# Patient Record
Sex: FEMALE | Race: WHITE | Employment: OTHER | URBAN - NONMETROPOLITAN AREA
[De-identification: names, ages, dates, MRNs, and addresses within clinical notes are randomized per-mention and may not be internally consistent; named-entity substitution may affect disease eponyms.]

---

## 2017-01-18 ENCOUNTER — OFFICE VISIT (OUTPATIENT)
Dept: PSYCHIATRY | Age: 57
End: 2017-01-18

## 2017-01-18 DIAGNOSIS — F31.32 BIPOLAR 1 DISORDER, DEPRESSED, MODERATE (HCC): Primary | ICD-10-CM

## 2017-01-18 PROCEDURE — 99213 OFFICE O/P EST LOW 20 MIN: CPT | Performed by: PSYCHIATRY & NEUROLOGY

## 2017-01-18 RX ORDER — CITALOPRAM 40 MG/1
40 TABLET ORAL DAILY
Qty: 30 TABLET | Refills: 3 | Status: SHIPPED | OUTPATIENT
Start: 2017-01-18 | End: 2017-03-28 | Stop reason: SDUPTHER

## 2017-02-15 ENCOUNTER — OFFICE VISIT (OUTPATIENT)
Dept: PSYCHIATRY | Age: 57
End: 2017-02-15

## 2017-02-15 DIAGNOSIS — F31.75 BIPOLAR DISORDER, IN PARTIAL REMISSION, MOST RECENT EPISODE DEPRESSED (HCC): Primary | ICD-10-CM

## 2017-02-15 PROCEDURE — 99213 OFFICE O/P EST LOW 20 MIN: CPT | Performed by: PSYCHIATRY & NEUROLOGY

## 2017-02-22 PROBLEM — D50.9 IRON DEFICIENCY ANEMIA: Status: ACTIVE | Noted: 2017-02-22

## 2017-02-22 PROBLEM — R07.9 CHEST PAIN AT REST: Status: ACTIVE | Noted: 2017-02-22

## 2017-03-07 RX ORDER — LAMOTRIGINE 200 MG/1
TABLET ORAL
Qty: 90 TABLET | Refills: 0 | Status: ON HOLD | OUTPATIENT
Start: 2017-03-07 | End: 2017-04-25 | Stop reason: SDUPTHER

## 2017-03-15 ENCOUNTER — OFFICE VISIT (OUTPATIENT)
Dept: PSYCHIATRY | Age: 57
End: 2017-03-15

## 2017-03-15 DIAGNOSIS — F31.75 BIPOLAR DISORDER, IN PARTIAL REMISSION, MOST RECENT EPISODE DEPRESSED (HCC): Primary | ICD-10-CM

## 2017-03-15 PROCEDURE — 99213 OFFICE O/P EST LOW 20 MIN: CPT | Performed by: PSYCHIATRY & NEUROLOGY

## 2017-03-28 RX ORDER — CITALOPRAM 40 MG/1
TABLET ORAL
Qty: 90 TABLET | Refills: 3 | Status: ON HOLD | OUTPATIENT
Start: 2017-03-28 | End: 2018-02-07 | Stop reason: HOSPADM

## 2017-04-19 ENCOUNTER — OFFICE VISIT (OUTPATIENT)
Dept: PSYCHIATRY | Age: 57
End: 2017-04-19

## 2017-04-19 DIAGNOSIS — F31.75 BIPOLAR DISORDER, IN PARTIAL REMISSION, MOST RECENT EPISODE DEPRESSED (HCC): Primary | ICD-10-CM

## 2017-04-19 PROCEDURE — 99213 OFFICE O/P EST LOW 20 MIN: CPT | Performed by: PSYCHIATRY & NEUROLOGY

## 2017-04-25 PROBLEM — R94.39 ABNORMAL NUCLEAR STRESS TEST: Status: ACTIVE | Noted: 2017-04-25

## 2017-04-25 PROBLEM — Z86.79 H/O CLASS II ANGINA PECTORIS: Status: ACTIVE | Noted: 2017-04-25

## 2017-05-09 RX ORDER — LITHIUM CARBONATE 300 MG/1
CAPSULE ORAL
Qty: 270 CAPSULE | Refills: 3 | Status: SHIPPED | OUTPATIENT
Start: 2017-05-09 | End: 2018-08-03 | Stop reason: SDUPTHER

## 2017-05-12 ENCOUNTER — TELEPHONE (OUTPATIENT)
Dept: PSYCHIATRY | Age: 57
End: 2017-05-12

## 2017-05-12 DIAGNOSIS — F31.75 BIPOLAR I, MOST RECENT EPISODE DEPRESSED, PARTIAL REMISSION (HCC): Primary | ICD-10-CM

## 2017-05-24 ENCOUNTER — OFFICE VISIT (OUTPATIENT)
Dept: PSYCHIATRY | Age: 57
End: 2017-05-24

## 2017-05-24 DIAGNOSIS — F31.75 BIPOLAR I, MOST RECENT EPISODE DEPRESSED, PARTIAL REMISSION (HCC): Primary | ICD-10-CM

## 2017-05-24 PROCEDURE — 99213 OFFICE O/P EST LOW 20 MIN: CPT | Performed by: PSYCHIATRY & NEUROLOGY

## 2017-05-24 RX ORDER — LAMOTRIGINE 200 MG/1
200 TABLET ORAL DAILY
Qty: 90 TABLET | Refills: 3 | Status: SHIPPED | OUTPATIENT
Start: 2017-05-24 | End: 2018-08-20 | Stop reason: SDUPTHER

## 2017-07-10 ENCOUNTER — TELEPHONE (OUTPATIENT)
Dept: PSYCHIATRY | Age: 57
End: 2017-07-10

## 2017-07-26 ENCOUNTER — HOSPITAL ENCOUNTER (OUTPATIENT)
Age: 57
Discharge: HOME | End: 2017-07-26
Payer: MEDICARE

## 2017-07-26 LAB
BASOPHILS # BLD: 0.9 %
BASOPHILS ABSOLUTE: 0 THOU/MM3 (ref 0–0.1)
EOSINOPHIL # BLD: 3.5 %
EOSINOPHILS ABSOLUTE: 0.2 THOU/MM3 (ref 0–0.4)
HCT VFR BLD CALC: 37.3 % (ref 37–47)
HEMOGLOBIN: 12.4 GM/DL (ref 12–16)
LYMPHOCYTES # BLD: 12.8 %
LYMPHOCYTES ABSOLUTE: 0.7 THOU/MM3 (ref 1–4.8)
MCH RBC QN AUTO: 32.6 PG (ref 27–31)
MCHC RBC AUTO-ENTMCNC: 33.3 GM/DL (ref 33–37)
MCV RBC AUTO: 97.8 FL (ref 81–99)
MONOCYTES # BLD: 8.6 %
MONOCYTES ABSOLUTE: 0.5 THOU/MM3 (ref 0.4–1.3)
NUCLEATED RED BLOOD CELLS: 0 /100 WBC
PDW BLD-RTO: 14 % (ref 11.5–14.5)
PLATELET # BLD: 189 THOU/MM3 (ref 130–400)
PMV BLD AUTO: 8.8 MCM (ref 7.4–10.4)
RBC # BLD: 3.81 MILL/MM3 (ref 4.2–5.4)
RBC # BLD: NORMAL 10*6/UL
SEG NEUTROPHILS: 74.2 %
SEGMENTED NEUTROPHILS ABSOLUTE COUNT: 3.9 THOU/MM3 (ref 1.8–7.7)
WBC # BLD: 5.3 THOU/MM3 (ref 4.8–10.8)

## 2017-07-26 PROCEDURE — 85025 COMPLETE CBC W/AUTO DIFF WBC: CPT

## 2017-07-26 PROCEDURE — 36415 COLL VENOUS BLD VENIPUNCTURE: CPT

## 2017-08-02 ENCOUNTER — OFFICE VISIT (OUTPATIENT)
Dept: PSYCHIATRY | Age: 57
End: 2017-08-02
Payer: MEDICARE

## 2017-08-02 DIAGNOSIS — F31.75 BIPOLAR I, MOST RECENT EPISODE DEPRESSED, PARTIAL REMISSION (HCC): Primary | ICD-10-CM

## 2017-08-02 PROCEDURE — 99213 OFFICE O/P EST LOW 20 MIN: CPT | Performed by: PSYCHIATRY & NEUROLOGY

## 2017-08-25 ENCOUNTER — HOSPITAL ENCOUNTER (OUTPATIENT)
Age: 57
Discharge: HOME | End: 2017-08-25
Payer: MEDICARE

## 2017-08-25 LAB
CREAT SERPL-MCNC: 0.6 MG/DL (ref 0.4–1.2)
GFR SERPL CREATININE-BSD FRML MDRD: > 90 ML/MIN/1.73M2

## 2017-08-25 PROCEDURE — 36415 COLL VENOUS BLD VENIPUNCTURE: CPT

## 2017-08-25 PROCEDURE — 82565 ASSAY OF CREATININE: CPT

## 2017-09-06 ENCOUNTER — HOSPITAL ENCOUNTER (OUTPATIENT)
Age: 57
Discharge: HOME | End: 2017-09-06
Payer: MEDICARE

## 2017-09-06 LAB
ANION GAP SERPL CALCULATED.3IONS-SCNC: 10 MEQ/L (ref 8–16)
BUN BLDV-MCNC: 10 MG/DL (ref 7–22)
CALCIUM SERPL-MCNC: 10 MG/DL (ref 8.5–10.5)
CHLORIDE BLD-SCNC: 107 MEQ/L (ref 98–111)
CO2: 25 MEQ/L (ref 23–33)
CREAT SERPL-MCNC: 0.4 MG/DL (ref 0.4–1.2)
GFR SERPL CREATININE-BSD FRML MDRD: > 90 ML/MIN/1.73M2
GLUCOSE BLD-MCNC: 87 MG/DL (ref 70–108)
POTASSIUM SERPL-SCNC: 4.4 MEQ/L (ref 3.5–5.2)
SODIUM BLD-SCNC: 142 MEQ/L (ref 135–145)

## 2017-09-06 PROCEDURE — 36415 COLL VENOUS BLD VENIPUNCTURE: CPT

## 2017-09-06 PROCEDURE — 80048 BASIC METABOLIC PNL TOTAL CA: CPT

## 2017-09-08 ENCOUNTER — HOSPITAL ENCOUNTER (OUTPATIENT)
Dept: MRI IMAGING | Age: 57
Discharge: HOME | End: 2017-09-08
Payer: MEDICARE

## 2017-09-08 DIAGNOSIS — G35 MULTIPLE SCLEROSIS (HCC): ICD-10-CM

## 2017-09-08 PROCEDURE — 70553 MRI BRAIN STEM W/O & W/DYE: CPT

## 2017-09-08 PROCEDURE — A9579 GAD-BASE MR CONTRAST NOS,1ML: HCPCS | Performed by: PSYCHIATRY & NEUROLOGY

## 2017-09-08 PROCEDURE — 6360000004 HC RX CONTRAST MEDICATION: Performed by: PSYCHIATRY & NEUROLOGY

## 2017-09-08 RX ADMIN — GADOTERIDOL 10 ML: 279.3 INJECTION, SOLUTION INTRAVENOUS at 18:36

## 2017-10-26 ENCOUNTER — HOSPITAL ENCOUNTER (OUTPATIENT)
Age: 57
Discharge: HOME | End: 2017-10-26
Payer: MEDICARE

## 2017-10-26 LAB
T4 FREE: 1.2 NG/DL (ref 0.93–1.76)
TSH SERPL DL<=0.05 MIU/L-ACNC: 0.4 UIU/ML (ref 0.4–4.2)

## 2017-10-26 PROCEDURE — 84443 ASSAY THYROID STIM HORMONE: CPT

## 2017-10-26 PROCEDURE — 84439 ASSAY OF FREE THYROXINE: CPT

## 2017-10-26 PROCEDURE — 36415 COLL VENOUS BLD VENIPUNCTURE: CPT

## 2017-11-01 ENCOUNTER — OFFICE VISIT (OUTPATIENT)
Dept: PSYCHIATRY | Age: 57
End: 2017-11-01
Payer: MEDICARE

## 2017-11-01 DIAGNOSIS — F31.75 BIPOLAR I, MOST RECENT EPISODE DEPRESSED, PARTIAL REMISSION (HCC): Primary | ICD-10-CM

## 2017-11-01 PROCEDURE — 99213 OFFICE O/P EST LOW 20 MIN: CPT | Performed by: PSYCHIATRY & NEUROLOGY

## 2017-12-04 ENCOUNTER — HOSPITAL ENCOUNTER (OUTPATIENT)
Dept: WOMENS IMAGING | Age: 57
Discharge: HOME | End: 2017-12-04
Payer: MEDICARE

## 2017-12-04 DIAGNOSIS — Z12.31 VISIT FOR SCREENING MAMMOGRAM: ICD-10-CM

## 2017-12-04 PROCEDURE — 77063 BREAST TOMOSYNTHESIS BI: CPT

## 2018-02-03 ENCOUNTER — HOSPITAL ENCOUNTER (INPATIENT)
Age: 58
LOS: 4 days | Discharge: HOME | DRG: 378 | End: 2018-02-07
Attending: EMERGENCY MEDICINE | Admitting: EMERGENCY MEDICINE
Payer: MEDICARE

## 2018-02-03 ENCOUNTER — NURSE TRIAGE (OUTPATIENT)
Dept: ADMINISTRATIVE | Age: 58
End: 2018-02-03

## 2018-02-03 ENCOUNTER — APPOINTMENT (OUTPATIENT)
Dept: CT IMAGING | Age: 58
DRG: 378 | End: 2018-02-03
Payer: MEDICARE

## 2018-02-03 DIAGNOSIS — R10.9 RIGHT SIDED ABDOMINAL PAIN: Primary | ICD-10-CM

## 2018-02-03 DIAGNOSIS — Z87.11 HISTORY OF GASTRIC ULCER: ICD-10-CM

## 2018-02-03 DIAGNOSIS — Z98.84 HISTORY OF BARIATRIC SURGERY: ICD-10-CM

## 2018-02-03 DIAGNOSIS — D64.89 ANEMIA DUE TO OTHER CAUSE, NOT CLASSIFIED: ICD-10-CM

## 2018-02-03 DIAGNOSIS — K92.1 GASTROINTESTINAL HEMORRHAGE WITH MELENA: ICD-10-CM

## 2018-02-03 PROBLEM — R10.11 RIGHT UPPER QUADRANT ABDOMINAL PAIN: Status: ACTIVE | Noted: 2018-02-03

## 2018-02-03 PROBLEM — K92.2 GI BLEEDING: Status: ACTIVE | Noted: 2018-02-03

## 2018-02-03 PROBLEM — R07.9 CHEST PAIN AT REST: Status: RESOLVED | Noted: 2017-02-22 | Resolved: 2018-02-03

## 2018-02-03 PROBLEM — D53.9 ANEMIA, MACROCYTIC: Status: ACTIVE | Noted: 2018-02-03

## 2018-02-03 LAB
ALBUMIN SERPL-MCNC: 4.3 G/DL (ref 3.5–5.1)
ALP BLD-CCNC: 94 U/L (ref 38–126)
ALT SERPL-CCNC: 38 U/L (ref 11–66)
AMORPHOUS: ABNORMAL
AMYLASE: 82 U/L (ref 20–104)
ANION GAP SERPL CALCULATED.3IONS-SCNC: 14 MEQ/L (ref 8–16)
ANISOCYTOSIS: ABNORMAL
AST SERPL-CCNC: 24 U/L (ref 5–40)
BACTERIA: ABNORMAL /HPF
BASOPHILS # BLD: 0.9 %
BASOPHILS ABSOLUTE: 0 THOU/MM3 (ref 0–0.1)
BILIRUB SERPL-MCNC: 0.2 MG/DL (ref 0.3–1.2)
BILIRUBIN DIRECT: < 0.2 MG/DL (ref 0–0.3)
BILIRUBIN URINE: NEGATIVE
BLOOD, URINE: ABNORMAL
BUN BLDV-MCNC: 12 MG/DL (ref 7–22)
CALCIUM SERPL-MCNC: 9.6 MG/DL (ref 8.5–10.5)
CASTS 2: ABNORMAL /LPF
CASTS UA: ABNORMAL /LPF
CHARACTER, URINE: ABNORMAL
CHLORIDE BLD-SCNC: 103 MEQ/L (ref 98–111)
CO2: 23 MEQ/L (ref 23–33)
COLOR: YELLOW
CREAT SERPL-MCNC: 0.7 MG/DL (ref 0.4–1.2)
CRYSTALS, UA: ABNORMAL
EOSINOPHIL # BLD: 2.7 %
EOSINOPHILS ABSOLUTE: 0.1 THOU/MM3 (ref 0–0.4)
EPITHELIAL CELLS, UA: ABNORMAL /HPF
GFR SERPL CREATININE-BSD FRML MDRD: 86 ML/MIN/1.73M2
GLUCOSE BLD-MCNC: 97 MG/DL (ref 70–108)
GLUCOSE URINE: NEGATIVE MG/DL
HCT VFR BLD CALC: 20.4 % (ref 37–47)
HCT VFR BLD CALC: 27.3 % (ref 37–47)
HEMOCCULT STL QL: POSITIVE
HEMOGLOBIN: 6.6 GM/DL (ref 12–16)
HEMOGLOBIN: 8.8 GM/DL (ref 12–16)
INR BLD: 1.07 (ref 0.85–1.13)
KETONES, URINE: ABNORMAL
LEUKOCYTE ESTERASE, URINE: ABNORMAL
LIPASE: 25.6 U/L (ref 5.6–51.3)
LYMPHOCYTES # BLD: 12.8 %
LYMPHOCYTES ABSOLUTE: 0.7 THOU/MM3 (ref 1–4.8)
MACROCYTES: ABNORMAL
MCH RBC QN AUTO: 32.8 PG (ref 27–31)
MCHC RBC AUTO-ENTMCNC: 32.3 GM/DL (ref 33–37)
MCV RBC AUTO: 101.5 FL (ref 81–99)
MISCELLANEOUS 2: ABNORMAL
MONOCYTES # BLD: 9 %
MONOCYTES ABSOLUTE: 0.5 THOU/MM3 (ref 0.4–1.3)
NITRITE, URINE: NEGATIVE
NUCLEATED RED BLOOD CELLS: 0 /100 WBC
OSMOLALITY CALCULATION: 279.1 MOSMOL/KG (ref 275–300)
PDW BLD-RTO: 14.8 % (ref 11.5–14.5)
PH UA: 7
PLATELET # BLD: 337 THOU/MM3 (ref 130–400)
PMV BLD AUTO: 8.2 FL (ref 7.4–10.4)
POTASSIUM SERPL-SCNC: 4.2 MEQ/L (ref 3.5–5.2)
PROTEIN UA: NEGATIVE
RBC # BLD: 2.69 MILL/MM3 (ref 4.2–5.4)
RBC URINE: ABNORMAL /HPF
RENAL EPITHELIAL, UA: ABNORMAL
SEG NEUTROPHILS: 74.6 %
SEGMENTED NEUTROPHILS ABSOLUTE COUNT: 4 THOU/MM3 (ref 1.8–7.7)
SODIUM BLD-SCNC: 140 MEQ/L (ref 135–145)
SPECIFIC GRAVITY, URINE: 1.01 (ref 1–1.03)
TOTAL PROTEIN: 6.2 G/DL (ref 6.1–8)
UROBILINOGEN, URINE: 0.2 EU/DL
WBC # BLD: 5.3 THOU/MM3 (ref 4.8–10.8)
WBC UA: ABNORMAL /HPF
YEAST: ABNORMAL

## 2018-02-03 PROCEDURE — 6360000004 HC RX CONTRAST MEDICATION: Performed by: EMERGENCY MEDICINE

## 2018-02-03 PROCEDURE — 96361 HYDRATE IV INFUSION ADD-ON: CPT

## 2018-02-03 PROCEDURE — 2580000003 HC RX 258: Performed by: NURSE PRACTITIONER

## 2018-02-03 PROCEDURE — 2580000003 HC RX 258: Performed by: EMERGENCY MEDICINE

## 2018-02-03 PROCEDURE — 80053 COMPREHEN METABOLIC PANEL: CPT

## 2018-02-03 PROCEDURE — 82150 ASSAY OF AMYLASE: CPT

## 2018-02-03 PROCEDURE — 85610 PROTHROMBIN TIME: CPT

## 2018-02-03 PROCEDURE — G0378 HOSPITAL OBSERVATION PER HR: HCPCS

## 2018-02-03 PROCEDURE — 85018 HEMOGLOBIN: CPT

## 2018-02-03 PROCEDURE — 36415 COLL VENOUS BLD VENIPUNCTURE: CPT

## 2018-02-03 PROCEDURE — 96374 THER/PROPH/DIAG INJ IV PUSH: CPT

## 2018-02-03 PROCEDURE — 82248 BILIRUBIN DIRECT: CPT

## 2018-02-03 PROCEDURE — 6370000000 HC RX 637 (ALT 250 FOR IP): Performed by: NURSE PRACTITIONER

## 2018-02-03 PROCEDURE — 87081 CULTURE SCREEN ONLY: CPT

## 2018-02-03 PROCEDURE — 86850 RBC ANTIBODY SCREEN: CPT

## 2018-02-03 PROCEDURE — 86900 BLOOD TYPING SEROLOGIC ABO: CPT

## 2018-02-03 PROCEDURE — 96375 TX/PRO/DX INJ NEW DRUG ADDON: CPT

## 2018-02-03 PROCEDURE — 86901 BLOOD TYPING SEROLOGIC RH(D): CPT

## 2018-02-03 PROCEDURE — 74177 CT ABD & PELVIS W/CONTRAST: CPT

## 2018-02-03 PROCEDURE — 87086 URINE CULTURE/COLONY COUNT: CPT

## 2018-02-03 PROCEDURE — 83690 ASSAY OF LIPASE: CPT

## 2018-02-03 PROCEDURE — 99285 EMERGENCY DEPT VISIT HI MDM: CPT

## 2018-02-03 PROCEDURE — P9016 RBC LEUKOCYTES REDUCED: HCPCS

## 2018-02-03 PROCEDURE — C9113 INJ PANTOPRAZOLE SODIUM, VIA: HCPCS | Performed by: EMERGENCY MEDICINE

## 2018-02-03 PROCEDURE — 87641 MR-STAPH DNA AMP PROBE: CPT

## 2018-02-03 PROCEDURE — 86923 COMPATIBILITY TEST ELECTRIC: CPT

## 2018-02-03 PROCEDURE — 82272 OCCULT BLD FECES 1-3 TESTS: CPT

## 2018-02-03 PROCEDURE — 81001 URINALYSIS AUTO W/SCOPE: CPT

## 2018-02-03 PROCEDURE — 85025 COMPLETE CBC W/AUTO DIFF WBC: CPT

## 2018-02-03 PROCEDURE — 99223 1ST HOSP IP/OBS HIGH 75: CPT | Performed by: NURSE PRACTITIONER

## 2018-02-03 PROCEDURE — 2060000000 HC ICU INTERMEDIATE R&B

## 2018-02-03 PROCEDURE — 6360000002 HC RX W HCPCS: Performed by: EMERGENCY MEDICINE

## 2018-02-03 PROCEDURE — 85014 HEMATOCRIT: CPT

## 2018-02-03 RX ORDER — ONDANSETRON 2 MG/ML
4 INJECTION INTRAMUSCULAR; INTRAVENOUS EVERY 6 HOURS PRN
Status: DISCONTINUED | OUTPATIENT
Start: 2018-02-03 | End: 2018-02-07 | Stop reason: HOSPADM

## 2018-02-03 RX ORDER — FERROUS SULFATE 325(65) MG
325 TABLET ORAL
Status: DISCONTINUED | OUTPATIENT
Start: 2018-02-04 | End: 2018-02-06

## 2018-02-03 RX ORDER — 0.9 % SODIUM CHLORIDE 0.9 %
250 INTRAVENOUS SOLUTION INTRAVENOUS ONCE
Status: DISCONTINUED | OUTPATIENT
Start: 2018-02-03 | End: 2018-02-07 | Stop reason: HOSPADM

## 2018-02-03 RX ORDER — LAMOTRIGINE 100 MG/1
200 TABLET ORAL DAILY
Status: DISCONTINUED | OUTPATIENT
Start: 2018-02-04 | End: 2018-02-07 | Stop reason: HOSPADM

## 2018-02-03 RX ORDER — PANTOPRAZOLE SODIUM 40 MG/10ML
40 INJECTION, POWDER, LYOPHILIZED, FOR SOLUTION INTRAVENOUS 2 TIMES DAILY
Status: DISCONTINUED | OUTPATIENT
Start: 2018-02-04 | End: 2018-02-06

## 2018-02-03 RX ORDER — PANTOPRAZOLE SODIUM 40 MG/10ML
40 INJECTION, POWDER, LYOPHILIZED, FOR SOLUTION INTRAVENOUS EVERY 8 HOURS
Status: DISCONTINUED | OUTPATIENT
Start: 2018-02-03 | End: 2018-02-03

## 2018-02-03 RX ORDER — SODIUM CHLORIDE 0.9 % (FLUSH) 0.9 %
10 SYRINGE (ML) INJECTION EVERY 12 HOURS SCHEDULED
Status: DISCONTINUED | OUTPATIENT
Start: 2018-02-03 | End: 2018-02-06 | Stop reason: SDUPTHER

## 2018-02-03 RX ORDER — PANTOPRAZOLE SODIUM 40 MG/10ML
40 INJECTION, POWDER, LYOPHILIZED, FOR SOLUTION INTRAVENOUS ONCE
Status: COMPLETED | OUTPATIENT
Start: 2018-02-03 | End: 2018-02-03

## 2018-02-03 RX ORDER — SODIUM CHLORIDE 0.9 % (FLUSH) 0.9 %
10 SYRINGE (ML) INJECTION PRN
Status: DISCONTINUED | OUTPATIENT
Start: 2018-02-03 | End: 2018-02-03

## 2018-02-03 RX ORDER — ACETAMINOPHEN 325 MG/1
650 TABLET ORAL EVERY 4 HOURS PRN
Status: DISCONTINUED | OUTPATIENT
Start: 2018-02-03 | End: 2018-02-07 | Stop reason: HOSPADM

## 2018-02-03 RX ORDER — NITROGLYCERIN 0.4 MG/1
0.4 TABLET SUBLINGUAL EVERY 5 MIN PRN
Status: DISCONTINUED | OUTPATIENT
Start: 2018-02-03 | End: 2018-02-07 | Stop reason: HOSPADM

## 2018-02-03 RX ORDER — SODIUM CHLORIDE 0.9 % (FLUSH) 0.9 %
10 SYRINGE (ML) INJECTION EVERY 12 HOURS SCHEDULED
Status: DISCONTINUED | OUTPATIENT
Start: 2018-02-03 | End: 2018-02-03

## 2018-02-03 RX ORDER — SODIUM CHLORIDE 9 MG/ML
INJECTION, SOLUTION INTRAVENOUS CONTINUOUS
Status: DISCONTINUED | OUTPATIENT
Start: 2018-02-03 | End: 2018-02-07 | Stop reason: HOSPADM

## 2018-02-03 RX ORDER — LEVOTHYROXINE SODIUM 0.12 MG/1
125 TABLET ORAL DAILY
Status: DISCONTINUED | OUTPATIENT
Start: 2018-02-04 | End: 2018-02-07 | Stop reason: HOSPADM

## 2018-02-03 RX ORDER — LITHIUM CARBONATE 300 MG/1
600 CAPSULE ORAL NIGHTLY
Status: DISCONTINUED | OUTPATIENT
Start: 2018-02-03 | End: 2018-02-07 | Stop reason: HOSPADM

## 2018-02-03 RX ORDER — SODIUM CHLORIDE 0.9 % (FLUSH) 0.9 %
10 SYRINGE (ML) INJECTION PRN
Status: DISCONTINUED | OUTPATIENT
Start: 2018-02-03 | End: 2018-02-06 | Stop reason: SDUPTHER

## 2018-02-03 RX ORDER — DIMETHYL FUMARATE 240 MG/1
240 CAPSULE ORAL 2 TIMES DAILY
Status: DISCONTINUED | OUTPATIENT
Start: 2018-02-03 | End: 2018-02-07 | Stop reason: HOSPADM

## 2018-02-03 RX ORDER — KETOROLAC TROMETHAMINE 30 MG/ML
30 INJECTION, SOLUTION INTRAMUSCULAR; INTRAVENOUS ONCE
Status: COMPLETED | OUTPATIENT
Start: 2018-02-03 | End: 2018-02-03

## 2018-02-03 RX ORDER — LITHIUM CARBONATE 300 MG/1
300 CAPSULE ORAL DAILY
Status: DISCONTINUED | OUTPATIENT
Start: 2018-02-04 | End: 2018-02-07 | Stop reason: HOSPADM

## 2018-02-03 RX ADMIN — SODIUM CHLORIDE: 9 INJECTION, SOLUTION INTRAVENOUS at 21:47

## 2018-02-03 RX ADMIN — KETOROLAC TROMETHAMINE 30 MG: 30 INJECTION, SOLUTION INTRAMUSCULAR at 16:33

## 2018-02-03 RX ADMIN — SODIUM CHLORIDE: 9 INJECTION, SOLUTION INTRAVENOUS at 16:25

## 2018-02-03 RX ADMIN — Medication 10 ML: at 21:58

## 2018-02-03 RX ADMIN — LITHIUM CARBONATE 600 MG: 300 CAPSULE ORAL at 21:58

## 2018-02-03 RX ADMIN — PANTOPRAZOLE SODIUM 40 MG: 40 INJECTION, POWDER, FOR SOLUTION INTRAVENOUS at 20:08

## 2018-02-03 RX ADMIN — IOPAMIDOL 85 ML: 755 INJECTION, SOLUTION INTRAVENOUS at 18:55

## 2018-02-03 RX ADMIN — DIMETHYL FUMARATE 240 MG: 240 CAPSULE ORAL at 22:46

## 2018-02-03 ASSESSMENT — PAIN DESCRIPTION - DESCRIPTORS
DESCRIPTORS: ACHING
DESCRIPTORS: ACHING

## 2018-02-03 ASSESSMENT — PAIN SCALES - GENERAL
PAINLEVEL_OUTOF10: 7
PAINLEVEL_OUTOF10: 2
PAINLEVEL_OUTOF10: 3
PAINLEVEL_OUTOF10: 8
PAINLEVEL_OUTOF10: 3
PAINLEVEL_OUTOF10: 0

## 2018-02-03 ASSESSMENT — PAIN DESCRIPTION - ORIENTATION
ORIENTATION: RIGHT;LOWER

## 2018-02-03 ASSESSMENT — PAIN DESCRIPTION - LOCATION
LOCATION: ABDOMEN

## 2018-02-03 ASSESSMENT — PAIN DESCRIPTION - FREQUENCY
FREQUENCY: INTERMITTENT
FREQUENCY: INTERMITTENT

## 2018-02-03 ASSESSMENT — ENCOUNTER SYMPTOMS
EYE PAIN: 0
RHINORRHEA: 0
ABDOMINAL PAIN: 1
COUGH: 0
BACK PAIN: 0
SHORTNESS OF BREATH: 0
VOMITING: 0
SORE THROAT: 0
DIARRHEA: 0
WHEEZING: 0
EYE DISCHARGE: 0
NAUSEA: 0
CONSTIPATION: 1

## 2018-02-03 ASSESSMENT — PAIN DESCRIPTION - PAIN TYPE
TYPE: ACUTE PAIN

## 2018-02-03 ASSESSMENT — PAIN DESCRIPTION - PROGRESSION
CLINICAL_PROGRESSION: NOT CHANGED
CLINICAL_PROGRESSION: GRADUALLY WORSENING

## 2018-02-03 NOTE — ED PROVIDER NOTES
[id number] [id number]   eMERGENCY dEPARTMENT eNCOUnter        CHIEF COMPLAINT    Chief Complaint   Patient presents with    Abdominal Pain     right isde       Nurses Notes reviewed and I agree except as noted in the HPI. HPI    Jeny Parker is a 62 y.o. female who presents for evaluation of abdominal pain. The patient reports that yesterday afternoon she noted some right sided tenderness that has gradually gotten worse. She rates the pain at a 8/10 in severity and describes it as aching in character. She states that she has also been constipated and tried suppositories and enemas with no relief. Patient's stool is hard and dark and this morning it is melanotic. She had history of ulcer in the past and has been seeing Dr. Darin Willard. She denies any dysuria, hematuria, nausea, vomiting, or chills. She has had her appendix, and gall bladder removed, and has had a hysterectomy. Her last BM was today, but it was small and hard. Patient denies any further complaints at initial encounter. REVIEW OF SYSTEMS    Review of Systems   Constitutional: Negative for appetite change, chills, fatigue and fever. HENT: Negative for congestion, ear pain, rhinorrhea and sore throat. Eyes: Negative for pain, discharge and visual disturbance. Respiratory: Negative for cough, shortness of breath and wheezing. Cardiovascular: Negative for chest pain, palpitations and leg swelling. Gastrointestinal: Positive for abdominal pain and constipation. Negative for diarrhea, nausea and vomiting. Genitourinary: Negative for difficulty urinating, dysuria and vaginal discharge. Musculoskeletal: Negative for arthralgias, back pain, joint swelling and neck pain. Skin: Negative for pallor and rash. Neurological: Negative for dizziness, syncope, weakness, light-headedness and headaches. Hematological: Negative for adenopathy.    Psychiatric/Behavioral: Negative for confusion, sister; Colon Polyps in her mother; Glaucoma in her father; Heart Disease in her father. SOCIAL HISTORY     reports that she has never smoked. She has never used smokeless tobacco. She reports that she does not drink alcohol or use drugs. PHYSICAL EXAM      INITIAL VITALS: /64   Pulse 59   Temp 97.7 °F (36.5 °C) (Oral)   Resp 16   Ht 4' 10\" (1.473 m)   Wt 120 lb (54.4 kg)   SpO2 99%   BMI 25.08 kg/m²  Estimated body mass index is 25.08 kg/m² as calculated from the following:    Height as of this encounter: 4' 10\" (1.473 m). Weight as of this encounter: 120 lb (54.4 kg). Physical Exam   Constitutional: She is oriented to person, place, and time. She appears well-developed and well-nourished. HENT:   Head: Normocephalic and atraumatic. Right Ear: External ear normal.   Left Ear: External ear normal.   Eyes: Conjunctivae are normal. Right eye exhibits no discharge. Left eye exhibits no discharge. No scleral icterus. Neck: Normal range of motion. Neck supple. No JVD present. Cardiovascular: Normal rate, regular rhythm and normal heart sounds. Exam reveals no gallop and no friction rub. No murmur heard. Pulmonary/Chest: Effort normal. No stridor. No respiratory distress. She has no wheezes. She has no rales. Abdominal: Soft. She exhibits no distension. There is tenderness. There is CVA tenderness (right). Right lateral abdominal pain   Genitourinary:   Genitourinary Comments: Rectal examination was done with the nurse RN Shaista Mccarty, good sprinter tone and empty rectal vault, there is brown-black scanty material for examination finger. No masses no tenderness   Musculoskeletal: Normal range of motion. She exhibits no edema. Neurological: She is alert and oriented to person, place, and time. She exhibits normal muscle tone. GCS eye subscore is 4. GCS verbal subscore is 5. GCS motor subscore is 6. Skin: Skin is warm and dry. She is not diaphoretic. No erythema.    Psychiatric: She has a normal mood and affect. Her behavior is normal.   Nursing note and vitals reviewed. MEDICAL DECISION MAKING    DIFFERENTIAL DIAGNOSIS:  UTI, kidney stones, pyelonephritis, bowel obstruction   History of bariatric surgery B[place]-en-Y      DIAGNOSTIC RESULTS      RADIOLOGY:  I have reviewed radiologic plain film image(s). The plain films will be read or overread by the radiologist.  All other non-plain film images(s) such as CT, Ultrasound and MRI have been read by the radiologist.  [address] Additional Contrast? Oral   Final Result      There is a nonspecific, dilated loop of small bowel within the midabdomen. No other surrounding changes are identified however, early small bowel obstruction is not completely excluded. No other acute abnormality is identified. **This report has been created using voice recognition software. It may contain minor errors which are inherent in voice recognition technology. **      Final report electronically signed by Dr. Melinda Casillas on 2/3/2018 7:21 PM          LABS:   Labs Reviewed   CBC WITH AUTO DIFFERENTIAL - Abnormal; Notable for the following:        Result Value    RBC 2.69 (*)     Hemoglobin 8.8 (*)     Hematocrit 27.3 (*)     .5 (*)     MCH 32.8 (*)     MCHC 32.3 (*)     RDW 14.8 (*)     Lymphocytes # 0.7 (*)     All other components within normal limits   HEPATIC FUNCTION PANEL - Abnormal; Notable for the following:      Total Bilirubin 0.2 (*)     All other components within normal limits   GLOMERULAR FILTRATION RATE, ESTIMATED - Abnormal; Notable for the following:     Est, Glom Filt Rate 86 (*)     All other components within normal limits   URINE WITH REFLEXED MICRO - Abnormal; Notable for the following:     Ketones, Urine TRACE (*)     Blood, Urine MODERATE (*)     Leukocyte Esterase, Urine SMALL (*)     Character, Urine CLOUDY (*)     All other components within normal limits   URINE CULTURE, REFLEXED    Narrative: Source: urine, clean catch       Site:           Current Antibiotics: none   BASIC METABOLIC PANEL   LIPASE   AMYLASE   ANION GAP   OSMOLALITY   BLOOD OCCULT STOOL SCREEN #1     All other unresulted laboratory test above are normal:    Vitals:    Vitals:    02/03/18 1547 02/03/18 1702 02/03/18 1708 02/03/18 1837   BP: (!) 132/54 124/64 124/64 124/64   Pulse: 69  58 59   Resp: 18 18 16   Temp: 97.7 °F (36.5 °C)      TempSrc: Oral      SpO2: 93% 99% 100% 99%   Weight: 120 lb (54.4 kg)      Height: 4' 10\" (1.473 m)          EMERGENCY DEPARTMENT COURSE:    Medications   0.9 % sodium chloride infusion ( Intravenous Stopped 2/3/18 1927)   pantoprazole (PROTONIX) injection 40 mg (not administered)   pantoprazole (PROTONIX) 80 mg in sodium chloride 0.9 % 100 mL infusion (not administered)   ketorolac (TORADOL) injection 30 mg (30 mg Intravenous Given 2/3/18 1633)   iopamidol (ISOVUE-370) 76 % injection 85 mL (85 mLs Intravenous Given 2/3/18 1855)       The pt was seen and evaluated by me. Within the department, I observed the pt's vital signs to be within acceptable range. Laboratory and Radiological studies were performed, results were reviewed with the patient. Within the department, the pt was treated with IV fluid hydration, Protonix, Toradol for pain. I observed the pt's condition to be hemodynamically stable during the duration of their stay. I explained my proposed course of treatment to the pt, and she is amenable to my decision. The case was referred to the hospitalist services, Dr. Wiley Villalobos graciously admitted the patient. Dr. Kip Dudley was likewise consulted from gastroenterology services. CRITICAL CARE:   None. CONSULTS:  Dr Sera Monzon:  None. FINAL IMPRESSION       1. Right sided abdominal pain    2. Gastrointestinal hemorrhage with melena    3. Anemia due to other cause, not classified    4. History of gastric ulcer    5.  History of bariatric surgery

## 2018-02-04 ENCOUNTER — APPOINTMENT (OUTPATIENT)
Dept: GENERAL RADIOLOGY | Age: 58
DRG: 378 | End: 2018-02-04
Payer: MEDICARE

## 2018-02-04 LAB
ABO: NORMAL
ANTIBODY SCREEN: NORMAL
HCT VFR BLD CALC: 24.1 % (ref 37–47)
HCT VFR BLD CALC: 26.4 % (ref 37–47)
HEMOGLOBIN: 8 GM/DL (ref 12–16)
HEMOGLOBIN: 8.5 GM/DL (ref 12–16)
MRSA SCREEN RT-PCR: NEGATIVE
ORGANISM: ABNORMAL
RH FACTOR: NORMAL
URINE CULTURE REFLEX: ABNORMAL

## 2018-02-04 PROCEDURE — 74018 RADEX ABDOMEN 1 VIEW: CPT

## 2018-02-04 PROCEDURE — 2060000000 HC ICU INTERMEDIATE R&B

## 2018-02-04 PROCEDURE — 85018 HEMOGLOBIN: CPT

## 2018-02-04 PROCEDURE — 2580000003 HC RX 258: Performed by: EMERGENCY MEDICINE

## 2018-02-04 PROCEDURE — G0378 HOSPITAL OBSERVATION PER HR: HCPCS

## 2018-02-04 PROCEDURE — 2580000003 HC RX 258: Performed by: NURSE PRACTITIONER

## 2018-02-04 PROCEDURE — 6360000002 HC RX W HCPCS: Performed by: NURSE PRACTITIONER

## 2018-02-04 PROCEDURE — 96376 TX/PRO/DX INJ SAME DRUG ADON: CPT

## 2018-02-04 PROCEDURE — 36430 TRANSFUSION BLD/BLD COMPNT: CPT

## 2018-02-04 PROCEDURE — 85014 HEMATOCRIT: CPT

## 2018-02-04 PROCEDURE — P9016 RBC LEUKOCYTES REDUCED: HCPCS

## 2018-02-04 PROCEDURE — C9113 INJ PANTOPRAZOLE SODIUM, VIA: HCPCS | Performed by: NURSE PRACTITIONER

## 2018-02-04 PROCEDURE — 6370000000 HC RX 637 (ALT 250 FOR IP): Performed by: NURSE PRACTITIONER

## 2018-02-04 PROCEDURE — 99232 SBSQ HOSP IP/OBS MODERATE 35: CPT | Performed by: INTERNAL MEDICINE

## 2018-02-04 PROCEDURE — 36415 COLL VENOUS BLD VENIPUNCTURE: CPT

## 2018-02-04 RX ADMIN — Medication 10 ML: at 21:15

## 2018-02-04 RX ADMIN — LAMOTRIGINE 200 MG: 100 TABLET ORAL at 07:54

## 2018-02-04 RX ADMIN — FERROUS SULFATE TAB 325 MG (65 MG ELEMENTAL FE) 325 MG: 325 (65 FE) TAB at 07:54

## 2018-02-04 RX ADMIN — DIMETHYL FUMARATE 240 MG: 240 CAPSULE ORAL at 07:16

## 2018-02-04 RX ADMIN — PANTOPRAZOLE SODIUM 40 MG: 40 INJECTION, POWDER, FOR SOLUTION INTRAVENOUS at 07:10

## 2018-02-04 RX ADMIN — LITHIUM CARBONATE 600 MG: 300 CAPSULE ORAL at 21:14

## 2018-02-04 RX ADMIN — SODIUM CHLORIDE: 9 INJECTION, SOLUTION INTRAVENOUS at 22:30

## 2018-02-04 RX ADMIN — SODIUM CHLORIDE: 9 INJECTION, SOLUTION INTRAVENOUS at 12:02

## 2018-02-04 RX ADMIN — LITHIUM CARBONATE 300 MG: 300 CAPSULE ORAL at 07:54

## 2018-02-04 RX ADMIN — DIMETHYL FUMARATE 240 MG: 240 CAPSULE ORAL at 21:14

## 2018-02-04 RX ADMIN — PANTOPRAZOLE SODIUM 40 MG: 40 INJECTION, POWDER, FOR SOLUTION INTRAVENOUS at 21:15

## 2018-02-04 RX ADMIN — LEVOTHYROXINE SODIUM 125 MCG: 125 TABLET ORAL at 07:14

## 2018-02-04 NOTE — CONSULTS
Allergies:  Latex and Sulfa antibiotics  Home Meds:  Prior to Admission medications    Medication Sig Start Date End Date Taking? Authorizing Provider   psyllium (KONSYL) 28.3 % PACK Take 1 packet by mouth daily   Yes Historical Provider, MD   aspirin 81 MG tablet Take 81 mg by mouth daily   Yes Historical Provider, MD   lamoTRIgine (LAMICTAL) 200 MG tablet Take 1 tablet by mouth daily 5/24/17  Yes Judy Segovia MD   lithium 300 MG capsule TAKE 1 CAPSULE BY MOUTH EVERY MORNING AND 2 CAPSULES BY MOUTH EVERY EVENING. 5/9/17  Yes Judy Segovia MD   dimethyl fumarate (TECFIDERA) 240 MG delayed release capsule Take 240 mg by mouth 2 times daily   Yes Historical Provider, MD   levothyroxine (SYNTHROID) 125 MCG tablet Take 125 mcg by mouth Daily   Yes Historical Provider, MD   citalopram (CELEXA) 40 MG tablet TAKE 1 TABLET BY MOUTH DAILY 3/28/17  Yes Judy Segovia MD   Prenatal MV-Min-Fe Fum-FA-DHA (PRENATAL 1 PO) Take by mouth   Yes Historical Provider, MD   ferrous sulfate 325 (65 FE) MG tablet Take 325 mg by mouth daily (with breakfast)   Yes Historical Provider, MD   nitroGLYCERIN (NITROSTAT) 0.4 MG SL tablet up to max of 3 total doses.  If no relief after 1 dose, call 911. 2/22/17   Marcelle Travis MD      Current Meds:     Current Facility-Administered Medications   Medication Dose Route Frequency Provider Last Rate Last Dose    0.9 % sodium chloride infusion   Intravenous Continuous Duy MD Juan Carlos 100 mL/hr at 02/03/18 2147      dimethyl fumarate (TECFIDERA) delayed release capsule 240 mg  240 mg Oral BID Rosario Patel CNP   240 mg at 02/04/18 0716    ferrous sulfate tablet 325 mg  325 mg Oral Daily with breakfast Rosario Patel CNP   325 mg at 02/04/18 0754    lamoTRIgine (LAMICTAL) tablet 200 mg  200 mg Oral Daily Rosario Patel CNP   200 mg at 02/04/18 0754    levothyroxine (SYNTHROID) tablet 125 mcg  125 mcg Oral Daily Rosario Salter

## 2018-02-04 NOTE — ED NOTES
Pt returned from CT, VS reassessed and stable. Pt updated on POC and verbalizes no further needs, questions, or concerns at this time. Will continue to monitor.      Miquel Pryor RN  02/03/18 1929

## 2018-02-04 NOTE — FLOWSHEET NOTE
02/03/18 2238   Provider Notification   Reason for Communication Evaluate   Provider Name Chante Gonsales    Provider Notification Nurse Practitioner   Method of Communication Secure Message   Response See orders   Notification Time 1802     Critical value of hemoglobin of 6.6. 1 unit of blood ordered along with type and screen. Pt educated. and blood consent signed.

## 2018-02-04 NOTE — OP NOTE
[facility]    Patient: Reena Su  : 1960  Acct#: [de-identified]  Date of evaluation: 2018  Primary Care Physician: Pauline Sharpe CNP     Procedure:   []EGD    [x]Enteroscopy    []Biopsy   []Dilation      []PEG    []PEG change    []PEG removal  []Variceal banding    []Control of bleeding  []Destruction of lesion by [facility]    []Stent Placement  []Foreign Body Removal    []Snare Polypectomy  []Other:     []Aborted:        []Colonoscopy  []Flex Sigmoid []EUS, rectal     []Biopsy   []Snare Polypectomy    []Control of bleeding  []Destruction of lesion by [facility]    []Stent Placement  []Foreign Body Removal    []Dilation    []Other:    []Aborted:   []Tattoo    Indication:  Melena, abdominal pain     History:  The patient is a 62 y.o.  female admitted 2/3/18 for melena and abdominal pain. She has a RYGB in  and has a history of anastomotic ulcers. Pain is epigastric and RUQ. She is s/p remote manju and appy. Pain began yesterday as did intermittent melena. She is on an iron supplement but has not changed it and it has not made her stool black in the past.  She did have a large hard BM before this started. She had an episode like this in ~ and had some anastomotic ulcers. Physical Exam:  VITALS: BP (!) 121/56   Pulse 61   Temp 98.5 °F (36.9 °C) (Oral)   Resp 18   Ht 4' 10\" (1.473 m)   Wt 127 lb (57.6 kg)   SpO2 99%   BMI 26.54 kg/m²   The patient is a 62 y.o.  female with Body mass index is 26.54 kg/m². in no acute distress. HEAD: Normal cephalic/atraumatic. Extra-occular motions intact bilaterally. NECK: No lymphadenopathy or bruits. CHEST: Rises equally on inspiration. Clear to auscultation bilaterally. CARDIOVASCULAR: Regular rate and rhythm without murmurs, rubs or gallops. ABDOMEN: Soft and nondistended with normal bowel sounds. No Hepatosplemomegaly.               Tender:  RUQ and epigastrium  UPPER EXTREMITIES: no cyanosis, clubbing, or edema. R arm congenitally short  DERM: no rash or jaundice. LOWER EXTREMITIES: no cyanosis, clubbing, or edema. NEURO: Alert and oriented times four. Patient moves all extremities and has gross sensation in all extremities. ASA: ASA 2 - Patient with mild systemic disease with no functional limitations    MEDICATION:    MAC/Propofol/Anesthesia:  Yes     Photo:  Yes  Biopsy:  No      Description of Procedure: The risks and benefits of the procedure were described to the patient or their representative if unable to give consent including but not limited to bleeding, infection, poking a hole someplace requiring surgery to fix it, having a reaction to medication, and death. The patient  or their representative if unable to give consent understood these risks and provided informed consent. Airway was assessed and is adequate for the planned sedation. Anesthesia: MAC  Estimated Blood Loss: less than 50   Complications: None  Specimens: Was Not Obtained     Sedation was administered by anesthesia who monitored the patient during the procedure. The patient was placed in the left lateral decubitus position. A forward-viewing Olympus pediatric colonoscope was lubricated and inserted through the mouth into the oropharynx. Under direct visualization, the upper esophagus was intubated. The scope was advanced to the esophagus and stomach to second portion of duodenum and then as far as possible with push enteroscopy. Scope was slowly withdrawn with good views of mucosal surfaces. The scope was retroflexed in the fundus. Findings and maneuvers are listed in impression below. The patient tolerated the procedure well. The scope was removed. There were no immediate complications. IMPRESSION:  1. Esophagus - normal   2. Stomach - s/p RYGB with normal upper anastomosis  3.  Duodenum - unable to visualize given RYGB  4. Jejunum - normal, unable to reach lower anastomosis

## 2018-02-04 NOTE — ED NOTES
Pt transported to [facility]  by cart in stable condition. IV is patent. Spoke with 4K RN.         Pantera Parrish  02/03/18 2007

## 2018-02-04 NOTE — PROGRESS NOTES
--    --    HGB  8.8*  6.6*  8.5*   HCT  27.3*  20.4*  26.4*   PLT  337   --    --      Recent Labs      02/03/18   1624   NA  140   K  4.2   CL  103   CO2  23   BUN  12   CREATININE  0.7   CALCIUM  9.6     Recent Labs      02/03/18   1624   AST  24   ALT  38   BILIDIR  <0.2   BILITOT  0.2*   ALKPHOS  94     Recent Labs      02/03/18   2130   INR  1.07     No results for input(s): Diaz Din in the last 72 hours. Urinalysis:    Lab Results   Component Value Date    NITRU NEGATIVE 02/03/2018    WBCUA 2-4 02/03/2018    BACTERIA FEW 02/03/2018    RBCUA 0-2 02/03/2018    BLOODU MODERATE 02/03/2018    SPECGRAV >1.030 05/13/2016    GLUCOSEU NEGATIVE 02/03/2018       Radiology:  Xr Abdomen (kub) (single Ap View)    Result Date: 2/4/2018  PROCEDURE: XR ABDOMEN (KUB) (SINGLE AP VIEW) CLINICAL INFORMATION: Abdominal pain. COMPARISON: CT abdomen/pelvis dated 2/3/2018. TECHNIQUE: AP supine abdomen performed. FINDINGS: POSTOPERATIVE CHANGES: 1. Cholecystectomy. LINES/TUBES/MECHANICAL DEVICES: None. BOWEL GAS PATTERN: 1. There is gas and a moderately large amount of stool mixed with oral contrast within the nondistended colon. There is also gas within several loops of small bowel a few which are upper limits normal size. The distended small bowel loop measuring 5.2 cm seen within the mid abdomen on the previous CT examination is not identified on the plain film radiograph. The bowel gas pattern based on the plain film radiographs suggest an ileus however when combined with the previous CT examination a partial small bowel obstruction cannot be excluded. Progress imaging is recommended. LUNG BASES: Unremarkable. FREE AIR: None. MASS SHADOWS: None. PATHOLOGIC CALCIFICATIONS: None. OSSEOUS STRUCTURES: Unremarkable. OTHER: None. 1.  There is gas and a moderately large amount of stool mixed with oral contrast within the nondistended colon.  There is also gas within several loops of small bowel a few which are upper

## 2018-02-05 ENCOUNTER — ANESTHESIA EVENT (OUTPATIENT)
Dept: ENDOSCOPY | Age: 58
DRG: 378 | End: 2018-02-05
Payer: MEDICARE

## 2018-02-05 ENCOUNTER — ANESTHESIA (OUTPATIENT)
Dept: ENDOSCOPY | Age: 58
DRG: 378 | End: 2018-02-05
Payer: MEDICARE

## 2018-02-05 VITALS — SYSTOLIC BLOOD PRESSURE: 129 MMHG | OXYGEN SATURATION: 100 % | DIASTOLIC BLOOD PRESSURE: 64 MMHG

## 2018-02-05 LAB
ANISOCYTOSIS: ABNORMAL
BASOPHILS # BLD: 0.8 %
BASOPHILS ABSOLUTE: 0 THOU/MM3 (ref 0–0.1)
EOSINOPHIL # BLD: 3.5 %
EOSINOPHILS ABSOLUTE: 0.1 THOU/MM3 (ref 0–0.4)
FERRITIN: 9 NG/ML (ref 10–291)
FOLATE: 17.7 NG/ML (ref 4.8–24.2)
HCT VFR BLD CALC: 25.1 % (ref 37–47)
HEMOGLOBIN: 8.2 GM/DL (ref 12–16)
IRON SATURATION: 8 % (ref 20–50)
IRON: 21 UG/DL (ref 50–170)
LYMPHOCYTES # BLD: 12.8 %
LYMPHOCYTES ABSOLUTE: 0.5 THOU/MM3 (ref 1–4.8)
MCH RBC QN AUTO: 32.3 PG (ref 27–31)
MCHC RBC AUTO-ENTMCNC: 32.7 GM/DL (ref 33–37)
MCV RBC AUTO: 98.8 FL (ref 81–99)
MONOCYTES # BLD: 9.3 %
MONOCYTES ABSOLUTE: 0.3 THOU/MM3 (ref 0.4–1.3)
MRSA SCREEN: NORMAL
NUCLEATED RED BLOOD CELLS: 0 /100 WBC
PDW BLD-RTO: 16.9 % (ref 11.5–14.5)
PLATELET # BLD: 215 THOU/MM3 (ref 130–400)
PMV BLD AUTO: 8.1 FL (ref 7.4–10.4)
RBC # BLD: 2.54 MILL/MM3 (ref 4.2–5.4)
RETICULOCYTE ABSOLUTE COUNT: 3.5 % (ref 0.5–2)
SEG NEUTROPHILS: 73.6 %
SEGMENTED NEUTROPHILS ABSOLUTE COUNT: 2.7 THOU/MM3 (ref 1.8–7.7)
TOTAL IRON BINDING CAPACITY: 258 UG/DL (ref 171–450)
VITAMIN B-12: 205 PG/ML (ref 211–911)
VRE CULTURE: NORMAL
WBC # BLD: 3.7 THOU/MM3 (ref 4.8–10.8)

## 2018-02-05 PROCEDURE — 96365 THER/PROPH/DIAG IV INF INIT: CPT

## 2018-02-05 PROCEDURE — 3700000001 HC ADD 15 MINUTES (ANESTHESIA): Performed by: INTERNAL MEDICINE

## 2018-02-05 PROCEDURE — 83550 IRON BINDING TEST: CPT

## 2018-02-05 PROCEDURE — 0DJ08ZZ INSPECTION OF UPPER INTESTINAL TRACT, VIA NATURAL OR ARTIFICIAL OPENING ENDOSCOPIC: ICD-10-PCS | Performed by: INTERNAL MEDICINE

## 2018-02-05 PROCEDURE — 7100000000 HC PACU RECOVERY - FIRST 15 MIN: Performed by: INTERNAL MEDICINE

## 2018-02-05 PROCEDURE — 2060000000 HC ICU INTERMEDIATE R&B

## 2018-02-05 PROCEDURE — 2580000003 HC RX 258: Performed by: INTERNAL MEDICINE

## 2018-02-05 PROCEDURE — 96376 TX/PRO/DX INJ SAME DRUG ADON: CPT

## 2018-02-05 PROCEDURE — 3700000000 HC ANESTHESIA ATTENDED CARE: Performed by: INTERNAL MEDICINE

## 2018-02-05 PROCEDURE — 2500000003 HC RX 250 WO HCPCS: Performed by: SPECIALIST

## 2018-02-05 PROCEDURE — 6370000000 HC RX 637 (ALT 250 FOR IP): Performed by: INTERNAL MEDICINE

## 2018-02-05 PROCEDURE — 6370000000 HC RX 637 (ALT 250 FOR IP): Performed by: NURSE PRACTITIONER

## 2018-02-05 PROCEDURE — 2580000003 HC RX 258: Performed by: NURSE PRACTITIONER

## 2018-02-05 PROCEDURE — 99232 SBSQ HOSP IP/OBS MODERATE 35: CPT | Performed by: INTERNAL MEDICINE

## 2018-02-05 PROCEDURE — C9113 INJ PANTOPRAZOLE SODIUM, VIA: HCPCS | Performed by: NURSE PRACTITIONER

## 2018-02-05 PROCEDURE — 6360000002 HC RX W HCPCS: Performed by: NURSE PRACTITIONER

## 2018-02-05 PROCEDURE — 85025 COMPLETE CBC W/AUTO DIFF WBC: CPT

## 2018-02-05 PROCEDURE — 6360000002 HC RX W HCPCS: Performed by: INTERNAL MEDICINE

## 2018-02-05 PROCEDURE — 83540 ASSAY OF IRON: CPT

## 2018-02-05 PROCEDURE — 96366 THER/PROPH/DIAG IV INF ADDON: CPT

## 2018-02-05 PROCEDURE — 7100000001 HC PACU RECOVERY - ADDTL 15 MIN: Performed by: INTERNAL MEDICINE

## 2018-02-05 PROCEDURE — 6370000000 HC RX 637 (ALT 250 FOR IP): Performed by: SPECIALIST

## 2018-02-05 PROCEDURE — 6370000000 HC RX 637 (ALT 250 FOR IP)

## 2018-02-05 PROCEDURE — 85045 AUTOMATED RETICULOCYTE COUNT: CPT

## 2018-02-05 PROCEDURE — 82607 VITAMIN B-12: CPT

## 2018-02-05 PROCEDURE — 82746 ASSAY OF FOLIC ACID SERUM: CPT

## 2018-02-05 PROCEDURE — 36415 COLL VENOUS BLD VENIPUNCTURE: CPT

## 2018-02-05 PROCEDURE — 3609017000 HC ENTEROSCOPY: Performed by: INTERNAL MEDICINE

## 2018-02-05 PROCEDURE — 82728 ASSAY OF FERRITIN: CPT

## 2018-02-05 PROCEDURE — G0378 HOSPITAL OBSERVATION PER HR: HCPCS

## 2018-02-05 PROCEDURE — 6360000002 HC RX W HCPCS: Performed by: SPECIALIST

## 2018-02-05 RX ORDER — 0.9 % SODIUM CHLORIDE 0.9 %
10 VIAL (ML) INJECTION PRN
Status: DISCONTINUED | OUTPATIENT
Start: 2018-02-05 | End: 2018-02-06 | Stop reason: SDUPTHER

## 2018-02-05 RX ORDER — 0.9 % SODIUM CHLORIDE 0.9 %
10 VIAL (ML) INJECTION EVERY 12 HOURS SCHEDULED
Status: DISCONTINUED | OUTPATIENT
Start: 2018-02-05 | End: 2018-02-06 | Stop reason: SDUPTHER

## 2018-02-05 RX ORDER — GLYCOPYRROLATE 0.2 MG/ML
INJECTION INTRAMUSCULAR; INTRAVENOUS PRN
Status: DISCONTINUED | OUTPATIENT
Start: 2018-02-05 | End: 2018-02-05 | Stop reason: SDUPTHER

## 2018-02-05 RX ORDER — POLYETHYLENE GLYCOL 3350 17 G/17G
238 POWDER, FOR SOLUTION ORAL ONCE
Status: COMPLETED | OUTPATIENT
Start: 2018-02-05 | End: 2018-02-05

## 2018-02-05 RX ORDER — SODIUM CHLORIDE 450 MG/100ML
INJECTION, SOLUTION INTRAVENOUS CONTINUOUS
Status: DISCONTINUED | OUTPATIENT
Start: 2018-02-05 | End: 2018-02-05

## 2018-02-05 RX ORDER — LIDOCAINE HYDROCHLORIDE 20 MG/ML
INJECTION, SOLUTION EPIDURAL; INFILTRATION; INTRACAUDAL; PERINEURAL PRN
Status: DISCONTINUED | OUTPATIENT
Start: 2018-02-05 | End: 2018-02-05 | Stop reason: SDUPTHER

## 2018-02-05 RX ADMIN — GLYCOPYRROLATE 0.1 MG: 0.2 INJECTION, SOLUTION INTRAMUSCULAR; INTRAVENOUS at 10:01

## 2018-02-05 RX ADMIN — LIDOCAINE HYDROCHLORIDE 15 ML: 20 SOLUTION ORAL; TOPICAL at 10:01

## 2018-02-05 RX ADMIN — PROPOFOL 100 MG: 10 INJECTION, EMULSION INTRAVENOUS at 10:02

## 2018-02-05 RX ADMIN — Medication 10 ML: at 11:41

## 2018-02-05 RX ADMIN — DIMETHYL FUMARATE 240 MG: 240 CAPSULE ORAL at 11:45

## 2018-02-05 RX ADMIN — DIMETHYL FUMARATE 240 MG: 240 CAPSULE ORAL at 20:25

## 2018-02-05 RX ADMIN — FERROUS SULFATE TAB 325 MG (65 MG ELEMENTAL FE) 325 MG: 325 (65 FE) TAB at 11:40

## 2018-02-05 RX ADMIN — LITHIUM CARBONATE 600 MG: 300 CAPSULE ORAL at 20:25

## 2018-02-05 RX ADMIN — PROPOFOL 40 MG: 10 INJECTION, EMULSION INTRAVENOUS at 10:09

## 2018-02-05 RX ADMIN — PANTOPRAZOLE SODIUM 40 MG: 40 INJECTION, POWDER, FOR SOLUTION INTRAVENOUS at 20:42

## 2018-02-05 RX ADMIN — Medication 10 ML: at 20:42

## 2018-02-05 RX ADMIN — BISACODYL 10 MG: 5 TABLET, DELAYED RELEASE ORAL at 15:02

## 2018-02-05 RX ADMIN — LITHIUM CARBONATE 300 MG: 300 CAPSULE ORAL at 11:40

## 2018-02-05 RX ADMIN — SODIUM CHLORIDE: 4.5 INJECTION, SOLUTION INTRAVENOUS at 09:55

## 2018-02-05 RX ADMIN — PANTOPRAZOLE SODIUM 40 MG: 40 INJECTION, POWDER, FOR SOLUTION INTRAVENOUS at 11:41

## 2018-02-05 RX ADMIN — Medication 10 ML: at 20:26

## 2018-02-05 RX ADMIN — LAMOTRIGINE 200 MG: 100 TABLET ORAL at 11:40

## 2018-02-05 RX ADMIN — LEVOTHYROXINE SODIUM 125 MCG: 125 TABLET ORAL at 04:58

## 2018-02-05 RX ADMIN — POLYETHYLENE GLYCOL 3350 238 G: 17 POWDER, FOR SOLUTION ORAL at 17:14

## 2018-02-05 RX ADMIN — IRON SUCROSE 500 MG: 20 INJECTION, SOLUTION INTRAVENOUS at 17:06

## 2018-02-05 RX ADMIN — LIDOCAINE HYDROCHLORIDE 100 MG: 20 INJECTION, SOLUTION EPIDURAL; INFILTRATION; INTRACAUDAL; PERINEURAL at 10:02

## 2018-02-05 ASSESSMENT — PAIN SCALES - GENERAL
PAINLEVEL_OUTOF10: 0

## 2018-02-05 NOTE — PROGRESS NOTES
Hospitalist Progress Note    Patient:  Rashmi Neil      Unit/Bed:4K-12/012-A    YOB: 1960    MRN: [id number]       Acct: [de-identified]     PCP: Varun Horton CNP    Date of Admission: 2/3/2018    Chief Complaint:   Chief Complaint   Patient presents with    Abdominal Pain     right isde       Subjective:   Dizziness and abd pain has resolved. Some dark stools overnight. Hgb down to 8.2 in am.    Medications:  Reviewed    Infusion Medications    sodium chloride 75 mL/hr (02/05/18 1144)     Scheduled Medications    sodium chloride (PF)  10 mL Intravenous 2 times per day    iron sucrose  500 mg Intravenous Once    polyethylene glycol  238 g Oral Once    dimethyl fumarate  240 mg Oral BID    ferrous sulfate  325 mg Oral Daily with breakfast    lamoTRIgine  200 mg Oral Daily    levothyroxine  125 mcg Oral Daily    lithium  300 mg Oral Daily    lithium  600 mg Oral Nightly    sodium chloride flush  10 mL Intravenous 2 times per day    pantoprazole  40 mg Intravenous BID    sodium chloride  250 mL Intravenous Once     PRN Meds: sodium chloride (PF), nitroGLYCERIN, sodium chloride flush, acetaminophen, ondansetron      Intake/Output Summary (Last 24 hours) at 02/05/18 1537  Last data filed at 02/05/18 1418   Gross per 24 hour   Intake          3171.71 ml   Output             4050 ml   Net          -878.29 ml       Diet:  DIET CLEAR LIQUID;    Exam:  BP (!) 103/53   Pulse 51   Temp 98.2 °F (36.8 °C) (Oral)   Resp 16   Ht 4' 10\" (1.473 m)   Wt 125 lb 3.2 oz (56.8 kg)   SpO2 98%   BMI 26.17 kg/m²     Gen/overall appearance: Not in acute distress. Alert. Head: Normocephalic, atraumatic  Eyes: EOMI, no scleral icterus  CVS: regular rate and rhythm, Normal S1S2  Pulm: Clear to auscultation bilaterally. No crackles/wheezes  Gastrointestinal: Soft, nontender, nondistended, no guarding or rebound  Extremities: No edema.  No erythema or warmth  Neuro: No gross focal deficits noted  Skin:

## 2018-02-05 NOTE — PLAN OF CARE
Problem: Discharge Planning:  Goal: Discharged to appropriate level of care  Discharged to appropriate level of care   Outcome: Ongoing  Patient planned to be discharged home. No current needs at this time. Problem: Bowel Function - Altered:  Goal: Bowel elimination is within specified parameters  Bowel elimination is within specified parameters   Outcome: Ongoing  1 loose bowel movement this AM- patient reports bowel movement is dark. Started bowel prep for colonoscopy tomorrow. Problem: Fluid Volume - Imbalance:  Goal: Will show no signs and symptoms of excessive bleeding  Will show no signs and symptoms of excessive bleeding   Outcome: Ongoing  Monitoring labs as ordered. No blood per rectum. Patient reported dark stool. Problem: Nausea/Vomiting:  Goal: Absence of nausea/vomiting  Absence of nausea/vomiting   Outcome: Ongoing  Patient denied nausea throughout shift. Encouraged to report early. Problem: Falls - Risk of:  Goal: Will remain free from falls  Will remain free from falls   Outcome: Ongoing  Patient high risk for falls due to generalized weakness and GI bleed. Up to bathroom with standby assist x1. Tolerates ambulation well. Denies any dizziness while ambulating. Problem: Pain:  Goal: Pain level will decrease  Pain level will decrease   Outcome: Ongoing  Denies any pain throughout shift. Reports acute abdominal pain that was present upon admission has since resolved. Assessed with hourly rounding. Encouraged to report any pain. Problem: Falls - Risk of  Goal: Absence of falls  Outcome: Ongoing  Patient high risk for falls due to generalized weakness and GI bleed. Up to bathroom with standby assist x1. Tolerates ambulation well. Denies any dizziness while ambulating. Comments: Care plan reviewed with patient. Patient verbalized understanding of the plan of care and contribute to goal setting.

## 2018-02-05 NOTE — ANESTHESIA POSTPROCEDURE EVALUATION
Department of Anesthesiology  Postprocedure Note    Patient: Hugo Park  MRN: [id number]  YOB: 1960  Date of evaluation: 2/5/2018  Time:  10:21 AM     Procedure Summary     Date:  02/05/18 Room / Location:  [facility] / [facility]    Anesthesia Start:  1000 Anesthesia Stop:  0494    Procedure:  ENTEROSCOPY (N/A ) Diagnosis:  (GI Bleed)    Surgeon:  Zachariah Martino MD Responsible Provider:  Debbie Watts DO    Anesthesia Type:  MAC ASA Status:  3          Anesthesia Type: MAC    Jerson Phase I:      Jerson Phase II:      Last vitals: Reviewed and per EMR flowsheets.        Anesthesia Post Evaluation    Patient location during evaluation: bedside  Patient participation: complete - patient participated  Level of consciousness: responsive to verbal stimuli  Pain score: 0  Airway patency: patent  Nausea & Vomiting: no nausea and no vomiting  Complications: no  Cardiovascular status: hemodynamically stable  Respiratory status: spontaneous ventilation

## 2018-02-06 ENCOUNTER — ANESTHESIA (OUTPATIENT)
Dept: ENDOSCOPY | Age: 58
DRG: 378 | End: 2018-02-06
Payer: MEDICARE

## 2018-02-06 ENCOUNTER — ANESTHESIA EVENT (OUTPATIENT)
Dept: ENDOSCOPY | Age: 58
DRG: 378 | End: 2018-02-06
Payer: MEDICARE

## 2018-02-06 ENCOUNTER — APPOINTMENT (OUTPATIENT)
Dept: CT IMAGING | Age: 58
DRG: 378 | End: 2018-02-06
Payer: MEDICARE

## 2018-02-06 VITALS — OXYGEN SATURATION: 100 % | DIASTOLIC BLOOD PRESSURE: 49 MMHG | SYSTOLIC BLOOD PRESSURE: 104 MMHG

## 2018-02-06 LAB
ANISOCYTOSIS: ABNORMAL
BASOPHILS # BLD: 0.1 %
BASOPHILS ABSOLUTE: 0 THOU/MM3 (ref 0–0.1)
EOSINOPHIL # BLD: 3.4 %
EOSINOPHILS ABSOLUTE: 0.2 THOU/MM3 (ref 0–0.4)
HCT VFR BLD CALC: 24.1 % (ref 37–47)
HEMOGLOBIN: 7.9 GM/DL (ref 12–16)
LYMPHOCYTES # BLD: 12.5 %
LYMPHOCYTES ABSOLUTE: 0.7 THOU/MM3 (ref 1–4.8)
MCH RBC QN AUTO: 31.2 PG (ref 27–31)
MCHC RBC AUTO-ENTMCNC: 32.7 GM/DL (ref 33–37)
MCV RBC AUTO: 95.6 FL (ref 81–99)
MONOCYTES # BLD: 7.5 %
MONOCYTES ABSOLUTE: 0.4 THOU/MM3 (ref 0.4–1.3)
NUCLEATED RED BLOOD CELLS: 0 /100 WBC
PDW BLD-RTO: 16.8 % (ref 11.5–14.5)
PLATELET # BLD: 223 THOU/MM3 (ref 130–400)
PMV BLD AUTO: 7.7 FL (ref 7.4–10.4)
RBC # BLD: 2.52 MILL/MM3 (ref 4.2–5.4)
SEG NEUTROPHILS: 76.5 %
SEGMENTED NEUTROPHILS ABSOLUTE COUNT: 4.1 THOU/MM3 (ref 1.8–7.7)
WBC # BLD: 5.3 THOU/MM3 (ref 4.8–10.8)

## 2018-02-06 PROCEDURE — G0378 HOSPITAL OBSERVATION PER HR: HCPCS

## 2018-02-06 PROCEDURE — 99232 SBSQ HOSP IP/OBS MODERATE 35: CPT | Performed by: INTERNAL MEDICINE

## 2018-02-06 PROCEDURE — 96376 TX/PRO/DX INJ SAME DRUG ADON: CPT

## 2018-02-06 PROCEDURE — 2060000000 HC ICU INTERMEDIATE R&B

## 2018-02-06 PROCEDURE — C9113 INJ PANTOPRAZOLE SODIUM, VIA: HCPCS | Performed by: NURSE PRACTITIONER

## 2018-02-06 PROCEDURE — 2580000003 HC RX 258: Performed by: INTERNAL MEDICINE

## 2018-02-06 PROCEDURE — 6360000002 HC RX W HCPCS: Performed by: NURSE PRACTITIONER

## 2018-02-06 PROCEDURE — 2580000003 HC RX 258: Performed by: ANESTHESIOLOGY

## 2018-02-06 PROCEDURE — 36415 COLL VENOUS BLD VENIPUNCTURE: CPT

## 2018-02-06 PROCEDURE — 6360000002 HC RX W HCPCS: Performed by: ANESTHESIOLOGY

## 2018-02-06 PROCEDURE — 6360000004 HC RX CONTRAST MEDICATION: Performed by: INTERNAL MEDICINE

## 2018-02-06 PROCEDURE — 74177 CT ABD & PELVIS W/CONTRAST: CPT

## 2018-02-06 PROCEDURE — 3700000000 HC ANESTHESIA ATTENDED CARE: Performed by: INTERNAL MEDICINE

## 2018-02-06 PROCEDURE — 3609027000 HC COLONOSCOPY: Performed by: INTERNAL MEDICINE

## 2018-02-06 PROCEDURE — 85025 COMPLETE CBC W/AUTO DIFF WBC: CPT

## 2018-02-06 PROCEDURE — 6370000000 HC RX 637 (ALT 250 FOR IP): Performed by: NURSE PRACTITIONER

## 2018-02-06 PROCEDURE — 0DJD8ZZ INSPECTION OF LOWER INTESTINAL TRACT, VIA NATURAL OR ARTIFICIAL OPENING ENDOSCOPIC: ICD-10-PCS | Performed by: INTERNAL MEDICINE

## 2018-02-06 PROCEDURE — 6370000000 HC RX 637 (ALT 250 FOR IP): Performed by: INTERNAL MEDICINE

## 2018-02-06 PROCEDURE — 7100000000 HC PACU RECOVERY - FIRST 15 MIN: Performed by: INTERNAL MEDICINE

## 2018-02-06 RX ORDER — SODIUM CHLORIDE 450 MG/100ML
INJECTION, SOLUTION INTRAVENOUS CONTINUOUS
Status: DISCONTINUED | OUTPATIENT
Start: 2018-02-06 | End: 2018-02-07 | Stop reason: HOSPADM

## 2018-02-06 RX ORDER — SODIUM CHLORIDE 9 MG/ML
INJECTION, SOLUTION INTRAVENOUS CONTINUOUS PRN
Status: DISCONTINUED | OUTPATIENT
Start: 2018-02-06 | End: 2018-02-06 | Stop reason: SDUPTHER

## 2018-02-06 RX ORDER — LANOLIN ALCOHOL/MO/W.PET/CERES
1000 CREAM (GRAM) TOPICAL DAILY
Status: DISCONTINUED | OUTPATIENT
Start: 2018-02-06 | End: 2018-02-07 | Stop reason: HOSPADM

## 2018-02-06 RX ORDER — PROPOFOL 10 MG/ML
INJECTION, EMULSION INTRAVENOUS PRN
Status: DISCONTINUED | OUTPATIENT
Start: 2018-02-06 | End: 2018-02-06 | Stop reason: SDUPTHER

## 2018-02-06 RX ORDER — PANTOPRAZOLE SODIUM 40 MG/1
40 TABLET, DELAYED RELEASE ORAL
Status: DISCONTINUED | OUTPATIENT
Start: 2018-02-07 | End: 2018-02-07 | Stop reason: HOSPADM

## 2018-02-06 RX ORDER — 0.9 % SODIUM CHLORIDE 0.9 %
10 VIAL (ML) INJECTION EVERY 12 HOURS SCHEDULED
Status: DISCONTINUED | OUTPATIENT
Start: 2018-02-06 | End: 2018-02-07 | Stop reason: HOSPADM

## 2018-02-06 RX ORDER — 0.9 % SODIUM CHLORIDE 0.9 %
10 VIAL (ML) INJECTION PRN
Status: DISCONTINUED | OUTPATIENT
Start: 2018-02-06 | End: 2018-02-07 | Stop reason: HOSPADM

## 2018-02-06 RX ADMIN — SODIUM CHLORIDE: 9 INJECTION, SOLUTION INTRAVENOUS at 09:20

## 2018-02-06 RX ADMIN — CYANOCOBALAMIN TAB 1000 MCG 1000 MCG: 1000 TAB at 12:31

## 2018-02-06 RX ADMIN — IOPAMIDOL 100 ML: 755 INJECTION, SOLUTION INTRAVENOUS at 15:32

## 2018-02-06 RX ADMIN — DIMETHYL FUMARATE 240 MG: 240 CAPSULE ORAL at 19:41

## 2018-02-06 RX ADMIN — LITHIUM CARBONATE 300 MG: 300 CAPSULE ORAL at 10:47

## 2018-02-06 RX ADMIN — LAMOTRIGINE 200 MG: 100 TABLET ORAL at 10:47

## 2018-02-06 RX ADMIN — Medication 10 ML: at 08:01

## 2018-02-06 RX ADMIN — SODIUM CHLORIDE: 9 INJECTION, SOLUTION INTRAVENOUS at 11:01

## 2018-02-06 RX ADMIN — DIMETHYL FUMARATE 240 MG: 240 CAPSULE ORAL at 10:47

## 2018-02-06 RX ADMIN — Medication 10 ML: at 19:40

## 2018-02-06 RX ADMIN — LITHIUM CARBONATE 600 MG: 300 CAPSULE ORAL at 19:39

## 2018-02-06 RX ADMIN — PANTOPRAZOLE SODIUM 40 MG: 40 INJECTION, POWDER, FOR SOLUTION INTRAVENOUS at 08:01

## 2018-02-06 RX ADMIN — PROPOFOL 200 MG: 10 INJECTION, EMULSION INTRAVENOUS at 09:51

## 2018-02-06 RX ADMIN — LEVOTHYROXINE SODIUM 125 MCG: 125 TABLET ORAL at 04:33

## 2018-02-06 RX ADMIN — SODIUM CHLORIDE: 9 INJECTION, SOLUTION INTRAVENOUS at 03:15

## 2018-02-06 ASSESSMENT — PAIN SCALES - GENERAL
PAINLEVEL_OUTOF10: 0
PAINLEVEL_OUTOF10: 0

## 2018-02-06 NOTE — PROGRESS NOTES
3. 7*  5.3   HGB  8.8*   < >  8.0*  8.2*  7.9*   HCT  27.3*   < >  24.1*  25.1*  24.1*   PLT  337   --    --   215  223    < > = values in this interval not displayed. Recent Labs      02/03/18   1624   NA  140   K  4.2   CL  103   CO2  23   BUN  12   CREATININE  0.7   CALCIUM  9.6     Recent Labs      02/03/18   1624   AST  24   ALT  38   BILIDIR  <0.2   BILITOT  0.2*   ALKPHOS  94     Recent Labs      02/03/18   2130   INR  1.07     No results for input(s): Luberta Moll in the last 72 hours. Urinalysis:      Lab Results   Component Value Date    NITRU NEGATIVE 02/03/2018    WBCUA 2-4 02/03/2018    BACTERIA FEW 02/03/2018    RBCUA 0-2 02/03/2018    BLOODU MODERATE 02/03/2018    SPECGRAV >1.030 05/13/2016    GLUCOSEU NEGATIVE 02/03/2018       Radiology:  Xr Abdomen (kub) (single Ap View)    Result Date: 2/4/2018  PROCEDURE: XR ABDOMEN (KUB) (SINGLE AP VIEW) CLINICAL INFORMATION: Abdominal pain. COMPARISON: CT abdomen/pelvis dated 2/3/2018. TECHNIQUE: AP supine abdomen performed. FINDINGS: POSTOPERATIVE CHANGES: 1. Cholecystectomy. LINES/TUBES/MECHANICAL DEVICES: None. BOWEL GAS PATTERN: 1. There is gas and a moderately large amount of stool mixed with oral contrast within the nondistended colon. There is also gas within several loops of small bowel a few which are upper limits normal size. The distended small bowel loop measuring 5.2 cm seen within the mid abdomen on the previous CT examination is not identified on the plain film radiograph. The bowel gas pattern based on the plain film radiographs suggest an ileus however when combined with the previous CT examination a partial small bowel obstruction cannot be excluded. Progress imaging is recommended. LUNG BASES: Unremarkable. FREE AIR: None. MASS SHADOWS: None. PATHOLOGIC CALCIFICATIONS: None. OSSEOUS STRUCTURES: Unremarkable. OTHER: None.      1.  There is gas and a moderately large amount of stool mixed with oral contrast within the nondistended colon. There is also gas within several loops of small bowel a few which are upper limits normal size. The distended small bowel loop measuring 5.2 cm seen within the mid abdomen on the previous CT examination is not identified on the plain film radiograph. The bowel gas pattern based on the plain film radiographs suggest an ileus however when combined with the previous CT examination a partial small bowel obstruction cannot be excluded. Progress imaging is recommended. **This report has been created using voice recognition software. It may contain minor errors which are inherent in voice recognition technology. ** Final report electronically signed by Dr. France Ribeiro on 2/4/2018 7:38 AM    Ct Abdomen Pelvis W Iv Contrast Additional Contrast? Oral    Result Date: 2/3/2018  PROCEDURE: CT ABDOMEN PELVIS W IV CONTRAST CLINICAL INFORMATION: the middle pain right-sided, anemia. Additional history obtained from electronic medical record indicates the patient has abdominal pain. COMPARISON: None. TECHNIQUE: 5 mm axial CT images were obtained through the abdomen and pelvis after the administration of 80 mL Isovue-370 intravenous and oral contrast. Coronal and sagittal reconstructions were obtained. All CT scans at this facility use dose modulation, iterative reconstruction, and/or weight-based dosing when appropriate to reduce radiation dose to as low as reasonably achievable. FINDINGS: The visualized aspects of the lung bases are clear. The base of the heart is within appropriate limits. There is a hypodensity again noted at the lateral aspect of the liver which likely represents a cyst or hemangioma. The spleen is unremarkable. The adrenals and pancreas are normal. The gallbladder is surgically absent. No intra-or extrahepatic biliary ductal dilation. There is no hydronephrosis or stones of either kidney. No renal masses are noted. No ascites or free air. No other fluid collection.  No abnormalities of the small or large bowel loops are noted. There is a single, nonspecific loop of dilated small bowel in the midabdomen. The appendix is surgically absent. The IVC and aorta are of normal caliber. There is no adenopathy. The urinary bladder is normal. There is no pelvic free fluid. There is no adenopathy. No suspicious osseous lesions are identified. There is a nonspecific, dilated loop of small bowel within the midabdomen. No other surrounding changes are identified however, early small bowel obstruction is not completely excluded. No other acute abnormality is identified. **This report has been created using voice recognition software. It may contain minor errors which are inherent in voice recognition technology. ** Final report electronically signed by Dr. Tirso Morrison on 2/3/2018 7:21 PM      Diet: Diet NPO Time Specified  DIET GENERAL; High Fiber     Disposition:    [x] Home       [] TCU       [] Rehab       [] Psych       [] SNF       [] [place]       [] Other-    Code Status: Full Code    Assessment/Plan:    Active Hospital Problems    Diagnosis Date Noted    Lower GI bleed [K92.2] 02/03/2018    Anemia, macrocytic [D53.9] 02/03/2018    Right upper quadrant abdominal pain [R10.11] 02/03/2018    Gastric bypass status for obesity [Z98.84] 05/15/2016    Bipolar I, most recent episode depressed, partial remission ([facility] [address] [F31.75] 05/08/2013    Multiple sclerosis ([facility] [address] [place] 12/21/2011    Hypothyroidism [E03.9] 12/20/2011     1. Questionable SBO; clinically improving. 2.   Acute GI bleed with melenic stools. EGD and CS unremarkable for bleed  3. Acute blood loss anemia 2/2 above  4. Hx of gastric bypass  5. Dilated cardiomyopathy. Cath with patent coronaries in 2017. LVEF 35-40%  6.    PMH of HTN, Multiple sclerosis, Hypothyroidism, GERD, Bipolar, Depression    Planned for CT enterography and outpt capsule  Trend H&H  Transfuse if <7 or symptomatic  Gentle IVFs  PPI  PRN bentyl    Electronically signed by Ascencion Angela MD on 2/6/2018 at 2:24 PM

## 2018-02-06 NOTE — OP NOTE
and epigastrium  UPPER EXTREMITIES: no cyanosis, clubbing, or edema.  R arm congenitally short  DERM: no rash or jaundice. LOWER EXTREMITIES: no cyanosis, clubbing, or edema. NEURO: Alert and oriented times four. Patient moves all extremities and has gross sensation in all extremities.     ASA: ASA 2 - Patient with mild systemic disease with no functional limitations    MEDICATION:    MAC/Propofol/Anesthesia:  Yes     Photo:  Yes  Biopsy:  No      Description of Procedure: The risks and benefits of the procedure were described to the patient or their representative if unable to give consent including but not limited to bleeding, infection, poking a hole someplace requiring surgery to fix it, having a reaction to medication, and death. The patient  or their representative if unable to give consent understood these risks and provided informed consent. Airway was assessed and is adequate for the planned sedation. Anesthesia: MAC  Estimated Blood Loss: less than 50   Complications: None  Specimens: Was Not Obtained     Sedation was administered by anesthesia who monitored the patient during the procedure. The patient was placed in the left lateral decubitus position. The perianal area was inspected, and a digital rectal exam was performed. A forward-viewing Olympus adult colonoscope was lubricated and inserted through the anus into the rectum. Under direct visualization, this was advanced to the TI. Cecal base was identified by the ileocecal valve and appendiceal orifice. The scope was then slowly withdrawn with good views of mucosal surfaces. The scope was retroflexed in the rectum. Findings and maneuvers are listed in impression below. The patient tolerated the procedure well. The scope was removed. The patient was moved to the recovery area. There were no immediate complications. IMPRESSION:  1. Fair prep made adequate with copious irrigation and suctioning. 2.  No blood or melena seen.   3.

## 2018-02-07 VITALS
DIASTOLIC BLOOD PRESSURE: 46 MMHG | RESPIRATION RATE: 18 BRPM | SYSTOLIC BLOOD PRESSURE: 105 MMHG | TEMPERATURE: 98.3 F | HEART RATE: 59 BPM | OXYGEN SATURATION: 98 % | HEIGHT: 58 IN | WEIGHT: 126.44 LBS | BODY MASS INDEX: 26.54 KG/M2

## 2018-02-07 PROBLEM — R10.11 RIGHT UPPER QUADRANT ABDOMINAL PAIN: Status: RESOLVED | Noted: 2018-02-03 | Resolved: 2018-02-07

## 2018-02-07 PROBLEM — K92.2 LOWER GI BLEED: Status: RESOLVED | Noted: 2018-02-03 | Resolved: 2018-02-07

## 2018-02-07 LAB
ABO: NORMAL
ANISOCYTOSIS: ABNORMAL
ANTIBODY SCREEN: NORMAL
BASOPHILS # BLD: 0.8 %
BASOPHILS ABSOLUTE: 0 THOU/MM3 (ref 0–0.1)
EOSINOPHIL # BLD: 3.5 %
EOSINOPHILS ABSOLUTE: 0.2 THOU/MM3 (ref 0–0.4)
HCT VFR BLD CALC: 21.2 % (ref 37–47)
HCT VFR BLD CALC: 24.5 % (ref 37–47)
HEMOGLOBIN: 7 GM/DL (ref 12–16)
HEMOGLOBIN: 8 GM/DL (ref 12–16)
LYMPHOCYTES # BLD: 11 %
LYMPHOCYTES ABSOLUTE: 0.6 THOU/MM3 (ref 1–4.8)
MCH RBC QN AUTO: 32.4 PG (ref 27–31)
MCHC RBC AUTO-ENTMCNC: 32.9 GM/DL (ref 33–37)
MCV RBC AUTO: 98.3 FL (ref 81–99)
MONOCYTES # BLD: 7.8 %
MONOCYTES ABSOLUTE: 0.4 THOU/MM3 (ref 0.4–1.3)
NUCLEATED RED BLOOD CELLS: 0 /100 WBC
PDW BLD-RTO: 16.3 % (ref 11.5–14.5)
PLATELET # BLD: 212 THOU/MM3 (ref 130–400)
PMV BLD AUTO: 8.5 FL (ref 7.4–10.4)
RBC # BLD: 2.15 MILL/MM3 (ref 4.2–5.4)
RH FACTOR: NORMAL
SEG NEUTROPHILS: 76.9 %
SEGMENTED NEUTROPHILS ABSOLUTE COUNT: 3.9 THOU/MM3 (ref 1.8–7.7)
WBC # BLD: 5.1 THOU/MM3 (ref 4.8–10.8)

## 2018-02-07 PROCEDURE — 85018 HEMOGLOBIN: CPT

## 2018-02-07 PROCEDURE — 85025 COMPLETE CBC W/AUTO DIFF WBC: CPT

## 2018-02-07 PROCEDURE — P9016 RBC LEUKOCYTES REDUCED: HCPCS

## 2018-02-07 PROCEDURE — G0378 HOSPITAL OBSERVATION PER HR: HCPCS

## 2018-02-07 PROCEDURE — 86923 COMPATIBILITY TEST ELECTRIC: CPT

## 2018-02-07 PROCEDURE — 36430 TRANSFUSION BLD/BLD COMPNT: CPT

## 2018-02-07 PROCEDURE — 86850 RBC ANTIBODY SCREEN: CPT

## 2018-02-07 PROCEDURE — 86901 BLOOD TYPING SEROLOGIC RH(D): CPT

## 2018-02-07 PROCEDURE — 36415 COLL VENOUS BLD VENIPUNCTURE: CPT

## 2018-02-07 PROCEDURE — 99239 HOSP IP/OBS DSCHRG MGMT >30: CPT | Performed by: HOSPITALIST

## 2018-02-07 PROCEDURE — 2580000003 HC RX 258: Performed by: INTERNAL MEDICINE

## 2018-02-07 PROCEDURE — 6370000000 HC RX 637 (ALT 250 FOR IP): Performed by: INTERNAL MEDICINE

## 2018-02-07 PROCEDURE — 6370000000 HC RX 637 (ALT 250 FOR IP): Performed by: NURSE PRACTITIONER

## 2018-02-07 PROCEDURE — 85014 HEMATOCRIT: CPT

## 2018-02-07 PROCEDURE — 86900 BLOOD TYPING SEROLOGIC ABO: CPT

## 2018-02-07 RX ORDER — 0.9 % SODIUM CHLORIDE 0.9 %
250 INTRAVENOUS SOLUTION INTRAVENOUS ONCE
Status: COMPLETED | OUTPATIENT
Start: 2018-02-07 | End: 2018-02-07

## 2018-02-07 RX ORDER — PANTOPRAZOLE SODIUM 40 MG/1
40 TABLET, DELAYED RELEASE ORAL
Qty: 30 TABLET | Refills: 3 | Status: ON HOLD | OUTPATIENT
Start: 2018-02-08 | End: 2018-10-02

## 2018-02-07 RX ADMIN — DIMETHYL FUMARATE 240 MG: 240 CAPSULE ORAL at 08:23

## 2018-02-07 RX ADMIN — CYANOCOBALAMIN TAB 1000 MCG 1000 MCG: 1000 TAB at 08:14

## 2018-02-07 RX ADMIN — PANTOPRAZOLE SODIUM 40 MG: 40 TABLET, DELAYED RELEASE ORAL at 04:14

## 2018-02-07 RX ADMIN — LAMOTRIGINE 200 MG: 100 TABLET ORAL at 08:59

## 2018-02-07 RX ADMIN — LITHIUM CARBONATE 300 MG: 300 CAPSULE ORAL at 08:14

## 2018-02-07 RX ADMIN — SODIUM CHLORIDE: 9 INJECTION, SOLUTION INTRAVENOUS at 00:56

## 2018-02-07 RX ADMIN — SODIUM CHLORIDE 250 ML: 9 INJECTION, SOLUTION INTRAVENOUS at 08:12

## 2018-02-07 RX ADMIN — LEVOTHYROXINE SODIUM 125 MCG: 125 TABLET ORAL at 04:14

## 2018-02-07 NOTE — PROGRESS NOTES
Pt Name: Mariah Mayberry  MRN: [id number]  [id number]  YOB: 1960  Admit Date: 2/3/2018  3:46 PM  Date of evaluation: 2/7/2018  Primary Care Physician: Enzo Ledesma CNP   [id number]     YESSY LARA No complaints. Feels well, no melena. O.     Vitals:    02/07/18 1103   BP: (!) 105/46   Pulse: 59   Resp: 18   Temp: 98.3 °F (36.8 °C)   SpO2: 98%       Body mass index is 26.43 kg/m². Awake and alert   clear to auscultation bilaterally   regular rate and rhythm   Soft and nondistended with normal BS, nontender   no cyanosis, clubbing or edema present, RUE congeitally short    Current Meds    sodium chloride (PF)  10 mL Intravenous 2 times per day    vitamin B-12  1,000 mcg Oral Daily    pantoprazole  40 mg Oral QAM AC    dimethyl fumarate  240 mg Oral BID    lamoTRIgine  200 mg Oral Daily    levothyroxine  125 mcg Oral Daily    lithium  300 mg Oral Daily    lithium  600 mg Oral Nightly    sodium chloride  250 mL Intravenous Once      sodium chloride      sodium chloride 75 mL/hr at 02/07/18 0056     Diet: DIET GENERAL; High Fiber    A.  57 y.o.  female admitted 2/3/18 for melena and abdominal pain. Yesica Bettencourt has a RYGB in 2000 and has a history of anastomotic ulcers.  Pain is epigastric and RUQ. Yesica Bettencourt is s/p remote manju and appy.  Pain began yesterday as did intermittent melena. Yesica Bettencourt is on an iron supplement but has not changed it and it has not made her stool black in the past.  She did have a large hard BM before this started. Yesica Bettencourt had an episode like this in ~2009 and had some anastomotic ulcers.     Enteroscopy 12/5/18 normal.  Colonoscopy 12/6/18 with tics but no sign of hemorrhage  CT enterography 12/7/18 with some dilated areas but no narrowing or thickening    Melena  Chronic anemia  JOANNE from poor iron absorptions  S/P RYGB  B12 deficiency    P.       Repeat iv iron in 2 weeks ~ 2/19/18   B12 supplementation  Outpatient pillcam for JOANNE, obscure GI bleeding  OV with Pilar

## 2018-02-07 NOTE — CARE COORDINATION
2/7/18, 12:20 PM  Client plans home alone independently as PTA (uses cane) when medically cleared and if H stable; H 7 and plans blood transfusion today: GI following  Discharge plan discussed by  and . Discharge plan reviewed with patient/ family. Patient/ family verbalize understanding of discharge plan and are in agreement with plan. Understanding was demonstrated using the teach back method.

## 2018-02-08 ENCOUNTER — NURSE TRIAGE (OUTPATIENT)
Dept: ADMINISTRATIVE | Age: 58
End: 2018-02-08

## 2018-02-08 ENCOUNTER — TELEPHONE (OUTPATIENT)
Dept: PSYCHIATRY | Age: 58
End: 2018-02-08

## 2018-02-08 NOTE — TELEPHONE ENCOUNTER
younger than 10years old without medical advice. How should I take psyllium? Use exactly as directed on the label, or as prescribed by your doctor. Do not use in larger or smaller amounts or for longer than recommended. Take psyllium with a full glass (at least 8 ounces) of water or other liquid. Psyllium can swell in your throat and cause choking if you don't take it with enough liquid. Drink plenty of fluids each day to help improve bowel regularity. Swallow psyllium capsules one at a time. Do not take more than the recommended number of capsules per dose. Psyllium powder must be mixed with liquid before you take it. Do not swallow the dry powder. Mix the powder with at least 8 ounces of liquid such as water or fruit juice. Stir and drink this mixture right away. To get the entire dose, add a little more water to the same glass, swirl gently and drink right away. The psyllium wafer must be chewed before you swallow it. Psyllium usually produces a bowel movement within 12 to 72 hours. It may take up to 3 days before your symptoms improve. Do not take psyllium for longer than 7 days in a row without a doctor's advice. Using a laxative too often or for too long may cause severe medical problems with your intestines. Call your doctor if your symptoms do not improve, or if they get worse while using psyllium. Laxatives may be habit-forming if they are used too often or for too long. Psyllium may be only part of a complete program of treatment that also includes diet, exercise, and weight control. Follow your doctor's instructions very closely. Store at room temperature away from moisture and heat. What happens if I miss a dose? Since psyllium is used when needed, you may not be on a dosing schedule. If you are on a schedule, use the missed dose as soon as you remember. Skip the missed dose if it is almost time for your next scheduled dose. Do not use extra medicine to make up the missed dose.   What with others, and use this medication only for the indication prescribed. Every effort has been made to ensure that the information provided by [address] is accurate, up-to-date, and complete, but no guarantee is made to that effect. Drug information contained herein may be time sensitive. [facility] information has been compiled for use by healthcare practitioners and consumers in the [place] and therefore [facility] does not warrant that uses outside of the [place] are appropriate, unless specifically indicated otherwise. [facility]'s drug information does not endorse drugs, diagnose patients or recommend therapy. [facility]'s drug information is an informational resource designed to assist licensed healthcare practitioners in caring for their patients and/or to serve consumers viewing this service as a supplement to, and not a substitute for, the expertise, skill, knowledge and judgment of healthcare practitioners. The absence of a warning for a given drug or drug combination in no way should be construed to indicate that the drug or drug combination is safe, effective or appropriate for any given patient. [facility] does not assume any responsibility for any aspect of healthcare administered with the aid of information [facility] provides. The information contained herein is not intended to cover all possible uses, directions, precautions, warnings, drug interactions, allergic reactions, or adverse effects. If you have questions about the drugs you are taking, check with your doctor, nurse or pharmacist.  Copyright 9397-1848 [address] [place] [address] Dr CUENCA. Version: 7.03. Revision date: 5/6/2014. Care instructions adapted under license by [facility] ([facility]).  If you have questions about a medical condition or this instruction, always ask your healthcare professional. [place] [address] any warranty or liability for your use of this information.     Discussed some foods as well that would enhance her diet

## 2018-02-09 ENCOUNTER — OFFICE VISIT (OUTPATIENT)
Dept: PSYCHIATRY | Age: 58
End: 2018-02-09
Payer: MEDICARE

## 2018-02-09 ENCOUNTER — TELEPHONE (OUTPATIENT)
Dept: PSYCHIATRY | Age: 58
End: 2018-02-09

## 2018-02-09 DIAGNOSIS — F31.75 BIPOLAR I, MOST RECENT EPISODE DEPRESSED, PARTIAL REMISSION (HCC): Primary | ICD-10-CM

## 2018-02-09 PROCEDURE — G8482 FLU IMMUNIZE ORDER/ADMIN: HCPCS | Performed by: PSYCHIATRY & NEUROLOGY

## 2018-02-09 PROCEDURE — 99213 OFFICE O/P EST LOW 20 MIN: CPT | Performed by: PSYCHIATRY & NEUROLOGY

## 2018-02-09 PROCEDURE — 3014F SCREEN MAMMO DOC REV: CPT | Performed by: PSYCHIATRY & NEUROLOGY

## 2018-02-09 PROCEDURE — 1036F TOBACCO NON-USER: CPT | Performed by: PSYCHIATRY & NEUROLOGY

## 2018-02-09 PROCEDURE — 3017F COLORECTAL CA SCREEN DOC REV: CPT | Performed by: PSYCHIATRY & NEUROLOGY

## 2018-02-09 PROCEDURE — 1111F DSCHRG MED/CURRENT MED MERGE: CPT | Performed by: PSYCHIATRY & NEUROLOGY

## 2018-02-09 PROCEDURE — G8419 CALC BMI OUT NRM PARAM NOF/U: HCPCS | Performed by: PSYCHIATRY & NEUROLOGY

## 2018-02-09 PROCEDURE — G8428 CUR MEDS NOT DOCUMENT: HCPCS | Performed by: PSYCHIATRY & NEUROLOGY

## 2018-02-09 RX ORDER — VILAZODONE HYDROCHLORIDE 40 MG/1
40 TABLET ORAL DAILY
Qty: 30 TABLET | Refills: 3 | Status: SHIPPED | OUTPATIENT
Start: 2018-02-09 | End: 2018-06-04 | Stop reason: SDUPTHER

## 2018-02-12 ENCOUNTER — TELEPHONE (OUTPATIENT)
Dept: PHARMACY | Age: 58
End: 2018-02-12

## 2018-02-12 NOTE — TELEPHONE ENCOUNTER
Returning pt phone call - pt requesting food list for fiber, iron and Vitamin B12.  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

## 2018-02-13 ENCOUNTER — HOSPITAL ENCOUNTER (EMERGENCY)
Age: 58
Discharge: HOME | End: 2018-02-13
Payer: COMMERCIAL

## 2018-02-13 ENCOUNTER — HOSPITAL ENCOUNTER (OUTPATIENT)
Age: 58
Discharge: HOME | End: 2018-02-13
Payer: COMMERCIAL

## 2018-02-13 ENCOUNTER — APPOINTMENT (OUTPATIENT)
Dept: GENERAL RADIOLOGY | Age: 58
End: 2018-02-13
Payer: COMMERCIAL

## 2018-02-13 ENCOUNTER — HOSPITAL ENCOUNTER (OUTPATIENT)
Dept: NURSING | Age: 58
End: 2018-02-13
Payer: COMMERCIAL

## 2018-02-13 VITALS
HEIGHT: 60 IN | RESPIRATION RATE: 18 BRPM | DIASTOLIC BLOOD PRESSURE: 58 MMHG | WEIGHT: 132 LBS | SYSTOLIC BLOOD PRESSURE: 124 MMHG | HEART RATE: 82 BPM | TEMPERATURE: 98.9 F | BODY MASS INDEX: 25.91 KG/M2 | OXYGEN SATURATION: 100 %

## 2018-02-13 DIAGNOSIS — J02.9 VIRAL PHARYNGITIS: ICD-10-CM

## 2018-02-13 DIAGNOSIS — J40 BRONCHITIS: Primary | ICD-10-CM

## 2018-02-13 LAB
FLU A ANTIGEN: NEGATIVE
FLU B ANTIGEN: NEGATIVE
GROUP A STREP CULTURE, REFLEX: NEGATIVE
HCT VFR BLD CALC: 25 % (ref 37–47)
HEMOGLOBIN: 8 GM/DL (ref 12–16)
REFLEX THROAT C + S: NORMAL

## 2018-02-13 PROCEDURE — 96372 THER/PROPH/DIAG INJ SC/IM: CPT

## 2018-02-13 PROCEDURE — 85018 HEMOGLOBIN: CPT

## 2018-02-13 PROCEDURE — 6370000000 HC RX 637 (ALT 250 FOR IP): Performed by: PHYSICIAN ASSISTANT

## 2018-02-13 PROCEDURE — 85014 HEMATOCRIT: CPT

## 2018-02-13 PROCEDURE — 87804 INFLUENZA ASSAY W/OPTIC: CPT

## 2018-02-13 PROCEDURE — 87070 CULTURE OTHR SPECIMN AEROBIC: CPT

## 2018-02-13 PROCEDURE — 87880 STREP A ASSAY W/OPTIC: CPT

## 2018-02-13 PROCEDURE — 6360000002 HC RX W HCPCS: Performed by: PHYSICIAN ASSISTANT

## 2018-02-13 PROCEDURE — 36415 COLL VENOUS BLD VENIPUNCTURE: CPT

## 2018-02-13 PROCEDURE — 71046 X-RAY EXAM CHEST 2 VIEWS: CPT

## 2018-02-13 PROCEDURE — 99283 EMERGENCY DEPT VISIT LOW MDM: CPT

## 2018-02-13 RX ORDER — KETOROLAC TROMETHAMINE 30 MG/ML
60 INJECTION, SOLUTION INTRAMUSCULAR; INTRAVENOUS ONCE
Status: COMPLETED | OUTPATIENT
Start: 2018-02-13 | End: 2018-02-13

## 2018-02-13 RX ORDER — BENZONATATE 100 MG/1
200 CAPSULE ORAL ONCE
Status: COMPLETED | OUTPATIENT
Start: 2018-02-13 | End: 2018-02-13

## 2018-02-13 RX ORDER — AZITHROMYCIN 250 MG/1
TABLET, FILM COATED ORAL
Qty: 6 TABLET | Refills: 0 | Status: ON HOLD | OUTPATIENT
Start: 2018-02-13 | End: 2018-02-17

## 2018-02-13 RX ORDER — PREDNISONE 20 MG/1
40 TABLET ORAL DAILY
Qty: 10 TABLET | Refills: 0 | Status: ON HOLD | OUTPATIENT
Start: 2018-02-13 | End: 2018-02-17

## 2018-02-13 RX ORDER — GUAIFENESIN AND CODEINE PHOSPHATE 100; 10 MG/5ML; MG/5ML
5 SOLUTION ORAL 3 TIMES DAILY PRN
Qty: 118 ML | Refills: 0 | Status: ON HOLD | OUTPATIENT
Start: 2018-02-13 | End: 2018-02-17

## 2018-02-13 RX ADMIN — KETOROLAC TROMETHAMINE 60 MG: 30 INJECTION, SOLUTION INTRAMUSCULAR at 09:40

## 2018-02-13 RX ADMIN — BENZONATATE 200 MG: 100 CAPSULE ORAL at 09:40

## 2018-02-13 RX ADMIN — Medication 5 ML: at 09:45

## 2018-02-13 ASSESSMENT — PAIN SCALES - GENERAL
PAINLEVEL_OUTOF10: 6
PAINLEVEL_OUTOF10: 6

## 2018-02-13 ASSESSMENT — ENCOUNTER SYMPTOMS
STRIDOR: 0
COUGH: 0
COLOR CHANGE: 0
EYE DISCHARGE: 0
CHEST TIGHTNESS: 0
SHORTNESS OF BREATH: 1
SORE THROAT: 1
ABDOMINAL DISTENTION: 0
DIARRHEA: 0
EYE REDNESS: 0
VOMITING: 0
PHOTOPHOBIA: 0
NAUSEA: 0
RHINORRHEA: 1
FACIAL SWELLING: 0

## 2018-02-13 ASSESSMENT — PAIN DESCRIPTION - LOCATION: LOCATION: THROAT;HEAD

## 2018-02-13 ASSESSMENT — PAIN DESCRIPTION - PAIN TYPE: TYPE: ACUTE PAIN

## 2018-02-13 ASSESSMENT — PAIN DESCRIPTION - ONSET: ONSET: GRADUAL

## 2018-02-13 ASSESSMENT — PAIN DESCRIPTION - FREQUENCY: FREQUENCY: INTERMITTENT

## 2018-02-13 NOTE — ED PROVIDER NOTES
[facility]  eMERGENCY dEPARTMENT eNCOUnter          [address] [facility]       Chief Complaint   Patient presents with    Pharyngitis    Cough    Nausea       Nurses Notes reviewed and I agree except as noted in the HPI. HISTORY OF PRESENT ILLNESS    Deirdre Smith is a 62 y.o. female who presents to the Emergency Department for the evaluation of \"lung pain\". The patient reports feeling like her throat and lungs are \"on fire\" since yesterday. She rates her current pain as a 6/10 in severity and states it is worse with her dry cough. The patient states she took aspirin this morning without relief. The patient reports associated runny nose, generalized weakness and shortness of breath. She denies any chest tightness, fever, vomiting, diarrhea or congestion. The patient denies any sick contact. The patient denies history of asthma, COPD or lung disease. She denies smoking cigarettes. Location/Symptom: \"lung pain\"  Timing/Onset: yesterday  Context/Setting: feels like her throat and lungs are on fire, denies lung disease or sick contact  Quality: burning, \"on fire\"  Duration: constant  Modifying Factors: worse with coughing, no relief with aspirin  Severity: 6/10    The HPI was provided by the patient. REVIEW OF SYSTEMS     Review of Systems   Constitutional: Negative for chills, diaphoresis and fever. HENT: Positive for rhinorrhea and sore throat. Negative for congestion, ear discharge and facial swelling. Eyes: Negative for photophobia, discharge and redness. Respiratory: Positive for shortness of breath. Negative for cough, chest tightness and stridor. Cardiovascular: Negative for palpitations and leg swelling. Gastrointestinal: Negative for abdominal distention, diarrhea, nausea and vomiting. Musculoskeletal: Negative for gait problem and joint swelling. Skin: Negative for color change and rash (on exposed body surfaces). Neurological: Positive for weakness.  Negative

## 2018-02-13 NOTE — CARE COORDINATION
Brief ED Social Work Assessment  2/13/18, 11:04 AM    Spoke with patient and she stated that she has done well at home until yesterday when she started to get sick.

## 2018-02-15 LAB — THROAT/NOSE CULTURE: NORMAL

## 2018-02-17 ENCOUNTER — APPOINTMENT (OUTPATIENT)
Dept: CT IMAGING | Age: 58
DRG: 394 | End: 2018-02-17
Payer: MEDICARE

## 2018-02-17 ENCOUNTER — HOSPITAL ENCOUNTER (INPATIENT)
Age: 58
LOS: 4 days | Discharge: HOME | DRG: 394 | End: 2018-02-21
Attending: EMERGENCY MEDICINE | Admitting: INTERNAL MEDICINE
Payer: MEDICARE

## 2018-02-17 DIAGNOSIS — K29.71 GASTROINTESTINAL HEMORRHAGE ASSOCIATED WITH GASTRITIS, UNSPECIFIED GASTRITIS TYPE: Primary | ICD-10-CM

## 2018-02-17 DIAGNOSIS — J10.1 INFLUENZA A: ICD-10-CM

## 2018-02-17 PROBLEM — K92.2 GI BLEED: Status: ACTIVE | Noted: 2018-02-17

## 2018-02-17 PROBLEM — D62 ACUTE BLOOD LOSS ANEMIA: Status: ACTIVE | Noted: 2018-02-17

## 2018-02-17 LAB
ABO: NORMAL
ALBUMIN SERPL-MCNC: 3.5 G/DL (ref 3.5–5.1)
ALP BLD-CCNC: 83 U/L (ref 38–126)
ALT SERPL-CCNC: 34 U/L (ref 11–66)
AMPHETAMINE+METHAMPHETAMINE URINE SCREEN: NEGATIVE
ANION GAP SERPL CALCULATED.3IONS-SCNC: 9 MEQ/L (ref 8–16)
ANTIBODY SCREEN: NORMAL
AST SERPL-CCNC: 23 U/L (ref 5–40)
BARBITURATE QUANTITATIVE URINE: NEGATIVE
BENZODIAZEPINE QUANTITATIVE URINE: NEGATIVE
BILIRUB SERPL-MCNC: 0.2 MG/DL (ref 0.3–1.2)
BILIRUBIN DIRECT: < 0.2 MG/DL (ref 0–0.3)
BILIRUBIN URINE: NEGATIVE
BLOOD, URINE: NEGATIVE
BUN BLDV-MCNC: 20 MG/DL (ref 7–22)
CALCIUM SERPL-MCNC: 8.9 MG/DL (ref 8.5–10.5)
CANNABINOID QUANTITATIVE URINE: NEGATIVE
CHARACTER, URINE: CLEAR
CHLORIDE BLD-SCNC: 107 MEQ/L (ref 98–111)
CO2: 26 MEQ/L (ref 23–33)
COCAINE METABOLITE QUANTITATIVE URINE: NEGATIVE
COLOR: YELLOW
CREAT SERPL-MCNC: 0.5 MG/DL (ref 0.4–1.2)
DIFFERENTIAL, MANUAL: NORMAL
EKG ATRIAL RATE: 68 BPM
EKG P AXIS: 56 DEGREES
EKG P-R INTERVAL: 152 MS
EKG Q-T INTERVAL: 450 MS
EKG QRS DURATION: 140 MS
EKG QTC CALCULATION (BAZETT): 478 MS
EKG R AXIS: 40 DEGREES
EKG T AXIS: 35 DEGREES
EKG VENTRICULAR RATE: 68 BPM
ETHYL ALCOHOL, SERUM: < 0.01 %
FLU A ANTIGEN: POSITIVE
FLU B ANTIGEN: NEGATIVE
GFR SERPL CREATININE-BSD FRML MDRD: > 90 ML/MIN/1.73M2
GLUCOSE BLD-MCNC: 116 MG/DL (ref 70–108)
GLUCOSE URINE: NEGATIVE MG/DL
HCT VFR BLD CALC: 25.2 % (ref 37–47)
HEMOCCULT STL QL: POSITIVE
HEMOGLOBIN: 8.2 GM/DL (ref 12–16)
INDIRECT COOMBS: NORMAL
KETONES, URINE: NEGATIVE
LEUKOCYTE ESTERASE, URINE: NEGATIVE
LIPASE: 17.1 U/L (ref 5.6–51.3)
MAGNESIUM: 2.2 MG/DL (ref 1.6–2.4)
NITRITE, URINE: NEGATIVE
OPIATES, URINE: POSITIVE
OSMOLALITY CALCULATION: 286.7 MOSMOL/KG (ref 275–300)
OXYCODONE: NEGATIVE
PH UA: 7.5
PHENCYCLIDINE QUANTITATIVE URINE: NEGATIVE
POTASSIUM SERPL-SCNC: 4.2 MEQ/L (ref 3.5–5.2)
PROTEIN UA: NEGATIVE
RH FACTOR: NORMAL
SODIUM BLD-SCNC: 142 MEQ/L (ref 135–145)
SPECIFIC GRAVITY, URINE: 1.01 (ref 1–1.03)
TOTAL PROTEIN: 5.2 G/DL (ref 6.1–8)
TROPONIN T: < 0.01 NG/ML
TSH SERPL DL<=0.05 MIU/L-ACNC: 2.39 UIU/ML (ref 0.4–4.2)
UROBILINOGEN, URINE: 0.2 EU/DL

## 2018-02-17 PROCEDURE — 86900 BLOOD TYPING SEROLOGIC ABO: CPT

## 2018-02-17 PROCEDURE — 85025 COMPLETE CBC W/AUTO DIFF WBC: CPT

## 2018-02-17 PROCEDURE — 86901 BLOOD TYPING SEROLOGIC RH(D): CPT

## 2018-02-17 PROCEDURE — P9016 RBC LEUKOCYTES REDUCED: HCPCS

## 2018-02-17 PROCEDURE — 85014 HEMATOCRIT: CPT

## 2018-02-17 PROCEDURE — 84443 ASSAY THYROID STIM HORMONE: CPT

## 2018-02-17 PROCEDURE — 1200000003 HC TELEMETRY R&B

## 2018-02-17 PROCEDURE — 86850 RBC ANTIBODY SCREEN: CPT

## 2018-02-17 PROCEDURE — 81003 URINALYSIS AUTO W/O SCOPE: CPT

## 2018-02-17 PROCEDURE — 80053 COMPREHEN METABOLIC PANEL: CPT

## 2018-02-17 PROCEDURE — 71250 CT THORAX DX C-: CPT

## 2018-02-17 PROCEDURE — 36430 TRANSFUSION BLD/BLD COMPNT: CPT

## 2018-02-17 PROCEDURE — 82272 OCCULT BLD FECES 1-3 TESTS: CPT

## 2018-02-17 PROCEDURE — C9113 INJ PANTOPRAZOLE SODIUM, VIA: HCPCS | Performed by: EMERGENCY MEDICINE

## 2018-02-17 PROCEDURE — 86923 COMPATIBILITY TEST ELECTRIC: CPT

## 2018-02-17 PROCEDURE — 99285 EMERGENCY DEPT VISIT HI MDM: CPT

## 2018-02-17 PROCEDURE — 2580000003 HC RX 258: Performed by: EMERGENCY MEDICINE

## 2018-02-17 PROCEDURE — 84484 ASSAY OF TROPONIN QUANT: CPT

## 2018-02-17 PROCEDURE — 6370000000 HC RX 637 (ALT 250 FOR IP): Performed by: EMERGENCY MEDICINE

## 2018-02-17 PROCEDURE — 6360000002 HC RX W HCPCS: Performed by: EMERGENCY MEDICINE

## 2018-02-17 PROCEDURE — 80307 DRUG TEST PRSMV CHEM ANLYZR: CPT

## 2018-02-17 PROCEDURE — 85018 HEMOGLOBIN: CPT

## 2018-02-17 PROCEDURE — 2580000003 HC RX 258: Performed by: INTERNAL MEDICINE

## 2018-02-17 PROCEDURE — 6370000000 HC RX 637 (ALT 250 FOR IP): Performed by: INTERNAL MEDICINE

## 2018-02-17 PROCEDURE — 83735 ASSAY OF MAGNESIUM: CPT

## 2018-02-17 PROCEDURE — 36415 COLL VENOUS BLD VENIPUNCTURE: CPT

## 2018-02-17 PROCEDURE — G0480 DRUG TEST DEF 1-7 CLASSES: HCPCS

## 2018-02-17 PROCEDURE — 93010 ELECTROCARDIOGRAM REPORT: CPT | Performed by: INTERNAL MEDICINE

## 2018-02-17 PROCEDURE — 83690 ASSAY OF LIPASE: CPT

## 2018-02-17 PROCEDURE — 87804 INFLUENZA ASSAY W/OPTIC: CPT

## 2018-02-17 PROCEDURE — 82248 BILIRUBIN DIRECT: CPT

## 2018-02-17 PROCEDURE — 74176 CT ABD & PELVIS W/O CONTRAST: CPT

## 2018-02-17 PROCEDURE — 30233N1 TRANSFUSION OF NONAUTOLOGOUS RED BLOOD CELLS INTO PERIPHERAL VEIN, PERCUTANEOUS APPROACH: ICD-10-PCS | Performed by: EMERGENCY MEDICINE

## 2018-02-17 PROCEDURE — 99223 1ST HOSP IP/OBS HIGH 75: CPT | Performed by: INTERNAL MEDICINE

## 2018-02-17 PROCEDURE — 93005 ELECTROCARDIOGRAM TRACING: CPT | Performed by: EMERGENCY MEDICINE

## 2018-02-17 RX ORDER — NITROGLYCERIN 0.4 MG/1
0.4 TABLET SUBLINGUAL EVERY 5 MIN PRN
Status: DISCONTINUED | OUTPATIENT
Start: 2018-02-17 | End: 2018-02-21 | Stop reason: HOSPADM

## 2018-02-17 RX ORDER — LITHIUM CARBONATE 300 MG/1
300 CAPSULE ORAL DAILY
Status: DISCONTINUED | OUTPATIENT
Start: 2018-02-17 | End: 2018-02-21 | Stop reason: HOSPADM

## 2018-02-17 RX ORDER — ONDANSETRON 2 MG/ML
4 INJECTION INTRAMUSCULAR; INTRAVENOUS EVERY 6 HOURS PRN
Status: DISCONTINUED | OUTPATIENT
Start: 2018-02-17 | End: 2018-02-21 | Stop reason: HOSPADM

## 2018-02-17 RX ORDER — LEVOTHYROXINE SODIUM 0.12 MG/1
125 TABLET ORAL DAILY
Status: DISCONTINUED | OUTPATIENT
Start: 2018-02-18 | End: 2018-02-21 | Stop reason: HOSPADM

## 2018-02-17 RX ORDER — DIMETHYL FUMARATE 240 MG/1
240 CAPSULE ORAL 2 TIMES DAILY
Status: DISCONTINUED | OUTPATIENT
Start: 2018-02-17 | End: 2018-02-21 | Stop reason: HOSPADM

## 2018-02-17 RX ORDER — POTASSIUM CHLORIDE 7.45 MG/ML
10 INJECTION INTRAVENOUS PRN
Status: DISCONTINUED | OUTPATIENT
Start: 2018-02-17 | End: 2018-02-21 | Stop reason: HOSPADM

## 2018-02-17 RX ORDER — VILAZODONE HYDROCHLORIDE 40 MG/1
40 TABLET ORAL DAILY
Status: DISCONTINUED | OUTPATIENT
Start: 2018-02-17 | End: 2018-02-21 | Stop reason: HOSPADM

## 2018-02-17 RX ORDER — FERROUS SULFATE 325(65) MG
325 TABLET ORAL
Status: DISCONTINUED | OUTPATIENT
Start: 2018-02-18 | End: 2018-02-21 | Stop reason: HOSPADM

## 2018-02-17 RX ORDER — LAMOTRIGINE 100 MG/1
200 TABLET ORAL DAILY
Status: DISCONTINUED | OUTPATIENT
Start: 2018-02-18 | End: 2018-02-21 | Stop reason: HOSPADM

## 2018-02-17 RX ORDER — SODIUM CHLORIDE 9 MG/ML
INJECTION, SOLUTION INTRAVENOUS CONTINUOUS
Status: ACTIVE | OUTPATIENT
Start: 2018-02-17 | End: 2018-02-18

## 2018-02-17 RX ORDER — SODIUM CHLORIDE 0.9 % (FLUSH) 0.9 %
10 SYRINGE (ML) INJECTION PRN
Status: DISCONTINUED | OUTPATIENT
Start: 2018-02-17 | End: 2018-02-21 | Stop reason: HOSPADM

## 2018-02-17 RX ORDER — OSELTAMIVIR PHOSPHATE 75 MG/1
75 CAPSULE ORAL ONCE
Status: DISCONTINUED | OUTPATIENT
Start: 2018-02-17 | End: 2018-02-17

## 2018-02-17 RX ORDER — 0.9 % SODIUM CHLORIDE 0.9 %
250 INTRAVENOUS SOLUTION INTRAVENOUS ONCE
Status: COMPLETED | OUTPATIENT
Start: 2018-02-17 | End: 2018-02-18

## 2018-02-17 RX ORDER — PRENATAL WITH FERROUS FUM AND FOLIC ACID 3080; 920; 120; 400; 22; 1.84; 3; 20; 10; 1; 12; 200; 27; 25; 2 [IU]/1; [IU]/1; MG/1; [IU]/1; MG/1; MG/1; MG/1; MG/1; MG/1; MG/1; UG/1; MG/1; MG/1; MG/1; MG/1
1 TABLET ORAL DAILY
Status: DISCONTINUED | OUTPATIENT
Start: 2018-02-17 | End: 2018-02-21 | Stop reason: HOSPADM

## 2018-02-17 RX ORDER — POTASSIUM CHLORIDE 20 MEQ/1
40 TABLET, EXTENDED RELEASE ORAL PRN
Status: DISCONTINUED | OUTPATIENT
Start: 2018-02-17 | End: 2018-02-21 | Stop reason: HOSPADM

## 2018-02-17 RX ORDER — LITHIUM CARBONATE 300 MG/1
600 CAPSULE ORAL NIGHTLY
Status: DISCONTINUED | OUTPATIENT
Start: 2018-02-17 | End: 2018-02-21 | Stop reason: HOSPADM

## 2018-02-17 RX ORDER — ACETAMINOPHEN 325 MG/1
650 TABLET ORAL EVERY 4 HOURS PRN
Status: DISCONTINUED | OUTPATIENT
Start: 2018-02-17 | End: 2018-02-21 | Stop reason: HOSPADM

## 2018-02-17 RX ORDER — OSELTAMIVIR PHOSPHATE 75 MG/1
75 CAPSULE ORAL 2 TIMES DAILY
Status: DISCONTINUED | OUTPATIENT
Start: 2018-02-17 | End: 2018-02-21 | Stop reason: HOSPADM

## 2018-02-17 RX ORDER — POTASSIUM CHLORIDE 20MEQ/15ML
40 LIQUID (ML) ORAL PRN
Status: DISCONTINUED | OUTPATIENT
Start: 2018-02-17 | End: 2018-02-21 | Stop reason: HOSPADM

## 2018-02-17 RX ORDER — PANTOPRAZOLE SODIUM 40 MG/1
40 TABLET, DELAYED RELEASE ORAL
Status: DISCONTINUED | OUTPATIENT
Start: 2018-02-18 | End: 2018-02-21 | Stop reason: HOSPADM

## 2018-02-17 RX ORDER — SODIUM CHLORIDE 0.9 % (FLUSH) 0.9 %
10 SYRINGE (ML) INJECTION EVERY 12 HOURS SCHEDULED
Status: DISCONTINUED | OUTPATIENT
Start: 2018-02-17 | End: 2018-02-21 | Stop reason: HOSPADM

## 2018-02-17 RX ORDER — CODEINE PHOSPHATE AND GUAIFENESIN 10; 100 MG/5ML; MG/5ML
5 SOLUTION ORAL 3 TIMES DAILY PRN
Status: DISCONTINUED | OUTPATIENT
Start: 2018-02-17 | End: 2018-02-21 | Stop reason: HOSPADM

## 2018-02-17 RX ORDER — LANOLIN ALCOHOL/MO/W.PET/CERES
1000 CREAM (GRAM) TOPICAL DAILY
Status: DISCONTINUED | OUTPATIENT
Start: 2018-02-17 | End: 2018-02-21 | Stop reason: HOSPADM

## 2018-02-17 RX ORDER — PANTOPRAZOLE SODIUM 40 MG/1
40 TABLET, DELAYED RELEASE ORAL
Status: DISCONTINUED | OUTPATIENT
Start: 2018-02-18 | End: 2018-02-17

## 2018-02-17 RX ADMIN — SODIUM CHLORIDE: 9 INJECTION, SOLUTION INTRAVENOUS at 20:21

## 2018-02-17 RX ADMIN — Medication 10 ML: at 11:57

## 2018-02-17 RX ADMIN — SODIUM CHLORIDE 80 MG: 900 INJECTION, SOLUTION INTRAVENOUS at 11:57

## 2018-02-17 RX ADMIN — LITHIUM CARBONATE 600 MG: 300 CAPSULE ORAL at 20:19

## 2018-02-17 RX ADMIN — OSELTAMIVIR PHOSPHATE 75 MG: 75 CAPSULE ORAL at 20:19

## 2018-02-17 RX ADMIN — SODIUM CHLORIDE 250 ML: 9 INJECTION, SOLUTION INTRAVENOUS at 12:52

## 2018-02-17 RX ADMIN — OSELTAMIVIR PHOSPHATE 75 MG: 75 CAPSULE ORAL at 09:44

## 2018-02-17 RX ADMIN — DIMETHYL FUMARATE 240 MG: 240 CAPSULE ORAL at 20:20

## 2018-02-17 ASSESSMENT — PAIN SCALES - GENERAL
PAINLEVEL_OUTOF10: 0
PAINLEVEL_OUTOF10: 0

## 2018-02-17 ASSESSMENT — ENCOUNTER SYMPTOMS
TROUBLE SWALLOWING: 0
COUGH: 1
SHORTNESS OF BREATH: 0
VOMITING: 0
CHOKING: 0
BLOOD IN STOOL: 1
PHOTOPHOBIA: 0
DIARRHEA: 0
CONSTIPATION: 0
WHEEZING: 0
EYE DISCHARGE: 0
SORE THROAT: 0
BACK PAIN: 0
NAUSEA: 0
RHINORRHEA: 0
EYE ITCHING: 0
VOICE CHANGE: 0
CONSTIPATION: 1
CHEST TIGHTNESS: 0
EYE PAIN: 0
ABDOMINAL PAIN: 0
EYE REDNESS: 0
ABDOMINAL DISTENTION: 0
SINUS PRESSURE: 0

## 2018-02-17 NOTE — H&P
History & Physical      PCP: Adilson Daily CNP    Date of Admission: 2/17/2018    Date of Service: 2/17/18    Chief Complaint:  Fatigue, anemia    History Of Present Illness:    History obtained from chart review and the patient. 62 y.o. female who presented to [address] [place] [address] with three days of worsening fatigue. She had constipation, took laxative and had dark bowel movement. She cites compliance with diet and supplements, denies specific SOB but doesn't feel energy to ambulate well. Past Medical History:          Diagnosis Date    Balance problem     Bipolar I disorder, most recent episode (or current) depressed, in partial or unspecified remission 5/8/2013    Cancer ([facility] [address]     CERVICAL     Depression     Fatigue     Gallstones     GERD (gastroesophageal reflux disease)     History of hysterectomy     REMOVAL OF ONE OVARY     Hypothyroidism     MS (multiple sclerosis) (HCC)     Paresthesias     RT LOWER LIMB    UTI (urinary tract infection)        Past Surgical History:          Procedure Laterality Date    APPENDECTOMY      BONE GRAFT Left     left leg to right arm    CARPAL TUNNEL RELEASE  1999    CHOLECYSTECTOMY, LAPAROSCOPIC  5/16/2016    ENTEROSCOPY N/A 2/5/2018    ENTEROSCOPY performed by Sharyle Hawking, MD at [address]  5/16/2016    OTHER [address]    OTHER SURGICAL HISTORY  1986    D&C    OTHER SURGICAL HISTORY  2006    GI BLEED    ME COLONOSCOPY FLX DX W/COLLJ SPEC WHEN PFRMD Left 2/6/2018    COLONOSCOPY performed by Sharyle Hawking, MD at [address] [facility]       Medications Prior to Admission:      Prior to Admission medications    Medication Sig Start Date End Date Taking?  Authorizing Provider   vilazodone HCl (VILAZODONE HCL) 40 MG TABS Take 1 tablet by mouth daily 2/9/18  Yes Keisha Hinton MD   vitamin B-12 1000 MCG tablet Take 1 tablet by mouth daily 2/8/18  Yes Nathan Stevens MD   pantoprazole (PROTONIX) 40 MG tablet Take 1 tablet by mouth every morning (before breakfast) 2/8/18  Yes Annie Omer MD   psyllium (KONSYL) 28.3 % PACK Take 1 packet by mouth 3 times daily    Yes Historical Provider, MD   aspirin 81 MG tablet Take 81 mg by mouth daily   Yes Historical Provider, MD   lamoTRIgine (LAMICTAL) 200 MG tablet Take 1 tablet by mouth daily 5/24/17  Yes Mallory Rosenthal MD   lithium 300 MG capsule TAKE 1 CAPSULE BY MOUTH EVERY MORNING AND 2 CAPSULES BY MOUTH EVERY EVENING. 5/9/17  Yes Mallory Rosenthal MD   dimethyl fumarate (TECFIDERA) 240 MG delayed release capsule Take 240 mg by mouth 2 times daily   Yes Historical Provider, MD   levothyroxine (SYNTHROID) 125 MCG tablet Take 125 mcg by mouth Daily   Yes Historical Provider, MD   Prenatal MV-Min-Fe Fum-FA-DHA (PRENATAL 1 PO) Take by mouth   Yes Historical Provider, MD   ferrous sulfate 325 (65 FE) MG tablet Take 325 mg by mouth daily (with breakfast)   Yes Historical Provider, MD   nitroGLYCERIN (NITROSTAT) 0.4 MG SL tablet up to max of 3 total doses. If no relief after 1 dose, call 911. 2/22/17   Pepper Romero MD       Allergies:  Latex and Sulfa antibiotics    Social History:      The patient currently lives at home    TOBACCO:   reports that she has never smoked. She has never used smokeless tobacco.  ETOH:   reports that she does not drink alcohol. Family History:      Reviewed in detail. Positive as follows:        Problem Relation Age of Onset   Adina Huang Cancer Mother     Colon Polyps Mother     Heart Disease Father     Glaucoma Father     Cancer Sister     Cancer Sister        REVIEW OF SYSTEMS:   10 point review of system performed, pertinent positives as noted in the HPI, all other systems negative/at baseline.     PHYSICAL EXAM:    BP (!) 103/54   Pulse 64   Temp 99.3 °F (37.4 °C) (Oral)   Resp 18 Comment: lungs clear, diminished  Ht 4' 10\" (1.473 m)   Wt 128 lb 12.8

## 2018-02-17 NOTE — ED TRIAGE NOTES
Patient presents to ED room 3 with complaint of generalized weakness for the last 3 days. Patient reports she still has a cough from bronchitis, which she was treated for here on 2/12/18. Reports she has a history of anemia, Hgb was 7 last time she had it checked.

## 2018-02-17 NOTE — H&P
Lito Reyes is an 62 y.o.  female. Patient presents with complaint of weakness and fatigue for the last three days. The patient has a history of RYGB surgery and in the past had anastomotic ulcers resulting in GI bleed with melena. The patient had a black stool yesterday after not having a bowel movement for the previous week for which she took a laxative. Patient states that she is compliant with her medications at home and follows the diet recommended to her to prevent vitamin B12 and iron deficiency. Past Medical History:   Diagnosis Date    Balance problem     Bipolar I disorder, most recent episode (or current) depressed, in partial or unspecified remission 5/8/2013    Cancer ([facility] [address]     CERVICAL     Depression     Fatigue     Gallstones     GERD (gastroesophageal reflux disease)     History of hysterectomy     REMOVAL OF ONE OVARY     Hypothyroidism     [facility] sclerosis) ([facility])     Paresthesias     RT LOWER LIMB    UTI (urinary tract infection)        Allergies: Allergies   Allergen Reactions    Latex Rash    Sulfa Antibiotics        Active Problems:    GI bleed  Resolved Problems:    * No resolved hospital problems. *    Blood pressure (!) 124/59, pulse 69, temperature 98.8 °F (37.1 °C), temperature source Oral, resp. rate 18, height 4' 10\" (1.473 m), weight 128 lb 12.8 oz (58.4 kg), SpO2 100 %, not currently breastfeeding. Review of Systems   Constitutional: Positive for malaise/fatigue. Negative for chills and fever. Respiratory: Positive for cough. Negative for shortness of breath. Cardiovascular: Negative for chest pain. Gastrointestinal: Positive for constipation and melena. Negative for abdominal pain, diarrhea, nausea and vomiting. Genitourinary: Negative for dysuria, frequency and urgency. Patient states she has had decreased urine output the last few days. Musculoskeletal: Negative for myalgias. Skin: Negative for rash.    Neurological: Positive for dizziness and weakness. Negative for headaches. Patient complains of lightheadedness when she gets up from laying down. Physical Exam   Constitutional: She is oriented to person, place, and time. She appears well-developed and well-nourished. HENT:   Head: Normocephalic and atraumatic. Cardiovascular: Normal rate and normal heart sounds. Exam reveals no gallop and no friction rub. No murmur heard. Pulmonary/Chest: Effort normal and breath sounds normal. No respiratory distress. She has no wheezes. She has no rales. Abdominal: Soft. Bowel sounds are normal. She exhibits no distension. There is no tenderness. There is no guarding. Musculoskeletal: She exhibits no edema. Neurological: She is alert and oriented to person, place, and time. Skin: Skin is warm and dry. Assessment:  Anemia secondary to GI bleed    Plan:  Patient is admitted for blood transfusion to correct the anemia secondary to the GI bleed. GI will be performing an EGD tomorrow to analyze the source of the bleed. Patient will also be given IV iron sucrose to treat iron deficiency.     Scott Herrera  2/17/2018

## 2018-02-18 LAB
ANION GAP SERPL CALCULATED.3IONS-SCNC: 8 MEQ/L (ref 8–16)
ANISOCYTOSIS: ABNORMAL
BANDED NEUTROPHILS ABSOLUTE COUNT: 0.1 THOU/MM3
BANDS PRESENT: 2 %
BASOPHILIA: ABNORMAL
BASOPHILS # BLD: 0 %
BASOPHILS ABSOLUTE: 0 THOU/MM3 (ref 0–0.1)
BUN BLDV-MCNC: 14 MG/DL (ref 7–22)
CALCIUM SERPL-MCNC: 9 MG/DL (ref 8.5–10.5)
CHLORIDE BLD-SCNC: 113 MEQ/L (ref 98–111)
CO2: 22 MEQ/L (ref 23–33)
CREAT SERPL-MCNC: 0.5 MG/DL (ref 0.4–1.2)
DIFFERENTIAL TYPE: ABNORMAL
EOSINOPHIL # BLD: 0 %
EOSINOPHILS ABSOLUTE: 0 THOU/MM3 (ref 0–0.4)
GFR SERPL CREATININE-BSD FRML MDRD: > 90 ML/MIN/1.73M2
GLUCOSE BLD-MCNC: 92 MG/DL (ref 70–108)
HCT VFR BLD CALC: 20.1 % (ref 37–47)
HCT VFR BLD CALC: 24 % (ref 37–47)
HCT VFR BLD CALC: 25.3 % (ref 37–47)
HEMOGLOBIN: 6.5 GM/DL (ref 12–16)
HEMOGLOBIN: 7.6 GM/DL (ref 12–16)
HEMOGLOBIN: 8.5 GM/DL (ref 12–16)
LYMPHOCYTES # BLD: 19 %
LYMPHOCYTES ABSOLUTE: 0.7 THOU/MM3 (ref 1–4.8)
MACROCYTES: ABNORMAL
MCH RBC QN AUTO: 31.3 PG (ref 27–31)
MCH RBC QN AUTO: 32.6 PG (ref 27–31)
MCHC RBC AUTO-ENTMCNC: 31.8 GM/DL (ref 33–37)
MCHC RBC AUTO-ENTMCNC: 32.2 GM/DL (ref 33–37)
MCV RBC AUTO: 101.1 FL (ref 81–99)
MCV RBC AUTO: 98.2 FL (ref 81–99)
METAMYELOCYTES: 1 %
MONOCYTES # BLD: 2 %
MONOCYTES ABSOLUTE: 0.1 THOU/MM3 (ref 0.4–1.3)
NUCLEATED RED BLOOD CELLS: 2 /100 WBC
PATHOLOGIST REVIEW: ABNORMAL
PDW BLD-RTO: 17.7 % (ref 11.5–14.5)
PDW BLD-RTO: 19.5 % (ref 11.5–14.5)
PLATELET # BLD: 195 THOU/MM3 (ref 130–400)
PLATELET # BLD: 196 THOU/MM3 (ref 130–400)
PLATELET ESTIMATE: ADEQUATE
PMV BLD AUTO: 7.9 FL (ref 7.4–10.4)
PMV BLD AUTO: 8.3 FL (ref 7.4–10.4)
POIKILOCYTES: ABNORMAL
POTASSIUM REFLEX MAGNESIUM: 3.7 MEQ/L (ref 3.5–5.2)
RBC # BLD: 1.98 MILL/MM3 (ref 4.2–5.4)
RBC # BLD: 2.45 MILL/MM3 (ref 4.2–5.4)
ROULEAUX: SLIGHT
SEG NEUTROPHILS: 76 %
SEGMENTED NEUTROPHILS ABSOLUTE COUNT: 2.7 THOU/MM3 (ref 1.8–7.7)
SODIUM BLD-SCNC: 143 MEQ/L (ref 135–145)
WBC # BLD: 3.6 THOU/MM3 (ref 4.8–10.8)
WBC # BLD: 3.6 THOU/MM3 (ref 4.8–10.8)

## 2018-02-18 PROCEDURE — 6360000002 HC RX W HCPCS: Performed by: INTERNAL MEDICINE

## 2018-02-18 PROCEDURE — 85014 HEMATOCRIT: CPT

## 2018-02-18 PROCEDURE — 85018 HEMOGLOBIN: CPT

## 2018-02-18 PROCEDURE — 0DJ08ZZ INSPECTION OF UPPER INTESTINAL TRACT, VIA NATURAL OR ARTIFICIAL OPENING ENDOSCOPIC: ICD-10-PCS | Performed by: INTERNAL MEDICINE

## 2018-02-18 PROCEDURE — 85027 COMPLETE CBC AUTOMATED: CPT

## 2018-02-18 PROCEDURE — 1200000003 HC TELEMETRY R&B

## 2018-02-18 PROCEDURE — 99152 MOD SED SAME PHYS/QHP 5/>YRS: CPT | Performed by: INTERNAL MEDICINE

## 2018-02-18 PROCEDURE — 2580000003 HC RX 258: Performed by: INTERNAL MEDICINE

## 2018-02-18 PROCEDURE — 3609017100 HC EGD: Performed by: INTERNAL MEDICINE

## 2018-02-18 PROCEDURE — 99232 SBSQ HOSP IP/OBS MODERATE 35: CPT | Performed by: INTERNAL MEDICINE

## 2018-02-18 PROCEDURE — 80048 BASIC METABOLIC PNL TOTAL CA: CPT

## 2018-02-18 PROCEDURE — 36430 TRANSFUSION BLD/BLD COMPNT: CPT

## 2018-02-18 PROCEDURE — 6370000000 HC RX 637 (ALT 250 FOR IP): Performed by: INTERNAL MEDICINE

## 2018-02-18 PROCEDURE — 36415 COLL VENOUS BLD VENIPUNCTURE: CPT

## 2018-02-18 PROCEDURE — P9016 RBC LEUKOCYTES REDUCED: HCPCS

## 2018-02-18 RX ORDER — MIDAZOLAM HYDROCHLORIDE 1 MG/ML
INJECTION INTRAMUSCULAR; INTRAVENOUS PRN
Status: DISCONTINUED | OUTPATIENT
Start: 2018-02-18 | End: 2018-02-18 | Stop reason: HOSPADM

## 2018-02-18 RX ORDER — 0.9 % SODIUM CHLORIDE 0.9 %
250 INTRAVENOUS SOLUTION INTRAVENOUS ONCE
Status: COMPLETED | OUTPATIENT
Start: 2018-02-18 | End: 2018-02-19

## 2018-02-18 RX ORDER — SODIUM CHLORIDE 9 MG/ML
INJECTION, SOLUTION INTRAVENOUS CONTINUOUS
Status: DISCONTINUED | OUTPATIENT
Start: 2018-02-18 | End: 2018-02-20

## 2018-02-18 RX ORDER — FENTANYL CITRATE 50 UG/ML
INJECTION, SOLUTION INTRAMUSCULAR; INTRAVENOUS PRN
Status: DISCONTINUED | OUTPATIENT
Start: 2018-02-18 | End: 2018-02-18 | Stop reason: HOSPADM

## 2018-02-18 RX ADMIN — PANTOPRAZOLE SODIUM 40 MG: 40 TABLET, DELAYED RELEASE ORAL at 10:15

## 2018-02-18 RX ADMIN — LITHIUM CARBONATE 300 MG: 300 CAPSULE ORAL at 10:14

## 2018-02-18 RX ADMIN — VILAZODONE HYDROCHLORIDE 40 MG: 40 TABLET ORAL at 10:14

## 2018-02-18 RX ADMIN — Medication 200 MG: at 10:40

## 2018-02-18 RX ADMIN — LITHIUM CARBONATE 600 MG: 300 CAPSULE ORAL at 22:05

## 2018-02-18 RX ADMIN — OSELTAMIVIR PHOSPHATE 75 MG: 75 CAPSULE ORAL at 10:14

## 2018-02-18 RX ADMIN — SODIUM CHLORIDE 250 ML: 9 INJECTION, SOLUTION INTRAVENOUS at 15:44

## 2018-02-18 RX ADMIN — DIMETHYL FUMARATE 240 MG: 240 CAPSULE ORAL at 22:05

## 2018-02-18 RX ADMIN — VITAMIN A, VITAMIN C, VITAMIN D-3, VITAMIN E, VITAMIN B-1, VITAMIN B-2, NIACIN, VITAMIN B-6, CALCIUM, IRON, ZINC, COPPER 1 TABLET: 4000; 120; 400; 22; 1.84; 3; 20; 10; 1; 12; 200; 27; 25; 2 TABLET ORAL at 10:14

## 2018-02-18 RX ADMIN — DIMETHYL FUMARATE 240 MG: 240 CAPSULE ORAL at 10:16

## 2018-02-18 RX ADMIN — Medication 1000 MCG: at 10:14

## 2018-02-18 RX ADMIN — Medication 10 ML: at 10:41

## 2018-02-18 RX ADMIN — FERROUS SULFATE TAB 325 MG (65 MG ELEMENTAL FE) 325 MG: 325 (65 FE) TAB at 10:15

## 2018-02-18 RX ADMIN — LAMOTRIGINE 200 MG: 100 TABLET ORAL at 10:14

## 2018-02-18 RX ADMIN — OSELTAMIVIR PHOSPHATE 75 MG: 75 CAPSULE ORAL at 22:04

## 2018-02-18 RX ADMIN — SODIUM CHLORIDE: 9 INJECTION, SOLUTION INTRAVENOUS at 23:37

## 2018-02-18 RX ADMIN — LEVOTHYROXINE SODIUM 125 MCG: 125 TABLET ORAL at 10:14

## 2018-02-18 ASSESSMENT — PAIN SCALES - GENERAL
PAINLEVEL_OUTOF10: 0

## 2018-02-18 ASSESSMENT — PAIN - FUNCTIONAL ASSESSMENT: PAIN_FUNCTIONAL_ASSESSMENT: 0-10

## 2018-02-18 NOTE — PROGRESS NOTES
It may contain minor errors which are inherent in voice recognition technology. **      Final report electronically signed by Dr. Barbra Perales on 2/17/2018 9:22 AM      CT CHEST WO CONTRAST   Final Result   1. There is no consolidation or infiltrates. 2. Enlarged thyroid gland containing calcifications. A scheduled outpatient thyroid ultrasound examination is recommended. 3. Findings related to the abdomen/pelvis are dictated on the CT abdomen/pelvis examination report dated 2/17/2018. **This report has been created using voice recognition software. It may contain minor errors which are inherent in voice recognition technology. **      Final report electronically signed by Dr. Barbra Perales on 2/17/2018 9:15 AM          Diet: Diet NPO Effective Now    DVT prophylaxis: No DVT prophylaxis, acutely bleeding       Disposition:    [x] Home       [] TCU       [] Rehab       [] Psych       [] SNF       [] [place]       [] Other-    Code Status: Limited    PT/OT Eval Status: None    Assessment/Plan:    Anticipated Discharge in : 2 days    Active Hospital Problems    Diagnosis Date Noted    GI bleed [K92.2] 02/17/2018    Acute blood loss anemia [D62] 02/17/2018    Influenza A [J10.1] 02/17/2018    Anemia, macrocytic [D53.9] 02/03/2018    Gastric bypass status for obesity [Z98.84] 05/15/2016    Bipolar I, most recent episode depressed, partial remission ([facility] [address] [F31.75] 05/08/2013    Multiple sclerosis ([facility] [address] Annalisa Kwan 12/21/2011       1. GI bleed- Undergoing EGD in attempt to find the source of the bleed  2. Acute blood loss anemia- 2 units of blood given yesterday. Will continue to monitor Hgb. 3. Influenza-75 mg capsule of tamiflu started yesterday  4.  Anemia, macrocytic- vitamin B-12 injections started      Electronically signed by Tanya Doyle on 2/18/2018 at 11:45 AM

## 2018-02-18 NOTE — CONSULTS
Oral, BID, Deanna Apley, DO, 75 mg at 02/17/18 2019    vitamin B-12 (CYANOCOBALAMIN) tablet 1,000 mcg, 1,000 mcg, Oral, Daily, Deanna Apley, DO    vilazodone HCl (VIIBRYD) TABS 40 mg, 40 mg, Oral, Daily, Deanna Apley, DO    psyllium (KONSYL) 28.3 % packet 1 packet, 1 packet, Oral, Daily, Deanna Loganey, DO    nitroGLYCERIN (NITROSTAT) SL tablet 0.4 mg, 0.4 mg, Sublingual, Q5 Min PRN, Deanna Apley, DO    lithium capsule 300 mg, 300 mg, Oral, Daily, Dondavid Apley, DO    lithium capsule 600 mg, 600 mg, Oral, Nightly, Donnakerwinan Apley, DO, 600 mg at 02/17/18 2019    levothyroxine (SYNTHROID) tablet 125 mcg, 125 mcg, Oral, Daily, Deanna Loganey, DO    lamoTRIgine (LAMICTAL) tablet 200 mg, 200 mg, Oral, Daily, Deanna Apley, DO    guaiFENesin-codeine (TUSSI-ORGANIDIN NR) 100-10 MG/5ML syrup 5 mL, 5 mL, Oral, TID PRN, Deanna Apley, DO    ferrous sulfate tablet 325 mg, 325 mg, Oral, Daily with breakfast, Deanna Apley, DO    dimethyl fumarate (TECFIDERA) delayed release capsule 240 mg, 240 mg, Oral, BID, Deanna Apley, DO, 240 mg at 02/17/18 2020    0.9 % sodium chloride infusion, , Intravenous, Continuous, Deanna Aplangela, DO, Last Rate: 75 mL/hr at 02/17/18 2021    sodium chloride flush 0.9 % injection 10 mL, 10 mL, Intravenous, 2 times per day, Donnakerwinan Apley, DO, 10 mL at 02/17/18 1157    sodium chloride flush 0.9 % injection 10 mL, 10 mL, Intravenous, PRN, Deanna Apley, DO    potassium chloride (KLOR-CON M) extended release tablet 40 mEq, 40 mEq, Oral, PRN **OR** potassium chloride 20 MEQ/15ML (10%) oral solution 40 mEq, 40 mEq, Oral, PRN **OR** potassium chloride 10 mEq/100 mL IVPB (Peripheral Line), 10 mEq, Intravenous, PRN, Donnajean Apley, DO    acetaminophen (TYLENOL) tablet 650 mg, 650 mg, Oral, Q4H PRN, Donnajean Apley, DO    magnesium hydroxide (MILK OF MAGNESIA) 400 MG/5ML suspension 30 mL, 30 mL, Oral, Daily PRN, Donnajean Apley, DO    ondansetron [facility]) injection 4 mg, 4 mg, Oriented  []Other:      VITAL SIGNS   Patient Vitals for the past 24 hrs:   BP Temp Temp src Pulse Resp SpO2 Height Weight   02/18/18 0909 (!) 127/59 - - 59 - 100 % - -   02/18/18 0907 (!) 152/67 - - 61 - 100 % - -   02/18/18 0906 (!) 176/72 - - 63 - 100 % - -   02/18/18 0903 138/63 - - 71 - 100 % - -   02/18/18 0900 (!) 150/69 - - 63 - 100 % - -   02/18/18 0832 (!) 163/78 - - 62 18 100 % - -   02/18/18 0347 (!) 107/54 98.9 °F (37.2 °C) Oral 63 18 98 % - -   02/17/18 2004 111/61 99.3 °F (37.4 °C) Oral 69 18 100 % - -   02/17/18 1814 (!) 116/58 99.4 °F (37.4 °C) Oral 69 18 97 % - -   02/17/18 1610 (!) 103/54 99.3 °F (37.4 °C) Oral 64 18 98 % - -   02/17/18 1523 (!) 108/50 98.8 °F (37.1 °C) Oral 69 18 97 % - -   02/17/18 1446 (!) 113/54 98.8 °F (37.1 °C) Oral 72 18 99 % - -   02/17/18 1302 (!) 110/53 99.4 °F (37.4 °C) Oral 73 16 98 % - -   02/17/18 1241 (!) 104/50 98.8 °F (37.1 °C) Oral 67 18 97 % - -   02/17/18 1052 (!) 124/59 98.8 °F (37.1 °C) Oral 69 18 100 % 4' 10\" (1.473 m) 128 lb 12.8 oz (58.4 kg)   02/17/18 0935 99/60 - - 68 - 99 % - -       PLANNED PROCEDURE  [x]EGD  []Colonoscopy []Flex Sigmoid  []ERCP []EUS   []Cystoscopy  [] CATH [] BRONCH   Consent: I have discussed with the patient and/or the patient representative the indication, alternatives, and the possible risks and/or complications of the planned procedure and the anesthesia methods. The patient and/or patient representative appear to understand and agree to proceed. SEDATION  Planned agent:[]Midazolam []Meperidine []Sublimaze []Morphine  []Diazepam [x]Propofol  []Other:     ASA Classification: Class 3 - A patient with severe systemic disease that limits activity but is not incapacitating    Airway Assessment: normal    Monitoring and Safety: The patient will be placed on a cardiac monitor and vital signs, pulse oximetry and level of consciousness will be continuously evaluated throughout the procedure.  The patient will be closely monitored until recovery from the medications is complete and the patient has returned to baseline status. Respiratory therapy will be on standby during the procedure. [x]Pre-procedure diagnostic studies complete and results available. Comment:    [x]Previous sedation/anesthesia experiences assessed. Comment:    [x]The patient is an appropriate candidate to undergo the planned procedure sedation and anesthesia. (Refer to nursing sedation/analgesia documentation record)  [x]Formulation and discussion of sedation/procedure plan, risks, and expectations with patient and/or responsible adult completed. [x]Patient examined immediately prior to the procedure.  (Refer to nursing sedation/analgesia documentation record)    Sina An MD   Electronically signed 2/18/2018 at 9:17 AM

## 2018-02-18 NOTE — POST SEDATION
Kristi Benites  Sedation/Analgesia Post Sedation Record    Patient: Fernando Barjuan ale: 1960  [facility] Rec#: [id number] Acc#: [id number]   Procedure Performed By: Marbella Carlos  Primary Care Physician: Fransisca Bryant CNP    POST-PROCEDURE    Physicians/Assistants: Marbella Carlos  Procedure Performed:    Sedation/Anesthesia:     Estimated Blood Loss:          ml  Specimens Removed:  [x]None []Other:      Disposition of Specimen:  []Pathology [x]Other      Complications:   [x]None Immediate []Other:     Post-procedure Diagnosis/Findings:             Recommendations:     Gi bleed          Marbella Carlos MD [facility]  Electronically signed 2/18/2018 at 9:18 AM

## 2018-02-18 NOTE — CONSULTS
[address] [id number]                                   CONSULTATION    PATIENT NAME: Suly Martinez                  :        1960  MED REC NO:   [id number]                           ROOM:       0036  ACCOUNT NO:   [de-identified]                           ADMIT DATE: 2018  PROVIDER:     TYREL Saha Perches:  2018    REASON FOR CONSULTATION:  Symptomatic anemia and melena. HISTORY OF PRESENT ILLNESS:  A 66-year-old pleasant white female known to  our GI service. The patient has history of RYGB in  and has history of  anastomotic ulcer and bleed from that. The patient was recently admitted  in the hospital with melena and undergone enteroscopy with Dr. Paulette Knott  and there was no sign of upper GI bleeding found. The upper anastomosis  could be reached, but the lower anastomosis could not be reached with the  enteroscope. It was followed with the colonoscopy next day which was  earlier this month and it was negative except for twisted bowels, but no  active GI bleeding. The patient was given some transfusions and it was  felt the patient should have a small bowel capsule. CT enterography was  done which was essentially normal except for small dilated loop of small  bowel and the patient is scheduled to have the PillCam or small bowel  evaluation on coming Tuesday. The patient had gotten a cold and was  getting some antibiotics for that. The patient did not have a bowel  movement for almost a week to week and a half and then last night, she had  a black stool and she came in for feeling weak and tired and was found to  have low hemoglobin. The patient's lab's today in the emergency room show  the patient to have a white cell count of 3.6, hemoglobin 6.5, hematocrit  20, MCV is 101, and platelet count is 087.   Relatively normal liver  function with AST and ALT 34 and 23 respectively

## 2018-02-19 LAB
ANISOCYTOSIS: ABNORMAL
BANDED NEUTROPHILS ABSOLUTE COUNT: 0 THOU/MM3
BANDS PRESENT: 1 %
BASOPHILIA: ABNORMAL
BASOPHILS # BLD: 1 %
BASOPHILS ABSOLUTE: 0 THOU/MM3 (ref 0–0.1)
DIFFERENTIAL, MANUAL: NORMAL
EOSINOPHIL # BLD: 3 %
EOSINOPHILS ABSOLUTE: 0.1 THOU/MM3 (ref 0–0.4)
HCT VFR BLD CALC: 25.5 % (ref 37–47)
HEMOGLOBIN: 8.3 GM/DL (ref 12–16)
LYMPHOCYTES # BLD: 19 %
LYMPHOCYTES ABSOLUTE: 0.8 THOU/MM3 (ref 1–4.8)
MCH RBC QN AUTO: 31.7 PG (ref 27–31)
MCHC RBC AUTO-ENTMCNC: 32.6 GM/DL (ref 33–37)
MCV RBC AUTO: 97.2 FL (ref 81–99)
METAMYELOCYTES: 1 %
MONOCYTES # BLD: 7 %
MONOCYTES ABSOLUTE: 0.3 THOU/MM3 (ref 0.4–1.3)
MYELOCYTES: 5 %
NUCLEATED RED BLOOD CELLS: 0 /100 WBC
PDW BLD-RTO: 18.7 % (ref 11.5–14.5)
PLATELET # BLD: 201 THOU/MM3 (ref 130–400)
PLATELET ESTIMATE: ADEQUATE
PMV BLD AUTO: 8.7 FL (ref 7.4–10.4)
POIKILOCYTES: SLIGHT
RBC # BLD: 2.63 MILL/MM3 (ref 4.2–5.4)
SEG NEUTROPHILS: 63 %
SEGMENTED NEUTROPHILS ABSOLUTE COUNT: 2.8 THOU/MM3 (ref 1.8–7.7)
WBC # BLD: 4.4 THOU/MM3 (ref 4.8–10.8)

## 2018-02-19 PROCEDURE — 1200000003 HC TELEMETRY R&B

## 2018-02-19 PROCEDURE — 6370000000 HC RX 637 (ALT 250 FOR IP): Performed by: INTERNAL MEDICINE

## 2018-02-19 PROCEDURE — 85025 COMPLETE CBC W/AUTO DIFF WBC: CPT

## 2018-02-19 PROCEDURE — 36415 COLL VENOUS BLD VENIPUNCTURE: CPT

## 2018-02-19 PROCEDURE — 36430 TRANSFUSION BLD/BLD COMPNT: CPT

## 2018-02-19 RX ADMIN — LITHIUM CARBONATE 600 MG: 300 CAPSULE ORAL at 20:46

## 2018-02-19 RX ADMIN — OSELTAMIVIR PHOSPHATE 75 MG: 75 CAPSULE ORAL at 09:02

## 2018-02-19 RX ADMIN — DIMETHYL FUMARATE 240 MG: 240 CAPSULE ORAL at 09:04

## 2018-02-19 RX ADMIN — Medication 1000 MCG: at 09:03

## 2018-02-19 RX ADMIN — DIMETHYL FUMARATE 240 MG: 240 CAPSULE ORAL at 20:47

## 2018-02-19 RX ADMIN — OSELTAMIVIR PHOSPHATE 75 MG: 75 CAPSULE ORAL at 20:46

## 2018-02-19 RX ADMIN — FERROUS SULFATE TAB 325 MG (65 MG ELEMENTAL FE) 325 MG: 325 (65 FE) TAB at 09:02

## 2018-02-19 RX ADMIN — LEVOTHYROXINE SODIUM 125 MCG: 125 TABLET ORAL at 04:47

## 2018-02-19 RX ADMIN — VITAMIN A, VITAMIN C, VITAMIN D-3, VITAMIN E, VITAMIN B-1, VITAMIN B-2, NIACIN, VITAMIN B-6, CALCIUM, IRON, ZINC, COPPER 1 TABLET: 4000; 120; 400; 22; 1.84; 3; 20; 10; 1; 12; 200; 27; 25; 2 TABLET ORAL at 09:03

## 2018-02-19 RX ADMIN — VILAZODONE HYDROCHLORIDE 40 MG: 40 TABLET ORAL at 09:03

## 2018-02-19 RX ADMIN — PANTOPRAZOLE SODIUM 40 MG: 40 TABLET, DELAYED RELEASE ORAL at 07:42

## 2018-02-19 RX ADMIN — LAMOTRIGINE 200 MG: 100 TABLET ORAL at 09:02

## 2018-02-19 RX ADMIN — LITHIUM CARBONATE 300 MG: 300 CAPSULE ORAL at 09:02

## 2018-02-19 ASSESSMENT — PAIN SCALES - GENERAL
PAINLEVEL_OUTOF10: 0

## 2018-02-19 NOTE — PROGRESS NOTES
Gastrointestinal Progress Note      Patient:  Antwon Severe  Unit/Bed:3B-36/036-A       YOB: 1960    MRN: [id number]                      Acct: [de-identified]     Admit date: 2/17/2018      Subjective:  Pt lying in bed. She is tearful about not having transportation for her SBCE which was supposed to be tomorrow. I have called the office and we were able to reschedule it for Monday. Rn aware of the new date. Will ask  to come see the patient to see what they have available /resources for transport. Objective:       CBC:   Recent Labs      02/17/18   0850   02/18/18   0540  02/18/18   2102  02/19/18   0653   WBC  3.6*   --   3.6*   --   4.4*   HGB  6.5*   < >  7.6*  8.5*  8.3*   PLT  196   --   195   --   201    < > = values in this interval not displayed. BMP:    Recent Labs      02/17/18   0850  02/18/18   0540   NA  142  143   K  4.2  3.7   CL  107  113*   CO2  26  22*   BUN  20  14   CREATININE  0.5  0.5   GLUCOSE  116*  92     Calcium:  Recent Labs      02/18/18   0540   CALCIUM  9.0     Ionized Calcium:No results for input(s): IONCA in the last 72 hours. Magnesium:  Recent Labs      02/17/18   0850   MG  2.2     Phosphorus:No results for input(s): PHOS in the last 72 hours. INR: No results for input(s): INR in the last 72 hours. Hepatic:   Recent Labs      02/17/18   0850   ALKPHOS  83   ALT  34   AST  23   PROT  5.2*   BILITOT  0.2*   BILIDIR  <0.2   LABALBU  3.5     Amylase and Lipase:No results for input(s): LACTA, AMYLASE in the last 72 hours. Lactic Acid: No results for input(s): LACTA in the last 72 hours. Physical Exam:  Vitals:    02/19/18 1114   BP: (!) 111/59   Pulse: 60   Resp: 18   Temp: 98.4 °F (36.9 °C)   SpO2: 97%     GEN: Well nourished, well developed. LUNGS:  Clear to auscultation bilaterally. Chest rises equally on inspiration. CARDIOVASCULAR:  Regular rate and rhythm without murmurs, rubs or gallops.   ABDOMEN:  Soft, nontender and

## 2018-02-19 NOTE — PROGRESS NOTES
Hospitalist Progress Note    Patient:  Raquel Stoner      Unit/Bed:3B-36/036-A    YOB: 1960    MRN: [id number]       Acct: [de-identified]     PCP: Debra Zimmerman CNP    Date of Admission: 2/17/2018    Chief Complaint: Fatigue    Hospital Course: Patient was admitted two days ago with fatigue and weakness secondary to anemia. Patient has been given multiple units of blood over the last three days for her anemia secondary to GI bleed. EGD performed yesterday found no obvious cause of bleed at proximal anastomose. Patient hemoglobin dropped slightly when compared to yesterday, which may be caused by hemodilution. Hemoglobin will be checked tomorrow morning and may be transferred or discharge pending GI recommendations. Subjective: Patient states that she is feeling the same fatigue and weakness as yesterday. She said that her cough has improved slightly. Patient says she started getting dark black diarrhea yesterday as well.       Medications:  Reviewed    Infusion Medications    sodium chloride 50 mL/hr at 02/18/18 1217     Scheduled Medications    oseltamivir  75 mg Oral BID    cyanocobalamin  1,000 mcg Oral Daily    vilazodone HCl  40 mg Oral Daily    psyllium  1 packet Oral Daily    lithium  300 mg Oral Daily    lithium  600 mg Oral Nightly    levothyroxine  125 mcg Oral Daily    lamoTRIgine  200 mg Oral Daily    ferrous sulfate  325 mg Oral Daily with breakfast    dimethyl fumarate  240 mg Oral BID    sodium chloride flush  10 mL Intravenous 2 times per day    prenatal vitamin  1 tablet Oral Daily    pantoprazole  40 mg Oral QAM AC     PRN Meds: nitroGLYCERIN, guaiFENesin-codeine, sodium chloride flush, potassium chloride **OR** potassium chloride **OR** potassium chloride, acetaminophen, magnesium hydroxide, ondansetron      Intake/Output Summary (Last 24 hours) at 02/19/18 1035  Last data filed at 02/19/18 0714   Gross per 24 hour   Intake           355.42 ml   Output

## 2018-02-19 NOTE — CARE COORDINATION
2/19/18, 12:45 PM  Jeane Eugene       Admitted from: ED 2/17/2018/ NICOLE OKEEFE) [facility] day: 2   Location: [address] [place] [address] Reason for admit: GI bleed [K92.2] Status: inpt  Admit order signed?: yes  PMH:  has a past medical history of Balance problem; Bipolar I disorder, most recent episode (or current) depressed, in partial or unspecified remission; Cancer ([facility] [address] Depression; Fatigue; Gallstones; GERD (gastroesophageal reflux disease); History of hysterectomy; Hypothyroidism; MS (multiple sclerosis) ([facility] [address] Paresthesias; and UTI (urinary tract infection). Hx Batsheva-en-Y surgery. Medications:  Scheduled Meds:   oseltamivir  75 mg Oral BID    cyanocobalamin  1,000 mcg Oral Daily    vilazodone HCl  40 mg Oral Daily    psyllium  1 packet Oral Daily    lithium  300 mg Oral Daily    lithium  600 mg Oral Nightly    levothyroxine  125 mcg Oral Daily    lamoTRIgine  200 mg Oral Daily    ferrous sulfate  325 mg Oral Daily with breakfast    dimethyl fumarate  240 mg Oral BID    sodium chloride flush  10 mL Intravenous 2 times per day    prenatal vitamin  1 tablet Oral Daily    pantoprazole  40 mg Oral QAM AC     Continuous Infusions:   sodium chloride 50 mL/hr at 02/18/18 2337      Pertinent Info/Orders/Treatment Plan: IV NS @ 50/hr, S/P 3 units PRBC, IV iron yesterday, EGD (-). Hgb 8.3 today. Diet: Diet NPO Effective Now   Vital Signs: BP (!) 111/59   Pulse 60   Temp 98.4 °F (36.9 °C) (Oral)   Resp 18   Ht 4' 10\" (1.473 m)   Wt 128 lb 12.8 oz (58.4 kg)   SpO2 97%   BMI 26.92 kg/m²   DVT Prophylaxis: SCDs  Influenza Vaccination Screening Completed: yes  Pneumonia Vaccination Screening Completed: yes  Core measures: monitoring  PCP: Anand Reid CNP  Readmission: yes  Patient has been readmitted within 10 days. Patient went to f/u appointment? Yes on 2/12  If yes, was it within 7 days? yes  Patient was able to fill prescriptions? yes  Patient is taking medications as prescribed? yes  Cause for readmission?  GI bleeding    Risk Score: 26.75     Discharge Planning  Current Residence:  Private Residence  Living Arrangements:  Alone   Support Systems:  Family Members  Current Services PTA:     Potential Assistance Needed:  N/A  Potential Assistance Purchasing Medications:  No  Does patient want to participate in local refill/ meds to beds program?  No  Type of Home Care Services:  None  Patient expects to be discharged to:  home  Expected Discharge date:  02/19/18  Follow Up Appointment: Best Day/ Time: Monday AM    Discharge Plan: Pt from home alone and plans on returning home.     Evaluation: no

## 2018-02-20 ENCOUNTER — APPOINTMENT (OUTPATIENT)
Dept: NUCLEAR MEDICINE | Age: 58
DRG: 394 | End: 2018-02-20
Payer: MEDICARE

## 2018-02-20 PROBLEM — K29.71 GASTROINTESTINAL HEMORRHAGE ASSOCIATED WITH GASTRITIS: Status: ACTIVE | Noted: 2018-02-17

## 2018-02-20 LAB
ANION GAP SERPL CALCULATED.3IONS-SCNC: 13 MEQ/L (ref 8–16)
BUN BLDV-MCNC: 7 MG/DL (ref 7–22)
CALCIUM SERPL-MCNC: 8.9 MG/DL (ref 8.5–10.5)
CHLORIDE BLD-SCNC: 110 MEQ/L (ref 98–111)
CO2: 20 MEQ/L (ref 23–33)
CREAT SERPL-MCNC: 0.4 MG/DL (ref 0.4–1.2)
GFR SERPL CREATININE-BSD FRML MDRD: > 90 ML/MIN/1.73M2
GLUCOSE BLD-MCNC: 80 MG/DL (ref 70–108)
HCT VFR BLD CALC: 23.2 % (ref 37–47)
HCT VFR BLD CALC: 24.1 % (ref 37–47)
HEMOGLOBIN: 7.6 GM/DL (ref 12–16)
HEMOGLOBIN: 7.8 GM/DL (ref 12–16)
MCH RBC QN AUTO: 30.7 PG (ref 27–31)
MCHC RBC AUTO-ENTMCNC: 32.8 GM/DL (ref 33–37)
MCV RBC AUTO: 93.5 FL (ref 81–99)
PDW BLD-RTO: 19 % (ref 11.5–14.5)
PLATELET # BLD: 218 THOU/MM3 (ref 130–400)
PMV BLD AUTO: 8.1 FL (ref 7.4–10.4)
POTASSIUM SERPL-SCNC: 3.9 MEQ/L (ref 3.5–5.2)
RBC # BLD: 2.48 MILL/MM3 (ref 4.2–5.4)
SODIUM BLD-SCNC: 143 MEQ/L (ref 135–145)
WBC # BLD: 5.5 THOU/MM3 (ref 4.8–10.8)

## 2018-02-20 PROCEDURE — 3430000000 HC RX DIAGNOSTIC RADIOPHARMACEUTICAL: Performed by: NURSE PRACTITIONER

## 2018-02-20 PROCEDURE — 6370000000 HC RX 637 (ALT 250 FOR IP): Performed by: INTERNAL MEDICINE

## 2018-02-20 PROCEDURE — 80048 BASIC METABOLIC PNL TOTAL CA: CPT

## 2018-02-20 PROCEDURE — 85027 COMPLETE CBC AUTOMATED: CPT

## 2018-02-20 PROCEDURE — 78278 ACUTE GI BLOOD LOSS IMAGING: CPT

## 2018-02-20 PROCEDURE — 1200000003 HC TELEMETRY R&B

## 2018-02-20 PROCEDURE — 2580000003 HC RX 258: Performed by: INTERNAL MEDICINE

## 2018-02-20 PROCEDURE — A9560 TC99M LABELED RBC: HCPCS | Performed by: NURSE PRACTITIONER

## 2018-02-20 PROCEDURE — 36415 COLL VENOUS BLD VENIPUNCTURE: CPT

## 2018-02-20 PROCEDURE — 85018 HEMOGLOBIN: CPT

## 2018-02-20 PROCEDURE — 85014 HEMATOCRIT: CPT

## 2018-02-20 PROCEDURE — 99232 SBSQ HOSP IP/OBS MODERATE 35: CPT | Performed by: HOSPITALIST

## 2018-02-20 RX ADMIN — LITHIUM CARBONATE 600 MG: 300 CAPSULE ORAL at 19:50

## 2018-02-20 RX ADMIN — OSELTAMIVIR PHOSPHATE 75 MG: 75 CAPSULE ORAL at 19:49

## 2018-02-20 RX ADMIN — PSYLLIUM HUSK 1 PACKET: 3.4 GRANULE ORAL at 08:25

## 2018-02-20 RX ADMIN — FERROUS SULFATE TAB 325 MG (65 MG ELEMENTAL FE) 325 MG: 325 (65 FE) TAB at 08:23

## 2018-02-20 RX ADMIN — VITAMIN A, VITAMIN C, VITAMIN D-3, VITAMIN E, VITAMIN B-1, VITAMIN B-2, NIACIN, VITAMIN B-6, CALCIUM, IRON, ZINC, COPPER 1 TABLET: 4000; 120; 400; 22; 1.84; 3; 20; 10; 1; 12; 200; 27; 25; 2 TABLET ORAL at 08:24

## 2018-02-20 RX ADMIN — DIMETHYL FUMARATE 240 MG: 240 CAPSULE ORAL at 19:54

## 2018-02-20 RX ADMIN — LITHIUM CARBONATE 300 MG: 300 CAPSULE ORAL at 08:24

## 2018-02-20 RX ADMIN — Medication 25 MILLICURIE: at 14:15

## 2018-02-20 RX ADMIN — Medication 1000 MCG: at 08:24

## 2018-02-20 RX ADMIN — LAMOTRIGINE 200 MG: 100 TABLET ORAL at 08:23

## 2018-02-20 RX ADMIN — DIMETHYL FUMARATE 240 MG: 240 CAPSULE ORAL at 08:20

## 2018-02-20 RX ADMIN — VILAZODONE HYDROCHLORIDE 40 MG: 40 TABLET ORAL at 08:24

## 2018-02-20 RX ADMIN — Medication 10 ML: at 19:49

## 2018-02-20 RX ADMIN — PANTOPRAZOLE SODIUM 40 MG: 40 TABLET, DELAYED RELEASE ORAL at 08:24

## 2018-02-20 RX ADMIN — LEVOTHYROXINE SODIUM 125 MCG: 125 TABLET ORAL at 04:25

## 2018-02-20 RX ADMIN — OSELTAMIVIR PHOSPHATE 75 MG: 75 CAPSULE ORAL at 08:23

## 2018-02-20 NOTE — PROGRESS NOTES
Hospitalist Progress Note    Patient:  Alicia Burleson      Unit/Bed:3B-36/036-A    YOB: 1960    MRN: [id number]       Acct: [de-identified]     PCP: Adilson Daily CNP    Date of Admission: 2/17/2018        Subjective:     Has some melena stools  No abdominal pain,nausea or vomiting      Medications:  Reviewed    Infusion Medications    Scheduled Medications    oseltamivir  75 mg Oral BID    cyanocobalamin  1,000 mcg Oral Daily    vilazodone HCl  40 mg Oral Daily    psyllium  1 packet Oral Daily    lithium  300 mg Oral Daily    lithium  600 mg Oral Nightly    levothyroxine  125 mcg Oral Daily    lamoTRIgine  200 mg Oral Daily    ferrous sulfate  325 mg Oral Daily with breakfast    dimethyl fumarate  240 mg Oral BID    sodium chloride flush  10 mL Intravenous 2 times per day    prenatal vitamin  1 tablet Oral Daily    pantoprazole  40 mg Oral QAM AC     PRN Meds: nitroGLYCERIN, guaiFENesin-codeine, sodium chloride flush, potassium chloride **OR** potassium chloride **OR** potassium chloride, acetaminophen, magnesium hydroxide, ondansetron      Intake/Output Summary (Last 24 hours) at 02/20/18 1604  Last data filed at 02/20/18 1408   Gross per 24 hour   Intake          1458.78 ml   Output             3675 ml   Net         -2216.22 ml       Diet:  DIET GENERAL;    Exam:  BP (!) 130/58   Pulse 66   Temp 98.3 °F (36.8 °C) (Oral)   Resp 16   Ht 4' 10\" (1.473 m)   Wt 131 lb 6.4 oz (59.6 kg)   SpO2 100%   BMI 27.46 kg/m²     General appearance: No apparent distress, appears stated age and cooperative. HEENT: Pupils equal, round, and reactive to light. Conjunctivae/corneas clear. Neck: Supple, with full range of motion. No jugular venous distention. Trachea midline. Respiratory:  Normal respiratory effort. Clear to auscultation, bilaterally without Rales/Wheezes/Rhonchi. Cardiovascular: Regular rate and rhythm with normal S1/S2 without murmurs, rubs or gallops.   Abdomen: Soft, non-tender, non-distended with normal bowel sounds. Musculoskeletal: No clubbing, cyanosis or edema bilaterally. Full range of motion without deformity. Skin: Skin color, texture, turgor normal.  No rashes or lesions. Neurologic:  Neurovascularly intact without any focal sensory/motor deficits. Cranial nerves: II-XII intact, grossly non-focal.  Psychiatric: Alert and oriented, thought content appropriate, normal insight  Capillary Refill: Brisk,< 3 seconds   Peripheral Pulses: +2 palpable, equal bilaterally       Labs:   Recent Labs      02/18/18   0540   02/19/18   0653  02/20/18   0658  02/20/18   1108   WBC  3.6*   --   4.4*  5.5   --    HGB  7.6*   < >  8.3*  7.6*  7.8*   HCT  24.0*   < >  25.5*  23.2*  24.1*   PLT  195   --   201  218   --     < > = values in this interval not displayed. Recent Labs      02/18/18   0540  02/20/18   0422   NA  143  143   K  3.7  3.9   CL  113*  110   CO2  22*  20*   BUN  14  7   CREATININE  0.5  0.4   CALCIUM  9.0  8.9     No results for input(s): AST, ALT, BILIDIR, BILITOT, ALKPHOS in the last 72 hours. No results for input(s): INR in the last 72 hours. No results for input(s): Jenifer Blanca in the last 72 hours. Urinalysis:    Lab Results   Component Value Date    NITRU NEGATIVE 02/17/2018    WBCUA 2-4 02/03/2018    BACTERIA FEW 02/03/2018    RBCUA 0-2 02/03/2018    BLOODU NEGATIVE 02/17/2018    SPECGRAV >1.030 05/13/2016    GLUCOSEU NEGATIVE 02/17/2018       Radiology:  NM GI Blood Loss   Final Result      No scintigraphic evidence of active gastrointestinal bleeding. Final report electronically signed by Dr. Veronica Linda on 2/20/2018 3:49 PM      CT ABDOMEN PELVIS WO CONTRAST Additional Contrast? None   Final Result   1. The bowel gas pattern is nonobstructive. 2. There is a moderately large amount of stool throughout the colon. 3. Postop changes related to the small bowel.  There is a stable dilated loop of small bowel within the anterior aspect of the mid abdomen measuring 3.5 cm transverse dimension. 4. Additional findings as described in the body of the report. **This report has been created using voice recognition software. It may contain minor errors which are inherent in voice recognition technology. **      Final report electronically signed by Dr. Ana Maria Llanes on 2/17/2018 9:22 AM      CT CHEST WO CONTRAST   Final Result   1. There is no consolidation or infiltrates. 2. Enlarged thyroid gland containing calcifications. A scheduled outpatient thyroid ultrasound examination is recommended. 3. Findings related to the abdomen/pelvis are dictated on the CT abdomen/pelvis examination report dated 2/17/2018. **This report has been created using voice recognition software. It may contain minor errors which are inherent in voice recognition technology. **      Final report electronically signed by Dr. Ana Maria Llanes on 2/17/2018 9:15 AM          Diet: DIET GENERAL;    DVT prophylaxis: [] Lovenox                                 [x] SCDs                                 [] SQ Heparin                                 [] Encourage ambulation           [] Already on Anticoagulation     Disposition:    [x] Home vs transfer       [] TCU       [] Rehab       [] Psych       [] SNF       [] [place]       [] Other-    Code Status: Limited        Assessment/Plan:    Anticipated Discharge in : 24 hrs if H&H is stable with no further bleeding      1. Acute GI bleed with melena stools- EGD was unable to find source of bleed,could be from surgically excluded part of stomach. .. Will continue to monitor H&H. Tagged RBC scan today is negative.scheduled to have SBCE next week as outpatient. ..GI recommends transfer if bleeding recurrs  2. Acute blood loss anemia s/p 2 units PRBC transfusion 2/18 . On iron therapy  3. Hx Batsheva-en-Y gastric bypass  4. Influenza- Continue the 75mg capsule of tamiflu two times daily,stop   5.  Chronic macrocytic

## 2018-02-20 NOTE — PLAN OF CARE
Problem: DISCHARGE BARRIERS  Goal: Patient's continuum of care needs are met  Outcome: Ongoing  Home alone. Denies needs.
Problem: Falls - Risk of  Goal: Absence of falls  Outcome: Met This Shift  Assessment & interventions provided throughout shift. Bed locked & in low position, call light in reach, side-rails up x2, non-slip socks on when ambulating, reminded patient to use call light to call for assistance. Problem: Fluid Volume - Imbalance:  Goal: Absence of imbalanced fluid volume signs and symptoms  Absence of imbalanced fluid volume signs and symptoms   Outcome: Met This Shift  No SS or hyper or hypovolemia will continue to monitor      Problem: Cardiovascular  Goal: No DVT, peripheral vascular complications  Outcome: Met This Shift  No signs of DVT   Goal: Hemodynamic stability  Outcome: Ongoing  Last Hgb stable no significant drop. Pt has not had BM today. Will reassess with CBC in morning     Comments: Care plan reviewed with patient. Patient verbalizes understanding of the care plan and contributed to goal setting.
Problem: Falls - Risk of  Goal: Absence of falls  Outcome: Met This Shift  Non-skids, 2/4 siderails, call light & belongings kept within reach, use of bed alarm. Uses call light appropriately. Up x 1 assist. Weak but steady. No falls. Problem: Discharge Planning:  Goal: Discharged to appropriate level of care  Discharged to appropriate level of care   Outcome: Ongoing  Plans to be discharged home. Will continue to monitor & involve case management for discharge needs. Problem: Fluid Volume - Imbalance:  Goal: Absence of imbalanced fluid volume signs and symptoms  Absence of imbalanced fluid volume signs and symptoms   Outcome: Ongoing  Remains on IV fluids. Urinary output adequate. Monitoring hemoglobin. Last hemoglobin improved to 8.2. Awaiting labs from this AM. Will continue to monitor and intervene appropriately.      Problem: Cardiovascular  Goal: No DVT, peripheral vascular complications  bbb
Problem: Falls - Risk of  Goal: Absence of falls  Outcome: Ongoing  Monitoring patient for falls with each shift assessment and hourly rounding. Problem: Discharge Planning:  Goal: Discharged to appropriate level of care  Discharged to appropriate level of care   Outcome: Ongoing      Comments: Care plan reviewed with patient. Patient verbalize understanding of the plan of care and contribute to goal setting.
facility. Comments: Care plan reviewed with patient. Patient states understanding of care plan.

## 2018-02-20 NOTE — CARE COORDINATION
DISCHARGE BARRIERS  2/20/18, 1:50 PM    Reason for Referral: Insurance questions  Mental Status: Alert and oriented to person, place and time. Decision Making: Independent   Family/Social/Home Environment: From home alone. Gopi South is very independent. She gets most of her transportation through the [employer] program.   Current Services: None  Current Equipment:Quad Cane  Payment Source:BCBS and Medicaid  Concerns or Barriers to Discharge: None  Collabrative List of ECF/HH were provided:NA    Teach Back Method used with Gopi South regarding care plan and discharge planning. Patient verbalized understanding of the plan of care and contribute to goal setting. Anticipated Needs/Discharge Plan: [place] [facility] plans to return home where she lives alone. She has questions regarding coverage for specific procedures. SW unable to answer her specific insurance questions. She denied need for home health. She uses [employer] for transportation to physicians appointments. SW also made her aware of the [facility] and what they could help with should she have a decline in her mobility. She denies needs at this time. Electronically signed by XOCHILT Ortiz on 2/20/2018 at 1:50 PM

## 2018-02-21 VITALS
TEMPERATURE: 98.2 F | OXYGEN SATURATION: 98 % | RESPIRATION RATE: 18 BRPM | SYSTOLIC BLOOD PRESSURE: 110 MMHG | HEIGHT: 58 IN | HEART RATE: 60 BPM | BODY MASS INDEX: 27.97 KG/M2 | DIASTOLIC BLOOD PRESSURE: 55 MMHG | WEIGHT: 133.25 LBS

## 2018-02-21 LAB
ANISOCYTOSIS: ABNORMAL
BASOPHILIA: ABNORMAL
BASOPHILS # BLD: 0 %
BASOPHILS ABSOLUTE: 0 THOU/MM3 (ref 0–0.1)
DIFFERENTIAL, MANUAL: NORMAL
EOSINOPHIL # BLD: 3 %
EOSINOPHILS ABSOLUTE: 0.2 THOU/MM3 (ref 0–0.4)
HCT VFR BLD CALC: 26.3 % (ref 37–47)
HEMOGLOBIN: 8.6 GM/DL (ref 12–16)
LYMPHOCYTES # BLD: 12 %
LYMPHOCYTES ABSOLUTE: 0.9 THOU/MM3 (ref 1–4.8)
MCH RBC QN AUTO: 31.4 PG (ref 27–31)
MCHC RBC AUTO-ENTMCNC: 32.6 GM/DL (ref 33–37)
MCV RBC AUTO: 96.3 FL (ref 81–99)
METAMYELOCYTES: 2 %
MONOCYTES # BLD: 5 %
MONOCYTES ABSOLUTE: 0.4 THOU/MM3 (ref 0.4–1.3)
MYELOCYTES: 2 %
NUCLEATED RED BLOOD CELLS: 1 /100 WBC
OVALOCYTES: ABNORMAL
PDW BLD-RTO: 21 % (ref 11.5–14.5)
PLATELET # BLD: 269 THOU/MM3 (ref 130–400)
PLATELET ESTIMATE: ADEQUATE
PMV BLD AUTO: 7.9 FL (ref 7.4–10.4)
POIKILOCYTES: SLIGHT
RBC # BLD: 2.73 MILL/MM3 (ref 4.2–5.4)
SEG NEUTROPHILS: 76 %
SEGMENTED NEUTROPHILS ABSOLUTE COUNT: 5.9 THOU/MM3 (ref 1.8–7.7)
WBC # BLD: 7.7 THOU/MM3 (ref 4.8–10.8)

## 2018-02-21 PROCEDURE — 99239 HOSP IP/OBS DSCHRG MGMT >30: CPT | Performed by: HOSPITALIST

## 2018-02-21 PROCEDURE — 6370000000 HC RX 637 (ALT 250 FOR IP): Performed by: INTERNAL MEDICINE

## 2018-02-21 PROCEDURE — 36415 COLL VENOUS BLD VENIPUNCTURE: CPT

## 2018-02-21 PROCEDURE — 85025 COMPLETE CBC W/AUTO DIFF WBC: CPT

## 2018-02-21 PROCEDURE — 2580000003 HC RX 258: Performed by: INTERNAL MEDICINE

## 2018-02-21 RX ORDER — OSELTAMIVIR PHOSPHATE 75 MG/1
75 CAPSULE ORAL 2 TIMES DAILY
Qty: 4 CAPSULE | Refills: 0 | Status: SHIPPED | OUTPATIENT
Start: 2018-02-21 | End: 2018-02-23

## 2018-02-21 RX ADMIN — VILAZODONE HYDROCHLORIDE 40 MG: 40 TABLET ORAL at 08:27

## 2018-02-21 RX ADMIN — PSYLLIUM HUSK 1 PACKET: 3.4 GRANULE ORAL at 08:29

## 2018-02-21 RX ADMIN — LEVOTHYROXINE SODIUM 125 MCG: 125 TABLET ORAL at 05:48

## 2018-02-21 RX ADMIN — FERROUS SULFATE TAB 325 MG (65 MG ELEMENTAL FE) 325 MG: 325 (65 FE) TAB at 08:27

## 2018-02-21 RX ADMIN — OSELTAMIVIR PHOSPHATE 75 MG: 75 CAPSULE ORAL at 08:27

## 2018-02-21 RX ADMIN — PANTOPRAZOLE SODIUM 40 MG: 40 TABLET, DELAYED RELEASE ORAL at 05:48

## 2018-02-21 RX ADMIN — VITAMIN A, VITAMIN C, VITAMIN D-3, VITAMIN E, VITAMIN B-1, VITAMIN B-2, NIACIN, VITAMIN B-6, CALCIUM, IRON, ZINC, COPPER 1 TABLET: 4000; 120; 400; 22; 1.84; 3; 20; 10; 1; 12; 200; 27; 25; 2 TABLET ORAL at 08:27

## 2018-02-21 RX ADMIN — DIMETHYL FUMARATE 240 MG: 240 CAPSULE ORAL at 08:26

## 2018-02-21 RX ADMIN — Medication 10 ML: at 08:29

## 2018-02-21 RX ADMIN — LAMOTRIGINE 200 MG: 100 TABLET ORAL at 08:27

## 2018-02-21 RX ADMIN — Medication 1000 MCG: at 08:27

## 2018-02-21 RX ADMIN — LITHIUM CARBONATE 300 MG: 300 CAPSULE ORAL at 08:27

## 2018-02-21 NOTE — PROGRESS NOTES
clubbing, or edema. NEURO:  Alert and oriented x4. Patient moves all extremities and has gross sensation in all extremities. Medications:  Scheduled Meds:   oseltamivir  75 mg Oral BID    cyanocobalamin  1,000 mcg Oral Daily    vilazodone HCl  40 mg Oral Daily    psyllium  1 packet Oral Daily    lithium  300 mg Oral Daily    lithium  600 mg Oral Nightly    levothyroxine  125 mcg Oral Daily    lamoTRIgine  200 mg Oral Daily    ferrous sulfate  325 mg Oral Daily with breakfast    dimethyl fumarate  240 mg Oral BID    sodium chloride flush  10 mL Intravenous 2 times per day    prenatal vitamin  1 tablet Oral Daily    pantoprazole  40 mg Oral QAM AC     Continuous Infusions:   PRN Meds:nitroGLYCERIN, guaiFENesin-codeine, sodium chloride flush, potassium chloride **OR** potassium chloride **OR** potassium chloride, acetaminophen, magnesium hydroxide, ondansetron    Radiology : nuclear medicine bleeding scan yesterday                  No scintigraphic evidence of active gastrointestinal bleeding. Assessment:  · Melena PTA, + FOBT on 2-17-18  · Anemia;  H/H 8.6/26.3  · Influenza   · Hx recent GI bleed - anastomotic site ulcer bleed  · Hx Batsheva-en-Y  · + opiates on drug screen     Plan:  · Recent negative colonoscopy and enteroscopy. · Nuclear medicine bleeding scan negative ; see above  · EGD with DR Rm Staplteon; report reviewed- no ulcer noted. Likely source of bleeding in the surgical excluded stomach, in the gastroduodenal artery territory. and cannot be reached with scope. If further bleeding, recommended transfer to tertiary center  · PRBC prn  · Follow labs  · Advance diet. Pt is not ill appearing at this time.    · Needs to get SBCE as OP when able--Feb 26, 2018 Monday 0815 with Sydney Vicente at [facility]. Nathanael Gale[place] 1 office  · Social service consult for possible transportation to SBCE  · Pt to be discharged today since H/H better        Hospitalist/Attending provider notes, consulting

## 2018-02-21 NOTE — CARE COORDINATION
2/21/18, 8:51 AM  Ricki Ayala day: 4  Location: [address] [place] [address] Reason for admit: GI bleed [K92.2]   Clinical update: Pt here for GI bleed and (+) flu. S/P 3 units PRBC, S/P EGD on Monday, S/P IV iron x1 on 2/18. Full liquid diet advanced to regular. Hgb 8.7 today. Discharge plan: Discussed plan of care and patient disposition in [address] [place] [address] this morning, plan is for possible discharge per Dr. Mikey Carroll. Discharge plan discussed by  and . Discharge plan reviewed with patient/ family. Patient/ family verbalize understanding of discharge plan and are in agreement with plan. Understanding was demonstrated using the teach back method.              .3

## 2018-02-21 NOTE — DISCHARGE SUMMARY
Hospital Discharge Summary    Patient's PCP: Jenni Chung CNP  Admit Date: 2/17/2018   Discharge Date: 2/21/2018    Admitting Physician: Dr. Kizzy Linn, DO  [facility]   Consults: GI    Brief HPI:     59-year-old female with history of RYGB in 2000 ,c/b  Bleeding anastomotic ulcer,s recently admitted  with melena and had enteroscopy with Dr. Gris Elias  and there was no sign of upper GI bleeding found. The upper anastomosis  could be reached, but the lower anastomosis could not be reached with the  enteroscope. It was followed with the colonoscopy next day which was  earlier this month and it was negative except for twisted bowels, but no  active GI bleeding. She returned with three days of worsening fatigue and melena stools    Brief hospital course:     She was treated for the following    1. Acute GI bleed with melena stools- EGD was unable to find source of bleed,could be from surgically excluded part of stomach. ..  Will continue to monitor H&H. Tagged RBC scan was negative.scheduled to have SBCE next week as outpatient. ..GI recommended transfer to tertiary facility if bleeding recurred  2. Acute blood loss anemia s/p 2 units PRBC transfusion 2/18/18 . On iron therapy. H&H improved and stabilized  3. Hx Batsheva-en-Y gastric bypass  4. Influenza- Continue the 75mg capsule of tamiflu two times daily,stop date 2/22/18  5. Iron def/B12 def  anemia- Continue 1,000 mcg tablet of vitamin B-12 and iron supplementation  6. Hypothyroidism-c/w  Synthroid  7. Bipolar 1 d/o-stable on Lithium,vilazodone,lamotrigine  8. Hx MS-stable-c/w Tecfidera  9. GERD -c/w  PPI      Physical Exam: BP (!) 110/55   Pulse 60   Temp 98.2 °F (36.8 °C) (Oral)   Resp 18   Ht 4' 10\" (1.473 m)   Wt 133 lb 4 oz (60.4 kg)   SpO2 98%   BMI 27.85 kg/m²   Gen/overall appearance: Not in acute distress. Alert.   Head: Normocephalic, atraumatic  Eyes: EOMI, good acuity  ENT:- Oral mucosa moist  Neck: No JVD,

## 2018-03-15 ENCOUNTER — OFFICE VISIT (OUTPATIENT)
Dept: PSYCHIATRY | Age: 58
End: 2018-03-15
Payer: MEDICARE

## 2018-03-15 DIAGNOSIS — F31.76 BIPOLAR DISORDER, IN FULL REMISSION, MOST RECENT EPISODE DEPRESSED (HCC): Primary | ICD-10-CM

## 2018-03-15 PROCEDURE — 1036F TOBACCO NON-USER: CPT | Performed by: PSYCHIATRY & NEUROLOGY

## 2018-03-15 PROCEDURE — 3017F COLORECTAL CA SCREEN DOC REV: CPT | Performed by: PSYCHIATRY & NEUROLOGY

## 2018-03-15 PROCEDURE — G8419 CALC BMI OUT NRM PARAM NOF/U: HCPCS | Performed by: PSYCHIATRY & NEUROLOGY

## 2018-03-15 PROCEDURE — 3014F SCREEN MAMMO DOC REV: CPT | Performed by: PSYCHIATRY & NEUROLOGY

## 2018-03-15 PROCEDURE — 99213 OFFICE O/P EST LOW 20 MIN: CPT | Performed by: PSYCHIATRY & NEUROLOGY

## 2018-03-15 PROCEDURE — G8428 CUR MEDS NOT DOCUMENT: HCPCS | Performed by: PSYCHIATRY & NEUROLOGY

## 2018-03-15 PROCEDURE — 1111F DSCHRG MED/CURRENT MED MERGE: CPT | Performed by: PSYCHIATRY & NEUROLOGY

## 2018-03-15 PROCEDURE — G8482 FLU IMMUNIZE ORDER/ADMIN: HCPCS | Performed by: PSYCHIATRY & NEUROLOGY

## 2018-03-15 NOTE — PROGRESS NOTES
Chief Complaint   Patient presents with    1 Month Follow-Up     Bipolar      Julian Starks returned after one month to follow-up on bipolar disorder. Her Celexa had been stopped while she was hospitalized because of GI bleeding. Therefore I started her on Viibryd which did not list problems in this area. She is doing quite well with that. Her mood is normal.  She's back to doing volunteer work. She's bright and perky in the interview and not reporting any mood symptoms. She did report some sleep difficulties with the Viibryd. She gets to sleep alright, but will wake up around midnight, and may be up as much as two hours before being able to return to sleep. She says that this is improving, and I commented that it probably would with time. She says that the literature she read did describe this phenomenon and she expects it to get better. We decided not to make any changes. She'll therefore continue the lamotrigine 200 mg and the 900 mg daily of lithium, in addition to the [address] She said there had not been other medication changes. She had been readmitted to the hospital, again with GI bleeding and iron deficiency anemia. That was towards the end of February.     Mental Status Examination    Level of consciousness:  within normal limits  Appearance:  well-appearing, street clothes, in chair, good grooming and good hygiene  Behavior/Motor:  no abnormalities noted  Attitude toward examiner:  cooperative, attentive and good eye contact  Speech:  spontaneous, normal rate, normal volume and well articulated  Mood:  euthymic  Affect:  mood congruent  Thought processes:  linear, goal directed and coherent  Thought content:  Homocidal ideation: none  Suicidal Ideation:  denies suicidal ideation  Delusions:  no evidence of delusions  Perceptual Disturbance:  denies any perceptual disturbance  Cognition:  oriented to person, place, and time  Concentration succeeded  Memory intact  Fund of knowledge:

## 2018-03-28 ENCOUNTER — HOSPITAL ENCOUNTER (OUTPATIENT)
Dept: NURSING | Age: 58
Discharge: HOME | End: 2018-03-28
Payer: MEDICARE

## 2018-03-28 VITALS
DIASTOLIC BLOOD PRESSURE: 59 MMHG | HEART RATE: 50 BPM | SYSTOLIC BLOOD PRESSURE: 108 MMHG | BODY MASS INDEX: 24.35 KG/M2 | HEIGHT: 58 IN | TEMPERATURE: 97.5 F | RESPIRATION RATE: 18 BRPM | WEIGHT: 116 LBS | OXYGEN SATURATION: 100 %

## 2018-03-28 DIAGNOSIS — D50.9 IRON DEFICIENCY ANEMIA, UNSPECIFIED IRON DEFICIENCY ANEMIA TYPE: ICD-10-CM

## 2018-03-28 PROCEDURE — 2580000003 HC RX 258: Performed by: INTERNAL MEDICINE

## 2018-03-28 PROCEDURE — 96365 THER/PROPH/DIAG IV INF INIT: CPT

## 2018-03-28 PROCEDURE — 6360000002 HC RX W HCPCS: Performed by: INTERNAL MEDICINE

## 2018-03-28 RX ORDER — SODIUM CHLORIDE 0.9 % (FLUSH) 0.9 %
10 SYRINGE (ML) INJECTION PRN
Status: CANCELLED | OUTPATIENT
Start: 2018-03-28

## 2018-03-28 RX ORDER — SODIUM CHLORIDE 0.9 % (FLUSH) 0.9 %
10 SYRINGE (ML) INJECTION PRN
Status: DISCONTINUED | OUTPATIENT
Start: 2018-03-28 | End: 2018-03-29 | Stop reason: HOSPADM

## 2018-03-28 RX ADMIN — FERRIC CARBOXYMALTOSE INJECTION 750 MG: 50 INJECTION, SOLUTION INTRAVENOUS at 08:36

## 2018-03-28 RX ADMIN — Medication 10 ML: at 09:02

## 2018-03-28 ASSESSMENT — PAIN SCALES - GENERAL: PAINLEVEL_OUTOF10: 0

## 2018-03-28 ASSESSMENT — PAIN - FUNCTIONAL ASSESSMENT: PAIN_FUNCTIONAL_ASSESSMENT: 0-10

## 2018-03-28 NOTE — PROGRESS NOTES
Patient admitted to room A for a IV infusion, vitals are stable. Patient offered rights and responsibilities.

## 2018-03-28 NOTE — PROGRESS NOTES
Patient being discharged in stable condition. Written and verbal instructions given to patient, she voices no questions are concerns.

## 2018-03-28 NOTE — PROGRESS NOTES
__m__ Safety:       (Environmental)   Theodore to environment   Ensure ID band is correct and in place/ allergy band as needed   Assess for fall risk   Initiate fall precautions as applicable (fall band, side rails, etc.)   Call light within reach   Bed in low position/ wheels locked    ____ Pain:        Assess pain level and characteristics   Administer analgesics as ordered   Assess effectiveness of pain management and report to MD as needed    ____ Knowledge Deficit:   Assess baseline knowledge   Provide teaching at level of understanding   Provide teaching via preferred learning method   Evaluate teaching effectiveness    ____ Hemodynamic/Respiratory Status:       (Pre and Post Procedure Monitoring)   Assess/Monitor vital signs and LOC   Assess Baseline SpO2 prior to any sedation   Obtain weight/height   Assess vital signs/ LOC until patient meets discharge criteria   Monitor procedure site and notify MD of any issues    ____ Infection-Risk of Central Venous Catheter:   Monitor for infection signs and symptoms (catheter site redness, temperature elevation, etc)   Assess for infection risks   Educate regarding infection prevention   Manage central venous catheter (flushes/ dressing changes per protocol)

## 2018-04-04 ENCOUNTER — HOSPITAL ENCOUNTER (OUTPATIENT)
Dept: NURSING | Age: 58
Discharge: HOME | End: 2018-04-04
Payer: MEDICARE

## 2018-04-04 VITALS
TEMPERATURE: 98.5 F | OXYGEN SATURATION: 100 % | HEART RATE: 52 BPM | SYSTOLIC BLOOD PRESSURE: 127 MMHG | RESPIRATION RATE: 16 BRPM | DIASTOLIC BLOOD PRESSURE: 73 MMHG

## 2018-04-04 DIAGNOSIS — D50.9 IRON DEFICIENCY ANEMIA, UNSPECIFIED IRON DEFICIENCY ANEMIA TYPE: ICD-10-CM

## 2018-04-04 PROCEDURE — 6360000002 HC RX W HCPCS: Performed by: INTERNAL MEDICINE

## 2018-04-04 PROCEDURE — 2580000003 HC RX 258: Performed by: INTERNAL MEDICINE

## 2018-04-04 PROCEDURE — 96365 THER/PROPH/DIAG IV INF INIT: CPT

## 2018-04-04 RX ORDER — SODIUM CHLORIDE 0.9 % (FLUSH) 0.9 %
10 SYRINGE (ML) INJECTION PRN
Status: CANCELLED | OUTPATIENT
Start: 2018-04-04

## 2018-04-04 RX ORDER — SODIUM CHLORIDE 0.9 % (FLUSH) 0.9 %
10 SYRINGE (ML) INJECTION PRN
Status: DISCONTINUED | OUTPATIENT
Start: 2018-04-04 | End: 2018-04-05 | Stop reason: HOSPADM

## 2018-04-04 RX ADMIN — FERRIC CARBOXYMALTOSE INJECTION 750 MG: 50 INJECTION, SOLUTION INTRAVENOUS at 09:10

## 2018-04-04 RX ADMIN — Medication 10 ML: at 09:04

## 2018-04-04 ASSESSMENT — PAIN - FUNCTIONAL ASSESSMENT: PAIN_FUNCTIONAL_ASSESSMENT: 0-10

## 2018-04-04 NOTE — PROGRESS NOTES
0800 Patient arrived ambulatory for iron infusion  PT RIGHTS AND RESPONSIBILITIES OFFERED TO PT.  0907 discharge instructions given to patient verbalized understanding  0933 Discharge to home in stable condition ambulatory    m____ Safety:       (Environmental)  Genia Chad to environment   Ensure ID band is correct and in place/ allergy band as needed   Assess for fall risk   Initiate fall precautions as applicable (fall band, side rails, etc.)   Call light within reach   Bed in low position/ wheels locked    __m__ Pain:        Assess pain level and characteristics   Administer analgesics as ordered   Assess effectiveness of pain management and report to MD as needed    __m__ Knowledge Deficit:   Assess baseline knowledge   Provide teaching at level of understanding   Provide teaching via preferred learning method   Evaluate teaching effectiveness    __m__ Hemodynamic/Respiratory Status:       (Pre and Post Procedure Monitoring)   Assess/Monitor vital signs and LOC   Assess Baseline SpO2 prior to any sedation   Obtain weight/height   Assess vital signs/ LOC until patient meets discharge criteria   Monitor procedure site and notify MD of any issues

## 2018-04-11 ENCOUNTER — HOSPITAL ENCOUNTER (OUTPATIENT)
Age: 58
Discharge: HOME | End: 2018-04-11
Payer: MEDICARE

## 2018-04-11 PROBLEM — J10.1 INFLUENZA A: Status: RESOLVED | Noted: 2018-02-17 | Resolved: 2018-04-11

## 2018-04-11 LAB
ANION GAP SERPL CALCULATED.3IONS-SCNC: 12 MEQ/L (ref 8–16)
BUN BLDV-MCNC: 11 MG/DL (ref 7–22)
CALCIUM SERPL-MCNC: 10.4 MG/DL (ref 8.5–10.5)
CHLORIDE BLD-SCNC: 107 MEQ/L (ref 98–111)
CO2: 23 MEQ/L (ref 23–33)
CREAT SERPL-MCNC: 0.4 MG/DL (ref 0.4–1.2)
GFR SERPL CREATININE-BSD FRML MDRD: > 90 ML/MIN/1.73M2
GLUCOSE BLD-MCNC: 89 MG/DL (ref 70–108)
HCT VFR BLD CALC: 38 % (ref 37–47)
HEMOGLOBIN: 12.4 GM/DL (ref 12–16)
MCH RBC QN AUTO: 29.7 PG (ref 27–31)
MCHC RBC AUTO-ENTMCNC: 32.5 GM/DL (ref 33–37)
MCV RBC AUTO: 91.4 FL (ref 81–99)
PDW BLD-RTO: 18.2 % (ref 11.5–14.5)
PLATELET # BLD: 196 THOU/MM3 (ref 130–400)
PMV BLD AUTO: 9.6 FL (ref 7.4–10.4)
POTASSIUM SERPL-SCNC: 4.2 MEQ/L (ref 3.5–5.2)
RBC # BLD: 4.15 MILL/MM3 (ref 4.2–5.4)
SODIUM BLD-SCNC: 142 MEQ/L (ref 135–145)
WBC # BLD: 4.6 THOU/MM3 (ref 4.8–10.8)

## 2018-04-11 PROCEDURE — 80048 BASIC METABOLIC PNL TOTAL CA: CPT

## 2018-04-11 PROCEDURE — 36415 COLL VENOUS BLD VENIPUNCTURE: CPT

## 2018-04-11 PROCEDURE — 85027 COMPLETE CBC AUTOMATED: CPT

## 2018-04-20 ENCOUNTER — OFFICE VISIT (OUTPATIENT)
Dept: PSYCHIATRY | Age: 58
End: 2018-04-20
Payer: MEDICARE

## 2018-04-20 DIAGNOSIS — F31.76 BIPOLAR DISORDER, IN FULL REMISSION, MOST RECENT EPISODE DEPRESSED (HCC): Primary | ICD-10-CM

## 2018-04-20 PROCEDURE — 3017F COLORECTAL CA SCREEN DOC REV: CPT | Performed by: PSYCHIATRY & NEUROLOGY

## 2018-04-20 PROCEDURE — 3014F SCREEN MAMMO DOC REV: CPT | Performed by: PSYCHIATRY & NEUROLOGY

## 2018-04-20 PROCEDURE — 1036F TOBACCO NON-USER: CPT | Performed by: PSYCHIATRY & NEUROLOGY

## 2018-04-20 PROCEDURE — 99212 OFFICE O/P EST SF 10 MIN: CPT | Performed by: PSYCHIATRY & NEUROLOGY

## 2018-04-20 PROCEDURE — G8420 CALC BMI NORM PARAMETERS: HCPCS | Performed by: PSYCHIATRY & NEUROLOGY

## 2018-04-20 PROCEDURE — G8428 CUR MEDS NOT DOCUMENT: HCPCS | Performed by: PSYCHIATRY & NEUROLOGY

## 2018-05-01 ENCOUNTER — HOSPITAL ENCOUNTER (OUTPATIENT)
Age: 58
Discharge: HOME | End: 2018-05-01
Payer: MEDICARE

## 2018-05-01 ENCOUNTER — TELEPHONE (OUTPATIENT)
Dept: PSYCHIATRY | Age: 58
End: 2018-05-01

## 2018-05-01 DIAGNOSIS — F31.76 BIPOLAR I DISORDER, MOST RECENT EPISODE DEPRESSED, IN REMISSION (HCC): ICD-10-CM

## 2018-05-01 DIAGNOSIS — F31.76 DEPRESSED BIPOLAR I DISORDER IN REMISSION (HCC): Primary | ICD-10-CM

## 2018-05-01 LAB — LITHIUM LEVEL: 0.67 MMOL/L (ref 0.6–1.2)

## 2018-05-01 PROCEDURE — 80178 ASSAY OF LITHIUM: CPT

## 2018-05-01 PROCEDURE — 36415 COLL VENOUS BLD VENIPUNCTURE: CPT

## 2018-05-11 ENCOUNTER — HOSPITAL ENCOUNTER (EMERGENCY)
Age: 58
Discharge: HOME | End: 2018-05-11
Payer: MEDICARE

## 2018-05-11 ENCOUNTER — APPOINTMENT (OUTPATIENT)
Dept: GENERAL RADIOLOGY | Age: 58
End: 2018-05-11
Payer: MEDICARE

## 2018-05-11 VITALS
RESPIRATION RATE: 14 BRPM | OXYGEN SATURATION: 99 % | BODY MASS INDEX: 22.67 KG/M2 | TEMPERATURE: 97.8 F | SYSTOLIC BLOOD PRESSURE: 129 MMHG | WEIGHT: 108 LBS | HEIGHT: 58 IN | DIASTOLIC BLOOD PRESSURE: 76 MMHG | HEART RATE: 66 BPM

## 2018-05-11 DIAGNOSIS — R53.1 GENERAL WEAKNESS: Primary | ICD-10-CM

## 2018-05-11 DIAGNOSIS — N39.0 URINARY TRACT INFECTION WITHOUT HEMATURIA, SITE UNSPECIFIED: ICD-10-CM

## 2018-05-11 LAB
ALBUMIN SERPL-MCNC: 4.5 G/DL (ref 3.5–5.1)
ALP BLD-CCNC: 221 U/L (ref 38–126)
ALT SERPL-CCNC: 167 U/L (ref 11–66)
ANION GAP SERPL CALCULATED.3IONS-SCNC: 12 MEQ/L (ref 8–16)
ANISOCYTOSIS: ABNORMAL
AST SERPL-CCNC: 113 U/L (ref 5–40)
BACTERIA: ABNORMAL /HPF
BASOPHILS # BLD: 0.1 %
BASOPHILS ABSOLUTE: 0 THOU/MM3 (ref 0–0.1)
BILIRUB SERPL-MCNC: 0.4 MG/DL (ref 0.3–1.2)
BILIRUBIN DIRECT: < 0.2 MG/DL (ref 0–0.3)
BILIRUBIN URINE: NEGATIVE
BLOOD, URINE: ABNORMAL
BUN BLDV-MCNC: 14 MG/DL (ref 7–22)
CALCIUM SERPL-MCNC: 10.1 MG/DL (ref 8.5–10.5)
CASTS 2: ABNORMAL /LPF
CASTS UA: ABNORMAL /LPF
CHARACTER, URINE: CLEAR
CHLORIDE BLD-SCNC: 103 MEQ/L (ref 98–111)
CO2: 23 MEQ/L (ref 23–33)
COLOR: YELLOW
CREAT SERPL-MCNC: 0.4 MG/DL (ref 0.4–1.2)
CRYSTALS, UA: ABNORMAL
EKG ATRIAL RATE: 57 BPM
EKG P AXIS: 68 DEGREES
EKG P-R INTERVAL: 152 MS
EKG Q-T INTERVAL: 478 MS
EKG QRS DURATION: 136 MS
EKG QTC CALCULATION (BAZETT): 465 MS
EKG R AXIS: 77 DEGREES
EKG T AXIS: 46 DEGREES
EKG VENTRICULAR RATE: 57 BPM
EOSINOPHIL # BLD: 0.9 %
EOSINOPHILS ABSOLUTE: 0.1 THOU/MM3 (ref 0–0.4)
EPITHELIAL CELLS, UA: ABNORMAL /HPF
GFR SERPL CREATININE-BSD FRML MDRD: > 90 ML/MIN/1.73M2
GLUCOSE BLD-MCNC: 74 MG/DL (ref 70–108)
GLUCOSE URINE: NEGATIVE MG/DL
HCT VFR BLD CALC: 40.4 % (ref 37–47)
HEMOGLOBIN: 13.2 GM/DL (ref 12–16)
KETONES, URINE: NEGATIVE
LEUKOCYTE ESTERASE, URINE: ABNORMAL
LIPASE: 22.8 U/L (ref 5.6–51.3)
LITHIUM LEVEL: 0.84 MMOL/L (ref 0.6–1.2)
LYMPHOCYTES # BLD: 8.9 %
LYMPHOCYTES ABSOLUTE: 0.7 THOU/MM3 (ref 1–4.8)
MCH RBC QN AUTO: 29.9 PG (ref 27–31)
MCHC RBC AUTO-ENTMCNC: 32.8 GM/DL (ref 33–37)
MCV RBC AUTO: 91.1 FL (ref 81–99)
MISCELLANEOUS 2: ABNORMAL
MONOCYTES # BLD: 10.8 %
MONOCYTES ABSOLUTE: 0.8 THOU/MM3 (ref 0.4–1.3)
NITRITE, URINE: NEGATIVE
NUCLEATED RED BLOOD CELLS: 0 /100 WBC
OSMOLALITY CALCULATION: 274.8 MOSMOL/KG (ref 275–300)
PDW BLD-RTO: 19.7 % (ref 11.5–14.5)
PH UA: 6.5
PLATELET # BLD: 274 THOU/MM3 (ref 130–400)
PMV BLD AUTO: 8.3 FL (ref 7.4–10.4)
POTASSIUM SERPL-SCNC: 3.9 MEQ/L (ref 3.5–5.2)
PROTEIN UA: NEGATIVE
RBC # BLD: 4.43 MILL/MM3 (ref 4.2–5.4)
RBC URINE: ABNORMAL /HPF
RENAL EPITHELIAL, UA: ABNORMAL
SEG NEUTROPHILS: 79.3 %
SEGMENTED NEUTROPHILS ABSOLUTE COUNT: 5.9 THOU/MM3 (ref 1.8–7.7)
SODIUM BLD-SCNC: 138 MEQ/L (ref 135–145)
SPECIFIC GRAVITY, URINE: 1.01 (ref 1–1.03)
TOTAL PROTEIN: 7 G/DL (ref 6.1–8)
TROPONIN T: < 0.01 NG/ML
TSH SERPL DL<=0.05 MIU/L-ACNC: 0.24 UIU/ML (ref 0.4–4.2)
UROBILINOGEN, URINE: 0.2 EU/DL
WBC # BLD: 7.5 THOU/MM3 (ref 4.8–10.8)
WBC UA: ABNORMAL /HPF
YEAST: ABNORMAL

## 2018-05-11 PROCEDURE — 80053 COMPREHEN METABOLIC PANEL: CPT

## 2018-05-11 PROCEDURE — 85025 COMPLETE CBC W/AUTO DIFF WBC: CPT

## 2018-05-11 PROCEDURE — 87086 URINE CULTURE/COLONY COUNT: CPT

## 2018-05-11 PROCEDURE — 93005 ELECTROCARDIOGRAM TRACING: CPT | Performed by: EMERGENCY MEDICINE

## 2018-05-11 PROCEDURE — 84443 ASSAY THYROID STIM HORMONE: CPT

## 2018-05-11 PROCEDURE — 87077 CULTURE AEROBIC IDENTIFY: CPT

## 2018-05-11 PROCEDURE — 84484 ASSAY OF TROPONIN QUANT: CPT

## 2018-05-11 PROCEDURE — 99285 EMERGENCY DEPT VISIT HI MDM: CPT

## 2018-05-11 PROCEDURE — 36415 COLL VENOUS BLD VENIPUNCTURE: CPT

## 2018-05-11 PROCEDURE — 81001 URINALYSIS AUTO W/SCOPE: CPT

## 2018-05-11 PROCEDURE — 87186 SC STD MICRODIL/AGAR DIL: CPT

## 2018-05-11 PROCEDURE — 83690 ASSAY OF LIPASE: CPT

## 2018-05-11 PROCEDURE — 71046 X-RAY EXAM CHEST 2 VIEWS: CPT

## 2018-05-11 PROCEDURE — 82248 BILIRUBIN DIRECT: CPT

## 2018-05-11 PROCEDURE — 80178 ASSAY OF LITHIUM: CPT

## 2018-05-11 PROCEDURE — 87184 SC STD DISK METHOD PER PLATE: CPT

## 2018-05-11 RX ORDER — NITROFURANTOIN 25; 75 MG/1; MG/1
100 CAPSULE ORAL 2 TIMES DAILY
Qty: 14 CAPSULE | Refills: 0 | Status: SHIPPED | OUTPATIENT
Start: 2018-05-11 | End: 2018-05-18

## 2018-05-11 RX ORDER — BUPIVACAINE HYDROCHLORIDE 5 MG/ML
30 INJECTION, SOLUTION PERINEURAL ONCE
Status: DISCONTINUED | OUTPATIENT
Start: 2018-05-11 | End: 2018-05-11

## 2018-05-11 RX ORDER — LIDOCAINE HYDROCHLORIDE 10 MG/ML
5 INJECTION, SOLUTION EPIDURAL; INFILTRATION; INTRACAUDAL; PERINEURAL ONCE
Status: DISCONTINUED | OUTPATIENT
Start: 2018-05-11 | End: 2018-05-11

## 2018-05-11 ASSESSMENT — ENCOUNTER SYMPTOMS
ABDOMINAL PAIN: 0
BLOOD IN STOOL: 0
SHORTNESS OF BREATH: 0
EYE DISCHARGE: 0
RHINORRHEA: 0
BACK PAIN: 0
VOMITING: 0
WHEEZING: 0
SORE THROAT: 0
EYE PAIN: 0
DIARRHEA: 0
COUGH: 0
NAUSEA: 1

## 2018-05-12 PROCEDURE — 93010 ELECTROCARDIOGRAM REPORT: CPT | Performed by: INTERNAL MEDICINE

## 2018-05-13 LAB
ORGANISM: ABNORMAL
URINE CULTURE REFLEX: ABNORMAL

## 2018-05-14 ENCOUNTER — TELEPHONE (OUTPATIENT)
Dept: PHARMACY | Age: 58
End: 2018-05-14

## 2018-05-15 RX ORDER — CEFUROXIME AXETIL 250 MG/1
250 TABLET ORAL 2 TIMES DAILY
Qty: 10 TABLET | Refills: 0 | Status: SHIPPED | OUTPATIENT
Start: 2018-05-15 | End: 2018-05-20

## 2018-06-04 RX ORDER — VILAZODONE HYDROCHLORIDE 40 MG/1
TABLET ORAL
Qty: 30 TABLET | Refills: 1 | Status: SHIPPED | OUTPATIENT
Start: 2018-06-04 | End: 2018-08-27 | Stop reason: SDUPTHER

## 2018-06-20 ENCOUNTER — OFFICE VISIT (OUTPATIENT)
Dept: PSYCHIATRY | Age: 58
End: 2018-06-20
Payer: MEDICARE

## 2018-06-20 DIAGNOSIS — F31.76 BIPOLAR DISORDER, IN FULL REMISSION, MOST RECENT EPISODE DEPRESSED (HCC): Primary | ICD-10-CM

## 2018-06-20 PROCEDURE — 99212 OFFICE O/P EST SF 10 MIN: CPT | Performed by: PSYCHIATRY & NEUROLOGY

## 2018-08-03 RX ORDER — LITHIUM CARBONATE 300 MG/1
CAPSULE ORAL
Qty: 270 CAPSULE | Refills: 3 | Status: ON HOLD | OUTPATIENT
Start: 2018-08-03 | End: 2019-05-21

## 2018-08-03 NOTE — TELEPHONE ENCOUNTER
[facility]S PHARMACY IS REQUESTING A 270 DEVICE SUPPLY FOR LITHIUM 300MG; #270 WITH 3 REFILLS; LAST FILLED ON 05/09/17 ON ESE'S BEHALF. PATIENT'S LAST COMPLETED APT WAS ON 06/20/18 TO RETURN ON 08/08/18.

## 2018-08-06 ENCOUNTER — TELEPHONE (OUTPATIENT)
Dept: PSYCHIATRY | Age: 58
End: 2018-08-06

## 2018-08-08 ENCOUNTER — OFFICE VISIT (OUTPATIENT)
Dept: PSYCHIATRY | Age: 58
End: 2018-08-08
Payer: MEDICARE

## 2018-08-08 DIAGNOSIS — F31.76 BIPOLAR DISORDER, IN FULL REMISSION, MOST RECENT EPISODE DEPRESSED (HCC): Primary | ICD-10-CM

## 2018-08-08 PROCEDURE — 99212 OFFICE O/P EST SF 10 MIN: CPT | Performed by: PSYCHIATRY & NEUROLOGY

## 2018-08-20 RX ORDER — LAMOTRIGINE 200 MG/1
TABLET ORAL
Qty: 90 TABLET | Refills: 0 | Status: SHIPPED | OUTPATIENT
Start: 2018-08-20 | End: 2018-11-07 | Stop reason: SDUPTHER

## 2018-08-20 NOTE — TELEPHONE ENCOUNTER
THIS IS A DR. BUENROSTRO PATIENT; HE IS CURRENTLY OUT OF THE OFFICE UNTIL Wednesday 08/22/18. I HAVE ONLY LOADED THE MEDICATION WITH 0 REFILLS BEING THAT THIS ENCOUNTER IS BEING SENT TO Chris Brink. [facility]'S PHARMACY IS REQUESTING A MEDICATION REFILL FOR LAMICTAL 200MG; #90 WITH 3 REFILLS;LAST FILLED ON 05/24/18. PATIENT'S LAST COMPLETED APT WAS ON 08/08/18 TO RETURN ON 11/14/18.

## 2018-08-27 RX ORDER — VILAZODONE HYDROCHLORIDE 40 MG/1
40 TABLET ORAL DAILY
Qty: 90 TABLET | Refills: 1 | Status: SHIPPED | OUTPATIENT
Start: 2018-08-27 | End: 2019-04-08 | Stop reason: SDUPTHER

## 2018-09-11 ENCOUNTER — TELEPHONE (OUTPATIENT)
Dept: PSYCHIATRY | Age: 58
End: 2018-09-11

## 2018-09-11 NOTE — TELEPHONE ENCOUNTER
Little Gaucher contacted office seeking advice, she is taking protonix (prescribed by jimenez Gautam) and is asking if she needs to continue this medication or if she can discontinue . She is having no issues. Please advice.

## 2018-09-12 ENCOUNTER — HOSPITAL ENCOUNTER (OUTPATIENT)
Age: 58
Discharge: HOME | End: 2018-09-12
Payer: MEDICARE

## 2018-09-12 LAB
ERYTHROCYTE [DISTWIDTH] IN BLOOD BY AUTOMATED COUNT: 13.3 % (ref 11.5–14.5)
ERYTHROCYTE [DISTWIDTH] IN BLOOD BY AUTOMATED COUNT: 49.7 FL (ref 35–45)
FERRITIN: 228 NG/ML (ref 10–291)
HCT VFR BLD CALC: 38.4 % (ref 37–47)
HEMOGLOBIN: 12.6 GM/DL (ref 12–16)
IRON: 216 UG/DL (ref 50–170)
MCH RBC QN AUTO: 33.2 PG (ref 26–33)
MCHC RBC AUTO-ENTMCNC: 32.8 GM/DL (ref 32.2–35.5)
MCV RBC AUTO: 101.1 FL (ref 81–99)
PLATELET # BLD: 200 THOU/MM3 (ref 130–400)
PMV BLD AUTO: 10.4 FL (ref 9.4–12.4)
RBC # BLD: 3.8 MILL/MM3 (ref 4.2–5.4)
TOTAL IRON BINDING CAPACITY: 256 UG/DL (ref 171–450)
WBC # BLD: 5.5 THOU/MM3 (ref 4.8–10.8)

## 2018-09-12 PROCEDURE — 83540 ASSAY OF IRON: CPT

## 2018-09-12 PROCEDURE — 82728 ASSAY OF FERRITIN: CPT

## 2018-09-12 PROCEDURE — 83550 IRON BINDING TEST: CPT

## 2018-09-12 PROCEDURE — 85027 COMPLETE CBC AUTOMATED: CPT

## 2018-09-12 PROCEDURE — 36415 COLL VENOUS BLD VENIPUNCTURE: CPT

## 2018-09-28 ENCOUNTER — HOSPITAL ENCOUNTER (EMERGENCY)
Age: 58
Discharge: HOME | DRG: 357 | End: 2018-09-28
Attending: FAMILY MEDICINE
Payer: MEDICARE

## 2018-09-28 ENCOUNTER — APPOINTMENT (OUTPATIENT)
Dept: GENERAL RADIOLOGY | Age: 58
DRG: 357 | End: 2018-09-28
Payer: MEDICARE

## 2018-09-28 VITALS
HEART RATE: 65 BPM | HEIGHT: 58 IN | SYSTOLIC BLOOD PRESSURE: 128 MMHG | WEIGHT: 108 LBS | OXYGEN SATURATION: 99 % | BODY MASS INDEX: 22.67 KG/M2 | TEMPERATURE: 98.7 F | RESPIRATION RATE: 17 BRPM | DIASTOLIC BLOOD PRESSURE: 55 MMHG

## 2018-09-28 DIAGNOSIS — K92.1 BLACK STOOLS: Primary | ICD-10-CM

## 2018-09-28 DIAGNOSIS — Z98.84 GASTRIC BYPASS STATUS FOR OBESITY: ICD-10-CM

## 2018-09-28 DIAGNOSIS — E03.1 CONGENITAL HYPOTHYROIDISM WITHOUT GOITER: ICD-10-CM

## 2018-09-28 DIAGNOSIS — D50.8 OTHER IRON DEFICIENCY ANEMIA: ICD-10-CM

## 2018-09-28 DIAGNOSIS — R10.31 RIGHT LOWER QUADRANT ABDOMINAL PAIN: ICD-10-CM

## 2018-09-28 LAB
ANION GAP SERPL CALCULATED.3IONS-SCNC: 9 MEQ/L (ref 8–16)
APTT: 32.2 SECONDS (ref 22–38)
BACTERIA: ABNORMAL /HPF
BASOPHILS # BLD: 0.4 %
BASOPHILS ABSOLUTE: 0 THOU/MM3 (ref 0–0.1)
BILIRUBIN URINE: NEGATIVE
BLOOD, URINE: NEGATIVE
BUN BLDV-MCNC: 17 MG/DL (ref 7–22)
CALCIUM SERPL-MCNC: 10.4 MG/DL (ref 8.5–10.5)
CASTS 2: ABNORMAL /LPF
CASTS UA: ABNORMAL /LPF
CHARACTER, URINE: CLEAR
CHLORIDE BLD-SCNC: 107 MEQ/L (ref 98–111)
CO2: 24 MEQ/L (ref 23–33)
COLOR: YELLOW
CREAT SERPL-MCNC: 0.6 MG/DL (ref 0.4–1.2)
CRYSTALS, UA: ABNORMAL
EKG ATRIAL RATE: 54 BPM
EKG P AXIS: 61 DEGREES
EKG P-R INTERVAL: 156 MS
EKG Q-T INTERVAL: 490 MS
EKG QRS DURATION: 142 MS
EKG QTC CALCULATION (BAZETT): 464 MS
EKG R AXIS: 54 DEGREES
EKG T AXIS: 30 DEGREES
EKG VENTRICULAR RATE: 54 BPM
EOSINOPHIL # BLD: 2 %
EOSINOPHILS ABSOLUTE: 0.1 THOU/MM3 (ref 0–0.4)
EPITHELIAL CELLS, UA: ABNORMAL /HPF
ERYTHROCYTE [DISTWIDTH] IN BLOOD BY AUTOMATED COUNT: 13.4 % (ref 11.5–14.5)
ERYTHROCYTE [DISTWIDTH] IN BLOOD BY AUTOMATED COUNT: 49 FL (ref 35–45)
GFR SERPL CREATININE-BSD FRML MDRD: > 90 ML/MIN/1.73M2
GLUCOSE BLD-MCNC: 96 MG/DL (ref 70–108)
GLUCOSE URINE: NEGATIVE MG/DL
HCT VFR BLD CALC: 38.5 % (ref 37–47)
HEMOCCULT STL QL: NEGATIVE
HEMOGLOBIN: 12.6 GM/DL (ref 12–16)
IMMATURE GRANS (ABS): 0.01 THOU/MM3 (ref 0–0.07)
IMMATURE GRANULOCYTES: 0.2 %
INR BLD: 0.92 (ref 0.85–1.13)
KETONES, URINE: NEGATIVE
LEUKOCYTE ESTERASE, URINE: ABNORMAL
LYMPHOCYTES # BLD: 11.6 %
LYMPHOCYTES ABSOLUTE: 0.6 THOU/MM3 (ref 1–4.8)
MCH RBC QN AUTO: 32.8 PG (ref 26–33)
MCHC RBC AUTO-ENTMCNC: 32.7 GM/DL (ref 32.2–35.5)
MCV RBC AUTO: 100.3 FL (ref 81–99)
MISCELLANEOUS 2: ABNORMAL
MONOCYTES # BLD: 8.6 %
MONOCYTES ABSOLUTE: 0.4 THOU/MM3 (ref 0.4–1.3)
NITRITE, URINE: NEGATIVE
NUCLEATED RED BLOOD CELLS: 0 /100 WBC
OSMOLALITY CALCULATION: 280.8 MOSMOL/KG (ref 275–300)
PH UA: 6
PLATELET # BLD: 196 THOU/MM3 (ref 130–400)
PMV BLD AUTO: 10.2 FL (ref 9.4–12.4)
POTASSIUM SERPL-SCNC: 4.6 MEQ/L (ref 3.5–5.2)
PROTEIN UA: NEGATIVE
RBC # BLD: 3.84 MILL/MM3 (ref 4.2–5.4)
RBC URINE: ABNORMAL /HPF
RENAL EPITHELIAL, UA: ABNORMAL
SEG NEUTROPHILS: 77.2 %
SEGMENTED NEUTROPHILS ABSOLUTE COUNT: 3.8 THOU/MM3 (ref 1.8–7.7)
SODIUM BLD-SCNC: 140 MEQ/L (ref 135–145)
SPECIFIC GRAVITY, URINE: 1.01 (ref 1–1.03)
UROBILINOGEN, URINE: 0.2 EU/DL
WBC # BLD: 4.9 THOU/MM3 (ref 4.8–10.8)
WBC UA: ABNORMAL /HPF
YEAST: ABNORMAL

## 2018-09-28 PROCEDURE — 85025 COMPLETE CBC W/AUTO DIFF WBC: CPT

## 2018-09-28 PROCEDURE — 74018 RADEX ABDOMEN 1 VIEW: CPT

## 2018-09-28 PROCEDURE — 80048 BASIC METABOLIC PNL TOTAL CA: CPT

## 2018-09-28 PROCEDURE — 87086 URINE CULTURE/COLONY COUNT: CPT

## 2018-09-28 PROCEDURE — 85730 THROMBOPLASTIN TIME PARTIAL: CPT

## 2018-09-28 PROCEDURE — 81001 URINALYSIS AUTO W/SCOPE: CPT

## 2018-09-28 PROCEDURE — 99284 EMERGENCY DEPT VISIT MOD MDM: CPT

## 2018-09-28 PROCEDURE — 36415 COLL VENOUS BLD VENIPUNCTURE: CPT

## 2018-09-28 PROCEDURE — 93005 ELECTROCARDIOGRAM TRACING: CPT | Performed by: FAMILY MEDICINE

## 2018-09-28 PROCEDURE — 85610 PROTHROMBIN TIME: CPT

## 2018-09-28 PROCEDURE — 82272 OCCULT BLD FECES 1-3 TESTS: CPT

## 2018-09-28 ASSESSMENT — ENCOUNTER SYMPTOMS
SHORTNESS OF BREATH: 0
ABDOMINAL PAIN: 1
CONSTIPATION: 0
EYE PAIN: 0
SORE THROAT: 0
COUGH: 0
ABDOMINAL DISTENTION: 0
PHOTOPHOBIA: 0
CHEST TIGHTNESS: 0

## 2018-09-28 ASSESSMENT — PAIN DESCRIPTION - LOCATION: LOCATION: ABDOMEN

## 2018-09-28 ASSESSMENT — PAIN DESCRIPTION - PAIN TYPE: TYPE: ACUTE PAIN

## 2018-09-28 ASSESSMENT — PAIN DESCRIPTION - ORIENTATION: ORIENTATION: LEFT

## 2018-09-28 ASSESSMENT — PAIN SCALES - GENERAL: PAINLEVEL_OUTOF10: 2

## 2018-09-28 NOTE — ED PROVIDER NOTES
indicated that her father is . She indicated that both of her sisters are alive. family history includes Cancer in her mother, sister, and sister; Colon Polyps in her mother; Glaucoma in her father; Heart Disease in her father. SOCIAL HISTORY      reports that she has quit smoking. She has never used smokeless tobacco. She reports that she does not drink alcohol or use drugs. PHYSICAL EXAM     INITIAL VITALS:  height is 4' 10\" (1.473 m) and weight is 108 lb (49 kg). Her oral temperature is 98.7 °F (37.1 °C). Her blood pressure is 128/55 (abnormal) and her pulse is 65. Her respiration is 17 and oxygen saturation is 99%. Physical Exam   Constitutional: She is oriented to person, place, and time. She appears well-developed and well-nourished. Non-toxic appearance. HENT:   Head: Normocephalic and atraumatic. Right Ear: Tympanic membrane and external ear normal.   Left Ear: Tympanic membrane and external ear normal.   Nose: Nose normal.   Mouth/Throat: Oropharynx is clear and moist and mucous membranes are normal. No oropharyngeal exudate, posterior oropharyngeal edema or posterior oropharyngeal erythema. Eyes: Conjunctivae and EOM are normal.   Neck: Normal range of motion. Neck supple. No JVD present. Cardiovascular: Normal rate, regular rhythm, normal heart sounds, intact distal pulses and normal pulses. Exam reveals no gallop and no friction rub. No murmur heard. Pulmonary/Chest: Effort normal and breath sounds normal. No respiratory distress. She has no decreased breath sounds. She has no wheezes. She has no rhonchi. She has no rales. Abdominal: Soft. Bowel sounds are normal. She exhibits no distension. There is tenderness in the right lower quadrant. There is no rebound, no guarding and no CVA tenderness. Musculoskeletal: Normal range of motion. She exhibits no edema. Neurological: She is alert and oriented to person, place, and time. She exhibits normal muscle tone.

## 2018-09-29 LAB
ORGANISM: ABNORMAL
URINE CULTURE REFLEX: ABNORMAL

## 2018-09-29 PROCEDURE — 93010 ELECTROCARDIOGRAM REPORT: CPT | Performed by: INTERNAL MEDICINE

## 2018-10-01 ENCOUNTER — HOSPITAL ENCOUNTER (INPATIENT)
Age: 58
LOS: 8 days | Discharge: ANOTHER HOSPITAL | DRG: 357 | End: 2018-10-09
Attending: FAMILY MEDICINE | Admitting: FAMILY MEDICINE
Payer: MEDICARE

## 2018-10-01 DIAGNOSIS — K92.2 GASTROINTESTINAL HEMORRHAGE, UNSPECIFIED GASTROINTESTINAL HEMORRHAGE TYPE: Primary | ICD-10-CM

## 2018-10-01 DIAGNOSIS — D50.0 BLOOD LOSS ANEMIA: ICD-10-CM

## 2018-10-01 DIAGNOSIS — R79.89 PRERENAL AZOTEMIA: ICD-10-CM

## 2018-10-01 PROBLEM — R53.83 FATIGUE: Status: ACTIVE | Noted: 2018-10-01

## 2018-10-01 PROBLEM — R42 DIZZINESS: Status: ACTIVE | Noted: 2018-10-01

## 2018-10-01 PROBLEM — E83.51 HYPOCALCEMIA: Status: ACTIVE | Noted: 2018-10-01

## 2018-10-01 LAB
ALBUMIN SERPL-MCNC: 3.1 G/DL (ref 3.5–5.1)
ALP BLD-CCNC: 77 U/L (ref 38–126)
ALT SERPL-CCNC: 39 U/L (ref 11–66)
ANION GAP SERPL CALCULATED.3IONS-SCNC: 8 MEQ/L (ref 8–16)
AST SERPL-CCNC: 14 U/L (ref 5–40)
BASOPHILS # BLD: 0.5 %
BASOPHILS ABSOLUTE: 0 THOU/MM3 (ref 0–0.1)
BILIRUB SERPL-MCNC: 0.3 MG/DL (ref 0.3–1.2)
BILIRUBIN DIRECT: < 0.2 MG/DL (ref 0–0.3)
BUN BLDV-MCNC: 28 MG/DL (ref 7–22)
CALCIUM SERPL-MCNC: 7.9 MG/DL (ref 8.5–10.5)
CHLORIDE BLD-SCNC: 112 MEQ/L (ref 98–111)
CO2: 21 MEQ/L (ref 23–33)
CREAT SERPL-MCNC: 0.4 MG/DL (ref 0.4–1.2)
EKG ATRIAL RATE: 62 BPM
EKG P AXIS: 55 DEGREES
EKG P-R INTERVAL: 140 MS
EKG Q-T INTERVAL: 458 MS
EKG QRS DURATION: 136 MS
EKG QTC CALCULATION (BAZETT): 464 MS
EKG R AXIS: 31 DEGREES
EKG T AXIS: 24 DEGREES
EKG VENTRICULAR RATE: 62 BPM
EOSINOPHIL # BLD: 1.9 %
EOSINOPHILS ABSOLUTE: 0.1 THOU/MM3 (ref 0–0.4)
ERYTHROCYTE [DISTWIDTH] IN BLOOD BY AUTOMATED COUNT: 13.5 % (ref 11.5–14.5)
ERYTHROCYTE [DISTWIDTH] IN BLOOD BY AUTOMATED COUNT: 49.2 FL (ref 35–45)
GFR SERPL CREATININE-BSD FRML MDRD: > 90 ML/MIN/1.73M2
GLUCOSE BLD-MCNC: 95 MG/DL (ref 70–108)
HCT VFR BLD CALC: 23.8 % (ref 37–47)
HEMOCCULT STL QL: POSITIVE
HEMOGLOBIN: 7.8 GM/DL (ref 12–16)
IMMATURE GRANS (ABS): 0.02 THOU/MM3 (ref 0–0.07)
IMMATURE GRANULOCYTES: 0.3 %
LIPASE: 24.5 U/L (ref 5.6–51.3)
LYMPHOCYTES # BLD: 12 %
LYMPHOCYTES ABSOLUTE: 0.7 THOU/MM3 (ref 1–4.8)
MCH RBC QN AUTO: 33.3 PG (ref 26–33)
MCHC RBC AUTO-ENTMCNC: 32.8 GM/DL (ref 32.2–35.5)
MCV RBC AUTO: 101.7 FL (ref 81–99)
MONOCYTES # BLD: 8.8 %
MONOCYTES ABSOLUTE: 0.5 THOU/MM3 (ref 0.4–1.3)
NUCLEATED RED BLOOD CELLS: 0 /100 WBC
OSMOLALITY CALCULATION: 286.5 MOSMOL/KG (ref 275–300)
PLATELET # BLD: 169 THOU/MM3 (ref 130–400)
PMV BLD AUTO: 10.5 FL (ref 9.4–12.4)
POTASSIUM SERPL-SCNC: 4.1 MEQ/L (ref 3.5–5.2)
RBC # BLD: 2.34 MILL/MM3 (ref 4.2–5.4)
SEG NEUTROPHILS: 76.5 %
SEGMENTED NEUTROPHILS ABSOLUTE COUNT: 4.5 THOU/MM3 (ref 1.8–7.7)
SODIUM BLD-SCNC: 141 MEQ/L (ref 135–145)
T4 FREE: 0.93 NG/DL (ref 0.93–1.76)
TOTAL PROTEIN: 4.2 G/DL (ref 6.1–8)
TROPONIN T: < 0.01 NG/ML
TSH SERPL DL<=0.05 MIU/L-ACNC: 6.63 UIU/ML (ref 0.4–4.2)
WBC # BLD: 5.9 THOU/MM3 (ref 4.8–10.8)

## 2018-10-01 PROCEDURE — 2709999900 HC NON-CHARGEABLE SUPPLY

## 2018-10-01 PROCEDURE — 2580000003 HC RX 258: Performed by: FAMILY MEDICINE

## 2018-10-01 PROCEDURE — 84484 ASSAY OF TROPONIN QUANT: CPT

## 2018-10-01 PROCEDURE — 83690 ASSAY OF LIPASE: CPT

## 2018-10-01 PROCEDURE — 6360000002 HC RX W HCPCS

## 2018-10-01 PROCEDURE — 84443 ASSAY THYROID STIM HORMONE: CPT

## 2018-10-01 PROCEDURE — 99285 EMERGENCY DEPT VISIT HI MDM: CPT

## 2018-10-01 PROCEDURE — 99223 1ST HOSP IP/OBS HIGH 75: CPT | Performed by: FAMILY MEDICINE

## 2018-10-01 PROCEDURE — 86850 RBC ANTIBODY SCREEN: CPT

## 2018-10-01 PROCEDURE — 84439 ASSAY OF FREE THYROXINE: CPT

## 2018-10-01 PROCEDURE — 80053 COMPREHEN METABOLIC PANEL: CPT

## 2018-10-01 PROCEDURE — 82272 OCCULT BLD FECES 1-3 TESTS: CPT

## 2018-10-01 PROCEDURE — 85025 COMPLETE CBC W/AUTO DIFF WBC: CPT

## 2018-10-01 PROCEDURE — 86901 BLOOD TYPING SEROLOGIC RH(D): CPT

## 2018-10-01 PROCEDURE — 86923 COMPATIBILITY TEST ELECTRIC: CPT

## 2018-10-01 PROCEDURE — 2060000000 HC ICU INTERMEDIATE R&B

## 2018-10-01 PROCEDURE — 93005 ELECTROCARDIOGRAM TRACING: CPT | Performed by: FAMILY MEDICINE

## 2018-10-01 PROCEDURE — 36430 TRANSFUSION BLD/BLD COMPNT: CPT

## 2018-10-01 PROCEDURE — 82248 BILIRUBIN DIRECT: CPT

## 2018-10-01 PROCEDURE — 86900 BLOOD TYPING SEROLOGIC ABO: CPT

## 2018-10-01 PROCEDURE — P9016 RBC LEUKOCYTES REDUCED: HCPCS

## 2018-10-01 PROCEDURE — 36415 COLL VENOUS BLD VENIPUNCTURE: CPT

## 2018-10-01 RX ORDER — ONDANSETRON 4 MG/1
4 TABLET, ORALLY DISINTEGRATING ORAL ONCE
Status: COMPLETED | OUTPATIENT
Start: 2018-10-01 | End: 2018-10-01

## 2018-10-01 RX ORDER — SODIUM CHLORIDE 9 MG/ML
INJECTION, SOLUTION INTRAVENOUS CONTINUOUS
Status: DISCONTINUED | OUTPATIENT
Start: 2018-10-01 | End: 2018-10-02 | Stop reason: HOSPADM

## 2018-10-01 RX ORDER — ONDANSETRON 4 MG/1
TABLET, ORALLY DISINTEGRATING ORAL
Status: COMPLETED
Start: 2018-10-01 | End: 2018-10-01

## 2018-10-01 RX ORDER — 0.9 % SODIUM CHLORIDE 0.9 %
500 INTRAVENOUS SOLUTION INTRAVENOUS ONCE
Status: COMPLETED | OUTPATIENT
Start: 2018-10-01 | End: 2018-10-02

## 2018-10-01 RX ADMIN — ONDANSETRON 4 MG: 4 TABLET, ORALLY DISINTEGRATING ORAL at 21:23

## 2018-10-01 RX ADMIN — SODIUM CHLORIDE 500 ML: 9 INJECTION, SOLUTION INTRAVENOUS at 21:45

## 2018-10-01 ASSESSMENT — ENCOUNTER SYMPTOMS
SHORTNESS OF BREATH: 0
BLOOD IN STOOL: 0
CONSTIPATION: 1

## 2018-10-02 ENCOUNTER — APPOINTMENT (OUTPATIENT)
Dept: INTERVENTIONAL RADIOLOGY/VASCULAR | Age: 58
DRG: 357 | End: 2018-10-02
Payer: MEDICARE

## 2018-10-02 ENCOUNTER — APPOINTMENT (OUTPATIENT)
Dept: CT IMAGING | Age: 58
DRG: 357 | End: 2018-10-02
Payer: MEDICARE

## 2018-10-02 ENCOUNTER — APPOINTMENT (OUTPATIENT)
Dept: NUCLEAR MEDICINE | Age: 58
DRG: 357 | End: 2018-10-02
Payer: MEDICARE

## 2018-10-02 LAB
ABO: NORMAL
ANION GAP SERPL CALCULATED.3IONS-SCNC: 9 MEQ/L (ref 8–16)
ANTIBODY SCREEN: NORMAL
BUN BLDV-MCNC: 30 MG/DL (ref 7–22)
CALCIUM IONIZED: 1.35 MMOL/L (ref 1.12–1.32)
CALCIUM SERPL-MCNC: 8.8 MG/DL (ref 8.5–10.5)
CHLORIDE BLD-SCNC: 116 MEQ/L (ref 98–111)
CO2: 15 MEQ/L (ref 23–33)
CREAT SERPL-MCNC: 0.3 MG/DL (ref 0.4–1.2)
FOLATE: 12.1 NG/ML (ref 4.8–24.2)
GFR SERPL CREATININE-BSD FRML MDRD: > 90 ML/MIN/1.73M2
GLUCOSE BLD-MCNC: 117 MG/DL (ref 70–108)
HCT VFR BLD CALC: 20.9 % (ref 37–47)
HCT VFR BLD CALC: 22.4 % (ref 37–47)
HEMOGLOBIN: 6.8 GM/DL (ref 12–16)
HEMOGLOBIN: 8 GM/DL (ref 12–16)
LACTIC ACID: 1.1 MMOL/L (ref 0.5–2.2)
POTASSIUM REFLEX MAGNESIUM: 5.3 MEQ/L (ref 3.5–5.2)
PRO-BNP: 62 PG/ML (ref 0–900)
RH FACTOR: NORMAL
SODIUM BLD-SCNC: 140 MEQ/L (ref 135–145)
TROPONIN T: < 0.01 NG/ML
VITAMIN B-12: 616 PG/ML (ref 211–911)

## 2018-10-02 PROCEDURE — 6360000004 HC RX CONTRAST MEDICATION: Performed by: FAMILY MEDICINE

## 2018-10-02 PROCEDURE — 82607 VITAMIN B-12: CPT

## 2018-10-02 PROCEDURE — 80048 BASIC METABOLIC PNL TOTAL CA: CPT

## 2018-10-02 PROCEDURE — 2709999900 HC NON-CHARGEABLE SUPPLY

## 2018-10-02 PROCEDURE — C1760 CLOSURE DEV, VASC: HCPCS

## 2018-10-02 PROCEDURE — 6360000004 HC RX CONTRAST MEDICATION: Performed by: RADIOLOGY

## 2018-10-02 PROCEDURE — 83605 ASSAY OF LACTIC ACID: CPT

## 2018-10-02 PROCEDURE — 82330 ASSAY OF CALCIUM: CPT

## 2018-10-02 PROCEDURE — B4241ZZ COMPUTERIZED TOMOGRAPHY (CT SCAN) OF SUPERIOR MESENTERIC ARTERY USING LOW OSMOLAR CONTRAST: ICD-10-PCS | Performed by: RADIOLOGY

## 2018-10-02 PROCEDURE — C9113 INJ PANTOPRAZOLE SODIUM, VIA: HCPCS | Performed by: INTERNAL MEDICINE

## 2018-10-02 PROCEDURE — 78278 ACUTE GI BLOOD LOSS IMAGING: CPT

## 2018-10-02 PROCEDURE — 74174 CTA ABD&PLVS W/CONTRAST: CPT

## 2018-10-02 PROCEDURE — B4101ZZ FLUOROSCOPY OF ABDOMINAL AORTA USING LOW OSMOLAR CONTRAST: ICD-10-PCS | Performed by: RADIOLOGY

## 2018-10-02 PROCEDURE — 36247 INS CATH ABD/L-EXT ART 3RD: CPT

## 2018-10-02 PROCEDURE — 75774 ARTERY X-RAY EACH VESSEL: CPT

## 2018-10-02 PROCEDURE — 6360000002 HC RX W HCPCS: Performed by: RADIOLOGY

## 2018-10-02 PROCEDURE — B4121ZZ FLUOROSCOPY OF HEPATIC ARTERY USING LOW OSMOLAR CONTRAST: ICD-10-PCS | Performed by: RADIOLOGY

## 2018-10-02 PROCEDURE — 6360000002 HC RX W HCPCS: Performed by: INTERNAL MEDICINE

## 2018-10-02 PROCEDURE — 99233 SBSQ HOSP IP/OBS HIGH 50: CPT | Performed by: INTERNAL MEDICINE

## 2018-10-02 PROCEDURE — 2500000003 HC RX 250 WO HCPCS

## 2018-10-02 PROCEDURE — 85014 HEMATOCRIT: CPT

## 2018-10-02 PROCEDURE — 93010 ELECTROCARDIOGRAM REPORT: CPT | Performed by: INTERNAL MEDICINE

## 2018-10-02 PROCEDURE — 36430 TRANSFUSION BLD/BLD COMPNT: CPT

## 2018-10-02 PROCEDURE — 82746 ASSAY OF FOLIC ACID SERUM: CPT

## 2018-10-02 PROCEDURE — 75726 ARTERY X-RAYS ABDOMEN: CPT

## 2018-10-02 PROCEDURE — C1894 INTRO/SHEATH, NON-LASER: HCPCS

## 2018-10-02 PROCEDURE — 3430000000 HC RX DIAGNOSTIC RADIOPHARMACEUTICAL: Performed by: INTERNAL MEDICINE

## 2018-10-02 PROCEDURE — B4281ZZ COMPUTERIZED TOMOGRAPHY (CT SCAN) OF BILATERAL RENAL ARTERIES USING LOW OSMOLAR CONTRAST: ICD-10-PCS | Performed by: RADIOLOGY

## 2018-10-02 PROCEDURE — 83880 ASSAY OF NATRIURETIC PEPTIDE: CPT

## 2018-10-02 PROCEDURE — B41B1ZZ FLUOROSCOPY OF OTHER INTRA-ABDOMINAL ARTERIES USING LOW OSMOLAR CONTRAST: ICD-10-PCS | Performed by: RADIOLOGY

## 2018-10-02 PROCEDURE — 36415 COLL VENOUS BLD VENIPUNCTURE: CPT

## 2018-10-02 PROCEDURE — 6370000000 HC RX 637 (ALT 250 FOR IP)

## 2018-10-02 PROCEDURE — 2580000003 HC RX 258: Performed by: INTERNAL MEDICINE

## 2018-10-02 PROCEDURE — 2060000000 HC ICU INTERMEDIATE R&B

## 2018-10-02 PROCEDURE — 37244 VASC EMBOLIZE/OCCLUDE BLEED: CPT

## 2018-10-02 PROCEDURE — 04V33DZ RESTRICTION OF HEPATIC ARTERY WITH INTRALUMINAL DEVICE, PERCUTANEOUS APPROACH: ICD-10-PCS | Performed by: RADIOLOGY

## 2018-10-02 PROCEDURE — 6370000000 HC RX 637 (ALT 250 FOR IP): Performed by: RADIOLOGY

## 2018-10-02 PROCEDURE — 6370000000 HC RX 637 (ALT 250 FOR IP): Performed by: FAMILY MEDICINE

## 2018-10-02 PROCEDURE — 71275 CT ANGIOGRAPHY CHEST: CPT

## 2018-10-02 PROCEDURE — 6360000002 HC RX W HCPCS

## 2018-10-02 PROCEDURE — 84484 ASSAY OF TROPONIN QUANT: CPT

## 2018-10-02 PROCEDURE — C1887 CATHETER, GUIDING: HCPCS

## 2018-10-02 PROCEDURE — C1881 DIALYSIS ACCESS SYSTEM: HCPCS

## 2018-10-02 PROCEDURE — P9016 RBC LEUKOCYTES REDUCED: HCPCS

## 2018-10-02 PROCEDURE — B4211ZZ COMPUTERIZED TOMOGRAPHY (CT SCAN) OF CELIAC ARTERY USING LOW OSMOLAR CONTRAST: ICD-10-PCS | Performed by: RADIOLOGY

## 2018-10-02 PROCEDURE — C1769 GUIDE WIRE: HCPCS

## 2018-10-02 PROCEDURE — 85018 HEMOGLOBIN: CPT

## 2018-10-02 PROCEDURE — 2720000010 HC SURG SUPPLY STERILE

## 2018-10-02 PROCEDURE — A9560 TC99M LABELED RBC: HCPCS | Performed by: INTERNAL MEDICINE

## 2018-10-02 PROCEDURE — B4201ZZ COMPUTERIZED TOMOGRAPHY (CT SCAN) OF ABDOMINAL AORTA USING LOW OSMOLAR CONTRAST: ICD-10-PCS | Performed by: RADIOLOGY

## 2018-10-02 RX ORDER — LAMOTRIGINE 100 MG/1
200 TABLET ORAL DAILY
Status: DISCONTINUED | OUTPATIENT
Start: 2018-10-02 | End: 2018-10-09 | Stop reason: HOSPADM

## 2018-10-02 RX ORDER — PRENATAL WITH FERROUS FUM AND FOLIC ACID 3080; 920; 120; 400; 22; 1.84; 3; 20; 10; 1; 12; 200; 27; 25; 2 [IU]/1; [IU]/1; MG/1; [IU]/1; MG/1; MG/1; MG/1; MG/1; MG/1; MG/1; UG/1; MG/1; MG/1; MG/1; MG/1
1 TABLET ORAL DAILY
Status: DISCONTINUED | OUTPATIENT
Start: 2018-10-02 | End: 2018-10-04

## 2018-10-02 RX ORDER — PANTOPRAZOLE SODIUM 40 MG/10ML
40 INJECTION, POWDER, LYOPHILIZED, FOR SOLUTION INTRAVENOUS 2 TIMES DAILY
Status: DISCONTINUED | OUTPATIENT
Start: 2018-10-02 | End: 2018-10-02 | Stop reason: SDUPTHER

## 2018-10-02 RX ORDER — DIAPER,BRIEF,INFANT-TODD,DISP
EACH MISCELLANEOUS ONCE
Status: COMPLETED | OUTPATIENT
Start: 2018-10-02 | End: 2018-10-02

## 2018-10-02 RX ORDER — FENTANYL CITRATE 50 UG/ML
25 INJECTION, SOLUTION INTRAMUSCULAR; INTRAVENOUS ONCE
Status: COMPLETED | OUTPATIENT
Start: 2018-10-02 | End: 2018-10-02

## 2018-10-02 RX ORDER — SODIUM CHLORIDE 0.9 % (FLUSH) 0.9 %
10 SYRINGE (ML) INJECTION PRN
Status: DISCONTINUED | OUTPATIENT
Start: 2018-10-02 | End: 2018-10-09 | Stop reason: HOSPADM

## 2018-10-02 RX ORDER — DIMETHYL FUMARATE 240 MG/1
240 CAPSULE ORAL 2 TIMES DAILY
Status: DISCONTINUED | OUTPATIENT
Start: 2018-10-02 | End: 2018-10-09 | Stop reason: HOSPADM

## 2018-10-02 RX ORDER — NITROGLYCERIN 0.4 MG/1
0.4 TABLET SUBLINGUAL EVERY 5 MIN PRN
Status: DISCONTINUED | OUTPATIENT
Start: 2018-10-02 | End: 2018-10-09 | Stop reason: HOSPADM

## 2018-10-02 RX ORDER — LITHIUM CARBONATE 300 MG/1
300 CAPSULE ORAL DAILY
Status: DISCONTINUED | OUTPATIENT
Start: 2018-10-02 | End: 2018-10-09 | Stop reason: HOSPADM

## 2018-10-02 RX ORDER — LEVOTHYROXINE SODIUM 0.12 MG/1
125 TABLET ORAL DAILY
Status: DISCONTINUED | OUTPATIENT
Start: 2018-10-02 | End: 2018-10-09 | Stop reason: HOSPADM

## 2018-10-02 RX ORDER — 0.9 % SODIUM CHLORIDE 0.9 %
250 INTRAVENOUS SOLUTION INTRAVENOUS ONCE
Status: COMPLETED | OUTPATIENT
Start: 2018-10-02 | End: 2018-10-02

## 2018-10-02 RX ORDER — MIDAZOLAM HYDROCHLORIDE 1 MG/ML
0.5 INJECTION INTRAMUSCULAR; INTRAVENOUS ONCE
Status: COMPLETED | OUTPATIENT
Start: 2018-10-02 | End: 2018-10-02

## 2018-10-02 RX ORDER — SODIUM CHLORIDE 0.9 % (FLUSH) 0.9 %
10 SYRINGE (ML) INJECTION EVERY 12 HOURS SCHEDULED
Status: DISCONTINUED | OUTPATIENT
Start: 2018-10-02 | End: 2018-10-09 | Stop reason: HOSPADM

## 2018-10-02 RX ORDER — FERROUS SULFATE 325(65) MG
325 TABLET ORAL
Status: DISCONTINUED | OUTPATIENT
Start: 2018-10-02 | End: 2018-10-02

## 2018-10-02 RX ORDER — VILAZODONE HYDROCHLORIDE 40 MG/1
40 TABLET ORAL DAILY
Status: DISCONTINUED | OUTPATIENT
Start: 2018-10-02 | End: 2018-10-09 | Stop reason: HOSPADM

## 2018-10-02 RX ORDER — ACETAMINOPHEN 500 MG
1000 TABLET ORAL EVERY 8 HOURS PRN
Status: DISCONTINUED | OUTPATIENT
Start: 2018-10-02 | End: 2018-10-09 | Stop reason: HOSPADM

## 2018-10-02 RX ORDER — LITHIUM CARBONATE 300 MG/1
600 CAPSULE ORAL NIGHTLY
Status: DISCONTINUED | OUTPATIENT
Start: 2018-10-02 | End: 2018-10-09 | Stop reason: HOSPADM

## 2018-10-02 RX ORDER — LANOLIN ALCOHOL/MO/W.PET/CERES
1000 CREAM (GRAM) TOPICAL DAILY
Status: DISCONTINUED | OUTPATIENT
Start: 2018-10-02 | End: 2018-10-04

## 2018-10-02 RX ORDER — LITHIUM CARBONATE 300 MG/1
300 CAPSULE ORAL SEE ADMIN INSTRUCTIONS
Status: DISCONTINUED | OUTPATIENT
Start: 2018-10-02 | End: 2018-10-02

## 2018-10-02 RX ORDER — ONDANSETRON 2 MG/ML
4 INJECTION INTRAMUSCULAR; INTRAVENOUS EVERY 6 HOURS PRN
Status: DISCONTINUED | OUTPATIENT
Start: 2018-10-02 | End: 2018-10-09 | Stop reason: HOSPADM

## 2018-10-02 RX ORDER — SODIUM CHLORIDE 9 MG/ML
INJECTION, SOLUTION INTRAVENOUS CONTINUOUS
Status: DISCONTINUED | OUTPATIENT
Start: 2018-10-02 | End: 2018-10-03

## 2018-10-02 RX ADMIN — SODIUM CHLORIDE 8 MG/HR: 9 INJECTION, SOLUTION INTRAVENOUS at 19:09

## 2018-10-02 RX ADMIN — IOPAMIDOL 90 ML: 755 INJECTION, SOLUTION INTRAVENOUS at 01:42

## 2018-10-02 RX ADMIN — FENTANYL CITRATE 25 MCG: 50 INJECTION INTRAMUSCULAR; INTRAVENOUS at 15:27

## 2018-10-02 RX ADMIN — MIDAZOLAM HYDROCHLORIDE 0.5 MG: 2 INJECTION, SOLUTION INTRAMUSCULAR; INTRAVENOUS at 15:26

## 2018-10-02 RX ADMIN — IOPAMIDOL 100 ML: 612 INJECTION, SOLUTION INTRAVENOUS at 16:30

## 2018-10-02 RX ADMIN — ACETAMINOPHEN 1000 MG: 500 TABLET, FILM COATED ORAL at 03:38

## 2018-10-02 RX ADMIN — BACITRACIN ZINC 1 G: 500 OINTMENT TOPICAL at 16:39

## 2018-10-02 RX ADMIN — SODIUM CHLORIDE: 9 INJECTION, SOLUTION INTRAVENOUS at 09:09

## 2018-10-02 RX ADMIN — Medication 28.8 MILLICURIE: at 12:10

## 2018-10-02 RX ADMIN — SODIUM CHLORIDE: 9 INJECTION, SOLUTION INTRAVENOUS at 20:11

## 2018-10-02 RX ADMIN — SODIUM CHLORIDE 8 MG/HR: 9 INJECTION, SOLUTION INTRAVENOUS at 06:37

## 2018-10-02 RX ADMIN — SODIUM CHLORIDE 80 MG: 9 INJECTION, SOLUTION INTRAVENOUS at 06:37

## 2018-10-02 RX ADMIN — SODIUM CHLORIDE 250 ML: 9 INJECTION, SOLUTION INTRAVENOUS at 09:28

## 2018-10-02 ASSESSMENT — PAIN DESCRIPTION - PAIN TYPE
TYPE: ACUTE PAIN;SURGICAL PAIN
TYPE: ACUTE PAIN

## 2018-10-02 ASSESSMENT — PAIN DESCRIPTION - ORIENTATION
ORIENTATION: RIGHT;UPPER
ORIENTATION: MID

## 2018-10-02 ASSESSMENT — PAIN DESCRIPTION - DESCRIPTORS
DESCRIPTORS: ACHING
DESCRIPTORS: CRAMPING

## 2018-10-02 ASSESSMENT — PAIN SCALES - GENERAL
PAINLEVEL_OUTOF10: 3
PAINLEVEL_OUTOF10: 3
PAINLEVEL_OUTOF10: 1
PAINLEVEL_OUTOF10: 2

## 2018-10-02 ASSESSMENT — PAIN DESCRIPTION - LOCATION
LOCATION: ABDOMEN
LOCATION: LEG

## 2018-10-02 NOTE — ED NOTES
Bed: 001A  Expected date: 10/1/18  Expected time:   Means of arrival: [facility] Dept  Comments:      Elan Nixon RN  10/01/18 0071

## 2018-10-02 NOTE — CONSULTS
[facility]  Gastroenterology Consult    Patient's Name/Date of Birth: Emilie Laguna / 1960 (4 y.o.)    Date: October 2, 2018     Referring Provider: Jun Matthew MD    CHIEF COMPLAINT:    HPI: This is a pleasant 62 y.o. female with a history of Batsheva-en-Y gastric bypass who we are consulted for a week history of melena. Patient had a melanic bowel movement on Wednesday and then did not have a BM on Thursday. Friday she decided to take a stool softener and had a small BM and had a small dark BM on Saturday after an enema. She presented to the ED on Saturday for issues with continued constipation/fatigue and was evaluated and was put on a CLD and discharged. . She did not have any bright red or maroon colored stools until she arrived to [facility]. [facility]. She states she has had 3 darker maroon colored stools overnight. She does complain of nausea with no vomiting. Has epigastric abdominal pain with radiation to her back alleviated by laying down. No F/C. No NSAID use. No tobacco use. She does have dizziness. She was noted to have a Hgb of 12.6 3 days ago and noted to have a Hgb of 7.8 today. She has had similar presentation previously, has had a recent colonoscopy in 3/2018 which was unremarkable for etiology of anemia and EGD on 4/2018 was unremarkable as well for an etiology of bleeding. She had a capsule that was unremarkable as well. PMH  GERD  Hypothyroidism  MS  Bipolar disorder    SH:  Denies any tobacco, alcohol, or illicit drug use.     FH:  Denies any FH or GI or liver disease      Past Medical History:   Diagnosis Date    Balance problem     Bipolar I disorder, most recent episode (or current) depressed, in partial or unspecified remission 5/8/2013    Cancer ([facility] [address]     CERVICAL     Depression     Fatigue     Gallstones     Gastric bypass status for obesity     GERD (gastroesophageal reflux disease)     History of blood transfusion     History of hysterectomy

## 2018-10-02 NOTE — H&P
History & Physical    Patient:  Silas Foote  YOB: 1960  Date of Service: 10/1/2018  MRN: [id number]   Acct:  [de-identified]   Primary Care Physician: AJITH Rivera CNP    Chief Complaint: Generalized weakness    History of Present Illness:   History obtained from chart review and the patient. The patient is a 62 y.o. female with GERD, Hypothyroidism, MS, Bipolar disorder who presents to the ED for evaluation of generalized weakness for the past 5 days. Patient states she was seen at this ED 3 days ago and was diagnosed with constipation. She states that after she was discharged home, she gave herself an enema and did have non bloody stool. She was noted with H/H  12.6/38.5 3 days ago. H/H today was 7.8/23. 8. Patient denies bloody stools but states she had dark stools for 2 days prior to ED presentation 3 days ago. She states that she felt worse today, with associated dizziness and nausea, prompting ED visit. Patient also relates to associated lower abdominal pain which she rates 3/10 in severity without radiation for the past 5 days. She notes epigastric pain which she notes 4/10 in severity with radiation to her back since early this morning. Patient states that last colonoscopy was ~ 3/2018 without polypectomy. EGD was ~ 4/2018 without ulcerations noted. Patient denies recent use of NSAIDs. Patient denies chest pain, shortness of breath, cough, fever, chills, headache or dysuria. Patient denies smoking, alcohol or illicit drug use. Patient is being admitted for further care. GI has been consulted.       Past Medical History:        Diagnosis Date    Balance problem     Bipolar I disorder, most recent episode (or current) depressed, in partial or unspecified remission 5/8/2013    Cancer ([facility] [address]     CERVICAL     Depression     Fatigue     Gallstones     Gastric bypass status for obesity     GERD (gastroesophageal reflux disease)     History of hysterectomy     REMOVAL OF ONE person, place, and time  Neck - supple, no significant adenopathy, no JVD, or carotid bruits  Chest - clear to auscultation, no wheezes, rales or rhonchi, symmetric air entry  Heart - normal rate, regular rhythm, normal S1, S2, no murmurs, rubs, clicks or gallops  Abdomen - soft, + generalized abdominal tenderness, nondistended, no masses or organomegaly  Neurological - alert, oriented, normal speech, no focal findings or movement disorder noted  Musculoskeletal - + RUE deformity d/t birth defect, no joint tenderness or swelling  Extremities - peripheral pulses normal, no pedal edema, no clubbing or cyanosis  Skin - normal coloration and turgor, no rashes, no suspicious skin lesions noted      Review of Labs and Diagnostic Testing:    Recent Results (from the past 24 hour(s))   EKG Nausea    Collection Time: 10/01/18  9:19 PM   Result Value Ref Range    Ventricular Rate 62 BPM    Atrial Rate 62 BPM    P-R Interval 140 ms    QRS Duration 136 ms    Q-T Interval 458 ms    QTc Calculation (Bazett) 464 ms    P Axis 55 degrees    R Axis 31 degrees    T Axis 24 degrees   CBC auto differential    Collection Time: 10/01/18  9:49 PM   Result Value Ref Range    WBC 5.9 4.8 - 10.8 thou/mm3    RBC 2.34 (L) 4.20 - 5.40 mill/mm3    Hemoglobin 7.8 (L) 12.0 - 16.0 gm/dl    Hematocrit 23.8 (L) 37.0 - 47.0 %    .7 (H) 81.0 - 99.0 fL    MCH 33.3 (H) 26.0 - 33.0 pg    MCHC 32.8 32.2 - 35.5 gm/dl    RDW-CV 13.5 11.5 - 14.5 %    RDW-SD 49.2 (H) 35.0 - 45.0 fL    Platelets 257 670 - 094 thou/mm3    MPV 10.5 9.4 - 12.4 fL    Seg Neutrophils 76.5 %    Lymphocytes 12.0 %    Monocytes 8.8 %    Eosinophils 1.9 %    Basophils 0.5 %    Immature Granulocytes 0.3 %    Segs Absolute 4.5 1.8 - 7.7 thou/mm3    Lymphocytes # 0.7 (L) 1.0 - 4.8 thou/mm3    Monocytes # 0.5 0.4 - 1.3 thou/mm3    Eosinophils # 0.1 0.0 - 0.4 thou/mm3    Basophils # 0.0 0.0 - 0.1 thou/mm3    Immature Grans (Abs) 0.02 0.00 - 0.07 thou/mm3    nRBC 0 /100 wbc   Basic

## 2018-10-02 NOTE — ED NOTES
Pt. Resting comfortably in bed. Pt. Has no new complaints at this time. Patient appears to be feeling better. Pt.'s V/S stable.       Peewee Cruz RN  10/01/18 5575

## 2018-10-02 NOTE — ED NOTES
Pt. Presents to the ED with complaints of nausea and fatigue. Pt. States her symptoms started this am and has progressively become worse. Pt. Has no other concerns. Dr. Teresa Dallas at bedside.       Crystal Mix RN  10/01/18 3307

## 2018-10-02 NOTE — PROGRESS NOTES
Department of Radiology  Post Procedure Progress Note      Pre-Procedure Diagnosis:  GI bleed    Procedure Performed:  Mesenteric angiogram with embolization of the gastroduodenal artery     Anesthesia: local / versed and fentanyl    Findings: successful    Immediate Complications:  None    Estimated Blood Loss: minimal    SEE DICTATED PROCEDURE NOTE FOR COMPLETE DETAILS.     Electronically signed by Rachelle Pfeiffer MD on 10/2/2018 at 4:41 PM

## 2018-10-02 NOTE — PROGRESS NOTES
Placeholder, Hoda Udeagbala, DO    sodium chloride flush 0.9 % injection 10 mL, 10 mL, Intravenous, 2 times per day, Hdoa Udeagbala, DO    sodium chloride flush 0.9 % injection 10 mL, 10 mL, Intravenous, PRN, Hoda Udeagbala, DO    magnesium hydroxide (MILK OF MAGNESIA) 400 MG/5ML suspension 30 mL, 30 mL, Oral, Daily PRN, Hoda Udeagbala, DO    ondansetron (ZOFRAN) injection 4 mg, 4 mg, Intravenous, Q6H PRN, Hoda Udeagbala, DO    acetaminophen (TYLENOL) tablet 1,000 mg, 1,000 mg, Oral, Q8H PRN, Hoda Udeagbala, DO, 1,000 mg at 10/02/18 0338    dimethyl fumarate (TECFIDERA) delayed release capsule 240 mg, 240 mg, Oral, BID, Hoda Marckeagbala, DO    lamoTRIgine (LAMICTAL) tablet 200 mg, 200 mg, Oral, Daily, Hoda Udeagbala, DO    levothyroxine (SYNTHROID) tablet 125 mcg, 125 mcg, Oral, Daily, Hoda Udeagbala, DO    nitroGLYCERIN (NITROSTAT) SL tablet 0.4 mg, 0.4 mg, Sublingual, Q5 Min PRN, Hoda Udeagbala, DO    prenatal vitamin 27-1 MG tablet 1 tablet, 1 tablet, Oral, Daily, Hoda Udeagbala, DO    vilazodone HCl (VIIBRYD) TABS 40 mg, 40 mg, Oral, Daily, Hoda Udeagbala, DO    vitamin B-12 (CYANOCOBALAMIN) tablet 1,000 mcg, 1,000 mcg, Oral, Daily, Hoda Udeagbala, DO    lithium capsule 600 mg, 600 mg, Oral, Nightly, Hoda Udeagbala, DO    lithium capsule 300 mg, 300 mg, Oral, Daily, Hoda Udeagbala, DO    pantoprazole (PROTONIX) 80 mg in sodium chloride 0.9 % 100 mL infusion, 8 mg/hr, Intravenous, Continuous, Roseline Sabillon MD, Last Rate: 10 mL/hr at 10/02/18 0637, 8 mg/hr at 10/02/18 0637    0.9 % sodium chloride infusion, , Intravenous, Continuous, Sorin Mason MD, Last Rate: 75 mL/hr at 10/02/18 0909    0.9 % sodium chloride bolus, 250 mL, Intravenous, Once, Sorin Mason MD, Last Rate: 20 mL/hr at 10/02/18 0928, 250 mL at 10/02/18 0928    [START ON 10/3/2018] influenza quadrivalent split vaccine (FLUZONE;FLUARIX;FLULAVAL;AFLURIA)

## 2018-10-02 NOTE — PROGRESS NOTES
Moderate stool in the colon. Additional findings as detailed above. **This report has been created using voice recognition software. It may contain minor errors which are inherent in voice recognition technology. **      Final report electronically signed by Dr. Kip Eugene on 10/2/2018 2:36 AM      IR ANGIOGRAM VISCERAL GASTRIC    (Results Pending)       Diet: Diet NPO Time Specified    DVT prophylaxis: [] Lovenox                                 [x] SCDs                                 [] SQ Heparin                                 [] Encourage ambulation           [] Already on Anticoagulation     Disposition:    [x] Home       [] TCU       [] Rehab       [] Psych       [] SNF       [] [place]       [] Other-    Code Status: Limited        Assessment/Plan:    Anticipated Discharge in : ?    Active Hospital Problems    Diagnosis Date Noted    GI bleed [K92.2] 10/01/2018    Hypocalcemia [E83.51] 10/01/2018    Fatigue [R53.83] 10/01/2018    Dizziness [R42] 10/01/2018    Acute blood loss anemia [D62] 02/17/2018    Anemia, macrocytic [D53.9] 02/03/2018    Nausea [R11.0] 05/15/2016       1.  Gi bleed, likely upper- protonix drip, consult gi, npo; follow h/h        Electronically signed by Cely Cassidy MD on 10/2/2018 at 4:27 PM

## 2018-10-03 ENCOUNTER — APPOINTMENT (OUTPATIENT)
Dept: INTERVENTIONAL RADIOLOGY/VASCULAR | Age: 58
DRG: 357 | End: 2018-10-03
Payer: MEDICARE

## 2018-10-03 ENCOUNTER — APPOINTMENT (OUTPATIENT)
Dept: NUCLEAR MEDICINE | Age: 58
DRG: 357 | End: 2018-10-03
Payer: MEDICARE

## 2018-10-03 PROBLEM — K92.2: Status: ACTIVE | Noted: 2018-10-03

## 2018-10-03 LAB
ANION GAP SERPL CALCULATED.3IONS-SCNC: 9 MEQ/L (ref 8–16)
BUN BLDV-MCNC: 30 MG/DL (ref 7–22)
CALCIUM SERPL-MCNC: 8.8 MG/DL (ref 8.5–10.5)
CHLORIDE BLD-SCNC: 113 MEQ/L (ref 98–111)
CO2: 20 MEQ/L (ref 23–33)
CREAT SERPL-MCNC: 0.3 MG/DL (ref 0.4–1.2)
GFR SERPL CREATININE-BSD FRML MDRD: > 90 ML/MIN/1.73M2
GLUCOSE BLD-MCNC: 131 MG/DL (ref 70–108)
GLUCOSE BLD-MCNC: 176 MG/DL (ref 70–108)
HCT VFR BLD CALC: 19 % (ref 37–47)
HCT VFR BLD CALC: 23 % (ref 37–47)
HEMOGLOBIN: 6.3 GM/DL (ref 12–16)
HEMOGLOBIN: 7.6 GM/DL (ref 12–16)
POTASSIUM SERPL-SCNC: 4 MEQ/L (ref 3.5–5.2)
SODIUM BLD-SCNC: 142 MEQ/L (ref 135–145)

## 2018-10-03 PROCEDURE — 85018 HEMOGLOBIN: CPT

## 2018-10-03 PROCEDURE — 2580000003 HC RX 258: Performed by: INTERNAL MEDICINE

## 2018-10-03 PROCEDURE — 99233 SBSQ HOSP IP/OBS HIGH 50: CPT | Performed by: INTERNAL MEDICINE

## 2018-10-03 PROCEDURE — C1887 CATHETER, GUIDING: HCPCS

## 2018-10-03 PROCEDURE — 80048 BASIC METABOLIC PNL TOTAL CA: CPT

## 2018-10-03 PROCEDURE — 2709999900 HC NON-CHARGEABLE SUPPLY

## 2018-10-03 PROCEDURE — 36245 INS CATH ABD/L-EXT ART 1ST: CPT

## 2018-10-03 PROCEDURE — 3430000000 HC RX DIAGNOSTIC RADIOPHARMACEUTICAL: Performed by: INTERNAL MEDICINE

## 2018-10-03 PROCEDURE — 6360000004 HC RX CONTRAST MEDICATION: Performed by: RADIOLOGY

## 2018-10-03 PROCEDURE — C1894 INTRO/SHEATH, NON-LASER: HCPCS

## 2018-10-03 PROCEDURE — B4121ZZ FLUOROSCOPY OF HEPATIC ARTERY USING LOW OSMOLAR CONTRAST: ICD-10-PCS | Performed by: RADIOLOGY

## 2018-10-03 PROCEDURE — 6360000002 HC RX W HCPCS: Performed by: INTERNAL MEDICINE

## 2018-10-03 PROCEDURE — 2500000003 HC RX 250 WO HCPCS

## 2018-10-03 PROCEDURE — 6360000002 HC RX W HCPCS: Performed by: FAMILY MEDICINE

## 2018-10-03 PROCEDURE — 82948 REAGENT STRIP/BLOOD GLUCOSE: CPT

## 2018-10-03 PROCEDURE — 75726 ARTERY X-RAYS ABDOMEN: CPT

## 2018-10-03 PROCEDURE — 6360000002 HC RX W HCPCS

## 2018-10-03 PROCEDURE — C1760 CLOSURE DEV, VASC: HCPCS

## 2018-10-03 PROCEDURE — 93005 ELECTROCARDIOGRAM TRACING: CPT | Performed by: INTERNAL MEDICINE

## 2018-10-03 PROCEDURE — B4141ZZ FLUOROSCOPY OF SUPERIOR MESENTERIC ARTERY USING LOW OSMOLAR CONTRAST: ICD-10-PCS | Performed by: RADIOLOGY

## 2018-10-03 PROCEDURE — 04L33DZ OCCLUSION OF HEPATIC ARTERY WITH INTRALUMINAL DEVICE, PERCUTANEOUS APPROACH: ICD-10-PCS | Performed by: RADIOLOGY

## 2018-10-03 PROCEDURE — B4101ZZ FLUOROSCOPY OF ABDOMINAL AORTA USING LOW OSMOLAR CONTRAST: ICD-10-PCS | Performed by: RADIOLOGY

## 2018-10-03 PROCEDURE — 78278 ACUTE GI BLOOD LOSS IMAGING: CPT

## 2018-10-03 PROCEDURE — C9113 INJ PANTOPRAZOLE SODIUM, VIA: HCPCS | Performed by: INTERNAL MEDICINE

## 2018-10-03 PROCEDURE — 36430 TRANSFUSION BLD/BLD COMPNT: CPT

## 2018-10-03 PROCEDURE — P9016 RBC LEUKOCYTES REDUCED: HCPCS

## 2018-10-03 PROCEDURE — 85014 HEMATOCRIT: CPT

## 2018-10-03 PROCEDURE — 6370000000 HC RX 637 (ALT 250 FOR IP)

## 2018-10-03 PROCEDURE — 36247 INS CATH ABD/L-EXT ART 3RD: CPT

## 2018-10-03 PROCEDURE — 2060000000 HC ICU INTERMEDIATE R&B

## 2018-10-03 PROCEDURE — B4151ZZ FLUOROSCOPY OF INFERIOR MESENTERIC ARTERY USING LOW OSMOLAR CONTRAST: ICD-10-PCS | Performed by: RADIOLOGY

## 2018-10-03 PROCEDURE — 6360000002 HC RX W HCPCS: Performed by: RADIOLOGY

## 2018-10-03 PROCEDURE — 2580000003 HC RX 258: Performed by: FAMILY MEDICINE

## 2018-10-03 PROCEDURE — C1769 GUIDE WIRE: HCPCS

## 2018-10-03 PROCEDURE — B41B1ZZ FLUOROSCOPY OF OTHER INTRA-ABDOMINAL ARTERIES USING LOW OSMOLAR CONTRAST: ICD-10-PCS | Performed by: RADIOLOGY

## 2018-10-03 PROCEDURE — 6370000000 HC RX 637 (ALT 250 FOR IP): Performed by: FAMILY MEDICINE

## 2018-10-03 PROCEDURE — 37244 VASC EMBOLIZE/OCCLUDE BLEED: CPT

## 2018-10-03 PROCEDURE — A9560 TC99M LABELED RBC: HCPCS | Performed by: INTERNAL MEDICINE

## 2018-10-03 PROCEDURE — 6370000000 HC RX 637 (ALT 250 FOR IP): Performed by: RADIOLOGY

## 2018-10-03 PROCEDURE — 36415 COLL VENOUS BLD VENIPUNCTURE: CPT

## 2018-10-03 RX ORDER — DIAPER,BRIEF,INFANT-TODD,DISP
EACH MISCELLANEOUS ONCE
Status: COMPLETED | OUTPATIENT
Start: 2018-10-03 | End: 2018-10-03

## 2018-10-03 RX ORDER — PANTOPRAZOLE SODIUM 40 MG/1
40 TABLET, DELAYED RELEASE ORAL
Status: DISCONTINUED | OUTPATIENT
Start: 2018-10-03 | End: 2018-10-03

## 2018-10-03 RX ORDER — PANTOPRAZOLE SODIUM 40 MG/10ML
40 INJECTION, POWDER, LYOPHILIZED, FOR SOLUTION INTRAVENOUS 2 TIMES DAILY
Status: DISCONTINUED | OUTPATIENT
Start: 2018-10-03 | End: 2018-10-03

## 2018-10-03 RX ORDER — 0.9 % SODIUM CHLORIDE 0.9 %
250 INTRAVENOUS SOLUTION INTRAVENOUS ONCE
Status: COMPLETED | OUTPATIENT
Start: 2018-10-03 | End: 2018-10-03

## 2018-10-03 RX ORDER — SODIUM CHLORIDE 9 MG/ML
INJECTION, SOLUTION INTRAVENOUS CONTINUOUS
Status: DISCONTINUED | OUTPATIENT
Start: 2018-10-03 | End: 2018-10-04

## 2018-10-03 RX ORDER — MIDAZOLAM HYDROCHLORIDE 1 MG/ML
0.5 INJECTION INTRAMUSCULAR; INTRAVENOUS ONCE
Status: COMPLETED | OUTPATIENT
Start: 2018-10-03 | End: 2018-10-03

## 2018-10-03 RX ORDER — MIDAZOLAM HYDROCHLORIDE 1 MG/ML
1 INJECTION INTRAMUSCULAR; INTRAVENOUS ONCE
Status: COMPLETED | OUTPATIENT
Start: 2018-10-03 | End: 2018-10-03

## 2018-10-03 RX ORDER — FENTANYL CITRATE 50 UG/ML
50 INJECTION, SOLUTION INTRAMUSCULAR; INTRAVENOUS ONCE
Status: COMPLETED | OUTPATIENT
Start: 2018-10-03 | End: 2018-10-03

## 2018-10-03 RX ORDER — 0.9 % SODIUM CHLORIDE 0.9 %
250 INTRAVENOUS SOLUTION INTRAVENOUS ONCE
Status: COMPLETED | OUTPATIENT
Start: 2018-10-03 | End: 2018-10-04

## 2018-10-03 RX ORDER — FENTANYL CITRATE 50 UG/ML
25 INJECTION, SOLUTION INTRAMUSCULAR; INTRAVENOUS ONCE
Status: COMPLETED | OUTPATIENT
Start: 2018-10-03 | End: 2018-10-03

## 2018-10-03 RX ORDER — OCTREOTIDE ACETATE 100 UG/ML
50 INJECTION, SOLUTION INTRAVENOUS; SUBCUTANEOUS ONCE
Status: COMPLETED | OUTPATIENT
Start: 2018-10-03 | End: 2018-10-03

## 2018-10-03 RX ORDER — 0.9 % SODIUM CHLORIDE 0.9 %
250 INTRAVENOUS SOLUTION INTRAVENOUS ONCE
Status: DISCONTINUED | OUTPATIENT
Start: 2018-10-03 | End: 2018-10-05

## 2018-10-03 RX ORDER — 0.9 % SODIUM CHLORIDE 0.9 %
250 INTRAVENOUS SOLUTION INTRAVENOUS ONCE
Status: DISCONTINUED | OUTPATIENT
Start: 2018-10-03 | End: 2018-10-03

## 2018-10-03 RX ADMIN — SODIUM CHLORIDE 8 MG/HR: 9 INJECTION, SOLUTION INTRAVENOUS at 22:38

## 2018-10-03 RX ADMIN — Medication 10 ML: at 20:07

## 2018-10-03 RX ADMIN — FENTANYL CITRATE 25 MCG: 50 INJECTION INTRAMUSCULAR; INTRAVENOUS at 21:20

## 2018-10-03 RX ADMIN — SODIUM CHLORIDE 250 ML: 9 INJECTION, SOLUTION INTRAVENOUS at 15:28

## 2018-10-03 RX ADMIN — VILAZODONE HYDROCHLORIDE 40 MG: 40 TABLET ORAL at 11:57

## 2018-10-03 RX ADMIN — PRENATAL WITH FERROUS FUM AND FOLIC ACID 1 TABLET: 3080; 920; 120; 400; 22; 1.84; 3; 20; 10; 1; 12; 200; 27; 25; 2 TABLET ORAL at 11:57

## 2018-10-03 RX ADMIN — SODIUM CHLORIDE 8 MG/HR: 9 INJECTION, SOLUTION INTRAVENOUS at 10:53

## 2018-10-03 RX ADMIN — ACETAMINOPHEN 1000 MG: 500 TABLET, FILM COATED ORAL at 11:58

## 2018-10-03 RX ADMIN — OCTREOTIDE ACETATE 50 MCG/HR: 500 INJECTION, SOLUTION INTRAVENOUS; SUBCUTANEOUS at 22:38

## 2018-10-03 RX ADMIN — IOPAMIDOL 110 ML: 612 INJECTION, SOLUTION INTRAVENOUS at 22:00

## 2018-10-03 RX ADMIN — SODIUM CHLORIDE: 9 INJECTION, SOLUTION INTRAVENOUS at 22:59

## 2018-10-03 RX ADMIN — MIDAZOLAM 0.5 MG: 1 INJECTION INTRAMUSCULAR; INTRAVENOUS at 21:20

## 2018-10-03 RX ADMIN — Medication 33 MILLICURIE: at 17:50

## 2018-10-03 RX ADMIN — ACETAMINOPHEN 1000 MG: 500 TABLET, FILM COATED ORAL at 20:07

## 2018-10-03 RX ADMIN — SODIUM CHLORIDE 250 ML: 9 INJECTION, SOLUTION INTRAVENOUS at 01:00

## 2018-10-03 RX ADMIN — Medication 1000 MCG: at 11:57

## 2018-10-03 RX ADMIN — IRON SUCROSE 100 MG: 20 INJECTION, SOLUTION INTRAVENOUS at 14:37

## 2018-10-03 RX ADMIN — LAMOTRIGINE 200 MG: 100 TABLET ORAL at 11:58

## 2018-10-03 RX ADMIN — LITHIUM CARBONATE 300 MG: 300 CAPSULE ORAL at 11:57

## 2018-10-03 RX ADMIN — ONDANSETRON 4 MG: 2 INJECTION INTRAMUSCULAR; INTRAVENOUS at 14:24

## 2018-10-03 RX ADMIN — SODIUM CHLORIDE: 9 INJECTION, SOLUTION INTRAVENOUS at 16:40

## 2018-10-03 RX ADMIN — MIDAZOLAM 0.5 MG: 1 INJECTION INTRAMUSCULAR; INTRAVENOUS at 21:17

## 2018-10-03 RX ADMIN — FENTANYL CITRATE 25 MCG: 50 INJECTION INTRAMUSCULAR; INTRAVENOUS at 21:17

## 2018-10-03 RX ADMIN — BACITRACIN ZINC 1 G: 500 OINTMENT TOPICAL at 22:12

## 2018-10-03 RX ADMIN — OCTREOTIDE ACETATE 50 MCG: 50 INJECTION, SOLUTION INTRAVENOUS; SUBCUTANEOUS at 22:38

## 2018-10-03 ASSESSMENT — PAIN SCALES - GENERAL
PAINLEVEL_OUTOF10: 2
PAINLEVEL_OUTOF10: 1
PAINLEVEL_OUTOF10: 0
PAINLEVEL_OUTOF10: 2

## 2018-10-03 ASSESSMENT — PAIN DESCRIPTION - PROGRESSION: CLINICAL_PROGRESSION: GRADUALLY WORSENING

## 2018-10-03 ASSESSMENT — PAIN DESCRIPTION - DESCRIPTORS: DESCRIPTORS: ACHING

## 2018-10-03 ASSESSMENT — PAIN DESCRIPTION - LOCATION: LOCATION: BACK

## 2018-10-03 ASSESSMENT — PAIN DESCRIPTION - FREQUENCY: FREQUENCY: CONTINUOUS

## 2018-10-03 ASSESSMENT — PAIN DESCRIPTION - PAIN TYPE: TYPE: ACUTE PAIN

## 2018-10-03 ASSESSMENT — PAIN DESCRIPTION - ORIENTATION: ORIENTATION: MID;LOWER

## 2018-10-03 ASSESSMENT — PAIN DESCRIPTION - ONSET: ONSET: SUDDEN

## 2018-10-03 NOTE — CARE COORDINATION
10/3/18, 12:55 PM      Corey Leiva day: 2  Location: [facility]10/010-A Reason for admit: GI bleed [K92.2]   Procedure: 10/2 Mesenteric Angiogram: coil embolization of the gastroduodenal artery with evidence of bleeding   Treatment Plan of Care: H 7.6; monitor. Blood Transfusion orders on chart.  GI following. 100.5t; monitor  PCP: Hoy Brunner, APRN - CNP  Readmission Risk Score: 18%  Discharge Plan: plans home alone when medically cleared

## 2018-10-03 NOTE — FLOWSHEET NOTE
10/02/18 8556   Provider Notification   Reason for Communication Critical Value (comment)  (Hgb 6.8 Hct 20.9)   Provider Name Dr. Arsh Bautista   Provider Notification Physician   Method of Communication Secure Message   Response Waiting for response   Notification Time [phone]     Per Dr. Escalera Spikes note today, she says to contact for Hgb less than 7. Patients last H&H came back Hgb 6.8 and Hct 20.9. Down from Hgb 8.0 at 3p today after receiving 1 unit of blood. Patients vitals stable, last vitals at 2307 /55 Map 70 HR: 66 T:97.5. Site from IR procedure has no issues. No BM's for me to assess yet this shift. What interventions would you like done at this time? Thanks! Provider called back, putting in orders for 1 unit PRBC. See new orders.

## 2018-10-03 NOTE — PROGRESS NOTES
Intravenous Once Robert Saunders  mL/hr at 10/03/18 1437 100 mg at 10/03/18 1437    0.9 % sodium chloride bolus  250 mL Intravenous Once Robert Saunders MD        pantoprazole (PROTONIX) injection 40 mg  40 mg Intravenous BID Robert Saunders MD        0.9 % sodium chloride infusion   Intravenous Continuous Robert Saunders MD        calcium replacement protocol   Other RX Placeholder Hoda Udeagbala, DO        sodium chloride flush 0.9 % injection 10 mL  10 mL Intravenous 2 times per day Hoda Udeagbala, DO        sodium chloride flush 0.9 % injection 10 mL  10 mL Intravenous PRN Hoda Udeagbala, DO        magnesium hydroxide (MILK OF MAGNESIA) 400 MG/5ML suspension 30 mL  30 mL Oral Daily PRN Hoda Udeagbala, DO        ondansetron (ZOFRAN) injection 4 mg  4 mg Intravenous Q6H PRN Hoda Udeagbala, DO   4 mg at 10/03/18 1424    acetaminophen (TYLENOL) tablet 1,000 mg  1,000 mg Oral Q8H PRN Hoda Udeagbala, DO   1,000 mg at 10/03/18 1158    dimethyl fumarate (TECFIDERA) delayed release capsule 240 mg  240 mg Oral BID Hoda Udeagbala, DO        lamoTRIgine (LAMICTAL) tablet 200 mg  200 mg Oral Daily Hoda Udeagbala, DO   200 mg at 10/03/18 1158    levothyroxine (SYNTHROID) tablet 125 mcg  125 mcg Oral Daily Hoda Udeagbala, DO        nitroGLYCERIN (NITROSTAT) SL tablet 0.4 mg  0.4 mg Sublingual Q5 Min PRN Hoda Udeagbala, DO        prenatal vitamin 27-1 MG tablet 1 tablet  1 tablet Oral Daily Hoda Udeagbala, DO   1 tablet at 10/03/18 1157    vilazodone HCl (VIIBRYD) TABS 40 mg  40 mg Oral Daily Hoda Udeagbala, DO   40 mg at 10/03/18 1157    vitamin B-12 (CYANOCOBALAMIN) tablet 1,000 mcg  1,000 mcg Oral Daily Hoda Udeagbala, DO   1,000 mcg at 10/03/18 1157    lithium capsule 600 mg  600 mg Oral Nightly Hodaraj Garrett DO        lithium capsule 300 mg  300 mg Oral Daily Hoda DO Tami   300 mg at 10/03/18 1157    influenza

## 2018-10-04 ENCOUNTER — ANESTHESIA EVENT (OUTPATIENT)
Dept: ENDOSCOPY | Age: 58
DRG: 357 | End: 2018-10-04
Payer: MEDICARE

## 2018-10-04 ENCOUNTER — ANESTHESIA (OUTPATIENT)
Dept: ENDOSCOPY | Age: 58
DRG: 357 | End: 2018-10-04
Payer: MEDICARE

## 2018-10-04 VITALS
RESPIRATION RATE: 12 BRPM | DIASTOLIC BLOOD PRESSURE: 53 MMHG | OXYGEN SATURATION: 99 % | SYSTOLIC BLOOD PRESSURE: 101 MMHG

## 2018-10-04 LAB
ANION GAP SERPL CALCULATED.3IONS-SCNC: 8 MEQ/L (ref 8–16)
BUN BLDV-MCNC: 26 MG/DL (ref 7–22)
CALCIUM SERPL-MCNC: 8.6 MG/DL (ref 8.5–10.5)
CHLORIDE BLD-SCNC: 115 MEQ/L (ref 98–111)
CO2: 20 MEQ/L (ref 23–33)
CREAT SERPL-MCNC: 0.4 MG/DL (ref 0.4–1.2)
EKG ATRIAL RATE: 62 BPM
EKG P AXIS: 51 DEGREES
EKG P-R INTERVAL: 144 MS
EKG Q-T INTERVAL: 456 MS
EKG QRS DURATION: 142 MS
EKG QTC CALCULATION (BAZETT): 462 MS
EKG R AXIS: 31 DEGREES
EKG T AXIS: 30 DEGREES
EKG VENTRICULAR RATE: 62 BPM
GFR SERPL CREATININE-BSD FRML MDRD: > 90 ML/MIN/1.73M2
GLUCOSE BLD-MCNC: 141 MG/DL (ref 70–108)
HCT VFR BLD CALC: 21.4 % (ref 37–47)
HCT VFR BLD CALC: 21.7 % (ref 37–47)
HCT VFR BLD CALC: 21.9 % (ref 37–47)
HCT VFR BLD CALC: 22.2 % (ref 37–47)
HEMOGLOBIN: 7 GM/DL (ref 12–16)
HEMOGLOBIN: 7.2 GM/DL (ref 12–16)
HEMOGLOBIN: 7.3 GM/DL (ref 12–16)
HEMOGLOBIN: 7.5 GM/DL (ref 12–16)
POTASSIUM SERPL-SCNC: 4.1 MEQ/L (ref 3.5–5.2)
SODIUM BLD-SCNC: 143 MEQ/L (ref 135–145)

## 2018-10-04 PROCEDURE — 2060000000 HC ICU INTERMEDIATE R&B

## 2018-10-04 PROCEDURE — B32S1ZZ COMPUTERIZED TOMOGRAPHY (CT SCAN) OF RIGHT PULMONARY ARTERY USING LOW OSMOLAR CONTRAST: ICD-10-PCS | Performed by: RADIOLOGY

## 2018-10-04 PROCEDURE — 2580000003 HC RX 258: Performed by: NURSE PRACTITIONER

## 2018-10-04 PROCEDURE — 36415 COLL VENOUS BLD VENIPUNCTURE: CPT

## 2018-10-04 PROCEDURE — 36569 INSJ PICC 5 YR+ W/O IMAGING: CPT

## 2018-10-04 PROCEDURE — 99233 SBSQ HOSP IP/OBS HIGH 50: CPT | Performed by: INTERNAL MEDICINE

## 2018-10-04 PROCEDURE — 2709999900 HC NON-CHARGEABLE SUPPLY

## 2018-10-04 PROCEDURE — 6370000000 HC RX 637 (ALT 250 FOR IP): Performed by: FAMILY MEDICINE

## 2018-10-04 PROCEDURE — 6360000002 HC RX W HCPCS: Performed by: INTERNAL MEDICINE

## 2018-10-04 PROCEDURE — 3609012800 HC EGD DIAGNOSTIC ONLY: Performed by: INTERNAL MEDICINE

## 2018-10-04 PROCEDURE — 2580000003 HC RX 258: Performed by: INTERNAL MEDICINE

## 2018-10-04 PROCEDURE — 85014 HEMATOCRIT: CPT

## 2018-10-04 PROCEDURE — 7100000000 HC PACU RECOVERY - FIRST 15 MIN: Performed by: INTERNAL MEDICINE

## 2018-10-04 PROCEDURE — 3700000000 HC ANESTHESIA ATTENDED CARE: Performed by: INTERNAL MEDICINE

## 2018-10-04 PROCEDURE — 99223 1ST HOSP IP/OBS HIGH 75: CPT | Performed by: SURGERY

## 2018-10-04 PROCEDURE — 85018 HEMOGLOBIN: CPT

## 2018-10-04 PROCEDURE — 0DJ08ZZ INSPECTION OF UPPER INTESTINAL TRACT, VIA NATURAL OR ARTIFICIAL OPENING ENDOSCOPIC: ICD-10-PCS | Performed by: INTERNAL MEDICINE

## 2018-10-04 PROCEDURE — 6360000002 HC RX W HCPCS: Performed by: NURSE ANESTHETIST, CERTIFIED REGISTERED

## 2018-10-04 PROCEDURE — 3700000001 HC ADD 15 MINUTES (ANESTHESIA): Performed by: INTERNAL MEDICINE

## 2018-10-04 PROCEDURE — B32T1ZZ COMPUTERIZED TOMOGRAPHY (CT SCAN) OF LEFT PULMONARY ARTERY USING LOW OSMOLAR CONTRAST: ICD-10-PCS | Performed by: RADIOLOGY

## 2018-10-04 PROCEDURE — C1751 CATH, INF, PER/CENT/MIDLINE: HCPCS

## 2018-10-04 PROCEDURE — C9113 INJ PANTOPRAZOLE SODIUM, VIA: HCPCS | Performed by: INTERNAL MEDICINE

## 2018-10-04 PROCEDURE — P9016 RBC LEUKOCYTES REDUCED: HCPCS

## 2018-10-04 PROCEDURE — 76937 US GUIDE VASCULAR ACCESS: CPT

## 2018-10-04 PROCEDURE — 2709999900 HC NON-CHARGEABLE SUPPLY: Performed by: INTERNAL MEDICINE

## 2018-10-04 PROCEDURE — 2500000003 HC RX 250 WO HCPCS: Performed by: NURSE ANESTHETIST, CERTIFIED REGISTERED

## 2018-10-04 PROCEDURE — 7100000001 HC PACU RECOVERY - ADDTL 15 MIN: Performed by: INTERNAL MEDICINE

## 2018-10-04 PROCEDURE — 93010 ELECTROCARDIOGRAM REPORT: CPT | Performed by: INTERNAL MEDICINE

## 2018-10-04 PROCEDURE — B3201ZZ COMPUTERIZED TOMOGRAPHY (CT SCAN) OF THORACIC AORTA USING LOW OSMOLAR CONTRAST: ICD-10-PCS | Performed by: RADIOLOGY

## 2018-10-04 PROCEDURE — 80048 BASIC METABOLIC PNL TOTAL CA: CPT

## 2018-10-04 RX ORDER — 0.9 % SODIUM CHLORIDE 0.9 %
250 INTRAVENOUS SOLUTION INTRAVENOUS ONCE
Status: DISCONTINUED | OUTPATIENT
Start: 2018-10-04 | End: 2018-10-05

## 2018-10-04 RX ORDER — SODIUM CHLORIDE 450 MG/100ML
INJECTION, SOLUTION INTRAVENOUS CONTINUOUS
Status: DISCONTINUED | OUTPATIENT
Start: 2018-10-04 | End: 2018-10-04

## 2018-10-04 RX ORDER — PROPOFOL 10 MG/ML
INJECTION, EMULSION INTRAVENOUS PRN
Status: DISCONTINUED | OUTPATIENT
Start: 2018-10-04 | End: 2018-10-04 | Stop reason: SDUPTHER

## 2018-10-04 RX ORDER — LIDOCAINE HYDROCHLORIDE 20 MG/ML
INJECTION, SOLUTION INFILTRATION; PERINEURAL PRN
Status: DISCONTINUED | OUTPATIENT
Start: 2018-10-04 | End: 2018-10-04 | Stop reason: SDUPTHER

## 2018-10-04 RX ORDER — FUROSEMIDE 10 MG/ML
10 INJECTION INTRAMUSCULAR; INTRAVENOUS ONCE
Status: COMPLETED | OUTPATIENT
Start: 2018-10-04 | End: 2018-10-04

## 2018-10-04 RX ORDER — SODIUM CHLORIDE 0.9 % (FLUSH) 0.9 %
10 SYRINGE (ML) INJECTION EVERY 12 HOURS SCHEDULED
Status: DISCONTINUED | OUTPATIENT
Start: 2018-10-04 | End: 2018-10-05

## 2018-10-04 RX ORDER — 0.9 % SODIUM CHLORIDE 0.9 %
10 VIAL (ML) INJECTION EVERY 12 HOURS SCHEDULED
Status: DISCONTINUED | OUTPATIENT
Start: 2018-10-04 | End: 2018-10-05

## 2018-10-04 RX ORDER — LIDOCAINE HYDROCHLORIDE 10 MG/ML
5 INJECTION, SOLUTION EPIDURAL; INFILTRATION; INTRACAUDAL; PERINEURAL ONCE
Status: DISCONTINUED | OUTPATIENT
Start: 2018-10-04 | End: 2018-10-09 | Stop reason: HOSPADM

## 2018-10-04 RX ORDER — SODIUM CHLORIDE 9 MG/ML
INJECTION, SOLUTION INTRAVENOUS CONTINUOUS
Status: DISCONTINUED | OUTPATIENT
Start: 2018-10-04 | End: 2018-10-09 | Stop reason: HOSPADM

## 2018-10-04 RX ORDER — SODIUM CHLORIDE 0.9 % (FLUSH) 0.9 %
10 SYRINGE (ML) INJECTION PRN
Status: DISCONTINUED | OUTPATIENT
Start: 2018-10-04 | End: 2018-10-05

## 2018-10-04 RX ORDER — 0.9 % SODIUM CHLORIDE 0.9 %
10 VIAL (ML) INJECTION PRN
Status: DISCONTINUED | OUTPATIENT
Start: 2018-10-04 | End: 2018-10-05

## 2018-10-04 RX ADMIN — SODIUM CHLORIDE: 9 INJECTION, SOLUTION INTRAVENOUS at 18:06

## 2018-10-04 RX ADMIN — FUROSEMIDE 10 MG: 10 INJECTION, SOLUTION INTRAMUSCULAR; INTRAVENOUS at 19:47

## 2018-10-04 RX ADMIN — PROPOFOL 150 MG: 10 INJECTION, EMULSION INTRAVENOUS at 11:35

## 2018-10-04 RX ADMIN — ACETAMINOPHEN 1000 MG: 500 TABLET, FILM COATED ORAL at 14:41

## 2018-10-04 RX ADMIN — Medication 10 ML: at 19:54

## 2018-10-04 RX ADMIN — OCTREOTIDE ACETATE 50 MCG/HR: 500 INJECTION, SOLUTION INTRAVENOUS; SUBCUTANEOUS at 21:18

## 2018-10-04 RX ADMIN — VILAZODONE HYDROCHLORIDE 40 MG: 40 TABLET ORAL at 14:40

## 2018-10-04 RX ADMIN — ACETAMINOPHEN 1000 MG: 500 TABLET, FILM COATED ORAL at 06:55

## 2018-10-04 RX ADMIN — SODIUM CHLORIDE 8 MG/HR: 9 INJECTION, SOLUTION INTRAVENOUS at 21:16

## 2018-10-04 RX ADMIN — LIDOCAINE HYDROCHLORIDE 80 MG: 20 INJECTION, SOLUTION INFILTRATION; PERINEURAL at 11:35

## 2018-10-04 RX ADMIN — SODIUM CHLORIDE 8 MG/HR: 9 INJECTION, SOLUTION INTRAVENOUS at 09:20

## 2018-10-04 RX ADMIN — OCTREOTIDE ACETATE 50 MCG/HR: 500 INJECTION, SOLUTION INTRAVENOUS; SUBCUTANEOUS at 09:20

## 2018-10-04 RX ADMIN — SODIUM CHLORIDE: 4.5 INJECTION, SOLUTION INTRAVENOUS at 11:24

## 2018-10-04 RX ADMIN — FUROSEMIDE 10 MG: 10 INJECTION, SOLUTION INTRAMUSCULAR; INTRAVENOUS at 09:19

## 2018-10-04 ASSESSMENT — ENCOUNTER SYMPTOMS
BACK PAIN: 0
BLOOD IN STOOL: 1
COUGH: 0
EYE REDNESS: 0
SHORTNESS OF BREATH: 0
WHEEZING: 0
NAUSEA: 0
VOMITING: 0
ABDOMINAL PAIN: 0
PHOTOPHOBIA: 0

## 2018-10-04 ASSESSMENT — PAIN DESCRIPTION - FREQUENCY
FREQUENCY: INTERMITTENT
FREQUENCY: INTERMITTENT

## 2018-10-04 ASSESSMENT — PAIN DESCRIPTION - ORIENTATION
ORIENTATION: RIGHT
ORIENTATION: RIGHT

## 2018-10-04 ASSESSMENT — PAIN SCALES - GENERAL
PAINLEVEL_OUTOF10: 0
PAINLEVEL_OUTOF10: 3
PAINLEVEL_OUTOF10: 2
PAINLEVEL_OUTOF10: 1
PAINLEVEL_OUTOF10: 0

## 2018-10-04 ASSESSMENT — PAIN DESCRIPTION - DESCRIPTORS
DESCRIPTORS: ACHING

## 2018-10-04 ASSESSMENT — PAIN DESCRIPTION - PROGRESSION: CLINICAL_PROGRESSION: NOT CHANGED

## 2018-10-04 ASSESSMENT — PAIN DESCRIPTION - PAIN TYPE
TYPE: SURGICAL PAIN
TYPE: SURGICAL PAIN

## 2018-10-04 ASSESSMENT — PAIN DESCRIPTION - LOCATION
LOCATION: GROIN
LOCATION: GROIN

## 2018-10-04 ASSESSMENT — PAIN - FUNCTIONAL ASSESSMENT: PAIN_FUNCTIONAL_ASSESSMENT: 0-10

## 2018-10-04 ASSESSMENT — PAIN DESCRIPTION - ONSET
ONSET: PROGRESSIVE
ONSET: ON-GOING

## 2018-10-04 NOTE — CONSULTS
[facility]  General Surgery Consult Note  Jefferson Mario MD     Pt Name: Angi Foreman  MRN: [id number]  [place]: 1960  Date of evaluation: 10/4/2018  Primary Care Physician: AJITH Martino - CNP  Patient evaluated at the request of  Dr. Yip Early  Reason for evaluation: Upper GI bleeding  IMPRESSIONS:   1. Upper GI bleeding-gastroduodenal artery distribution status post coil embolization ×2  2. Remote history of Batsheva-en-Y gastric bypass  3. Nodular thyroid incidentally on CT scan  4. Hemodynamically stable  PLANS:   1. Status post second coil embolization. Per nurse she has received her fourth unit of blood. No bowel movements now since yesterday. 2.   I would not rush this patient's surgery as discrete localization has not yet been possible. Surgery may be difficult to localize the source of bleeding as well. Last EGD in February failed to reveal the source of bleeding. Scope unable to be passed to even distal anastomosis. No bleeding or erosions seen at proximal gastroesophageal anastomosis at that time. If comes to surgical intervention may need on table endoscopy at that time. 3. Continue medical management protonix drip and on octreotide. Serial hemoglobins  4. Recommend thyroid ultrasound at some point whether it be on an inpatient or outpatient basis. SUBJECTIVE:   Chief complaint: Bloody bowel movement    History of present illness:  Davis August is a very pleasant 44-year-old female who had a history of a Batsheva-en-Y gastric bypass in 2002. She has a prior history of marginal ulceration at her gastroesophageal anastomosis. She has had previous cholecystectomy and appendectomy by Dr. Sharon Sutherland in 2016. She was admitted in February with GI bleeding. EGD and colonoscopy performed at that time. Due to her Batsheva-en-Y reconstruction only the small gastric pouch and approximately fair limb could be seen. Distal anastomosis even with push enteroscopy could not be visualized.   She protocol   Other RX Placeholder    sodium chloride flush  10 mL Intravenous 2 times per day    dimethyl fumarate  240 mg Oral BID    lamoTRIgine  200 mg Oral Daily    levothyroxine  125 mcg Oral Daily    prenatal vitamin  1 tablet Oral Daily    vilazodone HCl  40 mg Oral Daily    cyanocobalamin  1,000 mcg Oral Daily    lithium  600 mg Oral Nightly    lithium  300 mg Oral Daily    influenza virus vaccine  0.5 mL Intramuscular Once     Continuous Infusions:   sodium chloride 50 mL/hr at 10/03/18 2259    pantoprozole (PROTONIX) infusion 8 mg/hr (10/03/18 2238)    octreotide (SANDOSTATIN) infusion 50 mcg/hr (10/03/18 2238)     PRN Meds:.sodium chloride flush, magnesium hydroxide, ondansetron, acetaminophen, nitroGLYCERIN  Allergies:  is allergic to latex and sulfa antibiotics. Family History:  family history includes Cancer in her mother, sister, and sister; Colon Polyps in her mother; Glaucoma in her father; Heart Disease in her father. Social History:   reports that she has quit smoking. She has never used smokeless tobacco. She reports that she does not drink alcohol or use drugs. Review of Systems:  Review of Systems   Constitutional: Negative for diaphoresis and fever. HENT: Negative for congestion and nosebleeds. Eyes: Negative for photophobia and redness. Respiratory: Negative for cough, shortness of breath and wheezing. Gastrointestinal: Positive for blood in stool. Negative for abdominal pain, nausea and vomiting. Endocrine: Negative for polyuria. Genitourinary: Negative for dysuria and hematuria. Musculoskeletal: Negative for arthralgias and back pain. Neurological: Negative for dizziness, tremors, weakness and headaches. Hematological: Negative for adenopathy. Psychiatric/Behavioral: Negative for agitation. OBJECTIVE:   CURRENT VITALS:  height is 4' 10\" (1.473 m) and weight is 113 lb 14.4 oz (51.7 kg). Her oral temperature is 98.6 °F (37 °C).  Her blood pressure is

## 2018-10-04 NOTE — PROGRESS NOTES
2050 Patient received in IR for procedure. No family present. 2055 This procedure has been fully reviewed with the patient and written informed consent has been obtained. 2100 Dr. Misael Vasquez in; spoke to patient. 2105 Contacted floor nurse regarding no recent H&H since 0920 this AM. Nurse states that lab attempted to draw earlier but blood clotted. Nurse states she could contact lab to come to specials to draw blood now. Advised we are getting ready to prep and start procedure and that wouldn't be possible at this time. 2110 Patient prepped for procedure. 2117 Procedure started with Dr. Misael Vasquez. 2150 Deployed a VortX Aishwarya 5mm coil to the gastroduodenal artery. 2210 Procedure completed patient tolerated well. Angio seal device deployed to right femoral artery. 2212 Bacitracin oint, gauze and op site to right femoral site; area soft to touch with no bleeding noted. 2215 Dr Misael Vasquez updating Dr Cintia Saucedo via telephone. 2220 Report called to [employer], RN on [employer]. States they have no one to come get this pt.   2225 Patient on bed; comfort ensured. Right femoral dressing remains dry and intact with area soft. 2230 Patient taken to 4K via bed. Right femoral dressing remains dry and intact with area soft.

## 2018-10-04 NOTE — PROGRESS NOTES
1945 [facility]. H&H previous drawn on floor by specials but blood clotted. Lab to call back about redraw. H&H now q6h starting at 1900.     2105 Paul BAL from  called this RN about last H&H. This RN stated she has not seen lab in to redraw blood as she was in with another pt. Arcelia Conor stated that won't be neccessary as pt is already prepped for procedure. 2107 this RN called lab about H&H draw. Phlebotomy states no one has been up to draw blood but can come shortly. This RN updated them on pt's location. Phlebotomy states they are unsure what happened about the redraw.

## 2018-10-04 NOTE — PLAN OF CARE
Problem: Fluid Volume - Imbalance:  Goal: Will show no signs and symptoms of excessive bleeding  Will show no signs and symptoms of excessive bleeding   Outcome: Ongoing  H&H trending q6h. 1unit of PRBCs given. Problem: Bleeding:  Goal: Will show no signs and symptoms of excessive bleeding  Will show no signs and symptoms of excessive bleeding   Outcome: Ongoing  H&H trending q6h. 1unit of PRBCs given. Comments: Care plan reviewed with patient. Patient verbalize understanding of the plan of care and contribute to goal setting.

## 2018-10-04 NOTE — PROGRESS NOTES
1043: Dr. Emily Mays (GI) notified of Hemoglobin 7.0 via phone  10:44: Endo RN notified of pt Hemoglobin 7.0, Transport at bedside to transport pt to Endo for EGD. Pt in stable condition. 1050: Dr. Emily Mays gave this nurse a telephone order to transfused one unit of blood. 1 unit of Blood to be sent to Endoscopy to be transfused there.

## 2018-10-04 NOTE — ANESTHESIA PRE PROCEDURE
21.4 10/04/2018    .7 10/01/2018    RDW 19.7 05/11/2018     10/01/2018       CMP:   Lab Results   Component Value Date     10/04/2018    K 4.1 10/04/2018    K 5.3 10/02/2018     10/04/2018    CO2 20 10/04/2018    BUN 26 10/04/2018    CREATININE 0.4 10/04/2018    LABGLOM >90 10/04/2018    GLUCOSE 141 10/04/2018    PROT 4.2 10/01/2018    CALCIUM 8.6 10/04/2018    BILITOT 0.3 10/01/2018    ALKPHOS 77 10/01/2018    AST 14 10/01/2018    ALT 39 10/01/2018       POC Tests:   Recent Labs      10/03/18   1433   POCGLU  176*       Coags:   Lab Results   Component Value Date    INR 0.92 09/28/2018    APTT 32.2 09/28/2018       HCG (If Applicable): No results found for: PREGTESTUR, PREGSERUM, HCG, HCGQUANT     ABGs: No results found for: PHART, PO2ART, ZKT6WNV, AYX8VSN, BEART, Y6PXUMON     Type & Screen (If Applicable):  Lab Results   Component Value Date    LABRH NEG 10/01/2018       Anesthesia Evaluation  Patient summary reviewed and Nursing notes reviewed no history of anesthetic complications:   Airway: Mallampati: II  TM distance: >3 FB   Neck ROM: full  Mouth opening: > = 3 FB Dental: normal exam         Pulmonary: breath sounds clear to auscultation      (-) recent URI                           Cardiovascular:    (+) angina: no interval change, CAD: no interval change,       ECG reviewed  Rhythm: regular  Rate: normal    Stress test reviewed                Neuro/Psych:   (+) psychiatric history: stable without treatment            GI/Hepatic/Renal:   (+) GERD: no interval change,          ROS comment: Hx GI bleed with stenting uyesterday of anastomosis bleed. Endo/Other:              Pt had no PAT visit       Abdominal:           Vascular:                                        Anesthesia Plan      MAC     ASA 3             Anesthetic plan and risks discussed with patient. Plan discussed with attending.     Attending anesthesiologist reviewed and agrees with Pre Eval

## 2018-10-04 NOTE — PROGRESS NOTES
[]Other:    Abdomen: [x]Soft    []Other:    Mental Status: [x]Alert & Oriented  []Other:      PLANNED PROCEDURE   []Biospy [x]Mesenteric Arteriogram              []Drainage []Mediport Insertion  []Fistulogram []IV access       []Vertebroplasty / Augmentation  []IVC filter []Dialysis catheter []Biliary drainage  []Other: []CAPD Catheter []Nephrostomy Tube / Stent  SEDATION  Planned agent:[x]Midazolam []Meperidine [x]Sublimaze []Dilaudid []Morphine     []Diazepam  []Other:     ASA Classification:  []1 [x]2 []3 []4 []5  Class 1: A normal healthy patient  Class 2: Pt with mild to moderate systemic disease  Class 3: Severe systemic disease or disturbance  Class 4: Severe systemic disorders that are already life threatening. Class 5: Moribund pt with little chances of survival, for more than 24 hours. Mallampati I Airway Classification:   []1 [x]2 []3 []4    [x]Pre-procedure diagnostic studies complete and results available. Comment:    [x]Previous sedation/anesthesia experiences assessed. Comment:    [x]The patient is an appropriate candidate to undergo the planned procedure sedation and anesthesia. (Refer to nursing sedation/analgesia documentation record)  [x]Formulation and discussion of sedation/procedure plan, risks, and expectations with patient and/or responsible adult completed. [x]Patient examined immediately prior to the procedure.  (Refer to nursing sedation/analgesia documentation record)    Annika Linda MD  Electronically signed 10/3/2018 at 10:18 PM

## 2018-10-04 NOTE — PRE SEDATION
protocol   Other RX Placeholder    sodium chloride flush  10 mL Intravenous 2 times per day    dimethyl fumarate  240 mg Oral BID    lamoTRIgine  200 mg Oral Daily    levothyroxine  125 mcg Oral Daily    vilazodone HCl  40 mg Oral Daily    lithium  600 mg Oral Nightly    lithium  300 mg Oral Daily    influenza virus vaccine  0.5 mL Intramuscular Once     Continuous Infusions:    sodium chloride      sodium chloride 50 mL/hr at 10/03/18 2283    pantoprozole (PROTONIX) infusion 8 mg/hr (10/04/18 0920)    octreotide (SANDOSTATIN) infusion 50 mcg/hr (10/04/18 0920)     PRN Meds: sodium chloride (PF), sodium chloride flush, magnesium hydroxide, ondansetron, acetaminophen, nitroGLYCERIN  Home Meds:   Prior to Admission medications    Medication Sig Start Date End Date Taking? Authorizing Provider   vilazodone HCl (VILAZODONE HCL) 40 MG TABS Take 1 tablet by mouth daily 8/27/18 11/25/18 Yes Marion Pike MD   lamoTRIgine (LAMICTAL) 200 MG tablet TAKE ONE TABLET BY MOUTH ONE TIME A DAY 8/20/18  Yes Pamela Garza PA-C   lithium 300 MG capsule TAKE 1 CAPSULE BY MOUTH EVERY MORNING AND 2 CAPSULES EVERY EVENING 8/3/18  Yes Pamela Garza PA-C   psyllium (KONSYL) 28.3 % PACK Take 1 packet by mouth daily 2/21/18  Yes Susan Small MD   vitamin B-12 1000 MCG tablet Take 1 tablet by mouth daily 2/8/18  Yes Mercy Rodríguez MD   dimethyl fumarate (TECFIDERA) 240 MG delayed release capsule Take 240 mg by mouth 2 times daily   Yes Historical Provider, MD   levothyroxine (SYNTHROID) 125 MCG tablet Take 125 mcg by mouth Daily   Yes Historical Provider, MD   nitroGLYCERIN (NITROSTAT) 0.4 MG SL tablet up to max of 3 total doses.  If no relief after 1 dose, call 911. 2/22/17  Yes Jessica Brewster MD   Prenatal MV-Min-Fe Fum-FA-DHA (PRENATAL 1 PO) Take by mouth   Yes Historical Provider, MD   ferrous sulfate 325 (65 FE) MG tablet Take 325 mg by mouth daily (with breakfast)   Yes Historical Provider, MD

## 2018-10-04 NOTE — CARE COORDINATION
Plan of care: EGD with control of bleeding today; PPI/Octreotide gtts continued.  GI following; NPO status; client plans home alone when medically cleared  10/04/18  12:41 PM

## 2018-10-04 NOTE — PROGRESS NOTES
by Dr. Cam Pinzon on 10/3/2018 7:19 PM      IR ANGIOGRAM VISCERAL GASTRIC   Final Result   Successful, uncomplicated coil embolization of the gastroduodenal artery which demonstrated active extravasation on preembolization imaging. **This report has been created using voice recognition software. It may contain minor errors which are inherent in voice recognition technology. **                        Final report electronically signed by Dr Natacha Chew on 10/2/2018 6:00 PM      NM GI BLOOD LOSS   Final Result    IMPRESSION:   1. Positive for GI bleed within the small bowel as described above. These results were communicated directly with Saud Davis RN  on 10/2/2018 1:39 PM,  [ ]      **This report has been created using voice recognition software. It may contain minor errors which are inherent in voice recognition technology. **      Final report electronically signed by Dr. Cam Pinzon on 10/2/2018 1:42 PM      CTA ABDOMEN PELVIS W WO CONTRAST   Final Result      No thoracic aortic aneurysm or dissection. No acute pulmonary embolism. No acute pneumonia. Bilateral thyroid nodules, recommend ultrasound correlation. Additional findings as detailed above. **This report has been created using voice recognition software. It may contain minor errors which are inherent in voice recognition technology. **      PROCEDURE: CTA ABD PELVIS W WO CONTRAST      CLINICAL INFORMATION: Acute abdominal pain, acute back pain, anemia, rule out aortic dissection. COMPARISON: None      TECHNIQUE: Following IV contrast administration, spiral scanning was performed from the lung bases to the ischial tuberosities with axial images acquired at 3 mm intervals and slice thickness. Sagittal and coronal MIP 3-D reconstructions were created and    reviewed. Volume rendered 3-D reconstructions of the abdominal aorta were obtained and reviewed.       All CT scans at this facility use dose modulation, iterative are degenerative changes of the lumbar spine. Aorta and iliac arteries:   No evidence of an aortic aneurysm or dissection. There is no hemodynamically significant stenosis or occlusion in the common, internal, or external iliac arteries. Mesenteric Arteries: There is no hemodynamically significant stenosis in the celiac artery, SMA, or YARELIS. Renal Arteries: There are 2 right renal arteries and a single left renal artery. There is no hemodynamically significant stenosis. IMPRESSION:       No abdominal aortic aneurysm or dissection. No acute inflammatory/infectious process in the abdomen or pelvis. No evidence of bowel obstruction. Stable 11 x 13 mm hyperdense lesion in the mid left kidney, recommend ultrasound correlation. Moderate stool in the colon. Additional findings as detailed above. **This report has been created using voice recognition software. It may contain minor errors which are inherent in voice recognition technology. **      Final report electronically signed by Dr. Kimberley Heard on 10/2/2018 2:36 AM      CTA CHEST W WO CONTRAST   Final Result      No thoracic aortic aneurysm or dissection. No acute pulmonary embolism. No acute pneumonia. Bilateral thyroid nodules, recommend ultrasound correlation. Additional findings as detailed above. **This report has been created using voice recognition software. It may contain minor errors which are inherent in voice recognition technology. **      PROCEDURE: CTA ABD PELVIS W WO CONTRAST      CLINICAL INFORMATION: Acute abdominal pain, acute back pain, anemia, rule out aortic dissection. COMPARISON: None      TECHNIQUE: Following IV contrast administration, spiral scanning was performed from the lung bases to the ischial tuberosities with axial images acquired at 3 mm intervals and slice thickness. Sagittal and coronal MIP 3-D reconstructions were created and    reviewed.  Volume rendered 3-D

## 2018-10-05 ENCOUNTER — APPOINTMENT (OUTPATIENT)
Dept: ULTRASOUND IMAGING | Age: 58
DRG: 357 | End: 2018-10-05
Payer: MEDICARE

## 2018-10-05 LAB
ABO: NORMAL
ANION GAP SERPL CALCULATED.3IONS-SCNC: 9 MEQ/L (ref 8–16)
ANTIBODY SCREEN: NORMAL
BUN BLDV-MCNC: 19 MG/DL (ref 7–22)
CALCIUM SERPL-MCNC: 8.1 MG/DL (ref 8.5–10.5)
CHLORIDE BLD-SCNC: 106 MEQ/L (ref 98–111)
CO2: 22 MEQ/L (ref 23–33)
CREAT SERPL-MCNC: 0.4 MG/DL (ref 0.4–1.2)
GFR SERPL CREATININE-BSD FRML MDRD: > 90 ML/MIN/1.73M2
GLUCOSE BLD-MCNC: 184 MG/DL (ref 70–108)
HCT VFR BLD CALC: 18.7 % (ref 37–47)
HCT VFR BLD CALC: 22.2 % (ref 37–47)
HEMOGLOBIN: 6.3 GM/DL (ref 12–16)
HEMOGLOBIN: 7.4 GM/DL (ref 12–16)
POTASSIUM SERPL-SCNC: 3.7 MEQ/L (ref 3.5–5.2)
RH FACTOR: NORMAL
SODIUM BLD-SCNC: 137 MEQ/L (ref 135–145)
TSH SERPL DL<=0.05 MIU/L-ACNC: 2.31 UIU/ML (ref 0.4–4.2)

## 2018-10-05 PROCEDURE — 6360000002 HC RX W HCPCS: Performed by: INTERNAL MEDICINE

## 2018-10-05 PROCEDURE — 85014 HEMATOCRIT: CPT

## 2018-10-05 PROCEDURE — 80048 BASIC METABOLIC PNL TOTAL CA: CPT

## 2018-10-05 PROCEDURE — 99232 SBSQ HOSP IP/OBS MODERATE 35: CPT | Performed by: SURGERY

## 2018-10-05 PROCEDURE — 2060000000 HC ICU INTERMEDIATE R&B

## 2018-10-05 PROCEDURE — 86923 COMPATIBILITY TEST ELECTRIC: CPT

## 2018-10-05 PROCEDURE — 76536 US EXAM OF HEAD AND NECK: CPT

## 2018-10-05 PROCEDURE — P9016 RBC LEUKOCYTES REDUCED: HCPCS

## 2018-10-05 PROCEDURE — 2580000003 HC RX 258: Performed by: FAMILY MEDICINE

## 2018-10-05 PROCEDURE — 6370000000 HC RX 637 (ALT 250 FOR IP): Performed by: FAMILY MEDICINE

## 2018-10-05 PROCEDURE — 86900 BLOOD TYPING SEROLOGIC ABO: CPT

## 2018-10-05 PROCEDURE — 2709999900 HC NON-CHARGEABLE SUPPLY

## 2018-10-05 PROCEDURE — 2580000003 HC RX 258: Performed by: INTERNAL MEDICINE

## 2018-10-05 PROCEDURE — 36415 COLL VENOUS BLD VENIPUNCTURE: CPT

## 2018-10-05 PROCEDURE — G0008 ADMIN INFLUENZA VIRUS VAC: HCPCS | Performed by: INTERNAL MEDICINE

## 2018-10-05 PROCEDURE — 36430 TRANSFUSION BLD/BLD COMPNT: CPT

## 2018-10-05 PROCEDURE — 85018 HEMOGLOBIN: CPT

## 2018-10-05 PROCEDURE — 99233 SBSQ HOSP IP/OBS HIGH 50: CPT | Performed by: FAMILY MEDICINE

## 2018-10-05 PROCEDURE — 84443 ASSAY THYROID STIM HORMONE: CPT

## 2018-10-05 PROCEDURE — 36592 COLLECT BLOOD FROM PICC: CPT

## 2018-10-05 PROCEDURE — C9113 INJ PANTOPRAZOLE SODIUM, VIA: HCPCS | Performed by: INTERNAL MEDICINE

## 2018-10-05 PROCEDURE — 86901 BLOOD TYPING SEROLOGIC RH(D): CPT

## 2018-10-05 PROCEDURE — 86850 RBC ANTIBODY SCREEN: CPT

## 2018-10-05 PROCEDURE — 90686 IIV4 VACC NO PRSV 0.5 ML IM: CPT | Performed by: INTERNAL MEDICINE

## 2018-10-05 RX ORDER — 0.9 % SODIUM CHLORIDE 0.9 %
250 INTRAVENOUS SOLUTION INTRAVENOUS ONCE
Status: COMPLETED | OUTPATIENT
Start: 2018-10-05 | End: 2018-10-06

## 2018-10-05 RX ADMIN — OCTREOTIDE ACETATE 50 MCG/HR: 500 INJECTION, SOLUTION INTRAVENOUS; SUBCUTANEOUS at 08:47

## 2018-10-05 RX ADMIN — INFLUENZA A VIRUS A/MICHIGAN/45/2015 X-275 (H1N1) ANTIGEN (FORMALDEHYDE INACTIVATED), INFLUENZA A VIRUS A/SINGAPORE/INFIMH-16-0019/2016 IVR-186 (H3N2) ANTIGEN (FORMALDEHYDE INACTIVATED), INFLUENZA B VIRUS B/PHUKET/3073/2013 ANTIGEN (FORMALDEHYDE INACTIVATED), AND INFLUENZA B VIRUS B/MARYLAND/15/2016 BX-69A ANTIGEN (FORMALDEHYDE INACTIVATED) 0.5 ML: 15; 15; 15; 15 INJECTION, SUSPENSION INTRAMUSCULAR at 08:52

## 2018-10-05 RX ADMIN — LAMOTRIGINE 200 MG: 100 TABLET ORAL at 08:52

## 2018-10-05 RX ADMIN — SODIUM CHLORIDE 8 MG/HR: 9 INJECTION, SOLUTION INTRAVENOUS at 21:33

## 2018-10-05 RX ADMIN — OCTREOTIDE ACETATE 50 MCG/HR: 500 INJECTION, SOLUTION INTRAVENOUS; SUBCUTANEOUS at 19:04

## 2018-10-05 RX ADMIN — LITHIUM CARBONATE 600 MG: 300 CAPSULE ORAL at 20:13

## 2018-10-05 RX ADMIN — VILAZODONE HYDROCHLORIDE 40 MG: 40 TABLET ORAL at 14:28

## 2018-10-05 RX ADMIN — Medication 10 ML: at 08:53

## 2018-10-05 RX ADMIN — LITHIUM CARBONATE 300 MG: 300 CAPSULE ORAL at 08:52

## 2018-10-05 RX ADMIN — SODIUM CHLORIDE 8 MG/HR: 9 INJECTION, SOLUTION INTRAVENOUS at 08:47

## 2018-10-05 RX ADMIN — SODIUM CHLORIDE 250 ML: 9 INJECTION, SOLUTION INTRAVENOUS at 14:20

## 2018-10-05 ASSESSMENT — ENCOUNTER SYMPTOMS
SHORTNESS OF BREATH: 0
NAUSEA: 0
CHEST TIGHTNESS: 0
APNEA: 0
ABDOMINAL PAIN: 0
VOMITING: 0
ABDOMINAL DISTENTION: 0
BACK PAIN: 0
TROUBLE SWALLOWING: 0

## 2018-10-05 ASSESSMENT — PAIN SCALES - GENERAL
PAINLEVEL_OUTOF10: 0
PAINLEVEL_OUTOF10: 0

## 2018-10-05 NOTE — PROGRESS NOTES
nitroGLYCERIN      Intake/Output Summary (Last 24 hours) at 10/05/18 1105  Last data filed at 10/05/18 0649   Gross per 24 hour   Intake          3988.48 ml   Output             2500 ml   Net          1488.48 ml       Diet:  DIET GENERAL;    Exam:  BP (!) 121/55   Pulse 59   Temp 98.3 °F (36.8 °C) (Oral)   Resp 18   Ht 4' 10\" (1.473 m)   Wt 115 lb 14.4 oz (52.6 kg)   SpO2 100%   BMI 24.22 kg/m²     General appearance: No apparent distress, appears older than stated age and cooperative. HEENT: Pupils equal, round, and reactive to light. Conjunctivae/corneas clear. Neck: Supple, with full range of motion. No jugular venous distention. Trachea midline. Respiratory:  Normal respiratory effort. Clear to auscultation, bilaterally without Rales/Wheezes/Rhonchi. No respiratory distress or accessory muscle use. Cardiovascular: Regular rate and rhythm with normal S1/S2 without murmurs, rubs or gallops. Abdomen: Soft, minimal TTP upper abd, non-distended with normal bowel sounds. No rebound or guarding  Musculoskeletal: No clubbing, cyanosis or edema bilaterally. Full range of motion . Right arm distal to elbow missing. No calf tenderness palpation  Skin: Skin color, texture, turgor normal.  No rashes or lesions. Neurologic:  Neurovascularly intact without any focal sensory/motor deficits.  Cranial nerves: II-XII intact, grossly non-focal.  Psychiatric: Alert and oriented, thought content appropriate, normal insight  Capillary Refill: Brisk,< 3 seconds   Peripheral Pulses: +2 palpable, equal bilaterally       Labs:   Recent Labs      10/04/18   1504  10/04/18   2310  10/05/18   0945   HGB  7.2*  7.3*  6.3*   HCT  21.9*  21.7*  18.7*     Recent Labs      10/03/18   0920  10/04/18   0932  10/05/18   0945   NA  142  143  137   K  4.0  4.1  3.7   CL  113*  115*  106   CO2  20*  20*  22*   BUN  30*  26*  19   CREATININE  0.3*  0.4  0.4   CALCIUM  8.8  8.6  8.1*     No results for input(s): AST, ALT, BILIDIR,

## 2018-10-05 NOTE — PROGRESS NOTES
(LAMICTAL) tablet 200 mg  200 mg Oral Daily Hoda Udeagbala, DO   200 mg at 10/05/18 9848    levothyroxine (SYNTHROID) tablet 125 mcg  125 mcg Oral Daily Hoda Udeagbala, DO        nitroGLYCERIN (NITROSTAT) SL tablet 0.4 mg  0.4 mg Sublingual Q5 Min PRN Ohda Udeagbala, DO        vilazodone HCl (VIIBRYD) TABS 40 mg  40 mg Oral Daily Hdoa Udeagbala, DO   40 mg at 10/04/18 1440    lithium capsule 600 mg  600 mg Oral Nightly Hoda Udeagbala, DO        lithium capsule 300 mg  300 mg Oral Daily Hoda Udeagbala, DO   300 mg at 10/05/18 2708       Problem list:  Principal Problem:    Gastroduodenal artery bleed  Active Problems:    Nausea    Anemia, macrocytic    Acute blood loss anemia    GI bleed    Hypocalcemia    Fatigue    Dizziness  Resolved Problems:    * No resolved hospital problems. *        ASSESSMENT AND PLAN:  Zaira Bojorquez is a 62 y.o. female patient who presents with black stools and fatigue. 1. Upper GI Bleed  No acute bleeding, most likely due to Small Bowel bleed. EGD shows normal esophagus, small gastric remnant, and no blood. The anastomotic site appeared normal.  Continue PPI  Blood work pending, 0400 blood draw clotted, so redrawn at 1000.    2. Thyroid Nodule  TSH/T4 pending  Endocrine on board for /S      GI will continue to follow    Thank you for allowing us to participate in the care of this patient. Feel free to contact [facility] or myself with any questions or concerns.         Tita Long  Gastroenterology - [facility]

## 2018-10-05 NOTE — PROGRESS NOTES
pressure is 109/55 (abnormal) and her pulse is 60. Her respiration is 16 and oxygen saturation is 97%. INTAKE/OUTPUT:    Intake/Output Summary (Last 24 hours) at 10/05/18 0826  Last data filed at 10/05/18 0649   Gross per 24 hour   Intake          3988.48 ml   Output             2500 ml   Net          1488.48 ml     Physical Exam   Constitutional: She is oriented to person, place, and time. She appears well-developed. No distress. HENT:   Head: Normocephalic and atraumatic. Mouth/Throat: Oropharynx is clear and moist.   Eyes: Pupils are equal, round, and reactive to light. EOM are normal. No scleral icterus. Neck: Normal range of motion. Neck supple. No JVD present. No tracheal deviation present. Thyromegaly present. Firm mass entire lobe right thyroid compared to the left   Cardiovascular: Normal rate and normal heart sounds. Pulmonary/Chest: Breath sounds normal. No respiratory distress. She has no wheezes. Abdominal: Soft. Bowel sounds are normal. She exhibits no distension. There is no tenderness. There is no rebound and no guarding. Musculoskeletal: Normal range of motion. She exhibits no edema. Congenital absence right forearm   Lymphadenopathy:     She has no cervical adenopathy. Neurological: She is alert and oriented to person, place, and time. No cranial nerve deficit. Skin: Skin is warm and dry. No rash noted. She is not diaphoretic. Psychiatric: She has a normal mood and affect. Vitals reviewed. I/O last 3 completed shifts: In: 3988.5 [P.O.:1420; I.V.:1854.3; Blood:714.2]  Out: 2500 [Urine:2500]  No intake/output data recorded. LABS  Recent Labs      10/04/18   0932   10/04/18   2310   HGB  7.0*   < >  7.3*   HCT  21.4*   < >  21.7*   NA  143   --    --    K  4.1   --    --    CL  115*   --    --    CO2  20*   --    --    BUN  26*   --    --    CREATININE  0.4   --    --    CALCIUM  8.6   --    --     < > = values in this interval not displayed.          Jaron Wheeler

## 2018-10-05 NOTE — PLAN OF CARE
Problem: Nutrition  Goal: Optimal nutrition therapy  Outcome: Ongoing  Nutrition Problem: Inadequate oral intake  Intervention: Food and/or Nutrient Delivery: Continue current diet  Nutritional Goals: Pt. will receive adequate nutrition (FL diet or greater) in next 1-4 days.

## 2018-10-06 PROBLEM — R53.1 GENERALIZED WEAKNESS: Status: ACTIVE | Noted: 2018-10-01

## 2018-10-06 LAB
ABSOLUTE RETIC #: 137 THOU/MM3 (ref 20–115)
CALCIUM IONIZED: 1.26 MMOL/L (ref 1.12–1.32)
FERRITIN: 191 NG/ML (ref 10–291)
HCT VFR BLD CALC: 20.6 % (ref 37–47)
HCT VFR BLD CALC: 20.8 % (ref 37–47)
HCT VFR BLD CALC: 21.9 % (ref 37–47)
HEMOGLOBIN: 6.9 GM/DL (ref 12–16)
HEMOGLOBIN: 6.9 GM/DL (ref 12–16)
HEMOGLOBIN: 7.3 GM/DL (ref 12–16)
IMMATURE RETIC FRACT: 47.9 % (ref 3–15.9)
IRON SATURATION: 31 % (ref 20–50)
IRON: 54 UG/DL (ref 50–170)
MAGNESIUM: 1.8 MG/DL (ref 1.6–2.4)
POTASSIUM SERPL-SCNC: 3.9 MEQ/L (ref 3.5–5.2)
RETIC HEMOGLOBIN: 31.6 PG (ref 28.2–35.7)
RETICULOCYTE ABSOLUTE COUNT: 6.3 % (ref 0.5–2)
THYROXINE (T4): 2.4 UG/DL (ref 4.5–12)
TOTAL IRON BINDING CAPACITY: 176 UG/DL (ref 171–450)
TROPONIN T: < 0.01 NG/ML
VITAMIN B-12: 791 PG/ML (ref 211–911)

## 2018-10-06 PROCEDURE — 82607 VITAMIN B-12: CPT

## 2018-10-06 PROCEDURE — 83540 ASSAY OF IRON: CPT

## 2018-10-06 PROCEDURE — P9016 RBC LEUKOCYTES REDUCED: HCPCS

## 2018-10-06 PROCEDURE — 99233 SBSQ HOSP IP/OBS HIGH 50: CPT | Performed by: FAMILY MEDICINE

## 2018-10-06 PROCEDURE — 85046 RETICYTE/HGB CONCENTRATE: CPT

## 2018-10-06 PROCEDURE — 99232 SBSQ HOSP IP/OBS MODERATE 35: CPT | Performed by: SURGERY

## 2018-10-06 PROCEDURE — 2060000000 HC ICU INTERMEDIATE R&B

## 2018-10-06 PROCEDURE — 83550 IRON BINDING TEST: CPT

## 2018-10-06 PROCEDURE — 84436 ASSAY OF TOTAL THYROXINE: CPT

## 2018-10-06 PROCEDURE — 6360000002 HC RX W HCPCS: Performed by: INTERNAL MEDICINE

## 2018-10-06 PROCEDURE — 36592 COLLECT BLOOD FROM PICC: CPT

## 2018-10-06 PROCEDURE — 84132 ASSAY OF SERUM POTASSIUM: CPT

## 2018-10-06 PROCEDURE — 82728 ASSAY OF FERRITIN: CPT

## 2018-10-06 PROCEDURE — 85018 HEMOGLOBIN: CPT

## 2018-10-06 PROCEDURE — 2709999900 HC NON-CHARGEABLE SUPPLY

## 2018-10-06 PROCEDURE — 2580000003 HC RX 258: Performed by: HOSPITALIST

## 2018-10-06 PROCEDURE — 6360000002 HC RX W HCPCS: Performed by: FAMILY MEDICINE

## 2018-10-06 PROCEDURE — 36415 COLL VENOUS BLD VENIPUNCTURE: CPT

## 2018-10-06 PROCEDURE — 93005 ELECTROCARDIOGRAM TRACING: CPT | Performed by: FAMILY MEDICINE

## 2018-10-06 PROCEDURE — 2580000003 HC RX 258: Performed by: INTERNAL MEDICINE

## 2018-10-06 PROCEDURE — 84484 ASSAY OF TROPONIN QUANT: CPT

## 2018-10-06 PROCEDURE — 6370000000 HC RX 637 (ALT 250 FOR IP): Performed by: FAMILY MEDICINE

## 2018-10-06 PROCEDURE — C9113 INJ PANTOPRAZOLE SODIUM, VIA: HCPCS | Performed by: INTERNAL MEDICINE

## 2018-10-06 PROCEDURE — 2580000003 HC RX 258: Performed by: FAMILY MEDICINE

## 2018-10-06 PROCEDURE — 36430 TRANSFUSION BLD/BLD COMPNT: CPT

## 2018-10-06 PROCEDURE — 82330 ASSAY OF CALCIUM: CPT

## 2018-10-06 PROCEDURE — 85014 HEMATOCRIT: CPT

## 2018-10-06 PROCEDURE — 83735 ASSAY OF MAGNESIUM: CPT

## 2018-10-06 PROCEDURE — 82747 ASSAY OF FOLIC ACID RBC: CPT

## 2018-10-06 RX ORDER — 0.9 % SODIUM CHLORIDE 0.9 %
250 INTRAVENOUS SOLUTION INTRAVENOUS ONCE
Status: COMPLETED | OUTPATIENT
Start: 2018-10-06 | End: 2018-10-07

## 2018-10-06 RX ORDER — FUROSEMIDE 10 MG/ML
20 INJECTION INTRAMUSCULAR; INTRAVENOUS ONCE
Status: COMPLETED | OUTPATIENT
Start: 2018-10-06 | End: 2018-10-06

## 2018-10-06 RX ORDER — 0.9 % SODIUM CHLORIDE 0.9 %
250 INTRAVENOUS SOLUTION INTRAVENOUS ONCE
Status: COMPLETED | OUTPATIENT
Start: 2018-10-06 | End: 2018-10-06

## 2018-10-06 RX ADMIN — SODIUM CHLORIDE 250 ML: 9 INJECTION, SOLUTION INTRAVENOUS at 16:20

## 2018-10-06 RX ADMIN — LITHIUM CARBONATE 300 MG: 300 CAPSULE ORAL at 08:33

## 2018-10-06 RX ADMIN — LAMOTRIGINE 200 MG: 100 TABLET ORAL at 08:33

## 2018-10-06 RX ADMIN — SODIUM CHLORIDE 8 MG/HR: 9 INJECTION, SOLUTION INTRAVENOUS at 08:34

## 2018-10-06 RX ADMIN — SODIUM CHLORIDE 8 MG/HR: 9 INJECTION, SOLUTION INTRAVENOUS at 22:08

## 2018-10-06 RX ADMIN — FUROSEMIDE 20 MG: 10 INJECTION, SOLUTION INTRAMUSCULAR; INTRAVENOUS at 21:39

## 2018-10-06 RX ADMIN — LEVOTHYROXINE SODIUM 125 MCG: 125 TABLET ORAL at 06:16

## 2018-10-06 RX ADMIN — SODIUM CHLORIDE 250 ML: 9 INJECTION, SOLUTION INTRAVENOUS at 03:15

## 2018-10-06 RX ADMIN — OCTREOTIDE ACETATE 50 MCG/HR: 500 INJECTION, SOLUTION INTRAVENOUS; SUBCUTANEOUS at 10:13

## 2018-10-06 RX ADMIN — LITHIUM CARBONATE 600 MG: 300 CAPSULE ORAL at 21:39

## 2018-10-06 RX ADMIN — VILAZODONE HYDROCHLORIDE 40 MG: 40 TABLET ORAL at 13:40

## 2018-10-06 ASSESSMENT — ENCOUNTER SYMPTOMS
BACK PAIN: 0
SHORTNESS OF BREATH: 0
NAUSEA: 0
ABDOMINAL DISTENTION: 0
VOMITING: 0
CHEST TIGHTNESS: 0
ABDOMINAL PAIN: 0
TROUBLE SWALLOWING: 0
APNEA: 0

## 2018-10-06 ASSESSMENT — PAIN SCALES - GENERAL
PAINLEVEL_OUTOF10: 0
PAINLEVEL_OUTOF10: 0

## 2018-10-06 NOTE — PROGRESS NOTES
Medications    sodium chloride  250 mL Intravenous Once    lidocaine 1 % injection  5 mL Intradermal Once    calcium replacement protocol   Other RX Placeholder    sodium chloride flush  10 mL Intravenous 2 times per day    dimethyl fumarate  240 mg Oral BID    lamoTRIgine  200 mg Oral Daily    levothyroxine  125 mcg Oral Daily    vilazodone HCl  40 mg Oral Daily    lithium  600 mg Oral Nightly    lithium  300 mg Oral Daily     PRN Meds: sodium chloride flush, magnesium hydroxide, ondansetron, acetaminophen, nitroGLYCERIN      Intake/Output Summary (Last 24 hours) at 10/06/18 0729  Last data filed at 10/06/18 0550   Gross per 24 hour   Intake          4652.21 ml   Output             3600 ml   Net          1052.21 ml       Diet:  DIET GENERAL;    Exam:  /60   Pulse 54   Temp 98.5 °F (36.9 °C) (Oral)   Resp 18 Comment: lung sounds clear  Ht 4' 10\" (1.473 m)   Wt 118 lb 6.4 oz (53.7 kg)   SpO2 98%   BMI 24.75 kg/m²     General appearance: No apparent distress, appears stated age and cooperative. HEENT: Pupils equal, round, and reactive to light. Conjunctivae/corneas clear. Neck: Supple, with full range of motion. No jugular venous distention. Trachea midline. Respiratory:  Normal respiratory effort. Clear to auscultation, bilaterally without Rales/Wheezes/Rhonchi. No respiratory distress or accessory muscle use. Cardiovascular: Regular rate and rhythm with normal S1/S2 without murmurs, rubs or gallops. Abdomen: Soft, non-tender, non-distended with normal bowel sounds. No rebound or guarding  Musculoskeletal: No clubbing, cyanosis or edema bilaterally. Full range of motion. 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 No calf tenderness palpation  Skin: Skin color, texture, turgor normal.  No rashes or lesions. Neurologic:  Neurovascularly intact without any focal sensory/motor deficits.  Cranial nerves: II-XII intact, grossly non-focal.  Psychiatric: Alert and oriented, thought content appropriate, normal insight  Capillary Refill: Brisk,< 3 seconds   Peripheral Pulses: +2 palpable, equal bilaterally       Labs:   Recent Labs      10/05/18   1150  10/05/18   1757  10/06/18   0640   HGB  6.9*  7.4*  7.3*   HCT  20.8*  22.2*  21.9*     Recent Labs      10/03/18   0920  10/04/18   0932  10/05/18   0945  10/06/18   0648   NA  142  143  137   --    K  4.0  4.1  3.7  3.9   CL  113*  115*  106   --    CO2  20*  20*  22*   --    BUN  30*  26*  19   --    CREATININE  0.3*  0.4  0.4   --    CALCIUM  8.8  8.6  8.1*   --      No results for input(s): AST, ALT, BILIDIR, BILITOT, ALKPHOS in the last 72 hours. No results for input(s): INR in the last 72 hours. No results for input(s): Adonay Conrado in the last 72 hours. Urinalysis:    Lab Results   Component Value Date    NITRU NEGATIVE 09/28/2018    WBCUA 0-2 09/28/2018    BACTERIA NONE 09/28/2018    RBCUA 0-2 09/28/2018    BLOODU NEGATIVE 09/28/2018    SPECGRAV >1.030 05/13/2016    GLUCOSEU NEGATIVE 09/28/2018       Radiology:  US HEAD NECK SOFT TISSUE THYROID   Final Result   Multiple nodules bilaterally. Based on the American Thyroid Association criteria, they all have low suspicion for malignancy. **This report has been created using voice recognition software. It may contain minor errors which are inherent in voice recognition technology. **      Final report electronically signed by Dr. New Knott on 10/5/2018 3:30 PM      IR ABDOMINAL AORTOBIFEMORAL CATHETER SERIALOGRAM   Final Result   Successful, uncomplicated repeat coil embolization of the gastroduodenal artery. Final report electronically signed by Dr Loreto Sloan on 10/4/2018 12:53 PM      NM GI BLOOD LOSS   Final Result   1. Findings suspicious for active GI bleed within the proximal small bowel as described above. **This report has been created using voice recognition software.  It may contain minor errors which are inherent in voice recognition technology. **      Final report electronically signed by Dr. Marcell Parra on 10/3/2018 7:19 PM      IR ANGIOGRAM VISCERAL GASTRIC   Final Result   Successful, uncomplicated coil embolization of the gastroduodenal artery which demonstrated active extravasation on preembolization imaging. **This report has been created using voice recognition software. It may contain minor errors which are inherent in voice recognition technology. **                        Final report electronically signed by Dr Airam Scott on 10/2/2018 6:00 PM      NM GI BLOOD LOSS   Final Result    IMPRESSION:   1. Positive for GI bleed within the small bowel as described above. These results were communicated directly with Julissa Cerda RN  on 10/2/2018 1:39 PM,  [ ]      **This report has been created using voice recognition software. It may contain minor errors which are inherent in voice recognition technology. **      Final report electronically signed by Dr. Marcell Parra on 10/2/2018 1:42 PM      CTA ABDOMEN PELVIS W WO CONTRAST   Final Result      No thoracic aortic aneurysm or dissection. No acute pulmonary embolism. No acute pneumonia. Bilateral thyroid nodules, recommend ultrasound correlation. Additional findings as detailed above. **This report has been created using voice recognition software. It may contain minor errors which are inherent in voice recognition technology. **      PROCEDURE: CTA ABD PELVIS W WO CONTRAST      CLINICAL INFORMATION: Acute abdominal pain, acute back pain, anemia, rule out aortic dissection. COMPARISON: None      TECHNIQUE: Following IV contrast administration, spiral scanning was performed from the lung bases to the ischial tuberosities with axial images acquired at 3 mm intervals and slice thickness. Sagittal and coronal MIP 3-D reconstructions were created and    reviewed. Volume rendered 3-D reconstructions of the abdominal aorta were obtained and reviewed. All CT scans at this facility use dose modulation, iterative reconstruction, and/or weight-based dosing when appropriate to reduce radiation dose to as low as reasonably achievable. FINDINGS:      Lung bases, cardiomediastinum, and chest wall: See the accompanying chest CTA report      Liver: There is a 12 x 10 mm ovoid hypodense lesion in the lateral subcapsular right lobe of the liver, unchanged compared to prior CT, probably a cyst. Liver is otherwise unremarkable. No intrahepatic biliary dilatation. Gallbladder, bile ducts: Patient is status post cholecystectomy with clips in the gallbladder fossa. There is mild extrahepatic biliary dilatation, within normal limits postcholecystectomy. Spleen: Unremarkable      Pancreas: Unremarkable. No mass or ductal dilatation. Adrenal glands: Unremarkable      Kidneys and ureters: There is no change in the 11 x 13 mm ovoid hyperdense lesion in the anterior mid left kidney, probably a hemorrhagic or proteinaceous debris-containing cyst although cannot exclude a solid mass. Recommend correlation with renal    ultrasound. . No hydroureteronephrosis. No renal or ureteral calculi. Bladder: Unremarkable. No wall thickening, obvious mass, or calculi. Reproductive: The uterus is absent consistent with hysterectomy. The adnexal regions are unremarkable. Stomach, small bowel, colon:   The stomach and duodenum are grossly normal.   There is moderate stool in the colon. Small bowel and colon are otherwise grossly normal.    No bowel wall thickening or evidence of bowel obstruction. Appendix: The appendix is absent and there are staples adjacent to the wall of the cecum consistent with prior appendectomy. Omentum and mesentery: Unremarkable. Peritoneum:  There is no ascites, abscess, adenopathy, or mass. No pneumoperitoneum. Abdominal and pelvic body wall soft tissues: Unremarkable.       Musculoskeletal:  There is a or mass. No pneumoperitoneum. Abdominal and pelvic body wall soft tissues: Unremarkable. Musculoskeletal:  There is a subcentimeter sclerotic probable bone island in the left femoral head. There are degenerative changes of the lumbar spine. Aorta and iliac arteries:   No evidence of an aortic aneurysm or dissection. There is no hemodynamically significant stenosis or occlusion in the common, internal, or external iliac arteries. Mesenteric Arteries: There is no hemodynamically significant stenosis in the celiac artery, SMA, or YARELIS. Renal Arteries: There are 2 right renal arteries and a single left renal artery. There is no hemodynamically significant stenosis. IMPRESSION:       No abdominal aortic aneurysm or dissection. No acute inflammatory/infectious process in the abdomen or pelvis. No evidence of bowel obstruction. Stable 11 x 13 mm hyperdense lesion in the mid left kidney, recommend ultrasound correlation. Moderate stool in the colon. Additional findings as detailed above. **This report has been created using voice recognition software. It may contain minor errors which are inherent in voice recognition technology. **      Final report electronically signed by Dr. Rocio Lyons on 10/2/2018 2:36 AM          Diet: DIET GENERAL;    [place]: no    Microbiology:  none    Tele:  SR, SB 50's    DVT prophylaxis: [] Lovenox                                 [x] SCDs                                 [] SQ Heparin                                 [] Encourage ambulation           [] Already on Anticoagulation     Disposition:    [x] Home       [] TCU       [] Rehab       [] Psych       [] SNF       [] [place]       [] Other-    Code Status: Full Code    PT/OT Eval Status: consulted 10/6        Electronically signed by Gasper Jarrett MD on 10/6/2018 at 7:29 AM

## 2018-10-06 NOTE — PROGRESS NOTES
--    --    CL  106   --    --    CO2  22*   --    --    BUN  19   --    --    CREATININE  0.4   --    --    CALCIUM  8.1*   --    --     < > = values in this interval not displayed. TSH normal, T4 pending        Narrative   PROCEDURE: US HEAD NECK SOFT TISSUE THYROID       CLINICAL INFORMATION: THYROID DISEASE,        COMPARISON: Chest CT 2/17/2018       TECHNIQUE: Grayscale and color sonographic imaging of the thyroid gland performed in longitudinal and transverse planes.       FINDINGS:        Right Thyroid - 4.9 x 2.2 x 2.5 cm   Left Thyroid -3.9 x 1.7 x 1.8 cm   Isthmus -0.19 cm       THYROID SIZE: Normal.       NODULES:        R1: Mid gland. Fairly well-circumscribed. Mildly heterogeneous. Majority of the lesion is isoechoic. 2.9 cm x 2.4 cm x 2.0 cm. Based on the American Thyroid Association criteria, this has a low suspicion for malignancy.       R2: Inferior well-circumscribed. Mildly hyperechoic. Mild associated shadowing. 5 mm x 7 mm x 4 mm Based on the American Thyroid Association criteria, this has a low suspicion for malignancy.       L1: Superior. Fairly well-circumscribed. Hyperechoic. 1.5 cm x 1.4 cm x 1.1 cm. Based on the American Thyroid Association criteria, this has a low suspicion for malignancy.       L2: Mid gland. Well circumscribed. Heterogeneous and isoechoic. 2.0 cm x 1.4 cm x 1.4 cm. Based on the American Thyroid Association criteria, this has a low suspicion for malignancy.       Within the isthmus toward the left there is a well-circumscribed cystic focus with central echogenic area measuring 4 mm x 3 mm x 4 mm. Based on the American Thyroid Association criteria, this has a low suspicion for malignancy.       PARENCHYMAL ECHOGENICITY/VASCULARITY: Normal.       MICROLITHIASIS: None.       CERVICAL LYMPHADENOPATHY:    There are no pathologically enlarged lymph nodes adjacent to the thyroid gland.               Impression   Multiple nodules bilaterally.  Based on the American Thyroid Association criteria, they all have low suspicion for malignancy.               **This report has been created using voice recognition software.  It may contain minor errors which are inherent in voice recognition technology. **       Final report electronically signed by Dr. Demetrius Guy on 10/5/2018 3:30 PM        Ricardo Rangel MD  Electronically signed 10/6/2018 at 6:58 AM

## 2018-10-06 NOTE — PLAN OF CARE
Problem: Falls - Risk of:  Goal: Will remain free from falls  Will remain free from falls   Outcome: Ongoing  No falls noted this shift. Continue falling star program. Bed alarm on, bed in low position. Call light and personal belongings in reach. Patient uses call light appropriately. Problem: Nutrition  Goal: Optimal nutrition therapy  Outcome: Ongoing  Pt tolerating general diet this shift. Will monitor. Problem: Discharge Planning:  Goal: Discharged to appropriate level of care  Discharged to appropriate level of care   Outcome: Ongoing  Pt plans to return home at discharge. Will continue to assess discharge needs. Problem: Bowel Function - Altered:  Goal: Bowel elimination is within specified parameters  Bowel elimination is within specified parameters   Outcome: Ongoing  Pt has not had bowel movement this shift. Bowel sounds active. Will monitor. Problem: Fluid Volume - Imbalance:  Goal: Will show no signs and symptoms of excessive bleeding  Will show no signs and symptoms of excessive bleeding    Outcome: Ongoing  No signs of excessive bleeding this shift. Will monitor. Problem: Pain:  Goal: Pain level will decrease  Pain level will decrease   Outcome: Ongoing  Pt not complaining of pain this shift. Prn pain medication available. Will monitor. Problem: Bleeding:  Goal: Will show no signs and symptoms of excessive bleeding  Will show no signs and symptoms of excessive bleeding    Outcome: Ongoing  No signs of excessive bleeding this shift. Will monitor. Comments: Care plan reviewed with patient. Patient verbalizes understanding of plan of care and contributes to goal setting.

## 2018-10-07 LAB
ANION GAP SERPL CALCULATED.3IONS-SCNC: 9 MEQ/L (ref 8–16)
BUN BLDV-MCNC: 23 MG/DL (ref 7–22)
CALCIUM SERPL-MCNC: 8.1 MG/DL (ref 8.5–10.5)
CHLORIDE BLD-SCNC: 106 MEQ/L (ref 98–111)
CO2: 23 MEQ/L (ref 23–33)
CREAT SERPL-MCNC: 0.4 MG/DL (ref 0.4–1.2)
EKG ATRIAL RATE: 55 BPM
EKG P AXIS: 52 DEGREES
EKG P-R INTERVAL: 150 MS
EKG Q-T INTERVAL: 464 MS
EKG QRS DURATION: 138 MS
EKG QTC CALCULATION (BAZETT): 443 MS
EKG R AXIS: 40 DEGREES
EKG T AXIS: 37 DEGREES
EKG VENTRICULAR RATE: 55 BPM
ERYTHROCYTE [DISTWIDTH] IN BLOOD BY AUTOMATED COUNT: 17.2 % (ref 11.5–14.5)
ERYTHROCYTE [DISTWIDTH] IN BLOOD BY AUTOMATED COUNT: 47 FL (ref 35–45)
GFR SERPL CREATININE-BSD FRML MDRD: > 90 ML/MIN/1.73M2
GLUCOSE BLD-MCNC: 119 MG/DL (ref 70–108)
HCT VFR BLD CALC: 24.9 % (ref 37–47)
HEMOGLOBIN: 8.9 GM/DL (ref 12–16)
MCH RBC QN AUTO: 32.5 PG (ref 26–33)
MCHC RBC AUTO-ENTMCNC: 35.7 GM/DL (ref 32.2–35.5)
MCV RBC AUTO: 90.9 FL (ref 81–99)
PHOSPHORUS: 3 MG/DL (ref 2.4–4.7)
PLATELET # BLD: 101 THOU/MM3 (ref 130–400)
PMV BLD AUTO: 11.4 FL (ref 9.4–12.4)
POTASSIUM SERPL-SCNC: 4.1 MEQ/L (ref 3.5–5.2)
RBC # BLD: 2.74 MILL/MM3 (ref 4.2–5.4)
SODIUM BLD-SCNC: 138 MEQ/L (ref 135–145)
WBC # BLD: 8.2 THOU/MM3 (ref 4.8–10.8)

## 2018-10-07 PROCEDURE — 80048 BASIC METABOLIC PNL TOTAL CA: CPT

## 2018-10-07 PROCEDURE — C9113 INJ PANTOPRAZOLE SODIUM, VIA: HCPCS | Performed by: INTERNAL MEDICINE

## 2018-10-07 PROCEDURE — 99232 SBSQ HOSP IP/OBS MODERATE 35: CPT | Performed by: SURGERY

## 2018-10-07 PROCEDURE — 2709999900 HC NON-CHARGEABLE SUPPLY

## 2018-10-07 PROCEDURE — 36415 COLL VENOUS BLD VENIPUNCTURE: CPT

## 2018-10-07 PROCEDURE — 93010 ELECTROCARDIOGRAM REPORT: CPT | Performed by: INTERNAL MEDICINE

## 2018-10-07 PROCEDURE — 2580000003 HC RX 258: Performed by: INTERNAL MEDICINE

## 2018-10-07 PROCEDURE — 99232 SBSQ HOSP IP/OBS MODERATE 35: CPT | Performed by: FAMILY MEDICINE

## 2018-10-07 PROCEDURE — 6370000000 HC RX 637 (ALT 250 FOR IP): Performed by: FAMILY MEDICINE

## 2018-10-07 PROCEDURE — 85027 COMPLETE CBC AUTOMATED: CPT

## 2018-10-07 PROCEDURE — 2060000000 HC ICU INTERMEDIATE R&B

## 2018-10-07 PROCEDURE — 6360000002 HC RX W HCPCS: Performed by: INTERNAL MEDICINE

## 2018-10-07 PROCEDURE — 84100 ASSAY OF PHOSPHORUS: CPT

## 2018-10-07 RX ADMIN — VILAZODONE HYDROCHLORIDE 40 MG: 40 TABLET ORAL at 13:03

## 2018-10-07 RX ADMIN — OCTREOTIDE ACETATE 50 MCG/HR: 500 INJECTION, SOLUTION INTRAVENOUS; SUBCUTANEOUS at 07:00

## 2018-10-07 RX ADMIN — LITHIUM CARBONATE 300 MG: 300 CAPSULE ORAL at 08:18

## 2018-10-07 RX ADMIN — LITHIUM CARBONATE 600 MG: 300 CAPSULE ORAL at 19:48

## 2018-10-07 RX ADMIN — SODIUM CHLORIDE 8 MG/HR: 9 INJECTION, SOLUTION INTRAVENOUS at 11:00

## 2018-10-07 RX ADMIN — LEVOTHYROXINE SODIUM 125 MCG: 125 TABLET ORAL at 05:33

## 2018-10-07 RX ADMIN — LAMOTRIGINE 200 MG: 100 TABLET ORAL at 08:18

## 2018-10-07 RX ADMIN — SODIUM CHLORIDE 8 MG/HR: 9 INJECTION, SOLUTION INTRAVENOUS at 21:27

## 2018-10-07 RX ADMIN — OCTREOTIDE ACETATE 50 MCG/HR: 500 INJECTION, SOLUTION INTRAVENOUS; SUBCUTANEOUS at 19:36

## 2018-10-07 ASSESSMENT — ENCOUNTER SYMPTOMS
TROUBLE SWALLOWING: 0
CHEST TIGHTNESS: 0
ABDOMINAL PAIN: 0
VOMITING: 0
SHORTNESS OF BREATH: 0
NAUSEA: 0
ABDOMINAL DISTENTION: 0
APNEA: 0
COLOR CHANGE: 0
BACK PAIN: 0

## 2018-10-07 ASSESSMENT — PAIN SCALES - GENERAL
PAINLEVEL_OUTOF10: 0
PAINLEVEL_OUTOF10: 0

## 2018-10-07 NOTE — FLOWSHEET NOTE
10/07/18 1718   Encounter Summary   Services provided to: Patient   Referral/Consult From: [facility]   Continue Visiting Yes  (10/07/18)   Complexity of Encounter Moderate   Length of Encounter 15 minutes   Spiritual/Restoration   Type Spiritual support   Assessment Calm; Approachable; Hopeful   Intervention Active listening;Nurtured hope;Prayer   Outcome Engaged in conversation;Expressed gratitude   10/07/18  S. Patient was sitting up in bed resting. Room was bright. No TV. O. 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 Stated that her condition was improving very slowly. Almaz Kamara as though she will be here for a few days. Prayed with her. P. Recommend spiritual care follow up to encourage the patient.

## 2018-10-08 LAB
ABO: NORMAL
ANTIBODY SCREEN: NORMAL
ERYTHROCYTE [DISTWIDTH] IN BLOOD BY AUTOMATED COUNT: 18.8 % (ref 11.5–14.5)
ERYTHROCYTE [DISTWIDTH] IN BLOOD BY AUTOMATED COUNT: 56.4 FL (ref 35–45)
HCT VFR BLD CALC: 20.9 % (ref 37–47)
HCT VFR BLD CALC: 21.1 % (ref 37–47)
HEMOGLOBIN: 6.5 GM/DL (ref 12–16)
HEMOGLOBIN: 6.7 GM/DL (ref 12–16)
MCH RBC QN AUTO: 31.7 PG (ref 26–33)
MCHC RBC AUTO-ENTMCNC: 31.1 GM/DL (ref 32.2–35.5)
MCV RBC AUTO: 102 FL (ref 81–99)
PLATELET # BLD: 129 THOU/MM3 (ref 130–400)
PMV BLD AUTO: 11.4 FL (ref 9.4–12.4)
RBC # BLD: 2.05 MILL/MM3 (ref 4.2–5.4)
RH FACTOR: NORMAL
TROPONIN T: < 0.01 NG/ML
WBC # BLD: 8.8 THOU/MM3 (ref 4.8–10.8)

## 2018-10-08 PROCEDURE — 85018 HEMOGLOBIN: CPT

## 2018-10-08 PROCEDURE — 86900 BLOOD TYPING SEROLOGIC ABO: CPT

## 2018-10-08 PROCEDURE — 85027 COMPLETE CBC AUTOMATED: CPT

## 2018-10-08 PROCEDURE — 2580000003 HC RX 258: Performed by: HOSPITALIST

## 2018-10-08 PROCEDURE — 36415 COLL VENOUS BLD VENIPUNCTURE: CPT

## 2018-10-08 PROCEDURE — 86923 COMPATIBILITY TEST ELECTRIC: CPT

## 2018-10-08 PROCEDURE — 2709999900 HC NON-CHARGEABLE SUPPLY

## 2018-10-08 PROCEDURE — 99233 SBSQ HOSP IP/OBS HIGH 50: CPT | Performed by: FAMILY MEDICINE

## 2018-10-08 PROCEDURE — C9113 INJ PANTOPRAZOLE SODIUM, VIA: HCPCS | Performed by: INTERNAL MEDICINE

## 2018-10-08 PROCEDURE — 6360000002 HC RX W HCPCS: Performed by: INTERNAL MEDICINE

## 2018-10-08 PROCEDURE — 2580000003 HC RX 258: Performed by: INTERNAL MEDICINE

## 2018-10-08 PROCEDURE — 6360000002 HC RX W HCPCS: Performed by: FAMILY MEDICINE

## 2018-10-08 PROCEDURE — 36430 TRANSFUSION BLD/BLD COMPNT: CPT

## 2018-10-08 PROCEDURE — 2060000000 HC ICU INTERMEDIATE R&B

## 2018-10-08 PROCEDURE — 85014 HEMATOCRIT: CPT

## 2018-10-08 PROCEDURE — 99233 SBSQ HOSP IP/OBS HIGH 50: CPT | Performed by: SURGERY

## 2018-10-08 PROCEDURE — 84484 ASSAY OF TROPONIN QUANT: CPT

## 2018-10-08 PROCEDURE — 86850 RBC ANTIBODY SCREEN: CPT

## 2018-10-08 PROCEDURE — P9016 RBC LEUKOCYTES REDUCED: HCPCS

## 2018-10-08 PROCEDURE — 86901 BLOOD TYPING SEROLOGIC RH(D): CPT

## 2018-10-08 PROCEDURE — 6370000000 HC RX 637 (ALT 250 FOR IP): Performed by: FAMILY MEDICINE

## 2018-10-08 RX ORDER — SODIUM CHLORIDE 0.9 % (FLUSH) 0.9 %
10 SYRINGE (ML) INJECTION PRN
Status: DISCONTINUED | OUTPATIENT
Start: 2018-10-08 | End: 2018-10-09 | Stop reason: HOSPADM

## 2018-10-08 RX ORDER — 0.9 % SODIUM CHLORIDE 0.9 %
250 INTRAVENOUS SOLUTION INTRAVENOUS ONCE
Status: COMPLETED | OUTPATIENT
Start: 2018-10-09 | End: 2018-10-09

## 2018-10-08 RX ORDER — 0.9 % SODIUM CHLORIDE 0.9 %
250 INTRAVENOUS SOLUTION INTRAVENOUS ONCE
Status: COMPLETED | OUTPATIENT
Start: 2018-10-08 | End: 2018-10-08

## 2018-10-08 RX ADMIN — LITHIUM CARBONATE 300 MG: 300 CAPSULE ORAL at 09:53

## 2018-10-08 RX ADMIN — LITHIUM CARBONATE 600 MG: 300 CAPSULE ORAL at 20:22

## 2018-10-08 RX ADMIN — SODIUM CHLORIDE 250 ML: 9 INJECTION, SOLUTION INTRAVENOUS at 09:56

## 2018-10-08 RX ADMIN — OCTREOTIDE ACETATE 50 MCG/HR: 500 INJECTION, SOLUTION INTRAVENOUS; SUBCUTANEOUS at 14:05

## 2018-10-08 RX ADMIN — ACETAMINOPHEN 1000 MG: 500 TABLET, FILM COATED ORAL at 12:41

## 2018-10-08 RX ADMIN — VILAZODONE HYDROCHLORIDE 40 MG: 40 TABLET ORAL at 18:03

## 2018-10-08 RX ADMIN — ONDANSETRON 4 MG: 2 INJECTION INTRAMUSCULAR; INTRAVENOUS at 21:49

## 2018-10-08 RX ADMIN — ACETAMINOPHEN 1000 MG: 500 TABLET, FILM COATED ORAL at 21:49

## 2018-10-08 RX ADMIN — LAMOTRIGINE 200 MG: 100 TABLET ORAL at 09:53

## 2018-10-08 RX ADMIN — LEVOTHYROXINE SODIUM 125 MCG: 125 TABLET ORAL at 06:15

## 2018-10-08 RX ADMIN — SODIUM CHLORIDE 8 MG/HR: 9 INJECTION, SOLUTION INTRAVENOUS at 21:51

## 2018-10-08 RX ADMIN — SODIUM CHLORIDE 8 MG/HR: 9 INJECTION, SOLUTION INTRAVENOUS at 10:39

## 2018-10-08 ASSESSMENT — PAIN SCALES - GENERAL
PAINLEVEL_OUTOF10: 6
PAINLEVEL_OUTOF10: 4

## 2018-10-08 ASSESSMENT — ENCOUNTER SYMPTOMS
VOMITING: 0
ABDOMINAL PAIN: 0
NAUSEA: 0
TROUBLE SWALLOWING: 0
ABDOMINAL DISTENTION: 0
APNEA: 0
CHEST TIGHTNESS: 0
COLOR CHANGE: 0
SHORTNESS OF BREATH: 0
BACK PAIN: 0

## 2018-10-08 ASSESSMENT — PAIN DESCRIPTION - PAIN TYPE: TYPE: ACUTE PAIN

## 2018-10-08 ASSESSMENT — PAIN DESCRIPTION - PROGRESSION: CLINICAL_PROGRESSION: NOT CHANGED

## 2018-10-08 ASSESSMENT — PAIN DESCRIPTION - ONSET: ONSET: ON-GOING

## 2018-10-08 ASSESSMENT — PAIN DESCRIPTION - LOCATION: LOCATION: ABDOMEN

## 2018-10-08 ASSESSMENT — PAIN DESCRIPTION - DESCRIPTORS: DESCRIPTORS: CRAMPING

## 2018-10-08 ASSESSMENT — PAIN DESCRIPTION - ORIENTATION: ORIENTATION: LEFT

## 2018-10-08 ASSESSMENT — PAIN DESCRIPTION - FREQUENCY: FREQUENCY: INTERMITTENT

## 2018-10-08 NOTE — CARE COORDINATION
Plan of care: blood transfusion orders on chart for H 6.5; monitor, on general diet. GI/SGY following.  PPI/Octrotide gtts continued; client plans home mikael when medically cleared  10/08/18  2:08 PM

## 2018-10-08 NOTE — PROGRESS NOTES
[facility] - Dr. Abby Ferguson  Daily Progress Note  Pt Name: Malik Gonzalez  Medical Record Number: [id number]  Date of Birth 1960   Today's Date: 10/8/2018    ASSESSMENT  1. Upper GI bleed status post coiling of the gastroduodenal artery  2. Status post Batsheva-en-Y gastric bypass 2002  3. Evidence of summary bleeding with drop in hemoglobin. Black stool with some blood today. Receiving transfusion. EGD no bleeding seen I efferent or afferent limb  5. Right thyroid enlargement/nodularity- multiple benign appearing nodules on thyroid ultrasound  PLAN  1. Discussed at length with GI. Only further thinning I can offer here is operative exploration with on table endoscopy to try to identify and treat the source of bleeding. Dr. Ro Chowdhury rounding this week. States he is in no rash for operative management. Discussed with Haily'alexander FELDMANA who is her sister. She mentioned Dr. Ro Chowdhury in February had discussed the possibility of further interventions at ICU. I inquired with [facility] to see if they may have something that we cannot offer here. SUBJECTIVE  Haily hemoglobin dropped again overnight. Had more dark stool with some melenic blood per her sister. She states she feels tired. Abdomen is benign. REVIEW OF SYSTEMS:    Review of Systems   Constitutional: Positive for fatigue. Negative for activity change, chills, diaphoresis and fever. HENT: Negative for congestion and trouble swallowing. Eyes: Negative for visual disturbance. Respiratory: Negative for apnea, chest tightness and shortness of breath. Cardiovascular: Negative for chest pain and palpitations. Gastrointestinal: Negative for abdominal distention, abdominal pain, nausea and vomiting. Genitourinary: Negative for flank pain and frequency. Musculoskeletal: Negative for back pain and myalgias. Skin: Negative for color change and pallor.    Neurological: Negative for light-headedness and

## 2018-10-08 NOTE — PROGRESS NOTES
above - TSH 10/5 WNL - cont current synthroid dose  14. Bipolar d/o -- cont lamictal, vibryd, lithium  15. GERD -- PPI currently - per GI EGD 10/4 normal  16. Constipation -- ++ stool noted per CT abd - cont monitor - TSH wNL 10/5 - per GI with above  17. Hx Vit D deficiency -- eval outpt once transfusions done  18. Generalized weakness -- due to anemia/bleeding - ?causing MS flare - cont monitor activity - c/s PT/OT 10/6  19. Code status -- discussed different code status options as Per RN pt expressed desire for possible limited code/DNRCCA --> 15 min discussion with pt and sister POA --> pt does not wish to have life-sustaining measures without benefit but at this time would like to be a full code but would NOT like prolonged CPR or long ventilation if not going to improve. Sister POA understands and pt has living will. Offered palliative care c/s and pt declined. Dispo   -- 10/8 --> apprec consultants -- apparently pt still bleeding with dark stool last pm and drop in h/h again this am -_> transfusing a 11th unit of blood -- repeat this pm -- ?GI and surgery plan --> pt agreeable to surgery her if needed vs had talked about tx to  back in 2/2018 when here for GIB --> ??transfer. Cont monitor BP as borderline low, monitor lytes, h/h closely. Await consultants plan and for transfusion this am.    -- 10/7 --> apprec consultants - h/h up this am after 2 more units PRBC last pm --> repeat later this afternoon and in am - monitor for stools - cont PPI gtt and octreotide gtts per GI.  CP resolved - monitor for further sx and encouraged pt to MOVE and wear SCDs again. Cont monitor lytes, BP, h/h, stools. PT/OT to see.       -- 10/6 --> apprec consultants - d/w Dr. Staci Kingsley - no plans for surgery at this point -- still on PPI and octreotide gtts per GI --> cont monitor h/h as transfused again overnight -> ??need to repeat bleeding scan again - ?need tx to tertiary center - await further GI recs - cont transfuse prn admission. Midline won't draw blood. Medications:  Reviewed    Infusion Medications    sodium chloride 20 mL/hr at 10/04/18 1806    pantoprozole (PROTONIX) infusion 8 mg/hr (10/07/18 2127)    octreotide (SANDOSTATIN) infusion 50 mcg/hr (10/07/18 1936)     Scheduled Medications    sodium chloride  250 mL Intravenous Once    lidocaine 1 % injection  5 mL Intradermal Once    calcium replacement protocol   Other RX Placeholder    sodium chloride flush  10 mL Intravenous 2 times per day    dimethyl fumarate  240 mg Oral BID    lamoTRIgine  200 mg Oral Daily    levothyroxine  125 mcg Oral Daily    vilazodone HCl  40 mg Oral Daily    lithium  600 mg Oral Nightly    lithium  300 mg Oral Daily     PRN Meds: sodium chloride flush, magnesium hydroxide, ondansetron, acetaminophen, nitroGLYCERIN      Intake/Output Summary (Last 24 hours) at 10/08/18 0706  Last data filed at 10/08/18 0245   Gross per 24 hour   Intake          2297.17 ml   Output             3800 ml   Net         -1502.83 ml       Diet:  DIET GENERAL;    Exam:  /61   Pulse 60   Temp 98.5 °F (36.9 °C) (Oral)   Resp 18   Ht 4' 10\" (1.473 m)   Wt 118 lb 14.4 oz (53.9 kg)   SpO2 98%   BMI 24.85 kg/m²     General appearance: No apparent distress, appears Older than stated age and cooperative. HEENT: Pupils equal, round, and reactive to light. Conjunctivae/corneas clear. Neck: Supple, with full range of motion. No jugular venous distention. Trachea midline. Respiratory:  Normal respiratory effort. Clear to auscultation, bilaterally without Rales/Wheezes/Rhonchi. No respiratory distress or accessory muscle use. Cardiovascular: Regular rate and rhythm with normal S1/S2 without murmurs, rubs or gallops. Abdomen: Soft, non-tender, non-distended with normal bowel sounds. No rebound or guarding  Musculoskeletal: trace edema bilaterally. Full range of motion. 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   No calf tenderness palpation  Skin: Skin color, texture, turgor normal.  No rashes or lesions. Neurologic:  Neurovascularly intact without any focal sensory/motor deficits. Cranial nerves: II-XII intact, grossly non-focal.  Psychiatric: Alert and oriented, thought content appropriate, normal insight  Capillary Refill: Brisk,< 3 seconds   Peripheral Pulses: +2 palpable, equal bilaterally       Labs:   Recent Labs      10/06/18   1511  10/07/18   0600  10/08/18   0504   WBC   --   8.2  8.8   HGB  6.9*  8.9*  6.5*   HCT  20.6*  24.9*  20.9*   PLT   --   101*  129*     Recent Labs      10/05/18   0945  10/06/18   0648  10/07/18   0600   NA  137   --   138   K  3.7  3.9  4.1   CL  106   --   106   CO2  22*   --   23   BUN  19   --   23*   CREATININE  0.4   --   0.4   CALCIUM  8.1*   --   8.1*   PHOS   --    --   3.0     No results for input(s): AST, ALT, BILIDIR, BILITOT, ALKPHOS in the last 72 hours. No results for input(s): INR in the last 72 hours. No results for input(s): Tom [place] in the last 72 hours. Urinalysis:      Lab Results   Component Value Date    NITRU NEGATIVE 09/28/2018    WBCUA 0-2 09/28/2018    BACTERIA NONE 09/28/2018    RBCUA 0-2 09/28/2018    BLOODU NEGATIVE 09/28/2018    SPECGRAV >1.030 05/13/2016    GLUCOSEU NEGATIVE 09/28/2018       Radiology:  US HEAD NECK SOFT TISSUE THYROID   Final Result   Multiple nodules bilaterally. Based on the American Thyroid Association criteria, they all have low suspicion for malignancy. **This report has been created using voice recognition software. It may contain minor errors which are inherent in voice recognition technology. **      Final report electronically signed by Dr. Evangelina Roger on 10/5/2018 3:30 PM      IR ABDOMINAL AORTOBIFEMORAL CATHETER SERIALOGRAM   Final Result   Successful, uncomplicated repeat coil embolization of the gastroduodenal artery.                                  Final report electronically signed by Dr Fany Schmitz on

## 2018-10-08 NOTE — FLOWSHEET NOTE
Yadira Vivas 60  OCCUPATIONAL THERAPY MISSED TREATMENT NOTE  [facility] ICU STEPDOWN TELEMETRY 4K  4K-10010-A      Date: 10/8/2018  Patient Name: Kathie Paniagua        CSN: [id number]   : 1960  (62 y.o.)  Gender: female                REASON FOR MISSED TREATMENT:  Hold treatment per nursing request.  RN requesting to hold eval this am due to low hemoglobin and needing blood transfusion. Will re attempt as able.

## 2018-10-09 VITALS
WEIGHT: 118.9 LBS | BODY MASS INDEX: 24.96 KG/M2 | DIASTOLIC BLOOD PRESSURE: 58 MMHG | RESPIRATION RATE: 18 BRPM | HEART RATE: 78 BPM | TEMPERATURE: 98.6 F | OXYGEN SATURATION: 99 % | HEIGHT: 58 IN | SYSTOLIC BLOOD PRESSURE: 111 MMHG

## 2018-10-09 LAB
ERYTHROCYTE [DISTWIDTH] IN BLOOD BY AUTOMATED COUNT: 20.6 % (ref 11.5–14.5)
ERYTHROCYTE [DISTWIDTH] IN BLOOD BY AUTOMATED COUNT: 53.1 FL (ref 35–45)
HCT VFR BLD CALC: 21.5 % (ref 37–47)
HEMOGLOBIN: 7.6 GM/DL (ref 12–16)
MCH RBC QN AUTO: 31.3 PG (ref 26–33)
MCHC RBC AUTO-ENTMCNC: 35.3 GM/DL (ref 32.2–35.5)
MCV RBC AUTO: 88.5 FL (ref 81–99)
PLATELET # BLD: 134 THOU/MM3 (ref 130–400)
PMV BLD AUTO: 11.6 FL (ref 9.4–12.4)
RBC # BLD: 2.43 MILL/MM3 (ref 4.2–5.4)
WBC # BLD: 11.5 THOU/MM3 (ref 4.8–10.8)

## 2018-10-09 PROCEDURE — 6360000002 HC RX W HCPCS: Performed by: INTERNAL MEDICINE

## 2018-10-09 PROCEDURE — G8988 SELF CARE GOAL STATUS: HCPCS

## 2018-10-09 PROCEDURE — 99232 SBSQ HOSP IP/OBS MODERATE 35: CPT | Performed by: SURGERY

## 2018-10-09 PROCEDURE — 99239 HOSP IP/OBS DSCHRG MGMT >30: CPT | Performed by: FAMILY MEDICINE

## 2018-10-09 PROCEDURE — 36430 TRANSFUSION BLD/BLD COMPNT: CPT

## 2018-10-09 PROCEDURE — G8978 MOBILITY CURRENT STATUS: HCPCS

## 2018-10-09 PROCEDURE — 2580000003 HC RX 258: Performed by: INTERNAL MEDICINE

## 2018-10-09 PROCEDURE — P9016 RBC LEUKOCYTES REDUCED: HCPCS

## 2018-10-09 PROCEDURE — 2580000003 HC RX 258: Performed by: FAMILY MEDICINE

## 2018-10-09 PROCEDURE — 2709999900 HC NON-CHARGEABLE SUPPLY

## 2018-10-09 PROCEDURE — 6370000000 HC RX 637 (ALT 250 FOR IP): Performed by: FAMILY MEDICINE

## 2018-10-09 PROCEDURE — G8979 MOBILITY GOAL STATUS: HCPCS

## 2018-10-09 PROCEDURE — 85027 COMPLETE CBC AUTOMATED: CPT

## 2018-10-09 PROCEDURE — 36415 COLL VENOUS BLD VENIPUNCTURE: CPT

## 2018-10-09 PROCEDURE — G8987 SELF CARE CURRENT STATUS: HCPCS

## 2018-10-09 PROCEDURE — 97162 PT EVAL MOD COMPLEX 30 MIN: CPT

## 2018-10-09 PROCEDURE — 2580000003 HC RX 258: Performed by: HOSPITALIST

## 2018-10-09 PROCEDURE — C9113 INJ PANTOPRAZOLE SODIUM, VIA: HCPCS | Performed by: INTERNAL MEDICINE

## 2018-10-09 PROCEDURE — 97166 OT EVAL MOD COMPLEX 45 MIN: CPT

## 2018-10-09 RX ADMIN — VILAZODONE HYDROCHLORIDE 40 MG: 40 TABLET ORAL at 12:12

## 2018-10-09 RX ADMIN — Medication 10 ML: at 08:58

## 2018-10-09 RX ADMIN — OCTREOTIDE ACETATE 50 MCG/HR: 500 INJECTION, SOLUTION INTRAVENOUS; SUBCUTANEOUS at 04:41

## 2018-10-09 RX ADMIN — LAMOTRIGINE 200 MG: 100 TABLET ORAL at 08:57

## 2018-10-09 RX ADMIN — SODIUM CHLORIDE 250 ML: 9 INJECTION, SOLUTION INTRAVENOUS at 01:55

## 2018-10-09 RX ADMIN — SODIUM CHLORIDE 8 MG/HR: 9 INJECTION, SOLUTION INTRAVENOUS at 10:33

## 2018-10-09 RX ADMIN — LEVOTHYROXINE SODIUM 125 MCG: 125 TABLET ORAL at 06:56

## 2018-10-09 RX ADMIN — LITHIUM CARBONATE 300 MG: 300 CAPSULE ORAL at 08:58

## 2018-10-09 ASSESSMENT — ENCOUNTER SYMPTOMS
ABDOMINAL DISTENTION: 0
NAUSEA: 0
TROUBLE SWALLOWING: 0
SHORTNESS OF BREATH: 0
VOMITING: 0
CHEST TIGHTNESS: 0
COLOR CHANGE: 0
APNEA: 0
BACK PAIN: 0
ABDOMINAL PAIN: 0

## 2018-10-09 ASSESSMENT — PAIN SCALES - GENERAL: PAINLEVEL_OUTOF10: 0

## 2018-10-09 NOTE — PROGRESS NOTES
[facility]  INPATIENT PHYSICAL THERAPY  EVALUATION  [facility] ICU STEPDOWN TELEMETRY 4K - 4K-10/010-A    Time In: 1121  Time Out: 1139  Timed Code Treatment Minutes: 0 Minutes  Minutes: 18          Date: 10/9/2018  Patient Name: Angie Kapoor,  Gender:  female        MRN: [id number]  : 1960  (62 y.o.)      Referring Practitioner: Dr Benjy Nieves  Diagnosis: GI bleed  Additional Pertinent Hx: Pt admitted 10-01 with Gi bleed . Pt had EGD 10-4. Pt planning discharge to another facitlity soon for her GI issues.      Past Medical History:   Diagnosis Date    Balance problem     Bipolar I disorder, most recent episode (or current) depressed, in partial or unspecified remission 2013    Cancer ([facility] [address]     CERVICAL     Depression     Fatigue     Gallstones     Gastric bypass status for obesity     GERD (gastroesophageal reflux disease)     History of blood transfusion     History of hysterectomy     REMOVAL OF ONE OVARY     Hypothyroidism     MS (multiple sclerosis) (HCC)     Paresthesias     RT LOWER LIMB    UTI (urinary tract infection)      Past Surgical History:   Procedure Laterality Date    APPENDECTOMY      BONE GRAFT Left     left leg to right arm    CARPAL TUNNEL RELEASE      CHOLECYSTECTOMY, LAPAROSCOPIC  2016    COLONOSCOPY      DILATATION, ESOPHAGUS      ENDOSCOPY, COLON, DIAGNOSTIC      ENTEROSCOPY N/A 2018    ENTEROSCOPY performed by Boni Taylor MD at [address]  2016    OTHER [address]    OTHER SURGICAL HISTORY      D&C    OTHER SURGICAL HISTORY      GI BLEED    CT COLONOSCOPY FLX DX W/COLLJ SPEC WHEN PFRMD Left 2018    COLONOSCOPY performed by Boni Taylor MD at [facility]    CT OFFICE/OUTPT VISIT,PROCEDURE ONLY Left 2018    EGD ESOPHAGOGASTRODUODENOSCOPY performed by Ro Romero MD at [facility] OFFICE/OUTPT

## 2018-10-09 NOTE — PROGRESS NOTES
2/18/2018    EGD ESOPHAGOGASTRODUODENOSCOPY performed by Ashlee Hooks MD at [facility] OFFICE/OUTPT VISIT,PROCEDURE ONLY N/A 10/4/2018    EGD DIAGNOSTIC ONLY performed by Oj Lopez MD at [facility]           Subjective  Chart Reviewed: Yes (orders, progress notes)  Patient assessed for rehabilitation services?: Yes  Family / Caregiver Present: No    Subjective: agreeable to short session, family present to visit    General:     Vision: Within Functional Limits  Vision Exceptions: Wears glasses at all times    Hearing: Exceptions to [facility]  Hearing Exceptions: Hard of hearing/hearing concerns         Pain:  Pain Assessment  Patient Currently in Pain: No (\"just really tired & weak\")       Social/Functional History:  Lives With: Alone  Type of Home: Apartment  Home Layout: One level  Home Access: Elevator  Home Equipment: Quad cane             ADL Assistance: Independent  Homemaking Assistance: Independent       Ambulation Assistance: Independent  Transfer Assistance: Independent          Additional Comments: Pt volunteers here. She uses quad cane on occasion.     Objective        Overall Cognitive Status: WNL         Sensation  Overall Sensation Status: WNL                  LUE AROM (degrees)  LUE AROM : WNL          RUE AROM (degrees)  RUE AROM : Exceptions (residual limb-shoulder abduction to 90 degrees)       LUE Strength  Gross LUE Strength:  (4-/5 grossly)              RUE Strength  Gross RUE Strength:  (NT )                   ADL  LE Dressing: Dependent/Total (for adjusting slipper socks)     Bed mobility  Supine to Sit: Modified independent    Transfers  Sit to stand: Contact guard assistance  Stand to sit: Contact guard assistance    Balance  Sitting Balance: Stand by assistance  Standing Balance: Contact guard assistance           Functional Mobility  Functional - Mobility Device: No device  Activity: Other (3ft to bedside chair)  Assist Level: Contact guard assistance                Activity Tolerance:  Activity Tolerance: Patient limited by fatigue        Assessment:  Assessment: Pt demo decreased strength, endurance, & balance for ADLs & functional mobility at PLOF. Continued OT recommended to increase safety awareness & faciliate improved strength & endurnace for ADLs. Performance deficits / Impairments: Decreased functional mobility , Decreased endurance, Decreased ADL status, Decreased balance, Decreased strength  Prognosis: Fair    Clinical Decision Making: Clinical Decision making was of Moderate Complexity as the result of analysis of data from a detailed assessment, a consideration of several treatment options, the presence of comorbidities affecting the plan of care and the need for minimal to moderate modifications or assistance required to complete the evaluation. Discharge Recommendations:  Discharge Recommendations: Continue to assess pending progress    Patient Education:  Patient Education: OT role, POC, safety with mobility, fall prevention  Barriers to Learning: none    Equipment Recommendations: Other: Continue to assess pending progress    Safety:  Safety Devices in place: Yes  Type of devices:  All fall risk precautions in place, Call light within reach    Plan:  Times per week: 3-5x  Current Treatment Recommendations: Balance Training, Functional Mobility Training, Endurance Training, Safety Education & Training, Self-Care / ADL    Goals:  Patient goals : get stronger, not be so tired    Short term goals  Time Frame for Short term goals: 2 weeks  Short term goal 1: Complete various t/f including toilet with S & 0 vcs for safety  Short term goal 2: Complete 3-4 min standing ADL with S for increased indep with sinkside grooming  Short term goal 3: Complete mobility to/from bathroom with S   Short term goal 4: Complete LE dressing with min A & adaptative strategies prn for decreased fall risk  Long term goals  Time Frame for Long term

## 2018-10-09 NOTE — PROGRESS NOTES
Pt Name: Tor Stage  MRN: [id number]  [id number]  YOB: 1960  Admit Date: 10/1/2018  9:12 PM  Date of evaluation: 10/9/2018  Primary Care Physician: AJITH Yuan - CNP   4K-10/010-A   Dictating for WARREN Lee    S  Pt denies pain, nausea, and vomiting. Had BM large black in color with red blood around stool of small amount. O  BP (!) 94/51   Pulse 62   Temp 98.3 °F (36.8 °C) (Oral)   Resp 18   Ht 4' 10\" (1.473 m)   Wt 118 lb 14.4 oz (53.9 kg)   SpO2 98%   BMI 24.85 kg/m²   VSS, afebrile  Alert and oriented  Resp: CTAB  Chest: RRR  Abd:soft, non-tender,non-distended with active BS's  Ext: +bilat lower ext edema      ASSESSMENT AND PLAN:  62 y.o.  female with RYGB was admitted 10/1/18 for weakness, melena, and anemia.  She had similar episodes in the past, with unremarkable workup including EGD, colonoscopy and capsule endoscopy earlier in 2018.  She developed hematochezia and had +tagged RBC scan with embolization of the GDA by IR on 10/2/18 with repeat bloody stools and repeat IR embolization and coiling to GDA in same area as previous intervention on 10-3-18.  EGD was performed 10/4/18 without source.        1. GI Bleed in remnanat stomach/duodenum, unreachable by endoscopy at [facility], surgery following. Gastroduodenal artery bleed with IR coiling by IR on 10-2-18 (7 coils) and again on 10-3-18 (1 coli). EGD 10-14-18 without source noted. Pt with black stool and some red around it  Today. 2. S/p Batsheva-en Y gastric bypass 2002  3. Anemia. S/p 10 units PRBC. Hgb this AM 7.6, one more unit PRBC last evening ordered to total 12 units transfused. 4. Thrombocytopenia 134  5. Right thyroid enlargement/nodularity- multiple benign appearing nodules on thyroid ultrasound      Plan  1. Supportive care per primary team  2. Transfuse as needed. 3. Continue IV PPI until hgb stabalizes  4. Octreotide gtt until hgb stabalizes  5. Diet as tolerated  6.  Consider transfer to tertiary

## 2018-10-10 LAB — FOLATE: NORMAL

## 2018-10-10 NOTE — DISCHARGE SUMMARY
Hospitalist Discharge Summary     Patient Identification:  Bob Nuno  : 1960  MRN: [id number]   Account: [de-identified]     Admit date: 10/1/2018  Discharge date: 10/9/2018   Attending provider: No att. providers found        Primary care provider: AJITH Vega - CNP     Discharge Diagnoses:   1. Acute GI bleed at gastroduodenal artery s/p embolization x 2  2. Acute blood loss anemia due to GI loss   3. Atypical chest pain - resolved  4. Metabolic acidosis  5. Azotemia   6. Bilateral thyroid nodules   7. Hx gastric bypass -- Batsheva-en-Y  8. Non-ischemic cardiomyopathy  9. Dizziness   10. multiple sclerosis  11. 11 x 13 mm hyperdense lesion in anterior mid left kidney   12. 12 x 10 mm hypodense lesion in lateral subcapsular right lobe of liver   13. Hypothyroidism   14. Bipolar d/o   15. GERD  16. Constipation   17. Hx Vit D deficiency  18. Generalized weakness         Hospital Course: Bob Nuno is a 62 y.o. female admitted to [address] [place] [address] on 10/1/2018 for generalized weakness, anemia, GIB. See H&P by Ángel Monroe DO on 10/1/18 for admitting details.      2. Acute GI bleed -- apprec GI and gen surg assist --> PPI gtt, octreotide gtt 10/3  --> per GI CNP [place] plan to transfer to [address] [place] [address] for further eval/tx 10/9/18   -- cont monitor h/h closely and transfuse prn   --  Pt cont to bleed despite admission x 1 week  -- bleeding scan (+) on 10/2 --> s/p IR abdominal angiogram 10/2/18 with extravasation from gastroduodenal artery into region of duodenum thus s/p coil embolization --> repeat bleeding scan done 10/3 due to drop in hgb again and (+) for bleeding in prox small bowel --> repeat IR abd arteriogram 10/3 with repeat coil embolization of gastroduodenal artery  -- Dr. Jesus West did EGD 10/4/18 -> normal esophagus, gastric remnant small but normal with efferent and afferent loops visualized and appeared normal w/o visualized blood and anastomotic site appeared normal.  -- general surgery consulted per GI that if bleeding persists ?need surgical intervention  3. Acute blood loss anemia due to GI loss -- see above -- s/p transfused 13 units PRBC since admission --> cont trend and transfuse prn -- baseline hgb 12's  -- cont to drop thus cont likely bleeding -> per GI plan tx to [facility]  -- retic count up 10/6 thus likely response for acute bleeding  -- hx iron def 2/2018 -> on po iron at home -> held for now with #1 -> resume per GI - s/p IV iron dose 10/3 x 1 --> ??repeat iron  -- iron studies 10/6 ok, retic high thus likely acute loss  -- INR normal 9/28, LFT/bili 10/1 WNL thus less likely hemolysis  4. Atypical chest pain/Chest \"numbness\" -- 10/6 pm and resolved since 10/7 --  no EKG changes - trop (-) x 1 - likely anxiety related -- resolved 10/7 -- ?GI related. States happened after lasix   -- cath 4/2017 with PATENT coronaries  -- monitor closely as pt has been refusing SCD's for DVT proph and has not been moving well -> if persists/recurs may need r/o PE. 19. Metabolic acidosis -- likely due to GIB -- LA 10/2 WNL -- improving as of 10/5 with blood/IVF and correction of above -- monitor   20. Azotemia -- BUN >>Creat likely due to GIB -- improving  21. Bilateral thyroid nodules -- noted per CTA chest 10/2/18 -- Dr. Viviana Romero gen surgery ordered thyroid u/s 10/5/18 multiple nodules bilaterally and based on \"the American Thyroid Association criteria, they all have low suspicion for malignancy\" per radiologist  -- TSH 10/5 WNL --> to f/u with Dr. Viviana Romero as outpt for repeat u/s 6 mo. 22. Hx gastric bypass -- Batsheva-en-Y -- per GI and surgery -- s/p normal EGD into afferent and efferent limbs 10/4/18 -- resume MVI, iron, B12 when bleeding improves  23. Non-ischemic cardiomyopathy -- per cath by Dr. Mundo Brown 4/26/17 with EF 35-40%, patent coronaries - ?cause - cont monitor fluid status -- BP does not allow for BB or ACE/ARB at this time - prn lasix with blood transfusions  24.  Dizziness and the scope was completely withdrawn.        Recommendations:   - Follow clinical symptoms and laboratory studies for evidence of rebleeding  - [facility] to [facility] for [facility] care  - continue IV PPI   - patient can be started on a CLD  - continue to monitor H/H closely  - Please call myself or [facility] for any further questions or concerns     Loree Jenkins MD  [facility]    Consults:   IP CONSULT TO GI  IP CONSULT TO GENERAL SURGERY      Examination:  Vitals:  Vitals:    10/09/18 0745 10/09/18 1200 10/09/18 1535 10/09/18 2007   BP: (!) 94/51 (!) 97/47 (!) 93/49 (!) 111/58   Pulse: 62 61 59 78   Resp: 18 18 16 18   Temp: 98.3 °F (36.8 °C) 98.5 °F (36.9 °C) 98.6 °F (37 °C) 98.6 °F (37 °C)   TempSrc: Oral Oral Oral Oral   SpO2: 98% 94% 100% 99%   Weight:       Height:         Weight: Weight: 118 lb 14.4 oz (53.9 kg)     24 hour intake/output:  Intake/Output Summary (Last 24 hours) at 10/09/18 2335  Last data filed at 10/09/18 1336   Gross per 24 hour   Intake           1728.3 ml   Output             2100 ml   Net           -371.7 ml       General appearance - oriented to person, place, and time and chronically ill appearing  Chest - clear to auscultation, no wheezes, rales or rhonchi, symmetric air entry  Heart - normal rate, regular rhythm, normal S1, S2, no murmurs, rubs, clicks or gallops  Abdomen - soft, nontender, nondistended, no masses or organomegaly  bowel sounds normal  Obese: No; Protuberant: No   Neurological - alert, oriented, normal speech, no focal findings or movement disorder noted, cranial nerves II through XII intact  Extremities - peripheral pulses normal, no pedal edema, no clubbing or cyanosis;   Right distal arm deformity (no hand)  Skin - normal coloration and turgor, no rashes, no suspicious skin lesions noted    Significant Diagnostics:   Radiology:   RADIOLOGY REPORT   Final Result      US HEAD NECK SOFT TISSUE THYROID   Final Result   Multiple nodules

## 2018-10-14 ENCOUNTER — APPOINTMENT (OUTPATIENT)
Dept: CT IMAGING | Age: 58
End: 2018-10-14
Payer: MEDICARE

## 2018-10-14 ENCOUNTER — HOSPITAL ENCOUNTER (EMERGENCY)
Age: 58
Discharge: HOME | End: 2018-10-15
Payer: MEDICARE

## 2018-10-14 DIAGNOSIS — R10.9 ABDOMINAL PAIN, UNSPECIFIED ABDOMINAL LOCATION: Primary | ICD-10-CM

## 2018-10-14 DIAGNOSIS — K59.00 CONSTIPATION, UNSPECIFIED CONSTIPATION TYPE: ICD-10-CM

## 2018-10-14 LAB
ALBUMIN SERPL-MCNC: 2.8 G/DL (ref 3.5–5.1)
ALP BLD-CCNC: 95 U/L (ref 38–126)
ALT SERPL-CCNC: 22 U/L (ref 11–66)
ANION GAP SERPL CALCULATED.3IONS-SCNC: 8 MEQ/L (ref 8–16)
ANISOCYTOSIS: PRESENT
AST SERPL-CCNC: 22 U/L (ref 5–40)
BASOPHILIA: ABNORMAL
BASOPHILS # BLD: 0.2 %
BASOPHILS ABSOLUTE: 0 THOU/MM3 (ref 0–0.1)
BILIRUB SERPL-MCNC: 0.2 MG/DL (ref 0.3–1.2)
BILIRUBIN DIRECT: < 0.2 MG/DL (ref 0–0.3)
BUN BLDV-MCNC: 14 MG/DL (ref 7–22)
CALCIUM SERPL-MCNC: 9 MG/DL (ref 8.5–10.5)
CHLORIDE BLD-SCNC: 108 MEQ/L (ref 98–111)
CO2: 24 MEQ/L (ref 23–33)
CREAT SERPL-MCNC: 0.5 MG/DL (ref 0.4–1.2)
EKG ATRIAL RATE: 58 BPM
EKG P AXIS: 53 DEGREES
EKG P-R INTERVAL: 162 MS
EKG Q-T INTERVAL: 492 MS
EKG QRS DURATION: 146 MS
EKG QTC CALCULATION (BAZETT): 482 MS
EKG R AXIS: 25 DEGREES
EKG T AXIS: 32 DEGREES
EKG VENTRICULAR RATE: 58 BPM
EOSINOPHIL # BLD: 1 %
EOSINOPHILS ABSOLUTE: 0.1 THOU/MM3 (ref 0–0.4)
ERYTHROCYTE [DISTWIDTH] IN BLOOD BY AUTOMATED COUNT: 19.3 % (ref 11.5–14.5)
ERYTHROCYTE [DISTWIDTH] IN BLOOD BY AUTOMATED COUNT: 67.1 FL (ref 35–45)
GFR SERPL CREATININE-BSD FRML MDRD: > 90 ML/MIN/1.73M2
GLUCOSE BLD-MCNC: 94 MG/DL (ref 70–108)
HCT VFR BLD CALC: 30.4 % (ref 37–47)
HEMOGLOBIN: 9.4 GM/DL (ref 12–16)
IMMATURE GRANS (ABS): 0.03 THOU/MM3 (ref 0–0.07)
IMMATURE GRANULOCYTES: 0.6 %
LIPASE: 28.5 U/L (ref 5.6–51.3)
LYMPHOCYTES # BLD: 8.4 %
LYMPHOCYTES ABSOLUTE: 0.4 THOU/MM3 (ref 1–4.8)
MCH RBC QN AUTO: 30.1 PG (ref 26–33)
MCHC RBC AUTO-ENTMCNC: 30.9 GM/DL (ref 32.2–35.5)
MCV RBC AUTO: 97.4 FL (ref 81–99)
MONOCYTES # BLD: 6.5 %
MONOCYTES ABSOLUTE: 0.3 THOU/MM3 (ref 0.4–1.3)
NUCLEATED RED BLOOD CELLS: 0 /100 WBC
OSMOLALITY CALCULATION: 279.6 MOSMOL/KG (ref 275–300)
PLATELET # BLD: 300 THOU/MM3 (ref 130–400)
PMV BLD AUTO: 9.4 FL (ref 9.4–12.4)
POTASSIUM SERPL-SCNC: 4.2 MEQ/L (ref 3.5–5.2)
RBC # BLD: 3.12 MILL/MM3 (ref 4.2–5.4)
SCAN OF BLOOD SMEAR: NORMAL
SEG NEUTROPHILS: 83.3 %
SEGMENTED NEUTROPHILS ABSOLUTE COUNT: 4.3 THOU/MM3 (ref 1.8–7.7)
SODIUM BLD-SCNC: 140 MEQ/L (ref 135–145)
TOTAL PROTEIN: 5.1 G/DL (ref 6.1–8)
WBC # BLD: 5.2 THOU/MM3 (ref 4.8–10.8)

## 2018-10-14 PROCEDURE — 85025 COMPLETE CBC W/AUTO DIFF WBC: CPT

## 2018-10-14 PROCEDURE — 74177 CT ABD & PELVIS W/CONTRAST: CPT

## 2018-10-14 PROCEDURE — 82248 BILIRUBIN DIRECT: CPT

## 2018-10-14 PROCEDURE — 93005 ELECTROCARDIOGRAM TRACING: CPT | Performed by: NURSE PRACTITIONER

## 2018-10-14 PROCEDURE — 6360000004 HC RX CONTRAST MEDICATION: Performed by: NURSE PRACTITIONER

## 2018-10-14 PROCEDURE — 80053 COMPREHEN METABOLIC PANEL: CPT

## 2018-10-14 PROCEDURE — 99285 EMERGENCY DEPT VISIT HI MDM: CPT

## 2018-10-14 PROCEDURE — 6360000002 HC RX W HCPCS: Performed by: NURSE PRACTITIONER

## 2018-10-14 PROCEDURE — 83690 ASSAY OF LIPASE: CPT

## 2018-10-14 PROCEDURE — 96374 THER/PROPH/DIAG INJ IV PUSH: CPT

## 2018-10-14 PROCEDURE — 36415 COLL VENOUS BLD VENIPUNCTURE: CPT

## 2018-10-14 PROCEDURE — 96375 TX/PRO/DX INJ NEW DRUG ADDON: CPT

## 2018-10-14 RX ORDER — MORPHINE SULFATE 4 MG/ML
4 INJECTION, SOLUTION INTRAMUSCULAR; INTRAVENOUS ONCE
Status: COMPLETED | OUTPATIENT
Start: 2018-10-14 | End: 2018-10-14

## 2018-10-14 RX ORDER — ONDANSETRON 2 MG/ML
4 INJECTION INTRAMUSCULAR; INTRAVENOUS ONCE
Status: COMPLETED | OUTPATIENT
Start: 2018-10-14 | End: 2018-10-14

## 2018-10-14 RX ADMIN — MORPHINE SULFATE 4 MG: 4 INJECTION, SOLUTION INTRAMUSCULAR; INTRAVENOUS at 22:05

## 2018-10-14 RX ADMIN — ONDANSETRON HYDROCHLORIDE 4 MG: 4 INJECTION, SOLUTION INTRAMUSCULAR; INTRAVENOUS at 22:05

## 2018-10-14 ASSESSMENT — PAIN DESCRIPTION - PAIN TYPE
TYPE: ACUTE PAIN
TYPE: ACUTE PAIN

## 2018-10-14 ASSESSMENT — ENCOUNTER SYMPTOMS
ABDOMINAL PAIN: 1
SORE THROAT: 0
WHEEZING: 0
NAUSEA: 0
RHINORRHEA: 0
VOMITING: 0
BLOOD IN STOOL: 0
DIARRHEA: 0
BACK PAIN: 0
SHORTNESS OF BREATH: 0
CONSTIPATION: 0
COUGH: 0

## 2018-10-14 ASSESSMENT — PAIN DESCRIPTION - FREQUENCY: FREQUENCY: CONTINUOUS

## 2018-10-14 ASSESSMENT — PAIN DESCRIPTION - DESCRIPTORS: DESCRIPTORS: ACHING

## 2018-10-14 ASSESSMENT — PAIN SCALES - GENERAL
PAINLEVEL_OUTOF10: 10
PAINLEVEL_OUTOF10: 8
PAINLEVEL_OUTOF10: 10

## 2018-10-14 ASSESSMENT — PAIN DESCRIPTION - ORIENTATION
ORIENTATION: RIGHT;LOWER
ORIENTATION: RIGHT;LOWER

## 2018-10-14 ASSESSMENT — PAIN DESCRIPTION - LOCATION
LOCATION: ABDOMEN
LOCATION: ABDOMEN

## 2018-10-15 VITALS
DIASTOLIC BLOOD PRESSURE: 61 MMHG | HEART RATE: 57 BPM | RESPIRATION RATE: 16 BRPM | SYSTOLIC BLOOD PRESSURE: 151 MMHG | HEIGHT: 59 IN | WEIGHT: 108 LBS | OXYGEN SATURATION: 100 % | BODY MASS INDEX: 21.77 KG/M2 | TEMPERATURE: 98.3 F

## 2018-10-15 LAB
BILIRUBIN URINE: NEGATIVE
BLOOD, URINE: NEGATIVE
CHARACTER, URINE: CLEAR
COLOR: YELLOW
GLUCOSE URINE: NEGATIVE MG/DL
KETONES, URINE: NEGATIVE
LEUKOCYTE ESTERASE, URINE: NEGATIVE
NITRITE, URINE: NEGATIVE
PH UA: 7.5
PROTEIN UA: NEGATIVE
SPECIFIC GRAVITY, URINE: 1.01 (ref 1–1.03)
UROBILINOGEN, URINE: 1 EU/DL

## 2018-10-15 PROCEDURE — 6360000004 HC RX CONTRAST MEDICATION: Performed by: NURSE PRACTITIONER

## 2018-10-15 PROCEDURE — 81003 URINALYSIS AUTO W/O SCOPE: CPT

## 2018-10-15 PROCEDURE — 93010 ELECTROCARDIOGRAM REPORT: CPT | Performed by: INTERNAL MEDICINE

## 2018-10-15 PROCEDURE — 2709999900 HC NON-CHARGEABLE SUPPLY

## 2018-10-15 PROCEDURE — 96375 TX/PRO/DX INJ NEW DRUG ADDON: CPT

## 2018-10-15 PROCEDURE — 6360000002 HC RX W HCPCS: Performed by: NURSE PRACTITIONER

## 2018-10-15 RX ORDER — POLYETHYLENE GLYCOL 3350 17 G/17G
17 POWDER, FOR SOLUTION ORAL DAILY
Qty: 1 BOTTLE | Refills: 0 | Status: SHIPPED | OUTPATIENT
Start: 2018-10-15 | End: 2019-03-13

## 2018-10-15 RX ORDER — KETOROLAC TROMETHAMINE 30 MG/ML
15 INJECTION, SOLUTION INTRAMUSCULAR; INTRAVENOUS ONCE
Status: COMPLETED | OUTPATIENT
Start: 2018-10-15 | End: 2018-10-15

## 2018-10-15 RX ADMIN — IOPAMIDOL 85 ML: 755 INJECTION, SOLUTION INTRAVENOUS at 00:01

## 2018-10-15 RX ADMIN — KETOROLAC TROMETHAMINE 15 MG: 30 INJECTION, SOLUTION INTRAMUSCULAR at 01:54

## 2018-10-15 ASSESSMENT — PAIN SCALES - GENERAL
PAINLEVEL_OUTOF10: 1
PAINLEVEL_OUTOF10: 1

## 2018-10-15 ASSESSMENT — PAIN DESCRIPTION - LOCATION: LOCATION: ABDOMEN

## 2018-10-15 ASSESSMENT — PAIN DESCRIPTION - ORIENTATION: ORIENTATION: RIGHT;LOWER

## 2018-10-15 ASSESSMENT — PAIN DESCRIPTION - PAIN TYPE: TYPE: ACUTE PAIN

## 2018-10-15 NOTE — ED PROVIDER NOTES
weakness, light-headedness, numbness and headaches. PAST MEDICALHISTORY    has a past medical history of Balance problem; Bipolar I disorder, most recent episode (or current) depressed, in partial or unspecified remission; Cancer ([facility] [address] Depression; Fatigue; Gallstones; Gastric bypass status for obesity; GERD (gastroesophageal reflux disease); History of blood transfusion; History of hysterectomy; Hypothyroidism; MS (multiple sclerosis) ([facility] [address] Paresthesias; and UTI (urinary tract infection). SURGICAL HISTORY      has a past surgical history that includes Hysterectomy (1963); Carpal tunnel release (1999); other surgical history; other surgical history (1986); Batsheva-en-Y Gastric Bypass (2000); other surgical history (2006); bone graft (Left); Cholecystectomy, laparoscopic (5/16/2016); laparoscopic appendectomy (5/16/2016); Appendectomy; Enteroscopy (N/A, 2/5/2018); pr colonoscopy flx dx w/collj spec when pfrmd (Left, 2/6/2018); pr office/outpt visit,procedure only (Left, 2/18/2018); Colonoscopy; Endoscopy, colon, diagnostic; Dilatation, esophagus; and pr office/outpt visit,procedure only (N/A, 10/4/2018).     CURRENT MEDICATIONS       Discharge Medication List as of 10/15/2018  1:50 AM      CONTINUE these medications which have NOT CHANGED    Details   vilazodone HCl (VILAZODONE HCL) 40 MG TABS Take 1 tablet by mouth daily, Disp-90 tablet, R-1Normal      lamoTRIgine (LAMICTAL) 200 MG tablet TAKE ONE TABLET BY MOUTH ONE TIME A DAY, Disp-90 tablet, R-0Normal      lithium 300 MG capsule TAKE 1 CAPSULE BY MOUTH EVERY MORNING AND 2 CAPSULES EVERY EVENING, Disp-270 capsule, R-3Normal      psyllium (KONSYL) 28.3 % PACK Take 1 packet by mouth daily, Disp-7 each, R-0NO PRINT      vitamin B-12 1000 MCG tablet Take 1 tablet by mouth daily, Disp-30 tablet, R-5Print      dimethyl fumarate (TECFIDERA) 240 MG delayed release capsule Take 240 mg by mouth 2 times dailyHistorical Med      levothyroxine (SYNTHROID) 125 MCG tablet

## 2018-10-15 NOTE — ED NOTES
Pt back from CT. Pt denies pain at this time. Respirations easy and unlabored. Pt given water. Pt's sister at bedside.      Shane Farrell RN  10/15/18 0846

## 2018-10-15 NOTE — ED NOTES
Pt resting on cot. Pt appears more alert at this time. Pt states she feels \"much better\" and denies pain at this time. Pt denies nausea at this time. Respirations easy and unlabored. Sister at bedside.      Nasra Vang RN  10/14/18 3350

## 2018-10-16 ENCOUNTER — HOSPITAL ENCOUNTER (EMERGENCY)
Age: 58
Discharge: HOME | End: 2018-10-16
Attending: FAMILY MEDICINE
Payer: MEDICARE

## 2018-10-16 ENCOUNTER — APPOINTMENT (OUTPATIENT)
Dept: GENERAL RADIOLOGY | Age: 58
End: 2018-10-16
Payer: MEDICARE

## 2018-10-16 VITALS
DIASTOLIC BLOOD PRESSURE: 78 MMHG | SYSTOLIC BLOOD PRESSURE: 155 MMHG | HEIGHT: 58 IN | WEIGHT: 105 LBS | HEART RATE: 60 BPM | TEMPERATURE: 98.1 F | RESPIRATION RATE: 16 BRPM | OXYGEN SATURATION: 98 % | BODY MASS INDEX: 22.04 KG/M2

## 2018-10-16 DIAGNOSIS — R07.81 PAIN IN RIB: ICD-10-CM

## 2018-10-16 DIAGNOSIS — M54.6 ACUTE MIDLINE THORACIC BACK PAIN: Primary | ICD-10-CM

## 2018-10-16 PROCEDURE — 96374 THER/PROPH/DIAG INJ IV PUSH: CPT

## 2018-10-16 PROCEDURE — 71110 X-RAY EXAM RIBS BIL 3 VIEWS: CPT

## 2018-10-16 PROCEDURE — 99283 EMERGENCY DEPT VISIT LOW MDM: CPT

## 2018-10-16 PROCEDURE — 6370000000 HC RX 637 (ALT 250 FOR IP): Performed by: FAMILY MEDICINE

## 2018-10-16 PROCEDURE — 6360000002 HC RX W HCPCS: Performed by: FAMILY MEDICINE

## 2018-10-16 RX ORDER — CYCLOBENZAPRINE HCL 10 MG
10 TABLET ORAL 3 TIMES DAILY PRN
Qty: 21 TABLET | Refills: 0 | Status: SHIPPED | OUTPATIENT
Start: 2018-10-16 | End: 2018-10-24

## 2018-10-16 RX ORDER — NAPROXEN 500 MG/1
500 TABLET ORAL 2 TIMES DAILY
Qty: 20 TABLET | Refills: 0 | Status: SHIPPED | OUTPATIENT
Start: 2018-10-16 | End: 2018-10-24 | Stop reason: ALTCHOICE

## 2018-10-16 RX ORDER — KETOROLAC TROMETHAMINE 30 MG/ML
30 INJECTION, SOLUTION INTRAMUSCULAR; INTRAVENOUS ONCE
Status: COMPLETED | OUTPATIENT
Start: 2018-10-16 | End: 2018-10-16

## 2018-10-16 RX ORDER — TIZANIDINE 4 MG/1
4 TABLET ORAL ONCE
Status: COMPLETED | OUTPATIENT
Start: 2018-10-16 | End: 2018-10-16

## 2018-10-16 RX ADMIN — KETOROLAC TROMETHAMINE 30 MG: 30 INJECTION, SOLUTION INTRAMUSCULAR at 19:40

## 2018-10-16 RX ADMIN — TIZANIDINE 4 MG: 4 TABLET ORAL at 19:40

## 2018-10-16 ASSESSMENT — ENCOUNTER SYMPTOMS
WHEEZING: 0
EYE DISCHARGE: 0
EYE PAIN: 0
SORE THROAT: 0
ABDOMINAL PAIN: 0
VOMITING: 0
RHINORRHEA: 0
DIARRHEA: 0
BACK PAIN: 0
COUGH: 0
NAUSEA: 0
SHORTNESS OF BREATH: 0

## 2018-10-16 ASSESSMENT — PAIN DESCRIPTION - DESCRIPTORS: DESCRIPTORS: ACHING

## 2018-10-16 ASSESSMENT — PAIN DESCRIPTION - PAIN TYPE: TYPE: ACUTE PAIN

## 2018-10-16 ASSESSMENT — PAIN DESCRIPTION - LOCATION: LOCATION: RIB CAGE

## 2018-10-16 ASSESSMENT — PAIN SCALES - GENERAL: PAINLEVEL_OUTOF10: 8

## 2018-10-16 ASSESSMENT — PAIN DESCRIPTION - ORIENTATION: ORIENTATION: RIGHT;LEFT

## 2018-10-17 ENCOUNTER — HOSPITAL ENCOUNTER (OUTPATIENT)
Dept: MRI IMAGING | Age: 58
Discharge: HOME | End: 2018-10-17
Payer: MEDICARE

## 2018-10-17 DIAGNOSIS — G35 MULTIPLE SCLEROSIS (HCC): ICD-10-CM

## 2018-10-17 PROCEDURE — 70553 MRI BRAIN STEM W/O & W/DYE: CPT

## 2018-10-17 PROCEDURE — 6360000004 HC RX CONTRAST MEDICATION: Performed by: PSYCHIATRY & NEUROLOGY

## 2018-10-17 PROCEDURE — A9579 GAD-BASE MR CONTRAST NOS,1ML: HCPCS | Performed by: PSYCHIATRY & NEUROLOGY

## 2018-10-17 RX ADMIN — GADOTERIDOL 10 ML: 279.3 INJECTION, SOLUTION INTRAVENOUS at 10:21

## 2018-10-24 ENCOUNTER — OFFICE VISIT (OUTPATIENT)
Dept: FAMILY MEDICINE CLINIC | Age: 58
End: 2018-10-24
Payer: MEDICARE

## 2018-10-24 VITALS
DIASTOLIC BLOOD PRESSURE: 89 MMHG | TEMPERATURE: 98.4 F | HEIGHT: 59 IN | WEIGHT: 116.8 LBS | BODY MASS INDEX: 23.55 KG/M2 | OXYGEN SATURATION: 99 % | RESPIRATION RATE: 12 BRPM | SYSTOLIC BLOOD PRESSURE: 124 MMHG | HEART RATE: 62 BPM

## 2018-10-24 DIAGNOSIS — E83.42 HYPOMAGNESEMIA: ICD-10-CM

## 2018-10-24 DIAGNOSIS — G35 MS (MULTIPLE SCLEROSIS) (HCC): ICD-10-CM

## 2018-10-24 DIAGNOSIS — K92.2 RECURRENT GASTROINTESTINAL HEMORRHAGE: Primary | ICD-10-CM

## 2018-10-24 DIAGNOSIS — I82.A12 ACUTE DEEP VEIN THROMBOSIS (DVT) OF AXILLARY VEIN OF LEFT UPPER EXTREMITY (HCC): ICD-10-CM

## 2018-10-24 DIAGNOSIS — Z23 NEED FOR PROPHYLACTIC VACCINATION AND INOCULATION AGAINST VARICELLA: ICD-10-CM

## 2018-10-24 DIAGNOSIS — Z11.4 ENCOUNTER FOR SCREENING FOR HIV: ICD-10-CM

## 2018-10-24 DIAGNOSIS — E03.1 CONGENITAL HYPOTHYROIDISM WITHOUT GOITER: ICD-10-CM

## 2018-10-24 DIAGNOSIS — Z23 NEED FOR PROPHYLACTIC VACCINATION AGAINST DIPHTHERIA-TETANUS-PERTUSSIS (DTP): ICD-10-CM

## 2018-10-24 DIAGNOSIS — E55.9 VITAMIN D DEFICIENCY: ICD-10-CM

## 2018-10-24 DIAGNOSIS — Z11.59 ENCOUNTER FOR HEPATITIS C SCREENING TEST FOR LOW RISK PATIENT: ICD-10-CM

## 2018-10-24 DIAGNOSIS — E83.51 HYPOCALCEMIA: ICD-10-CM

## 2018-10-24 DIAGNOSIS — Z13.220 SCREENING FOR HYPERLIPIDEMIA: ICD-10-CM

## 2018-10-24 DIAGNOSIS — F31.75 BIPOLAR I, MOST RECENT EPISODE DEPRESSED, PARTIAL REMISSION (HCC): ICD-10-CM

## 2018-10-24 PROCEDURE — 99215 OFFICE O/P EST HI 40 MIN: CPT | Performed by: NURSE PRACTITIONER

## 2018-10-24 RX ORDER — LEVOTHYROXINE SODIUM 0.12 MG/1
125 TABLET ORAL DAILY
Qty: 30 TABLET | Refills: 1 | Status: SHIPPED | OUTPATIENT
Start: 2018-10-24 | End: 2018-12-11 | Stop reason: SDUPTHER

## 2018-10-24 RX ORDER — SUCRALFATE ORAL 1 G/10ML
1 SUSPENSION ORAL 4 TIMES DAILY
COMMUNITY
End: 2019-03-13

## 2018-10-24 ASSESSMENT — ENCOUNTER SYMPTOMS
ANAL BLEEDING: 0
DIARRHEA: 0
SORE THROAT: 0
RHINORRHEA: 0
SHORTNESS OF BREATH: 0
COLOR CHANGE: 0
NAUSEA: 0
CONSTIPATION: 0
ABDOMINAL DISTENTION: 0
BLOOD IN STOOL: 0
EYE DISCHARGE: 0
ABDOMINAL PAIN: 0
EYE REDNESS: 0
COUGH: 0

## 2018-10-24 NOTE — PATIENT INSTRUCTIONS
please click on the \"Sign Up Now\" link. Current as of: December 7, 2017  Content Version: 11.7  © [id number] [employer]. Care instructions adapted under license by [facility] ([facility]). If you have questions about a medical condition or this instruction, always ask your healthcare professional. [address] any warranty or liability for your use of this information. Patient Education        Gastrointestinal Bleeding: Care Instructions  Your Care Instructions    The digestive or gastrointestinal tract goes from the mouth to the anus. It is often called the GI tract. Bleeding can happen anywhere in the GI tract. It may be caused by an ulcer, an infection, or cancer. It may also be caused by medicines such as aspirin or ibuprofen. Light bleeding may not cause any symptoms at first. But if you continue to bleed for a while, you may feel very weak or tired. Sudden, heavy bleeding means you need to see a doctor right away. This kind of bleeding can be very dangerous. But it can usually be cured or controlled. The doctor may do some tests to find the cause of your bleeding. Follow-up care is a key part of your treatment and safety. Be sure to make and go to all appointments, and call your doctor if you are having problems. It's also a good idea to know your test results and keep a list of the medicines you take. How can you care for yourself at home? · Be safe with medicines. Take your medicines exactly as prescribed. Call your doctor if you think you are having a problem with your medicine. You will get more details on the specific medicines your doctor prescribes. · Do not take aspirin or other anti-inflammatory medicines, such as naproxen (Aleve) or ibuprofen (Advil, Motrin), without talking to your doctor first. Ask your doctor if it is okay to use acetaminophen (Tylenol). · Do not drink alcohol. · The bleeding may make you lose iron.  So it's important to eat foods that have a active. · You may have to take medicine. Follow-up care is a key part of your treatment and safety. Be sure to make and go to all appointments, and call your doctor if you are having problems. It's also a good idea to know your test results and keep a list of the medicines you take. Where can you learn more? Go to [email or web address] and sign in to your weartolook account. Enter T552 in the Nagi box to learn more about \"Learning About High Cholesterol. \"     If you do not have an account, please click on the \"Sign Up Now\" link. Current as of: May 10, 2017  Content Version: 11.7  © 2758-7537 [employer] Care instructions adapted under license by [facility] ([facility]). If you have questions about a medical condition or this instruction, always ask your healthcare professional. [place] [address] any warranty or liability for your use of this information. Patient Education        Hypocalcemia: Care Instructions  Your Care Instructions    Hypocalcemia means that the level of calcium in your blood is lower than it should be. Your doctor may have done tests to check your calcium levels because you had certain symptoms. These include tingling or twitching of your muscles. Your doctor may do more tests to find out why your calcium is low and to see how well your kidneys and other organs are working. Your doctor will also want to see how well your parathyroid gland is working. This gland controls calcium levels in your blood. You may have this problem because you are not getting enough calcium in your diet. Or your body may not be absorbing the calcium as it should. You may be able to get your calcium up to a safe level by taking supplements. If your levels are very low, your doctor may give you a calcium shot, possibly along with magnesium. You will probably also be given vitamin D, because you need it to absorb calcium.   After your doctor has your

## 2018-10-24 NOTE — PROGRESS NOTES
Pharmacy Medication History Note      List of current medications patient is taking is complete. Source of information: patient    Medications removed (include reason, ex. therapy complete or physician discontinued):  · cyclobenzaprine - given in ER and no longer taking  · Naproxen - given in ER and no longer taking    Medications added/doses adjusted:  · carafate 1 g po qid added     Other notes (ex. Recent course of antibiotics, Coumadin dosing):  · Denies use of other OTC or herbal medications.       Allergies reviewed    Eliane Fischer, PharmD  PGY2 Ambulatory Care Resident
History:   Diagnosis Date    Balance problem     Bipolar I disorder, most recent episode (or current) depressed, in partial or unspecified remission 5/8/2013    Cancer ([address]     CERVICAL     Depression     Fatigue     Gallstones     Gastric bypass status for obesity 2002    GERD (gastroesophageal reflux disease)     History of blood transfusion     History of hysterectomy     REMOVAL OF ONE OVARY     Hypothyroidism     MS (multiple sclerosis) (HCC)     Paresthesias     RT LOWER LIMB    UTI (urinary tract infection)       Past Surgical History:   Procedure Laterality Date    APPENDECTOMY      BONE GRAFT Left     left leg to right arm    CARPAL TUNNEL RELEASE  1999    CHOLECYSTECTOMY, LAPAROSCOPIC  5/16/2016    COLONOSCOPY      DILATATION, ESOPHAGUS      ENDOSCOPY, COLON, DIAGNOSTIC      ENTEROSCOPY N/A 2/5/2018    ENTEROSCOPY performed by Mg South MD at [address] [place] [address]  5/16/2016    OTHER SURGICAL HISTORY      RECTAL FISSULRE    OTHER SURGICAL HISTORY  1986    D&C    OTHER SURGICAL HISTORY  2006    GI BLEED    OK COLONOSCOPY FLX DX W/COLLJ SPEC WHEN PFRMD Left 2/6/2018    COL[facility] Mg [facility]    OK OFFICE/OUTPT VISIT,PROCEDURE ONLY Left 2/18/2018    EGD ESOPHAGOGASTRODUODENOSCOPY performed by Micah Rodriguez MD at [address] [place] [address] OFFICE/OUTPT VISIT,PROCEDURE ONLY N/A 10/4/2018    EGD DIAGNOSTIC ONLY performed by Rodrigo Brand MD at [address] [place] [address]  2000     Family History   Problem Relation Age of Onset    Cancer Mother     Colon Polyps Mother     Heart Disease Father     Glaucoma Father     Cancer Sister     Cancer Sister      Social History   Substance Use Topics    Smoking status: Former Smoker     Packs/day: 0.10     Years: 0.50     Types: Cigarettes     Quit date: 10/24/1977    Smokeless tobacco: Never Used   Krishna Manley

## 2018-10-25 ENCOUNTER — HOSPITAL ENCOUNTER (OUTPATIENT)
Dept: INTERVENTIONAL RADIOLOGY/VASCULAR | Age: 58
End: 2018-10-25
Payer: MEDICARE

## 2018-10-25 ENCOUNTER — HOSPITAL ENCOUNTER (OUTPATIENT)
Age: 58
Discharge: HOME | End: 2018-10-25
Payer: MEDICARE

## 2018-10-25 ENCOUNTER — TELEPHONE (OUTPATIENT)
Dept: FAMILY MEDICINE CLINIC | Age: 58
End: 2018-10-25

## 2018-10-25 DIAGNOSIS — Z13.220 SCREENING FOR HYPERLIPIDEMIA: ICD-10-CM

## 2018-10-25 DIAGNOSIS — G35 MS (MULTIPLE SCLEROSIS) (HCC): ICD-10-CM

## 2018-10-25 DIAGNOSIS — E55.9 VITAMIN D DEFICIENCY: ICD-10-CM

## 2018-10-25 DIAGNOSIS — E03.1 CONGENITAL HYPOTHYROIDISM WITHOUT GOITER: ICD-10-CM

## 2018-10-25 DIAGNOSIS — E83.42 HYPOMAGNESEMIA: ICD-10-CM

## 2018-10-25 DIAGNOSIS — D50.0 CHRONIC BLOOD LOSS ANEMIA: Primary | ICD-10-CM

## 2018-10-25 DIAGNOSIS — Z11.59 ENCOUNTER FOR HEPATITIS C SCREENING TEST FOR LOW RISK PATIENT: ICD-10-CM

## 2018-10-25 DIAGNOSIS — K92.2 RECURRENT GASTROINTESTINAL HEMORRHAGE: ICD-10-CM

## 2018-10-25 DIAGNOSIS — Z11.4 ENCOUNTER FOR SCREENING FOR HIV: ICD-10-CM

## 2018-10-25 LAB
ANISOCYTOSIS: PRESENT
BASOPHILIA: ABNORMAL
BASOPHILS # BLD: 0.3 %
BASOPHILS ABSOLUTE: 0 THOU/MM3 (ref 0–0.1)
CHOLESTEROL, TOTAL: 129 MG/DL (ref 100–199)
EOSINOPHIL # BLD: 2.8 %
EOSINOPHILS ABSOLUTE: 0.1 THOU/MM3 (ref 0–0.4)
ERYTHROCYTE [DISTWIDTH] IN BLOOD BY AUTOMATED COUNT: 19.3 % (ref 11.5–14.5)
ERYTHROCYTE [DISTWIDTH] IN BLOOD BY AUTOMATED COUNT: 73.1 FL (ref 35–45)
FOLATE: > 20 NG/ML (ref 4.8–24.2)
HCT VFR BLD CALC: 30.1 % (ref 37–47)
HDLC SERPL-MCNC: 84 MG/DL
HEMOGLOBIN: 9.1 GM/DL (ref 12–16)
HEPATITIS C ANTIBODY: NEGATIVE
IMMATURE GRANS (ABS): 0.01 THOU/MM3 (ref 0–0.07)
IMMATURE GRANULOCYTES: 0.3 %
LDL CHOLESTEROL CALCULATED: 37 MG/DL
LYMPHOCYTES # BLD: 9.9 %
LYMPHOCYTES ABSOLUTE: 0.4 THOU/MM3 (ref 1–4.8)
MAGNESIUM: 2.1 MG/DL (ref 1.6–2.4)
MCH RBC QN AUTO: 31.3 PG (ref 26–33)
MCHC RBC AUTO-ENTMCNC: 30.2 GM/DL (ref 32.2–35.5)
MCV RBC AUTO: 103.4 FL (ref 81–99)
MONOCYTES # BLD: 9.3 %
MONOCYTES ABSOLUTE: 0.3 THOU/MM3 (ref 0.4–1.3)
NUCLEATED RED BLOOD CELLS: 0 /100 WBC
PLATELET # BLD: 245 THOU/MM3 (ref 130–400)
PLATELET ESTIMATE: ADEQUATE
PMV BLD AUTO: 9.2 FL (ref 9.4–12.4)
POIKILOCYTES: ABNORMAL
RBC # BLD: 2.91 MILL/MM3 (ref 4.2–5.4)
SCAN OF BLOOD SMEAR: NORMAL
SEG NEUTROPHILS: 77.4 %
SEGMENTED NEUTROPHILS ABSOLUTE COUNT: 2.8 THOU/MM3 (ref 1.8–7.7)
TRIGL SERPL-MCNC: 40 MG/DL (ref 0–199)
VITAMIN B-12: 1180 PG/ML (ref 211–911)
VITAMIN D 25-HYDROXY: 16 NG/ML (ref 30–100)
WBC # BLD: 3.6 THOU/MM3 (ref 4.8–10.8)

## 2018-10-25 PROCEDURE — 85025 COMPLETE CBC W/AUTO DIFF WBC: CPT

## 2018-10-25 PROCEDURE — 82607 VITAMIN B-12: CPT

## 2018-10-25 PROCEDURE — 86803 HEPATITIS C AB TEST: CPT

## 2018-10-25 PROCEDURE — 80061 LIPID PANEL: CPT

## 2018-10-25 PROCEDURE — 36415 COLL VENOUS BLD VENIPUNCTURE: CPT

## 2018-10-25 PROCEDURE — 82306 VITAMIN D 25 HYDROXY: CPT

## 2018-10-25 PROCEDURE — 83735 ASSAY OF MAGNESIUM: CPT

## 2018-10-25 PROCEDURE — 87389 HIV-1 AG W/HIV-1&-2 AB AG IA: CPT

## 2018-10-25 PROCEDURE — 82746 ASSAY OF FOLIC ACID SERUM: CPT

## 2018-10-26 LAB — HIV-2 AB: NEGATIVE

## 2018-10-30 ENCOUNTER — TELEPHONE (OUTPATIENT)
Dept: FAMILY MEDICINE CLINIC | Age: 58
End: 2018-10-30

## 2018-11-05 ENCOUNTER — TELEPHONE (OUTPATIENT)
Dept: PSYCHIATRY | Age: 58
End: 2018-11-05

## 2018-11-06 NOTE — TELEPHONE ENCOUNTER
Willian Aleman was contacted regarding providers recommendation with vibryd, she indicated she is concerned with her history of G.I bleed, recently admitted to hospital needing a blood transfusion of 14 pints ,she also has 2 blood clots in her shoulder from a med. Line that was placed Willian Aleman is scheduled to return on 11/14.

## 2018-11-07 ENCOUNTER — OFFICE VISIT (OUTPATIENT)
Dept: FAMILY MEDICINE CLINIC | Age: 58
End: 2018-11-07
Payer: MEDICARE

## 2018-11-07 VITALS
SYSTOLIC BLOOD PRESSURE: 119 MMHG | TEMPERATURE: 98.8 F | DIASTOLIC BLOOD PRESSURE: 67 MMHG | RESPIRATION RATE: 12 BRPM | HEART RATE: 54 BPM | OXYGEN SATURATION: 99 % | HEIGHT: 59 IN | WEIGHT: 115 LBS | BODY MASS INDEX: 23.18 KG/M2

## 2018-11-07 DIAGNOSIS — J02.9 ACUTE VIRAL PHARYNGITIS: Primary | ICD-10-CM

## 2018-11-07 DIAGNOSIS — Z23 NEED FOR PROPHYLACTIC VACCINATION AND INOCULATION AGAINST VARICELLA: ICD-10-CM

## 2018-11-07 DIAGNOSIS — Z23 NEED FOR PROPHYLACTIC VACCINATION WITH TETANUS-DIPHTHERIA (TD): ICD-10-CM

## 2018-11-07 LAB — S PYO AG THROAT QL: NORMAL

## 2018-11-07 PROCEDURE — 87880 STREP A ASSAY W/OPTIC: CPT | Performed by: NURSE PRACTITIONER

## 2018-11-07 PROCEDURE — 99213 OFFICE O/P EST LOW 20 MIN: CPT | Performed by: NURSE PRACTITIONER

## 2018-11-07 RX ORDER — LAMOTRIGINE 200 MG/1
200 TABLET ORAL DAILY
Qty: 30 TABLET | Refills: 0 | Status: SHIPPED | OUTPATIENT
Start: 2018-11-07 | End: 2018-12-07 | Stop reason: SDUPTHER

## 2018-11-07 RX ORDER — PHENOL 1.4 %
1 AEROSOL, SPRAY (ML) MUCOUS MEMBRANE NIGHTLY
COMMUNITY

## 2018-11-07 RX ORDER — PANTOPRAZOLE SODIUM 40 MG/1
40 TABLET, DELAYED RELEASE ORAL 2 TIMES DAILY
COMMUNITY
End: 2019-09-21

## 2018-11-07 RX ORDER — MULTIVITAMIN/IRON/FOLIC ACID 18MG-0.4MG
250 TABLET ORAL NIGHTLY
COMMUNITY

## 2018-11-07 RX ORDER — TETANUS AND DIPHTHERIA TOXOIDS ADSORBED 2; 2 [LF]/.5ML; [LF]/.5ML
0.5 INJECTION INTRAMUSCULAR ONCE
Qty: 0.5 ML | Refills: 0 | Status: SHIPPED | OUTPATIENT
Start: 2018-11-07 | End: 2018-11-07

## 2018-11-07 ASSESSMENT — ENCOUNTER SYMPTOMS
VOMITING: 0
NAUSEA: 0
COUGH: 0
CHEST TIGHTNESS: 0
TROUBLE SWALLOWING: 0
DIARRHEA: 0
SHORTNESS OF BREATH: 0
SORE THROAT: 1

## 2018-11-07 NOTE — PROGRESS NOTES
OFFICE/OUTPT VISIT,PROCEDURE ONLY Left 2/18/2018    EGD ESOPHAGOGASTRODUODENOSCOPY performed by Nikki Laureano MD at [facility]    [facility] OFFICE/OUTPT VISIT,PROCEDURE ONLY N/A 10/4/2018    EGD DIAGNOSTIC ONLY performed by Sofie Hazel MD at [address]  [address]     Family History   Problem Relation Age of Onset   Montero Cancer Mother     Colon Polyps Mother     Heart Disease Father     Glaucoma Father     Cancer Sister     Cancer Sister      Social History   Substance Use Topics    Smoking status: Former Smoker     Packs/day: 0.10     Years: 0.50     Types: Cigarettes     Quit date: 10/24/1977    Smokeless tobacco: Never Used    Alcohol use No      Current Outpatient Prescriptions   Medication Sig Dispense Refill    diptheria-tetanus toxoids (DECAVAC) 2-2 LF/0.5ML injection Inject 0.5 mLs into the muscle once for 1 dose 0.5 mL 0    zoster recombinant adjuvanted vaccine (SHINGRIX) 50 MCG/0.5ML SUSR injection Inject 0.5 mLs into the muscle once for 1 dose 50 MCG IM then repeat 2-6 months.  1 each 1    pantoprazole (PROTONIX) 40 MG tablet Take 40 mg by mouth 2 times daily      magnesium oxide (MAG-OX) 400 MG tablet Take 250 mg by mouth daily      calcium carbonate 600 MG TABS tablet Take 1 tablet by mouth daily      sucralfate (CARAFATE) 1 GM/10ML suspension Take 1 g by mouth 4 times daily      levothyroxine (SYNTHROID) 125 MCG tablet Take 1 tablet by mouth Daily 30 tablet 1    polyethylene glycol (MIRALAX) powder Take 17 g by mouth daily 1 Bottle 0    vilazodone HCl (VILAZODONE HCL) 40 MG TABS Take 1 tablet by mouth daily 90 tablet 1    lamoTRIgine (LAMICTAL) 200 MG tablet TAKE ONE TABLET BY MOUTH ONE TIME A DAY 90 tablet 0    lithium 300 MG capsule TAKE 1 CAPSULE BY MOUTH EVERY MORNING AND 2 CAPSULES EVERY EVENING 270 capsule 3    vitamin B-12 1000 MCG tablet Take 1 tablet by mouth daily 30 tablet 5    dimethyl fumarate (TECFIDERA) 240 MG delayed release capsule Take 240 BILATERAL (3 VIEWS) CLINICAL INFORMATION: rib pain. denies injury or fall. tenderness to palpation, no step off defortmities, . COMPARISON: May 11, 2018 TECHNIQUE: Four views of the bilateral ribs were obtained. A PA upright view of the chest was also obtained. FINDINGS: No lobar consolidation. Costophrenic recesses are preserved. No displaced rib fracture. Cardiac mediastinal silhouette is within normal limits. Degenerative changes thoracic spine. No displaced rib fracture. No lobar consolidation **This report has been created using voice recognition software. It may contain minor errors which are inherent in voice recognition technology. ** Final report electronically signed by Dr. Ericka Mosley on 10/16/2018 8:24 PM    Ct Abdomen Pelvis W Iv Contrast    Result Date: 10/15/2018  PROCEDURE: CT ABDOMEN PELVIS W IV CONTRAST CLINICAL INFORMATION: abd pain, recent gi bleed with coiling . COMPARISON: October 3, 2018 TECHNIQUE: 5 mm axial CT images were obtained through the abdomen and pelvis after the administration of intravenous and oral contrast. Coronal and sagittal reconstructions were obtained. All CT scans at this facility use dose modulation, iterative reconstruction, and/or weight-based dosing when appropriate to reduce radiation dose to as low as reasonably achievable. FINDINGS:  The lung bases are clear. The cardiac chambers remain within normal limits. There is mild fatty appearance of the liver with postcholecystectomy changes and slight dilation of the intra and extrahepatic bile ducts likely related to surgery. There are embolic coils in the right upper abdomen adjacent or associated with the gastroduodenal artery. There is no evidence of acute bowel obstruction. There is retained stool throughout the colon. The urinary bladder is notably distended. Bilateral kidneys demonstrate slight heterogeneous enhancement correlation with nephritis recommended.  There is no gross obstructive uropathy or retained within normal limits. No abnormal intracranial enhancement is identified. The visualized orbits and temporal bone structures are unremarkable. The paranasal sinuses are within normal limits. There is redemonstration of multiple hyperintense T2 signal lesions within the periventricular and subcortical white matter hemispheres. The appearance is again consistent with the patient's history of demyelinating disease and specifically multiple sclerosis. No new lesion is identified. None of the lesions demonstrate restricted diffusion or enhancement to suggest active demyelination. **This report has been created using voice recognition software. It may contain minor errors which are inherent in voice recognition technology. ** Final report electronically signed by Dr. Nida Farrell on 10/17/2018 12:59 PM    Assessment:      Diagnosis Orders   1. Acute viral pharyngitis  Throat Culture   2. Need for prophylactic vaccination with tetanus-diphtheria (Td)  diptheria-tetanus toxoids (DECAVAC) 2-2 LF/0.5ML injection   3. Need for prophylactic vaccination and inoculation against varicella  zoster recombinant adjuvanted vaccine [facility]) 50 MCG/0.5ML SUSR injection     Plan:      · Rapid strep negative  · Will culture swab   · Source of symptoms likely viral  · Instructed most viral illnesses last 7-14 days, and antibiotics do not help. · Warm salt-water gargles  · Get plenty of rest  · Increase fluids  · Avoid secondhand smoke  · Cool-mist humidifier at night   · Use Tylenol or Ibuprofen (motrin) OTC as directed for headache or sore throat  · Return to office if symptoms do not start to improve over the 7-10 days       No Follow-up on file.     Orders Placed:  Orders Placed This Encounter   Procedures    Throat Culture    POCT rapid strep A     Medications Prescribed:  Orders Placed This Encounter   Medications    diptheria-tetanus toxoids (DECAVAC) 2-2 LF/0.5ML injection     Sig: Inject 0.5 mLs into the muscle once

## 2018-11-09 LAB — THROAT/NOSE CULTURE: NORMAL

## 2018-11-13 ENCOUNTER — HOSPITAL ENCOUNTER (OUTPATIENT)
Age: 58
Discharge: HOME | End: 2018-11-13
Payer: MEDICARE

## 2018-11-13 DIAGNOSIS — D50.0 CHRONIC BLOOD LOSS ANEMIA: ICD-10-CM

## 2018-11-13 LAB
BASOPHILS # BLD: 0.4 %
BASOPHILS ABSOLUTE: 0 THOU/MM3 (ref 0–0.1)
EOSINOPHIL # BLD: 1.7 %
EOSINOPHILS ABSOLUTE: 0.1 THOU/MM3 (ref 0–0.4)
ERYTHROCYTE [DISTWIDTH] IN BLOOD BY AUTOMATED COUNT: 16.4 % (ref 11.5–14.5)
ERYTHROCYTE [DISTWIDTH] IN BLOOD BY AUTOMATED COUNT: 62.9 FL (ref 35–45)
HCT VFR BLD CALC: 35.4 % (ref 37–47)
HEMOGLOBIN: 11 GM/DL (ref 12–16)
IMMATURE GRANS (ABS): 0.02 THOU/MM3 (ref 0–0.07)
IMMATURE GRANULOCYTES: 0.4 %
LYMPHOCYTES # BLD: 11.2 %
LYMPHOCYTES ABSOLUTE: 0.6 THOU/MM3 (ref 1–4.8)
MCH RBC QN AUTO: 32.2 PG (ref 26–33)
MCHC RBC AUTO-ENTMCNC: 31.1 GM/DL (ref 32.2–35.5)
MCV RBC AUTO: 103.5 FL (ref 81–99)
MONOCYTES # BLD: 8.6 %
MONOCYTES ABSOLUTE: 0.5 THOU/MM3 (ref 0.4–1.3)
NUCLEATED RED BLOOD CELLS: 0 /100 WBC
PLATELET # BLD: 267 THOU/MM3 (ref 130–400)
PMV BLD AUTO: 9.6 FL (ref 9.4–12.4)
RBC # BLD: 3.42 MILL/MM3 (ref 4.2–5.4)
SEG NEUTROPHILS: 77.7 %
SEGMENTED NEUTROPHILS ABSOLUTE COUNT: 4.2 THOU/MM3 (ref 1.8–7.7)
WBC # BLD: 5.4 THOU/MM3 (ref 4.8–10.8)

## 2018-11-13 PROCEDURE — 85025 COMPLETE CBC W/AUTO DIFF WBC: CPT

## 2018-11-13 PROCEDURE — 36415 COLL VENOUS BLD VENIPUNCTURE: CPT

## 2018-11-14 ENCOUNTER — TELEPHONE (OUTPATIENT)
Dept: FAMILY MEDICINE CLINIC | Age: 58
End: 2018-11-14

## 2018-11-14 ENCOUNTER — OFFICE VISIT (OUTPATIENT)
Dept: PSYCHIATRY | Age: 58
End: 2018-11-14
Payer: MEDICARE

## 2018-11-14 DIAGNOSIS — F31.9 BIPOLAR DEPRESSION (HCC): Primary | ICD-10-CM

## 2018-11-14 PROCEDURE — 99213 OFFICE O/P EST LOW 20 MIN: CPT | Performed by: PSYCHIATRY & NEUROLOGY

## 2018-11-14 NOTE — PROGRESS NOTES
mg by mouth 2 times daily      magnesium oxide (MAG-OX) 400 MG tablet Take 250 mg by mouth daily      calcium carbonate 600 MG TABS tablet Take 1 tablet by mouth daily      lamoTRIgine (LAMICTAL) 200 MG tablet Take 1 tablet by mouth daily 30 tablet 0    sucralfate (CARAFATE) 1 GM/10ML suspension Take 1 g by mouth 4 times daily      levothyroxine (SYNTHROID) 125 MCG tablet Take 1 tablet by mouth Daily 30 tablet 1    polyethylene glycol (MIRALAX) powder Take 17 g by mouth daily 1 Bottle 0    vilazodone HCl (VILAZODONE HCL) 40 MG TABS Take 1 tablet by mouth daily 90 tablet 1    lithium 300 MG capsule TAKE 1 CAPSULE BY MOUTH EVERY MORNING AND 2 CAPSULES EVERY EVENING 270 capsule 3    vitamin B-12 1000 MCG tablet Take 1 tablet by mouth daily 30 tablet 5    dimethyl fumarate (TECFIDERA) 240 MG delayed release capsule Take 240 mg by mouth 2 times daily      nitroGLYCERIN (NITROSTAT) 0.4 MG SL tablet up to max of 3 total doses. If no relief after 1 dose, call 911. 25 tablet 3    Prenatal MV-Min-Fe Fum-FA-DHA (PRENATAL 1 PO) Take by mouth      ferrous sulfate 325 (65 FE) MG tablet Take 325 mg by mouth daily (with breakfast)       No current facility-administered medications for this visit.

## 2018-11-16 ENCOUNTER — HOSPITAL ENCOUNTER (OUTPATIENT)
Age: 58
Discharge: HOME | End: 2018-11-16
Payer: MEDICARE

## 2018-11-16 ENCOUNTER — TELEPHONE (OUTPATIENT)
Dept: FAMILY MEDICINE CLINIC | Age: 58
End: 2018-11-16

## 2018-11-16 DIAGNOSIS — F31.9 BIPOLAR DEPRESSION (HCC): ICD-10-CM

## 2018-11-16 LAB — LITHIUM LEVEL: 0.56 MMOL/L (ref 0.6–1.2)

## 2018-11-16 PROCEDURE — 36415 COLL VENOUS BLD VENIPUNCTURE: CPT

## 2018-11-16 PROCEDURE — 80178 ASSAY OF LITHIUM: CPT

## 2018-11-21 ENCOUNTER — HOSPITAL ENCOUNTER (OUTPATIENT)
Dept: INTERVENTIONAL RADIOLOGY/VASCULAR | Age: 58
Discharge: HOME | End: 2018-11-21
Payer: MEDICARE

## 2018-11-21 DIAGNOSIS — I82.622 DEEP VEIN THROMBOSIS (DVT) OF LEFT UPPER EXTREMITY, UNSPECIFIED CHRONICITY, UNSPECIFIED VEIN (HCC): ICD-10-CM

## 2018-11-21 PROCEDURE — 93971 EXTREMITY STUDY: CPT

## 2018-12-07 RX ORDER — LAMOTRIGINE 200 MG/1
200 TABLET ORAL DAILY
Qty: 30 TABLET | Refills: 0 | Status: SHIPPED | OUTPATIENT
Start: 2018-12-07 | End: 2019-01-04 | Stop reason: SDUPTHER

## 2018-12-11 RX ORDER — LEVOTHYROXINE SODIUM 0.12 MG/1
125 TABLET ORAL DAILY
Qty: 30 TABLET | Refills: 1 | Status: SHIPPED | OUTPATIENT
Start: 2018-12-11 | End: 2019-01-21 | Stop reason: SDUPTHER

## 2018-12-12 ENCOUNTER — HOSPITAL ENCOUNTER (OUTPATIENT)
Dept: WOMENS IMAGING | Age: 58
Discharge: HOME | End: 2018-12-12
Payer: MEDICARE

## 2018-12-12 DIAGNOSIS — Z12.31 VISIT FOR SCREENING MAMMOGRAM: ICD-10-CM

## 2018-12-12 PROCEDURE — 77067 SCR MAMMO BI INCL CAD: CPT

## 2019-01-07 RX ORDER — LAMOTRIGINE 200 MG/1
TABLET ORAL
Qty: 30 TABLET | Refills: 1 | Status: SHIPPED | OUTPATIENT
Start: 2019-01-07 | End: 2019-03-10 | Stop reason: SDUPTHER

## 2019-01-08 ENCOUNTER — OFFICE VISIT (OUTPATIENT)
Dept: FAMILY MEDICINE CLINIC | Age: 59
End: 2019-01-08
Payer: MEDICARE

## 2019-01-08 VITALS
DIASTOLIC BLOOD PRESSURE: 60 MMHG | HEIGHT: 59 IN | BODY MASS INDEX: 24.35 KG/M2 | RESPIRATION RATE: 12 BRPM | TEMPERATURE: 97.7 F | OXYGEN SATURATION: 100 % | SYSTOLIC BLOOD PRESSURE: 100 MMHG | WEIGHT: 120.8 LBS | HEART RATE: 56 BPM

## 2019-01-08 DIAGNOSIS — F31.75 BIPOLAR I, MOST RECENT EPISODE DEPRESSED, PARTIAL REMISSION (HCC): ICD-10-CM

## 2019-01-08 DIAGNOSIS — R19.7 DIARRHEA, UNSPECIFIED TYPE: ICD-10-CM

## 2019-01-08 DIAGNOSIS — D50.8 OTHER IRON DEFICIENCY ANEMIA: ICD-10-CM

## 2019-01-08 DIAGNOSIS — E06.3 HYPOTHYROIDISM DUE TO HASHIMOTO'S THYROIDITIS: ICD-10-CM

## 2019-01-08 DIAGNOSIS — E03.8 HYPOTHYROIDISM DUE TO HASHIMOTO'S THYROIDITIS: ICD-10-CM

## 2019-01-08 DIAGNOSIS — K29.71 GASTROINTESTINAL HEMORRHAGE ASSOCIATED WITH GASTRITIS, UNSPECIFIED GASTRITIS TYPE: ICD-10-CM

## 2019-01-08 DIAGNOSIS — K25.0 ACUTE GASTRIC ULCER WITH HEMORRHAGE: ICD-10-CM

## 2019-01-08 DIAGNOSIS — R41.3 MEMORY DIFFICULTIES: ICD-10-CM

## 2019-01-08 DIAGNOSIS — G35 MS (MULTIPLE SCLEROSIS) (HCC): Primary | ICD-10-CM

## 2019-01-08 PROCEDURE — 99214 OFFICE O/P EST MOD 30 MIN: CPT | Performed by: FAMILY MEDICINE

## 2019-01-08 RX ORDER — ONDANSETRON HYDROCHLORIDE 8 MG/1
8 TABLET, FILM COATED ORAL EVERY 8 HOURS PRN
COMMUNITY
End: 2019-01-16 | Stop reason: SDUPTHER

## 2019-01-08 ASSESSMENT — ENCOUNTER SYMPTOMS
VOMITING: 0
COUGH: 0
TROUBLE SWALLOWING: 0
DIARRHEA: 1
NAUSEA: 0
BLOOD IN STOOL: 0
CONSTIPATION: 0
EYE PAIN: 0
ABDOMINAL PAIN: 0
SHORTNESS OF BREATH: 0

## 2019-01-15 ENCOUNTER — HOSPITAL ENCOUNTER (EMERGENCY)
Age: 59
Discharge: HOME | End: 2019-01-16
Attending: EMERGENCY MEDICINE
Payer: MEDICARE

## 2019-01-15 DIAGNOSIS — E86.0 DEHYDRATION, MILD: ICD-10-CM

## 2019-01-15 DIAGNOSIS — Z82.0 FAMILY HISTORY OF MS (MULTIPLE SCLEROSIS): ICD-10-CM

## 2019-01-15 DIAGNOSIS — R11.0 NAUSEA: Primary | ICD-10-CM

## 2019-01-15 LAB
ALBUMIN SERPL-MCNC: 4.4 G/DL (ref 3.5–5.1)
ALP BLD-CCNC: 134 U/L (ref 38–126)
ALT SERPL-CCNC: 94 U/L (ref 11–66)
ANION GAP SERPL CALCULATED.3IONS-SCNC: 10 MEQ/L (ref 8–16)
AST SERPL-CCNC: 99 U/L (ref 5–40)
BASOPHILS # BLD: 0.4 %
BASOPHILS ABSOLUTE: 0 THOU/MM3 (ref 0–0.1)
BILIRUB SERPL-MCNC: 0.4 MG/DL (ref 0.3–1.2)
BUN BLDV-MCNC: 25 MG/DL (ref 7–22)
CALCIUM SERPL-MCNC: 9.9 MG/DL (ref 8.5–10.5)
CHLORIDE BLD-SCNC: 106 MEQ/L (ref 98–111)
CO2: 21 MEQ/L (ref 23–33)
CREAT SERPL-MCNC: 0.6 MG/DL (ref 0.4–1.2)
EKG ATRIAL RATE: 57 BPM
EKG P AXIS: 60 DEGREES
EKG P-R INTERVAL: 150 MS
EKG Q-T INTERVAL: 500 MS
EKG QRS DURATION: 152 MS
EKG QTC CALCULATION (BAZETT): 486 MS
EKG R AXIS: 46 DEGREES
EKG T AXIS: 34 DEGREES
EKG VENTRICULAR RATE: 57 BPM
EOSINOPHIL # BLD: 1.2 %
EOSINOPHILS ABSOLUTE: 0.1 THOU/MM3 (ref 0–0.4)
ERYTHROCYTE [DISTWIDTH] IN BLOOD BY AUTOMATED COUNT: 13.5 % (ref 11.5–14.5)
ERYTHROCYTE [DISTWIDTH] IN BLOOD BY AUTOMATED COUNT: 47.8 FL (ref 35–45)
GFR SERPL CREATININE-BSD FRML MDRD: > 90 ML/MIN/1.73M2
GLUCOSE BLD-MCNC: 95 MG/DL (ref 70–108)
HCT VFR BLD CALC: 40.4 % (ref 37–47)
HEMOGLOBIN: 13 GM/DL (ref 12–16)
IMMATURE GRANS (ABS): 0.02 THOU/MM3 (ref 0–0.07)
IMMATURE GRANULOCYTES: 0.3 %
LYMPHOCYTES # BLD: 6.8 %
LYMPHOCYTES ABSOLUTE: 0.5 THOU/MM3 (ref 1–4.8)
MCH RBC QN AUTO: 31.1 PG (ref 26–33)
MCHC RBC AUTO-ENTMCNC: 32.2 GM/DL (ref 32.2–35.5)
MCV RBC AUTO: 96.7 FL (ref 81–99)
MONOCYTES # BLD: 5.9 %
MONOCYTES ABSOLUTE: 0.4 THOU/MM3 (ref 0.4–1.3)
NUCLEATED RED BLOOD CELLS: 0 /100 WBC
OSMOLALITY CALCULATION: 278 MOSMOL/KG (ref 275–300)
PLATELET # BLD: 206 THOU/MM3 (ref 130–400)
PMV BLD AUTO: 10 FL (ref 9.4–12.4)
POTASSIUM REFLEX MAGNESIUM: 4.3 MEQ/L (ref 3.5–5.2)
RBC # BLD: 4.18 MILL/MM3 (ref 4.2–5.4)
SEG NEUTROPHILS: 85.4 %
SEGMENTED NEUTROPHILS ABSOLUTE COUNT: 5.8 THOU/MM3 (ref 1.8–7.7)
SODIUM BLD-SCNC: 137 MEQ/L (ref 135–145)
TOTAL PROTEIN: 6.2 G/DL (ref 6.1–8)
TSH SERPL DL<=0.05 MIU/L-ACNC: 0.18 UIU/ML (ref 0.4–4.2)
WBC # BLD: 6.8 THOU/MM3 (ref 4.8–10.8)

## 2019-01-15 PROCEDURE — 96374 THER/PROPH/DIAG INJ IV PUSH: CPT

## 2019-01-15 PROCEDURE — 86800 THYROGLOBULIN ANTIBODY: CPT

## 2019-01-15 PROCEDURE — 84432 ASSAY OF THYROGLOBULIN: CPT

## 2019-01-15 PROCEDURE — 85025 COMPLETE CBC W/AUTO DIFF WBC: CPT

## 2019-01-15 PROCEDURE — 6360000002 HC RX W HCPCS: Performed by: EMERGENCY MEDICINE

## 2019-01-15 PROCEDURE — 96361 HYDRATE IV INFUSION ADD-ON: CPT

## 2019-01-15 PROCEDURE — 99285 EMERGENCY DEPT VISIT HI MDM: CPT

## 2019-01-15 PROCEDURE — 2580000003 HC RX 258: Performed by: EMERGENCY MEDICINE

## 2019-01-15 PROCEDURE — 83516 IMMUNOASSAY NONANTIBODY: CPT

## 2019-01-15 PROCEDURE — 80053 COMPREHEN METABOLIC PANEL: CPT

## 2019-01-15 PROCEDURE — 86376 MICROSOMAL ANTIBODY EACH: CPT

## 2019-01-15 PROCEDURE — 36415 COLL VENOUS BLD VENIPUNCTURE: CPT

## 2019-01-15 PROCEDURE — 93005 ELECTROCARDIOGRAM TRACING: CPT | Performed by: EMERGENCY MEDICINE

## 2019-01-15 PROCEDURE — 84443 ASSAY THYROID STIM HORMONE: CPT

## 2019-01-15 RX ORDER — SODIUM CHLORIDE 9 MG/ML
INJECTION, SOLUTION INTRAVENOUS CONTINUOUS
Status: DISCONTINUED | OUTPATIENT
Start: 2019-01-15 | End: 2019-01-16 | Stop reason: HOSPADM

## 2019-01-15 RX ORDER — ONDANSETRON 2 MG/ML
4 INJECTION INTRAMUSCULAR; INTRAVENOUS EVERY 30 MIN PRN
Status: DISCONTINUED | OUTPATIENT
Start: 2019-01-15 | End: 2019-01-16 | Stop reason: HOSPADM

## 2019-01-15 RX ORDER — MULTIVIT-MIN/IRON/FOLIC ACID/K 18-600-40
CAPSULE ORAL NIGHTLY
COMMUNITY

## 2019-01-15 RX ORDER — 0.9 % SODIUM CHLORIDE 0.9 %
1000 INTRAVENOUS SOLUTION INTRAVENOUS ONCE
Status: COMPLETED | OUTPATIENT
Start: 2019-01-15 | End: 2019-01-16

## 2019-01-15 RX ADMIN — SODIUM CHLORIDE 1000 ML: 9 INJECTION, SOLUTION INTRAVENOUS at 23:00

## 2019-01-15 RX ADMIN — ONDANSETRON 4 MG: 2 INJECTION INTRAMUSCULAR; INTRAVENOUS at 23:00

## 2019-01-15 ASSESSMENT — ENCOUNTER SYMPTOMS
BLOOD IN STOOL: 0
SHORTNESS OF BREATH: 0
WHEEZING: 0
COUGH: 0
SORE THROAT: 0
VOMITING: 1
DIARRHEA: 0
BACK PAIN: 0
NAUSEA: 1
ABDOMINAL PAIN: 0

## 2019-01-16 ENCOUNTER — TELEPHONE (OUTPATIENT)
Dept: FAMILY MEDICINE CLINIC | Age: 59
End: 2019-01-16

## 2019-01-16 VITALS
BODY MASS INDEX: 22.18 KG/M2 | RESPIRATION RATE: 16 BRPM | HEART RATE: 61 BPM | HEIGHT: 59 IN | SYSTOLIC BLOOD PRESSURE: 108 MMHG | OXYGEN SATURATION: 98 % | TEMPERATURE: 98 F | WEIGHT: 110 LBS | DIASTOLIC BLOOD PRESSURE: 53 MMHG

## 2019-01-16 LAB
BACTERIA: ABNORMAL /HPF
BILIRUBIN URINE: NEGATIVE
BLOOD, URINE: ABNORMAL
CASTS 2: ABNORMAL /LPF
CASTS UA: ABNORMAL /LPF
CHARACTER, URINE: CLEAR
COLOR: YELLOW
CRYSTALS, UA: ABNORMAL
EPITHELIAL CELLS, UA: ABNORMAL /HPF
GLUCOSE URINE: NEGATIVE MG/DL
KETONES, URINE: 15
LEUKOCYTE ESTERASE, URINE: ABNORMAL
MISCELLANEOUS 2: ABNORMAL
NITRITE, URINE: NEGATIVE
PH UA: 5.5
PROTEIN UA: NEGATIVE
RBC URINE: ABNORMAL /HPF
RENAL EPITHELIAL, UA: ABNORMAL
SPECIFIC GRAVITY, URINE: 1.01 (ref 1–1.03)
UROBILINOGEN, URINE: 0.2 EU/DL
WBC UA: ABNORMAL /HPF
YEAST: ABNORMAL

## 2019-01-16 PROCEDURE — 87077 CULTURE AEROBIC IDENTIFY: CPT

## 2019-01-16 PROCEDURE — 93010 ELECTROCARDIOGRAM REPORT: CPT | Performed by: INTERNAL MEDICINE

## 2019-01-16 PROCEDURE — 81001 URINALYSIS AUTO W/SCOPE: CPT

## 2019-01-16 PROCEDURE — 87184 SC STD DISK METHOD PER PLATE: CPT

## 2019-01-16 PROCEDURE — 87186 SC STD MICRODIL/AGAR DIL: CPT

## 2019-01-16 PROCEDURE — 87086 URINE CULTURE/COLONY COUNT: CPT

## 2019-01-16 PROCEDURE — 96361 HYDRATE IV INFUSION ADD-ON: CPT

## 2019-01-16 RX ORDER — ONDANSETRON 4 MG/1
4 TABLET, ORALLY DISINTEGRATING ORAL EVERY 8 HOURS PRN
Qty: 20 TABLET | Refills: 0 | Status: SHIPPED | OUTPATIENT
Start: 2019-01-16 | End: 2019-11-13 | Stop reason: ALTCHOICE

## 2019-01-17 ENCOUNTER — OFFICE VISIT (OUTPATIENT)
Dept: PSYCHIATRY | Age: 59
End: 2019-01-17
Payer: MEDICARE

## 2019-01-17 ENCOUNTER — HOSPITAL ENCOUNTER (OUTPATIENT)
Age: 59
Discharge: HOME | End: 2019-01-17
Payer: MEDICARE

## 2019-01-17 ENCOUNTER — HOSPITAL ENCOUNTER (OUTPATIENT)
Dept: MRI IMAGING | Age: 59
Discharge: HOME | End: 2019-01-17
Payer: MEDICARE

## 2019-01-17 DIAGNOSIS — F31.31 BIPOLAR AFFECTIVE DISORDER, CURRENTLY DEPRESSED, MILD (HCC): Primary | ICD-10-CM

## 2019-01-17 DIAGNOSIS — G35 MS (MULTIPLE SCLEROSIS) (HCC): ICD-10-CM

## 2019-01-17 LAB
LITHIUM LEVEL: 0.68 MMOL/L (ref 0.6–1.2)
T4 FREE: 0.96 NG/DL (ref 0.93–1.76)
TSH SERPL DL<=0.05 MIU/L-ACNC: 0.18 UIU/ML (ref 0.4–4.2)

## 2019-01-17 PROCEDURE — 6360000004 HC RX CONTRAST MEDICATION: Performed by: PSYCHIATRY & NEUROLOGY

## 2019-01-17 PROCEDURE — 80178 ASSAY OF LITHIUM: CPT

## 2019-01-17 PROCEDURE — 36415 COLL VENOUS BLD VENIPUNCTURE: CPT

## 2019-01-17 PROCEDURE — 84439 ASSAY OF FREE THYROXINE: CPT

## 2019-01-17 PROCEDURE — 84443 ASSAY THYROID STIM HORMONE: CPT

## 2019-01-17 PROCEDURE — 99213 OFFICE O/P EST LOW 20 MIN: CPT | Performed by: PSYCHIATRY & NEUROLOGY

## 2019-01-17 PROCEDURE — 70553 MRI BRAIN STEM W/O & W/DYE: CPT

## 2019-01-17 PROCEDURE — A9579 GAD-BASE MR CONTRAST NOS,1ML: HCPCS | Performed by: PSYCHIATRY & NEUROLOGY

## 2019-01-17 RX ADMIN — GADOTERIDOL 10 ML: 279.3 INJECTION, SOLUTION INTRAVENOUS at 08:23

## 2019-01-18 LAB
ORGANISM: ABNORMAL
THYROGLOBULIN: NORMAL
THYROID PEROXIDASE ANTIBODY: 0.4 IU/ML (ref 0–9)
URINE CULTURE REFLEX: ABNORMAL

## 2019-01-19 ENCOUNTER — TELEPHONE (OUTPATIENT)
Dept: PHARMACY | Age: 59
End: 2019-01-19

## 2019-01-19 LAB — GLIADIN PEPTIDE IGG, IGA: NORMAL

## 2019-01-20 RX ORDER — NITROFURANTOIN 25; 75 MG/1; MG/1
100 CAPSULE ORAL 2 TIMES DAILY
Qty: 10 CAPSULE | Refills: 0 | Status: SHIPPED | OUTPATIENT
Start: 2019-01-20 | End: 2019-01-25

## 2019-01-21 ENCOUNTER — TELEPHONE (OUTPATIENT)
Dept: FAMILY MEDICINE CLINIC | Age: 59
End: 2019-01-21

## 2019-01-21 RX ORDER — LEVOTHYROXINE SODIUM 0.1 MG/1
100 TABLET ORAL DAILY
Qty: 90 TABLET | Refills: 1 | Status: SHIPPED | OUTPATIENT
Start: 2019-01-21 | End: 2019-07-07 | Stop reason: SDUPTHER

## 2019-02-13 ENCOUNTER — OFFICE VISIT (OUTPATIENT)
Dept: PSYCHIATRY | Age: 59
End: 2019-02-13
Payer: MEDICARE

## 2019-02-13 DIAGNOSIS — F31.75 BIPOLAR I, MOST RECENT EPISODE DEPRESSED, PARTIAL REMISSION (HCC): Primary | ICD-10-CM

## 2019-02-13 PROCEDURE — 99213 OFFICE O/P EST LOW 20 MIN: CPT | Performed by: PSYCHIATRY & NEUROLOGY

## 2019-03-13 ENCOUNTER — OFFICE VISIT (OUTPATIENT)
Dept: FAMILY MEDICINE CLINIC | Age: 59
End: 2019-03-13
Payer: MEDICARE

## 2019-03-13 VITALS
HEIGHT: 59 IN | SYSTOLIC BLOOD PRESSURE: 130 MMHG | WEIGHT: 116.2 LBS | RESPIRATION RATE: 12 BRPM | OXYGEN SATURATION: 99 % | DIASTOLIC BLOOD PRESSURE: 70 MMHG | TEMPERATURE: 98.6 F | BODY MASS INDEX: 23.43 KG/M2 | HEART RATE: 56 BPM

## 2019-03-13 DIAGNOSIS — E03.8 OTHER SPECIFIED HYPOTHYROIDISM: ICD-10-CM

## 2019-03-13 DIAGNOSIS — J34.89 RHINORRHEA: Primary | ICD-10-CM

## 2019-03-13 PROCEDURE — 99213 OFFICE O/P EST LOW 20 MIN: CPT | Performed by: NURSE PRACTITIONER

## 2019-03-13 RX ORDER — VILAZODONE HYDROCHLORIDE 40 MG/1
40 TABLET ORAL DAILY
Status: ON HOLD | COMMUNITY
End: 2019-03-21 | Stop reason: DRUGHIGH

## 2019-03-13 RX ORDER — FLUTICASONE PROPIONATE 50 MCG
1 SPRAY, SUSPENSION (ML) NASAL DAILY
Qty: 2 BOTTLE | Refills: 1 | Status: SHIPPED | OUTPATIENT
Start: 2019-03-13 | End: 2019-04-10 | Stop reason: DRUGHIGH

## 2019-03-13 RX ORDER — LORATADINE 10 MG/1
10 CAPSULE, LIQUID FILLED ORAL DAILY
Qty: 30 CAPSULE | Refills: 1 | Status: ON HOLD | OUTPATIENT
Start: 2019-03-13 | End: 2019-03-22 | Stop reason: HOSPADM

## 2019-03-13 ASSESSMENT — ENCOUNTER SYMPTOMS
COUGH: 0
ANAL BLEEDING: 0
SHORTNESS OF BREATH: 0
RHINORRHEA: 1
BLOOD IN STOOL: 0
DIARRHEA: 0
NAUSEA: 0
ABDOMINAL DISTENTION: 0
CONSTIPATION: 0
EYE DISCHARGE: 0
ABDOMINAL PAIN: 0
EYE REDNESS: 0
SORE THROAT: 0
COLOR CHANGE: 0

## 2019-03-14 ENCOUNTER — HOSPITAL ENCOUNTER (OUTPATIENT)
Age: 59
Discharge: HOME | End: 2019-03-14
Payer: MEDICARE

## 2019-03-14 ENCOUNTER — TELEPHONE (OUTPATIENT)
Dept: FAMILY MEDICINE CLINIC | Age: 59
End: 2019-03-14

## 2019-03-14 DIAGNOSIS — E03.8 OTHER SPECIFIED HYPOTHYROIDISM: ICD-10-CM

## 2019-03-14 LAB — TSH SERPL DL<=0.05 MIU/L-ACNC: 0.52 UIU/ML (ref 0.4–4.2)

## 2019-03-14 PROCEDURE — 84443 ASSAY THYROID STIM HORMONE: CPT

## 2019-03-14 PROCEDURE — 36415 COLL VENOUS BLD VENIPUNCTURE: CPT

## 2019-03-14 PROCEDURE — 84436 ASSAY OF TOTAL THYROXINE: CPT

## 2019-03-15 ENCOUNTER — TELEPHONE (OUTPATIENT)
Dept: FAMILY MEDICINE CLINIC | Age: 59
End: 2019-03-15

## 2019-03-15 LAB — THYROXINE (T4): 5.9 UG/DL (ref 4.5–12)

## 2019-03-20 ENCOUNTER — HOSPITAL ENCOUNTER (INPATIENT)
Age: 59
LOS: 2 days | Discharge: HOME | DRG: 392 | End: 2019-03-22
Attending: FAMILY MEDICINE | Admitting: INTERNAL MEDICINE
Payer: MEDICARE

## 2019-03-20 ENCOUNTER — APPOINTMENT (OUTPATIENT)
Dept: GENERAL RADIOLOGY | Age: 59
DRG: 392 | End: 2019-03-20
Payer: MEDICARE

## 2019-03-20 ENCOUNTER — APPOINTMENT (OUTPATIENT)
Dept: CT IMAGING | Age: 59
DRG: 392 | End: 2019-03-20
Payer: MEDICARE

## 2019-03-20 DIAGNOSIS — R00.1 SYMPTOMATIC BRADYCARDIA: Primary | ICD-10-CM

## 2019-03-20 LAB
AMYLASE: 83 U/L (ref 20–104)
ANION GAP SERPL CALCULATED.3IONS-SCNC: 10 MEQ/L (ref 8–16)
APTT: 30.7 SECONDS (ref 22–38)
BASOPHILS # BLD: 0.4 %
BASOPHILS ABSOLUTE: 0 THOU/MM3 (ref 0–0.1)
BUN BLDV-MCNC: 16 MG/DL (ref 7–22)
CALCIUM SERPL-MCNC: 9.4 MG/DL (ref 8.5–10.5)
CHLORIDE BLD-SCNC: 107 MEQ/L (ref 98–111)
CO2: 25 MEQ/L (ref 23–33)
CREAT SERPL-MCNC: 0.6 MG/DL (ref 0.4–1.2)
D-DIMER QUANTITATIVE: 485 NG/ML FEU (ref 0–500)
EKG ATRIAL RATE: 46 BPM
EKG ATRIAL RATE: 60 BPM
EKG P AXIS: 52 DEGREES
EKG P AXIS: 61 DEGREES
EKG P-R INTERVAL: 168 MS
EKG P-R INTERVAL: 176 MS
EKG Q-T INTERVAL: 486 MS
EKG Q-T INTERVAL: 520 MS
EKG QRS DURATION: 144 MS
EKG QRS DURATION: 150 MS
EKG QTC CALCULATION (BAZETT): 413 MS
EKG QTC CALCULATION (BAZETT): 486 MS
EKG R AXIS: 39 DEGREES
EKG R AXIS: 45 DEGREES
EKG T AXIS: 18 DEGREES
EKG T AXIS: 21 DEGREES
EKG VENTRICULAR RATE: 38 BPM
EKG VENTRICULAR RATE: 60 BPM
EOSINOPHIL # BLD: 1.8 %
EOSINOPHILS ABSOLUTE: 0.2 THOU/MM3 (ref 0–0.4)
ERYTHROCYTE [DISTWIDTH] IN BLOOD BY AUTOMATED COUNT: 14.4 % (ref 11.5–14.5)
ERYTHROCYTE [DISTWIDTH] IN BLOOD BY AUTOMATED COUNT: 54.4 FL (ref 35–45)
GFR SERPL CREATININE-BSD FRML MDRD: > 90 ML/MIN/1.73M2
GLUCOSE BLD-MCNC: 135 MG/DL (ref 70–108)
HCT VFR BLD CALC: 36.6 % (ref 37–47)
HEMOGLOBIN: 11.7 GM/DL (ref 12–16)
IMMATURE GRANS (ABS): 0.02 THOU/MM3 (ref 0–0.07)
IMMATURE GRANULOCYTES: 0.2 %
INR BLD: 0.91 (ref 0.85–1.13)
LACTIC ACID, SEPSIS: 1.1 MMOL/L (ref 0.5–1.9)
LACTIC ACID, SEPSIS: 1.3 MMOL/L (ref 0.5–1.9)
LIPASE: 20.7 U/L (ref 5.6–51.3)
LITHIUM LEVEL: 1.24 MMOL/L (ref 0.6–1.2)
LYMPHOCYTES # BLD: 12.5 %
LYMPHOCYTES ABSOLUTE: 1.1 THOU/MM3 (ref 1–4.8)
MCH RBC QN AUTO: 32.6 PG (ref 26–33)
MCHC RBC AUTO-ENTMCNC: 32 GM/DL (ref 32.2–35.5)
MCV RBC AUTO: 101.9 FL (ref 81–99)
MONOCYTES # BLD: 8.7 %
MONOCYTES ABSOLUTE: 0.7 THOU/MM3 (ref 0.4–1.3)
NUCLEATED RED BLOOD CELLS: 0 /100 WBC
OSMOLALITY CALCULATION: 286.3 MOSMOL/KG (ref 275–300)
PLATELET # BLD: 218 THOU/MM3 (ref 130–400)
PMV BLD AUTO: 10 FL (ref 9.4–12.4)
POTASSIUM SERPL-SCNC: 3.9 MEQ/L (ref 3.5–5.2)
PRO-BNP: 240.5 PG/ML (ref 0–900)
RBC # BLD: 3.59 MILL/MM3 (ref 4.2–5.4)
SEG NEUTROPHILS: 76.4 %
SEGMENTED NEUTROPHILS ABSOLUTE COUNT: 6.4 THOU/MM3 (ref 1.8–7.7)
SODIUM BLD-SCNC: 142 MEQ/L (ref 135–145)
TROPONIN T: < 0.01 NG/ML
TSH SERPL DL<=0.05 MIU/L-ACNC: 2.43 UIU/ML (ref 0.4–4.2)
WBC # BLD: 8.4 THOU/MM3 (ref 4.8–10.8)

## 2019-03-20 PROCEDURE — 93005 ELECTROCARDIOGRAM TRACING: CPT | Performed by: FAMILY MEDICINE

## 2019-03-20 PROCEDURE — 93010 ELECTROCARDIOGRAM REPORT: CPT | Performed by: INTERNAL MEDICINE

## 2019-03-20 PROCEDURE — 2140000000 HC CCU INTERMEDIATE R&B

## 2019-03-20 PROCEDURE — 82150 ASSAY OF AMYLASE: CPT

## 2019-03-20 PROCEDURE — 84443 ASSAY THYROID STIM HORMONE: CPT

## 2019-03-20 PROCEDURE — 84484 ASSAY OF TROPONIN QUANT: CPT

## 2019-03-20 PROCEDURE — 83690 ASSAY OF LIPASE: CPT

## 2019-03-20 PROCEDURE — 83880 ASSAY OF NATRIURETIC PEPTIDE: CPT

## 2019-03-20 PROCEDURE — 85610 PROTHROMBIN TIME: CPT

## 2019-03-20 PROCEDURE — 96374 THER/PROPH/DIAG INJ IV PUSH: CPT

## 2019-03-20 PROCEDURE — 80048 BASIC METABOLIC PNL TOTAL CA: CPT

## 2019-03-20 PROCEDURE — 85379 FIBRIN DEGRADATION QUANT: CPT

## 2019-03-20 PROCEDURE — 71046 X-RAY EXAM CHEST 2 VIEWS: CPT

## 2019-03-20 PROCEDURE — 74176 CT ABD & PELVIS W/O CONTRAST: CPT

## 2019-03-20 PROCEDURE — 83605 ASSAY OF LACTIC ACID: CPT

## 2019-03-20 PROCEDURE — 6360000002 HC RX W HCPCS

## 2019-03-20 PROCEDURE — 2709999900 HC NON-CHARGEABLE SUPPLY

## 2019-03-20 PROCEDURE — 87040 BLOOD CULTURE FOR BACTERIA: CPT

## 2019-03-20 PROCEDURE — 85025 COMPLETE CBC W/AUTO DIFF WBC: CPT

## 2019-03-20 PROCEDURE — 36415 COLL VENOUS BLD VENIPUNCTURE: CPT

## 2019-03-20 PROCEDURE — 80178 ASSAY OF LITHIUM: CPT

## 2019-03-20 PROCEDURE — 85730 THROMBOPLASTIN TIME PARTIAL: CPT

## 2019-03-20 PROCEDURE — G0378 HOSPITAL OBSERVATION PER HR: HCPCS

## 2019-03-20 PROCEDURE — 2580000003 HC RX 258: Performed by: FAMILY MEDICINE

## 2019-03-20 PROCEDURE — 99285 EMERGENCY DEPT VISIT HI MDM: CPT

## 2019-03-20 RX ORDER — ATROPINE SULFATE 1 MG/ML
0.5 INJECTION, SOLUTION INTRAMUSCULAR; INTRAVENOUS; SUBCUTANEOUS ONCE
Status: DISCONTINUED | OUTPATIENT
Start: 2019-03-21 | End: 2019-03-22 | Stop reason: HOSPADM

## 2019-03-20 RX ORDER — VILAZODONE HYDROCHLORIDE 40 MG/1
40 TABLET ORAL DAILY
Status: DISCONTINUED | OUTPATIENT
Start: 2019-03-21 | End: 2019-03-22 | Stop reason: HOSPADM

## 2019-03-20 RX ORDER — MULTIVITAMIN/IRON/FOLIC ACID 18MG-0.4MG
250 TABLET ORAL DAILY
Status: DISCONTINUED | OUTPATIENT
Start: 2019-03-21 | End: 2019-03-22 | Stop reason: HOSPADM

## 2019-03-20 RX ORDER — ATROPINE SULFATE 0.1 MG/ML
INJECTION INTRAVENOUS
Status: COMPLETED
Start: 2019-03-20 | End: 2019-03-20

## 2019-03-20 RX ORDER — ONDANSETRON 4 MG/1
4 TABLET, ORALLY DISINTEGRATING ORAL EVERY 8 HOURS PRN
Status: DISCONTINUED | OUTPATIENT
Start: 2019-03-20 | End: 2019-03-22 | Stop reason: HOSPADM

## 2019-03-20 RX ORDER — FERROUS SULFATE 325(65) MG
325 TABLET ORAL
Status: DISCONTINUED | OUTPATIENT
Start: 2019-03-21 | End: 2019-03-22 | Stop reason: HOSPADM

## 2019-03-20 RX ORDER — PANTOPRAZOLE SODIUM 40 MG/1
40 TABLET, DELAYED RELEASE ORAL 2 TIMES DAILY
Status: DISCONTINUED | OUTPATIENT
Start: 2019-03-21 | End: 2019-03-22 | Stop reason: HOSPADM

## 2019-03-20 RX ORDER — NITROGLYCERIN 0.4 MG/1
0.4 TABLET SUBLINGUAL EVERY 5 MIN PRN
Status: DISCONTINUED | OUTPATIENT
Start: 2019-03-20 | End: 2019-03-22 | Stop reason: HOSPADM

## 2019-03-20 RX ORDER — LITHIUM CARBONATE 300 MG/1
300 CAPSULE ORAL 2 TIMES DAILY WITH MEALS
Status: DISCONTINUED | OUTPATIENT
Start: 2019-03-21 | End: 2019-03-22 | Stop reason: HOSPADM

## 2019-03-20 RX ORDER — 0.9 % SODIUM CHLORIDE 0.9 %
1000 INTRAVENOUS SOLUTION INTRAVENOUS ONCE
Status: COMPLETED | OUTPATIENT
Start: 2019-03-20 | End: 2019-03-20

## 2019-03-20 RX ORDER — LEVOTHYROXINE SODIUM 0.1 MG/1
100 TABLET ORAL DAILY
Status: DISCONTINUED | OUTPATIENT
Start: 2019-03-21 | End: 2019-03-22 | Stop reason: HOSPADM

## 2019-03-20 RX ORDER — ATROPINE SULFATE 0.1 MG/ML
1 INJECTION INTRAVENOUS ONCE
Status: COMPLETED | OUTPATIENT
Start: 2019-03-20 | End: 2019-03-20

## 2019-03-20 RX ORDER — 0.9 % SODIUM CHLORIDE 0.9 %
1000 INTRAVENOUS SOLUTION INTRAVENOUS ONCE
Status: DISCONTINUED | OUTPATIENT
Start: 2019-03-20 | End: 2019-03-20 | Stop reason: HOSPADM

## 2019-03-20 RX ORDER — LANOLIN ALCOHOL/MO/W.PET/CERES
1000 CREAM (GRAM) TOPICAL DAILY
Status: DISCONTINUED | OUTPATIENT
Start: 2019-03-21 | End: 2019-03-22 | Stop reason: HOSPADM

## 2019-03-20 RX ORDER — VILAZODONE HYDROCHLORIDE 40 MG/1
40 TABLET ORAL DAILY
Status: DISCONTINUED | OUTPATIENT
Start: 2019-03-21 | End: 2019-03-20 | Stop reason: SDUPTHER

## 2019-03-20 RX ORDER — FLUTICASONE PROPIONATE 50 MCG
1 SPRAY, SUSPENSION (ML) NASAL DAILY
Status: DISCONTINUED | OUTPATIENT
Start: 2019-03-21 | End: 2019-03-22 | Stop reason: HOSPADM

## 2019-03-20 RX ORDER — CALCIUM CARBONATE 500(1250)
500 TABLET ORAL DAILY
Status: DISCONTINUED | OUTPATIENT
Start: 2019-03-21 | End: 2019-03-22 | Stop reason: HOSPADM

## 2019-03-20 RX ADMIN — ATROPINE SULFATE 1 MG: 0.1 INJECTION, SOLUTION ENDOTRACHEAL; INTRAMUSCULAR; INTRAVENOUS; SUBCUTANEOUS at 20:48

## 2019-03-20 RX ADMIN — SODIUM CHLORIDE 1000 ML: 9 INJECTION, SOLUTION INTRAVENOUS at 21:00

## 2019-03-20 RX ADMIN — ATROPINE SULFATE 1 MG: 0.1 INJECTION INTRAVENOUS at 20:48

## 2019-03-20 ASSESSMENT — PAIN DESCRIPTION - LOCATION: LOCATION: ABDOMEN

## 2019-03-20 ASSESSMENT — PAIN DESCRIPTION - ORIENTATION: ORIENTATION: RIGHT;LEFT

## 2019-03-20 ASSESSMENT — PAIN SCALES - GENERAL
PAINLEVEL_OUTOF10: 0
PAINLEVEL_OUTOF10: 0
PAINLEVEL_OUTOF10: 3
PAINLEVEL_OUTOF10: 0

## 2019-03-20 ASSESSMENT — ENCOUNTER SYMPTOMS
STRIDOR: 0
FACIAL SWELLING: 0
TROUBLE SWALLOWING: 0
SORE THROAT: 1
VOMITING: 0
ABDOMINAL PAIN: 1
CHEST TIGHTNESS: 0
WHEEZING: 0
NAUSEA: 1
SHORTNESS OF BREATH: 0
SINUS PRESSURE: 1
VOICE CHANGE: 0
DIARRHEA: 1
RHINORRHEA: 1
COUGH: 1

## 2019-03-20 ASSESSMENT — PAIN DESCRIPTION - DESCRIPTORS: DESCRIPTORS: ACHING;SHARP

## 2019-03-20 ASSESSMENT — PAIN DESCRIPTION - FREQUENCY: FREQUENCY: CONTINUOUS

## 2019-03-20 ASSESSMENT — PAIN DESCRIPTION - ONSET: ONSET: ON-GOING

## 2019-03-20 ASSESSMENT — PAIN DESCRIPTION - PROGRESSION: CLINICAL_PROGRESSION: NOT CHANGED

## 2019-03-20 ASSESSMENT — PAIN DESCRIPTION - PAIN TYPE: TYPE: ACUTE PAIN

## 2019-03-21 ENCOUNTER — TELEPHONE (OUTPATIENT)
Dept: FAMILY MEDICINE CLINIC | Age: 59
End: 2019-03-21

## 2019-03-21 LAB
BILIRUBIN URINE: NEGATIVE
BLOOD, URINE: NEGATIVE
CHARACTER, URINE: CLEAR
COLOR: YELLOW
FLU A ANTIGEN: NEGATIVE
FLU B ANTIGEN: NEGATIVE
GLUCOSE URINE: NEGATIVE MG/DL
KETONES, URINE: ABNORMAL
LEUKOCYTE ESTERASE, URINE: ABNORMAL
NITRITE, URINE: NEGATIVE
PH UA: 6.5 (ref 5–9)
PROTEIN UA: NEGATIVE
SPECIFIC GRAVITY, URINE: 1.01 (ref 1–1.03)
TROPONIN T: < 0.01 NG/ML
UROBILINOGEN, URINE: 0.2 EU/DL (ref 0–1)

## 2019-03-21 PROCEDURE — 2580000003 HC RX 258: Performed by: INTERNAL MEDICINE

## 2019-03-21 PROCEDURE — 2709999900 HC NON-CHARGEABLE SUPPLY

## 2019-03-21 PROCEDURE — 81003 URINALYSIS AUTO W/O SCOPE: CPT

## 2019-03-21 PROCEDURE — 6370000000 HC RX 637 (ALT 250 FOR IP): Performed by: INTERNAL MEDICINE

## 2019-03-21 PROCEDURE — 81001 URINALYSIS AUTO W/SCOPE: CPT

## 2019-03-21 PROCEDURE — 84484 ASSAY OF TROPONIN QUANT: CPT

## 2019-03-21 PROCEDURE — 2140000000 HC CCU INTERMEDIATE R&B

## 2019-03-21 PROCEDURE — G0378 HOSPITAL OBSERVATION PER HR: HCPCS

## 2019-03-21 PROCEDURE — 36415 COLL VENOUS BLD VENIPUNCTURE: CPT

## 2019-03-21 PROCEDURE — 87804 INFLUENZA ASSAY W/OPTIC: CPT

## 2019-03-21 RX ORDER — CHLORPHENIRAMINE MALEATE 4 MG/1
4 TABLET ORAL EVERY 6 HOURS PRN
Status: DISCONTINUED | OUTPATIENT
Start: 2019-03-21 | End: 2019-03-22 | Stop reason: HOSPADM

## 2019-03-21 RX ORDER — ACETAMINOPHEN 325 MG/1
650 TABLET ORAL EVERY 4 HOURS PRN
Status: DISCONTINUED | OUTPATIENT
Start: 2019-03-21 | End: 2019-03-22 | Stop reason: HOSPADM

## 2019-03-21 RX ORDER — SODIUM CHLORIDE 0.9 % (FLUSH) 0.9 %
10 SYRINGE (ML) INJECTION 2 TIMES DAILY
Status: DISCONTINUED | OUTPATIENT
Start: 2019-03-21 | End: 2019-03-22 | Stop reason: HOSPADM

## 2019-03-21 RX ADMIN — CALCIUM 500 MG: 500 TABLET ORAL at 09:15

## 2019-03-21 RX ADMIN — CHLORPHENIRAMINE MALEATE 4 MG: 4 TABLET ORAL at 11:02

## 2019-03-21 RX ADMIN — LEVOTHYROXINE SODIUM 100 MCG: 100 TABLET ORAL at 17:00

## 2019-03-21 RX ADMIN — PANTOPRAZOLE SODIUM 40 MG: 40 TABLET, DELAYED RELEASE ORAL at 17:00

## 2019-03-21 RX ADMIN — LITHIUM CARBONATE 300 MG: 300 CAPSULE ORAL at 17:00

## 2019-03-21 RX ADMIN — FERROUS SULFATE TAB 325 MG (65 MG ELEMENTAL FE) 325 MG: 325 (65 FE) TAB at 09:15

## 2019-03-21 RX ADMIN — VITAMIN D, TAB 1000IU (100/BT) 2000 UNITS: 25 TAB at 09:15

## 2019-03-21 RX ADMIN — Medication 10 ML: at 23:15

## 2019-03-21 RX ADMIN — Medication 1000 MCG: at 09:15

## 2019-03-21 RX ADMIN — CHLORPHENIRAMINE MALEATE 4 MG: 4 TABLET ORAL at 17:00

## 2019-03-21 RX ADMIN — LURASIDONE HYDROCHLORIDE 20 MG: 40 TABLET, FILM COATED ORAL at 09:15

## 2019-03-21 RX ADMIN — PANTOPRAZOLE SODIUM 40 MG: 40 TABLET, DELAYED RELEASE ORAL at 05:13

## 2019-03-21 RX ADMIN — Medication 250 MG: at 09:15

## 2019-03-21 RX ADMIN — LITHIUM CARBONATE 300 MG: 300 CAPSULE ORAL at 09:15

## 2019-03-21 RX ADMIN — ACETAMINOPHEN 650 MG: 325 TABLET ORAL at 18:49

## 2019-03-21 RX ADMIN — VILAZODONE HYDROCHLORIDE 40 MG: 40 TABLET ORAL at 09:15

## 2019-03-21 RX ADMIN — FLUTICASONE PROPIONATE 1 SPRAY: 50 SPRAY, METERED NASAL at 09:15

## 2019-03-21 ASSESSMENT — PAIN SCALES - GENERAL
PAINLEVEL_OUTOF10: 1
PAINLEVEL_OUTOF10: 0
PAINLEVEL_OUTOF10: 3
PAINLEVEL_OUTOF10: 3
PAINLEVEL_OUTOF10: 0
PAINLEVEL_OUTOF10: 0
PAINLEVEL_OUTOF10: 5
PAINLEVEL_OUTOF10: 0
PAINLEVEL_OUTOF10: 3
PAINLEVEL_OUTOF10: 0
PAINLEVEL_OUTOF10: 0

## 2019-03-21 ASSESSMENT — PAIN DESCRIPTION - LOCATION
LOCATION: HEAD;THROAT

## 2019-03-21 ASSESSMENT — PAIN DESCRIPTION - PAIN TYPE
TYPE: ACUTE PAIN

## 2019-03-21 NOTE — H&P
[address] [id number]                              HISTORY AND PHYSICAL    PATIENT NAME: Vamshi Colvin                  :        1960  MED REC NO:   [id number]                           ROOM:       0031  ACCOUNT NO:   [de-identified]                           ADMIT DATE: 2019  PROVIDER:     Carley Bui M.D. PRESENTING COMPLAINT:  Abdominal pain and also cold symptoms. 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 The patient was  also noted to have bradycardia, this is not new to her, which was  initially reported as symptomatic, but she denied any symptoms relating  to the bradycardia. She did take some cold symptoms over-the-counter  medications, however, prior to being admitted here. When she was seen  here and with scans of the abdomen, she was admitted for observation  purposes. The patient apparently had taken some Claritin and also some  Flonase to aid the cold symptoms that she presented with. At the time  of my visit this morning, she feels somewhat better and the pain in the  abdomen has mostly resolved for the most part to palpation. PAST MEDICAL HISTORY:  Again, medical history is remarkable for bipolar  disorder, history of cervical cancer, gallstone disease, and  hypothyroidism. PAST SURGICAL HISTORY:  Remarkable for the appendectomy, gastric bypass  Batsheva-en-Y surgery. She has had hysterectomy. HOME MEDICATIONS:  To be reconciled; include the vilazodone, Claritin,  Flonase, Lamictal, Latuda, Synthroid, Zofran, vitamin D, Protonix, mag  oxide, p.r.n. Nitrostat, iron sulfate. ALLERGIES:  Listed allergy to LATEX and SULFA. SOCIAL HISTORY:  Quit smoking about 41 years ago. No alcohol use at  this time. She lives by herself.   At pelvis. No  evidence of bowel obstructions. No urinary calculi noted. Renal cyst  in the kidney is measuring about 1.5 x 1.2 cm. Chest x-ray shows no acute cardiopulmonary disease. EKG is normal sinus  rhythm, rate about 60 per minute, no acute ST-T wave changes. ASSESSMENT AND PLAN:  1. Coryza. We will treat symptomatically. We will check for flu,  however. 2.  Abdominal pain and discomfort, mostly just a discomfort. Rectal  examination deferred. No clear indication, however, we will monitor  left suprapubic pain. No palpable mass. There may be some possibility  of diverticular disease probably, but she has no knowledge of this as  well. 3.  Hypothyroidism, on Synthroid. We will continue medication. Currently, euthyroid on the current dose. 4.  History of bipolar disorder, medications were reviewed and resumed. 5.  GERD, on Protonix 40 a day, we will resume this. We will anticipate  overnight stay and discharge tomorrow. [address] [place] [address]  Hilton Overton M.D.    D: 03/21/2019 8:49:37       T: 03/21/2019 10:18:50     [email or web address]  Job#: [id number]     Doc#: [id number]    CC:

## 2019-03-21 NOTE — ED NOTES
Pt resting quietly in room no needs expressed. Side rails up x2 with call light in reach. Will continue to monitor.        [facility]  03/20/19 6627

## 2019-03-21 NOTE — ED NOTES
Pt resting quietly in room no needs expressed. Side rails up x2 with call light in reach. Will continue to monitor.        [facility]  03/20/19 8544

## 2019-03-21 NOTE — PLAN OF CARE
Problem: Safety:  Goal: Free from accidental physical injury  Description  Free from accidental physical injury  Outcome: Met This Shift  Note:   Bed lock and in low position, side rails up x2. Non slip socks used while ambulating. Reminded patient not to get out of bed without assistance, use call light for assistance. Problem: Pain:  Goal: Patient's pain/discomfort is manageable  Description  Patient's pain/discomfort is manageable  Outcome: Met This Shift  Note:    Pt to report pain to the nurse. Pain meds  per MD orders. Rates pain 0 out of 10. Patient's pain goal is 0 out of 10. Problem: Discharge Planning:  Goal: Patients continuum of care needs are met  Description  Patients continuum of care needs are met  Outcome: Ongoing  Note:   Pt lives alone. Plans to return home upon discharge. States sister is available to assist if needed. Care plan reviewed with pt. Pt verbalizes understanding of the care plan and contributed to goal setting.

## 2019-03-21 NOTE — ED NOTES
Bed: 001A  Expected date: 3/20/19  Expected time: 8:33 PM  Means of arrival: [facility]  Comments:     Steven Manzanares RN  03/20/19 2040

## 2019-03-21 NOTE — PROGRESS NOTES
Pt resting quietly in bed, eyes closed, resp. rate easy and unlabored @ 16. Monitor shows sinus ric 44.

## 2019-03-21 NOTE — PROGRESS NOTES
Pharmacy Medication History Note      List of current medications patient is taking is Complete. Source of information: Patient, AVS (3-13-19), Patient provided med list, and epic fill history. Changes made to medication list:  Medications removed (include reason, ex. therapy complete or physician discontinued):  Lurasidone 40mg- removed for dose adjustment  Vilazodone 49SN- duplicate order on file. Medications added/doses adjusted:  Magnesium oxide 400mg tab switched to 250mg tab daily. Lurasidone 40mg 1/2 tab daily changed to 20mg daily. Prenatal MV - dose clarified of 1 tab daily    Other notes (ex. Recent course of antibiotics, Coumadin dosing):  Patient brought in med list and keeps up to date for pharmacy records. Denies use of other OTC or herbal medications.       Allergies reviewed      Electronically signed by Patsy Mejia on 3/21/2019 at 11:03 AM

## 2019-03-21 NOTE — CARE COORDINATION
3/21/19, 7:48 AM      Anthony Mcleod       Admitted from: ED 3/20/2019/ 2040 Hospital day: 1   Location: [facility]/031-A Reason for admit: Symptomatic bradycardia [R00.1] Status: IP  Admit order signed?: yes  PMH:  has a past medical history of Balance problem, Bipolar I disorder, most recent episode (or current) depressed, in partial or unspecified remission, Cancer ([facility] [address] Depression, Fatigue, Gallstones, Gastric bypass status for obesity, GERD (gastroesophageal reflux disease), History of blood transfusion, History of hysterectomy, Hypothyroidism, MS (multiple sclerosis) ([facility] [address] Paresthesias, and UTI (urinary tract infection). Procedure: none  Pertinent abnormal Imaging:  3/20 CT abdomen/pelvis  Exam limited due to the lack of oral and IV contrast.   No definite acute inflammatory or infectious process in the abdomen or pelvis. No evidence of bowel obstruction. No urinary tract calculi. No hydroureteronephrosis. Postoperative changes of the abdomen and pelvis as discussed above. 1.5 x 1.2 cm hyperdense left renal lesion, see discussion above and recommend ultrasound correlation.           Medications:  Scheduled Meds:   ferrous sulfate  325 mg Oral Daily with breakfast    cyanocobalamin  1,000 mcg Oral Daily    lithium  300 mg Oral BID WC    vilazodone HCl  40 mg Oral Daily    pantoprazole  40 mg Oral BID    Magnesium Oxide  250 mg Oral Daily    calcium elemental  500 mg Oral Daily    vitamin D  2,000 Units Oral BID    levothyroxine  100 mcg Oral Daily    lurasidone  20 mg Oral Daily    fluticasone  1 spray Each Nare Daily    atropine  0.5 mg Intravenous Once    enoxaparin  40 mg Subcutaneous Daily     Continuous Infusions:   Pertinent Info/Orders/Treatment Plan:  Presented to ED for bradycardia, atropine given in ED. She had taken sinus medication at home. hx MS. Internal med following. Telemetry. (-) troponin. Lactic 1.1. Viibryd tabs. 95% on RA.    Diet: DIET CARDIAC;   Smoking status:

## 2019-03-21 NOTE — ED PROVIDER NOTES
pain, palpitations and leg swelling. Gastrointestinal: Positive for abdominal pain, diarrhea and nausea. Negative for vomiting. Genitourinary: Negative. Musculoskeletal: Negative. Skin: Negative. Neurological: Positive for weakness. Negative for dizziness. All other systems reviewed and are negative. PASTMEDICAL HISTORY   [unfilled]    SURGICAL HISTORY      has a past surgical history that includes Hysterectomy (1963); Carpal tunnel release (1999); other surgical history; other surgical history (1986); Batsheva-en-Y Gastric Bypass (2000); other surgical history (2006); bone graft (Left); Cholecystectomy, laparoscopic (5/16/2016); laparoscopic appendectomy (5/16/2016); Appendectomy; Enteroscopy (N/A, 2/5/2018); pr colonoscopy flx dx w/collj spec when pfrmd (Left, 2/6/2018); pr office/outpt visit,procedure only (Left, 2/18/2018);  Colonoscopy; Endoscopy, colon, diagnostic; Dilatation, esophagus; pr office/outpt visit,procedure only (N/A, 10/4/2018); and Hysterectomy, vaginal.    CURRENT MEDICATIONS       Previous Medications    CALCIUM CARBONATE 600 MG TABS TABLET    Take 1 tablet by mouth daily    CHOLECALCIFEROL (VITAMIN D) 2000 UNITS CAPS CAPSULE    Take 2,000 Units by mouth 2 times daily    DIMETHYL FUMARATE (TECFIDERA) 240 MG DELAYED RELEASE CAPSULE    Take 240 mg by mouth 2 times daily    FERROUS SULFATE 325 (65 FE) MG TABLET    Take 325 mg by mouth daily (with breakfast)    FLUTICASONE (FLONASE) 50 MCG/ACT NASAL SPRAY    1 spray by Each Nare route daily 1 Spray in each nostril    LAMOTRIGINE (LAMICTAL) 200 MG TABLET    TAKE 1 TABLET BY MOUTH ONE TIME A DAY    LEVOTHYROXINE (SYNTHROID) 100 MCG TABLET    Take 1 tablet by mouth Daily    LITHIUM 300 MG CAPSULE    TAKE 1 CAPSULE BY MOUTH EVERY MORNING AND 2 CAPSULES EVERY EVENING    LORATADINE (CLARITIN) 10 MG CAPSULE    Take 1 capsule by mouth daily    LURASIDONE (LATUDA) 40 MG TABS TABLET    Take 1 tablet by mouth daily    MAGNESIUM OXIDE (MAG-OX) 400 MG TABLET    Take 250 mg by mouth daily    NITROGLYCERIN (NITROSTAT) 0.4 MG SL TABLET    up to max of 3 total doses. If no relief after 1 dose, call 911. ONDANSETRON (ZOFRAN ODT) 4 MG DISINTEGRATING TABLET    Take 1 tablet by mouth every 8 hours as needed for Nausea    PANTOPRAZOLE (PROTONIX) 40 MG TABLET    Take 40 mg by mouth 2 times daily    PRENATAL MV-MIN-FE FUM-FA-DHA (PRENATAL 1 PO)    Take by mouth daily     VILAZODONE HCL (VILAZODONE HCL) 40 MG TABS    Take 1 tablet by mouth daily    VILAZODONE HCL (VILAZODONE HCL) 40 MG TABS    Take 40 mg by mouth daily    VITAMIN B-12 1000 MCG TABLET    Take 1 tablet by mouth daily       ALLERGIES     is allergic to latex and sulfa antibiotics. FAMILY HISTORY     indicated that her mother is alive. She indicated that her father is . She indicated that both of her sisters are alive. family history includes Cancer in her mother, sister, and sister; Colon Polyps in her mother; Glaucoma in her father; Heart Disease in her father. SOCIAL HISTORY      reports that she quit smoking about 41 years ago. Her smoking use included cigarettes. She has a 0.05 pack-year smoking history. She has never used smokeless tobacco. She reports that she does not drink alcohol or use drugs. PHYSICAL EXAM     INITIAL VITALS:  height is 4' 11\" (1.499 m) and weight is 120 lb (54.4 kg). Her oral temperature is 97.3 °F (36.3 °C). Her blood pressure is 105/55 (abnormal) and her pulse is 60. Her respiration is 16 and oxygen saturation is 100%. Physical Exam   Constitutional: She is oriented to person, place, and time. She appears well-developed and well-nourished. No distress. HENT:   Head: Normocephalic and atraumatic. Right Ear: External ear normal.   Left Ear: External ear normal.   Nose: Nose normal.   Eyes: Pupils are equal, round, and reactive to light. Conjunctivae and EOM are normal. Right eye exhibits no discharge. Left eye exhibits no discharge. No scleral icterus. Neck: Normal range of motion. Neck supple. No JVD present. No tracheal deviation present. No thyromegaly present. Cardiovascular: Normal rate, regular rhythm, normal heart sounds and intact distal pulses. No murmur heard. Pulmonary/Chest: Effort normal and breath sounds normal. No respiratory distress. She has no wheezes. She has no rales. She exhibits no tenderness. Abdominal: Soft. Bowel sounds are normal. She exhibits no distension and no mass. There is no tenderness. There is no rebound and no guarding. Musculoskeletal: Normal range of motion. She exhibits no edema or tenderness. Lymphadenopathy:     She has no cervical adenopathy. Neurological: She is alert and oriented to person, place, and time. No cranial nerve deficit. Coordination normal.   Skin: Skin is warm. No rash noted. She is not diaphoretic. No erythema. No pallor. DIFFERENTIALDIAGNOSIS:      bradycardic at a sinus bradycardia most likely related to the medication she took. We shall exclude other causes of bradycardia. DIAGNOSTIC RESULTS     EKG: All EKG's are interpreted by the Emergency Department Physician who either signs or Co-signs this chart inthe absence of a cardiologist.     EKG is sinus bradycardia at 30°/m QTc of 413 normal intervals abnormal EKG with a right bundle branch block repeat EKG is basically the same with a ventricular rate of 60. RADIOLOGY: non-plain film images(s) such as CT, Ultrasound and MRI are read by the radiologist.  Plain radiographic images are visualized and preliminarily interpreted by the emergency physician unless otherwise stated below. XR CHEST STANDARD (2 VW)   Final Result      No acute cardiopulmonary disease. **This report has been created using voice recognition software. It may contain minor errors which are inherent in voice recognition technology. **      Final report electronically signed by Dr. Raad De León on 3/20/2019 9:55 PM      CT ABDOMEN PELVIS WO CONTRAST Additional Contrast? None   Final Result   Exam limited due to the lack of oral and IV contrast.   No definite acute inflammatory or infectious process in the abdomen or pelvis. No evidence of bowel obstruction. No urinary tract calculi. No hydroureteronephrosis. Postoperative changes of the abdomen and pelvis as discussed above. 1.5 x 1.2 cm hyperdense left renal lesion, see discussion above and recommend ultrasound correlation. **This report has been created using voice recognition software. It may contain minor errors which are inherent in voice recognition technology. **      Final report electronically signed by Dr. Walt Cade on 3/20/2019 9:49 PM          LABS:   Labs Reviewed   CBC WITH AUTO DIFFERENTIAL - Abnormal; Notable for the following components:       Result Value    RBC 3.59 (*)     Hemoglobin 11.7 (*)     Hematocrit 36.6 (*)     .9 (*)     MCHC 32.0 (*)     RDW-SD 54.4 (*)     All other components within normal limits   BASIC METABOLIC PANEL - Abnormal; Notable for the following components:    Glucose 135 (*)     All other components within normal limits   LITHIUM LEVEL - Abnormal; Notable for the following components:    Lithium Lvl 1.24 (*)     All other components within normal limits   CULTURE BLOOD #1   CULTURE BLOOD #2   TROPONIN   LIPASE   AMYLASE   BRAIN NATRIURETIC PEPTIDE   PROTIME-INR   APTT   D-DIMER, QUANTITATIVE   TSH WITHOUT REFLEX   LACTATE, SEPSIS   ANION GAP   OSMOLALITY   GLOMERULAR FILTRATION RATE, ESTIMATED   URINE RT REFLEX TO CULTURE   LACTATE, SEPSIS       EMERGENCY DEPARTMENT COURSE:   Vitals:    Vitals:    03/20/19 2041 03/20/19 2202 03/20/19 2206   BP: 121/63  (!) 105/55   Pulse: (!) 39 58 60   Resp: 18  16   Temp: 97.3 °F (36.3 °C)     TempSrc: Oral     SpO2: 100%  100%   Weight: 120 lb (54.4 kg)     Height: 4' 11\" (1.499 m)       CRITICAL CARE:      15 min patient with bradycardia requiring atropine IV.  Close monitoring for decompensation. Patient's workup in the ED is negative. CONSULTS:    11PM patient is discussed with Dr Heidy Brewer who kindly accepts the patient . FINAL IMPRESSION      1. Symptomatic bradycardia          DISPOSITION/PLAN     DISPOSITION Admitted patient is discharged.     PATIENT REFERRED TO:  Juliette Araya DO  [address] [place] [address]            DISCHARGE MEDICATIONS:  New Prescriptions    No medications on file       (Please note that portions of this note were completed with avoice recognition program.  Efforts were made to edit the dictations but occasionally words are mis-transcribed.)    MD Sharlene Diaz MD  03/20/19 0065 [address] Silvana Mcbride MD  03/20/19 9817

## 2019-03-21 NOTE — PLAN OF CARE
Problem: Safety:  Goal: Free from accidental physical injury  Description  Free from accidental physical injury  3/21/2019 1028 by Estela Krishnamurthy RN  Outcome: Ongoing  Note:   Patient free of falls. Call light within reach. Bed in lowest position. Nonskid socks on. Bed alarm on. Patient can ambulate with the assist of one. Hourly rounding continues. Problem: Daily Care:  Goal: Daily care needs are met  Description  Daily care needs are met  Outcome: Ongoing  Note:   Patient can perform all daily care need independently. Problem: Pain:  Goal: Patient's pain/discomfort is manageable  Description  Patient's pain/discomfort is manageable  3/21/2019 1028 by Estela Krishnamurthy RN  Outcome: Ongoing  Note:   Patient denies any pain during this shift. Will continue to monitor. Problem: Skin Integrity:  Goal: Skin integrity will stabilize  Description  Skin integrity will stabilize  Outcome: Ongoing  Note:   Patient has no skin breakdown. Patient able to turn self frequently. Will continue to monitor. Problem: Discharge Planning:  Goal: Patients continuum of care needs are met  Description  Patients continuum of care needs are met  3/21/2019 1028 by Estela Krishnamurthy RN  Outcome: Ongoing  Note:   Patient from home alone. Plans to go back home when appropriate. Will continue to monitor. Problem: Cardiac Output - Decreased:  Goal: Hemodynamic stability will improve  Description  Hemodynamic stability will improve  Outcome: Ongoing  Note:   Vitals stable. Heart rate remains 40-60s. Patient asymptomatic. Will continue to monitor with cardiac telemetry. Care plan reviewed with patient. Patient verbalize dunderstanding of the plan of care and contribute to goal setting.

## 2019-03-21 NOTE — PROGRESS NOTES
Head to toe assessment  Vital signs Temp 100.5, Resp 18, O2 sat 96% on RA, /59, HR 54  Complaints of a 3/10 on a 0 - 10 on a pain scale for headache and throat pain and raw feeling  Alert and Oriented x4, speech is clear and appropriate  PERRLA 3mm to 2mm  Skin is pink, warm and dry no skin breakdown. 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 It is otherwise fully functional with no pain, loss of sensation or skin breakdown. Left arm with out any abnormality. Left hand grasp is strong, capillary refill is < 3 seconds, skin turgor < 3 seconds. Lung sounds are clear throughout all lobes bilaterally. Respirations are of moderate depth and chest rise is equal bilaterally   Heart sounds are strong, regular at a bradicardic rate of 52 apical. Telemetry has been reflecting sinus bradycardia. Bowel sounds are active in all 4 quadrants, no masses or pain on palpation  Lower extremities have full ROM and sensation. Skin is pink warm and dry and no skin breakdown. Pedal pulses are present and strong bilaterally. Scant edema in the lower extremities, SCDs being worn by patient while in bed.

## 2019-03-21 NOTE — H&P
Dr. Sven Bills ( [facility] )  H&P  3/21/2019  8:31 AM    Patient:  Meghana Marsh  YOB: 1960    MRN: [id number]   Acct:  [de-identified]   3B-31/031-A  Primary Care Physician: Sury Ellis DO  DICTATED      Electronically signed by Ramana Israel MD on 3/21/2019 at 8:31 AM         Copy: Primary Care Physician: Sury Ellis DO

## 2019-03-22 ENCOUNTER — TELEPHONE (OUTPATIENT)
Dept: FAMILY MEDICINE CLINIC | Age: 59
End: 2019-03-22

## 2019-03-22 VITALS
TEMPERATURE: 98.9 F | WEIGHT: 113.8 LBS | HEIGHT: 59 IN | HEART RATE: 53 BPM | SYSTOLIC BLOOD PRESSURE: 106 MMHG | RESPIRATION RATE: 14 BRPM | OXYGEN SATURATION: 96 % | DIASTOLIC BLOOD PRESSURE: 59 MMHG | BODY MASS INDEX: 22.94 KG/M2

## 2019-03-22 PROCEDURE — G0378 HOSPITAL OBSERVATION PER HR: HCPCS

## 2019-03-22 PROCEDURE — 93225 XTRNL ECG REC<48 HRS REC: CPT

## 2019-03-22 PROCEDURE — 93226 XTRNL ECG REC<48 HR SCAN A/R: CPT

## 2019-03-22 PROCEDURE — 6370000000 HC RX 637 (ALT 250 FOR IP): Performed by: INTERNAL MEDICINE

## 2019-03-22 PROCEDURE — 2580000003 HC RX 258: Performed by: INTERNAL MEDICINE

## 2019-03-22 RX ORDER — CHLORPHENIRAMINE MALEATE 4 MG/1
4 TABLET ORAL EVERY 6 HOURS PRN
Qty: 40 TABLET | Refills: 0 | Status: SHIPPED | OUTPATIENT
Start: 2019-03-22 | End: 2019-04-01

## 2019-03-22 RX ADMIN — Medication 1000 MCG: at 08:45

## 2019-03-22 RX ADMIN — Medication 250 MG: at 08:44

## 2019-03-22 RX ADMIN — PANTOPRAZOLE SODIUM 40 MG: 40 TABLET, DELAYED RELEASE ORAL at 05:52

## 2019-03-22 RX ADMIN — CALCIUM 500 MG: 500 TABLET ORAL at 08:44

## 2019-03-22 RX ADMIN — LITHIUM CARBONATE 300 MG: 300 CAPSULE ORAL at 08:44

## 2019-03-22 RX ADMIN — VITAMIN D, TAB 1000IU (100/BT) 2000 UNITS: 25 TAB at 08:44

## 2019-03-22 RX ADMIN — Medication 10 ML: at 08:46

## 2019-03-22 RX ADMIN — CHLORPHENIRAMINE MALEATE 4 MG: 4 TABLET ORAL at 08:44

## 2019-03-22 RX ADMIN — VILAZODONE HYDROCHLORIDE 40 MG: 40 TABLET ORAL at 08:44

## 2019-03-22 RX ADMIN — LEVOTHYROXINE SODIUM 100 MCG: 100 TABLET ORAL at 05:52

## 2019-03-22 RX ADMIN — CHLORPHENIRAMINE MALEATE 4 MG: 4 TABLET ORAL at 02:45

## 2019-03-22 RX ADMIN — FERROUS SULFATE TAB 325 MG (65 MG ELEMENTAL FE) 325 MG: 325 (65 FE) TAB at 08:44

## 2019-03-22 RX ADMIN — FLUTICASONE PROPIONATE 1 SPRAY: 50 SPRAY, METERED NASAL at 08:45

## 2019-03-22 RX ADMIN — LURASIDONE HYDROCHLORIDE 20 MG: 40 TABLET, FILM COATED ORAL at 08:45

## 2019-03-22 ASSESSMENT — PAIN SCALES - GENERAL: PAINLEVEL_OUTOF10: 0

## 2019-03-22 NOTE — PROGRESS NOTES
CLINICAL PHARMACY: DISCHARGE MED RECONCILIATION/REVIEW    [facility]) Select Patient?: No  Total # of Interventions Recommended: 1 -    -   - Updated Order #: 1  Total # Interventions Accepted: 1  Intervention Severity:   - Level 1 Intervention Present?: No   - Level 2 #: 0   - Level 3 #: 1   Time Spent (min): 15    Additional Documentation:  AVS reviewed for discharge. Medication section completed.

## 2019-03-22 NOTE — PROGRESS NOTES
Patient sitting up in bed with reading materials and television on. Rates her pain a 5 on a 0 -10 scale regarding her sore throat and 3 regarding her headache. Patients vitals Temp 99.6, BP 99/50, Resp 18, HR 56 and 97% O2 on RA. Tylenol administered at bedside. Patients side table, call light and cell phone all within reach. No other concerns voiced at this time.

## 2019-03-22 NOTE — CARE COORDINATION
3/22/19, 2:17 PM    Discharge plan discussed by  and . Discharge plan reviewed with patient/ family. Patient/ family verbalize understanding of discharge plan and are in agreement with plan. Understanding was demonstrated using the teach back method. Discharging home, denied needs.         IMM Letter  IMM Letter date given[de-identified] 03/20/19

## 2019-03-22 NOTE — DISCHARGE SUMMARY
Dr. Mariely Flores Discharge Summary  3/22/2019  11:51 AM    Patient:  Kehinde Dunn  YOB: 1960    MRN: [id number]   Acct: [de-identified]   [id number]   Primary Care Physician: Ping Cuello DO    Admit date:  3/20/2019    Discharge date:  3/22/2019    Discharge Diagnoses:    Patient Active Problem List   Diagnosis    Hypothyroidism    [facility]    Bipolar I, most recent episode depressed, partial remission ([facility]    Nausea    Gastric bypass status for obesity    S/P laparoscopic cholecystectomy    S/P laparoscopic appendectomy    Iron deficiency anemia    H/O class II angina pectoris    Abnormal nuclear stress test    Anemia, macrocytic    Gastrointestinal hemorrhage associated with gastritis    Acute blood loss anemia    Bipolar disorder, in full remission, most recent episode depressed ([facility]    GI bleed    Hypocalcemia    Generalized weakness    Dizziness    Gastroduodenal artery bleed    Prerenal azotemia    Metabolic acidosis    Multiple thyroid nodules    Nonischemic cardiomyopathy ([facility]    History of Batsheva-en-Y gastric bypass    Renal lesion    Liver lesion    Bipolar 1 disorder (HCC)    Constipation    Atypical chest pain    Acute gastric ulcer with hemorrhage    Symptomatic bradycardia       Discharge Medications:       [facility] Medication Instructions [id number]    Printed on:03/22/19 1151   Medication Information                      calcium carbonate 600 MG TABS tablet  Take 1 tablet by mouth daily             chlorpheniramine (CHLOR-TRIMETON) 4 MG tablet  Take 1 tablet by mouth every 6 hours as needed for Allergies             Cholecalciferol (VITAMIN D) 2000 units CAPS capsule  Take 2,000 Units by mouth daily              dimethyl fumarate (TECFIDERA) 240 MG delayed release capsule  Take 240 mg by mouth 2 times daily             ferrous sulfate 325 (65 FE) MG tablet  Take 325 mg by mouth daily (with breakfast) without any issue and will be discharged with holter today. Vitals: BP (!) 106/59   Pulse 53   Temp 98.9 °F (37.2 °C) (Oral)   Resp 14   Ht 4' 11\" (1.499 m)   Wt 113 lb 12.8 oz (51.6 kg)   SpO2 96%   Breastfeeding? No   BMI 22.98 kg/m²   Physical Exam:  General appearance - alert, well appearing, and in no distress  Eyes - pupils equal and reactive, extraocular eye movements intact  Mouth - mucous membranes moist, pharynx normal without lesions  Neck - supple, no significant adenopathy  Chest - clear to auscultation, no wheezes, rales or rhonchi, symmetric air entry  Distant Breath Sounds: No  Heart - normal rate, regular rhythm, normal S1, S2, no murmurs, rubs, clicks or gallops  Abdomen - soft, nontender, nondistended, no masses or organomegaly  Neurological - alert, oriented, normal speech, no focal findings or movement disorder noted  Musculoskeletal - no joint tenderness, deformity or swelling    Disposition: home    Condition: Stable        Pam Zmimerman MD     Copy: Primary Care Physician: Merlin Alexander, DO  Internal Medicine  Over 30 mins spent.

## 2019-03-22 NOTE — PROGRESS NOTES
Patient with eyes closed resting in bed with television on in room. Bedside table, cell phone and call light all within reach.

## 2019-03-22 NOTE — PROGRESS NOTES
Head to toe assessment completed  Vital signs Temp 98.9, BP 98/52, HR 52, Resp 18, 97% O2 Sat on RA  Patient rates a decrease in pain from a 5 to a 1 regarding her headache and sore throat, after administration of Tylenol. Assessment findings are all otherwise consistent with last assessment at 1610. Patients side table, call light and cell phone all within reach. No other concerns voiced at this time.

## 2019-03-22 NOTE — PROGRESS NOTES
Patient AVS printed and reviewed. Medication next dosage due, medication changes, follow up appointments and bradycardia reviewed. Patient received medications from outpatient pharmacy and EKG put on her 48 holter monitor. Patient offered opportunity to ask questions. She is agreeable to discharge and is waitng for [employer] ride home. Discharge signature in chart and patient dressed and ready to go home. Going to private residence alone.

## 2019-03-26 LAB
BLOOD CULTURE, ROUTINE: NORMAL
BLOOD CULTURE, ROUTINE: NORMAL

## 2019-03-28 ENCOUNTER — OFFICE VISIT (OUTPATIENT)
Dept: FAMILY MEDICINE CLINIC | Age: 59
End: 2019-03-28
Payer: MEDICARE

## 2019-03-28 VITALS
OXYGEN SATURATION: 99 % | BODY MASS INDEX: 23.1 KG/M2 | SYSTOLIC BLOOD PRESSURE: 122 MMHG | HEIGHT: 59 IN | WEIGHT: 114.6 LBS | TEMPERATURE: 98.3 F | DIASTOLIC BLOOD PRESSURE: 64 MMHG | HEART RATE: 57 BPM | RESPIRATION RATE: 12 BRPM

## 2019-03-28 DIAGNOSIS — J34.89 RHINORRHEA: Primary | ICD-10-CM

## 2019-03-28 DIAGNOSIS — R00.1 BRADYCARDIA: ICD-10-CM

## 2019-03-28 DIAGNOSIS — Z09 HOSPITAL DISCHARGE FOLLOW-UP: ICD-10-CM

## 2019-03-28 LAB
ACQUISITION DURATION: NORMAL S
AVERAGE HEART RATE: 57 BPM
FASTEST SUPRAVENTRICULAR RATE: 140 BPM
HOOKUP DATE: NORMAL
HOOKUP TIME: NORMAL
LONGEST SUPRAVENTRICULAR RATE: 140 BPM
MAX HEART RATE TIME/DATE: NORMAL
MAX HEART RATE: 103 BPM
MIN HEART RATE TIME/DATE: NORMAL
MIN HEART RATE: 43 BPM
NUMBER OF FASTEST SUPRAVENTRICULAR BEATS: 6
NUMBER OF LONGEST SUPRAVENTRICULAR BEATS: 6
NUMBER OF QRS COMPLEXES: NORMAL
NUMBER OF SUPRAVENTRICULAR BEATS IN RUNS: 19
NUMBER OF SUPRAVENTRICULAR COUPLETS: 4
NUMBER OF SUPRAVENTRICULAR ECTOPICS: 207
NUMBER OF SUPRAVENTRICULAR ISOLATED BEATS: 180
NUMBER OF SUPRAVENTRICULAR RUNS: 5
NUMBER OF VENTRICULAR BEATS IN RUNS: 0
NUMBER OF VENTRICULAR BIGEMINAL CYCLES: 0
NUMBER OF VENTRICULAR COUPLETS: 0
NUMBER OF VENTRICULAR ECTOPICS: 26
NUMBER OF VENTRICULAR ISOLATED BEATS: 26
NUMBER OF VENTRICULAR RUNS: 0

## 2019-03-28 PROCEDURE — 99495 TRANSJ CARE MGMT MOD F2F 14D: CPT | Performed by: NURSE PRACTITIONER

## 2019-03-28 PROCEDURE — 1111F DSCHRG MED/CURRENT MED MERGE: CPT | Performed by: NURSE PRACTITIONER

## 2019-03-28 ASSESSMENT — ENCOUNTER SYMPTOMS
DIARRHEA: 0
SORE THROAT: 0
COLOR CHANGE: 0
EYE DISCHARGE: 0
SHORTNESS OF BREATH: 0
ABDOMINAL PAIN: 0
CONSTIPATION: 0
BLOOD IN STOOL: 0
ABDOMINAL DISTENTION: 0
EYE REDNESS: 0
NAUSEA: 0
COUGH: 0
ANAL BLEEDING: 0

## 2019-04-08 RX ORDER — VILAZODONE HYDROCHLORIDE 40 MG/1
40 TABLET ORAL DAILY
Qty: 90 TABLET | Refills: 1 | Status: SHIPPED | OUTPATIENT
Start: 2019-04-08 | End: 2019-05-15 | Stop reason: ALTCHOICE

## 2019-04-08 NOTE — TELEPHONE ENCOUNTER
Esther Meyer called requesting a refill of Viibryd 40mg      Last Appointment Date: 2/13/2019  Next Appointment Date: Visit date not found

## 2019-04-09 RX ORDER — LEVOTHYROXINE SODIUM 0.1 MG/1
100 TABLET ORAL DAILY
Qty: 90 TABLET | Refills: 1 | Status: CANCELLED | OUTPATIENT
Start: 2019-04-09

## 2019-04-10 ENCOUNTER — OFFICE VISIT (OUTPATIENT)
Dept: FAMILY MEDICINE CLINIC | Age: 59
End: 2019-04-10
Payer: MEDICARE

## 2019-04-10 ENCOUNTER — NURSE ONLY (OUTPATIENT)
Dept: LAB | Age: 59
End: 2019-04-10

## 2019-04-10 ENCOUNTER — OFFICE VISIT (OUTPATIENT)
Dept: PSYCHIATRY | Age: 59
End: 2019-04-10
Payer: MEDICARE

## 2019-04-10 VITALS
BODY MASS INDEX: 23.14 KG/M2 | WEIGHT: 114.8 LBS | OXYGEN SATURATION: 98 % | TEMPERATURE: 97.6 F | RESPIRATION RATE: 16 BRPM | HEIGHT: 59 IN | HEART RATE: 56 BPM | SYSTOLIC BLOOD PRESSURE: 122 MMHG | DIASTOLIC BLOOD PRESSURE: 80 MMHG

## 2019-04-10 DIAGNOSIS — Z23 NEED FOR IMMUNIZATION AGAINST COMBINATION OF INFECTIOUS DISEASES: ICD-10-CM

## 2019-04-10 DIAGNOSIS — J31.0 CHRONIC RHINITIS: Primary | ICD-10-CM

## 2019-04-10 DIAGNOSIS — F31.76 BIPOLAR DISORDER, IN FULL REMISSION, MOST RECENT EPISODE DEPRESSED (HCC): Primary | ICD-10-CM

## 2019-04-10 LAB
ALBUMIN SERPL-MCNC: 4 G/DL (ref 3.5–5.1)
ALP BLD-CCNC: 156 U/L (ref 38–126)
ALT SERPL-CCNC: 153 U/L (ref 11–66)
ANION GAP SERPL CALCULATED.3IONS-SCNC: 7 MEQ/L (ref 8–16)
AST SERPL-CCNC: 115 U/L (ref 5–40)
BILIRUB SERPL-MCNC: 0.5 MG/DL (ref 0.3–1.2)
BILIRUBIN DIRECT: < 0.2 MG/DL (ref 0–0.3)
BUN BLDV-MCNC: 14 MG/DL (ref 7–22)
CALCIUM SERPL-MCNC: 9.9 MG/DL (ref 8.5–10.5)
CHLORIDE BLD-SCNC: 110 MEQ/L (ref 98–111)
CHOLESTEROL, TOTAL: 130 MG/DL (ref 100–199)
CO2: 26 MEQ/L (ref 23–33)
CREAT SERPL-MCNC: 0.5 MG/DL (ref 0.4–1.2)
ERYTHROCYTE [DISTWIDTH] IN BLOOD BY AUTOMATED COUNT: 13.5 % (ref 11.5–14.5)
ERYTHROCYTE [DISTWIDTH] IN BLOOD BY AUTOMATED COUNT: 50.4 FL (ref 35–45)
GFR SERPL CREATININE-BSD FRML MDRD: > 90 ML/MIN/1.73M2
GLUCOSE BLD-MCNC: 83 MG/DL (ref 70–108)
HCT VFR BLD CALC: 39.2 % (ref 37–47)
HDLC SERPL-MCNC: 96 MG/DL
HEMOGLOBIN: 12.7 GM/DL (ref 12–16)
LDL CHOLESTEROL CALCULATED: 27 MG/DL
MCH RBC QN AUTO: 32.9 PG (ref 26–33)
MCHC RBC AUTO-ENTMCNC: 32.4 GM/DL (ref 32.2–35.5)
MCV RBC AUTO: 101.6 FL (ref 81–99)
PLATELET # BLD: 175 THOU/MM3 (ref 130–400)
PMV BLD AUTO: 10.6 FL (ref 9.4–12.4)
POTASSIUM SERPL-SCNC: 5 MEQ/L (ref 3.5–5.2)
RBC # BLD: 3.86 MILL/MM3 (ref 4.2–5.4)
SODIUM BLD-SCNC: 143 MEQ/L (ref 135–145)
TOTAL PROTEIN: 6.1 G/DL (ref 6.1–8)
TRIGL SERPL-MCNC: 36 MG/DL (ref 0–199)
TSH SERPL DL<=0.05 MIU/L-ACNC: 1.13 UIU/ML (ref 0.4–4.2)
WBC # BLD: 4.5 THOU/MM3 (ref 4.8–10.8)

## 2019-04-10 PROCEDURE — 99213 OFFICE O/P EST LOW 20 MIN: CPT | Performed by: NURSE PRACTITIONER

## 2019-04-10 PROCEDURE — 99213 OFFICE O/P EST LOW 20 MIN: CPT | Performed by: PSYCHIATRY & NEUROLOGY

## 2019-04-10 RX ORDER — FLUTICASONE PROPIONATE 50 MCG
2 SPRAY, SUSPENSION (ML) NASAL DAILY
Qty: 2 BOTTLE | Refills: 1 | Status: SHIPPED
Start: 2019-04-10 | End: 2019-04-10 | Stop reason: DRUGHIGH

## 2019-04-10 RX ORDER — LORATADINE 10 MG/1
10 CAPSULE, LIQUID FILLED ORAL DAILY
COMMUNITY
End: 2019-05-08 | Stop reason: SDUPTHER

## 2019-04-10 RX ORDER — FLUTICASONE PROPIONATE 50 MCG
2 SPRAY, SUSPENSION (ML) NASAL DAILY
Qty: 2 BOTTLE | Refills: 1 | Status: ON HOLD
Start: 2019-04-10 | End: 2019-09-23

## 2019-04-10 ASSESSMENT — ENCOUNTER SYMPTOMS
VOMITING: 0
SHORTNESS OF BREATH: 0
RHINORRHEA: 1
COUGH: 0
SINUS PRESSURE: 0
NAUSEA: 0
SORE THROAT: 0
TROUBLE SWALLOWING: 0
SINUS PAIN: 0
FACIAL SWELLING: 0

## 2019-04-10 NOTE — PATIENT INSTRUCTIONS
Patient Education      Increase Flonase to 2 sprays per nostril once daily. Continue Claritin. Keep follow up with ENT. Follow up in 1 month or sooner if needed due to medication change. Rhinitis: Care Instructions  Your Care Instructions  Rhinitis is swelling and irritation in the nose. Allergies and infections are often the cause. Your nose may run or feel stuffy. Other symptoms are itchy and sore eyes, ears, throat, and mouth. If allergies are the cause, your doctor may do tests to find out what you are allergic to. You may be able to stop symptoms if you avoid the things that cause them. Your doctor may suggest or prescribe medicine to ease your symptoms. Follow-up care is a key part of your treatment and safety. Be sure to make and go to all appointments, and call your doctor if you are having problems. It's also a good idea to know your test results and keep a list of the medicines you take. How can you care for yourself at home? · If your rhinitis is caused by allergies, try to find out what sets off (triggers) your symptoms. Take steps to avoid these triggers. ? Avoid yard work. It can stir up both pollen and mold. ? Do not smoke or allow others to smoke around you. If you need help quitting, talk to your doctor about stop-smoking programs and medicines. These can increase your chances of quitting for good. ? Do not use aerosol sprays, cleaning products, or perfumes. ? If pollen is one of your triggers, close your house and car windows during blooming season. ? Clean your house often to control dust.  ? Keep pets outside. · If your doctor recommends over-the-counter medicines to relieve symptoms, take your medicines exactly as prescribed. Call your doctor if you think you are having a problem with your medicine. · Use saline (saltwater) nasal washes to help keep your nasal passages open and wash out mucus and bacteria. You can buy saline nose drops at a grocery store or drugstore.  Or you can make your own at home by adding 1 teaspoon of salt and 1 teaspoon of baking soda to 2 cups of distilled water. If you make your own, fill a bulb syringe with the solution, insert the tip into your nostril, and squeeze gently. Pat Fraction your nose. When should you call for help? Call your doctor now or seek immediate medical care if:    · You are having trouble breathing.    Watch closely for changes in your health, and be sure to contact your doctor if:    · Mucus from your nose gets thicker (like pus) or has new blood in it.     · You have new or worse symptoms.     · You do not get better as expected. Where can you learn more? Go to [email or web address][email or web address] org and sign in to your [employer] account. Enter M030 in the [employer] box to learn more about \"Rhinitis: Care Instructions. \"     If you do not have an account, please click on the \"Sign Up Now\" link. Current as of: March 27, 2018  Content Version: 11.9  © 7851-4747 [employer]. Care instructions adapted under license by [facility] ([facility]). If you have questions about a medical condition or this instruction, always ask your healthcare professional. [place] [address] any warranty or liability for your use of this information. Patient Education        pneumococcal 13-valent conjugate vaccine  Pronunciation:  BRIONNA hu 13-VAY joelle MERCADOX keenan  Brand:  Prevnar 15  What is the most important information I should know about this vaccine? For children, the pneumococcal 13-valent vaccine is given in a series of shots. The first shot is usually given when the child is 3 months old. The booster shots are then given at 4 months, 6 months, and 15to 13months of age. Adults usually receive only one dose of the vaccine. In a child older than 6 months who has not yet received this vaccine, the first dose can be given any time from the age of 10 months through 11 years (before the 7th birthday).   If pneumococcal bacteria. Pneumococcal 13-valent vaccine works by exposing you to a small amount of the bacteria or a protein from the bacteria, which causes the body to develop immunity to the disease. This vaccine will not treat an active infection that has already developed in the body. Pneumococcal 13-valent vaccine is for use in children from 6 weeks to 11years old, and in adults who are 48 and older. Becoming infected with pneumococcal disease (such as pneumonia or meningitis) is much more dangerous to your health than receiving this vaccine. However, like any medicine, this vaccine can cause side effects but the risk of serious side effects is extremely low. Like any vaccine, pneumococcal 13-valent vaccine may not provide protection from disease in every person. What should I discuss with my healthcare provider before receiving this vaccine? Keep track of any and all side effects your child has after receiving this vaccine. When the child receives a booster dose, you will need to tell the doctor if the previous shot caused any side effects. You should not receive this vaccine if you ever had a severe allergic reaction to a pneumococcal or diphtheria vaccine. Before your child receives this vaccine, tell your doctor if the child was born prematurely. To make sure you or your child can safely receive this vaccine, tell your doctor if you or your child have any of these other conditions:  · a bleeding or blood clotting disorder such as hemophilia or easy bruising; or  · a weak immune system caused by disease, bone marrow transplant, or by using certain medicines or receiving cancer treatments. You can still receive a vaccine if you have a minor cold. In the case of a more severe illness with a fever or any type of infection, wait until you get better before receiving this vaccine. How is this vaccine given? This vaccine is injected into a muscle.  You will receive this injection in a doctor's office or clinic setting. For children, the pneumococcal 13-valent vaccine is given in a series of shots. The first shot is usually given when the child is 3 months old. The booster shots are then given at 4 months, 6 months, and 15to 13months of age. Adults usually receive only one dose of the vaccine. The first injection should be given no earlier than 10weeks of age. Allow at least 2 months to pass between injections. If your child is older than 6 months, he or she can still receive this vaccine on the following schedule:  · Age 7-11 months: two injections at least 4 weeks apart, followed by a third injection after the child turns 1 year (at least 2 months after the second injection); · Age 12-23 months: two injections at least 2 months apart;  · Age 19 months to 5 years (before the 7th birthday): one injection. The timing of this vaccination is very important for it to be effective. Your child's individual booster schedule may be different from these guidelines. Follow your doctor's instructions or the schedule recommended by the health department of the state you live in. Your doctor may recommend treating fever and pain with an aspirin-free pain reliever such as acetaminophen (Tylenol) or ibuprofen (Motrin, Advil, and others) when the shot is given and for the next 24 hours. Follow the label directions or your doctor's instructions about how much of this medicine to give your child. It is especially important to prevent fever from occurring in a child who has a seizure disorder such as epilepsy. Be sure to keep your child on a regular schedule for other immunizations such as diphtheria, tetanus, pertussis (whooping cough), hepatitis, and varicella (chicken pox). Your doctor or state health department can provide you with a recommended immunization schedule. What happens if I miss a dose? Contact your doctor if your child will miss a booster dose or gets behind schedule.  The next dose should be given as soon as possible. There is no need to start over. Be sure your child receives all recommended doses of this vaccine. If your child does not receive the full series of vaccines, he or she may not be fully protected against the disease. What happens if I overdose? An overdose of this vaccine is unlikely to occur. What should I avoid before or after receiving this vaccine? Follow your doctor's instructions about any restrictions on food, beverages, or activity. What are the possible side effects of this vaccine? Your child should not receive a booster vaccine if he or she had a life-threatening allergic reaction after the first shot. Keep track of any and all side effects your child has after receiving this vaccine. When the child receives a booster dose, you will need to tell the doctor if the previous shot caused any side effects. Get emergency medical help if your child has any of these signs of an allergic reaction: hives; difficulty breathing; swelling of the face, lips, tongue, or throat. Call your doctor at once if you or your child has a serious side effect such as:  · high fever (103 degrees or higher);  · seizure (convulsions);  · wheezing, trouble breathing;  · severe stomach pain, severe vomiting or diarrhea;  · easy bruising or bleeding; or  · severe pain, itching, irritation, or skin changes where the shot was given. Less serious side effects include  · crying, fussiness;  · headache, tired feeling;  · muscle or joint pain;  · drowsiness, sleeping more or less than usual;  · mild redness, swelling, tenderness, or a hard lump where the shot was given;  · loss of appetite, mild vomiting or diarrhea;  · low fever (102 degrees or less), chills; or  · mild skin rash. This is not a complete list of side effects and others may occur. Call your doctor for medical advice about side effects. You may report vaccine side effects to the [address] [place] [address] [address] and Human Services at [phone].   What other drugs will affect this vaccine? Before receiving this vaccine, tell the doctor about all other vaccines you or your child have recently received. Also tell the doctor if you or your child have recently received drugs or treatments that can weaken the immune system, including:  · an oral, nasal, inhaled, or injectable steroid medicine;  · chemotherapy or radiation;  · medications to treat psoriasis, rheumatoid arthritis, or other autoimmune disorders, such as azathioprine (Imuran), etanercept (Enbrel), leflunomide ([address]), and others; or  · medicines to treat or prevent organ transplant rejection, such as basiliximab (Simulect), cyclosporine (Sandimmune, Neoral, Gengraf), muromonab CD3 (Orthoclone), mycophenolate mofetil (CellCept), sirolimus (Rapamune), or tacrolimus (Prograf). If you are using any of these medications, you may not be able to receive the vaccine, or may need to wait until the other treatments are finished. There may be other drugs that can interact with pneumococcal 13-valent vaccine. Tell your doctor about all medications you use. This includes prescription, over-the-counter, vitamin, and herbal products. Do not start a new medication without telling your doctor. Where can I get more information? Your doctor or pharmacist can provide more information about this vaccine. Additional information is available from your local health department or the Centers for Disease Control and Prevention. Remember, keep this and all other medicines out of the reach of children, never share your medicines with others, and use this medication only for the indication prescribed. Every effort has been made to ensure that the information provided by [address] is accurate, up-to-date, and complete, but no guarantee is made to that effect. Drug information contained herein may be time sensitive.  [employer] information has been compiled for use by healthcare practitioners and consumers in the [facility] States and therefore [employer] does not warrant that uses outside of the [place] are appropriate, unless specifically indicated otherwise. [facility]'s drug information does not endorse drugs, diagnose patients or recommend therapy. [facility] drug information is an informational resource designed to assist licensed healthcare practitioners in caring for their patients and/or to serve consumers viewing this service as a supplement to, and not a substitute for, the expertise, skill, knowledge and judgment of healthcare practitioners. The absence of a warning for a given drug or drug combination in no way should be construed to indicate that the drug or drug combination is safe, effective or appropriate for any given patient. [facility] does not assume any responsibility for any aspect of healthcare administered with the aid of information [facility] provides. The information contained herein is not intended to cover all possible uses, directions, precautions, warnings, drug interactions, allergic reactions, or adverse effects. If you have questions about the drugs you are taking, check with your doctor, nurse or pharmacist.  Copyright 1444-7191 [address]. Version: 4.01. Revision date: 1/16/2012. Care instructions adapted under license by [facility] ([facility]). If you have questions about a medical condition or this instruction, always ask your healthcare professional. [place] [address] any warranty or liability for your use of this information.

## 2019-04-10 NOTE — PROGRESS NOTES
[address] [facility] [address] [place]  [address]  Dept: [phone]  Dept Fax: [phone]  Loc: [address] [facility]       Chief Complaint   Patient presents with    1 Month Follow-Up     prevnar       Nurses Notes reviewed and I agree except as noted in the HPI. HISTORY OF PRESENT ILLNESS   Gabe Whitley is a 62 y.o. female who presents for 1 month f/u for rhinitis. Recently started on Claritin and Flonase. Flonase using as 1 spray per nostril a day. No nose bleeds. No headaches. Patient notes a 50% improvement in rhinitis s/s. She would like to continue treatment for another month to see if it helps. She has appointment with ENT on 4/18/19. She is due for Prevnar vaccine. She would like that today. No additional complaints. REVIEW OF SYSTEMS     Review of Systems   Constitutional: Negative for chills, diaphoresis, fatigue and fever. HENT: Positive for congestion, hearing loss (chronic, wears hearing aids. No changes.), postnasal drip, rhinorrhea and sneezing. Negative for ear pain, facial swelling, nosebleeds (she has had in past, not since starting Flonase), sinus pressure, sinus pain, sore throat and trouble swallowing. Respiratory: Negative for cough and shortness of breath. Cardiovascular: Negative for chest pain. Gastrointestinal: Negative for nausea and vomiting. Musculoskeletal: Negative for gait problem, neck pain and neck stiffness. Neurological: Negative for dizziness, light-headedness and headaches. Hematological: Negative for adenopathy.        PAST MEDICAL HISTORY         Diagnosis Date    Balance problem     Bipolar I disorder, most recent episode (or current) depressed, in partial or unspecified remission 5/8/2013    Cancer ([facility] [address]     CERVICAL     Depression     Fatigue     Gallstones     Gastric bypass status for obesity 2002    GERD (gastroesophageal reflux disease)     History of blood transfusion     History of hysterectomy     REMOVAL OF ONE OVARY     Hypothyroidism     MS (multiple sclerosis) (HCC)     Paresthesias     RT LOWER LIMB    UTI (urinary tract infection)        SURGICAL HISTORY     Patient  has a past surgical history that includes Hysterectomy (1963); Carpal tunnel release (1999); other surgical history; other surgical history (1986); Batsheva-en-Y Gastric Bypass (2000); other surgical history (2006); bone graft (Left); Cholecystectomy, laparoscopic (5/16/2016); laparoscopic appendectomy (5/16/2016); Appendectomy; Enteroscopy (N/A, 2/5/2018); pr colonoscopy flx dx w/collj spec when pfrmd (Left, 2/6/2018); pr office/outpt visit,procedure only (Left, 2/18/2018);  Colonoscopy; Endoscopy, colon, diagnostic; Dilatation, esophagus; pr office/outpt visit,procedure only (N/A, 10/4/2018); and Hysterectomy, vaginal.    CURRENT MEDICATIONS       Outpatient Medications Prior to Visit   Medication Sig Dispense Refill    loratadine (CLARITIN) 10 MG capsule Take 10 mg by mouth daily      lurasidone (LATUDA) 20 MG TABS tablet Take 20 mg by mouth daily      lamoTRIgine (LAMICTAL) 200 MG tablet TAKE 1 TABLET BY MOUTH ONE TIME A DAY 30 tablet 5    levothyroxine (SYNTHROID) 100 MCG tablet Take 1 tablet by mouth Daily 90 tablet 1    ondansetron (ZOFRAN ODT) 4 MG disintegrating tablet Take 1 tablet by mouth every 8 hours as needed for Nausea 20 tablet 0    Cholecalciferol (VITAMIN D) 2000 units CAPS capsule Take 2,000 Units by mouth daily       pantoprazole (PROTONIX) 40 MG tablet Take 40 mg by mouth 2 times daily      Magnesium Oxide 250 MG TABS Take 250 mg by mouth daily      calcium carbonate 600 MG TABS tablet Take 1 tablet by mouth daily      lithium 300 MG capsule TAKE 1 CAPSULE BY MOUTH EVERY MORNING AND 2 CAPSULES EVERY EVENING 270 capsule 3    vitamin B-12 1000 MCG tablet Take 1 tablet by mouth daily 30 tablet 5    dimethyl fumarate (TECFIDERA) 240 MG delayed release capsule Take 240 mg by mouth 2 times daily      nitroGLYCERIN (NITROSTAT) 0.4 MG SL tablet up to max of 3 total doses. If no relief after 1 dose, call 911. 25 tablet 3    Prenatal MV-Min-Fe Fum-FA-DHA (PRENATAL 1 PO) Take 1 tablet by mouth daily       ferrous sulfate 325 (65 FE) MG tablet Take 325 mg by mouth daily (with breakfast)      vilazodone HCl (VILAZODONE HCL) 40 MG TABS Take 1 tablet by mouth daily 90 tablet 1    fluticasone (FLONASE) 50 MCG/ACT nasal spray 1 spray by Each Nare route daily 1 Spray in each nostril 2 Bottle 1     No facility-administered medications prior to visit. ALLERGIES     Patient is is allergic to latex and sulfa antibiotics. FAMILY HISTORY     Patient's family history includes Cancer in her mother, sister, and sister; Colon Polyps in her mother; Glaucoma in her father; Heart Disease in her father. SOCIAL HISTORY     Patient  reports that she quit smoking about 41 years ago. Her smoking use included cigarettes. She has a 0.05 pack-year smoking history. She has never used smokeless tobacco. She reports that she does not drink alcohol or use drugs. PHYSICAL EXAM     VITALS  BP: 122/80, Temp: 97.6 °F (36.4 °C), Pulse: 56, Resp: 16, SpO2: 98 %  Physical Exam   Constitutional: She is oriented to person, place, and time. Vital signs are normal. She appears well-developed and well-nourished. Non-toxic appearance. She does not have a sickly appearance. She does not appear ill. No distress. HENT:   Head: Normocephalic and atraumatic. Right Ear: External ear and ear canal normal. A middle ear effusion is present. Decreased hearing (wears hearing aids) is noted. Left Ear: External ear and ear canal normal. A middle ear effusion is present. Decreased hearing (wears hearing aids) is noted. Nose: Mucosal edema present. No rhinorrhea. No epistaxis. Right sinus exhibits no maxillary sinus tenderness and no frontal sinus tenderness.  Left sinus exhibits no maxillary sinus tenderness and no frontal sinus tenderness. Mouth/Throat: Uvula is midline, oropharynx is clear and moist and mucous membranes are normal. No trismus in the jaw. No uvula swelling. No oropharyngeal exudate, posterior oropharyngeal edema or posterior oropharyngeal erythema. Eyes: No scleral icterus. Neck: Normal range of motion. Neck supple. No tracheal deviation present. No thyromegaly present. Pulmonary/Chest: Effort normal. No respiratory distress. Lymphadenopathy:        Head (right side): No submental, no submandibular, no tonsillar, no preauricular, no posterior auricular and no occipital adenopathy present. Head (left side): No submental, no submandibular, no tonsillar, no preauricular, no posterior auricular and no occipital adenopathy present. She has no cervical adenopathy. Right: No supraclavicular adenopathy present. Left: No supraclavicular adenopathy present. Neurological: She is alert and oriented to person, place, and time. Skin: Skin is warm, dry and intact. No rash noted. She is not diaphoretic. No pallor. Skin warm and dry to touch, no rashes noted on exposed surfaces. Nursing note and vitals reviewed. DIAGNOSTIC RESULTS   Labs:No results found for this visit on 04/10/19. IMAGING:  No orders to display       No images are attached to the encounter or orders placed in the encounter. CLINICAL COURSE COURSE:     Vitals:    04/10/19 0814   BP: 122/80   Site: Left Upper Arm   Position: Sitting   Cuff Size: Medium Adult   Pulse: 56   Resp: 16   Temp: 97.6 °F (36.4 °C)   TempSrc: Oral   SpO2: 98%   Weight: 114 lb 12.8 oz (52.1 kg)   Height: 4' 11.02\" (1.499 m)       Administrations This Visit     pneumococcal 13-valent conjugate (PREVNAR) injection 0.5 mL     Admin Date  04/10/2019 Action  Given Dose  0.5 mL Route  Intramuscular Administered By  Agustin Gonzales CMA (AAMA)              PROCEDURES:  None  FINAL IMPRESSION      1.  Chronic

## 2019-04-10 NOTE — PROGRESS NOTES
Immunizations     Name Date Dose Route    Pneumococcal 13-valent Conjugate ([id number]) 4/10/2019 0.5 mL Intramuscular    Lot: [id number]    NDC: 4439-4236-12

## 2019-04-10 NOTE — PROGRESS NOTES
Chief Complaint   Patient presents with    Follow-up     2 mos Bipolar      Rudi Barron returned after two months to follow-up on bipolar disorder. She was in the hospital in March for bradycardia. That has now corrected. I checked for lithium levels, and found one ordered in March that was 1.24 µg per mL. That is too high of a value but I cannot tell whether it was a trough level or not. Oftentimes I find that these are not timed appropriately so they are artificially high. I'm going to repeat it and make sure that the 12 hour trough is obtained. Rudi Barron says she can easily do that in the morning so we'll know sometime tomorrow. I told her we would contact her if it needs changing.     Mental Status Examination    Level of consciousness:  within normal limits  Appearance:  well-appearing, street clothes, in chair, good grooming and good hygiene  Behavior/Motor:  no abnormalities noted  Attitude toward examiner:  cooperative, attentive and good eye contact  Speech:  spontaneous, normal rate, normal volume and well articulated  Mood:  euthymic  Affect:  mood congruent  Thought processes:  linear, goal directed and coherent  Thought content:  Homocidal ideation: none  Suicidal Ideation:  denies suicidal ideation  Delusions:  no evidence of delusions  Perceptual Disturbance:  denies any perceptual disturbance  Cognition:  oriented to person, place, and time  Concentration succeeded  Memory intact  Fund of knowledge:  good  Abstract thinking:  good  Insight:  good  Judgment:  good    DIAGNOSTIC IMPRESSION  Bipolar dep, remission    Plan  No changes  Current Outpatient Medications   Medication Sig Dispense Refill    loratadine (CLARITIN) 10 MG capsule Take 10 mg by mouth daily      fluticasone (FLONASE) 50 MCG/ACT nasal spray 2 sprays by Each Nare route daily 2 Bottle 1    vilazodone HCl (VILAZODONE HCL) 40 MG TABS Take 1 tablet by mouth daily 90 tablet 1    lurasidone (LATUDA) 20 MG TABS tablet Take 20 mg

## 2019-04-11 ENCOUNTER — NURSE ONLY (OUTPATIENT)
Dept: LAB | Age: 59
End: 2019-04-11

## 2019-04-11 LAB — LITHIUM LEVEL: 0.95 MMOL/L (ref 0.6–1.2)

## 2019-04-17 LAB
BILIRUBIN URINE: NEGATIVE
BLOOD, URINE: NEGATIVE
CHARACTER, URINE: CLEAR
COLOR: YELLOW
GLUCOSE URINE: NEGATIVE MG/DL
KETONES, URINE: ABNORMAL
LEUKOCYTE ESTERASE, URINE: ABNORMAL
NITRITE, URINE: NEGATIVE
PH UA: 6.5 (ref 5–9)
PROTEIN UA: NEGATIVE
SPECIFIC GRAVITY, URINE: 1.01 (ref 1–1.03)
UROBILINOGEN, URINE: 0.2 EU/DL (ref 0–1)

## 2019-05-08 DIAGNOSIS — J34.89 RHINORRHEA: ICD-10-CM

## 2019-05-08 RX ORDER — LORATADINE 10 MG/1
TABLET ORAL
Qty: 30 TABLET | Refills: 1 | Status: SHIPPED | OUTPATIENT
Start: 2019-05-08 | End: 2019-07-07 | Stop reason: SDUPTHER

## 2019-05-15 ENCOUNTER — OFFICE VISIT (OUTPATIENT)
Dept: ENT CLINIC | Age: 59
End: 2019-05-15
Payer: MEDICARE

## 2019-05-15 VITALS
BODY MASS INDEX: 24.77 KG/M2 | HEART RATE: 52 BPM | DIASTOLIC BLOOD PRESSURE: 72 MMHG | HEIGHT: 58 IN | SYSTOLIC BLOOD PRESSURE: 114 MMHG | RESPIRATION RATE: 12 BRPM | WEIGHT: 118 LBS | TEMPERATURE: 98.2 F

## 2019-05-15 DIAGNOSIS — J31.0 CHRONIC RHINITIS: ICD-10-CM

## 2019-05-15 DIAGNOSIS — J30.0 VASOMOTOR RHINITIS: Primary | ICD-10-CM

## 2019-05-15 PROCEDURE — 99203 OFFICE O/P NEW LOW 30 MIN: CPT | Performed by: OTOLARYNGOLOGY

## 2019-05-15 RX ORDER — IPRATROPIUM BROMIDE 21 UG/1
2 SPRAY, METERED NASAL 3 TIMES DAILY PRN
Qty: 1 BOTTLE | Refills: 3 | Status: SHIPPED | OUTPATIENT
Start: 2019-05-15 | End: 2019-09-05 | Stop reason: SDUPTHER

## 2019-05-15 ASSESSMENT — ENCOUNTER SYMPTOMS
COUGH: 0
COLOR CHANGE: 0
STRIDOR: 0
SINUS PRESSURE: 0
SHORTNESS OF BREATH: 0
FACIAL SWELLING: 0
SORE THROAT: 0
TROUBLE SWALLOWING: 0
VOICE CHANGE: 0
EYE ITCHING: 0
VOMITING: 0
SINUS PAIN: 0
NAUSEA: 0
RHINORRHEA: 1

## 2019-05-15 NOTE — PROGRESS NOTES
[address]  [place] [address]  [address]  [address] [address]  Dept: [phone]  Dept Fax: [phone]  Loc: [phone]    Meghana Marsh is a 61 y.o. female who was referred AJITH Kinney* for:  Chief Complaint   Patient presents with    New Patient     Pt. presents for rhinorrhea. Pt states it gets worse when/after she eats and talks a lot. Pt. states she goes in on 5/20/19 for insertion of a Harri. [place] Kristin HPI:     Meghana Marsh is a 61 y.o. female who presents today for chronic anterior bilateral rhinorrhea. This has been going on for about 1 year. It is worse when she eats food and also when she goes outside when there is a temperature change. It occurs on both sides but is worse on the right. She also noticed it being worse with increased stress such as public speaking or when she talks fast.  She had trialed Flonase and Claritin but this did not help. No previous history of chronic sinusitis. No history of allergy symptoms with environmental exposure. History:      Allergies   Allergen Reactions    Latex Rash    Sulfa Antibiotics Rash     Current Outpatient Medications   Medication Sig Dispense Refill    ipratropium (ATROVENT) 0.03 % nasal spray 2 sprays by Nasal route 3 times daily as needed for Rhinitis 1 Bottle 3    loratadine (CLARITIN) 10 MG tablet TAKE 1 TABLET BY MOUTH ONE TIME A DAY 30 tablet 1    fluticasone (FLONASE) 50 MCG/ACT nasal spray 2 sprays by Each Nare route daily 2 Bottle 1    lurasidone (LATUDA) 20 MG TABS tablet Take 20 mg by mouth daily      lamoTRIgine (LAMICTAL) 200 MG tablet TAKE 1 TABLET BY MOUTH ONE TIME A DAY 30 tablet 5    levothyroxine (SYNTHROID) 100 MCG tablet Take 1 tablet by mouth Daily 90 tablet 1    ondansetron (ZOFRAN ODT) 4 MG disintegrating tablet Take 1 tablet by mouth every 8 hours as needed for Nausea 20 tablet 0    Cholecalciferol (VITAMIN D) 2000 units CAPS capsule Take 2,000 Units by mouth daily       pantoprazole (PROTONIX) 40 MG tablet Take 40 mg by mouth 2 times daily      Magnesium Oxide 250 MG TABS Take 250 mg by mouth daily      calcium carbonate 600 MG TABS tablet Take 1 tablet by mouth daily      lithium 300 MG capsule TAKE 1 CAPSULE BY MOUTH EVERY MORNING AND 2 CAPSULES EVERY EVENING 270 capsule 3    vitamin B-12 1000 MCG tablet Take 1 tablet by mouth daily 30 tablet 5    dimethyl fumarate (TECFIDERA) 240 MG delayed release capsule Take 240 mg by mouth 2 times daily      nitroGLYCERIN (NITROSTAT) 0.4 MG SL tablet up to max of 3 total doses. If no relief after 1 dose, call 911. 25 tablet 3    Prenatal MV-Min-Fe Fum-FA-DHA (PRENATAL 1 PO) Take 1 tablet by mouth daily       ferrous sulfate 325 (65 FE) MG tablet Take 325 mg by mouth daily (with breakfast)       No current facility-administered medications for this visit.       Past Medical History:   Diagnosis Date    Balance problem     Bipolar I disorder, most recent episode (or current) depressed, in partial or unspecified remission 5/8/2013    [facility] ([facility] [address]     CERVICAL     Depression     Fatigue     Gallstones     Gastric bypass status for obesity 2002    GERD (gastroesophageal reflux disease)     History of blood transfusion     History of hysterectomy     REMOVAL OF ONE OVARY     Hypothyroidism     MS (multiple sclerosis) (HCC)     Paresthesias     RT LOWER LIMB    UTI (urinary tract infection)       Past Surgical History:   Procedure Laterality Date    APPENDECTOMY      BONE GRAFT Left     left leg to right arm    CARPAL TUNNEL RELEASE  1999    CHOLECYSTECTOMY, LAPAROSCOPIC  5/16/2016    COLONOSCOPY      DILATATION, ESOPHAGUS      ENDOSCOPY, COLON, DIAGNOSTIC      ENTEROSCOPY N/A 2/5/2018    ENTEROSCOPY performed by Brian Gilmore MD at [address] [facility] [address]  5/16/2016    OTHER SURGICAL HISTORY RECTAL FISSULRE    OTHER SURGICAL HISTORY      D&C    OTHER SURGICAL HISTORY  2006    GI BLEED    NE COLONOSCOPY FLX DX W/COLLJ SPEC WHEN PFRMD Left 2018    COLONOSCOPY performed by Hipolito Church MD at [facility]    NE OFFICE/OUTPT VISIT,PROCEDURE ONLY Left 2018    EGD ESOPHAGOGASTRODUODENOSCOPY performed by Meet Wallace MD at [facility]    NE OFFICE/OUTPT VISIT,PROCEDURE ONLY N/A 10/4/2018    EGD DIAGNOSTIC ONLY performed by Alexander An MD at [address]       Family History   Problem Relation Age of Onset    Cancer Mother     Colon Polyps Mother     Heart Disease Father     Glaucoma Father     Cancer Sister     Cancer Sister      Social History     Tobacco Use    Smoking status: Former Smoker     Packs/day: 0.10     Years: 0.50     Pack years: 0.05     Types: Cigarettes     Last attempt to quit: 10/24/1977     Years since quittin.5    Smokeless tobacco: Never Used   Substance Use Topics    Alcohol use: No       Subjective:      Review of Systems   Constitutional: Negative. HENT: Positive for rhinorrhea. Negative for ear discharge, ear pain, facial swelling, hearing loss, nosebleeds, postnasal drip, sinus pressure, sinus pain, sore throat, tinnitus, trouble swallowing and voice change. Eyes: Negative for itching and visual disturbance. Respiratory: Negative for cough, shortness of breath and stridor. Gastrointestinal: Negative for nausea and vomiting. Musculoskeletal: Negative for neck pain and neck stiffness. Skin: Negative for color change and wound. Allergic/Immunologic: Negative for environmental allergies and immunocompromised state. Neurological: Negative for dizziness and facial asymmetry. Psychiatric/Behavioral: Negative for behavioral problems and hallucinations. All other systems reviewed and are negative. Rest of review of systems are negative, except as noted in HPI.      Objective:   BP 114/72 (Site: Left Upper Arm, Position: Sitting)   Pulse 52   Temp 98.2 °F (36.8 °C) (Oral)   Resp 12   Ht 4' 10\" (1.473 m)   Wt 118 lb (53.5 kg)   BMI 24.66 kg/m²     PHYSICAL EXAM  Constitutional: He is oriented to person, place, and time. He appears well-developed and well-nourished. No distress. HENT:   Head: Normocephalic and atraumatic. Right Ear: External ear normal. Ear canal clear. Tympanic membrane intact. Middle ear aerated. Left Ear: External ear normal. Ear canal clear. Tympanic membrane intact. Middle ear aerated. Nose: External nose normal.  Nasal mucosa dry with mild crusting. No lesions noted. Mouth/Throat: Fair dentition. Oral cavity mucosa normal, no masses or lesions noted. Oropharynx is clear and moist.   Eyes: Pupils are equal, round, and reactive to light. Conjunctivae and EOM are normal.   Neck: Normal range of motion. Neck supple. No JVD present. No tracheal deviation present. No thyromegaly present. No cervical lymphadenopathy noted. Cardiovascular: Normal rate. Pulmonary/Chest: Effort normal. No stridor or stertor. No respiratory distress. Musculoskeletal: Normal range of motion. He exhibits no edema or Lymphadenopathy. Neurological: He is alert and oriented to person, place, and time. Cranial nerve II-XII grossly intact. Skin: Skin is warm. No erythema. Psychiatric: Normal mood and affect. Behavior is normal.   Vitals reviewed. Data:  All of the past medical history, past surgical history, family history,social history, allergies and current medications were reviewed with the patient. Assessment & Plan   Diagnoses and all orders for this visit:     Diagnosis Orders   1. Vasomotor rhinitis     2. Chronic rhinitis       59-year-old female with history consistent with vasomotor rhinitis. I will have her start ipratropium nasal spray as needed for this. I did state that this will further dry her nose.   Therefore she should use Salt water spray or gel to keep her nose hydrated. I also discussed that in theory ipratropium spray can raise her heart rate. Studies show that there is not  systemic effects, however her cardiologist may certainly not want her to take this. She should follow up with us if her symptoms persist because we could increase the dosage of her spray. I also discussed surgical treatment which I think for this time right now may be too aggressive. Follow as needed  The findings were explained and her questi ons were answered. Return if symptoms worsen or fail to improve. Claudia Valenzuela MD    **This report has been created using voice recognition software. It may contain minor errors which are inherent in voice recognition technology. **

## 2019-05-15 NOTE — PATIENT INSTRUCTIONS
Continue flonase and claritin with the ipratropium (Atrovent) spray for 2 weeks, then may come off flonase and claritin as a trial.

## 2019-05-16 RX ORDER — VILAZODONE HYDROCHLORIDE 40 MG/1
40 TABLET ORAL NIGHTLY
COMMUNITY
End: 2019-11-13 | Stop reason: SDUPTHER

## 2019-05-20 ENCOUNTER — APPOINTMENT (OUTPATIENT)
Dept: GENERAL RADIOLOGY | Age: 59
End: 2019-05-20
Attending: INTERNAL MEDICINE
Payer: MEDICARE

## 2019-05-20 PROBLEM — Z95.0 STATUS POST PLACEMENT OF CARDIAC PACEMAKER: Status: ACTIVE | Noted: 2019-05-20

## 2019-05-20 PROCEDURE — 71045 X-RAY EXAM CHEST 1 VIEW: CPT

## 2019-05-21 ENCOUNTER — TELEPHONE (OUTPATIENT)
Dept: FAMILY MEDICINE CLINIC | Age: 59
End: 2019-05-21

## 2019-05-21 NOTE — TELEPHONE ENCOUNTER
[facility] Transitions Initial Follow Up Call    Outreach made within 2 business days of discharge: Yes    Patient: Gabe Whitley Patient : 1960   MRN: [id number]  Reason for Admission: There are no discharge diagnoses documented for the most recent discharge. Discharge Date: 3/22/19       Spoke with: LOUISE [phone] Pippa Granados  , spoke Leandro Noel    Discharge department/facility: HOME     TCM Interactive Patient Contact:  Was patient able to fill all prescriptions: Yes  Was patient instructed to bring all medications to the follow-up visit: Yes  Is patient taking all medications as directed in the discharge summary?  Yes  Does patient understand their discharge instructions: Yes  Does patient have questions or concerns that need addressed prior to 7-14 day follow up office visit: no    Scheduled appointment with PCP within 7-14 days    Follow Up  Future Appointments   Date Time Provider Rudi Brandon   2019 12:00 PM [facility]   2019  1:40 PM Brenda Gibson MD [address] [place] [address]

## 2019-05-21 NOTE — TELEPHONE ENCOUNTER
05/21/2019 . Transition of Care visit scheduled.   5/23/2019  [id number]  Discharge from facility [facility]  Reason for admission pacemaker

## 2019-05-26 ENCOUNTER — HOSPITAL ENCOUNTER (EMERGENCY)
Age: 59
Discharge: HOME | End: 2019-05-26
Attending: EMERGENCY MEDICINE
Payer: MEDICARE

## 2019-05-26 VITALS
SYSTOLIC BLOOD PRESSURE: 119 MMHG | RESPIRATION RATE: 20 BRPM | HEART RATE: 61 BPM | TEMPERATURE: 97.7 F | DIASTOLIC BLOOD PRESSURE: 61 MMHG | WEIGHT: 110 LBS | HEIGHT: 58 IN | OXYGEN SATURATION: 98 % | BODY MASS INDEX: 23.09 KG/M2

## 2019-05-26 DIAGNOSIS — R42 VERTIGO: Primary | ICD-10-CM

## 2019-05-26 LAB
ANION GAP SERPL CALCULATED.3IONS-SCNC: 11 MEQ/L (ref 8–16)
BUN BLDV-MCNC: 11 MG/DL (ref 7–22)
CALCIUM SERPL-MCNC: 9.8 MG/DL (ref 8.5–10.5)
CHLORIDE BLD-SCNC: 106 MEQ/L (ref 98–111)
CO2: 24 MEQ/L (ref 23–33)
CREAT SERPL-MCNC: 0.5 MG/DL (ref 0.4–1.2)
EKG ATRIAL RATE: 63 BPM
EKG P AXIS: 79 DEGREES
EKG P-R INTERVAL: 198 MS
EKG Q-T INTERVAL: 458 MS
EKG QRS DURATION: 152 MS
EKG QTC CALCULATION (BAZETT): 468 MS
EKG R AXIS: 64 DEGREES
EKG T AXIS: 8 DEGREES
EKG VENTRICULAR RATE: 63 BPM
ERYTHROCYTE [DISTWIDTH] IN BLOOD BY AUTOMATED COUNT: 13.2 % (ref 11.5–14.5)
ERYTHROCYTE [DISTWIDTH] IN BLOOD BY AUTOMATED COUNT: 49.3 FL (ref 35–45)
GFR SERPL CREATININE-BSD FRML MDRD: > 90 ML/MIN/1.73M2
GLUCOSE BLD-MCNC: 56 MG/DL (ref 70–108)
HCT VFR BLD CALC: 36.5 % (ref 37–47)
HEMOGLOBIN: 12.1 GM/DL (ref 12–16)
MCH RBC QN AUTO: 33.5 PG (ref 26–33)
MCHC RBC AUTO-ENTMCNC: 33.2 GM/DL (ref 32.2–35.5)
MCV RBC AUTO: 101.1 FL (ref 81–99)
OSMOLALITY CALCULATION: 278.3 MOSMOL/KG (ref 275–300)
PLATELET # BLD: 162 THOU/MM3 (ref 130–400)
PMV BLD AUTO: 9.6 FL (ref 9.4–12.4)
POTASSIUM REFLEX MAGNESIUM: 4.2 MEQ/L (ref 3.5–5.2)
RBC # BLD: 3.61 MILL/MM3 (ref 4.2–5.4)
SODIUM BLD-SCNC: 141 MEQ/L (ref 135–145)
WBC # BLD: 6.8 THOU/MM3 (ref 4.8–10.8)

## 2019-05-26 PROCEDURE — 80048 BASIC METABOLIC PNL TOTAL CA: CPT

## 2019-05-26 PROCEDURE — 6370000000 HC RX 637 (ALT 250 FOR IP): Performed by: EMERGENCY MEDICINE

## 2019-05-26 PROCEDURE — 85027 COMPLETE CBC AUTOMATED: CPT

## 2019-05-26 PROCEDURE — 93005 ELECTROCARDIOGRAM TRACING: CPT | Performed by: EMERGENCY MEDICINE

## 2019-05-26 PROCEDURE — 99284 EMERGENCY DEPT VISIT MOD MDM: CPT

## 2019-05-26 PROCEDURE — 36415 COLL VENOUS BLD VENIPUNCTURE: CPT

## 2019-05-26 RX ORDER — MECLIZINE HYDROCHLORIDE CHEWABLE TABLETS 25 MG/1
25 TABLET, CHEWABLE ORAL ONCE
Status: COMPLETED | OUTPATIENT
Start: 2019-05-26 | End: 2019-05-26

## 2019-05-26 RX ORDER — MECLIZINE HYDROCHLORIDE 25 MG/1
25 TABLET ORAL 3 TIMES DAILY PRN
Qty: 10 TABLET | Refills: 0 | Status: SHIPPED | OUTPATIENT
Start: 2019-05-26 | End: 2019-05-29

## 2019-05-26 RX ADMIN — MECLIZINE HCL 25 MG: 25 TABLET, CHEWABLE ORAL at 11:12

## 2019-05-26 ASSESSMENT — ENCOUNTER SYMPTOMS
RHINORRHEA: 0
COUGH: 0
NAUSEA: 0
WHEEZING: 0
EYE DISCHARGE: 0
SHORTNESS OF BREATH: 0
ABDOMINAL PAIN: 0
SORE THROAT: 0
VOMITING: 0
DIARRHEA: 0
BACK PAIN: 0
EYE PAIN: 0

## 2019-05-26 NOTE — ED TRIAGE NOTES
Pt presents to [facility] via EMS c/o dizziness that started today. Pt states that she had a pacemaker placed on Monday per Dr. Imtiaz Bansal and started to have some pain last night. EKG completed and pt placed on cardiac monitor. VSS. CAll light in reach. Will monitor.

## 2019-05-26 NOTE — ED PROVIDER NOTES
Chief Complaint   Patient presents with    Dizziness     History obtained from the patient and chart review. Richardson Barba is a 61 y.o. female, c/o dizziness with spinning sensation earlier today while at home. Symptoms subsided prior to going to the emergency department, but since the patient reports that she had a [employer] pacemaker placed by Dr. Serge Brown (cardiologist) on 05/20/19 due to symptomatic bradycardia, she wanted to get evaluated to make sure it was okay. Patient has a history of MS. She denies recent sick contact or personal illness. She denies smoking, drinking, or illicit drug use. She is allergic to latex and sulfa drugs. She has an appointment with Dr. Serge Brown on 05/28/19. Patient denies any further complaints at initial encounter. Review of Systems   Constitutional: Negative for appetite change, chills, fatigue and fever. HENT: Negative for congestion, ear pain, rhinorrhea and sore throat. Eyes: Negative for pain, discharge and visual disturbance. Respiratory: Negative for cough, shortness of breath and wheezing. Cardiovascular: Negative for chest pain, palpitations and leg swelling. Gastrointestinal: Negative for abdominal pain, diarrhea, nausea and vomiting. Genitourinary: Negative for difficulty urinating, dysuria, hematuria and vaginal discharge. Musculoskeletal: Negative for arthralgias, back pain, joint swelling and neck pain. Skin: Negative for pallor and rash. Neurological: Positive for dizziness (described as spinning sensation). Negative for syncope, weakness, light-headedness, numbness and headaches. Hematological: Negative for adenopathy. Psychiatric/Behavioral: The patient is not nervous/anxious.           Past Medical History:   Diagnosis Date    Balance problem     Bipolar I disorder, most recent episode (or current) depressed, in partial or unspecified remission 5/8/2013    Cancer ([facility] [address]     CERVICAL     Depression     Fatigue     Gallstones     Gastric bypass status for obesity 2002    GERD (gastroesophageal reflux disease)     History of blood transfusion     History of hysterectomy     REMOVAL OF ONE OVARY     Hypothyroidism     MS (multiple sclerosis) (HCC)     Paresthesias     RT LOWER LIMB    UTI (urinary tract infection)          Past Surgical History:   Procedure Laterality Date    APPENDECTOMY      BONE GRAFT Left     left leg to right arm    CARPAL TUNNEL RELEASE  1999    CHOLECYSTECTOMY, LAPAROSCOPIC  5/16/2016    COLONOSCOPY      DILATATION, ESOPHAGUS      ENDOSCOPY, COLON, DIAGNOSTIC      ENTEROSCOPY N/A 2/5/2018    ENTEROSCOPY performed by Malia Ashford MD at [address] [facility]  5/16/2016    OTHER SURGICAL HISTORY      RECTAL [address]    OTHER SURGICAL HISTORY  1986    D&C    OTHER SURGICAL HISTORY  2006    GI BLEED    PACEMAKER INSERTION Right 05/20/2019    NE COLONOSCOPY FLX DX W/COLLJ SPEC WHEN PFRMD Left 2/6/2018    COLONOSCOPY performed by Malia Ashford MD at [facility]    NE OFFICE/OUTPT [facility] Left 2/18/2018    EGD ESOPHAGOGASTRODUODENOSCOPY performed by Shawna Hughes MD at [address] [facility] [address] OFFICE/OUTPT VISIT,PROCEDURE ONLY N/A 10/4/2018    EGD DIAGNOSTIC ONLY performed by Narayan Roblero MD at [address] [facility]         No current facility-administered medications for this encounter.      Current Outpatient Medications:     meclizine (ANTIVERT) 25 MG tablet, Take 1 tablet by mouth 3 times daily as needed for Dizziness If it does recur, take one tablet, may repeat a second dose half an hour to the first one if symptoms persist., Disp: 10 tablet, Rfl: 0    lithium 300 MG tablet, Take 600 mg by mouth nightly, Disp: , Rfl:     lithium 300 MG tablet, Take 300 mg by mouth every morning, Disp: , Rfl:     vilazodone HCl (VILAZODONE HCL) 20 MG TABS, Take 40 mg by mouth daily , Disp: , Rfl:     ipratropium (ATROVENT) 0.03 % nasal spray, 2 sprays by Nasal route 3 times daily as needed for Rhinitis, Disp: 1 Bottle, Rfl: 3    loratadine (CLARITIN) 10 MG tablet, TAKE 1 TABLET BY MOUTH ONE TIME A DAY, Disp: 30 tablet, Rfl: 1    fluticasone (FLONASE) 50 MCG/ACT nasal spray, 2 sprays by Each Nare route daily, Disp: 2 Bottle, Rfl: 1    lurasidone (LATUDA) 20 MG TABS tablet, Take 20 mg by mouth daily, Disp: , Rfl:     lamoTRIgine (LAMICTAL) 200 MG tablet, TAKE 1 TABLET BY MOUTH ONE TIME A DAY, Disp: 30 tablet, Rfl: 5    levothyroxine (SYNTHROID) 100 MCG tablet, Take 1 tablet by mouth Daily, Disp: 90 tablet, Rfl: 1    ondansetron (ZOFRAN ODT) 4 MG disintegrating tablet, Take 1 tablet by mouth every 8 hours as needed for Nausea, Disp: 20 tablet, Rfl: 0    Cholecalciferol (VITAMIN D) 2000 units CAPS capsule, Take 2,000 Units by mouth daily , Disp: , Rfl:     pantoprazole (PROTONIX) 40 MG tablet, Take 40 mg by mouth 2 times daily, Disp: , Rfl:     Magnesium Oxide 250 MG TABS, Take 250 mg by mouth daily, Disp: , Rfl:     calcium carbonate 600 MG TABS tablet, Take 1 tablet by mouth daily, Disp: , Rfl:     vitamin B-12 1000 MCG tablet, Take 1 tablet by mouth daily, Disp: 30 tablet, Rfl: 5    dimethyl fumarate (TECFIDERA) 240 MG delayed release capsule, Take 240 mg by mouth 2 times daily, Disp: , Rfl:     nitroGLYCERIN (NITROSTAT) 0.4 MG SL tablet, up to max of 3 total doses.  If no relief after 1 dose, call 911., Disp: 25 tablet, Rfl: 3    Prenatal MV-Min-Fe Fum-FA-DHA (PRENATAL 1 PO), Take 1 tablet by mouth daily , Disp: , Rfl:     ferrous sulfate 325 (65 FE) MG tablet, Take 325 mg by mouth daily (with breakfast), Disp: , Rfl:       Social History     Social History Narrative    Not on file     Social History     Tobacco Use    Smoking status: Former Smoker     Packs/day: 0.10     Years: 0.50     Pack years: 0.05     Types: Cigarettes     Last attempt to quit: 10/24/1977 Years since quittin.6    Smokeless tobacco: Never Used   Substance Use Topics    Alcohol use: No    Drug use: No         Allergies   Allergen Reactions    Latex Rash    Sulfa Antibiotics Rash         Family History   Problem Relation Age of Onset    Cancer Mother     Colon Polyps Mother     Heart Disease Father     Glaucoma Father     Cancer Sister     Cancer Sister            Previous records reviewed: patient had her pacemaker placed on 19 by Dr. Jose A Flores. Vitals:    19 1113   BP: 119/61   Pulse: 61   Resp: 20   Temp:    SpO2: 98%     Vital signs and nursing assessment reviewed and are normal. Pulsoximetry is normal per my interpretation. Physical Exam   Constitutional: She is oriented to person, place, and time. She appears well-developed and well-nourished. Non-toxic appearance. HENT:   Head: Normocephalic and atraumatic. Right Ear: External ear normal.   Left Ear: External ear normal.   Nose: Nose normal.   Mouth/Throat: Oropharynx is clear and moist and mucous membranes are normal. No oropharyngeal exudate, posterior oropharyngeal edema or posterior oropharyngeal erythema. Eyes: Conjunctivae are normal. Right eye exhibits nystagmus (right lateral, extinguishable). Neck: Normal range of motion. Neck supple. No JVD present. Cardiovascular: Normal rate, regular rhythm, normal heart sounds, intact distal pulses and normal pulses. Exam reveals no gallop and no friction rub. No murmur heard. Pulmonary/Chest: Effort normal and breath sounds normal. No respiratory distress. She has no decreased breath sounds. She has no wheezes. She has no rhonchi. She has no rales. Well healing incision site to the right anterior chest wall. No warmth or active bleeding. Musculoskeletal: Normal range of motion. She exhibits no edema. Neurological: She is alert and oriented to person, place, and time. She exhibits normal muscle tone.  Coordination normal.   Negative test of skew.   Skin: Skin is warm and dry. No rash noted. She is not diaphoretic. Nursing note and vitals reviewed. MDM  Initial Assessment: vertigo. R/o arrhytmic episode earlier today or electrolyte abnormality. Plan: medication, labs, EKG, interrogate pacemaker, likely discharge. Labs Reviewed   CBC - Abnormal; Notable for the following components:       Result Value    RBC 3.61 (*)     Hematocrit 36.5 (*)     .1 (*)     MCH 33.5 (*)     RDW-SD 49.3 (*)     All other components within normal limits   BASIC METABOLIC PANEL W/ REFLEX TO MG FOR LOW K - Abnormal; Notable for the following components:    Glucose 56 (*)     All other components within normal limits   ANION GAP   GLOMERULAR FILTRATION RATE, ESTIMATED   OSMOLALITY         Medications   meclizine (ANTIVERT) chewable tablet 25 mg (25 mg Oral Given 5/26/19 1112)         No orders to display         Final diagnoses:   Vertigo         ED Course as of May 26 1126   Sun May 26, 2019   1044 The written report from pacer interrogation, as well as detected today. Also receive a call from [facility] confirming no arrhythmic events detected today. Pacer spikes are currently visible on the 12-lead EKG as well as cardiac monitor. [DT]   1104 She has remained asymptomatic throughout observation. Nystagmus improved after medication. Pt. is ambulatory without assistance. Will discharged. [DT]      ED Course User Index  [DT] Deep Zelaya MD         New Prescriptions    MECLIZINE (ANTIVERT) 25 MG TABLET    Take 1 tablet by mouth 3 times daily as needed for Dizziness If it does recur, take one tablet, may repeat a second dose half an hour to the first one if symptoms persist.       Scribe:  Joaquim Cortez 5/26/19 10:28 AM Scribing for and in the presence of [email or web address].  Tita Villarreal MD.    Signed by: Mayur March Si, 05/26/19 11:26 AM    Provider:  I personally performed the services described in the documentation, reviewed and edited

## 2019-05-26 NOTE — ED NOTES
Bed: 006A  Expected date:   Expected time:   Means of arrival: [employer] Dept  Comments:     Arian Molina RN  05/26/19 6218

## 2019-05-28 PROCEDURE — 93010 ELECTROCARDIOGRAM REPORT: CPT | Performed by: INTERNAL MEDICINE

## 2019-05-29 ENCOUNTER — OFFICE VISIT (OUTPATIENT)
Dept: FAMILY MEDICINE CLINIC | Age: 59
End: 2019-05-29
Payer: MEDICARE

## 2019-05-29 VITALS
HEART RATE: 61 BPM | WEIGHT: 117.6 LBS | RESPIRATION RATE: 12 BRPM | SYSTOLIC BLOOD PRESSURE: 110 MMHG | OXYGEN SATURATION: 98 % | BODY MASS INDEX: 24.68 KG/M2 | HEIGHT: 58 IN | DIASTOLIC BLOOD PRESSURE: 74 MMHG | TEMPERATURE: 98.9 F

## 2019-05-29 DIAGNOSIS — Z09 HOSPITAL DISCHARGE FOLLOW-UP: ICD-10-CM

## 2019-05-29 DIAGNOSIS — R00.1 BRADYCARDIA: Primary | ICD-10-CM

## 2019-05-29 DIAGNOSIS — Z95.0 STATUS POST BIVENTRICULAR CARDIAC PACEMAKER INSERTION: ICD-10-CM

## 2019-05-29 DIAGNOSIS — L72.9 SKIN CYST: ICD-10-CM

## 2019-05-29 PROCEDURE — 1111F DSCHRG MED/CURRENT MED MERGE: CPT | Performed by: NURSE PRACTITIONER

## 2019-05-29 PROCEDURE — 99214 OFFICE O/P EST MOD 30 MIN: CPT | Performed by: NURSE PRACTITIONER

## 2019-05-29 ASSESSMENT — ENCOUNTER SYMPTOMS
COUGH: 0
NAUSEA: 0
ANAL BLEEDING: 0
EYE REDNESS: 0
DIARRHEA: 0
SORE THROAT: 0
CONSTIPATION: 0
ABDOMINAL DISTENTION: 0
BLOOD IN STOOL: 0
ABDOMINAL PAIN: 0
RHINORRHEA: 0
SHORTNESS OF BREATH: 0
COLOR CHANGE: 0
EYE DISCHARGE: 0

## 2019-05-29 NOTE — PATIENT INSTRUCTIONS
Thank you   1. Thank you for trusting us with your healthcare needs. You may receive a survey regarding today's visit. It would help us out if you would take a few moments to provide your feedback. We value your input. 2. Please bring in ALL medications BOTTLES, including inhalers, herbal supplements, over the counter, prescribed & non-prescribed medicine. The office would like actual medication bottles and a list.   3. Please note our OFFICE POLICIES:   a. Prior to getting your labs drawn, please check with your insurance company for benefits and eligibility of lab services. Often, insurance companies cover certain tests for preventative visits only. It is patient's responsibility to see what is covered. b. We are unable to change a diagnosis after the test has been performed. c. Lab orders will not be re-printed. Please hold onto your original lab orders and take them to your lab to be completed. d. If you no show your scheduled appointment three times, you will be dismissed from this practice. e. Betha Maffucci must be completed 24 hours prior to your schedule appointment. 4. If the list below has been completed, PLEASE FAX RECORDS TO OUR OFFICE @ [phone]. Once the records have been received we will update your records at our office:  Health Maintenance Due   Topic Date Due    Shingles Vaccine (2 of 2) 05/06/2019       Will recheck the area on the groin at next visit   If it changes at all please let us know right away  Back in 3 months for recheck        Patient Education        Bradycardia: Care Instructions  Your Care Instructions    Bradycardia is a slow heart rate. If your heart beats too slowly, it can't supply your body with enough blood. This can make you weak or dizzy. Or it may make you pass out. Sometimes medicine can cause this problem. If this happens, your doctor may have you adjust one of your medicines. If a medicine is not the problem, your doctor may recommend a pacemaker.   It is important to treat bradycardia so that you don't get more serious health problems. Your doctor will want to see you on a routine schedule to make sure that your heartbeat is normal.  Follow-up care is a key part of your treatment and safety. Be sure to make and go to all appointments, and call your doctor if you are having problems. It's also a good idea to know your test results and keep a list of the medicines you take. How can you care for yourself at home? · Take your medicines exactly as prescribed. Call your doctor if you think you are having a problem with your medicine. If your bradycardia is caused by another disease, your doctor will try to treat the disease. If it is caused by heart medicines, he or she will adjust your medicines. · Make lifestyle changes to improve your heart health. ? Get regular exercise. Try for 30 minutes on most days of the week. If you do not have other heart problems, you likely do not have limits on the type or level of activity that you can do. You may want to walk, swim, bike, or do other activities. Ask your doctor what level of exercise is safe for you. ? To control your cholesterol, avoid foods with a lot of fat, saturated fat, or sodium. Try to eat more fiber. And if your doctor says it's okay, get some exercise on most days. ? Do not smoke. Smoking can make your heart condition worse. If you need help quitting, talk to your doctor about stop-smoking programs and medicines. These can increase your chances of quitting for good. ? Limit alcohol to 2 drinks a day for men and 1 drink a day for women. Too much alcohol can cause health problems. ? If you get a pacemaker, you will get information about it. When should you call for help? Call 911 anytime you think you may need emergency care. For example, call if:    · You have symptoms of sudden heart failure. These may include:  ? Severe trouble breathing. ? A fast or irregular heartbeat. ?  Coughing up pink, foamy pacemaker is a small device that is placed under the skin of your chest. It is powered by batteries. It has thin wires, called leads, that pass through a vein into your heart. A pacemaker for heart failure is a biventricular pacemaker (say \"leon-felisa-TRICK-carlito-campbell\"). Treatment that uses this type of pacemaker is called cardiac resynchronization therapy (CRT). When you have heart failure, the lower chambers of your heart may not pump at the same time. The pacemaker sends painless electrical signals to your heart. These signals make the chambers pump at the same time. This can help your heart pump blood better and help you feel better. Your pacemaker may be combined with an ICD, or implantable cardioverter-defibrillator. It can control abnormal heart rhythms. This can prevent sudden death. You may feel worried about having a pacemaker. This is common. It can help if you learn about how the pacemaker helps your heart. Talk to your doctor about your concerns. How is a pacemaker put in place? You will get medicine before the procedure. This helps you relax and helps prevent pain. The doctor makes a cut in the skin just below your collarbone. The cut may be on either side of your chest. The doctor will put the pacemaker leads through the cut. The leads go into a large blood vessel in the upper chest. Then the doctor will guide the leads through the blood vessel into different chambers of the heart. The doctor will place the pacemaker under the skin of your chest. He or she will attach the leads to the pacemaker. Then the cut will be closed with stitches. The procedure usually takes 2 to 3 hours. You may need to spend the night in the hospital.  What can you expect when you have a pacemaker? A pacemaker can help your heart pump blood better. It may help you feel better so you can be more active. It also may help keep you out of the hospital and help you live longer.   You can live a normal, active life with a pacemaker. But you'll need to use certain electric devices with caution. Some devices have a strong electromagnetic field. This field can keep your pacemaker from working right for a short time. These devices include things in your home, garage, or workplace. Check with your doctor about what you need to avoid and what you need to keep a short distance away from your pacemaker. Many household and office electronics do not affect your pacemaker. Your doctor will check your pacemaker regularly to make sure it's working right. Pacemaker batteries usually last 5 to 15 years before they need to be replaced. Follow-up care is a key part of your treatment and safety. Be sure to make and go to all appointments, and call your doctor if you are having problems. It's also a good idea to know your test results and keep a list of the medicines you take. Where can you learn more? Go to https://[email or web address].[employer]. org and sign in to your Copybar account. Enter Y169 in the BackerKit box to learn more about \"Learning About a Pacemaker for Heart Failure. \"     If you do not have an account, please click on the \"Sign Up Now\" link. Current as of: July 22, 2018  Content Version: 12.0  © 2378-6606 Healthwise, Incorporated. Care instructions adapted under license by [facility] ([facility]). If you have questions about a medical condition or this instruction, always ask your healthcare professional. [address] any warranty or liability for your use of this information.

## 2019-05-29 NOTE — PROGRESS NOTES
TABS tablet  Take 1 tablet by mouth daily             Cholecalciferol (VITAMIN D) 2000 units CAPS capsule  Take 2,000 Units by mouth daily              dimethyl fumarate (TECFIDERA) 240 MG delayed release capsule  Take 240 mg by mouth 2 times daily             ferrous sulfate 325 (65 FE) MG tablet  Take 325 mg by mouth daily (with breakfast)             fluticasone (FLONASE) 50 MCG/ACT nasal spray  2 sprays by Each Nare route daily             ipratropium (ATROVENT) 0.03 % nasal spray  2 sprays by Nasal route 3 times daily as needed for Rhinitis             lamoTRIgine (LAMICTAL) 200 MG tablet  TAKE 1 TABLET BY MOUTH ONE TIME A DAY             levothyroxine (SYNTHROID) 100 MCG tablet  Take 1 tablet by mouth Daily             lithium 300 MG tablet  Take 600 mg by mouth nightly             lithium 300 MG tablet  Take 300 mg by mouth every morning             loratadine (CLARITIN) 10 MG tablet  TAKE 1 TABLET BY MOUTH ONE TIME A DAY             lurasidone (LATUDA) 20 MG TABS tablet  Take 20 mg by mouth daily             Magnesium Oxide 250 MG TABS  Take 250 mg by mouth daily             meclizine (ANTIVERT) 25 MG tablet  Take 1 tablet by mouth 3 times daily as needed for Dizziness If it does recur, take one tablet, may repeat a second dose half an hour to the first one if symptoms persist.             nitroGLYCERIN (NITROSTAT) 0.4 MG SL tablet  up to max of 3 total doses. If no relief after 1 dose, call 911.              ondansetron (ZOFRAN ODT) 4 MG disintegrating tablet  Take 1 tablet by mouth every 8 hours as needed for Nausea             pantoprazole (PROTONIX) 40 MG tablet  Take 40 mg by mouth 2 times daily             Prenatal MV-Min-Fe Fum-FA-DHA (PRENATAL 1 PO)  Take 1 tablet by mouth daily              vilazodone HCl (VILAZODONE HCL) 20 MG TABS  Take 40 mg by mouth daily              vitamin B-12 1000 MCG tablet  Take 1 tablet by mouth daily                   Medications marked \"taking\" at this time  Outpatient Medications Marked as Taking for the 5/29/19 encounter (Office Visit) with AJITH Asher CNP   Medication Sig Dispense Refill    meclizine (ANTIVERT) 25 MG tablet Take 1 tablet by mouth 3 times daily as needed for Dizziness If it does recur, take one tablet, may repeat a second dose half an hour to the first one if symptoms persist. 10 tablet 0    lithium 300 MG tablet Take 600 mg by mouth nightly      lithium 300 MG tablet Take 300 mg by mouth every morning      vilazodone HCl (VILAZODONE HCL) 20 MG TABS Take 40 mg by mouth daily       ipratropium (ATROVENT) 0.03 % nasal spray 2 sprays by Nasal route 3 times daily as needed for Rhinitis 1 Bottle 3    lurasidone (LATUDA) 20 MG TABS tablet Take 20 mg by mouth daily      lamoTRIgine (LAMICTAL) 200 MG tablet TAKE 1 TABLET BY MOUTH ONE TIME A DAY 30 tablet 5    levothyroxine (SYNTHROID) 100 MCG tablet Take 1 tablet by mouth Daily 90 tablet 1    ondansetron (ZOFRAN ODT) 4 MG disintegrating tablet Take 1 tablet by mouth every 8 hours as needed for Nausea 20 tablet 0    Cholecalciferol (VITAMIN D) 2000 units CAPS capsule Take 2,000 Units by mouth daily       pantoprazole (PROTONIX) 40 MG tablet Take 40 mg by mouth 2 times daily      Magnesium Oxide 250 MG TABS Take 250 mg by mouth daily      calcium carbonate 600 MG TABS tablet Take 1 tablet by mouth daily      vitamin B-12 1000 MCG tablet Take 1 tablet by mouth daily 30 tablet 5    dimethyl fumarate (TECFIDERA) 240 MG delayed release capsule Take 240 mg by mouth 2 times daily      Prenatal MV-Min-Fe Fum-FA-DHA (PRENATAL 1 PO) Take 1 tablet by mouth daily       ferrous sulfate 325 (65 FE) MG tablet Take 325 mg by mouth daily (with breakfast)          Medications patient taking as of now reconciled against medications ordered at time of hospital discharge: Yes    Chief Complaint   Patient presents with    Follow-Up from Hospital     [facility]   [facility]       [facility]    Had a dual-chamber How Severe Are Your Spot(S)?: moderate pacemaker placed on 5/20/19 - tolerated procedure well - feeling good after. Saw Dr. Brock Chavez yesterday for follow-up - he said everything looks good - did not want her wearing her prosthetic arm while healing - cannot go back to work for 2 more weeks. Done for persistent bradycardia. Since the procedure she feels better overall - depression is improved as well. Walked to her appointment today and had no shortness of breath, chest pain, dizziness, or lightheadedness. On Sunday she went to ER with c/o dizziness - diagnosed with vertigo - given Antivert in ER and an rx for antivert for 3 days. By the time she left the ER she felt better    Inpatient course: Discharge summary reviewed- see chart. Interval history/Current status: Stable    Review of Systems   Constitutional: Negative for chills, fatigue and fever. HENT: Negative for congestion, ear pain, postnasal drip, rhinorrhea and sore throat. Eyes: Negative for discharge and redness. Respiratory: Negative for cough and shortness of breath. Cardiovascular: Negative for chest pain and leg swelling. Gastrointestinal: Negative for abdominal distention, abdominal pain, anal bleeding, blood in stool, constipation, diarrhea and nausea. Skin: Negative for color change and rash. Neurological: Negative for facial asymmetry, speech difficulty and weakness. Hematological: Does not bruise/bleed easily. Psychiatric/Behavioral: Negative for agitation and confusion. Vitals:    05/29/19 1414   BP: 110/74   Pulse: 61   Resp: 12   Temp: 98.9 °F (37.2 °C)   TempSrc: Oral   SpO2: 98%   Weight: 117 lb 9.6 oz (53.3 kg)   Height: 4' 9.99\" (1.473 m)     Body mass index is 24.59 kg/m².    Wt Readings from Last 3 Encounters:   05/29/19 117 lb 9.6 oz (53.3 kg)   05/26/19 110 lb (49.9 kg)   05/20/19 118 lb (53.5 kg)     BP Readings from Last 3 Encounters:   05/29/19 110/74   05/26/19 119/61   05/21/19 123/61       Physical Exam   Constitutional: Vital What Is The Reason For Today's Visit?: Excessive sun exposure

## 2019-06-12 ENCOUNTER — OFFICE VISIT (OUTPATIENT)
Dept: PSYCHIATRY | Age: 59
End: 2019-06-12
Payer: MEDICARE

## 2019-06-12 DIAGNOSIS — F31.76 BIPOLAR DISORDER, IN FULL REMISSION, MOST RECENT EPISODE DEPRESSED (HCC): Primary | ICD-10-CM

## 2019-06-12 PROCEDURE — 99214 OFFICE O/P EST MOD 30 MIN: CPT | Performed by: PSYCHIATRY & NEUROLOGY

## 2019-06-12 NOTE — PROGRESS NOTES
Chief Complaint   Patient presents with    Follow-up     2 mos Bipolar dep     Mir Dorman returns after two months to follow-up on the above. This time she was accompanied by an older sister, Edwige Dang. I asked why reinforcements were needed at this time, and the sister told me that she and her other sister have some concerns about how Mir Dorman has been doing. She described an incident when all three of them were with their mother in the nursing home, and Mir Dorman would not let go of mother's hand in order to allow things like a transferr from the bed to the wheelchair and out to a car. Mir Dorman told us in thinking back that she has so little time with mother that it was just for than she could bare, and the sisters do not let Mir Dorman do much with mother due to Haily's limitations. She feels cut off; her sister says she isolates and is invited. There are other incidents as well. For instance a cousin came up to her when she was in the parking lot and she did not recognize her cousin (who she basically grew up with). She insisted on taking the bus rather than go with the cousin who offered to take her wherever she wanted to go. There have been times when they find her confused and apparently disoriented. Mir Dorman broke into tears at that point and said she is having some cognitive difficulties and wonders if she has early dementia. At that point I offered to refer her for testing and she accepted. We will see if Dr. Hamzah Esqueda can see her. She also told me that she thought she ought to be in therapy. She thinks that she saw Alo Najera previously. Jessica Murphy is no longer here so I told her I would check with Dr. Shayy Ortiz.     Mental Status Examination    Level of consciousness:  within normal limits  Appearance:  well-appearing, street clothes, in chair, good grooming and good hygiene  Behavior/Motor:  no abnormalities noted  Attitude toward examiner:  cooperative, attentive and good eye contact  Speech:  spontaneous, normal rate, normal volume and well articulated  Mood:  depressed, tearful, angry  Affect:  mood congruent  Thought processes:  linear, goal directed and coherent  Thought content:  Homocidal ideation: none  Suicidal Ideation:  denies suicidal ideation  Delusions:  no evidence of delusions  Perceptual Disturbance:  denies any perceptual disturbance  Cognition:  oriented to person, place, and time  Concentration succeeded  Memory intact  Fund of knowledge:  good  Abstract thinking:  good  Insight:  good  Judgment:  good    DIAGNOSTIC IMPRESSION  Bipolar dep, moderate  R/o cognitive disorder    Plan  Refer for testing  Refer for therapy  Current Outpatient Medications   Medication Sig Dispense Refill    lithium 300 MG tablet Take 600 mg by mouth nightly      lithium 300 MG tablet Take 300 mg by mouth every morning      vilazodone HCl (VILAZODONE HCL) 20 MG TABS Take 40 mg by mouth daily       ipratropium (ATROVENT) 0.03 % nasal spray 2 sprays by Nasal route 3 times daily as needed for Rhinitis 1 Bottle 3    loratadine (CLARITIN) 10 MG tablet TAKE 1 TABLET BY MOUTH ONE TIME A DAY 30 tablet 1    fluticasone (FLONASE) 50 MCG/ACT nasal spray 2 sprays by Each Nare route daily 2 Bottle 1    lurasidone (LATUDA) 20 MG TABS tablet Take 20 mg by mouth daily      lamoTRIgine (LAMICTAL) 200 MG tablet TAKE 1 TABLET BY MOUTH ONE TIME A DAY 30 tablet 5    levothyroxine (SYNTHROID) 100 MCG tablet Take 1 tablet by mouth Daily 90 tablet 1    ondansetron (ZOFRAN ODT) 4 MG disintegrating tablet Take 1 tablet by mouth every 8 hours as needed for Nausea 20 tablet 0    Cholecalciferol (VITAMIN D) 2000 units CAPS capsule Take 2,000 Units by mouth daily       pantoprazole (PROTONIX) 40 MG tablet Take 40 mg by mouth 2 times daily      Magnesium Oxide 250 MG TABS Take 250 mg by mouth daily      calcium carbonate 600 MG TABS tablet Take 1 tablet by mouth daily      vitamin B-12 1000 MCG tablet Take 1 tablet by mouth daily 30 tablet 5    dimethyl fumarate (TECFIDERA) 240 MG delayed release capsule Take 240 mg by mouth 2 times daily      nitroGLYCERIN (NITROSTAT) 0.4 MG SL tablet up to max of 3 total doses. If no relief after 1 dose, call 911. 25 tablet 3    Prenatal MV-Min-Fe Fum-FA-DHA (PRENATAL 1 PO) Take 1 tablet by mouth daily       ferrous sulfate 325 (65 FE) MG tablet Take 325 mg by mouth daily (with breakfast)       No current facility-administered medications for this visit.

## 2019-06-17 ENCOUNTER — TELEPHONE (OUTPATIENT)
Dept: PSYCHIATRY | Age: 59
End: 2019-06-17

## 2019-06-17 NOTE — TELEPHONE ENCOUNTER
[facility] does not accept patient insurance and also will be leaving the [place] area. Is there somewhere else you would like to refer patient?

## 2019-07-07 DIAGNOSIS — J34.89 RHINORRHEA: ICD-10-CM

## 2019-07-08 RX ORDER — LEVOTHYROXINE SODIUM 0.1 MG/1
TABLET ORAL
Qty: 90 TABLET | Refills: 1 | Status: SHIPPED | OUTPATIENT
Start: 2019-07-08 | End: 2019-12-30

## 2019-07-08 RX ORDER — LORATADINE 10 MG/1
TABLET ORAL
Qty: 30 TABLET | Refills: 1 | Status: SHIPPED | OUTPATIENT
Start: 2019-07-08 | End: 2019-08-28 | Stop reason: ALTCHOICE

## 2019-08-22 ENCOUNTER — OFFICE VISIT (OUTPATIENT)
Dept: PSYCHIATRY | Age: 59
End: 2019-08-22
Payer: MEDICARE

## 2019-08-22 DIAGNOSIS — F31.75 BIPOLAR I, MOST RECENT EPISODE DEPRESSED, PARTIAL REMISSION (HCC): Primary | ICD-10-CM

## 2019-08-22 PROCEDURE — 99213 OFFICE O/P EST LOW 20 MIN: CPT | Performed by: PSYCHIATRY & NEUROLOGY

## 2019-08-22 RX ORDER — LITHIUM CARBONATE 300 MG
300 TABLET ORAL EVERY MORNING
Qty: 90 TABLET | Refills: 3 | Status: SHIPPED | OUTPATIENT
Start: 2019-08-22 | End: 2020-06-01

## 2019-08-22 RX ORDER — LITHIUM CARBONATE 300 MG
600 TABLET ORAL NIGHTLY
Qty: 180 TABLET | Refills: 3 | Status: SHIPPED | OUTPATIENT
Start: 2019-08-22 | End: 2020-06-01

## 2019-08-23 ENCOUNTER — NURSE ONLY (OUTPATIENT)
Dept: LAB | Age: 59
End: 2019-08-23

## 2019-08-23 LAB
BUN BLDV-MCNC: 14 MG/DL (ref 7–22)
CREAT SERPL-MCNC: 0.6 MG/DL (ref 0.4–1.2)
GFR SERPL CREATININE-BSD FRML MDRD: > 90 ML/MIN/1.73M2
LITHIUM LEVEL: 0.82 MMOL/L (ref 0.6–1.2)
T4 FREE: 1.11 NG/DL (ref 0.93–1.76)
TSH SERPL DL<=0.05 MIU/L-ACNC: 0.94 UIU/ML (ref 0.4–4.2)

## 2019-08-28 ENCOUNTER — NURSE ONLY (OUTPATIENT)
Dept: LAB | Age: 59
End: 2019-08-28

## 2019-08-28 ENCOUNTER — OFFICE VISIT (OUTPATIENT)
Dept: FAMILY MEDICINE CLINIC | Age: 59
End: 2019-08-28
Payer: MEDICARE

## 2019-08-28 VITALS
HEIGHT: 58 IN | HEART RATE: 78 BPM | WEIGHT: 120 LBS | SYSTOLIC BLOOD PRESSURE: 126 MMHG | RESPIRATION RATE: 12 BRPM | OXYGEN SATURATION: 98 % | BODY MASS INDEX: 25.19 KG/M2 | DIASTOLIC BLOOD PRESSURE: 69 MMHG

## 2019-08-28 DIAGNOSIS — R41.3 MEMORY DIFFICULTIES: Primary | ICD-10-CM

## 2019-08-28 DIAGNOSIS — E06.3 HYPOTHYROIDISM DUE TO HASHIMOTO'S THYROIDITIS: ICD-10-CM

## 2019-08-28 DIAGNOSIS — F31.75 BIPOLAR I, MOST RECENT EPISODE DEPRESSED, PARTIAL REMISSION (HCC): ICD-10-CM

## 2019-08-28 DIAGNOSIS — R41.3 MEMORY DIFFICULTIES: ICD-10-CM

## 2019-08-28 DIAGNOSIS — E03.8 HYPOTHYROIDISM DUE TO HASHIMOTO'S THYROIDITIS: ICD-10-CM

## 2019-08-28 PROCEDURE — 99213 OFFICE O/P EST LOW 20 MIN: CPT | Performed by: NURSE PRACTITIONER

## 2019-08-28 ASSESSMENT — ENCOUNTER SYMPTOMS
EYE REDNESS: 0
COUGH: 0
NAUSEA: 0
ABDOMINAL PAIN: 0
ANAL BLEEDING: 0
SHORTNESS OF BREATH: 0
CONSTIPATION: 0
BLOOD IN STOOL: 0
COLOR CHANGE: 0
EYE DISCHARGE: 0
ABDOMINAL DISTENTION: 0
DIARRHEA: 0
RHINORRHEA: 0
SORE THROAT: 0

## 2019-08-28 NOTE — PATIENT INSTRUCTIONS
You may receive a survey about your visit with us today. The feedback from our patients helps us identify what is working well and where the service to all patients can be enhanced. Thank you! Back in 6 months sooner as needed  Will check DHEA levels      Patient Education        Bipolar Disorder: Care Instructions  Your Care Instructions    Bipolar disorder is an illness that causes extreme mood changes, from times of very high energy (manic episodes) to times of depression. But many people with bipolar disorder show only the symptoms of depression. These moods may cause problems with your work, school, family life, friendships, and how well you function. This disease is also called manic-depression. There is no cure for bipolar disorder, but it can be helped with medicines. Counseling may also help. It is important to take your medicines exactly as prescribed, even when you feel well. You may need lifelong treatment. Follow-up care is a key part of your treatment and safety. Be sure to make and go to all appointments, and call your doctor if you are having problems. It's also a good idea to know your test results and keep a list of the medicines you take. How can you care for yourself at home? · Be safe with medicines. Take your medicines exactly as prescribed. Do not stop or change a medicine without talking to your doctor first. Eliel Daly and your doctor may need to try different combinations of medicines to find what works for you. · Take your medicines on schedule to keep your moods even. When you feel good, you may think that you do not need your medicines. But it is important to keep taking them. · Go to your counseling sessions. Call and talk with your counselor if you can't go to a session or if you don't think the sessions are helping. Do not just stop going. · Get at least 30 minutes of activity on most days of the week. Walking is a good choice.  You also may want to do other things, such as always ask your healthcare professional. [address] any warranty or liability for your use of this information. Patient Education        Hypothyroidism: Care Instructions  Your Care Instructions    You have hypothyroidism, which means that your body is not making enough thyroid hormone. This hormone helps your body use energy. If your thyroid level is low, you may feel tired, be constipated, have an increase in your blood pressure, or have dry skin or memory problems. You may also get cold easily, even when it is warm. Women with low thyroid levels may have heavy menstrual periods. A blood test to find your thyroid-stimulating hormone (TSH) level is used to check for hypothyroidism. A high TSH level may mean that you have low thyroid. When your body is not making enough thyroid hormone, TSH levels rise in an effort to make the body produce more. The treatment for hypothyroidism is to take thyroid hormone pills. You should start to feel better in 1 to 2 weeks. But it can take several months to see changes in the TSH level. You will need regular visits with your doctor to make sure you have the right dose of medicine. Most people need treatment for the rest of their lives. You will need to see your doctor regularly to have blood tests and to make sure you are doing well. Follow-up care is a key part of your treatment and safety. Be sure to make and go to all appointments, and call your doctor if you are having problems. It's also a good idea to know your test results and keep a list of the medicines you take. How can you care for yourself at home? · Take your thyroid hormone medicine exactly as prescribed. Call your doctor if you think you are having a problem with your medicine. Most people do not have side effects if they take the right amount of medicine regularly. ? Take the medicine 30 minutes before breakfast, and do not take it with calcium, vitamins, or iron.   ? Do not pasta.  · Gain weight. · Sleep more and feel drowsy during the daytime. How are mood disorders treated? Mood disorders can be treated with medicines or counseling, or a combination of both. Medicines for depression and SAD may include antidepressants. Medicines for bipolar disorder may include:  · Mood stabilizers. · Antipsychotics. · Benzodiazepines. Counseling may involve cognitive-behavioral therapy. It teaches you how to change the ways you think and behave. This can help you stop thinking bad thoughts about yourself and your life. Light therapy is the main treatment for SAD. This therapy uses a special kind of lamp. You let the lamp shine on you at certain times, usually in the morning. This may help your symptoms during the months when there is less sunlight. Healthy lifestyle  Healthy lifestyle changes may help you feel better. · Get at least 30 minutes of exercise on most days of the week. Walking is a good choice. · Eat a healthy diet. Include fruits, vegetables, lean proteins, and whole grains in your diet each day. · Keep a regular sleep schedule. Try for 8 hours of sleep a night. · Find ways to manage stress, such as relaxation exercises. · Avoid alcohol and illegal drugs. Follow-up care is a key part of your treatment and safety. Be sure to make and go to all appointments, and call your doctor if you are having problems. It's also a good idea to know your test results and keep a list of the medicines you take. Where can you learn more? Go to https://Zvooqtaisha.bop.fm. org and sign in to your [employer] account. Enter O505 in the [facility] box to learn more about \"Learning About Mood Disorders. \"     If you do not have an account, please click on the \"Sign Up Now\" link. Current as of: September 11, 2018  Content Version: 12.1  © 9387-4265 [employer]. Care instructions adapted under license by [facility] ([facility]).  If you have questions about a medical

## 2019-08-31 LAB — DHEAS (DHEA SULFATE): 37 UG/DL (ref 26–200)

## 2019-09-01 LAB — DHEA UNCONJUGATED: 0.24 NG/ML (ref 0.63–4.7)

## 2019-09-03 DIAGNOSIS — J34.89 RHINORRHEA: ICD-10-CM

## 2019-09-04 RX ORDER — LORATADINE 10 MG/1
TABLET ORAL
Qty: 30 TABLET | Refills: 5 | Status: ON HOLD | OUTPATIENT
Start: 2019-09-04 | End: 2019-09-23

## 2019-09-05 RX ORDER — LAMOTRIGINE 200 MG/1
TABLET ORAL
Qty: 30 TABLET | Refills: 5 | Status: SHIPPED | OUTPATIENT
Start: 2019-09-05 | End: 2020-03-03

## 2019-09-05 RX ORDER — IPRATROPIUM BROMIDE 21 UG/1
SPRAY, METERED NASAL
Qty: 30 ML | Refills: 3 | Status: ON HOLD | OUTPATIENT
Start: 2019-09-05 | End: 2019-09-23

## 2019-09-05 NOTE — TELEPHONE ENCOUNTER
Melinda Perez is requesting a medication refill for Lamictal 200mg;#30 with 5 refills; last with a start date of 03/11/19 on leah's behalf. Patient's last completed appt was on 08/22/19 to return on 10/23/19.     Medication is pending your approval.

## 2019-09-06 ENCOUNTER — TELEPHONE (OUTPATIENT)
Dept: FAMILY MEDICINE CLINIC | Age: 59
End: 2019-09-06

## 2019-09-13 ENCOUNTER — HOSPITAL ENCOUNTER (EMERGENCY)
Age: 59
Discharge: HOME | End: 2019-09-13
Attending: EMERGENCY MEDICINE
Payer: MEDICARE

## 2019-09-13 ENCOUNTER — APPOINTMENT (OUTPATIENT)
Dept: CT IMAGING | Age: 59
End: 2019-09-13
Payer: MEDICARE

## 2019-09-13 VITALS
SYSTOLIC BLOOD PRESSURE: 118 MMHG | HEIGHT: 59 IN | DIASTOLIC BLOOD PRESSURE: 59 MMHG | TEMPERATURE: 98.5 F | RESPIRATION RATE: 16 BRPM | WEIGHT: 110 LBS | BODY MASS INDEX: 22.18 KG/M2 | HEART RATE: 62 BPM | OXYGEN SATURATION: 99 %

## 2019-09-13 DIAGNOSIS — R10.13 DYSPEPSIA: Primary | ICD-10-CM

## 2019-09-13 LAB
ALBUMIN SERPL-MCNC: 4.1 G/DL (ref 3.5–5.1)
ALP BLD-CCNC: 115 U/L (ref 38–126)
ALT SERPL-CCNC: 39 U/L (ref 11–66)
ANION GAP SERPL CALCULATED.3IONS-SCNC: 11 MEQ/L (ref 8–16)
AST SERPL-CCNC: 32 U/L (ref 5–40)
BACTERIA: ABNORMAL /HPF
BASOPHILS # BLD: 0.5 %
BASOPHILS ABSOLUTE: 0 THOU/MM3 (ref 0–0.1)
BILIRUB SERPL-MCNC: 0.5 MG/DL (ref 0.3–1.2)
BILIRUBIN DIRECT: < 0.2 MG/DL (ref 0–0.3)
BILIRUBIN URINE: NEGATIVE
BLOOD, URINE: NEGATIVE
BUN BLDV-MCNC: 13 MG/DL (ref 7–22)
CALCIUM SERPL-MCNC: 10.7 MG/DL (ref 8.5–10.5)
CASTS 2: ABNORMAL /LPF
CASTS UA: ABNORMAL /LPF
CHARACTER, URINE: CLEAR
CHLORIDE BLD-SCNC: 105 MEQ/L (ref 98–111)
CO2: 24 MEQ/L (ref 23–33)
COLOR: YELLOW
CREAT SERPL-MCNC: 0.6 MG/DL (ref 0.4–1.2)
CRYSTALS, UA: ABNORMAL
EOSINOPHIL # BLD: 2.5 %
EOSINOPHILS ABSOLUTE: 0.1 THOU/MM3 (ref 0–0.4)
EPITHELIAL CELLS, UA: ABNORMAL /HPF
ERYTHROCYTE [DISTWIDTH] IN BLOOD BY AUTOMATED COUNT: 13 % (ref 11.5–14.5)
ERYTHROCYTE [DISTWIDTH] IN BLOOD BY AUTOMATED COUNT: 48.9 FL (ref 35–45)
GFR SERPL CREATININE-BSD FRML MDRD: > 90 ML/MIN/1.73M2
GLUCOSE BLD-MCNC: 85 MG/DL (ref 70–108)
GLUCOSE URINE: NEGATIVE MG/DL
HCT VFR BLD CALC: 38.4 % (ref 37–47)
HEMOGLOBIN: 12.2 GM/DL (ref 12–16)
IMMATURE GRANS (ABS): 0.02 THOU/MM3 (ref 0–0.07)
IMMATURE GRANULOCYTES: 0 %
KETONES, URINE: NEGATIVE
LEUKOCYTE ESTERASE, URINE: ABNORMAL
LIPASE: 20.6 U/L (ref 5.6–51.3)
LYMPHOCYTES # BLD: 12.9 %
LYMPHOCYTES ABSOLUTE: 0.7 THOU/MM3 (ref 1–4.8)
MCH RBC QN AUTO: 32.4 PG (ref 26–33)
MCHC RBC AUTO-ENTMCNC: 31.8 GM/DL (ref 32.2–35.5)
MCV RBC AUTO: 102.1 FL (ref 81–99)
MISCELLANEOUS 2: ABNORMAL
MONOCYTES # BLD: 11.3 %
MONOCYTES ABSOLUTE: 0.6 THOU/MM3 (ref 0.4–1.3)
NITRITE, URINE: NEGATIVE
NUCLEATED RED BLOOD CELLS: 0 /100 WBC
OSMOLALITY CALCULATION: 278.8 MOSMOL/KG (ref 275–300)
PH UA: 7 (ref 5–9)
PLATELET # BLD: 192 THOU/MM3 (ref 130–400)
PMV BLD AUTO: 9.4 FL (ref 9.4–12.4)
POTASSIUM REFLEX MAGNESIUM: 4.5 MEQ/L (ref 3.5–5.2)
PROTEIN UA: NEGATIVE
RBC # BLD: 3.76 MILL/MM3 (ref 4.2–5.4)
RBC URINE: ABNORMAL /HPF
RENAL EPITHELIAL, UA: ABNORMAL
SEG NEUTROPHILS: 72.4 %
SEGMENTED NEUTROPHILS ABSOLUTE COUNT: 4.1 THOU/MM3 (ref 1.8–7.7)
SODIUM BLD-SCNC: 140 MEQ/L (ref 135–145)
SPECIFIC GRAVITY, URINE: 1.01 (ref 1–1.03)
TOTAL PROTEIN: 6.3 G/DL (ref 6.1–8)
UROBILINOGEN, URINE: 0.2 EU/DL (ref 0–1)
WBC # BLD: 5.7 THOU/MM3 (ref 4.8–10.8)
WBC UA: ABNORMAL /HPF
YEAST: ABNORMAL

## 2019-09-13 PROCEDURE — 96374 THER/PROPH/DIAG INJ IV PUSH: CPT

## 2019-09-13 PROCEDURE — 83690 ASSAY OF LIPASE: CPT

## 2019-09-13 PROCEDURE — 81001 URINALYSIS AUTO W/SCOPE: CPT

## 2019-09-13 PROCEDURE — 99284 EMERGENCY DEPT VISIT MOD MDM: CPT

## 2019-09-13 PROCEDURE — 80048 BASIC METABOLIC PNL TOTAL CA: CPT

## 2019-09-13 PROCEDURE — 74176 CT ABD & PELVIS W/O CONTRAST: CPT

## 2019-09-13 PROCEDURE — 80076 HEPATIC FUNCTION PANEL: CPT

## 2019-09-13 PROCEDURE — 85025 COMPLETE CBC W/AUTO DIFF WBC: CPT

## 2019-09-13 PROCEDURE — 36415 COLL VENOUS BLD VENIPUNCTURE: CPT

## 2019-09-13 PROCEDURE — 2580000003 HC RX 258: Performed by: EMERGENCY MEDICINE

## 2019-09-13 PROCEDURE — 6360000002 HC RX W HCPCS: Performed by: EMERGENCY MEDICINE

## 2019-09-13 RX ORDER — SODIUM CHLORIDE 9 MG/ML
INJECTION, SOLUTION INTRAVENOUS CONTINUOUS
Status: DISCONTINUED | OUTPATIENT
Start: 2019-09-13 | End: 2019-09-13 | Stop reason: HOSPADM

## 2019-09-13 RX ORDER — KETOROLAC TROMETHAMINE 30 MG/ML
30 INJECTION, SOLUTION INTRAMUSCULAR; INTRAVENOUS ONCE
Status: COMPLETED | OUTPATIENT
Start: 2019-09-13 | End: 2019-09-13

## 2019-09-13 RX ORDER — 0.9 % SODIUM CHLORIDE 0.9 %
1000 INTRAVENOUS SOLUTION INTRAVENOUS ONCE
Status: COMPLETED | OUTPATIENT
Start: 2019-09-13 | End: 2019-09-13

## 2019-09-13 RX ORDER — ONDANSETRON 2 MG/ML
4 INJECTION INTRAMUSCULAR; INTRAVENOUS EVERY 30 MIN PRN
Status: DISCONTINUED | OUTPATIENT
Start: 2019-09-13 | End: 2019-09-13 | Stop reason: HOSPADM

## 2019-09-13 RX ORDER — PANTOPRAZOLE SODIUM 40 MG/10ML
40 INJECTION, POWDER, LYOPHILIZED, FOR SOLUTION INTRAVENOUS ONCE
Status: DISCONTINUED | OUTPATIENT
Start: 2019-09-13 | End: 2019-09-13 | Stop reason: HOSPADM

## 2019-09-13 RX ORDER — SUCRALFATE 1 G/1
1 TABLET ORAL 4 TIMES DAILY
Qty: 60 TABLET | Refills: 0 | Status: ON HOLD | OUTPATIENT
Start: 2019-09-13 | End: 2019-09-25 | Stop reason: SDUPTHER

## 2019-09-13 RX ORDER — RANITIDINE 300 MG/1
300 CAPSULE ORAL 2 TIMES DAILY
Status: ON HOLD | COMMUNITY
End: 2019-09-25 | Stop reason: HOSPADM

## 2019-09-13 RX ADMIN — KETOROLAC TROMETHAMINE 30 MG: 30 INJECTION, SOLUTION INTRAMUSCULAR at 20:08

## 2019-09-13 RX ADMIN — ONDANSETRON 4 MG: 2 INJECTION INTRAMUSCULAR; INTRAVENOUS at 20:08

## 2019-09-13 RX ADMIN — SODIUM CHLORIDE 1000 ML: 9 INJECTION, SOLUTION INTRAVENOUS at 20:08

## 2019-09-13 ASSESSMENT — ENCOUNTER SYMPTOMS
BLOOD IN STOOL: 0
VOMITING: 0
SORE THROAT: 0
DIARRHEA: 0
NAUSEA: 1
ABDOMINAL PAIN: 1
SHORTNESS OF BREATH: 0
WHEEZING: 0
BACK PAIN: 0
COUGH: 0

## 2019-09-13 ASSESSMENT — PAIN DESCRIPTION - LOCATION: LOCATION: BACK

## 2019-09-13 ASSESSMENT — PAIN SCALES - GENERAL
PAINLEVEL_OUTOF10: 1
PAINLEVEL_OUTOF10: 8

## 2019-09-13 ASSESSMENT — PAIN DESCRIPTION - PAIN TYPE: TYPE: ACUTE PAIN

## 2019-09-13 ASSESSMENT — PAIN DESCRIPTION - FREQUENCY: FREQUENCY: CONTINUOUS

## 2019-09-13 NOTE — ED PROVIDER NOTES
The patient is not nervous/anxious. All other systems reviewed and are negative. PAST MEDICAL HISTORY    has a past medical history of Balance problem, Bipolar I disorder, most recent episode (or current) depressed, in partial or unspecified remission, Cancer ([facility] [address] Depression, Fatigue, Gallstones, Gastric bypass status for obesity, GERD (gastroesophageal reflux disease), History of blood transfusion, History of hysterectomy, Hypothyroidism, MS (multiple sclerosis) ([facility] [address] Paresthesias, and UTI (urinary tract infection). SURGICAL HISTORY      has a past surgical history that includes Hysterectomy (1963); Carpal tunnel release (1999); other surgical history; other surgical history (1986); Batsheva-en-Y Gastric Bypass (2000); other surgical history (2006); bone graft (Left); Cholecystectomy, laparoscopic (5/16/2016); laparoscopic appendectomy (5/16/2016); Appendectomy; Enteroscopy (N/A, 2/5/2018); pr colonoscopy flx dx w/collj spec when pfrmd (Left, 2/6/2018); pr office/outpt visit,procedure only (Left, 2/18/2018); Colonoscopy; Endoscopy, colon, diagnostic; Dilatation, esophagus; pr office/outpt visit,procedure only (N/A, 10/4/2018); Hysterectomy, vaginal; and Pacemaker insertion (Right, 05/20/2019). CURRENT MEDICATIONS       Previous Medications    CALCIUM CARBONATE 600 MG TABS TABLET    Take 1 tablet by mouth daily    CHOLECALCIFEROL (VITAMIN D) 2000 UNITS CAPS CAPSULE    Take 2,000 Units by mouth daily     DIMETHYL FUMARATE (TECFIDERA) 240 MG DELAYED RELEASE CAPSULE    Take 240 mg by mouth 2 times daily    FERROUS SULFATE 325 (65 FE) MG TABLET    Take 325 mg by mouth daily (with breakfast)    FLUTICASONE (FLONASE) 50 MCG/ACT NASAL SPRAY    2 sprays by Each Nare route daily    IPRATROPIUM (ATROVENT) 0.03 % NASAL SPRAY    USE 2 SPRAYS IN EACH NOSTRIL THREE TIMES A DAY AS NEEDED FOR RHINITIS.     LAMOTRIGINE (LAMICTAL) 200 MG TABLET    TAKE 1 TABLET BY MOUTH ONE TIME A DAY    LEVOTHYROXINE (SYNTHROID) 100 MCG cooperative. Non-toxic appearance. She does not appear ill. HENT:   Head: Normocephalic. Right Ear: External ear normal. No drainage. Left Ear: External ear normal. No drainage. Nose: Nose normal. No epistaxis. Mouth/Throat: Mucous membranes are not dry and not cyanotic. Eyes: Conjunctivae and EOM are normal. Right eye exhibits no discharge. Left eye exhibits no discharge. No scleral icterus. Neck: Trachea normal, normal range of motion and phonation normal. Neck supple. No tracheal deviation present. Cardiovascular: Normal rate, regular rhythm, normal heart sounds and intact distal pulses. Exam reveals no gallop and no friction rub. No murmur heard. Pulses:       Radial pulses are 2+ on the right side. Pulmonary/Chest: Effort normal and breath sounds normal. No stridor. No respiratory distress. She has no wheezes. She has no rales. She exhibits no tenderness. Abdominal: Soft. Bowel sounds are normal. She exhibits no distension and no pulsatile midline mass. There is tenderness in the right upper quadrant and epigastric area. There is CVA tenderness. There is no rigidity, no rebound and no guarding. Musculoskeletal: Normal range of motion. She exhibits no edema or tenderness (No calf pain or tenderness. No evidence of DVT). Neurological: She is alert and oriented to person, place, and time. She has normal strength. She displays no tremor. She displays no seizure activity. Coordination normal. GCS eye subscore is 4. GCS verbal subscore is 5. GCS motor subscore is 6. Skin: Skin is warm and dry. No rash (on exposed surfaced) noted. She is not diaphoretic. No cyanosis or erythema. No pallor. Psychiatric: She has a normal mood and affect. Her speech is normal and behavior is normal.   Nursing note and vitals reviewed. DIFFERENTIAL DIAGNOSIS:   Right upper quadrant back and epigastric pain.   Consider dyspepsia secondary to peptic ulcer disease, gastritis, pancreatitis, kidney stone.    DIAGNOSTIC RESULTS     RADIOLOGY: non-plain film images(s) such as CT, Ultrasound and MRI are read by the radiologist.    CT ABDOMEN PELVIS WO CONTRAST Additional Contrast? None   Final Result   Stool throughout the colon correlate for constipation. Nonobstructing right intrarenal calculus. Stable likely hemorrhagic left renal cyst.         **This report has been created using voice recognition software. It may contain minor errors which are inherent in voice recognition technology. **      Final report electronically signed by Dr. Odalis Gordon on 9/13/2019 8:40 PM          [x] Visualized and interpreted by me   [x] Radiologist's Wet Read Report Reviewed   [] Discussed with Radiologist.    Manual Prude:   Results for orders placed or performed during the hospital encounter of 09/13/19   Urine with Reflexed Micro   Result Value Ref Range    Glucose, Ur NEGATIVE NEGATIVE mg/dl    Bilirubin Urine NEGATIVE NEGATIVE    Ketones, Urine NEGATIVE NEGATIVE    Specific Gravity, Urine 1.015 1.002 - 1.03    Blood, Urine NEGATIVE NEGATIVE    pH, UA 7.0 5.0 - 9.0    Protein, UA NEGATIVE NEGATIVE    Urobilinogen, Urine 0.2 0.0 - 1.0 eu/dl    Nitrite, Urine NEGATIVE NEGATIVE    Leukocyte Esterase, Urine SMALL (A) NEGATIVE    Color, UA YELLOW STRAW-YELL    Character, Urine CLEAR CLEAR-SL C    RBC, UA 3-5 0-2/hpf /hpf    WBC, UA 5-10 0-4/hpf /hpf    Epi Cells 0-2 3-5/hpf /hpf    Bacteria, UA NONE FEW/NONE S /hpf    Casts UA NONE SEEN NONE SEEN /lpf    Crystals NONE SEEN NONE SEEN    Renal Epithelial, Urine NONE SEEN NONE SEEN    Yeast, UA NONE SEEN NONE SEEN    CASTS 2 NONE SEEN NONE SEEN /lpf    MISCELLANEOUS 2 NONE SEEN    CBC Auto Differential   Result Value Ref Range    WBC 5.7 4.8 - 10.8 thou/mm3    RBC 3.76 (L) 4.20 - 5.40 mill/mm3    Hemoglobin 12.2 12.0 - 16.0 gm/dl    Hematocrit 38.4 37.0 - 47.0 %    .1 (H) 81.0 - 99.0 fL    MCH 32.4 26.0 - 33.0 pg    MCHC 31.8 (L) 32.2 - 35.5 gm/dl    RDW-CV 13.0 11.5 - 14.5 %

## 2019-09-21 ENCOUNTER — APPOINTMENT (OUTPATIENT)
Dept: GENERAL RADIOLOGY | Age: 59
DRG: 392 | End: 2019-09-21
Payer: MEDICARE

## 2019-09-21 ENCOUNTER — HOSPITAL ENCOUNTER (EMERGENCY)
Age: 59
Discharge: HOME | DRG: 392 | End: 2019-09-21
Attending: EMERGENCY MEDICINE
Payer: MEDICARE

## 2019-09-21 VITALS
DIASTOLIC BLOOD PRESSURE: 76 MMHG | BODY MASS INDEX: 22.22 KG/M2 | RESPIRATION RATE: 16 BRPM | HEART RATE: 67 BPM | OXYGEN SATURATION: 97 % | SYSTOLIC BLOOD PRESSURE: 132 MMHG | TEMPERATURE: 97.4 F | WEIGHT: 110 LBS

## 2019-09-21 LAB
ALBUMIN SERPL-MCNC: 3.9 G/DL (ref 3.5–5.1)
ALP BLD-CCNC: 118 U/L (ref 38–126)
ALT SERPL-CCNC: 56 U/L (ref 11–66)
ANION GAP SERPL CALCULATED.3IONS-SCNC: 10 MEQ/L (ref 8–16)
AST SERPL-CCNC: 33 U/L (ref 5–40)
BACTERIA: ABNORMAL /HPF
BASOPHILS # BLD: 0.3 %
BASOPHILS ABSOLUTE: 0 THOU/MM3 (ref 0–0.1)
BILIRUB SERPL-MCNC: 0.2 MG/DL (ref 0.3–1.2)
BILIRUBIN URINE: NEGATIVE
BLOOD, URINE: NEGATIVE
BUN BLDV-MCNC: 17 MG/DL (ref 7–22)
CALCIUM SERPL-MCNC: 10 MG/DL (ref 8.5–10.5)
CASTS 2: ABNORMAL /LPF
CASTS UA: ABNORMAL /LPF
CHARACTER, URINE: CLEAR
CHLORIDE BLD-SCNC: 108 MEQ/L (ref 98–111)
CO2: 21 MEQ/L (ref 23–33)
COLOR: YELLOW
CREAT SERPL-MCNC: 0.7 MG/DL (ref 0.4–1.2)
CRYSTALS, UA: ABNORMAL
EKG ATRIAL RATE: 63 BPM
EKG P AXIS: 70 DEGREES
EKG P-R INTERVAL: 224 MS
EKG Q-T INTERVAL: 440 MS
EKG QRS DURATION: 140 MS
EKG QTC CALCULATION (BAZETT): 450 MS
EKG R AXIS: 7 DEGREES
EKG T AXIS: 7 DEGREES
EKG VENTRICULAR RATE: 63 BPM
EOSINOPHIL # BLD: 2.1 %
EOSINOPHILS ABSOLUTE: 0.1 THOU/MM3 (ref 0–0.4)
EPITHELIAL CELLS, UA: ABNORMAL /HPF
ERYTHROCYTE [DISTWIDTH] IN BLOOD BY AUTOMATED COUNT: 13.2 % (ref 11.5–14.5)
ERYTHROCYTE [DISTWIDTH] IN BLOOD BY AUTOMATED COUNT: 50 FL (ref 35–45)
GFR SERPL CREATININE-BSD FRML MDRD: 86 ML/MIN/1.73M2
GLUCOSE BLD-MCNC: 123 MG/DL (ref 70–108)
GLUCOSE URINE: NEGATIVE MG/DL
HCT VFR BLD CALC: 37.3 % (ref 37–47)
HEMOGLOBIN: 11.9 GM/DL (ref 12–16)
IMMATURE GRANS (ABS): 0.02 THOU/MM3 (ref 0–0.07)
IMMATURE GRANULOCYTES: 0 %
KETONES, URINE: NEGATIVE
LEUKOCYTE ESTERASE, URINE: ABNORMAL
LIPASE: 19.8 U/L (ref 5.6–51.3)
LYMPHOCYTES # BLD: 9.1 %
LYMPHOCYTES ABSOLUTE: 0.6 THOU/MM3 (ref 1–4.8)
MCH RBC QN AUTO: 32.9 PG (ref 26–33)
MCHC RBC AUTO-ENTMCNC: 31.9 GM/DL (ref 32.2–35.5)
MCV RBC AUTO: 103 FL (ref 81–99)
MISCELLANEOUS 2: ABNORMAL
MONOCYTES # BLD: 10.9 %
MONOCYTES ABSOLUTE: 0.8 THOU/MM3 (ref 0.4–1.3)
NITRITE, URINE: NEGATIVE
NUCLEATED RED BLOOD CELLS: 0 /100 WBC
OSMOLALITY CALCULATION: 280.4 MOSMOL/KG (ref 275–300)
PH UA: 6 (ref 5–9)
PLATELET # BLD: 182 THOU/MM3 (ref 130–400)
PMV BLD AUTO: 9.8 FL (ref 9.4–12.4)
POTASSIUM REFLEX MAGNESIUM: 4.2 MEQ/L (ref 3.5–5.2)
PROTEIN UA: NEGATIVE
RBC # BLD: 3.62 MILL/MM3 (ref 4.2–5.4)
RBC URINE: ABNORMAL /HPF
RENAL EPITHELIAL, UA: ABNORMAL
SEG NEUTROPHILS: 77.3 %
SEGMENTED NEUTROPHILS ABSOLUTE COUNT: 5.4 THOU/MM3 (ref 1.8–7.7)
SODIUM BLD-SCNC: 139 MEQ/L (ref 135–145)
SPECIFIC GRAVITY, URINE: 1.02 (ref 1–1.03)
TOTAL PROTEIN: 6.1 G/DL (ref 6.1–8)
TROPONIN T: < 0.01 NG/ML
UROBILINOGEN, URINE: 0.2 EU/DL (ref 0–1)
WBC # BLD: 7 THOU/MM3 (ref 4.8–10.8)
WBC UA: ABNORMAL /HPF
YEAST: ABNORMAL

## 2019-09-21 PROCEDURE — 36415 COLL VENOUS BLD VENIPUNCTURE: CPT

## 2019-09-21 PROCEDURE — 6360000002 HC RX W HCPCS: Performed by: EMERGENCY MEDICINE

## 2019-09-21 PROCEDURE — 85025 COMPLETE CBC W/AUTO DIFF WBC: CPT

## 2019-09-21 PROCEDURE — 6370000000 HC RX 637 (ALT 250 FOR IP): Performed by: EMERGENCY MEDICINE

## 2019-09-21 PROCEDURE — 96375 TX/PRO/DX INJ NEW DRUG ADDON: CPT

## 2019-09-21 PROCEDURE — 84484 ASSAY OF TROPONIN QUANT: CPT

## 2019-09-21 PROCEDURE — 93005 ELECTROCARDIOGRAM TRACING: CPT | Performed by: EMERGENCY MEDICINE

## 2019-09-21 PROCEDURE — 81001 URINALYSIS AUTO W/SCOPE: CPT

## 2019-09-21 PROCEDURE — 74022 RADEX COMPL AQT ABD SERIES: CPT

## 2019-09-21 PROCEDURE — 96374 THER/PROPH/DIAG INJ IV PUSH: CPT

## 2019-09-21 PROCEDURE — 87086 URINE CULTURE/COLONY COUNT: CPT

## 2019-09-21 PROCEDURE — 83690 ASSAY OF LIPASE: CPT

## 2019-09-21 PROCEDURE — 80053 COMPREHEN METABOLIC PANEL: CPT

## 2019-09-21 PROCEDURE — 99284 EMERGENCY DEPT VISIT MOD MDM: CPT

## 2019-09-21 PROCEDURE — C9113 INJ PANTOPRAZOLE SODIUM, VIA: HCPCS | Performed by: EMERGENCY MEDICINE

## 2019-09-21 RX ORDER — PANTOPRAZOLE SODIUM 40 MG/10ML
40 INJECTION, POWDER, LYOPHILIZED, FOR SOLUTION INTRAVENOUS ONCE
Status: COMPLETED | OUTPATIENT
Start: 2019-09-21 | End: 2019-09-21

## 2019-09-21 RX ORDER — SUCRALFATE 1 G/1
1 TABLET ORAL ONCE
Status: COMPLETED | OUTPATIENT
Start: 2019-09-21 | End: 2019-09-21

## 2019-09-21 RX ORDER — MORPHINE SULFATE 2 MG/ML
2 INJECTION, SOLUTION INTRAMUSCULAR; INTRAVENOUS ONCE
Status: COMPLETED | OUTPATIENT
Start: 2019-09-21 | End: 2019-09-21

## 2019-09-21 RX ORDER — 0.9 % SODIUM CHLORIDE 0.9 %
10 VIAL (ML) INJECTION DAILY
Status: DISCONTINUED | OUTPATIENT
Start: 2019-09-22 | End: 2019-09-22 | Stop reason: HOSPADM

## 2019-09-21 RX ORDER — DICYCLOMINE HYDROCHLORIDE 10 MG/1
10 CAPSULE ORAL 4 TIMES DAILY
Qty: 120 CAPSULE | Refills: 0 | Status: ON HOLD | OUTPATIENT
Start: 2019-09-21 | End: 2019-09-23

## 2019-09-21 RX ADMIN — LIDOCAINE HYDROCHLORIDE: 20 SOLUTION ORAL; TOPICAL at 21:52

## 2019-09-21 RX ADMIN — MORPHINE SULFATE 2 MG: 2 INJECTION, SOLUTION INTRAMUSCULAR; INTRAVENOUS at 21:53

## 2019-09-21 RX ADMIN — SUCRALFATE 1 G: 1 TABLET ORAL at 21:53

## 2019-09-21 RX ADMIN — PANTOPRAZOLE SODIUM 40 MG: 40 INJECTION, POWDER, FOR SOLUTION INTRAVENOUS at 21:55

## 2019-09-21 ASSESSMENT — ENCOUNTER SYMPTOMS
RHINORRHEA: 0
ABDOMINAL PAIN: 1
EYE DISCHARGE: 0
ABDOMINAL DISTENTION: 0
WHEEZING: 0
COUGH: 0
EYE ITCHING: 0
SHORTNESS OF BREATH: 0
NAUSEA: 1
DIARRHEA: 0
VOMITING: 0

## 2019-09-21 ASSESSMENT — PAIN DESCRIPTION - ORIENTATION: ORIENTATION: MID;UPPER

## 2019-09-21 ASSESSMENT — PAIN DESCRIPTION - DESCRIPTORS: DESCRIPTORS: PRESSURE;DULL

## 2019-09-21 ASSESSMENT — PAIN DESCRIPTION - LOCATION: LOCATION: ABDOMEN

## 2019-09-21 ASSESSMENT — PAIN DESCRIPTION - FREQUENCY: FREQUENCY: CONTINUOUS

## 2019-09-21 ASSESSMENT — PAIN SCALES - GENERAL
PAINLEVEL_OUTOF10: 9
PAINLEVEL_OUTOF10: 9

## 2019-09-21 ASSESSMENT — PAIN DESCRIPTION - PAIN TYPE: TYPE: ACUTE PAIN

## 2019-09-22 NOTE — ED PROVIDER NOTES
[facility]  eMERGENCY dEPARTMENT eNCOUnter          CHIEF COMPLAINT       Chief Complaint   Patient presents with    Abdominal Pain    Nausea       Nurses Notes reviewed and I agreeexcept as noted in the HPI. HISTORY OF PRESENT ILLNESS    Homer Goodell is a 61 y.o. female who presents to Emergency Department with abdominal pain for two weeks. Her past medical history significant for chronic abdominal pain, bipolar disorder, remote open Batsheva-en-Y gastric bypass, prior cholecystectomy and appendectomy and prior GI bleeding. She was seen at [facility] ED for same pain on 9/13/2019 with labs WNL and CT A/P showing stool throughout the colon correlate for constipation, nonobstructing right intrarenal calculus, and a stable likely hemorrhagic left renal cyst.    She has a chronic abdominal pain and she is on pantoprazole, ranitidine, sucralfate, and Zofran. She is suggested to follow-up with GI but apparently she has not done that yet. Pain is at 5/10, dull ache, persistent pain, no radiating. Associated with nausea and vomiting. No diarrhea. No urine symptoms. REVIEW OF SYSTEMS     Review of Systems   Constitutional: Negative for activity change, appetite change, chills, fatigue and fever. HENT: Negative for congestion, ear discharge, hearing loss, nosebleeds and rhinorrhea. Eyes: Negative for discharge and itching. Respiratory: Negative for cough, shortness of breath and wheezing. Cardiovascular: Negative for chest pain. Gastrointestinal: Positive for abdominal pain and nausea. Negative for abdominal distention, diarrhea and vomiting. Endocrine: Negative for cold intolerance. Genitourinary: Negative for enuresis and genital sores. Musculoskeletal: Negative for arthralgias, myalgias, neck pain and neck stiffness. Skin: Negative for rash and wound. Neurological: Negative for dizziness and weakness.    Psychiatric/Behavioral: Negative for agitation and suicidal ideas.          PAST MEDICAL HISTORY    has a past medical history of Balance problem, Bipolar I disorder, most recent episode (or current) depressed, in partial or unspecified remission, Cancer ([facility] [address] Depression, Fatigue, Gallstones, Gastric bypass status for obesity, GERD (gastroesophageal reflux disease), History of blood transfusion, History of hysterectomy, Hypothyroidism, MS (multiple sclerosis) ([facility] [address] Paresthesias, and UTI (urinary tract infection). SURGICAL HISTORY      has a past surgical history that includes Hysterectomy (1963); Carpal tunnel release (1999); other surgical history; other surgical history (1986); Batsheva-en-Y Gastric Bypass (2000); other surgical history (2006); bone graft (Left); Cholecystectomy, laparoscopic (5/16/2016); laparoscopic appendectomy (5/16/2016); Appendectomy; Enteroscopy (N/A, 2/5/2018); pr colonoscopy flx dx w/collj spec when pfrmd (Left, 2/6/2018); pr office/outpt visit,procedure only (Left, 2/18/2018); Colonoscopy; Endoscopy, colon, diagnostic; Dilatation, esophagus; pr office/outpt visit,procedure only (N/A, 10/4/2018); Hysterectomy, vaginal; and Pacemaker insertion (Right, 05/20/2019). CURRENT MEDICATIONS       Previous Medications    CALCIUM CARBONATE 600 MG TABS TABLET    Take 1 tablet by mouth daily    CHOLECALCIFEROL (VITAMIN D) 2000 UNITS CAPS CAPSULE    Take 2,000 Units by mouth daily     DIMETHYL FUMARATE (TECFIDERA) 240 MG DELAYED RELEASE CAPSULE    Take 240 mg by mouth 2 times daily    FERROUS SULFATE 325 (65 FE) MG TABLET    Take 325 mg by mouth daily (with breakfast)    FLUTICASONE (FLONASE) 50 MCG/ACT NASAL SPRAY    2 sprays by Each Nare route daily    IPRATROPIUM (ATROVENT) 0.03 % NASAL SPRAY    USE 2 SPRAYS IN EACH NOSTRIL THREE TIMES A DAY AS NEEDED FOR RHINITIS.     LAMOTRIGINE (LAMICTAL) 200 MG TABLET    TAKE 1 TABLET BY MOUTH ONE TIME A DAY    LEVOTHYROXINE (SYNTHROID) 100 MCG TABLET    TAKE 1 TABLET BY MOUTH ONE TIME A DAY    LITHIUM 300 MG TABLET Take 2 tablets by mouth nightly    LITHIUM 300 MG TABLET    Take 1 tablet by mouth every morning    LORATADINE (CLARITIN) 10 MG TABLET    TAKE 1 TABLET BY MOUTH ONCE DAILY. LURASIDONE (LATUDA) 20 MG TABS TABLET    Take 20 mg by mouth daily    MAGNESIUM OXIDE 250 MG TABS    Take 250 mg by mouth daily    NITROGLYCERIN (NITROSTAT) 0.4 MG SL TABLET    up to max of 3 total doses. If no relief after 1 dose, call 911. ONDANSETRON (ZOFRAN ODT) 4 MG DISINTEGRATING TABLET    Take 1 tablet by mouth every 8 hours as needed for Nausea    PRENATAL MV-MIN-FE FUM-FA-DHA (PRENATAL 1 PO)    Take 1 tablet by mouth daily     RANITIDINE (ZANTAC) 300 MG CAPSULE    Take 300 mg by mouth 2 times daily    SUCRALFATE (CARAFATE) 1 GM TABLET    Take 1 tablet by mouth 4 times daily    VILAZODONE HCL (VILAZODONE HCL) 20 MG TABS    Take 40 mg by mouth daily     VITAMIN B-12 1000 MCG TABLET    Take 1 tablet by mouth daily       ALLERGIES     is allergic to latex and sulfa antibiotics. FAMILY HISTORY     She indicated that her mother is alive. She indicated that her father is . She indicated that both of her sisters are alive. family history includes Cancer in her mother, sister, and sister; Colon Polyps in her mother; Glaucoma in her father; Heart Disease in her father. SOCIAL HISTORY      reports that she quit smoking about 41 years ago. Her smoking use included cigarettes. She has a 0.05 pack-year smoking history. She has never used smokeless tobacco. She reports that she does not drink alcohol or use drugs. PHYSICAL EXAM     INITIAL VITALS:  weight is 110 lb (49.9 kg). Her oral temperature is 97.4 °F (36.3 °C). Her blood pressure is 132/76 and her pulse is 67. Her respiration is 16 and oxygen saturation is 97%. Physical Exam   Constitutional: She is oriented to person, place, and time. She appears well-developed and well-nourished. HENT:   Head: Normocephalic and atraumatic.    Eyes: Pupils are equal, round, and reactive to light. Right eye exhibits no discharge. Left eye exhibits no discharge. No scleral icterus. Neck: Normal range of motion. Neck supple. No tracheal deviation present. Cardiovascular: Normal rate, regular rhythm and normal heart sounds. Exam reveals no gallop and no friction rub. No murmur heard. Pulmonary/Chest: Effort normal. No stridor. No respiratory distress. She has no wheezes. Abdominal: Soft. There is tenderness. There is no rebound and no guarding. Epigastric tenderness   Musculoskeletal: She exhibits no edema or tenderness. Neurological: She is alert and oriented to person, place, and time. No cranial nerve deficit. Coordination normal.   Skin: Skin is warm and dry. She is not diaphoretic. Psychiatric: She has a normal mood and affect. Her behavior is normal. Judgment and thought content normal.   Nursing note and vitals reviewed. DIFFERENTIAL DIAGNOSIS:   Gastritis, peptic ulcer, IBS, AMI, anxiety,    DIAGNOSTIC RESULTS     EKG: All EKG's are interpreted by the Emergency Department Physician who either signs or Co-signsthis chart in the absence of a cardiologist.  None    RADIOLOGY: non-plain film images(s) such as CT, Ultrasound and MRI are read by the radiologist.    XR Acute Abd Series Chest 1 VW   Final Result   Non-obstructive bowel gas pattern. No pneumoperitoneum. No acute cardiopulmonary disease. **This report has been created using voice recognition software. It may contain minor errors which are inherent in voice recognition technology. **      Final report electronically signed by Dr. Carlie Ayoub on 9/21/2019 9:54 PM          []Visualized and interpreted by me   [] Radiologist's Wet Read Report Reviewed   [] Discussed with Radiologist.    Narinder Brooms:   Results for orders placed or performed during the hospital encounter of 09/21/19   CBC Auto Differential   Result Value Ref Range    WBC 7.0 4.8 - 10.8 thou/mm3    RBC 3.62 (L) 4.20 - 5.40 mill/mm3 Hemoglobin 11.9 (L) 12.0 - 16.0 gm/dl    Hematocrit 37.3 37.0 - 47.0 %    .0 (H) 81.0 - 99.0 fL    MCH 32.9 26.0 - 33.0 pg    MCHC 31.9 (L) 32.2 - 35.5 gm/dl    RDW-CV 13.2 11.5 - 14.5 %    RDW-SD 50.0 (H) 35.0 - 45.0 fL    Platelets 474 219 - 163 thou/mm3    MPV 9.8 9.4 - 12.4 fL    Seg Neutrophils 77.3 %    Lymphocytes 9.1 %    Monocytes 10.9 %    Eosinophils 2.1 %    Basophils 0.3 %    Immature Granulocytes 0 %    Segs Absolute 5.4 1.8 - 7.7 thou/mm3    Lymphocytes Absolute 0.6 (L) 1.0 - 4.8 thou/mm3    Monocytes Absolute 0.8 0.4 - 1.3 thou/mm3    Eosinophils Absolute 0.1 0.0 - 0.4 thou/mm3    Basophils Absolute 0.0 0.0 - 0.1 thou/mm3    Immature Grans (Abs) 0.02 0.00 - 0.07 thou/mm3    nRBC 0 /100 wbc   Comprehensive Metabolic Panel w/ Reflex to MG   Result Value Ref Range    Glucose 123 (H) 70 - 108 mg/dL    CREATININE 0.7 0.4 - 1.2 mg/dL    BUN 17 7 - 22 mg/dL    Sodium 139 135 - 145 meq/L    Potassium reflex Magnesium 4.2 3.5 - 5.2 meq/L    Chloride 108 98 - 111 meq/L    CO2 21 (L) 23 - 33 meq/L    Calcium 10.0 8.5 - 10.5 mg/dL    AST 33 5 - 40 U/L    Alkaline Phosphatase 118 38 - 126 U/L    Total Protein 6.1 6.1 - 8.0 g/dL    Alb 3.9 3.5 - 5.1 g/dL    Total Bilirubin 0.2 (L) 0.3 - 1.2 mg/dL    ALT 56 11 - 66 U/L   Lipase   Result Value Ref Range    Lipase 19.8 5.6 - 51.3 U/L   Troponin   Result Value Ref Range    Troponin T < 0.010 ng/ml   Anion Gap   Result Value Ref Range    Anion Gap 10.0 8.0 - 16.0 meq/L   Osmolality   Result Value Ref Range    Osmolality Calc 280.4 275.0 - 300 mOsmol/kg   Glomerular Filtration Rate, Estimated   Result Value Ref Range    Est, Glom Filt Rate 86 (A) ml/min/1.73m2   Urine with Reflexed Micro   Result Value Ref Range    Glucose, Ur NEGATIVE NEGATIVE mg/dl    Bilirubin Urine NEGATIVE NEGATIVE    Ketones, Urine NEGATIVE NEGATIVE    Specific Gravity, Urine 1.022 1.002 - 1.03    Blood, Urine NEGATIVE NEGATIVE    pH, UA 6.0 5.0 - 9.0    Protein, UA NEGATIVE NEGATIVE

## 2019-09-22 NOTE — ED NOTES
Bed: 023A  Expected date:   Expected time:   Means of arrival: MAKAYLA MASON II.[facility] [employer]  Comments:     Enrique Stager  09/21/19 2050

## 2019-09-22 NOTE — ED NOTES
Patient upon discharge stating that her pain is no longer present. Discharge instructions reviewed with patient and any questions answered. [facility] cab called for patient and patient transported to waiting room to await for cab by wheelchair. Patient denies any further needs.       Elena Black RN  09/21/19 [id number]

## 2019-09-22 NOTE — ED TRIAGE NOTES
Patient presents to the ED with complaints of abdominal pain that has been ongoing for the last few months. Patient was seen and treated in this ED 2 weeks ago and was sent home with acid reflux medication which the patient states got rid of her headache but has not helped her abdominal pain. Patient has some nausea with the abdominal pain but denies any emesis, diarrhea, constipation or any other GI complaints. Patient had a gastric bypass in 2002. Denies any CP or SOB.

## 2019-09-23 ENCOUNTER — HOSPITAL ENCOUNTER (INPATIENT)
Age: 59
LOS: 2 days | Discharge: HOME | DRG: 392 | End: 2019-09-25
Attending: INTERNAL MEDICINE | Admitting: INTERNAL MEDICINE
Payer: MEDICARE

## 2019-09-23 PROBLEM — K92.2 ACUTE GI BLEEDING: Status: ACTIVE | Noted: 2019-09-23

## 2019-09-23 LAB
ABO: NORMAL
ALBUMIN SERPL-MCNC: 4.2 G/DL (ref 3.5–5.1)
ALP BLD-CCNC: 115 U/L (ref 38–126)
ALT SERPL-CCNC: 79 U/L (ref 11–66)
ANION GAP SERPL CALCULATED.3IONS-SCNC: 10 MEQ/L (ref 8–16)
ANTIBODY SCREEN: NORMAL
AST SERPL-CCNC: 35 U/L (ref 5–40)
BASOPHILS # BLD: 0.4 %
BASOPHILS ABSOLUTE: 0 THOU/MM3 (ref 0–0.1)
BILIRUB SERPL-MCNC: 0.2 MG/DL (ref 0.3–1.2)
BUN BLDV-MCNC: 32 MG/DL (ref 7–22)
CALCIUM SERPL-MCNC: 10.3 MG/DL (ref 8.5–10.5)
CHLORIDE BLD-SCNC: 104 MEQ/L (ref 98–111)
CO2: 24 MEQ/L (ref 23–33)
CREAT SERPL-MCNC: 0.6 MG/DL (ref 0.4–1.2)
EKG ATRIAL RATE: 62 BPM
EKG P AXIS: 64 DEGREES
EKG P-R INTERVAL: 146 MS
EKG Q-T INTERVAL: 460 MS
EKG QRS DURATION: 144 MS
EKG QTC CALCULATION (BAZETT): 466 MS
EKG R AXIS: 44 DEGREES
EKG T AXIS: 35 DEGREES
EKG VENTRICULAR RATE: 62 BPM
EOSINOPHIL # BLD: 1.2 %
EOSINOPHILS ABSOLUTE: 0.1 THOU/MM3 (ref 0–0.4)
ERYTHROCYTE [DISTWIDTH] IN BLOOD BY AUTOMATED COUNT: 13.5 % (ref 11.5–14.5)
ERYTHROCYTE [DISTWIDTH] IN BLOOD BY AUTOMATED COUNT: 51.8 FL (ref 35–45)
GFR SERPL CREATININE-BSD FRML MDRD: > 90 ML/MIN/1.73M2
GLUCOSE BLD-MCNC: 96 MG/DL (ref 70–108)
HCT VFR BLD CALC: 24.4 % (ref 37–47)
HCT VFR BLD CALC: 32.1 % (ref 37–47)
HEMOCCULT STL QL: POSITIVE
HEMOGLOBIN: 10.3 GM/DL (ref 12–16)
HEMOGLOBIN: 8.3 GM/DL (ref 12–16)
IMMATURE GRANS (ABS): 0.02 THOU/MM3 (ref 0–0.07)
IMMATURE GRANULOCYTES: 0 %
INR BLD: 0.95 (ref 0.85–1.13)
LIPASE: 19.2 U/L (ref 5.6–51.3)
LYMPHOCYTES # BLD: 7.9 %
LYMPHOCYTES ABSOLUTE: 0.6 THOU/MM3 (ref 1–4.8)
MCH RBC QN AUTO: 33.2 PG (ref 26–33)
MCHC RBC AUTO-ENTMCNC: 32.1 GM/DL (ref 32.2–35.5)
MCV RBC AUTO: 103.5 FL (ref 81–99)
MONOCYTES # BLD: 9.4 %
MONOCYTES ABSOLUTE: 0.8 THOU/MM3 (ref 0.4–1.3)
NUCLEATED RED BLOOD CELLS: 0 /100 WBC
ORGANISM: ABNORMAL
OSMOLALITY CALCULATION: 282.4 MOSMOL/KG (ref 275–300)
PLATELET # BLD: 200 THOU/MM3 (ref 130–400)
PMV BLD AUTO: 9.9 FL (ref 9.4–12.4)
POTASSIUM SERPL-SCNC: 4.3 MEQ/L (ref 3.5–5.2)
RBC # BLD: 3.1 MILL/MM3 (ref 4.2–5.4)
RH FACTOR: NORMAL
SEG NEUTROPHILS: 80.9 %
SEGMENTED NEUTROPHILS ABSOLUTE COUNT: 6.5 THOU/MM3 (ref 1.8–7.7)
SODIUM BLD-SCNC: 138 MEQ/L (ref 135–145)
TOTAL PROTEIN: 6.1 G/DL (ref 6.1–8)
URINE CULTURE REFLEX: ABNORMAL
WBC # BLD: 8 THOU/MM3 (ref 4.8–10.8)

## 2019-09-23 PROCEDURE — 93005 ELECTROCARDIOGRAM TRACING: CPT | Performed by: EMERGENCY MEDICINE

## 2019-09-23 PROCEDURE — 1200000003 HC TELEMETRY R&B

## 2019-09-23 PROCEDURE — 85025 COMPLETE CBC W/AUTO DIFF WBC: CPT

## 2019-09-23 PROCEDURE — 85018 HEMOGLOBIN: CPT

## 2019-09-23 PROCEDURE — 83690 ASSAY OF LIPASE: CPT

## 2019-09-23 PROCEDURE — 6360000002 HC RX W HCPCS: Performed by: NURSE PRACTITIONER

## 2019-09-23 PROCEDURE — 96374 THER/PROPH/DIAG INJ IV PUSH: CPT

## 2019-09-23 PROCEDURE — 99222 1ST HOSP IP/OBS MODERATE 55: CPT | Performed by: PHYSICIAN ASSISTANT

## 2019-09-23 PROCEDURE — 99285 EMERGENCY DEPT VISIT HI MDM: CPT

## 2019-09-23 PROCEDURE — C9113 INJ PANTOPRAZOLE SODIUM, VIA: HCPCS | Performed by: NURSE PRACTITIONER

## 2019-09-23 PROCEDURE — 94761 N-INVAS EAR/PLS OXIMETRY MLT: CPT

## 2019-09-23 PROCEDURE — 86900 BLOOD TYPING SEROLOGIC ABO: CPT

## 2019-09-23 PROCEDURE — 86901 BLOOD TYPING SEROLOGIC RH(D): CPT

## 2019-09-23 PROCEDURE — 82272 OCCULT BLD FECES 1-3 TESTS: CPT

## 2019-09-23 PROCEDURE — 2580000003 HC RX 258: Performed by: NURSE PRACTITIONER

## 2019-09-23 PROCEDURE — 85610 PROTHROMBIN TIME: CPT

## 2019-09-23 PROCEDURE — 36415 COLL VENOUS BLD VENIPUNCTURE: CPT

## 2019-09-23 PROCEDURE — 2580000003 HC RX 258: Performed by: PHYSICIAN ASSISTANT

## 2019-09-23 PROCEDURE — 96375 TX/PRO/DX INJ NEW DRUG ADDON: CPT

## 2019-09-23 PROCEDURE — 85014 HEMATOCRIT: CPT

## 2019-09-23 PROCEDURE — 80053 COMPREHEN METABOLIC PANEL: CPT

## 2019-09-23 PROCEDURE — 6370000000 HC RX 637 (ALT 250 FOR IP): Performed by: PHYSICIAN ASSISTANT

## 2019-09-23 PROCEDURE — 86850 RBC ANTIBODY SCREEN: CPT

## 2019-09-23 PROCEDURE — 2500000003 HC RX 250 WO HCPCS: Performed by: EMERGENCY MEDICINE

## 2019-09-23 PROCEDURE — 93010 ELECTROCARDIOGRAM REPORT: CPT | Performed by: INTERNAL MEDICINE

## 2019-09-23 RX ORDER — CETIRIZINE HYDROCHLORIDE 10 MG/1
10 TABLET ORAL DAILY
Status: DISCONTINUED | OUTPATIENT
Start: 2019-09-23 | End: 2019-09-23

## 2019-09-23 RX ORDER — PANTOPRAZOLE SODIUM 40 MG/10ML
40 INJECTION, POWDER, LYOPHILIZED, FOR SOLUTION INTRAVENOUS EVERY 12 HOURS
Status: DISCONTINUED | OUTPATIENT
Start: 2019-09-23 | End: 2019-09-24

## 2019-09-23 RX ORDER — VILAZODONE HYDROCHLORIDE 40 MG/1
40 TABLET ORAL NIGHTLY
Status: DISCONTINUED | OUTPATIENT
Start: 2019-09-23 | End: 2019-09-25 | Stop reason: HOSPADM

## 2019-09-23 RX ORDER — FERROUS SULFATE 325(65) MG
325 TABLET ORAL
Status: DISCONTINUED | OUTPATIENT
Start: 2019-09-24 | End: 2019-09-25 | Stop reason: HOSPADM

## 2019-09-23 RX ORDER — DIMETHYL FUMARATE 240 MG/1
240 CAPSULE ORAL 2 TIMES DAILY
Status: DISCONTINUED | OUTPATIENT
Start: 2019-09-23 | End: 2019-09-25 | Stop reason: HOSPADM

## 2019-09-23 RX ORDER — ONDANSETRON 2 MG/ML
4 INJECTION INTRAMUSCULAR; INTRAVENOUS EVERY 6 HOURS PRN
Status: DISCONTINUED | OUTPATIENT
Start: 2019-09-23 | End: 2019-09-25 | Stop reason: HOSPADM

## 2019-09-23 RX ORDER — LAMOTRIGINE 100 MG/1
200 TABLET ORAL DAILY
Status: DISCONTINUED | OUTPATIENT
Start: 2019-09-24 | End: 2019-09-25 | Stop reason: HOSPADM

## 2019-09-23 RX ORDER — SODIUM CHLORIDE 0.9 % (FLUSH) 0.9 %
10 SYRINGE (ML) INJECTION PRN
Status: DISCONTINUED | OUTPATIENT
Start: 2019-09-23 | End: 2019-09-25 | Stop reason: HOSPADM

## 2019-09-23 RX ORDER — SODIUM CHLORIDE 0.9 % (FLUSH) 0.9 %
10 SYRINGE (ML) INJECTION EVERY 12 HOURS SCHEDULED
Status: DISCONTINUED | OUTPATIENT
Start: 2019-09-23 | End: 2019-09-25 | Stop reason: HOSPADM

## 2019-09-23 RX ORDER — LITHIUM CARBONATE 300 MG/1
300 CAPSULE ORAL EVERY MORNING
Status: DISCONTINUED | OUTPATIENT
Start: 2019-09-24 | End: 2019-09-25 | Stop reason: HOSPADM

## 2019-09-23 RX ORDER — DICYCLOMINE HYDROCHLORIDE 10 MG/1
10 CAPSULE ORAL 4 TIMES DAILY
Status: DISCONTINUED | OUTPATIENT
Start: 2019-09-23 | End: 2019-09-23

## 2019-09-23 RX ORDER — 0.9 % SODIUM CHLORIDE 0.9 %
10 VIAL (ML) INJECTION EVERY 12 HOURS
Status: DISCONTINUED | OUTPATIENT
Start: 2019-09-23 | End: 2019-09-25 | Stop reason: HOSPADM

## 2019-09-23 RX ORDER — LITHIUM CARBONATE 300 MG/1
600 CAPSULE ORAL NIGHTLY
Status: DISCONTINUED | OUTPATIENT
Start: 2019-09-23 | End: 2019-09-25 | Stop reason: HOSPADM

## 2019-09-23 RX ORDER — POLYETHYLENE GLYCOL 3350 17 G/17G
17 POWDER, FOR SOLUTION ORAL DAILY
Status: DISCONTINUED | OUTPATIENT
Start: 2019-09-23 | End: 2019-09-25 | Stop reason: HOSPADM

## 2019-09-23 RX ORDER — DOCUSATE SODIUM 100 MG/1
100 CAPSULE, LIQUID FILLED ORAL 2 TIMES DAILY
Status: DISCONTINUED | OUTPATIENT
Start: 2019-09-23 | End: 2019-09-25 | Stop reason: HOSPADM

## 2019-09-23 RX ORDER — ACETAMINOPHEN 325 MG/1
650 TABLET ORAL EVERY 4 HOURS PRN
Status: DISCONTINUED | OUTPATIENT
Start: 2019-09-23 | End: 2019-09-25 | Stop reason: HOSPADM

## 2019-09-23 RX ORDER — LEVOTHYROXINE SODIUM 0.1 MG/1
100 TABLET ORAL DAILY
Status: DISCONTINUED | OUTPATIENT
Start: 2019-09-23 | End: 2019-09-25 | Stop reason: HOSPADM

## 2019-09-23 RX ADMIN — Medication 10 ML: at 15:24

## 2019-09-23 RX ADMIN — PANTOPRAZOLE SODIUM 40 MG: 40 INJECTION, POWDER, FOR SOLUTION INTRAVENOUS at 15:23

## 2019-09-23 RX ADMIN — LEVOTHYROXINE SODIUM 100 MCG: 100 TABLET ORAL at 15:23

## 2019-09-23 RX ADMIN — SODIUM CHLORIDE, PRESERVATIVE FREE 10 ML: 5 INJECTION INTRAVENOUS at 20:43

## 2019-09-23 RX ADMIN — FAMOTIDINE 20 MG: 10 INJECTION, SOLUTION INTRAVENOUS at 11:31

## 2019-09-23 RX ADMIN — LITHIUM CARBONATE 600 MG: 300 CAPSULE ORAL at 20:42

## 2019-09-23 RX ADMIN — VILAZODONE HYDROCHLORIDE 40 MG: 40 TABLET ORAL at 20:42

## 2019-09-23 RX ADMIN — LURASIDONE HYDROCHLORIDE 20 MG: 40 TABLET, FILM COATED ORAL at 20:42

## 2019-09-23 ASSESSMENT — ENCOUNTER SYMPTOMS
WHEEZING: 0
SHORTNESS OF BREATH: 0
CONSTIPATION: 1
NAUSEA: 0
BACK PAIN: 0
EYE PAIN: 0
COUGH: 0
PHOTOPHOBIA: 0
SORE THROAT: 0
ABDOMINAL PAIN: 1
EYE REDNESS: 0
RHINORRHEA: 1
BLOOD IN STOOL: 1
ABDOMINAL DISTENTION: 0
EYE DISCHARGE: 0
ABDOMINAL PAIN: 0
RHINORRHEA: 0
DIARRHEA: 0
VOMITING: 0

## 2019-09-23 ASSESSMENT — PAIN SCALES - GENERAL: PAINLEVEL_OUTOF10: 0

## 2019-09-23 NOTE — H&P
Assessment and Plan:        1. Acute GI Bleed: GI consulted. Hgb 10.8, down trending from 11.9 on 9/21. NPO until GI sees. H&H q6hr. Zofran prn for nausea. 2. Bipolar I disorder: continue home meds- Lamictal, Lithium, Latuda, and Viibryd. 3. Multiple Sclerosis: continue home meds- Tecridera   4. Hypothyroidism: continue home meds- synthroid        CC:  Rectal bleed    HPI: 61 y.o. female former smoker with PMHx of GI bleeds, MS, pacemaker placement, and Bipolar disorder, who presented to [address] [place] [address] with rectal bleeding. Pt states she noticed black in her stool 3 days ago and started noticing bright red blood today. Pt admits to fatigue. Pt denies abdominal pain at this time, denies N/V, fever, chills, or body aches. Pt has been in the ED on 9/13 and 9/21 for abdominal pain and was discharged home as she was stable at those times. Pt states she has had multiple GI bleeds from different sources in the past. Pt states she was seeing Dr. Cristopher Crews outpatient, but recently switched to the [address] [place] [address] and was scheduled for an EGD in a month from now. Pt sees Dr. Anthony Meza for Cardiology outpatient and is s/p pacemaker placement in Feb 2019. ROS: Review of Systems   Constitutional: Positive for fatigue. Negative for chills and fever. HENT: Positive for congestion and rhinorrhea. Negative for postnasal drip. Eyes: Negative for photophobia, redness and visual disturbance. Respiratory: Negative for cough, shortness of breath and wheezing. Cardiovascular: Negative for chest pain, palpitations and leg swelling. Gastrointestinal: Positive for abdominal pain and blood in stool. Negative for abdominal distention, diarrhea, nausea and vomiting. Endocrine: Negative for cold intolerance, heat intolerance and polyuria. Genitourinary: Negative for difficulty urinating, dysuria and urgency. Musculoskeletal: Negative for arthralgias, back pain and neck stiffness. Skin: Positive for pallor. Negative for rash and wound. Neurological: Negative for dizziness, light-headedness and headaches. Psychiatric/Behavioral: Negative for agitation. The patient is not nervous/anxious and is not hyperactive. PMH:  Per HPI  SHX:  Former smoker, quit 1977. Denies ETOH use. FHX: CA, Colon polyps, Heart disease, glaucoma  Allergies: Latex, Sulfa antibiotics   Medications:      Vital Signs:   BP (!) 96/57   Pulse 62   Temp 98.3 °F (36.8 °C) (Oral)   Resp 12   SpO2 98%    No intake or output data in the 24 hours ending 09/23/19 1206     General:   Pale appearing female. NAD   HEENT:  normocephalic and atraumatic. No scleral icterus. PERR. Neck: supple. No JVD. No thyromegaly. Lungs: clear to auscultation. No retractions  Cardiac: RRR without murmur. Abdomen: soft. Diffuse tenderness. Bowel sounds positive. Extremities:  No clubbing, cyanosis, or edema x 4. Prosthetic arm d/t birth defect. Vasculature: capillary refill < 3 seconds. Palpable LE pulses bilaterally. Skin:  warm and dry. Psych:  Alert and oriented x3. Affect appropriate  Lymph:  No supraclavicular adenopathy. Neurologic:  No focal deficit. No seizures. Data: (All radiographs, tracings, PFTs, and imaging are personally viewed and interpreted unless otherwise noted).  Sodium 138. Potassium 4.3. Chloride 104. CO2 24. BUN 32 (H). Cr 0.6. AG 10. eGFR >90. Calcium 10.3. Glucose 96   Albumin 4.2. Alk Phos 115. ALT 79 (H). AST 35. Bilirubin 0.2. Lipase 19.2. Total protein 6.1    WBC 8.0. RBC 3.10. Hgb 10.3. Hct 32.1. .5. MCH 33.2. MCHC 32.1.  Blood Occult stool: positive   XR Acute Abd Series Report (9/21): Non-obstructive bowel gas pattern. No peumoperitoneum. No acute cardiopulmonary disease.  CT Abdomen pelvis WO contrast Report (9/13): stool throughout the colon correlate for constipation. Non-obstructing right intrarenal calculus.  Stable likely hemorrhagic left renal cyst.       Electronically signed by Elijah Outhouse ALEXIS Bedoya on 9/23/2019 at 12:06 PM

## 2019-09-23 NOTE — ED PROVIDER NOTES
[facility] COMPLAINT       Chief Complaint   Patient presents with    Rectal Bleeding       Nurses Notes reviewed and I agree except as noted in the HPI. HISTORY OF PRESENT ILLNESS    Kiera Ho is a 61 y.o. female who presents to the Emergency Department for the evaluation of blood in stool. Patient states she suspected she had blood in her stool 3 days ago, but noticed it this morning. She states she was feeling constipated this morning and gave herself an enema and states she noticed black stool with bright red blood. She reports urinary frequency. She denies other urinary symptoms, abdominal pain, nausea, vomiting, fever, chills, or body aches. Patient was in the ED on 09/13/19 for abdominal pain and had blood work and a CT scan and discharged home with Carafate. Patient was then in the ED again on 09/21/19 for abdominal pain and had blood work and an x-ray and discharged home with Bentyl. She states her abdominal pain has completely resolved. Patient states she has had EGDs in the past by Dr. Skyler Hewitt (GI), but states she is switching to Dr. Jackeline Hidalgo (GI) and has an appointment next month. The patient has a past medical history of GERD and MS. No further complaints at the time of the initial encounter. The HPI was provided by the patient. REVIEW OF SYSTEMS     Review of Systems   Constitutional: Negative for appetite change, chills, fatigue and fever. HENT: Negative for congestion, ear pain, rhinorrhea and sore throat. Eyes: Negative for pain, discharge and visual disturbance. Respiratory: Negative for cough, shortness of breath and wheezing. Cardiovascular: Negative for chest pain, palpitations and leg swelling. Gastrointestinal: Positive for blood in stool and constipation. Negative for abdominal pain, diarrhea, nausea and vomiting. Genitourinary: Positive for frequency.  Negative for difficulty urinating, dysuria, DAY  Qty: 30 tablet, Refills: 5      !! lithium 300 MG tablet Take 2 tablets by mouth nightly  Qty: 180 tablet, Refills: 3      !! lithium 300 MG tablet Take 1 tablet by mouth every morning  Qty: 90 tablet, Refills: 3      levothyroxine (SYNTHROID) 100 MCG tablet TAKE 1 TABLET BY MOUTH ONE TIME A DAY  Qty: 90 tablet, Refills: 1      vilazodone HCl (VILAZODONE HCL) 40 MG TABS Take 40 mg by mouth nightly       lurasidone (LATUDA) 40 MG TABS tablet Take 20 mg by mouth nightly       ondansetron (ZOFRAN ODT) 4 MG disintegrating tablet Take 1 tablet by mouth every 8 hours as needed for Nausea  Qty: 20 tablet, Refills: 0      Cholecalciferol (VITAMIN D) 2000 units CAPS capsule Take 2,000 Units by mouth nightly       Magnesium Oxide 250 MG TABS Take 250 mg by mouth nightly       calcium carbonate 600 MG TABS tablet Take 1 tablet by mouth nightly       vitamin B-12 1000 MCG tablet Take 1 tablet by mouth daily  Qty: 30 tablet, Refills: 5      dimethyl fumarate (TECFIDERA) 240 MG delayed release capsule Take 240 mg by mouth 2 times daily      nitroGLYCERIN (NITROSTAT) 0.4 MG SL tablet up to max of 3 total doses. If no relief after 1 dose, call 911. Qty: 25 tablet, Refills: 3      Prenatal MV-Min-Fe Fum-FA-DHA (PRENATAL 1 PO) Take 1 tablet by mouth daily       ferrous sulfate 325 (65 FE) MG tablet Take 325 mg by mouth daily (with breakfast)       ! ! - Potential duplicate medications found. Please discuss with provider. ALLERGIES     is allergic to latex and sulfa antibiotics. FAMILY HISTORY     She indicated that her mother is alive. She indicated that her father is . She indicated that both of her sisters are alive. family history includes Cancer in her mother, sister, and sister; Colon Polyps in her mother; Glaucoma in her father; Heart Disease in her father. SOCIAL HISTORY      reports that she quit smoking about 41 years ago. Her smoking use included cigarettes.  She has a 0.05 pack-year smoking history. She has never used smokeless tobacco. She reports that she does not drink alcohol or use drugs. PHYSICAL EXAM     INITIAL VITALS:  height is 4' 11\" (1.499 m) and weight is 114 lb 10.2 oz (52 kg). Her oral temperature is 98 °F (36.7 °C). Her blood pressure is 97/55 (abnormal) and her pulse is 64. Her respiration is 16 and oxygen saturation is 100%. Physical Exam   Constitutional: She is oriented to person, place, and time. She appears well-developed and well-nourished. Non-toxic appearance. Right arm is in a brace due to having MSEleazar CORDON:   Head: Normocephalic and atraumatic. Right Ear: Tympanic membrane normal.   Left Ear: Tympanic membrane normal.   Mouth/Throat: Mucous membranes are normal. No posterior oropharyngeal edema or posterior oropharyngeal erythema. Eyes: Conjunctivae and EOM are normal.   Neck: Normal range of motion. Neck supple. No JVD present. Cardiovascular: Normal rate, regular rhythm, normal heart sounds, intact distal pulses and normal pulses. Exam reveals no gallop and no friction rub. No murmur heard. Pulmonary/Chest: Effort normal and breath sounds normal. No respiratory distress. She has no decreased breath sounds. She has no wheezes. She has no rhonchi. She has no rales. Abdominal: Soft. Bowel sounds are normal. She exhibits no distension. There is no tenderness. There is no rebound, no guarding and no CVA tenderness. Genitourinary:   Genitourinary Comments: No stool in rectal vault. Trace stool on glove that was dark in color. Musculoskeletal: Normal range of motion. She exhibits no edema. Neurological: She is alert and oriented to person, place, and time. She exhibits normal muscle tone. Coordination normal.   Skin: Skin is warm and dry. No rash noted. She is not diaphoretic. Nursing note and vitals reviewed. DIFFERENTIAL DIAGNOSIS:   Upper GI bleed, peptic ulcer, GERD, less likely diverticulitis or colitis     DIAGNOSTIC RESULTS     EKG:  All

## 2019-09-23 NOTE — CONSULTS
legs.    PSYCH:  No anxiety or depression. ENDOCRINE:  No polyuria or polydipsia. BLOOD:  No anemia, bleeding disorder, blood or blood product transfusion. PHYSICAL EXAM:  BP (!) 97/55   Pulse 64   Temp 98 °F (36.7 °C) (Oral)   Resp 16   Ht 4' 11\" (1.499 m)   Wt 114 lb 10.2 oz (52 kg)   SpO2 100%   BMI 23.15 kg/m²     General appearance: No apparent distress, appears stated age and cooperative. HEENT: Normal cephalic, atraumatic without obvious deformity. Pupils equal, round, and reactive to light. Neck: Supple, with full range of motion. No jugular venous distention. Trachea midline. Respiratory:  Normal respiratory effort. Clear to auscultation, bilaterally without Rales/Wheezes/Rhonchi. Cardiovascular: Regular rate and rhythm without murmurs, rubs or gallops. Abdomen: Soft, non-tender, non-distended with active bowel sounds. Musculoskeletal: No clubbing, cyanosis or edema bilaterally. Prosthetic limb. Skin: Pink, warm, dry. No rashes or lesions. Psychiatric: Alert and oriented, thought content appropriate, normal insight    Labs:   Recent Labs     09/23/19  1007   WBC 8.0   HGB 10.3*   HCT 32.1*        Recent Labs     09/23/19  1007      K 4.3      CO2 24   BUN 32*   CREATININE 0.6   CALCIUM 10.3     Recent Labs     09/23/19  1007   AST 35   ALT 79*   BILITOT 0.2*   ALKPHOS 115     Code Status: Full Code    ASSESSMENT:  1. GI bleed  2. Anemia  3. GERD  4. Constipation  5. MS  6. Biploar  7. Hypothyroidism    PLAN:    1. IVP PPI BID  2. Miralax daily  3. Colace BID  4. Regular diet, NPO at midnight  5. Monitor Hgb & transfuse for Hgb <8  6. Supportive care per primary team  7. EGD tomorrow with Dr. Dina Wallace       Case reviewed and impression/plan reviewed in collaboration with Dr. Dina Wallace  Electronically signed by AJITH Pérez CNP on 9/23/2019 at 1:54 PM    [facility]  Thank you for the consultation.

## 2019-09-23 NOTE — PROGRESS NOTES
Pt admitted to  6K24 from ED. Complaints: GI bleed. IV none infusing into the antecubital left, condition patent and no redness. IV site free of s/s of infection or infiltration. Vital signs obtained. Assessment and data collection initiated. Two nurse skin assessment performed by ISAIAH Sanders RN and ISABEL Wilson RN. Oriented to room. Policies and procedures for  explained. All questions answered with no further questions at this time. Fall prevention and safety brochure discussed with patient. Bed alarm on. Call light in reach. The best day to schedule a follow up Dr appointment is: Wednesday a.m.

## 2019-09-23 NOTE — PROGRESS NOTES
Pharmacy Medication History Note      List of current medications patient is taking is complete. Source of information: patient and medication list    Changes made to medication list:  Medications removed (include reason, ex. therapy complete or physician discontinued):  Dicyclomine - therapy complete  Fluticasone nasal spray - therapy complete  Ipratropium nasal spray - therapy complete  Loratadine - therapy complete    Medications added/doses adjusted:  None    Other notes (ex. Recent course of antibiotics, Coumadin dosing):  Denies use of other OTC or herbal medications.       Allergies reviewed      Electronically signed by Adrian Lorenz, [address] [place] [address] on 9/23/2019 at 1:31 PM

## 2019-09-24 ENCOUNTER — TELEPHONE (OUTPATIENT)
Dept: FAMILY MEDICINE CLINIC | Age: 59
End: 2019-09-24

## 2019-09-24 LAB
ERYTHROCYTE [DISTWIDTH] IN BLOOD BY AUTOMATED COUNT: 13.8 % (ref 11.5–14.5)
ERYTHROCYTE [DISTWIDTH] IN BLOOD BY AUTOMATED COUNT: 52.2 FL (ref 35–45)
HCT VFR BLD CALC: 27.1 % (ref 37–47)
HEMOGLOBIN: 8.4 GM/DL (ref 12–16)
MCH RBC QN AUTO: 32.7 PG (ref 26–33)
MCHC RBC AUTO-ENTMCNC: 31 GM/DL (ref 32.2–35.5)
MCV RBC AUTO: 105.4 FL (ref 81–99)
PLATELET # BLD: 155 THOU/MM3 (ref 130–400)
PMV BLD AUTO: 10 FL (ref 9.4–12.4)
RBC # BLD: 2.57 MILL/MM3 (ref 4.2–5.4)
WBC # BLD: 5.5 THOU/MM3 (ref 4.8–10.8)

## 2019-09-24 PROCEDURE — 7100000000 HC PACU RECOVERY - FIRST 15 MIN: Performed by: INTERNAL MEDICINE

## 2019-09-24 PROCEDURE — 6370000000 HC RX 637 (ALT 250 FOR IP): Performed by: NURSE PRACTITIONER

## 2019-09-24 PROCEDURE — 1200000003 HC TELEMETRY R&B

## 2019-09-24 PROCEDURE — 6360000002 HC RX W HCPCS: Performed by: INTERNAL MEDICINE

## 2019-09-24 PROCEDURE — 0DJ08ZZ INSPECTION OF UPPER INTESTINAL TRACT, VIA NATURAL OR ARTIFICIAL OPENING ENDOSCOPIC: ICD-10-PCS | Performed by: INTERNAL MEDICINE

## 2019-09-24 PROCEDURE — 99232 SBSQ HOSP IP/OBS MODERATE 35: CPT | Performed by: INTERNAL MEDICINE

## 2019-09-24 PROCEDURE — 6360000002 HC RX W HCPCS: Performed by: NURSE PRACTITIONER

## 2019-09-24 PROCEDURE — G0008 ADMIN INFLUENZA VIRUS VAC: HCPCS | Performed by: PHYSICIAN ASSISTANT

## 2019-09-24 PROCEDURE — C9113 INJ PANTOPRAZOLE SODIUM, VIA: HCPCS | Performed by: NURSE PRACTITIONER

## 2019-09-24 PROCEDURE — 96376 TX/PRO/DX INJ SAME DRUG ADON: CPT

## 2019-09-24 PROCEDURE — 2709999900 HC NON-CHARGEABLE SUPPLY: Performed by: INTERNAL MEDICINE

## 2019-09-24 PROCEDURE — 96375 TX/PRO/DX INJ NEW DRUG ADDON: CPT

## 2019-09-24 PROCEDURE — 2580000003 HC RX 258: Performed by: NURSE PRACTITIONER

## 2019-09-24 PROCEDURE — 6370000000 HC RX 637 (ALT 250 FOR IP): Performed by: PHYSICIAN ASSISTANT

## 2019-09-24 PROCEDURE — 2580000003 HC RX 258: Performed by: PHYSICIAN ASSISTANT

## 2019-09-24 PROCEDURE — 6360000002 HC RX W HCPCS: Performed by: PHYSICIAN ASSISTANT

## 2019-09-24 PROCEDURE — 90686 IIV4 VACC NO PRSV 0.5 ML IM: CPT | Performed by: PHYSICIAN ASSISTANT

## 2019-09-24 PROCEDURE — 85027 COMPLETE CBC AUTOMATED: CPT

## 2019-09-24 PROCEDURE — 6370000000 HC RX 637 (ALT 250 FOR IP)

## 2019-09-24 PROCEDURE — 7100000001 HC PACU RECOVERY - ADDTL 15 MIN: Performed by: INTERNAL MEDICINE

## 2019-09-24 PROCEDURE — 6370000000 HC RX 637 (ALT 250 FOR IP): Performed by: INTERNAL MEDICINE

## 2019-09-24 PROCEDURE — 3609012800 HC EGD DIAGNOSTIC ONLY: Performed by: INTERNAL MEDICINE

## 2019-09-24 PROCEDURE — 36415 COLL VENOUS BLD VENIPUNCTURE: CPT

## 2019-09-24 RX ORDER — FENTANYL CITRATE 50 UG/ML
INJECTION, SOLUTION INTRAMUSCULAR; INTRAVENOUS PRN
Status: DISCONTINUED | OUTPATIENT
Start: 2019-09-24 | End: 2019-09-24 | Stop reason: ALTCHOICE

## 2019-09-24 RX ORDER — PANTOPRAZOLE SODIUM 40 MG/1
40 TABLET, DELAYED RELEASE ORAL
Status: DISCONTINUED | OUTPATIENT
Start: 2019-09-24 | End: 2019-09-25 | Stop reason: HOSPADM

## 2019-09-24 RX ORDER — MIDAZOLAM HYDROCHLORIDE 1 MG/ML
INJECTION INTRAMUSCULAR; INTRAVENOUS PRN
Status: DISCONTINUED | OUTPATIENT
Start: 2019-09-24 | End: 2019-09-24 | Stop reason: ALTCHOICE

## 2019-09-24 RX ADMIN — LAMOTRIGINE 200 MG: 100 TABLET ORAL at 08:28

## 2019-09-24 RX ADMIN — Medication 10 ML: at 03:30

## 2019-09-24 RX ADMIN — DOCUSATE SODIUM 100 MG: 100 CAPSULE, LIQUID FILLED ORAL at 21:37

## 2019-09-24 RX ADMIN — PANTOPRAZOLE SODIUM 40 MG: 40 TABLET, DELAYED RELEASE ORAL at 16:50

## 2019-09-24 RX ADMIN — SODIUM CHLORIDE, PRESERVATIVE FREE 10 ML: 5 INJECTION INTRAVENOUS at 08:28

## 2019-09-24 RX ADMIN — SODIUM CHLORIDE, PRESERVATIVE FREE 10 ML: 5 INJECTION INTRAVENOUS at 21:37

## 2019-09-24 RX ADMIN — Medication 10 ML: at 14:28

## 2019-09-24 RX ADMIN — LITHIUM CARBONATE 600 MG: 300 CAPSULE ORAL at 21:37

## 2019-09-24 RX ADMIN — FERROUS SULFATE TAB 325 MG (65 MG ELEMENTAL FE) 325 MG: 325 (65 FE) TAB at 08:28

## 2019-09-24 RX ADMIN — VILAZODONE HYDROCHLORIDE 40 MG: 40 TABLET ORAL at 21:37

## 2019-09-24 RX ADMIN — PANTOPRAZOLE SODIUM 40 MG: 40 INJECTION, POWDER, FOR SOLUTION INTRAVENOUS at 14:28

## 2019-09-24 RX ADMIN — LITHIUM CARBONATE 300 MG: 300 CAPSULE ORAL at 08:28

## 2019-09-24 RX ADMIN — INFLUENZA A VIRUS A/BRISBANE/02/2018 IVR-190 (H1N1) ANTIGEN (PROPIOLACTONE INACTIVATED), INFLUENZA A VIRUS A/KANSAS/14/2017 X-327 (H3N2) ANTIGEN (PROPIOLACTONE INACTIVATED), INFLUENZA B VIRUS B/MARYLAND/15/2016 ANTIGEN (PROPIOLACTONE INACTIVATED), INFLUENZA B VIRUS B/PHUKET/3073/2013 BVR-1B ANTIGEN (PROPIOLACTONE INACTIVATED) 0.5 ML: 15; 15; 15; 15 INJECTION, SUSPENSION INTRAMUSCULAR at 12:46

## 2019-09-24 RX ADMIN — LURASIDONE HYDROCHLORIDE 20 MG: 40 TABLET, FILM COATED ORAL at 21:37

## 2019-09-24 RX ADMIN — PANTOPRAZOLE SODIUM 40 MG: 40 INJECTION, POWDER, FOR SOLUTION INTRAVENOUS at 03:30

## 2019-09-24 RX ADMIN — LEVOTHYROXINE SODIUM 100 MCG: 100 TABLET ORAL at 06:29

## 2019-09-24 ASSESSMENT — PAIN SCALES - GENERAL
PAINLEVEL_OUTOF10: 0

## 2019-09-24 NOTE — PROGRESS NOTES
1612  Report called to floor RN,     [phone]  Pt arouses to verbal stimuli, pt denies pain at this time.

## 2019-09-24 NOTE — H&P
sucralfate (CARAFATE) 1 GM tablet Take 1 tablet by mouth 4 times daily 9/13/19  Yes General Kenya MD   lamoTRIgine (LAMICTAL) 200 MG tablet TAKE 1 TABLET BY MOUTH ONE TIME A DAY 9/5/19  Yes Gonzales Urbina MD   lithium 300 MG tablet Take 2 tablets by mouth nightly 8/22/19  Yes Gonzales Urbina MD   lithium 300 MG tablet Take 1 tablet by mouth every morning 8/22/19  Yes Gonzales Urbina MD   levothyroxine (SYNTHROID) 100 MCG tablet TAKE 1 TABLET BY MOUTH ONE TIME A DAY 7/8/19  Yes Ayala Gaona DO   vilazodone HCl (VILAZODONE HCL) 40 MG TABS Take 40 mg by mouth nightly    Yes Historical Provider, MD   lurasidone (LATUDA) 40 MG TABS tablet Take 20 mg by mouth nightly    Yes Historical Provider, MD   ondansetron (ZOFRAN ODT) 4 MG disintegrating tablet Take 1 tablet by mouth every 8 hours as needed for Nausea 1/16/19  Yes General Kenya MD   Cholecalciferol (VITAMIN D) 2000 units CAPS capsule Take 2,000 Units by mouth nightly    Yes Historical Provider, MD   Magnesium Oxide 250 MG TABS Take 250 mg by mouth nightly    Yes Historical Provider, MD   calcium carbonate 600 MG TABS tablet Take 1 tablet by mouth nightly    Yes Historical Provider, MD   vitamin B-12 1000 MCG tablet Take 1 tablet by mouth daily 2/8/18  Yes Gayle Zavala MD   dimethyl fumarate (TECFIDERA) 240 MG delayed release capsule Take 240 mg by mouth 2 times daily   Yes Historical Provider, MD   nitroGLYCERIN (NITROSTAT) 0.4 MG SL tablet up to max of 3 total doses. If no relief after 1 dose, call 911. 2/22/17  Yes Gail Casanova MD   Prenatal MV-Min-Fe Fum-FA-DHA (PRENATAL 1 PO) Take 1 tablet by mouth daily    Yes Historical Provider, MD   ferrous sulfate 325 (65 FE) MG tablet Take 325 mg by mouth daily (with breakfast)   Yes Historical Provider, MD     Additional information:       PHYSICAL:    height is 4' 11\" (1.499 m) and weight is 114 lb 10.2 oz (52 kg). Her oral temperature is 98.2 °F (36.8 °C).  Her blood pressure is 105/57 (abnormal) and her pulse is 67. Her respiration is 16 and oxygen saturation is 98%. Heart:  [x]Regular rate and rhythm  []Other:    Lungs:  [x]Clear    []Other:    Abdomen: [x]Soft    []Other:    Mental Status: [x]Alert & Oriented  []Other:      VITAL SIGNS   Patient Vitals for the past 24 hrs:   BP Temp Temp src Pulse Resp SpO2 Height Weight   09/23/19 2039 (!) 105/57 98.2 °F (36.8 °C) Oral 67 16 98 % -- --   09/23/19 1556 (!) 111/54 98.2 °F (36.8 °C) Axillary 67 14 98 % -- --   09/23/19 1206 (!) 97/55 98 °F (36.7 °C) Oral 64 16 100 % 4' 11\" (1.499 m) 114 lb 10.2 oz (52 kg)   09/23/19 1129 (!) 96/57 -- -- 62 12 98 % -- --   09/23/19 1028 -- -- -- -- 17 100 % -- --   09/23/19 1018 (!) 96/57 98.3 °F (36.8 °C) Oral 62 17 98 % -- --       PLANNED PROCEDURE    [x]EGD  []Colonoscopy []Flex Sigmoid  []ERCP []EUS   []Cystoscopy  [] CATH [] BRONCH   Consent: I have discussed with the patient and/or the patient representative the indication, alternatives, and the possible risks and/or complications of the planned procedure and the anesthesia methods. The patient and/or patient representative appear to understand and agree to proceed. SEDATION ( TIVA ANESTHESIA, SEE ANESTHESIA NOTE FOR DETAILS)    Planned agent:[x]Midazolam []Meperidine [x]Sublimaze []Morphine  []Diazepam  []Other:     ASA Classification: Class 3 - A patient with severe systemic disease that limits activity but is not incapacitating    Airway Assessment: normal    Monitoring and Safety: The patient will be placed on a cardiac monitor and vital signs, pulse oximetry and level of consciousness will be continuously evaluated throughout the procedure. The patient will be closely monitored until recovery from the medications is complete and the patient has returned to baseline status. Respiratory therapy will be on standby during the procedure. [x]Pre-procedure diagnostic studies complete and results available.    Comment:    [x]Previous sedation/anesthesia experiences assessed. Comment:    [x]The patient is an appropriate candidate to undergo the planned procedure sedation and anesthesia. (Refer to nursing sedation/analgesia documentation record)  [x]Formulation and discussion of sedation/procedure plan, risks, and expectations with patient and/or responsible adult completed. [x]Patient examined immediately prior to the procedure.  (Refer to nursing sedation/analgesia documentation record)    Michael Borrego MD   Electronically signed 9/23/2019 at 9:49 PM

## 2019-09-24 NOTE — FLOWSHEET NOTE
09/24/19 1229   Encounter Summary   Services provided to: Patient   Referral/Consult From: [facility] Family members   Place of [facility]   Contact Protestant Completed   Continue Visiting Yes  (9/24/19 pre.medical test spiritual support)   Complexity of Encounter Moderate   Length of Encounter 15 minutes   Spiritual Assessment Completed Yes   Advance Care Planning Yes   Spiritual/Cheondoism   Type Spiritual support   Assessment Approachable;Calm; Hopeful   Intervention Active listening;Nurtured hope;Prayer;Discussed illness/injury and it's impact   Outcome Expressed gratitude;Engaged in conversation     Pt is a 61year old female. Patient is lying in her bed and engaged in conversation with . Patient shared wit this  that she is admitted for a GI issue. Patient also told told this  that she is scheduled for some test to find out what is causing this issue. Patient is a member of [employer]. Patient asked for prayer and prayer was offered in respond. Patient is receptive to 's visit. Chalain also offered encouragement and nurtured hope. Plan:   will follow up to learn what test results shows and hear from patent how spiritual care can be of help and encouragement.

## 2019-09-24 NOTE — PROCEDURES
[facility] Endoscopy     EGD Report    Patient: Michelle Almanzar  : 1960  Acct#: [de-identified]     BRIEF HISTORY AND INDICATIONS:    The patient is a 61 y.o.,  female with significant past medical history of hematochezia, and anemia. PREMEDICATION:  Lidocaine gargle to the oropharynx, Fentanyl 50 mcg IV,  Versed 2 mg IV. IVCS start time:  3045  IVCS stop time:  5397  Transfer to recovery:  1600     INSTRUMENT:  Olympus GIF H-180 gastroscope    The risks and benefits of upper endoscopy with biopsy and dilation were  described to the patient, including but not limited to bleeding, infection, poking a hole someplace requiring surgery to fix it, having reaction to medication, and death. The patient understood these risks and provided informed consent. The patient was placed in the left lateral decubitus position. Conscious sedation was administered . The patient was continuously monitored to ensure adequate sedation and patient safety. A forward-viewing Olympus endoscope was lubricated and inserted through the mouth into the oropharynx. Under direct visualization, the upper esophagus was intubated. The scope was advanced to the esophagus and stomach to the jejunum. Scope was slowly withdrawn with good views of mucosal surfaces. The scope was retroflexed in the fundus. Findings and maneuvers are listed in impression below. The patient tolerated the procedure well. The scope was removed. The patient was removed to the recovery area. There were no immediate complications. IMPRESSION:      1. Small hiatal hernia   . 2.   Erosive esophagitis, with signs of recent bleeding . 3. Normal gastric remnant and efferent and afferent limbs, no marginal ulcer . 4. Altered Batsheva en Y gastric bypass anatomy. RECOMMENDATIONS:    1. Protonix 40mg BID, patient likely not absorbing PPI. 2. Make sure that Hb/Hct are stable. 3. Advance diet.    4. If stable, can be discharged tomorrow and follow up with Dr. Marlena Eason as outpatient in 2 weeks. No active bleeding. Consider Colonoscopy as outpatient.          Specimens: were not obtained    (The following sections must be completed)  Post-Sedation Vital Signs: Vital signs were reviewed and were stable after the procedure (see flow sheet for vitals)            Post-Sedation Exam: Lungs: clear to auscultation bilaterally and Cardiovascular: regular rate and rhythm           Complications: none        Claudine Tucker MD, Rafy Harry

## 2019-09-24 NOTE — CARE COORDINATION
Potential Assistance Needed:  N/A  Potential Assistance Purchasing Medications:  No  Does patient want to participate in local refill/ meds to beds program?  Yes  Type of Home Care Services:  None  Patient expects to be discharged to:  home  Expected Discharge date:  09/26/19  Follow Up Appointment: Best Day/ Time: Wednesday AM    Discharge Plan: Met with Oracio Santamaria. She currently lives at home alone. Plan at discharge is to return home. She denies need for DME or HH.      Evaluation: no

## 2019-09-25 VITALS
OXYGEN SATURATION: 92 % | DIASTOLIC BLOOD PRESSURE: 52 MMHG | HEIGHT: 59 IN | HEART RATE: 62 BPM | WEIGHT: 111.1 LBS | SYSTOLIC BLOOD PRESSURE: 110 MMHG | TEMPERATURE: 97.7 F | BODY MASS INDEX: 22.4 KG/M2 | RESPIRATION RATE: 14 BRPM

## 2019-09-25 LAB
ANION GAP SERPL CALCULATED.3IONS-SCNC: 11 MEQ/L (ref 8–16)
BUN BLDV-MCNC: 19 MG/DL (ref 7–22)
CALCIUM SERPL-MCNC: 9.9 MG/DL (ref 8.5–10.5)
CHLORIDE BLD-SCNC: 107 MEQ/L (ref 98–111)
CO2: 24 MEQ/L (ref 23–33)
CREAT SERPL-MCNC: 0.5 MG/DL (ref 0.4–1.2)
ERYTHROCYTE [DISTWIDTH] IN BLOOD BY AUTOMATED COUNT: 13.8 % (ref 11.5–14.5)
ERYTHROCYTE [DISTWIDTH] IN BLOOD BY AUTOMATED COUNT: 53 FL (ref 35–45)
GFR SERPL CREATININE-BSD FRML MDRD: > 90 ML/MIN/1.73M2
GLUCOSE BLD-MCNC: 86 MG/DL (ref 70–108)
HCT VFR BLD CALC: 26.4 % (ref 37–47)
HEMOGLOBIN: 8.2 GM/DL (ref 12–16)
LITHIUM LEVEL: 0.82 MMOL/L (ref 0.6–1.2)
MCH RBC QN AUTO: 33.2 PG (ref 26–33)
MCHC RBC AUTO-ENTMCNC: 31.1 GM/DL (ref 32.2–35.5)
MCV RBC AUTO: 106.9 FL (ref 81–99)
PLATELET # BLD: 173 THOU/MM3 (ref 130–400)
PMV BLD AUTO: 10.3 FL (ref 9.4–12.4)
POTASSIUM SERPL-SCNC: 4.4 MEQ/L (ref 3.5–5.2)
RBC # BLD: 2.47 MILL/MM3 (ref 4.2–5.4)
SODIUM BLD-SCNC: 142 MEQ/L (ref 135–145)
WBC # BLD: 6.7 THOU/MM3 (ref 4.8–10.8)

## 2019-09-25 PROCEDURE — 85027 COMPLETE CBC AUTOMATED: CPT

## 2019-09-25 PROCEDURE — 99239 HOSP IP/OBS DSCHRG MGMT >30: CPT | Performed by: INTERNAL MEDICINE

## 2019-09-25 PROCEDURE — 80048 BASIC METABOLIC PNL TOTAL CA: CPT

## 2019-09-25 PROCEDURE — 2580000003 HC RX 258: Performed by: NURSE PRACTITIONER

## 2019-09-25 PROCEDURE — 36415 COLL VENOUS BLD VENIPUNCTURE: CPT

## 2019-09-25 PROCEDURE — 80178 ASSAY OF LITHIUM: CPT

## 2019-09-25 PROCEDURE — 2580000003 HC RX 258: Performed by: PHYSICIAN ASSISTANT

## 2019-09-25 PROCEDURE — 6370000000 HC RX 637 (ALT 250 FOR IP): Performed by: PHYSICIAN ASSISTANT

## 2019-09-25 PROCEDURE — 6370000000 HC RX 637 (ALT 250 FOR IP): Performed by: NURSE PRACTITIONER

## 2019-09-25 PROCEDURE — 6370000000 HC RX 637 (ALT 250 FOR IP): Performed by: INTERNAL MEDICINE

## 2019-09-25 RX ORDER — SUCRALFATE 1 G/1
1 TABLET ORAL 4 TIMES DAILY
Qty: 60 TABLET | Refills: 0 | Status: SHIPPED | OUTPATIENT
Start: 2019-09-25 | End: 2019-11-13 | Stop reason: ALTCHOICE

## 2019-09-25 RX ORDER — POLYETHYLENE GLYCOL 3350 17 G/17G
17 POWDER, FOR SOLUTION ORAL DAILY
Qty: 527 G | Refills: 1 | COMMUNITY
Start: 2019-09-26 | End: 2019-10-26

## 2019-09-25 RX ORDER — PANTOPRAZOLE SODIUM 40 MG/1
40 TABLET, DELAYED RELEASE ORAL
Qty: 30 TABLET | Refills: 1 | Status: SHIPPED | OUTPATIENT
Start: 2019-09-25 | End: 2019-09-25

## 2019-09-25 RX ORDER — PSEUDOEPHEDRINE HCL 30 MG
100 TABLET ORAL 2 TIMES DAILY
COMMUNITY
Start: 2019-09-25 | End: 2019-11-13 | Stop reason: ALTCHOICE

## 2019-09-25 RX ORDER — PANTOPRAZOLE SODIUM 40 MG/1
40 TABLET, DELAYED RELEASE ORAL
Qty: 60 TABLET | Refills: 1 | Status: ON HOLD | OUTPATIENT
Start: 2019-09-25 | End: 2019-09-28 | Stop reason: HOSPADM

## 2019-09-25 RX ADMIN — DOCUSATE SODIUM 100 MG: 100 CAPSULE, LIQUID FILLED ORAL at 08:40

## 2019-09-25 RX ADMIN — Medication 10 ML: at 02:19

## 2019-09-25 RX ADMIN — POLYETHYLENE GLYCOL 3350 17 G: 17 POWDER, FOR SOLUTION ORAL at 08:40

## 2019-09-25 RX ADMIN — LITHIUM CARBONATE 300 MG: 300 CAPSULE ORAL at 08:40

## 2019-09-25 RX ADMIN — LAMOTRIGINE 200 MG: 100 TABLET ORAL at 08:40

## 2019-09-25 RX ADMIN — SODIUM CHLORIDE, PRESERVATIVE FREE 10 ML: 5 INJECTION INTRAVENOUS at 08:40

## 2019-09-25 RX ADMIN — PANTOPRAZOLE SODIUM 40 MG: 40 TABLET, DELAYED RELEASE ORAL at 05:23

## 2019-09-25 RX ADMIN — LEVOTHYROXINE SODIUM 100 MCG: 100 TABLET ORAL at 05:23

## 2019-09-25 ASSESSMENT — PAIN SCALES - GENERAL
PAINLEVEL_OUTOF10: 0
PAINLEVEL_OUTOF10: 0

## 2019-09-25 NOTE — PROGRESS NOTES
Gastroenterology Progress Note:     Patient Name:  Kerri Engle   MRN: [id number]  [id number]  YOB: 1960  Admit Date: 9/23/2019  9:45 AM  Primary Care Physician: Catherine Chowdhury DO   [id number]     Patient seen and examined. 24 hours events and chart reviewed. Subjective: Patient resting in bed. Denies abdominal pain, nausea, and vomiting. She states she feels good. Objective:  BP (!) 106/53   Pulse 67   Temp 98.9 °F (37.2 °C) (Oral)   Resp 16   Ht 4' 11\" (1.499 m)   Wt 111 lb 1.6 oz (50.4 kg)   SpO2 95%   BMI 22.44 kg/m²     Physical Exam:    General:  Nourished in no distress  HEENT: Atraumatic, normocephalic. Moist oral mucous membranes. Neck: Supple without adenopathy, JVD, thyromegaly or masses. No JVD present. Trachea midline. CV: Heart RRR, no murmurs, rubs, gallops. Resp: Even, easy without cough or accessory use. Lungs clear to ascultation bilaterally. Abd: Round, soft, nontender. No hepatosplenomegaly or mass present. Active bowel sounds heard. No distention noted. Ext:  Without cyanosis, clubbing, edema. Skin: Pink, warm, dry  Neuro:  Alert, oriented x3 with no obvious deficits.        Rectal: deferred    Labs:   CBC:   Lab Results   Component Value Date    WBC 6.7 09/25/2019    HGB 8.2 09/25/2019    HCT 26.4 09/25/2019    .9 09/25/2019     09/25/2019     BMP:   Lab Results   Component Value Date     09/25/2019    K 4.4 09/25/2019    K 4.2 09/21/2019     09/25/2019    CO2 24 09/25/2019    PHOS 3.0 10/07/2018    BUN 19 09/25/2019    CREATININE 0.5 09/25/2019    CALCIUM 9.9 09/25/2019     PT/INR:   Lab Results   Component Value Date    INR 0.95 09/23/2019     Lipids:   Lab Results   Component Value Date    ALKPHOS 115 09/23/2019    ALT 79 09/23/2019    AST 35 09/23/2019    BILITOT 0.2 09/23/2019    BILIDIR <0.2 09/13/2019    LABALBU 4.2 09/23/2019    AMYLASE 83 03/20/2019    LIPASE 19.2 09/23/2019       Significant Diagnostic Studies:   EGD by Dr. Angela Martinez 09/24/19  Impression: small hiatal hernia, erosive esophagitis with signs of recent bleeding, altered Batsheva en Y gastric bypass anatomy, no active bleeding    Current Meds:  Scheduled Meds:   pantoprazole  40 mg Oral BID AC    dimethyl fumarate  240 mg Oral BID    [Held by provider] ferrous sulfate  325 mg Oral Daily with breakfast    lamoTRIgine  200 mg Oral Daily    lithium  600 mg Oral Nightly    lithium  300 mg Oral QAM    levothyroxine  100 mcg Oral Daily    lurasidone  20 mg Oral Nightly    vilazodone HCl  40 mg Oral Nightly    sodium chloride flush  10 mL Intravenous 2 times per day    sodium chloride (PF)  10 mL Intravenous Q12H    polyethylene glycol  17 g Oral Daily    docusate sodium  100 mg Oral BID     Assessment:  60 yo F admitted 09/23/19 for melena. She was in the ED a few times this week for abdominal pain, which subsided prior to this admission. CT abdomen/pelvis 09/13/19 showed constipation and nonobstructing right intrarenal calculus. KUB 09/21/19 negative. She has been having constipation for about a month and gave herself a Fleets enema the morning she was admitted. She has a history of bipolar, cervical cancer, gastric bypass, GERD, MS hypothyroidism, and GI bleed. She would like to see Dr. Arvind Chanel instead of Dr. Malia Hernandez. 1. GI bleed  2. Erosive esophagitis  3. Anemia  4. GERD  5. Constipation  6. MS  7. Biploar  8. Hypothyroidism    Plan:    1. PPI BID, script sent  2. Miralax daily, continue at discharge  3. Colace BID, continue at discharge  4. Supportive care per primary team  5. Follow-up with Dr. Arvind Chanel in 2-3 weeks, may need outpatient colonoscopy  6.  Okay from GI standpoint to discharge home when cleared by primary team  GI signing off    Case reviewed and impression/plan reviewed in collaboration with Dr. Angela Martinez  Electronically signed by AJITH Austin CNP on 9/25/2019 at 11:35 AM    [facility]

## 2019-09-25 NOTE — PLAN OF CARE
Care plan reviewed with patient. Patient verbalizes understanding of the plan of care and contribute to goal setting. Problem: Falls - Risk of:  Goal: Will remain free from falls  Description  Will remain free from falls  9/24/2019 1419 by Omer Saavedra RN  Note:   No falls noted this shift. Continue falling star program. Bed alarm on, bed in low position. Call light and personal belongings in reach. Patient uses call light appropriately. Goal: Absence of physical injury  Description  Absence of physical injury  9/24/2019 1419 by Omer Saavedra RN  Note:   No falls noted this shift. Continue falling star program. Bed alarm on, bed in low position. Call light and personal belongings in reach. Patient uses call light appropriately. Problem: Discharge Planning:  Goal: Discharged to appropriate level of care  Description  Discharged to appropriate level of care  9/24/2019 1419 by Omer Saavedra RN  Note:   Plan to discharge home with support       Problem: Bowel Function - Altered:  Goal: Bowel elimination is within specified parameters  Description  Bowel elimination is within specified parameters  9/24/2019 1419 by Omer Saavedra RN  Note:   Patient not having diarrhea this morning, however states when she wipes it is 'black' EGD this afternoon at 1500, hgb 8.4        Problem: Fluid Volume - Imbalance:  Description  Avoid the routine use of orogastric or nasogastric lavage.   Goal: Absence of imbalanced fluid volume signs and symptoms  Description  Absence of imbalanced fluid volume signs and symptoms  9/24/2019 1419 by Omer Saavedra RN  Note:   Hgb 8.4, patient NPO for EGD this afternoon     Goal: Will show no signs and symptoms of excessive bleeding  Description  Will show no signs and symptoms of excessive bleeding  9/24/2019 1419 by Omer Saavedra RN  Note:   Hgb steady at 8.4, patient denies symptoms, EGD this afternoon around 1500       Problem: Nausea/Vomiting:  Goal:
Care plan reviewed with patient. Patient verbalizes understanding of the plan of care and contribute to goal setting. Problem: Falls - Risk of:  Goal: Will remain free from falls  Description  Will remain free from falls  9/25/2019 1013 by Ahmet Merritt RN  Outcome: Ongoing  Note:   No falls noted this shift. Continue falling star program. Bed alarm on, bed in low position. Call light and personal belongings in reach. Patient uses call light appropriately. Goal: Absence of physical injury  Description  Absence of physical injury  9/25/2019 1013 by Ahmet Merritt RN  Outcome: Ongoing  Note:   No falls noted this shift. Continue falling star program. Bed alarm on, bed in low position. Call light and personal belongings in reach. Patient uses call light appropriately. Problem: Discharge Planning:  Goal: Discharged to appropriate level of care  Description  Discharged to appropriate level of care  9/25/2019 1013 by Ahmet Merritt RN  Outcome: Ongoing  Note:   Plan to discharge home with support       Problem: Bowel Function - Altered:  Goal: Bowel elimination is within specified parameters  Description  Bowel elimination is within specified parameters  9/25/2019 1013 by Ahmet Merritt RN  Outcome: Ongoing  Note:   Patient complaining of constipation today, however had 2 BM yesterday, colace and miralax given, fluids encouraged     Problem: Fluid Volume - Imbalance:  Description  Avoid the routine use of orogastric or nasogastric lavage.   Goal: Absence of imbalanced fluid volume signs and symptoms  Description  Absence of imbalanced fluid volume signs and symptoms  9/25/2019 1013 by Ahmet Merritt RN  Outcome: Ongoing  Note:   VSS, fluids encouraged    Goal: Will show no signs and symptoms of excessive bleeding  Description  Will show no signs and symptoms of excessive bleeding  9/25/2019 1013 by Ahmet Merritt RN  Outcome: Ongoing  Note:   H and H q6, last hgb 8.2       Problem:
Problem: SKIN INTEGRITY  Goal: Skin integrity is maintained or improved  9/25/2019 0008 by Elissa Franco RN  Outcome: Ongoing  Patient free from skin breakdown. Patient turns self and makes frequent positional changes. Will continue to monitor. Problem: KNOWLEDGE DEFICIT  Goal: Patient/S.O. demonstrates understanding of disease process, treatment plan, medications, and discharge instructions. 9/25/2019 0008 by Elissa Franco RN  Outcome: Ongoing  Patient updated on plan of care as needed. Care plan reviewed with patient. Patient verbalize understanding of the plan of care and contribute to goal setting.
Will continue to monitor. Problem: SKIN INTEGRITY  Goal: Skin integrity is maintained or improved  Outcome: Ongoing  Note:   Patient turns and repositions self in bed, no new skin breakdown at this time. Care plan reviewed with patient. Patient verbalize understanding of the plan of care and contribute to goal setting.

## 2019-09-26 ENCOUNTER — HOSPITAL ENCOUNTER (INPATIENT)
Age: 59
LOS: 2 days | Discharge: HOME | DRG: 381 | End: 2019-09-28
Attending: INTERNAL MEDICINE | Admitting: INTERNAL MEDICINE
Payer: MEDICARE

## 2019-09-26 ENCOUNTER — APPOINTMENT (OUTPATIENT)
Dept: GENERAL RADIOLOGY | Age: 59
DRG: 381 | End: 2019-09-26
Payer: MEDICARE

## 2019-09-26 DIAGNOSIS — D62 ACUTE ON CHRONIC BLOOD LOSS ANEMIA: ICD-10-CM

## 2019-09-26 DIAGNOSIS — K92.2 GASTROINTESTINAL HEMORRHAGE, UNSPECIFIED GASTROINTESTINAL HEMORRHAGE TYPE: Primary | ICD-10-CM

## 2019-09-26 PROBLEM — K92.1 MELENA: Status: ACTIVE | Noted: 2019-09-26

## 2019-09-26 LAB
ABO: NORMAL
ALBUMIN SERPL-MCNC: 3.6 G/DL (ref 3.5–5.1)
ALP BLD-CCNC: 92 U/L (ref 38–126)
ALT SERPL-CCNC: 37 U/L (ref 11–66)
ANION GAP SERPL CALCULATED.3IONS-SCNC: 11 MEQ/L (ref 8–16)
ANTIBODY SCREEN: NORMAL
APTT: 30.2 SECONDS (ref 22–38)
AST SERPL-CCNC: 22 U/L (ref 5–40)
BASOPHILS # BLD: 0.3 %
BASOPHILS ABSOLUTE: 0 THOU/MM3 (ref 0–0.1)
BILIRUB SERPL-MCNC: 0.3 MG/DL (ref 0.3–1.2)
BILIRUBIN DIRECT: < 0.2 MG/DL (ref 0–0.3)
BUN BLDV-MCNC: 22 MG/DL (ref 7–22)
CALCIUM SERPL-MCNC: 9.9 MG/DL (ref 8.5–10.5)
CHLORIDE BLD-SCNC: 108 MEQ/L (ref 98–111)
CO2: 22 MEQ/L (ref 23–33)
CREAT SERPL-MCNC: 0.5 MG/DL (ref 0.4–1.2)
EKG ATRIAL RATE: 64 BPM
EKG P AXIS: 58 DEGREES
EKG P-R INTERVAL: 154 MS
EKG Q-T INTERVAL: 468 MS
EKG QRS DURATION: 146 MS
EKG QTC CALCULATION (BAZETT): 482 MS
EKG R AXIS: 32 DEGREES
EKG T AXIS: 40 DEGREES
EKG VENTRICULAR RATE: 64 BPM
EOSINOPHIL # BLD: 0.8 %
EOSINOPHILS ABSOLUTE: 0.1 THOU/MM3 (ref 0–0.4)
ERYTHROCYTE [DISTWIDTH] IN BLOOD BY AUTOMATED COUNT: 14.3 % (ref 11.5–14.5)
ERYTHROCYTE [DISTWIDTH] IN BLOOD BY AUTOMATED COUNT: 53.1 FL (ref 35–45)
FERRITIN: 27 NG/ML (ref 10–291)
GFR SERPL CREATININE-BSD FRML MDRD: > 90 ML/MIN/1.73M2
GLUCOSE BLD-MCNC: 90 MG/DL (ref 70–108)
HCT VFR BLD CALC: 18.4 % (ref 37–47)
HCT VFR BLD CALC: 22.6 % (ref 37–47)
HEMOCCULT STL QL: POSITIVE
HEMOGLOBIN: 6.4 GM/DL (ref 12–16)
HEMOGLOBIN: 7.1 GM/DL (ref 12–16)
IMMATURE GRANS (ABS): 0.09 THOU/MM3 (ref 0–0.07)
IMMATURE GRANULOCYTES: 1.2 %
INR BLD: 0.94 (ref 0.85–1.13)
IRON SATURATION: 46 % (ref 20–50)
IRON: 119 UG/DL (ref 50–170)
LIPASE: 20.8 U/L (ref 5.6–51.3)
LYMPHOCYTES # BLD: 5.3 %
LYMPHOCYTES ABSOLUTE: 0.4 THOU/MM3 (ref 1–4.8)
MAGNESIUM: 2 MG/DL (ref 1.6–2.4)
MCH RBC QN AUTO: 33.5 PG (ref 26–33)
MCHC RBC AUTO-ENTMCNC: 31.4 GM/DL (ref 32.2–35.5)
MCV RBC AUTO: 106.6 FL (ref 81–99)
MONOCYTES # BLD: 7.8 %
MONOCYTES ABSOLUTE: 0.6 THOU/MM3 (ref 0.4–1.3)
NUCLEATED RED BLOOD CELLS: 0 /100 WBC
OSMOLALITY CALCULATION: 284.1 MOSMOL/KG (ref 275–300)
PLATELET # BLD: 180 THOU/MM3 (ref 130–400)
PMV BLD AUTO: 10.2 FL (ref 9.4–12.4)
POTASSIUM SERPL-SCNC: 3.7 MEQ/L (ref 3.5–5.2)
RBC # BLD: 2.12 MILL/MM3 (ref 4.2–5.4)
RH FACTOR: NORMAL
SEG NEUTROPHILS: 84.6 %
SEGMENTED NEUTROPHILS ABSOLUTE COUNT: 6.4 THOU/MM3 (ref 1.8–7.7)
SODIUM BLD-SCNC: 141 MEQ/L (ref 135–145)
TOTAL IRON BINDING CAPACITY: 259 UG/DL (ref 171–450)
TOTAL PROTEIN: 5.7 G/DL (ref 6.1–8)
TROPONIN T: < 0.01 NG/ML
WBC # BLD: 7.6 THOU/MM3 (ref 4.8–10.8)

## 2019-09-26 PROCEDURE — 93005 ELECTROCARDIOGRAM TRACING: CPT | Performed by: PHYSICIAN ASSISTANT

## 2019-09-26 PROCEDURE — 85025 COMPLETE CBC W/AUTO DIFF WBC: CPT

## 2019-09-26 PROCEDURE — C9113 INJ PANTOPRAZOLE SODIUM, VIA: HCPCS | Performed by: NURSE PRACTITIONER

## 2019-09-26 PROCEDURE — 2580000003 HC RX 258: Performed by: PHYSICIAN ASSISTANT

## 2019-09-26 PROCEDURE — 83550 IRON BINDING TEST: CPT

## 2019-09-26 PROCEDURE — 36415 COLL VENOUS BLD VENIPUNCTURE: CPT

## 2019-09-26 PROCEDURE — 99222 1ST HOSP IP/OBS MODERATE 55: CPT | Performed by: INTERNAL MEDICINE

## 2019-09-26 PROCEDURE — 6360000002 HC RX W HCPCS: Performed by: INTERNAL MEDICINE

## 2019-09-26 PROCEDURE — C9113 INJ PANTOPRAZOLE SODIUM, VIA: HCPCS | Performed by: INTERNAL MEDICINE

## 2019-09-26 PROCEDURE — 85610 PROTHROMBIN TIME: CPT

## 2019-09-26 PROCEDURE — P9016 RBC LEUKOCYTES REDUCED: HCPCS

## 2019-09-26 PROCEDURE — 82272 OCCULT BLD FECES 1-3 TESTS: CPT

## 2019-09-26 PROCEDURE — 86923 COMPATIBILITY TEST ELECTRIC: CPT

## 2019-09-26 PROCEDURE — 86901 BLOOD TYPING SEROLOGIC RH(D): CPT

## 2019-09-26 PROCEDURE — 83690 ASSAY OF LIPASE: CPT

## 2019-09-26 PROCEDURE — 82248 BILIRUBIN DIRECT: CPT

## 2019-09-26 PROCEDURE — 85018 HEMOGLOBIN: CPT

## 2019-09-26 PROCEDURE — 93010 ELECTROCARDIOGRAM REPORT: CPT | Performed by: INTERNAL MEDICINE

## 2019-09-26 PROCEDURE — 2580000003 HC RX 258: Performed by: INTERNAL MEDICINE

## 2019-09-26 PROCEDURE — 83540 ASSAY OF IRON: CPT

## 2019-09-26 PROCEDURE — 99285 EMERGENCY DEPT VISIT HI MDM: CPT

## 2019-09-26 PROCEDURE — 86850 RBC ANTIBODY SCREEN: CPT

## 2019-09-26 PROCEDURE — 36430 TRANSFUSION BLD/BLD COMPNT: CPT

## 2019-09-26 PROCEDURE — C9113 INJ PANTOPRAZOLE SODIUM, VIA: HCPCS | Performed by: PHYSICIAN ASSISTANT

## 2019-09-26 PROCEDURE — 6360000002 HC RX W HCPCS: Performed by: NURSE PRACTITIONER

## 2019-09-26 PROCEDURE — 82728 ASSAY OF FERRITIN: CPT

## 2019-09-26 PROCEDURE — 85730 THROMBOPLASTIN TIME PARTIAL: CPT

## 2019-09-26 PROCEDURE — 6360000002 HC RX W HCPCS: Performed by: PHYSICIAN ASSISTANT

## 2019-09-26 PROCEDURE — 86900 BLOOD TYPING SEROLOGIC ABO: CPT

## 2019-09-26 PROCEDURE — 1200000003 HC TELEMETRY R&B

## 2019-09-26 PROCEDURE — 84484 ASSAY OF TROPONIN QUANT: CPT

## 2019-09-26 PROCEDURE — 6370000000 HC RX 637 (ALT 250 FOR IP): Performed by: INTERNAL MEDICINE

## 2019-09-26 PROCEDURE — 85014 HEMATOCRIT: CPT

## 2019-09-26 PROCEDURE — 74018 RADEX ABDOMEN 1 VIEW: CPT

## 2019-09-26 PROCEDURE — 80053 COMPREHEN METABOLIC PANEL: CPT

## 2019-09-26 PROCEDURE — 94761 N-INVAS EAR/PLS OXIMETRY MLT: CPT

## 2019-09-26 PROCEDURE — 83735 ASSAY OF MAGNESIUM: CPT

## 2019-09-26 RX ORDER — DIMETHYL FUMARATE 240 MG/1
240 CAPSULE ORAL 2 TIMES DAILY
Status: DISCONTINUED | OUTPATIENT
Start: 2019-09-26 | End: 2019-09-28 | Stop reason: HOSPADM

## 2019-09-26 RX ORDER — SODIUM CHLORIDE 0.9 % (FLUSH) 0.9 %
10 SYRINGE (ML) INJECTION PRN
Status: DISCONTINUED | OUTPATIENT
Start: 2019-09-26 | End: 2019-09-28 | Stop reason: HOSPADM

## 2019-09-26 RX ORDER — SODIUM CHLORIDE 0.9 % (FLUSH) 0.9 %
10 SYRINGE (ML) INJECTION EVERY 12 HOURS SCHEDULED
Status: DISCONTINUED | OUTPATIENT
Start: 2019-09-26 | End: 2019-09-28 | Stop reason: HOSPADM

## 2019-09-26 RX ORDER — 0.9 % SODIUM CHLORIDE 0.9 %
250 INTRAVENOUS SOLUTION INTRAVENOUS ONCE
Status: COMPLETED | OUTPATIENT
Start: 2019-09-26 | End: 2019-09-26

## 2019-09-26 RX ORDER — LAMOTRIGINE 100 MG/1
200 TABLET ORAL DAILY
Status: DISCONTINUED | OUTPATIENT
Start: 2019-09-27 | End: 2019-09-28 | Stop reason: HOSPADM

## 2019-09-26 RX ORDER — CALCIUM CARBONATE 200(500)MG
500 TABLET,CHEWABLE ORAL NIGHTLY
Status: DISCONTINUED | OUTPATIENT
Start: 2019-09-26 | End: 2019-09-28 | Stop reason: HOSPADM

## 2019-09-26 RX ORDER — PANTOPRAZOLE SODIUM 40 MG/10ML
40 INJECTION, POWDER, LYOPHILIZED, FOR SOLUTION INTRAVENOUS
Status: DISCONTINUED | OUTPATIENT
Start: 2019-09-26 | End: 2019-09-28 | Stop reason: HOSPADM

## 2019-09-26 RX ORDER — FERROUS SULFATE 325(65) MG
325 TABLET ORAL
Status: DISCONTINUED | OUTPATIENT
Start: 2019-09-27 | End: 2019-09-28 | Stop reason: HOSPADM

## 2019-09-26 RX ORDER — SUCRALFATE 1 G/1
1 TABLET ORAL
Status: DISCONTINUED | OUTPATIENT
Start: 2019-09-26 | End: 2019-09-28 | Stop reason: HOSPADM

## 2019-09-26 RX ORDER — LEVOTHYROXINE SODIUM 0.1 MG/1
100 TABLET ORAL DAILY
Status: DISCONTINUED | OUTPATIENT
Start: 2019-09-27 | End: 2019-09-28 | Stop reason: HOSPADM

## 2019-09-26 RX ORDER — LITHIUM CARBONATE 300 MG/1
300 CAPSULE ORAL EVERY MORNING
Status: DISCONTINUED | OUTPATIENT
Start: 2019-09-27 | End: 2019-09-28 | Stop reason: HOSPADM

## 2019-09-26 RX ORDER — PRENATAL WITH FERROUS FUM AND FOLIC ACID 3080; 920; 120; 400; 22; 1.84; 3; 20; 10; 1; 12; 200; 27; 25; 2 [IU]/1; [IU]/1; MG/1; [IU]/1; MG/1; MG/1; MG/1; MG/1; MG/1; MG/1; UG/1; MG/1; MG/1; MG/1; MG/1
1 TABLET ORAL DAILY
Status: DISCONTINUED | OUTPATIENT
Start: 2019-09-27 | End: 2019-09-28 | Stop reason: HOSPADM

## 2019-09-26 RX ORDER — ACETAMINOPHEN 325 MG/1
650 TABLET ORAL EVERY 4 HOURS PRN
Status: DISCONTINUED | OUTPATIENT
Start: 2019-09-26 | End: 2019-09-28 | Stop reason: HOSPADM

## 2019-09-26 RX ORDER — DOCUSATE SODIUM 100 MG/1
100 CAPSULE, LIQUID FILLED ORAL 2 TIMES DAILY
Status: DISCONTINUED | OUTPATIENT
Start: 2019-09-26 | End: 2019-09-28 | Stop reason: HOSPADM

## 2019-09-26 RX ORDER — LITHIUM CARBONATE 300 MG/1
600 CAPSULE ORAL NIGHTLY
Status: DISCONTINUED | OUTPATIENT
Start: 2019-09-26 | End: 2019-09-28 | Stop reason: HOSPADM

## 2019-09-26 RX ORDER — POLYETHYLENE GLYCOL 3350 17 G/17G
17 POWDER, FOR SOLUTION ORAL DAILY
Status: DISCONTINUED | OUTPATIENT
Start: 2019-09-26 | End: 2019-09-28 | Stop reason: HOSPADM

## 2019-09-26 RX ORDER — MULTIVITAMIN/IRON/FOLIC ACID 18MG-0.4MG
250 TABLET ORAL NIGHTLY
Status: DISCONTINUED | OUTPATIENT
Start: 2019-09-26 | End: 2019-09-28 | Stop reason: HOSPADM

## 2019-09-26 RX ORDER — SODIUM CHLORIDE 9 MG/ML
INJECTION, SOLUTION INTRAVENOUS CONTINUOUS
Status: DISCONTINUED | OUTPATIENT
Start: 2019-09-26 | End: 2019-09-27

## 2019-09-26 RX ORDER — VILAZODONE HYDROCHLORIDE 40 MG/1
40 TABLET ORAL NIGHTLY
Status: DISCONTINUED | OUTPATIENT
Start: 2019-09-26 | End: 2019-09-28 | Stop reason: HOSPADM

## 2019-09-26 RX ORDER — ONDANSETRON 2 MG/ML
4 INJECTION INTRAMUSCULAR; INTRAVENOUS EVERY 6 HOURS PRN
Status: DISCONTINUED | OUTPATIENT
Start: 2019-09-26 | End: 2019-09-28 | Stop reason: HOSPADM

## 2019-09-26 RX ORDER — LANOLIN ALCOHOL/MO/W.PET/CERES
1000 CREAM (GRAM) TOPICAL DAILY
Status: DISCONTINUED | OUTPATIENT
Start: 2019-09-26 | End: 2019-09-28 | Stop reason: HOSPADM

## 2019-09-26 RX ORDER — 0.9 % SODIUM CHLORIDE 0.9 %
1000 INTRAVENOUS SOLUTION INTRAVENOUS ONCE
Status: COMPLETED | OUTPATIENT
Start: 2019-09-26 | End: 2019-09-26

## 2019-09-26 RX ADMIN — Medication 250 MG: at 21:13

## 2019-09-26 RX ADMIN — Medication 1000 MCG: at 12:41

## 2019-09-26 RX ADMIN — SUCRALFATE 1 G: 1 TABLET ORAL at 12:41

## 2019-09-26 RX ADMIN — LURASIDONE HYDROCHLORIDE 20 MG: 40 TABLET, FILM COATED ORAL at 21:13

## 2019-09-26 RX ADMIN — SUCRALFATE 1 G: 1 TABLET ORAL at 16:06

## 2019-09-26 RX ADMIN — LITHIUM CARBONATE 600 MG: 300 CAPSULE ORAL at 21:13

## 2019-09-26 RX ADMIN — PANTOPRAZOLE SODIUM 40 MG: 40 INJECTION, POWDER, FOR SOLUTION INTRAVENOUS at 16:07

## 2019-09-26 RX ADMIN — SODIUM CHLORIDE 80 MG: 9 INJECTION, SOLUTION INTRAVENOUS at 09:13

## 2019-09-26 RX ADMIN — DOCUSATE SODIUM 100 MG: 100 CAPSULE, LIQUID FILLED ORAL at 21:15

## 2019-09-26 RX ADMIN — SODIUM CHLORIDE 250 ML: 9 INJECTION, SOLUTION INTRAVENOUS at 20:30

## 2019-09-26 RX ADMIN — VITAMIN D, TAB 1000IU (100/BT) 2000 UNITS: 25 TAB at 21:13

## 2019-09-26 RX ADMIN — VILAZODONE HYDROCHLORIDE 40 MG: 40 TABLET ORAL at 21:13

## 2019-09-26 RX ADMIN — SODIUM CHLORIDE: 9 INJECTION, SOLUTION INTRAVENOUS at 09:59

## 2019-09-26 RX ADMIN — SUCRALFATE 1 G: 1 TABLET ORAL at 21:13

## 2019-09-26 RX ADMIN — ANTACID TABLETS 500 MG: 500 TABLET, CHEWABLE ORAL at 21:13

## 2019-09-26 RX ADMIN — SODIUM CHLORIDE 1000 ML: 9 INJECTION, SOLUTION INTRAVENOUS at 08:29

## 2019-09-26 RX ADMIN — SODIUM CHLORIDE: 9 INJECTION, SOLUTION INTRAVENOUS at 20:24

## 2019-09-26 ASSESSMENT — PAIN SCALES - GENERAL
PAINLEVEL_OUTOF10: 0

## 2019-09-26 ASSESSMENT — ENCOUNTER SYMPTOMS
VOMITING: 0
ABDOMINAL PAIN: 0
RHINORRHEA: 0
EYE PAIN: 0
EYE DISCHARGE: 0
WHEEZING: 0
DIARRHEA: 0
SORE THROAT: 0
NAUSEA: 1
SHORTNESS OF BREATH: 0
BACK PAIN: 0
COUGH: 0

## 2019-09-26 NOTE — PROGRESS NOTES
CLINICAL PHARMACY NOTE: MEDS TO [address] Select Patient?: No  Total # of Prescriptions Filled: 1   The following medications were delivered to the patient:  Pantoprazole 40mg  Total # of Interventions Completed: 2  Time Spent (min): 30    Additional Documentation:

## 2019-09-26 NOTE — DISCHARGE SUMMARY
Hospital Medicine Discharge Summary      Patient Identification:   Hilton Chaudhry   : 1960  MRN: [id number]   Account: [de-identified]      Patient's PCP: Lyle Olivarez DO    Admit Date: 2019     Discharge Date: 2019      Admitting Physician: Arabella Yang MD     Discharge Physician: Valentino Chaco, DO       Hospital Course: Hilton Chaudhry is a 61 y.o. female with PMhx of trista-en-y gastric bypass, bipolar disorder, GERD, hypothyroidism, hx ppm for bradycardia admitted to [address] [place] [address] on 2019 for melena. Patient was found to have a hemoglobin of 10.3 from 11.9 several days prior to admission. She was started on IV protonix, and GI was consulted. She received IVF, and had a dilutional drop to 8.2 but remained stable on repeat. She underwent an upper endoscopy and this showed erosive esophagitis and evidence of recent bleed, but no active bleeding. There was also altered anatomy from prior RnY bypass and small hiatal hernia. Her vital signs remained stable and she her stools became less melanotic and was stable for discharge. Discussed return precuations and will arrange for a CBC in 4 days to monitor for any drop in Hgb. She will continue on iron tablets, and will be started on protonix PO BID. She will follow up with GI in 2-3 weeks and will be considered for colonoscopy at that time. Her hemoglobin was 8.4 and stable on discharge. The patient was stable for discharge - all consultants were contacted and in agreement with plan for discharge. Appropriate follow up appointment was arranged prior to discharge. Please see below or view chart for more details from hospital course.      Discharge Diagnoses:    · Acute UGIB - resolved  · Erosive Esophagitis - not on PPI PTA, dc on protonix BID  · Acute Macrocytic anemia  · Chronic Hypotension  · Constipation  · Hypothyroidism  · Bipolar disorder  · MS      The patient was seen and examined on day of discharge and * lithium 300 MG tablet  Take 2 tablets by mouth nightly     * lithium 300 MG tablet  Take 1 tablet by mouth every morning     lurasidone 40 MG Tabs tablet  Commonly known as:  LATUDA     Magnesium Oxide 250 MG Tabs     nitroGLYCERIN 0.4 MG SL tablet  Commonly known as:  NITROSTAT  up to max of 3 total doses. If no relief after 1 dose, call 911. ondansetron 4 MG disintegrating tablet  Commonly known as:  ZOFRAN-ODT  Take 1 tablet by mouth every 8 hours as needed for Nausea     PRENATAL 1 PO     sucralfate 1 GM tablet  Commonly known as:  CARAFATE  Take 1 tablet by mouth 4 times daily     TECFIDERA 240 MG delayed release capsule  Generic drug:  dimethyl fumarate     vilazodone hcl 40 MG Tabs  Generic drug:  vilazodone HCl     vitamin D 2000 units Caps capsule         * This list has 2 medication(s) that are the same as other medications prescribed for you. Read the directions carefully, and ask your doctor or other care provider to review them with you. STOP taking these medications    ranitidine 300 MG capsule  Commonly known as:  ZANTAC           Where to Get Your Medications      These medications were sent to [address] [place] [address] , [address]  [address] [place] [address]  [address] [place] [address], [address] [place] [address] Road [facility]    Phone:  [phone]   · pantoprazole 40 MG tablet  · sucralfate 1 GM tablet     You can get these medications from any pharmacy    You don't need a prescription for these medications  · docusate 100 MG Caps  · polyethylene glycol packet           Time Spent on discharge is roughly 35 minutes in the examination, evaluation, counseling and review of medications and discharge plan. Thank you Mustapha Morley DO for the opportunity to be involved in this patient's care.     Signed:    Electronically signed by Elodia Ozuna DO on 9/26/2019 at 5:45 PM

## 2019-09-27 ENCOUNTER — APPOINTMENT (OUTPATIENT)
Dept: NUCLEAR MEDICINE | Age: 59
DRG: 381 | End: 2019-09-27
Payer: MEDICARE

## 2019-09-27 ENCOUNTER — TELEPHONE (OUTPATIENT)
Dept: FAMILY MEDICINE CLINIC | Age: 59
End: 2019-09-27

## 2019-09-27 LAB
ANION GAP SERPL CALCULATED.3IONS-SCNC: 11 MEQ/L (ref 8–16)
BUN BLDV-MCNC: 10 MG/DL (ref 7–22)
CALCIUM SERPL-MCNC: 9.7 MG/DL (ref 8.5–10.5)
CHLORIDE BLD-SCNC: 108 MEQ/L (ref 98–111)
CO2: 22 MEQ/L (ref 23–33)
CREAT SERPL-MCNC: 0.4 MG/DL (ref 0.4–1.2)
ERYTHROCYTE [DISTWIDTH] IN BLOOD BY AUTOMATED COUNT: 17.2 % (ref 11.5–14.5)
ERYTHROCYTE [DISTWIDTH] IN BLOOD BY AUTOMATED COUNT: 61 FL (ref 35–45)
FOLATE: > 20 NG/ML (ref 4.8–24.2)
GFR SERPL CREATININE-BSD FRML MDRD: > 90 ML/MIN/1.73M2
GLUCOSE BLD-MCNC: 107 MG/DL (ref 70–108)
HCT VFR BLD CALC: 21.1 % (ref 37–47)
HCT VFR BLD CALC: 24.9 % (ref 37–47)
HCT VFR BLD CALC: 26.4 % (ref 37–47)
HEMOGLOBIN: 6.9 GM/DL (ref 12–16)
HEMOGLOBIN: 7.9 GM/DL (ref 12–16)
HEMOGLOBIN: 8.3 GM/DL (ref 12–16)
MCH RBC QN AUTO: 32.7 PG (ref 26–33)
MCHC RBC AUTO-ENTMCNC: 31.4 GM/DL (ref 32.2–35.5)
MCV RBC AUTO: 103.9 FL (ref 81–99)
PLATELET # BLD: 170 THOU/MM3 (ref 130–400)
PMV BLD AUTO: 9.9 FL (ref 9.4–12.4)
POTASSIUM REFLEX MAGNESIUM: 4.2 MEQ/L (ref 3.5–5.2)
RBC # BLD: 2.54 MILL/MM3 (ref 4.2–5.4)
SODIUM BLD-SCNC: 141 MEQ/L (ref 135–145)
VITAMIN B-12: 1919 PG/ML (ref 211–911)
WBC # BLD: 5.3 THOU/MM3 (ref 4.8–10.8)

## 2019-09-27 PROCEDURE — 2580000003 HC RX 258: Performed by: INTERNAL MEDICINE

## 2019-09-27 PROCEDURE — A9560 TC99M LABELED RBC: HCPCS | Performed by: INTERNAL MEDICINE

## 2019-09-27 PROCEDURE — 82607 VITAMIN B-12: CPT

## 2019-09-27 PROCEDURE — 2580000003 HC RX 258: Performed by: PHYSICIAN ASSISTANT

## 2019-09-27 PROCEDURE — 85027 COMPLETE CBC AUTOMATED: CPT

## 2019-09-27 PROCEDURE — 6370000000 HC RX 637 (ALT 250 FOR IP): Performed by: INTERNAL MEDICINE

## 2019-09-27 PROCEDURE — 78278 ACUTE GI BLOOD LOSS IMAGING: CPT

## 2019-09-27 PROCEDURE — 85014 HEMATOCRIT: CPT

## 2019-09-27 PROCEDURE — 36430 TRANSFUSION BLD/BLD COMPNT: CPT

## 2019-09-27 PROCEDURE — P9016 RBC LEUKOCYTES REDUCED: HCPCS

## 2019-09-27 PROCEDURE — 6360000002 HC RX W HCPCS: Performed by: NURSE PRACTITIONER

## 2019-09-27 PROCEDURE — 3430000000 HC RX DIAGNOSTIC RADIOPHARMACEUTICAL: Performed by: INTERNAL MEDICINE

## 2019-09-27 PROCEDURE — 1200000003 HC TELEMETRY R&B

## 2019-09-27 PROCEDURE — C9113 INJ PANTOPRAZOLE SODIUM, VIA: HCPCS | Performed by: NURSE PRACTITIONER

## 2019-09-27 PROCEDURE — 99233 SBSQ HOSP IP/OBS HIGH 50: CPT | Performed by: INTERNAL MEDICINE

## 2019-09-27 PROCEDURE — 80048 BASIC METABOLIC PNL TOTAL CA: CPT

## 2019-09-27 PROCEDURE — 2709999900 HC NON-CHARGEABLE SUPPLY

## 2019-09-27 PROCEDURE — 85018 HEMOGLOBIN: CPT

## 2019-09-27 PROCEDURE — 36415 COLL VENOUS BLD VENIPUNCTURE: CPT

## 2019-09-27 PROCEDURE — 82746 ASSAY OF FOLIC ACID SERUM: CPT

## 2019-09-27 RX ORDER — 0.9 % SODIUM CHLORIDE 0.9 %
250 INTRAVENOUS SOLUTION INTRAVENOUS ONCE
Status: COMPLETED | OUTPATIENT
Start: 2019-09-27 | End: 2019-09-27

## 2019-09-27 RX ADMIN — ANTACID TABLETS 500 MG: 500 TABLET, CHEWABLE ORAL at 20:59

## 2019-09-27 RX ADMIN — VITAMIN A, VITAMIN C, VITAMIN D-3, VITAMIN E, VITAMIN B-1, VITAMIN B-2, NIACIN, VITAMIN B-6, CALCIUM, IRON, ZINC, COPPER 1 TABLET: 4000; 120; 400; 22; 1.84; 3; 20; 10; 1; 12; 200; 27; 25; 2 TABLET ORAL at 12:58

## 2019-09-27 RX ADMIN — SUCRALFATE 1 G: 1 TABLET ORAL at 05:46

## 2019-09-27 RX ADMIN — DOCUSATE SODIUM 100 MG: 100 CAPSULE, LIQUID FILLED ORAL at 20:59

## 2019-09-27 RX ADMIN — VILAZODONE HYDROCHLORIDE 40 MG: 40 TABLET ORAL at 20:59

## 2019-09-27 RX ADMIN — LITHIUM CARBONATE 300 MG: 300 CAPSULE ORAL at 12:58

## 2019-09-27 RX ADMIN — LITHIUM CARBONATE 600 MG: 300 CAPSULE ORAL at 20:59

## 2019-09-27 RX ADMIN — SODIUM CHLORIDE 250 ML: 9 INJECTION, SOLUTION INTRAVENOUS at 05:00

## 2019-09-27 RX ADMIN — Medication 1000 MCG: at 12:58

## 2019-09-27 RX ADMIN — LURASIDONE HYDROCHLORIDE 20 MG: 40 TABLET, FILM COATED ORAL at 20:59

## 2019-09-27 RX ADMIN — SUCRALFATE 1 G: 1 TABLET ORAL at 18:17

## 2019-09-27 RX ADMIN — SUCRALFATE 1 G: 1 TABLET ORAL at 20:59

## 2019-09-27 RX ADMIN — LAMOTRIGINE 200 MG: 100 TABLET ORAL at 12:59

## 2019-09-27 RX ADMIN — Medication 27.6 MILLICURIE: at 11:20

## 2019-09-27 RX ADMIN — Medication 10 ML: at 20:59

## 2019-09-27 RX ADMIN — FERROUS SULFATE TAB 325 MG (65 MG ELEMENTAL FE) 325 MG: 325 (65 FE) TAB at 12:58

## 2019-09-27 RX ADMIN — LEVOTHYROXINE SODIUM 100 MCG: 100 TABLET ORAL at 05:46

## 2019-09-27 RX ADMIN — PANTOPRAZOLE SODIUM 40 MG: 40 INJECTION, POWDER, FOR SOLUTION INTRAVENOUS at 18:17

## 2019-09-27 RX ADMIN — VITAMIN D, TAB 1000IU (100/BT) 2000 UNITS: 25 TAB at 20:59

## 2019-09-27 RX ADMIN — Medication 250 MG: at 20:59

## 2019-09-27 ASSESSMENT — PAIN SCALES - GENERAL
PAINLEVEL_OUTOF10: 0
PAINLEVEL_OUTOF10: 0

## 2019-09-28 VITALS
DIASTOLIC BLOOD PRESSURE: 56 MMHG | HEIGHT: 59 IN | RESPIRATION RATE: 18 BRPM | BODY MASS INDEX: 23.26 KG/M2 | OXYGEN SATURATION: 99 % | WEIGHT: 115.4 LBS | HEART RATE: 61 BPM | SYSTOLIC BLOOD PRESSURE: 123 MMHG | TEMPERATURE: 97.9 F

## 2019-09-28 LAB
HCT VFR BLD CALC: 25.5 % (ref 37–47)
HEMOGLOBIN: 8 GM/DL (ref 12–16)

## 2019-09-28 PROCEDURE — C9113 INJ PANTOPRAZOLE SODIUM, VIA: HCPCS | Performed by: NURSE PRACTITIONER

## 2019-09-28 PROCEDURE — 2580000003 HC RX 258: Performed by: INTERNAL MEDICINE

## 2019-09-28 PROCEDURE — 85014 HEMATOCRIT: CPT

## 2019-09-28 PROCEDURE — 85018 HEMOGLOBIN: CPT

## 2019-09-28 PROCEDURE — 99239 HOSP IP/OBS DSCHRG MGMT >30: CPT | Performed by: INTERNAL MEDICINE

## 2019-09-28 PROCEDURE — 36415 COLL VENOUS BLD VENIPUNCTURE: CPT

## 2019-09-28 PROCEDURE — 6360000002 HC RX W HCPCS: Performed by: NURSE PRACTITIONER

## 2019-09-28 PROCEDURE — 6370000000 HC RX 637 (ALT 250 FOR IP): Performed by: INTERNAL MEDICINE

## 2019-09-28 RX ORDER — OMEPRAZOLE 40 MG/1
40 CAPSULE, DELAYED RELEASE ORAL
Qty: 90 CAPSULE | Refills: 1 | Status: SHIPPED | OUTPATIENT
Start: 2019-09-28 | End: 2019-11-13 | Stop reason: ALTCHOICE

## 2019-09-28 RX ADMIN — POLYETHYLENE GLYCOL 3350 17 G: 17 POWDER, FOR SOLUTION ORAL at 09:02

## 2019-09-28 RX ADMIN — LITHIUM CARBONATE 300 MG: 300 CAPSULE ORAL at 09:03

## 2019-09-28 RX ADMIN — SUCRALFATE 1 G: 1 TABLET ORAL at 11:11

## 2019-09-28 RX ADMIN — FERROUS SULFATE TAB 325 MG (65 MG ELEMENTAL FE) 325 MG: 325 (65 FE) TAB at 09:03

## 2019-09-28 RX ADMIN — VITAMIN A, VITAMIN C, VITAMIN D-3, VITAMIN E, VITAMIN B-1, VITAMIN B-2, NIACIN, VITAMIN B-6, CALCIUM, IRON, ZINC, COPPER 1 TABLET: 4000; 120; 400; 22; 1.84; 3; 20; 10; 1; 12; 200; 27; 25; 2 TABLET ORAL at 09:02

## 2019-09-28 RX ADMIN — Medication 1000 MCG: at 09:03

## 2019-09-28 RX ADMIN — LAMOTRIGINE 200 MG: 100 TABLET ORAL at 09:02

## 2019-09-28 RX ADMIN — DOCUSATE SODIUM 100 MG: 100 CAPSULE, LIQUID FILLED ORAL at 09:02

## 2019-09-28 RX ADMIN — LEVOTHYROXINE SODIUM 100 MCG: 100 TABLET ORAL at 06:24

## 2019-09-28 RX ADMIN — Medication 10 ML: at 09:02

## 2019-09-28 RX ADMIN — SUCRALFATE 1 G: 1 TABLET ORAL at 06:24

## 2019-09-28 ASSESSMENT — PAIN SCALES - GENERAL: PAINLEVEL_OUTOF10: 0

## 2019-09-30 ENCOUNTER — NURSE ONLY (OUTPATIENT)
Dept: LAB | Age: 59
End: 2019-09-30

## 2019-09-30 ENCOUNTER — OFFICE VISIT (OUTPATIENT)
Dept: FAMILY MEDICINE CLINIC | Age: 59
End: 2019-09-30
Payer: MEDICARE

## 2019-09-30 VITALS
WEIGHT: 121 LBS | SYSTOLIC BLOOD PRESSURE: 108 MMHG | HEART RATE: 80 BPM | DIASTOLIC BLOOD PRESSURE: 64 MMHG | HEIGHT: 59 IN | OXYGEN SATURATION: 99 % | RESPIRATION RATE: 12 BRPM | BODY MASS INDEX: 24.39 KG/M2

## 2019-09-30 DIAGNOSIS — D62 ACUTE ON CHRONIC BLOOD LOSS ANEMIA: ICD-10-CM

## 2019-09-30 DIAGNOSIS — E55.9 VITAMIN D DEFICIENCY: ICD-10-CM

## 2019-09-30 DIAGNOSIS — E03.8 HYPOTHYROIDISM DUE TO HASHIMOTO'S THYROIDITIS: ICD-10-CM

## 2019-09-30 DIAGNOSIS — K92.2: ICD-10-CM

## 2019-09-30 DIAGNOSIS — D50.8 OTHER IRON DEFICIENCY ANEMIA: ICD-10-CM

## 2019-09-30 DIAGNOSIS — E06.3 HYPOTHYROIDISM DUE TO HASHIMOTO'S THYROIDITIS: ICD-10-CM

## 2019-09-30 DIAGNOSIS — K29.71 GASTROINTESTINAL HEMORRHAGE ASSOCIATED WITH GASTRITIS, UNSPECIFIED GASTRITIS TYPE: ICD-10-CM

## 2019-09-30 DIAGNOSIS — D50.0 CHRONIC BLOOD LOSS ANEMIA: Primary | ICD-10-CM

## 2019-09-30 LAB
ANISOCYTOSIS: PRESENT
ATYPICAL LYMPHOCYTES: ABNORMAL %
BASOPHILIA: ABNORMAL
BASOPHILS # BLD: 0 %
BASOPHILS ABSOLUTE: 0 THOU/MM3 (ref 0–0.1)
EOSINOPHIL # BLD: 1 %
EOSINOPHILS ABSOLUTE: 0.1 THOU/MM3 (ref 0–0.4)
ERYTHROCYTE [DISTWIDTH] IN BLOOD BY AUTOMATED COUNT: 18 % (ref 11.5–14.5)
ERYTHROCYTE [DISTWIDTH] IN BLOOD BY AUTOMATED COUNT: 67.7 FL (ref 35–45)
HCT VFR BLD CALC: 26.7 % (ref 37–47)
HEMOGLOBIN: 7.9 GM/DL (ref 12–16)
IMMATURE GRANS (ABS): 0.08 THOU/MM3 (ref 0–0.07)
IMMATURE GRANULOCYTES: 1.1 %
LYMPHOCYTES # BLD: 9.1 %
LYMPHOCYTES ABSOLUTE: 0.7 THOU/MM3 (ref 1–4.8)
MCH RBC QN AUTO: 32.2 PG (ref 26–33)
MCHC RBC AUTO-ENTMCNC: 29.6 GM/DL (ref 32.2–35.5)
MCV RBC AUTO: 109 FL (ref 81–99)
MONOCYTES # BLD: 8.1 %
MONOCYTES ABSOLUTE: 0.6 THOU/MM3 (ref 0.4–1.3)
NUCLEATED RED BLOOD CELLS: 1 /100 WBC
PLATELET # BLD: 211 THOU/MM3 (ref 130–400)
PLATELET ESTIMATE: ADEQUATE
PMV BLD AUTO: 10.4 FL (ref 9.4–12.4)
POIKILOCYTES: ABNORMAL
RBC # BLD: 2.45 MILL/MM3 (ref 4.2–5.4)
SCAN OF BLOOD SMEAR: NORMAL
SEG NEUTROPHILS: 81.8 %
SEGMENTED NEUTROPHILS ABSOLUTE COUNT: 5.9 THOU/MM3 (ref 1.8–7.7)
WBC # BLD: 7.2 THOU/MM3 (ref 4.8–10.8)

## 2019-09-30 PROCEDURE — 99496 TRANSJ CARE MGMT HIGH F2F 7D: CPT | Performed by: NURSE PRACTITIONER

## 2019-09-30 PROCEDURE — 1111F DSCHRG MED/CURRENT MED MERGE: CPT | Performed by: NURSE PRACTITIONER

## 2019-09-30 ASSESSMENT — ENCOUNTER SYMPTOMS
DIARRHEA: 0
NAUSEA: 0
SHORTNESS OF BREATH: 0
SORE THROAT: 0
TROUBLE SWALLOWING: 0
CONSTIPATION: 1
COUGH: 0
ABDOMINAL PAIN: 0

## 2019-09-30 NOTE — PROGRESS NOTES
[address] [facility] [address] [facility] [address]  Dept: [phone]  Dept Fax: [phone]  Loc: [phone]    Transition Care Office Visit    Date of Face-to-Face: 9/30/2019    Date of discharge: Was at ED on 9/26/2019 D/C on 9/28/2019  No symptoms since. No black tarry stools. Hgb 6.4. Got one unit of blood. Last Hgb was 8. Was given Omeprazole, Carafate, and Colace. Taking her meds. Will continue her iron supplement. Summaryreviewed: 9/30/201      See transitional care telephone note on: 9/27/2019      Persons at visit: self    Here for follow after hospitalization for:Recurrent UGIB   Any medication changes since post-hospitalization phone call? No    Any treatment changes since post-hospitalization phone call? No    Any further education needed on medications/treatment plan?  No    Past Medical History:   Diagnosis Date    Balance problem     Bipolar I disorder, most recent episode (or current) depressed, in partial or unspecified remission 5/8/2013    Cancer ([facility] [address]     CERVICAL     Depression     Fatigue     Gallstones     Gastric bypass status for obesity 2002    GERD (gastroesophageal reflux disease)     History of blood transfusion     History of hysterectomy     REMOVAL OF ONE OVARY     Hypothyroidism     MS (multiple sclerosis) ([facility])     Paresthesias     RT LOWER LIMB    UTI (urinary tract infection)        Current Medication List    Outpatient Encounter Medications as of 9/30/2019   Medication Sig Dispense Refill    omeprazole (PRILOSEC) 40 MG delayed release capsule Take 1 capsule by mouth every morning (before breakfast) 90 capsule 1    sucralfate (CARAFATE) 1 GM tablet Take 1 tablet by mouth 4 times daily 60 tablet 0    docusate sodium (COLACE, DULCOLAX) 100 MG CAPS Take 100 mg by mouth 2 times daily      polyethylene glycol (GLYCOLAX) packet Take 17 g by mouth daily 527 g 1    lamoTRIgine dilution from IVF. She received 1 unit of packed RBC, GI had no plans for repeat endoscopy, as the etiology of her bleed was known and the reason for rebleeding was non-compliance. Today she denies any black tarry stools. No further complaints. Review of Systems:     Review of Systems   Constitutional: Positive for fatigue. Negative for chills and fever. HENT: Negative for congestion, sore throat and trouble swallowing. Eyes: Negative for visual disturbance. Respiratory: Negative for cough and shortness of breath. Cardiovascular: Negative for chest pain and palpitations. Gastrointestinal: Positive for constipation. Negative for abdominal pain, diarrhea and nausea. Genitourinary: Negative for frequency and urgency. Musculoskeletal: Negative for arthralgias and joint swelling. Skin: Negative for rash. Neurological: Positive for headaches. Negative for light-headedness. Psychiatric/Behavioral: Negative for agitation. The patient is not nervous/anxious. Exam:   /64   Pulse 80   Resp 12   Ht 4' 11\" (1.499 m)   Wt 121 lb (54.9 kg)   SpO2 99%   BMI 24.44 kg/m²     Physical Exam   Constitutional: She is oriented to person, place, and time. She appears well-developed and well-nourished. No distress. HENT:   Head: Normocephalic and atraumatic. Eyes: Conjunctivae are normal. Right eye exhibits no discharge. Left eye exhibits no discharge. Neck: Normal range of motion. Neck supple. No tracheal deviation present. Pulmonary/Chest: Effort normal. No respiratory distress. Musculoskeletal: Normal range of motion. Neurological: She is alert and oriented to person, place, and time. Skin: Skin is warm and dry. She is not diaphoretic. Psychiatric: She has a normal mood and affect. Her behavior is normal. Judgment and thought content normal.   Nursing note and vitals reviewed.       Lab Results   Component Value Date    WBC 5.3 09/27/2019    HGB 8.0 (L) 09/28/2019    HCT

## 2019-09-30 NOTE — PATIENT INSTRUCTIONS
You may receive a survey about your visit with us today. The feedback from our patients helps us identify what is working well and where the service to all patients can be enhanced. Thank you! Patient Education        Iron Deficiency Anemia: Care Instructions  Your Care Instructions    Anemia means that you do not have enough red blood cells. Red blood cells carry oxygen around your body. When you have anemia, it can make you pale, weak, and tired. Many things can cause anemia. The most common cause is loss of blood. This can happen if you have heavy menstrual periods. It can also happen if you have bleeding in your stomach or bowel. You can also get anemia if you don't have enough iron in your diet or if it's hard for your body to absorb iron. In some cases, pregnancy causes anemia. That's because a pregnant woman needs more iron. Your doctor may do more tests to find the cause of your anemia. If a disease or other health problem is causing it, your doctor will treat that problem. It's important to follow up with your doctor to make sure that your iron level returns to normal.  Follow-up care is a key part of your treatment and safety. Be sure to make and go to all appointments, and call your doctor if you are having problems. It's also a good idea to know your test results and keep a list of the medicines you take. How can you care for yourself at home? · If your doctor recommended iron pills, take them as directed. ? Try to take the pills on an empty stomach. You can do this about 1 hour before or 2 hours after meals. But you may need to take iron with food to avoid an upset stomach. ? Do not take antacids or drink milk or anything with caffeine within 2 hours of when you take your iron. They can keep your body from absorbing the iron well. ? Vitamin C helps your body absorb iron.  You may want to take iron pills with a glass of orange juice or some other food high in vitamin C.  ? Iron pills may cause in the [facility] box to learn more about \"Iron Deficiency Anemia: Care Instructions. \"     If you do not have an account, please click on the \"Sign Up Now\" link. Current as of: March 28, 2019  Content Version: 12.1  © 7207-6599 Healthwise, [employer]. Care instructions adapted under license by [facility] ([facility]). If you have questions about a medical condition or this instruction, always ask your healthcare professional. [address] any warranty or liability for your use of this information. Patient Education        Iron-Rich Diet: Care Instructions  Your Care Instructions    Your body needs iron to make hemoglobin. Hemoglobin is a substance in red blood cells that carries oxygen from the lungs to cells all through your body. If you do not get enough iron, your body makes fewer and smaller red blood cells. As a result, your body's cells may not get enough oxygen. Adult men need 8 milligrams of iron a day; adult women need 18 milligrams of iron a day. After menopause, women need 8 milligrams of iron a day. A pregnant woman needs 27 milligrams of iron a day. Infants and young children have higher iron needs relative to their size than other age groups. People who have lost blood because of ulcers or heavy menstrual periods may become very low in iron and may develop anemia. Most people can get the iron their bodies need by eating enough of certain iron-rich foods. Your doctor may recommend that you take an iron supplement along with eating an iron-rich diet. Follow-up care is a key part of your treatment and safety. Be sure to make and go to all appointments, and call your doctor if you are having problems. It's also a good idea to know your test results and keep a list of the medicines you take. How can you care for yourself at home? · Make iron-rich foods a part of your daily diet. Iron-rich foods include:  ?  All meats, such as chicken, beef, lamb, pork, fish, and

## 2019-09-30 NOTE — TELEPHONE ENCOUNTER
[facility] Transitions Initial Follow Up Call    Outreach made within 2 business days of discharge: Yes    Patient: Nery Singh Patient : 1960   MRN: [id number]  Reason for Admission: There are no discharge diagnoses documented for the most recent discharge. Discharge Date: 19       Spoke with: Monico [place]    Discharge department/facility: home    TCM Interactive Patient Contact:  Was patient able to fill all prescriptions: Yes  Was patient instructed to bring all medications to the follow-up visit: Yes  Is patient taking all medications as directed in the discharge summary?  Yes  Does patient understand their discharge instructions: Yes  Does patient have questions or concerns that need addressed prior to 7-14 day follow up office visit: no    Scheduled appointment with PCP within 7-14 days    Follow Up  Future Appointments   Date Time Provider Rudi Brandon   2019  1:00 PM AJITH Capps CNP SRPX [facility]   10/23/2019  9:40 AM Reena Barros MD [address] [place] [address] [address] [place] [address]   2020  1:40 PM AJITH Sharpe CNP SRPARMANDO [address] ([address] [place] [address]

## 2019-10-02 ENCOUNTER — TELEPHONE (OUTPATIENT)
Dept: FAMILY MEDICINE CLINIC | Age: 59
End: 2019-10-02

## 2019-10-03 ENCOUNTER — OFFICE VISIT (OUTPATIENT)
Dept: FAMILY MEDICINE CLINIC | Age: 59
End: 2019-10-03
Payer: MEDICARE

## 2019-10-03 ENCOUNTER — NURSE ONLY (OUTPATIENT)
Dept: LAB | Age: 59
End: 2019-10-03

## 2019-10-03 VITALS
OXYGEN SATURATION: 98 % | TEMPERATURE: 98.7 F | WEIGHT: 123.6 LBS | BODY MASS INDEX: 25.94 KG/M2 | HEIGHT: 58 IN | SYSTOLIC BLOOD PRESSURE: 116 MMHG | DIASTOLIC BLOOD PRESSURE: 60 MMHG | HEART RATE: 62 BPM

## 2019-10-03 DIAGNOSIS — I42.8 NONISCHEMIC CARDIOMYOPATHY (HCC): ICD-10-CM

## 2019-10-03 DIAGNOSIS — K29.71 GASTROINTESTINAL HEMORRHAGE ASSOCIATED WITH GASTRITIS, UNSPECIFIED GASTRITIS TYPE: ICD-10-CM

## 2019-10-03 DIAGNOSIS — K92.2: Primary | ICD-10-CM

## 2019-10-03 DIAGNOSIS — D50.0 CHRONIC BLOOD LOSS ANEMIA: ICD-10-CM

## 2019-10-03 DIAGNOSIS — Z00.00 ROUTINE GENERAL MEDICAL EXAMINATION AT A HEALTH CARE FACILITY: ICD-10-CM

## 2019-10-03 LAB — HEMOGLOBIN: 8.5 GM/DL (ref 12–16)

## 2019-10-03 PROCEDURE — G0438 PPPS, INITIAL VISIT: HCPCS | Performed by: FAMILY MEDICINE

## 2019-10-03 ASSESSMENT — ENCOUNTER SYMPTOMS
TROUBLE SWALLOWING: 0
COUGH: 0
NAUSEA: 0
CONSTIPATION: 0
EYE PAIN: 0
VOMITING: 0
SHORTNESS OF BREATH: 0
DIARRHEA: 0
BLOOD IN STOOL: 0
ABDOMINAL PAIN: 0

## 2019-10-03 ASSESSMENT — PATIENT HEALTH QUESTIONNAIRE - PHQ9: SUM OF ALL RESPONSES TO PHQ QUESTIONS 1-9: 9

## 2019-10-03 ASSESSMENT — LIFESTYLE VARIABLES: HOW OFTEN DO YOU HAVE A DRINK CONTAINING ALCOHOL: 0

## 2019-10-09 ENCOUNTER — OFFICE VISIT (OUTPATIENT)
Dept: FAMILY MEDICINE CLINIC | Age: 59
End: 2019-10-09
Payer: MEDICARE

## 2019-10-09 ENCOUNTER — NURSE ONLY (OUTPATIENT)
Dept: LAB | Age: 59
End: 2019-10-09

## 2019-10-09 VITALS
WEIGHT: 122 LBS | HEART RATE: 64 BPM | OXYGEN SATURATION: 98 % | RESPIRATION RATE: 12 BRPM | SYSTOLIC BLOOD PRESSURE: 115 MMHG | DIASTOLIC BLOOD PRESSURE: 65 MMHG | HEIGHT: 59 IN | TEMPERATURE: 99.2 F | BODY MASS INDEX: 24.6 KG/M2

## 2019-10-09 DIAGNOSIS — K92.1 MELENA: ICD-10-CM

## 2019-10-09 DIAGNOSIS — F31.76 BIPOLAR DISORDER, IN FULL REMISSION, MOST RECENT EPISODE DEPRESSED (HCC): ICD-10-CM

## 2019-10-09 DIAGNOSIS — Z23 NEED FOR SHINGLES VACCINE: ICD-10-CM

## 2019-10-09 DIAGNOSIS — K92.2: Primary | ICD-10-CM

## 2019-10-09 DIAGNOSIS — D50.0 CHRONIC BLOOD LOSS ANEMIA: ICD-10-CM

## 2019-10-09 DIAGNOSIS — Z95.0 STATUS POST PLACEMENT OF CARDIAC PACEMAKER: ICD-10-CM

## 2019-10-09 DIAGNOSIS — D64.9 FATIGUE ASSOCIATED WITH ANEMIA: ICD-10-CM

## 2019-10-09 DIAGNOSIS — Z98.84 GASTRIC BYPASS STATUS FOR OBESITY: ICD-10-CM

## 2019-10-09 DIAGNOSIS — K29.71 GASTROINTESTINAL HEMORRHAGE ASSOCIATED WITH GASTRITIS, UNSPECIFIED GASTRITIS TYPE: ICD-10-CM

## 2019-10-09 DIAGNOSIS — D50.8 OTHER IRON DEFICIENCY ANEMIA: ICD-10-CM

## 2019-10-09 DIAGNOSIS — D53.9 ANEMIA, MACROCYTIC: ICD-10-CM

## 2019-10-09 LAB — HEMOGLOBIN: 8.8 GM/DL (ref 12–16)

## 2019-10-09 PROCEDURE — 99214 OFFICE O/P EST MOD 30 MIN: CPT | Performed by: STUDENT IN AN ORGANIZED HEALTH CARE EDUCATION/TRAINING PROGRAM

## 2019-10-09 ASSESSMENT — ENCOUNTER SYMPTOMS
SHORTNESS OF BREATH: 0
CONSTIPATION: 0
EYE PAIN: 0
ABDOMINAL PAIN: 0
BLOOD IN STOOL: 0
TROUBLE SWALLOWING: 0
DIARRHEA: 0
SORE THROAT: 0
NAUSEA: 0
COUGH: 0
RHINORRHEA: 0
VOMITING: 0

## 2019-10-22 ENCOUNTER — NURSE ONLY (OUTPATIENT)
Dept: LAB | Age: 59
End: 2019-10-22

## 2019-10-22 DIAGNOSIS — D50.0 CHRONIC BLOOD LOSS ANEMIA: ICD-10-CM

## 2019-10-22 LAB — HEMOGLOBIN: 9.3 GM/DL (ref 12–16)

## 2019-11-01 ENCOUNTER — TELEPHONE (OUTPATIENT)
Dept: FAMILY MEDICINE CLINIC | Age: 59
End: 2019-11-01

## 2019-11-03 DIAGNOSIS — J34.89 RHINORRHEA: ICD-10-CM

## 2019-11-05 ENCOUNTER — OFFICE VISIT (OUTPATIENT)
Dept: PSYCHIATRY | Age: 59
End: 2019-11-05
Payer: MEDICARE

## 2019-11-05 DIAGNOSIS — F31.9 BIPOLAR 1 DISORDER (HCC): Primary | ICD-10-CM

## 2019-11-05 PROCEDURE — 1036F TOBACCO NON-USER: CPT | Performed by: PSYCHIATRY & NEUROLOGY

## 2019-11-05 PROCEDURE — G8428 CUR MEDS NOT DOCUMENT: HCPCS | Performed by: PSYCHIATRY & NEUROLOGY

## 2019-11-05 PROCEDURE — G8420 CALC BMI NORM PARAMETERS: HCPCS | Performed by: PSYCHIATRY & NEUROLOGY

## 2019-11-05 PROCEDURE — 99213 OFFICE O/P EST LOW 20 MIN: CPT | Performed by: PSYCHIATRY & NEUROLOGY

## 2019-11-05 PROCEDURE — G8482 FLU IMMUNIZE ORDER/ADMIN: HCPCS | Performed by: PSYCHIATRY & NEUROLOGY

## 2019-11-05 PROCEDURE — 3017F COLORECTAL CA SCREEN DOC REV: CPT | Performed by: PSYCHIATRY & NEUROLOGY

## 2019-11-07 RX ORDER — FLUTICASONE PROPIONATE 50 MCG
SPRAY, SUSPENSION (ML) NASAL
Qty: 32 G | Refills: 1 | Status: SHIPPED | OUTPATIENT
Start: 2019-11-07 | End: 2019-11-13 | Stop reason: ALTCHOICE

## 2019-11-08 ENCOUNTER — NURSE ONLY (OUTPATIENT)
Dept: LAB | Age: 59
End: 2019-11-08

## 2019-11-08 DIAGNOSIS — D50.0 CHRONIC BLOOD LOSS ANEMIA: ICD-10-CM

## 2019-11-08 LAB — HEMOGLOBIN: 9.6 GM/DL (ref 12–16)

## 2019-11-13 ENCOUNTER — OFFICE VISIT (OUTPATIENT)
Dept: FAMILY MEDICINE CLINIC | Age: 59
End: 2019-11-13
Payer: MEDICARE

## 2019-11-13 ENCOUNTER — NURSE ONLY (OUTPATIENT)
Dept: LAB | Age: 59
End: 2019-11-13

## 2019-11-13 VITALS
DIASTOLIC BLOOD PRESSURE: 68 MMHG | WEIGHT: 124 LBS | HEART RATE: 64 BPM | RESPIRATION RATE: 16 BRPM | HEIGHT: 58 IN | TEMPERATURE: 98.6 F | SYSTOLIC BLOOD PRESSURE: 124 MMHG | BODY MASS INDEX: 26.03 KG/M2

## 2019-11-13 DIAGNOSIS — E03.1 CONGENITAL HYPOTHYROIDISM WITHOUT GOITER: ICD-10-CM

## 2019-11-13 DIAGNOSIS — Z95.0 STATUS POST PLACEMENT OF CARDIAC PACEMAKER: ICD-10-CM

## 2019-11-13 DIAGNOSIS — Z13.1 SCREENING FOR DIABETES MELLITUS: ICD-10-CM

## 2019-11-13 DIAGNOSIS — R79.9 ABNORMAL FINDING OF BLOOD CHEMISTRY, UNSPECIFIED: ICD-10-CM

## 2019-11-13 DIAGNOSIS — K92.2: Primary | ICD-10-CM

## 2019-11-13 DIAGNOSIS — F31.9 BIPOLAR 1 DISORDER (HCC): ICD-10-CM

## 2019-11-13 DIAGNOSIS — Z98.84 HISTORY OF ROUX-EN-Y GASTRIC BYPASS: ICD-10-CM

## 2019-11-13 DIAGNOSIS — D50.0 CHRONIC BLOOD LOSS ANEMIA: ICD-10-CM

## 2019-11-13 DIAGNOSIS — G35 MS (MULTIPLE SCLEROSIS) (HCC): ICD-10-CM

## 2019-11-13 PROBLEM — F31.76 BIPOLAR DISORDER, IN FULL REMISSION, MOST RECENT EPISODE DEPRESSED (HCC): Status: RESOLVED | Noted: 2018-03-15 | Resolved: 2019-11-13

## 2019-11-13 PROBLEM — Z86.79 H/O CLASS II ANGINA PECTORIS: Status: RESOLVED | Noted: 2017-04-25 | Resolved: 2019-11-13

## 2019-11-13 PROBLEM — R53.1 GENERALIZED WEAKNESS: Status: RESOLVED | Noted: 2018-10-01 | Resolved: 2019-11-13

## 2019-11-13 PROBLEM — R42 DIZZINESS: Status: RESOLVED | Noted: 2018-10-01 | Resolved: 2019-11-13

## 2019-11-13 LAB
AVERAGE GLUCOSE: 81 MG/DL (ref 70–126)
HBA1C MFR BLD: 4.7 % (ref 4.4–6.4)
HEMOGLOBIN: 9.5 GM/DL (ref 12–16)

## 2019-11-13 PROCEDURE — 3017F COLORECTAL CA SCREEN DOC REV: CPT | Performed by: STUDENT IN AN ORGANIZED HEALTH CARE EDUCATION/TRAINING PROGRAM

## 2019-11-13 PROCEDURE — 99214 OFFICE O/P EST MOD 30 MIN: CPT | Performed by: STUDENT IN AN ORGANIZED HEALTH CARE EDUCATION/TRAINING PROGRAM

## 2019-11-13 PROCEDURE — G8427 DOCREV CUR MEDS BY ELIG CLIN: HCPCS | Performed by: STUDENT IN AN ORGANIZED HEALTH CARE EDUCATION/TRAINING PROGRAM

## 2019-11-13 PROCEDURE — 1036F TOBACCO NON-USER: CPT | Performed by: STUDENT IN AN ORGANIZED HEALTH CARE EDUCATION/TRAINING PROGRAM

## 2019-11-13 PROCEDURE — G8419 CALC BMI OUT NRM PARAM NOF/U: HCPCS | Performed by: STUDENT IN AN ORGANIZED HEALTH CARE EDUCATION/TRAINING PROGRAM

## 2019-11-13 PROCEDURE — G8482 FLU IMMUNIZE ORDER/ADMIN: HCPCS | Performed by: STUDENT IN AN ORGANIZED HEALTH CARE EDUCATION/TRAINING PROGRAM

## 2019-11-13 RX ORDER — POLYETHYLENE GLYCOL 3350 17 G/17G
17 POWDER, FOR SOLUTION ORAL DAILY
COMMUNITY
End: 2021-06-10

## 2019-11-13 RX ORDER — PANTOPRAZOLE SODIUM 40 MG/1
40 GRANULE, DELAYED RELEASE ORAL
COMMUNITY
End: 2019-12-03 | Stop reason: SDUPTHER

## 2019-11-13 ASSESSMENT — ENCOUNTER SYMPTOMS
ABDOMINAL PAIN: 0
EYE PAIN: 0
DIARRHEA: 0
NAUSEA: 0
RHINORRHEA: 0
SORE THROAT: 0
TROUBLE SWALLOWING: 0
COUGH: 0
SHORTNESS OF BREATH: 0
CONSTIPATION: 0
VOMITING: 0

## 2019-11-22 ENCOUNTER — NURSE ONLY (OUTPATIENT)
Dept: LAB | Age: 59
End: 2019-11-22

## 2019-11-22 LAB — HEMOGLOBIN: 10.1 GM/DL (ref 12–16)

## 2019-12-03 RX ORDER — PANTOPRAZOLE SODIUM 40 MG/1
40 GRANULE, DELAYED RELEASE ORAL
Qty: 60 PACKET | Refills: 5 | Status: SHIPPED | OUTPATIENT
Start: 2019-12-03 | End: 2020-01-27 | Stop reason: SDUPTHER

## 2019-12-10 ENCOUNTER — TELEPHONE (OUTPATIENT)
Dept: FAMILY MEDICINE CLINIC | Age: 59
End: 2019-12-10

## 2019-12-30 RX ORDER — LEVOTHYROXINE SODIUM 0.1 MG/1
TABLET ORAL
Qty: 90 TABLET | Refills: 1 | Status: SHIPPED | OUTPATIENT
Start: 2019-12-30 | End: 2020-01-09

## 2020-01-09 RX ORDER — LEVOTHYROXINE SODIUM 0.1 MG/1
TABLET ORAL
Qty: 90 TABLET | Refills: 1 | Status: SHIPPED | OUTPATIENT
Start: 2020-01-09 | End: 2020-04-09 | Stop reason: SDUPTHER

## 2020-01-15 ENCOUNTER — HOSPITAL ENCOUNTER (OUTPATIENT)
Dept: WOMENS IMAGING | Age: 60
Discharge: HOME | End: 2020-01-15
Payer: MEDICARE

## 2020-01-15 PROCEDURE — 77063 BREAST TOMOSYNTHESIS BI: CPT

## 2020-01-27 RX ORDER — PANTOPRAZOLE SODIUM 40 MG/1
40 GRANULE, DELAYED RELEASE ORAL
Qty: 60 PACKET | Refills: 5 | Status: SHIPPED | OUTPATIENT
Start: 2020-01-27 | End: 2020-02-04

## 2020-02-04 ENCOUNTER — TELEPHONE (OUTPATIENT)
Dept: FAMILY MEDICINE CLINIC | Age: 60
End: 2020-02-04

## 2020-02-04 RX ORDER — PANTOPRAZOLE SODIUM 40 MG/1
40 TABLET, DELAYED RELEASE ORAL
Qty: 30 TABLET | Refills: 5 | Status: SHIPPED | OUTPATIENT
Start: 2020-02-04 | End: 2020-05-20

## 2020-02-11 ENCOUNTER — NURSE ONLY (OUTPATIENT)
Dept: LAB | Age: 60
End: 2020-02-11

## 2020-02-11 LAB
ERYTHROCYTE [DISTWIDTH] IN BLOOD BY AUTOMATED COUNT: 17.9 % (ref 11.5–14.5)
ERYTHROCYTE [DISTWIDTH] IN BLOOD BY AUTOMATED COUNT: 65 FL (ref 35–45)
HCT VFR BLD CALC: 38.8 % (ref 37–47)
HEMOGLOBIN: 11.9 GM/DL (ref 12–16)
MCH RBC QN AUTO: 30.2 PG (ref 26–33)
MCHC RBC AUTO-ENTMCNC: 30.7 GM/DL (ref 32.2–35.5)
MCV RBC AUTO: 98.5 FL (ref 81–99)
PLATELET # BLD: 209 THOU/MM3 (ref 130–400)
PMV BLD AUTO: 10.6 FL (ref 9.4–12.4)
RBC # BLD: 3.94 MILL/MM3 (ref 4.2–5.4)
WBC # BLD: 6.1 THOU/MM3 (ref 4.8–10.8)

## 2020-02-12 ENCOUNTER — TELEPHONE (OUTPATIENT)
Dept: FAMILY MEDICINE CLINIC | Age: 60
End: 2020-02-12

## 2020-02-13 NOTE — TELEPHONE ENCOUNTER
Can you ask her to come in to see us first?  I went back a year to find the diagnosis but could not find anything related to the nose bleeds ans dripping. We just have to have the justification for the referral jovon note. They like us to try something first - but we can do both the referral and the possible first line treatment at the visit!

## 2020-02-26 ENCOUNTER — OFFICE VISIT (OUTPATIENT)
Dept: FAMILY MEDICINE CLINIC | Age: 60
End: 2020-02-26
Payer: MEDICARE

## 2020-02-26 VITALS
SYSTOLIC BLOOD PRESSURE: 110 MMHG | BODY MASS INDEX: 24.11 KG/M2 | TEMPERATURE: 98.1 F | OXYGEN SATURATION: 98 % | HEIGHT: 59 IN | WEIGHT: 119.6 LBS | DIASTOLIC BLOOD PRESSURE: 64 MMHG | HEART RATE: 76 BPM

## 2020-02-26 PROBLEM — K92.1 MELENA: Status: ACTIVE | Noted: 2018-10-10

## 2020-02-26 PROCEDURE — G8482 FLU IMMUNIZE ORDER/ADMIN: HCPCS | Performed by: FAMILY MEDICINE

## 2020-02-26 PROCEDURE — 3017F COLORECTAL CA SCREEN DOC REV: CPT | Performed by: FAMILY MEDICINE

## 2020-02-26 PROCEDURE — 1036F TOBACCO NON-USER: CPT | Performed by: FAMILY MEDICINE

## 2020-02-26 PROCEDURE — G8420 CALC BMI NORM PARAMETERS: HCPCS | Performed by: FAMILY MEDICINE

## 2020-02-26 PROCEDURE — 99213 OFFICE O/P EST LOW 20 MIN: CPT | Performed by: FAMILY MEDICINE

## 2020-02-26 PROCEDURE — G8427 DOCREV CUR MEDS BY ELIG CLIN: HCPCS | Performed by: FAMILY MEDICINE

## 2020-02-26 RX ORDER — IPRATROPIUM BROMIDE 21 UG/1
2 SPRAY, METERED NASAL 3 TIMES DAILY
Qty: 1 BOTTLE | Refills: 3 | Status: SHIPPED | OUTPATIENT
Start: 2020-02-26 | End: 2020-06-15

## 2020-02-26 RX ORDER — FLUTICASONE PROPIONATE 50 MCG
2 SPRAY, SUSPENSION (ML) NASAL DAILY
Qty: 3 BOTTLE | Refills: 1 | Status: SHIPPED | OUTPATIENT
Start: 2020-02-26 | End: 2020-12-10 | Stop reason: ALTCHOICE

## 2020-02-26 RX ORDER — LORATADINE 10 MG/1
10 TABLET ORAL DAILY
Qty: 90 TABLET | Refills: 1 | Status: SHIPPED | OUTPATIENT
Start: 2020-02-26

## 2020-02-26 SDOH — ECONOMIC STABILITY: TRANSPORTATION INSECURITY
IN THE PAST 12 MONTHS, HAS THE LACK OF TRANSPORTATION KEPT YOU FROM MEDICAL APPOINTMENTS OR FROM GETTING MEDICATIONS?: NO

## 2020-02-26 SDOH — ECONOMIC STABILITY: FOOD INSECURITY: WITHIN THE PAST 12 MONTHS, YOU WORRIED THAT YOUR FOOD WOULD RUN OUT BEFORE YOU GOT MONEY TO BUY MORE.: NEVER TRUE

## 2020-02-26 SDOH — ECONOMIC STABILITY: FOOD INSECURITY: WITHIN THE PAST 12 MONTHS, THE FOOD YOU BOUGHT JUST DIDN'T LAST AND YOU DIDN'T HAVE MONEY TO GET MORE.: NEVER TRUE

## 2020-02-26 SDOH — ECONOMIC STABILITY: INCOME INSECURITY: HOW HARD IS IT FOR YOU TO PAY FOR THE VERY BASICS LIKE FOOD, HOUSING, MEDICAL CARE, AND HEATING?: NOT HARD AT ALL

## 2020-02-26 SDOH — ECONOMIC STABILITY: TRANSPORTATION INSECURITY
IN THE PAST 12 MONTHS, HAS LACK OF TRANSPORTATION KEPT YOU FROM MEETINGS, WORK, OR FROM GETTING THINGS NEEDED FOR DAILY LIVING?: NO

## 2020-02-26 ASSESSMENT — ENCOUNTER SYMPTOMS
TROUBLE SWALLOWING: 0
RHINORRHEA: 1
CONSTIPATION: 0
VOMITING: 0
EYE PAIN: 0
SINUS PRESSURE: 0
COUGH: 0
SORE THROAT: 0
DIARRHEA: 0
VOICE CHANGE: 0
NAUSEA: 0
SINUS PAIN: 0
SHORTNESS OF BREATH: 0
ABDOMINAL PAIN: 0

## 2020-02-26 NOTE — PROGRESS NOTES
pm) 180 tablet 3    lurasidone (LATUDA) 40 MG TABS tablet Take 20 mg by mouth nightly       Cholecalciferol (VITAMIN D) 2000 units CAPS capsule Take 2,000 Units by mouth nightly       Magnesium Oxide 250 MG TABS Take 250 mg by mouth nightly       calcium carbonate 600 MG TABS tablet Take 1 tablet by mouth nightly       vitamin B-12 1000 MCG tablet Take 1 tablet by mouth daily 30 tablet 5    dimethyl fumarate (TECFIDERA) 240 MG delayed release capsule Take 240 mg by mouth 2 times daily      nitroGLYCERIN (NITROSTAT) 0.4 MG SL tablet up to max of 3 total doses. If no relief after 1 dose, call 911. 25 tablet 3    Prenatal MV-Min-Fe Fum-FA-DHA (PRENATAL 1 PO) Take 1 tablet by mouth daily       ferrous sulfate 325 (65 FE) MG tablet Take 325 mg by mouth daily (with breakfast)      polyethylene glycol (GLYCOLAX) powder Take 17 g by mouth daily      lithium 300 MG tablet Take 1 tablet by mouth every morning (Patient not taking: Reported on 2/26/2020) 90 tablet 3     No current facility-administered medications for this visit. Review of Systems   Completed per Dr. Shiraz Elkins    OBJECTIVE     /64   Pulse 76   Temp 98.1 °F (36.7 °C) (Oral)   Ht 4' 11\" (1.499 m)   Wt 119 lb 9.6 oz (54.3 kg)   SpO2 98%   BMI 24.16 kg/m²   BP Readings from Last 3 Encounters:   02/26/20 110/64   11/13/19 124/68   10/09/19 115/65       Wt Readings from Last 3 Encounters:   02/26/20 119 lb 9.6 oz (54.3 kg)   11/13/19 124 lb (56.2 kg)   10/09/19 122 lb (55.3 kg)     Body mass index is 24.16 kg/m².         Lab Results   Component Value Date    LABA1C 4.7 11/13/2019       Lab Results   Component Value Date    CHOL 130 04/10/2019    TRIG 36 04/10/2019    HDL 96 04/10/2019    LDLCALC 27 04/10/2019       The 10-year ASCVD risk score (Ayanna Mcintosh, et al., 2013) is: 1%    Values used to calculate the score:      Age: 61 years      Sex: Female      Is Non- : No      Diabetic: No      Tobacco smoker: No cancer screen colonoscopy  11/06/2029    Flu vaccine  Completed    Shingles Vaccine  Completed    Pneumococcal 0-64 years Vaccine  Completed    Hepatitis C screen  Completed    HIV screen  Completed    Hepatitis A vaccine  Aged Out    Hepatitis B vaccine  Aged Out    Hib vaccine  Aged Out    Meningococcal (ACWY) vaccine  Aged Out           Future Appointments   Date Time Provider Rudi Roma   3/4/2020  1:20 PM Edith Corbin MD SRPX PSYCHIA [facility] [email or web address]   4/9/2020  3:40 PM Precious Marian Kamara, APRN - CNP SRPX FM RES [facility] [email or web address]       ASSESSMENT       Diagnosis Orders   1. Rhinorrhea  loratadine (CLARITIN) 10 MG tablet    fluticasone (FLONASE) 50 MCG/ACT nasal spray    ipratropium (ATROVENT) 0.03 % nasal spray       PLAN      Consider PAP/ PELVIC at future appt. Given persistence of rhinorrhea, will have pt trial meds as listed below at current time:   Atrovent Nasal Spray- 0.03%- 2 sprays each nostril 3x/day at this time  Flonase Nasal Spray- 2 sprays/ nostril daily in AM  Claritin  10 mg- 1 pill daily     FLP and TSH/ FREE T4 in 8/2020  Continue current meds otherwise  Follow up in 6 weeks for re-eval.            Attending Physician Statement  I have discussed the case, including pertinent history and exam findings with the resident. I agree with the documented assessment and plan as documented by the resident.   GE modifier added to this encounter     Electronically signed by Vicenta Manley MD on 2/26/2020 at 8:58 PM

## 2020-02-26 NOTE — PATIENT INSTRUCTIONS
Thank you   1. Thank you for trusting us with your healthcare needs. You may receive a survey regarding today's visit. It would help us out if you would take a few moments to provide your feedback. We value your input. 2. Please bring in ALL medications BOTTLES, including inhalers, herbal supplements, over the counter, prescribed & non-prescribed medicine. The office would like actual medication bottles and a list.   3. Please note our OFFICE POLICIES:   a. Prior to getting your labs drawn, please check with your insurance company for benefits and eligibility of lab services. Often, insurance companies cover certain tests for preventative visits only. It is patient's responsibility to see what is covered. b. We are unable to change a diagnosis after the test has been performed. c. Lab orders will not be re-printed. Please hold onto your original lab orders and take them to your lab to be completed. d. If you no show your scheduled appointment three times, you will be dismissed from this practice. e. Reardan Ode must be completed 24 hours prior to your schedule appointment. 4. If the list below has been completed, PLEASE FAX RECORDS TO OUR OFFICE @ [phone]. Once the records have been received we will update your records at our office: There are no preventive care reminders to display for this patient. Patient Education        Stopping Smoking: Care Instructions  Your Care Instructions  Cigarette smokers crave the nicotine in cigarettes. Giving it up is much harder than simply changing a habit. Your body has to stop craving the nicotine. It is hard to quit, but you can do it. There are many tools that people use to quit smoking. You may find that combining tools works best for you. There are several steps to quitting. First you get ready to quit. Then you get support to help you. After that, you learn new skills and behaviors to become a nonsmoker.  For many people, a necessary step is about ways you can avoid those things that make you reach for a cigarette. ? Avoid situations that put you at greatest risk for smoking. For some people, it is hard to have a drink with friends without smoking. For others, they might skip a coffee break with coworkers who smoke. ? Change your daily routine. Take a different route to work or eat a meal in a different place. · Cut down on stress. Calm yourself or release tension by doing an activity you enjoy, such as reading a book, taking a hot bath, or gardening. · Talk to your doctor or pharmacist about nicotine replacement therapy, which replaces the nicotine in your body. You still get nicotine but you do not use tobacco. Nicotine replacement products help you slowly reduce the amount of nicotine you need. These products come in several forms, many of them available over-the-counter:  ? Nicotine patches  ? Nicotine gum and lozenges  ? Nicotine inhaler  · Ask your doctor about bupropion (Wellbutrin) or varenicline (Chantix), which are prescription medicines. They do not contain nicotine. They help you by reducing withdrawal symptoms, such as stress and anxiety. · Some people find hypnosis, acupuncture, and massage helpful for ending the smoking habit. · Eat a healthy diet and get regular exercise. Having healthy habits will help your body move past its craving for nicotine. · Be prepared to keep trying. Most people are not successful the first few times they try to quit. Do not get mad at yourself if you smoke again. Make a list of things you learned and think about when you want to try again, such as next week, next month, or next year. Where can you learn more? Go to https://yakov.[employer]. org and sign in to your [employer] account. Enter T636 in the [employer] box to learn more about \"Stopping Smoking: Care Instructions. \"     If you do not have an account, please click on the \"Sign Up Now\" link.   Current as of: July 4, 2019  Content Version: 12.3  © 3353-2554 Healthwise, [employer]. Care instructions adapted under license by [facility] ([facility]). If you have questions about a medical condition or this instruction, always ask your healthcare professional. [address] any warranty or liability for your use of this information.

## 2020-02-26 NOTE — PROGRESS NOTES
Wil Vieyra is a 61 y.o. female who presents today for:  Chief Complaint   Patient presents with    Hypothyroidism     recheck    Nasal Congestion     dripping Dr Miryam Mcintosh wanted it checked       Goals      Exercise 3x per week (30 min per time)           HPI:   HPI  Nose has been dripping for 6 months. Worse in the morning, sometimes bad all day long. Getting spontaneous nosebleeds as well. Vaseline and sea salt spray helps with the nose bleed, nothing for the runny nose. Clear discharge when she blows her nose. Was given atrovent by Dr. Curt White. Was taking it twice a day, no change at all. Embarrassed when this happens with the patients. No breathing problems. No asthma or COPD. No allergies. No fevers or chills, no cough. No pain. Chronic conditions  GI bleed 9/2019  S/p Bypass- meds/vitamins  Hypothyroid- synthroid  Psych- Dr. Dominguez Vincent, stable drugs and doses. Sees him every 3 months   MS- See Dr. Ayanna Mcqueen. Tecfidera   Bradycardia- Paced by pacemaker  Volunteers at hospital    No question data found.     Health Maintenance reviewed -   Health Maintenance   Topic Date Due    TSH testing  08/23/2020    Annual Wellness Visit (AWV)  10/03/2020    Breast cancer screen  01/15/2022    Lipid screen  04/10/2024    DTaP/Tdap/Td vaccine (4 - Td) 10/25/2028    Colon cancer screen colonoscopy  11/06/2029    Flu vaccine  Completed    Shingles Vaccine  Completed    Pneumococcal 0-64 years Vaccine  Completed    Hepatitis C screen  Completed    HIV screen  Completed    Hepatitis A vaccine  Aged Out    Hepatitis B vaccine  Aged Out    Hib vaccine  Aged Out    Meningococcal (ACWY) vaccine  Aged Out       Past Medical History:   Diagnosis Date    Balance problem     Bipolar I disorder, most recent episode (or current) depressed, in partial or unspecified remission 5/8/2013    Cancer ([facility] [address]     CERVICAL     Depression     Fatigue     Gallstones     Gastric bypass status for obesity 2002    GERD (gastroesophageal reflux disease)     History of blood transfusion     History of hysterectomy     REMOVAL OF ONE OVARY     Hypothyroidism     MS (multiple sclerosis) (HCC)     Paresthesias     RT LOWER LIMB    UTI (urinary tract infection)       Past Surgical History:   Procedure Laterality Date    APPENDECTOMY      BONE GRAFT Left     left leg to right arm    CARPAL TUNNEL RELEASE      CHOLECYSTECTOMY, LAPAROSCOPIC  2016    COLONOSCOPY      DILATATION, ESOPHAGUS      ENDOSCOPY, COLON, DIAGNOSTIC      ENTEROSCOPY N/A 2018    ENTEROSCOPY performed by Nancy Zambrano MD at [facility]  2016    OTHER SURGICAL HISTORY      RECTAL FISSULRE    OTHER SURGICAL HISTORY      D&C    OTHER SURGICAL HISTORY  2006    GI BLEED    PACEMAKER INSERTION Right 2019    MD COLONOSCOPY FLX DX W/COLLJ SPEC WHEN PFRMD Left 2018    COLONOSCOPY performed by Nancy [facility]brano MD at [facility]    MD OFFICE/OUTPT VISIT,PROCEDURE ONLY Left 2018    EGD ESOPHAGOGASTRODUODENOSCOPY performed by Lakesha Bhat MD at [facility] OFFICE/OUTPT VISIT,PROCEDURE ONLY N/A 10/4/2018    EGD DIAGNOSTIC ONLY performed by Alex Ayala MD [address]    UPPER GASTROINTESTINAL ENDOSCOPY Left 2019    EGD DIAGNOSTIC ONLY performed by Aravind Longo MD at [facility]     Family History   Problem Relation Age of Onset    Cancer Mother     Colon Polyps Mother     Heart Disease Father     Glaucoma Father     Cancer Sister     Cancer Sister      Social History     Tobacco Use    Smoking status: Former Smoker     Packs/day: 0.10     Years: 0.50     Pack years: 0.05     Types: Cigarettes     Last attempt to quit: 10/24/1977     Years since quittin.3    Smokeless tobacco: Never Used   Substance Use Topics    Alcohol use: No      Current Outpatient Medications   Medication Sig Dispense Refill    loratadine (CLARITIN) 10 MG tablet Take 1 tablet by mouth daily 90 tablet 1    fluticasone (FLONASE) 50 MCG/ACT nasal spray 2 sprays by Each Nostril route daily 3 Bottle 1    ipratropium (ATROVENT) 0.03 % nasal spray 2 sprays by Nasal route 3 times daily 1 Bottle 3    pantoprazole (PROTONIX) 40 MG tablet Take 1 tablet by mouth every morning (before breakfast) 30 tablet 5    levothyroxine (SYNTHROID) 100 MCG tablet TAKE 1 TABLET BY MOUTH ONE TIME A DAY 90 tablet 1    VILAZODONE HCL 40 MG Tablet TAKE 1 TABLET BY MOUTH ONE TIME A DAY 90 tablet 1    lamoTRIgine (LAMICTAL) 200 MG tablet TAKE 1 TABLET BY MOUTH ONE TIME A DAY 30 tablet 5    lithium 300 MG tablet Take 2 tablets by mouth nightly (Patient taking differently: Take 600 mg by mouth nightly Take one pill in am and 2 pills in pm) 180 tablet 3    lurasidone (LATUDA) 40 MG TABS tablet Take 20 mg by mouth nightly       Cholecalciferol (VITAMIN D) 2000 units CAPS capsule Take 2,000 Units by mouth nightly       Magnesium Oxide 250 MG TABS Take 250 mg by mouth nightly       calcium carbonate 600 MG TABS tablet Take 1 tablet by mouth nightly       vitamin B-12 1000 MCG tablet Take 1 tablet by mouth daily 30 tablet 5    dimethyl fumarate (TECFIDERA) 240 MG delayed release capsule Take 240 mg by mouth 2 times daily      nitroGLYCERIN (NITROSTAT) 0.4 MG SL tablet up to max of 3 total doses. If no relief after 1 dose, call 911. 25 tablet 3    Prenatal MV-Min-Fe Fum-FA-DHA (PRENATAL 1 PO) Take 1 tablet by mouth daily       ferrous sulfate 325 (65 FE) MG tablet Take 325 mg by mouth daily (with breakfast)      polyethylene glycol (GLYCOLAX) powder Take 17 g by mouth daily      lithium 300 MG tablet Take 1 tablet by mouth every morning (Patient not taking: Reported on 2/26/2020) 90 tablet 3     No current facility-administered medications for this visit.       Allergies   Allergen Reactions    Latex Rash    Sulfa Antibiotics Rash     Health Maintenance   Topic Date Due    TSH testing  08/23/2020    Annual Wellness Visit (AWV)  10/03/2020    Breast cancer screen  01/15/2022    Lipid screen  04/10/2024    DTaP/Tdap/Td vaccine (4 - Td) 10/25/2028    Colon cancer screen colonoscopy  11/06/2029    Flu vaccine  Completed    Shingles Vaccine  Completed    Pneumococcal 0-64 years Vaccine  Completed    Hepatitis C screen  Completed    HIV screen  Completed    Hepatitis A vaccine  Aged Out    Hepatitis B vaccine  Aged Out    Hib vaccine  Aged Out    Meningococcal (ACWY) vaccine  Aged Out     Subjective:   Review of Systems   Constitutional: Negative for chills, fatigue and fever. HENT: Positive for nosebleeds, postnasal drip, rhinorrhea and sneezing. Negative for congestion, ear discharge, ear pain, sinus pressure, sinus pain, sore throat, trouble swallowing and voice change. Eyes: Negative for pain and visual disturbance. Respiratory: Negative for cough and shortness of breath. Cardiovascular: Negative for chest pain and palpitations. Gastrointestinal: Negative for abdominal pain, constipation, diarrhea, nausea and vomiting. Genitourinary: Negative for dysuria and urgency. Skin: Negative for rash and wound. Neurological: Negative for numbness and headaches. Psychiatric/Behavioral: Negative for agitation and behavioral problems. Objective:     Vitals:    02/26/20 1415   BP: 110/64   Pulse: 76   Temp: 98.1 °F (36.7 °C)   TempSrc: Oral   SpO2: 98%   Weight: 119 lb 9.6 oz (54.3 kg)   Height: 4' 11\" (1.499 m)     Body mass index is 24.16 kg/m². Wt Readings from Last 3 Encounters:   02/26/20 119 lb 9.6 oz (54.3 kg)   11/13/19 124 lb (56.2 kg)   10/09/19 122 lb (55.3 kg)     BP Readings from Last 3 Encounters:   02/26/20 110/64   11/13/19 124/68   10/09/19 115/65     Physical Exam  Vitals signs and nursing note reviewed. Constitutional:       General: She is not in acute distress. Appearance: She is well-developed. She is not diaphoretic. HENT:      Head: Normocephalic and atraumatic. Right Ear: External ear normal.      Left Ear: External ear normal.      Nose: Mucosal edema and rhinorrhea present. Rhinorrhea is clear. Right Turbinates: Enlarged and swollen. Left Turbinates: Enlarged and swollen. Right Sinus: No maxillary sinus tenderness or frontal sinus tenderness. Left Sinus: No maxillary sinus tenderness or frontal sinus tenderness. Eyes:      General: No scleral icterus. Right eye: No discharge. Left eye: No discharge. Conjunctiva/sclera: Conjunctivae normal.      Pupils: Pupils are equal, round, and reactive to light. Neck:      Musculoskeletal: Normal range of motion. Cardiovascular:      Rate and Rhythm: Normal rate and regular rhythm. Heart sounds: Normal heart sounds. No murmur. Pulmonary:      Effort: Pulmonary effort is normal.      Breath sounds: Normal breath sounds. No wheezing. Abdominal:      General: There is no distension. Palpations: Abdomen is soft. Tenderness: There is no abdominal tenderness. Musculoskeletal:      Comments: Prior R arm amputation    Skin:     General: Skin is warm and dry. Findings: No erythema or rash. Neurological:      Mental Status: She is alert and oriented to person, place, and time. Cranial Nerves: No cranial nerve deficit. Psychiatric:         Behavior: Behavior normal.         Thought Content:  Thought content normal.         Judgment: Judgment normal.         Lab Results   Component Value Date    WBC 6.1 02/11/2020    HGB 11.9 (L) 02/11/2020    HCT 38.8 02/11/2020     02/11/2020    CHOL 130 04/10/2019    TRIG 36 04/10/2019    HDL 96 04/10/2019    LDLCALC 27 04/10/2019    AST 22 09/26/2019     09/27/2019    K 4.2 09/27/2019     09/27/2019    CREATININE 0.4 09/27/2019    BUN 10 09/27/2019    CO2 22 (L) 09/27/2019    TSH 0.937 08/23/2019 INR 0.94 2019    LABA1C 4.7 2019    LABGLOM >90 2019    MG 2.0 2019    CALCIUM 9.7 2019    VITD25 16 (L) 10/25/2018       Imaging Results:    No results found. Assessment / Plan:   Martinez Lamb was seen today for follow-up. Diagnoses and all orders for this visit:    Martinez Lamb was seen today for hypothyroidism and nasal congestion. Diagnoses and all orders for this visit:    Rhinorrhea  -     loratadine (CLARITIN) 10 MG tablet; Take 1 tablet by mouth daily  -     fluticasone (FLONASE) 50 MCG/ACT nasal spray; 2 sprays by Each Nostril route daily  -     ipratropium (ATROVENT) 0.03 % nasal spray; 2 sprays by Nasal route 3 times daily      Will trial these three meds   Return in 6 weeks for reevaluation  If no improvement, consider ENT referral          Return in about 6 weeks (around 2020). Medications Prescribed:  Orders Placed This Encounter   Medications    loratadine (CLARITIN) 10 MG tablet     Sig: Take 1 tablet by mouth daily     Dispense:  90 tablet     Refill:  1    fluticasone (FLONASE) 50 MCG/ACT nasal spray     Si sprays by Each Nostril route daily     Dispense:  3 Bottle     Refill:  1    ipratropium (ATROVENT) 0.03 % nasal spray     Si sprays by Nasal route 3 times daily     Dispense:  1 Bottle     Refill:  3       Future Appointments   Date Time Provider [facility]   3/4/2020  1:20 PM Ava Lundborg, MD [address]        Patient given educational materials - see patient instructions. Discussed use, benefit, and sideeffects of prescribed medications. All patient questions answered. Pt voiced understanding. Reviewed health maintenance. Instructed to continue current medications, diet and exercise. Patient agreed with treatment plan. Follow up as directed.      Electronically signed by Augsutus Dunham MD on 2020 at 3:00 PM

## 2020-03-03 RX ORDER — LAMOTRIGINE 200 MG/1
TABLET ORAL
Qty: 30 TABLET | Refills: 0 | Status: SHIPPED | OUTPATIENT
Start: 2020-03-03 | End: 2020-03-30 | Stop reason: SDUPTHER

## 2020-03-03 NOTE — TELEPHONE ENCOUNTER
THIS IS A DR. BUENROSTRO PATIENT. Melinda Perez is requesting a medication refill on Haily's behalf for Lamictal 200mg;#30 with 5 refills;last with a start date of 09/05/19. Medication is pending your approval for a 30 day supply with 0 refills being that the original provider is out of the office. Patient's last completed appt was on 11/05/19 to return on 04/06/20.

## 2020-03-09 ENCOUNTER — HOSPITAL ENCOUNTER (OUTPATIENT)
Age: 60
Discharge: HOME | End: 2020-03-09
Payer: MEDICARE

## 2020-03-09 LAB
ANION GAP SERPL CALCULATED.3IONS-SCNC: 10 MEQ/L (ref 8–16)
BUN BLDV-MCNC: 16 MG/DL (ref 7–22)
CALCIUM SERPL-MCNC: 9.9 MG/DL (ref 8.5–10.5)
CHLORIDE BLD-SCNC: 107 MEQ/L (ref 98–111)
CO2: 25 MEQ/L (ref 23–33)
CREAT SERPL-MCNC: 0.6 MG/DL (ref 0.4–1.2)
GFR SERPL CREATININE-BSD FRML MDRD: > 90 ML/MIN/1.73M2
GLUCOSE BLD-MCNC: 90 MG/DL (ref 70–108)
POTASSIUM SERPL-SCNC: 4 MEQ/L (ref 3.5–5.2)
SODIUM BLD-SCNC: 142 MEQ/L (ref 135–145)

## 2020-03-09 PROCEDURE — 36415 COLL VENOUS BLD VENIPUNCTURE: CPT

## 2020-03-09 PROCEDURE — 80048 BASIC METABOLIC PNL TOTAL CA: CPT

## 2020-03-11 RX ORDER — LURASIDONE HYDROCHLORIDE 40 MG/1
TABLET, FILM COATED ORAL
Qty: 30 TABLET | Refills: 0 | Status: SHIPPED | OUTPATIENT
Start: 2020-03-11 | End: 2020-03-30

## 2020-03-11 NOTE — TELEPHONE ENCOUNTER
Covering for Dr. Gab Rodríguez. Approved Latuda refill.    Electronically signed by Barbara Elliott MD on 3/11/2020 at 1:34 PM

## 2020-03-17 ENCOUNTER — TELEPHONE (OUTPATIENT)
Dept: FAMILY MEDICINE CLINIC | Age: 60
End: 2020-03-17

## 2020-03-30 RX ORDER — VILAZODONE HYDROCHLORIDE 40 MG/1
TABLET ORAL
Qty: 90 TABLET | Refills: 1 | Status: SHIPPED | OUTPATIENT
Start: 2020-03-30 | End: 2020-12-17 | Stop reason: SDUPTHER

## 2020-03-30 RX ORDER — LURASIDONE HYDROCHLORIDE 40 MG/1
TABLET, FILM COATED ORAL
Qty: 30 TABLET | Refills: 1 | Status: SHIPPED | OUTPATIENT
Start: 2020-03-30 | End: 2020-05-15

## 2020-03-30 RX ORDER — LAMOTRIGINE 200 MG/1
200 TABLET ORAL DAILY
Qty: 30 TABLET | Refills: 0 | Status: SHIPPED | OUTPATIENT
Start: 2020-03-30 | End: 2020-04-23

## 2020-03-30 NOTE — TELEPHONE ENCOUNTER
[facility] ([facility]) has requested a refill of Viibryd 40mg/d on [place]. She attended an appointment in the office 11/5 and is scheduled to return 4/6.

## 2020-03-31 ENCOUNTER — TELEPHONE (OUTPATIENT)
Dept: FAMILY MEDICINE CLINIC | Age: 60
End: 2020-03-31

## 2020-03-31 NOTE — TELEPHONE ENCOUNTER
\"We want to confirm that, for purposes of billing, this is a virtual visit with your provider   for which we will submit a claim for reimbursement with your insurance company. You   will be responsible for any copays, coinsurance amounts or other amounts not covered   by your insurance company. If you do not accept this, unfortunately we will not be able   to schedule a virtual visit with the provider. Do you accept? \"   PATIENT RESPONSE: YES

## 2020-04-03 ENCOUNTER — TELEPHONE (OUTPATIENT)
Dept: FAMILY MEDICINE CLINIC | Age: 60
End: 2020-04-03

## 2020-04-06 ENCOUNTER — VIRTUAL VISIT (OUTPATIENT)
Dept: PSYCHIATRY | Age: 60
End: 2020-04-06
Payer: MEDICARE

## 2020-04-06 PROCEDURE — G8428 CUR MEDS NOT DOCUMENT: HCPCS | Performed by: PSYCHIATRY & NEUROLOGY

## 2020-04-06 PROCEDURE — G8420 CALC BMI NORM PARAMETERS: HCPCS | Performed by: PSYCHIATRY & NEUROLOGY

## 2020-04-06 PROCEDURE — 1036F TOBACCO NON-USER: CPT | Performed by: PSYCHIATRY & NEUROLOGY

## 2020-04-06 PROCEDURE — 3017F COLORECTAL CA SCREEN DOC REV: CPT | Performed by: PSYCHIATRY & NEUROLOGY

## 2020-04-06 PROCEDURE — 99212 OFFICE O/P EST SF 10 MIN: CPT | Performed by: PSYCHIATRY & NEUROLOGY

## 2020-04-09 ENCOUNTER — VIRTUAL VISIT (OUTPATIENT)
Dept: FAMILY MEDICINE CLINIC | Age: 60
End: 2020-04-09
Payer: MEDICARE

## 2020-04-09 ENCOUNTER — TELEPHONE (OUTPATIENT)
Dept: FAMILY MEDICINE CLINIC | Age: 60
End: 2020-04-09

## 2020-04-09 PROCEDURE — 99213 OFFICE O/P EST LOW 20 MIN: CPT | Performed by: NURSE PRACTITIONER

## 2020-04-09 PROCEDURE — 3017F COLORECTAL CA SCREEN DOC REV: CPT | Performed by: NURSE PRACTITIONER

## 2020-04-09 PROCEDURE — G8428 CUR MEDS NOT DOCUMENT: HCPCS | Performed by: NURSE PRACTITIONER

## 2020-04-09 RX ORDER — LEVOTHYROXINE SODIUM 0.1 MG/1
TABLET ORAL
Qty: 90 TABLET | Refills: 1 | Status: SHIPPED | OUTPATIENT
Start: 2020-04-09 | End: 2020-12-10 | Stop reason: SDUPTHER

## 2020-04-09 ASSESSMENT — ENCOUNTER SYMPTOMS
EYE REDNESS: 0
ANAL BLEEDING: 0
EYE DISCHARGE: 0
CONSTIPATION: 0
COLOR CHANGE: 0
BLOOD IN STOOL: 0
DIARRHEA: 0
NAUSEA: 0
RHINORRHEA: 1
COUGH: 0
ABDOMINAL DISTENTION: 0
ABDOMINAL PAIN: 0
SORE THROAT: 0
SHORTNESS OF BREATH: 0

## 2020-04-09 NOTE — PATIENT INSTRUCTIONS
bedroom. · If you are allergic to house dust and mites, do not use home humidifiers. Your doctor can suggest ways you can control dust and mites. · Look for signs of cockroaches. Cockroaches cause allergic reactions. Use cockroach baits to get rid of them. Then, clean your home well. Cockroaches like areas where grocery bags, newspapers, empty bottles, or cardboard boxes are stored. Do not keep these inside your home, and keep trash and food containers sealed. Seal off any spots where cockroaches might enter your home. · If you are allergic to mold, get rid of furniture, rugs, and drapes that smell musty. Check for mold in the bathroom. · If you are allergic to outdoor pollen or mold spores, use air-conditioning. Change or clean all filters every month. Keep windows closed. · If you are allergic to pollen, stay inside when pollen counts are high. Use a vacuum  with a HEPA filter or a double-thickness filter at least two times each week. · Stay inside when air pollution is bad. Avoid paint fumes, perfumes, and other strong odors. · Avoid conditions that make your allergies worse. Stay away from smoke. Do not smoke or let anyone else smoke in your house. Do not use fireplaces or wood-burning stoves. · If you are allergic to your pets, change the air filter in your furnace every month. Use high-efficiency filters. · If you are allergic to pet dander, keep pets outside or out of your bedroom. Old carpet and cloth furniture can hold a lot of animal dander. You may need to replace them. When should you call for help? Give an epinephrine shot if:    · You think you are having a severe allergic reaction.     · You have symptoms in more than one body area, such as mild nausea and an itchy mouth.    After giving an epinephrine shot call  911, even if you feel better.   Call 911 if:    · You have symptoms of a severe allergic reaction.  These may include:  ? Sudden raised, red areas (hives) all over your you have fatigue because you have a certain health problem, treating this problem also treats your fatigue. If depression or anxiety is the cause, treatment may help. Follow-up care is a key part of your treatment and safety. Be sure to make and go to all appointments, and call your doctor if you are having problems. It's also a good idea to know your test results and keep a list of the medicines you take. How can you care for yourself at home? · Get regular exercise. But don't overdo it. Go back and forth between rest and exercise. · Get plenty of rest.  · Eat a healthy diet. Do not skip meals, especially breakfast.  · Reduce your use of caffeine, tobacco, and alcohol. Caffeine is most often found in coffee, tea, cola drinks, and chocolate. · Limit medicines that can cause fatigue. This includes tranquilizers and cold and allergy medicines. When should you call for help? Watch closely for changes in your health, and be sure to contact your doctor if:    · You have new symptoms such as fever or a rash.     · Your fatigue gets worse.     · You have been feeling down, depressed, or hopeless. Or you may have lost interest in things that you usually enjoy.     · You are not getting better as expected. Where can you learn more? Go to https://[employer] Media.Chromasun. org and sign in to your [employer] account. Enter V990 in the [facility] box to learn more about \"Fatigue: Care Instructions. \"     If you do not have an account, please click on the \"Sign Up Now\" link. Current as of: June 26, 2019Content Version: 12.4  © 2651-4088 Healthwise, Incorporated. Care instructions adapted under license by [facility] ([facility]). If you have questions about a medical condition or this instruction, always ask your healthcare professional. [place] [address] any warranty or liability for your use of this information.          Patient Education        Hashimoto's Thyroiditis: Care Instructions  Your Care · You have constipation that is new or that gets worse.     · You cannot stand cold temperatures.     · You have heavy or irregular menstrual periods.     · You have other new symptoms. Where can you learn more? Go to https://Joystickerspeloweb.Art-Exchange. org and sign in to your [employer] account. Enter K454 in the [facility] box to learn more about \"Hashimoto's Thyroiditis: Care Instructions. \"     If you do not have an account, please click on the \"Sign Up Now\" link. Current as of: July 28, 2019Content Version: 12.4  © 7978-0703 [employer]. Care instructions adapted under license by [facility] ([facility]). If you have questions about a medical condition or this instruction, always ask your healthcare professional. [address] any warranty or liability for your use of this information. Patient Education        Rhinitis: Care Instructions  Your Care Instructions  Rhinitis is swelling and irritation in the nose. Allergies and infections are often the cause. Your nose may run or feel stuffy. Other symptoms are itchy and sore eyes, ears, throat, and mouth. If allergies are the cause, your doctor may do tests to find out what you are allergic to. You may be able to stop symptoms if you avoid the things that cause them. Your doctor may suggest or prescribe medicine to ease your symptoms. Follow-up care is a key part of your treatment and safety. Be sure to make and go to all appointments, and call your doctor if you are having problems. It's also a good idea to know your test results and keep a list of the medicines you take. How can you care for yourself at home? · If your rhinitis is caused by allergies, try to find out what sets off (triggers) your symptoms. Take steps to avoid these triggers. ? Avoid yard work. It can stir up both pollen and mold. ? Do not smoke or allow others to smoke around you.  If you need help quitting, talk to your doctor about

## 2020-04-09 NOTE — PROGRESS NOTES
[address] [facility]  [phone] (phone)  [phone] (fax)    Visit Date: 4/9/2020    Ander Freed is a 61 y.o. female who presents today for:  Chief Complaint   Patient presents with    Allergies    Memory Loss     HPI:     THIS VISIT WAS PERFORMED VIA A SYNCHRONOUS TELECOMMUNICATION SYSTEM. Patient gave consent for synchronous telecommunication visit during [facility]- public [facility] emergency. I was present in my home utilizing EPIC patient was in their home. Visit was started at 2:40    Was seen 02/26/2020 - was started on Atrovent, Flonase, and Claritin - feels it is helping her allergies. Does not need refills at this time. Been taking DHEA 50 mg since January - was instructed to take 10 mg daily - she does have some concentration issues. Otherwise she is doing fine - she feels good.      HPI  Health Maintenance   Topic Date Due    TSH testing  08/23/2020    Annual Wellness Visit (AWV)  10/03/2020    Breast cancer screen  01/15/2022    Lipid screen  04/10/2024    DTaP/Tdap/Td vaccine (4 - Td) 10/25/2028    Colon cancer screen colonoscopy  11/06/2029    Flu vaccine  Completed    Shingles Vaccine  Completed    Pneumococcal 0-64 years Vaccine  Completed    Hepatitis C screen  Completed    HIV screen  Completed    Hepatitis A vaccine  Aged Out    Hepatitis B vaccine  Aged Out    Hib vaccine  Aged Out    Meningococcal (ACWY) vaccine  Aged Out     Past Medical History:   Diagnosis Date    Balance problem     Bipolar I disorder, most recent episode (or current) depressed, in partial or unspecified remission 5/8/2013    Cancer ([facility]     CERVICAL     Depression     Fatigue     Gallstones     Gastric bypass status for obesity 2002    GERD (gastroesophageal reflux disease)     History of blood transfusion     History of hysterectomy     REMOVAL OF ONE OVARY     Hypothyroidism     MS (multiple sclerosis) ([facility]     Paresthesias     RT LOWER LIMB    UTI (urinary MG TABS tablet TAKE 1 TABLET BY MOUTH ONE TIME A DAY 30 tablet 1    lamoTRIgine (LAMICTAL) 200 MG tablet Take 1 tablet by mouth daily 30 tablet 0    Prosthesis MISC by Does not apply route Strap for the prosthesis broken please order new 1 each 0    loratadine (CLARITIN) 10 MG tablet Take 1 tablet by mouth daily 90 tablet 1    fluticasone (FLONASE) 50 MCG/ACT nasal spray 2 sprays by Each Nostril route daily 3 Bottle 1    ipratropium (ATROVENT) 0.03 % nasal spray 2 sprays by Nasal route 3 times daily 1 Bottle 3    pantoprazole (PROTONIX) 40 MG tablet Take 1 tablet by mouth every morning (before breakfast) 30 tablet 5    polyethylene glycol (GLYCOLAX) powder Take 17 g by mouth daily      lithium 300 MG tablet Take 2 tablets by mouth nightly (Patient taking differently: Take 600 mg by mouth nightly Take one pill in am and 2 pills in pm) 180 tablet 3    lithium 300 MG tablet Take 1 tablet by mouth every morning (Patient not taking: Reported on 2/26/2020) 90 tablet 3    Cholecalciferol (VITAMIN D) 2000 units CAPS capsule Take 2,000 Units by mouth nightly       Magnesium Oxide 250 MG TABS Take 250 mg by mouth nightly       calcium carbonate 600 MG TABS tablet Take 1 tablet by mouth nightly       vitamin B-12 1000 MCG tablet Take 1 tablet by mouth daily 30 tablet 5    dimethyl fumarate (TECFIDERA) 240 MG delayed release capsule Take 240 mg by mouth 2 times daily      nitroGLYCERIN (NITROSTAT) 0.4 MG SL tablet up to max of 3 total doses. If no relief after 1 dose, call 911. 25 tablet 3    Prenatal MV-Min-Fe Fum-FA-DHA (PRENATAL 1 PO) Take 1 tablet by mouth daily       ferrous sulfate 325 (65 FE) MG tablet Take 325 mg by mouth daily (with breakfast)       No current facility-administered medications for this visit. Allergies   Allergen Reactions    Latex Rash    Sulfa Antibiotics Rash       Subjective:    Review of Systems   Constitutional: Negative for chills, fatigue and fever.    HENT: Positive for Will recheck the DHEA level  Continue current medications  Back in 3 months - sooner as needed     Return in about 3 months (around 7/9/2020). Orders Placed:  Orders Placed This Encounter   Procedures    DHEA    DHEA-Sulfate    TSH with Reflex     Medications Prescribed:  Orders Placed This Encounter   Medications    levothyroxine (SYNTHROID) 100 MCG tablet     Sig: TAKE 1 TABLET BY MOUTH ONE TIME A DAY     Dispense:  90 tablet     Refill:  1     No future appointments. Patient given educational materials - see patient instructions. Discussed use, benefit, and side effects of prescribedmedications. All patient questions answered. Pt voiced understanding. Reviewed health maintenance. Instructed to continue current medications, diet and exercise. Patient agreed with treatment plan. Follow up as directed. Shelby Quinn is a 61 y.o. female being evaluated by a Virtual Visit (video visit) encounter to address concerns as mentioned above. A caregiver was present when appropriate. Due to this being a TeleHealth encounter (During [facility] public health emergency). Evaluation of the following organ systems was limited: Vitals/Constitutional/EENT/Resp/CV/GI//MS/Neuro/Skin/Heme-Lymph-Imm. Pursuant to the emergency declaration under the [facility], [address] [place] [address] authority and the [employer] Act, this Virtual Visit was conducted with patient's (and/or legal guardian's) consent, to reduce the patient's risk of exposure to COVID-19 and provide necessary medical care. The patient (and/or legal guardian) has also been advised to contact this office for worsening conditions or problems, and seek emergency medical treatment and/or call 911 if deemed necessary. Services were provided through a video synchronous discussion virtually to substitute for in-person clinic visit.  Patient and provider were located at

## 2020-04-13 NOTE — TELEPHONE ENCOUNTER
Sorry, I thought I singed but don't see it signed in media. Can you ask Dr Hemant Hernandez to sign when he is in clinic? Thanks!

## 2020-04-23 RX ORDER — LAMOTRIGINE 200 MG/1
TABLET ORAL
Qty: 30 TABLET | Refills: 1 | Status: SHIPPED | OUTPATIENT
Start: 2020-04-23 | End: 2020-07-29

## 2020-05-13 ENCOUNTER — TELEPHONE (OUTPATIENT)
Dept: PSYCHIATRY | Age: 60
End: 2020-05-13

## 2020-05-15 RX ORDER — LURASIDONE HYDROCHLORIDE 40 MG/1
TABLET, FILM COATED ORAL
Qty: 30 TABLET | Refills: 2 | Status: SHIPPED | OUTPATIENT
Start: 2020-05-15 | End: 2020-07-08

## 2020-05-15 NOTE — TELEPHONE ENCOUNTER
[address] Bergman is requesting a medication on Haily's behalf for Griffin Soda 40mg;#30 with 1 refill;last with a start date of 03/30/20 and last refill date of 04/23/20. Medication is pending your approval for a 30 day supply with 2 refills to get her until the time expected for her next appt in July. Her last completed appt was on 04/06/20 with instructions to return on 07/06/20.

## 2020-05-19 ENCOUNTER — NURSE ONLY (OUTPATIENT)
Dept: LAB | Age: 60
End: 2020-05-19

## 2020-05-19 LAB
HEMOGLOBIN: 13.2 GM/DL (ref 12–16)
LITHIUM LEVEL: 0.59 MMOL/L (ref 0.6–1.2)
TSH SERPL DL<=0.05 MIU/L-ACNC: 2.06 UIU/ML (ref 0.4–4.2)

## 2020-05-20 RX ORDER — PANTOPRAZOLE SODIUM 40 MG/1
TABLET, DELAYED RELEASE ORAL
Qty: 30 TABLET | Refills: 5 | Status: SHIPPED | OUTPATIENT
Start: 2020-05-20 | End: 2020-07-06

## 2020-05-21 ENCOUNTER — TELEPHONE (OUTPATIENT)
Dept: FAMILY MEDICINE CLINIC | Age: 60
End: 2020-05-21

## 2020-05-21 NOTE — TELEPHONE ENCOUNTER
3/5 lab results completed. Called [phone], No answer. Left detailed voicemail Check [employer]IP[employer] 08/22/19.

## 2020-05-22 LAB — DHEAS (DHEA SULFATE): 73 UG/DL (ref 13–130)

## 2020-05-23 LAB — DHEA UNCONJUGATED: 0.56 NG/ML (ref 0.63–4.7)

## 2020-06-01 ENCOUNTER — TELEPHONE (OUTPATIENT)
Dept: FAMILY MEDICINE CLINIC | Age: 60
End: 2020-06-01

## 2020-06-01 RX ORDER — LITHIUM CARBONATE 300 MG
600 TABLET ORAL 2 TIMES DAILY
Qty: 120 TABLET | Refills: 3 | Status: SHIPPED | OUTPATIENT
Start: 2020-06-01 | End: 2020-12-17 | Stop reason: SDUPTHER

## 2020-06-01 RX ORDER — LITHIUM CARBONATE 300 MG
600 TABLET ORAL NIGHTLY
Qty: 60 TABLET | Refills: 0 | Status: CANCELLED | OUTPATIENT
Start: 2020-06-01

## 2020-06-01 NOTE — TELEPHONE ENCOUNTER
Brenda Brown-     Patient stated that her DHEA was below normal. Wants to know what you would her to do.      Thanks,  [employer]lee, [employer]

## 2020-06-15 RX ORDER — IPRATROPIUM BROMIDE 21 UG/1
SPRAY, METERED NASAL
Qty: 30 ML | Refills: 3 | Status: SHIPPED | OUTPATIENT
Start: 2020-06-15 | End: 2021-04-08

## 2020-06-17 ENCOUNTER — TELEPHONE (OUTPATIENT)
Dept: FAMILY MEDICINE CLINIC | Age: 60
End: 2020-06-17

## 2020-06-18 RX ORDER — IPRATROPIUM BROMIDE 21 UG/1
SPRAY, METERED NASAL
Qty: 30 ML | Refills: 3 | OUTPATIENT
Start: 2020-06-18

## 2020-06-18 NOTE — TELEPHONE ENCOUNTER
Pt called back, frustrated cannot find the correct dosage. Told her to search [facility] dhea- they have a 10 mg. She found it .   Thanked me for the help

## 2020-06-19 NOTE — TELEPHONE ENCOUNTER
[address]ina.   Pia Bourgeois they are not sure why someone called, they do have the RX and refills ready for her

## 2020-07-06 RX ORDER — PANTOPRAZOLE SODIUM 40 MG/1
TABLET, DELAYED RELEASE ORAL
Qty: 30 TABLET | Refills: 5 | Status: ON HOLD
Start: 2020-07-06 | End: 2020-10-01 | Stop reason: HOSPADM

## 2020-07-08 ENCOUNTER — VIRTUAL VISIT (OUTPATIENT)
Dept: PSYCHIATRY | Age: 60
End: 2020-07-08
Payer: MEDICARE

## 2020-07-08 ENCOUNTER — NURSE ONLY (OUTPATIENT)
Dept: LAB | Age: 60
End: 2020-07-08

## 2020-07-08 LAB — LITHIUM LEVEL: 1.06 MMOL/L (ref 0.6–1.2)

## 2020-07-08 PROCEDURE — 99442 PR PHYS/QHP TELEPHONE EVALUATION 11-20 MIN: CPT | Performed by: PSYCHIATRY & NEUROLOGY

## 2020-07-08 NOTE — PROGRESS NOTES
Chief Complaint   Patient presents with    3 Month Follow-Up     bipolar mixed     Jeanine Topete is a 61 y.o. female evaluated via telephone on 7/8/2020. Consent:  She and/or health care decision maker is aware that that she may receive a bill for this telephone service, depending on her insurance coverage, and has provided verbal consent to proceed: Yes      Documentation:  I communicated with the patient and/or health care decision maker about her psychiartric condition. Details of this discussion including any medical advice provided: yes      I affirm this is a Patient Initiated Episode with a Patient who has not had a related appointment within my department in the past 7 days or scheduled within the next 24 hours. Patient identification was verified at the start of the visit: Yes    Total Time: minutes: 11-20 minutes    Note: not billable if this call serves to triage the patient into an appointment for the relevant concern      Jamaal Cordoba was interviewed by telephone for this appointment. Manisha said that she is having some coarsening of her bipolar illness. She is noting a lot of irritability, but she is able to sleep. She does say her mind is racing, and she is struggling more with depression as well. She is taking 1200 mg of lithium daily in divided. It has not been measured at that level. I asked her to get a lithium level today. She took her morning dose at 4 AM so she should be able to go to the lab late this afternoon and get a valid draw. I put in an order for that. Secondly we talked about her lurasidone. She has 40 mg tablets but is only taking 1/2 tablet daily. I told her to increase that to a whole tablet and we will see if she does not straighten out. When we get the lithium level back I will let her know if we need any further adjustments to the lithium. She is of course worried about the COVID situation.   She has worked for a long time as a levothyroxine (SYNTHROID) 100 MCG tablet TAKE 1 TABLET BY MOUTH ONE TIME A DAY 90 tablet 1    VILAZODONE HCL 40 MG Tablet TAKE 1 TABLET BY MOUTH ONCE DAILY. 90 tablet 1    Prosthesis MISC by Does not apply route Strap for the prosthesis broken please order new 1 each 0    loratadine (CLARITIN) 10 MG tablet Take 1 tablet by mouth daily 90 tablet 1    fluticasone (FLONASE) 50 MCG/ACT nasal spray 2 sprays by Each Nostril route daily 3 Bottle 1    polyethylene glycol (GLYCOLAX) powder Take 17 g by mouth daily      Cholecalciferol (VITAMIN D) 2000 units CAPS capsule Take 2,000 Units by mouth nightly       Magnesium Oxide 250 MG TABS Take 250 mg by mouth nightly       calcium carbonate 600 MG TABS tablet Take 1 tablet by mouth nightly       vitamin B-12 1000 MCG tablet Take 1 tablet by mouth daily 30 tablet 5    dimethyl fumarate (TECFIDERA) 240 MG delayed release capsule Take 240 mg by mouth 2 times daily      nitroGLYCERIN (NITROSTAT) 0.4 MG SL tablet up to max of 3 total doses. If no relief after 1 dose, call 911. 25 tablet 3    Prenatal MV-Min-Fe Fum-FA-DHA (PRENATAL 1 PO) Take 1 tablet by mouth daily       ferrous sulfate 325 (65 FE) MG tablet Take 325 mg by mouth daily (with breakfast)       No current facility-administered medications for this visit.

## 2020-07-24 ENCOUNTER — NURSE ONLY (OUTPATIENT)
Dept: LAB | Age: 60
End: 2020-07-24

## 2020-07-24 LAB
FERRITIN: 43 NG/ML (ref 10–291)
TOTAL IRON BINDING CAPACITY: 334 UG/DL (ref 171–450)

## 2020-07-27 LAB — TRANSFERRIN: 289 MG/DL (ref 200–400)

## 2020-07-29 RX ORDER — LAMOTRIGINE 200 MG/1
TABLET ORAL
Qty: 30 TABLET | Refills: 0 | Status: SHIPPED | OUTPATIENT
Start: 2020-07-29 | End: 2020-08-19

## 2020-07-29 NOTE — TELEPHONE ENCOUNTER
Ziggymikyaddison Fonseca is requesting a medication refill on Haily's behalf for Lamictal 200mg;#30 with 1 refill;last with a start date of 04/23/20. I reached out to the pharmcy to see if she had refills and to verify the last refill date; I spoke with Claudette Larger and she said the last refill date was on 04/23/20 and they filled it for a 2 mths supply. I spoke with Stephanie Leahy and she said that her last bottle says #30 on 03/30/20 and she does not have a bottle for the 04/23/20 date; she reports that she is not completely out; she says that she has a couple days left and she takes 1 daily. She is scheduled to return on 08/03/20 appt. Medication is pending your approval for a 30 day supply with 0 refills.

## 2020-08-04 ENCOUNTER — VIRTUAL VISIT (OUTPATIENT)
Dept: PSYCHIATRY | Age: 60
End: 2020-08-04
Payer: MEDICARE

## 2020-08-04 ENCOUNTER — TELEPHONE (OUTPATIENT)
Dept: PSYCHIATRY | Age: 60
End: 2020-08-04

## 2020-08-04 PROCEDURE — 99442 PR PHYS/QHP TELEPHONE EVALUATION 11-20 MIN: CPT | Performed by: PSYCHIATRY & NEUROLOGY

## 2020-08-04 NOTE — TELEPHONE ENCOUNTER
Dr. Kim Rosenthal has terminated her care; Dr. Brianna Gonzalez is only covering for Dr. Reyna Aguirre during this time; transfer or care okay to this provider? Please advise Heladio Victoria?

## 2020-08-04 NOTE — TELEPHONE ENCOUNTER
Leyla Wheeler called into the office stating that she would like to transfer care to another provider in the office; she feels she is not getting adequate care and there is nothing else that this provider can do for her. She said that she feels this provider is close to [facility] and does not help anymore. She is wanting to switch to [facility]; she was informed that all of our providers are scheduling until the October time; but we do utilize a cancellation list in the meantime as well. She understood. I informed her of the transfer of care policy with checking with the original provider and then checking with the transfer provider; she understood. She said that she is worried about her medications being refilled until the time of her new appt with another provider if accepted; I informed that that typically the original provider provides the refills until she is able to be seen; she understood; she said that this provider was not happy with her so she was unsure if he would or not. Please advise if you are willing to transfer care?

## 2020-08-04 NOTE — TELEPHONE ENCOUNTER
Per Dr. Brittany Pearce:    Barbara Nobles her a termination letter and see if Dr ROGERS will see her as OP (her request) Otherwise refer in office

## 2020-08-04 NOTE — PROGRESS NOTES
Chief Complaint   Patient presents with    1 Month Follow-Up     Bipolar     Caesar Solorzano is a 61 y.o. female evaluated via telephone on 8/4/2020. Consent:  She and/or health care decision maker is aware that that she may receive a bill for this telephone service, depending on her insurance coverage, and has provided verbal consent to proceed: Yes      Documentation:  I communicated with the patient and/ore health care decision maker about her psychiatric condition. Details of this discussion including any medical advice provided: yes      I affirm this is a Patient Initiated Episode with a Patient who has not had a related appointment within my department in the past 7 days or scheduled within the next 24 hours. Patient identification was verified at the start of the visit: Yes    Total Time: minutes: 11-20 minutes    Note: not billable if this call serves to triage the patient into an appointment for the relevant concern    Marco Mercedes was interviewed by telephone for this appointment. She surprised me by indicating that she wanted to be referred to another provider. She pretty pretty proceeded to tell me that she wanted to see the [facility] psychiatrist in our office. I told her that there was no such person although there was an indication DrEleazar and gave Dr. Mayra Britton's name. She said she did not know the name but he was a specialist in addictions so she wanted to see him. I told her that that is not the case, he is not an addictionologist, and he does not really deal with outpatients to any extent but I can ask if he would see her. She tells me that her \"addictions\" are out of control but the only clarification about that I could get from her was that she is talking about food. She said it in the pleural and I do not know what else she might be referring to. She also tells me that her bipolar disorder has gotten bad and asked me if I can do anything about it.   Of course for years I been trying to do something about it which I pointed out to her. She really was not listening very well at all. In the end that is what she wants to do so I told her I would terminate care with her and we can see who else in the office might be willing to take her. Mental Status Examination    Level of consciousness:  within normal limits  Appearance:  phone  Behavior/Motor:  phone  Attitude toward examiner:  argumentative  Speech:  spontaneous, normal rate, normal volume and well articulated  Mood:  depressed?   Affect:  mood congruent  Thought processes:  linear, goal directed and coherent  Thought content:  Homocidal ideation: none  Suicidal Ideation:  denies suicidal ideation  Delusions:  no evidence of delusions  Perceptual Disturbance:  denies any perceptual disturbance  Cognition:  oriented to person, place, and time  Concentration succeeded  Memory intact  Fund knowledge:  good  Abstract :  good  Insight:  good  Judgment:  questionable  Anxiety:   Generalized: No  Excessive Worry: No  Panic Attacks: No  Frequency:  Housebound: No   Obsession: No   Compulsion: No  Flashbacks:No  Nightmares No  Hyperarousal No     DIAGNOSTIC IMPRESSION  Bipolar disorder    Plan  Terminate care and refer to Dr ROGERS or other in office'

## 2020-08-05 NOTE — TELEPHONE ENCOUNTER
I have left a message for Sonali Marte to return my call to schedule an appt with Carol Sepulveda; I will await her call.

## 2020-08-19 RX ORDER — LAMOTRIGINE 200 MG/1
TABLET ORAL
Qty: 30 TABLET | Refills: 1 | Status: SHIPPED | OUTPATIENT
Start: 2020-08-19 | End: 2020-11-02

## 2020-08-19 NOTE — TELEPHONE ENCOUNTER
Melinda [id number] has requested a refill of Lamictal 200mg/d on Haily's behalf.   She attended an appointment 8/4 and will be resuming care with Vik Frausto 10/1

## 2020-08-28 ENCOUNTER — HOSPITAL ENCOUNTER (OUTPATIENT)
Age: 60
Discharge: HOME | End: 2020-08-28
Payer: MEDICARE

## 2020-08-28 LAB
IRON: 131 UG/DL (ref 50–170)
TOTAL IRON BINDING CAPACITY: 306 UG/DL (ref 171–450)

## 2020-08-28 PROCEDURE — 36415 COLL VENOUS BLD VENIPUNCTURE: CPT

## 2020-08-28 PROCEDURE — 83550 IRON BINDING TEST: CPT

## 2020-08-28 PROCEDURE — 83540 ASSAY OF IRON: CPT

## 2020-09-20 ENCOUNTER — HOSPITAL ENCOUNTER (EMERGENCY)
Age: 60
Discharge: HOME | End: 2020-09-20
Payer: MEDICARE

## 2020-09-20 ENCOUNTER — APPOINTMENT (OUTPATIENT)
Dept: CT IMAGING | Age: 60
End: 2020-09-20
Payer: MEDICARE

## 2020-09-20 VITALS
DIASTOLIC BLOOD PRESSURE: 61 MMHG | HEART RATE: 63 BPM | OXYGEN SATURATION: 95 % | WEIGHT: 110 LBS | BODY MASS INDEX: 22.18 KG/M2 | SYSTOLIC BLOOD PRESSURE: 114 MMHG | RESPIRATION RATE: 15 BRPM | TEMPERATURE: 97.9 F | HEIGHT: 59 IN

## 2020-09-20 LAB
ALBUMIN SERPL-MCNC: 4 G/DL (ref 3.5–5.1)
ALP BLD-CCNC: 88 U/L (ref 38–126)
ALT SERPL-CCNC: 31 U/L (ref 11–66)
ANION GAP SERPL CALCULATED.3IONS-SCNC: 7 MEQ/L (ref 8–16)
AST SERPL-CCNC: 33 U/L (ref 5–40)
BACTERIA: ABNORMAL /HPF
BASOPHILS # BLD: 0.1 %
BASOPHILS ABSOLUTE: 0 THOU/MM3 (ref 0–0.1)
BILIRUB SERPL-MCNC: 0.3 MG/DL (ref 0.3–1.2)
BILIRUBIN URINE: NEGATIVE
BLOOD, URINE: ABNORMAL
BUN BLDV-MCNC: 17 MG/DL (ref 7–22)
C-REACTIVE PROTEIN: < 0.03 MG/DL (ref 0–1)
CALCIUM SERPL-MCNC: 9.7 MG/DL (ref 8.5–10.5)
CASTS 2: ABNORMAL /LPF
CASTS UA: ABNORMAL /LPF
CHARACTER, URINE: CLEAR
CHLORIDE BLD-SCNC: 104 MEQ/L (ref 98–111)
CO2: 22 MEQ/L (ref 23–33)
COLOR: ABNORMAL
CREAT SERPL-MCNC: 0.4 MG/DL (ref 0.4–1.2)
CRYSTALS, UA: ABNORMAL
EOSINOPHIL # BLD: 0.3 %
EOSINOPHILS ABSOLUTE: 0 THOU/MM3 (ref 0–0.4)
EPITHELIAL CELLS, UA: ABNORMAL /HPF
ERYTHROCYTE [DISTWIDTH] IN BLOOD BY AUTOMATED COUNT: 12.4 % (ref 11.5–14.5)
ERYTHROCYTE [DISTWIDTH] IN BLOOD BY AUTOMATED COUNT: 46.9 FL (ref 35–45)
GFR SERPL CREATININE-BSD FRML MDRD: > 90 ML/MIN/1.73M2
GLUCOSE BLD-MCNC: 134 MG/DL (ref 70–108)
GLUCOSE URINE: NEGATIVE MG/DL
HCT VFR BLD CALC: 36.3 % (ref 37–47)
HEMOGLOBIN: 11.9 GM/DL (ref 12–16)
IMMATURE GRANS (ABS): 0.02 THOU/MM3 (ref 0–0.07)
IMMATURE GRANULOCYTES: 0.3 %
KETONES, URINE: 15
LEUKOCYTE ESTERASE, URINE: ABNORMAL
LIPASE: 24.3 U/L (ref 5.6–51.3)
LYMPHOCYTES # BLD: 5.5 %
LYMPHOCYTES ABSOLUTE: 0.4 THOU/MM3 (ref 1–4.8)
MCH RBC QN AUTO: 33.7 PG (ref 26–33)
MCHC RBC AUTO-ENTMCNC: 32.8 GM/DL (ref 32.2–35.5)
MCV RBC AUTO: 102.8 FL (ref 81–99)
MISCELLANEOUS 2: ABNORMAL
MONOCYTES # BLD: 4.2 %
MONOCYTES ABSOLUTE: 0.3 THOU/MM3 (ref 0.4–1.3)
NITRITE, URINE: NEGATIVE
NUCLEATED RED BLOOD CELLS: 0 /100 WBC
OSMOLALITY CALCULATION: 269.9 MOSMOL/KG (ref 275–300)
PH UA: 5.5 (ref 5–9)
PLATELET # BLD: 182 THOU/MM3 (ref 130–400)
PMV BLD AUTO: 9.9 FL (ref 9.4–12.4)
POTASSIUM REFLEX MAGNESIUM: 4 MEQ/L (ref 3.5–5.2)
PROTEIN UA: ABNORMAL
RBC # BLD: 3.53 MILL/MM3 (ref 4.2–5.4)
RBC URINE: ABNORMAL /HPF
RENAL EPITHELIAL, UA: ABNORMAL
SEG NEUTROPHILS: 89.6 %
SEGMENTED NEUTROPHILS ABSOLUTE COUNT: 7.1 THOU/MM3 (ref 1.8–7.7)
SODIUM BLD-SCNC: 133 MEQ/L (ref 135–145)
SPECIFIC GRAVITY, URINE: 1.03 (ref 1–1.03)
TOTAL PROTEIN: 6 G/DL (ref 6.1–8)
UROBILINOGEN, URINE: 1 EU/DL (ref 0–1)
WBC # BLD: 7.9 THOU/MM3 (ref 4.8–10.8)
WBC UA: ABNORMAL /HPF
YEAST: ABNORMAL

## 2020-09-20 PROCEDURE — 6360000004 HC RX CONTRAST MEDICATION: Performed by: PHYSICIAN ASSISTANT

## 2020-09-20 PROCEDURE — 96375 TX/PRO/DX INJ NEW DRUG ADDON: CPT

## 2020-09-20 PROCEDURE — 83690 ASSAY OF LIPASE: CPT

## 2020-09-20 PROCEDURE — 6360000002 HC RX W HCPCS: Performed by: PHYSICIAN ASSISTANT

## 2020-09-20 PROCEDURE — 87086 URINE CULTURE/COLONY COUNT: CPT

## 2020-09-20 PROCEDURE — 36415 COLL VENOUS BLD VENIPUNCTURE: CPT

## 2020-09-20 PROCEDURE — 74177 CT ABD & PELVIS W/CONTRAST: CPT

## 2020-09-20 PROCEDURE — 80053 COMPREHEN METABOLIC PANEL: CPT

## 2020-09-20 PROCEDURE — 2580000003 HC RX 258: Performed by: PHYSICIAN ASSISTANT

## 2020-09-20 PROCEDURE — 81001 URINALYSIS AUTO W/SCOPE: CPT

## 2020-09-20 PROCEDURE — 86140 C-REACTIVE PROTEIN: CPT

## 2020-09-20 PROCEDURE — 85025 COMPLETE CBC W/AUTO DIFF WBC: CPT

## 2020-09-20 PROCEDURE — 99284 EMERGENCY DEPT VISIT MOD MDM: CPT

## 2020-09-20 PROCEDURE — 96374 THER/PROPH/DIAG INJ IV PUSH: CPT

## 2020-09-20 RX ORDER — ONDANSETRON 2 MG/ML
4 INJECTION INTRAMUSCULAR; INTRAVENOUS ONCE
Status: COMPLETED | OUTPATIENT
Start: 2020-09-20 | End: 2020-09-20

## 2020-09-20 RX ORDER — 0.9 % SODIUM CHLORIDE 0.9 %
500 INTRAVENOUS SOLUTION INTRAVENOUS ONCE
Status: COMPLETED | OUTPATIENT
Start: 2020-09-20 | End: 2020-09-20

## 2020-09-20 RX ORDER — METRONIDAZOLE 500 MG/1
500 TABLET ORAL 2 TIMES DAILY
Qty: 20 TABLET | Refills: 0 | Status: ON HOLD | OUTPATIENT
Start: 2020-09-20 | End: 2020-10-01 | Stop reason: HOSPADM

## 2020-09-20 RX ORDER — ONDANSETRON 4 MG/1
4 TABLET, ORALLY DISINTEGRATING ORAL EVERY 8 HOURS PRN
Qty: 20 TABLET | Refills: 0 | Status: SHIPPED | OUTPATIENT
Start: 2020-09-20 | End: 2020-09-22 | Stop reason: ALTCHOICE

## 2020-09-20 RX ORDER — HYDROCODONE BITARTRATE AND ACETAMINOPHEN 5; 325 MG/1; MG/1
1 TABLET ORAL EVERY 6 HOURS PRN
Qty: 10 TABLET | Refills: 0 | Status: SHIPPED | OUTPATIENT
Start: 2020-09-20 | End: 2020-09-23

## 2020-09-20 RX ORDER — CIPROFLOXACIN 500 MG/1
500 TABLET, FILM COATED ORAL 2 TIMES DAILY
Qty: 20 TABLET | Refills: 0 | Status: ON HOLD | OUTPATIENT
Start: 2020-09-20 | End: 2020-10-01 | Stop reason: HOSPADM

## 2020-09-20 RX ORDER — MORPHINE SULFATE 4 MG/ML
4 INJECTION, SOLUTION INTRAMUSCULAR; INTRAVENOUS ONCE
Status: COMPLETED | OUTPATIENT
Start: 2020-09-20 | End: 2020-09-20

## 2020-09-20 RX ADMIN — SODIUM CHLORIDE 500 ML: 9 INJECTION, SOLUTION INTRAVENOUS at 04:33

## 2020-09-20 RX ADMIN — MORPHINE SULFATE 4 MG: 4 INJECTION, SOLUTION INTRAMUSCULAR; INTRAVENOUS at 04:34

## 2020-09-20 RX ADMIN — IOPAMIDOL 80 ML: 755 INJECTION, SOLUTION INTRAVENOUS at 04:52

## 2020-09-20 RX ADMIN — ONDANSETRON 4 MG: 2 INJECTION INTRAMUSCULAR; INTRAVENOUS at 04:34

## 2020-09-20 ASSESSMENT — ENCOUNTER SYMPTOMS
VOMITING: 0
SHORTNESS OF BREATH: 0
NAUSEA: 1
ABDOMINAL PAIN: 1
COUGH: 0
CONSTIPATION: 0
BACK PAIN: 0
DIARRHEA: 0
COLOR CHANGE: 0

## 2020-09-20 ASSESSMENT — PAIN DESCRIPTION - ORIENTATION: ORIENTATION: RIGHT;LOWER

## 2020-09-20 ASSESSMENT — PAIN SCALES - GENERAL
PAINLEVEL_OUTOF10: 9
PAINLEVEL_OUTOF10: 9
PAINLEVEL_OUTOF10: 5

## 2020-09-20 ASSESSMENT — PAIN DESCRIPTION - PROGRESSION: CLINICAL_PROGRESSION: GRADUALLY WORSENING

## 2020-09-20 ASSESSMENT — PAIN DESCRIPTION - PAIN TYPE: TYPE: ACUTE PAIN

## 2020-09-20 ASSESSMENT — PAIN DESCRIPTION - LOCATION: LOCATION: ABDOMEN

## 2020-09-20 NOTE — ED PROVIDER NOTES
[facility]  eMERGENCY dEPARTMENT eNCOUnter          CHIEF COMPLAINT       Chief Complaint   Patient presents with    Abdominal Pain     RLQ       Nurses Notes reviewed and I agree except as noted inthe HPI. HISTORY OF PRESENT ILLNESS    Laura Bright is a 61 y.o. female who presents to the Emergency Department for the evaluation of right lower quadrant pain. Patient states that it began 2 weeks ago and has been getting progressively worse and tonight she was unable to tolerate the pain any longer so she came in for evaluation. She reports associated nausea and black stool but denies any fever, chills, vomiting, constipation, diarrhea, urinary changes or hematochezia. She denies any history of similar pain in the past.  She reports history of prior appendectomy, cholecystectomy and hysterectomy with unilateral oophorectomy. The HPI was provided by the patient. REVIEW OF SYSTEMS     Review of Systems   Constitutional: Negative for chills and fever. HENT: Negative for sneezing. Respiratory: Negative for cough and shortness of breath. Cardiovascular: Negative for chest pain. Gastrointestinal: Positive for abdominal pain and nausea. Negative for constipation, diarrhea and vomiting. Genitourinary: Negative for dysuria and hematuria. Musculoskeletal: Negative for back pain. Skin: Negative for color change. Neurological: Negative for syncope and headaches. PAST MEDICAL HISTORY    has a past medical history of Balance problem, Bipolar I disorder, most recent episode (or current) depressed, in partial or unspecified remission, Cancer ([address] Depression, Fatigue, Gallstones, Gastric bypass status for obesity, GERD (gastroesophageal reflux disease), History of blood transfusion, History of hysterectomy, Hypothyroidism, MS (multiple sclerosis) ([address] Paresthesias, and UTI (urinary tract infection).     SURGICAL HISTORY      has a past surgical history that includes (PRENATAL 1 PO)    Take 1 tablet by mouth daily     PROSTHESIS MISC    by Does not apply route Strap for the prosthesis broken please order new    VILAZODONE HCL 40 MG TABLET    TAKE 1 TABLET BY MOUTH ONCE DAILY. VITAMIN B-12 1000 MCG TABLET    Take 1 tablet by mouth daily       ALLERGIES     is allergic to latex and sulfa antibiotics. FAMILY HISTORY     She indicated that her mother is alive. She indicated that her father is . She indicated that both of her sisters are alive. family history includes Cancer in her mother, sister, and sister; Colon Polyps in her mother; Glaucoma in her father; Heart Disease in her father. SOCIAL HISTORY      reports that she quit smoking about 42 years ago. Her smoking use included cigarettes. She has a 0.05 pack-year smoking history. She has never used smokeless tobacco. She reports that she does not drink alcohol or use drugs. PHYSICAL EXAM     INITIAL VITALS:  height is 4' 11\" (1.499 m) and weight is 110 lb (49.9 kg). Her oral temperature is 97.9 °F (36.6 °C). Her blood pressure is 114/61 and her pulse is 63. Her respiration is 15 and oxygen saturation is 95%. Physical Exam  Vitals signs and nursing note reviewed. Constitutional:       Appearance: She is well-developed. HENT:      Head: Normocephalic and atraumatic. Cardiovascular:      Rate and Rhythm: Normal rate. Pulmonary:      Effort: Pulmonary effort is normal. No respiratory distress. Abdominal:      Palpations: Abdomen is soft. Tenderness: There is abdominal tenderness in the right upper quadrant, right lower quadrant, epigastric area and suprapubic area. There is no guarding. Negative signs include Hendrickson's sign and Rovsing's sign. Skin:     General: Skin is warm and dry. Neurological:      General: No focal deficit present. Mental Status: She is alert.    Psychiatric:         Mood and Affect: Mood normal.         DIFFERENTIAL DIAGNOSIS:   Differential diagnoses are discussed    DIAGNOSTIC RESULTS     EKG: All EKG's are interpreted by the Emergency Department Physician who either signs or Co-signsthis chart in the absence of a cardiologist.          RADIOLOGY: non-plain film images(s) such as CT, Ultrasound and MRI are read by the radiologist.    [facility]   Final Result   There is a thickened appearance of the cecum with some inflammatory changes in the adjacent fat. This may be on the basis of acute colitis. **This report has been created using voice recognition software. It may contain minor errors which are inherent in voice recognition technology. **      Final report electronically signed by Dr Denise Carlton on 9/20/2020 5:18 AM          LABS:      Labs Reviewed   CBC WITH AUTO DIFFERENTIAL - Abnormal; Notable for the following components:       Result Value    RBC 3.53 (*)     Hemoglobin 11.9 (*)     Hematocrit 36.3 (*)     .8 (*)     MCH 33.7 (*)     RDW-SD 46.9 (*)     Lymphocytes Absolute 0.4 (*)     Monocytes Absolute 0.3 (*)     All other components within normal limits   COMPREHENSIVE METABOLIC PANEL W/ REFLEX TO MG FOR LOW K - Abnormal; Notable for the following components:    Glucose 134 (*)     Sodium 133 (*)     CO2 22 (*)     Total Protein 6.0 (*)     All other components within normal limits   ANION GAP - Abnormal; Notable for the following components:    Anion Gap 7.0 (*)     All other components within normal limits   OSMOLALITY - Abnormal; Notable for the following components:    Osmolality Calc 269.9 (*)     All other components within normal limits   URINE WITH REFLEXED MICRO - Abnormal; Notable for the following components:    Ketones, Urine 15 (*)     Blood, Urine MODERATE (*)     Protein, UA TRACE (*)     Leukocyte Esterase, Urine MODERATE (*)     Color, UA DK YELLOW (*)     All other components within normal limits   CULTURE, REFLEXED, URINE    Narrative:     Source: urine       Site: unknown collect Current Antibiotics: not stated   C-REACTIVE PROTEIN   LIPASE   GLOMERULAR FILTRATION RATE, ESTIMATED       EMERGENCY DEPARTMENT COURSE:   Vitals:    Vitals:    09/20/20 0347 09/20/20 0437 09/20/20 0545   BP: 105/69 (!) 106/59 114/61   Pulse: 61 61 63   Resp: 16 16 15   Temp: 97.9 °F (36.6 °C)     TempSrc: Oral     SpO2: 98% 99% 95%   Weight: 110 lb (49.9 kg)     Height: 4' 11\" (1.499 m)        4:16 AM EDT: The patient was seen and evaluated. Patient presents for complaints of abdominal pain that has been ongoing for the past 2 weeks. It has been getting progressively worse and is associated with nausea but no fevers, chills, vomiting or grossly bloody stools. However, she has noticed some melena. She has been following with the GI service for this. She arrives with reassuring vital signs. Urinalysis did show moderate leukocytes in the urine with 25-50 white blood cells. White blood cell count was 7.9. CT showed thickened appearance of the cecum with inflammatory changes in the adjacent fat which may be on the basis of acute colitis. Due to the persistence of symptoms for 2 weeks as well as melena, I do feel we should treat this with antibiotics. Discussed with the patient who is agreeable. We will cover with Cipro and Flagyl as this should cover any developing urinary tract infection as well. Advised follow-up with her GI service and she was agreeable with this plan. Return precautions were discussed. As her pain improved in the department with morphine and Zofran, will discharge home with Norco and Zofran in addition to the antibiotics. CRITICAL CARE:   None    CONSULTS:  None    PROCEDURES:  None    FINAL IMPRESSION      1.  Colitis          DISPOSITION/PLAN   Discharge    PATIENT REFERRED TO:  Griffin Scott MD  [address] [place] [id number]  [phone]    Call in 1 day      [address] [facility]  [address]  [phone]    If symptoms

## 2020-09-20 NOTE — ED NOTES
Pt went to restroom for urine sample. Pt back in bed and medicated per MAR. Pt updated on POC. Pt verbalized understanding. VS reassessed. RR reg.  Will continue to monitor      Dylan Castillo RN  09/20/20 4278

## 2020-09-20 NOTE — ED NOTES
Bed: 011A  Expected date:   Expected time:   Means of arrival:   Comments:  TERESA Berger RN  09/20/20 0597

## 2020-09-20 NOTE — ED TRIAGE NOTES
PT comes to ED from home via EMS with c/o RLQ pain that started about 2 weeks ago. PT states the pain has gotten gradually orose. PT states that she had her appendix and gallbladder out and had a hysterectomy. Pt states she had a BM a couple hours ago and it was large. PT denies blood in her stool. Pt denies having trouble urinating as well. Pt PT states she is nauseous. Pt denies emesis but gags occasionally. Pt states this pain has never happened before.

## 2020-09-21 ENCOUNTER — TELEPHONE (OUTPATIENT)
Dept: FAMILY MEDICINE CLINIC | Age: 60
End: 2020-09-21

## 2020-09-21 LAB
ORGANISM: ABNORMAL
URINE CULTURE REFLEX: ABNORMAL

## 2020-09-21 NOTE — TELEPHONE ENCOUNTER
[facility]  [address] [address], [address]  Phone: [phone]  Fax: [phone]    September 21, 2020    [address]  [address] [address]      Dear Ivy Gatica,    This letter is regarding your Emergency Department (ED) visit at [address] [address] on 9/20/20. Dr. Omayra Willard wanted to make sure that you understand your discharge instructions and that you were able to fill any prescriptions that may have been ordered for you. Please contact the office at the above phone number if the ED advised you to follow up with Dr. Omayra Willard, or if you have any further questions or needs. Also did you know -   *Visiting the ED for a non-emergency could result in higher co-pays than you would normally be subject to paying? *You can call your doctor even after hours so they can direct you to the most appropriate care. [facility]) practices can often offer you an appointment on the same day that you call. *We have some [facility] offices that offer Walk-in appointments; check our website for availability in your community, [email or web address].      *Evisits are now available for patients for $36 through [employer] for certain conditions:  * Sinus, cold and or cough       * Diarrhea            * Headache  * Heartburn                                * Poison Karin          * Back pain     * Urinary problems                         If you do not have [employer] and are interested, please contact the office and a staff member may assist you or go to [email or web address].     Sincerely,     Omayra Willard DO and your [facility]

## 2020-09-22 ENCOUNTER — HOSPITAL ENCOUNTER (EMERGENCY)
Age: 60
Discharge: HOME | DRG: 919 | End: 2020-09-22
Attending: EMERGENCY MEDICINE
Payer: MEDICARE

## 2020-09-22 ENCOUNTER — APPOINTMENT (OUTPATIENT)
Dept: CT IMAGING | Age: 60
DRG: 919 | End: 2020-09-22
Payer: MEDICARE

## 2020-09-22 ENCOUNTER — APPOINTMENT (OUTPATIENT)
Dept: GENERAL RADIOLOGY | Age: 60
DRG: 919 | End: 2020-09-22
Payer: MEDICARE

## 2020-09-22 VITALS
SYSTOLIC BLOOD PRESSURE: 121 MMHG | HEART RATE: 65 BPM | DIASTOLIC BLOOD PRESSURE: 69 MMHG | HEIGHT: 59 IN | BODY MASS INDEX: 22.18 KG/M2 | TEMPERATURE: 98.7 F | WEIGHT: 110 LBS | OXYGEN SATURATION: 97 % | RESPIRATION RATE: 12 BRPM

## 2020-09-22 LAB
ALBUMIN SERPL-MCNC: 4.3 G/DL (ref 3.5–5.1)
ALP BLD-CCNC: 365 U/L (ref 38–126)
ALT SERPL-CCNC: 367 U/L (ref 11–66)
ANION GAP SERPL CALCULATED.3IONS-SCNC: 10 MEQ/L (ref 8–16)
APTT: 29.4 SECONDS (ref 22–38)
AST SERPL-CCNC: 290 U/L (ref 5–40)
BACTERIA: ABNORMAL /HPF
BASOPHILS # BLD: 0.3 %
BASOPHILS ABSOLUTE: 0 THOU/MM3 (ref 0–0.1)
BILIRUB SERPL-MCNC: 0.3 MG/DL (ref 0.3–1.2)
BILIRUBIN DIRECT: < 0.2 MG/DL (ref 0–0.3)
BILIRUBIN URINE: NEGATIVE
BLOOD, URINE: NEGATIVE
BUN BLDV-MCNC: 17 MG/DL (ref 7–22)
CALCIUM SERPL-MCNC: 10.9 MG/DL (ref 8.5–10.5)
CASTS 2: ABNORMAL /LPF
CASTS UA: ABNORMAL /LPF
CHARACTER, URINE: CLEAR
CHLORIDE BLD-SCNC: 101 MEQ/L (ref 98–111)
CO2: 24 MEQ/L (ref 23–33)
COLOR: YELLOW
CREAT SERPL-MCNC: 0.6 MG/DL (ref 0.4–1.2)
CRYSTALS, UA: ABNORMAL
EOSINOPHIL # BLD: 0.8 %
EOSINOPHILS ABSOLUTE: 0.1 THOU/MM3 (ref 0–0.4)
EPITHELIAL CELLS, UA: ABNORMAL /HPF
ERYTHROCYTE [DISTWIDTH] IN BLOOD BY AUTOMATED COUNT: 12.4 % (ref 11.5–14.5)
ERYTHROCYTE [DISTWIDTH] IN BLOOD BY AUTOMATED COUNT: 46.7 FL (ref 35–45)
GFR SERPL CREATININE-BSD FRML MDRD: > 90 ML/MIN/1.73M2
GLUCOSE BLD-MCNC: 113 MG/DL (ref 70–108)
GLUCOSE URINE: NEGATIVE MG/DL
HCT VFR BLD CALC: 37.7 % (ref 37–47)
HEMOGLOBIN: 12.2 GM/DL (ref 12–16)
IMMATURE GRANS (ABS): 0.05 THOU/MM3 (ref 0–0.07)
IMMATURE GRANULOCYTES: 0.6 %
INR BLD: 0.92 (ref 0.85–1.13)
KETONES, URINE: ABNORMAL
LEUKOCYTE ESTERASE, URINE: ABNORMAL
LIPASE: 19.3 U/L (ref 5.6–51.3)
LYMPHOCYTES # BLD: 6.3 %
LYMPHOCYTES ABSOLUTE: 0.5 THOU/MM3 (ref 1–4.8)
MCH RBC QN AUTO: 33.5 PG (ref 26–33)
MCHC RBC AUTO-ENTMCNC: 32.4 GM/DL (ref 32.2–35.5)
MCV RBC AUTO: 103.6 FL (ref 81–99)
MISCELLANEOUS 2: ABNORMAL
MONOCYTES # BLD: 5.1 %
MONOCYTES ABSOLUTE: 0.4 THOU/MM3 (ref 0.4–1.3)
NITRITE, URINE: NEGATIVE
NUCLEATED RED BLOOD CELLS: 0 /100 WBC
OSMOLALITY CALCULATION: 272.4 MOSMOL/KG (ref 275–300)
PH UA: 7 (ref 5–9)
PLATELET # BLD: 227 THOU/MM3 (ref 130–400)
PMV BLD AUTO: 10.1 FL (ref 9.4–12.4)
POTASSIUM SERPL-SCNC: 4.6 MEQ/L (ref 3.5–5.2)
PROTEIN UA: NEGATIVE
RBC # BLD: 3.64 MILL/MM3 (ref 4.2–5.4)
RBC URINE: ABNORMAL /HPF
RENAL EPITHELIAL, UA: ABNORMAL
SEG NEUTROPHILS: 86.9 %
SEGMENTED NEUTROPHILS ABSOLUTE COUNT: 7.6 THOU/MM3 (ref 1.8–7.7)
SODIUM BLD-SCNC: 135 MEQ/L (ref 135–145)
SPECIFIC GRAVITY, URINE: 1.01 (ref 1–1.03)
TOTAL PROTEIN: 6.9 G/DL (ref 6.1–8)
TROPONIN T: < 0.01 NG/ML
UROBILINOGEN, URINE: 0.2 EU/DL (ref 0–1)
WBC # BLD: 8.7 THOU/MM3 (ref 4.8–10.8)
WBC UA: ABNORMAL /HPF
YEAST: ABNORMAL

## 2020-09-22 PROCEDURE — 82248 BILIRUBIN DIRECT: CPT

## 2020-09-22 PROCEDURE — 81001 URINALYSIS AUTO W/SCOPE: CPT

## 2020-09-22 PROCEDURE — 83690 ASSAY OF LIPASE: CPT

## 2020-09-22 PROCEDURE — 85610 PROTHROMBIN TIME: CPT

## 2020-09-22 PROCEDURE — 85025 COMPLETE CBC W/AUTO DIFF WBC: CPT

## 2020-09-22 PROCEDURE — 99283 EMERGENCY DEPT VISIT LOW MDM: CPT

## 2020-09-22 PROCEDURE — 6360000004 HC RX CONTRAST MEDICATION: Performed by: EMERGENCY MEDICINE

## 2020-09-22 PROCEDURE — 85730 THROMBOPLASTIN TIME PARTIAL: CPT

## 2020-09-22 PROCEDURE — 84484 ASSAY OF TROPONIN QUANT: CPT

## 2020-09-22 PROCEDURE — 71046 X-RAY EXAM CHEST 2 VIEWS: CPT

## 2020-09-22 PROCEDURE — 80053 COMPREHEN METABOLIC PANEL: CPT

## 2020-09-22 PROCEDURE — 74177 CT ABD & PELVIS W/CONTRAST: CPT

## 2020-09-22 PROCEDURE — 36415 COLL VENOUS BLD VENIPUNCTURE: CPT

## 2020-09-22 RX ORDER — SODIUM CHLORIDE 9 MG/ML
1000 INJECTION, SOLUTION INTRAVENOUS CONTINUOUS
Status: DISCONTINUED | OUTPATIENT
Start: 2020-09-22 | End: 2020-09-22 | Stop reason: HOSPADM

## 2020-09-22 RX ORDER — NAPROXEN 500 MG/1
500 TABLET ORAL 2 TIMES DAILY PRN
Qty: 60 TABLET | Refills: 0 | Status: ON HOLD | OUTPATIENT
Start: 2020-09-22 | End: 2020-09-25

## 2020-09-22 RX ORDER — ONDANSETRON 4 MG/1
4 TABLET, FILM COATED ORAL 3 TIMES DAILY PRN
Qty: 15 TABLET | Refills: 0 | Status: SHIPPED | OUTPATIENT
Start: 2020-09-22

## 2020-09-22 RX ADMIN — IOPAMIDOL 80 ML: 755 INJECTION, SOLUTION INTRAVENOUS at 15:03

## 2020-09-22 ASSESSMENT — ENCOUNTER SYMPTOMS
CHEST TIGHTNESS: 0
CONSTIPATION: 0
PHOTOPHOBIA: 0
VOMITING: 1
NAUSEA: 1
ABDOMINAL DISTENTION: 0
SORE THROAT: 0
CHOKING: 0
BLOOD IN STOOL: 0
DIARRHEA: 0
VOICE CHANGE: 0
STRIDOR: 0
ABDOMINAL PAIN: 1
TROUBLE SWALLOWING: 0
SHORTNESS OF BREATH: 0
WHEEZING: 0
BACK PAIN: 1
COUGH: 0

## 2020-09-22 ASSESSMENT — PAIN DESCRIPTION - PAIN TYPE: TYPE: ACUTE PAIN

## 2020-09-22 ASSESSMENT — PAIN SCALES - GENERAL
PAINLEVEL_OUTOF10: 8
PAINLEVEL_OUTOF10: 8

## 2020-09-22 ASSESSMENT — PAIN DESCRIPTION - ORIENTATION: ORIENTATION: RIGHT;LOWER

## 2020-09-22 ASSESSMENT — PAIN DESCRIPTION - LOCATION: LOCATION: ABDOMEN;BACK

## 2020-09-22 ASSESSMENT — PAIN DESCRIPTION - FREQUENCY: FREQUENCY: INTERMITTENT

## 2020-09-22 NOTE — ED PROVIDER NOTES
ATTENDING ATTESTATION  Evaluation of Suzanna Billingsley. Patient seen and examined by me. Case discussed and care plan developed with resident. I agree with the note and plan as documented by her, except if my documentation differs. I reviewed the medical, surgical, family and social history, medications and allergies. I have reviewed the nursing documentation. I have reviewed the patient's vital signs. Patient c/o upper abd pain radiating around the side towards her back. No vomiting. Hx of cholecystectomy. Patient denies urinary symptoms. Physical exam is notable for well appearing female, no acute distress. Abdomen is soft without rebound, guarding, rigidity. No lower extremity edema, no murmur. Lungs clear to auscultation bilaterally. Pulses 2+ in all 4 extremities. EKG: EKG demonstrates normal sinus rhythm with ventricular rate of 89. No ST changes or T wave inversions suggestive of ischemia. No STEMI.  QRS is 86. VT is 146. QTc is 447. MDM: 51-year-old female returns to the emergency department for upper abdominal pain and back pain. Exam does not show a surgical abdomen. CT that was repeated today shows constipation otherwise no acute pathology. Patient does have transaminitis with elevated alk phos. Patient is tolerating p.o. and is appropriate for outpatient testing. Pt given appropriate return precautions for fever, vomiting, severe pain, inability to eat or drink. Plan: d/c with outpatient PCP follow up. Please see the residents' completed note for final disposition except as documented on this attestation. Diagnosis, treatment and disposition plans were discussed and agreed upon by patient. This transcription was electronically signed. It was dictated by use of voice recognition software and electronically transcribed. The transcription may contain errors not detected in proofreading.      I was present for the critical portion following procedures: None Electronically signed by Smith MD on 9/22/20 at 4:15 PM EDT        Ernie Mcbride MD  09/22/20 5026

## 2020-09-22 NOTE — ED PROVIDER NOTES
joint swelling, myalgias, neck pain and neck stiffness. Neurological: Negative for dizziness, tremors, seizures, syncope, facial asymmetry, speech difficulty, weakness, light-headedness, numbness and headaches. Hematological: Negative for adenopathy. Does not bruise/bleed easily.          PAST MEDICAL AND SURGICAL HISTORY     Past Medical History:   Diagnosis Date    Balance problem     Bipolar I disorder, most recent episode (or current) depressed, in partial or unspecified remission 5/8/2013    [address] [address]     CERVICAL     Depression     Fatigue     Gallstones     Gastric bypass status for obesity 2002    GERD (gastroesophageal reflux disease)     History of blood transfusion     History of hysterectomy     REMOVAL OF ONE OVARY     Hypothyroidism     MS (multiple sclerosis) (HCC)     Paresthesias     RT LOWER LIMB    UTI (urinary tract infection)      Past Surgical History:   Procedure Laterality Date    APPENDECTOMY      BONE GRAFT Left     left leg to right arm    CARPAL TUNNEL RELEASE  1999    CHOLECYSTECTOMY, LAPAROSCOPIC  5/16/2016    COLONOSCOPY      DILATATION, ESOPHAGUS      ENDOSCOPY, COLON, DIAGNOSTIC      ENTEROSCOPY N/A 2/5/2018    ENTEROS[facility]  5/16/2016    OTHER SURGICAL HISTORY      RECTAL [address]    OTHER SURGICAL HISTORY  1986    D&C    OTHER SURGICAL HISTORY  2006    GI BLEED    PACEMAKER INSERTION Right 05/20/2019    TN COLONOSCOPY FLX DX W/COLLJ SPEC WHEN PFRMD Left 2/6/2018    COLONOSCOPY performed by Yang Bey MD at [address] [place] [address] OFFICE/OUTPT VISIT,PROCEDURE ONLY Left 2/18/2018    EGD ESOPHAGOGASTRODUODENOSCOPY performed by Brian Wilkinson MD at [address] [place] [address] OFFICE/OUTPT VISIT,PROCEDURE ONLY N/A 10/4/2018    EGD DIAGNOSTIC ONLY performed by Yonas Knight MD at [facility] [address]    UPPER GASTROINTESTINAL ENDOSCOPY Left 9/24/2019    EGD DIAGNOSTIC ONLY performed by Jorge Gilbert MD at [address] [place] [address]   No current facility-administered medications for this encounter. Current Outpatient Medications:     ondansetron (ZOFRAN) 4 MG tablet, Take 1 tablet by mouth 3 times daily as needed for Nausea or Vomiting, Disp: 15 tablet, Rfl: 0    naproxen (NAPROSYN) 500 MG tablet, Take 1 tablet by mouth 2 times daily as needed for Pain, Disp: 60 tablet, Rfl: 0    HYDROcodone-acetaminophen (NORCO) 5-325 MG per tablet, Take 1 tablet by mouth every 6 hours as needed for Pain for up to 3 days. WARNING: This medication may make you drowsy. Do not operate heavy machinery or motor vehicles during use., Disp: 10 tablet, Rfl: 0    ciprofloxacin (CIPRO) 500 MG tablet, Take 1 tablet by mouth 2 times daily for 10 days, Disp: 20 tablet, Rfl: 0    metroNIDAZOLE (FLAGYL) 500 MG tablet, Take 1 tablet by mouth 2 times daily for 10 days, Disp: 20 tablet, Rfl: 0    lamoTRIgine (LAMICTAL) 200 MG tablet, TAKE 1 TABLET BY MOUTH ONCE DAILY. , Disp: 30 tablet, Rfl: 1    pantoprazole (PROTONIX) 40 MG tablet, TAKE ONE TABLET BY MOUTH EVERY MORNING BEFORE BREAKFAST., Disp: 30 tablet, Rfl: 5    ipratropium (ATROVENT) 0.03 % nasal spray, INSTILL 2 SPRAYS IN EACH NOSTRIL THREE TIMES A DAY, Disp: 30 mL, Rfl: 3    lithium 300 MG tablet, Take 2 tablets by mouth 2 times daily, Disp: 120 tablet, Rfl: 3    levothyroxine (SYNTHROID) 100 MCG tablet, TAKE 1 TABLET BY MOUTH ONE TIME A DAY, Disp: 90 tablet, Rfl: 1    VILAZODONE HCL 40 MG Tablet, TAKE 1 TABLET BY MOUTH ONCE DAILY. , Disp: 90 tablet, Rfl: 1    Prosthesis MISC, by Does not apply route Strap for the prosthesis broken please order new, Disp: 1 each, Rfl: 0    loratadine (CLARITIN) 10 MG tablet, Take 1 tablet by mouth daily, Disp: 90 tablet, Rfl: 1    fluticasone (FLONASE) 50 MCG/ACT nasal spray, 2 sprays by Each Nostril route daily, Disp: 3 Bottle, Rfl: 1    polyethylene glycol (GLYCOLAX) powder, Take 17 g by mouth daily, Disp: , Rfl:     Cholecalciferol (VITAMIN D) 2000 units CAPS capsule, Take 2,000 Units by mouth nightly , Disp: , Rfl:     Magnesium Oxide 250 MG TABS, Take 250 mg by mouth nightly , Disp: , Rfl:     calcium carbonate 600 MG TABS tablet, Take 1 tablet by mouth nightly , Disp: , Rfl:     vitamin B-12 1000 MCG tablet, Take 1 tablet by mouth daily, Disp: 30 tablet, Rfl: 5    dimethyl fumarate (TECFIDERA) 240 MG delayed release capsule, Take 240 mg by mouth 2 times daily, Disp: , Rfl:     nitroGLYCERIN (NITROSTAT) 0.4 MG SL tablet, up to max of 3 total doses. If no relief after 1 dose, call 911., Disp: 25 tablet, Rfl: 3    Prenatal MV-Min-Fe Fum-FA-DHA (PRENATAL 1 PO), Take 1 tablet by mouth daily , Disp: , Rfl:     ferrous sulfate 325 (65 FE) MG tablet, Take 325 mg by mouth daily (with breakfast), Disp: , Rfl:       SOCIAL HISTORY     Social History     Social History Narrative    Not on file     Social History     Tobacco Use    Smoking status: Former Smoker     Packs/day: 0.10     Years: 0.50     Pack years: 0.05     Types: Cigarettes     Last attempt to quit: 10/24/1977     Years since quittin.9    Smokeless tobacco: Never Used   Substance Use Topics    Alcohol use: No    Drug use: No         ALLERGIES     Allergies   Allergen Reactions    Latex Rash    Sulfa Antibiotics Rash         FAMILY HISTORY     Family History   Problem Relation Age of Onset    Cancer Mother     Colon Polyps Mother     Heart Disease Father     Glaucoma Father     Cancer Sister     Cancer Sister          PREVIOUS RECORDS   Previous records reviewed: Past medical, past surgical, medications, allergies.         PHYSICAL EXAM     ED Triage Vitals [20 1258]   BP Temp Temp Source Pulse Resp SpO2 Height Weight   121/68 98.7 °F (37.1 °C) Oral 66 16 97 % 4' 11\" (1.499 m) 110 lb (49.9 kg)     Initial vital signs and nursing assessment reviewed and Normal. Pulsoximetry is normal per my interpretation. Additional Vital Signs:  Vitals:    09/22/20 1431   BP: 121/69   Pulse: 65   Resp: 12   Temp:    SpO2: 97%       Physical Exam  Constitutional:       General: She is not in acute distress. Appearance: She is normal weight. She is not ill-appearing, toxic-appearing or diaphoretic. HENT:      Head: Normocephalic and atraumatic. Mouth/Throat:      Mouth: Mucous membranes are moist.      Pharynx: Oropharynx is clear. Eyes:      General: No scleral icterus. Extraocular Movements: Extraocular movements intact. Pupils: Pupils are equal, round, and reactive to light. Cardiovascular:      Rate and Rhythm: Normal rate. Heart sounds: Normal heart sounds. No murmur. No gallop. Pulmonary:      Effort: Pulmonary effort is normal. No respiratory distress. Breath sounds: Normal breath sounds. No stridor. No wheezing, rhonchi or rales. Chest:      Chest wall: No tenderness. Abdominal:      General: Abdomen is flat. A surgical scar is present. Bowel sounds are normal. There is no distension or abdominal bruit. Palpations: Abdomen is soft. There is no shifting dullness, fluid wave or pulsatile mass. Tenderness: There is abdominal tenderness in the right lower quadrant. There is right CVA tenderness. There is no left CVA tenderness, guarding or rebound. Negative signs include Hendrickson's sign, Rovsing's sign, McBurney's sign, psoas sign and obturator sign. Skin:     General: Skin is warm and dry. Capillary Refill: Capillary refill takes less than 2 seconds. Coloration: Skin is not jaundiced or pale. Neurological:      General: No focal deficit present. Mental Status: She is alert.              MEDICAL DECISION MAKING   Initial Assessment: 70-year-old female with multiple previous abdominal surgeries, including cholecystectomy, appendectomy, hysterectomy, who presented previously 2 days ago and now again for abdominal pain. Differential diagnoses include but are not limited to diverticulitis, small bowel obstruction, gastroenteritis, pancreatitis, cholangitis, hepatitis, pyelonephritis. Physical exam significant for soft abdomen, normal bowel sounds, pain to deep palpation in right lower quadrant and right CVA. Labs show increased LFTs. CT was repeated. Showed hepatic cyst and constipation. Plan: Discharge to home with close follow-up to GI and primary care for transaminitis. Strict return precautions discussed with patient. ED RESULTS   Laboratory results:  Labs Reviewed   CBC WITH AUTO DIFFERENTIAL - Abnormal; Notable for the following components:       Result Value    RBC 3.64 (*)     .6 (*)     MCH 33.5 (*)     RDW-SD 46.7 (*)     Lymphocytes Absolute 0.5 (*)     All other components within normal limits   BASIC METABOLIC PANEL - Abnormal; Notable for the following components:    Glucose 113 (*)     Calcium 10.9 (*)     All other components within normal limits   HEPATIC FUNCTION PANEL - Abnormal; Notable for the following components:    Alkaline Phosphatase 365 (*)      (*)      (*)     All other components within normal limits   OSMOLALITY - Abnormal; Notable for the following components:    Osmolality Calc 272.4 (*)     All other components within normal limits   URINE WITH REFLEXED MICRO - Abnormal; Notable for the following components:    Ketones, Urine TRACE (*)     Leukocyte Esterase, Urine SMALL (*)     All other components within normal limits   LIPASE   TROPONIN   APTT   PROTIME-INR   ANION GAP   GLOMERULAR FILTRATION RATE, ESTIMATED       Radiologic studies results:  CT ABDOMEN PELVIS W IV CONTRAST Additional Contrast? None   Final Result   Moderate stool throughout the colon. Correlate for constipation. **This report has been created using voice recognition software. It may contain minor errors which are inherent in voice recognition technology. ** Final report electronically signed by Dr. Jaquan Tello on 9/22/2020 3:51 PM      XR CHEST (2 VW)   Final Result    IMPRESSION:      No acute findings. **This report has been created using voice recognition software. It may contain minor errors which are inherent in voice recognition technology. **      Final report electronically signed by Dr. Jaquan Tello on 9/22/2020 1:32 PM          ED Medications administered this visit:   Medications   iopamidol (ISOVUE-370) 76 % injection 80 mL (80 mLs Intravenous Given 9/22/20 1503)         ED COURSE     ED Course as of Sep 22 2258   Tue Sep 22, 2020   1823 Patient updated on the results of her laboratory testing, she is agreeable to discharge and further evaluation work-up as an outpatient    [FK]      ED Course User Index  [FK] Jael Mejia MD       Strict return precautions and follow up instructions were discussed with the patient prior to discharge, with which the patient agrees. MEDICATION CHANGES     Discharge Medication List as of 9/22/2020  6:25 PM            FINAL DISPOSITION     Final diagnoses:   Right upper quadrant abdominal pain   Hepatic cyst   Transaminitis     Condition: condition: good  Dispo: Discharge to home      This transcription was electronically signed. Parts of this transcriptions may have been dictated by use of voice recognition software and electronically transcribed, and parts may have been transcribed with the assistance of an ED scribe. The transcription may contain errors not detected in proofreading. Please refer to my supervising physician's documentation if my documentation differs.     Electronically Signed: Cj Wilkerson, 09/22/20, 10:58 PM         Cj Wilkerson MD  Resident  09/22/20 8130

## 2020-09-22 NOTE — ED NOTES
Patient presents to the ED with complaints of abdominal pain and back pain that has been ongoing for 2 weeks. States she was seen here Saturday and they diagnosed with colitis but it has not got better. States she thinks its a kidney stone because the pain is more flank area.      Charles Solorio  09/22/20 [id number]

## 2020-09-23 ENCOUNTER — TELEPHONE (OUTPATIENT)
Dept: FAMILY MEDICINE CLINIC | Age: 60
End: 2020-09-23

## 2020-09-23 NOTE — TELEPHONE ENCOUNTER
[facility]  [address] [place] [address], [address]  Phone: [phone]  Fax: [phone]    September 23, 2020    Gurmeet Agudelo  [address]  [facility] [address]      Dear Carolyn Tsai,    This letter is regarding your Emergency Department (ED) visit at [address] [address] on 9/22/20. Dr. Bryon Calvo wanted to make sure that you understand your discharge instructions and that you were able to fill any prescriptions that may have been ordered for you. Please contact the office at the above phone number if the ED advised you to follow up with Dr. Bryon Calvo, or if you have any further questions or needs. Also did you know -   *Visiting the ED for a non-emergency could result in higher co-pays than you would normally be subject to paying? *You can call your doctor even after hours so they can direct you to the most appropriate care. [facility]) practices can often offer you an appointment on the same day that you call. *We have some [facility] offices that offer Walk-in appointments; check our website for availability in your community, [email or web address].      *Evisits are now available for patients for $36 through [employer] for certain conditions:  * Sinus, cold and or cough       * Diarrhea            * Headache  * Heartburn                                * Poison Karin          * Back pain     * Urinary problems                         If you do not have [facility] and are interested, please contact the office and a staff member may assist you or go to [email or web address].     Sincerely,     Bryon Calvo DO and your [facility]

## 2020-09-25 ENCOUNTER — HOSPITAL ENCOUNTER (INPATIENT)
Age: 60
LOS: 6 days | Discharge: HOME HEALTH | DRG: 919 | End: 2020-10-01
Attending: EMERGENCY MEDICINE | Admitting: INTERNAL MEDICINE
Payer: MEDICARE

## 2020-09-25 PROBLEM — K92.2 GI BLEED: Status: ACTIVE | Noted: 2020-09-25

## 2020-09-25 PROBLEM — D50.0 BLOOD LOSS ANEMIA: Status: ACTIVE | Noted: 2018-02-17

## 2020-09-25 LAB
ABO: NORMAL
ALBUMIN SERPL-MCNC: 3.6 G/DL (ref 3.5–5.1)
ALP BLD-CCNC: 145 U/L (ref 38–126)
ALT SERPL-CCNC: 82 U/L (ref 11–66)
AMORPHOUS: ABNORMAL
ANION GAP SERPL CALCULATED.3IONS-SCNC: 7 MEQ/L (ref 8–16)
ANTIBODY SCREEN: NORMAL
APTT: 26.5 SECONDS (ref 22–38)
AST SERPL-CCNC: 23 U/L (ref 5–40)
BACTERIA: ABNORMAL /HPF
BASOPHILS # BLD: 0.4 %
BASOPHILS ABSOLUTE: 0 THOU/MM3 (ref 0–0.1)
BILIRUB SERPL-MCNC: 0.2 MG/DL (ref 0.3–1.2)
BILIRUBIN URINE: NEGATIVE
BLOOD, URINE: NEGATIVE
BUN BLDV-MCNC: 26 MG/DL (ref 7–22)
CALCIUM SERPL-MCNC: 9.4 MG/DL (ref 8.5–10.5)
CASTS 2: ABNORMAL /LPF
CASTS UA: ABNORMAL /LPF
CHARACTER, URINE: CLEAR
CHLORIDE BLD-SCNC: 109 MEQ/L (ref 98–111)
CO2: 23 MEQ/L (ref 23–33)
COLOR: ABNORMAL
CREAT SERPL-MCNC: 0.6 MG/DL (ref 0.4–1.2)
CRYSTALS, UA: ABNORMAL
EOSINOPHIL # BLD: 0.1 %
EOSINOPHILS ABSOLUTE: 0 THOU/MM3 (ref 0–0.4)
EPITHELIAL CELLS, UA: ABNORMAL /HPF
ERYTHROCYTE [DISTWIDTH] IN BLOOD BY AUTOMATED COUNT: 13.4 % (ref 11.5–14.5)
ERYTHROCYTE [DISTWIDTH] IN BLOOD BY AUTOMATED COUNT: 49.8 FL (ref 35–45)
FERRITIN: 27 NG/ML (ref 10–291)
GFR SERPL CREATININE-BSD FRML MDRD: > 90 ML/MIN/1.73M2
GLUCOSE BLD-MCNC: 108 MG/DL (ref 70–108)
GLUCOSE URINE: NEGATIVE MG/DL
HCT VFR BLD CALC: 23.2 % (ref 37–47)
HEMOGLOBIN: 7.3 GM/DL (ref 12–16)
IMMATURE GRANS (ABS): 0.12 THOU/MM3 (ref 0–0.07)
IMMATURE GRANULOCYTES: 1.6 %
INR BLD: 0.96 (ref 0.85–1.13)
IRON: 123 UG/DL (ref 50–170)
KETONES, URINE: NEGATIVE
LEUKOCYTE ESTERASE, URINE: ABNORMAL
LITHIUM LEVEL: 1.26 MMOL/L (ref 0.6–1.2)
LYMPHOCYTES # BLD: 5.3 %
LYMPHOCYTES ABSOLUTE: 0.4 THOU/MM3 (ref 1–4.8)
MCH RBC QN AUTO: 33.3 PG (ref 26–33)
MCHC RBC AUTO-ENTMCNC: 31.5 GM/DL (ref 32.2–35.5)
MCV RBC AUTO: 105.9 FL (ref 81–99)
MISCELLANEOUS 2: ABNORMAL
MONOCYTES # BLD: 5.1 %
MONOCYTES ABSOLUTE: 0.4 THOU/MM3 (ref 0.4–1.3)
MRSA SCREEN RT-PCR: NEGATIVE
MUCUS: ABNORMAL
NITRITE, URINE: NEGATIVE
NUCLEATED RED BLOOD CELLS: 0 /100 WBC
OSMOLALITY CALCULATION: 282.8 MOSMOL/KG (ref 275–300)
PH UA: 5.5 (ref 5–9)
PLATELET # BLD: 194 THOU/MM3 (ref 130–400)
PMV BLD AUTO: 10.2 FL (ref 9.4–12.4)
POTASSIUM REFLEX MAGNESIUM: 3.9 MEQ/L (ref 3.5–5.2)
PROTEIN UA: ABNORMAL
RBC # BLD: 2.19 MILL/MM3 (ref 4.2–5.4)
RBC URINE: ABNORMAL /HPF
REASON FOR REJECTION: NORMAL
REJECTED TEST: NORMAL
RENAL EPITHELIAL, UA: ABNORMAL
RH FACTOR: NORMAL
SEG NEUTROPHILS: 87.5 %
SEGMENTED NEUTROPHILS ABSOLUTE COUNT: 6.5 THOU/MM3 (ref 1.8–7.7)
SODIUM BLD-SCNC: 139 MEQ/L (ref 135–145)
SPECIFIC GRAVITY, URINE: 1.02 (ref 1–1.03)
TOTAL PROTEIN: 5.3 G/DL (ref 6.1–8)
TROPONIN T: < 0.01 NG/ML
UROBILINOGEN, URINE: 1 EU/DL (ref 0–1)
VANCOMYCIN RESISTANT ENTEROCOCCUS: NEGATIVE
WBC # BLD: 7.4 THOU/MM3 (ref 4.8–10.8)
WBC UA: ABNORMAL /HPF
YEAST: ABNORMAL

## 2020-09-25 PROCEDURE — 2060000000 HC ICU INTERMEDIATE R&B

## 2020-09-25 PROCEDURE — 87641 MR-STAPH DNA AMP PROBE: CPT

## 2020-09-25 PROCEDURE — 84484 ASSAY OF TROPONIN QUANT: CPT

## 2020-09-25 PROCEDURE — 85610 PROTHROMBIN TIME: CPT

## 2020-09-25 PROCEDURE — 87500 VANOMYCIN DNA AMP PROBE: CPT

## 2020-09-25 PROCEDURE — C9113 INJ PANTOPRAZOLE SODIUM, VIA: HCPCS | Performed by: EMERGENCY MEDICINE

## 2020-09-25 PROCEDURE — 2580000003 HC RX 258: Performed by: EMERGENCY MEDICINE

## 2020-09-25 PROCEDURE — 86850 RBC ANTIBODY SCREEN: CPT

## 2020-09-25 PROCEDURE — 2580000003 HC RX 258: Performed by: STUDENT IN AN ORGANIZED HEALTH CARE EDUCATION/TRAINING PROGRAM

## 2020-09-25 PROCEDURE — 6370000000 HC RX 637 (ALT 250 FOR IP): Performed by: STUDENT IN AN ORGANIZED HEALTH CARE EDUCATION/TRAINING PROGRAM

## 2020-09-25 PROCEDURE — 6360000002 HC RX W HCPCS: Performed by: EMERGENCY MEDICINE

## 2020-09-25 PROCEDURE — 82728 ASSAY OF FERRITIN: CPT

## 2020-09-25 PROCEDURE — 81001 URINALYSIS AUTO W/SCOPE: CPT

## 2020-09-25 PROCEDURE — 85025 COMPLETE CBC W/AUTO DIFF WBC: CPT

## 2020-09-25 PROCEDURE — P9016 RBC LEUKOCYTES REDUCED: HCPCS

## 2020-09-25 PROCEDURE — 86901 BLOOD TYPING SEROLOGIC RH(D): CPT

## 2020-09-25 PROCEDURE — 93005 ELECTROCARDIOGRAM TRACING: CPT | Performed by: EMERGENCY MEDICINE

## 2020-09-25 PROCEDURE — 82746 ASSAY OF FOLIC ACID SERUM: CPT

## 2020-09-25 PROCEDURE — 36415 COLL VENOUS BLD VENIPUNCTURE: CPT

## 2020-09-25 PROCEDURE — 86900 BLOOD TYPING SEROLOGIC ABO: CPT

## 2020-09-25 PROCEDURE — 96374 THER/PROPH/DIAG INJ IV PUSH: CPT

## 2020-09-25 PROCEDURE — 86923 COMPATIBILITY TEST ELECTRIC: CPT

## 2020-09-25 PROCEDURE — 94761 N-INVAS EAR/PLS OXIMETRY MLT: CPT

## 2020-09-25 PROCEDURE — 80053 COMPREHEN METABOLIC PANEL: CPT

## 2020-09-25 PROCEDURE — 99223 1ST HOSP IP/OBS HIGH 75: CPT | Performed by: INTERNAL MEDICINE

## 2020-09-25 PROCEDURE — 99283 EMERGENCY DEPT VISIT LOW MDM: CPT

## 2020-09-25 PROCEDURE — 80178 ASSAY OF LITHIUM: CPT

## 2020-09-25 PROCEDURE — 82607 VITAMIN B-12: CPT

## 2020-09-25 PROCEDURE — 87081 CULTURE SCREEN ONLY: CPT

## 2020-09-25 PROCEDURE — 85730 THROMBOPLASTIN TIME PARTIAL: CPT

## 2020-09-25 PROCEDURE — 83540 ASSAY OF IRON: CPT

## 2020-09-25 PROCEDURE — 36430 TRANSFUSION BLD/BLD COMPNT: CPT

## 2020-09-25 RX ORDER — POLYETHYLENE GLYCOL 3350 17 G/17G
17 POWDER, FOR SOLUTION ORAL DAILY
Status: DISCONTINUED | OUTPATIENT
Start: 2020-09-26 | End: 2020-09-25 | Stop reason: CLARIF

## 2020-09-25 RX ORDER — CALCIUM CARBONATE 500(1250)
1 TABLET ORAL NIGHTLY
Status: DISCONTINUED | OUTPATIENT
Start: 2020-09-25 | End: 2020-10-01 | Stop reason: HOSPADM

## 2020-09-25 RX ORDER — LAMOTRIGINE 100 MG/1
200 TABLET ORAL DAILY
Status: DISCONTINUED | OUTPATIENT
Start: 2020-09-25 | End: 2020-10-01 | Stop reason: HOSPADM

## 2020-09-25 RX ORDER — SODIUM CHLORIDE 0.9 % (FLUSH) 0.9 %
10 SYRINGE (ML) INJECTION EVERY 12 HOURS SCHEDULED
Status: DISCONTINUED | OUTPATIENT
Start: 2020-09-25 | End: 2020-09-26 | Stop reason: SDUPTHER

## 2020-09-25 RX ORDER — ONDANSETRON 2 MG/ML
4 INJECTION INTRAMUSCULAR; INTRAVENOUS ONCE
Status: COMPLETED | OUTPATIENT
Start: 2020-09-25 | End: 2020-09-25

## 2020-09-25 RX ORDER — SODIUM CHLORIDE 9 MG/ML
10 INJECTION INTRAVENOUS DAILY
Status: DISCONTINUED | OUTPATIENT
Start: 2020-09-28 | End: 2020-09-25 | Stop reason: ALTCHOICE

## 2020-09-25 RX ORDER — LITHIUM CARBONATE 300 MG/1
300 CAPSULE ORAL ONCE
Status: COMPLETED | OUTPATIENT
Start: 2020-09-25 | End: 2020-09-25

## 2020-09-25 RX ORDER — NITROGLYCERIN 0.4 MG/1
0.4 TABLET SUBLINGUAL EVERY 5 MIN PRN
Status: DISCONTINUED | OUTPATIENT
Start: 2020-09-25 | End: 2020-10-01 | Stop reason: HOSPADM

## 2020-09-25 RX ORDER — PANTOPRAZOLE SODIUM 40 MG/10ML
40 INJECTION, POWDER, LYOPHILIZED, FOR SOLUTION INTRAVENOUS DAILY
Status: DISCONTINUED | OUTPATIENT
Start: 2020-09-28 | End: 2020-09-25 | Stop reason: ALTCHOICE

## 2020-09-25 RX ORDER — ACETAMINOPHEN 650 MG/1
650 SUPPOSITORY RECTAL EVERY 6 HOURS PRN
Status: DISCONTINUED | OUTPATIENT
Start: 2020-09-25 | End: 2020-10-01 | Stop reason: HOSPADM

## 2020-09-25 RX ORDER — FERROUS SULFATE 325(65) MG
325 TABLET ORAL
Status: DISCONTINUED | OUTPATIENT
Start: 2020-09-26 | End: 2020-10-01 | Stop reason: HOSPADM

## 2020-09-25 RX ORDER — 0.9 % SODIUM CHLORIDE 0.9 %
20 INTRAVENOUS SOLUTION INTRAVENOUS ONCE
Status: COMPLETED | OUTPATIENT
Start: 2020-09-25 | End: 2020-09-25

## 2020-09-25 RX ORDER — LACTOBACILLUS RHAMNOSUS GG 10B CELL
1 CAPSULE ORAL
Status: DISCONTINUED | OUTPATIENT
Start: 2020-09-26 | End: 2020-10-01 | Stop reason: HOSPADM

## 2020-09-25 RX ORDER — ONDANSETRON 2 MG/ML
4 INJECTION INTRAMUSCULAR; INTRAVENOUS EVERY 6 HOURS PRN
Status: DISCONTINUED | OUTPATIENT
Start: 2020-09-25 | End: 2020-10-01 | Stop reason: HOSPADM

## 2020-09-25 RX ORDER — CIPROFLOXACIN 500 MG/1
500 TABLET, FILM COATED ORAL 2 TIMES DAILY
Status: COMPLETED | OUTPATIENT
Start: 2020-09-25 | End: 2020-09-27

## 2020-09-25 RX ORDER — IPRATROPIUM BROMIDE 21 UG/1
2 SPRAY, METERED NASAL 3 TIMES DAILY
Status: DISCONTINUED | OUTPATIENT
Start: 2020-09-25 | End: 2020-10-01 | Stop reason: HOSPADM

## 2020-09-25 RX ORDER — LANOLIN ALCOHOL/MO/W.PET/CERES
1000 CREAM (GRAM) TOPICAL DAILY
Status: DISCONTINUED | OUTPATIENT
Start: 2020-09-26 | End: 2020-10-01 | Stop reason: HOSPADM

## 2020-09-25 RX ORDER — PROMETHAZINE HYDROCHLORIDE 25 MG/1
12.5 TABLET ORAL EVERY 6 HOURS PRN
Status: DISCONTINUED | OUTPATIENT
Start: 2020-09-25 | End: 2020-10-01 | Stop reason: HOSPADM

## 2020-09-25 RX ORDER — SODIUM CHLORIDE 9 MG/ML
INJECTION, SOLUTION INTRAVENOUS CONTINUOUS
Status: DISCONTINUED | OUTPATIENT
Start: 2020-09-25 | End: 2020-09-26

## 2020-09-25 RX ORDER — SODIUM CHLORIDE 0.9 % (FLUSH) 0.9 %
10 SYRINGE (ML) INJECTION PRN
Status: DISCONTINUED | OUTPATIENT
Start: 2020-09-25 | End: 2020-09-26 | Stop reason: SDUPTHER

## 2020-09-25 RX ORDER — ACETAMINOPHEN 325 MG/1
650 TABLET ORAL EVERY 6 HOURS PRN
Status: DISCONTINUED | OUTPATIENT
Start: 2020-09-25 | End: 2020-10-01 | Stop reason: HOSPADM

## 2020-09-25 RX ORDER — DIMETHYL FUMARATE 240 MG/1
240 CAPSULE ORAL 2 TIMES DAILY
Status: DISCONTINUED | OUTPATIENT
Start: 2020-09-25 | End: 2020-10-01 | Stop reason: HOSPADM

## 2020-09-25 RX ORDER — MULTIVITAMIN WITH IRON
1 TABLET ORAL DAILY
Status: DISCONTINUED | OUTPATIENT
Start: 2020-09-25 | End: 2020-10-01 | Stop reason: HOSPADM

## 2020-09-25 RX ORDER — 0.9 % SODIUM CHLORIDE 0.9 %
1000 INTRAVENOUS SOLUTION INTRAVENOUS ONCE
Status: COMPLETED | OUTPATIENT
Start: 2020-09-25 | End: 2020-09-25

## 2020-09-25 RX ORDER — CETIRIZINE HYDROCHLORIDE 10 MG/1
10 TABLET ORAL DAILY
Status: DISCONTINUED | OUTPATIENT
Start: 2020-09-25 | End: 2020-10-01 | Stop reason: HOSPADM

## 2020-09-25 RX ORDER — METRONIDAZOLE 500 MG/1
500 TABLET ORAL 2 TIMES DAILY
Status: COMPLETED | OUTPATIENT
Start: 2020-09-25 | End: 2020-09-27

## 2020-09-25 RX ORDER — LEVOTHYROXINE SODIUM 0.1 MG/1
100 TABLET ORAL DAILY
Status: DISCONTINUED | OUTPATIENT
Start: 2020-09-26 | End: 2020-10-01 | Stop reason: HOSPADM

## 2020-09-25 RX ORDER — VILAZODONE HYDROCHLORIDE 40 MG/1
40 TABLET ORAL DAILY
Status: DISCONTINUED | OUTPATIENT
Start: 2020-09-25 | End: 2020-10-01 | Stop reason: HOSPADM

## 2020-09-25 RX ORDER — POLYETHYLENE GLYCOL 3350 17 G/17G
17 POWDER, FOR SOLUTION ORAL DAILY
Status: DISCONTINUED | OUTPATIENT
Start: 2020-09-26 | End: 2020-10-01 | Stop reason: HOSPADM

## 2020-09-25 RX ORDER — VITAMIN B COMPLEX
2000 TABLET ORAL NIGHTLY
Status: DISCONTINUED | OUTPATIENT
Start: 2020-09-25 | End: 2020-10-01 | Stop reason: HOSPADM

## 2020-09-25 RX ADMIN — CIPROFLOXACIN 500 MG: 500 TABLET, FILM COATED ORAL at 20:57

## 2020-09-25 RX ADMIN — IPRATROPIUM BROMIDE 2 SPRAY: 21 SPRAY NASAL at 20:58

## 2020-09-25 RX ADMIN — LITHIUM CARBONATE 300 MG: 300 CAPSULE ORAL at 20:57

## 2020-09-25 RX ADMIN — SODIUM CHLORIDE 20 ML: 9 INJECTION, SOLUTION INTRAVENOUS at 16:00

## 2020-09-25 RX ADMIN — SODIUM CHLORIDE 1000 ML: 9 INJECTION, SOLUTION INTRAVENOUS at 13:07

## 2020-09-25 RX ADMIN — Medication 2000 UNITS: at 20:57

## 2020-09-25 RX ADMIN — ONDANSETRON 4 MG: 2 INJECTION INTRAMUSCULAR; INTRAVENOUS at 13:10

## 2020-09-25 RX ADMIN — SODIUM CHLORIDE 80 MG: 9 INJECTION, SOLUTION INTRAVENOUS at 14:12

## 2020-09-25 RX ADMIN — METRONIDAZOLE 500 MG: 500 TABLET, FILM COATED ORAL at 20:57

## 2020-09-25 RX ADMIN — Medication 10 ML: at 20:58

## 2020-09-25 RX ADMIN — CALCIUM 500 MG: 500 TABLET ORAL at 20:57

## 2020-09-25 RX ADMIN — SODIUM CHLORIDE 8 MG/HR: 9 INJECTION, SOLUTION INTRAVENOUS at 15:25

## 2020-09-25 RX ADMIN — LAMOTRIGINE 200 MG: 100 TABLET ORAL at 20:57

## 2020-09-25 RX ADMIN — VILAZODONE HYDROCHLORIDE 40 MG: 40 TABLET ORAL at 20:57

## 2020-09-25 RX ADMIN — THERA TABS 1 TABLET: TAB at 20:57

## 2020-09-25 ASSESSMENT — ENCOUNTER SYMPTOMS
SHORTNESS OF BREATH: 1
BLOOD IN STOOL: 1
NAUSEA: 0
DIARRHEA: 0
VOMITING: 0
COUGH: 0
SORE THROAT: 0
RHINORRHEA: 0
ABDOMINAL PAIN: 0
CONSTIPATION: 0

## 2020-09-25 ASSESSMENT — PAIN DESCRIPTION - PAIN TYPE: TYPE: ACUTE PAIN

## 2020-09-25 ASSESSMENT — PAIN SCALES - GENERAL
PAINLEVEL_OUTOF10: 7
PAINLEVEL_OUTOF10: 0

## 2020-09-25 ASSESSMENT — PAIN DESCRIPTION - ORIENTATION: ORIENTATION: RIGHT

## 2020-09-25 ASSESSMENT — PAIN DESCRIPTION - LOCATION: LOCATION: FLANK

## 2020-09-25 ASSESSMENT — PAIN DESCRIPTION - DESCRIPTORS: DESCRIPTORS: ACHING

## 2020-09-25 NOTE — H&P
History & Physical       Patient: Kd Pickens  YOB: 1960    MRN: [id number]     Acct: [de-identified]    PCP: Teagan Espinoza DO    Date of Admission: 9/25/2020    Date of Service: Patient seen / examined on 09/25/20 and admitted to Inpatient with expected LOS greater than two midnights due to medical therapy. ASSESSMENT / PLAN:  1. Acute blood loss anemia due to hx of gastritis and erosive esophagitis likely exacerbated by recent naproxen use: Was discharged with Naproxen 500mg BID on 9/22. Pt takes Protonix 40mg QD outpatient; was not compliant in the past because it caused her headaches. - Was given 1L IVF in ED and started on Protonix drip. NPO for now. Monitor H+H q6hrs. GI on case and plan for transfusion of 2 units over 3 hours each and possible EGD in AM.  2. Acute colitis noted on CT abdomen on 9/20: Was discharged from ED with Cipro and Flagyl. No colitis mentioned on CT on 9/22. Has not had diarrhea and no complaints of abdominal pain. - Continue Flagyl and Cipro for two more days  3. ? Mild Shock Liver with transaminitis likely due to #1, improving from 9/22: Hgb of 7.3 compared to 12.2 noted on 9/22. Acute drop in Hgb recently and hypotension on admission may have contributed to acute transaminitis, which has been improving.   - Started IVF @ 125 cc/hr and recheck CMP in AM.  4. Hx Pacemaker placement for symptomatic bradycardia, stable: ECG shows atrial paced rhythm. - Monitor  5. Hx Batsheva-en-Y Gastric Bypass due to Obesity in 2002  - Continue vitamin supplements  6. Hx Hypothyroidism: TSH normal  - Continue Synthroid 100 mcg QD  7. Hx Multiple Sclerosis, stable  - Continue Tecfidera 240mg BID  8. Hx Depression and Bipolar Type I, stable:  - Continue Lamictal, Viibryd;  Pharmacy to dose [place] due to noted mildly elevated lithium level     Chief Complaint:   Melena, weakness, fatigue    History of Present Illness:  Patient is a 60 y/o  female with PMH multiple GI ENTEROSCOPY N/A 2/5/2018    ENTEROSCOPY performed by Gin Sommer MD at [address], VAGINAL      LAPAROSCOPIC APPENDECTOMY  5/16/2016    OTHER SURGICAL HISTORY      RECTAL [address]    OTHER SURGICAL HISTORY  1986    D&C    OTHER SURGICAL HISTORY  2006    GI BLEED    PACEMAKER INSERTION Right 05/20/2019    [place] COLONOSCOPY FLX DX W/COLLJ SPEC WHEN PFRMD Left 2/6/2018    COLONOSCOPY performed by Gin Sommer MD at [address] OFFICE/OUTPT VISIT,PROCEDURE ONLY Left 2/18/2018    EGD ESOPHAGOGASTRODUODENOSCOPY performed by Fred Andrews MD at [facility]    [place] OFFICE/OUTPT VISIT,PROCEDURE ONLY N/A 10/4/2018    EGD DIAGNOSTIC ONLY performed by Genesis Harden MD at [address] [place] [address] ENDOSCOPY Left 9/24/2019    EGD DIAGNOSTIC ONLY performed by Tosin Chand MD at [facility]      Medications Prior to Admission:   No current facility-administered medications on file prior to encounter. Current Outpatient Medications on File Prior to Encounter   Medication Sig Dispense Refill    ondansetron (ZOFRAN) 4 MG tablet Take 1 tablet by mouth 3 times daily as needed for Nausea or Vomiting 15 tablet 0    naproxen (NAPROSYN) 500 MG tablet Take 1 tablet by mouth 2 times daily as needed for Pain 60 tablet 0    ciprofloxacin (CIPRO) 500 MG tablet Take 1 tablet by mouth 2 times daily for 10 days 20 tablet 0    metroNIDAZOLE (FLAGYL) 500 MG tablet Take 1 tablet by mouth 2 times daily for 10 days 20 tablet 0    lamoTRIgine (LAMICTAL) 200 MG tablet TAKE 1 TABLET BY MOUTH ONCE DAILY. 30 tablet 1    pantoprazole (PROTONIX) 40 MG tablet TAKE ONE TABLET BY MOUTH EVERY MORNING BEFORE BREAKFAST.  30 tablet 5    ipratropium (ATROVENT) 0.03 % nasal spray INSTILL 2 SPRAYS IN EACH NOSTRIL THREE TIMES A DAY 30 mL 3    lithium 300 MG tablet Take 2 tablets by mouth 2 times daily 120 tablet 3    levothyroxine (SYNTHROID) 100 MCG tablet TAKE 1 TABLET BY MOUTH ONE TIME A DAY 90 tablet 1    VILAZODONE HCL 40 MG Tablet TAKE 1 TABLET BY MOUTH ONCE DAILY. 90 tablet 1    Prosthesis MISC by Does not apply route Strap for the prosthesis broken please order new 1 each 0    loratadine (CLARITIN) 10 MG tablet Take 1 tablet by mouth daily 90 tablet 1    fluticasone (FLONASE) 50 MCG/ACT nasal spray 2 sprays by Each Nostril route daily 3 Bottle 1    polyethylene glycol (GLYCOLAX) powder Take 17 g by mouth daily      Cholecalciferol (VITAMIN D) 2000 units CAPS capsule Take 2,000 Units by mouth nightly       Magnesium Oxide 250 MG TABS Take 250 mg by mouth nightly       calcium carbonate 600 MG TABS tablet Take 1 tablet by mouth nightly       vitamin B-12 1000 MCG tablet Take 1 tablet by mouth daily 30 tablet 5    dimethyl fumarate (TECFIDERA) 240 MG delayed release capsule Take 240 mg by mouth 2 times daily      nitroGLYCERIN (NITROSTAT) 0.4 MG SL tablet up to max of 3 total doses.  If no relief after 1 dose, call 911. 25 tablet 3    Prenatal MV-Min-Fe Fum-FA-DHA (PRENATAL 1 PO) Take 1 tablet by mouth daily       ferrous sulfate 325 (65 FE) MG tablet Take 325 mg by mouth daily (with breakfast)       Allergies:   Latex and Sulfa antibiotics    Social History:   Social History     Socioeconomic History    Marital status:      Spouse name: Not on file    Number of children: Not on file    Years of education: Not on file    Highest education level: Not on file   Occupational History    Not on file   Social Needs    Financial resource strain: Not hard at all   [place] insecurity     Worry: Never true     Inability: Never true   [employer] needs     Medical: No     Non-medical: No   Tobacco Use    Smoking status: Former Smoker     Packs/day: 0.10     Years: 0.50     Pack years: 0.05     Types: Cigarettes     Last attempt to quit: 10/24/1977     Years since quittin.9    Smokeless tobacco: Never Used   Substance and Sexual Activity    Alcohol use: No    Drug use: No    Sexual activity: Not Currently     Partners: Male   Lifestyle    Physical activity     Days per week: Not on file     Minutes per session: Not on file    Stress: Not on file   Relationships    Social connections     Talks on phone: Not on file     Gets together: Not on file     Attends [employer] service: Not on file     Active member of club or organization: Not on file     Attends meetings of clubs or organizations: Not on file     Relationship status: Not on file    Intimate partner violence     Fear of current or ex partner: Not on file     Emotionally abused: Not on file     Physically abused: Not on file     Forced sexual activity: Not on file   Other Topics Concern    Not on file   Social History Narrative    Not on file     Family History:    Family History   Problem Relation Age of Onset    Cancer Mother     Colon Polyps Mother     Heart Disease Father     Glaucoma Father     Cancer Sister     Cancer Sister      REVIEW OF SYSTEMS:  A 14-point ROS was obtained and negative, with the exception of pertinent positives as listed below:  +Melena  +Weakness  +Fatigue    PHYSICAL EXAM:  Vitals:    09/25/20 1239 09/25/20 1249 09/25/20 1406 09/25/20 1529   BP: (!) 97/49 (!) 110/55 (!) 99/41 (!) 101/52   Pulse: 61 62 62 65   Resp: 18 20 18 18   Temp:  98.4 °F (36.9 °C)     TempSrc:  Oral     SpO2: 100% 100% 100% 100%   Weight:       Height:         General appearance: Alert / well-appearing. Cooperative. NAD. HEENT:  Normocephalic / atraumatic. PERRL. EOM intact. Conjunctivae appear normal.  Neck: Supple. No JVD. Respiratory: Normal respiratory effort on RA. CTAB. No wheezes / rales / rhonchi. Cardiovascular: RRR. Normal S1/S2. No murmurs / rubs / gallops. Abdomen: Soft / non-tender to palpation / non-distended. BS present. Right flank pain  Musculoskeletal: No cyanosis or edema.  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 Normal turgor. Neurologic: A/O x 3. Speech normal. Answers questions appropriately. CN intact. No obvious focal neurologic deficits. Psychiatric: Thought content / judgment / insight appear appropriate. Capillary refill: Brisk bilaterally. Peripheral pulses: +2 bilaterally.     Labs:   Results for orders placed or performed during the hospital encounter of 09/25/20   CBC Auto Differential   Result Value Ref Range    WBC 7.4 4.8 - 10.8 thou/mm3    RBC 2.19 (L) 4.20 - 5.40 mill/mm3    Hemoglobin 7.3 (L) 12.0 - 16.0 gm/dl    Hematocrit 23.2 (L) 37.0 - 47.0 %    .9 (H) 81.0 - 99.0 fL    MCH 33.3 (H) 26.0 - 33.0 pg    MCHC 31.5 (L) 32.2 - 35.5 gm/dl    RDW-CV 13.4 11.5 - 14.5 %    RDW-SD 49.8 (H) 35.0 - 45.0 fL    Platelets 853 510 - 861 thou/mm3    MPV 10.2 9.4 - 12.4 fL    Seg Neutrophils 87.5 %    Lymphocytes 5.3 %    Monocytes 5.1 %    Eosinophils 0.1 %    Basophils 0.4 %    Immature Granulocytes 1.6 %    Segs Absolute 6.5 1.8 - 7.7 thou/mm3    Lymphocytes Absolute 0.4 (L) 1.0 - 4.8 thou/mm3    Monocytes Absolute 0.4 0.4 - 1.3 thou/mm3    Eosinophils Absolute 0.0 0.0 - 0.4 thou/mm3    Basophils Absolute 0.0 0.0 - 0.1 thou/mm3    Immature Grans (Abs) 0.12 (H) 0.00 - 0.07 thou/mm3    nRBC 0 /100 wbc   Comprehensive Metabolic Panel w/ Reflex to MG   Result Value Ref Range    Glucose 108 70 - 108 mg/dL    CREATININE 0.6 0.4 - 1.2 mg/dL    BUN 26 (H) 7 - 22 mg/dL    Sodium 139 135 - 145 meq/L    Potassium reflex Magnesium 3.9 3.5 - 5.2 meq/L    Chloride 109 98 - 111 meq/L    CO2 23 23 - 33 meq/L    Calcium 9.4 8.5 - 10.5 mg/dL    AST 23 5 - 40 U/L    Alkaline Phosphatase 145 (H) 38 - 126 U/L    Total Protein 5.3 (L) 6.1 - 8.0 g/dL    Alb 3.6 3.5 - 5.1 g/dL    Total Bilirubin 0.2 (L) 0.3 - 1.2 mg/dL    ALT 82 (H) 11 - 66 U/L   Protime-INR   Result Value Ref Range    INR 0.96 0.85 - 1.13   APTT   Result Value Ref Range    aPTT 26.5 22.0 - 38.0 seconds   Troponin   Result Value Ref Range CLINICAL INFORMATION: cp. COMPARISON: September 21, 2019. TECHNIQUE: PA and lateral views the chest. FINDINGS: Prominent right chest wall pacer. No lobar consolidation. Costophrenic recesses are preserved. No acute osseous findings. IMPRESSION: No acute findings. **This report has been created using voice recognition software. It may contain minor errors which are inherent in voice recognition technology. ** Final report electronically signed by Dr. Michelle Lyons on 9/22/2020 1:32 PM    Ct Abdomen Pelvis W Iv Contrast Additional Contrast? None    Result Date: 9/22/2020  PROCEDURE: CT ABDOMEN PELVIS W IV CONTRAST CLINICAL INFORMATION: INCREASED LFTS, BELLY PAIN . COMPARISON: 9/20/2020 TECHNIQUE: 5 mm axial CT images were obtained through the abdomen and pelvis after the administration of intravenous and oral contrast. Coronal and sagittal reconstructions were obtained. All CT scans at this facility use dose modulation, iterative reconstruction, and/or weight-based dosing when appropriate to reduce radiation dose to as low as reasonably achievable. FINDINGS: Lung bases are clear. Spleen, pancreas adrenal glands are unremarkable. Hypoattenuating lesion in the right hepatic lobe probable cyst. Cholecystectomy with mild biliary dilatation. Left renal cortical cyst. No hydronephrosis. Spleen is unremarkable. Pancreas is within normal limits. Postsurgical changes stomach. Large amount of stool throughout the colon. Correlate for constipation. No bladder wall thickening. Normal caliber abdominal aorta. No acute osseous findings. Moderate stool throughout the colon. Correlate for constipation. **This report has been created using voice recognition software. It may contain minor errors which are inherent in voice recognition technology. ** Final report electronically signed by Dr. Michelle Lyons on 9/22/2020 3:51 PM    Ct Abdomen Pelvis W Iv Contrast    Result Date: 9/20/2020  PROCEDURE: CT ABDOMEN PELVIS W IV CONTRAST CLINICAL INFORMATION: 20-year-old female with right lower quadrant abdominal pain that began 2 weeks ago . COMPARISON: CT scan 9/13/2019. TECHNIQUE: 5 mm axial CT images were obtained through the abdomen and pelvis after the administration of intravenous and oral contrast. Coronal and sagittal reconstructions were obtained. All CT scans at this facility use dose modulation, iterative reconstruction, and/or weight-based dosing when appropriate to reduce radiation dose to as low as reasonably achievable. FINDINGS: There is bibasilar atelectasis. There is no pleural effusion. The base of the heart is within acceptable limits. There are postsurgical changes involving the stomach. The gallbladder surgically absent. There is a cyst in the right hepatic lobe. There are embolization coils in the region of the gastroduodenal artery which are causing streak artifact. The pancreas and adrenal glands are within normal limits. Some calcified granulomas are seen in the spleen. The kidneys are symmetric in size and shape. There is no hydronephrosis. There is no evidence of a small bowel obstruction. There is thickening in the region of the cecum and there are inflammatory changes in the adjacent fat. There is a moderate amount of retained stool in the colon. The urinary bladder has a normal appearance. The uterus is absent. The abdominal aorta and IVC are normal in caliber. The osseous structures are intact. No acute fractures. There is a thickened appearance of the cecum with some inflammatory changes in the adjacent fat. This may be on the basis of acute colitis. **This report has been created using voice recognition software. It may contain minor errors which are inherent in voice recognition technology. ** Final report electronically signed by Dr Ken Ramsey on 9/20/2020 5:18 AM    FEN/GI/DVT:  IVF: NS @ 150  Electrolytes: Monitor and replace per protocols  Diet: NPO  GI PPX: On PPI for GERD  DVT Prophylaxis: SCDs    CODE STATUS:  Full    Thank you David Asif DO for the opportunity to be involved in this patient's care.     Electronically signed by Alfonso Thomas DO on 9/25/2020 at 4:43 PM

## 2020-09-25 NOTE — ED NOTES
Bed: 018A  Expected date:   Expected time:   Means of arrival: [facility]  Comments:     Dolores Melo RN  09/25/20 [id number]

## 2020-09-25 NOTE — ED NOTES
ED to inpatient nurses report    Chief Complaint   Patient presents with    Flank Pain     right    Melena      Present to ED from home  LOC: alert and orientated to name, place, date  Vital signs   Vitals:    09/25/20 1150 09/25/20 1239 09/25/20 1249   BP: (!) 104/58 (!) 97/49 (!) 110/55   Pulse: 61 61 62   Resp: 20 18 20   Temp: 98.3 °F (36.8 °C)  98.4 °F (36.9 °C)   TempSrc: Oral  Oral   SpO2: 100% 100% 100%   Weight: 110 lb (49.9 kg)     Height: 4' 11\" (1.499 m)        Oxygen Baseline RA    Current needs required RA Bipap/Cpap No  LDAs:   Peripheral IV 09/25/20 Left Antecubital (Active)   Site Assessment Clean;Dry; Intact 09/25/20 1152     Mobility: Requires assistance * 1  Pending ED orders: NA  Present condition: STABLE    Electronically signed by Toni Julio RN on 9/25/2020 at 1:25 PM     Toni Julio RN  09/25/20 9925

## 2020-09-25 NOTE — ED PROVIDER NOTES
Antoinette Pacheco 13 COMPLAINT       Chief Complaint   Patient presents with    Flank Pain     right    Melena       Nurses Notes reviewed and I agree except as noted in the HPI. HISTORY OF PRESENT ILLNESS    Suzanna Billingsley is a 61 y.o. female who presents to the emergency department for black stools. Patient was seen in the emergency department on September 20 and had a CT scan showing colitis. She was back in the emergency department on the 22nd and had a second CT scan which was concerning for constipation, the second CT was performed due to ongoing right flank pain. She reports intermittent black stool for the last week. She is also had some generalized weakness and dyspnea on exertion. She took 3 doses of MiraLAX 2 days ago with minimal resolve. She denies hematemesis, hematuria, hematochezia, dysuria, frequency, fever, or chills. She reports a little mild chest discomfort. She also reports a history of GI bleed due to gastric ulcer and bleeding vessel however patient states that she was told that her GI bleeding was \"because I drank too much pop\". She had a colonoscopy 2 years ago which was reportedly negative. She denies being on any aspirin or other blood thinners. She was prescribed naproxen 500 mg twice daily on September 22. No other complaints or concerns. REVIEW OF SYSTEMS     Review of Systems   Constitutional: Negative for diaphoresis and fever. HENT: Negative for congestion, rhinorrhea and sore throat. Eyes: Negative for visual disturbance. Respiratory: Positive for shortness of breath. Negative for cough. Cardiovascular: Positive for chest pain. Negative for palpitations and leg swelling. Gastrointestinal: Positive for blood in stool (described as black stool). Negative for abdominal pain, constipation, diarrhea, nausea and vomiting. Endocrine: Negative for polyuria.    Genitourinary: Negative for difficulty urinating and dysuria. Musculoskeletal: Negative for joint swelling. Skin: Negative for rash. Neurological: Negative for seizures, syncope, facial asymmetry, speech difficulty, weakness and headaches. Hematological: Negative for adenopathy. Psychiatric/Behavioral: Negative for confusion. All other systems reviewed and are negative. PAST MEDICAL HISTORY    has a past medical history of Balance problem, Bipolar I disorder, most recent episode (or current) depressed, in partial or unspecified remission, Cancer ([facility] [address] Depression, Fatigue, Gallstones, Gastric bypass status for obesity, GERD (gastroesophageal reflux disease), History of blood transfusion, History of hysterectomy, Hypothyroidism, MS (multiple sclerosis) ([address] Paresthesias, and UTI (urinary tract infection). SURGICAL HISTORY      has a past surgical history that includes Hysterectomy (1963); Carpal tunnel release (1999); other surgical history; other surgical history (1986); Batsheva-en-Y Gastric Bypass (2000); other surgical history (2006); bone graft (Left); Cholecystectomy, laparoscopic (5/16/2016); laparoscopic appendectomy (5/16/2016); Appendectomy; Enteroscopy (N/A, 2/5/2018); pr colonoscopy flx dx w/collj spec when pfrmd (Left, 2/6/2018); pr office/outpt visit,procedure only (Left, 2/18/2018); Colonoscopy; Endoscopy, colon, diagnostic; Dilatation, esophagus; pr office/outpt visit,procedure only (N/A, 10/4/2018); Hysterectomy, vaginal; Pacemaker insertion (Right, 05/20/2019); and Upper gastrointestinal endoscopy (Left, 9/24/2019).     CURRENT MEDICATIONS       Previous Medications    CALCIUM CARBONATE 600 MG TABS TABLET    Take 1 tablet by mouth nightly     CHOLECALCIFEROL (VITAMIN D) 2000 UNITS CAPS CAPSULE    Take 2,000 Units by mouth nightly     CIPROFLOXACIN (CIPRO) 500 MG TABLET    Take 1 tablet by mouth 2 times daily for 10 days    DIMETHYL FUMARATE (TECFIDERA) 240 MG DELAYED RELEASE CAPSULE    Take 240 mg by mouth 2 quit smoking about 42 years ago. Her smoking use included cigarettes. She has a 0.05 pack-year smoking history. She has never used smokeless tobacco. She reports that she does not drink alcohol or use drugs. PHYSICAL EXAM     INITIAL VITALS:  height is 4' 11\" (1.499 m) and weight is 110 lb (49.9 kg). Her oral temperature is 98.4 °F (36.9 °C). Her blood pressure is 99/41 (abnormal) and her pulse is 62. Her respiration is 18 and oxygen saturation is 100%. CONSTITUTIONAL: [Awake, alert, non toxic, well developed, well nourished, no acute distress]  HEAD: [Normocephalic, atraumatic]  EYES: [Pupils equal, round & reactive to light, extraocular movements intact, no nystagmus, clear conjunctiva, non-icteric sclera]  ENT: [External ear canal clear without evidence of cerumen impaction or foreign body, TM's clear without erythema or bulging. Nares patent without drainage, septum appears midline. Moist mucus membranes, oropharynx clear without exudate, erythema, or mass. Uvula midline]  NECK: [Nontender and supple. No meningismus, no appreciated lymphadenopathy. Intact full range of motion. C-spine midline without vertebral tenderness. Trachea midline.]  CHEST: [Inspection normal, no lesions, equal rise. No crepitus or tenderness upon palpation. Pacemaker anterior chest wall]  CARDIOVASCULAR: [Regular rate, rhythm, normal S1 and S2. No appreciated murmurs, rubs, or gallops. No pulse deficits appreciated. Intact distal perfusion. JVD not appreciated.]  PULMONARY: [Respiratory distress absent. Respiratory effort normal. Breath sounds clear to auscultation without rhonchi, rales, or wheezing. No accessory muscle use. No stridor]  ABDOMEN: [Inspection normal, without surgical scars. Soft, non-tender, non-distended, with normoactive bowel sounds. No palpable masses, rebound, or guarding]  BACK: [Intact ROM. No midline vertebral tenderness, step off, or crepitus.  No CVA tenderness.]  MUSCULOSKELETAL: [Extremities nontender to palpation. No gross deformity or evidence of external trauma. Intact range of motion. Sensation intact. No clubbing, cyanosis, or edema.]  SKIN: [Warm, dry. No jaundice, rash, urticaria, or petechiae]  NEUROLOGIC: [Alert and oriented x 3, GCS 15, normal mentation for age. Moves all four extremities. No gross sensory deficit. Cerebellar function grossly normal.]  PSYCHIATRIC: [Normal mood and affect, thought process is clear and linear]     DIFFERENTIAL DIAGNOSIS:   Melena due to UGI bleeding (ulcer vs. Gastritis vs. Other), less likely lower GI bleed, diverticulitis, and others    DIAGNOSTIC RESULTS     EKG: All EKG's are interpreted by the Emergency Department Physician who either signs or Co-signsthis chart in the absence of a cardiologist.  EKG shows atrial paced rhythm with a right bundle branch block at a rate of 60 bpm, CO interval 182 ms, QRS duration 144 ms,  ms. No ST elevation or depression, my interpretation.     RADIOLOGY: non-plain film images(s) such as CT,Ultrasound and MRI are read by the radiologist.    No orders to display     [] Visualized and interpreted by me   [] Radiologist's Wet Read Report Reviewed   [] Discussed withRadiologist.    LABS:   Labs Reviewed   CBC WITH AUTO DIFFERENTIAL - Abnormal; Notable for the following components:       Result Value    RBC 2.19 (*)     Hemoglobin 7.3 (*)     Hematocrit 23.2 (*)     .9 (*)     MCH 33.3 (*)     MCHC 31.5 (*)     RDW-SD 49.8 (*)     Lymphocytes Absolute 0.4 (*)     Immature Grans (Abs) 0.12 (*)     All other components within normal limits   COMPREHENSIVE METABOLIC PANEL W/ REFLEX TO MG FOR LOW K - Abnormal; Notable for the following components:    BUN 26 (*)     Alkaline Phosphatase 145 (*)     Total Protein 5.3 (*)     Total Bilirubin 0.2 (*)     ALT 82 (*)     All other components within normal limits   LITHIUM LEVEL - Abnormal; Notable for the following components:    Lithium Lvl 1.26 (*)     All other components within

## 2020-09-25 NOTE — PROGRESS NOTES
Pt admitted to  [facility] from ED. Complaints: GI Bleed. IV PRBC infusing into the hand left, condition patent and no redness at a rate of 150 mls/ hour with about 300 mls in the bag still. IV site free of s/s of infection or infiltration. Vital signs obtained. Assessment and data collection initiated. Two nurse skin assessment performed by [facility] and [employer]. Oriented to room. Policies and procedures for 4K explained. Laurie discussed hourly rounding with patient addressing 5 P's. Fall prevention and safety brochure discussed with patient. Bed alarm on. Call light in reach. The best day to schedule a follow up Dr appointment is:  Monday a.m. Explained patients right to have family, representative or physician notified of their admission. Patient has Declined for physician to be notified. Patient has Declined for family/representative to be notified. All questions answered with no further questions at this time.

## 2020-09-25 NOTE — CONSULTS
Consult History & Physical      Patient:  Sandhya Martinez  YOB: 1960  MRN: [id number]     Acct: [de-identified]    Chief Complaint:    Chief Complaint   Patient presents with    Flank Pain     right    Melena       Date of Service: Pt seen/examined in consultation on 9/25/2020    History Of Present Illness:      61 y.o. female who we are asked to see/evaluate by Samantha Cantu DO for medical management of melena, anema. Was recently in the emergency department for abdominal pain and Melena as well on 9/20. Had a CT scan at this time which showed colitis. Was discharged home with Cipro, Flagyl, as well as Norco and Zofran. She returned to the emergency room on 9/22 for upper abdominal and back pain. CT showed constipation and no acute pathology. At that time she had transaminitis with elevated alk phos but was tolerating p.o. and was discharged with outpatient PCP follow up. She returned to the ED today with continued symptoms of weakness and black stools. Has had weakness and black stools for one week. She states that she has had these symptoms before, many times. These are the symptoms she usually has when she has some sort of GI bleed. She has also had dark urine. Stools are reportedly loose, sticky, tar-like. Stools are not painful or foul smelling and have no jarad red blood. Has had nausea as well, no vomiting. Hasn't been eating much. Last time she ate was last night around [address] [facility] does not aggravate or alleviate her symptoms. She has felt no relief from prescribed antibiotics or her daily Protonix, which she has been taking twice a day. There are no other aggravating or alleviating factors. She reports some right sided flank pain. This does not radiate. It is dull, not intermittent. No positioning has made it better or worse. She has never had a kidney stone or infection that she knows of. The norco had helped her pain minimally.      She reports a history of GI bleed due to gastric 5/16/2016    OTHER SURGICAL HISTORY      RECTAL FISSULRE    OTHER SURGICAL HISTORY  1986    D&C    OTHER SURGICAL HISTORY  2006    GI BLEED    PACEMAKER INSERTION Right 05/20/2019    ID COLONOSCOPY FLX DX W/COLLJ SPEC WHEN PFRMD Left 2/6/2018    COLONOSCOPY performed by Jenny Grant MD at [address] [place] [address] Sw OFFICE/OUTPT VISIT,PROCEDURE ONLY Left 2/18/2018    EGD ESOPHAGOGASTRODUODENOSCOPY performed by Shayla Garcia MD at [facility]    ID OFFICE/OUTPT VISIT,PROCEDURE ONLY N/A 10/4/2018    EGD DIAGNOSTIC ONLY performed by Susan Oliveira MD at [place] [address] ENDOSCOPY Left 9/24/2019    EGD DIAGNOSTIC ONLY performed by Andres Steele MD at [facility] End[facility]       Family History:  Family History   Problem Relation Age of Onset    Cancer Mother     Colon Polyps Mother     Heart Disease Father     Glaucoma Father     Cancer Sister     Cancer Sister        Past GI History:  History of GERD, on Protonix. History of GDA bleed. Colonoscopy 2 years ago. Allergies:  Latex and Sulfa antibiotics    Social History:   TOBACCO:   reports that she quit smoking about 42 years ago. Her smoking use included cigarettes. She has a 0.05 pack-year smoking history. She has never used smokeless tobacco.  ETOH:   reports no history of alcohol use. Review Of Systems  GENERAL: No fever, chills. Reports mild weight loss. EYES:  No  blurred vision, double vision   CARDIOVASCULAR: No chest pain or palpitations. RESPIRATORY:  No dyspnea or cough. GI:  See HPI  MUSCULOSKELETAL: No new painful or swollen joints or myalgias. :   No dysuria or hematuria. SKIN:  No rashes or jaundice. NEUROLOGIC:  No headaches or seizures, numbness or tingling of arms, or legs. PSYCH:  No anxiety or depression. ENDOCRINE:  No polyuria or polydipsia. BLOOD:  No anemia, bleeding disorder, Has history of blood transfusion.       PHYSICAL EXAM:  BP (!) 99/41   Pulse 62   Temp 98.4 °F (36.9 °C) (Oral)   Resp 18   Ht 4' 11\" (1.499 m)   Wt 110 lb (49.9 kg)   SpO2 100%   BMI 22.22 kg/m²     General appearance: No apparent distress, appears stated age and cooperative. HEENT: Normal cephalic, atraumatic without obvious deformity. Pupils equal, round, and reactive to light. Neck: Supple, with full range of motion. No jugular venous distention. Trachea midline. Respiratory:  Normal respiratory effort. Clear to auscultation, bilaterally without Rales/Wheezes/Rhonchi. Cardiovascular: Regular rate and rhythm without murmurs, rubs or gallops. Abdomen: Soft, non-tender, non-distended with active bowel sounds. No guarding or rebound. Musculoskeletal: No clubbing, cyanosis or edema bilaterally. Skin: Pink, warm, dry. No rashes or lesions. Psychiatric: Alert and oriented, thought content appropriate, normal insight    Labs:   Recent Labs     09/25/20  1209   WBC 7.4   HGB 7.3*   HCT 23.2*        Recent Labs     09/25/20  1209      K 3.9      CO2 23   BUN 26*   CREATININE 0.6   CALCIUM 9.4     Recent Labs     09/25/20  1209   AST 23   ALT 82*   BILITOT 0.2*   ALKPHOS 145*     Recent Labs     09/25/20  1209   INR 0.96       Radiology:   CTAP- moderate stool throughout colon  Code Status: Prior    Assessment:  Patient is a 62 y/o  female with PMH multiple GI bleeds, gastritis, erosive esophagitis, GERD, gastric bypass, hypothyroidism, MS, depression, and bipolar type I. She presents with intermittent melena, weakness, and fatigue that has been occurring for the past week. Has had a drop in hemoglobin from 12.2-7.3. 1. Upper GI bleed- r/o bleed from excluded stomach and/or anastomosis site vs gastritis and/or erosive esophagitis  2. Acute on chronic anemia, likely due to 1  3. Transaminitis- improving from 9/22, likely due to 1  4.  Weakness, likely due to 1    Plan:    · 2 units of blood transfused over 3 hours each  · NPO  · IVP

## 2020-09-25 NOTE — ED NOTES
Reassessment of the patients Flank Pain (right) and Melena   is unchanged, the patients pain reassessment is a 5/10, Side rails up times 2, call light in reach, will continue to monitor.      Blanche Gauthier RN  09/25/20 0858

## 2020-09-25 NOTE — PROGRESS NOTES
Clinical Pharmacy Consult  [place]      Sandhya Martinez is a 61 y.o. female for whom pharmacy has been consulted to dose phenytoin. Patient Active Problem List   Diagnosis    Hypothyroidism    MS (multiple sclerosis) (HCC)    S/P laparoscopic cholecystectomy    S/P laparoscopic appendectomy    Iron deficiency anemia    Abnormal nuclear stress test    Gastrointestinal hemorrhage associated with gastritis    Acute on chronic blood loss anemia    Gastroduodenal artery bleed    Multiple thyroid nodules    Nonischemic cardiomyopathy (HCC)    History of Batsheva-en-Y gastric bypass    Renal lesion    Liver lesion    Bipolar 1 disorder (HCC)    Acute gastric ulcer with hemorrhage    Symptomatic bradycardia    Status post placement of cardiac pacemaker    Melena    GI bleed       Allergies:  Latex and Sulfa antibiotics     Recent Labs     09/25/20  1209   CREATININE 0.6       Lab Results   Component Value Date/Time    LABALBU 3.6 09/25/2020 12:09 PM       Ht/Wt:   Ht Readings from Last 1 Encounters:   09/25/20 4' 11\" (1.499 m)        Wt Readings from Last 1 Encounters:   09/25/20 110 lb (49.9 kg)         CrCl cannot be calculated (Unknown ideal weight.). Indication for Lithium: Bi-polar    Drug-drug Interactions: Vilazidone    Assessment/Plan:  Maintenance dose: 300 mg PO BID  Level at 1230 was drawn only 4.5 hours after morning dose- falsely elevated at 1.26. Patient does not have signs of toxicity will give 300 mg dose tonight and recheck level at 0800 on 9/26 to get true level. Will dose according to level then,     Thank you for the consult. Will continue to follow.

## 2020-09-26 ENCOUNTER — APPOINTMENT (OUTPATIENT)
Dept: INTERVENTIONAL RADIOLOGY/VASCULAR | Age: 60
DRG: 919 | End: 2020-09-26
Payer: MEDICARE

## 2020-09-26 ENCOUNTER — APPOINTMENT (OUTPATIENT)
Dept: NUCLEAR MEDICINE | Age: 60
DRG: 919 | End: 2020-09-26
Payer: MEDICARE

## 2020-09-26 LAB
ALBUMIN SERPL-MCNC: 2.6 G/DL (ref 3.5–5.1)
ALP BLD-CCNC: 96 U/L (ref 38–126)
ALT SERPL-CCNC: 44 U/L (ref 11–66)
ANION GAP SERPL CALCULATED.3IONS-SCNC: 5 MEQ/L (ref 8–16)
AST SERPL-CCNC: 16 U/L (ref 5–40)
BASOPHILS # BLD: 0.5 %
BASOPHILS ABSOLUTE: 0 THOU/MM3 (ref 0–0.1)
BILIRUB SERPL-MCNC: 0.6 MG/DL (ref 0.3–1.2)
BUN BLDV-MCNC: 21 MG/DL (ref 7–22)
CALCIUM SERPL-MCNC: 7.8 MG/DL (ref 8.5–10.5)
CHLORIDE BLD-SCNC: 118 MEQ/L (ref 98–111)
CO2: 17 MEQ/L (ref 23–33)
CREAT SERPL-MCNC: 0.4 MG/DL (ref 0.4–1.2)
EKG ATRIAL RATE: 60 BPM
EKG P AXIS: 71 DEGREES
EKG P-R INTERVAL: 182 MS
EKG Q-T INTERVAL: 478 MS
EKG QRS DURATION: 144 MS
EKG QTC CALCULATION (BAZETT): 478 MS
EKG R AXIS: 31 DEGREES
EKG T AXIS: 28 DEGREES
EKG VENTRICULAR RATE: 60 BPM
EOSINOPHIL # BLD: 2.1 %
EOSINOPHILS ABSOLUTE: 0.1 THOU/MM3 (ref 0–0.4)
ERYTHROCYTE [DISTWIDTH] IN BLOOD BY AUTOMATED COUNT: 16.7 % (ref 11.5–14.5)
ERYTHROCYTE [DISTWIDTH] IN BLOOD BY AUTOMATED COUNT: 57.2 FL (ref 35–45)
FOLATE: > 20 NG/ML (ref 4.8–24.2)
GFR SERPL CREATININE-BSD FRML MDRD: > 90 ML/MIN/1.73M2
GLUCOSE BLD-MCNC: 99 MG/DL (ref 70–108)
HCT VFR BLD CALC: 21.4 % (ref 37–47)
HCT VFR BLD CALC: 26.4 % (ref 37–47)
HEMOGLOBIN: 6.7 GM/DL (ref 12–16)
HEMOGLOBIN: 8.6 GM/DL (ref 12–16)
IMMATURE GRANS (ABS): 0.15 THOU/MM3 (ref 0–0.07)
IMMATURE GRANULOCYTES: 2.6 %
LITHIUM LEVEL: 0.37 MMOL/L (ref 0.6–1.2)
LYMPHOCYTES # BLD: 15.1 %
LYMPHOCYTES ABSOLUTE: 0.9 THOU/MM3 (ref 1–4.8)
MAGNESIUM: 1.6 MG/DL (ref 1.6–2.4)
MCH RBC QN AUTO: 32.3 PG (ref 26–33)
MCHC RBC AUTO-ENTMCNC: 32.6 GM/DL (ref 32.2–35.5)
MCV RBC AUTO: 99.2 FL (ref 81–99)
MONOCYTES # BLD: 9.2 %
MONOCYTES ABSOLUTE: 0.5 THOU/MM3 (ref 0.4–1.3)
NUCLEATED RED BLOOD CELLS: 0 /100 WBC
PLATELET # BLD: 140 THOU/MM3 (ref 130–400)
PMV BLD AUTO: 10.3 FL (ref 9.4–12.4)
POTASSIUM REFLEX MAGNESIUM: 3.2 MEQ/L (ref 3.5–5.2)
RBC # BLD: 2.66 MILL/MM3 (ref 4.2–5.4)
SEG NEUTROPHILS: 70.5 %
SEGMENTED NEUTROPHILS ABSOLUTE COUNT: 4 THOU/MM3 (ref 1.8–7.7)
SODIUM BLD-SCNC: 140 MEQ/L (ref 135–145)
TOTAL PROTEIN: 3.8 G/DL (ref 6.1–8)
VITAMIN B-12: 1562 PG/ML (ref 211–911)
WBC # BLD: 5.7 THOU/MM3 (ref 4.8–10.8)

## 2020-09-26 PROCEDURE — 6360000002 HC RX W HCPCS: Performed by: INTERNAL MEDICINE

## 2020-09-26 PROCEDURE — 6360000002 HC RX W HCPCS

## 2020-09-26 PROCEDURE — 75774 ARTERY X-RAY EACH VESSEL: CPT

## 2020-09-26 PROCEDURE — 2709999900 HC NON-CHARGEABLE SUPPLY

## 2020-09-26 PROCEDURE — B4141ZZ FLUOROSCOPY OF SUPERIOR MESENTERIC ARTERY USING LOW OSMOLAR CONTRAST: ICD-10-PCS | Performed by: RADIOLOGY

## 2020-09-26 PROCEDURE — 6360000004 HC RX CONTRAST MEDICATION: Performed by: RADIOLOGY

## 2020-09-26 PROCEDURE — 6360000002 HC RX W HCPCS: Performed by: STUDENT IN AN ORGANIZED HEALTH CARE EDUCATION/TRAINING PROGRAM

## 2020-09-26 PROCEDURE — 2580000003 HC RX 258: Performed by: STUDENT IN AN ORGANIZED HEALTH CARE EDUCATION/TRAINING PROGRAM

## 2020-09-26 PROCEDURE — 0DJ08ZZ INSPECTION OF UPPER INTESTINAL TRACT, VIA NATURAL OR ARTIFICIAL OPENING ENDOSCOPIC: ICD-10-PCS | Performed by: INTERNAL MEDICINE

## 2020-09-26 PROCEDURE — 99152 MOD SED SAME PHYS/QHP 5/>YRS: CPT | Performed by: INTERNAL MEDICINE

## 2020-09-26 PROCEDURE — C1760 CLOSURE DEV, VASC: HCPCS

## 2020-09-26 PROCEDURE — 99153 MOD SED SAME PHYS/QHP EA: CPT | Performed by: INTERNAL MEDICINE

## 2020-09-26 PROCEDURE — C1894 INTRO/SHEATH, NON-LASER: HCPCS

## 2020-09-26 PROCEDURE — 36248 INS CATH ABD/L-EXT ART ADDL: CPT

## 2020-09-26 PROCEDURE — 2580000003 HC RX 258: Performed by: INTERNAL MEDICINE

## 2020-09-26 PROCEDURE — 3609014100 HC ENTEROSCOPY > 2ND PRTN W/CONTROL BLEEDING: Performed by: INTERNAL MEDICINE

## 2020-09-26 PROCEDURE — 99233 SBSQ HOSP IP/OBS HIGH 50: CPT | Performed by: INTERNAL MEDICINE

## 2020-09-26 PROCEDURE — 87086 URINE CULTURE/COLONY COUNT: CPT

## 2020-09-26 PROCEDURE — 36245 INS CATH ABD/L-EXT ART 1ST: CPT

## 2020-09-26 PROCEDURE — 80053 COMPREHEN METABOLIC PANEL: CPT

## 2020-09-26 PROCEDURE — 6370000000 HC RX 637 (ALT 250 FOR IP): Performed by: STUDENT IN AN ORGANIZED HEALTH CARE EDUCATION/TRAINING PROGRAM

## 2020-09-26 PROCEDURE — 2060000000 HC ICU INTERMEDIATE R&B

## 2020-09-26 PROCEDURE — 78278 ACUTE GI BLOOD LOSS IMAGING: CPT

## 2020-09-26 PROCEDURE — 93010 ELECTROCARDIOGRAM REPORT: CPT | Performed by: INTERNAL MEDICINE

## 2020-09-26 PROCEDURE — C1729 CATH, DRAINAGE: HCPCS

## 2020-09-26 PROCEDURE — 3430000000 HC RX DIAGNOSTIC RADIOPHARMACEUTICAL: Performed by: STUDENT IN AN ORGANIZED HEALTH CARE EDUCATION/TRAINING PROGRAM

## 2020-09-26 PROCEDURE — 6370000000 HC RX 637 (ALT 250 FOR IP)

## 2020-09-26 PROCEDURE — 36415 COLL VENOUS BLD VENIPUNCTURE: CPT

## 2020-09-26 PROCEDURE — 85025 COMPLETE CBC W/AUTO DIFF WBC: CPT

## 2020-09-26 PROCEDURE — 85018 HEMOGLOBIN: CPT

## 2020-09-26 PROCEDURE — 83735 ASSAY OF MAGNESIUM: CPT

## 2020-09-26 PROCEDURE — 6370000000 HC RX 637 (ALT 250 FOR IP): Performed by: RADIOLOGY

## 2020-09-26 PROCEDURE — 6360000002 HC RX W HCPCS: Performed by: RADIOLOGY

## 2020-09-26 PROCEDURE — 85014 HEMATOCRIT: CPT

## 2020-09-26 PROCEDURE — C1887 CATHETER, GUIDING: HCPCS

## 2020-09-26 PROCEDURE — C9113 INJ PANTOPRAZOLE SODIUM, VIA: HCPCS | Performed by: STUDENT IN AN ORGANIZED HEALTH CARE EDUCATION/TRAINING PROGRAM

## 2020-09-26 PROCEDURE — A9560 TC99M LABELED RBC: HCPCS | Performed by: STUDENT IN AN ORGANIZED HEALTH CARE EDUCATION/TRAINING PROGRAM

## 2020-09-26 PROCEDURE — 2709999900 HC NON-CHARGEABLE SUPPLY: Performed by: INTERNAL MEDICINE

## 2020-09-26 PROCEDURE — 80178 ASSAY OF LITHIUM: CPT

## 2020-09-26 PROCEDURE — 75726 ARTERY X-RAYS ABDOMEN: CPT

## 2020-09-26 PROCEDURE — C1769 GUIDE WIRE: HCPCS

## 2020-09-26 PROCEDURE — 36247 INS CATH ABD/L-EXT ART 3RD: CPT

## 2020-09-26 RX ORDER — FENTANYL CITRATE 50 UG/ML
INJECTION, SOLUTION INTRAMUSCULAR; INTRAVENOUS
Status: DISPENSED
Start: 2020-09-26 | End: 2020-09-26

## 2020-09-26 RX ORDER — MIDAZOLAM HYDROCHLORIDE 1 MG/ML
INJECTION INTRAMUSCULAR; INTRAVENOUS
Status: DISPENSED
Start: 2020-09-26 | End: 2020-09-26

## 2020-09-26 RX ORDER — FENTANYL CITRATE 50 UG/ML
INJECTION, SOLUTION INTRAMUSCULAR; INTRAVENOUS PRN
Status: DISCONTINUED | OUTPATIENT
Start: 2020-09-26 | End: 2020-09-26 | Stop reason: ALTCHOICE

## 2020-09-26 RX ORDER — SODIUM CHLORIDE, SODIUM LACTATE, POTASSIUM CHLORIDE, CALCIUM CHLORIDE 600; 310; 30; 20 MG/100ML; MG/100ML; MG/100ML; MG/100ML
INJECTION, SOLUTION INTRAVENOUS CONTINUOUS
Status: DISCONTINUED | OUTPATIENT
Start: 2020-09-26 | End: 2020-09-29

## 2020-09-26 RX ORDER — POTASSIUM CHLORIDE 7.45 MG/ML
10 INJECTION INTRAVENOUS PRN
Status: DISCONTINUED | OUTPATIENT
Start: 2020-09-26 | End: 2020-10-01 | Stop reason: HOSPADM

## 2020-09-26 RX ORDER — MIDAZOLAM HYDROCHLORIDE 1 MG/ML
INJECTION INTRAMUSCULAR; INTRAVENOUS PRN
Status: DISCONTINUED | OUTPATIENT
Start: 2020-09-26 | End: 2020-09-26 | Stop reason: ALTCHOICE

## 2020-09-26 RX ORDER — FENTANYL CITRATE 50 UG/ML
25 INJECTION, SOLUTION INTRAMUSCULAR; INTRAVENOUS ONCE
Status: COMPLETED | OUTPATIENT
Start: 2020-09-26 | End: 2020-09-26

## 2020-09-26 RX ORDER — POLYETHYLENE GLYCOL 3350, SODIUM CHLORIDE, SODIUM BICARBONATE, POTASSIUM CHLORIDE 420; 11.2; 5.72; 1.48 G/4L; G/4L; G/4L; G/4L
4000 POWDER, FOR SOLUTION ORAL ONCE
Status: DISCONTINUED | OUTPATIENT
Start: 2020-09-26 | End: 2020-09-26

## 2020-09-26 RX ORDER — MIDAZOLAM HYDROCHLORIDE 1 MG/ML
0.5 INJECTION INTRAMUSCULAR; INTRAVENOUS ONCE
Status: COMPLETED | OUTPATIENT
Start: 2020-09-26 | End: 2020-09-26

## 2020-09-26 RX ORDER — LITHIUM CARBONATE 300 MG/1
300 CAPSULE ORAL 2 TIMES DAILY WITH MEALS
Status: DISCONTINUED | OUTPATIENT
Start: 2020-09-26 | End: 2020-10-01 | Stop reason: HOSPADM

## 2020-09-26 RX ORDER — SODIUM CHLORIDE 0.9 % (FLUSH) 0.9 %
10 SYRINGE (ML) INJECTION EVERY 12 HOURS SCHEDULED
Status: DISCONTINUED | OUTPATIENT
Start: 2020-09-26 | End: 2020-10-01 | Stop reason: HOSPADM

## 2020-09-26 RX ORDER — OCTREOTIDE ACETATE 50 UG/ML
50 INJECTION, SOLUTION INTRAVENOUS; SUBCUTANEOUS ONCE
Status: COMPLETED | OUTPATIENT
Start: 2020-09-26 | End: 2020-09-26

## 2020-09-26 RX ORDER — SODIUM CHLORIDE 0.9 % (FLUSH) 0.9 %
10 SYRINGE (ML) INJECTION PRN
Status: DISCONTINUED | OUTPATIENT
Start: 2020-09-26 | End: 2020-10-01 | Stop reason: HOSPADM

## 2020-09-26 RX ORDER — OCTREOTIDE ACETATE 100 UG/ML
50 INJECTION, SOLUTION INTRAVENOUS; SUBCUTANEOUS ONCE
Status: DISCONTINUED | OUTPATIENT
Start: 2020-09-26 | End: 2020-09-26

## 2020-09-26 RX ORDER — 0.9 % SODIUM CHLORIDE 0.9 %
20 INTRAVENOUS SOLUTION INTRAVENOUS ONCE
Status: COMPLETED | OUTPATIENT
Start: 2020-09-26 | End: 2020-09-26

## 2020-09-26 RX ORDER — MAGNESIUM SULFATE IN WATER 40 MG/ML
2 INJECTION, SOLUTION INTRAVENOUS ONCE
Status: COMPLETED | OUTPATIENT
Start: 2020-09-26 | End: 2020-09-26

## 2020-09-26 RX ORDER — SODIUM CHLORIDE 450 MG/100ML
INJECTION, SOLUTION INTRAVENOUS CONTINUOUS
Status: DISCONTINUED | OUTPATIENT
Start: 2020-09-26 | End: 2020-09-26

## 2020-09-26 RX ORDER — IBUPROFEN 200 MG
CAPSULE ORAL ONCE
Status: COMPLETED | OUTPATIENT
Start: 2020-09-26 | End: 2020-09-26

## 2020-09-26 RX ADMIN — POTASSIUM CHLORIDE 10 MEQ: 7.46 INJECTION, SOLUTION INTRAVENOUS at 13:03

## 2020-09-26 RX ADMIN — OCTREOTIDE ACETATE 50 MCG/HR: 500 INJECTION, SOLUTION INTRAVENOUS; SUBCUTANEOUS at 22:32

## 2020-09-26 RX ADMIN — LITHIUM CARBONATE 300 MG: 300 CAPSULE ORAL at 20:52

## 2020-09-26 RX ADMIN — LITHIUM CARBONATE 300 MG: 300 CAPSULE ORAL at 13:16

## 2020-09-26 RX ADMIN — CIPROFLOXACIN 500 MG: 500 TABLET, FILM COATED ORAL at 20:52

## 2020-09-26 RX ADMIN — THERA TABS 1 TABLET: TAB at 10:22

## 2020-09-26 RX ADMIN — Medication 1 CAPSULE: at 10:23

## 2020-09-26 RX ADMIN — OCTREOTIDE ACETATE 50 MCG: 50 INJECTION, SOLUTION INTRAVENOUS; SUBCUTANEOUS at 22:31

## 2020-09-26 RX ADMIN — VILAZODONE HYDROCHLORIDE 40 MG: 40 TABLET ORAL at 10:22

## 2020-09-26 RX ADMIN — MIDAZOLAM 0.5 MG: 1 INJECTION INTRAMUSCULAR; INTRAVENOUS at 16:36

## 2020-09-26 RX ADMIN — SODIUM CHLORIDE, PRESERVATIVE FREE 10 ML: 5 INJECTION INTRAVENOUS at 20:54

## 2020-09-26 RX ADMIN — Medication 2000 UNITS: at 20:52

## 2020-09-26 RX ADMIN — POTASSIUM CHLORIDE 10 MEQ: 7.46 INJECTION, SOLUTION INTRAVENOUS at 19:17

## 2020-09-26 RX ADMIN — METRONIDAZOLE 500 MG: 500 TABLET, FILM COATED ORAL at 10:22

## 2020-09-26 RX ADMIN — IOPAMIDOL 50 ML: 612 INJECTION, SOLUTION INTRAVENOUS at 18:48

## 2020-09-26 RX ADMIN — SODIUM CHLORIDE, POTASSIUM CHLORIDE, SODIUM LACTATE AND CALCIUM CHLORIDE: 600; 310; 30; 20 INJECTION, SOLUTION INTRAVENOUS at 07:43

## 2020-09-26 RX ADMIN — POLYETHYLENE GLYCOL 3350 17 G: 17 POWDER, FOR SOLUTION ORAL at 10:32

## 2020-09-26 RX ADMIN — CALCIUM 500 MG: 500 TABLET ORAL at 20:52

## 2020-09-26 RX ADMIN — Medication 33 MILLICURIE: at 14:45

## 2020-09-26 RX ADMIN — LEVOTHYROXINE SODIUM 100 MCG: 100 TABLET ORAL at 06:28

## 2020-09-26 RX ADMIN — SODIUM CHLORIDE 8 MG/HR: 9 INJECTION, SOLUTION INTRAVENOUS at 03:30

## 2020-09-26 RX ADMIN — IPRATROPIUM BROMIDE 2 SPRAY: 21 SPRAY NASAL at 13:16

## 2020-09-26 RX ADMIN — MAGNESIUM SULFATE IN WATER 2 G: 40 INJECTION, SOLUTION INTRAVENOUS at 10:23

## 2020-09-26 RX ADMIN — CIPROFLOXACIN 500 MG: 500 TABLET, FILM COATED ORAL at 10:23

## 2020-09-26 RX ADMIN — CETIRIZINE HYDROCHLORIDE 10 MG: 10 TABLET ORAL at 10:23

## 2020-09-26 RX ADMIN — FERROUS SULFATE TAB 325 MG (65 MG ELEMENTAL FE) 325 MG: 325 (65 FE) TAB at 10:23

## 2020-09-26 RX ADMIN — SODIUM CHLORIDE 20 ML: 9 INJECTION, SOLUTION INTRAVENOUS at 19:27

## 2020-09-26 RX ADMIN — IOPAMIDOL 150 ML: 612 INJECTION, SOLUTION INTRAVENOUS at 18:40

## 2020-09-26 RX ADMIN — IPRATROPIUM BROMIDE 2 SPRAY: 21 SPRAY NASAL at 20:53

## 2020-09-26 RX ADMIN — LAMOTRIGINE 200 MG: 100 TABLET ORAL at 10:23

## 2020-09-26 RX ADMIN — POTASSIUM CHLORIDE 10 MEQ: 7.46 INJECTION, SOLUTION INTRAVENOUS at 11:41

## 2020-09-26 RX ADMIN — SODIUM CHLORIDE: 9 INJECTION, SOLUTION INTRAVENOUS at 00:48

## 2020-09-26 RX ADMIN — FENTANYL CITRATE 25 MCG: 50 INJECTION, SOLUTION INTRAMUSCULAR; INTRAVENOUS at 18:20

## 2020-09-26 RX ADMIN — SODIUM CHLORIDE, POTASSIUM CHLORIDE, SODIUM LACTATE AND CALCIUM CHLORIDE: 600; 310; 30; 20 INJECTION, SOLUTION INTRAVENOUS at 18:00

## 2020-09-26 RX ADMIN — FENTANYL CITRATE 25 MCG: 50 INJECTION, SOLUTION INTRAMUSCULAR; INTRAVENOUS at 16:36

## 2020-09-26 RX ADMIN — BACITRACIN ZINC, NEOMYCIN, POLYMYXIN B: 400; 3.5; 5 OINTMENT TOPICAL at 18:32

## 2020-09-26 RX ADMIN — METRONIDAZOLE 500 MG: 500 TABLET, FILM COATED ORAL at 20:52

## 2020-09-26 RX ADMIN — Medication 1000 MCG: at 10:30

## 2020-09-26 RX ADMIN — IPRATROPIUM BROMIDE 2 SPRAY: 21 SPRAY NASAL at 10:30

## 2020-09-26 RX ADMIN — POTASSIUM CHLORIDE 10 MEQ: 7.46 INJECTION, SOLUTION INTRAVENOUS at 10:23

## 2020-09-26 ASSESSMENT — PAIN DESCRIPTION - PAIN TYPE: TYPE: ACUTE PAIN

## 2020-09-26 ASSESSMENT — PAIN DESCRIPTION - LOCATION: LOCATION: BACK

## 2020-09-26 ASSESSMENT — PAIN SCALES - GENERAL
PAINLEVEL_OUTOF10: 3
PAINLEVEL_OUTOF10: 0
PAINLEVEL_OUTOF10: 3
PAINLEVEL_OUTOF10: 3
PAINLEVEL_OUTOF10: 0
PAINLEVEL_OUTOF10: 0

## 2020-09-26 ASSESSMENT — PAIN - FUNCTIONAL ASSESSMENT: PAIN_FUNCTIONAL_ASSESSMENT: 0-10

## 2020-09-26 NOTE — PROGRESS NOTES
Magnesium IV and potassium replacement protocol in place. 5. Hx Pacemaker placement for symptomatic bradycardia, stable: ECG shows atrial paced rhythm. - Monitor  6. Hx Batsheva-en-Y Gastric Bypass due to Obesity in 2002  - Continue vitamin supplements  7. Hx Hypothyroidism: TSH normal  - Continue Synthroid 100 mcg QD  8. Hx Multiple Sclerosis, stable  - Continue Tecfidera 240mg BID  9. Hx Depression and Bipolar Type I, stable:  - Continue Lamictal, Viibryd; Pharmacy to dose Anthoston due to noted mildly elevated lithium level     Chief Complaint: Melena, weakness, fatigue    Hospital Course:  Patient is a 60 y/o  female with PMH multiple GI bleeds, gastritis, erosive esophagitis, GERD, gastric bypass, hypothyroidism, MS, depression, and bipolar type I. She presents to [facility] ED for intermittent melena, weakness, and fatigue that has been occurring for the past week. She was recently seen in the ED on 9/20 and CT showed acute colitis. She was discharged with Cipro and Flagyl. On 9/22 she presented back with right flank pain and CT showed constipation and hepatic cyst. She was discharged home with Naproxen 500mg BID and close follow-up with GI. She presents to us again due to worsening weakness and fatigue. She has been admitted multiple times in the past for GI bleeds. She also reports that she has had dark urine, but denies dysuria or frequency. She denies headaches, lightheadedness, SOB, chest pain, N/V/D, abdominal pain.     In the ED her Hgb was noted to be 7.3, dropped from 12.2 noted on 9/22. ALT 82 and Alk Phos 145, improved from 9/22. She was given 1L IVF and started on protonix drip. GI was consulted with orders to start 2 units over 3 hrs each and plans for possible EGD in AM.       Subjective (past 24 hours):   Patient states she feels better after receiving 2 units of blood. She still had dark stools overnight; she stated it was grainy. She notes she has been compliant with her Protonix at home.  Her BP has also been hypotensive since admission; she states her normal is 120s. She denies headaches, lightheadedness, SOB, chest pain. ROS (12 point review of systems completed. Pertinent positives noted. Otherwise ROS is negative). Medications:  Reviewed    Infusion Medications    lactated ringers 150 mL/hr at 09/26/20 6543     Scheduled Medications    fentaNYL        midazolam        sodium chloride flush  10 mL Intravenous 2 times per day    polyethylene glycol-electrolytes  4,000 mL Oral Once    sodium chloride  20 mL Intravenous Once    lithium  300 mg Oral BID WC    lamoTRIgine  200 mg Oral Daily    ciprofloxacin  500 mg Oral BID    dimethyl fumarate  240 mg Oral BID    ipratropium  2 spray Each Nostril TID    levothyroxine  100 mcg Oral Daily    cetirizine  10 mg Oral Daily    metroNIDAZOLE  500 mg Oral BID    vilazodone HCl  40 mg Oral Daily    lactobacillus  1 capsule Oral Daily with breakfast    calcium elemental  1 tablet Oral Nightly    Vitamin D  2,000 Units Oral Nightly    ferrous sulfate  325 mg Oral Daily with breakfast    multivitamin  1 tablet Oral Daily    cyanocobalamin  1,000 mcg Oral Daily    polyethylene glycol  17 g Oral Daily     PRN Meds: potassium chloride, sodium chloride flush, nitroGLYCERIN, acetaminophen **OR** acetaminophen, promethazine **OR** ondansetron      Intake/Output Summary (Last 24 hours) at 9/26/2020 1314  Last data filed at 9/26/2020 0330  Gross per 24 hour   Intake 3252 ml   Output 700 ml   Net 2552 ml     Diet:  DIET CLEAR LIQUID;  Diet NPO Time Specified Exceptions are: Ice Chips    Exam:  BP (!) 101/56   Pulse 60   Temp 98 °F (36.7 °C) (Oral)   Resp 18   Ht 4' 11\" (1.499 m)   Wt 117 lb 9.6 oz (53.3 kg)   SpO2 98%   BMI 23.75 kg/m²     General appearance: No apparent distress, appears stated age and cooperative. HEENT: Pupils equal, round, and reactive to light. Conjunctivae/corneas clear. Neck: Supple, with full range of motion.  No jugular venous distention. Trachea midline. Respiratory:  Normal respiratory effort. Clear to auscultation, bilaterally without Rales/Wheezes/Rhonchi. Cardiovascular: Regular rate and rhythm with normal S1/S2 without murmurs, rubs or gallops. Abdomen: Soft, non-tender, non-distended with normal bowel sounds. Musculoskeletal: passive and active ROM x 4 extremities. 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 Skin: Skin color, texture, turgor normal.  No rashes or lesions. Neurologic:  Neurovascularly intact without any focal sensory/motor deficits. Cranial nerves: II-XII intact, grossly non-focal.  Psychiatric: Alert and oriented, thought content appropriate, normal insight  Capillary Refill: Brisk,< 3 seconds   Peripheral Pulses: +2 palpable, equal bilaterally     Labs:   Recent Labs     09/25/20  1209 09/26/20  0215 09/26/20  1056   WBC 7.4 5.7  --    HGB 7.3* 8.6* 6.7*   HCT 23.2* 26.4* 21.4*    140  --      Recent Labs     09/25/20  1209 09/26/20  0540    140   K 3.9 3.2*    118*   CO2 23 17*   BUN 26* 21   CREATININE 0.6 0.4   CALCIUM 9.4 7.8*     Recent Labs     09/25/20  1209 09/26/20  0540   AST 23 16   ALT 82* 44   BILITOT 0.2* 0.6   ALKPHOS 145* 96     Recent Labs     09/25/20  1209   INR 0.96     No results for input(s): CKTOTAL, TROPONINI in the last 72 hours.     Microbiology:      Urinalysis:      Lab Results   Component Value Date    NITRU NEGATIVE 09/25/2020    WBCUA 5-9 09/25/2020    BACTERIA FEW 09/25/2020    RBCUA 0-2 09/25/2020    BLOODU NEGATIVE 09/25/2020    SPECGRAV >1.030 05/13/2016    GLUCOSEU NEGATIVE 09/25/2020     Radiology:  NM GI BLOOD LOSS    (Results Pending)     DVT prophylaxis: [] Lovenox                                 [x] SCDs                                 [] SQ Heparin                                 [] Encourage ambulation           [] Already on Anticoagulation     Code Status: Full Code    PT/OT Eval Status: TBD    Tele:   [x] yes             [] no    Electronically signed by Benny Valero DO on 9/26/2020 at 1:14 PM

## 2020-09-26 NOTE — PROGRESS NOTES
1605 Patient received in IR for mesenteric angiogram with possible embolization. 1613 This procedure has been fully reviewed with the patient and written informed consent has been obtained. [id number] [address] [facility] Procedure started with Dr. Rapp Severe   Access into the right femoral artery  1640 5fr sheath placed in the right femoral artery  1644 Angiogram started. 1645 Placing the SOS catheter. [address] and advancing the Renegade microcatheter into the celiac artery  1711 Procedure continues. Pt. resting well.  1753 Switched catheters. Prepped a CHG-C.  1809 Switching catheters. Prepping a KA2, 65cm  1828 Procedure complete. Right femoral angiogram complete. 1 Dr. Riley Walker deploying 6fr angioseal into the right femoral artery. 1832 Right groin soft. Tincture of benzoin placed at the right groin site. Triple antibiotic ointment also placed, folded 4x4, and sterile tegaderm to secure. 1848  Patient on bed; comfort ensured. [address] [facility] [address] report to [address]. [address] [facility]  Patient taken to 4k via bed.

## 2020-09-26 NOTE — PROGRESS NOTES
Clinical Pharmacy Note  Lithium Consult    Lithium Level: 0.37  mcg/mL      Current Dose: 300 mg PO BID  Indication: bipolar    Drug-drug interactions: Vilazodone    Assessment/Plan:   Patient currently tolerating 300 mg PO BID and seems effective. Will keep dose same as home dose. No further monitoring needed at this time.

## 2020-09-26 NOTE — PROGRESS NOTES
Attempted to see pt twice, but she is in a procedure. Pt is a Spiritual care volunteer.   Roddy Najera

## 2020-09-26 NOTE — PROGRESS NOTES
0932-pt in phase 2/vitals taken   0934-pt repositioned, responding to name   0942-vitals taken, arousable to name RR 12  0945-report called to primary RN Eunice Reid, updated on pt status, findings and POC.   0946-pt resting eyes closed RR 12   0947-transport in for patient   0948-pt continues to rest with eyes closed RR 12  0952-warm blanket applied to patient. Naga May continues to rest eyes closed.  RR 12  0958-transport here

## 2020-09-26 NOTE — H&P
[facility] Endoscopy    Pre-Endoscopy H&PE      Patient: David Solares    : 1960    Acct#: [de-identified]  Primary Care Physician: Amarjit Granado DO   Date of evaluation: 2020    Planned Procedure:    []EGD    [x]Enteroscopy    []PEG    []PEG change    []PEG removal  []Variceal banding    []Biopsy   []Dilation      [x]Control of bleeding  []Destruction of lesion by [facility]    []Stent Placement  []Foreign Body Removal    []Snare Polypectomy  []Other:       []Colonoscopy  []Flex Sigmoid []EUS, rectal      []Biopsy   []Dilation      []Control of bleeding  []Destruction of lesion by [facility]    []Stent Placement  []Foreign Body Removal    []Snare Polypectomy  []Other:      Planned Sedation  []Conscious Sedation []MAC/Propofol []Anesthesia    []None    Airway:  Adequate for planned sedation    Indication:   melena    History:  The patient is a 61 y.o.  female with h/o RYGB admitted 20 for melena and anemia. She has h/o PUD. She has had melena many times in the past with a normal w/u. She was in ED 20 with melena and abd pain. CT demonstrated colitis and she was discharged on cipro/flagyl. She returned to ED 20 with abdominal and back pain. CT was normal.  LFT's mildly elevated. She was discharged with outpt f/u. Physical Exam:  VITALS: BP (!) 97/55   Pulse 61   Temp 98.5 °F (36.9 °C) (Oral)   Resp 18   Ht 4' 11\" (1.499 m)   Wt 117 lb 9.6 oz (53.3 kg)   SpO2 100%   BMI 23.75 kg/m²   The patient is a 61 y.o.  female in no acute distress. HEAD: Normal cephalic/atraumatic. Extra-occular motions intact bilaterally. NECK: No lymphadenopathy or bruits. CHEST: Rises equally on inspiration. Clear to auscultation bilaterally. CARDIOVASCULAR: Regular rate and rhythm without murmurs, rubs or gallops. ABDOMEN: Soft, nontender, and nondistended with normal bowel sounds. No Hepatosplemomegaly. UPPER EXTREMITIES: no cyanosis, clubbing, or edema.   DERM: no rash or jaundice. LOWER EXTREMITIES: no cyanosis, clubbing, or edema. NEURO: Alert and oriented times four. Patient moves all extremities and has gross sensation in all extremities.     ASA: ASA 3 - Patient with moderate systemic disease with functional limitations    See GI consult dated 9/25/20    Kaya Elias MD  8:16 AM 9/26/2020

## 2020-09-26 NOTE — OP NOTE
[address] [address] Endoscopy    Patient: Kd Pickens  : 1960  Acct#: [de-identified]  Date of evaluation: 2020  Primary Care Physician: Teagan Espinoza DO     Procedure:    []EGD    [x]Enteroscopy    []Biopsy   []Dilation      []PEG    []PEG change    []PEG removal  []Variceal banding    []Control of bleeding  []Destruction of lesion by [facility]    []Stent Placement  []Foreign Body Removal    []Snare Polypectomy  []Other:     []Aborted:        []Colonoscopy  []Flex Sigmoid []EUS, rectal     []Biopsy   []Snare Polypectomy    []Control of bleeding  []Destruction of lesion by [facility]    []Stent Placement  []Foreign Body Removal    []Dilation    []Other:    []Aborted:   []Tattoo    Indication:   melena    History:  The patient is a 61 y.o.  female with h/o RYGB admitted 20 for melena and anemia. She has h/o PUD. She has had melena many times in the past with a normal w/u. She was in ED 20 with melena and abd pain. CT demonstrated colitis and she was discharged on cipro/flagyl. She returned to ED 20 with abdominal and back pain. CT was normal.  LFT's mildly elevated. She was discharged with outpt f/u. Physical Exam:  VITALS: BP (!) 97/55   Pulse 61   Temp 98.5 °F (36.9 °C) (Oral)   Resp 18   Ht 4' 11\" (1.499 m)   Wt 117 lb 9.6 oz (53.3 kg)   SpO2 100%   BMI 23.75 kg/m²   The patient is a 61 y.o.  female in no acute distress. HEAD: Normal cephalic/atraumatic. Extra-occular motions intact bilaterally. NECK: No lymphadenopathy or bruits. CHEST: Rises equally on inspiration. Clear to auscultation bilaterally. CARDIOVASCULAR: Regular rate and rhythm without murmurs, rubs or gallops. ABDOMEN: Soft, nontender, and nondistended with normal bowel sounds. No Hepatosplemomegaly. UPPER EXTREMITIES: no cyanosis, clubbing, or edema. DERM: no rash or jaundice. LOWER EXTREMITIES: no cyanosis, clubbing, or edema.   NEURO: Alert and complications. IMPRESSION:  1. Esophagus - small hiatal hernia  2. Stomach pouch - normal   3. GJ anastomosis - normal  4. Duodenum - unable to examine due to surgical anatomy  5. Gastric remnant - unable to examine due to surgical anatomy  6. Jejunum - normal   7. J-J anastomosis - unable to reach  8. No obvious reason for melena but unable to reach lower anastomosis and unable to visualize bypassed stomach and bowel    RECOMMENDATIONS:    1. Clear liquid diet   2. Prep for colonoscopy tomorrow  3.   Stop ppi infusion    Rhonda Krishnan MD  9:38 AM 9/26/2020

## 2020-09-26 NOTE — H&P
Formulation and discussion of sedation / procedure plans, risks, benefits, side effects and alternatives with patient and/or responsible adult completed.     Electronically signed by Puja Jin MD on 9/26/2020 at 4:22 PM

## 2020-09-26 NOTE — FLOWSHEET NOTE
09/26/20 1120   Provider Notification   Reason for Communication Critical Value (comment)  (hemoglobin 6.7)   Provider Name Dr. Matias Hernandes   Provider Notification Physician   Method of Communication Face to face   Response See orders   Notification Time 1120   Dr. Matias Hernandes notified of critical hemoglobin of 6.7. 2RBC ordered.

## 2020-09-26 NOTE — H&P
drug therapy use last 5 days  [x]No []Yes  Other anticoagulant use last 5 days  [x]No []Yes    Current Facility-Administered Medications:     potassium chloride 10 mEq/100 mL IVPB (Peripheral Line), 10 mEq, Intravenous, PRN, Saniya Si H Le, DO, Last Rate: 100 mL/hr at 09/26/20 1303, 10 mEq at 09/26/20 1303    lactated ringers infusion, , Intravenous, Continuous, Saniya Si H Le, DO, Last Rate: 150 mL/hr at 09/26/20 0743    fentaNYL (SUBLIMAZE) 100 MCG/2ML injection, , , ,     midazolam (VERSED) 5 MG/5ML injection, , , ,     sodium chloride flush 0.9 % injection 10 mL, 10 mL, Intravenous, 2 times per day, Yang Bey MD    sodium chloride flush 0.9 % injection 10 mL, 10 mL, Intravenous, PRN, Yang Bey MD    polyethylene glycol-electrolytes (NULYTELY) solution 4,000 mL, 4,000 mL, Oral, Once, David Jansen MD    0.9 % sodium chloride bolus, 20 mL, Intravenous, Once, Saniya Si H Le, DO    lithium capsule 300 mg, 300 mg, Oral, BID WC, Saniya Si H Le, DO, 300 mg at 09/26/20 1316    lamoTRIgine (LAMICTAL) tablet 200 mg, 200 mg, Oral, Daily, Saniya Si H Le, DO, 200 mg at 09/26/20 1023    ciprofloxacin (CIPRO) tablet 500 mg, 500 mg, Oral, BID, Saniya Si H Le, DO, 500 mg at 09/26/20 1023    dimethyl fumarate (TECFIDERA) delayed release capsule 240 mg, 240 mg, Oral, BID, Saniya Si H Le, DO    ipratropium (ATROVENT) 0.03 % nasal spray 2 spray, 2 spray, Each Nostril, TID, Saniya Si H Le, DO, 2 spray at 09/26/20 1316    levothyroxine (SYNTHROID) tablet 100 mcg, 100 mcg, Oral, Daily, Saniya Si H Le, DO, 100 mcg at 09/26/20 6573    cetirizine (ZYRTEC) tablet 10 mg, 10 mg, Oral, Daily, Saniya Si H Le, DO, 10 mg at 09/26/20 1023    metroNIDAZOLE (FLAGYL) tablet 500 mg, 500 mg, Oral, BID, Saniya Si H Le, DO, 500 mg at 09/26/20 1022    nitroGLYCERIN (NITROSTAT) SL tablet 0.4 mg, 0.4 mg, Sublingual, Q5 Min PRN, Saniya Si H Le, DO    vilazodone HCl (VIIBRYD) TABS 40 mg, 40 mg, Oral, Daily, Saniya Si H Le, DO, 40 mg at 09/26/20 1022   acetaminophen (TYLENOL) tablet 650 mg, 650 mg, Oral, Q6H PRN **OR** acetaminophen (TYLENOL) suppository 650 mg, 650 mg, Rectal, Q6H PRN, Saniya Si H Le, DO    promethazine (PHENERGAN) tablet 12.5 mg, 12.5 mg, Oral, Q6H PRN **OR** ondansetron (ZOFRAN) injection 4 mg, 4 mg, Intravenous, Q6H PRN, Saniya Si H Le, DO    lactobacillus (CULTURELLE) capsule 1 capsule, 1 capsule, Oral, Daily with breakfast, Saniya Si H Le, DO, 1 capsule at 09/26/20 1023    calcium elemental (OSCAL) tablet 500 mg, 1 tablet, Oral, Nightly, Saniya Si H Le, DO, 500 mg at 09/25/20 2057    Vitamin D (CHOLECALCIFEROL) tablet 2,000 Units, 2,000 Units, Oral, Nightly, Saniya Si H Le, DO, 2,000 Units at 09/25/20 2057    ferrous sulfate (IRON 325) tablet 325 mg, 325 mg, Oral, Daily with breakfast, Saniya Si H Le, DO, 325 mg at 09/26/20 1023    multivitamin 1 tablet, 1 tablet, Oral, Daily, Saniya Si H Le, DO, 1 tablet at 09/26/20 1022    vitamin B-12 (CYANOCOBALAMIN) tablet 1,000 mcg, 1,000 mcg, Oral, Daily, Saniya Si H Le, DO, 1,000 mcg at 09/26/20 1030    polyethylene glycol (GLYCOLAX) packet 17 g, 17 g, Oral, Daily, Saniya Si H Le, DO, 17 g at 09/26/20 1032  Prior to Admission medications    Medication Sig Start Date End Date Taking? Authorizing Provider   lurasidone (LATUDA) 40 MG TABS tablet Take 40 mg by mouth daily   Yes Historical Provider, MD   Nutritional Supplements (DHEA PO) Take 60 mg by mouth daily   Yes Historical Provider, MD   ondansetron (ZOFRAN) 4 MG tablet Take 1 tablet by mouth 3 times daily as needed for Nausea or Vomiting 9/22/20  Yes Hi Rios MD   ciprofloxacin (CIPRO) 500 MG tablet Take 1 tablet by mouth 2 times daily for 10 days 9/20/20 9/30/20 Yes Lilli Floyd PA-C   metroNIDAZOLE (FLAGYL) 500 MG tablet Take 1 tablet by mouth 2 times daily for 10 days 9/20/20 9/30/20 Yes Lilli Floyd PA-C   lamoTRIgine (LAMICTAL) 200 MG tablet TAKE 1 TABLET BY MOUTH ONCE DAILY.  8/19/20  Yes Dina Badillo MD   pantoprazole (PROTONIX) 40 MG tablet TAKE ONE TABLET BY MOUTH EVERY MORNING BEFORE BREAKFAST. Patient taking differently: Take 40 mg by mouth 2 times daily  7/6/20  Yes Nakita Barroso DO   ipratropium (ATROVENT) 0.03 % nasal spray INSTILL 2 SPRAYS IN [facility] NOSTRIL THREE TIMES A DAY 6/15/20  Yes Nakita Barroso, DO   lithium 300 MG tablet Take 2 tablets by mouth 2 times daily 6/1/20  Yes Lokesh Rosa MD   levothyroxine (SYNTHROID) 100 MCG tablet TAKE 1 TABLET BY MOUTH ONE TIME A DAY 4/9/20  Yes Josue Cheatham, APRN - CNP   VILAZODONE HCL 40 MG Tablet TAKE 1 TABLET BY MOUTH ONCE DAILY. 3/30/20  Yes Lokesh Rosa MD   loratadine (CLARITIN) 10 MG tablet Take 1 tablet by mouth daily 2/26/20  Yes Nevaeh Apple MD   fluticasone Dara Brayan) 50 MCG/ACT nasal spray 2 sprays by Each Nostril route daily 2/26/20  Yes Nevaeh Apple MD   polyethylene glycol (GLYCOLAX) powder Take 17 g by mouth daily   Yes Historical Provider, MD   Cholecalciferol (VITAMIN D) 2000 units CAPS capsule Take 2,000 Units by mouth nightly    Yes Historical Provider, MD   Magnesium Oxide 250 MG TABS Take 250 mg by mouth nightly    Yes Historical Provider, MD   calcium carbonate 600 MG TABS tablet Take 1 tablet by mouth nightly    Yes Historical Provider, MD   vitamin B-12 1000 MCG tablet Take 1 tablet by mouth daily 2/8/18  Yes Salvador Floyd MD   dimethyl fumarate (TECFIDERA) 240 MG delayed release capsule Take 240 mg by mouth 2 times daily   Yes Historical Provider, MD   nitroGLYCERIN (NITROSTAT) 0.4 MG SL tablet up to max of 3 total doses.  If no relief after 1 dose, call 911. 2/22/17  Yes Fatoumata Mcfarlane MD   Prenatal MV-Min-Fe Fum-FA-DHA (PRENATAL 1 PO) Take 1 tablet by mouth daily    Yes Historical Provider, MD   ferrous sulfate 325 (65 FE) MG tablet Take 325 mg by mouth 2 times daily (with meals)    Yes Historical Provider, MD   Prosthesis MISC by Does not apply route Strap for the prosthesis broken please order new 3/18/20   Nakita Barroso, DO     Additional information:       VITAL SIGNS   Vitals:    09/26/20 1030   BP: (!) 101/56   Pulse: 60   Resp: 18   Temp: 98 °F (36.7 °C)   SpO2: 98%       PHYSICAL:   Heart:  [x]Regular rate and rhythm  []Other:    Lungs:  [x]Clear    []Other:    Abdomen: [x]Soft    []Other:    Mental Status: [x]Alert & Oriented  []Other:      PLANNED PROCEDURE   []Biospy [x]Arteriogram              []Drainage   []Mediport Insertion  []Fistulogram []IV access       []Vertebroplasty / Augmentation  []IVC filter []Dialysis catheter []Biliary drainage  []Other: []CAPD Catheter []Nephrostomy Tube / Stent  SEDATION  Planned agent:[x]Midazolam []Meperidine [x]Sublimaze []Dilaudid []Morphine     []Diazepam  []Other:     ASA Classification:  []1 [x]2 []3 []4 []5  Class 1: A normal healthy patient  Class 2: Pt with mild to moderate systemic disease  Class 3: Severe systemic disease or disturbance  Class 4: Severe systemic disorders that are already life threatening. Class 5: Moribund pt with little chances of survival, for more than 24 hours. Mallampati I Airway Classification:   []1 [x]2 []3 []4    [x]Pre-procedure diagnostic studies complete and results available. Comment:    [x]Previous sedation/anesthesia experiences assessed. Comment:    [x]The patient is an appropriate candidate to undergo the planned procedure sedation and anesthesia. (Refer to nursing sedation/analgesia documentation record)  [x]Formulation and discussion of sedation/procedure plan, risks, and expectations with patient and/or responsible adult completed. [x]Patient examined immediately prior to the procedure.  (Refer to nursing sedation/analgesia documentation record)    Melinda Brown MD  Electronically signed 9/26/2020 at 4:22 PM

## 2020-09-26 NOTE — OP NOTE
Department of Radiology  Post Procedure Progress Note      Pre-Procedure Diagnosis:  GI bleeding    Procedure Performed:  Mesenteric angiogram    Anesthesia: local / versed and fentanyl    Findings: A small focus of extravasation was seen evident from a tiny artery arising near the origin of the Celiac axis. Many attempts were made to successfully access the vessel with a microcatheter, all of which were unsuccessful due to a combination of vessel size and the tortuous course. Due to these reasons, embolization could not be performed. The region appeared to extend to a portion of small bowel inferior and lateral to the [facility]. Immediate Complications:  None    Estimated Blood Loss: minimal    SEE DICTATED PROCEDURE NOTE FOR COMPLETE DETAILS.     Electronically signed by Shaw Johnston MD on 9/26/2020 at 6:39 PM

## 2020-09-26 NOTE — PLAN OF CARE
improve  Description: Ability to achieve adequate nutritional intake will improve  Outcome: Ongoing  Note: Patient currently NPO except of ice chips. Care plan reviewed with patient. Patient verbalize understanding of the plan of care and contribute to goal setting.

## 2020-09-26 NOTE — PROGRESS NOTES
I discussed results of angio with Dr Charity Holloway    Pt bleeding from previously coiled area of GDA from a small vessel. He was unable to get a microcatheter into vessel to coil. Intervention unsuccessful.     No bleeding from SMA or YARELIS     PLAN:    NPO  Resume PPI infusion  Add octreotide infusion  Follow Hgb and transfuse prn  If bleeding dose not stop, consult general surgery or transfer to tertiary center    Meghana Castillo MD  6:41 PM 9/26/2020

## 2020-09-27 LAB
ANION GAP SERPL CALCULATED.3IONS-SCNC: 9 MEQ/L (ref 8–16)
BUN BLDV-MCNC: 12 MG/DL (ref 7–22)
CALCIUM SERPL-MCNC: 8.3 MG/DL (ref 8.5–10.5)
CHLORIDE BLD-SCNC: 109 MEQ/L (ref 98–111)
CO2: 20 MEQ/L (ref 23–33)
CREAT SERPL-MCNC: 0.3 MG/DL (ref 0.4–1.2)
ERYTHROCYTE [DISTWIDTH] IN BLOOD BY AUTOMATED COUNT: 18 % (ref 11.5–14.5)
ERYTHROCYTE [DISTWIDTH] IN BLOOD BY AUTOMATED COUNT: 58.2 FL (ref 35–45)
GFR SERPL CREATININE-BSD FRML MDRD: > 90 ML/MIN/1.73M2
GLUCOSE BLD-MCNC: 104 MG/DL (ref 70–108)
HCT VFR BLD CALC: 26 % (ref 37–47)
HCT VFR BLD CALC: 26.5 % (ref 37–47)
HCT VFR BLD CALC: 28.6 % (ref 37–47)
HCT VFR BLD CALC: 29.5 % (ref 37–47)
HEMOGLOBIN: 8.4 GM/DL (ref 12–16)
HEMOGLOBIN: 9.3 GM/DL (ref 12–16)
HEMOGLOBIN: 9.5 GM/DL (ref 12–16)
HEMOGLOBIN: 9.7 GM/DL (ref 12–16)
MAGNESIUM: 1.7 MG/DL (ref 1.6–2.4)
MCH RBC QN AUTO: 32.6 PG (ref 26–33)
MCHC RBC AUTO-ENTMCNC: 32.9 GM/DL (ref 32.2–35.5)
MCV RBC AUTO: 99 FL (ref 81–99)
MRSA SCREEN: NORMAL
PLATELET # BLD: 146 THOU/MM3 (ref 130–400)
PMV BLD AUTO: 10.3 FL (ref 9.4–12.4)
POTASSIUM SERPL-SCNC: 3.8 MEQ/L (ref 3.5–5.2)
POTASSIUM SERPL-SCNC: 4 MEQ/L (ref 3.5–5.2)
RBC # BLD: 2.98 MILL/MM3 (ref 4.2–5.4)
SODIUM BLD-SCNC: 138 MEQ/L (ref 135–145)
WBC # BLD: 7 THOU/MM3 (ref 4.8–10.8)

## 2020-09-27 PROCEDURE — 85014 HEMATOCRIT: CPT

## 2020-09-27 PROCEDURE — C9113 INJ PANTOPRAZOLE SODIUM, VIA: HCPCS | Performed by: INTERNAL MEDICINE

## 2020-09-27 PROCEDURE — 2580000003 HC RX 258: Performed by: INTERNAL MEDICINE

## 2020-09-27 PROCEDURE — 80048 BASIC METABOLIC PNL TOTAL CA: CPT

## 2020-09-27 PROCEDURE — 83735 ASSAY OF MAGNESIUM: CPT

## 2020-09-27 PROCEDURE — 85018 HEMOGLOBIN: CPT

## 2020-09-27 PROCEDURE — 2060000000 HC ICU INTERMEDIATE R&B

## 2020-09-27 PROCEDURE — 6360000002 HC RX W HCPCS: Performed by: INTERNAL MEDICINE

## 2020-09-27 PROCEDURE — 85027 COMPLETE CBC AUTOMATED: CPT

## 2020-09-27 PROCEDURE — 36415 COLL VENOUS BLD VENIPUNCTURE: CPT

## 2020-09-27 PROCEDURE — 6370000000 HC RX 637 (ALT 250 FOR IP): Performed by: STUDENT IN AN ORGANIZED HEALTH CARE EDUCATION/TRAINING PROGRAM

## 2020-09-27 PROCEDURE — 84132 ASSAY OF SERUM POTASSIUM: CPT

## 2020-09-27 PROCEDURE — 99233 SBSQ HOSP IP/OBS HIGH 50: CPT | Performed by: INTERNAL MEDICINE

## 2020-09-27 PROCEDURE — 94760 N-INVAS EAR/PLS OXIMETRY 1: CPT

## 2020-09-27 PROCEDURE — 99221 1ST HOSP IP/OBS SF/LOW 40: CPT | Performed by: SURGERY

## 2020-09-27 PROCEDURE — U0002 COVID-19 LAB TEST NON-CDC: HCPCS

## 2020-09-27 RX ORDER — SODIUM CHLORIDE 0.9 % (FLUSH) 0.9 %
10 SYRINGE (ML) INJECTION EVERY 12 HOURS SCHEDULED
Status: DISCONTINUED | OUTPATIENT
Start: 2020-09-28 | End: 2020-10-01 | Stop reason: HOSPADM

## 2020-09-27 RX ORDER — SODIUM CHLORIDE 0.9 % (FLUSH) 0.9 %
10 SYRINGE (ML) INJECTION PRN
Status: DISCONTINUED | OUTPATIENT
Start: 2020-09-27 | End: 2020-10-01 | Stop reason: HOSPADM

## 2020-09-27 RX ADMIN — Medication 2000 UNITS: at 20:49

## 2020-09-27 RX ADMIN — METRONIDAZOLE 500 MG: 500 TABLET, FILM COATED ORAL at 09:42

## 2020-09-27 RX ADMIN — SODIUM CHLORIDE 8 MG/HR: 9 INJECTION, SOLUTION INTRAVENOUS at 00:52

## 2020-09-27 RX ADMIN — FERROUS SULFATE TAB 325 MG (65 MG ELEMENTAL FE) 325 MG: 325 (65 FE) TAB at 09:42

## 2020-09-27 RX ADMIN — THERA TABS 1 TABLET: TAB at 09:41

## 2020-09-27 RX ADMIN — SODIUM CHLORIDE, POTASSIUM CHLORIDE, SODIUM LACTATE AND CALCIUM CHLORIDE: 600; 310; 30; 20 INJECTION, SOLUTION INTRAVENOUS at 17:54

## 2020-09-27 RX ADMIN — VILAZODONE HYDROCHLORIDE 40 MG: 40 TABLET ORAL at 09:41

## 2020-09-27 RX ADMIN — Medication 1 CAPSULE: at 09:42

## 2020-09-27 RX ADMIN — LITHIUM CARBONATE 300 MG: 300 CAPSULE ORAL at 09:42

## 2020-09-27 RX ADMIN — LITHIUM CARBONATE 300 MG: 300 CAPSULE ORAL at 18:14

## 2020-09-27 RX ADMIN — OCTREOTIDE ACETATE 50 MCG/HR: 500 INJECTION, SOLUTION INTRAVENOUS; SUBCUTANEOUS at 07:53

## 2020-09-27 RX ADMIN — SODIUM CHLORIDE 8 MG/HR: 9 INJECTION, SOLUTION INTRAVENOUS at 09:46

## 2020-09-27 RX ADMIN — LEVOTHYROXINE SODIUM 100 MCG: 100 TABLET ORAL at 06:38

## 2020-09-27 RX ADMIN — CALCIUM 500 MG: 500 TABLET ORAL at 20:49

## 2020-09-27 RX ADMIN — SODIUM CHLORIDE 8 MG/HR: 9 INJECTION, SOLUTION INTRAVENOUS at 23:35

## 2020-09-27 RX ADMIN — Medication 1000 MCG: at 09:41

## 2020-09-27 RX ADMIN — OCTREOTIDE ACETATE 50 MCG/HR: 500 INJECTION, SOLUTION INTRAVENOUS; SUBCUTANEOUS at 19:28

## 2020-09-27 RX ADMIN — SODIUM CHLORIDE, POTASSIUM CHLORIDE, SODIUM LACTATE AND CALCIUM CHLORIDE: 600; 310; 30; 20 INJECTION, SOLUTION INTRAVENOUS at 07:45

## 2020-09-27 RX ADMIN — LAMOTRIGINE 200 MG: 100 TABLET ORAL at 09:42

## 2020-09-27 RX ADMIN — CIPROFLOXACIN 500 MG: 500 TABLET, FILM COATED ORAL at 09:42

## 2020-09-27 ASSESSMENT — ENCOUNTER SYMPTOMS
CHEST TIGHTNESS: 0
BLOOD IN STOOL: 1
SORE THROAT: 0
ANAL BLEEDING: 1
TROUBLE SWALLOWING: 0
VOMITING: 0
ABDOMINAL PAIN: 0
NAUSEA: 0
SHORTNESS OF BREATH: 0
BACK PAIN: 1
DIARRHEA: 0
CONSTIPATION: 0
COUGH: 0

## 2020-09-27 ASSESSMENT — PAIN SCALES - GENERAL: PAINLEVEL_OUTOF10: 0

## 2020-09-27 NOTE — PROGRESS NOTES
viibryd, lithium. Pharmacy managing lithium, as it was very minimally elevated on arrival.      Congenital RUE Malformation     Hx of RNY Gastric Bypass - continue MVI. Normal b12 and folate. GERD and hx of esophageal dilation     MS - cont home med    Expected discharge date:  tbd    Disposition: may need transfer to tertiary facility         [] Home       [] TCU       [] Rehab       [] Psych       [] SNF       [] [place]       [] Other-    --------------------------------------------    Chief Complaint: [address] [facility] Course: Per HPI, \"Patient is a 60 y/o  female with PMH multiple GI bleeds, gastritis, erosive esophagitis, GERD, gastric bypass, hypothyroidism, MS, depression, and bipolar type I. She presents to [address] [place] [address] ED for intermittent melena, weakness, and fatigue that has been occurring for the past week. She was recently seen in the ED on 9/20 and CT showed acute colitis. She was discharged with Cipro and Flagyl. On 9/22 she presented back with right flank pain and CT showed constipation and hepatic cyst. She was discharged home with Naproxen 500mg BID and close follow-up with GI. She presents to us again due to worsening weakness and fatigue. She has been admitted multiple times in the past for GI bleeds. She also reports that she has had dark urine, but denies dysuria or frequency. She denies headaches, lightheadedness, SOB, chest pain, N/V/D, abdominal pain. In the ED her Hgb was noted to be 7.3, dropped from 12.2 noted on 9/22. ALT 82 and Alk Phos 145, improved from 9/22. She was given 1L IVF and started on protonix drip. GI was consulted. \"     Of note, the patient had a similar bleeding issue in 2018 which required embolization of the gastroduodenal artery and ultimate transfer to [facility] for further management given ongoing bleeding, however this resolved after APC done on colonoscopy. She also presented in 2019 with GI bleed related to likely erosive esophagitis.   She now presents with what appears to be again bleeding from the gastroduodenal artery. IR was consulted after a positive bleeding scan however it were not able to coil the vessel that was bleeding. Patient is on octreotide and PPI drips now. Subjective (past 24 hours): Seen at bedside, currently stable however still feeling slightly weak and dizzy at times. Vitals show blood pressure remains in the 100-1 20 range and which is similar to her usual baseline of 120. She admits to some nausea since she has not been able to eat for some time, otherwise denies any vomiting, abdominal pain or pressure. Continues to have dark black bowel movements. Hemoglobin remained stable as of this morning at 9.7. She has received a total of 4 units of packed red blood cells, 2 units received last night.       Medications:  Reviewed    Infusion Medications    lactated ringers 100 mL/hr at 09/27/20 0745    pantoprozole (PROTONIX) infusion 8 mg/hr (09/27/20 0946)    octreotide (SANDOSTATIN) infusion 50 mcg/hr (09/27/20 0343)     Scheduled Medications    sodium chloride flush  10 mL Intravenous 2 times per day    lithium  300 mg Oral BID WC    lamoTRIgine  200 mg Oral Daily    dimethyl fumarate  240 mg Oral BID    ipratropium  2 spray Each Nostril TID    levothyroxine  100 mcg Oral Daily    cetirizine  10 mg Oral Daily    vilazodone HCl  40 mg Oral Daily    lactobacillus  1 capsule Oral Daily with breakfast    calcium elemental  1 tablet Oral Nightly    Vitamin D  2,000 Units Oral Nightly    ferrous sulfate  325 mg Oral Daily with breakfast    multivitamin  1 tablet Oral Daily    cyanocobalamin  1,000 mcg Oral Daily    polyethylene glycol  17 g Oral Daily     PRN Meds: potassium chloride, sodium chloride flush, nitroGLYCERIN, acetaminophen **OR** acetaminophen, promethazine **OR** ondansetron      Intake/Output Summary (Last 24 hours) at 9/27/2020 1036  Last data filed at 9/27/2020 0311  Gross per 24 hour   Intake ALKPHOS 145* 96     Recent Labs     09/25/20  1209   INR 0.96     No results for input(s): CKTOTAL, TROPONINI in the last 72 hours. Microbiology:      Urinalysis:      Lab Results   Component Value Date    NITRU NEGATIVE 09/25/2020    WBCUA 5-9 09/25/2020    BACTERIA FEW 09/25/2020    RBCUA 0-2 09/25/2020    BLOODU NEGATIVE 09/25/2020    SPECGRAV >1.030 05/13/2016    GLUCOSEU NEGATIVE 09/25/2020       Radiology:  IR ANGIOGRAM SUPERIOR MESENTERIC   Final Result   Digital subtracted angiography demonstrated contrast extravasation of a tiny branch arising from the celiac axis which led to an area of small bowel in the right side of the abdomen in somewhat close proximity to the previously placed gastroduodenal    artery embolization coils. Due to the tortuosity and small size of the feeding vessel, a microcatheter could not be successfully placed in order to perform an intervention. **This report has been created using voice recognition software. It may contain minor errors which are inherent in voice recognition technology. **                        Final report electronically signed by Dr Jack Glasgow on 9/27/2020 7:36 AM      NM GI BLOOD LOSS   Final Result      Abnormal radiotracer accumulation in the upper abdomen moving into the left hemiabdomen, likely secondary to active small bowel bleeding.       Final report electronically signed by Dr. Estefanía Peralta on 9/26/2020 4:09 PM          DVT prophylaxis: [] Lovenox                                  [x] SCDs                                 [] SQ Heparin                                 [] Encourage ambulation           [] Already on Anticoagulation     Code Status: Full Code    PT/OT Eval Status: tbd    Diet:   Diet NPO Effective Now Exceptions are: Ice Chips    Fluids: yes    Tele:   [x] yes             [] no      Electronically signed by Merlin Lagos, DO on 9/27/2020 at 10:36 AM

## 2020-09-27 NOTE — PROGRESS NOTES
Dc Matute MD  General Surgery consult  Pt Name: Yared Christianson  MRN: [id number]  [place]: 1960  Date of evaluation: 9/27/2020  Primary Care Physician: Andres Whitman DO  Consulting Physician:  Dr. Chrsitian Martin  Reason for evaluation: GI bleed      Chief complaint:      Chief Complaint   Patient presents with    Flank Pain     right    Melena        SUBJECTIVE:     HPI:    Miah Dash is a 61 y. o.female who is currently managed with a GI bleed. Patient has a history of multiple GI bleeds after her Batsheva-en-Y gastric bypass that was performed 2001 at [place]. Patient recently had a GI bleed in 2018, she was found to have bleeding from her GDA that was embolized. She had done well. She is since now has had 4 small GI bleeds. She is admitted again with a GI bleeg after taking naproxen for colitis. She underwent HINSON, there was found to be a bleeding vessel coming off the celiac that was distal to the [address]. It was unable to be embolize due to tortuosity. Dr. Buck Hampton with gastroenterology has been following the patient closely. Due to the location of the bleeding in the gastric remnant he is unable to access it. The patient is very adamant that she does not go back to [place] and wants to stay local.  He personally contacted me today to see if it would be a possibility to perform an open G-tube at which time he could then do a push enteroscopy with possible intervention. I feel this is a good idea for the patient as well. The patient is currently stable and not actively bleeding. Review of Systems:  Review of Systems   Constitutional: Negative for appetite change, fatigue and fever. HENT: Negative for ear pain, sore throat and trouble swallowing. Respiratory: Negative for cough, chest tightness and shortness of breath. Cardiovascular: Negative for chest pain, palpitations and leg swelling. Gastrointestinal: Positive for anal bleeding and blood in stool.  Negative for abdominal pain, constipation, diarrhea, nausea and vomiting. Endocrine: Negative for cold intolerance. Genitourinary: Positive for flank pain. Negative for difficulty urinating and urgency. Musculoskeletal: Positive for back pain. Negative for joint swelling. Skin: Negative for rash and wound. Allergic/Immunologic: Negative for immunocompromised state. Neurological: Negative for seizures and headaches. Psychiatric/Behavioral: Negative for hallucinations and suicidal ideas. The patient is not nervous/anxious.          Past Medical History  Past Medical History:   Diagnosis Date    Balance problem     Bipolar I disorder, most recent episode (or current) depressed, in partial or unspecified remission 5/8/2013    Cancer ([facility] [address]     CERVICAL     Depression     Fatigue     Gallstones     Gastric bypass status for obesity 2002    GERD (gastroesophageal reflux disease)     History of blood transfusion     History of hysterectomy     REMOVAL OF ONE OVARY     Hypothyroidism     MS (multiple sclerosis) (HCC)     Paresthesias     RT LOWER LIMB    UTI (urinary tract infection)        Past Surgical History  Past Surgical History:   Procedure Laterality Date    APPENDECTOMY      BONE GRAFT Left     left leg to right arm    CARPAL TUNNEL RELEASE  1999    CHOLECYSTECTOMY, LAPAROSCOPIC  5/16/2016    COLONOSCOPY      DILATATION, ESOPHAGUS      ENDOSCOPY, COLON, DIAGNOSTIC      ENTEROSCOPY N/A 2/5/2018    ENTEROSCOPY performed by Melisa Paul MD at [facility]  5/16/2016    OTHER SURGICAL HISTORY      RECTAL [address]    OTHER SURGICAL HISTORY  1986    D&C    OTHER SURGICAL HISTORY  2006    GI BLEED    PACEMAKER INSERTION Right 05/20/2019    PACEMAKER PLACEMENT      TX COLONOSCOPY FLX DX W/COLLJ SPEC WHEN PFRMD Left 2/6/2018    COLONOSCOPY performed [facility]    TX OFFICE/OUTPT VISIT,PROCEDURE ONLY Left 2/18/2018    EGD ESOPHAGOGASTRODUODENOSCOPY performed by Karen Duran MD at [address] Endoscopy    [place] OFFICE/OUTPT VISIT,PROCEDURE ONLY N/A 10/4/2018    EGD DIAGNOSTIC ONLY performed by Arelis Berman MD at [facility] ENDOSCOPY Left 9/24/2019    EGD DIAGNOSTIC ONLY performed by Maru Whitehead MD at [address] Endoscopy       Medications  Prior to Admission medications    Medication Sig Start Date End Date Taking? Authorizing Provider   lurasidone (LATUDA) 40 MG TABS tablet Take 40 mg by mouth daily   Yes Historical Provider, MD   Nutritional Supplements (DHEA PO) Take 60 mg by mouth daily   Yes Historical Provider, MD   ondansetron (ZOFRAN) 4 MG tablet Take 1 tablet by mouth 3 times daily as needed for Nausea or Vomiting 9/22/20  Yes Izabela Aldana MD   ciprofloxacin (CIPRO) 500 MG tablet Take 1 tablet by mouth 2 times daily for 10 days 9/20/20 9/30/20 Yes Salinas Cassidy PA-C   metroNIDAZOLE (FLAGYL) 500 MG tablet Take 1 tablet by mouth 2 times daily for 10 days 9/20/20 9/30/20 Yes Salinas Cassidy PA-C   lamoTRIgine (LAMICTAL) 200 MG tablet TAKE 1 TABLET BY MOUTH ONCE DAILY. 8/19/20  Yes Seven Guerrero MD   pantoprazole (PROTONIX) 40 MG tablet TAKE ONE TABLET BY MOUTH EVERY MORNING BEFORE BREAKFAST. Patient taking differently: Take 40 mg by mouth 2 times daily  7/6/20  Yes Roland Green DO   ipratropium (ATROVENT) 0.03 % nasal spray INSTILL 2 SPRAYS IN [facility] NOSTRIL THREE TIMES A DAY 6/15/20  Yes Roland Green DO   lithium 300 MG tablet Take 2 tablets by mouth 2 times daily 6/1/20  Yes Seven Guerrero MD   levothyroxine (SYNTHROID) 100 MCG tablet TAKE 1 TABLET BY MOUTH ONE TIME A DAY 4/9/20  Yes AJITH Riley CNP   VILAZODONE HCL 40 MG Tablet TAKE 1 TABLET BY MOUTH ONCE DAILY.  3/30/20  Yes Seven Guerrero MD   loratadine (CLARITIN) 10 MG tablet Take 1 tablet by mouth daily 2/26/20  Yes Ami Rendon MD fluticasone (FLONASE) 50 MCG/ACT nasal spray 2 sprays by Each Nostril route daily 2/26/20  Yes Eloisa Alfaro MD   polyethylene glycol (GLYCOLAX) powder Take 17 g by mouth daily   Yes Historical Provider, MD   Cholecalciferol (VITAMIN D) 2000 units CAPS capsule Take 2,000 Units by mouth nightly    Yes Historical Provider, MD   Magnesium Oxide 250 MG TABS Take 250 mg by mouth nightly    Yes Historical Provider, MD   calcium carbonate 600 MG TABS tablet Take 1 tablet by mouth nightly    Yes Historical Provider, MD   vitamin B-12 1000 MCG tablet Take 1 tablet by mouth daily 2/8/18  Yes Juan Manuel Gilbert MD   dimethyl fumarate (TECFIDERA) 240 MG delayed release capsule Take 240 mg by mouth 2 times daily   Yes Historical Provider, MD   nitroGLYCERIN (NITROSTAT) 0.4 MG SL tablet up to max of 3 total doses.  If no relief after 1 dose, call 911. 2/22/17  Yes Viridiana Hunter MD   Prenatal MV-Min-Fe Fum-FA-DHA (PRENATAL 1 PO) Take 1 tablet by mouth daily    Yes Historical Provider, MD   ferrous sulfate 325 (65 FE) MG tablet Take 325 mg by mouth 2 times daily (with meals)    Yes Historical Provider, MD   Prosthesis MISC by Does not apply route Strap for the prosthesis broken please order new 3/18/20   Nate Luciano DO    Scheduled Meds:   sodium chloride flush  10 mL Intravenous 2 times per day    lithium  300 mg Oral BID WC    lamoTRIgine  200 mg Oral Daily    dimethyl fumarate  240 mg Oral BID    ipratropium  2 spray Each Nostril TID    levothyroxine  100 mcg Oral Daily    cetirizine  10 mg Oral Daily    vilazodone HCl  40 mg Oral Daily    lactobacillus  1 capsule Oral Daily with breakfast    calcium elemental  1 tablet Oral Nightly    Vitamin D  2,000 Units Oral Nightly    ferrous sulfate  325 mg Oral Daily with breakfast    multivitamin  1 tablet Oral Daily    cyanocobalamin  1,000 mcg Oral Daily    polyethylene glycol  17 g Oral Daily     Continuous Infusions:   lactated ringers 100 mL/hr at 09/27/20 0745    pantoprozole (PROTONIX) infusion 8 mg/hr (20 0972)    octreotide (SANDOSTATIN) infusion 50 mcg/hr (20 0750)     PRN Meds:.potassium chloride, sodium chloride flush, nitroGLYCERIN, acetaminophen **OR** acetaminophen, promethazine **OR** ondansetron    Allergies  Allergies   Allergen Reactions    Latex Rash    Sulfa Antibiotics Rash        Family History  Family History   Problem Relation Age of Onset    Cancer Mother     Colon Polyps Mother     Heart Disease Father     Glaucoma Father     Cancer Sister     Cancer Sister    Not contributory to current presentation    Social History  Social History     Socioeconomic History    Marital status:      Spouse name: None    Number of children: None    Years of education: None    Highest education level: None   Occupational History    None   Social Needs    Financial resource strain: Not hard at all   [employer] insecurity     Worry: Never true     Inability: Never true    Transportation needs     Medical: No     Non-medical: No   Tobacco Use    Smoking status: Former Smoker     Packs/day: 0.10     Years: 0.50     Pack years: 0.05     Types: Cigarettes     Last attempt to quit: 10/24/1977     Years since quittin.9    Smokeless tobacco: Never Used   Substance and Sexual Activity    Alcohol use: No    Drug use: No    Sexual activity: Not Currently     Partners: Male   Lifestyle    Physical activity     Days per week: None     Minutes per session: None    Stress: None   Relationships    Social connections     Talks on phone: None     Gets together: None     Attends Restoration service: None     Active member of club or organization: None     Attends meetings of clubs or organizations: None     Relationship status: None    Intimate partner violence     Fear of current or ex partner: None     Emotionally abused: None     Physically abused: None     Forced sexual activity: None   Other Topics Concern    None   Social History Narrative    None         OBJECTIVE:   CURRENT VITALS:  height is 4' 11\" (1.499 m) and weight is 121 lb (54.9 kg). Her oral temperature is 98.4 °F (36.9 °C). Her blood pressure is 109/60 and her pulse is 60. Her respiration is 18 and oxygen saturation is 98%. Body mass index is 24.44 kg/m². Physical Exam  Constitutional:       Appearance: She is well-developed. HENT:      Head: Normocephalic and atraumatic. Eyes:      General: No scleral icterus. Pupils: Pupils are equal, round, and reactive to light. Neck:      Thyroid: No thyromegaly. Trachea: No tracheal deviation. Cardiovascular:      Rate and Rhythm: Normal rate and regular rhythm. Pulmonary:      Effort: Pulmonary effort is normal.      Breath sounds: Normal breath sounds. Abdominal:      Palpations: Abdomen is soft. Tenderness: There is no abdominal tenderness. There is no guarding. Hernia: No hernia is present. Comments: Well-healed midline laparotomy scar   Musculoskeletal: Normal range of motion. General: No deformity. Skin:     General: Skin is warm. Findings: No rash. Neurological:      Mental Status: She is alert and oriented to person, place, and time.    Psychiatric:         Speech: Speech normal.         Behavior: Behavior normal.          LABS:     Recent Labs     09/25/20  1209 09/25/20  1247 09/26/20  0215 09/26/20  0540  09/27/20  0347 09/27/20  0941 09/27/20  1308   WBC 7.4  --  5.7  --   --   --  7.0  --    HGB 7.3*  --  8.6*  --    < > 9.3* 9.7* 8.4*   HCT 23.2*  --  26.4*  --    < > 28.6* 29.5* 26.0*     --  140  --   --   --  146  --      --   --  140  --   --  138  --    K 3.9  --   --  3.2*  --  3.8 4.0  --      --   --  118*  --   --  109  --    CO2 23  --   --  17*  --   --  20*  --    BUN 26*  --   --  21  --   --  12  --    CREATININE 0.6  --   --  0.4  --   --  0.3*  --    MG  --   --   --  1.6  --   --  1.7  --    CALCIUM 9.4  --   --  7.8*  --   --  8.3*  -- INR 0.96  --   --   --   --   --   --   --    AST 23  --   --  16  --   --   --   --    ALT 82*  --   --  44  --   --   --   --    BILITOT 0.2*  --   --  0.6  --   --   --   --    NITRU  --  NEGATIVE  --   --   --   --   --   --    COLORU  --  DK YELLOW*  --   --   --   --   --   --    BACTERIA  --  FEW  --   --   --   --   --   --     < > = values in this interval not displayed. RADIOLOGY:   I have personally reviewed the following films:  IR ANGIOGRAM SUPERIOR MESENTERIC   Final Result   Digital subtracted angiography demonstrated contrast extravasation of a tiny branch arising from the celiac axis which led to an area of small bowel in the right side of the abdomen in somewhat close proximity to the previously placed gastroduodenal    artery embolization coils. Due to the tortuosity and small size of the feeding vessel, a microcatheter could not be successfully placed in order to perform an intervention. **This report has been created using voice recognition software. It may contain minor errors which are inherent in voice recognition technology. **                        Final report electronically signed by Dr Amanda Monique on 9/27/2020 7:36 AM      NM GI BLOOD LOSS   Final Result      Abnormal radiotracer accumulation in the upper abdomen moving into the left hemiabdomen, likely secondary to active small bowel bleeding. Final report electronically signed by Dr. Jade Gonzalez on 9/26/2020 4:09 PM          IMPRESSIONS:   60-year-old female with recurrent GI bleeds after remote Batsheva-en-Y gastric bypass, it is felt to have a bleeding vessel within the gastric remnant. PLANS:   after discussing risk benefits alternatives with the patient she would like to proceed with above-mentioned procedure. We will plan for open gastrostomy with push enteroscopy and interventions per GI if possible. We will place a G-tube at the time of the procedure.   Dr. Godfrey Franco is available after 1 PM.  I have already called and requested OR time in the after noon  In the interim continue to monitor H&H monitor for acute changes and hemodynamic stability  Electronically signed by Enid Nguyễn MD on 9/27/2020 at 2:14 PM                  Electronically signed by Enid Nguyễn MD  on 9/27/2020 at 2:14 PM

## 2020-09-27 NOTE — PROGRESS NOTES
Pt Name: Sunitha Newsome  MRN: [id number]  [id number]  YOB: 1960  Admit Date: 9/25/2020 11:42 AM  Date of evaluation: 9/27/2020  Primary Care Physician: Ahmet Jones DO   4K-10/010-A     YESSY LARA No complaints. No melena/hematochezia    O.     Vitals:    09/27/20 0733   BP: (!) 106/54   Pulse: 60   Resp: 21   Temp: 98.2 °F (36.8 °C)   SpO2: 98%       Body mass index is 24.44 kg/m². Awake and alert   clear to auscultation bilaterally   regular rate and rhythm   Soft and nondistended with normal BS, nontender   no cyanosis, clubbing or edema present      Current Meds    sodium chloride flush  10 mL Intravenous 2 times per day    lithium  300 mg Oral BID WC    lamoTRIgine  200 mg Oral Daily    ciprofloxacin  500 mg Oral BID    dimethyl fumarate  240 mg Oral BID    ipratropium  2 spray Each Nostril TID    levothyroxine  100 mcg Oral Daily    cetirizine  10 mg Oral Daily    metroNIDAZOLE  500 mg Oral BID    vilazodone HCl  40 mg Oral Daily    lactobacillus  1 capsule Oral Daily with breakfast    calcium elemental  1 tablet Oral Nightly    Vitamin D  2,000 Units Oral Nightly    ferrous sulfate  325 mg Oral Daily with breakfast    multivitamin  1 tablet Oral Daily    cyanocobalamin  1,000 mcg Oral Daily    polyethylene glycol  17 g Oral Daily      lactated ringers 100 mL/hr at 09/27/20 0745    pantoprozole (PROTONIX) infusion 8 mg/hr (09/27/20 0052)    octreotide (SANDOSTATIN) infusion 50 mcg/hr (09/27/20 0753)     Diet: Diet NPO Effective Now Exceptions are: Ice Chips    A.  61 y.o.  female with h/o RYGB admitted 9/25/20 for melena and anemia. She has h/o PUD and has recurrent GI bleeding form bypassed stomach/duodenum with IR with coiling in the past.  She was in ED 9/20/20 with melena and abd pain. CT demonstrated colitis and she was discharged on cipro/flagyl. She returned to ED 9/22/20 with abdominal and back pain. CT was normal.  LFT's mildly elevated.   She was discharged with outpt f/u. I performed an enteroscopy 9/27/20 but could not reach the lower anastomosis. Colonoscopy was planned for the following day, but the patient had a sudden drop in Hgb. Bleeding scan was positive and pt underwent angio with localization of bleeding from previously coiled area of GDA from a small vessel, but IR was unable to get a microcatheter into vessel to coil and intervention was unsuccessful. There was no bleeding from SMA or YARELIS. I resumed ppi infusion and began octreotide planning that if bleeding dose not stop, consult general surgery or transfer to tertiary center.     UGIB from [facility] 17 area on IR, unable to coil  S/p transfusion 4 units PRBC  Acute blood loss anemia  S/p RYGB  Recurrent GIB      P. Cont NPO  Cont PPI infusion  Cont octreotide infusion  Follow Hgb and transfuse prn  If bleeding dose not stop, consult general surgery or transfer to tertiary center - pt would prefer not to [facility] tertiary center. We could consider Surgical G-tube into remnant stomach with endoscopy inter-operatively to see pathology and determine if could be treated endoscopically or open surgery.     Bessy Sandoval MD  10:18 AM 9/27/2020

## 2020-09-27 NOTE — FLOWSHEET NOTE
[facility] PROGRESS NOTE      Patient: Celeste Sharpe  Room #: 4K-10/010-A            YOB: 1960  Age: 61 y.o. Gender: female            Admit Date & Time: 9/25/2020 11:42 AM    Assessment:  Pt was laying in bed, and looked very tired and weak. Pt shared that she had required several units of blood. Pt is a volunteer with the spiritual care department. Pt believes this surgery will not be intensive enough that she cannot return as a volunteer soon. Pt shared that she will be having surgery tomorrow at 1pm to resection her GI track. Pt is a member of [facility]. Pt has a strong lawrence and looking forward to being anointed prior to her surgery tomorrow. Pt expressed an understanding of the dangers of surgery and is ready to die if it is her time. The pt's phone rang at this time and pt chose to accept the call. Interventions:   provided a listening presence and explored the pt's concerns around her upcoming surgery. Outcomes:  Pt was grateful for the visit. Plan:  1. Reassess pt's spiritual needs following surgery. 2.  Provide spiritual care and support. Electronically signed by Olya Luna, on 9/27/2020 at 6:31 PM.  [address] [facility]  [phone]       09/27/20 1730   Encounter Summary   Services provided to: Patient   Referral/Consult From: [address]; Yazidi/lawrence community   Place of Scientologist   ([facility])   Continue Visiting Yes  (9/27)   Complexity of Encounter Moderate   Length of Encounter 15 minutes   Spiritual Assessment Completed Yes   Spiritual/Alevism   Type Spiritual support   Assessment Calm; Approachable   Intervention Active listening;Explored feelings, thoughts, concerns;Sustaining presence/ Ministry of presence   Outcome Connection/belonging;Comfort;Expressed gratitude;Engaged in conversation;Expressed feelings/needs/concerns

## 2020-09-28 ENCOUNTER — ANESTHESIA (OUTPATIENT)
Dept: OPERATING ROOM | Age: 60
DRG: 919 | End: 2020-09-28
Payer: MEDICARE

## 2020-09-28 ENCOUNTER — ANESTHESIA EVENT (OUTPATIENT)
Dept: OPERATING ROOM | Age: 60
DRG: 919 | End: 2020-09-28
Payer: MEDICARE

## 2020-09-28 ENCOUNTER — TELEPHONE (OUTPATIENT)
Dept: FAMILY MEDICINE CLINIC | Age: 60
End: 2020-09-28

## 2020-09-28 VITALS — SYSTOLIC BLOOD PRESSURE: 181 MMHG | OXYGEN SATURATION: 98 % | DIASTOLIC BLOOD PRESSURE: 82 MMHG

## 2020-09-28 PROBLEM — K26.4 BLEEDING DUODENAL ULCER: Status: ACTIVE | Noted: 2020-09-28

## 2020-09-28 LAB
ANION GAP SERPL CALCULATED.3IONS-SCNC: 17 MEQ/L (ref 8–16)
BUN BLDV-MCNC: 10 MG/DL (ref 7–22)
CALCIUM SERPL-MCNC: 9 MG/DL (ref 8.5–10.5)
CHLORIDE BLD-SCNC: 109 MEQ/L (ref 98–111)
CO2: 15 MEQ/L (ref 23–33)
CREAT SERPL-MCNC: 0.4 MG/DL (ref 0.4–1.2)
GFR SERPL CREATININE-BSD FRML MDRD: > 90 ML/MIN/1.73M2
GLUCOSE BLD-MCNC: 126 MG/DL (ref 70–108)
HCT VFR BLD CALC: 29.2 % (ref 37–47)
HCT VFR BLD CALC: 31.5 % (ref 37–47)
HEMOGLOBIN: 10.4 GM/DL (ref 12–16)
HEMOGLOBIN: 11 GM/DL (ref 12–16)
ORGANISM: ABNORMAL
POTASSIUM SERPL-SCNC: 4.9 MEQ/L (ref 3.5–5.2)
SARS-COV-2, NAAT: NOT DETECTED
SODIUM BLD-SCNC: 141 MEQ/L (ref 135–145)
URINE CULTURE, ROUTINE: ABNORMAL

## 2020-09-28 PROCEDURE — 3E0G76Z INTRODUCTION OF NUTRITIONAL SUBSTANCE INTO UPPER GI, VIA NATURAL OR ARTIFICIAL OPENING: ICD-10-PCS | Performed by: SURGERY

## 2020-09-28 PROCEDURE — C1729 CATH, DRAINAGE: HCPCS | Performed by: SURGERY

## 2020-09-28 PROCEDURE — 6360000002 HC RX W HCPCS: Performed by: REGISTERED NURSE

## 2020-09-28 PROCEDURE — 6370000000 HC RX 637 (ALT 250 FOR IP): Performed by: SURGERY

## 2020-09-28 PROCEDURE — 0DH63UZ INSERTION OF FEEDING DEVICE INTO STOMACH, PERCUTANEOUS APPROACH: ICD-10-PCS | Performed by: SURGERY

## 2020-09-28 PROCEDURE — 43830 GSTRST OPEN WO CONSTJ TUBE: CPT | Performed by: SURGERY

## 2020-09-28 PROCEDURE — 2580000003 HC RX 258: Performed by: INTERNAL MEDICINE

## 2020-09-28 PROCEDURE — 2709999900 HC NON-CHARGEABLE SUPPLY: Performed by: SURGERY

## 2020-09-28 PROCEDURE — 7100000001 HC PACU RECOVERY - ADDTL 15 MIN: Performed by: SURGERY

## 2020-09-28 PROCEDURE — 99232 SBSQ HOSP IP/OBS MODERATE 35: CPT | Performed by: SURGERY

## 2020-09-28 PROCEDURE — 6360000002 HC RX W HCPCS: Performed by: SURGERY

## 2020-09-28 PROCEDURE — 3700000000 HC ANESTHESIA ATTENDED CARE: Performed by: SURGERY

## 2020-09-28 PROCEDURE — 80048 BASIC METABOLIC PNL TOTAL CA: CPT

## 2020-09-28 PROCEDURE — 0W3P8ZZ CONTROL BLEEDING IN GASTROINTESTINAL TRACT, VIA NATURAL OR ARTIFICIAL OPENING ENDOSCOPIC: ICD-10-PCS | Performed by: INTERNAL MEDICINE

## 2020-09-28 PROCEDURE — 3700000001 HC ADD 15 MINUTES (ANESTHESIA): Performed by: SURGERY

## 2020-09-28 PROCEDURE — 2580000003 HC RX 258: Performed by: SURGERY

## 2020-09-28 PROCEDURE — 6360000002 HC RX W HCPCS: Performed by: INTERNAL MEDICINE

## 2020-09-28 PROCEDURE — 6370000000 HC RX 637 (ALT 250 FOR IP): Performed by: STUDENT IN AN ORGANIZED HEALTH CARE EDUCATION/TRAINING PROGRAM

## 2020-09-28 PROCEDURE — 85018 HEMOGLOBIN: CPT

## 2020-09-28 PROCEDURE — 85014 HEMATOCRIT: CPT

## 2020-09-28 PROCEDURE — 2500000003 HC RX 250 WO HCPCS: Performed by: REGISTERED NURSE

## 2020-09-28 PROCEDURE — 3600000003 HC SURGERY LEVEL 3 BASE: Performed by: SURGERY

## 2020-09-28 PROCEDURE — 3600000013 HC SURGERY LEVEL 3 ADDTL 15MIN: Performed by: SURGERY

## 2020-09-28 PROCEDURE — 2580000003 HC RX 258: Performed by: REGISTERED NURSE

## 2020-09-28 PROCEDURE — 2060000000 HC ICU INTERMEDIATE R&B

## 2020-09-28 PROCEDURE — 94760 N-INVAS EAR/PLS OXIMETRY 1: CPT

## 2020-09-28 PROCEDURE — 7100000000 HC PACU RECOVERY - FIRST 15 MIN: Performed by: SURGERY

## 2020-09-28 PROCEDURE — 36415 COLL VENOUS BLD VENIPUNCTURE: CPT

## 2020-09-28 RX ORDER — PANTOPRAZOLE SODIUM 40 MG/1
40 TABLET, DELAYED RELEASE ORAL
Status: DISCONTINUED | OUTPATIENT
Start: 2020-09-28 | End: 2020-10-01 | Stop reason: HOSPADM

## 2020-09-28 RX ORDER — MEPERIDINE HYDROCHLORIDE 25 MG/ML
12.5 INJECTION INTRAMUSCULAR; INTRAVENOUS; SUBCUTANEOUS EVERY 5 MIN PRN
Status: DISCONTINUED | OUTPATIENT
Start: 2020-09-28 | End: 2020-09-29

## 2020-09-28 RX ORDER — FENTANYL CITRATE 50 UG/ML
50 INJECTION, SOLUTION INTRAMUSCULAR; INTRAVENOUS EVERY 5 MIN PRN
Status: DISCONTINUED | OUTPATIENT
Start: 2020-09-28 | End: 2020-09-29

## 2020-09-28 RX ORDER — EPHEDRINE SULFATE/0.9% NACL/PF 50 MG/5 ML
SYRINGE (ML) INTRAVENOUS PRN
Status: DISCONTINUED | OUTPATIENT
Start: 2020-09-28 | End: 2020-09-28 | Stop reason: SDUPTHER

## 2020-09-28 RX ORDER — MIDAZOLAM HYDROCHLORIDE 1 MG/ML
INJECTION INTRAMUSCULAR; INTRAVENOUS PRN
Status: DISCONTINUED | OUTPATIENT
Start: 2020-09-28 | End: 2020-09-28 | Stop reason: SDUPTHER

## 2020-09-28 RX ORDER — SODIUM CHLORIDE 9 MG/ML
INJECTION, SOLUTION INTRAVENOUS CONTINUOUS PRN
Status: DISCONTINUED | OUTPATIENT
Start: 2020-09-28 | End: 2020-09-28 | Stop reason: SDUPTHER

## 2020-09-28 RX ORDER — CEFAZOLIN SODIUM 1 G/3ML
INJECTION, POWDER, FOR SOLUTION INTRAMUSCULAR; INTRAVENOUS PRN
Status: DISCONTINUED | OUTPATIENT
Start: 2020-09-28 | End: 2020-09-28 | Stop reason: SDUPTHER

## 2020-09-28 RX ORDER — DIPHENHYDRAMINE HYDROCHLORIDE 50 MG/ML
12.5 INJECTION INTRAMUSCULAR; INTRAVENOUS
Status: ACTIVE | OUTPATIENT
Start: 2020-09-28 | End: 2020-09-28

## 2020-09-28 RX ORDER — LABETALOL 20 MG/4 ML (5 MG/ML) INTRAVENOUS SYRINGE
5 EVERY 5 MIN PRN
Status: DISCONTINUED | OUTPATIENT
Start: 2020-09-28 | End: 2020-10-01 | Stop reason: HOSPADM

## 2020-09-28 RX ORDER — SUCCINYLCHOLINE CHLORIDE 20 MG/ML
INJECTION INTRAMUSCULAR; INTRAVENOUS PRN
Status: DISCONTINUED | OUTPATIENT
Start: 2020-09-28 | End: 2020-09-28 | Stop reason: SDUPTHER

## 2020-09-28 RX ORDER — NEOSTIGMINE METHYLSULFATE 5 MG/5 ML
SYRINGE (ML) INTRAVENOUS PRN
Status: DISCONTINUED | OUTPATIENT
Start: 2020-09-28 | End: 2020-09-28 | Stop reason: SDUPTHER

## 2020-09-28 RX ORDER — ONDANSETRON 2 MG/ML
4 INJECTION INTRAMUSCULAR; INTRAVENOUS
Status: ACTIVE | OUTPATIENT
Start: 2020-09-28 | End: 2020-09-28

## 2020-09-28 RX ORDER — FENTANYL CITRATE 50 UG/ML
INJECTION, SOLUTION INTRAMUSCULAR; INTRAVENOUS PRN
Status: DISCONTINUED | OUTPATIENT
Start: 2020-09-28 | End: 2020-09-28 | Stop reason: SDUPTHER

## 2020-09-28 RX ORDER — HYDROMORPHONE HCL 110MG/55ML
PATIENT CONTROLLED ANALGESIA SYRINGE INTRAVENOUS PRN
Status: DISCONTINUED | OUTPATIENT
Start: 2020-09-28 | End: 2020-09-28 | Stop reason: SDUPTHER

## 2020-09-28 RX ORDER — SUCRALFATE 1 G/1
1 TABLET ORAL
Status: DISCONTINUED | OUTPATIENT
Start: 2020-09-28 | End: 2020-10-01 | Stop reason: HOSPADM

## 2020-09-28 RX ORDER — GLYCOPYRROLATE 1 MG/5 ML
SYRINGE (ML) INTRAVENOUS PRN
Status: DISCONTINUED | OUTPATIENT
Start: 2020-09-28 | End: 2020-09-28 | Stop reason: SDUPTHER

## 2020-09-28 RX ORDER — MORPHINE SULFATE 2 MG/ML
2 INJECTION, SOLUTION INTRAMUSCULAR; INTRAVENOUS EVERY 5 MIN PRN
Status: DISCONTINUED | OUTPATIENT
Start: 2020-09-28 | End: 2020-09-29

## 2020-09-28 RX ORDER — PROPOFOL 10 MG/ML
INJECTION, EMULSION INTRAVENOUS PRN
Status: DISCONTINUED | OUTPATIENT
Start: 2020-09-28 | End: 2020-09-28 | Stop reason: SDUPTHER

## 2020-09-28 RX ORDER — HYDRALAZINE HYDROCHLORIDE 20 MG/ML
5 INJECTION INTRAMUSCULAR; INTRAVENOUS EVERY 10 MIN PRN
Status: DISCONTINUED | OUTPATIENT
Start: 2020-09-28 | End: 2020-10-01 | Stop reason: HOSPADM

## 2020-09-28 RX ORDER — ROCURONIUM BROMIDE 10 MG/ML
INJECTION, SOLUTION INTRAVENOUS PRN
Status: DISCONTINUED | OUTPATIENT
Start: 2020-09-28 | End: 2020-09-28 | Stop reason: SDUPTHER

## 2020-09-28 RX ADMIN — Medication 25 MG: at 12:42

## 2020-09-28 RX ADMIN — SODIUM CHLORIDE, POTASSIUM CHLORIDE, SODIUM LACTATE AND CALCIUM CHLORIDE: 600; 310; 30; 20 INJECTION, SOLUTION INTRAVENOUS at 03:41

## 2020-09-28 RX ADMIN — LITHIUM CARBONATE 300 MG: 300 CAPSULE ORAL at 09:26

## 2020-09-28 RX ADMIN — Medication 4 MG: at 13:49

## 2020-09-28 RX ADMIN — Medication 0.8 MG: at 13:49

## 2020-09-28 RX ADMIN — LITHIUM CARBONATE 300 MG: 300 CAPSULE ORAL at 17:07

## 2020-09-28 RX ADMIN — ROCURONIUM BROMIDE 50 MG: 10 INJECTION INTRAVENOUS at 12:29

## 2020-09-28 RX ADMIN — Medication 25 MG: at 12:35

## 2020-09-28 RX ADMIN — PROPOFOL 150 MG: 10 INJECTION, EMULSION INTRAVENOUS at 12:26

## 2020-09-28 RX ADMIN — MIDAZOLAM HYDROCHLORIDE 2 MG: 1 INJECTION, SOLUTION INTRAMUSCULAR; INTRAVENOUS at 14:01

## 2020-09-28 RX ADMIN — OCTREOTIDE ACETATE 50 MCG/HR: 500 INJECTION, SOLUTION INTRAVENOUS; SUBCUTANEOUS at 17:07

## 2020-09-28 RX ADMIN — Medication 1000 MCG: at 09:27

## 2020-09-28 RX ADMIN — Medication 1 CAPSULE: at 09:26

## 2020-09-28 RX ADMIN — SUCCINYLCHOLINE CHLORIDE 140 MG: 20 INJECTION, SOLUTION INTRAMUSCULAR; INTRAVENOUS at 12:25

## 2020-09-28 RX ADMIN — CEFAZOLIN 1000 MG: 1 INJECTION, POWDER, FOR SOLUTION INTRAMUSCULAR; INTRAVENOUS; PARENTERAL at 12:26

## 2020-09-28 RX ADMIN — FENTANYL CITRATE 100 MCG: 50 INJECTION, SOLUTION INTRAMUSCULAR; INTRAVENOUS at 13:58

## 2020-09-28 RX ADMIN — SODIUM CHLORIDE, POTASSIUM CHLORIDE, SODIUM LACTATE AND CALCIUM CHLORIDE: 600; 310; 30; 20 INJECTION, SOLUTION INTRAVENOUS at 17:10

## 2020-09-28 RX ADMIN — LAMOTRIGINE 200 MG: 100 TABLET ORAL at 09:26

## 2020-09-28 RX ADMIN — OCTREOTIDE ACETATE 50 MCG/HR: 500 INJECTION, SOLUTION INTRAVENOUS; SUBCUTANEOUS at 04:52

## 2020-09-28 RX ADMIN — PANTOPRAZOLE SODIUM 40 MG: 40 TABLET, DELAYED RELEASE ORAL at 17:07

## 2020-09-28 RX ADMIN — VILAZODONE HYDROCHLORIDE 40 MG: 40 TABLET ORAL at 09:27

## 2020-09-28 RX ADMIN — THERA TABS 1 TABLET: TAB at 09:27

## 2020-09-28 RX ADMIN — HYDROMORPHONE HYDROCHLORIDE 1 MG: 2 INJECTION INTRAMUSCULAR; INTRAVENOUS; SUBCUTANEOUS at 14:09

## 2020-09-28 RX ADMIN — FERROUS SULFATE TAB 325 MG (65 MG ELEMENTAL FE) 325 MG: 325 (65 FE) TAB at 09:25

## 2020-09-28 RX ADMIN — SUCRALFATE 1 G: 1 TABLET ORAL at 20:50

## 2020-09-28 RX ADMIN — Medication 2000 UNITS: at 20:50

## 2020-09-28 RX ADMIN — FENTANYL CITRATE 100 MCG: 50 INJECTION, SOLUTION INTRAMUSCULAR; INTRAVENOUS at 12:26

## 2020-09-28 RX ADMIN — SODIUM CHLORIDE: 9 INJECTION, SOLUTION INTRAVENOUS at 12:18

## 2020-09-28 RX ADMIN — LEVOTHYROXINE SODIUM 100 MCG: 100 TABLET ORAL at 06:34

## 2020-09-28 RX ADMIN — CALCIUM 500 MG: 500 TABLET ORAL at 20:50

## 2020-09-28 ASSESSMENT — PULMONARY FUNCTION TESTS
PIF_VALUE: 12
PIF_VALUE: 12
PIF_VALUE: 11
PIF_VALUE: 0
PIF_VALUE: 12
PIF_VALUE: 11
PIF_VALUE: 12
PIF_VALUE: 11
PIF_VALUE: 12
PIF_VALUE: 12
PIF_VALUE: 0
PIF_VALUE: 12
PIF_VALUE: 1
PIF_VALUE: 10
PIF_VALUE: 11
PIF_VALUE: 11
PIF_VALUE: 0
PIF_VALUE: 11
PIF_VALUE: 0
PIF_VALUE: 12
PIF_VALUE: 11
PIF_VALUE: 12
PIF_VALUE: 2
PIF_VALUE: 2
PIF_VALUE: 12
PIF_VALUE: 10
PIF_VALUE: 13
PIF_VALUE: 0
PIF_VALUE: 4
PIF_VALUE: 12
PIF_VALUE: 1
PIF_VALUE: 12
PIF_VALUE: 12
PIF_VALUE: 2
PIF_VALUE: 11
PIF_VALUE: 12
PIF_VALUE: 12
PIF_VALUE: 1
PIF_VALUE: 12
PIF_VALUE: 1
PIF_VALUE: 12
PIF_VALUE: 0
PIF_VALUE: 12
PIF_VALUE: 10
PIF_VALUE: 12
PIF_VALUE: 12
PIF_VALUE: 0
PIF_VALUE: 12
PIF_VALUE: 13
PIF_VALUE: 12
PIF_VALUE: 2
PIF_VALUE: 0
PIF_VALUE: 21
PIF_VALUE: 12
PIF_VALUE: 2
PIF_VALUE: 12
PIF_VALUE: 11
PIF_VALUE: 0
PIF_VALUE: 12
PIF_VALUE: 12
PIF_VALUE: 11
PIF_VALUE: 12
PIF_VALUE: 11
PIF_VALUE: 15
PIF_VALUE: 11
PIF_VALUE: 13
PIF_VALUE: 12
PIF_VALUE: 1
PIF_VALUE: 1
PIF_VALUE: 12
PIF_VALUE: 2
PIF_VALUE: 2
PIF_VALUE: 12
PIF_VALUE: 2
PIF_VALUE: 12
PIF_VALUE: 2
PIF_VALUE: 0
PIF_VALUE: 12

## 2020-09-28 ASSESSMENT — PAIN SCALES - GENERAL
PAINLEVEL_OUTOF10: 0
PAINLEVEL_OUTOF10: 0

## 2020-09-28 NOTE — TELEPHONE ENCOUNTER
Current Admission     Date: 9/25/2020    Unit: [facility] ICU STEPDOWN TELEMETRY 4K    Admitting: Yesica Suarez DO and Jojo Upton MD

## 2020-09-28 NOTE — OP NOTE
[address] [id number] Endoscopy    Patient: [address]  : 1960  Acct#: [de-identified]  Date of evaluation: 2020  Primary Care Physician: Aubree Baker DO     Procedure:    [x]EGD    []Enteroscopy    []Biopsy   []Dilation      []PEG    []PEG change    []PEG removal  []Variceal banding    [x]Control of bleeding []Destruction of lesion by [facility]    []Stent Placement  []Foreign Body Removal    []Snare Polypectomy  []Other:     []Aborted:        []Colonoscopy  []Flex Sigmoid []EUS, rectal     []Biopsy   []Snare Polypectomy    []Control of bleeding  []Destruction of lesion by [facility]    []Stent Placement  []Foreign Body Removal    []Dilation    []Other:    []Aborted:   []Tattoo    Indication:   melena with bleeding from bypassed limb     History:  The patient is a 61 y.o.  female with h/o RYGB admitted 20 for melena and anemia. She has h/o PUD. She has had melena many times in the past with a normal w/u.     She was in ED 20 with melena and abd pain. CT demonstrated colitis and she was discharged on cipro/flagyl. She returned to ED 20 with abdominal and back pain. CT was normal.  LFT's mildly elevated. She was discharged with outpt f/u. I performed an enteroscopy 20 but could not find active hemorrhage nor signs of recent bleeding. Colonoscopy was planned for the following day, but the patient had a sudden drop in Hgb. Bleeding scan was positive and pt underwent angio with localization of bleeding from previously coiled area of GDA from a small vessel, but IR was unable to get a microcatheter into vessel to coil and intervention was unsuccessful. There was no bleeding from SMA or YARELIS. I resumed ppi infusion and began octreotide.     Given recurrent GI bleeding from this area, I conferred with surgery and recommended Surgical G-tube into remnant stomach with endoscopy inter-operatively to see pathology and determine if could be treated endoscopically or open surgery.     Physical Exam:  VITALS: /60   Pulse 65   Temp 98.3 °F (36.8 °C) (Oral)   Resp 16   Ht 4' 11\" (1.499 m)   Wt 120 lb 11.2 oz (54.7 kg)   SpO2 99%   BMI 24.38 kg/m²   The patient is a 61 y.o.  female in no acute distress. HEAD: Normal cephalic/atraumatic. Extra-occular motions intact bilaterally. NECK: No lymphadenopathy or bruits. CHEST: Rises equally on inspiration. Clear to auscultation bilaterally. CARDIOVASCULAR: Regular rate and rhythm without murmurs, rubs or gallops. ABDOMEN: Soft, nontender, and nondistended with normal bowel sounds. No Hepatosplemomegaly. UPPER EXTREMITIES: no cyanosis, clubbing, or edema. DERM: no rash or jaundice. LOWER EXTREMITIES: no cyanosis, clubbing, or edema. NEURO: Alert and oriented times four. Patient moves all extremities and has gross sensation in all extremities.     ASA: ASA 3 - Patient with moderate systemic disease with functional limitations    MEDICATION:    MAC/Propofol/Anesthesia:  Yes     Photo:  Yes  Biopsy:  No      Description of Procedure: The risks and benefits of the procedure were described to the patient or their representative if unable to give consent including but not limited to bleeding, infection, poking a hole someplace requiring surgery to fix it, having a reaction to medication, and death. The patient or their representative if unable to give consent understood these risks and provided informed consent. Airway was assessed and is adequate for the planned sedation. Anesthesia: MAC  Estimated Blood Loss: less than 50   Complications: None  Specimens: Was Not Obtained     Sedation was administered by anesthesia who monitored the patient during the procedure. The patient was placed supine. A G-tube was placed by Dr Aylin Huizar. A forward-viewing Olympus endoscope was lubricated and inserted through the ostomy into the duodenal bulb.  Under direct visualization, the scope was advanced

## 2020-09-28 NOTE — PROGRESS NOTES
Cipro/Flagyl course. 3. ? Mild Shock Liver with transaminitis likely due to #1, resolved: Hgb of 7.3 compared to 12.2 noted on 9/22. Acute drop in Hgb recently and hypotension on admission may have contributed to acute transaminitis, which has been improving.   - Continue IVF  4. Hypokalemia, resolved: Likely dilutional.   - 9/26: K 3.2 this AM. Mg is low-normal. Will give 2gm Magnesium IV and potassium replacement protocol in place. 5. Hx Pacemaker placement for symptomatic bradycardia, stable: ECG shows atrial paced rhythm. - Monitor  6. Hx Batsheva-en-Y Gastric Bypass due to Obesity in 2002  - Continue vitamin supplements  7. Hx Hypothyroidism: TSH normal  - Continue Synthroid 100 mcg QD  8. Hx Multiple Sclerosis, stable  - Continue Tecfidera 240mg BID  9. Hx Depression and Bipolar Type I, stable:  - Continue Lamictal, Viibryd, Lithium    Chief Complaint: Melena, weakness, fatigue    Hospital Course:  Per HPI, \"Patient is a 62 y/o  female with PMH multiple GI bleeds, gastritis, erosive esophagitis, GERD, gastric bypass, hypothyroidism, MS, depression, and bipolar type I. She presents to [facility] ED for intermittent melena, weakness, and fatigue that has been occurring for the past week. She was recently seen in the ED on 9/20 and CT showed acute colitis. She was discharged with Cipro and Flagyl. On 9/22 she presented back with right flank pain and CT showed constipation and hepatic cyst. She was discharged home with Naproxen 500mg BID and close follow-up with GI. She presents to us again due to worsening weakness and fatigue. She has been admitted multiple times in the past for GI bleeds. She also reports that she has had dark urine, but denies dysuria or frequency. She denies headaches, lightheadedness, SOB, chest pain, N/V/D, abdominal pain.     In the ED her Hgb was noted to be 7.3, dropped from 12.2 noted on 9/22. ALT 82 and Alk Phos 145, improved from 9/22.  She was given 1L IVF and started on protonix drip. GI was consulted with orders to start 2 units over 3 hrs each and plans for possible EGD in AM.\"    Subjective (past 24 hours):  Patient had no acute events overnight. She states her weakness and dizziness has improved. She has no complaints or concerns at this time. ROS (12 point review of systems completed. Pertinent positives noted. Otherwise ROS is negative).     Medications:  Reviewed    Infusion Medications    lactated ringers 100 mL/hr at 09/28/20 9261    pantoprozole (PROTONIX) infusion 8 mg/hr (09/27/20 6913)    octreotide (SANDOSTATIN) infusion 50 mcg/hr (09/28/20 6837)     Scheduled Medications    sodium chloride flush  10 mL Intravenous 2 times per day    sodium chloride flush  10 mL Intravenous 2 times per day    lithium  300 mg Oral BID WC    lamoTRIgine  200 mg Oral Daily    dimethyl fumarate  240 mg Oral BID    ipratropium  2 spray Each Nostril TID    levothyroxine  100 mcg Oral Daily    cetirizine  10 mg Oral Daily    vilazodone HCl  40 mg Oral Daily    lactobacillus  1 capsule Oral Daily with breakfast    calcium elemental  1 tablet Oral Nightly    Vitamin D  2,000 Units Oral Nightly    ferrous sulfate  325 mg Oral Daily with breakfast    multivitamin  1 tablet Oral Daily    cyanocobalamin  1,000 mcg Oral Daily    polyethylene glycol  17 g Oral Daily     PRN Meds: sodium chloride flush, potassium chloride, sodium chloride flush, nitroGLYCERIN, acetaminophen **OR** acetaminophen, promethazine **OR** ondansetron      Intake/Output Summary (Last 24 hours) at 9/28/2020 1048  Last data filed at 9/28/2020 0336  Gross per 24 hour   Intake 3007.1 ml   Output 2900 ml   Net 107.1 ml     Diet:  Diet NPO Effective Now Exceptions are: Sips with Meds    Exam:  /60   Pulse 65   Temp 98.3 °F (36.8 °C) (Oral)   Resp 16   Ht 4' 11\" (1.499 m)   Wt 120 lb 11.2 oz (54.7 kg)   SpO2 99%   BMI 24.38 kg/m²     General appearance: No apparent distress, appears stated age and cooperative. HEENT: Pupils equal, round, and reactive to light. Conjunctivae/corneas clear. Neck: Supple, with full range of motion. No jugular venous distention. Trachea midline. Respiratory:  Normal respiratory effort. Clear to auscultation, bilaterally without Rales/Wheezes/Rhonchi. Cardiovascular: Regular rate and rhythm with normal S1/S2 without murmurs, rubs or gallops. Abdomen: Soft, non-tender, non-distended with normal bowel sounds. Musculoskeletal: passive and active ROM x 4 extremities. Skin: Skin color, texture, turgor normal.  No rashes or lesions. Neurologic:  Neurovascularly intact without any focal sensory/motor deficits. Cranial nerves: II-XII intact, grossly non-focal.  Psychiatric: Alert and oriented, thought content appropriate, normal insight  Capillary Refill: Brisk,< 3 seconds   Peripheral Pulses: +2 palpable, equal bilaterally     Labs:   Recent Labs     09/25/20  1209 09/26/20  0215  09/27/20  0941 09/27/20  1308 09/27/20  1848   WBC 7.4 5.7  --  7.0  --   --    HGB 7.3* 8.6*   < > 9.7* 8.4* 9.5*   HCT 23.2* 26.4*   < > 29.5* 26.0* 26.5*    140  --  146  --   --     < > = values in this interval not displayed. Recent Labs     09/25/20  1209 09/26/20  0540 09/27/20  0347 09/27/20  0941    140  --  138   K 3.9 3.2* 3.8 4.0    118*  --  109   CO2 23 17*  --  20*   BUN 26* 21  --  12   CREATININE 0.6 0.4  --  0.3*   CALCIUM 9.4 7.8*  --  8.3*     Recent Labs     09/25/20  1209 09/26/20  0540   AST 23 16   ALT 82* 44   BILITOT 0.2* 0.6   ALKPHOS 145* 96     Recent Labs     09/25/20  1209   INR 0.96     No results for input(s): CKTOTAL, TROPONINI in the last 72 hours.     Microbiology:      Urinalysis:      Lab Results   Component Value Date    NITRU NEGATIVE 09/25/2020    WBCUA 5-9 09/25/2020    BACTERIA FEW 09/25/2020    RBCUA 0-2 09/25/2020    BLOODU NEGATIVE 09/25/2020    SPECGRAV >1.030 05/13/2016    GLUCOSEU NEGATIVE 09/25/2020     Radiology:  IR ANGIOGRAM SUPERIOR MESENTERIC   Final Result   Digital subtracted angiography demonstrated contrast extravasation of a tiny branch arising from the celiac axis which led to an area of small bowel in the right side of the abdomen in somewhat close proximity to the previously placed gastroduodenal    artery embolization coils. Due to the tortuosity and small size of the feeding vessel, a microcatheter could not be successfully placed in order to perform an intervention. **This report has been created using voice recognition software. It may contain minor errors which are inherent in voice recognition technology. **                        Final report electronically signed by Dr Dat Floyd on 9/27/2020 7:36 AM      NM GI BLOOD LOSS   Final Result      Abnormal radiotracer accumulation in the upper abdomen moving into the left hemiabdomen, likely secondary to active small bowel bleeding.       Final report electronically signed by Dr. Siena Spangler on 9/26/2020 4:09 PM        DVT prophylaxis: [] Lovenox                                 [x] SCDs                                 [] SQ Heparin                                 [] Encourage ambulation           [] Already on Anticoagulation     Code Status: Full Code    PT/OT Eval Status: TBD    Tele:   [x] yes             [] no    Electronically signed by Georgia Mohr DO on 9/28/2020 at 10:48 AM

## 2020-09-28 NOTE — PROGRESS NOTES
1402 Pt transferred to PACU, see flow sheet for assessment. Pt is unresponsive, airway obstructing and needing jaw thrust.  2230 Jian Jaramillo CRNA placed NPA into left nare. 1420 Breathing even/unlabored, NPA remains in place. Pt remains unresponsive to her name/touch. 1432 Pt will briefly open her eyes to repeated verbal command and shoulder rub. 65 Pt will open her eyes and nodded her head she was aware her surgery was over. NPA removed. After conversation, pt dozed back to sleep. 56 Pt is sleepy but is oriented to her name/place/age. 1500 Report called to .   1521 Pt transferred to , tele box on IV pole.

## 2020-09-28 NOTE — FLOWSHEET NOTE
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 She is a 61year old who is in bed on 4k and will be having surgery this afternoon.   Pre surgery prayer was had with her.      09/28/20 1000   Encounter Summary   Services provided to: Patient   Referral/Consult From: [facility]   Continue Visiting Yes  (9/28 pre surgery)   Complexity of Encounter Moderate   Length of Encounter 15 minutes   Spiritual/[facility]ian   Type Spiritual support

## 2020-09-28 NOTE — ANESTHESIA PRE PROCEDURE
nasal spray 2 sprays by Each Nostril route daily 2/26/20  Yes Cindy Mercedes MD   polyethylene glycol (GLYCOLAX) powder Take 17 g by mouth daily   Yes Historical Provider, MD   Cholecalciferol (VITAMIN D) 2000 units CAPS capsule Take 2,000 Units by mouth nightly    Yes Historical Provider, MD   Magnesium Oxide 250 MG TABS Take 250 mg by mouth nightly    Yes Historical Provider, MD   calcium carbonate 600 MG TABS tablet Take 1 tablet by mouth nightly    Yes Historical Provider, MD   vitamin B-12 1000 MCG tablet Take 1 tablet by mouth daily 2/8/18  Yes Glenda Tamez MD   dimethyl fumarate (TECFIDERA) 240 MG delayed release capsule Take 240 mg by mouth 2 times daily   Yes Historical Provider, MD   nitroGLYCERIN (NITROSTAT) 0.4 MG SL tablet up to max of 3 total doses.  If no relief after 1 dose, call 911. 2/22/17  Yes Lorin Umanzor MD   Prenatal MV-Min-Fe Fum-FA-DHA (PRENATAL 1 PO) Take 1 tablet by mouth daily    Yes Historical Provider, MD   ferrous sulfate 325 (65 FE) MG tablet Take 325 mg by mouth 2 times daily (with meals)    Yes Historical Provider, MD   Prosthesis MISC by Does not apply route Strap for the prosthesis broken please order new 3/18/20   Zeb Mark, DO       Current medications:    Current Facility-Administered Medications   Medication Dose Route Frequency Provider Last Rate Last Dose    sodium chloride flush 0.9 % injection 10 mL  10 mL Intravenous 2 times per day Janee Quinonez MD        sodium chloride flush 0.9 % injection 10 mL  10 mL Intravenous PRN Janee Quinonez MD        potassium chloride 10 mEq/100 mL IVPB (Peripheral Line)  10 mEq Intravenous PRN Saniya Si H Le,  mL/hr at 09/26/20 1917 10 mEq at 09/26/20 1917    lactated ringers infusion   Intravenous Continuous Margarita Teixeira,  mL/hr at 09/28/20 0341      sodium chloride flush 0.9 % injection 10 mL  10 mL Intravenous 2 times per day Glenda Tamez MD   10 mL at 09/26/20 2054    sodium chloride flush 0.9 % injection 10 mL  10 mL Intravenous PRN Carmita Spaulding MD        lithium capsule 300 mg  300 mg Oral BID WC Saniya Si H Le, DO   300 mg at 09/28/20 0926    pantoprazole (PROTONIX) 80 mg in sodium chloride 0.9 % 100 mL infusion  8 mg/hr Intravenous Continuous Carmita Spaulding MD 10 mL/hr at 09/27/20 2335 8 mg/hr at 09/27/20 2335    octreotide (SANDOSTATIN) 500 mcg in sodium chloride 0.9 % 100 mL infusion  50 mcg/hr Intravenous Continuous Carmita Spaulding MD 10 mL/hr at 09/28/20 0452 50 mcg/hr at 09/28/20 0452    lamoTRIgine (LAMICTAL) tablet 200 mg  200 mg Oral Daily Saniya Si H Le, DO   200 mg at 09/28/20 0926    dimethyl fumarate (TECFIDERA) delayed release capsule 240 mg  240 mg Oral BID Saniya Si H Le, DO        ipratropium (ATROVENT) 0.03 % nasal spray 2 spray  2 spray Each Nostril TID Saniya Si H Le, DO   2 spray at 09/26/20 2053    levothyroxine (SYNTHROID) tablet 100 mcg  100 mcg Oral Daily Saniya Si H Le, DO   100 mcg at 09/28/20 0634    cetirizine (ZYRTEC) tablet 10 mg  10 mg Oral Daily Saniya Si H Le, DO   10 mg at 09/26/20 1023    nitroGLYCERIN (NITROSTAT) SL tablet 0.4 mg  0.4 mg Sublingual Q5 Min PRN Saniya Si H Le, DO        vilazodone HCl (VIIBRYD) TABS 40 mg  40 mg Oral Daily Saniya Si H Le, DO   40 mg at 09/28/20 0927    acetaminophen (TYLENOL) tablet 650 mg  650 mg Oral Q6H PRN Saniya Si H Le, DO        Or    acetaminophen (TYLENOL) suppository 650 mg  650 mg Rectal Q6H PRN Saniya Si H Le, DO        promethazine (PHENERGAN) tablet 12.5 mg  12.5 mg Oral Q6H PRN Saniya Si H Le, DO        Or    ondansetron (ZOFRAN) injection 4 mg  4 mg Intravenous Q6H PRN Saniya Si H Le, DO        lactobacillus (CULTURELLE) capsule 1 capsule  1 capsule Oral Daily with breakfast Saniya Si H Le, DO   1 capsule at 09/28/20 0926    calcium elemental (OSCAL) tablet 500 mg  1 tablet Oral Nightly Saniya Si H Le, DO   500 mg at 09/27/20 2049    Vitamin D (CHOLECALCIFEROL) tablet 2,000 Units  2,000 Units Oral Nightly Saniya Si H Le, DO   2,000 Units at 09/27/20 2049    ferrous sulfate (IRON 325) tablet 325 mg  325 mg Oral Daily with breakfast Saniya Si H Le, DO   325 mg at 09/28/20 3802    multivitamin 1 tablet  1 tablet Oral Daily Saniya Si H Le, DO   1 tablet at 09/28/20 8122    vitamin B-12 (CYANOCOBALAMIN) tablet 1,000 mcg  1,000 mcg Oral Daily Saniya Si H Le, DO   1,000 mcg at 09/28/20 8287    polyethylene glycol (GLYCOLAX) packet 17 g  17 g Oral Daily Saniya Si H Le, DO   17 g at 09/26/20 1032       Allergies:     Allergies   Allergen Reactions    Latex Rash    Sulfa Antibiotics Rash       Problem List:    Patient Active Problem List   Diagnosis Code    Hypothyroidism E03.9    [facility] G35    S/P laparoscopic cholecystectomy Z90.49    S/P laparoscopic appendectomy Z90.49    Iron deficiency anemia D50.9    Abnormal nuclear stress test R94.39    Gastrointestinal hemorrhage associated with gastritis K29.71    Blood loss anemia D50.0    Gastroduodenal artery bleed R58    Prerenal azotemia R79.89    Multiple thyroid nodules E04.2    Nonischemic cardiomyopathy (HCC) I42.8    History of Batsheva-en-Y gastric bypass Z98.84    Renal lesion N28.9    Liver lesion K76.9    Bipolar 1 disorder (HCC) F31.9    Acute gastric ulcer with hemorrhage K25.0    Symptomatic bradycardia R00.1    Status post placement of cardiac pacemaker Z95.0    Melena K92.1    GI bleed K92.2    Transaminitis R74.0       Past Medical History:        Diagnosis Date    Balance problem     Bipolar I disorder, most recent episode (or current) depressed, in partial or unspecified remission 5/8/2013    Cancer ([facility]     CERVICAL     Depression     Fatigue     Gallstones     Gastric bypass status for obesity 2002    GERD (gastroesophageal reflux disease)     History of blood transfusion     History of hysterectomy     REMOVAL OF ONE OVARY     Hypothyroidism     MS (multiple sclerosis) (HCC)     Paresthesias     RT LOWER LIMB    UTI (urinary tract infection)        Past Surgical History:        Procedure Laterality Date    APPENDECTOMY      BONE GRAFT Left     left leg to right arm    CARPAL TUNNEL RELEASE      CHOLECYSTECTOMY, LAPAROSCOPIC  2016    COLONOSCOPY      DILATATION, ESOPHAGUS      ENDOSCOPY, COLON, DIAGNOSTIC      ENTEROSCOPY N/A 2018    ENTEROSCOPY performed [facility]eta [facility]ascimento MD at [facility]  2016    OTHER SURGICAL HISTORY      RECTAL [address]    OTHER SURGICAL HISTORY  1986    D&C    OTHER SURGICAL HISTORY      GI BLEED    PACEMAKER INSERTION Right 2019    PACEMAKER PLACEMENT      IL COLONOSCOPY FLX DX W/COLLJ SPEC WHEN PFRMD Left 2018    COLONOSCOPY performed by Meeta [facility]ascimento MD at [facility] [place] [facility]    IL OFFICE/OUTPT VISIT,PROCEDURE ONLY Left 2018    EGD ESOPHAGOGASTRODUODENOSCOPY performed by Karina Her MD at [facility] [place] [facility]    IL OFFICE/OUTPT VISIT,PROCEDURE ONLY N/A 10/4/2018    EGD DIAGNOSTIC ONLY performed by Ana Martin MD at [place] [address] ENDOSCOPY Left 2019    EGD DIAGNOSTIC ONLY performed by Misa Alcazar MD at [facility] [place] [facility]       Social History:    Social History     Tobacco Use    Smoking status: Former Smoker     Packs/day: 0.10     Years: 0.50     Pack years: 0.05     Types: Cigarettes     Last attempt to quit: 10/24/1977     Years since quittin.9    Smokeless tobacco: Never Used   Substance Use Topics    Alcohol use:  No                                Counseling given: Not Answered      Vital Signs (Current):   Vitals:    20 0000 20 0336 20 0800 20 1001   BP:  113/68 109/60    Pulse:  60 65    Resp:  16 16    Temp:  98.1 °F (36.7 °C) 98.3 °F (36.8 °C)    TempSrc:  Oral Oral    SpO2:  97% 99% 99%   Weight: 120 lb 11.2 oz (54.7 kg)      Height: BP Readings from Last 3 Encounters:   09/28/20 109/60   09/22/20 121/69   09/20/20 114/61       NPO Status: Time of last liquid consumption: 0830                        Time of last solid consumption: 1800                        Date of last liquid consumption: 09/26/20                        Date of last solid food consumption: 09/24/20    BMI:   Wt Readings from Last 3 Encounters:   09/28/20 120 lb 11.2 oz (54.7 kg)   09/22/20 110 lb (49.9 kg)   09/20/20 110 lb (49.9 kg)     Body mass index is 24.38 kg/m². CBC:   Lab Results   Component Value Date    WBC 7.0 09/27/2020    RBC 2.98 09/27/2020    HGB 9.5 09/27/2020    HCT 26.5 09/27/2020    MCV 99.0 09/27/2020    RDW 19.7 05/11/2018     09/27/2020       CMP:   Lab Results   Component Value Date     09/27/2020    K 4.0 09/27/2020    K 3.2 09/26/2020     09/27/2020    CO2 20 09/27/2020    BUN 12 09/27/2020    CREATININE 0.3 09/27/2020    LABGLOM >90 09/27/2020    GLUCOSE 104 09/27/2020    PROT 3.8 09/26/2020    CALCIUM 8.3 09/27/2020    BILITOT 0.6 09/26/2020    ALKPHOS 96 09/26/2020    AST 16 09/26/2020    ALT 44 09/26/2020       POC Tests: No results for input(s): POCGLU, POCNA, POCK, POCCL, POCBUN, POCHEMO, POCHCT in the last 72 hours.     Coags:   Lab Results   Component Value Date    INR 0.96 09/25/2020    APTT 26.5 09/25/2020       HCG (If Applicable): No results found for: PREGTESTUR, PREGSERUM, HCG, HCGQUANT     ABGs: No results found for: PHART, PO2ART, RJH1GSV, EYQ8ILB, BEART, A5ZYIIUL     Type & Screen (If Applicable):  Lab Results   Component Value Date    LABRH NEG 09/25/2020       Drug/Infectious Status (If Applicable):  Lab Results   Component Value Date    HEPCAB Negative 10/25/2018       COVID-19 Screening (If Applicable):   Lab Results   Component Value Date    COVID19 NOT DETECTED 09/27/2020         Anesthesia Evaluation    Airway: Mallampati: II       Mouth opening: > = 3 FB Dental:          Pulmonary: Cardiovascular:    (+) pacemaker:,       ECG reviewed                        Neuro/Psych:   (+) psychiatric history:            GI/Hepatic/Renal:   (+) GERD:, PUD,           Endo/Other:    (+) hypothyroidism::., .                 Abdominal:           Vascular:                                        Anesthesia Plan      general     ASA 4       Induction: intravenous. Anesthetic plan and risks discussed with patient. Plan discussed with CRNA.                   Tom Negrete MD   9/28/2020

## 2020-09-28 NOTE — INTERVAL H&P NOTE
Update History & Physical    Colonoscopy was planned for the following day, but the patient had a sudden drop in Hgb. Bleeding scan was positive and pt underwent angio with localization of bleeding from previously coiled area of GDA from a small vessel, but IR was unable to get a microcatheter into vessel to coil and intervention was unsuccessful. There was no bleeding from SMA or YARELIS. I resumed ppi infusion and began octreotide. Given recurrent GI bleeding from this area, I conferred with surgery and recommended Surgical G-tube into remnant stomach with endoscopy inter-operatively to see pathology and determine if could be treated endoscopically or open surgery. HXPE otherwise unchanged.     Magdalene Rivera MD  8:22 AM 9/28/2020

## 2020-09-28 NOTE — CARE COORDINATION
9/28/20, 7:32 AM EDT  DISCHARGE PLANNING EVALUATION:    Yared Christianson       Admitted from: ED 9/25/2020/ [facility] day: 3   Location: [address] [place] [address]- Reason for admit: GI bleed [K92.2] Status: IP  Admit order signed?: yes  PMH:  has a past medical history of Balance problem, Bipolar I disorder, most recent episode (or current) depressed, in partial or unspecified remission, Cancer ([facility] [address] Depression, Fatigue, Gallstones, Gastric bypass status for obesity, GERD (gastroesophageal reflux disease), History of blood transfusion, History of hysterectomy, Hypothyroidism, MS (multiple sclerosis) ([facility] [address] Paresthesias, and UTI (urinary tract infection). Procedure:   9/26 EGD: Esophagus with small hiatal hernia; No obvious reason for melena but unable to reach lower anastomosis and unable to visualize bypassed stomach and bowel  9/26 IR mesenteric angiogram: small focus of extravasation was seen evident from a tiny artery arising near the origin of the Celiac axis. Many attempts were made to successfully access the vessel with a microcatheter, all of which were unsuccessful due to a combination of vessel size and the tortuous course. Embolization could not be performed. The region appeared to extend to a portion of small bowel inferior and lateral to the previous embolization coils placed in the [facility] in 2018.     9/26 NM Bleeding scan: Abnormal radiotracer accumulation in the upper abdomen moving into the left hemiabdomen, likely secondary to active small bowel bleeding    Medications:  Scheduled Meds:   sodium chloride flush  10 mL Intravenous 2 times per day    sodium chloride flush  10 mL Intravenous 2 times per day    lithium  300 mg Oral BID WC    lamoTRIgine  200 mg Oral Daily    dimethyl fumarate  240 mg Oral BID    ipratropium  2 spray Each Nostril TID    levothyroxine  100 mcg Oral Daily    cetirizine  10 mg Oral Daily    vilazodone HCl  40 mg Oral Daily    lactobacillus  1 capsule Oral Daily with breakfast    calcium elemental  1 tablet Oral Nightly    Vitamin D  2,000 Units Oral Nightly    ferrous sulfate  325 mg Oral Daily with breakfast    multivitamin  1 tablet Oral Daily    cyanocobalamin  1,000 mcg Oral Daily    polyethylene glycol  17 g Oral Daily     Continuous Infusions:   lactated ringers 100 mL/hr at 09/28/20 0341    pantoprozole (PROTONIX) infusion 8 mg/hr (09/27/20 5545)    octreotide (SANDOSTATIN) infusion 50 mcg/hr (09/28/20 8440)      Pertinent Info/Orders/Treatment Plan: Presented with c/o intermittent melena, weakness, and fatigue for 1 week. Seen in ED on 9/20 - CT with acute colitis and was discharged with Cipro and Flagyl. Returned to ED on 9/22 with right flank pain - CT with constipation and hepatic cyst - discharge on Naproxen bid. Returned now with worsening fatigue and weakness. Hx of multiple admissions for GI bleeds. Hgb was 7.3 - down from 12.2 on 9/22. Received 1L fld bolus and started on protonix drip. GI consulted. Received 2 PRBC on 9/26 and 2 PRBC on 9/27. EGD on 9/26 - no obvious reason noted for melena but unable to reach lower anastomosis and unable to visualize bypassed stomach and bowel. Went to IR; unable to coil. General Surgery consulted for recurrent small bowel bleed. Plan for open gastrostomy with push enteroscopy and interventions per GI if possible; plan for G-tube at time of procedure today. Afebrile. Apaced. On room air. Ox4. Telemetry, SCDs, up with assist. IVF, octreotide @ 50 mcg/hr, protonix @ 8 mg/hr, oscal, zyrtec, tecfidera, ferrous sulfate, lactobacillus, lamictal, synthroid, lithium, viibryd. PO Cipro completed yesterday. Trop neg, alb 2.6, alk phos 145 - now 96, alt 82 - now 44, hgb 7.3 - down to 6.7 - now 9.5. Urine w/small leukocytes - culture with growth of contaminants. Diet: Diet NPO Effective Now Exceptions are: Sips with Meds   Smoking status:  reports that she quit smoking about 42 years ago.  Her smoking use included cigarettes. She has a 0.05 pack-year smoking history. She has never used smokeless tobacco.   PCP: Celia Prabhakar DO  Readmission 30 days or less: no  Readmission Risk Score: 24%    Discharge Planning Evaluation  Current Residence:  Private Residence  Living Arrangements:  Alone   Support Systems:  Family Members, [facility] [facility], Friends/Neighbors  Current Services PTA:     Potential Assistance Needed:  N/A  Potential Assistance Purchasing Medications:  No  Does patient want to participate in local refill/ meds to beds program?  Yes  Type of Home Care Services:  None  Patient expects to be discharged to:  home  Expected Discharge date:  10/02/20  Follow Up Appointment: Best Day/ Time: Tuesday AM    Patient Goals/Plan/Treatment Preferences: Spoke with Guerline Batsheva; states she lives at home alone and did not use any DME or have any HH services PTA. She cares for herself independently. She does not drive, she uses the bus for transportation. Guerline Herman states she plans to return home at discharge, denies needs, and declines HH stating she doesn't feel she will need it. Transportation/Food Security/Housekeeping Addressed:  No issues identified.     Evaluation: no

## 2020-09-28 NOTE — OP NOTE
Operative Note      NAME: Neptali Amador   MRN: [id number]  : 1960  PROCEDURE DATE: 2020 - 2020     Surgeon: Ben Robles) and Role:     * Ghanshyam Martinez MD - Primary     * Jose Miguel Garrison MD    Assistants: none    Staff: Circulator: Liza Lee RN; Supriya Hills RN  Relief Circulator: Mayur Dowling RN  Scrub Person First: Sadaf Catalan  Scrub Person Second: Morenita Jameson RN  Perioperative Nurse: Alysia Vera RN  Endoscopy Technician: Wade Ribeiro    Pre-op Diagnosis: hx of gastric bypass, duodenal bleed    Post-op Diagnosis: same, duodenotomy     Procedure Performed: Procedure(s):  GASTROSTOMY TUBE PLACEMENT- RINESMITH TO DO GASTRIC ENDOSCOPY (N/A)    Anesthesia Type: General    Indications: This 27-year-old female who is been having recurrent GI bleeds after GDA ligation and history of gastric bypass. Bleeding was found to be coming from a small vessel feeding her gastric remnant duodenum. Plan was for open G-tube with push enteroscopy by GI. Risk benefits and alternatives were explained. Findings: Moderate filmy adhesions  Postpyloric initial tube placement appreciated during endoscopy. Bleeding within the duodenum cauterized by gastroenterology  Replacement of tube proximal to the pylorus  Primary repair of duodenotomy    Procedure details: She was seen in preoperative holding room where informed consent was reviewed she was brought to the operating placed napping table spine position. After adequate anesthesia formal divorce performed she is prepped and draped in the sterile fashion. Midline incision was made through her prior laparotomy scar  Cautery was used to deepen this through the continuous tissue and fascia. The peritoneal cavity was entered sharply. There were moderate adhesions of the greater omentum to the anterior abdominal wall and the stomach was fairly socked in. These adhesions were taken down sharply.   Was thought to be the stomach just proximal to the pylorus was grasped and elevated anteriorly. Moderate amount of adhesions had the stomach pulled medially. A 24 Prydeinig gastrostomy tube was advanced to the anterior abdominal wall. A pursestring suture was placed around the bowel and a thought to be gastrotomy was made. The tube was advanced the pursestring was secured. It was then buttressed to the anterior abdominal wall with box sutures. The G-tube was then removed and the pursestring suture was cut to allow passage of the endoscope. A separate dictation can be seen from gastroenterology. The scope was able to be advanced into the bowel. At this point it was noted that it was duodenal mucosa and to be  postpyloric. The duodenum was scoped a small ulcer was identified and was cauterized. The scope was passed retrograde into the proximal stomach there were no ulcers or lesions. While doing this the new chosen site for the gastrostomy site was identified and grasped with a tunde. There were multiple adhesions overlying this area of the stomach that required adhesionolysis. The duodenum was taken down from the anterior abdominal wall it was then closed in layers with interrupted Vicryl sutures followed by imbricating Vicryl sutures. Dissection ensued to free the proximal stomach that had overlying adhesions. A pursestring suture was placed. A 24 Prydeinig tube was then passed back  through the anterior abdominal wall . Gastrotomy was made the tube was advanced and the balloon was inflated with sterile water the external bumper was at 2-1/2 cm the skin. It was secured to the anterior abdominal wall again with box sutures. The pursestring suture was cut to allow for endoscopy at a later date. The abdominal cavity was irrigated. A 19 [place] Hank drain was passed through the right anterior abdominal wall to lay over the duodenal repair. The fascia was closed with an 0 looped PDS, the skin was closed a running Vicryl suture. Surgical glue was applied. All sponge and needle counts were correct.     Complications: none    Specimens:   * No specimens in log *     Implant:  * No implants in log *       Estimated Blood Loss:  15ml                     Condition: stable

## 2020-09-28 NOTE — ANESTHESIA POSTPROCEDURE EVALUATION
Department of Anesthesiology  Postprocedure Note    Patient: Chris Downs  MRN: [id number]  YOB: 1960  Date of evaluation: 9/28/2020  Time:  3:24 PM     Procedure Summary     Date:  09/28/20 Room / Location:  [facility]  / [facility]    Anesthesia Start:  1218 Anesthesia Stop:  9272    Procedure:  [address] TO DO GASTRIC ENDOSCOPY (N/A ) Diagnosis:  (GI Bleed)    Surgeon:  Afua De La Paz MD Responsible Provider:  Jian Angulo MD    Anesthesia Type:  general ASA Status:  4          Anesthesia Type: general    Jerson Phase I: Jerson Score: 9    Jerson Phase II: Jerson Score: 9    Last vitals: Reviewed and per EMR flowsheets.        Anesthesia Post Evaluation

## 2020-09-28 NOTE — PROGRESS NOTES
infusion 50 mcg/hr (20 3882)     PRN Meds:.sodium chloride flush, potassium chloride, sodium chloride flush, nitroGLYCERIN, acetaminophen **OR** acetaminophen, promethazine **OR** ondansetron  OBJECTIVE   CURRENT VITALS:  height is 4' 11\" (1.499 m) and weight is 120 lb 11.2 oz (54.7 kg). Her oral temperature is 98.3 °F (36.8 °C). Her blood pressure is 109/60 and her pulse is 65. Her respiration is 16 and oxygen saturation is 99%. Temperature Range (24h):Temp: 98.3 °F (36.8 °C) Temp  Av.2 °F (36.8 °C)  Min: 97.8 °F (36.6 °C)  Max: 98.7 °F (37.1 °C)  BP Range (78C): Systolic (59HZK), ZOW:992 , Min:109 , VOK:359     Diastolic (25SNF), CRF:28, Min:60, Max:68    Pulse Range (24h): Pulse  Av.8  Min: 61  Max: 65  Respiration Range (24h): Resp  Av.4  Min: 16  Max: 18  Current Pulse Ox (24h):  SpO2: 99 %  Pulse Ox Range (24h):  SpO2  Av.6 %  Min: 97 %  Max: 99 %  Oxygen Amount and Delivery:    Incentive Spirometry Tx:            Resting in bed no acute distress interactive and appropriate  Unlabored respirations good air movement  Regular rate palpable distal pulses  Abdomen is soft nontender nondistended  No peripheral edema no cyanosis clubbing    In: 2414.6 [I.V.:2414.6]  Out: 2300 [Urine:2300]  Date 20 0000 - 20   Shift 2592-2994 7435-4007 2981-4954 24 Hour Total   INTAKE   P.O.(mL/kg/hr) 0(0) 0  0   I. V.(mL/kg) 783. 5(14.3) 870(15.9)  1653. 5(30.2)   Shift Total(mL/kg) 783. 5(14.3) 870(15.9)  1653. 5(30.2)   OUTPUT   Urine(mL/kg/hr) 1500(3.4) 0  1500   Emesis/NG output(mL/kg) 0(0) 0(0)  0(0)   Other(mL/kg) 0(0) 0(0)  0(0)   Stool(mL/kg) 0(0) 0(0)  0(0)   Blood(mL/kg) 0(0) 0(0)  0(0)   Shift Total(mL/kg) 1500(27.4) 0(0)  1500(27.4)   Weight (kg) 54.7 54.7 54.7 54.7     LABS     Recent Labs     20  1209 20  0215 20  0540  20  0347 20  0941 20  1308 20  1848   WBC 7.4 5.7  --   --   --  7.0  --   --    HGB 7.3* 8.6*  --    < > 9.3* 9.7* 8.4*

## 2020-09-28 NOTE — PROGRESS NOTES
Patient to OR without any personal belongings including cell phone, jewelry, hearing aids, dentures, etc.     Bilateral nares swabbed. Temporal temperature 98.9.

## 2020-09-29 LAB
HCT VFR BLD CALC: 29.9 % (ref 37–47)
HCT VFR BLD CALC: 30.2 % (ref 37–47)
HEMOGLOBIN: 9.7 GM/DL (ref 12–16)
HEMOGLOBIN: 9.7 GM/DL (ref 12–16)

## 2020-09-29 PROCEDURE — 2580000003 HC RX 258: Performed by: SURGERY

## 2020-09-29 PROCEDURE — 85018 HEMOGLOBIN: CPT

## 2020-09-29 PROCEDURE — 36415 COLL VENOUS BLD VENIPUNCTURE: CPT

## 2020-09-29 PROCEDURE — 2060000000 HC ICU INTERMEDIATE R&B

## 2020-09-29 PROCEDURE — 6370000000 HC RX 637 (ALT 250 FOR IP): Performed by: NURSE PRACTITIONER

## 2020-09-29 PROCEDURE — 6370000000 HC RX 637 (ALT 250 FOR IP): Performed by: SURGERY

## 2020-09-29 PROCEDURE — 85014 HEMATOCRIT: CPT

## 2020-09-29 PROCEDURE — 94760 N-INVAS EAR/PLS OXIMETRY 1: CPT

## 2020-09-29 PROCEDURE — 6360000002 HC RX W HCPCS: Performed by: STUDENT IN AN ORGANIZED HEALTH CARE EDUCATION/TRAINING PROGRAM

## 2020-09-29 RX ORDER — DOCUSATE SODIUM 100 MG/1
100 CAPSULE, LIQUID FILLED ORAL 2 TIMES DAILY
Status: DISCONTINUED | OUTPATIENT
Start: 2020-09-29 | End: 2020-10-01 | Stop reason: HOSPADM

## 2020-09-29 RX ORDER — MORPHINE SULFATE 2 MG/ML
2 INJECTION, SOLUTION INTRAMUSCULAR; INTRAVENOUS EVERY 4 HOURS PRN
Status: DISCONTINUED | OUTPATIENT
Start: 2020-09-29 | End: 2020-10-01 | Stop reason: HOSPADM

## 2020-09-29 RX ORDER — MORPHINE SULFATE 2 MG/ML
1 INJECTION, SOLUTION INTRAMUSCULAR; INTRAVENOUS EVERY 4 HOURS PRN
Status: DISCONTINUED | OUTPATIENT
Start: 2020-09-29 | End: 2020-10-01 | Stop reason: HOSPADM

## 2020-09-29 RX ADMIN — POLYETHYLENE GLYCOL 3350 17 G: 17 POWDER, FOR SOLUTION ORAL at 10:12

## 2020-09-29 RX ADMIN — Medication 1 CAPSULE: at 10:12

## 2020-09-29 RX ADMIN — SODIUM CHLORIDE, PRESERVATIVE FREE 10 ML: 5 INJECTION INTRAVENOUS at 20:43

## 2020-09-29 RX ADMIN — SUCRALFATE 1 G: 1 TABLET ORAL at 15:31

## 2020-09-29 RX ADMIN — SUCRALFATE 1 G: 1 TABLET ORAL at 07:29

## 2020-09-29 RX ADMIN — IPRATROPIUM BROMIDE 2 SPRAY: 21 SPRAY NASAL at 10:13

## 2020-09-29 RX ADMIN — CALCIUM 500 MG: 500 TABLET ORAL at 20:28

## 2020-09-29 RX ADMIN — LAMOTRIGINE 200 MG: 100 TABLET ORAL at 10:11

## 2020-09-29 RX ADMIN — PANTOPRAZOLE SODIUM 40 MG: 40 TABLET, DELAYED RELEASE ORAL at 16:42

## 2020-09-29 RX ADMIN — Medication 1000 MCG: at 10:11

## 2020-09-29 RX ADMIN — Medication 10 ML: at 20:29

## 2020-09-29 RX ADMIN — THERA TABS 1 TABLET: TAB at 10:11

## 2020-09-29 RX ADMIN — SODIUM CHLORIDE, POTASSIUM CHLORIDE, SODIUM LACTATE AND CALCIUM CHLORIDE: 600; 310; 30; 20 INJECTION, SOLUTION INTRAVENOUS at 14:17

## 2020-09-29 RX ADMIN — PANTOPRAZOLE SODIUM 40 MG: 40 TABLET, DELAYED RELEASE ORAL at 10:11

## 2020-09-29 RX ADMIN — Medication 10 ML: at 10:13

## 2020-09-29 RX ADMIN — VILAZODONE HYDROCHLORIDE 40 MG: 40 TABLET ORAL at 10:12

## 2020-09-29 RX ADMIN — LITHIUM CARBONATE 300 MG: 300 CAPSULE ORAL at 16:42

## 2020-09-29 RX ADMIN — MORPHINE SULFATE 2 MG: 2 INJECTION, SOLUTION INTRAMUSCULAR; INTRAVENOUS at 20:28

## 2020-09-29 RX ADMIN — DOCUSATE SODIUM 100 MG: 100 CAPSULE, LIQUID FILLED ORAL at 14:17

## 2020-09-29 RX ADMIN — Medication 2000 UNITS: at 20:28

## 2020-09-29 RX ADMIN — DOCUSATE SODIUM 100 MG: 100 CAPSULE, LIQUID FILLED ORAL at 20:28

## 2020-09-29 RX ADMIN — MORPHINE SULFATE 1 MG: 2 INJECTION, SOLUTION INTRAMUSCULAR; INTRAVENOUS at 08:00

## 2020-09-29 RX ADMIN — LEVOTHYROXINE SODIUM 100 MCG: 100 TABLET ORAL at 10:12

## 2020-09-29 RX ADMIN — LITHIUM CARBONATE 300 MG: 300 CAPSULE ORAL at 10:11

## 2020-09-29 ASSESSMENT — PAIN DESCRIPTION - FREQUENCY
FREQUENCY: CONTINUOUS

## 2020-09-29 ASSESSMENT — PAIN DESCRIPTION - PROGRESSION
CLINICAL_PROGRESSION: NOT CHANGED

## 2020-09-29 ASSESSMENT — PAIN - FUNCTIONAL ASSESSMENT
PAIN_FUNCTIONAL_ASSESSMENT: PREVENTS OR INTERFERES SOME ACTIVE ACTIVITIES AND ADLS

## 2020-09-29 ASSESSMENT — PAIN SCALES - GENERAL
PAINLEVEL_OUTOF10: 7
PAINLEVEL_OUTOF10: 0
PAINLEVEL_OUTOF10: 4
PAINLEVEL_OUTOF10: 6
PAINLEVEL_OUTOF10: 2
PAINLEVEL_OUTOF10: 2

## 2020-09-29 ASSESSMENT — PAIN DESCRIPTION - LOCATION
LOCATION: ABDOMEN

## 2020-09-29 ASSESSMENT — PAIN DESCRIPTION - ONSET
ONSET: ON-GOING

## 2020-09-29 ASSESSMENT — PAIN DESCRIPTION - PAIN TYPE
TYPE: ACUTE PAIN

## 2020-09-29 ASSESSMENT — PAIN DESCRIPTION - ORIENTATION
ORIENTATION: MID

## 2020-09-29 ASSESSMENT — PAIN DESCRIPTION - DESCRIPTORS
DESCRIPTORS: ACHING

## 2020-09-29 NOTE — CARE COORDINATION
DISCHARGE/PLANNING EVALUATION  9/29/20, 11:17 AM EDT    Reason for Referral:  Discharge planning  Mental Status:  Alert and oriented   Decision Making:   Makes her own decisions  Family/Social/Home Environment:   SW spoke to patient about her needs. She lives alone in a second floor apartment with elevator. She had been able to do all of her own housekeeping. She has a bathtub shower with seat and grab bar. She is magdaleno to drive but does not have a car. She relies on the bus for transportation. She works in the [employer] at [employer]. She does not have children but can rely on her sister for assist  Current Services including food security, transportation and housekeeping:   No services PTA, see above for other needs  Current Equipment:  see above  Payment Source: [facility] Medicare and Medicaid  Concerns or Barriers to Discharge: We discussed HH and she was agreeable to this  Post acute provider list with quality measures, geographic area and applicable managed care information provided. Questions regarding selection process answered: she prefers [facility]    Teach Back Method used with patient regarding care plan and needs  Patient verbalize understanding of the plan of care and contribute to goal setting.        Patient goals, treatment preferences and discharge plan:  Plan home alone with [facility]    Electronically signed by XOCHILT Perez on 9/29/2020 at 11:17 AM

## 2020-09-29 NOTE — CARE COORDINATION
9/29/20, 2:55 PM EDT    DISCHARGE PLANNING EVALUATION      SW spoke to Joshua with [facility]. She verified they are in network with her [facility] but her plan does not give many visits. They will bill to  Medicaid if need more visits.

## 2020-09-29 NOTE — CARE COORDINATION
9/29/20, 1:55 PM EDT    DISCHARGE ON GOING [address] day: 4  Location: -10/010-A Reason for admit: GI bleed [K92.2]   Procedure:   9/26 EGD: Esophagus with small hiatal hernia; No obvious reason for melena but unable to reach lower anastomosis and unable to visualize bypassed stomach and bowel  9/26 IR mesenteric angiogram: small focus of extravasation was seen evident from a tiny artery arising near the origin of the Celiac axis.  Many attempts were made to successfully access the vessel with a microcatheter, all of which were unsuccessful due to a combination of vessel size and the tortuous course. Embolization could not be performed. The region appeared to extend to a portion of small bowel inferior and lateral to the previous embolization coils placed in the [facility] in 2018.    9/26 NM Bleeding scan: Abnormal radiotracer accumulation in the upper abdomen moving into the left hemiabdomen, likely secondary to active small bowel bleeding  9/27 IR Angiogram  Digital subtracted angiography demonstrated contrast extravasation of a tiny branch arising from the celiac axis which led to an area of small bowel in the right side of the abdomen in somewhat close proximity to the previously placed gastroduodenal   artery embolization coils  9/28 Endoscopy  1. Esophagus - unable to examine  2. Stomach - postsurgical, proximally obstructed  3.   Duodenum - single small clean-based ulcer with actively oozing spot at proximal side cauterized with APC for hemostasis  9/28   GASTROSTOMY TUBE PLACEMENT- RINESMIT TO DO GASTRIC ENDOSCOPY   Treatment Plan:  Repeat EGD via ostomy in 6-8 weeks to confirm healing after dilating ostomy  Plan of Care: Client admitted with GI Bleed; GI following  Barriers to Discharge: H 9.7; monitor  PCP: Bang Joyner DO  Readmission Risk Score: 22%  Patient Goals/Plan/Treatment Preferences: plans home alone, [facility]

## 2020-09-29 NOTE — FLOWSHEET NOTE
09/29/20 0741   Provider Notification   Reason for Communication Evaluate   Provider Name Dr. Kelley Stallings   Provider Notification Resident   Method of Communication Face to face   Response See orders   Notification Time 0741   Dr. Kelley Stallings in room to see patient. Asked this RN to discontinue morphine, fetanyl, & demerol orders and place order for morphine 1-2 mg every 4 hours as needed. He also ordered PT & OT.  See orders

## 2020-09-29 NOTE — PROGRESS NOTES
Hospitalist Progress Note    Patient:  Colletta Gable      Unit/Bed:4K-10/010-A    YOB: 1960    MRN: [id number]       Acct: [de-identified]     PCP: Brett Sheppard DO    Date of Admission: 9/25/2020    Assessment/Plan:    1. Acute blood loss anemia 2/2 UGIB, resolved: due to hx of gastritis and erosive esophagitis possibly exacerbated by recent naproxen use vs. Hx AVM. Was discharged with Naproxen 500mg BID on 9/22. Pt takes Protonix 40mg QD outpatient; was not compliant in the past because it caused her headaches. From chart review in 10/2018 patient had Hgb drop of 12 to 7.8 and melena. She underweight tagged RBC and coil embolization on 10/2/18 and 10/3/18 at that time. She continued to have melena and required 12 units of pRBCs-subsequently transferred to [facility] for further care. On 10/11/18 she underwent colonoscopy with APC to a cecal AVM. She had no further melena following the procedure. - Was given 1L IVF in ED and started on Protonix drip. NPO for now. Monitor H+H q6hrs. GI on case and plan for transfusion of 2 units over 3 hours each and possible EGD in AM. Stop NSAID's. Transfuse if Hgb >7.  -9/26: EGD this AM showed no obvious evidence for bleeding. NPO after midnight. IVF switched to LR @ 150 cc/hr. Protonix drip stopped per GI. NM bleeding scan showed bleeding in region of GDA/prior embolization, however, IR was unable to successfully coil the bleed-Dr. Jansen aware and restarted PPI drip and started octreotide. Received 2 more units of pRBCs overnight.  -9/27: Seen my general surgery with recommendations for open gastrostomy with push enteroscopy and interventions per GI if possible; G-tube will be placed at time of procedure.  -9/28: Hgb this AM 9.5. Has received a total of 4 units pRBC since admission.  Plan for procedure mentioned above this afternoon.  -9/29: Yesterday afternoon had open gastrostomy with push enteroscopy and duodenal bleed found cauterized with APC; G-tube placed to BAN with plans to repeat EGD via ostomy in 6-8 weeks to confirm healing after dilating ostomy. Hgb was 11 last night and this AM 9.7. Likely dilutional as patient is still on IV fluids. Encouraged PO intake with plans to discontinue IVF. Per general surgery patient is able to advance her diet as tolerated. 2. Acute colitis noted on CT abdomen on 9/20: Was discharged from ED with Cipro and Flagyl. No colitis mentioned on CT on 9/22. Completed Cipro/Flagyl course. 3. ? Mild Shock Liver with transaminitis likely due to #1, resolved: Hgb of 7.3 compared to 12.2 noted on 9/22. Acute drop in Hgb recently and hypotension on admission may have contributed to acute transaminitis, which has resolved on 9/26.  4. Hypokalemia, resolved: Likely dilutional.   - 9/26: K 3.2 this AM. Mg is low-normal. Will give 2gm Magnesium IV and potassium replacement protocol in place.  - Monitor BMP  5. Hx Pacemaker placement for symptomatic bradycardia, stable: ECG shows atrial paced rhythm. - Monitor  6. Hx Batsheva-en-Y Gastric Bypass due to Obesity in 2002  - Continue vitamin supplements  7. Hx Hypothyroidism: TSH normal  - Continue Synthroid 100 mcg QD  8. Hx Multiple Sclerosis, stable  - Continue Tecfidera 240mg BID  9. Hx Depression and Bipolar Type I, stable:  - Continue Lamictal, Viibryd, Lithium    Expected discharge date: 1-2 days    Disposition:    [x] Home       [] TCU       [] Rehab       [] Psych       [] SNF       [] [place]       [] Other-    Chief Complaint: Melena, weakness, fatigue    Hospital Course:  Per HPI, \"Patient is a 62 y/o  female with PMH multiple GI bleeds, gastritis, erosive esophagitis, GERD, gastric bypass, hypothyroidism, MS, depression, and bipolar type I. She presents to [facility] ED for intermittent melena, weakness, and fatigue that has been occurring for the past week. She was recently seen in the ED on 9/20 and CT showed acute colitis. She was discharged with Cipro and Flagyl.  On 9/22 she presented back with right flank pain and CT showed constipation and hepatic cyst. She was discharged home with Naproxen 500mg BID and close follow-up with GI. She presents to us again due to worsening weakness and fatigue. She has been admitted multiple times in the past for GI bleeds. She also reports that she has had dark urine, but denies dysuria or frequency. She denies headaches, lightheadedness, SOB, chest pain, N/V/D, abdominal pain.     In the ED her Hgb was noted to be 7.3, dropped from 12.2 noted on 9/22. ALT 82 and Alk Phos 145, improved from 9/22. She was given 1L IVF and started on protonix drip. GI was consulted with orders to start 2 units over 3 hrs each and plans for possible EGD in AM.\"     Subjective (past 24 hours):   Patient states she has right upper quadrant pain due to her G-tube placement, but she otherwise feels fine. She has not been eating much since the procedure due to the pain, but states she will try to improve her PO intake today and get out of bed. She has no questions or concerns at this time. ROS (12 point review of systems completed. Pertinent positives noted. Otherwise ROS is negative).     Medications:  Reviewed    Infusion Medications    lactated ringers 100 mL/hr at 09/28/20 1710    octreotide (SANDOSTATIN) infusion 50 mcg/hr (09/28/20 1707)     Scheduled Medications    sucralfate  1 g Oral BID AC    pantoprazole  40 mg Oral BID AC    sodium chloride flush  10 mL Intravenous 2 times per day    sodium chloride flush  10 mL Intravenous 2 times per day    lithium  300 mg Oral BID WC    lamoTRIgine  200 mg Oral Daily    dimethyl fumarate  240 mg Oral BID    ipratropium  2 spray Each Nostril TID    levothyroxine  100 mcg Oral Daily    cetirizine  10 mg Oral Daily    vilazodone HCl  40 mg Oral Daily    lactobacillus  1 capsule Oral Daily with breakfast    calcium elemental  1 tablet Oral Nightly    Vitamin D  2,000 Units Oral Nightly    [Held by provider] ferrous sulfate  325 mg Oral Daily with breakfast    multivitamin  1 tablet Oral Daily    cyanocobalamin  1,000 mcg Oral Daily    polyethylene glycol  17 g Oral Daily     PRN Meds: morphine **OR** morphine, labetalol, hydrALAZINE, ceFAZolin, sodium chloride flush, potassium chloride, sodium chloride flush, nitroGLYCERIN, acetaminophen **OR** acetaminophen, promethazine **OR** ondansetron      Intake/Output Summary (Last 24 hours) at 9/29/2020 0748  Last data filed at 9/29/2020 0551  Gross per 24 hour   Intake 3637.89 ml   Output 1370 ml   Net 2267.89 ml     Diet:  DIET CLEAR LIQUID;    Exam:  /63   Pulse 60   Temp 98.4 °F (36.9 °C) (Oral)   Resp 12   Ht 4' 11\" (1.499 m)   Wt 120 lb 11.2 oz (54.7 kg)   SpO2 97%   BMI 24.38 kg/m²     General appearance: No apparent distress, appears stated age and cooperative. HEENT: Pupils equal, round, and reactive to light. Conjunctivae/corneas clear. Neck: Supple, with full range of motion. No jugular venous distention. Trachea midline. Respiratory:  Normal respiratory effort. Clear to auscultation, bilaterally without Rales/Wheezes/Rhonchi. Cardiovascular: Regular rate and rhythm with normal S1/S2 without murmurs, rubs or gallops. Abdomen: Soft, tender to palpation, non-distended with normal bowel sounds. G-tube intact to RUQ. Musculoskeletal: passive and active ROM x 4 extremities. 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 Skin: Skin color, texture, turgor normal.  No rashes or lesions. Neurologic:  Neurovascularly intact without any focal sensory/motor deficits.  Cranial nerves: II-XII intact, grossly non-focal.  Psychiatric: Alert and oriented, thought content appropriate, normal insight  Capillary Refill: Brisk,< 3 seconds   Peripheral Pulses: +2 palpable, equal bilaterally     Labs:   Recent Labs     09/27/20  0941  09/28/20  1624 09/28/20  1908 09/29/20  0056   WBC 7.0  --   --   --   --    HGB 9.7*   < > 11.0* 10.4* 9.7*   HCT 29.5*   < > 31.5* 29.2* 29.9*     --   --   --   --     < > = values in this interval not displayed. Recent Labs     09/27/20  0347 09/27/20  0941 09/28/20  1624   NA  --  138 141   K 3.8 4.0 4.9   CL  --  109 109   CO2  --  20* 15*   BUN  --  12 10   CREATININE  --  0.3* 0.4   CALCIUM  --  8.3* 9.0     No results for input(s): AST, ALT, BILIDIR, BILITOT, ALKPHOS in the last 72 hours. No results for input(s): INR in the last 72 hours. No results for input(s): Jadene Moh in the last 72 hours. Microbiology:      Urinalysis:      Lab Results   Component Value Date    NITRU NEGATIVE 09/25/2020    WBCUA 5-9 09/25/2020    BACTERIA FEW 09/25/2020    RBCUA 0-2 09/25/2020    BLOODU NEGATIVE 09/25/2020    SPECGRAV >1.030 05/13/2016    GLUCOSEU NEGATIVE 09/25/2020       Radiology:  IR ANGIOGRAM SUPERIOR MESENTERIC   Final Result   Digital subtracted angiography demonstrated contrast extravasation of a tiny branch arising from the celiac axis which led to an area of small bowel in the right side of the abdomen in somewhat close proximity to the previously placed gastroduodenal    artery embolization coils. Due to the tortuosity and small size of the feeding vessel, a microcatheter could not be successfully placed in order to perform an intervention. **This report has been created using voice recognition software. It may contain minor errors which are inherent in voice recognition technology. **                        Final report electronically signed by Dr Taurus South on 9/27/2020 7:36 AM      NM GI BLOOD LOSS   Final Result      Abnormal radiotracer accumulation in the upper abdomen moving into the left hemiabdomen, likely secondary to active small bowel bleeding.       Final report electronically signed by Dr. Ericka Spangler on 9/26/2020 4:09 PM        DVT prophylaxis: [] Lovenox                                 [x] SCDs                                 [] SQ Heparin                                 [] Encourage ambulation           [] Already on Anticoagulation     Code Status: Full Code    PT/OT Eval Status: TBD    Tele:   [x] yes              [] no    Electronically signed by Nakita Solomon DO on 9/29/2020 at 7:48 AM

## 2020-09-30 LAB
ANION GAP SERPL CALCULATED.3IONS-SCNC: 7 MEQ/L (ref 8–16)
BUN BLDV-MCNC: 6 MG/DL (ref 7–22)
CALCIUM SERPL-MCNC: 9.2 MG/DL (ref 8.5–10.5)
CHLORIDE BLD-SCNC: 111 MEQ/L (ref 98–111)
CO2: 26 MEQ/L (ref 23–33)
CREAT SERPL-MCNC: 0.5 MG/DL (ref 0.4–1.2)
GFR SERPL CREATININE-BSD FRML MDRD: > 90 ML/MIN/1.73M2
GLUCOSE BLD-MCNC: 113 MG/DL (ref 70–108)
HCT VFR BLD CALC: 29.4 % (ref 37–47)
HEMOGLOBIN: 9.2 GM/DL (ref 12–16)
LACTIC ACID: 1.2 MMOL/L (ref 0.5–2.2)
POTASSIUM SERPL-SCNC: 4.1 MEQ/L (ref 3.5–5.2)
SODIUM BLD-SCNC: 144 MEQ/L (ref 135–145)

## 2020-09-30 PROCEDURE — 6370000000 HC RX 637 (ALT 250 FOR IP): Performed by: SURGERY

## 2020-09-30 PROCEDURE — 94760 N-INVAS EAR/PLS OXIMETRY 1: CPT

## 2020-09-30 PROCEDURE — 97165 OT EVAL LOW COMPLEX 30 MIN: CPT

## 2020-09-30 PROCEDURE — 99024 POSTOP FOLLOW-UP VISIT: CPT | Performed by: SURGERY

## 2020-09-30 PROCEDURE — 2060000000 HC ICU INTERMEDIATE R&B

## 2020-09-30 PROCEDURE — 85018 HEMOGLOBIN: CPT

## 2020-09-30 PROCEDURE — 36415 COLL VENOUS BLD VENIPUNCTURE: CPT

## 2020-09-30 PROCEDURE — 2580000003 HC RX 258: Performed by: SURGERY

## 2020-09-30 PROCEDURE — 6370000000 HC RX 637 (ALT 250 FOR IP): Performed by: NURSE PRACTITIONER

## 2020-09-30 PROCEDURE — 85014 HEMATOCRIT: CPT

## 2020-09-30 PROCEDURE — 97535 SELF CARE MNGMENT TRAINING: CPT

## 2020-09-30 PROCEDURE — 83605 ASSAY OF LACTIC ACID: CPT

## 2020-09-30 PROCEDURE — 80048 BASIC METABOLIC PNL TOTAL CA: CPT

## 2020-09-30 RX ADMIN — THERA TABS 1 TABLET: TAB at 09:32

## 2020-09-30 RX ADMIN — CALCIUM 500 MG: 500 TABLET ORAL at 22:12

## 2020-09-30 RX ADMIN — Medication 1 CAPSULE: at 09:33

## 2020-09-30 RX ADMIN — Medication 2000 UNITS: at 22:13

## 2020-09-30 RX ADMIN — LAMOTRIGINE 200 MG: 100 TABLET ORAL at 09:32

## 2020-09-30 RX ADMIN — VILAZODONE HYDROCHLORIDE 40 MG: 40 TABLET ORAL at 09:32

## 2020-09-30 RX ADMIN — FERROUS SULFATE TAB 325 MG (65 MG ELEMENTAL FE) 325 MG: 325 (65 FE) TAB at 09:32

## 2020-09-30 RX ADMIN — SUCRALFATE 1 G: 1 TABLET ORAL at 14:55

## 2020-09-30 RX ADMIN — PANTOPRAZOLE SODIUM 40 MG: 40 TABLET, DELAYED RELEASE ORAL at 06:42

## 2020-09-30 RX ADMIN — ACETAMINOPHEN 650 MG: 325 TABLET ORAL at 01:07

## 2020-09-30 RX ADMIN — SUCRALFATE 1 G: 1 TABLET ORAL at 06:42

## 2020-09-30 RX ADMIN — PANTOPRAZOLE SODIUM 40 MG: 40 TABLET, DELAYED RELEASE ORAL at 16:18

## 2020-09-30 RX ADMIN — LITHIUM CARBONATE 300 MG: 300 CAPSULE ORAL at 09:32

## 2020-09-30 RX ADMIN — LITHIUM CARBONATE 300 MG: 300 CAPSULE ORAL at 16:18

## 2020-09-30 RX ADMIN — ACETAMINOPHEN 650 MG: 325 TABLET ORAL at 17:11

## 2020-09-30 RX ADMIN — Medication 10 ML: at 21:00

## 2020-09-30 RX ADMIN — DOCUSATE SODIUM 100 MG: 100 CAPSULE, LIQUID FILLED ORAL at 22:12

## 2020-09-30 RX ADMIN — LEVOTHYROXINE SODIUM 100 MCG: 100 TABLET ORAL at 06:42

## 2020-09-30 RX ADMIN — Medication 1000 MCG: at 09:32

## 2020-09-30 RX ADMIN — Medication 10 ML: at 09:33

## 2020-09-30 ASSESSMENT — PAIN DESCRIPTION - PAIN TYPE
TYPE: ACUTE PAIN
TYPE: ACUTE PAIN

## 2020-09-30 ASSESSMENT — PAIN SCALES - GENERAL
PAINLEVEL_OUTOF10: 0
PAINLEVEL_OUTOF10: 0
PAINLEVEL_OUTOF10: 2
PAINLEVEL_OUTOF10: 3
PAINLEVEL_OUTOF10: 0
PAINLEVEL_OUTOF10: 2
PAINLEVEL_OUTOF10: 0

## 2020-09-30 ASSESSMENT — PAIN DESCRIPTION - PROGRESSION
CLINICAL_PROGRESSION: GRADUALLY WORSENING
CLINICAL_PROGRESSION: NOT CHANGED

## 2020-09-30 ASSESSMENT — PAIN DESCRIPTION - ONSET
ONSET: ON-GOING
ONSET: ON-GOING

## 2020-09-30 ASSESSMENT — PAIN DESCRIPTION - LOCATION
LOCATION: ABDOMEN
LOCATION: ABDOMEN

## 2020-09-30 ASSESSMENT — PAIN DESCRIPTION - ORIENTATION
ORIENTATION: MID
ORIENTATION: MID

## 2020-09-30 ASSESSMENT — PAIN DESCRIPTION - DESCRIPTORS
DESCRIPTORS: ACHING
DESCRIPTORS: ACHING

## 2020-09-30 ASSESSMENT — PAIN DESCRIPTION - FREQUENCY
FREQUENCY: CONTINUOUS
FREQUENCY: CONTINUOUS

## 2020-09-30 ASSESSMENT — PAIN - FUNCTIONAL ASSESSMENT
PAIN_FUNCTIONAL_ASSESSMENT: ACTIVITIES ARE NOT PREVENTED
PAIN_FUNCTIONAL_ASSESSMENT: ACTIVITIES ARE NOT PREVENTED

## 2020-09-30 NOTE — PROGRESS NOTES
Angelique Logan MD  [address] [place] [address] Surgery Daily Progress Note    Pt Name: Yuri Corey  Medical Record Number: [id number]  Date of Birth 1960   Today's Date: 9/30/2020  Chief complaint: none  ASSESSMENT   1. Hospital day # 5   2. PDO 2, exlap gastrostomy with push enteroscopy, closure of duodenostomy   3. Recurrent GI bleeds  4. Hx of gastaric bypass   has a past medical history of Balance problem, Bipolar I disorder, most recent episode (or current) depressed, in partial or unspecified remission, Cancer ([facility] [address] Depression, Fatigue, Gallstones, Gastric bypass status for obesity, GERD (gastroesophageal reflux disease), History of blood transfusion, History of hysterectomy, Hypothyroidism, MS (multiple sclerosis) ([facility] [address] Paresthesias, and UTI (urinary tract infection). PLAN   1. IV hydration  2. Regular diet as tolerated  3. Anticipate d/c home tomorrow. Alicia Fragoso is pain is controlled. Still having some residual melanotic stool.    CURRENT MEDICATIONS   Scheduled Meds:   docusate sodium  100 mg Oral BID    sucralfate  1 g Oral BID AC    pantoprazole  40 mg Oral BID AC    sodium chloride flush  10 mL Intravenous 2 times per day    sodium chloride flush  10 mL Intravenous 2 times per day    lithium  300 mg Oral BID WC    lamoTRIgine  200 mg Oral Daily    dimethyl fumarate  240 mg Oral BID    ipratropium  2 spray Each Nostril TID    levothyroxine  100 mcg Oral Daily    cetirizine  10 mg Oral Daily    vilazodone HCl  40 mg Oral Daily    lactobacillus  1 capsule Oral Daily with breakfast    calcium elemental  1 tablet Oral Nightly    Vitamin D  2,000 Units Oral Nightly    ferrous sulfate  325 mg Oral Daily with breakfast    multivitamin  1 tablet Oral Daily    cyanocobalamin  1,000 mcg Oral Daily    polyethylene glycol  17 g Oral Daily     Continuous Infusions:    PRN Meds:.morphine **OR** morphine, labetalol, hydrALAZINE, ceFAZolin, sodium chloride flush, potassium chloride, sodium chloride flush, nitroGLYCERIN, acetaminophen **OR** acetaminophen, promethazine **OR** ondansetron  OBJECTIVE   CURRENT VITALS:  height is 4' 11\" (1.499 m) and weight is 120 lb 11.2 oz (54.7 kg). Her oral temperature is 98.5 °F (36.9 °C). Her blood pressure is 126/66 and her pulse is 60. Her respiration is 16 and oxygen saturation is 96%. Temperature Range (24h):Temp: 98.5 °F (36.9 °C) Temp  Av.5 °F (36.9 °C)  Min: 97.8 °F (36.6 °C)  Max: 99 °F (37.2 °C)  BP Range (86Q): Systolic (64FDW), CNE:827 , Min:120 , GRU:835     Diastolic (94QJC), FWS:76, Min:57, Max:70    Pulse Range (24h): Pulse  Av.8  Min: 61  Max: 60  Respiration Range (24h): Resp  Av.5  Min: 16  Max: 19  Current Pulse Ox (24h):  SpO2: 96 %(no O2 needed per protocol)  Pulse Ox Range (24h):  SpO2  Av.4 %  Min: 95 %  Max: 98 %  Oxygen Amount and Delivery: O2 Flow Rate (L/min): 8 L/min  Incentive Spirometry Tx:            Resting in bed no acute distress interactive and appropriate  Unlabored respirations good air movement  Regular rate palpable distal pulses  Abdomen is soft nontender nondistended  Incision c/d/i, ADE SS, G tube in good position    In: 1642.4 [P.O.:490; I.V.:1152.4]  Out: 1224 [Urine:3150; Drains:20]  Date 20 - 20 2353   Shift 4921-2365 3965-7210 8338-4325 24 Hour Total   INTAKE   P.O.(mL/kg/hr) 50(0.1) 240  290   I. V.(mL/kg) 0(0) 10(0.2)  10(0.2)   Shift Total(mL/kg) 50(0.9) 250(4.6)  300(5.5)   OUTPUT   Urine(mL/kg/hr) 1000(2.3) 450  1450   Shift Total(mL/kg) 1000(18.3) 450(8.2)  1450(26.5)   Weight (kg) 54.7 54.7 54.7 54.7     LABS     Recent Labs     20  1624  20  0056 20  1402 20  0537 20  0854   HGB 11.0*   < > 9.7* 9.7* 9.2*  --    HCT 31.5*   < > 29.9* 30.2* 29.4*  --      --   --   --   --  144   K 4.9  --   --   --   --  4.1     --   --   --   --  111   CO2 15*  --   --   --   --  26   BUN 10  --   --   --   --  6* CREATININE 0.4  --   --   --   --  0.5   CALCIUM 9.0  --   --   --   --  9.2    < > = values in this interval not displayed. No results for input(s): PTT, INR in the last 72 hours. Invalid input(s): PT  Recent Labs     09/30/20  0854   LACTA 1.2     No results for input(s): TROPONINT in the last 72 hours. RADIOLOGY     RADIOLOGY REPORT   Final Result      IR ANGIOGRAM SUPERIOR MESENTERIC   Final Result   Digital subtracted angiography demonstrated contrast extravasation of a tiny branch arising from the celiac axis which led to an area of small bowel in the right side of the abdomen in somewhat close proximity to the previously placed gastroduodenal    artery embolization coils. Due to the tortuosity and small size of the feeding vessel, a microcatheter could not be successfully placed in order to perform an intervention. **This report has been created using voice recognition software. It may contain minor errors which are inherent in voice recognition technology. **                        Final report electronically signed by Dr Vikas Negron on 9/27/2020 7:36 AM      NM GI BLOOD LOSS   Final Result      Abnormal radiotracer accumulation in the upper abdomen moving into the left hemiabdomen, likely secondary to active small bowel bleeding.       Final report electronically signed by Dr. Caron Black on 9/26/2020 4:09 PM          Electronically signed by Brian Topete MD on 9/30/2020 at 2:28 PM

## 2020-09-30 NOTE — FLOWSHEET NOTE
[place] [address] PROGRESS NOTE      Patient: Josiah Meier  Room #: 4K-10/010-A            YOB: 1960  Age: 61 y.o. Gender: female            Admit Date & Time: 9/25/2020 11:42 AM    Assessment: This  visited with the head of the spiritual care department. We brought the pt flowers, a balloon and a card. Pt is a volunteer with the spiritual care department. Pt had surgery two days ago. Pt is hopeful of returning home tomorrow, but stated it will be several weeks before she can return to volunteering. Pt lives alone, but said her sisters would help her out as well as friends. Pt misses her cat. Other  prayed with the pt and then the pt said a prayer. Both chaplains offered to aid pt in any way upon release. Pt's request was for some volunteer tasks to be provided to her to work on from home. Interventions:  Chaplains provided gifts for the pt and offered a listening presence and the other  prayed with the pt. Outcomes:  Pt expressed gratitude for the visit and the gifts. Plan:  1. Provide spiritual care and support. 2.  Find a volunteer project for the pt to work on. Electronically signed by Lora Luna on 9/30/2020 at 2:21 PM.  Ismael Samuel  [phone]       09/30/20 1149   Encounter Summary   Services provided to: Patient   Referral/Consult From: [address] [place] [address] Yarsanism/lawrence community; Family members;Friends/neighbors   Place of [facility]   ( [facility])   Continue Visiting Yes  (9/30)   Complexity of Encounter Moderate   Length of Encounter 30 minutes   Spiritual Assessment Completed Yes   Spiritual/Adventist   Type Spiritual support   Assessment Calm; Approachable   Intervention Active listening;Explored feelings, thoughts, concerns;Explored coping resources;Prayer;Sustaining presence/ Ministry of presence   Outcome Connection/belonging;Comfort;Engaged in conversation;Expressed gratitude

## 2020-09-30 NOTE — PROGRESS NOTES
Assisted pt with ambulation to bathroom. States \"3\" pain in her stomach with ambulation and just received acetaminophen for pain.

## 2020-09-30 NOTE — PROGRESS NOTES
Yadira Vivas 60  INPATIENT OCCUPATIONAL THERAPY  [facility] ICU STEPDOWN TELEMETRY 4K  EVALUATION    Time:   Time In: 1000  Time Out: 1030  Timed Code Treatment Minutes: 15 Minutes  Minutes: 30          Date: 2020  Patient Name: Yuri Corey,   Gender: female      MRN: [id number]  : 1960  (61 y.o.)  Referring Practitioner: Dr. Dominik Angelo DO  Diagnosis: GI Bleed  Additional Pertinent Hx: Pt with PMH multiple GI bleeds, gastritis, erosive esophagitis, GERD, gastric bypass, hypothyroidism, MS, depression, and bipolar type I. She presents to [facility] ED for intermittent melena, weakness, and fatigue that has been occurring for the past week. She was recently seen in the ED on  and CT showed acute colitis. She was discharged with Cipro and Flagyl. On  she presented back with right flank pain and CT showed constipation and hepatic cyst. She was discharged home with Naproxen 500mg BID and close follow-up with GI. She presents to us again due to worsening weakness and fatigue. She has been admitted multiple times in the past for GI bleeds. She also reports that she has had dark urine, but denies dysuria or frequency. She denies headaches, lightheadedness, SOB, chest pain, N/V/D, abdominal pain. She underwent gatrostomy tube placement on 20. Restrictions/Precautions:  Restrictions/Precautions: Fall Risk  Position Activity Restriction  Other position/activity restrictions: G-tube placement; pth as a prosthesis for her RUE which she doesn't always use. Subjective  Chart Reviewed: Yes, Orders, History and Physical    Subjective: Pleasant and cooperative  Comments: RN approved session. Pt agreed to get up and do some self care. She remained in the recliner chair following session. She had mild pain at her abdomen at site of her surgery.     Pain:  Pain Assessment  Patient Currently in Pain: Denies  Pain Assessment: 0-10  Pain Level: 2  Pain Type: Acute pain  Pain Location: Abdomen  Pain Orientation: Mid  Pain Descriptors: Aching  Pain Frequency: Continuous  Clinical Progression: Not changed  Patient's Stated Pain Goal: No pain  Response to Pain Intervention: Patient Satisfied  Multiple Pain Sites: No    Social/Functional History:  Lives With: Alone  Type of Home: Apartment(senior apartment on the ground floor)  Home Layout: One level  Home Access: Level entry  Home Equipment: Quad cane   Bathroom Shower/Tub: Tub/Shower unit  Bathroom Toilet: Standard  Bathroom Equipment: Grab bars in shower, Grab bars around toilet  Bathroom Accessibility: Accessible    Receives Help From: [employer]  ADL Assistance: [address]: Independent  Homemaking Responsibilities: Yes  Ambulation Assistance: Independent  Transfer Assistance: Independent    Active : No  Patient's  Info: Pt uses public transportation  Mode of Transportation: Bus, Cab  Occupation: Volunteer work  Leisure & Hobbies: Reading, taking care of a cat  Additional Comments: Pt ambulated without any AD. She did her own self care and homemaking. She has a washer and dryer at the site and takes an elevator to access it. She uses a rolling basket for transporting laundry. She carries her groceries. Cognition/Orientation:  Overall Orientation Status: Within Normal Limits  Overall Cognitive Status: WNL    ADL's:  Grooming: Stand by assistance(brushing her teeth at the sink)  Vision - Basic Assessment  Prior Vision: Wears glasses all the time    Functional Mobility:  Bed mobility  Rolling to Right: Stand by assistance  Supine to Sit: Stand by assistance(using the bedrail)  Scooting: Stand by assistance    Functional Mobility  Functional - Mobility Device: No device  Activity: To/from bathroom  Assist Level: Stand by assistance  Functional Mobility Comments: Pt needed CGA for a couple of steps but progressed well to only needing SBA. She moves very slowly.      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 RUE AROM : WFL  RUE General AROM: At the shoulder jt. Right Hand General PROM: Not applicable    LUE Strength  L Hand General: 4/5  LUE Strength Comment: 3+/5 deltoid; 4-/5 pectoral; 4/5 biceps/triceps    Sensation  Overall Sensation Status: WNL  Fine Motor Skills  Fine Motor Comment: Pt has no difficulty using her L hand. She does rely on her residual RUE to help with tasks such as opening a pillow case. Activity Tolerance: Patient Tolerated treatment well  Pt stood at the sink for 3 minutes while doing self care. She remained in the chair following the session. Slow moving noted. Assessment:  Assessment: Pt would be able to return home when medically stable. Pt presents with GI bleed and underwent gastrostomy tube placement this admission. 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 She is close to her baseline with self care and with functional mobility. She moves slowly but with SBA without any AD. Pt had no LOB. Pt does not use any AD for ambulation. No acute goals identified at this time. Pt is anxious about returning home alone. Performance deficits / Impairments: Decreased functional mobility , Decreased high-level IADLs  Prognosis: Good  REQUIRES OT FOLLOW UP: No  No Skilled OT: At baseline function  Decision Making: Low Complexity    Treatment Initiated: Treatment and education initiated within context of evaluation.   Evaluation time included review of current medical information, gathering information related to past medical, social and functional history, completion of standardized testing, formal and informal observation of tasks, assessment of data and development of plan of care and goals. Treatment time included skilled education and facilitation of tasks to increase safety and independence with ADL's for improved functional independence and quality of life. Discussed home safety and adaptation options such as using a laundry basket with a handle and wheels. She indicates that she does have a wheeled basket for this purpose. Pt demonstrated placing pillow cases on pillows with SBA. No difficulty shown. Pt stated her pain was mild. Discharge Recommendations:   Home with visiting nurses. Patient Education:  OT Education: OT Role, IADL Safety    Equipment Recommendations:  Equipment Needed: No    Plan:  Times per week: Not applicable  Plan Comment: Pt would be able to return home when medically stable and discharged from acute. No follow up OT recommended. Specific instructions for Next Treatment: Not applicable    Goals:  Patient goals : \"I want to get back home. \" pt states. Short term goals  Time Frame for Short term goals: Not applicable  Long term goals  Time Frame for Long term goals : None secondary to short estimated length of stay. See long-term goal time frame for expected duration of plan of care. If no long-term goals established, a short length of stay is anticipated. Following session, patient left in safe position with all fall risk precautions in place.

## 2020-09-30 NOTE — PROGRESS NOTES
Gastroenterology Progress Note:     Patient Name:  Charmaine Mejia   MRN: [id number]  [id number]  YOB: 1960  Admit Date: 9/25/2020 11:42 AM  Primary Care Physician: Makenzie Giordano DO   4K-10/010-A     Patient seen and examined. 24 hours events and chart reviewed. Subjective: Patient resting in bed. She still has abdominal discomfort at the surgical site. She is tolerating a general diet. Denies nausea & vomiting. Per RN, she had 2-3 dark, green stools. Hgb 9.2 Per RN, she may be discharged tomorrow. Objective:  /66   Pulse 60   Temp 98.5 °F (36.9 °C) (Oral)   Resp 16   Ht 4' 11\" (1.499 m)   Wt 120 lb 11.2 oz (54.7 kg)   SpO2 98%   BMI 24.38 kg/m²     Physical Exam:    General:  Nourished in no distress  HEENT: Atraumatic, normocephalic. Moist oral mucous membranes. Neck: Supple without adenopathy, JVD, thyromegaly or masses. Trachea midline. CV: Heart RRR, no murmurs, rubs, gallops. Resp: Even, easy without cough or accessory use. Lungs clear to ascultation bilaterally. Abd: Round, soft, nontender. No hepatosplenomegaly or mass present. Active bowel sounds heard. No distention noted. G-tube intact. Abdominal dressing dry & intact. ADE drain intact with serosanguinous drainage. Ext:  Without cyanosis, clubbing, edema. Skin: Pink, warm, dry  Neuro:  Alert, oriented x 3 with no obvious deficits.        Rectal: deferred    Labs:   CBC:   Lab Results   Component Value Date    WBC 7.0 09/27/2020    HGB 9.2 09/30/2020    HCT 29.4 09/30/2020    MCV 99.0 09/27/2020     09/27/2020     BMP:   Lab Results   Component Value Date     09/30/2020    K 4.1 09/30/2020    K 3.2 09/26/2020     09/30/2020    CO2 26 09/30/2020    PHOS 3.0 10/07/2018    BUN 6 09/30/2020    CREATININE 0.5 09/30/2020    CALCIUM 9.2 09/30/2020     PT/INR:   Lab Results   Component Value Date    INR 0.96 09/25/2020     Lipids:   Lab Results   Component Value Date    ALKPHOS 96 09/26/2020    ALT 44 09/26/2020    AST 16 09/26/2020    BILITOT 0.6 09/26/2020    BILIDIR <0.2 09/22/2020    LABALBU 2.6 09/26/2020    AMYLASE 83 03/20/2019    LIPASE 19.3 09/22/2020     Current Meds:  Scheduled Meds:   docusate sodium  100 mg Oral BID    sucralfate  1 g Oral BID AC    pantoprazole  40 mg Oral BID AC    sodium chloride flush  10 mL Intravenous 2 times per day    sodium chloride flush  10 mL Intravenous 2 times per day    lithium  300 mg Oral BID WC    lamoTRIgine  200 mg Oral Daily    dimethyl fumarate  240 mg Oral BID    ipratropium  2 spray Each Nostril TID    levothyroxine  100 mcg Oral Daily    cetirizine  10 mg Oral Daily    vilazodone HCl  40 mg Oral Daily    lactobacillus  1 capsule Oral Daily with breakfast    calcium elemental  1 tablet Oral Nightly    Vitamin D  2,000 Units Oral Nightly    ferrous sulfate  325 mg Oral Daily with breakfast    multivitamin  1 tablet Oral Daily    cyanocobalamin  1,000 mcg Oral Daily    polyethylene glycol  17 g Oral Daily     Assessment:  62 yo F admitted 09/25/20 for melena & anemia. H/O PUD & recurrent bleeding from bypassed stomach/duodenum with IR coiling in the past. In the ED 09/20/20, with melena & abd pain. CT A/P demonstrated colitis & she was discharged on Cipro/Flagyl. Returned to ED 09/22/20 for abd pain & back pain, CT negative. Discharged with outpatient follow-up. Enteroscopy 09/27/20, but could not reach lower anastomosis. Colonoscopy planned for following day, but patient had a sudden drop in Hgb. NM GI blood loss scan positive, underwent angio for localization of bleeding from previously coiled area of GDA from a small vessel, but IR unable to get microcatheter into vessel to coil. No bleeding from SMA or YARELIS. Gastrostomy tube placed surgically 09/28/20.  Gastric endoscopy done in the OR 09/28/20 & demonstrated stomach postsurgial, proximally obstructed, duodenum with a single small clean-based ulcer with actively oozing spot at proximal side cauterized with APC. 1. Upper GIB from [place] [address] area on IR, unable to coil  2. Anemia s/p 4 units PRBC  3. Gastrostomy tube placed surgically 09/28/20  4. Gastric endoscopy done in OR 09/28/20 with small clean based ulcer oozing blood in the duodenum s/p APC  5. H/O Batsheva-en-Y bypass  6. Recurrent GIB  7. Pacemaker  8.  Hypothryoidism     Plan:    · Monitor H & H, transfuse prn  · PPI BID, continue at discharge  · Sucralfate BID, continue at discharge  · No NSAIDs  · Diet as tolerated  · Will need repeat endoscopy via ostomy in 6-8 weeks to confirm healing after dilating ostomy  · Stool for h pylori  · General surgery on board  · Pain control per primary  · Colace BID, continue at discharge  · Miralax daily, continue at discharge  · Supportive care per primary team  Will follow    Case reviewed and impression/plan reviewed in collaboration with Dr. Philly Gallegos  Electronically signed by AJITH Maria CNP on 9/30/2020 at 1:20 PM    [facility]

## 2020-09-30 NOTE — CARE COORDINATION
9/30/20, 1:02 PM EDT  Plans home alone likely in am per report-discussed at rounds or when medically cleared (monitoring H 9.2), [facility] when medically cleared  Patient goals/plan/ treatment preferences discussed by  and . Patient goals/plan/ treatment preferences reviewed with patient/ family. Patient/ family verbalize understanding of discharge plan and are in agreement with goal/plan/treatment preferences. Understanding was demonstrated using the teach back method. AVS provided by RN at time of discharge, which includes all necessary medical information pertaining to the patients current course of illness, treatment, post-discharge goals of care, and treatment preferences.         IMM Letter  IMM Letter given to Patient/Family/Significant other/Guardian/POA/by[de-identified]   IMM Letter date given[de-identified] 09/30/20  IMM Letter time given[de-identified] 0900

## 2020-09-30 NOTE — PROGRESS NOTES
Hospitalist Progress Note    Patient:  Colleen Montana      Unit/Bed:4K-10/010-A    YOB: 1960    MRN: [id number]       Acct: [de-identified]     PCP: Kirsten Celeste,     Date of Admission: 9/25/2020    Assessment/Plan:    1. Acute blood loss anemia 2/2 UGIB, resolved: due to hx of gastritis and erosive esophagitis possibly exacerbated by recent naproxen use vs. Hx AVM.  Was discharged with Naproxen 500mg BID on 9/22. Pt takes Protonix 40mg QD outpatient; was not compliant in the past because it caused her headaches. From chart review in 10/2018 patient had Hgb drop of 12 to 7.8 and melena. She underweight tagged RBC and coil embolization on 10/2/18 and 10/3/18 at that time. She continued to have melena and required 12 units of pRBCs-subsequently transferred to [place] for further care. On 10/11/18 she underwent colonoscopy with APC to a cecal AVM. She had no further melena following the procedure. - Was given 1L IVF in ED and started on Protonix drip. NPO for now. Monitor H+H q6hrs. GI on case and plan for transfusion of 2 units over 3 hours each and possible EGD in AM. Stop NSAID's. Transfuse if Hgb >7.  -9/26: EGD this AM showed no obvious evidence for bleeding. NPO after midnight. IVF switched to LR @ 150 cc/hr. Protonix drip stopped per GI. NM bleeding scan showed bleeding in region of GDA/prior embolization, however, IR was unable to successfully coil the bleed-Dr. Jansen aware and restarted PPI drip and started octreotide. Received 2 more units of pRBCs overnight.  -9/27: Seen my general surgery with recommendations for open gastrostomy with push enteroscopy and interventions per GI if possible; G-tube will be placed at time of procedure.  -9/28: Hgb this AM 9.5. Has received a total of 4 units pRBC since admission. Plan for procedure mentioned above this afternoon.  Started on Carafate BID per GI.  -9/29: Yesterday afternoon had open gastrostomy with push enteroscopy and duodenal bleed found cauterized with APC; G-tube placed to RUQ with plans to repeat EGD via ostomy in 6-8 weeks to confirm healing after dilating ostomy. Hgb was 11 last night and this AM 9.7. Likely dilutional as patient is still on IV fluids. Encouraged PO intake with plans to discontinue IVF. Per general surgery patient is able to advance her diet as tolerated. -9/30: Octreotide and IVF was discontinued yesterday. BM this AM was loose and dark, tarry, and green in color. Hgb was 9.2. Plan for discharge tomorrow if no significant changes. 2. Acute colitis noted on CT abdomen on 9/20: Was discharged from ED with Cipro and Flagyl. No colitis mentioned on CT on 9/22. Completed Cipro/Flagyl course. 3. ? Mild Shock Liver with transaminitis likely due to #1, resolved: Hgb of 7.3 compared to 12.2 noted on 9/22. Acute drop in Hgb recently and hypotension on admission may have contributed to acute transaminitis, which has resolved on 9/26.  4. Hypokalemia, resolved: Likely dilutional.   - 9/26: K 3.2 this AM. Mg is low-normal. Will give 2gm Magnesium IV and potassium replacement protocol in place.  - Monitor BMP  5. Hx Pacemaker placement for symptomatic bradycardia, stable: ECG shows atrial paced rhythm. - Monitor  6. Hx Batsheva-en-Y Gastric Bypass due to Obesity in 2002  - Continue vitamin supplements  7. Hx Hypothyroidism: TSH normal  - Continue Synthroid 100 mcg QD  8. Hx Multiple Sclerosis, stable  - Continue Tecfidera 240mg BID  9. Hx Depression and Bipolar Type I, stable:  - Continue Lamictal, Viibryd, Lithium    Expected discharge date: 1-2 days    Disposition:    [x] Home       [] TCU       [] Rehab       [] Psych       [] SNF       [] [place]       [] Other-    Chief Complaint: Melena, weakness, fatigue    Hospital Course:  Per HPI, \"Patient is a 60 y/o  female with PMH multiple GI bleeds, gastritis, erosive esophagitis, GERD, gastric bypass, hypothyroidism, MS, depression, and bipolar type I.  She presents to [facility] ED for intermittent melena, weakness, and fatigue that has been occurring for the past week. She was recently seen in the ED on 9/20 and CT showed acute colitis. She was discharged with Cipro and Flagyl. On 9/22 she presented back with right flank pain and CT showed constipation and hepatic cyst. She was discharged home with Naproxen 500mg BID and close follow-up with GI. She presents to us again due to worsening weakness and fatigue. She has been admitted multiple times in the past for GI bleeds. She also reports that she has had dark urine, but denies dysuria or frequency. She denies headaches, lightheadedness, SOB, chest pain, N/V/D, abdominal pain.     In the ED her Hgb was noted to be 7.3, dropped from 12.2 noted on 9/22. ALT 82 and Alk Phos 145, improved from 9/22. She was given 1L IVF and started on protonix drip. GI was consulted with orders to start 2 units over 3 hrs each and plans for possible EGD in AM.\"     Subjective (past 24 hours):  Patient states she is doing well and her pain is improving. She had a BM this AM and it was loose and dark, tarry, and green in color. She is tolerating PO intake without issues. She would like PT and OT to improve her strength before she is discharged home as she lives alone       ROS (12 point review of systems completed. Pertinent positives noted. Otherwise ROS is negative).     Medications:  Reviewed    Infusion Medications   Scheduled Medications    docusate sodium  100 mg Oral BID    sucralfate  1 g Oral BID AC    pantoprazole  40 mg Oral BID AC    sodium chloride flush  10 mL Intravenous 2 times per day    sodium chloride flush  10 mL Intravenous 2 times per day    lithium  300 mg Oral BID WC    lamoTRIgine  200 mg Oral Daily    dimethyl fumarate  240 mg Oral BID    ipratropium  2 spray Each Nostril TID    levothyroxine  100 mcg Oral Daily    cetirizine  10 mg Oral Daily    vilazodone HCl  40 mg Oral Daily    lactobacillus  1 capsule Oral Daily 9.2*   HCT 29.9* 30.2* 29.4*     Recent Labs     09/28/20  1624 09/30/20  0854    144   K 4.9 4.1    111   CO2 15* 26   BUN 10 6*   CREATININE 0.4 0.5   CALCIUM 9.0 9.2     No results for input(s): AST, ALT, BILIDIR, BILITOT, ALKPHOS in the last 72 hours. No results for input(s): INR in the last 72 hours. No results for input(s): [place] in the last 72 hours. Microbiology:      Urinalysis:      Lab Results   Component Value Date    NITRU NEGATIVE 09/25/2020    WBCUA 5-9 09/25/2020    BACTERIA FEW 09/25/2020    RBCUA 0-2 09/25/2020    BLOODU NEGATIVE 09/25/2020    SPECGRAV >1.030 05/13/2016    GLUCOSEU NEGATIVE 09/25/2020       Radiology:  RADIOLOGY REPORT   Final Result      IR ANGIOGRAM SUPERIOR MESENTERIC   Final Result   Digital subtracted angiography demonstrated contrast extravasation of a tiny branch arising from the celiac axis which led to an area of small bowel in the right side of the abdomen in somewhat close proximity to the previously placed gastroduodenal    artery embolization coils. Due to the tortuosity and small size of the feeding vessel, a microcatheter could not be successfully placed in order to perform an intervention. **This report has been created using voice recognition software. It may contain minor errors which are inherent in voice recognition technology. **                        Final report electronically signed by Dr Jimmy Keating on 9/27/2020 7:36 AM      NM GI BLOOD LOSS   Final Result      Abnormal radiotracer accumulation in the upper abdomen moving into the left hemiabdomen, likely secondary to active small bowel bleeding.       Final report electronically signed by Dr. Liana العلي on 9/26/2020 4:09 PM          DVT prophylaxis: [] Lovenox                                 [x] SCDs                                 [] SQ Heparin                                 [] Encourage ambulation           [] Already on Anticoagulation     Code Status: Full Code    PT/OT Eval Status: Pending eval and treat     Tele:   [x] yes             [] no    Electronically signed by Jeanette Menjivar DO on 9/30/2020 at 10:39 AM

## 2020-09-30 NOTE — CARE COORDINATION
9/30/20, 10:00 AM EDT    DISCHARGE PLANNING EVALUATION      SW spoke to patient to inform that her [employer] Medicare is in network with [facility] and that she will have about 5 visits. She does not think she needs more that this. We discussed her Medicaid but this is [employer] variety which pays for the premiums for her part B of Medicare, can not bill to this for [facility].   She has prescription coverage with her Medicare BCBS

## 2020-09-30 NOTE — PLAN OF CARE
Problem: Falls - Risk of:  Goal: Will remain free from falls  Description: Will remain free from falls  Outcome: Ongoing  Note: No falls thus far this shift. Patient ambulates well with 1 assist. She is alert and oriented x4. Goal: Absence of physical injury  Description: Absence of physical injury  Outcome: Completed     Problem: Pain:  Goal: Pain level will decrease  Description: Pain level will decrease  Outcome: Ongoing  Note: Pain Assessment: 0-10  Pain Level: 0   Patient's Stated Pain Goal: No pain   Is pain goal met at this time? Yes     Non-Pharmaceutical Pain Intervention(s): Repositioned, Rest      Problem: Discharge Planning:  Goal: Discharged to appropriate level of care  Description: Discharged to appropriate level of care  Outcome: Ongoing  Note: Plans return home with home health at discharge. Problem: Bowel Function - Altered:  Goal: Bowel elimination is within specified parameters  Description: Bowel elimination is within specified parameters  Outcome: Ongoing  Note: + 3 BMs this shift, very dark green in color. GI is aware and believe this is old blood. Problem: Fluid Volume - Imbalance:  Goal: Absence of imbalanced fluid volume signs and symptoms  Description: Absence of imbalanced fluid volume signs and symptoms  Outcome: Ongoing  Note: I/O last 3 completed shifts: In: 1882.4 [P.O.:730; I.V.:1152.4]  Out: 2814 [Urine:3150; Drains:30]  I/O this shift: In: 500 [P.O.:500]  Out: -      Goal: Will show no signs and symptoms of excessive bleeding  Description: Will show no signs and symptoms of excessive bleeding  Outcome: Ongoing  Note: Very dark stools thi shift but GI believes this is old blood. Problem: Nausea/Vomiting:  Goal: Absence of nausea/vomiting  Description: Absence of nausea/vomiting  Outcome: Ongoing  Note: No N/V this shift.   Goal: Able to drink  Description: Able to drink  Outcome: Completed  Goal: Able to eat  Description: Able to eat  Outcome: Completed  Goal: Ability to achieve adequate nutritional intake will improve  Description: Ability to achieve adequate nutritional intake will improve  Outcome: Ongoing  Note: Tolerating regular diet well. Problem: Skin Integrity:  Goal: Will show no infection signs and symptoms  Description: Will show no infection signs and symptoms  Outcome: Completed  Goal: Absence of new skin breakdown  Description: Absence of new skin breakdown  Outcome: Ongoing  Note: No new s/s of skin breakdown this shift. She does have blanchable erythema to buttocks. Patient is turning herself in bed but does not always turn every 2 hours as recommended. Patient refuses to turn as recommended. Education provided regarding the benefits of repositioning and the risk to skin integrity associated with not repositioning as recommended. Care plan reviewed with patient. Patient verbalizes understanding of the plan of care and contribute to goal setting.

## 2020-10-01 VITALS
HEART RATE: 60 BPM | OXYGEN SATURATION: 96 % | BODY MASS INDEX: 24.33 KG/M2 | DIASTOLIC BLOOD PRESSURE: 59 MMHG | WEIGHT: 120.7 LBS | HEIGHT: 59 IN | SYSTOLIC BLOOD PRESSURE: 113 MMHG | RESPIRATION RATE: 16 BRPM | TEMPERATURE: 97.7 F

## 2020-10-01 PROBLEM — D50.0 BLOOD LOSS ANEMIA: Status: RESOLVED | Noted: 2018-02-17 | Resolved: 2020-10-01

## 2020-10-01 PROBLEM — K26.4 BLEEDING DUODENAL ULCER: Status: RESOLVED | Noted: 2020-09-28 | Resolved: 2020-10-01

## 2020-10-01 PROBLEM — K92.2 GI BLEED: Status: RESOLVED | Noted: 2020-09-25 | Resolved: 2020-10-01

## 2020-10-01 LAB
HCT VFR BLD CALC: 28.5 % (ref 37–47)
HEMOGLOBIN: 8.8 GM/DL (ref 12–16)

## 2020-10-01 PROCEDURE — 99239 HOSP IP/OBS DSCHRG MGMT >30: CPT | Performed by: INTERNAL MEDICINE

## 2020-10-01 PROCEDURE — 6370000000 HC RX 637 (ALT 250 FOR IP): Performed by: SURGERY

## 2020-10-01 PROCEDURE — 6370000000 HC RX 637 (ALT 250 FOR IP): Performed by: NURSE PRACTITIONER

## 2020-10-01 PROCEDURE — 85018 HEMOGLOBIN: CPT

## 2020-10-01 PROCEDURE — 2580000003 HC RX 258: Performed by: SURGERY

## 2020-10-01 PROCEDURE — 85014 HEMATOCRIT: CPT

## 2020-10-01 PROCEDURE — 99024 POSTOP FOLLOW-UP VISIT: CPT | Performed by: SURGERY

## 2020-10-01 PROCEDURE — 36415 COLL VENOUS BLD VENIPUNCTURE: CPT

## 2020-10-01 RX ORDER — PSEUDOEPHEDRINE HCL 30 MG
100 TABLET ORAL 2 TIMES DAILY
Qty: 30 CAPSULE | Refills: 1 | Status: CANCELLED | OUTPATIENT
Start: 2020-10-01

## 2020-10-01 RX ORDER — PSEUDOEPHEDRINE HCL 30 MG
100 TABLET ORAL 2 TIMES DAILY
Qty: 60 CAPSULE | Refills: 2 | Status: SHIPPED | OUTPATIENT
Start: 2020-10-01 | End: 2020-12-10 | Stop reason: SDUPTHER

## 2020-10-01 RX ORDER — FERROUS SULFATE 325(65) MG
325 TABLET ORAL EVERY OTHER DAY
Qty: 30 TABLET | Refills: 3 | Status: CANCELLED | OUTPATIENT
Start: 2020-10-01

## 2020-10-01 RX ORDER — LACTOBACILLUS RHAMNOSUS GG 10B CELL
1 CAPSULE ORAL
Qty: 30 CAPSULE | Refills: 1 | Status: SHIPPED | OUTPATIENT
Start: 2020-10-02 | End: 2020-12-01 | Stop reason: SDUPTHER

## 2020-10-01 RX ORDER — SUCRALFATE 1 G/1
1 TABLET ORAL
Qty: 120 TABLET | Refills: 1 | Status: SHIPPED | OUTPATIENT
Start: 2020-10-01 | End: 2020-12-10 | Stop reason: ALTCHOICE

## 2020-10-01 RX ORDER — PANTOPRAZOLE SODIUM 40 MG/1
40 TABLET, DELAYED RELEASE ORAL
Qty: 30 TABLET | Refills: 2 | Status: SHIPPED | OUTPATIENT
Start: 2020-10-01 | End: 2020-10-19 | Stop reason: SDUPTHER

## 2020-10-01 RX ORDER — SUCRALFATE 1 G/1
1 TABLET ORAL
Qty: 120 TABLET | Refills: 3 | Status: CANCELLED | OUTPATIENT
Start: 2020-10-01

## 2020-10-01 RX ADMIN — Medication 10 ML: at 07:51

## 2020-10-01 RX ADMIN — PANTOPRAZOLE SODIUM 40 MG: 40 TABLET, DELAYED RELEASE ORAL at 06:53

## 2020-10-01 RX ADMIN — Medication 1 CAPSULE: at 07:50

## 2020-10-01 RX ADMIN — SUCRALFATE 1 G: 1 TABLET ORAL at 06:53

## 2020-10-01 RX ADMIN — LAMOTRIGINE 200 MG: 100 TABLET ORAL at 07:50

## 2020-10-01 RX ADMIN — ACETAMINOPHEN 650 MG: 325 TABLET ORAL at 01:35

## 2020-10-01 RX ADMIN — THERA TABS 1 TABLET: TAB at 07:50

## 2020-10-01 RX ADMIN — LITHIUM CARBONATE 300 MG: 300 CAPSULE ORAL at 07:50

## 2020-10-01 RX ADMIN — Medication 1000 MCG: at 07:50

## 2020-10-01 RX ADMIN — FERROUS SULFATE TAB 325 MG (65 MG ELEMENTAL FE) 325 MG: 325 (65 FE) TAB at 07:50

## 2020-10-01 RX ADMIN — DOCUSATE SODIUM 100 MG: 100 CAPSULE, LIQUID FILLED ORAL at 07:51

## 2020-10-01 RX ADMIN — LEVOTHYROXINE SODIUM 100 MCG: 100 TABLET ORAL at 06:53

## 2020-10-01 RX ADMIN — CETIRIZINE HYDROCHLORIDE 10 MG: 10 TABLET ORAL at 07:50

## 2020-10-01 RX ADMIN — VILAZODONE HYDROCHLORIDE 40 MG: 40 TABLET ORAL at 07:50

## 2020-10-01 ASSESSMENT — PAIN SCALES - GENERAL
PAINLEVEL_OUTOF10: 3
PAINLEVEL_OUTOF10: 0
PAINLEVEL_OUTOF10: 0

## 2020-10-01 NOTE — PROGRESS NOTES
2120: No changes from initial assessment. Bowel sounds present in four quadrants. Tenderness noted in the abdomen.

## 2020-10-01 NOTE — PROGRESS NOTES
Gastroenterology Progress Note:     Patient Name:  Yuri Corey   MRN: [id number]  [id number]  YOB: 1960  Admit Date: 9/25/2020 11:42 AM  Primary Care Physician: Kavitha Bob DO   4K-10/010-A     Patient seen and examined. 24 hours events and chart reviewed. Subjective: Patient resting in bed. She has some surgical discomfort, but states \"it is coming along. \" She is tolerating a general diet. She is being discharged today. Hgb 8.8    Objective:  BP (!) 113/59   Pulse 60   Temp 97.7 °F (36.5 °C) (Oral)   Resp 16   Ht 4' 11\" (1.499 m)   Wt 120 lb 11.2 oz (54.7 kg)   SpO2 96%   BMI 24.38 kg/m²     Physical Exam:    General:  Nourished in no distress  HEENT: Atraumatic, normocephalic. Moist oral mucous membranes. Neck: Supple without adenopathy, JVD, thyromegaly or masses. Trachea midline. CV: Heart RRR, no murmurs, rubs, gallops. Resp: Even, easy without cough or accessory use. Lungs clear to ascultation bilaterally. Abd: Round, soft, tender to surgical area. No hepatosplenomegaly or mass present. Active bowel sounds heard. No distention noted. G-tube intact, no bleeding. Dressing dry & intact. ADE drain intact & patent with serosanguinous drainage. Ext:  Without cyanosis, clubbing, edema. Skin: Pink, warm, dry  Neuro:  Alert, oriented x 3 with no obvious deficits.        Rectal: deferred    Labs:   CBC:   Lab Results   Component Value Date    WBC 7.0 09/27/2020    HGB 8.8 10/01/2020    HCT 28.5 10/01/2020    MCV 99.0 09/27/2020     09/27/2020     BMP:   Lab Results   Component Value Date     09/30/2020    K 4.1 09/30/2020    K 3.2 09/26/2020     09/30/2020    CO2 26 09/30/2020    PHOS 3.0 10/07/2018    BUN 6 09/30/2020    CREATININE 0.5 09/30/2020    CALCIUM 9.2 09/30/2020     PT/INR:   Lab Results   Component Value Date    INR 0.96 09/25/2020     Lipids:   Lab Results   Component Value Date    ALKPHOS 96 09/26/2020    ALT 44 09/26/2020    AST 16 09/26/2020 BILITOT 0.6 09/26/2020    BILIDIR <0.2 09/22/2020    LABALBU 2.6 09/26/2020    AMYLASE 83 03/20/2019    LIPASE 19.3 09/22/2020     Current Meds:  Scheduled Meds:   docusate sodium  100 mg Oral BID    sucralfate  1 g Oral BID AC    pantoprazole  40 mg Oral BID AC    sodium chloride flush  10 mL Intravenous 2 times per day    sodium chloride flush  10 mL Intravenous 2 times per day    lithium  300 mg Oral BID WC    lamoTRIgine  200 mg Oral Daily    dimethyl fumarate  240 mg Oral BID    ipratropium  2 spray Each Nostril TID    levothyroxine  100 mcg Oral Daily    cetirizine  10 mg Oral Daily    vilazodone HCl  40 mg Oral Daily    lactobacillus  1 capsule Oral Daily with breakfast    calcium elemental  1 tablet Oral Nightly    Vitamin D  2,000 Units Oral Nightly    ferrous sulfate  325 mg Oral Daily with breakfast    multivitamin  1 tablet Oral Daily    cyanocobalamin  1,000 mcg Oral Daily    polyethylene glycol  17 g Oral Daily     Assessment:  60 yo F admitted 09/25/20 for melena & anemia. H/O PUD & recurrent bleeding from bypassed stomach/duodenum with IR coiling in the past. In the ED 09/20/20, with melena & abd pain. CT A/P demonstrated colitis & she was discharged on Cipro/Flagyl. Returned to ED 09/22/20 for abd pain & back pain, CT negative. Discharged with outpatient follow-up. Enteroscopy 09/27/20, but could not reach lower anastomosis. Colonoscopy planned for following day, but patient had a sudden drop in Hgb. NM GI blood loss scan positive, underwent angio for localization of bleeding from previously coiled area of GDA from a small vessel, but IR unable to get microcatheter into vessel to coil. No bleeding from SMA or YARELIS. Gastrostomy tube placed surgically 09/28/20. Gastric endoscopy done in the OR 09/28/20 & demonstrated stomach postsurgial, proximally obstructed, duodenum with a single small clean-based ulcer with actively oozing spot at proximal side cauterized with APC.   1. Upper GIB from [place] [address] area on IR, unable to coil- s/p endoscopy with clean-based ulcer  2. Anemia s/p 4 units PRBC  3. Gastrostomy tube placed surgically 09/28/20  4. Gastric endoscopy done in OR 09/28/20 with small clean based ulcer oozing blood in the duodenum s/p APC  5. H/O Batsheva-en-Y bypass  6. Recurrent GIB  7. Pacemaker  8.  Hypothryoidism      Plan:    · Monitor H & H, transfuse prn  · PPI BID, script sent  · Sucralfate BID, script sent  · No NSAIDs  · Diet as tolerated  · Will need repeat endoscopy via ostomy in 6-8 weeks to confirm healing after dilating ostomy  · Stool for h pylori, OP order placed  · General surgery on board  · Pain control per primary  · Colace BID, script sent  · Miralax daily, continue at discharge  · Supportive care per primary team  · Will need a 4 week follow-up with Dr. Nikolas Shaw  · Okay to discharge from GI standpoint when cleared by primary team    Case reviewed and impression/plan reviewed in collaboration with Dr. Bello Dobbs  Electronically signed by AJITH Aj - CNP on 10/1/2020 at 12:20 PM    [facility]

## 2020-10-01 NOTE — CARE COORDINATION
10/1/20, 10:45 AM EDT    Patient goals/plan/ treatment preferences discussed by  and . Patient goals/plan/ treatment preferences reviewed with patient/ family. Patient/ family verbalize understanding of discharge plan and are in agreement with goal/plan/treatment preferences. Understanding was demonstrated using the teach back method. AVS provided by RN at time of discharge, which includes all necessary medical information pertaining to the patients current course of illness, treatment, post-discharge goals of care, and treatment preferences. Services After Discharge  Services At/After Discharge: Nursing Services([facility], RN only for G-tube care)   IMM Letter  IMM Letter given to Patient/Family/Significant other/Guardian/POA/by[de-identified]   IMM Letter date given[de-identified] 09/30/20  IMM Letter time given[de-identified] 0900       Discussed discharge today during morning huddle. ANT notified Kath [place] with [place]. ANT updated pt, her sister is to transport home.   VALERIANO Jacobson updated

## 2020-10-01 NOTE — DISCHARGE INSTR - COC
Continuity of Care Form    Patient Name: Sandhya Martinez   :  1960  MRN:  [id number]    Admit date:  2020  Discharge date:  ***    Code Status Order: Full Code   Advance Directives:   Advance Care Flowsheet Documentation     Date/Time Healthcare Directive Type of Healthcare Directive Copy in [facility] [address] Agent's Name Healthcare Agent's Phone Number    20  Yes, patient has an advance directive for healthcare treatment  Durable power of  for health care;Living will  Other (Comment) in [facility] of   sisters Linda Avila or Liborio Pavon  --          Admitting Physician:  Samantha Cantu DO  PCP: Pat Bryson DO    Discharging Nurse: [facility] Unit/Room#: 4K-10/010-A  Discharging Unit Phone Number: ***    Emergency Contact:   Extended Emergency Contact Information  Primary Emergency Contact: [address] Phone: [phone]  Mobile Phone: [phone]  Relation: Brother/Sister  Secondary Emergency Contact: [address] of [address] [place] [address] Phone: [phone]  Relation: Brother/Sister    Past Surgical History:  Past Surgical History:   Procedure Laterality Date    APPENDECTOMY      BONE GRAFT Left     left leg to right arm    [facility] [address], LAPAROSCOPIC  2016    COLONOSCOPY      DILATATION, ESOPHAGUS      ENDOSCOPY, COLON, DIAGNOSTIC      ENTEROSCOPY N/A 2018    ENTEROSCOPY performed by Jenny Grant MD at [address] [place] [address] N/A 2020    GASTROSTOMY TUBE PLACEMENT- [facility] TO  GASTRIC ENDOSCOPY performed by Susie Rodriguez MD at [address]  2016    OTHER SURGICAL HISTORY      RECTAL FISSULRE    OTHER SURGICAL HISTORY      D&C    OTHER SURGICAL HISTORY  2006    GI BLEED    PACEMAKER INSERTION Right 2019  PACEMAKER PLACEMENT      [place] COLONOSCOPY FLX DX W/COLLJ SPEC WHEN PFRMD Left 2/6/2018    COLONOSCOPY performed by Bang Sparrow MD at [address] [place] [address] OFFICE/OUTPT VISIT,PROCEDURE ONLY Left 2/18/2018    EGD ESOPHAGOGASTRODUODENOSCOPY performed by Gregg Green MD at [address] [place] [address] OFFICE/OUTPT VISIT,PROCEDURE ONLY N/A 10/4/2018    EGD DIAGNOSTIC ONLY performed by Anne-Marie Foote MD at [facility] ENDOSCOPY Left 9/24/2019    EGD DIAGNOSTIC ONLY performed by Juan Elkins MD at [facility] ENDOSCOPY N/A 9/26/2020    ENTEROSCOPY PUSH CONTROL HEMORRHAGE performed by Bang Sparrow MD at [facility] Endoscopy       Immunization History:   Immunization History   Administered Date(s) Administered    Influenza Vaccine, unspecified formulation 09/15/2015, 09/14/2016, 10/21/2016, 08/16/2017    Influenza Virus Vaccine 10/21/2016, 08/16/2017    Influenza, MDCK Lisa Santiago, with preserv IM (Flucelvax 4 yrs and older) 03/11/2019    Influenza, Lisa Santiago, 6 mo and older, IM, PF (Flulaval, Fluarix) 10/05/2018    Influenza, Quadv, IM, (6 mo and older Fluzone, Flulaval, Fluarix and 3 yrs and older Afluria) 10/05/2018    Influenza, Quadv, IM, PF (6 mo and older Fluzone, Flulaval, Fluarix, and 3 yrs and older Afluria) 09/23/2017, 09/24/2019    Pneumococcal Conjugate 13-valent ([id number]) 04/10/2019    Pneumococcal Polysaccharide ([id number]) 05/17/2016    Td (Adult), 5 Lf Tetanus Toxoid, Pf (Tenivac, Decavac) 04/30/1999    Td vaccine (adult) 04/30/1999    Tdap (Boostrix, Adacel) 09/29/2015, 09/12/2016, 10/25/2018    Zoster Recombinant (Shingrix) 03/11/2019, 11/19/2019, 01/03/2020       Active Problems:  Patient Active Problem List   Diagnosis Code    Hypothyroidism E03.9    [facility]) ([facility] G35    S/P laparoscopic cholecystectomy Z90.49    S/P laparoscopic appendectomy Z90.49    Iron deficiency anemia {YES / [place]:84183}       Date of Last BM: ***    Intake/Output Summary (Last 24 hours) at 10/1/2020 0133  Last data filed at 2020  Gross per 24 hour   Intake 1040 ml   Output 2060 ml   Net -1020 ml     I/O last 3 completed shifts: In: 9955 [P.O.:1030;  I.V.:10]  Out:  [Urine:; Drains:10]    Safety Concerns:     [facility] Safety Concerns:137400950}    Impairments/Disabilities:      [facility] Impairments/Disabilities:501366775}    Nutrition Therapy:  Current Nutrition Therapy:   [facility] Diet List:204630837}    Routes of Feeding: {CHP DME Other Feedings:157960230}  Liquids: {Slp liquid thickness:43286}  Daily Fluid Restriction: {CHP DME Yes amt example:835098213}  Last Modified Barium Swallow with Video (Video Swallowing Test): {Done Not Done [facility]:189889713}    Treatments at the Time of Hospital Discharge:   Respiratory Treatments: ***  Oxygen Therapy:  {Therapy; copd oxygen:23129}  Ventilator:    { CC Vent BZIB:217477698}    Rehab Therapies: {THERAPEUTIC INTERVENTION:2663414847}  Weight Bearing Status/Restrictions: [address] [place] [address] Weight Bearin}  Other Medical Equipment (for information only, NOT a DME order):  {EQUIPMENT:012758194}  Other Treatments: ***    Patient's personal belongings (please select all that are sent with patient):  {[facility] DME Belongings:587899487}    RN SIGNATURE:  {Esignature:830385050}    CASE MANAGEMENT/SOCIAL WORK SECTION    Inpatient Status Date: ***    Readmission Risk Assessment Score:  Readmission Risk              Risk of Unplanned Readmission:        22           Discharging to Facility/ Agency   · Name:   · Address:  · Phone:  · Fax:    Dialysis Facility (if applicable)   · Name:  · Address:  · Dialysis Schedule:  · Phone:  · Fax:    / signature: {Esignature:321877628}    PHYSICIAN SECTION    Prognosis: {Prognosis:8795152985}    Condition at Discharge: [facility] Patient Condition:036223107}    Rehab Potential (if transferring to Rehab): {Prognosis:2158922998}    Recommended Labs or Other Treatments After Discharge: ***    Physician Certification: I certify the above information and transfer of Gunnar Fuentes  is necessary for the continuing treatment of the diagnosis listed and that she requires {Admit to Appropriate Level of Care:05733} for {GREATER/LESS:182932741} 30 days.      Update Admission H&P: {CHP DME Changes in OTEPB:358773503}    PHYSICIAN SIGNATURE:  {Esignature:614229928}

## 2020-10-01 NOTE — PROGRESS NOTES
Discharge teaching and instructions for diagnosis/procedure of GI bleed completed with patient using teachback method. AVS reviewed. Four prescriptions given to patient through meds to beds program. Patient voiced understanding regarding prescriptions, follow up appointments, and care of self at home. Discharged in a wheelchair to  home with support per friend. IV taken out. Heart monitor taken off. Patient discharged with documented belongings.

## 2020-10-01 NOTE — DISCHARGE SUMMARY
Internal Medicine Discharge Summary      Patient Identification:   Ave Castro   : 1960  MRN: [id number]   Account: [de-identified]      Patient's PCP: Aubree Baker DO    Admit Date: 2020     Discharge Date:   10/1/2020    Admitting Physician: Shankar Lee DO     Discharge Physician: Marco Foote DO     Discharge Diagnoses: Upper GI [place] Problems    Diagnosis Date Noted    Non-ischemic cardiomyopathy [facility]) [I42.8]        The patient was seen and examined on day of discharge and this discharge summary is in conjunction with any daily progress note from day of discharge. Hospital Course: Carrillo Alvarenga is a 41-year-old female admitted on 2020 for melena and anemia. Patient has a history of peptic ulcer disease and Batsheva-en-Y with history of GI bleeding requiring IR coiling in the past.  Patient reported progressive intermittent melena, weakness, and fatigue that been occurring over the past week. Patient had previous encounter in the ED on 2020 with melena and abdominal pain. CT of the abdomen and pelvis demonstrated colitis and she was discharged on Cipro and Flagyl. Patient returned to the ED on [phone] for abdominal pain and back pain with a CT negative for any acute findings. Patient was discharged with outpatient follow-up. Patient was seen and evaluated by gastroenterology who performed a push through enteroscopy on 2020, but could not reach the lower anastomosis. A colonoscopy was planned for the following day but the patient had a sudden drop in hemoglobin. A GI bleeding scan was positive in the epigastric region for bleeding and the patient underwent angiography for localization of bleeding from previously coiled area of gastric duodenal ulcer but IR was unable to reach the area with a microcatheter.   General surgery placed a surgical gastrostomy tube on 2020 and a gastric endoscopy was performed in the OR which demonstrated a bleeding duodenal ulcer in the bypassed segment of duodenum which was cauterized via endoscopy. On the day of discharge patient was seen and evaluated at bedside and found to be in stable condition and all labs, imaging, and care plan were reviewed by the attending. Patient discharge instructions were discussed with the patient by the nursing staff. Thank you Gene Denise DO for allowing us the opportunity to care for this patient    1. Upper GI bleed: Source gastric duodenal ulcer and bypassed segment of Batsheva-en-Y. Initial attempts to reach site of bleeding through push enteroscopy unsuccessful. Attempts to stop bleeding via IR coiling were unsuccessful. Hemostasis was achieved by enteroscopy with cauterization through surgical gastrostomy tube performed on 9/28/2020. Plan to leave gastrostomy tube in place so that patient can be evaluated for bleeding in follow-up. 2. Gastroduodenal ulcer: Noted in bypassed segment of duodenum. Patient underwent cauterization of duodenal ulcer through gastrectomy on 9/28/2020. Patient is on Carafate and pantoprazole. 3. Anemia: Secondary to upper GI bleed:  Patient has been transfused a total of 4 units packed red blood cells. Current hemoglobin 8.8 and stable from previous day. Iron studies within normal limits  4. History of Batsheva-en-Y bypass: Performed for obesity in 2966 complicated by bleeding in the bypassed duodenal segment requiring IR coiling previously. 5. Pacemaker: For symptomatic bradycardia. ECG shows atrial paced rhythm. Stable  6. Hypothyroidism: Chronic stable on home Synthroid  7. Multiple sclerosis: Chronic, stable on home dose of Tecfidera  8. Bipolar disease type I: Chronic, stable on home dose of Lamictal, Viibryd, and lithium  9. Acute colitis(resolved): Patient was seen in the ED on 9/20/2020 and was treated for acute colitis with course of ciprofloxacin and Flagyl which was completed.         Exam:     Vitals:  Vitals:    10/01/20 0130 10/01/20 0457 10/01/20 0730 10/01/20 1100   BP: (!) 114/58 124/65 122/62 (!) 113/59   Pulse: 72 60 60 60   Resp: 17 17 16 16   Temp: 98.6 °F (37 °C) 98.3 °F (36.8 °C) 97.2 °F (36.2 °C) 97.7 °F (36.5 °C)   TempSrc: Oral Oral Oral Oral   SpO2: 94% 95% 95% 96%   Weight:       Height:         Weight: Weight: 120 lb 11.2 oz (54.7 kg)     24 hour intake/output:    Intake/Output Summary (Last 24 hours) at 10/1/2020 1405  Last data filed at 10/1/2020 1325  Gross per 24 hour   Intake 2790 ml   Output 3210 ml   Net -420 ml         General appearance: No apparent distress, appears stated age and cooperative. HEENT: Conjunctivae/corneas clear. Neck: Supple, with full range of motion. No jugular venous distention. Trachea midline. Respiratory:  Normal respiratory effort. Clear to auscultation, bilaterally without Rales/Wheezes/Rhonchi. Cardiovascular: Regular rate and rhythm with normal S1/S2 without murmurs, rubs or gallops. Abdomen: Clean intact dressing over postoperative site, there is some blood noted in the gastrostomy tube abdomen is otherwise soft, non-tender, non-distended with normal bowel sounds. Musculoskeletal: Right forearm amputation noted otherwise passive and active ROM x 4 extremities. Skin: Skin color, texture, turgor normal.  No rashes or lesions. Neurologic:  Neurovascularly intact without any focal sensory/motor deficits. Psychiatric: Alert and oriented, thought content appropriate, normal insight  Capillary Refill: Brisk,< 3 seconds   Peripheral Pulses: +2 palpable, equal bilaterally     Labs:  For convenience and continuity at follow-up the following most recent labs are provided:      CBC:    Lab Results   Component Value Date    WBC 7.0 09/27/2020    HGB 8.8 10/01/2020    HCT 28.5 10/01/2020     09/27/2020       Renal:    Lab Results   Component Value Date     09/30/2020    K 4.1 09/30/2020    K 3.2 09/26/2020     09/30/2020    CO2 26 09/30/2020    BUN 6 09/30/2020 CREATININE 0.5 09/30/2020    CALCIUM 9.2 09/30/2020    PHOS 3.0 10/07/2018         Significant Diagnostic Studies    Radiology:   RADIOLOGY REPORT   Final Result      IR ANGIOGRAM SUPERIOR MESENTERIC   Final Result   Digital subtracted angiography demonstrated contrast extravasation of a tiny branch arising from the celiac axis which led to an area of small bowel in the right side of the abdomen in somewhat close proximity to the previously placed gastroduodenal    artery embolization coils. Due to the tortuosity and small size of the feeding vessel, a microcatheter could not be successfully placed in order to perform an intervention. **This report has been created using voice recognition software. It may contain minor errors which are inherent in voice recognition technology. **                        Final report electronically signed by Dr Jack Glasgow on 9/27/2020 7:36 AM      NM GI BLOOD LOSS   Final Result      Abnormal radiotracer accumulation in the upper abdomen moving into the left hemiabdomen, likely secondary to active small bowel bleeding.       Final report electronically signed by Dr. Estefanía Peralta on 9/26/2020 4:09 PM             Consults:     IP CONSULT TO GI  IP CONSULT TO GENERAL SURGERY  IP CONSULT TO HOME CARE NEEDS    Disposition: Home  Condition at Discharge: Stable    Code Status:  Full Code     Patient Instructions:    Discharge lab work: none  Activity: activity as tolerated  Diet: DIET GENERAL;      Follow-up visits:   Monica Simmons DO  [address] [place] [address]  [phone]      staff-please s/u w/i 7d    CM STR [address]/[facility], [facility]  [address] [facility] [address] [phone]    As needed for nursing, G tube site care; clean with soap/water, dry dressing as needed    Duy Stearns MD  [address] [place] [address] [address] [facility]  [phone]          Zahraa Saucedo [address]    Schedule an appointment as soon as possible for a visit in 4 weeks           Discharge Medications:      Jennifer Bel   Home Medication Instructions ZI    Printed on:10/01/20 0601   Medication Information                      calcium carbonate 600 MG TABS tablet  Take 1 tablet by mouth nightly              Cholecalciferol (VITAMIN D) 2000 units CAPS capsule  Take 2,000 Units by mouth nightly              dimethyl fumarate (TECFIDERA) 240 MG delayed release capsule  Take 240 mg by mouth 2 times daily             docusate sodium (COLACE, DULCOLAX) 100 MG CAPS  Take 100 mg by mouth 2 times daily             ferrous sulfate 325 (65 FE) MG tablet  Take 325 mg by mouth 2 times daily (with meals)              fluticasone (FLONASE) 50 MCG/ACT nasal spray  2 sprays by Each Nostril route daily             ipratropium (ATROVENT) 0.03 % nasal spray  INSTILL 2 SPRAYS IN EACH NOSTRIL THREE TIMES A DAY             lactobacillus (CULTURELLE) capsule  Take 1 capsule by mouth daily (with breakfast)             lamoTRIgine (LAMICTAL) 200 MG tablet  TAKE 1 TABLET BY MOUTH ONCE DAILY. levothyroxine (SYNTHROID) 100 MCG tablet  TAKE 1 TABLET BY MOUTH ONE TIME A DAY             lithium 300 MG tablet  Take 2 tablets by mouth 2 times daily             loratadine (CLARITIN) 10 MG tablet  Take 1 tablet by mouth daily             lurasidone (LATUDA) 40 MG TABS tablet  Take 40 mg by mouth daily             Magnesium Oxide 250 MG TABS  Take 250 mg by mouth nightly              nitroGLYCERIN (NITROSTAT) 0.4 MG SL tablet  up to max of 3 total doses. If no relief after 1 dose, call 911.              Nutritional Supplements (DHEA PO)  Take 60 mg by mouth daily             ondansetron (ZOFRAN) 4 MG tablet  Take 1 tablet by mouth 3 times daily as needed for Nausea or Vomiting             pantoprazole (PROTONIX) 40 MG tablet  Take 1 tablet by mouth 2 times daily (before meals)             polyethylene glycol (GLYCOLAX) powder  Take 17 g by mouth daily Prenatal MV-Min-Fe Fum-FA-DHA (PRENATAL 1 PO)  Take 1 tablet by mouth daily              Prosthesis MISC  by Does not apply route Strap for the prosthesis broken please order new             sucralfate (CARAFATE) 1 GM tablet  Take 1 tablet by mouth 2 times daily (before meals)             VILAZODONE HCL 40 MG Tablet  TAKE 1 TABLET BY MOUTH ONCE DAILY. vitamin B-12 1000 MCG tablet  Take 1 tablet by mouth daily                 Time Spent on discharge is more than 1 hour in the examination, evaluation, counseling and review of medications and discharge plan. Signed: Thank you New Sood DO for the opportunity to be involved in this patient's care.     Electronically signed by Yari Amador DO on 10/1/2020 at 2:05 PM

## 2020-10-01 NOTE — PROGRESS NOTES
Paulita Blizzard, [address] [facility]   General Surgery Daily Progress Note    Pt Name: Ephraim Morales  Medical Record Number: [id number]  Date of Birth 1960   Today's Date: 10/1/2020  Chief complaint: none  ASSESSMENT   1. Hospital day # 6   2. PDO 3, exlap gastrostomy with push enteroscopy, closure of duodenostomy   3. Recurrent GI bleeds  4. Hx of gastaric bypass   has a past medical history of Balance problem, Bipolar I disorder, most recent episode (or current) depressed, in partial or unspecified remission, Cancer ([facility] [address] Depression, Fatigue, Gallstones, Gastric bypass status for obesity, GERD (gastroesophageal reflux disease), History of blood transfusion, History of hysterectomy, Hypothyroidism, MS (multiple sclerosis) ([facility] [address] Paresthesias, and UTI (urinary tract infection). PLAN   1. IV hydration  2. Regular diet as tolerated  3. Anticipate d/c home today  Dafne Polio is pain is controlled. Drain remains SS  CURRENT MEDICATIONS   Scheduled Meds:   docusate sodium  100 mg Oral BID    sucralfate  1 g Oral BID AC    pantoprazole  40 mg Oral BID AC    sodium chloride flush  10 mL Intravenous 2 times per day    sodium chloride flush  10 mL Intravenous 2 times per day    lithium  300 mg Oral BID WC    lamoTRIgine  200 mg Oral Daily    dimethyl fumarate  240 mg Oral BID    ipratropium  2 spray Each Nostril TID    levothyroxine  100 mcg Oral Daily    cetirizine  10 mg Oral Daily    vilazodone HCl  40 mg Oral Daily    lactobacillus  1 capsule Oral Daily with breakfast    calcium elemental  1 tablet Oral Nightly    Vitamin D  2,000 Units Oral Nightly    ferrous sulfate  325 mg Oral Daily with breakfast    multivitamin  1 tablet Oral Daily    cyanocobalamin  1,000 mcg Oral Daily    polyethylene glycol  17 g Oral Daily     Continuous Infusions:    PRN Meds:.morphine **OR** morphine, labetalol, hydrALAZINE, ceFAZolin, sodium chloride flush, potassium chloride, sodium chloride flush, nitroGLYCERIN, acetaminophen **OR** acetaminophen, promethazine **OR** ondansetron  OBJECTIVE   CURRENT VITALS:  height is 4' 11\" (1.499 m) and weight is 120 lb 11.2 oz (54.7 kg). Her oral temperature is 97.2 °F (36.2 °C). Her blood pressure is 122/62 and her pulse is 60. Her respiration is 16 and oxygen saturation is 95%. Temperature Range (24h):Temp: 97.2 °F (36.2 °C) Temp  Av.3 °F (36.8 °C)  Min: 97.2 °F (36.2 °C)  Max: 98.6 °F (37 °C)  BP Range (85O): Systolic (74ESA), KLS:454 , Min:110 , BLP:621     Diastolic (47SHV), OPS:11, Min:56, Max:66    Pulse Range (24h): Pulse  Av.2  Min: 60  Max: 79  Respiration Range (24h): Resp  Avg: 15  Min: 12  Max: 17  Current Pulse Ox (24h):  SpO2: 95 %  Pulse Ox Range (24h):  SpO2  Av.1 %  Min: 93 %  Max: 98 %  Oxygen Amount and Delivery: O2 Flow Rate (L/min): 8 L/min  Incentive Spirometry Tx: In shower chair. Unlabored respirations good air movement  Regular rate palpable distal pulses  Abdomen is soft nontender nondistended  Incision c/d/i, ADE SS, G tube in good position    In: 770 [P.O.:750; I.V.:20]  Out: 1900 [Urine:1900]  Date 10/01/20 0000 - 10/01/20 235   Shift 3171-2208 7280-3037 2391-7417 24 Hour Total   INTAKE   P.O.(mL/kg/hr) 250(0.6)   250   I. V.(mL/kg) 10(0.2)   10(0.2)   Shift Total(mL/kg) 260(4.7)   260(4.7)   OUTPUT   Urine(mL/kg/hr) 1300(3)   1300   Emesis/NG output(mL/kg) 0(0)   0(0)   Other(mL/kg) 0(0)   0(0)   Stool(mL/kg) 0(0)   0(0)   Blood(mL/kg) 0(0)   0(0)   Shift Total(mL/kg) 1300(23.7)   1300(23.7)   Weight (kg) 54.7 54.7 54.7 54.7     LABS     Recent Labs     20  1624  20  1402 20  0537 20  0854 10/01/20  0528   HGB 11.0*   < > 9.7* 9.2*  --  8.8*   HCT 31.5*   < > 30.2* 29.4*  --  28.5*     --   --   --  144  --    K 4.9  --   --   --  4.1  --      --   --   --  111  --    CO2 15*  --   --   --  26  --    BUN 10  --   --   --  6*  --    CREATININE 0.4  --   --   --  0.5  --

## 2020-10-01 NOTE — PROGRESS NOTES
Assisted pt with ambulation to bathroom and back to her bed. Up with 1 assist and tolerated ambulation well.

## 2020-10-02 ENCOUNTER — TELEPHONE (OUTPATIENT)
Dept: FAMILY MEDICINE CLINIC | Age: 60
End: 2020-10-02

## 2020-10-02 NOTE — PROGRESS NOTES
CLINICAL PHARMACY NOTE: MEDS TO [address] Select Patient?: No  Total # of Prescriptions Filled: 1   The following medications were delivered to the patient:  Culturelle   Total # of Interventions Completed: 2  Time Spent (min): 30    Additional Documentation:

## 2020-10-02 NOTE — PROGRESS NOTES
CLINICAL PHARMACY NOTE: MEDS TO [address] [place] [address] Drive Select Patient?: No  Total # of Prescriptions Filled: 3   The following medications were delivered to the patient:  Pantoprazole 40mg  Docusate 100mg  Sucralfate 1gm  Total # of Interventions Completed: 2  Time Spent (min): 30    Additional Documentation:

## 2020-10-02 NOTE — TELEPHONE ENCOUNTER
[facility] Trans[facility] Initial Follow Up Call    Outreach made within 2 business days of discharge: Yes    Patient: Halford Closs Patient : 1960   MRN: [id number]  Reason for Admission: There are no discharge diagnoses documented for the most recent discharge. Discharge Date: 10/1/20       Spoke with: amy to       Discharge department/facility: hosp    [place] [facility] Interactive Patient Contact:  Was patient able to fill all prescriptions:   Was patient instructed to bring all medications to the follow-up visit:   Is patient taking all medications as directed in the discharge summary?    Does patient understand their discharge instructions:   Does patient have questions or concerns that need addressed prior to 7-14 day follow up office visit:     Scheduled appointment with PCP within 7-14 days    Follow Up  Future Appointments   Date Time Provider Rudi Jorgenseni   10/8/2020  9:30 AM DO KIMBERLY LionX  [facility]   10/20/2020  9:00 AM Richard Salinas MD [id number] [email or web address]   2020 11:00 AM [place], [place]

## 2020-10-05 NOTE — TELEPHONE ENCOUNTER
[facility] Trans[facility] Initial Follow Up Call    Outreach made within 2 business days of discharge: Yes    Patient: Roland Floyd Patient : 1960   MRN: [id number]  Reason for Admission: There are no discharge diagnoses documented for the most recent discharge. Discharge Date: 10/1/20       Spoke with: EULOGIOM to R/C    Discharge department/facility: [facility] Interactive Patient Contact:  Was patient able to fill all prescriptions:   Was patient instructed to bring all medications to the follow-up visit:   Is patient taking all medications as directed in the discharge summary?    Does patient understand their discharge instructions:   Does patient have questions or concerns that need addressed prior to 7-14 day follow up office visit:     Scheduled appointment with PCP within 7-14 days    Follow Up  Future Appointments   Date Time Provider Rudi Jorgenseni   10/8/2020  9:30 AM DO RAD Oh [address]   10/20/2020  9:00 AM Anusha Miller MD [id number] [email or web address]   2020 11:00 AM Roxann Patrick [place], [place]

## 2020-10-07 ENCOUNTER — TELEPHONE (OUTPATIENT)
Dept: FAMILY MEDICINE CLINIC | Age: 60
End: 2020-10-07

## 2020-10-07 ENCOUNTER — NURSE ONLY (OUTPATIENT)
Dept: LAB | Age: 60
End: 2020-10-07

## 2020-10-07 NOTE — TELEPHONE ENCOUNTER
Dr. John [facility] sent in a stool sample to the lab for Juan R Guerrero , they were wondering if you wanted anything else ran on stool. What with her previous issues and HH said stool was black.     Please advise and call lab if additional order are ordered

## 2020-10-08 ENCOUNTER — NURSE ONLY (OUTPATIENT)
Dept: LAB | Age: 60
End: 2020-10-08

## 2020-10-08 ENCOUNTER — OFFICE VISIT (OUTPATIENT)
Dept: FAMILY MEDICINE CLINIC | Age: 60
End: 2020-10-08
Payer: MEDICARE

## 2020-10-08 VITALS
TEMPERATURE: 97 F | DIASTOLIC BLOOD PRESSURE: 68 MMHG | HEIGHT: 59 IN | BODY MASS INDEX: 24.03 KG/M2 | SYSTOLIC BLOOD PRESSURE: 112 MMHG | HEART RATE: 65 BPM | WEIGHT: 119.2 LBS | OXYGEN SATURATION: 99 %

## 2020-10-08 LAB
ANION GAP SERPL CALCULATED.3IONS-SCNC: 9 MEQ/L (ref 8–16)
ANISOCYTOSIS: PRESENT
BASOPHILS # BLD: 0.6 %
BASOPHILS ABSOLUTE: 0 THOU/MM3 (ref 0–0.1)
BUN BLDV-MCNC: 16 MG/DL (ref 7–22)
CALCIUM SERPL-MCNC: 10.4 MG/DL (ref 8.5–10.5)
CHLORIDE BLD-SCNC: 109 MEQ/L (ref 98–111)
CO2: 22 MEQ/L (ref 23–33)
CREAT SERPL-MCNC: 0.5 MG/DL (ref 0.4–1.2)
CRENATED RBC'S: ABNORMAL
EOSINOPHIL # BLD: 2.5 %
EOSINOPHILS ABSOLUTE: 0.2 THOU/MM3 (ref 0–0.4)
ERYTHROCYTE [DISTWIDTH] IN BLOOD BY AUTOMATED COUNT: 16.9 % (ref 11.5–14.5)
ERYTHROCYTE [DISTWIDTH] IN BLOOD BY AUTOMATED COUNT: 66.5 FL (ref 35–45)
GFR SERPL CREATININE-BSD FRML MDRD: > 90 ML/MIN/1.73M2
GLUCOSE BLD-MCNC: 90 MG/DL (ref 70–108)
HCT VFR BLD CALC: 34.3 % (ref 37–47)
HELICOBACTER PYLORI ANTIGEN, STOOL: NORMAL
HEMOGLOBIN: 10.2 GM/DL (ref 12–16)
IMMATURE GRANS (ABS): 0.04 THOU/MM3 (ref 0–0.07)
IMMATURE GRANULOCYTES: 0.6 %
LYMPHOCYTES # BLD: 8 %
LYMPHOCYTES ABSOLUTE: 0.6 THOU/MM3 (ref 1–4.8)
MAGNESIUM: 2.1 MG/DL (ref 1.6–2.4)
MCH RBC QN AUTO: 31.9 PG (ref 26–33)
MCHC RBC AUTO-ENTMCNC: 29.7 GM/DL (ref 32.2–35.5)
MCV RBC AUTO: 107.2 FL (ref 81–99)
MONOCYTES # BLD: 8 %
MONOCYTES ABSOLUTE: 0.6 THOU/MM3 (ref 0.4–1.3)
NUCLEATED RED BLOOD CELLS: 0 /100 WBC
PLATELET # BLD: 363 THOU/MM3 (ref 130–400)
PLATELET ESTIMATE: ADEQUATE
PMV BLD AUTO: 10.1 FL (ref 9.4–12.4)
POIKILOCYTES: ABNORMAL
POTASSIUM SERPL-SCNC: 4.3 MEQ/L (ref 3.5–5.2)
RBC # BLD: 3.2 MILL/MM3 (ref 4.2–5.4)
SCAN OF BLOOD SMEAR: NORMAL
SEG NEUTROPHILS: 80.3 %
SEGMENTED NEUTROPHILS ABSOLUTE COUNT: 5.5 THOU/MM3 (ref 1.8–7.7)
SODIUM BLD-SCNC: 140 MEQ/L (ref 135–145)
VITAMIN D 25-HYDROXY: 35 NG/ML (ref 30–100)
WBC # BLD: 6.9 THOU/MM3 (ref 4.8–10.8)

## 2020-10-08 PROCEDURE — 1111F DSCHRG MED/CURRENT MED MERGE: CPT | Performed by: FAMILY MEDICINE

## 2020-10-08 PROCEDURE — 99495 TRANSJ CARE MGMT MOD F2F 14D: CPT | Performed by: FAMILY MEDICINE

## 2020-10-08 ASSESSMENT — ENCOUNTER SYMPTOMS
CONSTIPATION: 0
SHORTNESS OF BREATH: 0
TROUBLE SWALLOWING: 0
NAUSEA: 0
DIARRHEA: 0
VOMITING: 0
EYE PAIN: 0
BLOOD IN STOOL: 1
COUGH: 0
ABDOMINAL PAIN: 0

## 2020-10-08 NOTE — PATIENT INSTRUCTIONS
Thank you   1. Thank you for trusting us with your healthcare needs. You may receive a survey regarding today's visit. It would help us out if you would take a few moments to provide your feedback. We value your input. 2. Please bring in ALL medications BOTTLES, including inhalers, herbal supplements, over the counter, prescribed & non-prescribed medicine. The office would like actual medication bottles and a list.   3. Please note our OFFICE POLICIES:   a. Prior to getting your labs drawn, please check with your insurance company for benefits and eligibility of lab services. Often, insurance companies cover certain tests for preventative visits only. It is patient's responsibility to see what is covered. b. We are unable to change a diagnosis after the test has been performed. c. Lab orders will not be re-printed. Please hold onto your original lab orders and take them to your lab to be completed. d. If you no show your scheduled appointment three times, you will be dismissed from this practice. e. Mele Dry must be completed 24 hours prior to your schedule appointment. 4. If the list below has been completed, PLEASE FAX RECORDS TO OUR OFFICE @ [phone].  Once the records have been received we will update your records at our office:  Health Maintenance Due   Topic Date Due    Annual Wellness Visit (AWV)  05/29/2019    Flu vaccine (1) 09/01/2020       Will do the cbc today on your way out    Gi appointment today    Will recheck the vitamin levels - dhea and the mag     Will recheck the kidney as you are processing the blod - but they were great in the hospital    Will keep watching the MS    Will see the surgeon in October and psychiatry in December    Will see neurology - Mayra Estrada in 3 months    Will see us back in november

## 2020-10-08 NOTE — PROGRESS NOTES
Health Maintenance Due   Topic Date Due    Annual Wellness Visit (AWV)  [id number] scheduled      [id number] [address] [address]  Dept: [phone]  Dept Fax: [phone]: [phone]    Health Maintenance Due   Topic Date Due    Annual Wellness Visit (AWV)  05/29/2019    Flu vaccine (1) 09/01/2020           Patient:  Cole Foley  Visit Date: 10/8/2020    ANTICIPATORY GUIDANCE : ADULT     WELL QUESTIONS  1. How many cups of caffeine do you drink per day? I do not consume any caffeine products daily, 03 cups of tea, **0* cups of soda, 0 cups of energy drink, 00 snack sized candy bars a day, 00full sized candy bars a day and 3 cups of coffee   2. Do you exercise a minimum of 30 minutes per day, above normal activity? Yes    3. Do you eat a minimum of 8-10 servings of fruits and vegetables per day? Yes  4. Do you drink alcohol? No  5. How many oz of water do you drink per day?  (64 oz per day recommended) 4    EDUCATION PROVIDED  Discussed and/or Handout Given on the following items:            [] Caffeine Use: Limit to 2 cups per day   (400mg of caffeine a day)     [] Exercise: Ideal 30 minutes per day, above normal activity              [] Eating Fruits and Vegetables: Studies show 8-10 servings per day decrease BP  [] Alcohol: Ideal maximum of one cup per day for females and two cups per day for a male           Flu vaccine (1) 09/01/2020       Cole Foley is a 61 y.o. female who presents today for:  Chief Complaint   Patient presents with    Follow-Up from [employer] Exercise 3x per week (30 min per time)           HPI:     HPI   Bounced back from the other surgeries - but this one she tires more easily and more tired    Stool today was not as black - still dark but no longer constipated.   bm the last few days    Had surgery for the go bleed - has a g tube so they have access to the bleed - will do another scope until she is healed the g tube will stay in place    no ms flare on the tecfidera - does not have to have any bloodwork   No question data found.     Past Medical History:   Diagnosis Date    Balance problem     Bipolar I disorder, most recent episode (or current) depressed, in partial or unspecified remission 5/8/2013    Cancer ([address]     CERVICAL     Depression     Fatigue     Gallstones     Gastric bypass status for obesity 2002    GERD (gastroesophageal reflux disease)     History of blood transfusion     History of hysterectomy     REMOVAL OF ONE OVARY     Hypothyroidism     MS (multiple sclerosis) (HCC)     Paresthesias     RT LOWER LIMB    UTI (urinary tract infection)       Past Surgical History:   Procedure Laterality Date    APPENDECTOMY      BONE GRAFT Left     left leg to right arm    CARPAL TUNNEL RELEASE  1999    CHOLECYSTECTOMY, LAPAROSCOPIC  5/16/2016    COLONOSCOPY      DILATATION, ESOPHAGUS      ENDOSCOPY, COLON, DIAGNOSTIC      ENTEROSCOPY N/A 2/5/2018    ENTEROSCOPY performed by Simran Cruz MD at [address] [place] [address] N/A 9/28/2020    GASTROSTOMY TUBE PLACEMENT- [facility] TO DO GASTRIC ENDOSCOPY performed by Oneal Prado MD at [place] [address], VAGINAL      LAPAROSCOPIC APPENDECTOMY  5/16/2016    OTHER SURGICAL HISTORY      RECTAL FISSULRE    OTHER SURGICAL HISTORY  1986    D&C    OTHER SURGICAL HISTORY  2006    GI BLEED    PACEMAKER INSERTION Right 05/20/2019    PACEMAKER PLACEMENT      TN COLONOSCOPY FLX DX W/COLLJ SPEC WHEN PFRMD Left 2/6/2018    COLONOSCOPY performed by Simran Cruz MD at [facility]    TN OFFICE/OUTPT VISIT,PROCEDURE ONLY Left 2/18/2018    EGD ESOPHAGOGASTRODUODENOSCOPY performed by Amber Gonzalez MD at [address] [place] [address] OFFICE/OUTPT VISIT,PROCEDURE ONLY N/A 10/4/2018    EGD DIAGNOSTIC ONLY performed by Zhane Bautista MD at DAILY. 90 tablet 1    Prosthesis MISC by Does not apply route Strap for the prosthesis broken please order new 1 each 0    loratadine (CLARITIN) 10 MG tablet Take 1 tablet by mouth daily 90 tablet 1    fluticasone (FLONASE) 50 MCG/ACT nasal spray 2 sprays by Each Nostril route daily 3 Bottle 1    polyethylene glycol (GLYCOLAX) powder Take 17 g by mouth daily      Cholecalciferol (VITAMIN D) 2000 units CAPS capsule Take 2,000 Units by mouth nightly       Magnesium Oxide 250 MG TABS Take 250 mg by mouth nightly       calcium carbonate 600 MG TABS tablet Take 1 tablet by mouth nightly       vitamin B-12 1000 MCG tablet Take 1 tablet by mouth daily 30 tablet 5    dimethyl fumarate (TECFIDERA) 240 MG delayed release capsule Take 240 mg by mouth 2 times daily      nitroGLYCERIN (NITROSTAT) 0.4 MG SL tablet up to max of 3 total doses. If no relief after 1 dose, call 911. 25 tablet 3    Prenatal MV-Min-Fe Fum-FA-DHA (PRENATAL 1 PO) Take 1 tablet by mouth daily       ferrous sulfate 325 (65 FE) MG tablet Take 325 mg by mouth 2 times daily (with meals)        No current facility-administered medications for this visit. Allergies   Allergen Reactions    Latex Rash    Sulfa Antibiotics Rash       Health Maintenance   Topic Date Due    Annual Wellness Visit (AWV)  05/29/2019    Flu vaccine (1) 09/01/2020    TSH testing  05/19/2021    Breast cancer screen  01/15/2022    Lipid screen  04/10/2024    DTaP/Tdap/Td vaccine (4 - Td) 10/25/2028    Colon cancer screen colonoscopy  11/06/2029    Shingles Vaccine  Completed    Pneumococcal 0-64 years Vaccine  Completed    Hepatitis C screen  Completed    HIV screen  Completed    Hepatitis A vaccine  Aged Out    Hepatitis B vaccine  Aged Out    Hib vaccine  Aged Out    Meningococcal (ACWY) vaccine  Aged Out       Subjective:      Review of Systems   Constitutional: Positive for fatigue. Negative for chills and fever.    HENT: Negative for ear pain, postnasal HGB 8.8 (L) 10/01/2020    HCT 28.5 (L) 10/01/2020     09/27/2020    CHOL 130 04/10/2019    TRIG 36 04/10/2019    HDL 96 04/10/2019    LDLCALC 27 04/10/2019    AST 16 09/26/2020     09/30/2020    K 4.1 09/30/2020     09/30/2020    CREATININE 0.5 09/30/2020    BUN 6 (L) 09/30/2020    CO2 26 09/30/2020    TSH 2.060 05/19/2020    INR 0.96 09/25/2020    LABA1C 4.7 11/13/2019    LABGLOM >90 09/30/2020    MG 1.7 09/27/2020    CALCIUM 9.2 09/30/2020    VITD25 16 (L) 10/25/2018       Imaging Results:    Xr Chest (2 Vw)    Result Date: 9/22/2020  PROCEDURE: XR CHEST (2 VW) CLINICAL INFORMATION: cp. COMPARISON: September 21, 2019. TECHNIQUE: PA and lateral views the chest. FINDINGS: Prominent right chest wall pacer. No lobar consolidation. Costophrenic recesses are preserved. No acute osseous findings. IMPRESSION: No acute findings. **This report has been created using voice recognition software. It may contain minor errors which are inherent in voice recognition technology. ** Final report electronically signed by Dr. Jovita Franco on 9/22/2020 1:32 PM    Nm Gi Blood Loss    Result Date: 9/26/2020  PROCEDURE: NM GI BLOOD LOSS CLINICAL INFORMATION: Blood loss TECHNIQUE: A sample of the patient's blood was removed and the red blood cells were labeled with 33 mCi technetium 99m pertechnetate using the UltraTag kit and reinjected back into the patient. Immediate blood flow images of the abdomen were obtained. Sequential 1 minute acquisitions over the abdomen and pelvis were obtained for 60 minutes. COMPARISON: None FINDINGS: There is abnormal radiotracer accumulation which originates in the upper abdomen earlier in the study and demonstrates movement into the left hemiabdomen. Physiologic activity is seen in the heart, liver, spleen and blood vessels. Abnormal radiotracer accumulation in the upper abdomen moving into the left hemiabdomen, likely secondary to active small bowel bleeding.  Final report electronically signed by Dr. Earnestine Dance on 9/26/2020 4:09 PM    Ct Abdomen Pelvis W Iv Contrast Additional Contrast? None    Result Date: 9/22/2020  PROCEDURE: CT ABDOMEN PELVIS W IV CONTRAST CLINICAL INFORMATION: INCREASED LFTS, BELLY PAIN . COMPARISON: 9/20/2020 TECHNIQUE: 5 mm axial CT images were obtained through the abdomen and pelvis after the administration of intravenous and oral contrast. Coronal and sagittal reconstructions were obtained. All CT scans at this facility use dose modulation, iterative reconstruction, and/or weight-based dosing when appropriate to reduce radiation dose to as low as reasonably achievable. FINDINGS: Lung bases are clear. Spleen, pancreas adrenal glands are unremarkable. Hypoattenuating lesion in the right hepatic lobe probable cyst. Cholecystectomy with mild biliary dilatation. Left renal cortical cyst. No hydronephrosis. Spleen is unremarkable. Pancreas is within normal limits. Postsurgical changes stomach. Large amount of stool throughout the colon. Correlate for constipation. No bladder wall thickening. Normal caliber abdominal aorta. No acute osseous findings. Moderate stool throughout the colon. Correlate for constipation. **This report has been created using voice recognition software. It may contain minor errors which are inherent in voice recognition technology. ** Final report electronically signed by Dr. Michelle Lyons on 9/22/2020 3:51 PM    Ct Abdomen Pelvis W Iv Contrast    Result Date: 9/20/2020  PROCEDURE: CT ABDOMEN PELVIS W IV CONTRAST CLINICAL INFORMATION: 25-year-old female with right lower quadrant abdominal pain that began 2 weeks ago . COMPARISON: CT scan 9/13/2019. TECHNIQUE: 5 mm axial CT images were obtained through the abdomen and pelvis after the administration of intravenous and oral contrast. Coronal and sagittal reconstructions were obtained.  All CT scans at this facility use dose modulation, iterative reconstruction, and/or weight-based dosing when appropriate to reduce radiation dose to as low as reasonably achievable. FINDINGS: There is bibasilar atelectasis. There is no pleural effusion. The base of the heart is within acceptable limits. There are postsurgical changes involving the stomach. The gallbladder surgically absent. There is a cyst in the right hepatic lobe. There are embolization coils in the region of the gastroduodenal artery which are causing streak artifact. The pancreas and adrenal glands are within normal limits. Some calcified granulomas are seen in the spleen. The kidneys are symmetric in size and shape. There is no hydronephrosis. There is no evidence of a small bowel obstruction. There is thickening in the region of the cecum and there are inflammatory changes in the adjacent fat. There is a moderate amount of retained stool in the colon. The urinary bladder has a normal appearance. The uterus is absent. The abdominal aorta and IVC are normal in caliber. The osseous structures are intact. No acute fractures. There is a thickened appearance of the cecum with some inflammatory changes in the adjacent fat. This may be on the basis of acute colitis. **This report has been created using voice recognition software. It may contain minor errors which are inherent in voice recognition technology. ** Final report electronically signed by Dr Deidra Ornelas on 9/20/2020 5:18 AM    Ir Angiogram Superior Mesenteric    Result Date: 9/27/2020  PROCEDURE: MESENTERIC ARTERIOGRAM CLINICAL INFORMATION: 71-year-old female with gastrointestinal bleeding and anemia PERFORMED BY:  Dr. Aung Baeza. Libra Loredo MD APPROACH: Right common femoral artery, micropuncture technique. CATHETERS: 5 [employer] VCF, 5 [employer] SOS, 5 [employer] Alva C, Renegade microcatheter VESSELS CATHETERIZED: Abdominal aorta, celiac axis, superior mesenteric artery, inferior mesenteric artery. 2 additional branches arising from the celiac axis were subselected.  DIAGNOSTIC PROCEDURES: Abdominal aortogram, celiac axis angiogram, splenic artery angiogram, common hepatic artery angiogram, selective angiography of 2 small branches arising from the celiac axis, superior mesenteric artery angiogram, inferior mesenteric artery angiogram INTERVENTIONS: None. FLUOROSCOPY TIME: 44 minutes and 42 seconds FLUOROSCOPIC IMAGES: 205 SEDATION: Versed 0.5mg ; fentanyl 50mcg , IV; the patient was sedated for 2 hours during this procedure and monitored with EKG and pulse ox monitoring devices by registered nurse. CONTRAST: 200 ml, Isovue-300. CLOSURE: Angio-Seal device, successful without complication. TECHNIQUE: Signed informed consent was obtained prior to performing this procedure. The patient was sedated, as indicated above. Access was obtained and a 5 [employer] vascular sheath was inserted. The procedures as above were then performed. FINDINGS: The right common femoral artery was cannulated and the VCF catheter was advanced into the distal portion of the thoracic aorta. Contrast was infused through a power injector and digital subtracted angiographic images were obtained. There was standard anatomy of the celiac axis, superior mesenteric artery, bilateral renal arteries and inferior mesenteric artery. The VCF catheter was then removed over a wire and the SOS catheter was advanced. The catheter was placed into the celiac axis. It was then hooked up to a power injector. Images were obtained demonstrating contrast extravasation slightly lateral and inferior to the previously placed coils within the gastroduodenal artery. Numerous additional images were obtained and it appeared this region was supplied by a tiny vessel near the origin of the celiac axis. Due to the tortuosity in size of the small vessel, several attempts were made to access with a microcatheter which were all unsuccessful as the catheter could not be successfully placed into the vessel to attempt intervention. The microcatheter was then removed.  The base catheter was then placed into the superior mesenteric artery. Power injector was again used. No evidence of contrast extravasation was noted. The base catheter was then placed into the inferior mesenteric artery and images were obtained which did not demonstrate any evidence of extravasation of contrast. At that point all wires and catheters were removed from the patient. Closure was performed using an Angio-Seal device in the right groin which was successful. The patient tolerated the procedure well with no immediate postprocedural complication. Digital subtracted angiography demonstrated contrast extravasation of a tiny branch arising from the celiac axis which led to an area of small bowel in the right side of the abdomen in somewhat close proximity to the previously placed gastroduodenal artery embolization coils. Due to the tortuosity and small size of the feeding vessel, a microcatheter could not be successfully placed in order to perform an intervention. **This report has been created using voice recognition software. It may contain minor errors which are inherent in voice recognition technology. ** Final report electronically signed by Dr Nick Coronado on 9/27/2020 7:36 AM        Assessment:      Diagnosis Orders   1. Other iron deficiency anemia  CBC Auto Differential    Vitamin D 25 Hydroxy    DHEA    DHEA-Sulfate    Magnesium    Basic Metabolic Panel   2. Gastrointestinal hemorrhage associated with gastritis, unspecified gastritis type  CBC Auto Differential    Vitamin D 25 Hydroxy    DHEA    DHEA-Sulfate    Magnesium    Basic Metabolic Panel   3. Bipolar 1 disorder (HCC)  CBC Auto Differential    Vitamin D 25 Hydroxy    DHEA    DHEA-Sulfate    Magnesium    Basic Metabolic Panel   4. Gastrointestinal hemorrhage with melena  CBC Auto Differential    Vitamin D 25 Hydroxy    DHEA    DHEA-Sulfate    Magnesium    Basic Metabolic Panel   5.  Intestinal malabsorption, unspecified type  Vitamin D 25 Hydroxy       Plan: Will do the cbc today on your way out    Gi appointment today    Will recheck the vitamin levels - dhea and the mag     Will recheck the kidney as you are processing the blod - but they were great in the hospital    Will keep watching the MS    Will see the surgeon in October and psychiatry in December    Will see neurology - Jolynndante Bailey in 3 months    Will see us back in november       Return in 2 months (on 12/8/2020) for yrly F/U appt needd. Orders Placed:  Orders Placed This Encounter   Procedures    CBC Auto Differential    Vitamin D 25 Hydroxy    DHEA    DHEA-Sulfate    Magnesium    Basic Metabolic Panel     Medications Prescribed:  No orders of the defined types were placed in this encounter. Future Appointments   Date Time Provider Rudi Brandon   10/20/2020  9:00 AM Wendy Wood MD [id number] [facility]   12/17/2020 11:00 AM AJITH Jeff - CNP Ander Papoula 1998       Patient given educational materials - see patient instructions. Discussed use, benefit, and sideeffects of prescribed medications. All patient questions answered. Pt voiced understanding. Reviewed health maintenance. Instructed to continue current medications, diet and exercise. Patient agreed with treatment plan. Follow up as directed. I was present for the key portions of the exam and history and confirmed all areas of the note with the patient, staff and the student. Electronically signed by New Sood DO on 10/8/2020 at 10:12 AM      Post-Discharge Transitional Care Management Services or Hospital Follow Up      Nancymingo Crawfordsarah   YOB: 1960    Date of Office Visit:  10/8/2020  Date of Hospital Admission: 9/25/20  Date of Hospital Discharge: 10/1/20  Risk of hospital readmission (high >=14%.  Medium >=10%) :Readmission Risk Score: 23      Care management risk score Rising risk (score 2-5) and Complex Care (Scores >=6): 4     Non face to face following discharge, date last encounter closed (first attempt may have been earlier): 10/6/2020  9:11 AM    Call initiated 2 business days of discharge: Yes    Patient Active Problem List   Diagnosis    Hypothyroidism    [facility] sclerosis) ([facility]    S/P laparoscopic cholecystectomy    S/P laparoscopic appendectomy    Iron deficiency anemia    Abnormal nuclear stress test    Gastrointestinal hemorrhage associated with gastritis    Gastroduodenal artery bleed    Multiple thyroid nodules    Non-ischemic cardiomyopathy ([facility]    History of Batsheva-en-Y gastric bypass    Renal lesion    Liver lesion    Bipolar 1 disorder ([facility]    Acute gastric ulcer with hemorrhage    Symptomatic bradycardia    Status post placement of cardiac pacemaker    Gastrointestinal hemorrhage with melena       Allergies   Allergen Reactions    Latex Rash    Sulfa Antibiotics Rash       Medications listed as ordered at the time of discharge from hospital   Deborra Cam R   Home Medication Instructions SUNDAR:    Printed on:10/08/20 1012   Medication Information                      calcium carbonate 600 MG TABS tablet  Take 1 tablet by mouth nightly              Cholecalciferol (VITAMIN D) 2000 units CAPS capsule  Take 2,000 Units by mouth nightly              dimethyl fumarate (TECFIDERA) 240 MG delayed release capsule  Take 240 mg by mouth 2 times daily             docusate sodium (COLACE, DULCOLAX) 100 MG CAPS  Take 100 mg by mouth 2 times daily             ferrous sulfate 325 (65 FE) MG tablet  Take 325 mg by mouth 2 times daily (with meals)              fluticasone (FLONASE) 50 MCG/ACT nasal spray  2 sprays by Each Nostril route daily             ipratropium (ATROVENT) 0.03 % nasal spray  INSTILL 2 SPRAYS IN EACH NOSTRIL THREE TIMES A DAY             lactobacillus (CULTURELLE) capsule  Take 1 capsule by mouth daily (with breakfast)             lamoTRIgine (LAMICTAL) 200 MG tablet  TAKE 1 TABLET BY MOUTH ONCE DAILY. levothyroxine (SYNTHROID) 100 MCG tablet  TAKE 1 TABLET BY MOUTH ONE TIME A DAY             lithium 300 MG tablet  Take 2 tablets by mouth 2 times daily             loratadine (CLARITIN) 10 MG tablet  Take 1 tablet by mouth daily             lurasidone (LATUDA) 40 MG TABS tablet  Take 40 mg by mouth daily             Magnesium Oxide 250 MG TABS  Take 250 mg by mouth nightly              nitroGLYCERIN (NITROSTAT) 0.4 MG SL tablet  up to max of 3 total doses. If no relief after 1 dose, call 911. Nutritional Supplements (DHEA PO)  Take 60 mg by mouth daily             ondansetron (ZOFRAN) 4 MG tablet  Take 1 tablet by mouth 3 times daily as needed for Nausea or Vomiting             pantoprazole (PROTONIX) 40 MG tablet  Take 1 tablet by mouth 2 times daily (before meals)             polyethylene glycol (GLYCOLAX) powder  Take 17 g by mouth daily             Prenatal MV-Min-Fe Fum-FA-DHA (PRENATAL 1 PO)  Take 1 tablet by mouth daily              Prosthesis MISC  by Does not apply route Strap for the prosthesis broken please order new             sucralfate (CARAFATE) 1 GM tablet  Take 1 tablet by mouth 2 times daily (before meals)             VILAZODONE HCL 40 MG Tablet  TAKE 1 TABLET BY MOUTH ONCE DAILY. vitamin B-12 1000 MCG tablet  Take 1 tablet by mouth daily                   Medications marked \"taking\" at this time  No outpatient medications have been marked as taking for the 10/8/20 encounter (Office Visit) with Shelbie Dallas DO.        Medications patient taking as of now reconciled against medications ordered at time of hospital discharge: Yes    Chief Complaint   Patient presents with   [address] from Hospital       History of Present illness - Follow up of Hospital diagnosis(es): stable     Inpatient course: Discharge summary reviewed- see chart.     Interval history/Current status: fatigue but stable    Vitals:    10/08/20 0930   BP: 112/68   Pulse: 65   Temp: 97 °F (36.1 °C)

## 2020-10-12 LAB — DHEAS (DHEA SULFATE): 79.4 UG/DL (ref 13–130)

## 2020-10-15 LAB — DHEA UNCONJUGATED: 0.59 NG/ML (ref 0.63–4.7)

## 2020-10-19 NOTE — TELEPHONE ENCOUNTER
Last visit- 10/8/2020  Next visit- 12/10/2020    Requested Prescriptions     Pending Prescriptions Disp Refills    pantoprazole (PROTONIX) 40 MG tablet 30 tablet 2     Sig: Take 1 tablet by mouth 2 times daily (before meals)     DEBBIE DE LA PAZ DO  Previous script was written for 30 tab BID by hospitalitis when patient was in patient so Rose Ayoub only getting 15 day supply at a time. Please Advise.

## 2020-10-20 ENCOUNTER — OFFICE VISIT (OUTPATIENT)
Dept: SURGERY | Age: 60
End: 2020-10-20
Payer: MEDICARE

## 2020-10-20 VITALS
HEIGHT: 59 IN | TEMPERATURE: 97.2 F | OXYGEN SATURATION: 99 % | DIASTOLIC BLOOD PRESSURE: 78 MMHG | HEART RATE: 68 BPM | SYSTOLIC BLOOD PRESSURE: 120 MMHG | WEIGHT: 110 LBS | BODY MASS INDEX: 22.18 KG/M2 | RESPIRATION RATE: 15 BRPM

## 2020-10-20 PROCEDURE — 17250 CHEM CAUT OF GRANLTJ TISSUE: CPT | Performed by: SURGERY

## 2020-10-20 PROCEDURE — 99024 POSTOP FOLLOW-UP VISIT: CPT | Performed by: SURGERY

## 2020-10-20 RX ORDER — PANTOPRAZOLE SODIUM 40 MG/1
40 TABLET, DELAYED RELEASE ORAL
Qty: 180 TABLET | Refills: 2 | Status: SHIPPED | OUTPATIENT
Start: 2020-10-20 | End: 2020-12-10 | Stop reason: SDUPTHER

## 2020-10-20 NOTE — LETTER
[address] [facility]  [facility]. [address] [facility] [address]  Phone: [phone]  Fax: [phone]    Elvin Barbosa MD        October 20, 2020     Patient: Cole Foley   YOB: 1960   Date of Visit: 10/20/2020       To Whom It May Concern: It is my medical opinion that Zandra Lobato may return to work 10/21/2020. If you have any questions or concerns, please don't hesitate to call.     Sincerely,        Elvin Barbosa MD

## 2020-10-22 ENCOUNTER — TELEPHONE (OUTPATIENT)
Dept: FAMILY MEDICINE CLINIC | Age: 60
End: 2020-10-22

## 2020-10-25 NOTE — PROGRESS NOTES
MD Ander Cortes 238 Post op Note    Pt Name: Colleen Montana  Medical Record Number: [id number]  Date of Birth 1960   Today's Date: 10/25/2020    ASSESSMENT       ICD-10-CM    1. Encounter for postoperative care  Z48.89     ex lap, access gastric remnant    PLAN   1. At this time she is having minimal drainage that I can appreciate however it sounds like it is worse at night. I recommend that she pin the tube up and keep a dry dressing around the site. 2. M surprised that the drainage is nonbilious without mucus. This could be secondary to having good outflow distally however this will be better evaluated when gastroenterology performs an EGD in the coming days. 3. Can see her back when gastroenterology is no longer needing access to her gastric remnant  4. Hypertrophic tissue around her G-tube was cauterized with silver nitrate. 5. Did discuss if the tube needs to stay that we can discuss getting her transitioned over to a [place] button  Tetokaila Evelynnora is doing okay she only complains that she has a little bit of drainage at night from her tube. She is uncertain if it is draining around her tube or if it is draining from her tube. She states it is clear fluid. She is no significant pain. She is not had any more bleeding and overall feels well.        CURRENT MEDICATIONS     Current Outpatient Medications on File Prior to Visit   Medication Sig Dispense Refill    lactobacillus (CULTURELLE) capsule Take 1 capsule by mouth daily (with breakfast) 30 capsule 1    docusate sodium (COLACE, DULCOLAX) 100 MG CAPS Take 100 mg by mouth 2 times daily 60 capsule 2    sucralfate (CARAFATE) 1 GM tablet Take 1 tablet by mouth 2 times daily (before meals) 120 tablet 1    lurasidone (LATUDA) 40 MG TABS tablet Take 40 mg by mouth daily      Nutritional Supplements (DHEA PO) Take 60 mg by mouth daily      ondansetron (ZOFRAN) 4 MG tablet Take 1 tablet by mouth 3 times daily as needed for Nausea or Vomiting 15 tablet 0    lamoTRIgine (LAMICTAL) 200 MG tablet TAKE 1 TABLET BY MOUTH ONCE DAILY. 30 tablet 1    ipratropium (ATROVENT) 0.03 % nasal spray INSTILL 2 SPRAYS IN EACH NOSTRIL THREE TIMES A DAY 30 mL 3    lithium 300 MG tablet Take 2 tablets by mouth 2 times daily 120 tablet 3    levothyroxine (SYNTHROID) 100 MCG tablet TAKE 1 TABLET BY MOUTH ONE TIME A DAY 90 tablet 1    VILAZODONE HCL 40 MG Tablet TAKE 1 TABLET BY MOUTH ONCE DAILY. 90 tablet 1    Prosthesis MISC by Does not apply route Strap for the prosthesis broken please order new 1 each 0    loratadine (CLARITIN) 10 MG tablet Take 1 tablet by mouth daily 90 tablet 1    fluticasone (FLONASE) 50 MCG/ACT nasal spray 2 sprays by Each Nostril route daily 3 Bottle 1    polyethylene glycol (GLYCOLAX) powder Take 17 g by mouth daily      Cholecalciferol (VITAMIN D) 2000 units CAPS capsule Take by mouth nightly 50 mcg nightly      Magnesium Oxide 250 MG TABS Take 250 mg by mouth nightly       calcium carbonate 600 MG TABS tablet Take 1 tablet by mouth nightly       vitamin B-12 1000 MCG tablet Take 1 tablet by mouth daily 30 tablet 5    dimethyl fumarate (TECFIDERA) 240 MG delayed release capsule Take 240 mg by mouth 2 times daily      Prenatal MV-Min-Fe Fum-FA-DHA (PRENATAL 1 PO) Take 1 tablet by mouth daily       ferrous sulfate 325 (65 FE) MG tablet Take 325 mg by mouth 2 times daily (with meals)       pantoprazole (PROTONIX) 40 MG tablet Take 1 tablet by mouth 2 times daily (before meals) 180 tablet 2    nitroGLYCERIN (NITROSTAT) 0.4 MG SL tablet up to max of 3 total doses. If no relief after 1 dose, call 911. (Patient not taking: Reported on 10/20/2020) 25 tablet 3     No current facility-administered medications on file prior to visit. OBJECTIVE   CURRENT VITALS:  height is 4' 11\" (1.499 m) and weight is 110 lb (49.9 kg). Her tympanic temperature is 97.2 °F (36.2 °C).  Her blood pressure is 120/78 and her pulse is 68. Her respiration is 15 and oxygen saturation is 99%. She is alert oriented appropriate no acute distress interactive  Her incision is clean dry and intact  Pain site has healed  Her G-tube is in good position I did not appreciate any significant drainage. There is minimal hypertrophic tissue around the tube this was cauterized with silver nitrate usiing two separate sticks  In the G-tube there is clear drainage no mucus as would be expected no bile.       LABS and Pathology   na    RADIOLOGY   na    Electronically signed by Colt Pike MD on 10/25/2020 at 11:44 AM

## 2020-10-27 NOTE — TELEPHONE ENCOUNTER
Melinda Perez is requesting a medication refill on Haily's behalf; Alec Murrieta also called into the office as well. She said that she needs a refill on the same requested medication; Latuda 40mg;#30 with 0 refills; last with a start date of 05/15/20 and last refill date of 07/13/20. She states that she has been out for several weeks; according to her last refill date it would have lasted until mid August? I informed her that she would need to take the medication every day and to give the office a call at least 3 days prior to her running out; she said that she had called a couple weeks ago but its been in \"limbo\" since then; there is no record of this call. Medication is pending your approval for a 30 day supply with 0 refills; she is no longer this provider's patient but she has not yet seen Carlos Espinoza; whom she transferred to. She is scheduled with Carlos Espinoza on 12/17/20.

## 2020-10-28 RX ORDER — LURASIDONE HYDROCHLORIDE 40 MG/1
TABLET, FILM COATED ORAL
Qty: 30 TABLET | Refills: 0 | OUTPATIENT
Start: 2020-10-28

## 2020-10-29 NOTE — TELEPHONE ENCOUNTER
Patient had previously fired your but she is not scheduled with Uday Alvarado until 12/17/20. The medication request was sent to her previous provider until she is seen with her new provider. Medication is pending your approval for Latuda 40mg;#30 with 0 refills. Please advise otherwise.

## 2020-10-30 NOTE — TELEPHONE ENCOUNTER
[address] also called into the office to check on the status of this encounter; patient is in the in between to transfer care to another provider; medication was routed to the original provider in which she has not seen King Anaya yet.      Medication is loaded for Latuda 40mg;#30 with 0 refills

## 2020-11-02 RX ORDER — LAMOTRIGINE 200 MG/1
TABLET ORAL
Qty: 30 TABLET | Refills: 1 | Status: SHIPPED | OUTPATIENT
Start: 2020-11-02 | End: 2020-12-17 | Stop reason: SDUPTHER

## 2020-11-02 NOTE — TELEPHONE ENCOUNTER
[address] has requested a refill of Lamictal 200mg/d on Haily's behalf. She attended an appointment 8/4 and is to transfer care to [facility] 12/17/20.

## 2020-11-06 ENCOUNTER — HOSPITAL ENCOUNTER (OUTPATIENT)
Age: 60
Discharge: HOME | End: 2020-11-06
Payer: MEDICARE

## 2020-11-06 ENCOUNTER — HOSPITAL ENCOUNTER (OUTPATIENT)
Dept: GENERAL RADIOLOGY | Age: 60
Discharge: HOME | End: 2020-11-06
Payer: MEDICARE

## 2020-11-06 PROCEDURE — 49465 FLUORO EXAM OF G/COLON TUBE: CPT

## 2020-11-06 PROCEDURE — 6360000004 HC RX CONTRAST MEDICATION: Performed by: INTERNAL MEDICINE

## 2020-11-06 RX ADMIN — DIATRIZOATE MEGLUMINE AND DIATRIZOATE SODIUM 30 ML: 600; 100 SOLUTION ORAL; RECTAL at 08:22

## 2020-12-01 RX ORDER — LACTOBACILLUS RHAMNOSUS GG 10B CELL
1 CAPSULE ORAL
Qty: 30 CAPSULE | Refills: 1 | Status: SHIPPED | OUTPATIENT
Start: 2020-12-01 | End: 2021-01-11

## 2020-12-01 NOTE — TELEPHONE ENCOUNTER
Spoke with pt, informed pt to continue Culturelle and will discuss at next appointment on 12/10/20. Pt verbalized understanding.

## 2020-12-10 ENCOUNTER — OFFICE VISIT (OUTPATIENT)
Dept: FAMILY MEDICINE CLINIC | Age: 60
End: 2020-12-10
Payer: MEDICARE

## 2020-12-10 ENCOUNTER — NURSE ONLY (OUTPATIENT)
Dept: LAB | Age: 60
End: 2020-12-10

## 2020-12-10 VITALS
HEART RATE: 73 BPM | HEIGHT: 57 IN | TEMPERATURE: 97 F | BODY MASS INDEX: 26.32 KG/M2 | OXYGEN SATURATION: 100 % | DIASTOLIC BLOOD PRESSURE: 80 MMHG | WEIGHT: 122 LBS | SYSTOLIC BLOOD PRESSURE: 126 MMHG

## 2020-12-10 PROCEDURE — G8431 POS CLIN DEPRES SCRN F/U DOC: HCPCS | Performed by: FAMILY MEDICINE

## 2020-12-10 PROCEDURE — G0439 PPPS, SUBSEQ VISIT: HCPCS | Performed by: FAMILY MEDICINE

## 2020-12-10 RX ORDER — PSEUDOEPHEDRINE HCL 30 MG
100 TABLET ORAL 2 TIMES DAILY
Qty: 60 CAPSULE | Refills: 2 | Status: SHIPPED | OUTPATIENT
Start: 2020-12-10 | End: 2021-06-07

## 2020-12-10 RX ORDER — PANTOPRAZOLE SODIUM 40 MG/1
40 TABLET, DELAYED RELEASE ORAL
Qty: 180 TABLET | Refills: 2 | Status: ON HOLD | OUTPATIENT
Start: 2020-12-10 | End: 2022-01-18 | Stop reason: SDUPTHER

## 2020-12-10 RX ORDER — LEVOTHYROXINE SODIUM 0.1 MG/1
TABLET ORAL
Qty: 90 TABLET | Refills: 1 | Status: SHIPPED | OUTPATIENT
Start: 2020-12-10 | End: 2022-01-04 | Stop reason: SDUPTHER

## 2020-12-10 ASSESSMENT — ENCOUNTER SYMPTOMS
NAUSEA: 0
VOMITING: 0
ABDOMINAL PAIN: 0
ABDOMINAL DISTENTION: 0
ANAL BLEEDING: 0
DIARRHEA: 0
BLOOD IN STOOL: 0

## 2020-12-10 ASSESSMENT — LIFESTYLE VARIABLES: HOW OFTEN DO YOU HAVE A DRINK CONTAINING ALCOHOL: 0

## 2020-12-10 ASSESSMENT — PATIENT HEALTH QUESTIONNAIRE - PHQ9
2. FEELING DOWN, DEPRESSED OR HOPELESS: 2
9. THOUGHTS THAT YOU WOULD BE BETTER OFF DEAD, OR OF HURTING YOURSELF: 0
4. FEELING TIRED OR HAVING LITTLE ENERGY: 1
SUM OF ALL RESPONSES TO PHQ QUESTIONS 1-9: 10
SUM OF ALL RESPONSES TO PHQ9 QUESTIONS 1 & 2: 4
7. TROUBLE CONCENTRATING ON THINGS, SUCH AS READING THE NEWSPAPER OR WATCHING TELEVISION: 2
3. TROUBLE FALLING OR STAYING ASLEEP: 0
SUM OF ALL RESPONSES TO PHQ QUESTIONS 1-9: 10
5. POOR APPETITE OR OVEREATING: 3
8. MOVING OR SPEAKING SO SLOWLY THAT OTHER PEOPLE COULD HAVE NOTICED. OR THE OPPOSITE, BEING SO FIGETY OR RESTLESS THAT YOU HAVE BEEN MOVING AROUND A LOT MORE THAN USUAL: 0
6. FEELING BAD ABOUT YOURSELF - OR THAT YOU ARE A FAILURE OR HAVE LET YOURSELF OR YOUR FAMILY DOWN: 0
SUM OF ALL RESPONSES TO PHQ QUESTIONS 1-9: 10
10. IF YOU CHECKED OFF ANY PROBLEMS, HOW DIFFICULT HAVE THESE PROBLEMS MADE IT FOR YOU TO DO YOUR WORK, TAKE CARE OF THINGS AT HOME, OR GET ALONG WITH OTHER PEOPLE: 0
1. LITTLE INTEREST OR PLEASURE IN DOING THINGS: 2

## 2020-12-10 NOTE — PROGRESS NOTES
**This is a Medical/ PA/ APRN Student Note and is charted for educational purposes. The non-physician staff attested note is not to be used for billing purposes or to guide patient care. Please see the physician modifications/ attestation for treatment plan/suggestions. This note has been reviewed and feedback has been provided to the student. *        Sandhya Martinez is a 61 y.o. female who presents today for:  No chief complaint on file. Goals      Exercise 3x per week (30 min per time)           HPI:     F/U for post hospitalization for melena. No concerns and questions today. H/H monitoring and stable. Denies melena, dizziness, SOB. No question data found.     Past Medical History:   Diagnosis Date    Balance problem     Bipolar I disorder, most recent episode (or current) depressed, in partial or unspecified remission 5/8/2013    Cancer ([facility] [address]     CERVICAL     Depression     Fatigue     Gallstones     Gastric bypass status for obesity 2002    GERD (gastroesophageal reflux disease)     History of blood transfusion     History of hysterectomy     REMOVAL OF ONE OVARY     Hypothyroidism     MS (multiple sclerosis) (HCC)     Paresthesias     RT LOWER LIMB    UTI (urinary tract infection)       Past Surgical History:   Procedure Laterality Date    APPENDECTOMY      BONE GRAFT Left     left leg to right arm    CARPAL TUNNEL RELEASE  1999    CHOLECYSTECTOMY, LAPAROSCOPIC  5/16/2016    COLONOSCOPY      DILATATION, ESOPHAGUS      ENDOSCOPY, COLON, DIAGNOSTIC      ENTEROSCOPY N/A 2/5/2018    ENTEROSCOPY performed by Jenny Grant MD at [address] [place] [address] N/A 9/28/2020    GASTROSTOMY TUBE PLACEMENT- [facility] TO DO GASTRIC ENDOSCOPY performed by Susie Rodriguez MD at [facility], VAGINAL      LAPAROSCOPIC APPENDECTOMY  5/16/2016    OTHER SURGICAL HISTORY      RECTAL [address] 25 tablet Take 1 tablet by mouth 3 times daily as needed for Nausea or Vomiting 15 tablet 0    ipratropium (ATROVENT) 0.03 % nasal spray INSTILL 2 SPRAYS IN EACH NOSTRIL THREE TIMES A DAY 30 mL 3    lithium 300 MG tablet Take 2 tablets by mouth 2 times daily 120 tablet 3    levothyroxine (SYNTHROID) 100 MCG tablet TAKE 1 TABLET BY MOUTH ONE TIME A DAY 90 tablet 1    VILAZODONE HCL 40 MG Tablet TAKE 1 TABLET BY MOUTH ONCE DAILY. 90 tablet 1    loratadine (CLARITIN) 10 MG tablet Take 1 tablet by mouth daily 90 tablet 1    fluticasone (FLONASE) 50 MCG/ACT nasal spray 2 sprays by Each Nostril route daily 3 Bottle 1    polyethylene glycol (GLYCOLAX) powder Take 17 g by mouth daily      Cholecalciferol (VITAMIN D) 2000 units CAPS capsule Take by mouth nightly 50 mcg nightly      Magnesium Oxide 250 MG TABS Take 250 mg by mouth nightly       calcium carbonate 600 MG TABS tablet Take 1 tablet by mouth nightly       vitamin B-12 1000 MCG tablet Take 1 tablet by mouth daily 30 tablet 5    dimethyl fumarate (TECFIDERA) 240 MG delayed release capsule Take 240 mg by mouth 2 times daily      nitroGLYCERIN (NITROSTAT) 0.4 MG SL tablet up to max of 3 total doses. If no relief after 1 dose, call 911. 25 tablet 3    Prenatal MV-Min-Fe Fum-FA-DHA (PRENATAL 1 PO) Take 1 tablet by mouth daily       ferrous sulfate 325 (65 FE) MG tablet Take 325 mg by mouth 2 times daily (with meals)       Prosthesis MISC by Does not apply route Strap for the prosthesis broken please order new 1 each 0     No current facility-administered medications for this visit.       Allergies   Allergen Reactions    Latex Rash    Sulfa Antibiotics Rash       Health Maintenance   Topic Date Due    Annual Wellness Visit (AWV)  05/29/2019    Flu vaccine (1) 09/01/2020    TSH testing  05/19/2021    Breast cancer screen  01/15/2022    Lipid screen  04/10/2024    DTaP/Tdap/Td vaccine (4 - Td) 10/25/2028    Colon cancer screen colonoscopy  11/06/2029  Shingles Vaccine  Completed    Hepatitis C screen  Completed    HIV screen  Completed    Hepatitis A vaccine  Aged Out    Hepatitis B vaccine  Aged Out    Hib vaccine  Aged Out    Meningococcal (ACWY) vaccine  Aged Out    Pneumococcal 0-64 years Vaccine  Aged Out       Subjective:      Review of Systems   Constitutional: Negative for chills, fatigue and fever. Gastrointestinal: Negative for abdominal distention, abdominal pain, anal bleeding, blood in stool, diarrhea, nausea and vomiting. Neurological: Negative for dizziness, syncope, weakness and light-headedness. Hematological: Does not bruise/bleed easily. Objective:     Vitals:    12/10/20 1013   BP: 126/80   Pulse: 73   Temp: 97 °F (36.1 °C)   SpO2: 100%   Weight: 122 lb (55.3 kg)   Height: 4' 9.48\" (1.46 m)       Body mass index is 25.96 kg/m². Wt Readings from Last 3 Encounters:   12/10/20 122 lb (55.3 kg)   10/20/20 110 lb (49.9 kg)   10/08/20 119 lb 3.2 oz (54.1 kg)     BP Readings from Last 3 Encounters:   12/10/20 126/80   10/20/20 120/78   10/08/20 112/68       Physical Exam  Constitutional:       Appearance: Normal appearance. HENT:      Head: Normocephalic and atraumatic. Eyes:      Extraocular Movements: Extraocular movements intact. Pupils: Pupils are equal, round, and reactive to light. Cardiovascular:      Rate and Rhythm: Normal rate and regular rhythm. Heart sounds: No murmur. No friction rub. No gallop. Pulmonary:      Breath sounds: Normal breath sounds. Abdominal:      General: Abdomen is flat. Bowel sounds are normal.   Skin:     General: Skin is warm and dry. Neurological:      Mental Status: She is alert.            Lab Results   Component Value Date    WBC 7.0 11/10/2020    HGB 11.1 (L) 11/10/2020    HCT 36.4 (L) 11/10/2020     11/10/2020    CHOL 130 04/10/2019    TRIG 36 04/10/2019    HDL 96 04/10/2019    LDLCALC 27 04/10/2019    AST 16 09/26/2020     10/08/2020    K 4.3

## 2020-12-10 NOTE — PROGRESS NOTES
Health Maintenance Due   Topic Date Due    Annual Wellness Visit (AWV)  05/29/2019    Flu vaccine (1) 09/01/2020 employees health

## 2020-12-10 NOTE — PROGRESS NOTES
Pharmacy Medication History Note      List of current medications patient is taking is complete. Source of information: patient list     Medications removed (include reason, ex. therapy complete or physician discontinued):  · Sucralfate (stopped on 11/9)    Medications added/doses adjusted:  · none    Other notes (ex. Recent course of antibiotics, Coumadin dosing):  · Denies use of other OTC or herbal medications. Allergies reviewed    Recommendations:   Mily Bloom should be taken with food, patient reported she takes it before bed.      CLINICAL PHARMACY CONSULT: MED RECONCILIATION/REVIEW ADDENDUM    For Pharmacy Admin Tracking Only    PHSO: No  Total # of Interventions Recommended: 1  Total Interventions Accepted: 1  Time Spent (min): 15    Elijah Greco, PharmD  [address]

## 2020-12-10 NOTE — PROGRESS NOTES
[address] [facility] [place] Javier [address] [facility] [address]  Dept: [phone]  Loc: [phone]     Josiah Meier is a 61 y.o. female who presents today for:  No chief complaint on file. Goals      Exercise 3x per week (30 min per time)           HPI:     HPI   Here to recheck labs    Doing great - and not having any more stomach bleeds for the last few months - she knows she feels weak when she has then    The dhea - she was on 50mg and takes it every day     No question data found.     Past Medical History:   Diagnosis Date    Balance problem     Bipolar I disorder, most recent episode (or current) depressed, in partial or unspecified remission 5/8/2013    Cancer ([facility] [address]     CERVICAL     Depression     Fatigue     Gallstones     Gastric bypass status for obesity 2002    GERD (gastroesophageal reflux disease)     History of blood transfusion     History of hysterectomy     REMOVAL OF ONE OVARY     Hypothyroidism     MS (multiple sclerosis) (HCC)     Paresthesias     RT LOWER LIMB    UTI (urinary tract infection)       Past Surgical History:   Procedure Laterality Date    APPENDECTOMY      BONE GRAFT Left     left leg to right arm    CARPAL TUNNEL RELEASE  1999    CHOLECYSTECTOMY, LAPAROSCOPIC  5/16/2016    COLONOSCOPY      DILATATION, ESOPHAGUS      ENDOSCOPY, COLON, DIAGNOSTIC      ENTEROSCOPY N/A 2/5/2018    ENTEROSCOPY performed by David Johnson MD at [address] [place] [address] N/A 9/28/2020    GASTROSTOMY TUBE PLACEMENT- [facility] TO DO GASTRIC ENDOSCOPY performed by Cristopher Garcia MD at [facility], VAGINAL      LAPAROSCOPIC APPENDECTOMY  5/16/2016    OTHER SURGICAL HISTORY      RECTAL FISSULRE    OTHER SURGICAL HISTORY  1986    D&C    OTHER SURGICAL HISTORY  2006    GI BLEED    PACEMAKER INSERTION Right 05/20/2019    Dr. Sanam Kwok PLACEMENT      RI COLONOSCOPY FLX DX W/COLLJ SPEC WHEN PFRMD Left 2018    COLONOSCOPY performed by Katerine Colunga MD at [address] [facility] [address] OFFICE/OUTPT VISIT,PROCEDURE ONLY Left 2018    EGD ESOPHAGOGASTRODUODENOSCOPY performed by Marisela Meier MD at [facility] End[facility]    [place] OFFICE/OUTPT VISIT,PROCEDURE ONLY N/A 10/4/2018    EGD DIAGNOSTIC ONLY performed by Candelario Apley, MD at [address]    UPPER GASTROINTESTINAL ENDOSCOPY Left 2019    EGD DIAGNOSTIC ONLY performed by Latonya Kaplan MD at [address] [facility] ENDOSCOPY N/A 2020    ENTEROSCOPY PUSH CONTROL HEMORRHAGE [facility]     Family History   Problem Relation Age of Onset    Cancer Mother     Colon Polyps Mother     Heart Disease Father     Glaucoma Father     Cancer Sister     Cancer Sister      Social History     Tobacco Use    Smoking status: Former Smoker     Packs/day: 0.10     Years: 0.50     Pack years: 0.05     Types: Cigarettes     Last attempt to quit: 10/24/1977     Years since quittin.1    Smokeless tobacco: Never Used   Substance Use Topics    Alcohol use: No      Current Outpatient Medications   Medication Sig Dispense Refill    pantoprazole (PROTONIX) 40 MG tablet Take 1 tablet by mouth 2 times daily (before meals) 180 tablet 2    docusate (COLACE, DULCOLAX) 100 MG CAPS Take 100 mg by mouth 2 times daily 60 capsule 2    levothyroxine (SYNTHROID) 100 MCG tablet TAKE 1 TABLET BY MOUTH ONE TIME A DAY 90 tablet 1    lactobacillus (CULTURELLE) capsule Take 1 capsule by mouth daily (with breakfast) 30 capsule 1    lurasidone (LATUDA) 40 MG TABS tablet Take 1 tablet by mouth daily 30 tablet 1    lamoTRIgine (LAMICTAL) 200 MG tablet TAKE 1 TABLET BY MOUTH ONCE DAILY.  30 tablet 1    Nutritional Supplements (DHEA PO) Take 50 mg by mouth daily       ondansetron (ZOFRAN) 4 MG tablet Take 1 tablet by mouth 3 times daily as needed for Nausea or Vomiting 15 tablet 0    ipratropium (ATROVENT) 0.03 % nasal spray INSTILL 2 SPRAYS IN EACH NOSTRIL THREE TIMES A DAY 30 mL 3    lithium 300 MG tablet Take 2 tablets by mouth 2 times daily 120 tablet 3    VILAZODONE HCL 40 MG Tablet TAKE 1 TABLET BY MOUTH ONCE DAILY. 90 tablet 1    loratadine (CLARITIN) 10 MG tablet Take 1 tablet by mouth daily 90 tablet 1    polyethylene glycol (GLYCOLAX) powder Take 17 g by mouth daily      Cholecalciferol (VITAMIN D) 2000 units CAPS capsule Take by mouth nightly 50 mcg nightly      Magnesium Oxide 250 MG TABS Take 250 mg by mouth nightly       calcium carbonate 600 MG TABS tablet Take 1 tablet by mouth nightly       vitamin B-12 1000 MCG tablet Take 1 tablet by mouth daily 30 tablet 5    dimethyl fumarate (TECFIDERA) 240 MG delayed release capsule Take 240 mg by mouth 2 times daily      nitroGLYCERIN (NITROSTAT) 0.4 MG SL tablet up to max of 3 total doses. If no relief after 1 dose, call 911. 25 tablet 3    Prenatal MV-Min-Fe Fum-FA-DHA (PRENATAL 1 PO) Take 1 tablet by mouth daily       ferrous sulfate 325 (65 FE) MG tablet Take 325 mg by mouth 2 times daily (with meals)       Prosthesis MISC by Does not apply route Strap for the prosthesis broken please order new 1 each 0     No current facility-administered medications for this visit.       Allergies   Allergen Reactions    Latex Rash    Sulfa Antibiotics Rash       Health Maintenance   Topic Date Due    Annual Wellness Visit (AWV)  05/29/2019    TSH testing  05/19/2021    Breast cancer screen  01/15/2022    Lipid screen  04/10/2024    DTaP/Tdap/Td vaccine (4 - Td) 10/25/2028    Colon cancer screen colonoscopy  11/06/2029    Flu vaccine  Completed    Shingles Vaccine  Completed    Hepatitis C screen  Completed    HIV screen  Completed    Hepatitis A vaccine  Aged Out    Hepatitis B vaccine  Aged Out    Hib vaccine  Aged Out    Meningococcal (ACWY) vaccine  Aged Out LABGLOM >90 10/08/2020    MG 2.1 10/08/2020    CALCIUM 10.4 10/08/2020    VITD25 35 10/08/2020       Imaging Results:    No results found. PROCEDURE: CT ABDOMEN PELVIS W IV CONTRAST         CLINICAL INFORMATION: INCREASED LFTS, BELLY PAIN .         COMPARISON: 9/20/2020         TECHNIQUE: 5 mm axial CT images were obtained through the abdomen and pelvis after the administration of intravenous and oral contrast. Coronal and sagittal reconstructions were obtained.         All CT scans at this facility use dose modulation, iterative reconstruction, and/or weight-based dosing when appropriate to reduce radiation dose to as low as reasonably achievable.         FINDINGS:         Lung bases are clear. Spleen, pancreas adrenal glands are unremarkable. Hypoattenuating lesion in the right hepatic lobe probable cyst. Cholecystectomy with mild biliary dilatation. Left renal cortical cyst. No hydronephrosis. Spleen is unremarkable. Pancreas is within normal limits. Postsurgical changes stomach. Large amount of stool throughout the colon. Correlate for constipation. No bladder wall thickening. Normal caliber abdominal aorta. No acute osseous findings.              Impression    Moderate stool throughout the colon. Correlate for constipation.              **This report has been created using voice recognition software. It may contain minor errors which are inherent in voice recognition technology. **         Final report electronically signed by Dr. Jessica Quinn on 9/22/2020 3:51 PM          Assessment:      Diagnosis Orders   1. Positive depression screening  Positive Screen for Clinical Depression with a Documented Follow-up Plan    2.  Gastroduodenal artery bleed  Positive Screen for Clinical Depression with a Documented Follow-up Plan     Basic Metabolic Panel    TSH with Reflex    Magnesium    Lipid Panel    Vitamin D 25 Hydroxy    Hepatic Function Panel    Iron and TIBC    DHEA    DHEA-Sulfate mg by mouth 2 times daily Yes Historical Provider, MD   nitroGLYCERIN (NITROSTAT) 0.4 MG SL tablet up to max of 3 total doses. If no relief after 1 dose, call 911.  Yes Christian Christy MD   Prenatal MV-Min-Fe Fum-FA-DHA (PRENATAL 1 PO) Take 1 tablet by mouth daily  Yes Historical Provider, MD   ferrous sulfate 325 (65 FE) MG tablet Take 325 mg by mouth 2 times daily (with meals)  Yes Historical Provider, MD   Prosthesis MISC by Does not apply route Strap for the prosthesis broken please order new  Desiree Slider, DO       Past Medical History:   Diagnosis Date    Balance problem     Bipolar I disorder, most recent episode (or current) depressed, in partial or unspecified remission 5/8/2013    Cancer ([facility] [address]     CERVICAL     Depression     Fatigue     Gallstones     Gastric bypass status for obesity 2002    GERD (gastroesophageal reflux disease)     History of blood transfusion     History of hysterectomy     REMOVAL OF ONE OVARY     Hypothyroidism     MS (multiple sclerosis) ([facility] [address]     Paresthesias     RT LOWER LIMB    UTI (urinary tract infection)        Past Surgical History:   Procedure Laterality Date    APPENDECTOMY      BONE GRAFT Left     left leg to right arm    CARPAL TUNNEL RELEASE  1999    CHOLECYSTECTOMY, LAPAROSCOPIC  5/16/2016    COLONOSCOPY      DILATATION, ESOPHAGUS      ENDOSCOPY, COLON, DIAGNOSTIC      ENTEROSCOPY N/A 2/5/2018    ENTEROSCOPY performed by Jose Miguel Garrison MD at [address] [place] [address] N/A 9/28/2020    GASTROSTOMY TUBE PLACEMENT- [facility] TO DO GASTRIC ENDOSCOPY performed by Ghanshyam Martinez MD at [place] [address], VAGINAL      LAPAROSCOPIC APPENDECTOMY  5/16/2016    OTHER SURGICAL HISTORY      RECTAL FISSULRE    OTHER SURGICAL HISTORY  1986    D&C    OTHER SURGICAL HISTORY  2006    GI BLEED    PACEMAKER INSERTION Right 05/20/2019    Dr. Jorje Perez FLX DX W/COLLJ SPEC WHEN PFRMD Left 2/6/2018    COLONOSCOPY performed by Yang Bey MD at [address] [facility] [address] OFFICE/OUTPT [address] [facility] [address] Left 2/18/2018    EGD ESOPHAGOGASTRODUODENOSCOPY performed by Brian Wilkinson MD at [address] Endoscopy    [place] OFFICE/OUTPT VISIT,PROCEDURE ONLY N/A 10/4/2018    EGD DIAGNOSTIC ONLY performed by Yonas Knight MD at [facility] [address] ENDOSCOPY Left 9/24/2019    EGD DIAGNOSTIC ONLY performed by Sonia Guadarrama MD at [address] Endoscopy    UPPER GASTROINTESTINAL ENDOSCOPY N/A 9/26/2020    ENTEROSCOPY PUSH CONTROL HEMORRHAGE performed by Yang Bey MD at [address] Endoscopy       Family History   Problem Relation Age of Onset    Cancer Mother     Colon Polyps Mother     Heart Disease Father     Glaucoma Father     Cancer Sister     Cancer Sister        CareTeam (Including outside providers/suppliers regularly involved in providing care):   Patient Care Team:  Leonel Razo DO as PCP - General (Family Medicine)  Leonel Razo DO as PCP - [facility] Empaneled Provider  AJITH Rondon CNP as Nurse Practitioner (Certified Nurse Practitioner)  AJITH Brice CNP as Nurse Practitioner (Nurse Practitioner)  Yonas Knight MD as Consulting Physician (Gastroenterology)  Leola Nobles MD as Consulting Physician (Neurology)    Wt Readings from Last 3 Encounters:   12/10/20 122 lb (55.3 kg)   10/20/20 110 lb (49.9 kg)   10/08/20 119 lb 3.2 oz (54.1 kg)     Vitals:    12/10/20 1013   BP: 126/80   Pulse: 73   Temp: 97 °F (36.1 °C)   SpO2: 100%   Weight: 122 lb (55.3 kg)   Height: 4' 9.48\" (1.46 m)     Body mass index is 25.96 kg/m². Based upon direct observation of the patient, evaluation of cognition reveals recent and remote memory intact. Patient's complete Health Risk Assessment and screening values have been reviewed and are found in Flowsheets.  The following problems were reviewed today and where indicated follow up appointments were made and/or referrals ordered. Positive Risk Factor Screenings with Interventions:     Depression:  PHQ-2 Score: 4  PHQ-9 Total Score: 10    Severity:1-4 = minimal depression, 5-9 = mild depression, 10-14 = moderate depression, 15-19 = moderately severe depression, 20-27 = severe depression  Depression Interventions:  · follows with psychiatry    General Health and ACP:  General  In general, how would you say your health is?: Good  In the past 7 days, have you experienced any of the following?  New or Increased Pain, New or Increased Fatigue, Loneliness, Social Isolation, Stress or Anger?: (!) Loneliness  Do you get the social and emotional support that you need?: Yes  Do you have a Living Will?: Yes  Advance Directives     Power of  Living Will ACP-Advance Directive ACP-Power of     Not on File [place] on 05/11/17 [id number] [facility] Risk Interventions:  · social isolation due to covid    Health Habits/Nutrition:  Health Habits/Nutrition  Do you exercise for at least 20 minutes 2-3 times per week?: (!) No  Have you lost any weight without trying in the past 3 months?: No  Do you eat fewer than 2 meals per day?: No  Have you seen a dentist within the past year?: (!) No(last appt 1999)  Body mass index: (!) 25.96  Health Habits/Nutrition Interventions:  · will see the dentis    Hearing/Vision:  No exam data present  Hearing/Vision  Do you or your family notice any trouble with your hearing?: (!) Yes(Has hearing aides)  Do you have difficulty driving, watching TV, or doing any of your daily activities because of your eyesight?: No  Have you had an eye exam within the past year?: Yes  Hearing/Vision Interventions:  · does have a hearing aid - spt scheduled to re-adjust    Personalized Preventive Plan   Current Health Maintenance Status  Immunization History   Administered Date(s) Administered    Influenza Vaccine, unspecified formulation 09/15/2015, 09/14/2016, 10/21/2016, 08/16/2017    Influenza Virus Vaccine 10/21/2016, 08/16/2017, 09/30/2020    Influenza, MDCK Manuela Carrillo, with preserv IM (Flucelvax 4 yrs and older) 03/11/2019    Influenza, Manuela Carrillo, 6 mo and older, IM, PF (Flulaval, Fluarix) 10/05/2018    Influenza, Quadv, IM, (6 mo and older Fluzone, Flulaval, Fluarix and 3 yrs and older Afluria) 10/05/2018    Influenza, Quadv, IM, PF (6 mo and older Fluzone, Flulaval, Fluarix, and 3 yrs and older Afluria) 09/23/2017, 09/24/2019    Pneumococcal Conjugate 13-valent ([id number]) 04/10/2019    Pneumococcal Polysaccharide ([id number]) 05/17/2016    Td (Adult), 5 Lf Tetanus Toxoid, Pf (Tenivac, Decavac) 04/30/1999    Td vaccine (adult) 04/30/1999    Tdap (Boostrix, Adacel) 09/29/2015, 09/12/2016, 10/25/2018    Zoster Recombinant (Shingrix) 03/11/2019, 11/19/2019, 01/03/2020        Health Maintenance   Topic Date Due    Annual Wellness Visit (AWV)  05/29/2019    TSH testing  05/19/2021    Breast cancer screen  01/15/2022    Lipid screen  04/10/2024    DTaP/Tdap/Td vaccine (4 - Td) 10/25/2028    Colon cancer screen colonoscopy  11/06/2029    Flu vaccine  Completed    Shingles Vaccine  Completed    Hepatitis C screen  Completed    HIV screen  Completed    Hepatitis A vaccine  Aged Out    Hepatitis B vaccine  Aged Out    Hib vaccine  Aged Out    Meningococcal (ACWY) vaccine  Aged Out    Pneumococcal 0-64 years Vaccine  Aged Out     Recommendations for [employer] Due: see orders and patient instructions/AVS.  . Recommended screening schedule for the next 5-10 years is provided to the patient in written form: see Patient Instructions/AVS.    Anna Ordonez was seen today for medicare awv.     Diagnoses and all orders for this visit:    Positive depression screening  -     Positive Screen for Clinical Depression with a Documented Follow-up Plan     Gastroduodenal artery bleed  -     Positive Screen for Clinical Depression with a Documented Follow-up Plan   -     Basic Metabolic Panel; Future  -     TSH with Reflex; Future  -     Magnesium; Future  -     Lipid Panel; Future  -     Vitamin D 25 Hydroxy; Future  -     Hepatic Function Panel; Future  -     Iron and TIBC; Future  -     DHEA; Future  -     DHEA-Sulfate; Future    Congenital hypothyroidism without goiter  -     Positive Screen for Clinical Depression with a Documented Follow-up Plan   -     Basic Metabolic Panel; Future  -     TSH with Reflex; Future  -     Magnesium; Future  -     Lipid Panel; Future  -     Vitamin D 25 Hydroxy; Future  -     Hepatic Function Panel; Future  -     Iron and TIBC; Future  -     DHEA; Future  -     DHEA-Sulfate; Future    MS [facility]multiple sclerosis) ([facility]  -     Positive Screen for Clinical Depression with a Documented Follow-up Plan   -     Basic Metabolic Panel; Future  -     TSH with Reflex; Future  -     Magnesium; Future  -     Lipid Panel; Future  -     Vitamin D 25 Hydroxy; Future  -     Hepatic Function Panel; Future  -     Iron and TIBC; Future  -     DHEA; Future  -     DHEA-Sulfate; Future    History of Batsheva-en-Y gastric bypass    Other iron deficiency anemia  -     Positive Screen for Clinical Depression with a Documented Follow-up Plan   -     Basic Metabolic Panel; Future  -     TSH with Reflex; Future  -     Magnesium; Future  -     Lipid Panel; Future  -     Vitamin D 25 Hydroxy; Future  -     Hepatic Function Panel; Future  -     Iron and TIBC; Future  -     DHEA; Future  -     DHEA-Sulfate; Future    Intestinal malabsorption, unspecified type  -     Vitamin D 25 Hydroxy; Future    Hypothyroidism due to Hashimoto's thyroiditis  -     levothyroxine (SYNTHROID) 100 MCG tablet; TAKE 1 TABLET BY MOUTH ONE TIME A DAY    Other orders  -     pantoprazole (PROTONIX) 40 MG tablet; Take 1 tablet by mouth 2 times daily (before meals)  -     docusate (COLACE, DULCOLAX) 100 MG CAPS;  Take 100 mg by mouth 2 times daily

## 2020-12-11 NOTE — PATIENT INSTRUCTIONS
Personalized Preventive Plan for Yifan Lentner - 12/10/2020  Medicare offers a range of preventive health benefits. Some of the tests and screenings are paid in full while other may be subject to a deductible, co-insurance, and/or copay. Some of these benefits include a comprehensive review of your medical history including lifestyle, illnesses that may run in your family, and various assessments and screenings as appropriate. After reviewing your medical record and screening and assessments performed today your provider may have ordered immunizations, labs, imaging, and/or referrals for you. A list of these orders (if applicable) as well as your Preventive Care list are included within your After Visit Summary for your review. Other Preventive Recommendations:    · A preventive eye exam performed by an eye specialist is recommended every 1-2 years to screen for glaucoma; cataracts, macular degeneration, and other eye disorders. · A preventive dental visit is recommended every 6 months. · Try to get at least 150 minutes of exercise per week or 10,000 steps per day on a pedometer . · Order or download the FREE \"Exercise & Physical Activity: Your Everyday Guide\" from [employer]. Call 1-795.218.8923 or search [employer] online. · You need 4949-3652 mg of calcium and 3455-2275 IU of vitamin D per day. It is possible to meet your calcium requirement with diet alone, but a vitamin D supplement is usually necessary to meet this goal.  · When exposed to the sun, use a sunscreen that protects against both UVA and UVB radiation with an SPF of 30 or greater. Reapply every 2 to 3 hours or after sweating, drying off with a towel, or swimming. · Always wear a seat belt when traveling in a car. Always wear a helmet when riding a bicycle or motorcycle.

## 2020-12-14 LAB — DHEAS (DHEA SULFATE): 61.3 UG/DL (ref 13–130)

## 2020-12-16 LAB — DHEA UNCONJUGATED: 0.41 NG/ML (ref 0.63–4.7)

## 2020-12-17 ENCOUNTER — OFFICE VISIT (OUTPATIENT)
Dept: PSYCHIATRY | Age: 60
End: 2020-12-17
Payer: MEDICARE

## 2020-12-17 PROCEDURE — 90792 PSYCH DIAG EVAL W/MED SRVCS: CPT | Performed by: NURSE PRACTITIONER

## 2020-12-17 RX ORDER — LAMOTRIGINE 200 MG/1
TABLET ORAL
Qty: 30 TABLET | Refills: 1 | Status: SHIPPED | OUTPATIENT
Start: 2020-12-17 | End: 2021-01-26 | Stop reason: SDUPTHER

## 2020-12-17 RX ORDER — FLUTICASONE PROPIONATE 50 MCG
2 SPRAY, SUSPENSION (ML) NASAL DAILY
COMMUNITY

## 2020-12-17 RX ORDER — LITHIUM CARBONATE 300 MG
600 TABLET ORAL 2 TIMES DAILY
Qty: 120 TABLET | Refills: 1 | Status: SHIPPED | OUTPATIENT
Start: 2020-12-17 | End: 2021-01-26 | Stop reason: SDUPTHER

## 2020-12-17 RX ORDER — VILAZODONE HYDROCHLORIDE 40 MG/1
TABLET ORAL
Qty: 30 TABLET | Refills: 1 | Status: SHIPPED | OUTPATIENT
Start: 2020-12-17 | End: 2021-01-26 | Stop reason: SDUPTHER

## 2020-12-17 NOTE — PROGRESS NOTES
[address] [place] [id number]  Dept: [phone]  Dept Fax: [phone]: [phone]    Visit Date: 12/17/2020    SUBJECTIVE DATA     CHIEF COMPLAINT:    Chief Complaint   Patient presents with   [address] Problem    Psychiatric Evaluation    Establish Care     former patient of Dr. Avinash Segura       History obtained from: patient    HISTORY OF PRESENT ILLNESS:    Josiah Meier is a 61 y.o. female who presents to the office to establish care with this provider. She is a former patient of Dr. Avinash Segura. Requested a transfer to this provider to continue care. States she requested the transfer because she didn't feel she was getting the care she wanted.     States she is here today to [employer]  my care\"    States she doesn't like the current restrictions due to COVID-19  -states she is going \"stir crazy\"  -wants to get out more  -enjoys her cat    States she would like to get off some of her medications if possible  -states if any of her psychotropic medications can be reduced she would be okay with it    Current medications:  *Lithium - for her bipolar - has been on this medication since [address] - for depression - has been on this medication since around 2016 or 20017  *Lamictal - added because she was previously feeling suicidal - doesn't recall when this medication was added  *Latuda - unsure why she is on it - thinks it is for bipolar or the suicidal ideations; has been on this medication for 3-4 years    States she feels her mood is doing very well  -keeping self busy despite COVID-19 restrictions - reading, working on the computer, etc  -has not had any negative mood changes   -states since she got her cat she has noticed she is feeling better; less lonely; states she is no longer saying mean things to her siblings  -denies feeling down, sad, depressed  -occasionally feels \"blue\" but not depressed; this occurs on rare occasion - usually when she is bored  -states she has had some outbursts because others in her apartment building are \"not following the rules\" related to the COVID-19 mandates; states this makes her angry and she has had some outbursts, but this is getting better  -sleeping well overall. Denies difficulty initiating or maintaining sleep  -denies euphoria, pedro luis or hypomania  -rates overall mood as 7/10 with 10 as best mood possible    She has a long history of bipolar disorder  -she was originally diagnosed with depression in 1987 when she was hospitalized the first time  -states by 1989 she was diagnosed with bipolar disorder and started on Lithium; this occurred during the 2nd hospitalization; states she does recall she was having a lot of suicidal thoughts but does not recall any other details leading to the bipolar diagnosis   -states the Lithium has always worked very well for her and she has tolerated it very well; has not had any history of lithium toxicity  -states she recalls she would have some very angry outbursts but does not recall any specific pedro luis/hypomania     Mood has fluctuated over the years  -always fluctuated with depression    States she joined the [employer] following high school but then [place]  -states she left within the graces of the Sikhism and got a dispensation   -states she \"left in [employer]"  -states she never would have left if it would have been against the lawrence/Sikhism because \"I love my lawrence too much\"  -states \"It was stupid to leave.  I should have stayed\"  -states she left on her own accord because of \"stubbornness\"  -she still holds some regret that she left the convent; states \"I'm trying to move on from that because you cannot live in the past.\"    Highest weight was 270# in 2002  -had full gastric bypass in 2002  -lowest weight after surgery was 200#  -states she had some weight gain, but then she joined "[employer] - which has been very helpful; states having a sponsor and following the 12 steps has been very good for her; she was able to begin loosing weight and maintaining weight     Got  in 2002  - in 2014  - in 2015  -states \"I've been going solo ever since and I'm fine with it\"    CHILDHOOD:  -States she recalls having some suicidal thoughts off/on as a child  -was teased a lot because she was overweight and 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 States \"they did not baby me\"    Denies suicidal ideations, intent, plan. No homicidal ideations, intent, plan. No audiovisual hallucinations. HPI      PSYCHIATRIC HISTORY:  Patient has had prior care with the following:    [x] Psychiatrist    [] Psychologist    [] Other Therapist    [] None    The patient has had 0 lifetime suicide attempts. Reports she had intense thoughts of suicide with a plan and intent back in 1987 but someone came home.     Patient reports 3 psych hospital admissions with the last admission taking place in 1996    Past psychiatric medications include: various per her report but does not recall specific names    Adverse reactions from psychotropic medications:  None      Lifetime Psychiatric Review of Systems         Marian or Hypomania:  Patient does not recall specific marian or hypomania; she is unsure how/why she was diagnosed with bipolar disorder     Panic Attacks:  no     Phobias:  no     Obsessions and Compulsions:  no     Body or Vocal Tics:  no     Hallucinations:  no     Delusions:  no    SOCIAL HISTORY:  Patient was born in [place], [place] and raised by her biological parents      Social History     Socioeconomic History    Marital status:      Spouse name: Not on file    Number of children: 0    Years of education: Not on file    Highest education level: Bachelor's degree (e.g., BA, AB, BS)   Occupational History    Not on file   Social Needs    Financial resource strain: Not hard at all   [place] insecurity     Worry: Never true     Inability: Never true   [employer] needs     Medical: No     Non-medical: No   Tobacco Use    Smoking status: Former Smoker     Packs/day: 0.10     Years: 0.50     Pack years: 0.05     Types: Cigarettes     Quit date: 10/24/1977     Years since quittin.1    Smokeless tobacco: Never Used   Substance and Sexual Activity    Alcohol use: No    Drug use: No    Sexual activity: Not Currently     Partners: Male   Lifestyle    Physical activity     Days per week: Not on file     Minutes per session: Not on file    Stress: Not on file   Relationships    Social connections     Talks on phone: Not on file     Gets together: Not on file     Attends [employer] service: Not on file     Active member of club or organization: Not on file     Attends meetings of clubs or organizations: Not on file     Relationship status: Not on file    Intimate partner violence     Fear of current or ex partner: Not on file     Emotionally abused: Not on file     Physically abused: Not on file     Forced sexual activity: Not on file   Other Topics Concern    Not on file   Social History Narrative    2020    LEVEL OF EDUCATION: graduated high school; earned her bachelor degree in education; did some master's level course work but did not complete the degree    SPECIAL EDUCATION NEEDS: None    RESIDENCE: Currently lives alone    LEGAL HISTORY: None    Anabaptist: Ca[employer]ism    TRAUMA: None    : None    HOBBIES: writing, swimming, 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  She was  from  to ; they  in     CHILDREN: None    SUBSTANCE USE: None       FAMILY HISTORY: Family History   Problem Relation Age of Onset   Zannie Cowden Cancer Mother     Colon Polyps Mother     Heart Disease Father     Glaucoma Father     Cancer Sister     Cancer Sister        Psychiatric Family History  None reported    PAST MEDICAL HISTORY:    Past Medical History:   Diagnosis Date    Balance problem     Bipolar I disorder, most recent episode (or current) depressed, in partial or unspecified remission 5/8/2013    Cancer ([facility] [facility]     CERVICAL     Depression     Fatigue     Gallstones     Gastric bypass status for obesity 2002    GERD (gastroesophageal reflux disease)     History of blood transfusion     History of hysterectomy     REMOVAL OF ONE OVARY     Hypothyroidism     MS (multiple sclerosis) ([facility] [facility]     Paresthesias     RT LOWER LIMB    UTI (urinary tract infection)        PAST SURGICAL HISTORY:    Past Surgical History:   Procedure Laterality Date    APPENDECTOMY      BONE GRAFT Left     left leg to right arm    CARPAL TUNNEL RELEASE  1999    CHOLECYSTECTOMY, LAPAROSCOPIC  5/16/2016    COLONOSCOPY      DILATATION, ESOPHAGUS      ENDOSCOPY, COLON, DIAGNOSTIC      ENTEROSCOPY N/A 2/5/2018    ENTEROSCOPY performed by Lubna Pruitt MD at [address] [place] [address] N/A 9/28/2020    GASTROSTOMY TUBE PLACEMENT- [facility] TO DO GASTRIC ENDOSCOPY performed by Rachele Quinn MD at [place] [address], VAGINAL      LAPAROSCOPIC APPENDECTOMY  5/16/2016    OTHER SURGICAL HISTORY      RECTAL [address]    OTHER SURGICAL HISTORY  1986    D&C    OTHER SURGICAL HISTORY  2006    GI BLEED    PACEMAKER INSERTION Right 05/20/2019    Dr. Howard Reid FLX DX W/BONNIE Swan 1978 [facility] Left 2/6/2018    COLONOSCOPY performed by Lubna Pruitt MD at [facility]    [place] OFFICE/OUTPT [facility] Left 2/18/2018    EGD ESOPHAGOGASTRODUODENOSCOPY performed by Krystal Smith MD at [facility]    [place] OFFICE/OUTPT VISIT,PROCEDURE ONLY N/A 10/4/2018    EGD DIAGNOSTIC ONLY performed [address] Anne-Marie Foote MD [address]    UPPER GASTROINTESTINAL ENDOSCOPY Left 9/24/2019    EGD DIAGNOSTIC ONLY performed by Juan Elkins MD at [facility]    UPPER GASTROINTESTINAL ENDOSCOPY N/A 9/26/2020    ENTEROSCOPY PUSH CONTROL HEMORRHAGE performed by Bang pSarrow MD at [facility]       PREVIOUSMEDICATIONS:  Outpatient Medications Prior to Visit   Medication Sig Dispense Refill    fluticasone (FLONASE) 50 MCG/ACT nasal spray 2 sprays by Each Nostril route daily      pantoprazole (PROTONIX) 40 MG tablet Take 1 tablet by mouth 2 times daily (before meals) 180 tablet 2    docusate (COLACE, DULCOLAX) 100 MG CAPS Take 100 mg by mouth 2 times daily 60 capsule 2    levothyroxine (SYNTHROID) 100 MCG tablet TAKE 1 TABLET BY MOUTH ONE TIME A DAY 90 tablet 1    lactobacillus (CULTURELLE) capsule Take 1 capsule by mouth daily (with breakfast) 30 capsule 1    lurasidone (LATUDA) 40 MG TABS tablet Take 1 tablet by mouth daily 30 tablet 1    lamoTRIgine (LAMICTAL) 200 MG tablet TAKE 1 TABLET BY MOUTH ONCE DAILY. 30 tablet 1    Nutritional Supplements (DHEA PO) Take 50 mg by mouth daily       ondansetron (ZOFRAN) 4 MG tablet Take 1 tablet by mouth 3 times daily as needed for Nausea or Vomiting 15 tablet 0    ipratropium (ATROVENT) 0.03 % nasal spray INSTILL 2 SPRAYS IN EACH NOSTRIL THREE TIMES A DAY 30 mL 3    lithium 300 MG tablet Take 2 tablets by mouth 2 times daily 120 tablet 3    VILAZODONE HCL 40 MG Tablet TAKE 1 TABLET BY MOUTH ONCE DAILY.  90 tablet 1    Prosthesis MISC by Does not apply route Strap for the prosthesis broken please order new 1 each 0    loratadine (CLARITIN) 10 MG tablet Take 1 tablet by mouth daily 90 tablet 1    polyethylene glycol (GLYCOLAX) powder Take 17 g by mouth daily      Cholecalciferol (VITAMIN D) 2000 units CAPS capsule Take by mouth nightly 50 mcg nightly      Magnesium Oxide 250 MG TABS Take 250 mg by mouth nightly       calcium carbonate 600 MG TABS tablet Take 1 tablet by mouth nightly       vitamin B-12 1000 MCG tablet Take 1 tablet by mouth daily 30 tablet 5    dimethyl fumarate (TECFIDERA) 240 MG delayed release capsule Take 240 mg by mouth 2 times daily      Prenatal MV-Min-Fe Fum-FA-DHA (PRENATAL 1 PO) Take 1 tablet by mouth daily       ferrous sulfate 325 (65 FE) MG tablet Take 325 mg by mouth 2 times daily (with meals)       nitroGLYCERIN (NITROSTAT) 0.4 MG SL tablet up to max of 3 total doses. If no relief after 1 dose, call 911. 78 tablet 3     No facility-administered medications prior to visit. ALLERGIES:    Latex and Sulfa antibiotics    REVIEW OF SYSTEMS:    Review of Systems    The patient sees Edgardo Chaudhry DO as her primary care provider. SPECIALISTS: Dr. Marlen Corbett (GI), Dr. Yung Berman, Dr. Jorje Harris (neurology)    OBJECTIVE DATA     There were no vitals taken for this visit. Physical Exam    Mental Status Evaluation:   Orientation: Alert, oriented, thought content appropriate   Mood:. Euthymic      Affect:  Normal      Appearance:  right upper extremity prosthetic, Age Appropriate, Casually Dressed, Clean, Well Groomed, Clothing Appropriate for Age and Clothing Appropriate for Weather   Activity:  Cooperative, Good Eye Contact and Seated Calmly   Gait/Posture: Normal   Speech:  Clear, Fluent, Normal Pitch and Volume, Age and Situation Appropriate   Thought Process: Within Normal Limits   Thought Content:   Within Normal Limits   Cognition:  Grossly Intact   Memory: Intact   Insight:  Good   Judgment: Good   Suicidal Ideations: Denies Suicidal Ideation   Homicidal Ideations: Negative for homicidal ideation   Medication Side Effects: Absent       Attention Span Attention span and concentration were age appropriate       Screenings Completed in This Encounter:     Anxiety and Depression:                    DIAGNOSIS AND ASSESSMENT DATA     DIAGNOSIS:   1. Bipolar I, most recent episode depressed, partial remission ([facility] [address]    2. Therapeutic drug monitoring        PLAN   Follow-up:  Return in about 4 weeks (around 1/14/2021), or if symptoms worsen or fail to improve, for follow-up and medication management. Prescriptions for this encounter:  New Prescriptions    No medications on file       Orders Placed This Encounter   Medications    lamoTRIgine (LAMICTAL) 200 MG tablet     Sig: TAKE 1 TABLET BY MOUTH ONCE DAILY. Dispense:  30 tablet     Refill:  1    lithium 300 MG tablet     Sig: Take 2 tablets by mouth 2 times daily     Dispense:  120 tablet     Refill:  1    lurasidone (LATUDA) 20 MG TABS tablet     Sig: Take 1 tablet by mouth daily     Dispense:  30 tablet     Refill:  1    vilazodone HCl (VILAZODONE HCL) 40 MG TABS     Sig: TAKE 1 TABLET BY MOUTH ONCE DAILY. Dispense:  30 tablet     Refill:  1       Medications Discontinued During This Encounter   Medication Reason    VILAZODONE HCL 40 MG Tablet REORDER    lithium 300 MG tablet REORDER    lurasidone (LATUDA) 40 MG TABS tablet DOSE ADJUSTMENT    lamoTRIgine (LAMICTAL) 200 MG tablet REORDER       Additional orders:  Orders Placed This Encounter   Procedures    Lithium Level     Patient has reported a long history of bipolar disorder. She is reporting stable and well controlled mood with no recent episodes of pedro luis or hypomania. Patient records reviewed. Treatment options reviewed. Patient expresses desire for medication and dose reduction. She will reduce to Latuda 20mg daily. Monitor mood closely for worsening depression, irritability, mood swings and/or pedro luis/hypomania. Risks of dose reductions reviewed at length with patient including but not limited to worsening or mood, suicidal/homicidal ideations, hallucinations and/or pedro luis/hypomania. Will continue dose reduction of Latuda pending patient response. Supportive therapy provided.  Patient is encouraged to utilize nonpharmacologic coping skills such as deep breathing, guided imagery, guided meditation, muscle relaxation, calming music, and/or journaling. Risks, potential side effects, possibledrug-drug interactions, benefits and alternate treatments discussed in detail. All questions answered. Patient stated understanding and is agreeable to treatment plan. Patient has been instructed to seek emergency help via the emergency and/or calling 911 should symptoms become severe, worsen, or with other concerning symptoms. Patient instructed to goimmediately to the emergency room and/or call 911 with any suicidal or homicidal ideations or if audio/visual hallucinations develop  Patient stated understanding and agrees. Patient given crisis center information. I spent a total of 60 minutes with the patient and over half of that time was spent on counselingand coordination of care regarding topics discussed above. Provider Signature:  Electronically signed by AJITH Willard Se, CNP on 12/17/2020 at 11:23 AM    **This report has been created using voice recognition software. It may contain minor errors which are inherent in voice recognition technology. **

## 2020-12-18 ENCOUNTER — TELEPHONE (OUTPATIENT)
Dept: FAMILY MEDICINE CLINIC | Age: 60
End: 2020-12-18

## 2020-12-21 ENCOUNTER — NURSE ONLY (OUTPATIENT)
Dept: LAB | Age: 60
End: 2020-12-21

## 2020-12-21 LAB — LITHIUM LEVEL: 0.9 MMOL/L (ref 0.6–1.2)

## 2021-01-02 ENCOUNTER — HOSPITAL ENCOUNTER (EMERGENCY)
Age: 61
Discharge: HOME | End: 2021-01-02
Payer: MEDICARE

## 2021-01-02 VITALS
RESPIRATION RATE: 18 BRPM | OXYGEN SATURATION: 99 % | TEMPERATURE: 97.8 F | WEIGHT: 113 LBS | HEART RATE: 72 BPM | DIASTOLIC BLOOD PRESSURE: 82 MMHG | SYSTOLIC BLOOD PRESSURE: 126 MMHG | BODY MASS INDEX: 24.05 KG/M2

## 2021-01-02 DIAGNOSIS — L03.113 CELLULITIS OF RIGHT UPPER EXTREMITY: Primary | ICD-10-CM

## 2021-01-02 DIAGNOSIS — S86.911A STRAIN OF RIGHT KNEE, INITIAL ENCOUNTER: ICD-10-CM

## 2021-01-02 PROCEDURE — 99213 OFFICE O/P EST LOW 20 MIN: CPT | Performed by: NURSE PRACTITIONER

## 2021-01-02 PROCEDURE — 6370000000 HC RX 637 (ALT 250 FOR IP): Performed by: NURSE PRACTITIONER

## 2021-01-02 PROCEDURE — 99212 OFFICE O/P EST SF 10 MIN: CPT

## 2021-01-02 RX ORDER — GINSENG 100 MG
CAPSULE ORAL ONCE
Status: COMPLETED | OUTPATIENT
Start: 2021-01-02 | End: 2021-01-02

## 2021-01-02 RX ORDER — PHENYLEPHRINE HYDROCHLORIDE 10 MG/1
1 TABLET, COATED ORAL DAILY
Qty: 1 EACH | Refills: 0 | Status: SHIPPED | OUTPATIENT
Start: 2021-01-02 | End: 2021-10-11 | Stop reason: ALTCHOICE

## 2021-01-02 RX ORDER — CEPHALEXIN 500 MG/1
500 CAPSULE ORAL 3 TIMES DAILY
Qty: 21 CAPSULE | Refills: 0 | Status: SHIPPED | OUTPATIENT
Start: 2021-01-02 | End: 2021-01-05 | Stop reason: SDUPTHER

## 2021-01-02 RX ADMIN — BACITRACIN: 500 OINTMENT TOPICAL at 09:42

## 2021-01-02 ASSESSMENT — ENCOUNTER SYMPTOMS
COLOR CHANGE: 1
STRIDOR: 0
CHOKING: 0
VOMITING: 0
APNEA: 0
WHEEZING: 0
ABDOMINAL PAIN: 0
SHORTNESS OF BREATH: 0
CHEST TIGHTNESS: 0
NAUSEA: 0
COUGH: 0

## 2021-01-02 ASSESSMENT — PAIN DESCRIPTION - DESCRIPTORS: DESCRIPTORS: ACHING;SHARP

## 2021-01-02 ASSESSMENT — PAIN - FUNCTIONAL ASSESSMENT: PAIN_FUNCTIONAL_ASSESSMENT: PREVENTS OR INTERFERES SOME ACTIVE ACTIVITIES AND ADLS

## 2021-01-02 ASSESSMENT — PAIN SCALES - GENERAL: PAINLEVEL_OUTOF10: 8

## 2021-01-02 ASSESSMENT — PAIN DESCRIPTION - LOCATION: LOCATION: ELBOW

## 2021-01-02 ASSESSMENT — PAIN DESCRIPTION - PAIN TYPE: TYPE: ACUTE PAIN

## 2021-01-02 NOTE — ED NOTES
BAcitracin applied to rt arm stump. 2  2x3 telfa pads applied over wounds and secured with 3 inch Coban.       Rose Luong RN  01/02/21 0054

## 2021-01-02 NOTE — ED TRIAGE NOTES
Pt to room 1 with c/o a possible infection on her stump, rt lower arm. She reports she had a new prosthesis for her arm and had been either wearing it or trying it on and it doesn't fit quite right. She has what looks like a blister that opened on the end of her stump that has a red wound bed and there is redness and swelling around the site. She also reports she has right knee pain after falling while getting on the transportation bus last Tuesday.

## 2021-01-02 NOTE — ED PROVIDER NOTES
Balance problem     Bipolar I disorder, most recent episode (or current) depressed, in partial or unspecified remission 5/8/2013    Cancer ([facility] [address]     CERVICAL     Depression     Fatigue     Gallstones     Gastric bypass status for obesity 2002    GERD (gastroesophageal reflux disease)     History of blood transfusion     History of hysterectomy     REMOVAL OF ONE OVARY     Hypothyroidism     MS (multiple sclerosis) (HCC)     Paresthesias     RT LOWER LIMB    UTI (urinary tract infection)        SURGICAL HISTORY     Patient  has a past surgical history that includes Hysterectomy (1963); Carpal tunnel release (1999); other surgical history; other surgical history (1986); Batsheva-en-Y Gastric Bypass (2000); other surgical history (2006); bone graft (Left); Cholecystectomy, laparoscopic (5/16/2016); laparoscopic appendectomy (5/16/2016); Appendectomy; Enteroscopy (N/A, 2/5/2018); pr colonoscopy flx dx w/collj spec when pfrmd (Left, 2/6/2018); pr office/outpt visit,procedure only (Left, 2/18/2018); Colonoscopy; Endoscopy, colon, diagnostic; Dilatation, esophagus; pr office/outpt visit,procedure only (N/A, 10/4/2018); Hysterectomy, vaginal; Pacemaker insertion (Right, 05/20/2019); Upper gastrointestinal endoscopy (Left, 9/24/2019); pacemaker placement; Upper gastrointestinal endoscopy (N/A, 9/26/2020); and Gastrostomy tube placement (N/A, 9/28/2020). CURRENT MEDICATIONS       Discharge Medication List as of 1/2/2021  9:36 AM      CONTINUE these medications which have NOT CHANGED    Details   fluticasone (FLONASE) 50 MCG/ACT nasal spray 2 sprays by Each Nostril route dailyHistorical Med      lamoTRIgine (LAMICTAL) 200 MG tablet TAKE 1 TABLET BY MOUTH ONCE DAILY. , Disp-30 tablet, R-1Normal      lithium 300 MG tablet Take 2 tablets by mouth 2 times daily, Disp-120 tablet, R-1Normal      lurasidone (LATUDA) 20 MG TABS tablet Take 1 tablet by mouth daily, Disp-30 tablet, R-1Normal      vilazodone HCl (VILAZODONE HCL) 40 MG TABS TAKE 1 TABLET BY MOUTH ONCE DAILY. , Disp-30 tablet, R-1Normal      pantoprazole (PROTONIX) 40 MG tablet Take 1 tablet by mouth 2 times daily (before meals), Disp-180 tablet,R-2Normal      docusate (COLACE, DULCOLAX) 100 MG CAPS Take 100 mg by mouth 2 times daily, Disp-60 capsule,R-2Normal      levothyroxine (SYNTHROID) 100 MCG tablet TAKE 1 TABLET BY MOUTH ONE TIME A DAY, Disp-90 tablet,R-1Normal      lactobacillus (CULTURELLE) capsule Take 1 capsule by mouth daily (with breakfast), Disp-30 capsule,R-1Normal      Nutritional Supplements (DHEA PO) Take 50 mg by mouth daily Historical Med      ipratropium (ATROVENT) 0.03 % nasal spray INSTILL 2 SPRAYS IN EACH NOSTRIL THREE TIMES A DAY, Disp-30 mL, R-3Normal      loratadine (CLARITIN) 10 MG tablet Take 1 tablet by mouth daily, Disp-90 tablet, R-1Normal      polyethylene glycol (GLYCOLAX) powder Take 17 g by mouth dailyHistorical Med      Cholecalciferol (VITAMIN D) 2000 units CAPS capsule Take by mouth nightly 50 mcg nightlyHistorical Med      Magnesium Oxide 250 MG TABS Take 250 mg by mouth nightly Historical Med      calcium carbonate 600 MG TABS tablet Take 1 tablet by mouth nightly Historical Med      vitamin B-12 1000 MCG tablet Take 1 tablet by mouth daily, Disp-30 tablet, R-5Print      dimethyl fumarate (TECFIDERA) 240 MG delayed release capsule Take 240 mg by mouth 2 times dailyHistorical Med      Prenatal MV-Min-Fe Fum-FA-DHA (PRENATAL 1 PO) Take 1 tablet by mouth daily Historical Med      ferrous sulfate 325 (65 FE) MG tablet Take 325 mg by mouth 2 times daily (with meals) Historical Med      ondansetron (ZOFRAN) 4 MG tablet Take 1 tablet by mouth 3 times daily as needed for Nausea or Vomiting, Disp-15 tablet,R-0Print      Prosthesis MISC Starting Wed 3/18/2020, Disp-1 each, R-0, PrintStrap for the prosthesis broken please order new      nitroGLYCERIN (NITROSTAT) 0.4 MG SL tablet up to max of 3 total doses.  If no relief after 1 dose, call 911., Disp-25 tablet, R-3             ALLERGIES     Patient is is allergic to latex and sulfa antibiotics. FAMILY HISTORY     Patient's family history includes Cancer in her mother, sister, and sister; Colon Polyps in her mother; Glaucoma in her father; Heart Disease in her father. SOCIAL HISTORY     Patient  reports that she quit smoking about 43 years ago. Her smoking use included cigarettes. She has a 0.05 pack-year smoking history. She has never used smokeless tobacco. She reports that she does not drink alcohol or use drugs. PHYSICAL EXAM     ED TRIAGE VITALS  BP: 126/82, Temp: 97.8 °F (36.6 °C), Pulse: 72, Resp: 18, SpO2: 99 %  Physical Exam  Vitals signs and nursing note reviewed. Constitutional:       General: She is not in acute distress. Appearance: Normal appearance. She is normal weight. She is not ill-appearing, toxic-appearing or diaphoretic. HENT:      Head: Normocephalic and atraumatic. Nose: Nose normal.   Eyes:      Extraocular Movements: Extraocular movements intact. Conjunctiva/sclera: Conjunctivae normal.   Cardiovascular:      Pulses: Normal pulses. Pulmonary:      Effort: Pulmonary effort is normal.   Musculoskeletal: Normal range of motion. Skin:     General: Skin is warm. Neurological:      General: No focal deficit present. Mental Status: She is alert and oriented to person, place, and time. Psychiatric:         Mood and Affect: Mood normal.         Behavior: Behavior normal.         Thought Content: Thought content normal.         Judgment: Judgment normal.         DIAGNOSTIC RESULTS   Labs:No results found for this visit on 01/02/21.     IMAGING:  No orders to display     URGENT CARE COURSE:     Vitals:    01/02/21 0911   BP: 126/82   Pulse: 72   Resp: 18   Temp: 97.8 °F (36.6 °C)   TempSrc: Temporal   SpO2: 99%   Weight: 113 lb (51.3 kg)       Medications   bacitracin ointment ( Topical Given 1/2/21 0942)     PROCEDURES:  None  FINAL IMPRESSION      1. Cellulitis of right upper extremity    2. Strain of right knee, initial encounter        DISPOSITION/PLAN   Decision To Discharge       The patient/Patient representative was advised to rest at home and elevate the affected area. Patient/representative was also advised to apply warm compresses to the affected area up to 15/20 minutes at a time up to 4 times a day. Patient/Patient representative is also advised to monitor any changes such as increasing redness, pain, development of fever or chills, generalized body aches. The patient will be given medication and is advised to take as directed. If the patient develops any changes such as fever not relieved with Motrin and Tylenol chest pain or shortness of breath patient is to dial 911 or go directly to the emergency room for reevaluation and further management of care. The patient does not experience any of these symptoms or concerns. Follow-up with her primary care provider in 2-3 days for reevaluation. The patient/Patient representatives are agreeable to treatment plan at this time and the patient left in stable condition.         PATIENT REFERRED TO:  Steffi Kapoor DO  [address]  [phone]    Schedule an appointment as soon as possible for a visit in 1 week  For wound re-check    DISCHARGE MEDICATIONS:  Discharge Medication List as of 1/2/2021  9:36 AM      START taking these medications    Details   cephALEXin (KEFLEX) 500 MG capsule Take 1 capsule by mouth 3 times daily for 7 days, Disp-21 capsule, R-0Normal      diclofenac sodium (VOLTAREN) 1 % GEL Apply 4 g topically 2 times daily for 10 days, Topical, 2 TIMES DAILY Starting Sat 1/2/2021, Until Tue 1/12/2021, For 10 days, Disp-80 g, R-0, Normal      Elastic Bandages & Supports (KNEE STABILIZER/ELASTIC/SM/MED) MISC Apply 1 Device topically daily for 5 days, Disp-1 each, R-0Normal           Discharge Medication List as of 1/2/2021  9:36 AM          Tracie Devries Herb Wang, APRN - CNP       Fish Shen, APRN - CNP  01/02/21 9484

## 2021-01-05 ENCOUNTER — OFFICE VISIT (OUTPATIENT)
Dept: FAMILY MEDICINE CLINIC | Age: 61
End: 2021-01-05
Payer: MEDICARE

## 2021-01-05 VITALS
HEART RATE: 83 BPM | BODY MASS INDEX: 26 KG/M2 | WEIGHT: 122.2 LBS | TEMPERATURE: 97 F | OXYGEN SATURATION: 99 % | SYSTOLIC BLOOD PRESSURE: 126 MMHG | DIASTOLIC BLOOD PRESSURE: 78 MMHG | RESPIRATION RATE: 20 BRPM

## 2021-01-05 DIAGNOSIS — L03.113 CELLULITIS OF RIGHT UPPER EXTREMITY: Primary | ICD-10-CM

## 2021-01-05 DIAGNOSIS — L98.491 SKIN ULCER OF ELBOW, LIMITED TO BREAKDOWN OF SKIN (HCC): ICD-10-CM

## 2021-01-05 PROCEDURE — 99213 OFFICE O/P EST LOW 20 MIN: CPT | Performed by: STUDENT IN AN ORGANIZED HEALTH CARE EDUCATION/TRAINING PROGRAM

## 2021-01-05 RX ORDER — CEPHALEXIN 500 MG/1
500 CAPSULE ORAL 4 TIMES DAILY
Qty: 28 CAPSULE | Refills: 0 | Status: SHIPPED | OUTPATIENT
Start: 2021-01-05 | End: 2021-01-12

## 2021-01-05 RX ORDER — GINSENG 100 MG
CAPSULE ORAL
Qty: 1 TUBE | Refills: 0 | Status: SHIPPED | OUTPATIENT
Start: 2021-01-05 | End: 2021-01-15

## 2021-01-05 ASSESSMENT — ENCOUNTER SYMPTOMS
VOMITING: 0
NAUSEA: 0
COLOR CHANGE: 1

## 2021-01-05 NOTE — PROGRESS NOTES
Sharmila Mckeon (:  1960) is a 61 y.o. female,Established patient, here for evaluation of the following chief complaint(s): Wound Infection (right arm x6 days)      ASSESSMENT/PLAN:  1. Cellulitis of right upper extremity  This is a new problem the redness and warmth of her right upper extremity stump likely represents a cellulitis. She had not started the Keflex that was prescribed by urgent care. There is no drainage on physical exam today or signs of systemic infection. Today we will prescribe Keflex 500 mg 4 times daily for 7 days. I instructed the patient that if the redness or warmth surrounding this area skin increases, or should show systemic signs or symptoms of infection to immediately call the office or go to the emergency department for further work-up. Patient was agreeable to this plan. I will see the patient back in clinic in 1 week. - cephALEXin (KEFLEX) 500 MG capsule; Take 1 capsule by mouth 4 times daily for 7 days  Dispense: 28 capsule; Refill: 0    2. Skin ulcer of elbow, limited to breakdown of skin ([address]  This is a new problem. The breakdown and ulceration of her stump is likely the result of her new prosthesis. I encouraged her to continue to use Voltaren gel for pain. I will also prescribe bacitracin ointment. I encouraged her to keep the wound clean and not to wear the new prosthesis anymore. We will follow-up with the patient in 1 week. - bacitracin 500 UNIT/GM ointment; Apply topically 2 times daily. Dispense: 1 Tube; Refill: 0    Return in about 1 week (around 2021) for f/u cellulitis. SUBJECTIVE/OBJECTIVE:  Radha Gonzales presents to clinic today with a chief complaint of an infection of her right upper extremity stump. She states that she was seen in urgent care on 2021 for this problem. She states that the redness and the wound have been getting worse since she got her new prosthetic.   She states that over the past week the redness and the wound has gotten worse. She states that she stopped wearing her new prosthetic because of this. She did not  her Keflex from the pharmacy that was prescribed by urgent care. Review of Systems   Constitutional: Negative for chills and fever. Gastrointestinal: Negative for nausea and vomiting. Skin: Positive for color change and wound. Vitals:    01/05/21 1320   BP: 126/78   Pulse: 83   Resp: 20   Temp: 97 °F (36.1 °C)   SpO2: 99%       Physical Exam  Vitals signs and nursing note reviewed. Constitutional:       General: She is not in acute distress. Appearance: Normal appearance. She is normal weight. She is not ill-appearing. HENT:      Head: Normocephalic and atraumatic. Right Ear: External ear normal.      Left Ear: External ear normal.   Eyes:      General: No scleral icterus. Right eye: No discharge. Left eye: No discharge. Conjunctiva/sclera: Conjunctivae normal.   Neck:      Musculoskeletal: Neck supple. Pulmonary:      Effort: Pulmonary effort is normal. No respiratory distress. Musculoskeletal:         General: Signs of injury present. Comments: Patient has a right below the elbow amputation. There is an area of ulceration on the posterior aspect of the stump around 3 to 4 cm in diameter surrounding granulation tissue. Skin:     General: Skin is warm and dry. Capillary Refill: Capillary refill takes less than 2 seconds. Findings: Erythema and wound present. Comments: There is an area of erythema on the anterior aspect of the stump is approximately 2 cm in diameter. There is associated warmth to this area. There is no tenderness to this area   Neurological:      General: No focal deficit present. Mental Status: She is alert and oriented to person, place, and time. Mental status is at baseline.       Gait: Gait normal.   Psychiatric:         Mood and Affect: Mood normal.         Behavior: Behavior normal.         Judgment:

## 2021-01-05 NOTE — PROGRESS NOTES
VAN Guzman is a 61 y. o.female    Chief Complaint   Patient presents with    Wound Infection     right arm x6 days     Pt presents for UC for follow up of right posterior stump Cellulitis- also with induration anteriorly. Pt supposed to be treated with Keflex 500 mg-  1 pill TID x 7 days. Never got Rx for unknown reason. Using topical Voltaren gel only. No proximal streaking.,   No fevers/ chills. No systemic symptoms. Patient Active Problem List   Diagnosis    Hypothyroidism    [facility]) ([facility] [address]    S/P laparoscopic cholecystectomy    S/P laparoscopic appendectomy    Iron deficiency anemia    Abnormal nuclear stress test    Gastrointestinal hemorrhage associated with gastritis    Gastroduodenal artery bleed    Multiple thyroid nodules    Non-ischemic cardiomyopathy ([facility] [address]    History of Batsheva-en-Y gastric bypass    Renal lesion    Liver lesion    Bipolar 1 disorder (HCC)    Acute gastric ulcer with hemorrhage    Symptomatic bradycardia    Status post placement of cardiac pacemaker    Gastrointestinal hemorrhage with melena       Current Outpatient Medications   Medication Sig Dispense Refill    cephALEXin (KEFLEX) 500 MG capsule Take 1 capsule by mouth 4 times daily for 7 days 28 capsule 0    bacitracin 500 UNIT/GM ointment Apply topically 2 times daily. 1 Tube 0    diclofenac sodium (VOLTAREN) 1 % GEL Apply 4 g topically 2 times daily for 10 days 80 g 0    Elastic Bandages & Supports (KNEE STABILIZER/ELASTIC/SM/MED) MISC Apply 1 Device topically daily for 5 days 1 each 0    fluticasone (FLONASE) 50 MCG/ACT nasal spray 2 sprays by Each Nostril route daily      lamoTRIgine (LAMICTAL) 200 MG tablet TAKE 1 TABLET BY MOUTH ONCE DAILY.  30 tablet 1    lithium 300 MG tablet Take 2 tablets by mouth 2 times daily 120 tablet 1    lurasidone (LATUDA) 20 MG TABS tablet Take 1 tablet by mouth daily 30 tablet 1    vilazodone HCl (VILAZODONE HCL) 40 MG TABS TAKE 1 TABLET BY MOUTH ONCE DAILY. 30 tablet 1    pantoprazole (PROTONIX) 40 MG tablet Take 1 tablet by mouth 2 times daily (before meals) 180 tablet 2    docusate (COLACE, DULCOLAX) 100 MG CAPS Take 100 mg by mouth 2 times daily 60 capsule 2    levothyroxine (SYNTHROID) 100 MCG tablet TAKE 1 TABLET BY MOUTH ONE TIME A DAY 90 tablet 1    lactobacillus (CULTURELLE) capsule Take 1 capsule by mouth daily (with breakfast) 30 capsule 1    Nutritional Supplements (DHEA PO) Take 50 mg by mouth daily       ondansetron (ZOFRAN) 4 MG tablet Take 1 tablet by mouth 3 times daily as needed for Nausea or Vomiting 15 tablet 0    ipratropium (ATROVENT) 0.03 % nasal spray INSTILL 2 SPRAYS IN EACH NOSTRIL THREE TIMES A DAY 30 mL 3    Prosthesis MISC by Does not apply route Strap for the prosthesis broken please order new 1 each 0    loratadine (CLARITIN) 10 MG tablet Take 1 tablet by mouth daily 90 tablet 1    polyethylene glycol (GLYCOLAX) powder Take 17 g by mouth daily      Cholecalciferol (VITAMIN D) 2000 units CAPS capsule Take by mouth nightly 50 mcg nightly      Magnesium Oxide 250 MG TABS Take 250 mg by mouth nightly       calcium carbonate 600 MG TABS tablet Take 1 tablet by mouth nightly       vitamin B-12 1000 MCG tablet Take 1 tablet by mouth daily 30 tablet 5    dimethyl fumarate (TECFIDERA) 240 MG delayed release capsule Take 240 mg by mouth 2 times daily      nitroGLYCERIN (NITROSTAT) 0.4 MG SL tablet up to max of 3 total doses. If no relief after 1 dose, call 911. 25 tablet 3    Prenatal MV-Min-Fe Fum-FA-DHA (PRENATAL 1 PO) Take 1 tablet by mouth daily       ferrous sulfate 325 (65 FE) MG tablet Take 325 mg by mouth 2 times daily (with meals)        No current facility-administered medications for this visit.         Review of Systems per Dr. Augustin Beverage    OBJECTIVE     /78 (Site: Left Upper Arm)   Pulse 83   Temp 97 °F (36.1 °C) (Skin)   Resp 20   Wt 122 lb 3.2 oz (55.4 kg)   SpO2 99%   BMI 26.00 kg/m²   BP Readings from Last 3 Encounters:   01/05/21 126/78   01/02/21 126/82   12/10/20 126/80       Wt Readings from Last 3 Encounters:   01/05/21 122 lb 3.2 oz (55.4 kg)   01/02/21 113 lb (51.3 kg)   12/10/20 122 lb (55.3 kg)     Body mass index is 26 kg/m².     Physical Exam per Dr. Lucas Alegria                    Immunization History   Administered Date(s) Administered    Influenza Vaccine, unspecified formulation 09/15/2015, 09/14/2016, 10/21/2016, 08/16/2017    Influenza Virus Vaccine 10/21/2016, 08/16/2017, 09/30/2020    Influenza, MDCK Sigmund Prier, with preserv IM (Flucelvax 4 yrs and older) 03/11/2019    Influenza, Sigmund Prier, 6 mo and older, IM, PF (Flulaval, Fluarix) 10/05/2018    Influenza, Quadv, IM, (6 mo and older Fluzone, Flulaval, Fluarix and 3 yrs and older Afluria) 10/05/2018    Influenza, Quadv, IM, PF (6 mo and older Fluzone, Flulaval, Fluarix, and 3 yrs and older Afluria) 09/23/2017, 09/24/2019    Pneumococcal Conjugate 13-valent ([id number]) 04/10/2019    Pneumococcal Polysaccharide ([id number]) 05/17/2016    Td (Adult), 5 Lf Tetanus Toxoid, Pf (Tenivac, Decavac) 04/30/1999    Td vaccine (adult) 04/30/1999    Tdap (Boostrix, Adacel) 09/29/2015, 09/12/2016, 10/25/2018    Zoster Recombinant (Shingrix) 03/11/2019, 11/19/2019, 01/03/2020       Health Maintenance   Topic Date Due    TSH testing  05/19/2021    Annual Wellness Visit (AWV)  12/11/2021    Breast cancer screen  01/15/2022    Lipid screen  04/10/2024    DTaP/Tdap/Td vaccine (4 - Td) 10/25/2028    Colon cancer screen colonoscopy  11/06/2029    Flu vaccine  Completed    Shingles Vaccine  Completed    Pneumococcal 0-64 years Vaccine  Completed    Hepatitis C screen  Completed    HIV screen  Completed    Hepatitis A vaccine  Aged Out    Hepatitis B vaccine  Aged Out    Hib vaccine  Aged Out    Meningococcal (ACWY) vaccine  Aged Out       Future Appointments   Date Time Provider Rudi Brandon   1/12/2021  2:40 PM Cisco Rodriguez MD SRPX  [facility] [place]   1/26/2021  9:30 AM AJITH Pringle - CNP Verner Paddy [facility] [place] [address]   6/10/2021 11:00 AM Corina Barton DO SRPX  RES [facility] [place] [address]       ASSESSMENT       Diagnosis Orders   1. Cellulitis of right upper extremity  cephALEXin (KEFLEX) 500 MG capsule   2. Skin ulcer of elbow, limited to breakdown of skin (HCC)  bacitracin 500 UNIT/GM ointment       PLAN      After discussion with , we agreed on plan as follows: Will start Keflex 500 mg- 1 pill every 6 hours x 7 days. Ok to continue Voltaren Gel for pain. OK for Triple Antibiotic ointment to open areas. Follow up in 1 week. Preventive Health Topics:  PAP/ PELVIC management to be discussed in future. MAMMO due after 1/15/63048  COLONOSCOPY UTD currently. Attending Physician Statement  I have discussed the case, including pertinent history and exam findings with the resident. I agree with the documented assessment and plan as documented by the resident.   GE modifier added  to this encounter     Electronically signed by Yuliana Ma MD on 1/5/2021 at 3:59 PM

## 2021-01-11 RX ORDER — LACTOBACILLUS RHAMNOSUS GG 10B CELL
CAPSULE ORAL
Qty: 30 CAPSULE | Refills: 1 | Status: SHIPPED | OUTPATIENT
Start: 2021-01-11 | End: 2021-02-04

## 2021-01-11 NOTE — TELEPHONE ENCOUNTER
Patient's last appointment was : 1/5/2021  Patient's next appointment is : 1/12/2021  Last refilled:  12/1/2020

## 2021-01-12 ENCOUNTER — OFFICE VISIT (OUTPATIENT)
Dept: FAMILY MEDICINE CLINIC | Age: 61
End: 2021-01-12
Payer: MEDICARE

## 2021-01-12 VITALS
DIASTOLIC BLOOD PRESSURE: 72 MMHG | RESPIRATION RATE: 17 BRPM | HEART RATE: 69 BPM | BODY MASS INDEX: 26.81 KG/M2 | TEMPERATURE: 97.2 F | WEIGHT: 126 LBS | OXYGEN SATURATION: 99 % | SYSTOLIC BLOOD PRESSURE: 116 MMHG

## 2021-01-12 DIAGNOSIS — L98.491 SKIN ULCER OF ELBOW, LIMITED TO BREAKDOWN OF SKIN (HCC): ICD-10-CM

## 2021-01-12 DIAGNOSIS — L03.113 CELLULITIS OF RIGHT UPPER EXTREMITY: Primary | ICD-10-CM

## 2021-01-12 PROCEDURE — 99213 OFFICE O/P EST LOW 20 MIN: CPT | Performed by: STUDENT IN AN ORGANIZED HEALTH CARE EDUCATION/TRAINING PROGRAM

## 2021-01-12 ASSESSMENT — ENCOUNTER SYMPTOMS: COLOR CHANGE: 0

## 2021-01-12 NOTE — PROGRESS NOTES
SUBJECTIVE     Isael De Anda is a 61 y. o.female       Chief Complaint   Patient presents with    Follow-up     cellulitis 1 week right arm     Pt presents for 1 week follow up of Right Arm Cellulitis- treated with Keflex 500 mg 4x/day for 7 days. Pt doing significantly better at this time. Patient Active Problem List   Diagnosis    Hypothyroidism    MS (multiple sclerosis) ([address]    S/P laparoscopic cholecystectomy    S/P laparoscopic appendectomy    Iron deficiency anemia    Abnormal nuclear stress test    Gastrointestinal hemorrhage associated with gastritis    Gastroduodenal artery bleed    Multiple thyroid nodules    Non-ischemic cardiomyopathy ([address]    History of Batsheva-en-Y gastric bypass    Renal lesion    Liver lesion    Bipolar 1 disorder (HCC)    Acute gastric ulcer with hemorrhage    Symptomatic bradycardia    Status post placement of cardiac pacemaker    Gastrointestinal hemorrhage with melena       Current Outpatient Medications   Medication Sig Dispense Refill    Lactobacillus-Inulin ([address] [facility] [address]) CAPS TAKE 1 CAPSULE BY MOUTH WITH BREAKFAST 30 capsule 1    cephALEXin (KEFLEX) 500 MG capsule Take 1 capsule by mouth 4 times daily for 7 days 28 capsule 0    bacitracin 500 UNIT/GM ointment Apply topically 2 times daily. 1 Tube 0    diclofenac sodium (VOLTAREN) 1 % GEL Apply 4 g topically 2 times daily for 10 days 80 g 0    fluticasone (FLONASE) 50 MCG/ACT nasal spray 2 sprays by Each Nostril route daily      lamoTRIgine (LAMICTAL) 200 MG tablet TAKE 1 TABLET BY MOUTH ONCE DAILY. 30 tablet 1    lithium 300 MG tablet Take 2 tablets by mouth 2 times daily 120 tablet 1    lurasidone (LATUDA) 20 MG TABS tablet Take 1 tablet by mouth daily 30 tablet 1    vilazodone HCl (VILAZODONE HCL) 40 MG TABS TAKE 1 TABLET BY MOUTH ONCE DAILY.  30 tablet 1    pantoprazole (PROTONIX) 40 MG tablet Take 1 tablet by mouth 2 times daily (before meals) 180 tablet 2    docusate (COLACE, DULCOLAX) 100 MG CAPS Take 100 mg by mouth 2 times daily 60 capsule 2    levothyroxine (SYNTHROID) 100 MCG tablet TAKE 1 TABLET BY MOUTH ONE TIME A DAY 90 tablet 1    Nutritional Supplements (DHEA PO) Take 50 mg by mouth daily       ondansetron (ZOFRAN) 4 MG tablet Take 1 tablet by mouth 3 times daily as needed for Nausea or Vomiting 15 tablet 0    ipratropium (ATROVENT) 0.03 % nasal spray INSTILL 2 SPRAYS IN EACH NOSTRIL THREE TIMES A DAY 30 mL 3    Prosthesis MISC by Does not apply route Strap for the prosthesis broken please order new 1 each 0    loratadine (CLARITIN) 10 MG tablet Take 1 tablet by mouth daily 90 tablet 1    polyethylene glycol (GLYCOLAX) powder Take 17 g by mouth daily      Cholecalciferol (VITAMIN D) 2000 units CAPS capsule Take by mouth nightly 50 mcg nightly      Magnesium Oxide 250 MG TABS Take 250 mg by mouth nightly       calcium carbonate 600 MG TABS tablet Take 1 tablet by mouth nightly       vitamin B-12 1000 MCG tablet Take 1 tablet by mouth daily 30 tablet 5    dimethyl fumarate (TECFIDERA) 240 MG delayed release capsule Take 240 mg by mouth 2 times daily      nitroGLYCERIN (NITROSTAT) 0.4 MG SL tablet up to max of 3 total doses. If no relief after 1 dose, call 911. 25 tablet 3    Prenatal MV-Min-Fe Fum-FA-DHA (PRENATAL 1 PO) Take 1 tablet by mouth daily       ferrous sulfate 325 (65 FE) MG tablet Take 325 mg by mouth 2 times daily (with meals)       Elastic Bandages & Supports (KNEE STABILIZER/ELASTIC/SM/MED) MISC Apply 1 Device topically daily for 5 days 1 each 0     No current facility-administered medications for this visit.         Review of Systems per Dr. Sil Beyer     /72 (Site: Left Upper Arm, Position: Sitting, Cuff Size: Medium Adult)   Pulse 69   Temp 97.2 °F (36.2 °C) (Skin)   Resp 17   Wt 126 lb (57.2 kg)   SpO2 99%   BMI 26.81 kg/m²   BP Readings from Last 3 Encounters:   01/12/21 116/72   01/05/21 126/78 01/02/21 126/82       Wt Readings from Last 3 Encounters:   01/12/21 126 lb (57.2 kg)   01/05/21 122 lb 3.2 oz (55.4 kg)   01/02/21 113 lb (51.3 kg)     Body mass index is 26.81 kg/m².     Physical Exam per Dr. Ramona Kirby                Immunization History   Administered Date(s) Administered    Influenza Vaccine, unspecified formulation 09/15/2015, 09/14/2016, 10/21/2016, 08/16/2017    Influenza Virus Vaccine 10/21/2016, 08/16/2017, 09/30/2020    Influenza, MDCK Latina Kocher, with preserv IM (Flucelvax 4 yrs and older) 03/11/2019    Influenza,  Kocher, 6 mo and older, IM, PF (Flulaval, Fluarix) 10/05/2018    Influenza, Quadv, IM, (6 mo and older Fluzone, Flulaval, Fluarix and 3 yrs and older Afluria) 10/05/2018    Influenza, Quadv, IM, PF (6 mo and older Fluzone, Flulaval, Fluarix, and 3 yrs and older Afluria) 09/23/2017, 09/24/2019    Pneumococcal Conjugate 13-valent ([id number]) 04/10/2019    Pneumococcal Polysaccharide ([id number]) 05/17/2016    Td (Adult), 5 Lf Tetanus Toxoid, Pf (Tenivac, Decavac) 04/30/1999    Td vaccine (adult) 04/30/1999    Tdap (Boostrix, Adacel) 09/29/2015, 09/12/2016, 10/25/2018    Zoster Recombinant (Shingrix) 03/11/2019, 11/19/2019, 01/03/2020       Health Maintenance   Topic Date Due    TSH testing  05/19/2021    Annual Wellness Visit (AWV)  12/11/2021    Breast cancer screen  01/15/2022    Lipid screen  04/10/2024    DTaP/Tdap/Td vaccine (4 - Td) 10/25/2028    Colon cancer screen colonoscopy  11/06/2029    Flu vaccine  Completed    Shingles Vaccine  Completed    Pneumococcal 0-64 years Vaccine  Completed    Hepatitis C screen  Completed    HIV screen  Completed    Hepatitis A vaccine  Aged Out    Hepatitis B vaccine  Aged Out    Hib vaccine  Aged Out    Meningococcal (ACWY) vaccine  Aged Out       Future Appointments   Date Time Provider Rudi Brandon   1/19/2021  9:40 AM STR MAMMOGRAPHY RM2  [facility] WOMEN STR Radiolog   1/26/2021  9:30 AM Rigoberto Andrea, APRN - CNP [facility] - [place]   6/10/2021 11:00 AM Ikebronson Viral, DO SRPX  RES [facility] [address] [facility]       ASSESSMENT       Diagnosis Orders   1. Cellulitis of right upper extremity     2. Skin ulcer of elbow, limited to breakdown of skin ([address]         PLAN      After discussion with , we agreed on plan as follows:    Complete Keflex as prescribed. Continue topical antibiotic ointment and Voltaren gel as needed. Continue to use old prosthesis at this time. Consider re-fitting new prosthesis. Follow up as scheduled in 4-6 months or sooner if new issues. Attending Physician Statement  I have discussed the case, including pertinent history and exam findings with the resident. I agree with the documented assessment and plan as documented by the resident.   GE modifier added  to this encounter     Electronically signed by Nathan Gonzalez MD on 1/12/2021 at 6:55 PM

## 2021-01-12 NOTE — PROGRESS NOTES
Sean Simon is a 61 y.o. female who presents today for:  Chief Complaint   Patient presents with    Follow-up     cellulitis 1 week right arm       Goals      Exercise 3x per week (30 min per time)           HPI:     HELEN Lopez presents the clinic today as a 1 week follow-up for right upper extremity cellulitis. She was taking Keflex 500 mg 4 times a day for it. She states that the redness and warmth in her stump have greatly improved since starting Keflex. She states that the ulcer has improved since starting the bacitracin ointment. She reports that she is using her old prosthetic. She denies having any fever, chills or pain today. No question data found.     Past Medical History:   Diagnosis Date    Balance problem     Bipolar I disorder, most recent episode (or current) depressed, in partial or unspecified remission 5/8/2013    Cancer ([address]     CERVICAL     Depression     Fatigue     Gallstones     Gastric bypass status for obesity 2002    GERD (gastroesophageal reflux disease)     History of blood transfusion     History of hysterectomy     REMOVAL OF ONE OVARY     Hypothyroidism     MS (multiple sclerosis) (HCC)     Paresthesias     RT LOWER LIMB    UTI (urinary tract infection)       Past Surgical History:   Procedure Laterality Date    APPENDECTOMY      BONE GRAFT Left     left leg to right arm    CARPAL TUNNEL RELEASE  1999    CHOLECYSTECTOMY, LAPAROSCOPIC  5/16/2016    COLONOSCOPY      DILATATION, ESOPHAGUS      ENDOSCOPY, COLON, DIAGNOSTIC      ENTEROSCOPY N/A 2/5/2018    ENTEROSCOPY performed by Marjan Basilio MD at [address] [place] [address] N/A 9/28/2020    GASTROSTOMY TUBE PLACEMENT- [facility] TO DO GASTRIC ENDOSCOPY performed by Ariana Kendall MD at [facility], VAGINAL      LAPAROSCOPIC APPENDECTOMY  5/16/2016    OTHER SURGICAL HISTORY      RECTAL [address]    D&C  OTHER SURGICAL HISTORY  2006    GI BLEED    PACEMAKER INSERTION Right 2019    Dr. Rossy Basilio FLX DX W/COLLJ [place]  PFRMD Left 2018    COLONOSCOPY performed by Manjeet Flynn MD at  [facility]    SC OFFICE/OUTPT VISIT,PROCEDURE ONLY Left 2018    EGD ESOPHAGOGASTRODUODENOSCOPY performed by Jessica Arndt MD at [address] [facility]    SC OFFICE/OUTPT VISIT,PROCEDURE ONLY N/A 10/4/2018    EGD DIAGNOSTIC ONLY performed by Seferino Lobo MD at [address] [facility]    UPPER GASTROINTESTINAL ENDOSCOPY Left 2019    EGD DIAGNOSTIC ONLY performed by Marina Spencer MD at [address] [facility]    UPPER GASTROINTESTINAL ENDOSCOPY N/A 2020    ENTEROSCOPY PUSH CONTROL HEMORRHAGE performed by Manjeet Flynn MD at [address] [facility]     Family History   Problem Relation Age of Onset    Cancer Mother     Colon Polyps Mother     Heart Disease Father     Glaucoma Father     Cancer Sister     Cancer Sister      Social History     Tobacco Use    Smoking status: Former Smoker     Packs/day: 0.10     Years: 0.50     Pack years: 0.05     Types: Cigarettes     Quit date: 10/24/1977     Years since quittin.2    Smokeless tobacco: Never Used   Substance Use Topics    Alcohol use: No      Current Outpatient Medications   Medication Sig Dispense Refill    Lactobacillus-Inulin ([facility] ) CAPS TAKE 1 CAPSULE BY MOUTH WITH BREAKFAST 30 capsule 1    cephALEXin (KEFLEX) 500 MG capsule Take 1 capsule by mouth 4 times daily for 7 days 28 capsule 0    bacitracin 500 UNIT/GM ointment Apply topically 2 times daily. 1 Tube 0    diclofenac sodium (VOLTAREN) 1 % GEL Apply 4 g topically 2 times daily for 10 days 80 g 0    fluticasone (FLONASE) 50 MCG/ACT nasal spray 2 sprays by Each Nostril route daily      lamoTRIgine (LAMICTAL) 200 MG tablet TAKE 1 TABLET BY MOUTH ONCE DAILY.  30 tablet 1    lithium 300 MG tablet Take 2 tablets by mouth 2 times daily 120 tablet 1    lurasidone (LATUDA) 20 MG TABS tablet Take 1 tablet by mouth daily 30 tablet 1    vilazodone HCl (VILAZODONE HCL) 40 MG TABS TAKE 1 TABLET BY MOUTH ONCE DAILY. 30 tablet 1    pantoprazole (PROTONIX) 40 MG tablet Take 1 tablet by mouth 2 times daily (before meals) 180 tablet 2    docusate (COLACE, DULCOLAX) 100 MG CAPS Take 100 mg by mouth 2 times daily 60 capsule 2    levothyroxine (SYNTHROID) 100 MCG tablet TAKE 1 TABLET BY MOUTH ONE TIME A DAY 90 tablet 1    Nutritional Supplements (DHEA PO) Take 50 mg by mouth daily       ondansetron (ZOFRAN) 4 MG tablet Take 1 tablet by mouth 3 times daily as needed for Nausea or Vomiting 15 tablet 0    ipratropium (ATROVENT) 0.03 % nasal spray INSTILL 2 SPRAYS IN EACH NOSTRIL THREE TIMES A DAY 30 mL 3    Prosthesis MISC by Does not apply route Strap for the prosthesis broken please order new 1 each 0    loratadine (CLARITIN) 10 MG tablet Take 1 tablet by mouth daily 90 tablet 1    polyethylene glycol (GLYCOLAX) powder Take 17 g by mouth daily      Cholecalciferol (VITAMIN D) 2000 units CAPS capsule Take by mouth nightly 50 mcg nightly      Magnesium Oxide 250 MG TABS Take 250 mg by mouth nightly       calcium carbonate 600 MG TABS tablet Take 1 tablet by mouth nightly       vitamin B-12 1000 MCG tablet Take 1 tablet by mouth daily 30 tablet 5    dimethyl fumarate (TECFIDERA) 240 MG delayed release capsule Take 240 mg by mouth 2 times daily      nitroGLYCERIN (NITROSTAT) 0.4 MG SL tablet up to max of 3 total doses.  If no relief after 1 dose, call 911. 25 tablet 3    Prenatal MV-Min-Fe Fum-FA-DHA (PRENATAL 1 PO) Take 1 tablet by mouth daily       ferrous sulfate 325 (65 FE) MG tablet Take 325 mg by mouth 2 times daily (with meals)       Elastic Bandages & Supports (KNEE STABILIZER/ELASTIC/SM/MED) MISC Apply 1 Device topically daily for 5 days 1 each 0     No current facility-administered medications for this visit. Allergies   Allergen Reactions    Latex Rash    Sulfa Antibiotics Rash       Health Maintenance   Topic Date Due    TSH testing  05/19/2021    Annual Wellness Visit (AWV)  12/11/2021    Breast cancer screen  01/15/2022    Lipid screen  04/10/2024    DTaP/Tdap/Td vaccine (4 - Td) 10/25/2028    Colon cancer screen colonoscopy  11/06/2029    Flu vaccine  Completed    Shingles Vaccine  Completed    Pneumococcal 0-64 years Vaccine  Completed    Hepatitis C screen  Completed    HIV screen  Completed    Hepatitis A vaccine  Aged Out    Hepatitis B vaccine  Aged Out    Hib vaccine  Aged Out    Meningococcal (ACWY) vaccine  Aged Out       Subjective:      Review of Systems   Constitutional: Negative for chills and fever. Skin: Negative for color change, rash and wound. Objective:     Vitals:    01/12/21 1447   BP: 116/72   Site: Left Upper Arm   Position: Sitting   Cuff Size: Medium Adult   Pulse: 69   Resp: 17   Temp: 97.2 °F (36.2 °C)   TempSrc: Skin   SpO2: 99%   Weight: 126 lb (57.2 kg)       Body mass index is 26.81 kg/m². Wt Readings from Last 3 Encounters:   01/12/21 126 lb (57.2 kg)   01/05/21 122 lb 3.2 oz (55.4 kg)   01/02/21 113 lb (51.3 kg)     BP Readings from Last 3 Encounters:   01/12/21 116/72   01/05/21 126/78   01/02/21 126/82       Physical Exam  Vitals signs and nursing note reviewed. Constitutional:       General: She is not in acute distress. Appearance: Normal appearance. She is normal weight. She is not ill-appearing. HENT:      Head: Normocephalic and atraumatic. Right Ear: External ear normal.      Left Ear: External ear normal.   Eyes:      General: No scleral icterus. Right eye: No discharge. Left eye: No discharge. Conjunctiva/sclera: Conjunctivae normal.   Neck:      Musculoskeletal: Neck supple. Cardiovascular:      Rate and Rhythm: Normal rate and regular rhythm. Pulses: Normal pulses.       Heart sounds: Normal heart sounds. No murmur. Pulmonary:      Effort: Pulmonary effort is normal. No respiratory distress. Breath sounds: Normal breath sounds. No wheezing or rales. Musculoskeletal:         General: No signs of injury. Comments: Patient has a right below the elbow amputation. There is an area of ulceration previous exam is healed and only has a small area open as seen in the pictures below     Skin:     General: Skin is warm and dry. Capillary Refill: Capillary refill takes less than 2 seconds. Findings: Wound present. Comments: The previous area of erythema seen on exam last week has resolved. There is no associated warmth today. There is no tenderness. Neurological:      General: No focal deficit present. Mental Status: She is alert and oriented to person, place, and time. Mental status is at baseline. Gait: Gait normal.   Psychiatric:         Mood and Affect: Mood normal.         Behavior: Behavior normal.         Judgment: Judgment normal.     Right upper extremity stump 1/12/2021              Lab Results   Component Value Date    WBC 7.0 11/10/2020    HGB 11.1 (L) 11/10/2020    HCT 36.4 (L) 11/10/2020     11/10/2020    CHOL 130 04/10/2019    TRIG 36 04/10/2019    HDL 96 04/10/2019    LDLCALC 27 04/10/2019    AST 16 09/26/2020     10/08/2020    K 4.3 10/08/2020     10/08/2020    CREATININE 0.5 10/08/2020    BUN 16 10/08/2020    CO2 22 (L) 10/08/2020    TSH 2.060 05/19/2020    INR 0.96 09/25/2020    LABA1C 4.7 11/13/2019    LABGLOM >90 10/08/2020    MG 2.1 10/08/2020    CALCIUM 10.4 10/08/2020    VITD25 35 10/08/2020       Imaging Results:    No results found. Assessment / Plan:     1. Cellulitis of right upper extremity  Rhonda Roach presents to clinic today for reevaluation of her right upper extremity cellulitis. Her cellulitis has resolved. She is instructed to finish out her Keflex that was started last week.   She was instructed to return to

## 2021-01-19 ENCOUNTER — HOSPITAL ENCOUNTER (OUTPATIENT)
Dept: WOMENS IMAGING | Age: 61
Discharge: HOME | End: 2021-01-19
Payer: MEDICARE

## 2021-01-19 DIAGNOSIS — Z12.31 ENCOUNTER FOR SCREENING MAMMOGRAM FOR MALIGNANT NEOPLASM OF BREAST: ICD-10-CM

## 2021-01-19 PROCEDURE — 77063 BREAST TOMOSYNTHESIS BI: CPT

## 2021-01-26 ENCOUNTER — OFFICE VISIT (OUTPATIENT)
Dept: PSYCHIATRY | Age: 61
End: 2021-01-26
Payer: MEDICARE

## 2021-01-26 DIAGNOSIS — F31.75 BIPOLAR I, MOST RECENT EPISODE DEPRESSED, PARTIAL REMISSION (HCC): Primary | ICD-10-CM

## 2021-01-26 PROCEDURE — 99214 OFFICE O/P EST MOD 30 MIN: CPT | Performed by: NURSE PRACTITIONER

## 2021-01-26 RX ORDER — LITHIUM CARBONATE 300 MG
600 TABLET ORAL 2 TIMES DAILY
Qty: 120 TABLET | Refills: 1 | Status: SHIPPED | OUTPATIENT
Start: 2021-01-26 | End: 2021-03-09 | Stop reason: SDUPTHER

## 2021-01-26 RX ORDER — VILAZODONE HYDROCHLORIDE 40 MG/1
TABLET ORAL
Qty: 30 TABLET | Refills: 1 | Status: SHIPPED | OUTPATIENT
Start: 2021-01-26 | End: 2021-03-09 | Stop reason: SDUPTHER

## 2021-01-26 RX ORDER — LAMOTRIGINE 200 MG/1
TABLET ORAL
Qty: 30 TABLET | Refills: 1 | Status: SHIPPED | OUTPATIENT
Start: 2021-01-26 | End: 2021-03-09 | Stop reason: SDUPTHER

## 2021-01-26 NOTE — PROGRESS NOTES
[address] [place] [address] 100 [facility] [address]  Dept: [phone]  Dept Fax: [phone]: [phone]    Visit Date: 1/26/2021    SUBJECTIVE DATA     CHIEF COMPLAINT:    Chief Complaint   Patient presents with    Mental Health Problem       History obtained from: patient    HISTORY OF PRESENT ILLNESS:    Kang Saavedra is a 61 y.o. female who presents to the office for follow-up on her moods. She states she is doing well since reducing the dose of Latuda  -denies change in mood  -no irritability  -denies marian or hypomania  -rates overall mood as 7/10 with 10 as best mood possible    Sleeping well  -no difficulty initiating or maintaining sleep    States she really enjoys her cat  -states \"it has really been a blessing\"  -she enjoys the company and affection of the cat  -she got the cat from the shelter  -not feeling as lonely with the cat in the home  -the cat keeps her occupied. States \"I'm not brooding any more\"      Denies suicidal ideations, intent, plan. No homicidal ideations, intent, plan. No audiovisual hallucinations. HPI    The patient has had 0 lifetime suicide attempts. Reports she had intense thoughts of suicide with a plan and intent back in 1987 but someone came home. Patient reports 3 psych hospital admissions with the last admission taking place in 1996    Past psychiatric medications include: various per her report but does not recall specific names    Adverse reactions from psychotropic medications:  None      Current Psychiatric Review of Systems         Marian or Hypomania:  None current.  Previously reported: Patient does not recall specific marian or hypomania; she is unsure how/why she was diagnosed with bipolar disorder     Panic Attacks:  no     Phobias:  no     Obsessions and Compulsions:  no     Body or Vocal Tics:  no     Hallucinations:  no     Delusions:  no    SOCIAL HISTORY:  Patient was born in [place] and raised by her biological parents      Social History     Socioeconomic History    Marital status:      Spouse name: Not on file    Number of children: 0    Years of education: Not on file    Highest education level:  Bachelor's degree (e.g., BA, AB, BS)   Occupational History    Not on file   Social Needs    Financial resource strain: Not hard at all   Naveed-Stefan insecurity     Worry: Never true     Inability: Never true   [employer] needs     Medical: No     Non-medical: No   Tobacco Use    Smoking status: Former Smoker     Packs/day: 0.10     Years: 0.50     Pack years: 0.05     Types: Cigarettes     Quit date: 10/24/1977     Years since quittin.2    Smokeless tobacco: Never Used   Substance and Sexual Activity    Alcohol use: No    Drug use: No    Sexual activity: Not Currently     Partners: Male   Lifestyle    Physical activity     Days per week: Not on file     Minutes per session: Not on file    Stress: Not on file   Relationships    Social connections     Talks on phone: Not on file     Gets together: Not on file     Attends Church service: Not on file     Active member of club or organization: Not on file     Attends meetings of clubs or organizations: Not on file     Relationship status: Not on file    Intimate partner violence     Fear of current or ex partner: Not on file     Emotionally abused: Not on file     Physically abused: Not on file     Forced sexual activity: Not on file   Other Topics Concern    Not on file   Social History Narrative    2020    LEVEL OF EDUCATION: graduated high school; earned her bachelor degree in education; did some master's level course work but did not complete the degree    SPECIAL EDUCATION NEEDS: None    RESIDENCE: Currently lives alone    LEGAL HISTORY: None    Worship: Yarsani    TRAUMA: None    : None    HOBBIES: writing, swimming, 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  She was  from 2002 to 2015; they  in 2015    CHILDREN: None    SUBSTANCE USE: None       FAMILY HISTORY:   Family History   Problem Relation Age of Onset   [facility] Cancer Mother     Colon Polyps Mother     Heart Disease Father     Glaucoma Father     Cancer Sister     Cancer Sister        Psychiatric Family History  None reported    PAST MEDICAL HISTORY:    Past Medical History:   Diagnosis Date    Balance problem     Bipolar I disorder, most recent episode (or current) depressed, in partial or unspecified remission 5/8/2013    Cancer ([address]     CERVICAL     Depression     Fatigue     Gallstones     Gastric bypass status for obesity 2002    GERD (gastroesophageal reflux disease)     History of blood transfusion     History of hysterectomy     REMOVAL OF ONE OVARY     Hypothyroidism     MS (multiple sclerosis) (HCC)     Paresthesias     RT LOWER LIMB    UTI (urinary tract infection)        PAST SURGICAL HISTORY:    Past Surgical History:   Procedure Laterality Date    APPENDECTOMY      BONE GRAFT Left     left leg to right arm    CARPAL TUNNEL RELEASE  1999    CHOLECYSTECTOMY, LAPAROSCOPIC  5/16/2016    COLONOSCOPY      DILATATION, ESOPHAGUS      ENDOSCOPY, COLON, DIAGNOSTIC      ENTEROSCOPY N/A 2/5/2018    ENTEROSCOPY performed by Asmita Prado MD at [address] [place] [address] N/A 9/28/2020    GASTROSTOMY TUBE PLACEMENT- [facility] TO DO GASTRIC ENDOSCOPY performed by Cindy Diaz MD at [place] [address], VAGINAL      LAPAROSCOPIC APPENDECTOMY  5/16/2016    OTHER SURGICAL HISTORY      RECTAL FISSULRE    OTHER SURGICAL HISTORY  1986    D&C    OTHER SURGICAL HISTORY  2006    GI BLEED    PACEMAKER INSERTION Right 05/20/2019    Dr. Hussain Armstrong FLX DX W/BONNIE Swan 1978 PFRMD Left 2/6/2018    COLONOSCOPY performed by Manjeet Flynn MD at [facility] [place] End[facility]    MI OFFICE/OUTPT VISIT,PROCEDURE ONLY Left 2/18/2018    EGD ESOPHAGOGASTRODUODENOSCOPY performed by Jessica Arndt MD at [facility] [place] Endoscopy    MI OFFICE/OUTPT VISIT,PROCEDURE ONLY N/A 10/4/2018    EGD DIAGNOSTIC ONLY performed by Seferino Lobo MD at [place] [address] ENDOSCOPY Left 9/24/2019    EGD DIAGNOSTIC ONLY performed by Marina Spencer MD at [facility] [place] Endoscopy    UPPER GASTROINTESTINAL ENDOSCOPY N/A 9/26/2020    ENTEROSCOPY PUSH CONTROL [facility]ORRHAGE [facility] [place] [facility]       PREVIOUSMEDICATIONS:  Outpatient Medications Prior to Visit   Medication Sig Dispense Refill    Lactobacillus-Inulin ([address]) CAPS TAKE 1 CAPSULE BY MOUTH WITH BREAKFAST 30 capsule 1    fluticasone (FLONASE) 50 MCG/ACT nasal spray 2 sprays by Each Nostril route daily      pantoprazole (PROTONIX) 40 MG tablet Take 1 tablet by mouth 2 times daily (before meals) 180 tablet 2    docusate (COLACE, DULCOLAX) 100 MG CAPS Take 100 mg by mouth 2 times daily 60 capsule 2    levothyroxine (SYNTHROID) 100 MCG tablet TAKE 1 TABLET BY MOUTH ONE TIME A DAY 90 tablet 1    Nutritional Supplements (DHEA PO) Take 50 mg by mouth daily       ondansetron (ZOFRAN) 4 MG tablet Take 1 tablet by mouth 3 times daily as needed for Nausea or Vomiting 15 tablet 0    ipratropium (ATROVENT) 0.03 % nasal spray INSTILL 2 SPRAYS IN EACH NOSTRIL THREE TIMES A DAY 30 mL 3    Prosthesis MISC by Does not apply route Strap for the prosthesis broken please order new 1 each 0    loratadine (CLARITIN) 10 MG tablet Take 1 tablet by mouth daily 90 tablet 1    polyethylene glycol (GLYCOLAX) powder Take 17 g by mouth daily      Cholecalciferol (VITAMIN D) 2000 units CAPS capsule Take by mouth nightly 50 mcg nightly      Magnesium Oxide 250 MG TABS Take 250 mg by mouth nightly       calcium carbonate 600 MG TABS tablet Take 1 tablet by mouth nightly       vitamin B-12 1000 MCG tablet Take 1 tablet by mouth daily 30 tablet 5    dimethyl fumarate (TECFIDERA) 240 MG delayed release capsule Take 240 mg by mouth 2 times daily      Prenatal MV-Min-Fe Fum-FA-DHA (PRENATAL 1 PO) Take 1 tablet by mouth daily       ferrous sulfate 325 (65 FE) MG tablet Take 325 mg by mouth 2 times daily (with meals)       diclofenac sodium (VOLTAREN) 1 % GEL Apply 4 g topically 2 times daily for 10 days 80 g 0    Elastic Bandages & Supports (KNEE STABILIZER/ELASTIC/SM/MED) MISC Apply 1 Device topically daily for 5 days 1 each 0    lamoTRIgine (LAMICTAL) 200 MG tablet TAKE 1 TABLET BY MOUTH ONCE DAILY. 30 tablet 1    lithium 300 MG tablet Take 2 tablets by mouth 2 times daily 120 tablet 1    lurasidone (LATUDA) 20 MG TABS tablet Take 1 tablet by mouth daily 30 tablet 1    vilazodone HCl (VILAZODONE HCL) 40 MG TABS TAKE 1 TABLET BY MOUTH ONCE DAILY. 30 tablet 1    nitroGLYCERIN (NITROSTAT) 0.4 MG SL tablet up to max of 3 total doses. If no relief after 1 dose, call 911. 25 tablet 3     No facility-administered medications prior to visit. ALLERGIES:    Latex and Sulfa antibiotics    REVIEW OF SYSTEMS:    Review of Systems    The patient sees Cori Contreras DO as her primary care provider. SPECIALISTS: Dr. Edith Liu (GI), Dr. Love Randall, Dr. Jeison Herman (neurology)    OBJECTIVE DATA     There were no vitals taken for this visit. Physical Exam    Mental Status Evaluation:   Orientation: Alert, oriented, thought content appropriate   Mood:. Euthymic      Affect:  Normal      Appearance:  right upper extremity prosthetic, Age Appropriate, Casually Dressed, Clean, Well Groomed, Clothing Appropriate for Age and Clothing Appropriate for Weather   Activity:  Cooperative, Good Eye Contact and Seated Calmly   Gait/Posture: Normal   Speech:  Clear, Fluent, Normal Pitch and Volume, Age and Situation Appropriate   Thought Process:   Within Normal Limits   Thought Content: Within Normal Limits   Cognition:  Grossly Intact   Memory: Intact   Insight:  Good   Judgment: Good   Suicidal Ideations: Denies Suicidal Ideation   Homicidal Ideations: Negative for homicidal ideation   Medication Side Effects: Absent       Attention Span Attention span and concentration were age appropriate       Screenings Completed in This Encounter:     Anxiety and Depression:                    DIAGNOSIS AND ASSESSMENT DATA     DIAGNOSIS:   1. Bipolar I, most recent episode depressed, partial remission ([facility]        PLAN   Follow-up:  Return in about 6 weeks (around 3/9/2021), or if symptoms worsen or fail to improve, for follow-up and medication management. Prescriptions for this encounter:  New Prescriptions    No medications on file       Orders Placed This Encounter   Medications    lamoTRIgine (LAMICTAL) 200 MG tablet     Sig: TAKE 1 TABLET BY MOUTH ONCE DAILY. Dispense:  30 tablet     Refill:  1    lithium 300 MG tablet     Sig: Take 2 tablets by mouth 2 times daily     Dispense:  120 tablet     Refill:  1    vilazodone HCl (VILAZODONE HCL) 40 MG TABS     Sig: TAKE 1 TABLET BY MOUTH ONCE DAILY. Dispense:  30 tablet     Refill:  1       Medications Discontinued During This Encounter   Medication Reason    lurasidone (LATUDA) 20 MG TABS tablet Stop Taking at Discharge    lamoTRIgine (LAMICTAL) 200 MG tablet REORDER    lithium 300 MG tablet REORDER    vilazodone HCl (VILAZODONE HCL) 40 MG TABS REORDER       Additional orders:  No orders of the defined types were placed in this encounter. Patient has tolerated the dose reduction of Latuda without difficulty. Mood remains stable. She is requesting to continue with the dose reduction. At this time patient will stop the [place]. She will continue the Lamictal, Lithium, and Viibryd without changes. Patient to monitor mood closely. Discussed potential for worsening of mood with the discontinuation of the Latuda.  Supportive therapy

## 2021-02-03 NOTE — TELEPHONE ENCOUNTER
Patient's last appointment was : 1/12/2021  Patient's next appointment is : 6/10/2021  Last refilled:  1/11/21

## 2021-02-04 RX ORDER — LACTOBACILLUS RHAMNOSUS GG 15B CELL
CAPSULE, SPRINKLE ORAL
Qty: 30 CAPSULE | Refills: 1 | Status: SHIPPED | OUTPATIENT
Start: 2021-02-04

## 2021-02-12 ENCOUNTER — NURSE ONLY (OUTPATIENT)
Dept: LAB | Age: 61
End: 2021-02-12

## 2021-02-12 LAB
ALBUMIN SERPL-MCNC: 4.4 G/DL (ref 3.5–5.1)
ALP BLD-CCNC: 148 U/L (ref 38–126)
ALT SERPL-CCNC: 36 U/L (ref 11–66)
ANION GAP SERPL CALCULATED.3IONS-SCNC: 8 MEQ/L (ref 8–16)
AST SERPL-CCNC: 32 U/L (ref 5–40)
BASOPHILS # BLD: 0.3 %
BASOPHILS ABSOLUTE: 0 THOU/MM3 (ref 0–0.1)
BILIRUB SERPL-MCNC: 0.2 MG/DL (ref 0.3–1.2)
BUN BLDV-MCNC: 12 MG/DL (ref 7–22)
CALCIUM SERPL-MCNC: 10.3 MG/DL (ref 8.5–10.5)
CHLORIDE BLD-SCNC: 106 MEQ/L (ref 98–111)
CO2: 25 MEQ/L (ref 23–33)
CREAT SERPL-MCNC: 0.7 MG/DL (ref 0.4–1.2)
EOSINOPHIL # BLD: 1.7 %
EOSINOPHILS ABSOLUTE: 0.1 THOU/MM3 (ref 0–0.4)
ERYTHROCYTE [DISTWIDTH] IN BLOOD BY AUTOMATED COUNT: 18.1 % (ref 11.5–14.5)
ERYTHROCYTE [DISTWIDTH] IN BLOOD BY AUTOMATED COUNT: 64.4 FL (ref 35–45)
FERRITIN: 27 NG/ML (ref 10–291)
FOLATE: > 20 NG/ML (ref 4.8–24.2)
GFR SERPL CREATININE-BSD FRML MDRD: 85 ML/MIN/1.73M2
GLUCOSE BLD-MCNC: 77 MG/DL (ref 70–108)
HCT VFR BLD CALC: 41.6 % (ref 37–47)
HEMOGLOBIN: 12.9 GM/DL (ref 12–16)
IMMATURE GRANS (ABS): 0.02 THOU/MM3 (ref 0–0.07)
IMMATURE GRANULOCYTES: 0.3 %
IRON: 78 UG/DL (ref 50–170)
LYMPHOCYTES # BLD: 11.7 %
LYMPHOCYTES ABSOLUTE: 0.7 THOU/MM3 (ref 1–4.8)
MCH RBC QN AUTO: 30.1 PG (ref 26–33)
MCHC RBC AUTO-ENTMCNC: 31 GM/DL (ref 32.2–35.5)
MCV RBC AUTO: 97 FL (ref 81–99)
MONOCYTES # BLD: 6.8 %
MONOCYTES ABSOLUTE: 0.4 THOU/MM3 (ref 0.4–1.3)
NUCLEATED RED BLOOD CELLS: 0 /100 WBC
PLATELET # BLD: 227 THOU/MM3 (ref 130–400)
PMV BLD AUTO: 10.3 FL (ref 9.4–12.4)
POTASSIUM SERPL-SCNC: 4.3 MEQ/L (ref 3.5–5.2)
RBC # BLD: 4.29 MILL/MM3 (ref 4.2–5.4)
SEG NEUTROPHILS: 79.2 %
SEGMENTED NEUTROPHILS ABSOLUTE COUNT: 4.8 THOU/MM3 (ref 1.8–7.7)
SODIUM BLD-SCNC: 139 MEQ/L (ref 135–145)
TOTAL IRON BINDING CAPACITY: 371 UG/DL (ref 171–450)
TOTAL PROTEIN: 7.2 G/DL (ref 6.1–8)
VITAMIN B-12: 1205 PG/ML (ref 211–911)
WBC # BLD: 6.1 THOU/MM3 (ref 4.8–10.8)

## 2021-02-13 LAB — TRANSFERRIN: 320 MG/DL (ref 200–360)

## 2021-02-14 LAB — SOLUBLE TRANSFERRIN RECEPT: 3.6 MG/L (ref 1.9–4.4)

## 2021-03-09 ENCOUNTER — OFFICE VISIT (OUTPATIENT)
Dept: PSYCHIATRY | Age: 61
End: 2021-03-09
Payer: MEDICARE

## 2021-03-09 DIAGNOSIS — F31.75 BIPOLAR I, MOST RECENT EPISODE DEPRESSED, PARTIAL REMISSION (HCC): Primary | ICD-10-CM

## 2021-03-09 DIAGNOSIS — Z51.81 THERAPEUTIC DRUG MONITORING: ICD-10-CM

## 2021-03-09 PROCEDURE — 99214 OFFICE O/P EST MOD 30 MIN: CPT | Performed by: NURSE PRACTITIONER

## 2021-03-09 RX ORDER — LAMOTRIGINE 200 MG/1
TABLET ORAL
Qty: 30 TABLET | Refills: 2 | Status: SHIPPED | OUTPATIENT
Start: 2021-03-09 | End: 2021-08-03

## 2021-03-09 RX ORDER — VILAZODONE HYDROCHLORIDE 40 MG/1
TABLET ORAL
Qty: 30 TABLET | Refills: 2 | Status: SHIPPED | OUTPATIENT
Start: 2021-03-09 | End: 2021-08-31 | Stop reason: SDUPTHER

## 2021-03-09 RX ORDER — LITHIUM CARBONATE 300 MG
600 TABLET ORAL 2 TIMES DAILY
Qty: 120 TABLET | Refills: 2 | Status: SHIPPED | OUTPATIENT
Start: 2021-03-09 | End: 2021-08-31 | Stop reason: SDUPTHER

## 2021-03-09 NOTE — PROGRESS NOTES
hypomania; she is unsure how/why she was diagnosed with bipolar disorder     Panic Attacks:  no     Phobias:  no     Obsessions and Compulsions:  no     Body or Vocal Tics:  no     Hallucinations:  no     Delusions:  no    SOCIAL HISTORY:  Patient was born in [place], [place] and raised by her biological parents      Social History     Socioeconomic History    Marital status:      Spouse name: Not on file    Number of children: 0    Years of education: Not on file    Highest education level:  Bachelor's degree (e.g., BA, AB, BS)   Occupational History    Not on file   Social Needs    Financial resource strain: Not hard at all   [employer] insecurity     Worry: Never true     Inability: Never true   [employer] needs     Medical: No     Non-medical: No   Tobacco Use    Smoking status: Former Smoker     Packs/day: 0.10     Years: 0.50     Pack years: 0.05     Types: Cigarettes     Quit date: 10/24/1977     Years since quittin.4    Smokeless tobacco: Never Used   Substance and Sexual Activity    Alcohol use: No    Drug use: No    Sexual activity: Not Currently     Partners: Male   Lifestyle    Physical activity     Days per week: Not on file     Minutes per session: Not on file    Stress: Not on file   Relationships    Social connections     Talks on phone: Not on file     Gets together: Not on file     Attends Judaism service: Not on file     Active member of club or organization: Not on file     Attends meetings of clubs or organizations: Not on file     Relationship status: Not on file    Intimate partner violence     Fear of current or ex partner: Not on file     Emotionally abused: Not on file     Physically abused: Not on file     Forced sexual activity: Not on file   Other Topics Concern    Not on file   Social History Narrative    2020    LEVEL OF EDUCATION: graduated high school; earned her bachelor degree in education; did some master's level course work but did not complete the degree    SPECIAL EDUCATION NEEDS: None    RESIDENCE: Currently lives alone    LEGAL HISTORY: None    Sikh: [employer]    TRAUMA: None    : None    HOBBIES: writing, swimming, 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  She was  from 2002 to 2015; they  in 2015    CHILDREN: None    SUBSTANCE USE: None       FAMILY HISTORY:   Family History   Problem Relation Age of Onset   Jhonathan Re Cancer Mother     Colon Polyps Mother     Heart Disease Father     Glaucoma Father     Cancer Sister     Cancer Sister        Psychiatric Family History  None reported    PAST MEDICAL HISTORY:    Past Medical History:   Diagnosis Date    Balance problem     Bipolar I disorder, most recent episode (or current) depressed, in partial or unspecified remission 5/8/2013    Cancer ([address]     CERVICAL     Depression     Fatigue     Gallstones     Gastric bypass status for obesity 2002    GERD (gastroesophageal reflux disease)     History of blood transfusion     History of hysterectomy     REMOVAL OF ONE OVARY     Hypothyroidism     MS (multiple sclerosis) (HCC)     Paresthesias     RT LOWER LIMB    UTI (urinary tract infection)        PAST SURGICAL HISTORY:    Past Surgical History:   Procedure Laterality Date    APPENDECTOMY      BONE GRAFT Left     left leg to right arm    CARPAL TUNNEL RELEASE  1999    CHOLECYSTECTOMY, LAPAROSCOPIC  5/16/2016    COLONOSCOPY      DILATATION, ESOPHAGUS      ENDOSCOPY, COLON, DIAGNOSTIC      ENTEROSCOPY N/A 2/5/2018    ENTEROSCOPY performed by Pam Stevens MD at [address] [place] [address] N/A 9/28/2020    GASTROSTOMY TUBE PLACEMENT- [facility] TO DO GASTRIC ENDOSCOPY performed by Shaista Brewster MD at [place] [address], VAGINAL      LAPAROSCOPIC APPENDECTOMY  5/16/2016    OTHER SURGICAL HISTORY      RECTAL Limmie Sravani [address] [facility]    D&C    OTHER SURGICAL HISTORY  2006    GI BLEED    PACEMAKER INSERTION Right 05/20/2019    Dr. Rossy Basilio FLX DX W/COLLMEHDI [place] 1978 PFRMD Left 2/6/2018    COLONOSCOPY performed by Manjeet Flynn MD at [facility] Endoscopy    [place] OFFICE/OUTPT VISIT,PROCEDURE ONLY Left 2/18/2018    EGD ESOPHAGOGASTRODUODENOSCOPY performed by Jessica rAndt MD at [facility] Endoscopy    [place] OFFICE/OUTPT VISIT,PROCEDURE ONLY N/A 10/4/2018    EGD DIAGNOSTIC ONLY performed by Seferino Lobo MD at [place] [address] ENDOSCOPY Left 9/24/2019    EGD DIAGNOSTIC ONLY performed by Marina Spencer MD at [facility] Endoscopy   [facility] ENDOSCOPY N/A 9/26/2020    ENTEROSCOPY PUSH CONTROL HEMORRHAGE performed by Manjeet Flynn MD at [facility] Endoscopy       PREVIOUSMEDICATIONS:  Outpatient Medications Prior to Visit   Medication Sig Dispense Refill    Lactobacillus-Inulin ([address] [place] [address], [facility],) CAPS TAKE 1 CAPSULE BY MOUTH WITH BREAKFAST 30 capsule 1    diclofenac sodium (VOLTAREN) 1 % GEL Apply 4 g topically 2 times daily for 10 days 80 g 0    fluticasone (FLONASE) 50 MCG/ACT nasal spray 2 sprays by Each Nostril route daily      pantoprazole (PROTONIX) 40 MG tablet Take 1 tablet by mouth 2 times daily (before meals) 180 tablet 2    docusate (COLACE, DULCOLAX) 100 MG CAPS Take 100 mg by mouth 2 times daily 60 capsule 2    levothyroxine (SYNTHROID) 100 MCG tablet TAKE 1 TABLET BY MOUTH ONE TIME A DAY 90 tablet 1    Nutritional Supplements (DHEA PO) Take 50 mg by mouth daily       ondansetron (ZOFRAN) 4 MG tablet Take 1 tablet by mouth 3 times daily as needed for Nausea or Vomiting 15 tablet 0    ipratropium (ATROVENT) 0.03 % nasal spray INSTILL 2 SPRAYS IN EACH NOSTRIL THREE TIMES A DAY 30 mL 3    loratadine (CLARITIN) 10 MG tablet Take 1 tablet by mouth daily 90 tablet 1    polyethylene glycol (GLYCOLAX) powder Take 17 g by mouth daily      Cholecalciferol (VITAMIN D) 2000 units CAPS capsule Take by mouth nightly 50 mcg nightly      Magnesium Oxide 250 MG TABS Take 250 mg by mouth nightly       calcium carbonate 600 MG TABS tablet Take 1 tablet by mouth nightly       vitamin B-12 1000 MCG tablet Take 1 tablet by mouth daily 30 tablet 5    dimethyl fumarate (TECFIDERA) 240 MG delayed release capsule Take 240 mg by mouth 2 times daily      Prenatal MV-Min-Fe Fum-FA-DHA (PRENATAL 1 PO) Take 1 tablet by mouth daily       ferrous sulfate 325 (65 FE) MG tablet Take 325 mg by mouth 2 times daily (with meals)       lamoTRIgine (LAMICTAL) 200 MG tablet TAKE 1 TABLET BY MOUTH ONCE DAILY. 30 tablet 1    lithium 300 MG tablet Take 2 tablets by mouth 2 times daily 120 tablet 1    vilazodone HCl (VILAZODONE HCL) 40 MG TABS TAKE 1 TABLET BY MOUTH ONCE DAILY. 30 tablet 1    Elastic Bandages & Supports (KNEE STABILIZER/ELASTIC/SM/MED) MISC Apply 1 Device topically daily for 5 days 1 each 0    Prosthesis MISC by Does not apply route Strap for the prosthesis broken please order new 1 each 0    nitroGLYCERIN (NITROSTAT) 0.4 MG SL tablet up to max of 3 total doses. If no relief after 1 dose, call 911. 25 tablet 3     No facility-administered medications prior to visit. ALLERGIES:    Latex and Sulfa antibiotics    REVIEW OF SYSTEMS:    Review of Systems    The patient sees Leonarda Kayser, DO as her primary care provider. SPECIALISTS: Dr. Dandre Engle (GI), Dr. Hernan Arciniega, Dr. Lorane Lundborg (neurology)    OBJECTIVE DATA     There were no vitals taken for this visit.     Results for orders placed or performed in visit on 03/04/21   CBC   Result Value Ref Range    WBC 5.0 4.8 - 10.8 thou/mm3    RBC 4.25 4.20 - 5.40 mill/mm3    Hemoglobin 13.1 12.0 - 16.0 gm/dl    Hematocrit 42.0 37.0 - 47.0 %    MCV 98.8 81.0 - 99.0 fL    MCH 30.8 26.0 - 33.0 pg    MCHC 31.2 (L) 32.2 - 35.5 gm/dl    RDW-CV 16.1 (H) 11.5 - 14.5 %    RDW-SD 58.8 AND ASSESSMENT DATA     DIAGNOSIS:   1. Bipolar I, most recent episode depressed, partial remission ([address]    2. Therapeutic drug monitoring        PLAN   Follow-up:  Return in about 3 months (around 6/9/2021), or if symptoms worsen or fail to improve, for follow-up and medication management. Prescriptions for this encounter:  New Prescriptions    No medications on file       Orders Placed This Encounter   Medications    vilazodone HCl (VILAZODONE HCL) 40 MG TABS     Sig: TAKE 1 TABLET BY MOUTH ONCE DAILY. Dispense:  30 tablet     Refill:  2    lithium 300 MG tablet     Sig: Take 2 tablets by mouth 2 times daily     Dispense:  120 tablet     Refill:  2    lamoTRIgine (LAMICTAL) 200 MG tablet     Sig: TAKE 1 TABLET BY MOUTH ONCE DAILY. Dispense:  30 tablet     Refill:  2       Medications Discontinued During This Encounter   Medication Reason    lamoTRIgine (LAMICTAL) 200 MG tablet REORDER    lithium 300 MG tablet REORDER    vilazodone HCl (VILAZODONE HCL) 40 MG TABS REORDER       Additional orders:  Orders Placed This Encounter   Procedures    Lithium Level     Patient continues to do very well. Mood remains stable. She will continue current medications without changes at this time. Will consider dose reduction of Lamictal at next visit pending continued mood stability. Patient is encouraged to utilize nonpharmacologic coping skills such as deep breathing, guided imagery, guided meditation, muscle relaxation, calming music, and/or journaling. Risks, potential side effects, possibledrug-drug interactions, benefits and alternate treatments discussed in detail. All questions answered. Patient stated understanding and is agreeable to treatment plan. Patient has been instructed to seek emergency help via the emergency and/or calling 911 should symptoms become severe, worsen, or with other concerning symptoms.  Patient instructed to goimmediately to the emergency room and/or call 911 with any suicidal or homicidal ideations or if audio/visual hallucinations develop  Patient stated understanding and agrees. Patient given crisis center information. Provider Signature:  Electronically signed by AJITH Urena CNP on 3/9/2021 at 10:48 AM    **This report has been created using voice recognition software. It may contain minor errors which are inherent in voice recognition technology. **

## 2021-03-20 ENCOUNTER — HOSPITAL ENCOUNTER (OUTPATIENT)
Age: 61
Discharge: HOME | End: 2021-03-20
Payer: MEDICARE

## 2021-03-20 DIAGNOSIS — Z51.81 THERAPEUTIC DRUG MONITORING: ICD-10-CM

## 2021-03-20 DIAGNOSIS — F31.75 BIPOLAR I, MOST RECENT EPISODE DEPRESSED, PARTIAL REMISSION (HCC): ICD-10-CM

## 2021-03-20 LAB — LITHIUM LEVEL: 1.03 MMOL/L (ref 0.6–1.2)

## 2021-03-20 PROCEDURE — 80178 ASSAY OF LITHIUM: CPT

## 2021-03-20 PROCEDURE — 36415 COLL VENOUS BLD VENIPUNCTURE: CPT

## 2021-03-22 DIAGNOSIS — F31.9 BIPOLAR 1 DISORDER (HCC): ICD-10-CM

## 2021-03-22 DIAGNOSIS — Z51.81 THERAPEUTIC DRUG MONITORING: Primary | ICD-10-CM

## 2021-03-30 ENCOUNTER — TELEPHONE (OUTPATIENT)
Dept: PSYCHIATRY | Age: 61
End: 2021-03-30

## 2021-03-30 ENCOUNTER — NURSE ONLY (OUTPATIENT)
Dept: LAB | Age: 61
End: 2021-03-30

## 2021-03-30 ENCOUNTER — HOSPITAL ENCOUNTER (OUTPATIENT)
Dept: MRI IMAGING | Age: 61
Discharge: HOME | End: 2021-03-30
Payer: MEDICARE

## 2021-03-30 DIAGNOSIS — F31.9 BIPOLAR 1 DISORDER (HCC): ICD-10-CM

## 2021-03-30 DIAGNOSIS — Z51.81 THERAPEUTIC DRUG MONITORING: ICD-10-CM

## 2021-03-30 DIAGNOSIS — G35 MULTIPLE SCLEROSIS (HCC): ICD-10-CM

## 2021-03-30 LAB — LITHIUM LEVEL: 0.68 MMOL/L (ref 0.6–1.2)

## 2021-03-30 PROCEDURE — 70553 MRI BRAIN STEM W/O & W/DYE: CPT

## 2021-03-30 PROCEDURE — 6360000004 HC RX CONTRAST MEDICATION: Performed by: PSYCHIATRY & NEUROLOGY

## 2021-03-30 PROCEDURE — A9579 GAD-BASE MR CONTRAST NOS,1ML: HCPCS | Performed by: PSYCHIATRY & NEUROLOGY

## 2021-03-30 RX ADMIN — GADOTERIDOL 10 ML: 279.3 INJECTION, SOLUTION INTRAVENOUS at 16:11

## 2021-03-30 NOTE — TELEPHONE ENCOUNTER
Jesi Wong contacted office seeking advice, she is having some increased depression, she indicated that a medication (could not recall the name (office note indicated latuda)was discontinued on OV 1/26 soon after she felt the depression coming back, not scheduled til 6/8, placed on the cancellation list for earlier apt.

## 2021-04-01 NOTE — TELEPHONE ENCOUNTER
I spoke with Niecy Bunn; she said that this incident occurred on Friday. 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 has became less and less spiritual and she only fears that it will only get worse. She said that the incident did not help her depression that has been ongoing and feels that it has gone downhill from her last appt with this provider. She said that she is not able to target any triggers or changes that have worsened it. She reports again that a change in her medications has not happened in years; so she is unsure if that is the cause. She reports that she was doing better on her previous medications; \"I was more level headed then. \"    Please advise. She is not scheduled to return until 06/08/21.

## 2021-04-01 NOTE — TELEPHONE ENCOUNTER
Suspect the worsening of mood is due to situational stressors. However, patient is offering slightly conflicting reports of symptom control. With this in mind, we can restart the Latuda if patient would like. If she wishes to restart I will send Latuda 20mg take one tab daily to the pharmacy.

## 2021-04-05 NOTE — TELEPHONE ENCOUNTER
I spoke with Noel Cruz and informed her of this information; she reports that yes she would like to restart [place]. She said that she has some remaining at home that she said this provider instructed her to set to the side for now. She said that she also has a pill cutter so if it is a higher dose she would split it. I informed her that if it is a higher dose and she is unable to make the 20mg that she would need to contact the office; because she will not be able to restart the medication at a higher dose; she understood and said that she would.

## 2021-04-08 DIAGNOSIS — J34.89 RHINORRHEA: ICD-10-CM

## 2021-04-08 RX ORDER — IPRATROPIUM BROMIDE 21 UG/1
SPRAY, METERED NASAL
Qty: 1 BOTTLE | Refills: 3 | Status: SHIPPED | OUTPATIENT
Start: 2021-04-08

## 2021-04-08 NOTE — TELEPHONE ENCOUNTER
Last visit- 1/12/2021  Next visit- 6/10/2021    Requested Prescriptions     Pending Prescriptions Disp Refills    ipratropium (ATROVENT) 0.03 % nasal spray [Pharmacy Med Name: IPRATROPIUM BROMIDE 0.03% SOLN] 1 Bottle 3     Sig: INSTILL 2 SPRAYS INTO EACH NOSTRIL 3 TIMES DAILY.

## 2021-04-12 ENCOUNTER — NURSE ONLY (OUTPATIENT)
Dept: LAB | Age: 61
End: 2021-04-12

## 2021-04-12 DIAGNOSIS — E03.1 CONGENITAL HYPOTHYROIDISM WITHOUT GOITER: ICD-10-CM

## 2021-04-12 DIAGNOSIS — K92.2: ICD-10-CM

## 2021-04-12 DIAGNOSIS — G35 MS (MULTIPLE SCLEROSIS) (HCC): ICD-10-CM

## 2021-04-12 DIAGNOSIS — K90.9 INTESTINAL MALABSORPTION, UNSPECIFIED TYPE: ICD-10-CM

## 2021-04-12 DIAGNOSIS — D50.8 OTHER IRON DEFICIENCY ANEMIA: ICD-10-CM

## 2021-04-12 LAB
ALBUMIN SERPL-MCNC: 4.5 G/DL (ref 3.5–5.1)
ALP BLD-CCNC: 114 U/L (ref 38–126)
ALT SERPL-CCNC: 29 U/L (ref 11–66)
ANION GAP SERPL CALCULATED.3IONS-SCNC: 11 MEQ/L (ref 8–16)
AST SERPL-CCNC: 22 U/L (ref 5–40)
BILIRUB SERPL-MCNC: 0.2 MG/DL (ref 0.3–1.2)
BILIRUBIN DIRECT: < 0.2 MG/DL (ref 0–0.3)
BUN BLDV-MCNC: 19 MG/DL (ref 7–22)
CALCIUM SERPL-MCNC: 10.5 MG/DL (ref 8.5–10.5)
CHLORIDE BLD-SCNC: 109 MEQ/L (ref 98–111)
CHOLESTEROL, TOTAL: 157 MG/DL (ref 100–199)
CO2: 21 MEQ/L (ref 23–33)
CREAT SERPL-MCNC: 0.7 MG/DL (ref 0.4–1.2)
GFR SERPL CREATININE-BSD FRML MDRD: 85 ML/MIN/1.73M2
GLUCOSE BLD-MCNC: 78 MG/DL (ref 70–108)
HDLC SERPL-MCNC: 103 MG/DL
IRON: 150 UG/DL (ref 50–170)
LDL CHOLESTEROL CALCULATED: 40 MG/DL
MAGNESIUM: 2.1 MG/DL (ref 1.6–2.4)
POTASSIUM SERPL-SCNC: 4.2 MEQ/L (ref 3.5–5.2)
SODIUM BLD-SCNC: 141 MEQ/L (ref 135–145)
TOTAL IRON BINDING CAPACITY: 382 UG/DL (ref 171–450)
TOTAL PROTEIN: 7 G/DL (ref 6.1–8)
TRIGL SERPL-MCNC: 71 MG/DL (ref 0–199)
TSH SERPL DL<=0.05 MIU/L-ACNC: 2.44 UIU/ML (ref 0.4–4.2)
VITAMIN D 25-HYDROXY: 28 NG/ML (ref 30–100)

## 2021-04-14 LAB — DHEAS (DHEA SULFATE): 84.6 UG/DL (ref 13–130)

## 2021-04-17 LAB — DHEA UNCONJUGATED: 1.69 NG/ML (ref 0.63–4.7)

## 2021-04-20 ENCOUNTER — TELEPHONE (OUTPATIENT)
Dept: FAMILY MEDICINE CLINIC | Age: 61
End: 2021-04-20

## 2021-04-20 NOTE — TELEPHONE ENCOUNTER
----- Message from Chip Rodriguez DO sent at 4/19/2021  5:07 PM EDT -----  It is a bit better now  - will see you in June!

## 2021-05-13 ENCOUNTER — NURSE ONLY (OUTPATIENT)
Dept: LAB | Age: 61
End: 2021-05-13

## 2021-05-13 ENCOUNTER — HOSPITAL ENCOUNTER (OUTPATIENT)
Dept: ULTRASOUND IMAGING | Age: 61
Discharge: HOME | End: 2021-05-13
Payer: MEDICARE

## 2021-05-13 DIAGNOSIS — R74.8 ACID PHOSPHATASE ELEVATED: ICD-10-CM

## 2021-05-13 DIAGNOSIS — R74.8 ALKALINE PHOSPHATASE ELEVATION: ICD-10-CM

## 2021-05-13 LAB
ALBUMIN SERPL-MCNC: 4.8 G/DL (ref 3.5–5.1)
ALP BLD-CCNC: 103 U/L (ref 38–126)
ALT SERPL-CCNC: 25 U/L (ref 11–66)
ANION GAP SERPL CALCULATED.3IONS-SCNC: 10 MEQ/L (ref 8–16)
AST SERPL-CCNC: 24 U/L (ref 5–40)
BASOPHILS # BLD: 0.6 %
BASOPHILS ABSOLUTE: 0 THOU/MM3 (ref 0–0.1)
BILIRUB SERPL-MCNC: 0.4 MG/DL (ref 0.3–1.2)
BUN BLDV-MCNC: 15 MG/DL (ref 7–22)
CALCIUM SERPL-MCNC: 10.4 MG/DL (ref 8.5–10.5)
CHLORIDE BLD-SCNC: 109 MEQ/L (ref 98–111)
CO2: 21 MEQ/L (ref 23–33)
CREAT SERPL-MCNC: 0.6 MG/DL (ref 0.4–1.2)
EOSINOPHIL # BLD: 1.1 %
EOSINOPHILS ABSOLUTE: 0.1 THOU/MM3 (ref 0–0.4)
ERYTHROCYTE [DISTWIDTH] IN BLOOD BY AUTOMATED COUNT: 14.5 % (ref 11.5–14.5)
ERYTHROCYTE [DISTWIDTH] IN BLOOD BY AUTOMATED COUNT: 55.8 FL (ref 35–45)
GFR SERPL CREATININE-BSD FRML MDRD: > 90 ML/MIN/1.73M2
GLUCOSE BLD-MCNC: 77 MG/DL (ref 70–108)
HCT VFR BLD CALC: 43.6 % (ref 37–47)
HEMOGLOBIN: 13.4 GM/DL (ref 12–16)
IMMATURE GRANS (ABS): 0.03 THOU/MM3 (ref 0–0.07)
IMMATURE GRANULOCYTES: 0.4 %
INR BLD: 0.91 (ref 0.85–1.13)
LYMPHOCYTES # BLD: 9.5 %
LYMPHOCYTES ABSOLUTE: 0.7 THOU/MM3 (ref 1–4.8)
MCH RBC QN AUTO: 32 PG (ref 26–33)
MCHC RBC AUTO-ENTMCNC: 30.7 GM/DL (ref 32.2–35.5)
MCV RBC AUTO: 104.1 FL (ref 81–99)
MONOCYTES # BLD: 7.2 %
MONOCYTES ABSOLUTE: 0.5 THOU/MM3 (ref 0.4–1.3)
NUCLEATED RED BLOOD CELLS: 0 /100 WBC
PLATELET # BLD: 226 THOU/MM3 (ref 130–400)
PMV BLD AUTO: 10.3 FL (ref 9.4–12.4)
POTASSIUM SERPL-SCNC: 4.5 MEQ/L (ref 3.5–5.2)
RBC # BLD: 4.19 MILL/MM3 (ref 4.2–5.4)
SEG NEUTROPHILS: 81.2 %
SEGMENTED NEUTROPHILS ABSOLUTE COUNT: 5.7 THOU/MM3 (ref 1.8–7.7)
SODIUM BLD-SCNC: 140 MEQ/L (ref 135–145)
TOTAL PROTEIN: 7.1 G/DL (ref 6.1–8)
WBC # BLD: 7 THOU/MM3 (ref 4.8–10.8)

## 2021-05-13 PROCEDURE — 93975 VASCULAR STUDY: CPT

## 2021-05-13 PROCEDURE — 76705 ECHO EXAM OF ABDOMEN: CPT

## 2021-06-04 ENCOUNTER — HOSPITAL ENCOUNTER (OUTPATIENT)
Age: 61
Discharge: HOME | End: 2021-06-04
Payer: MEDICARE

## 2021-06-04 LAB
BASOPHILS # BLD: 0.7 %
BASOPHILS ABSOLUTE: 0.1 THOU/MM3 (ref 0–0.1)
CREAT SERPL-MCNC: 0.5 MG/DL (ref 0.4–1.2)
EOSINOPHIL # BLD: 1.7 %
EOSINOPHILS ABSOLUTE: 0.1 THOU/MM3 (ref 0–0.4)
ERYTHROCYTE [DISTWIDTH] IN BLOOD BY AUTOMATED COUNT: 13.6 % (ref 11.5–14.5)
ERYTHROCYTE [DISTWIDTH] IN BLOOD BY AUTOMATED COUNT: 52.4 FL (ref 35–45)
GFR SERPL CREATININE-BSD FRML MDRD: > 90 ML/MIN/1.73M2
HCT VFR BLD CALC: 42.2 % (ref 37–47)
HEMOGLOBIN: 13.2 GM/DL (ref 12–16)
IMMATURE GRANS (ABS): 0.03 THOU/MM3 (ref 0–0.07)
IMMATURE GRANULOCYTES: 0.4 %
LYMPHOCYTES # BLD: 7.8 %
LYMPHOCYTES ABSOLUTE: 0.6 THOU/MM3 (ref 1–4.8)
MCH RBC QN AUTO: 32.5 PG (ref 26–33)
MCHC RBC AUTO-ENTMCNC: 31.3 GM/DL (ref 32.2–35.5)
MCV RBC AUTO: 103.9 FL (ref 81–99)
MONOCYTES # BLD: 8.5 %
MONOCYTES ABSOLUTE: 0.6 THOU/MM3 (ref 0.4–1.3)
NUCLEATED RED BLOOD CELLS: 0 /100 WBC
PLATELET # BLD: 200 THOU/MM3 (ref 130–400)
PMV BLD AUTO: 9.8 FL (ref 9.4–12.4)
RBC # BLD: 4.06 MILL/MM3 (ref 4.2–5.4)
SEG NEUTROPHILS: 80.9 %
SEGMENTED NEUTROPHILS ABSOLUTE COUNT: 6.1 THOU/MM3 (ref 1.8–7.7)
WBC # BLD: 7.6 THOU/MM3 (ref 4.8–10.8)

## 2021-06-04 PROCEDURE — 36415 COLL VENOUS BLD VENIPUNCTURE: CPT

## 2021-06-04 PROCEDURE — 82565 ASSAY OF CREATININE: CPT

## 2021-06-04 PROCEDURE — 85025 COMPLETE CBC W/AUTO DIFF WBC: CPT

## 2021-06-07 RX ORDER — DOCUSATE SODIUM 100 MG/1
CAPSULE, LIQUID FILLED ORAL
Qty: 60 CAPSULE | Refills: 2 | Status: SHIPPED | OUTPATIENT
Start: 2021-06-07 | End: 2022-01-20

## 2021-06-07 NOTE — TELEPHONE ENCOUNTER
Last visit- 1/12/2021  Next visit- 6/7/2021    Requested Prescriptions     Pending Prescriptions Disp Refills    docusate sodium (COLACE) 100 MG capsule [Pharmacy Med Name: DOCUSATE SODIUM 100MG CAPS] 60 capsule 2     Sig: TAKE 1 CAPSULE BY MOUTH 2 TIMES A DAY

## 2021-06-10 ENCOUNTER — OFFICE VISIT (OUTPATIENT)
Dept: FAMILY MEDICINE CLINIC | Age: 61
End: 2021-06-10
Payer: MEDICARE

## 2021-06-10 VITALS
DIASTOLIC BLOOD PRESSURE: 60 MMHG | SYSTOLIC BLOOD PRESSURE: 112 MMHG | OXYGEN SATURATION: 98 % | BODY MASS INDEX: 26.97 KG/M2 | WEIGHT: 125 LBS | RESPIRATION RATE: 16 BRPM | HEIGHT: 57 IN | TEMPERATURE: 98.9 F | HEART RATE: 63 BPM

## 2021-06-10 DIAGNOSIS — R00.1 SYMPTOMATIC BRADYCARDIA: ICD-10-CM

## 2021-06-10 DIAGNOSIS — K90.9 INTESTINAL MALABSORPTION, UNSPECIFIED TYPE: ICD-10-CM

## 2021-06-10 DIAGNOSIS — G35 MS (MULTIPLE SCLEROSIS) (HCC): Primary | ICD-10-CM

## 2021-06-10 PROBLEM — K92.2: Status: RESOLVED | Noted: 2018-10-03 | Resolved: 2021-06-10

## 2021-06-10 PROBLEM — D50.9 IRON DEFICIENCY ANEMIA: Status: RESOLVED | Noted: 2017-02-22 | Resolved: 2021-06-10

## 2021-06-10 PROBLEM — R94.39 ABNORMAL NUCLEAR STRESS TEST: Status: RESOLVED | Noted: 2017-04-25 | Resolved: 2021-06-10

## 2021-06-10 PROBLEM — K25.0 ACUTE GASTRIC ULCER WITH HEMORRHAGE: Status: RESOLVED | Noted: 2019-01-08 | Resolved: 2021-06-10

## 2021-06-10 PROCEDURE — 99213 OFFICE O/P EST LOW 20 MIN: CPT | Performed by: FAMILY MEDICINE

## 2021-06-10 RX ORDER — DIMETHYL FUMARATE 240 MG/1
240 CAPSULE ORAL 2 TIMES DAILY
COMMUNITY
Start: 2021-06-10

## 2021-06-10 SDOH — ECONOMIC STABILITY: FOOD INSECURITY: WITHIN THE PAST 12 MONTHS, YOU WORRIED THAT YOUR FOOD WOULD RUN OUT BEFORE YOU GOT MONEY TO BUY MORE.: SOMETIMES TRUE

## 2021-06-10 SDOH — ECONOMIC STABILITY: FOOD INSECURITY: WITHIN THE PAST 12 MONTHS, THE FOOD YOU BOUGHT JUST DIDN'T LAST AND YOU DIDN'T HAVE MONEY TO GET MORE.: SOMETIMES TRUE

## 2021-06-10 ASSESSMENT — ENCOUNTER SYMPTOMS
RESPIRATORY NEGATIVE: 1
GASTROINTESTINAL NEGATIVE: 1

## 2021-06-10 ASSESSMENT — SOCIAL DETERMINANTS OF HEALTH (SDOH): HOW HARD IS IT FOR YOU TO PAY FOR THE VERY BASICS LIKE FOOD, HOUSING, MEDICAL CARE, AND HEATING?: NOT HARD AT ALL

## 2021-06-10 NOTE — PROGRESS NOTES
[address] [facility] [address]  [facility] [address]  Dept: [phone]  Loc: [phone]     Mac Guo is a 64 y.o. female who presents today for:  Chief Complaint   Patient presents with    6 Month Follow-Up     depression       Goals      Exercise 3x per week (30 min per time)           HPI:     HPI  She was at the GI doctor - the ultrasound on the liver did show a cyst - no more bleeding    Doing well with the depression - sees psychiatry downstairs - on the lurasidone and the lamicatal and the vibrid    She does have to know when to rest when walking and volunteering but overall the MS is doing fine. No muscle weakness - she tends to sway back and forth and back step when the MS is exacerbated. Infection from the blister from the prosthesis is all gone and the new socket is working out well. Did get the cath and the pacemaker in 2019 - doing well since    She did increase the dhea last time - she does chill on the weekends to be strong enough for work during the weekend - has not noticed a large difference  No question data found.     Past Medical History:   Diagnosis Date    Balance problem     Bipolar I disorder, most recent episode (or current) depressed, in partial or unspecified remission 5/8/2013    Cancer ([address]     CERVICAL     Depression     Fatigue     Gallstones     Gastric bypass status for obesity 2002    GERD (gastroesophageal reflux disease)     History of blood transfusion     History of hysterectomy     REMOVAL OF ONE OVARY     Hypothyroidism     Liver cyst     GI Dr Jonathan Christy MS (multiple sclerosis) ([address]     Paresthesias     RT LOWER LIMB    UTI (urinary tract infection)       Past Surgical History:   Procedure Laterality Date    APPENDECTOMY      BONE GRAFT Left     left leg to right arm    CARPAL TUNNEL RELEASE  1999    CHOLECYSTECTOMY, LAPAROSCOPIC  5/16/2016    COLONOSCOPY      DILATATION, ESOPHAGUS      ENDOSCOPY, COLON, DIAGNOSTIC      ENTEROSCOPY N/A 2018    ENTEROSCOPY performed by Yang Cruz MD at [address] [place] [address] N/A 2020    GASTROSTOMY TUBE PLACEMENT- [facility] TO DO GASTRIC ENDOSCOPY performed by Lia Mason MD at [facility], VAGINAL      LAPAROSCOPIC APPENDECTOMY  2016    OTHER SURGICAL HISTORY      RECTAL FISSULRE    OTHER SURGICAL HISTORY      D&C    OTHER SURGICAL HISTORY      GI BLEED    PACEMAKER INSERTION Right 2019    Dr. Grace Gallegos FLX DX W/COLLJ [address]  [facility] Left 2018    COLONOSCOPY performed by Yang Cruz MD at [facility]    MT OFFICE/OUTPT VISIT,PROCEDURE ONLY Left 2018    EGD ESOPHAGOGASTRODUODENOSCOPY performed by Tammie Dang MD at [address] [place] [address] OFFICE/OUTPT VISIT,PROCEDURE ONLY N/A 10/4/2018    EGD DIAGNOSTIC ONLY performed by Briseida Chung MD at [place] [address] ENDOSCOPY Left 2019    EGD DIAGNOSTIC ONLY performed by Mesha Koroma MD at [address] [facility] ENDOSCOPY N/A 2020    ENTEROSCOPY PUSH CONTROL HEMORRHAGE performed by Yang Cruz MD at [facility]     Family History   Problem Relation Age of Onset    Cancer Mother     Colon Polyps Mother     Heart Disease Father     Glaucoma Father     Cancer Sister     Cancer Sister      Social History     Tobacco Use    Smoking status: Former Smoker     Packs/day: 0.10     Years: 0.50     Pack years: 0.05     Types: Cigarettes     Quit date: 10/24/1977     Years since quittin.6    Smokeless tobacco: Never Used   Substance Use Topics    Alcohol use: No      Current Outpatient Medications   Medication Sig Dispense Refill    lurasidone (LATUDA) 20 MG TABS tablet Take 20 mg by mouth daily      dimethyl fumarate (TECFIDERA) 240 MG delayed release capsule Take 1 capsule by mouth 2 times daily      docusate sodium (COLACE) 100 MG capsule TAKE 1 CAPSULE BY MOUTH 2 TIMES A DAY 60 capsule 2    ipratropium (ATROVENT) 0.03 % nasal spray INSTILL 2 SPRAYS INTO EACH NOSTRIL 3 TIMES DAILY. 1 Bottle 3    vilazodone HCl (VILAZODONE HCL) 40 MG TABS TAKE 1 TABLET BY MOUTH ONCE DAILY. 30 tablet 2    lithium 300 MG tablet Take 2 tablets by mouth 2 times daily 120 tablet 2    lamoTRIgine (LAMICTAL) 200 MG tablet TAKE 1 TABLET BY MOUTH ONCE DAILY. 30 tablet 2    Lactobacillus-Inulin (CULTURELLE HEALTH, INULIN,) CAPS TAKE 1 CAPSULE BY MOUTH WITH BREAKFAST 30 capsule 1    Elastic Bandages & Supports (KNEE STABILIZER/ELASTIC/SM/MED) MISC Apply 1 Device topically daily for 5 days 1 each 0    fluticasone (FLONASE) 50 MCG/ACT nasal spray 2 sprays by Each Nostril route daily      pantoprazole (PROTONIX) 40 MG tablet Take 1 tablet by mouth 2 times daily (before meals) 180 tablet 2    levothyroxine (SYNTHROID) 100 MCG tablet TAKE 1 TABLET BY MOUTH ONE TIME A DAY 90 tablet 1    Nutritional Supplements (DHEA PO) Take 50 mg by mouth daily       ondansetron (ZOFRAN) 4 MG tablet Take 1 tablet by mouth 3 times daily as needed for Nausea or Vomiting 15 tablet 0    Prosthesis MISC by Does not apply route Strap for the prosthesis broken please order new 1 each 0    loratadine (CLARITIN) 10 MG tablet Take 1 tablet by mouth daily 90 tablet 1    Cholecalciferol (VITAMIN D) 2000 units CAPS capsule Take by mouth nightly 50 mcg nightly      Magnesium Oxide 250 MG TABS Take 250 mg by mouth nightly       calcium carbonate 600 MG TABS tablet Take 1 tablet by mouth nightly       vitamin B-12 1000 MCG tablet Take 1 tablet by mouth daily 30 tablet 5    nitroGLYCERIN (NITROSTAT) 0.4 MG SL tablet up to max of 3 total doses.  If no relief after 1 dose, call 911. 25 tablet 3    Prenatal MV-Min-Fe Fum-FA-DHA (PRENATAL 1 PO) Take 1 tablet by mouth daily        No current facility-administered medications for this visit. Allergies   Allergen Reactions    Latex Rash    Sulfa Antibiotics Rash       Health Maintenance   Topic Date Due    Annual Wellness Visit (AWV)  12/11/2021    TSH testing  04/12/2022    Breast cancer screen  01/19/2023    Pneumococcal 0-64 years Vaccine (2 of 2) 05/12/2025    Lipid screen  04/12/2026    DTaP/Tdap/Td vaccine (4 - Td or Tdap) 10/25/2028    Colon cancer screen colonoscopy  11/06/2029    Flu vaccine  Completed    Shingles Vaccine  Completed    COVID-19 Vaccine  Completed    Hepatitis C screen  Completed    HIV screen  Completed    Hepatitis A vaccine  Aged Out    Hepatitis B vaccine  Aged Out    Hib vaccine  Aged Out    Meningococcal (ACWY) vaccine  Aged Out       Subjective:      Review of Systems   Constitutional: Positive for fatigue. HENT: Negative. Respiratory: Negative. Gastrointestinal: Negative. Genitourinary: Negative. Neurological: Positive for weakness. Objective:     Vitals:    06/10/21 1108   BP: 112/60   Site: Left Upper Arm   Position: Sitting   Cuff Size: Medium Adult   Pulse: 63   Resp: 16   Temp: 98.9 °F (37.2 °C)   TempSrc: Oral   SpO2: 98%   Weight: 125 lb (56.7 kg)   Height: 4' 9\" (1.448 m)       Body mass index is 27.05 kg/m². Wt Readings from Last 3 Encounters:   06/10/21 125 lb (56.7 kg)   01/12/21 126 lb (57.2 kg)   01/05/21 122 lb 3.2 oz (55.4 kg)     BP Readings from Last 3 Encounters:   06/10/21 112/60   01/12/21 116/72   01/05/21 126/78       Physical Exam  Vitals and nursing note reviewed. Constitutional:       General: She is not in acute distress. Appearance: She is well-developed. She is not diaphoretic. HENT:      Head: Normocephalic and atraumatic. Right Ear: External ear normal.      Left Ear: External ear normal.   Eyes:      General: No scleral icterus. Right eye: No discharge. Left eye: No discharge. Conjunctiva/sclera: Conjunctivae normal.   Pulmonary:      Effort: Pulmonary effort is normal.      Breath sounds: Normal breath sounds. Musculoskeletal:      Cervical back: Normal range of motion. Skin:     General: Skin is warm and dry. Findings: No erythema or rash. Neurological:      Mental Status: She is alert and oriented to person, place, and time. Cranial Nerves: No cranial nerve deficit. Psychiatric:         Behavior: Behavior normal.         Thought Content: Thought content normal.         Judgment: Judgment normal.           Lab Results   Component Value Date    WBC 7.6 06/04/2021    HGB 13.2 06/04/2021    HCT 42.2 06/04/2021     06/04/2021    CHOL 157 04/12/2021    TRIG 71 04/12/2021     04/12/2021    LDLCALC 40 04/12/2021    AST 24 05/13/2021     05/26/2021    K 4.0 05/26/2021     05/26/2021    CREATININE 0.5 06/04/2021    BUN 12 05/26/2021    CO2 24 05/26/2021    TSH 2.440 04/12/2021    INR 0.91 05/13/2021    LABA1C 4.7 11/13/2019    LABGLOM >90 06/04/2021    MG 2.1 04/12/2021    CALCIUM 9.50 05/26/2021    VITD25 28 (L) 04/12/2021       Imaging Results:    US LIVER    Result Date: 5/13/2021  PROCEDURE: US LIVER, US DUP ABD PEL RETRO SCROT COMPLETE CLINICAL INFORMATION: Alkaline phosphatase elevation TECHNIQUE: Multiple sonographic images of the upper abdomen were obtained with specific attention given to the liver. A few images of the pancreas and right kidney were also obtained. FINDINGS: Liver - L= 16.7 cm Gallbladder - removed Common Duct - 0.7 cm Hendrickson's Sign: No Liver: The liver is normal in size, contour, and echogenicity. There is a small benign-appearing 13 mm cyst in the right hepatic lobe, unchanged from prior CT abdomen dated 9/20/2020. No abnormally dilated bile ducts are seen. Color Doppler imaging of the major vessels in and about the liver reveals no abnormalities. pancreas, right kidney: Unremarkable. Common bile duct: Normal in caliber. Kristi Finnegan resolution sagittal FLAIR images were also obtained.       FINDINGS:       Current lesion burden of white matter: mild   Interval since the most recent exam: 27 months. Interval new lesion development: None. Number of pathologically enhancing lesions of the white matter: None.       There is no restricted diffusion. The brain volume is normal.There are no intra-or extra-axial collections. Cecelia Finders is no hydrocephalus, midline shift or mass effect.           On the FLAIR and T2-weighted sequences, there is a mild amount of abnormal signal scattered in the white matter of the brain. Many of these radiate outward from the ventricular margins. These are small. There are a few scattered foci in the subcortical    white matter. All of these were present previously. There is no new abnormal signal.       On the gradient echo T2-weighted images, there is no susceptibility artifact present.       There is no abnormal enhancement in the brain. The major intracranial vascular flow voids are present.  The midline craniocervical junction structures are normal.  The brainstem and pituitary gland are normal.                       Impression       Mild amount of abnormal signal scattered in the white matter. This is consistent with multiple sclerosis. There are no new white matter lesions. There is no evidence of active demyelination.                   **This report has been created using voice recognition software. It may contain minor errors which are inherent in voice recognition technology. **           Final report electronically signed by Dr. Nandini Chew on 3/31/2021 8:08 AM         Assessment:      Diagnosis Orders   1. MS (multiple sclerosis) (HCC)  dimethyl fumarate (TECFIDERA) 240 MG delayed release capsule    DHEA    DHEA-Sulfate    Vitamin D 25 Hydroxy    Magnesium   2. Symptomatic bradycardia  DHEA    DHEA-Sulfate    Vitamin D 25 Hydroxy    Magnesium   3.  Intestinal malabsorption, unspecified type  Vitamin D 25 Hydroxy       Plan: Will add some more vitamin D - either in the diet with milk and eggs or in the vitamin D and extra on Sunday and Wednesday - cna add a little more fat in the diet    Will follow with neurology for the MS    Will follow with GI for the GI bleeds and the liver ultrasound    Will follow the dhea    Will recheck vitamins and dhea in the fall in see you back in October or November     The ASCVD Risk score (Rip Zavala., et al., 2013) failed to calculate for the following reasons: The valid HDL cholesterol range is 20 to 100 mg/dL        Return in about 4 months (around 10/10/2021). Orders Placed:  Orders Placed This Encounter   Procedures    DHEA    DHEA-Sulfate    Vitamin D 25 Hydroxy    Magnesium     Medications Prescribed:  Orders Placed This Encounter   Medications    dimethyl fumarate (TECFIDERA) 240 MG delayed release capsule     Sig: Take 1 capsule by mouth 2 times daily       Future Appointments   Date Time Provider Rudi Brandon   8/31/2021  2:30 PM [facility]       Patient given educational materials - see patient instructions. Discussed use, benefit, and sideeffects of prescribed medications. All patient questions answered. Pt voiced understanding. Reviewed health maintenance. Instructed to continue current medications, diet and exercise. Patient agreed with treatment plan. Follow up as directed. I was present for the key portions of the exam and history and confirmed all areas of the note with the patient, staff and the student.       Electronically signed by Selvin Kelly DO on 6/10/2021 at 11:43 AM

## 2021-06-10 NOTE — PATIENT INSTRUCTIONS
Will add some more vitamin D - either in the diet with milk and eggs or in the vitamin D and extra on Sunday and Wednesday - cna add a little more fat in the diet    Will follow with neurology for the MS    Will follow with GI for the GI bleeds and the liver ultrasound    Will follow the dhea    Will recheck vitamins and dhea in the fall in see you back in October or November     The ASCVD Risk score (Michelle Vincent., et al., 2013) failed to calculate for the following reasons:     The valid HDL cholesterol range is 20 to 100 mg/dL

## 2021-06-21 ENCOUNTER — TELEPHONE (OUTPATIENT)
Dept: FAMILY MEDICINE CLINIC | Age: 61
End: 2021-06-21

## 2021-06-21 DIAGNOSIS — G35 MS (MULTIPLE SCLEROSIS) (HCC): Primary | ICD-10-CM

## 2021-06-21 DIAGNOSIS — H91.93 DECREASED HEARING OF BOTH EARS: ICD-10-CM

## 2021-06-21 DIAGNOSIS — S48.919S: ICD-10-CM

## 2021-06-22 NOTE — TELEPHONE ENCOUNTER
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     I can sign an OT order

## 2021-07-07 ENCOUNTER — HOSPITAL ENCOUNTER (OUTPATIENT)
Dept: OCCUPATIONAL THERAPY | Age: 61
Setting detail: THERAPIES SERIES
Discharge: HOME | End: 2021-07-07
Payer: MEDICARE

## 2021-07-07 PROCEDURE — 97166 OT EVAL MOD COMPLEX 45 MIN: CPT

## 2021-07-07 NOTE — PROGRESS NOTES
** PLEASE SIGN, DATE AND TIME CERTIFICATION BELOW AND RETURN TO [facility] OUTPATIENT REHABILITATION (FAX #: [phone]). ATTEST/CO-SIGN IF ACCESSING VIA [employer] [facility]. THANK YOU.**    I certify that I have examined the patient below and determined that Physical Medicine and Rehabilitation service is necessary and that I approve the established plan of care for up to 90 days or as specifically noted. Attestation, signature or co-signature of physician indicates approval of certification requirements.    ________________________ ____________ __________  Physician Signature   Date   Time   [address] THERAPY  [x] EVALUATION  [] DAILY NOTE (LAND) [] DAILY NOTE (AQUATIC ) [] PROGRESS NOTE [] DISCHARGE NOTE    [x] [facility]   [] [place] [address]    [] [facility]   [] [facility]    Date: 2021  Patient Name:  Colleen Montana  : 1960  MRN: [id number]  CSN: [id number]    Referring Practitioner Nan Messina DO   Diagnosis Multiple sclerosis [G35]    Treatment Diagnosis MS; R UE prosthesis   Date of Evaluation 21      Functional Outcome Measure Used NT   Functional Outcome Score NT (21)       Insurance: Primary: Payor: Sarahi Fletcher /  /  / ,   Secondary: MEDICAID [place]   Authorization Information: PRE CERTIFICATION REQUIRED: YES, AFTER EVAL WITH AIM   INSURANCE THERAPY BENEFIT:     AQUATIC THERAPY COVERED: YES  MODALITIES COVERED:  YES, NO [place]   TELEHEALTH COVERED:    REFERENCE NUMBER:     Visit # 1, 1/10 for progress note   Visits Allowed: precert   Recertification Date:    Physician Follow-Up: none   Physician Orders: OT Eval and Tx   Pertinent History: 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 She has a new prosthesis that she received in May. It is for cosmetic purposes only and is minimally functional. Additionally, pt has had hearing aids for about 1-  1 1/2 years that Medicaid covers. Pt is unable to change hearing aid filters with her new prosthesis. Presents to OT for education and training on how to change filters on her own without assistance. She primarily uses L hand for all ADL     SUBJECTIVE: Not currently having pain. Social/Functional History:  Medications and Allergies have been reviewed and are listed on the [address] Cohen lives alone in an apartment with a level surface to enter. .    Task Prior Level of Function  (current level of function addressed below)   ADLs  Independent   Ambulation Independent   Transfers Independent   Hobbies Singing, writing, reading   Driving Active    Work On Disability     OBJECTIVE:  Hand Dominance left handed   Palpation    Observation    Posture    Edema    Special Tests        ADL's    Bed Mobility     Transfers    Balance        Sensation Right Upper Extremity: Absent. Coordination Impaired: limited opposition of prosthetic thumb to index/middle fingers           RIGHT UPPER EXTREMITY  RANGE OF MOTION    AROM PROM COMMENTS         Shoulder Flexion      Shoulder Extension      Shoulder Abduction      Shoulder Adduction      Shoulder External Rotation      Shoulder Internal Rotation      Shoulder Range of Motion is [place] [place]  []      Elbow Flexion      Elbow Extension      Forearm Pronation      Forearm Supination      Elbow Range of Motion is [place] [place]  []      Wrist Flexion      Wrist Extension      Wrist Radial Deviation      Wrist Ulnar Deviation      Wrist Range of Motion is [place] [place]  []   If no measurement is recorded, no formal assessment was completed for that motion. RIGHT UPPER EXTREMITY  STRENGTH    Strength Rating Comments   Shoulder Flexion     Shoulder Extension     Shoulder Abduction     Shoulder Adduction     Shoulder External Rotation     Shoulder Internal Rotation     []  Shoulder Strength is grossly WFL.       Elbow Flexion     Elbow Extension     Forearm Pronation     Forearm Supination     [] Elbow Strength is grossly WFL. Wrist Flexion     Wrist Extension     Wrist Radial Deviation     Wrist Ulnar Deviation     []  Wrist Strength is grossly WFL. Right Left    Strength Setting:      Pinch Strength Tip Pinch:      Lateral Pinch           If no ratings are recorded, no formal assessment was completed. TREATMENT   Precautions:    Pain:      X in shaded column indicates Activity Completed Today   Modalities Parameters/  Location  Notes/Comments                     Manual Therapy Time/  Technique  Notes/Comments                     Exercises   Sets/  Sec Reps  Notes/Comments                                                    Activities Time    Notes/Comments   Fabricate device to hold hearing aid for pt to use R arm as assist (without prosthesis) to stabilize while she used L hand to manipulate filter and dome covering on hearing aid. Applied hook velcro to clothespin which was then applied to loop velcro on a solid surface. Pt was able to insert hearing aid wires and rectangular piece that was the base for the filter into clothespin. At this point she used R arm to stabilize clothespin and used L hand to manipulate filter and dome, after demonstration from therapist using L hand only to accomplish this. Specific Interventions Next Treatment: Adjustments as needed for hearing aid stabilization for removal and application of hearing aid filter and dome. Activity/Treatment Tolerance:  []  Patient tolerated treatment well  []  Patient limited by fatigue  []  Patient limited by pain   [x]  Patient limited by other medical complications  []  Other:     Assessment: Pt benefitted from skilled OT for fabrication of device to stabilize hearing aid so that pt could remove and apply filter and dome to hearing aid, using R arm as assist and L hand/UE for fine motor manipulation.  Pt will benefit from further OT to modify or adjust stabilization device for full independence with filter removal and application. Areas for Improvement: fine motor control for I with hearing aid manipulation  Prognosis: good    GOALS:  Patient Goal: Independent with removing and applying hearing aid filter    Short Term Goals:  Time Frame: 2 weeks  *  Pt will demonstrate I with use of hearing aid stablization device. Long Term Goals:  Time Frame: 2 weeks  *  Pt will demonstrate I with use of hearing aid stabilization device to remove and apply filter and dome to hearing aids. .    Patient Education:   [x]  HEP/Education Completed: Plan of Care, Goals, adaptive device fabrication for hearing aid  []  No new Education completed  []  Reviewed Prior HEP      [x]  Patient verbalized and/or demonstrated understanding of education provided. []  Patient unable to verbalize and/or demonstrate understanding of education provided. Will continue education. [x]  Barriers to learning: hearing  PLAN:  Treatment Recommendations: Patient Education and adaptive equipment education/adjustment    [x]  Plan of care initiated. Plan to see patient 1 times per week for 2 weeks to address the treatment planned outlined above.   []  Continue with current plan of care  []  Modify plan of care as follows:    []  Hold pending physician visit  []  Discharge    Time In 1400   Time Out 1410   Timed Code Minutes: 0 min    Total Treatment Time: 70 min       Electronically Signed by: EBEN Buck/YOVANI, [address]

## 2021-07-30 ENCOUNTER — HOSPITAL ENCOUNTER (OUTPATIENT)
Age: 61
Discharge: HOME | End: 2021-07-30
Payer: MEDICARE

## 2021-07-30 LAB
ALBUMIN SERPL-MCNC: 4.6 G/DL (ref 3.5–5.1)
ALP BLD-CCNC: 121 U/L (ref 38–126)
ALT SERPL-CCNC: 69 U/L (ref 11–66)
AST SERPL-CCNC: 50 U/L (ref 5–40)
BILIRUB SERPL-MCNC: 0.4 MG/DL (ref 0.3–1.2)
BILIRUBIN DIRECT: < 0.2 MG/DL (ref 0–0.3)
ERYTHROCYTE [DISTWIDTH] IN BLOOD BY AUTOMATED COUNT: 13.7 % (ref 11.5–14.5)
ERYTHROCYTE [DISTWIDTH] IN BLOOD BY AUTOMATED COUNT: 52.1 FL (ref 35–45)
FERRITIN: 19 NG/ML (ref 10–291)
FOLATE: 17 NG/ML (ref 4.8–24.2)
HCT VFR BLD CALC: 40.8 % (ref 37–47)
HEMOGLOBIN: 13.1 GM/DL (ref 12–16)
IRON: 182 UG/DL (ref 50–170)
MCH RBC QN AUTO: 32.9 PG (ref 26–33)
MCHC RBC AUTO-ENTMCNC: 32.1 GM/DL (ref 32.2–35.5)
MCV RBC AUTO: 102.5 FL (ref 81–99)
PLATELET # BLD: 212 THOU/MM3 (ref 130–400)
PMV BLD AUTO: 9.9 FL (ref 9.4–12.4)
RBC # BLD: 3.98 MILL/MM3 (ref 4.2–5.4)
TOTAL IRON BINDING CAPACITY: 372 UG/DL (ref 171–450)
TOTAL PROTEIN: 6.6 G/DL (ref 6.1–8)
VITAMIN B-12: 1597 PG/ML (ref 211–911)
WBC # BLD: 6.2 THOU/MM3 (ref 4.8–10.8)

## 2021-07-30 PROCEDURE — 82607 VITAMIN B-12: CPT

## 2021-07-30 PROCEDURE — 80076 HEPATIC FUNCTION PANEL: CPT

## 2021-07-30 PROCEDURE — 84466 ASSAY OF TRANSFERRIN: CPT

## 2021-07-30 PROCEDURE — 82728 ASSAY OF FERRITIN: CPT

## 2021-07-30 PROCEDURE — 83540 ASSAY OF IRON: CPT

## 2021-07-30 PROCEDURE — 85027 COMPLETE CBC AUTOMATED: CPT

## 2021-07-30 PROCEDURE — 82746 ASSAY OF FOLIC ACID SERUM: CPT

## 2021-07-30 PROCEDURE — 36415 COLL VENOUS BLD VENIPUNCTURE: CPT

## 2021-07-30 PROCEDURE — 84238 ASSAY NONENDOCRINE RECEPTOR: CPT

## 2021-07-31 LAB — TRANSFERRIN: 313 MG/DL (ref 200–360)

## 2021-08-01 LAB — SOLUBLE TRANSFERRIN RECEPT: 2.6 MG/L (ref 1.9–4.4)

## 2021-08-03 RX ORDER — LAMOTRIGINE 200 MG/1
TABLET ORAL
Qty: 30 TABLET | Refills: 0 | Status: SHIPPED | OUTPATIENT
Start: 2021-08-03 | End: 2021-08-31 | Stop reason: SDUPTHER

## 2021-08-03 NOTE — TELEPHONE ENCOUNTER
Melinda Perez is requesting a refill of Lamictal 200mg/d on Haily's behalf indicating that the last refill was dispensed on 7/6. She attended an appointment 3/9 and is to return 8/31.

## 2021-08-18 NOTE — DISCHARGE SUMMARY
[address] DISCHARGE NOTE  Talya Herring    Date: 2021  Patient Name: Eren Cade        CSN: [id number]   : 1960  (64 y.o.)  Gender: female   Anahi Tristan DO,    Multiple sclerosis Libra Si,      Patient is discharged from Occupational Therapy services at this time. See last note for details related to results of therapy and goal achievement. Reason for discharge: One-time evaluation only. . Pt stopped by to let therapist know that hearing aide orthosis worked well for her. Did not request further follow up.        Mindi Reyez, EBEN/L, [address]

## 2021-08-31 ENCOUNTER — OFFICE VISIT (OUTPATIENT)
Dept: PSYCHIATRY | Age: 61
End: 2021-08-31
Payer: MEDICARE

## 2021-08-31 DIAGNOSIS — Z51.81 THERAPEUTIC DRUG MONITORING: ICD-10-CM

## 2021-08-31 DIAGNOSIS — F31.75 BIPOLAR I, MOST RECENT EPISODE DEPRESSED, PARTIAL REMISSION (HCC): Primary | ICD-10-CM

## 2021-08-31 PROCEDURE — 99214 OFFICE O/P EST MOD 30 MIN: CPT | Performed by: NURSE PRACTITIONER

## 2021-08-31 RX ORDER — VILAZODONE HYDROCHLORIDE 40 MG/1
TABLET ORAL
Qty: 30 TABLET | Refills: 2 | Status: SHIPPED | OUTPATIENT
Start: 2021-08-31 | End: 2021-11-30 | Stop reason: SDUPTHER

## 2021-08-31 RX ORDER — LITHIUM CARBONATE 300 MG
600 TABLET ORAL 2 TIMES DAILY
Qty: 120 TABLET | Refills: 2 | Status: SHIPPED | OUTPATIENT
Start: 2021-08-31 | End: 2021-11-30 | Stop reason: SDUPTHER

## 2021-08-31 RX ORDER — LAMOTRIGINE 200 MG/1
TABLET ORAL
Qty: 30 TABLET | Refills: 2 | Status: SHIPPED | OUTPATIENT
Start: 2021-08-31 | End: 2021-11-30 | Stop reason: SDUPTHER

## 2021-08-31 NOTE — PROGRESS NOTES
[address] [facility] [address]  Dept: [phone]  Dept Fax: [phone]  Loc: [phone]    Visit Date: 8/31/2021    SUBJECTIVE DATA     CHIEF COMPLAINT:    Chief Complaint   Patient presents with    Depression    Follow-up       History obtained from: patient    HISTORY OF PRESENT ILLNESS:    Heladio Lea is a 64 y.o. female who presents to the office for follow-up on her moods. Her last 03/09/2021. Patient states she is \"Ok. \"    States she is feeling a little down  -states she isn't spiraling  -noticed this about 3 weeks ago  -states she enjoys what she is doing but in the middle of the activity (pimarily volunteering) she wishes she would be home  -no new stressors   -no irritability  -no agitation  -denies anxiety  -motivation could be a little better; states it is \"so so\"; feels like she has to force herself to do things    Has resumed volunteering, which she enjoys     Sleeping okay overall. Has been on higher doses of Latuda in the past    Denies suicidal ideations, intent, plan. No homicidal ideations, intent, plan. No audiovisual hallucinations. HPI    The patient has had 0 lifetime suicide attempts. Reports she had intense thoughts of suicide with a plan and intent back in 1987 but someone came home. Patient reports 3 psych hospital admissions with the last admission taking place in 1996    Past psychiatric medications include: various per her report but does not recall specific names    Adverse reactions from psychotropic medications:  None      Current Psychiatric Review of Systems         Marian or Hypomania:  None current.  Previously reported: Patient does not recall specific marian or hypomania; she is unsure how/why she was diagnosed with bipolar disorder     Panic Attacks:  no     Phobias:  no     Obsessions and Compulsions:  no     Body or Vocal Tics:  no     Hallucinations:  no Delusions:  no    SOCIAL HISTORY:  Patient was born in [place], [place] and raised by her biological parents      Social History     Socioeconomic History    Marital status:      Spouse name: Not on file    Number of children: 0    Years of education: Not on file    Highest education level: Bachelor's degree (e.g., BA, AB, BS)   Occupational History    Not on file   Tobacco Use    Smoking status: Former Smoker     Packs/day: 0.10     Years: 0.50     Pack years: 0.05     Types: Cigarettes     Quit date: 10/24/1977     Years since quittin.8    Smokeless tobacco: Never Used   Vaping Use    Vaping Use: Never used   Substance and Sexual Activity    Alcohol use: No    Drug use: No    Sexual activity: Not Currently     Partners: Male   Other Topics Concern    Not on file   Social History Narrative    2021    LEVEL OF EDUCATION: graduated high school; earned her bachelor degree in education; did some master's level course work but did not complete the degree    SPECIAL EDUCATION NEEDS: None    RESIDENCE: Currently lives alone    LEGAL HISTORY: None    Pentecostal: Nondenominational    TRAUMA: None    : None    HOBBIES: writing, swimming, 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 She was  from  to ; they  in     CHILDREN: None    SUBSTANCE USE: None     Social Determinants of Health     Financial Resource Strain: Low Risk     Difficulty of Paying Living Expenses: Not hard at all   Food Insecurity: Food Insecurity Present    Worried About [address] in the Last Year: Sometimes true    Triston of Food in the Last Year: Sometimes true   Transportation Needs:     Lack of Transportation (Medical):      Lack of Transportation (Non-Medical):    Physical Activity:     Days of Exercise per Week:     Minutes of Exercise per Session:    Stress:     Feeling of Stress : Social Connections:     Frequency of Communication with Friends and Family:     Frequency of Social Gatherings with Friends and Family:     Attends Advent Services:     Active Member of Clubs or Organizations:     Attends Club or Organization Meetings:     Marital Status:    Intimate Partner Violence:     Fear of Current or Ex-Partner:     Emotionally Abused:     Physically Abused:     Sexually Abused:        FAMILY HISTORY:   Family History   Problem Relation Age of Onset    Cancer Mother     Colon Polyps Mother     Heart Disease Father     Glaucoma Father     Cancer Sister     Cancer Sister        Psychiatric Family History  None reported    PAST MEDICAL HISTORY:    Past Medical History:   Diagnosis Date    Balance problem     Bipolar I disorder, most recent episode (or current) depressed, in partial or unspecified remission 5/8/2013    Cancer ([facility]     CERVICAL     Depression     Fatigue     Gallstones     Gastric bypass status for obesity 2002    GERD (gastroesophageal reflux disease)     History of blood transfusion     History of hysterectomy     REMOVAL OF ONE OVARY     Hypothyroidism     Liver cyst     GI Dr Jake Maciel MS (multiple sclerosis) ([facility]     Paresthesias     RT LOWER LIMB    UTI (urinary tract infection)        PAST SURGICAL HISTORY:    Past Surgical History:   Procedure Laterality Date    APPENDECTOMY      BONE GRAFT Left     left leg to right arm    CARPAL TUNNEL RELEASE  1999    CHOLECYSTECTOMY, LAPAROSCOPIC  5/16/2016    COLONOSCOPY      DILATATION, ESOPHAGUS      ENDOSCOPY, COLON, DIAGNOSTIC      ENTEROSCOPY N/A 2/5/2018    ENTEROSCOPY performed by Basia Gifford MD at [address] [place] [address] N/A 9/28/2020    GASTROSTOMY TUBE PLACEMENT- [facility] TO DO GASTRIC ENDOSCOPY performed by Alice Schulz MD at [place] [address], VAGINAL      LAPAROSCOPIC APPENDECTOMY  5/16/2016    OTHER for the prosthesis broken please order new 1 each 0    loratadine (CLARITIN) 10 MG tablet Take 1 tablet by mouth daily 90 tablet 1    Cholecalciferol (VITAMIN D) 2000 units CAPS capsule Take by mouth nightly 50 mcg nightly      Magnesium Oxide 250 MG TABS Take 250 mg by mouth nightly       calcium carbonate 600 MG TABS tablet Take 1 tablet by mouth nightly       vitamin B-12 1000 MCG tablet Take 1 tablet by mouth daily 30 tablet 5    nitroGLYCERIN (NITROSTAT) 0.4 MG SL tablet up to max of 3 total doses. If no relief after 1 dose, call 911. 25 tablet 3    Prenatal MV-Min-Fe Fum-FA-DHA (PRENATAL 1 PO) Take 1 tablet by mouth daily       lamoTRIgine (LAMICTAL) 200 MG tablet TAKE 1 TABLET BY MOUTH ONCE DAILY 30 tablet 0    lurasidone (LATUDA) 20 MG TABS tablet Take 20 mg by mouth daily      vilazodone HCl (VILAZODONE HCL) 40 MG TABS TAKE 1 TABLET BY MOUTH ONCE DAILY. 30 tablet 2    lithium 300 MG tablet Take 2 tablets by mouth 2 times daily 120 tablet 2    Elastic Bandages & Supports (KNEE STABILIZER/ELASTIC/SM/MED) MISC Apply 1 Device topically daily for 5 days 1 each 0     No facility-administered medications prior to visit. ALLERGIES:    Latex and Sulfa antibiotics    REVIEW OF SYSTEMS:    Review of Systems    The patient sees Celia Prabhakar DO as her primary care provider. SPECIALISTS: Dr. Sigrid Jimenez (GI), Dr. Tirso Dobson, Dr. Gege Malhotra (neurology)    OBJECTIVE DATA     There were no vitals taken for this visit.     Results for orders placed or performed during the hospital encounter of 07/30/21   Hepatic Function Panel   Result Value Ref Range    Albumin 4.6 3.5 - 5.1 g/dL    Total Bilirubin 0.4 0.3 - 1.2 mg/dL    Bilirubin, Direct <0.2 0.0 - 0.3 mg/dL    Alkaline Phosphatase 121 38 - 126 U/L    AST 50 (H) 5 - 40 U/L    ALT 69 (H) 11 - 66 U/L    Total Protein 6.6 6.1 - 8.0 g/dL   CBC   Result Value Ref Range    WBC 6.2 4.8 - 10.8 thou/mm3    RBC 3.98 (L) 4.20 - 5.40 mill/mm3    Hemoglobin 13.1 12.0 - 16.0 gm/dl    Hematocrit 40.8 37.0 - 47.0 %    .5 (H) 81.0 - 99.0 fL    MCH 32.9 26.0 - 33.0 pg    MCHC 32.1 (L) 32.2 - 35.5 gm/dl    RDW-CV 13.7 11.5 - 14.5 %    RDW-SD 52.1 (H) 35.0 - 45.0 fL    Platelets 206 836 - 058 thou/mm3    MPV 9.9 9.4 - 12.4 fL   Iron   Result Value Ref Range    Iron 182 (H) 50 - 170 ug/dL   Iron Binding Capacity   Result Value Ref Range    TIBC 372 171 - 450 ug/dL   Ferritin   Result Value Ref Range    Ferritin 19 10 - 291 ng/mL   Transferrin   Result Value Ref Range    Transferrin 313 200 - 360 mg/dL   Soluble transferrin receptor   Result Value Ref Range    Soluble Transferrin Recept 2.6 1.9 - 4.4 mg/L   Vitamin B12 & Folate   Result Value Ref Range    Vitamin B-12 1597 (H) 211 - 911 pg/mL    Folate 17.0 4.8 - 24.2 ng/mL       Physical Exam    Mental Status Evaluation:   Orientation: Alert, oriented, thought content appropriate   Mood:. Dysthymic      Affect:  Normal      Appearance:  right upper extremity prosthetic, Age Appropriate, Casually Dressed, Clean, Well Groomed, Clothing Appropriate for Age and Clothing Appropriate for Weather   Activity:  Cooperative, Good Eye Contact and Seated Calmly   Gait/Posture: Normal   Speech:  Clear, Fluent, Normal Pitch and Volume, Age and Situation Appropriate   Thought Process: Within Normal Limits   Thought Content: Within Normal Limits   Cognition:  Grossly Intact   Memory: Intact   Insight:  Good   Judgment: Good   Suicidal Ideations: Denies Suicidal Ideation   Homicidal Ideations: Negative for homicidal ideation   Medication Side Effects: Absent       Attention Span Attention span and concentration were age appropriate       Screenings Completed in This Encounter:     Anxiety and Depression:                    DIAGNOSIS AND ASSESSMENT DATA     DIAGNOSIS:   1. Bipolar I, most recent episode depressed, partial remission ([facility]    2.  Therapeutic drug monitoring        PLAN   Follow-up:  Return in about 3 months (around 11/30/2021), or if symptoms worsen or fail to improve, for follow-up and medication management. Prescriptions for this encounter:  New Prescriptions    No medications on file       Orders Placed This Encounter   Medications    vilazodone HCl (VILAZODONE HCL) 40 MG TABS     Sig: TAKE 1 TABLET BY MOUTH ONCE DAILY. Dispense:  30 tablet     Refill:  2    lithium 300 MG tablet     Sig: Take 2 tablets by mouth 2 times daily     Dispense:  120 tablet     Refill:  2    lamoTRIgine (LAMICTAL) 200 MG tablet     Sig: Take one tab by mouth once daily. Dispense:  30 tablet     Refill:  2    lurasidone (LATUDA) 40 MG TABS tablet     Sig: Take 1 tablet by mouth daily     Dispense:  30 tablet     Refill:  2       Medications Discontinued During This Encounter   Medication Reason    vilazodone HCl (VILAZODONE HCL) 40 MG TABS REORDER    lithium 300 MG tablet REORDER    lurasidone (LATUDA) 20 MG TABS tablet DOSE ADJUSTMENT    lamoTRIgine (LAMICTAL) 200 MG tablet REORDER       Additional orders:  Orders Placed This Encounter   Procedures    Lithium Level    Basic Metabolic Panel     Patient is reporting slightly worsened mood since last visit. She has been on higher doses of Latuda in the past. Treatment options reviewed. Patient will increase to Latuda 40mg daily. Continue all other medications without changes. Labs as ordered. Patient is encouraged to utilize nonpharmacologic coping skills such as deep breathing, guided imagery, guided meditation, muscle relaxation, calming music, and/or journaling. Risks, potential side effects, possibledrug-drug interactions, benefits and alternate treatments discussed in detail. All questions answered. Patient stated understanding and is agreeable to treatment plan. Patient has been instructed to seek emergency help via the emergency and/or calling 911 should symptoms become severe, worsen, or with other concerning symptoms.  Patient instructed to goimmediately to the emergency room and/or call 911 with any suicidal or homicidal ideations or if audio/visual hallucinations develop  Patient stated understanding and agrees. Patient given crisis center information. Provider Signature:  Electronically signed by AJITH De Leon CNP on 8/31/2021 at 5:21 PM    **This report has been created using voice recognition software. It may contain minor errors which are inherent in voice recognition technology. **

## 2021-09-17 ENCOUNTER — HOSPITAL ENCOUNTER (OUTPATIENT)
Age: 61
Discharge: HOME | End: 2021-09-17
Payer: MEDICARE

## 2021-09-17 DIAGNOSIS — F31.75 BIPOLAR I, MOST RECENT EPISODE DEPRESSED, PARTIAL REMISSION (HCC): ICD-10-CM

## 2021-09-17 DIAGNOSIS — Z51.81 THERAPEUTIC DRUG MONITORING: ICD-10-CM

## 2021-09-17 LAB
ANION GAP SERPL CALCULATED.3IONS-SCNC: 10 MEQ/L (ref 8–16)
BUN BLDV-MCNC: 15 MG/DL (ref 7–22)
CALCIUM SERPL-MCNC: 10.1 MG/DL (ref 8.5–10.5)
CHLORIDE BLD-SCNC: 108 MEQ/L (ref 98–111)
CO2: 20 MEQ/L (ref 23–33)
CREAT SERPL-MCNC: 0.5 MG/DL (ref 0.4–1.2)
GFR SERPL CREATININE-BSD FRML MDRD: > 90 ML/MIN/1.73M2
GLUCOSE BLD-MCNC: 77 MG/DL (ref 70–108)
LITHIUM LEVEL: 0.48 MMOL/L (ref 0.6–1.2)
POTASSIUM SERPL-SCNC: 4.5 MEQ/L (ref 3.5–5.2)
SODIUM BLD-SCNC: 138 MEQ/L (ref 135–145)

## 2021-09-17 PROCEDURE — 80178 ASSAY OF LITHIUM: CPT

## 2021-09-17 PROCEDURE — 80048 BASIC METABOLIC PNL TOTAL CA: CPT

## 2021-09-17 PROCEDURE — 36415 COLL VENOUS BLD VENIPUNCTURE: CPT

## 2021-10-04 ENCOUNTER — HOSPITAL ENCOUNTER (OUTPATIENT)
Age: 61
Discharge: HOME | End: 2021-10-04
Payer: MEDICARE

## 2021-10-04 DIAGNOSIS — K90.9 INTESTINAL MALABSORPTION, UNSPECIFIED TYPE: ICD-10-CM

## 2021-10-04 DIAGNOSIS — R00.1 SYMPTOMATIC BRADYCARDIA: ICD-10-CM

## 2021-10-04 DIAGNOSIS — G35 MS (MULTIPLE SCLEROSIS) (HCC): ICD-10-CM

## 2021-10-04 LAB
MAGNESIUM: 2 MG/DL (ref 1.6–2.4)
VITAMIN D 25-HYDROXY: 30 NG/ML (ref 30–100)

## 2021-10-04 PROCEDURE — 82626 DEHYDROEPIANDROSTERONE: CPT

## 2021-10-04 PROCEDURE — 83735 ASSAY OF MAGNESIUM: CPT

## 2021-10-04 PROCEDURE — 36415 COLL VENOUS BLD VENIPUNCTURE: CPT

## 2021-10-04 PROCEDURE — 82627 DEHYDROEPIANDROSTERONE: CPT

## 2021-10-04 PROCEDURE — 82306 VITAMIN D 25 HYDROXY: CPT

## 2021-10-05 LAB — DHEAS (DHEA SULFATE): 110 UG/DL (ref 13–130)

## 2021-10-06 ENCOUNTER — TELEPHONE (OUTPATIENT)
Dept: FAMILY MEDICINE CLINIC | Age: 61
End: 2021-10-06

## 2021-10-06 NOTE — TELEPHONE ENCOUNTER
Patient contacted about results. Voiced understanding about coming in on 10/11 to go over results with Dr. Mita Mendenhall.

## 2021-10-06 NOTE — TELEPHONE ENCOUNTER
----- Message from Rio Reed DO sent at 10/5/2021  9:00 PM EDT -----  Pretty good results overall! Will go over with Dr. Ritchie Bergman on the 11th thanks!

## 2021-10-07 LAB — DHEA UNCONJUGATED: 0.89 NG/ML (ref 0.63–4.7)

## 2021-10-11 ENCOUNTER — TELEPHONE (OUTPATIENT)
Dept: PSYCHIATRY | Age: 61
End: 2021-10-11

## 2021-10-11 ENCOUNTER — OFFICE VISIT (OUTPATIENT)
Dept: FAMILY MEDICINE CLINIC | Age: 61
End: 2021-10-11
Payer: MEDICARE

## 2021-10-11 VITALS
DIASTOLIC BLOOD PRESSURE: 84 MMHG | OXYGEN SATURATION: 99 % | HEART RATE: 82 BPM | HEIGHT: 57 IN | SYSTOLIC BLOOD PRESSURE: 126 MMHG | WEIGHT: 121.6 LBS | TEMPERATURE: 96.3 F | BODY MASS INDEX: 26.24 KG/M2 | RESPIRATION RATE: 16 BRPM

## 2021-10-11 DIAGNOSIS — R10.11 RIGHT UPPER QUADRANT ABDOMINAL PAIN: Primary | ICD-10-CM

## 2021-10-11 DIAGNOSIS — R79.89 ABNORMAL LFTS: ICD-10-CM

## 2021-10-11 DIAGNOSIS — F31.9 BIPOLAR 1 DISORDER (HCC): ICD-10-CM

## 2021-10-11 PROCEDURE — 99214 OFFICE O/P EST MOD 30 MIN: CPT | Performed by: STUDENT IN AN ORGANIZED HEALTH CARE EDUCATION/TRAINING PROGRAM

## 2021-10-11 RX ORDER — PRASTERONE (DHEA) 50 MG
CAPSULE ORAL NIGHTLY
COMMUNITY

## 2021-10-11 SDOH — ECONOMIC STABILITY: FOOD INSECURITY: WITHIN THE PAST 12 MONTHS, THE FOOD YOU BOUGHT JUST DIDN'T LAST AND YOU DIDN'T HAVE MONEY TO GET MORE.: NEVER TRUE

## 2021-10-11 SDOH — ECONOMIC STABILITY: FOOD INSECURITY: WITHIN THE PAST 12 MONTHS, YOU WORRIED THAT YOUR FOOD WOULD RUN OUT BEFORE YOU GOT MONEY TO BUY MORE.: NEVER TRUE

## 2021-10-11 ASSESSMENT — ENCOUNTER SYMPTOMS
CONSTIPATION: 0
FLATUS: 0
HEMATOCHEZIA: 0
ABDOMINAL PAIN: 1
VOMITING: 0
NAUSEA: 0
BELCHING: 0
DIARRHEA: 0

## 2021-10-11 NOTE — TELEPHONE ENCOUNTER
Sean James presented to the office seeking advice. Sean James had a lithium level drawn on 9/17, result is 0.48, currently taking lithium 300mg 2 daily, next scheduled appt is on 11/30. Sean James is concerned that this is low and may need adjusted. Please advice.

## 2021-10-11 NOTE — PROGRESS NOTES
Visit (AWV)  12/11/2021    TSH testing  04/12/2022    Breast cancer screen  01/19/2023    Pneumococcal 0-64 years Vaccine (2 of 2 - PPSV23) 05/12/2025    Lipid screen  04/12/2026    DTaP/Tdap/Td vaccine (4 - Td or Tdap) 10/25/2028    Colon cancer screen colonoscopy  11/06/2029    Flu vaccine  Completed    Shingles Vaccine  Completed    COVID-19 Vaccine  Completed    Hepatitis C screen  Completed    HIV screen  Completed    Hepatitis A vaccine  Aged Out    Hepatitis B vaccine  Aged Out    Hib vaccine  Aged Out    Meningococcal (ACWY) vaccine  Aged Out       ROS:      Review of Systems   Constitutional: Negative for fever and weight loss. Gastrointestinal: Positive for abdominal pain. Negative for constipation, diarrhea, flatus, hematochezia, melena, nausea and vomiting. Genitourinary: Negative for dysuria, frequency and hematuria. Musculoskeletal: Negative for myalgias. Neurological: Negative for headaches. Psychiatric/Behavioral: Positive for dysphoric mood. Objective:     Vitals:    10/11/21 1424   BP: 126/84   Site: Left Upper Arm   Position: Sitting   Cuff Size: Medium Adult   Pulse: 82   Resp: 16   Temp: 96.3 °F (35.7 °C)   TempSrc: Temporal   SpO2: 99%   Weight: 121 lb 9.6 oz (55.2 kg)   Height: 4' 9\" (1.448 m)       Body mass index is 26.31 kg/m². Wt Readings from Last 3 Encounters:   10/11/21 121 lb 9.6 oz (55.2 kg)   06/10/21 125 lb (56.7 kg)   01/12/21 126 lb (57.2 kg)     BP Readings from Last 3 Encounters:   10/11/21 126/84   06/10/21 112/60   01/12/21 116/72       Physical Exam  Vitals and nursing note reviewed. Constitutional:       General: She is not in acute distress. Appearance: Normal appearance. She is not ill-appearing. HENT:      Head: Normocephalic and atraumatic. Nose: Nose normal. No congestion or rhinorrhea. Mouth/Throat:      Mouth: Mucous membranes are moist.      Pharynx: Oropharynx is clear.  No oropharyngeal exudate or posterior oropharyngeal erythema. Eyes:      General:         Right eye: No discharge. Left eye: No discharge. Extraocular Movements: Extraocular movements intact. Conjunctiva/sclera: Conjunctivae normal.      Pupils: Pupils are equal, round, and reactive to light. Cardiovascular:      Rate and Rhythm: Normal rate and regular rhythm. Heart sounds: Normal heart sounds. No murmur heard. Pulmonary:      Effort: Pulmonary effort is normal.      Breath sounds: Normal breath sounds. Abdominal:      General: Bowel sounds are normal.      Palpations: Abdomen is soft. Tenderness: There is abdominal tenderness in the right upper quadrant and right lower quadrant. There is no guarding or rebound. Musculoskeletal:      Cervical back: Neck supple. Skin:     General: Skin is warm and dry. Findings: No erythema or rash. Neurological:      General: No focal deficit present. Mental Status: She is alert and oriented to person, place, and time. Mental status is at baseline. Psychiatric:         Mood and Affect: Mood is depressed. Speech: Speech normal.         Behavior: Behavior normal. Behavior is cooperative. Assessment / Plan:     1. Right upper quadrant abdominal pain  Patient presents to clinic today for evaluation of right upper quadrant pain and to review her labs. Her labs are generally speaking within normal limits as discussed below. Her liver enzymes were elevated when they were last checked in September. It is unlikely that this is causing right upper quadrant abdominal pain however we will recheck them. Is unclear what is causing her right upper quadrant pain. She is having no nausea no vomiting no diarrhea. She is also having no dysuria or flank pain. We will also continue to monitor her abdominal pain. She is having no GI symptoms at this time. Patient will follow up in clinic in 4 weeks or earlier as needed for abdominal pain.   If she continues to have abdominal pain we will refer the patient to GI for further evaluation. Patient is in agreement with this plan. - Hepatic Function Panel; Future    2. Bipolar 1 disorder [facility])  Patient reports that her depression has been worsening. Her lithium level was subtherapeutic when it was last measured. Patient was told to go to the psychiatry office for adjustment of her lithium dose. Patient was in agreement with the plan. 3. Abnormal LFTs  Plan as above right upper quadrant pain. - Hepatic Function Panel; Future           Return in about 4 weeks (around 11/8/2021) for abd pain. Medications Prescribed:  No orders of the defined types were placed in this encounter. Future Appointments   Date Time Provider Rudi Brandon   11/8/2021  3:00 PM Alexsandra Calvo MD [facility]   11/30/2021  2:00 PM AJITH Chacon - CNP [facility] [address]       Patient given educational materials - see patient instructions. Discussed use, benefit, and side effects of prescribed medications. All patient questions answered. Patient voiced understanding. Reviewed health maintenance. Instructed to continue current medications, diet and exercise. Patient agreed with treatment plan. Follow up as directed.      Electronically signed by Alexsandra Calvo MD on 10/11/2021 at 3:31 PM

## 2021-10-11 NOTE — PATIENT INSTRUCTIONS
Thank you   1. Thank you for trusting us with your healthcare needs. You may receive a survey regarding today's visit. It would help us out if you would take a few moments to provide your feedback. We value your input. 2. Please bring in ALL medications BOTTLES, including inhalers, herbal supplements, over the counter, prescribed & non-prescribed medicine. The office would like actual medication bottles and a list.   3. Please note our OFFICE POLICIES:   a. Prior to getting your labs drawn, please check with your insurance company for benefits and eligibility of lab services. Often, insurance companies cover certain tests for preventative visits only. It is patient's responsibility to see what is covered. b. We are unable to change a diagnosis after the test has been performed. c. Lab orders will not be re-printed. Please hold onto your original lab orders and take them to your lab to be completed. d. If you no show your scheduled appointment three times, you will be dismissed from this practice. e. Angus Orozco must be completed 24 hours prior to your schedule appointment. 4. If the list below has been completed, PLEASE FAX RECORDS TO OUR OFFICE @ [phone]. Once the records have been received we will update your records at our office: There are no preventive care reminders to display for this patient.

## 2021-10-11 NOTE — PROGRESS NOTES
S: 64 y.o. female with   Chief Complaint   Patient presents with    Follow-up     4 Month Follow-up also having Right side Abdominal pain started a couple weeks ago       HPI: please see resident note for HPI and ROS. BP Readings from Last 3 Encounters:   10/11/21 126/84   06/10/21 112/60   01/12/21 116/72     Wt Readings from Last 3 Encounters:   10/11/21 121 lb 9.6 oz (55.2 kg)   06/10/21 125 lb (56.7 kg)   01/12/21 126 lb (57.2 kg)       O: VS:  height is 4' 9\" (1.448 m) and weight is 121 lb 9.6 oz (55.2 kg). Her temporal temperature is 96.3 °F (35.7 °C). Her blood pressure is 126/84 and her pulse is 82. Her respiration is 16 and oxygen saturation is 99%. AAO/NAD, appropriate affect for mood       Diagnosis Orders   1. Right upper quadrant abdominal pain     2. Bipolar 1 disorder ([address]     3. Abnormal LFTs         Plan:  Encouraged to f/u with psych to have lithium adjusted. recheck lfts. There are no preventive care reminders to display for this patient. Attending Physician Statement  I have discussed the case, including pertinent history and exam findings with the resident. I also have seen the patient and performed key portions of the examination. I agree with the documented assessment and plan as documented by the resident.         Fay Botello DO 10/11/2021 2:57 PM

## 2021-10-12 NOTE — TELEPHONE ENCOUNTER
I have left a message for Radha Cristobal to return my call to obtain some additional information; I will await her call.

## 2021-10-12 NOTE — TELEPHONE ENCOUNTER
Spoke with Thea Merino and wanted to clarify the need to possibly adjust the medication; she said that she just had seen her results through [facility] and it was below the range of where is needed to be; so she felt that it needed to be adjusted. She said that she is feeling fine other than seeing that the result was below normal; she is in a good place now with her mental health and if the dose does not need to be adjusted then it does not have to be and she is fine with where its at. Please advise further if needed.

## 2021-10-13 NOTE — TELEPHONE ENCOUNTER
I have left Moises Market this information in a message along with if she had any additional questions/concerns to contact our office.

## 2021-10-13 NOTE — TELEPHONE ENCOUNTER
No need to make any changes at this time. The main monitoring point with Lithium levels is to make sure it does not get too high. If it is a bit low it may be due to dosing, timing, excretion, etc. Patient mood is doing well overall. We will continue present program without changes.

## 2021-11-04 ENCOUNTER — HOSPITAL ENCOUNTER (OUTPATIENT)
Age: 61
Discharge: HOME | End: 2021-11-04
Payer: MEDICARE

## 2021-11-04 LAB
ALBUMIN SERPL-MCNC: 4.4 G/DL (ref 3.5–5.1)
ALP BLD-CCNC: 143 U/L (ref 38–126)
ALT SERPL-CCNC: 49 U/L (ref 11–66)
AST SERPL-CCNC: 29 U/L (ref 5–40)
BILIRUB SERPL-MCNC: 0.3 MG/DL (ref 0.3–1.2)
BILIRUBIN DIRECT: < 0.2 MG/DL (ref 0–0.3)
ERYTHROCYTE [DISTWIDTH] IN BLOOD BY AUTOMATED COUNT: 13 % (ref 11.5–14.5)
ERYTHROCYTE [DISTWIDTH] IN BLOOD BY AUTOMATED COUNT: 48.6 FL (ref 35–45)
FERRITIN: 28 NG/ML (ref 10–291)
FOLATE: > 20 NG/ML (ref 4.8–24.2)
HCT VFR BLD CALC: 40.1 % (ref 37–47)
HEMOGLOBIN: 12.8 GM/DL (ref 12–16)
IRON: 149 UG/DL (ref 50–170)
MCH RBC QN AUTO: 32.5 PG (ref 26–33)
MCHC RBC AUTO-ENTMCNC: 31.9 GM/DL (ref 32.2–35.5)
MCV RBC AUTO: 101.8 FL (ref 81–99)
PLATELET # BLD: 261 THOU/MM3 (ref 130–400)
PMV BLD AUTO: 9.5 FL (ref 9.4–12.4)
RBC # BLD: 3.94 MILL/MM3 (ref 4.2–5.4)
TOTAL IRON BINDING CAPACITY: 334 UG/DL (ref 171–450)
TOTAL PROTEIN: 6.2 G/DL (ref 6.1–8)
TRANSFERRIN: 288 MG/DL (ref 200–360)
VITAMIN B-12: 865 PG/ML (ref 211–911)
WBC # BLD: 7 THOU/MM3 (ref 4.8–10.8)

## 2021-11-04 PROCEDURE — 82607 VITAMIN B-12: CPT

## 2021-11-04 PROCEDURE — 36415 COLL VENOUS BLD VENIPUNCTURE: CPT

## 2021-11-04 PROCEDURE — 85027 COMPLETE CBC AUTOMATED: CPT

## 2021-11-04 PROCEDURE — 84466 ASSAY OF TRANSFERRIN: CPT

## 2021-11-04 PROCEDURE — 84238 ASSAY NONENDOCRINE RECEPTOR: CPT

## 2021-11-04 PROCEDURE — 82728 ASSAY OF FERRITIN: CPT

## 2021-11-04 PROCEDURE — 80076 HEPATIC FUNCTION PANEL: CPT

## 2021-11-04 PROCEDURE — 83540 ASSAY OF IRON: CPT

## 2021-11-04 PROCEDURE — 82746 ASSAY OF FOLIC ACID SERUM: CPT

## 2021-11-07 LAB — SOLUBLE TRANSFERRIN RECEPT: 3.1 MG/L (ref 1.9–4.4)

## 2021-11-09 ENCOUNTER — OFFICE VISIT (OUTPATIENT)
Dept: FAMILY MEDICINE CLINIC | Age: 61
End: 2021-11-09
Payer: MEDICARE

## 2021-11-09 VITALS
HEIGHT: 57 IN | RESPIRATION RATE: 16 BRPM | BODY MASS INDEX: 26.45 KG/M2 | WEIGHT: 122.6 LBS | OXYGEN SATURATION: 98 % | HEART RATE: 62 BPM | DIASTOLIC BLOOD PRESSURE: 70 MMHG | TEMPERATURE: 96.3 F | SYSTOLIC BLOOD PRESSURE: 118 MMHG

## 2021-11-09 DIAGNOSIS — R10.11 RIGHT UPPER QUADRANT ABDOMINAL PAIN: Primary | ICD-10-CM

## 2021-11-09 DIAGNOSIS — T88.7XXA MEDICATION SIDE EFFECT: ICD-10-CM

## 2021-11-09 DIAGNOSIS — F31.9 BIPOLAR 1 DISORDER (HCC): ICD-10-CM

## 2021-11-09 DIAGNOSIS — Z23 NEED FOR COVID-19 VACCINE: ICD-10-CM

## 2021-11-09 DIAGNOSIS — R74.8 ELEVATED ALKALINE PHOSPHATASE LEVEL: ICD-10-CM

## 2021-11-09 PROCEDURE — 99213 OFFICE O/P EST LOW 20 MIN: CPT | Performed by: STUDENT IN AN ORGANIZED HEALTH CARE EDUCATION/TRAINING PROGRAM

## 2021-11-09 ASSESSMENT — ENCOUNTER SYMPTOMS
VOMITING: 0
ABDOMINAL PAIN: 0
CONSTIPATION: 0
NAUSEA: 0
HEMATOCHEZIA: 0
DIARRHEA: 0

## 2021-11-09 NOTE — PROGRESS NOTES
Roby Recinos is a 64 y.o. female who presents today for:  Chief Complaint   Patient presents with    1 Month Follow-Up     Abdominal Pain       Goals      Exercise 3x per week (30 min per time)           HPI:     Anish Joseph presents to clinic today for follow-up of her abdominal pain and mood. Abdominal Pain  This is a new problem. The problem has been resolved. The pain is located in the RUQ. The pain is at a severity of 0/10. The patient is experiencing no pain. Pertinent negatives include no constipation, diarrhea, dysuria, frequency, headaches, hematochezia, hematuria, melena, nausea or vomiting. Mood has improved her last appointment. She Is now volunteering. She reports that she is sleeping less however she feels well rested. Appetite is good. He denies having any other issues with her mood at this time. After she takes her medications, her head gets \"prickly\" and her face turns her. The sensation happens in both in the morning and at night. She reports that it has been going on for years, however she does not know what medication is associated with. Current Outpatient Medications   Medication Sig Dispense Refill    DHEA 50 MG CAPS Take by mouth nightly      vilazodone HCl (VILAZODONE HCL) 40 MG TABS TAKE 1 TABLET BY MOUTH ONCE DAILY. 30 tablet 2    lithium 300 MG tablet Take 2 tablets by mouth 2 times daily 120 tablet 2    lamoTRIgine (LAMICTAL) 200 MG tablet Take one tab by mouth once daily. 30 tablet 2    lurasidone (LATUDA) 40 MG TABS tablet Take 1 tablet by mouth daily 30 tablet 2    dimethyl fumarate (TECFIDERA) 240 MG delayed release capsule Take 1 capsule by mouth 2 times daily      docusate sodium (COLACE) 100 MG capsule TAKE 1 CAPSULE BY MOUTH 2 TIMES A DAY 60 capsule 2    ipratropium (ATROVENT) 0.03 % nasal spray INSTILL 2 SPRAYS INTO EACH NOSTRIL 3 TIMES DAILY.  1 Bottle 3    Lactobacillus-Inulin (CULTURELLE HEALTH, INULIN,) CAPS TAKE 1 CAPSULE BY MOUTH WITH BREAKFAST 30 capsule 1    fluticasone (FLONASE) 50 MCG/ACT nasal spray 2 sprays by Each Nostril route daily      pantoprazole (PROTONIX) 40 MG tablet Take 1 tablet by mouth 2 times daily (before meals) (Patient taking differently: Take 40 mg by mouth daily ) 180 tablet 2    levothyroxine (SYNTHROID) 100 MCG tablet TAKE 1 TABLET BY MOUTH ONE TIME A DAY 90 tablet 1    ondansetron (ZOFRAN) 4 MG tablet Take 1 tablet by mouth 3 times daily as needed for Nausea or Vomiting 15 tablet 0    Prosthesis MISC by Does not apply route Strap for the prosthesis broken please order new 1 each 0    loratadine (CLARITIN) 10 MG tablet Take 1 tablet by mouth daily 90 tablet 1    Cholecalciferol (VITAMIN D) 2000 units CAPS capsule Take by mouth nightly 50 mcg nightly      Magnesium Oxide 250 MG TABS Take 250 mg by mouth nightly       calcium carbonate 600 MG TABS tablet Take 1 tablet by mouth nightly       vitamin B-12 1000 MCG tablet Take 1 tablet by mouth daily 30 tablet 5    nitroGLYCERIN (NITROSTAT) 0.4 MG SL tablet up to max of 3 total doses. If no relief after 1 dose, call 911. 25 tablet 3    Prenatal MV-Min-Fe Fum-FA-DHA (PRENATAL 1 PO) Take 1 tablet by mouth daily        No current facility-administered medications for this visit.           Food Insecurity: No Food Insecurity    Worried About Running Out of Food in the Last Year: Never true    Ran Out of Food in the Last Year: Never true       Health Maintenance   Topic Date Due    COVID-19 Vaccine (3 - Booster for Maradiaga Peter series) 08/23/2021    Annual Wellness Visit (AWV)  12/11/2021    TSH testing  04/12/2022    Breast cancer screen  01/19/2023    Pneumococcal 0-64 years Vaccine (2 of 2 - PPSV23) 05/12/2025    Lipid screen  04/12/2026    DTaP/Tdap/Td vaccine (4 - Td or Tdap) 10/25/2028    Colon cancer screen colonoscopy  11/06/2029    Flu vaccine  Completed    Shingles Vaccine  Completed    Hepatitis C screen  Completed    HIV screen  Completed    Hepatitis A vaccine  Aged Out    Hepatitis B vaccine  Aged Out    Hib vaccine  Aged Out    Meningococcal (ACWY) vaccine  Aged Out       ROS:      Review of Systems   Gastrointestinal: Negative for abdominal pain, constipation, diarrhea, hematochezia, melena, nausea and vomiting. Genitourinary: Negative for dysuria, frequency and hematuria. Neurological: Negative for headaches. Objective:     Vitals:    11/09/21 1308   BP: 118/70   Site: Left Upper Arm   Position: Sitting   Cuff Size: Medium Adult   Pulse: 62   Resp: 16   Temp: 96.3 °F (35.7 °C)   TempSrc: Temporal   SpO2: 98%   Weight: 122 lb 9.6 oz (55.6 kg)   Height: 4' 9\" (1.448 m)       Body mass index is 26.53 kg/m². Wt Readings from Last 3 Encounters:   11/09/21 122 lb 9.6 oz (55.6 kg)   10/11/21 121 lb 9.6 oz (55.2 kg)   06/10/21 125 lb (56.7 kg)     BP Readings from Last 3 Encounters:   11/09/21 118/70   10/11/21 126/84   06/10/21 112/60       Physical Exam  Vitals and nursing note reviewed. Constitutional:       General: She is not in acute distress. Appearance: Normal appearance. She is not ill-appearing. HENT:      Head: Normocephalic and atraumatic. Right Ear: External ear normal.      Left Ear: External ear normal.      Nose: Nose normal. No congestion. Eyes:      General:         Right eye: No discharge. Left eye: No discharge. Extraocular Movements: Extraocular movements intact. Conjunctiva/sclera: Conjunctivae normal.      Pupils: Pupils are equal, round, and reactive to light. Cardiovascular:      Rate and Rhythm: Normal rate and regular rhythm. Heart sounds: Normal heart sounds. No murmur heard. Pulmonary:      Effort: Pulmonary effort is normal.      Breath sounds: Normal breath sounds. Abdominal:      General: Bowel sounds are normal.      Palpations: Abdomen is soft. Tenderness: There is no abdominal tenderness. There is no guarding or rebound.    Musculoskeletal:      Cervical back: Neck supple. Skin:     General: Skin is warm and dry. Findings: No erythema or rash. Neurological:      General: No focal deficit present. Mental Status: She is alert and oriented to person, place, and time. Mental status is at baseline. Psychiatric:         Mood and Affect: Mood and affect normal. Mood is not depressed. Speech: Speech normal.         Behavior: Behavior normal. Behavior is cooperative. Assessment / Plan:     1. Right upper quadrant abdominal pain  Moises Walter presents to clinic today for 1 month follow-up of her abdominal pain. Her abdominal pain is now resolved. She attributed her abdominal pain to the additional stress she was under. Of note on her lab work that was recently done her alk phos was minimally elevated. Plan as below for that. Patient will follow up in clinic as needed for this problem. 2. Bipolar 1 disorder ([address]  Patient reports that her mood is better since returning to volunteering. She is getting out of the house more. She may be sleeping less, however she feels well rested when waking up. Patient was told to continue to monitor her mood changes and to contact her office or her psychiatrist if anything changes. 3. Elevated alkaline phosphatase level  Patient's alk phos was minimally elevated at her last lab draw. Her alk phos well level was as high as 365 in September 2020. We will recheck an function panel and check a gamma GT and an alk phos isoenzyme. This will help differentiate hepatic or bone origin for the alk phos. Patient will follow up in clinic in 3 months or earlier as needed for this problem. - Gamma GT; Future  - Alkaline Phosphatase, Isoenzymes; Future  - Hepatic Function Panel; Future    4. Medication side effect  It is likely that the prickly sensation that the patient is experiencing after taking her medications as a side effect of one of the medication she takes twice a day.   After consultation with the pharmacist, the pharmacist believe that the medication that is most likely to cause this problem is the dimethyl fumarate. Patient was told to bring this up with her neurologist if she continues to have worsening side effects. It can also not be excluded another medication because of this problem. 5. Need for COVID-19 vaccine  Patient will receive her third COVID-19 vaccine tomorrow. Patient was told to follow-up between 1 and 3 PM.           Return in about 3 months (around 2/9/2022) for Medicare Annual Wellness. Medications Prescribed:  No orders of the defined types were placed in this encounter. Future Appointments   Date Time Provider Rudi Brandon   11/30/2021  2:00 PM Laila Field APRN [facility]  [employer]IFT[employer] LimitedDELMI II.T[place]IA   2/14/2022  1:00 PM [facility] MAKAYLA MASON II.T[place]HIA       Patient given educational materials - see patient instructions. Discussed use, benefit, and side effects of prescribed medications. All patient questions answered. Patient voiced understanding. Reviewed health maintenance. Instructed to continue current medications, diet and exercise. Patient agreed with treatment plan. Follow up as directed.      Electronically signed by Abby Blanton MD on 11/9/2021 at 2:02 PM

## 2021-11-09 NOTE — PATIENT INSTRUCTIONS
Thank you   1. Thank you for trusting us with your healthcare needs. You may receive a survey regarding today's visit. It would help us out if you would take a few moments to provide your feedback. We value your input. 2. Please bring in ALL medications BOTTLES, including inhalers, herbal supplements, over the counter, prescribed & non-prescribed medicine. The office would like actual medication bottles and a list.   3. Please note our OFFICE POLICIES:   a. Prior to getting your labs drawn, please check with your insurance company for benefits and eligibility of lab services. Often, insurance companies cover certain tests for preventative visits only. It is patient's responsibility to see what is covered. b. We are unable to change a diagnosis after the test has been performed. c. Lab orders will not be re-printed. Please hold onto your original lab orders and take them to your lab to be completed. d. If you no show your scheduled appointment three times, you will be dismissed from this practice. e. Lucina [place] must be completed 24 hours prior to your schedule appointment. 4. If the list below has been completed, PLEASE FAX RECORDS TO OUR OFFICE @ [phone].  Once the records have been received we will update your records at our office:  Health Maintenance Due   Topic Date Due    COVID-19 Vaccine (3 - Booster for Maradiaga Peter series) 08/23/2021

## 2021-11-09 NOTE — PROGRESS NOTES
Health Maintenance Due   Topic Date Due    COVID-19 Vaccine (3 - Booster for Maradiaga Peter series) 08/23/2021

## 2021-11-09 NOTE — PROGRESS NOTES
S: 64 y.o. female with   Chief Complaint   Patient presents with    1 Month Follow-Up     Abdominal Pain     -RUQ abd pain:  Here for f/u on this  This is doing better now  sxs improved and no longer having pain  Here to f/u on labs all ok other than elevated alk phos      -Mood d/o:  Was doing worse  Better now  Following with psych  No changes in meds  Now that stress is better, is feeling better  Has f/u with psych  No SI/H      -Med side effect:  After she takes her medications, her head gets \"prickly\" and her face turns her. The sensation happens in both in the morning and at night. She reports that it has been going on for years, however she does not know what medication is associated with. BP Readings from Last 3 Encounters:   11/09/21 118/70   10/11/21 126/84   06/10/21 112/60     Wt Readings from Last 3 Encounters:   11/09/21 122 lb 9.6 oz (55.6 kg)   10/11/21 121 lb 9.6 oz (55.2 kg)   06/10/21 125 lb (56.7 kg)           O: VS:  height is 4' 9\" (1.448 m) and weight is 122 lb 9.6 oz (55.6 kg). Her temporal temperature is 96.3 °F (35.7 °C). Her blood pressure is 118/70 and her pulse is 62. Her respiration is 16 and oxygen saturation is 98%. Diagnosis Orders   1. Right upper quadrant abdominal pain     2. Bipolar 1 disorder (HCC)     3. Elevated alkaline phosphatase level  Gamma GT    Alkaline Phosphatase, Isoenzymes    Hepatic Function Panel   4. Medication side effect     5. Need for COVID-19 vaccine         Plan  RUQ pain/alk phos elevation: pain improved. Unclear etiology. Has mild alk phos elevation, likely not related. Agree with f/u labs for this to monitor. F.u here for GI if a bd pain recors    Mood d/o: improved, con't lithium and tegratol, f/u psych    Med side effect: may be from [address]. She will d/w neuro.  Agree with Dr. Gricel Nieves Maintenance Due   Topic Date Due    COVID-19 Vaccine (3 - Booster for Maradiaga Peter series) 08/23/2021         Attending Physician

## 2021-11-10 ENCOUNTER — IMMUNIZATION (OUTPATIENT)
Dept: PRIMARY CARE CLINIC | Age: 61
End: 2021-11-10
Payer: MEDICARE

## 2021-11-10 ENCOUNTER — HOSPITAL ENCOUNTER (OUTPATIENT)
Age: 61
Discharge: HOME | End: 2021-11-10
Payer: MEDICARE

## 2021-11-10 DIAGNOSIS — R74.8 ELEVATED ALKALINE PHOSPHATASE LEVEL: ICD-10-CM

## 2021-11-10 LAB
ALBUMIN SERPL-MCNC: 4.3 G/DL (ref 3.5–5.1)
ALP BLD-CCNC: 130 U/L (ref 38–126)
ALT SERPL-CCNC: 58 U/L (ref 11–66)
AST SERPL-CCNC: 30 U/L (ref 5–40)
BILIRUB SERPL-MCNC: 0.4 MG/DL (ref 0.3–1.2)
BILIRUBIN DIRECT: < 0.2 MG/DL (ref 0–0.3)
GAMMA GLUTAMYL TRANSFERASE: 70 U/L (ref 8–69)
TOTAL PROTEIN: 6.4 G/DL (ref 6.1–8)

## 2021-11-10 PROCEDURE — 84080 ASSAY ALKALINE PHOSPHATASES: CPT

## 2021-11-10 PROCEDURE — 84075 ASSAY ALKALINE PHOSPHATASE: CPT

## 2021-11-10 PROCEDURE — 91300 COVID-19, PFIZER VACCINE 30MCG/0.3ML DOSE: CPT | Performed by: PHARMACIST

## 2021-11-10 PROCEDURE — 36415 COLL VENOUS BLD VENIPUNCTURE: CPT

## 2021-11-10 PROCEDURE — 0004A COVID-19, PFIZER VACCINE 30MCG/0.3ML DOSE: CPT | Performed by: PHARMACIST

## 2021-11-10 PROCEDURE — 80076 HEPATIC FUNCTION PANEL: CPT

## 2021-11-10 PROCEDURE — 82977 ASSAY OF GGT: CPT

## 2021-11-12 LAB — ALKALINE PHOSPHATASE ISOENZYMES: NORMAL

## 2021-11-20 NOTE — PROGRESS NOTES
enemas. Patient did not think much about bleeding on Friday and wanted to wait and see, but apparently on the day of presentation she started seeing very black stool and was worried about bleeding and came to the emergency room. Denied any abdominal pain or nausea or vomiting. Denies taking any NSAIDS patient was little hypotensive with blood pressure in the 90s. Hospital Course: Patient's hemoglobin was little low from 11.9 couple of days prior to 10.3 and occult blood was positive and was admitted to the hospital.  Patient started on Protonix 40 mg IV twice daily and gastroenterologist consulted. Serial hemoglobin monitoring was done and hemoglobin trended down to 8.5. Patient is n.p.o. currently and is awaiting for EGD, most likely today. Patient's home medications have been continued.   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[employer], , No kids,  and  in the past    Subjective (past 24 hours): Patient denies having any more black stools, no abdominal pain or nausea or vomiting, looking forward for EGD      Medications:  Reviewed    Infusion Medications   Scheduled Medications    dimethyl fumarate  240 mg Oral BID    ferrous sulfate  325 mg Oral Daily with breakfast    lamoTRIgine  200 mg Oral Daily    lithium  600 mg Oral Nightly    lithium  300 mg Oral QAM    levothyroxine  100 mcg Oral Daily    lurasidone  20 mg Oral Nightly    vilazodone HCl  40 mg Oral Nightly    sodium chloride flush  10 mL Intravenous 2 times per day    pantoprazole  40 mg Intravenous Q12H    And    sodium chloride (PF)  10 mL Intravenous Q12H    polyethylene glycol  17 g Oral Daily    docusate sodium  100 mg Oral BID     PRN Meds: sodium chloride flush, acetaminophen, ondansetron      Intake/Output Summary (Last 24 hours) at 9/24/2019 1258  Last data filed at 9/23/2019 2042  Gross per 24 hour   Intake 10 ml   Output --   Net 10 ml       Diet:  Diet NPO, After Midnight Exceptions are: Ice Chips    Exam:  BP (!) 102/50   Pulse 61   Temp 98.2 °F (36.8 °C) (Oral)   Resp 16   Ht 4' 11\" (1.499 m)   Wt 113 lb 9.6 oz (51.5 kg)   SpO2 98%   BMI 22.94 kg/m²     Physical Examination:   General appearance - alert, awake  and in no distress at rest  HEENT: Normocephalic, Atraumatic, pupils reactive, mucous membranes moist  Chest - moving equally to respiration,symmetric air entry, clear to auscultation, PPM +  Heart - normal rate, regular rhythm, normal S1, S2, no murmurs,  Abdomen - soft, nontender, nondistended, no masses or organomegaly, BS+  Neurological - alert, oriented, normal speech, sensations intact and able to move all extremities   Extremities - peripheral pulses normal, trace pedal edema,  Capillary refill less than 3 sec, 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  Skin - normal coloration and turgor, no rashes        Labs:   Recent Labs     09/21/19 2106 09/23/19  1007 09/23/19  1803 09/24/19  0557   WBC 7.0 8.0  --  5.5   HGB 11.9* 10.3* 8.3* 8.4*   HCT 37.3 32.1* 24.4* 27.1*    200  --  155     Recent Labs     09/21/19 2106 09/23/19  1007    138   K 4.2 4.3    104   CO2 21* 24   BUN 17 32*   CREATININE 0.7 0.6   CALCIUM 10.0 10.3     Recent Labs     09/21/19 2106 09/23/19  1007   AST 33 35   ALT 56 79*   BILITOT 0.2* 0.2*   ALKPHOS 118 115     Recent Labs     09/23/19  1431   INR 0.95     No results for input(s): CKTOTAL, TROPONINI in the last 72 hours.     Microbiology:      Urinalysis:      Lab Results   Component Value Date    NITRU NEGATIVE 09/21/2019    WBCUA 10-15 09/21/2019    BACTERIA NONE 09/21/2019    RBCUA 3-5 09/21/2019    BLOODU NEGATIVE 09/21/2019    SPECGRAV >1.030 05/13/2016    GLUCOSEU NEGATIVE 09/21/2019       Radiology:  No orders to display       DVT prophylaxis: [] Lovenox                                 [x] SCDs                                 [] SQ Heparin                                 [] Encourage ambulation           [] Already on Anticoagulation     Code Status: Full Code    PT/OT Eval Status: none    Tele:   [x] yes             [] no    Active Hospital Problems    Diagnosis Date Noted    Acute GI bleeding [K92.2] 09/23/2019       Electronically signed by Toby Ferguson MD on 9/24/2019 at 12:58 PM Ejection Fraction...

## 2021-11-26 ENCOUNTER — TELEPHONE (OUTPATIENT)
Dept: FAMILY MEDICINE CLINIC | Age: 61
End: 2021-11-26

## 2021-11-29 NOTE — TELEPHONE ENCOUNTER
----- Message from Ella Berg MD sent at 11/15/2021  8:13 AM EST -----  The results of your labs showed that the elevated alkaline phosphatase is coming from both your bone and your liver, however none of these individual values are outside of the normal range. We will continue to monitor your alkaline phosphatase for any significant changes. If you have any questions please let me know.
Albaro Gill informed and voiced understanding.
LMOM requesting pt to call back at earliest convenience.
.

## 2021-11-30 ENCOUNTER — OFFICE VISIT (OUTPATIENT)
Dept: PSYCHIATRY | Age: 61
End: 2021-11-30
Payer: MEDICARE

## 2021-11-30 DIAGNOSIS — F43.0 ACUTE REACTION TO SITUATIONAL STRESS: ICD-10-CM

## 2021-11-30 DIAGNOSIS — F31.75 BIPOLAR I, MOST RECENT EPISODE DEPRESSED, PARTIAL REMISSION (HCC): Primary | ICD-10-CM

## 2021-11-30 DIAGNOSIS — Z51.81 THERAPEUTIC DRUG MONITORING: ICD-10-CM

## 2021-11-30 PROCEDURE — 99214 OFFICE O/P EST MOD 30 MIN: CPT | Performed by: NURSE PRACTITIONER

## 2021-11-30 RX ORDER — LITHIUM CARBONATE 300 MG
600 TABLET ORAL 2 TIMES DAILY
Qty: 120 TABLET | Refills: 2 | Status: SHIPPED | OUTPATIENT
Start: 2021-11-30 | End: 2022-02-24 | Stop reason: SDUPTHER

## 2021-11-30 RX ORDER — VILAZODONE HYDROCHLORIDE 40 MG/1
TABLET ORAL
Qty: 30 TABLET | Refills: 2 | Status: SHIPPED | OUTPATIENT
Start: 2021-11-30 | End: 2022-02-24 | Stop reason: SDUPTHER

## 2021-11-30 RX ORDER — LAMOTRIGINE 200 MG/1
TABLET ORAL
Qty: 30 TABLET | Refills: 2 | Status: SHIPPED | OUTPATIENT
Start: 2021-11-30 | End: 2022-02-24 | Stop reason: SDUPTHER

## 2021-11-30 NOTE — PROGRESS NOTES
[facility] [address]  [place] [address]  Dept: [phone]  Dept Fax: [phone]  Loc: [phone]    Visit Date: 11/30/2021    SUBJECTIVE DATA     CHIEF COMPLAINT:    Chief Complaint   Patient presents with   [address] Problem    Follow-up       History obtained from: patient    HISTORY OF PRESENT ILLNESS:    Marilu Bro is a 64 y.o. female who presents to the office for follow-up on her moods. Her last 08/31/2021.    -Pt states \"I gotta few tales to tell that's for sure, a lot has happened since then\"  -Had an issue where she wanted to pray with a patient before a cath and the staff got upset with her because she made the patient late for the cath--she is a  over at 1 building now and is very bored with it  -States she misses praying with the patients  -Yesterday a kevin came in with his pants below his underwear and she said something to him and the kevin got very upset with her   -She was off for about 4 weeks till they could find another volunteer position for her---she was very lonely during this time    MOOD  Feeling sad, down, and depressed every day  Motivation is bad   Denies irritability  Rates her mood a 4/10 with 10 being the best day--worsening of mood started when she got dismissed from her previous volunteer job    ANXIETY  Denies anxiety     SLEEP  Takes equate over the counter and it is not working  Getting about 3-4 hrs a night     Denies any symptoms of pedro luis   Denies suicidal ideations, intent, plan. No homicidal ideations, intent, plan. No audiovisual hallucinations. HPI    The patient has had 0 lifetime suicide attempts. Reports she had intense thoughts of suicide with a plan and intent back in 1987 but someone came home.     Patient reports 3 psych hospital admissions with the last admission taking place in 1996    Past psychiatric medications include: various per her report but does not recall specific names    Adverse reactions from psychotropic medications:  None      Current Psychiatric Review of Systems         Marian or Hypomania:  None current. Previously reported: Patient does not recall specific marian or hypomania; she is unsure how/why she was diagnosed with bipolar disorder     Panic Attacks:  no     Phobias:  no     Obsessions and Compulsions:  no     Body or Vocal Tics:  no     Hallucinations:  no     Delusions:  no    SOCIAL HISTORY:  Patient was born in [place], [place] and raised by her biological parents      Social History     Socioeconomic History    Marital status:      Spouse name: Not on file    Number of children: 0    Years of education: Not on file    Highest education level: Bachelor's degree (e.g., BA, AB, BS)   Occupational History    Not on file   Tobacco Use    Smoking status: Former Smoker     Packs/day: 0.10     Years: 0.50     Pack years: 0.05     Types: Cigarettes     Quit date: 10/24/1977     Years since quittin.1    Smokeless tobacco: Never Used   Vaping Use    Vaping Use: Never used   Substance and Sexual Activity    Alcohol use: No    Drug use: No    Sexual activity: Not Currently     Partners: Male   Other Topics Concern    Not on file   Social History Narrative    2021    LEVEL OF EDUCATION: graduated high school; earned her bachelor degree in education; did some master's level course work but did not complete the degree    SPECIAL EDUCATION NEEDS: None    RESIDENCE: Currently lives alone    LEGAL HISTORY: None    Nondenominational: Rastafari    TRAUMA: None    : None    HOBBIES: writing, swimming, 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  She was  from  to ; they  in     CHILDREN: None    SUBSTANCE USE: None     Social Determinants of Health     Financial Resource Strain: Low Risk     Difficulty of Paying Living 2 tablets by mouth 2 times daily 120 tablet 2    lamoTRIgine (LAMICTAL) 200 MG tablet Take one tab by mouth once daily. 30 tablet 2    lurasidone (LATUDA) 40 MG TABS tablet Take 1 tablet by mouth daily 30 tablet 2    dimethyl fumarate (TECFIDERA) 240 MG delayed release capsule Take 1 capsule by mouth 2 times daily      docusate sodium (COLACE) 100 MG capsule TAKE 1 CAPSULE BY MOUTH 2 TIMES A DAY 60 capsule 2    ipratropium (ATROVENT) 0.03 % nasal spray INSTILL 2 SPRAYS INTO EACH NOSTRIL 3 TIMES DAILY. 1 Bottle 3    Lactobacillus-Inulin (CULTURELLE HEALTH, INULIN,) CAPS TAKE 1 CAPSULE BY MOUTH WITH BREAKFAST 30 capsule 1    fluticasone (FLONASE) 50 MCG/ACT nasal spray 2 sprays by Each Nostril route daily      pantoprazole (PROTONIX) 40 MG tablet Take 1 tablet by mouth 2 times daily (before meals) (Patient taking differently: Take 40 mg by mouth daily ) 180 tablet 2    levothyroxine (SYNTHROID) 100 MCG tablet TAKE 1 TABLET BY MOUTH ONE TIME A DAY 90 tablet 1    ondansetron (ZOFRAN) 4 MG tablet Take 1 tablet by mouth 3 times daily as needed for Nausea or Vomiting 15 tablet 0    Prosthesis MISC by Does not apply route Strap for the prosthesis broken please order new 1 each 0    loratadine (CLARITIN) 10 MG tablet Take 1 tablet by mouth daily 90 tablet 1    Cholecalciferol (VITAMIN D) 2000 units CAPS capsule Take by mouth nightly 50 mcg nightly      Magnesium Oxide 250 MG TABS Take 250 mg by mouth nightly       calcium carbonate 600 MG TABS tablet Take 1 tablet by mouth nightly       vitamin B-12 1000 MCG tablet Take 1 tablet by mouth daily 30 tablet 5    nitroGLYCERIN (NITROSTAT) 0.4 MG SL tablet up to max of 3 total doses. If no relief after 1 dose, call 911. 25 tablet 3    Prenatal MV-Min-Fe Fum-FA-DHA (PRENATAL 1 PO) Take 1 tablet by mouth daily        No facility-administered medications prior to visit.        ALLERGIES:    Latex and Sulfa antibiotics    REVIEW OF SYSTEMS:    Review of Systems    The worsen or fail to improve, for follow-up and medication management. Prescriptions for this encounter:  New Prescriptions    No medications on file       Orders Placed This Encounter   Medications    vilazodone HCl (VILAZODONE HCL) 40 MG TABS     Sig: TAKE 1 TABLET BY MOUTH ONCE DAILY. Dispense:  30 tablet     Refill:  2    lurasidone (LATUDA) 40 MG TABS tablet     Sig: Take 1 tablet by mouth daily     Dispense:  30 tablet     Refill:  2    lithium 300 MG tablet     Sig: Take 2 tablets by mouth 2 times daily     Dispense:  120 tablet     Refill:  2    lamoTRIgine (LAMICTAL) 200 MG tablet     Sig: Take one tab by mouth once daily. Dispense:  30 tablet     Refill:  2       Medications Discontinued During This Encounter   Medication Reason    vilazodone HCl (VILAZODONE HCL) 40 MG TABS REORDER    lithium 300 MG tablet REORDER    lamoTRIgine (LAMICTAL) 200 MG tablet REORDER    lurasidone (LATUDA) 40 MG TABS tablet REORDER       Additional orders:  Orders Placed This Encounter   Procedures    Lithium Level    Basic Metabolic Panel     Patient is reporting slightly worsened mood since last visit but has also had significant stressors. Treatment options reviewed. Patient will continue all medications without changes. Labs as ordered. Patient is encouraged to utilize nonpharmacologic coping skills such as deep breathing, guided imagery, guided meditation, muscle relaxation, calming music, and/or journaling. Risks, potential side effects, possibledrug-drug interactions, benefits and alternate treatments discussed in detail. All questions answered. Patient stated understanding and is agreeable to treatment plan. Patient has been instructed to seek emergency help via the emergency and/or calling 911 should symptoms become severe, worsen, or with other concerning symptoms.  Patient instructed to goimmediately to the emergency room and/or call 911 with any suicidal or homicidal ideations or if

## 2021-12-01 ENCOUNTER — HOSPITAL ENCOUNTER (OUTPATIENT)
Age: 61
Discharge: HOME | End: 2021-12-01
Payer: MEDICARE

## 2021-12-01 DIAGNOSIS — F31.75 BIPOLAR I, MOST RECENT EPISODE DEPRESSED, PARTIAL REMISSION (HCC): ICD-10-CM

## 2021-12-01 DIAGNOSIS — Z51.81 THERAPEUTIC DRUG MONITORING: ICD-10-CM

## 2021-12-01 LAB
ANION GAP SERPL CALCULATED.3IONS-SCNC: 8 MEQ/L (ref 8–16)
BUN BLDV-MCNC: 15 MG/DL (ref 7–22)
CALCIUM SERPL-MCNC: 10.2 MG/DL (ref 8.5–10.5)
CHLORIDE BLD-SCNC: 108 MEQ/L (ref 98–111)
CO2: 23 MEQ/L (ref 23–33)
CREAT SERPL-MCNC: 0.6 MG/DL (ref 0.4–1.2)
GFR SERPL CREATININE-BSD FRML MDRD: > 90 ML/MIN/1.73M2
GLUCOSE BLD-MCNC: 75 MG/DL (ref 70–108)
LITHIUM LEVEL: 1.06 MMOL/L (ref 0.6–1.2)
POTASSIUM SERPL-SCNC: 4.4 MEQ/L (ref 3.5–5.2)
SODIUM BLD-SCNC: 139 MEQ/L (ref 135–145)

## 2021-12-01 PROCEDURE — 80178 ASSAY OF LITHIUM: CPT

## 2021-12-01 PROCEDURE — 36415 COLL VENOUS BLD VENIPUNCTURE: CPT

## 2021-12-01 PROCEDURE — 80048 BASIC METABOLIC PNL TOTAL CA: CPT

## 2021-12-13 DIAGNOSIS — Z51.81 THERAPEUTIC DRUG MONITORING: Primary | ICD-10-CM

## 2021-12-14 ENCOUNTER — HOSPITAL ENCOUNTER (OUTPATIENT)
Age: 61
Discharge: HOME | End: 2021-12-14
Payer: MEDICARE

## 2021-12-14 DIAGNOSIS — Z51.81 THERAPEUTIC DRUG MONITORING: ICD-10-CM

## 2021-12-14 LAB — LITHIUM LEVEL: 0.69 MMOL/L (ref 0.6–1.2)

## 2021-12-14 PROCEDURE — 36415 COLL VENOUS BLD VENIPUNCTURE: CPT

## 2021-12-14 PROCEDURE — 80178 ASSAY OF LITHIUM: CPT

## 2022-01-04 DIAGNOSIS — E03.8 HYPOTHYROIDISM DUE TO HASHIMOTO'S THYROIDITIS: Primary | ICD-10-CM

## 2022-01-04 DIAGNOSIS — E06.3 HYPOTHYROIDISM DUE TO HASHIMOTO'S THYROIDITIS: Primary | ICD-10-CM

## 2022-01-04 RX ORDER — LEVOTHYROXINE SODIUM 0.1 MG/1
TABLET ORAL
Qty: 90 TABLET | Refills: 1 | Status: SHIPPED | OUTPATIENT
Start: 2022-01-04 | End: 2022-07-06 | Stop reason: SDUPTHER

## 2022-01-04 NOTE — TELEPHONE ENCOUNTER
Patient's last appointment was : 11/9/2021  Patient's next appointment is : 2/14/2022  Last refilled:12/10/2020

## 2022-01-14 ENCOUNTER — APPOINTMENT (OUTPATIENT)
Dept: CT IMAGING | Age: 62
End: 2022-01-14
Payer: MEDICARE

## 2022-01-14 ENCOUNTER — HOSPITAL ENCOUNTER (EMERGENCY)
Age: 62
Discharge: ANOTHER HOSPITAL | End: 2022-01-15
Payer: MEDICARE

## 2022-01-14 DIAGNOSIS — K92.2 GASTROINTESTINAL HEMORRHAGE, UNSPECIFIED GASTROINTESTINAL HEMORRHAGE TYPE: Primary | ICD-10-CM

## 2022-01-14 LAB
ALBUMIN SERPL-MCNC: 4.3 G/DL (ref 3.5–5.1)
ALP BLD-CCNC: 109 U/L (ref 38–126)
ALT SERPL-CCNC: 18 U/L (ref 11–66)
ANION GAP SERPL CALCULATED.3IONS-SCNC: 9 MEQ/L (ref 8–16)
APTT: 32.9 SECONDS (ref 22–38)
AST SERPL-CCNC: 27 U/L (ref 5–40)
BASOPHILS # BLD: 0.4 %
BASOPHILS ABSOLUTE: 0 THOU/MM3 (ref 0–0.1)
BILIRUB SERPL-MCNC: 0.2 MG/DL (ref 0.3–1.2)
BUN BLDV-MCNC: 23 MG/DL (ref 7–22)
CALCIUM SERPL-MCNC: 9.8 MG/DL (ref 8.5–10.5)
CHLORIDE BLD-SCNC: 109 MEQ/L (ref 98–111)
CO2: 22 MEQ/L (ref 23–33)
CREAT SERPL-MCNC: 0.6 MG/DL (ref 0.4–1.2)
EKG ATRIAL RATE: 62 BPM
EKG P AXIS: 87 DEGREES
EKG P-R INTERVAL: 184 MS
EKG Q-T INTERVAL: 464 MS
EKG QRS DURATION: 136 MS
EKG QTC CALCULATION (BAZETT): 470 MS
EKG R AXIS: 74 DEGREES
EKG T AXIS: 60 DEGREES
EKG VENTRICULAR RATE: 62 BPM
EOSINOPHIL # BLD: 1.5 %
EOSINOPHILS ABSOLUTE: 0.1 THOU/MM3 (ref 0–0.4)
ERYTHROCYTE [DISTWIDTH] IN BLOOD BY AUTOMATED COUNT: 13.9 % (ref 11.5–14.5)
ERYTHROCYTE [DISTWIDTH] IN BLOOD BY AUTOMATED COUNT: 51.8 FL (ref 35–45)
GFR SERPL CREATININE-BSD FRML MDRD: > 90 ML/MIN/1.73M2
GLUCOSE BLD-MCNC: 138 MG/DL (ref 70–108)
HCT VFR BLD CALC: 34.8 % (ref 37–47)
HEMOCCULT STL QL: POSITIVE
HEMOGLOBIN: 11.2 GM/DL (ref 12–16)
IMMATURE GRANS (ABS): 0.01 THOU/MM3 (ref 0–0.07)
IMMATURE GRANULOCYTES: 0.1 %
INR BLD: 0.99 (ref 0.85–1.13)
LIPASE: 15.8 U/L (ref 5.6–51.3)
LYMPHOCYTES # BLD: 13.3 %
LYMPHOCYTES ABSOLUTE: 0.9 THOU/MM3 (ref 1–4.8)
MCH RBC QN AUTO: 32.6 PG (ref 26–33)
MCHC RBC AUTO-ENTMCNC: 32.2 GM/DL (ref 32.2–35.5)
MCV RBC AUTO: 101.2 FL (ref 81–99)
MONOCYTES # BLD: 6.6 %
MONOCYTES ABSOLUTE: 0.5 THOU/MM3 (ref 0.4–1.3)
NUCLEATED RED BLOOD CELLS: 0 /100 WBC
OSMOLALITY CALCULATION: 285.3 MOSMOL/KG (ref 275–300)
PLATELET # BLD: 203 THOU/MM3 (ref 130–400)
PMV BLD AUTO: 9.7 FL (ref 9.4–12.4)
POTASSIUM SERPL-SCNC: 4 MEQ/L (ref 3.5–5.2)
RBC # BLD: 3.44 MILL/MM3 (ref 4.2–5.4)
SEG NEUTROPHILS: 78.1 %
SEGMENTED NEUTROPHILS ABSOLUTE COUNT: 5.5 THOU/MM3 (ref 1.8–7.7)
SODIUM BLD-SCNC: 140 MEQ/L (ref 135–145)
TOTAL PROTEIN: 6.1 G/DL (ref 6.1–8)
TROPONIN T: < 0.01 NG/ML
WBC # BLD: 7.1 THOU/MM3 (ref 4.8–10.8)

## 2022-01-14 PROCEDURE — 36415 COLL VENOUS BLD VENIPUNCTURE: CPT

## 2022-01-14 PROCEDURE — 6360000004 HC RX CONTRAST MEDICATION: Performed by: PHYSICIAN ASSISTANT

## 2022-01-14 PROCEDURE — 84484 ASSAY OF TROPONIN QUANT: CPT

## 2022-01-14 PROCEDURE — 85730 THROMBOPLASTIN TIME PARTIAL: CPT

## 2022-01-14 PROCEDURE — 74177 CT ABD & PELVIS W/CONTRAST: CPT

## 2022-01-14 PROCEDURE — 80053 COMPREHEN METABOLIC PANEL: CPT

## 2022-01-14 PROCEDURE — 96374 THER/PROPH/DIAG INJ IV PUSH: CPT

## 2022-01-14 PROCEDURE — 85025 COMPLETE CBC W/AUTO DIFF WBC: CPT

## 2022-01-14 PROCEDURE — C9113 INJ PANTOPRAZOLE SODIUM, VIA: HCPCS | Performed by: PHYSICIAN ASSISTANT

## 2022-01-14 PROCEDURE — 82272 OCCULT BLD FECES 1-3 TESTS: CPT

## 2022-01-14 PROCEDURE — 85610 PROTHROMBIN TIME: CPT

## 2022-01-14 PROCEDURE — 93005 ELECTROCARDIOGRAM TRACING: CPT | Performed by: PHYSICIAN ASSISTANT

## 2022-01-14 PROCEDURE — 83690 ASSAY OF LIPASE: CPT

## 2022-01-14 PROCEDURE — 99283 EMERGENCY DEPT VISIT LOW MDM: CPT

## 2022-01-14 PROCEDURE — 6360000002 HC RX W HCPCS: Performed by: PHYSICIAN ASSISTANT

## 2022-01-14 RX ORDER — PANTOPRAZOLE SODIUM 40 MG/10ML
80 INJECTION, POWDER, LYOPHILIZED, FOR SOLUTION INTRAVENOUS ONCE
Status: COMPLETED | OUTPATIENT
Start: 2022-01-14 | End: 2022-01-14

## 2022-01-14 RX ADMIN — IOPAMIDOL 80 ML: 755 INJECTION, SOLUTION INTRAVENOUS at 15:49

## 2022-01-14 RX ADMIN — PANTOPRAZOLE SODIUM 80 MG: 40 INJECTION, POWDER, FOR SOLUTION INTRAVENOUS at 14:48

## 2022-01-14 ASSESSMENT — ENCOUNTER SYMPTOMS
EYE DISCHARGE: 0
SHORTNESS OF BREATH: 0
COLOR CHANGE: 0
EYE PAIN: 0
DIARRHEA: 0
VOMITING: 0
ABDOMINAL PAIN: 0
COUGH: 0
BACK PAIN: 0
SORE THROAT: 0
NAUSEA: 0
EYE ITCHING: 0
RHINORRHEA: 0
WHEEZING: 0

## 2022-01-14 NOTE — ED NOTES
ED nurse-to-nurse bedside report    Chief Complaint   Patient presents with    Rectal Bleeding      LOC: alert and orientated to name, place, date  Vital signs   Vitals:    01/14/22 1258 01/14/22 1811   BP: 129/66 (!) 116/54   Pulse: 86 65   Resp: 18 16   Temp: 98.3 °F (36.8 °C)    TempSrc: Oral    SpO2: 97% 99%   Weight: 110 lb (49.9 kg)       Pain:    Pain Interventions: NA  Pain Goal: 0  Oxygen: No    Current needs required RA   Telemetry: Yes  LDAs:   Peripheral IV 01/14/22 Left Upper arm (Active)   Site Assessment Clean;Dry; Intact 01/14/22 1811   Line Status Flushed 01/14/22 1811   Dressing Status Clean;Dry; Intact 01/14/22 1811     Continuous Infusions:   Mobility: Independent  Simpson Fall Risk Score:    Fall Risk 12/10/2020 12/10/2020 10/3/2019   2 or more falls in past year? no no no   Fall with injury in past year? no no no     Fall Interventions: call light in reach, side rails up x2, wheels locked  Report given to: VALERIANO vital, RN  01/14/22 [address], RN  01/14/22 2098

## 2022-01-14 NOTE — ED TRIAGE NOTES
Pt to ED with c/o rectal bleeding today. Pt reports episode x1 today. Pt reports intermittent pain R abdomen radiating to R flank. Pt has history of GI bleeds and transfusion.

## 2022-01-14 NOTE — ED NOTES
Pt appears to be resting on cot. No distress noted. Respirations even and unlabored. Call light in reach. Pt asking if she is aloud to eat. Pt denies any further needs or concerns at this time.       Jeevan Sauceda RN  01/14/22 [id number]

## 2022-01-14 NOTE — ED PROVIDER NOTES
depressed, in partial or unspecified remission, Cancer ([facility] [address] Depression, Fatigue, Gallstones, Gastric bypass status for obesity, GERD (gastroesophageal reflux disease), History of blood transfusion, History of hysterectomy, Hypothyroidism, Liver cyst, MS (multiple sclerosis) ([facility] [address] Paresthesias, and UTI (urinary tract infection). SURGICAL HISTORY      has a past surgical history that includes Hysterectomy (1963); Carpal tunnel release (1999); other surgical history; other surgical history (1986); Batsheva-en-Y Gastric Bypass (2000); other surgical history (2006); bone graft (Left); Cholecystectomy, laparoscopic (5/16/2016); laparoscopic appendectomy (5/16/2016); Appendectomy; Enteroscopy (N/A, 2/5/2018); pr colonoscopy flx dx w/collj spec when pfrmd (Left, 2/6/2018); pr office/outpt visit,procedure only (Left, 2/18/2018); Colonoscopy; Endoscopy, colon, diagnostic; Dilatation, esophagus; pr office/outpt visit,procedure only (N/A, 10/4/2018); Hysterectomy, vaginal; Pacemaker insertion (Right, 05/20/2019); Upper gastrointestinal endoscopy (Left, 9/24/2019); pacemaker placement; Upper gastrointestinal endoscopy (N/A, 9/26/2020); and Gastrostomy tube placement (N/A, 9/28/2020). CURRENT MEDICATIONS       Previous Medications    CALCIUM CARBONATE 600 MG TABS TABLET    Take 1 tablet by mouth nightly     CHOLECALCIFEROL (VITAMIN D) 2000 UNITS CAPS CAPSULE    Take by mouth nightly 50 mcg nightly    DHEA 50 MG CAPS    Take by mouth nightly    DIMETHYL FUMARATE (TECFIDERA) 240 MG DELAYED RELEASE CAPSULE    Take 1 capsule by mouth 2 times daily    DOCUSATE SODIUM (COLACE) 100 MG CAPSULE    TAKE 1 CAPSULE BY MOUTH 2 TIMES A DAY    FLUTICASONE (FLONASE) 50 MCG/ACT NASAL SPRAY    2 sprays by Each Nostril route daily    IPRATROPIUM (ATROVENT) 0.03 % NASAL SPRAY    INSTILL 2 SPRAYS INTO EACH NOSTRIL 3 TIMES DAILY.     LACTOBACILLUS-INULIN ([facility], INULIN,) CAPS    TAKE 1 CAPSULE BY MOUTH WITH BREAKFAST    LAMOTRIGINE (LAMICTAL) 200 MG TABLET    Take one tab by mouth once daily. LEVOTHYROXINE (SYNTHROID) 100 MCG TABLET    TAKE 1 TABLET BY MOUTH ONE TIME A DAY    LITHIUM 300 MG TABLET    Take 2 tablets by mouth 2 times daily    LORATADINE (CLARITIN) 10 MG TABLET    Take 1 tablet by mouth daily    LURASIDONE (LATUDA) 40 MG TABS TABLET    Take 1 tablet by mouth daily    MAGNESIUM OXIDE 250 MG TABS    Take 250 mg by mouth nightly     NITROGLYCERIN (NITROSTAT) 0.4 MG SL TABLET    up to max of 3 total doses. If no relief after 1 dose, call 911. ONDANSETRON (ZOFRAN) 4 MG TABLET    Take 1 tablet by mouth 3 times daily as needed for Nausea or Vomiting    PANTOPRAZOLE (PROTONIX) 40 MG TABLET    Take 1 tablet by mouth 2 times daily (before meals)    PRENATAL MV-MIN-FE FUM-FA-DHA (PRENATAL 1 PO)    Take 1 tablet by mouth daily     PROSTHESIS MISC    by Does not apply route Strap for the prosthesis broken please order new    VILAZODONE HCL (VILAZODONE HCL) 40 MG TABS    TAKE 1 TABLET BY MOUTH ONCE DAILY. VITAMIN B-12 1000 MCG TABLET    Take 1 tablet by mouth daily       ALLERGIES     is allergic to latex and sulfa antibiotics. HISTORY     She indicated that her mother is . She indicated that her father is . She indicated that both of her sisters are alive. family history includes Cancer in her mother, sister, and sister; Colon Polyps in her mother; Glaucoma in her father; Heart Disease in her father. SOCIALHISTORY      reports that she quit smoking about 44 years ago. Her smoking use included cigarettes. She has a 0.05 pack-year smoking history. She has never used smokeless tobacco. She reports that she does not drink alcohol and does not use drugs. PHYSICAL EXAM     INITIAL VITALS:  weight is 110 lb (49.9 kg). Her oral temperature is 98.3 °F (36.8 °C). Her blood pressure is 116/54 (abnormal) and her pulse is 65. Her respiration is 16 and oxygen saturation is 99%.     Physical Exam  Vitals and nursing note reviewed. Constitutional:       Comments: Well Developed Well Nourished Appearing     HENT:      Head: Normocephalic and atraumatic. Eyes:      Pupils: Pupils are equal, round, and reactive to light. Cardiovascular:      Rate and Rhythm: Normal rate and regular rhythm. Heart sounds: Normal heart sounds. Pulmonary:      Effort: Pulmonary effort is normal. No respiratory distress. Breath sounds: Normal breath sounds. No wheezing. Abdominal:      General: Bowel sounds are normal. There is no distension. Palpations: Abdomen is soft. Genitourinary:     Comments: Small amount of black stool per rectum. Musculoskeletal:      Cervical back: Normal range of motion and neck supple. DIFFERENTIAL DIAGNOSIS:   Possible GI bleed she did drop her hemoglobin 2 g% almost.  She does have some abdominal pain we will get a CT scan.     DIAGNOSTIC RESULTS     EKG: All EKG's are interpreted by the Emergency Department Physician who either signs or Co-signs this chart in the absence of a cardiologist.       EKG 12 Lead (Final result)   Component (Lab Inquiry)  Collection Time Result Time Ventricular Rate Atrial Rate P-R Interval QRS Duration Q-T Interval   01/14/22 14:11:27 01/14/22 14:50:27 62 62 184 136 464       Collection Time Result Time QTc Calculation (Bazett) P Axis R Axis T Axis   01/14/22 14:11:27 01/14/22 14:50:27 470 87 74 60         Final result                Narrative:    Atrial-paced rhythm   Right bundle branch block   Abnormal ECG   When compared with ECG of 25-SEP-2020 12:10,   T wave inversion no longer evident in Anterior leads   Confirmed by Adriel Ferrara ([id number]) on 1/14/2022 2:50:24 PM                  RADIOLOGY: non-plain film images(s) such as CT, Ultrasound and MRI are read by the radiologist.  CT scan of abdomen pelvis with IV contrast with radiology       CT ABDOMEN PELVIS W IV CONTRAST Additional Contrast? None (Final result)  Result time 01/14/22 16:17:47  Final result by Tomasa Ward MD (01/14/22 16:17:47)                Impression:    Stable CT scan abdomen and pelvis. There is constipation. There is no convincing evidence for bowel obstruction. No other acute abdominal or pelvic abnormality         **This report has been created using voice recognition software.  It may contain minor errors which are inherent in voice recognition technology. **     Final report electronically signed by Dr. Dalila Ibarra on 1/14/2022 4:17 PM            Narrative:    PROCEDURE: CT ABDOMEN PELVIS W IV CONTRAST     CLINICAL INFORMATION: abdominal pain rectal bleeeding . TECHNIQUE: 2-D multiplanar postcontrast images of the abdomen and pelvis at 5 mm intervals. Isovue-370 IV contrast.   All CT scans at this facility use dose modulation, iterative reconstruction, and/or weight-based dosing when appropriate to reduce radiation dose to as low as reasonably achievable. COMPARISON: 9/22/2020       FINDINGS:   Lung bases   Lungs appear emphysematous. Undersurface the heart is normal size with pacer leads present. Abdomen pelvis   Postsurgical changes of the stomach. Prior embolization procedure gastroduodenal artery. Stable cyst right lobe of the liver. Liver is enlarged. Spleen is normal. Streak artifact from the embolization coils. The pancreas appears normal. Status post cholecystectomy. Adrenals are normal. Nonobstructive calculus right kidney. Pelviectasis left kidney.   [place]  is unremarkable. There is constipation. No convincing evidence of bowel obstruction. There is very similar to the prior exam.   Urinary bladder is decompressed. No abnormal fluid collections are seen.      MSK   No suspicious bone lesions       With                   LABS:   Labs Reviewed   CBC WITH AUTO DIFFERENTIAL - Abnormal; Notable for the following components:       Result Value    RBC 3.44 (*)     Hemoglobin 11.2 (*)     Hematocrit 34.8 (*)     .2 (*)     RDW-SD 51.8 (*)     Lymphocytes Absolute 0.9 (*)     All other components within normal limits   COMPREHENSIVE METABOLIC PANEL - Abnormal; Notable for the following components:    Glucose 138 (*)     BUN 23 (*)     CO2 22 (*)     Total Bilirubin 0.2 (*)     All other components within normal limits   ANION GAP   OSMOLALITY   GLOMERULAR FILTRATION RATE, ESTIMATED   PROTIME-INR   APTT   LIPASE   TROPONIN   BLOOD OCCULT STOOL SCREEN #1       EMERGENCY DEPARTMENT COURSE:   :    Vitals:    01/14/22 1258 01/14/22 1811   BP: 129/66 (!) 116/54   Pulse: 86 65   Resp: 18 16   Temp: 98.3 °F (36.8 °C)    TempSrc: Oral    SpO2: 97% 99%   Weight: 110 lb (49.9 kg)      Patient was seen history physical exam was performed. I did consult GI here who felt because of the patient's Batsheva-en-Y and her history she needs to be transferred to a tertiary center. I have made calls to [facility] and waiting for a call back for acceptance. See disposition below    CRITICAL CARE:  None    CONSULTS:  Dr. Makeda Fonseca:  None    FINAL IMPRESSION      1. Gastrointestinal hemorrhage, unspecified gastrointestinal hemorrhage type          DISPOSITION/PLAN   Transfer  PATIENT REFERRED TO:  No follow-up provider specified.     DISCHARGE MEDICATIONS:  New Prescriptions    No medications on file       (Please note that portions of this note were completed with a voice recognitionprogram.  Efforts were made to edit the dictations but occasionally words are mis-transcribed.)    WILLIAM Menendez [place]  01/15/22 [id number]

## 2022-01-15 ENCOUNTER — HOSPITAL ENCOUNTER (INPATIENT)
Age: 62
LOS: 3 days | Discharge: HOME | DRG: 378 | End: 2022-01-18
Attending: FAMILY MEDICINE | Admitting: STUDENT IN AN ORGANIZED HEALTH CARE EDUCATION/TRAINING PROGRAM
Payer: MEDICARE

## 2022-01-15 VITALS
DIASTOLIC BLOOD PRESSURE: 62 MMHG | BODY MASS INDEX: 23.8 KG/M2 | SYSTOLIC BLOOD PRESSURE: 114 MMHG | WEIGHT: 110 LBS | HEART RATE: 62 BPM | OXYGEN SATURATION: 98 % | TEMPERATURE: 98.3 F | RESPIRATION RATE: 16 BRPM

## 2022-01-15 PROBLEM — K28.9 ULCER AT SITE OF SURGICAL ANASTOMOSIS FOLLOWING BYPASS OF STOMACH: Status: ACTIVE | Noted: 2022-01-15

## 2022-01-15 PROBLEM — T85.898A ULCER AT SITE OF SURGICAL ANASTOMOSIS FOLLOWING BYPASS OF STOMACH: Status: ACTIVE | Noted: 2022-01-15

## 2022-01-15 LAB
BASOPHILS # BLD: 0.6 %
BASOPHILS # BLD: 0.8 %
BASOPHILS ABSOLUTE: 0 THOU/MM3 (ref 0–0.1)
BASOPHILS ABSOLUTE: 0.1 THOU/MM3 (ref 0–0.1)
EOSINOPHIL # BLD: 1.7 %
EOSINOPHIL # BLD: 2.2 %
EOSINOPHILS ABSOLUTE: 0.1 THOU/MM3 (ref 0–0.4)
EOSINOPHILS ABSOLUTE: 0.1 THOU/MM3 (ref 0–0.4)
ERYTHROCYTE [DISTWIDTH] IN BLOOD BY AUTOMATED COUNT: 14.4 % (ref 11.5–14.5)
ERYTHROCYTE [DISTWIDTH] IN BLOOD BY AUTOMATED COUNT: 14.5 % (ref 11.5–14.5)
ERYTHROCYTE [DISTWIDTH] IN BLOOD BY AUTOMATED COUNT: 53.3 FL (ref 35–45)
ERYTHROCYTE [DISTWIDTH] IN BLOOD BY AUTOMATED COUNT: 53.8 FL (ref 35–45)
HCT VFR BLD CALC: 30.3 % (ref 37–47)
HCT VFR BLD CALC: 30.6 % (ref 37–47)
HCT VFR BLD CALC: 31.6 % (ref 36.3–47.1)
HCT VFR BLD CALC: 33.7 % (ref 36.3–47.1)
HEMOGLOBIN: 10.6 G/DL (ref 11.9–15.1)
HEMOGLOBIN: 9.7 GM/DL (ref 12–16)
HEMOGLOBIN: 9.8 GM/DL (ref 12–16)
HEMOGLOBIN: 9.9 G/DL (ref 11.9–15.1)
IMMATURE GRANS (ABS): 0.02 THOU/MM3 (ref 0–0.07)
IMMATURE GRANS (ABS): 0.03 THOU/MM3 (ref 0–0.07)
IMMATURE GRANULOCYTES: 0.4 %
IMMATURE GRANULOCYTES: 0.5 %
LYMPHOCYTES # BLD: 12.4 %
LYMPHOCYTES # BLD: 13.6 %
LYMPHOCYTES ABSOLUTE: 0.7 THOU/MM3 (ref 1–4.8)
LYMPHOCYTES ABSOLUTE: 0.8 THOU/MM3 (ref 1–4.8)
MCH RBC QN AUTO: 32.4 PG (ref 26–33)
MCH RBC QN AUTO: 32.9 PG (ref 26–33)
MCHC RBC AUTO-ENTMCNC: 31.7 GM/DL (ref 32.2–35.5)
MCHC RBC AUTO-ENTMCNC: 32.3 GM/DL (ref 32.2–35.5)
MCV RBC AUTO: 101.7 FL (ref 81–99)
MCV RBC AUTO: 102.3 FL (ref 81–99)
MONOCYTES # BLD: 6.8 %
MONOCYTES # BLD: 8.5 %
MONOCYTES ABSOLUTE: 0.4 THOU/MM3 (ref 0.4–1.3)
MONOCYTES ABSOLUTE: 0.5 THOU/MM3 (ref 0.4–1.3)
NUCLEATED RED BLOOD CELLS: 0 /100 WBC
NUCLEATED RED BLOOD CELLS: 0 /100 WBC
PLATELET # BLD: 187 THOU/MM3 (ref 130–400)
PLATELET # BLD: 193 THOU/MM3 (ref 130–400)
PMV BLD AUTO: 10.1 FL (ref 9.4–12.4)
PMV BLD AUTO: 9.9 FL (ref 9.4–12.4)
RBC # BLD: 2.98 MILL/MM3 (ref 4.2–5.4)
RBC # BLD: 2.99 MILL/MM3 (ref 4.2–5.4)
SEG NEUTROPHILS: 75.6 %
SEG NEUTROPHILS: 76.9 %
SEGMENTED NEUTROPHILS ABSOLUTE COUNT: 4.1 THOU/MM3 (ref 1.8–7.7)
SEGMENTED NEUTROPHILS ABSOLUTE COUNT: 4.8 THOU/MM3 (ref 1.8–7.7)
WBC # BLD: 5.3 THOU/MM3 (ref 4.8–10.8)
WBC # BLD: 6.4 THOU/MM3 (ref 4.8–10.8)

## 2022-01-15 PROCEDURE — 85018 HEMOGLOBIN: CPT

## 2022-01-15 PROCEDURE — 36415 COLL VENOUS BLD VENIPUNCTURE: CPT

## 2022-01-15 PROCEDURE — 99222 1ST HOSP IP/OBS MODERATE 55: CPT | Performed by: INTERNAL MEDICINE

## 2022-01-15 PROCEDURE — 1200000000 HC SEMI PRIVATE

## 2022-01-15 PROCEDURE — 2580000003 HC RX 258: Performed by: NURSE PRACTITIONER

## 2022-01-15 PROCEDURE — C9113 INJ PANTOPRAZOLE SODIUM, VIA: HCPCS | Performed by: NURSE PRACTITIONER

## 2022-01-15 PROCEDURE — 85025 COMPLETE CBC W/AUTO DIFF WBC: CPT

## 2022-01-15 PROCEDURE — 6360000002 HC RX W HCPCS: Performed by: NURSE PRACTITIONER

## 2022-01-15 PROCEDURE — 6370000000 HC RX 637 (ALT 250 FOR IP): Performed by: INTERNAL MEDICINE

## 2022-01-15 PROCEDURE — 6370000000 HC RX 637 (ALT 250 FOR IP): Performed by: NURSE PRACTITIONER

## 2022-01-15 PROCEDURE — 85014 HEMATOCRIT: CPT

## 2022-01-15 RX ORDER — SUCRALFATE 1 G/1
1 TABLET ORAL EVERY 6 HOURS SCHEDULED
Status: DISCONTINUED | OUTPATIENT
Start: 2022-01-15 | End: 2022-01-16

## 2022-01-15 RX ORDER — SODIUM CHLORIDE 9 MG/ML
10 INJECTION INTRAVENOUS EVERY 12 HOURS
Status: DISCONTINUED | OUTPATIENT
Start: 2022-01-15 | End: 2022-01-18

## 2022-01-15 RX ORDER — SODIUM CHLORIDE 0.9 % (FLUSH) 0.9 %
5-40 SYRINGE (ML) INJECTION PRN
Status: DISCONTINUED | OUTPATIENT
Start: 2022-01-15 | End: 2022-01-18 | Stop reason: HOSPADM

## 2022-01-15 RX ORDER — LITHIUM CARBONATE 300 MG
600 TABLET ORAL 2 TIMES DAILY
Status: DISCONTINUED | OUTPATIENT
Start: 2022-01-15 | End: 2022-01-18 | Stop reason: HOSPADM

## 2022-01-15 RX ORDER — LEVOTHYROXINE SODIUM 0.1 MG/1
100 TABLET ORAL DAILY
Status: DISCONTINUED | OUTPATIENT
Start: 2022-01-15 | End: 2022-01-18 | Stop reason: HOSPADM

## 2022-01-15 RX ORDER — ACETAMINOPHEN 650 MG/1
650 SUPPOSITORY RECTAL EVERY 6 HOURS PRN
Status: DISCONTINUED | OUTPATIENT
Start: 2022-01-15 | End: 2022-01-18 | Stop reason: HOSPADM

## 2022-01-15 RX ORDER — SODIUM CHLORIDE 9 MG/ML
INJECTION, SOLUTION INTRAVENOUS CONTINUOUS
Status: DISCONTINUED | OUTPATIENT
Start: 2022-01-15 | End: 2022-01-18

## 2022-01-15 RX ORDER — ONDANSETRON 2 MG/ML
4 INJECTION INTRAMUSCULAR; INTRAVENOUS EVERY 6 HOURS PRN
Status: DISCONTINUED | OUTPATIENT
Start: 2022-01-15 | End: 2022-01-18 | Stop reason: HOSPADM

## 2022-01-15 RX ORDER — ONDANSETRON 4 MG/1
4 TABLET, ORALLY DISINTEGRATING ORAL EVERY 8 HOURS PRN
Status: DISCONTINUED | OUTPATIENT
Start: 2022-01-15 | End: 2022-01-18 | Stop reason: HOSPADM

## 2022-01-15 RX ORDER — SODIUM CHLORIDE 0.9 % (FLUSH) 0.9 %
5-40 SYRINGE (ML) INJECTION EVERY 12 HOURS SCHEDULED
Status: DISCONTINUED | OUTPATIENT
Start: 2022-01-15 | End: 2022-01-18 | Stop reason: HOSPADM

## 2022-01-15 RX ORDER — LAMOTRIGINE 100 MG/1
200 TABLET ORAL DAILY
Status: DISCONTINUED | OUTPATIENT
Start: 2022-01-15 | End: 2022-01-18 | Stop reason: HOSPADM

## 2022-01-15 RX ORDER — SODIUM CHLORIDE 9 MG/ML
25 INJECTION, SOLUTION INTRAVENOUS PRN
Status: DISCONTINUED | OUTPATIENT
Start: 2022-01-15 | End: 2022-01-18 | Stop reason: HOSPADM

## 2022-01-15 RX ORDER — ACETAMINOPHEN 325 MG/1
650 TABLET ORAL EVERY 6 HOURS PRN
Status: DISCONTINUED | OUTPATIENT
Start: 2022-01-15 | End: 2022-01-18 | Stop reason: HOSPADM

## 2022-01-15 RX ORDER — PANTOPRAZOLE SODIUM 40 MG/10ML
40 INJECTION, POWDER, LYOPHILIZED, FOR SOLUTION INTRAVENOUS EVERY 12 HOURS
Status: DISCONTINUED | OUTPATIENT
Start: 2022-01-15 | End: 2022-01-18

## 2022-01-15 RX ADMIN — LEVOTHYROXINE SODIUM 100 MCG: 100 TABLET ORAL at 13:46

## 2022-01-15 RX ADMIN — LITHIUM CARBONATE 600 MG: 300 TABLET ORAL at 13:45

## 2022-01-15 RX ADMIN — PANTOPRAZOLE SODIUM 40 MG: 40 INJECTION, POWDER, FOR SOLUTION INTRAVENOUS at 13:45

## 2022-01-15 RX ADMIN — LAMOTRIGINE 200 MG: 100 TABLET ORAL at 13:45

## 2022-01-15 RX ADMIN — SODIUM CHLORIDE: 9 INJECTION, SOLUTION INTRAVENOUS at 13:47

## 2022-01-15 RX ADMIN — SODIUM CHLORIDE, PRESERVATIVE FREE 10 ML: 5 INJECTION INTRAVENOUS at 13:45

## 2022-01-15 RX ADMIN — SUCRALFATE 1 G: 1 TABLET ORAL at 17:12

## 2022-01-15 RX ADMIN — LITHIUM CARBONATE 600 MG: 300 TABLET ORAL at 20:41

## 2022-01-15 ASSESSMENT — ENCOUNTER SYMPTOMS
PHOTOPHOBIA: 0
VOMITING: 0
ABDOMINAL DISTENTION: 0
SHORTNESS OF BREATH: 0
ABDOMINAL PAIN: 1
COUGH: 0
BLOOD IN STOOL: 1
WHEEZING: 0
NAUSEA: 1

## 2022-01-15 ASSESSMENT — PAIN SCALES - GENERAL: PAINLEVEL_OUTOF10: 0

## 2022-01-15 NOTE — ED NOTES
Pt awaiting bed assignment at [facility] in [place]. Denies any needs at this time.       Sandhya Davidson RN  01/14/22 [id number]

## 2022-01-15 NOTE — ED NOTES
Nurse Zari Sharpe at [facility]. [facility] contacted at this time about patient. Nurse voices no concerns, will call them back with ETA.       Papito Capellan, RN  01/15/22 [address] [facility], RN  01/15/22 4217

## 2022-01-15 NOTE — ED NOTES
Upon bedside report pt appears to be resting on cot. Pt denies any needs or concerns at this time. Call light in reach.       Maria M Garcia RN  01/15/22 0511

## 2022-01-15 NOTE — ED NOTES
ED nurse-to-nurse bedside report    Chief Complaint   Patient presents with    Rectal Bleeding      LOC: alert and orientated to name, place, date  Vital signs   Vitals:    01/14/22 1901 01/14/22 1929 01/14/22 2136 01/14/22 2139   BP: 107/74  (!) 103/56    Pulse:    62   Resp:       Temp:       TempSrc:       SpO2: 98% 98% 97%    Weight:          Pain:    Pain Interventions: none  Pain Goal: none  Oxygen: No    Current needs required none   Telemetry: No  LDAs:   Peripheral IV 01/14/22 Left Upper arm (Active)   Site Assessment Clean;Dry; Intact 01/14/22 1811   Line Status Flushed 01/14/22 1811   Dressing Status Clean;Dry; Intact 01/14/22 1811     Continuous Infusions:   Mobility: Independent  Simpson Fall Risk Score:    Fall Risk 12/10/2020 12/10/2020 10/3/2019   2 or more falls in past year? no no no   Fall with injury in past year? no no no     Fall Interventions: side rails, socks, call light  Report given to: [employer], RN  01/15/22 3800

## 2022-01-15 NOTE — ED NOTES
Bed: 038A  Expected date:   Expected time:   Means of [email or web address]  [address] Kalpesh RN  01/15/22 0158

## 2022-01-15 NOTE — H&P
[facility]  Office: [address], DO, Keyla Montilla, DO, Luis Kate, DO, Mildred Ardon, DO, Aniyah Hernández MD, Mireille Cheek MD, Jess Roger MD, Dinah Brown MD, Ellis Sandoval MD, Angella Escalona MD, Julio César Ferrer MD, Haskell Leventhal, DO, Reji Ibarra, DO, Cheryle Bumpers, MD,  Manju Franco, DO, Tonny Mae MD, Heladio Domínguez MD, Dejuan Us MD, Anthony De Anda MD, Jose M Burrell MD, Kalvin Spurling, MD, Saint Goodwill, MD, Amari Tello, [facility], [facility], [facility], Zuhair Hernandez, CNP, Flori Avery, CNS, Donna Davidson, CNP, Maria C Abdalla, CNP, Kiera Davison, CNP, Lilian Garduno, CNP, Kamille Martins, CNP, Mary Weldon PA-C, Marbella Leon, DNP, Narcisa Pulido, [facility], Hao Gilliland, CNP, Douglas Murphy, CNP, Britta Dyer, CNP, Donita Piedra, CNP, Anabell Rand CNP, Adrian Marlow CNP         IN-PATIENT SERVICE  History and Physical  [facility]          Name: Heather Gomes  MRN: [id number]     Acct: [de-identified]  Room: [address] [place] [address]    Admit Date: 1/15/2022  PCP: Nadya Lewis DO    Chief Complaint:  No chief complaint on file. History of Present Illness:  Heather Gomes is a 64 y.o.  female who presents with   GI bleed    The patient reports that she has had Batsheva-en-Y bypass surgery in 2002  Her weight at 1 time was as high as 270 pounds  Since that initial surgery she estimates that she has had approximately 5 episodes of GI bleeding  She has had EGD and colonoscopies with the only diagnostic findings \"ulcers\"    Her stools have been dark in color  There has been a mild bout of right sided abdominal pain yesterday    Database has revealed:  Results for Emery Bills (MRN [id number]) as of 1/15/2022 15:15   Ref.  Range 11/4/2021 10:34 1/14/2022 13:14 1/15/2022 03:34 1/15/2022 10:23 1/15/2022 13:28   Hemoglobin Quant Latest Ref Range: 11.9 - 15.1 g/dL 12.8 11.2 (L) 9.7 (L) 9.8 (L) 10.6 (L)     Macrocytosis noted  Anemia work-up has included:  Results for Maria E Dhillon (MRN [id number]) as of 1/15/2022 15:15   Ref. Range 9/25/2020 12:26 2/12/2021 12:51 4/12/2021 10:22 7/30/2021 08:07 11/4/2021 10:34   Ferritin Latest Ref Range: 10 - 291 ng/mL  27  19 28   Iron Latest Ref Range: 50 - 170 ug/dL  78 150 182 (H) 149   Soluble Transferrin Recept Latest Ref Range: 1.9 - 4.4 mg/L  3.6  2.6 3.1   TIBC Latest Ref Range: 171 - 450 ug/dL  371 382 372 334   Transferrin Latest Ref Range: 200 - 360 mg/dL  320  313 288   Folate Latest Ref Range: 4.8 - 24.2 ng/mL > 20.0 > 20.0  17.0 > 20.0   Vitamin B-12 Latest Ref Range: 211 - 911 pg/mL 1562 (H) 1205 (H)  1597 (H) 865     EGD 9/19:  Results for Maria E Dhillon (MRN [id number]) as of 1/15/2022 15:15   Ref. Range 11/4/2021 10:34 1/14/2022 13:14 1/15/2022 03:34 1/15/2022 10:23 1/15/2022 13:28   Hemoglobin Quant Latest Ref Range: 11.9 - 15.1 g/dL 12.8 11.2 (L) 9.7 (L) 9.8 (L) 10.6 (L)     EGD 10/4/18  Esophagus: The GE junction was noted to be at 36cm. The esophagus appeared normal without evidence of Hanson's esophagus or reflux esophagitis. Stomach: The gastric remnant was small but appeared normal.  The efferent and afferent loops were visualized and appeared normal with no visualized blood.  The anastomotic site appeared normal.    PMHx:  Past Medical History:   Diagnosis Date    Balance problem     Bipolar I disorder, most recent episode (or current) depressed, in partial or unspecified remission 5/8/2013    Cancer ([facility]     CERVICAL     Depression     Fatigue     Gallstones     Gastric bypass status for obesity 2002    GERD (gastroesophageal reflux disease)     History of blood transfusion     History of hysterectomy     REMOVAL OF ONE OVARY     Hypothyroidism     Liver cyst     GI Dr Emma Zimmerman MS (multiple sclerosis) ([facility] [address] [facility]     Paresthesias     RT LOWER LIMB    Ulcer at site of surgical anastomosis following bypass of stomach 1/15/2022    UTI (urinary tract infection)         PSHx:  Past APRN - CNP   lurasidone (LATUDA) 40 MG TABS tablet Take 1 tablet by mouth daily  Patient taking differently: Take 40 mg by mouth nightly  11/30/21  Yes AJITH Loja CNP   lithium 300 MG tablet Take 2 tablets by mouth 2 times daily 11/30/21  Yes AJITH Schwartz CNP   lamoTRIgine (LAMICTAL) 200 MG tablet Take one tab by mouth once daily. 11/30/21  Yes AJITH Schwartz CNP   DHEA 50 MG CAPS Take by mouth nightly   Yes Historical Provider, MD   dimethyl fumarate (TECFIDERA) 240 MG delayed release capsule Take 1 capsule by mouth 2 times daily 6/10/21  Yes Virginia Blake DO   docusate sodium (COLACE) 100 MG capsule TAKE 1 CAPSULE BY MOUTH 2 TIMES A DAY 6/7/21  Yes Jesusa Collet, MD   ipratropium (ATROVENT) 0.03 % nasal spray INSTILL 2 SPRAYS INTO EACH NOSTRIL 3 TIMES DAILY.  4/8/21  Yes AJITH Morales CNP   Lactobacillus-Inulin ([facility], INULIN,) CAPS TAKE 1 CAPSULE BY MOUTH WITH BREAKFAST 2/4/21  Yes Virginia Blake DO   fluticasone [facility]) 50 MCG/ACT nasal spray 2 sprays by Each Nostril route daily   Yes Historical Provider, MD   pantoprazole (PROTONIX) 40 MG tablet Take 1 tablet by mouth 2 times daily (before meals)  Patient taking differently: Take 40 mg by mouth daily  12/10/20  Yes Virginia Blake DO   ondansetron [facility]) 4 MG tablet Take 1 tablet by mouth 3 times daily as needed for Nausea or Vomiting 9/22/20  Yes Elmer Chavez MD   Prosthesis MISC by Does not apply route Strap for the prosthesis broken please order new 3/18/20  Yes Virginia Blake DO   loratadine (CLARITIN) 10 MG tablet Take 1 tablet by mouth daily 2/26/20  Yes Rhonda Feliz MD   Cholecalciferol (VITAMIN D) 2000 units CAPS capsule Take by mouth nightly 50 mcg nightly   Yes Historical Provider, MD   Magnesium Oxide 250 MG TABS Take 250 mg by mouth nightly    Yes Historical Provider, MD   calcium carbonate 600 MG TABS tablet Take 1 tablet by mouth nightly    Yes Historical Provider, MD vitamin B-12 1000 MCG tablet Take 1 tablet by mouth daily 2/8/18  Yes Dee Dee Shah MD   nitroGLYCERIN (NITROSTAT) 0.4 MG SL tablet up to max of 3 total doses. If no relief after 1 dose, call 911. 2/22/17  Yes Arjun Jones MD   Prenatal MV-Min-Fe Fum-FA-DHA (PRENATAL 1 PO) Take 1 tablet by mouth daily    Yes Historical Provider, MD         Allergies:   Latex and Sulfa antibiotics    Social Hx:  Tobacco:    reports that she quit smoking about 44 years ago. Her smoking use included cigarettes. She has a 0.05 pack-year smoking history. She has never used smokeless tobacco.  Alcohol:      reports no history of alcohol use. Drug Use:  reports no history of drug use. Family Hx; Family History   Problem Relation Age of Onset   Montero Cancer Mother     Colon Polyps Mother     Heart Disease Father     Glaucoma Father     Cancer Sister     Cancer Sister        ROS:  Review of Systems   Constitutional: Positive for activity change. Negative for appetite change, chills, diaphoresis, fatigue and fever. HENT: Negative for congestion and nosebleeds. Eyes: Negative for photophobia and visual disturbance. Respiratory: Negative for cough, shortness of breath and wheezing. Cardiovascular: Negative for chest pain and palpitations. Gastrointestinal: Positive for abdominal pain (Had right-sided \"hunger\" pains yesterday), blood in stool and nausea (Reports some nausea yesterday). Negative for abdominal distention and vomiting. Genitourinary: Negative for flank pain and hematuria. Musculoskeletal: Negative for arthralgias and myalgias. Skin: Negative for rash and wound. Neurological: Negative for dizziness and light-headedness. Her MS has been stable       Physical Exam:  Vitals:  Ht 4' 9\" (1.448 m)   Wt 110 lb (49.9 kg)   BMI 23.80 kg/m²   No data recorded. Physical Exam  Vitals reviewed. Constitutional:       General: She is not in acute distress. Appearance: She is not diaphoretic. HENT:      Head: Normocephalic. Nose: Nose normal.   Eyes:      General: No scleral icterus. Conjunctiva/sclera: Conjunctivae normal.   Neck:      Trachea: No tracheal deviation. Cardiovascular:      Rate and Rhythm: Normal rate and regular rhythm. Pulmonary:      Effort: Pulmonary effort is normal. No respiratory distress. Breath sounds: Normal breath sounds. No wheezing or rales. Chest:      Chest wall: No tenderness. Abdominal:      General: There is no distension. Palpations: Abdomen is soft. Tenderness: There is no abdominal tenderness. There is no guarding or rebound. Musculoskeletal:         General: Deformity (Right arm congenital amputation) present. No tenderness. Cervical back: Neck supple. Skin:     General: Skin is warm and dry. Neurological:      Mental Status: She is alert and oriented to person, place, and time.        Data:  Laboratory Testing:  Recent Results (from the past 24 hour(s))   CBC auto differential    Collection Time: 01/15/22  3:34 AM   Result Value Ref Range    WBC 6.4 4.8 - 10.8 thou/mm3    RBC 2.99 (L) 4.20 - 5.40 mill/mm3    Hemoglobin 9.7 (L) 12.0 - 16.0 gm/dl    Hematocrit 30.6 (L) 37.0 - 47.0 %    .3 (H) 81.0 - 99.0 fL    MCH 32.4 26.0 - 33.0 pg    MCHC 31.7 (L) 32.2 - 35.5 gm/dl    RDW-CV 14.4 11.5 - 14.5 %    RDW-SD 53.3 (H) 35.0 - 45.0 fL    Platelets 095 839 - 818 thou/mm3    MPV 10.1 9.4 - 12.4 fL    Seg Neutrophils 75.6 %    Lymphocytes 12.4 %    Monocytes 8.5 %    Eosinophils 2.2 %    Basophils 0.8 %    Immature Granulocytes 0.5 %    Segs Absolute 4.8 1.8 - 7.7 thou/mm3    Lymphocytes Absolute 0.8 (L) 1.0 - 4.8 thou/mm3    Monocytes Absolute 0.5 0.4 - 1.3 thou/mm3    Eosinophils Absolute 0.1 0.0 - 0.4 thou/mm3    Basophils Absolute 0.1 0.0 - 0.1 thou/mm3    Immature Grans (Abs) 0.03 0.00 - 0.07 thou/mm3    nRBC 0 /100 wbc   CBC auto differential    Collection Time: 01/15/22 10:23 AM   Result Value Ref Range    WBC 5.3 4.8 - 10.8 thou/mm3    RBC 2.98 (L) 4.20 - 5.40 mill/mm3    Hemoglobin 9.8 (L) 12.0 - 16.0 gm/dl    Hematocrit 30.3 (L) 37.0 - 47.0 %    .7 (H) 81.0 - 99.0 fL    MCH 32.9 26.0 - 33.0 pg    MCHC 32.3 32.2 - 35.5 gm/dl    RDW-CV 14.5 11.5 - 14.5 %    RDW-SD 53.8 (H) 35.0 - 45.0 fL    Platelets 796 160 - 119 thou/mm3    MPV 9.9 9.4 - 12.4 fL    Seg Neutrophils 76.9 %    Lymphocytes 13.6 %    Monocytes 6.8 %    Eosinophils 1.7 %    Basophils 0.6 %    Immature Granulocytes 0.4 %    Segs Absolute 4.1 1.8 - 7.7 thou/mm3    Lymphocytes Absolute 0.7 (L) 1.0 - 4.8 thou/mm3    Monocytes Absolute 0.4 0.4 - 1.3 thou/mm3    Eosinophils Absolute 0.1 0.0 - 0.4 thou/mm3    Basophils Absolute 0.0 0.0 - 0.1 thou/mm3    Immature Grans (Abs) 0.02 0.00 - 0.07 thou/mm3    nRBC 0 /100 wbc   Hemoglobin and Hematocrit, Blood    Collection Time: 01/15/22  1:28 PM   Result Value Ref Range    Hemoglobin 10.6 (L) 11.9 - 15.1 g/dL    Hematocrit 33.7 (L) 36.3 - 47.1 %       Imaging/Diagnostics:  CT ABDOMEN PELVIS W IV CONTRAST Additional Contrast? None    Result Date: 1/14/2022  Stable CT scan abdomen and pelvis. There is constipation. There is no convincing evidence for bowel obstruction. No other acute abdominal or pelvic abnormality **This report has been created using voice recognition software. It may contain minor errors which are inherent in voice recognition technology. ** Final report electronically signed by Dr. Toni Roque on 1/14/2022 4:17 PM      Assessment:  Primary Problem  GI bleed    Active Hospital Problems    Diagnosis Date Noted    Ulcer at site of surgical anastomosis following bypass of stomach [T85.898A, K28.9] 01/15/2022    Peptic ulcer disease [F09.8]     Other complications of other bariatric procedure [K95.89]     GI bleed [K92.2] 01/14/2022    Status post placement of cardiac pacemaker [Z95.0] 05/20/2019    History of Batsheva-en-Y gastric bypass [Z98.84]     Non-ischemic cardiomyopathy ([facility] [I42.8]     MS (multiple sclerosis) ([facility] Andrew Horne 12/21/2011       Plan:  Admit  Monitor H&H  Transfuse as needed  PPI  Carafate  GI consultation  DVT prophylaxis      Patient is admitted as inpatient status because of co-morbidities listed above, severity of signs and symptoms as outlined, requirement for current medical therapies and most importantly because of direct risk to patient if care not provided in a hospital setting. Expected length of stay > 48 hours.      Consultations:   IP CONSULT TO GI    Electronically signed by Arvind Pool DO on 1/15/2022 at 3:36 PM

## 2022-01-15 NOTE — ED NOTES
Pt ambulated to [address] [place] [address] without difficulty     Nohemi Pillai RN  01/15/22 [id number]

## 2022-01-15 NOTE — ED PROVIDER NOTES
The patient was turned over to me by May Hsieh PA-C pending acceptance at [facility].      In short this is a 63-year-old female with a history of Batsheva-en-Y gastric bypass and prior GI bleeds. Patient has not had a GI bleed for 3 years but had gross melena today accompanied with abdominal cramping. Patient was found to have gross melena on physical exam and a 1.6 drop of her hemoglobin. GI at our facility was consulted and advised transfer due to her Batsheva-en-Y. I spoke with Dr. Humaira Pete gastroenterologist at [facility] who was happy to see the patient in consult. Dr. Timothy Gaston was consulted as well and graciously accepted admission. Patient remained stable in facility and was transferred to [place]. [place], [place]  01/14/22 1939

## 2022-01-15 NOTE — CONSULTS
[facility]    GASTROENTEROLOGY CONSULT    Patient:   Payam Bailey   :    1960   Facility:   [address] [place] [address]   Date:    1/15/2022  Admission Dx:  GI bleed [K92.2]  Requesting physician: Ralph Pena DO  Reason for consult:  GI bleed    CC : \"black tarry stool\"    SUBJECTIVE     HISTORY OF PRESENT ILLNESS  This is a 64 y.o.  female pmh of RNYGB, MS, type 1 bipolar, prior GI bleeds, GDA embolization, who was admitted 1/15/2022 with GI bleed [K92.2]. We have been asked to see the patient in consultation by Ralph Pena DO for GI bleed. Patient states that she had a large dark stool yesterday morning. Patient stated that she had R sided abdominal pain prior. Denies any N/V, lighteheadedness, or weakness. Patient states that since her RNYGB in  she has had black tarry stools 6 times. Patient has had multiple endoscopies but was unsure if she had any ulcers. hgb has been trending down. initally at OSH hgb was 12 has trended down to 9.8. not having any bloody BM since initial episode. YOSHI at bedside negative for gross blood. Patient is currently HDS, afebrile. Not complaining of any abdominal pain.        Summary of imaging completed at this time:      Summary of labs completed at this time:        Previous GI history:         OBJECTIVE:     PAST MEDICAL/SURGICAL HISTORY  Past Medical History:   Diagnosis Date    Balance problem     Bipolar I disorder, most recent episode (or current) depressed, in partial or unspecified remission 2013    Cancer ([facility]     CERVICAL     Depression     Fatigue     Gallstones     Gastric bypass status for obesity     GERD (gastroesophageal reflux disease)     History of blood transfusion     History of hysterectomy     REMOVAL OF ONE OVARY     Hypothyroidism     Liver cyst     GI Dr Justina Hughes MS (multiple sclerosis) ([facility]     Paresthesias     RT LOWER LIMB    UTI (urinary tract infection)      Past Surgical History:   Procedure Laterality Date    APPENDECTOMY      BONE GRAFT Left     left leg to right arm    CARPAL TUNNEL RELEASE  1999    CHOLECYSTECTOMY, LAPAROSCOPIC  5/16/2016    COLONOSCOPY      DILATATION, ESOPHAGUS      ENDOSCOPY, COLON, DIAGNOSTIC      ENTEROSCOPY N/A 2/5/2018    ENTEROSCOPY performed by Óscar Rahman MD at [address] [place] [address] N/A 9/28/2020    GASTROSTOMY TUBE PLACEMENT- [facility] TO DO GASTRIC ENDOSCOPY performed by Nathan Reina MD at [facility], VAGINAL      LAPAROSCOPIC APPENDECTOMY  5/16/2016    OTHER SURGICAL HISTORY      RECTAL FISSULRE    OTHER SURGICAL HISTORY  1986    D&C    OTHER SURGICAL HISTORY  2006    GI BLEED    PACEMAKER INSERTION Right 05/20/2019    Dr. Veronica Zapien FLX DX W/COLLJ [place] 1978 PFRMD Left 2/6/2018    COLONOSCOPY performed by Óscar Rahman MD at [facility]    [place] OFFICE/OUTPT VISIT,PROCEDURE ONLY Left 2/18/2018    EGD ESOPHAGOGASTRODUODENOSCOPY performed by Nando Rao MD at [facility] End[facility]    [place] OFFICE/[place]PT VISIT,PROCEDURE ONLY N/A 10/4/2018    EGD DIAGNOSTIC ONLY performed by Jerel Joiner MD at [place] [address] ENDOSCOPY Left 9/24/2019    EGD DIAGNOSTIC ONLY performed by Carley Isidro MD at [facility] ENDOSCOPY N/A 9/26/2020    ENTEROSCOPY PUSH CONTROL HEMORRHAGE performed by Óscar Rahman MD at [facility] Endoscopy       ALLERGIES:  Allergies   Allergen Reactions    Latex Rash    Sulfa Antibiotics Rash       HOME MEDICATIONS:  Prior to Admission medications    Medication Sig Start Date End Date Taking?  Authorizing Provider   levothyroxine (SYNTHROID) 100 MCG tablet TAKE 1 TABLET BY MOUTH ONE TIME A DAY  Patient taking differently: nightly TAKE 1 TABLET BY MOUTH ONE TIME A DAY 1/4/22  Yes Strawn Letters Ananth Lofton MD   vilazodone HCl (VILAZODONE HCL) 40 MG TABS TAKE 1 TABLET BY MOUTH ONCE DAILY. 11/30/21  Yes AJITH Carpenter CNP   lurasidone (LATUDA) 40 MG TABS tablet Take 1 tablet by mouth daily  Patient taking differently: Take 40 mg by mouth nightly  11/30/21  Yes AJITH Carpenter CNP   lithium 300 MG tablet Take 2 tablets by mouth 2 times daily 11/30/21  Yes AJITH Schwartz CNP   lamoTRIgine (LAMICTAL) 200 MG tablet Take one tab by mouth once daily. 11/30/21  Yes AJITH Schwartz CNP   DHEA 50 MG CAPS Take by mouth nightly   Yes Historical Provider, MD   dimethyl fumarate (TECFIDERA) 240 MG delayed release capsule Take 1 capsule by mouth 2 times daily 6/10/21  Yes Julio Cesar Miguel DO   docusate sodium (COLACE) 100 MG capsule TAKE 1 CAPSULE BY MOUTH 2 TIMES A DAY 6/7/21  Yes Yudelka Anders MD   ipratropium (ATROVENT) 0.03 % nasal spray INSTILL 2 SPRAYS INTO EACH NOSTRIL 3 TIMES DAILY.  4/8/21  Yes AJITH Benitez CNP   Lactobacillus-Inulin ([facility], INULIN,) CAPS TAKE 1 CAPSULE BY MOUTH WITH BREAKFAST 2/4/21  Yes Julio Cesar Miguel DO   fluticasone [facility]) 50 MCG/ACT nasal spray 2 sprays by Each Nostril route daily   Yes Historical Provider, MD   pantoprazole (PROTONIX) 40 MG tablet Take 1 tablet by mouth 2 times daily (before meals)  Patient taking differently: Take 40 mg by mouth daily  12/10/20  Yes Julio Cesar Miguel DO   ondansetron [facility]) 4 MG tablet Take 1 tablet by mouth 3 times daily as needed for Nausea or Vomiting 9/22/20  Yes Mulugeta Shah MD   Prosthesis MISC by Does not apply route Strap for the prosthesis broken please order new 3/18/20  Yes Julio Cesar Miguel DO   loratadine (CLARITIN) 10 MG tablet Take 1 tablet by mouth daily 2/26/20  Yes Zaheer Hewitt MD   Cholecalciferol (VITAMIN D) 2000 units CAPS capsule Take by mouth nightly 50 mcg nightly   Yes Historical Provider, MD   Magnesium Oxide 250 MG TABS Take 250 mg by mouth nightly    Yes Historical Provider, MD   calcium carbonate 600 MG TABS tablet Take 1 tablet by mouth nightly    Yes Historical Provider, MD   vitamin B-12 1000 MCG tablet Take 1 tablet by mouth daily 2/8/18  Yes Dinora Dove MD   nitroGLYCERIN (NITROSTAT) 0.4 MG SL tablet up to max of 3 total doses. If no relief after 1 dose, call 911. 2/22/17  Yes Lucas Saba MD   Prenatal MV-Min-Fe Fum-FA-DHA (PRENATAL 1 PO) Take 1 tablet by mouth daily    Yes Historical Provider, MD       CURRENT MEDICATIONS:  Scheduled Meds:   sodium chloride flush  5-40 mL IntraVENous 2 times per day    pantoprazole  40 mg IntraVENous Q12H    And    sodium chloride (PF)  10 mL IntraVENous Q12H    lamoTRIgine  200 mg Oral Daily    levothyroxine  100 mcg Oral Daily    lithium  600 mg Oral BID     Continuous Infusions:   sodium chloride 150 mL/hr at 01/15/22 1347    sodium chloride       PRN Meds:sodium chloride flush, sodium chloride, ondansetron **OR** ondansetron, acetaminophen **OR** acetaminophen    SOCIAL HISTORY:     Tobacco:   reports that she quit smoking about 44 years ago. Her smoking use included cigarettes. She has a 0.05 pack-year smoking history. She has never used smokeless tobacco.  Alcohol:   reports no history of alcohol use. Illicit drugs:  reports no history of drug use. FAMILY HISTORY:     Family History   Problem Relation Age of Onset   Alfonzo Barrett Cancer Mother     Colon Polyps Mother     Heart Disease Father     Glaucoma Father     Cancer Sister     Cancer Sister        REVIEW OF SYSTEMS:    Constitutional: No fever, no chills, no lethargy, no weakness. HEENT:  No headache, otalgia, itchy eyes, nasal discharge or sore throat. Cardiac:  No chest pain, dyspnea, orthopnea or PND. Chest:   No cough, phlegm or wheezing. Abdomen:  Large dark BM this AM   Neuro:  No focal weakness, abnormal movements or seizure like activity. Skin:   No rashes, no itching.   :   No hematuria, no pyuria, no dysuria, no flank pain.  Extremities:  No swelling or joint pains. ROS was otherwise negative except as mentioned in the [address] PHYSICAL EXAM:    Ht 4' 9\" (1.448 m)   Wt 110 lb (49.9 kg)   BMI 23.80 kg/m²     GENERAL:  Well developed, Well nourished, No apparent distress  HEAD:   Normocephalic, Atraumatic  EENT:   EOMI, Sclera not icteric, Oropharynx moist   NECK:  Supple, Trachea midline  LUNGS:  CTA Bilaterally  HEART:  RRR, No murmur  ABDOMEN:   Soft, Nontender, Nondistended, BS WNL, YOSHI negative   EXT:   No clubbing. No cyanosis. No edema. SKIN:   No rashes. No jaundice. No stigmata of liver disease. MUSC/SKEL:   Adequate muscle bulk for patient's age, No significant synovitis, No RUE due to birth defect. NEURO:  A&O x Three, CN II- XII grossly intact      LABS AND IMAGING:     CBC  Recent Labs     01/14/22  1314 01/14/22  1314 01/15/22  0334 01/15/22  1023 01/15/22  1328   WBC 7.1  --  6.4 5.3  --    HGB 11.2*   < > 9.7* 9.8* 10.6*   HCT 34.8*   < > 30.6* 30.3* 33.7*   .2*  --  102.3* 101.7*  --    MCH 32.6  --  32.4 32.9  --    MCHC 32.2  --  31.7* 32.3  --      --  193 187  --     < > = values in this interval not displayed. IMMATURE PLTs  No results found for: PLTFLUORE    BMP  Recent Labs     01/14/22  1314      K 4.0      CO2 22*   BUN 23*   CREATININE 0.6   GLUCOSE 138*   CALCIUM 9.8       LFTS  Recent Labs     01/14/22  1314   ALKPHOS 109   ALT 18   AST 27   PROT 6.1   BILITOT 0.2*   LABALBU 4.3       AMYLASE/LIPASE/AMMONIA  Recent Labs     01/14/22  1431   LIPASE 15.8       PT/INR  Recent Labs     01/14/22  1431   INR 0.99       ANEMIA STUDIES  No results for input(s): IRON, LABIRON, TIBC, UIBC, FERRITIN, TTNGHLHG82, FOLATE, OCCULTBLD in the last 72 hours. IMAGING  CT ABDOMEN PELVIS W IV CONTRAST Additional Contrast? None    Result Date: 1/14/2022  PROCEDURE: CT ABDOMEN PELVIS W IV CONTRAST CLINICAL INFORMATION: abdominal pain rectal bleeeding .  TECHNIQUE: 2-D multiplanar postcontrast images of the abdomen and pelvis at 5 mm intervals. Isovue-370 IV contrast. All CT scans at this facility use dose modulation, iterative reconstruction, and/or weight-based dosing when appropriate to reduce radiation dose to as low as reasonably achievable. COMPARISON: 9/22/2020 FINDINGS: Lung bases Lungs appear emphysematous. Undersurface the heart is normal size with pacer leads present. Abdomen pelvis Postsurgical changes of the stomach. Prior embolization procedure gastroduodenal artery. Stable cyst right lobe of the liver. Liver is enlarged. Spleen is normal. Streak artifact from the embolization coils. The pancreas appears normal. Status post cholecystectomy. Adrenals are normal. Nonobstructive calculus right kidney. Pelviectasis left kidney. Aorta is unremarkable. There is constipation. No convincing evidence of bowel obstruction. There is very similar to the prior exam. Urinary bladder is decompressed. No abnormal fluid collections are seen. MSK No suspicious bone lesions With     Stable CT scan abdomen and pelvis. There is constipation. There is no convincing evidence for bowel obstruction. No other acute abdominal or pelvic abnormality **This report has been created using voice recognition software. It may contain minor errors which are inherent in voice recognition technology. ** Final report electronically signed by Dr. Kika Montero on 1/14/2022 4:17 PM      IMPRESSION:     1. GI bleed: large episode of melena yesterday. YOSHI at [facility] is negative. hgb 9.8 from 12. Currently HDS  2. Hx of RNYGB  3. GERD currently on protonix      Old records, labs and imaging reviewed. PLAN   1. Plan for EGD Monday 1/17  2. Ok for diet  3. Continue to monitor hgb  4. Start protonix tx 40 bid  5. Transfuse if below hgb 7  6. If patient deteriorates will plan for bleeding scan. This plan was formulated in collaboration with Dr. Jr Rodas.    Thank you for allowing us to participate in the care of your patient. Electronically signed by: Hernán Isaac MD on 1/15/2022 at 2:13 PM     Please note that this note was generated using a voice recognition dictation software. Although every effort was made to ensure the accuracy of this automated transcription, some errors in transcription may have occurred.

## 2022-01-15 NOTE — ED NOTES
Updated pt on plan of care to transfer to [facility] to try to accommodate  a bed for her this am.      Margie Chung RN  01/15/22 1621

## 2022-01-15 NOTE — ED NOTES
Pt laying in bed, sleeping comfortably. Still awaiting bed assignment for transfer.      Bisi Godfrey RN  01/15/22 1887

## 2022-01-15 NOTE — LETTER
Chief complaint:   Chief Complaint   Patient presents with   • Follow-up     ear infection        Vitals:  Visit Vitals   • /70 ([place] [facility] Position: Sitting, Cuff Size: Regular)   • Pulse 84   • Temp 97 °F (36.1 °C) (Tympanic)   • Wt 112.9 kg   • LMP 11/26/2014 (Approximate)   • BMI 45.54 kg/m2       HISTORY OF PRESENT ILLNESS     HPI  Janessa is a 52 year old female who presents to the clinic today for recheck of ear.  She reports it feels better.  No pain. No congestion or troubles hearing.  No congestion or runny nose.  Has a mild sore throat.  Occasional cough.      Other significant problems:  Patient Active Problem List    Diagnosis Date Noted   • HTN (hypertension) 12/24/2012     Priority: Low   • Mitral valve regurgitation 12/24/2012     Priority: Low   • Left ventricular hypertrophy 12/24/2012     Priority: Low       PAST MEDICAL, FAMILY AND SOCIAL HISTORY     Medications:  Current Outpatient Prescriptions   Medication   • amLODIPine (NORVASC) 10 MG tablet   • losartan-hydrochlorothiazide (HYZAAR) 100-25 MG per tablet   • doxazosin (CARDURA) 2 MG tablet   • Omega-3 Fatty Acids (FISH OIL) 1000 MG capsule     No current facility-administered medications for this visit.        Allergies:  ALLERGIES:   Allergen Reactions   • Lisinopril Cough       Social History:  Social History   Substance Use Topics   • Smoking status: Never Smoker   • Smokeless tobacco: Never Used   • Alcohol use 0.0 oz/week     0 Standard drinks or equivalent per week      Comment: SELDOM       REVIEW OF SYSTEMS     Review of Systems  See HPI    PHYSICAL EXAM     Physical Exam   HENT:   Right Ear: Tympanic membrane, external ear and ear canal normal.   Left Ear: Tympanic membrane, external ear and ear canal normal.       ASSESSMENT/PLAN     Janessa was seen today for follow-up.    Diagnoses and all orders for this visit:    Normal physical exam  Ear exam normal today.     Essential hypertension  BP improved with 3rd reading  [facility] Stepdown  [address]  Phone: [phone]    Mary Randhawa MD      January 18, 2022     Patient: Elyssa Humphrey   YOB: 1960   Date of Visit: 1/14/2022       To Whom It May Concern: It is my medical opinion that Misha Michelle may return to full duty immediately with no restrictions. If you have any questions or concerns, please don't hesitate to call.     Sincerely,        Mary Randhawa MD done by nurse.  Will monitor.  Continue current medications.

## 2022-01-15 NOTE — FLOWSHEET NOTE
x1 at 1845       01/15/22 1852   Stool Assessment   Stool Appearance Formed   Stool Color Black   Stool Amount Large

## 2022-01-16 ENCOUNTER — ANESTHESIA (OUTPATIENT)
Dept: OPERATING ROOM | Age: 62
DRG: 378 | End: 2022-01-16
Payer: MEDICARE

## 2022-01-16 ENCOUNTER — ANESTHESIA EVENT (OUTPATIENT)
Dept: OPERATING ROOM | Age: 62
DRG: 378 | End: 2022-01-16
Payer: MEDICARE

## 2022-01-16 ENCOUNTER — APPOINTMENT (OUTPATIENT)
Dept: CT IMAGING | Age: 62
DRG: 378 | End: 2022-01-16
Attending: FAMILY MEDICINE
Payer: MEDICARE

## 2022-01-16 VITALS — DIASTOLIC BLOOD PRESSURE: 93 MMHG | OXYGEN SATURATION: 100 % | SYSTOLIC BLOOD PRESSURE: 104 MMHG

## 2022-01-16 LAB
ANION GAP SERPL CALCULATED.3IONS-SCNC: 5 MMOL/L (ref 9–17)
BLOOD BANK SPECIMEN: NORMAL
BUN BLDV-MCNC: 28 MG/DL (ref 8–23)
BUN/CREAT BLD: ABNORMAL (ref 9–20)
CALCIUM SERPL-MCNC: 8.3 MG/DL (ref 8.6–10.4)
CHLORIDE BLD-SCNC: 116 MMOL/L (ref 98–107)
CO2: 19 MMOL/L (ref 20–31)
CREAT SERPL-MCNC: 0.4 MG/DL (ref 0.5–0.9)
GFR AFRICAN AMERICAN: >60 ML/MIN
GFR NON-AFRICAN AMERICAN: >60 ML/MIN
GFR SERPL CREATININE-BSD FRML MDRD: ABNORMAL ML/MIN/{1.73_M2}
GFR SERPL CREATININE-BSD FRML MDRD: ABNORMAL ML/MIN/{1.73_M2}
GLUCOSE BLD-MCNC: 88 MG/DL (ref 70–99)
HCT VFR BLD CALC: 15.9 % (ref 36.3–47.1)
HCT VFR BLD CALC: 22.3 % (ref 36.3–47.1)
HCT VFR BLD CALC: 25 % (ref 36.3–47.1)
HCT VFR BLD CALC: 27.2 % (ref 36.3–47.1)
HCT VFR BLD CALC: 29 % (ref 36.3–47.1)
HEMOGLOBIN: 4.8 G/DL (ref 11.9–15.1)
HEMOGLOBIN: 6.9 G/DL (ref 11.9–15.1)
HEMOGLOBIN: 8 G/DL (ref 11.9–15.1)
HEMOGLOBIN: 9 G/DL (ref 11.9–15.1)
HEMOGLOBIN: 9.8 G/DL (ref 11.9–15.1)
IRON SATURATION: 30 % (ref 20–55)
IRON: 69 UG/DL (ref 37–145)
POTASSIUM SERPL-SCNC: 4 MMOL/L (ref 3.7–5.3)
SODIUM BLD-SCNC: 140 MMOL/L (ref 135–144)
TOTAL IRON BINDING CAPACITY: 227 UG/DL (ref 250–450)
TROPONIN INTERP: ABNORMAL
TROPONIN T: ABNORMAL NG/ML
TROPONIN, HIGH SENSITIVITY: 24 NG/L (ref 0–14)
UNSATURATED IRON BINDING CAPACITY: 158 UG/DL (ref 112–347)

## 2022-01-16 PROCEDURE — 6360000004 HC RX CONTRAST MEDICATION: Performed by: INTERNAL MEDICINE

## 2022-01-16 PROCEDURE — 2580000003 HC RX 258: Performed by: INTERNAL MEDICINE

## 2022-01-16 PROCEDURE — 86901 BLOOD TYPING SEROLOGIC RH(D): CPT

## 2022-01-16 PROCEDURE — 2580000003 HC RX 258: Performed by: NURSE PRACTITIONER

## 2022-01-16 PROCEDURE — 86920 COMPATIBILITY TEST SPIN: CPT

## 2022-01-16 PROCEDURE — 83550 IRON BINDING TEST: CPT

## 2022-01-16 PROCEDURE — P9016 RBC LEUKOCYTES REDUCED: HCPCS

## 2022-01-16 PROCEDURE — 93005 ELECTROCARDIOGRAM TRACING: CPT | Performed by: STUDENT IN AN ORGANIZED HEALTH CARE EDUCATION/TRAINING PROGRAM

## 2022-01-16 PROCEDURE — 7100000001 HC PACU RECOVERY - ADDTL 15 MIN: Performed by: INTERNAL MEDICINE

## 2022-01-16 PROCEDURE — 45382 COLONOSCOPY W/CONTROL BLEED: CPT | Performed by: INTERNAL MEDICINE

## 2022-01-16 PROCEDURE — 3609013900 HC ENTEROSCOPY: Performed by: INTERNAL MEDICINE

## 2022-01-16 PROCEDURE — 80048 BASIC METABOLIC PNL TOTAL CA: CPT

## 2022-01-16 PROCEDURE — 2709999900 HC NON-CHARGEABLE SUPPLY: Performed by: INTERNAL MEDICINE

## 2022-01-16 PROCEDURE — 83540 ASSAY OF IRON: CPT

## 2022-01-16 PROCEDURE — 86850 RBC ANTIBODY SCREEN: CPT

## 2022-01-16 PROCEDURE — 76937 US GUIDE VASCULAR ACCESS: CPT

## 2022-01-16 PROCEDURE — 74174 CTA ABD&PLVS W/CONTRAST: CPT

## 2022-01-16 PROCEDURE — 2060000000 HC ICU INTERMEDIATE R&B

## 2022-01-16 PROCEDURE — 36415 COLL VENOUS BLD VENIPUNCTURE: CPT

## 2022-01-16 PROCEDURE — 84484 ASSAY OF TROPONIN QUANT: CPT

## 2022-01-16 PROCEDURE — 44360 SMALL BOWEL ENDOSCOPY: CPT | Performed by: INTERNAL MEDICINE

## 2022-01-16 PROCEDURE — 6370000000 HC RX 637 (ALT 250 FOR IP): Performed by: NURSE PRACTITIONER

## 2022-01-16 PROCEDURE — C9113 INJ PANTOPRAZOLE SODIUM, VIA: HCPCS | Performed by: NURSE PRACTITIONER

## 2022-01-16 PROCEDURE — 85018 HEMOGLOBIN: CPT

## 2022-01-16 PROCEDURE — 3609009900 HC COLONOSCOPY W/CONTROL BLEEDING ANY METHOD: Performed by: INTERNAL MEDICINE

## 2022-01-16 PROCEDURE — 6360000002 HC RX W HCPCS: Performed by: NURSE ANESTHETIST, CERTIFIED REGISTERED

## 2022-01-16 PROCEDURE — 0DJ08ZZ INSPECTION OF UPPER INTESTINAL TRACT, VIA NATURAL OR ARTIFICIAL OPENING ENDOSCOPIC: ICD-10-PCS | Performed by: INTERNAL MEDICINE

## 2022-01-16 PROCEDURE — 36430 TRANSFUSION BLD/BLD COMPNT: CPT

## 2022-01-16 PROCEDURE — 86900 BLOOD TYPING SEROLOGIC ABO: CPT

## 2022-01-16 PROCEDURE — 99232 SBSQ HOSP IP/OBS MODERATE 35: CPT | Performed by: STUDENT IN AN ORGANIZED HEALTH CARE EDUCATION/TRAINING PROGRAM

## 2022-01-16 PROCEDURE — 3700000001 HC ADD 15 MINUTES (ANESTHESIA): Performed by: INTERNAL MEDICINE

## 2022-01-16 PROCEDURE — 7100000000 HC PACU RECOVERY - FIRST 15 MIN: Performed by: INTERNAL MEDICINE

## 2022-01-16 PROCEDURE — 85014 HEMATOCRIT: CPT

## 2022-01-16 PROCEDURE — 2720000010 HC SURG SUPPLY STERILE: Performed by: INTERNAL MEDICINE

## 2022-01-16 PROCEDURE — 6360000002 HC RX W HCPCS: Performed by: NURSE PRACTITIONER

## 2022-01-16 PROCEDURE — 3700000000 HC ANESTHESIA ATTENDED CARE: Performed by: INTERNAL MEDICINE

## 2022-01-16 PROCEDURE — 0W3P8ZZ CONTROL BLEEDING IN GASTROINTESTINAL TRACT, VIA NATURAL OR ARTIFICIAL OPENING ENDOSCOPIC: ICD-10-PCS | Performed by: INTERNAL MEDICINE

## 2022-01-16 PROCEDURE — 6370000000 HC RX 637 (ALT 250 FOR IP): Performed by: INTERNAL MEDICINE

## 2022-01-16 PROCEDURE — 2500000003 HC RX 250 WO HCPCS: Performed by: NURSE ANESTHETIST, CERTIFIED REGISTERED

## 2022-01-16 RX ORDER — SUCRALFATE 1 G/1
1 TABLET ORAL EVERY 6 HOURS
Status: DISCONTINUED | OUTPATIENT
Start: 2022-01-16 | End: 2022-01-18 | Stop reason: HOSPADM

## 2022-01-16 RX ORDER — SODIUM CHLORIDE 9 MG/ML
INJECTION, SOLUTION INTRAVENOUS PRN
Status: DISCONTINUED | OUTPATIENT
Start: 2022-01-16 | End: 2022-01-18 | Stop reason: HOSPADM

## 2022-01-16 RX ORDER — PHENYLEPHRINE HCL IN 0.9% NACL 1 MG/10 ML
SYRINGE (ML) INTRAVENOUS PRN
Status: DISCONTINUED | OUTPATIENT
Start: 2022-01-16 | End: 2022-01-16 | Stop reason: SDUPTHER

## 2022-01-16 RX ORDER — PROPOFOL 10 MG/ML
INJECTION, EMULSION INTRAVENOUS CONTINUOUS PRN
Status: DISCONTINUED | OUTPATIENT
Start: 2022-01-16 | End: 2022-01-16 | Stop reason: SDUPTHER

## 2022-01-16 RX ORDER — DIMETHYL FUMARATE 240 MG/1
240 CAPSULE ORAL 2 TIMES DAILY
Status: DISCONTINUED | OUTPATIENT
Start: 2022-01-16 | End: 2022-01-18 | Stop reason: HOSPADM

## 2022-01-16 RX ORDER — LIDOCAINE HYDROCHLORIDE 10 MG/ML
INJECTION, SOLUTION EPIDURAL; INFILTRATION; INTRACAUDAL; PERINEURAL PRN
Status: DISCONTINUED | OUTPATIENT
Start: 2022-01-16 | End: 2022-01-16 | Stop reason: SDUPTHER

## 2022-01-16 RX ORDER — PROPOFOL 10 MG/ML
INJECTION, EMULSION INTRAVENOUS PRN
Status: DISCONTINUED | OUTPATIENT
Start: 2022-01-16 | End: 2022-01-16 | Stop reason: SDUPTHER

## 2022-01-16 RX ORDER — SODIUM CHLORIDE 9 MG/ML
INJECTION, SOLUTION INTRAVENOUS PRN
Status: COMPLETED | OUTPATIENT
Start: 2022-01-16 | End: 2022-01-16

## 2022-01-16 RX ORDER — EPHEDRINE SULFATE/0.9% NACL/PF 50 MG/5 ML
SYRINGE (ML) INTRAVENOUS PRN
Status: DISCONTINUED | OUTPATIENT
Start: 2022-01-16 | End: 2022-01-16 | Stop reason: SDUPTHER

## 2022-01-16 RX ORDER — VILAZODONE HYDROCHLORIDE 40 MG/1
40 TABLET ORAL DAILY
Status: DISCONTINUED | OUTPATIENT
Start: 2022-01-16 | End: 2022-01-18 | Stop reason: HOSPADM

## 2022-01-16 RX ADMIN — SODIUM CHLORIDE, PRESERVATIVE FREE 10 ML: 5 INJECTION INTRAVENOUS at 22:09

## 2022-01-16 RX ADMIN — SODIUM CHLORIDE: 9 INJECTION, SOLUTION INTRAVENOUS at 10:29

## 2022-01-16 RX ADMIN — Medication 100 MCG: at 11:24

## 2022-01-16 RX ADMIN — SUCRALFATE 1 G: 1 TABLET ORAL at 00:07

## 2022-01-16 RX ADMIN — SUCRALFATE 1 G: 1 TABLET ORAL at 22:07

## 2022-01-16 RX ADMIN — LURASIDONE HYDROCHLORIDE 40 MG: 40 TABLET, FILM COATED ORAL at 22:07

## 2022-01-16 RX ADMIN — SODIUM CHLORIDE, PRESERVATIVE FREE 10 ML: 5 INJECTION INTRAVENOUS at 00:21

## 2022-01-16 RX ADMIN — LIDOCAINE HYDROCHLORIDE 50 MG: 10 INJECTION, SOLUTION EPIDURAL; INFILTRATION; INTRACAUDAL; PERINEURAL at 10:35

## 2022-01-16 RX ADMIN — PROPOFOL 50 MG: 10 INJECTION, EMULSION INTRAVENOUS at 10:35

## 2022-01-16 RX ADMIN — Medication 200 MCG: at 11:30

## 2022-01-16 RX ADMIN — IOPAMIDOL 100 ML: 755 INJECTION, SOLUTION INTRAVENOUS at 15:40

## 2022-01-16 RX ADMIN — Medication 100 MCG: at 11:26

## 2022-01-16 RX ADMIN — PROPOFOL 125 MCG/KG/MIN: 10 INJECTION, EMULSION INTRAVENOUS at 10:35

## 2022-01-16 RX ADMIN — LITHIUM CARBONATE 600 MG: 300 TABLET ORAL at 22:07

## 2022-01-16 RX ADMIN — PANTOPRAZOLE SODIUM 40 MG: 40 INJECTION, POWDER, FOR SOLUTION INTRAVENOUS at 00:07

## 2022-01-16 RX ADMIN — LEVOTHYROXINE SODIUM 100 MCG: 100 TABLET ORAL at 05:04

## 2022-01-16 RX ADMIN — SUCRALFATE 1 G: 1 TABLET ORAL at 05:04

## 2022-01-16 RX ADMIN — Medication 10 MG: at 11:39

## 2022-01-16 RX ADMIN — GLUCAGON HYDROCHLORIDE 1 MG: KIT at 10:46

## 2022-01-16 ASSESSMENT — PULMONARY FUNCTION TESTS
PIF_VALUE: 1
PIF_VALUE: 0
PIF_VALUE: 1
PIF_VALUE: 0
PIF_VALUE: 1
PIF_VALUE: 0
PIF_VALUE: 1
PIF_VALUE: 0
PIF_VALUE: 1
PIF_VALUE: 1
PIF_VALUE: 0
PIF_VALUE: 1
PIF_VALUE: 2
PIF_VALUE: 1
PIF_VALUE: 0

## 2022-01-16 ASSESSMENT — PAIN SCALES - GENERAL
PAINLEVEL_OUTOF10: 0

## 2022-01-16 NOTE — CARE COORDINATION
Case Management Initial Discharge Plan  Naveen Garay,             Met with:patient to discuss discharge plans. Information verified: address, contacts, phone number, , insurance Yes  Insurance Provider: [employer] and [address]    Emergency Contact/Next of Vikas Naylor name & number: Gladys Landa [phone], Abbi Lovett [phone]  Who are involved in patient's support system? sisters    PCP: Perlita Montana DO  Date of last visit: 3 months ago      Discharge Planning    Living Arrangements:  Alone     Home has 1 stories  0 stairs to climb to get into front door, 0 stairs to climb to reach second floor  Location of bedroom/bathroom in apt on one level. Lives on second floor of apt building, no steps, has elevator    Patient able to perform ADL's:Independent    Current Services (outpatient & in home) none  DME equipment:    DME provider:      Is patient receiving oral anticoagulation therapy? No    If indicated:   Physician managing anticoagulation treatment:   Where does patient obtain lab work for ATC treatment? Potential Assistance Needed:  N/A    Patient agreeable to home care: No  [place] of choice provided:  n/a    Prior SNF/Rehab Placement and Facility:    Agreeable to SNF/Rehab: No  [place] of choice provided: n/a     Evaluation: no    Expected Discharge date:       Patient expects to be discharged to: If home: is the family and/or caregiver wiling & able to provide support at home? Lives alone  Who will be providing this support? Can call on sisters for help    Follow Up Appointment: Best Day/ Time:      Transportation provider: has a ride  Transportation arrangements needed for discharge: No     Readmission Risk              Risk of Unplanned Readmission:  17             Does patient have a readmission risk score greater than 14?: Yes  If yes, follow-up appointment must be made within 7 days of discharge.      Goals of Care:       Educated patient on transitional options, provided freedom of choice and are agreeable with plan      Discharge Plan: home independent, has a ride, needs PCP appt           Electronically signed by Michelle Faustin RN on 1/16/22 at 8:17 AM EST

## 2022-01-16 NOTE — PLAN OF CARE
Asked to have patient transferred to ICU by Dr. Orta concern for HMD-instability with ongoing GIB. Most recent hgb was 4.8 but rechecked and actual hgb is 9.0 s/p prbc.     Patient is currently HMD-stable off pressors and satting >95% on RA.    -D/w Dr. Orta and ICU attending Dr. Matilde Vazquez: no need for ICU admission/transfer at this time.   -Will discontinue ICU transfer order    Kassi Mueller MD  PGY-2, [address], [place], [place]

## 2022-01-16 NOTE — ANESTHESIA POSTPROCEDURE EVALUATION
Department of Anesthesiology  Postprocedure Note    Patient: Nilam Patel  MRN: [id number]  YOB: 1960  Date of evaluation: 1/16/2022  Time:  3:48 PM     Procedure Summary     Date: 01/16/22 Room / Location: [address]    Anesthesia Start: 5254 Anesthesia Stop: 4761    Procedures:       ENTEROSCOPY PUSH DIAGNOSTIC (N/A )      COLONOSCOPY CONTROL HEMORRHAGE (N/A ) Diagnosis: (MELENA)    Surgeons: Billie Fletcher MD Responsible Provider: Umm De La Vega MD    Anesthesia Type: Not recorded ASA Status: 3 - Emergent          Anesthesia Type: No value filed. Jerson Phase I: Jerson Score: 10    Jerson Phase II:      Last vitals: Reviewed and per EMR flowsheets.        Anesthesia Post Evaluation    Patient location during evaluation: PACU  Patient participation: complete - patient participated  Level of consciousness: awake and alert  Pain score: 0  Airway patency: patent  Nausea & Vomiting: no nausea and no vomiting  Complications: no  Cardiovascular status: hemodynamically stable  Respiratory status: acceptable  Hydration status: stable

## 2022-01-16 NOTE — CARE COORDINATION
Transitional Planning    Received phone call from patient's RN that primary service plans to transfer patient to [facility] or [facility].

## 2022-01-16 NOTE — PLAN OF CARE
Problem: Bleeding:  Goal: Will show no signs and symptoms of excessive bleeding  Description: Will show no signs and symptoms of excessive bleeding  1/16/2022 0350 by Ritesh Reyes RN  Outcome: Ongoing  1/15/2022 1502 by Jeanmarie Holter, RN  Outcome: Ongoing

## 2022-01-16 NOTE — FLOWSHEET NOTE
01/16/22 0940   Stool Assessment   Stool Appearance Other (Comment); Soft  (jelly-like consistency)   Stool Color Black   Stool Amount Large   Stool Source Rectum

## 2022-01-16 NOTE — PROGRESS NOTES
Attempted to transfer the patient to [facility] as well as [facility]. [facility] would like our interventional radiology team to evaluate the patient prior to considering transfer. [facility] has rejected the transfer.        Stephanie Zhao MD  Hospitalist

## 2022-01-16 NOTE — OP NOTE
suggesting that the bleeding is likely from the small bowel common channel. Recommendations:  1. Will obtain a CT angio with delayed venous images to evaluate for any small bowel bleeding. 2. If positive we will consult IR for intervention. 3. If CT angio is negative, patient will need a double-balloon deep enteroscopy and transfer to a tertiary care center for further management. 4. Further recommendations to follow. 5. Keep patient NPO. Detailed Description of Procedure:   Informed consent was obtained from the patient after explanation of the procedure including indications, description of the procedure,  benefits and possible risks and complications of the procedure, and alternatives. Questions were answered. The patient's history was reviewed and a directed physical examination was performed prior to the procedure. Patient was monitored throughout the procedure with pulse oximetry and periodic assessment of vital signs. Patient was sedated as noted above. With the patient in the left lateral decubitus position, the Olympus videoendoscope was placed in the patient's mouth and under direct visualization passed into the esophagus. Visualization of the esophagus, stomach pouch, and Batsheva limb was performed during both introduction and withdrawal of the endoscope and retroflexed view of the stomach pouch was obtained. The scope was passed to the 150 cm of the Batsheva limb to the jejunojejunal anastomosis. The patient tolerated the procedure well and was taken to the recovery area in good condition. The patient  was taken to the recovery area in good condition. With the patient in the left lateral decubitus position, a digital rectal examination was performed and revealed negative without mass, lesions or tenderness.   The Olympus video colonoscope was placed in the patient's rectum and advanced without difficulty  to the cecum, which was identified by the ileocecal valve and appendiceal orifice amd then to the terminal ileum. The prep was adequate. Examination of the mucosa was performed during both introduction and withdrawal of the colonoscope. Retroflexed view of the rectum was performed. The patient tolerated the procedure well and was taken to the recovery area in good condition. The patient  was taken to the recovery area in good condition.       Electronically signed by Tita Bailey MD on 1/16/2022 at 12:33 PM

## 2022-01-16 NOTE — FLOWSHEET NOTE
01/16/22 0730   Stool Assessment   Stool Appearance Formed; Soft   Stool Color Black   Stool Amount Large   Stool Source Rectum   Last BM (including prior to admit) 01/16/22

## 2022-01-16 NOTE — ANESTHESIA PRE PROCEDURE
Department of Anesthesiology  Preprocedure Note       Name:  Tamir Porras   Age:  64 y.o.  :  1960                                          MRN:  [id number]         Date:  2022      Surgeon: Jered Page):  Natalia Guzman MD    Procedure: Procedure(s):  EGD CONTROL HEMORRHAGE    Medications prior to admission:   Prior to Admission medications    Medication Sig Start Date End Date Taking? Authorizing Provider   levothyroxine (SYNTHROID) 100 MCG tablet TAKE 1 TABLET BY MOUTH ONE TIME A DAY  Patient taking differently: nightly TAKE 1 TABLET BY MOUTH ONE TIME A DAY 22  Yes Abril Casillas MD   vilazodone HCl (VILAZODONE HCL) 40 MG TABS TAKE 1 TABLET BY MOUTH ONCE DAILY. 21  Yes AJITH Tillman CNP   lurasidone (LATUDA) 40 MG TABS tablet Take 1 tablet by mouth daily  Patient taking differently: Take 40 mg by mouth nightly  21  Yes AJITH Tillman CNP   lithium 300 MG tablet Take 2 tablets by mouth 2 times daily 21  Yes AJITH Schwartz CNP   lamoTRIgine (LAMICTAL) 200 MG tablet Take one tab by mouth once daily. 21  Yes AJITH Schwartz CNP   DHEA 50 MG CAPS Take by mouth nightly   Yes Historical Provider, MD   dimethyl fumarate (TECFIDERA) 240 MG delayed release capsule Take 1 capsule by mouth 2 times daily 6/10/21  Yes Emma Burk DO   docusate sodium (COLACE) 100 MG capsule TAKE 1 CAPSULE BY MOUTH 2 TIMES A DAY 21  Yes Abril Casillas MD   ipratropium (ATROVENT) 0.03 % nasal spray INSTILL 2 SPRAYS INTO EACH NOSTRIL 3 TIMES DAILY.  21  Yes AJITH Marcus CNP   Lactobacillus-Inulin ([facility], INULIN,) CAPS TAKE 1 CAPSULE BY MOUTH WITH BREAKFAST 21  Yes Emma Burk DO   fluticasone [facility]) 50 MCG/ACT nasal spray 2 sprays by Each Nostril route daily   Yes Historical Provider, MD   pantoprazole (PROTONIX) 40 MG tablet Take 1 tablet by mouth 2 times daily (before meals)  Patient taking differently: Take 40 mg by mouth daily  12/10/20  Yes Elaine Redmond DO   ondansetron [facility]) 4 MG tablet Take 1 tablet by mouth 3 times daily as needed for Nausea or Vomiting 9/22/20  Yes Salena Denis MD   Prosthesis MISC by Does not apply route Strap for the prosthesis broken please order new 3/18/20  Yes Elaine Redmond DO   loratadine (CLARITIN) 10 MG tablet Take 1 tablet by mouth daily 2/26/20  Yes Howard Mendez MD   Cholecalciferol (VITAMIN D) 2000 units CAPS capsule Take by mouth nightly 50 mcg nightly   Yes Historical Provider, MD   Magnesium Oxide 250 MG TABS Take 250 mg by mouth nightly    Yes Historical Provider, MD   calcium carbonate 600 MG TABS tablet Take 1 tablet by mouth nightly    Yes Historical Provider, MD   vitamin B-12 1000 MCG tablet Take 1 tablet by mouth daily 2/8/18  Yes Keyla Lam MD   nitroGLYCERIN (NITROSTAT) 0.4 MG SL tablet up to max of 3 total doses.  If no relief after 1 dose, call 911. 2/22/17  Yes Ray Demarco MD   Prenatal MV-Min-Fe Fum-FA-DHA (PRENATAL 1 PO) Take 1 tablet by mouth daily    Yes Historical Provider, MD       Current medications:    Current Facility-Administered Medications   Medication Dose Route Frequency Provider Last Rate Last Admin    0.9 % sodium chloride infusion   IntraVENous PRN AJITH Donohue CNP        dimethyl fumarate (TECFIDERA) delayed release capsule 240 mg  240 mg Oral BID Judy Lopez MD        lurasidone (LATUDA) tablet 40 mg  40 mg Oral Nightly Judy Lopez MD        vilazodone HCl (VIIBRYD) TABS 40 mg  40 mg Oral Daily Judy Lopez MD        0.9 % sodium chloride infusion   IntraVENous Continuous Arvind Pool DO 50 mL/hr at 01/15/22 1647 Rate Change at 01/15/22 1647    sodium chloride flush 0.9 % injection 5-40 mL  5-40 mL IntraVENous 2 times per day AJITH Madrigal CNP        sodium chloride flush 0.9 % injection 5-40 mL  5-40 mL IntraVENous PRN AJITH Madrigal CNP        0.9 % sodium chloride infusion  25 mL IntraVENous PRN Waynard Ottawa, APRN - CNP        ondansetron (ZOFRAN-ODT) disintegrating tablet 4 mg  4 mg Oral Q8H PRN Waynard Ottawa, APRN - CNP        Or    ondansetron [facility]) injection 4 mg  4 mg IntraVENous Q6H PRN Waynard Ottawa, APRN - CNP        acetaminophen (TYLENOL) tablet 650 mg  650 mg Oral Q6H PRN Waynard Ottawa, APRN - CNP        Or    acetaminophen (TYLENOL) suppository 650 mg  650 mg Rectal Q6H PRN Waynard Ottawa, APRN - CNP        pantoprazole (PROTONIX) injection 40 mg  40 mg IntraVENous Q12H Waynard Ottawa, APRN - CNP   40 mg at 01/16/22 0007    And    sodium chloride (PF) 0.9 % injection 10 mL  10 mL IntraVENous Q12H Waynard Ottawa, APRN - CNP   10 mL at 01/16/22 0021    lamoTRIgine (LAMICTAL) tablet 200 mg  200 mg Oral Daily Waynard Ottawa, APRN - CNP   200 mg at 01/15/22 1345    levothyroxine (SYNTHROID) tablet 100 mcg  100 mcg Oral Daily Waynard Ottawa, APRN - CNP   100 mcg at 01/16/22 8186    lithium tablet 600 mg  600 mg Oral BID Waynard Ottawa, APRN - CNP   600 mg at 01/15/22 2041    sucralfate (CARAFATE) tablet 1 g  1 g Oral 4 times per day Scooter Nunes DO   1 g at 01/16/22 8889       Allergies:     Allergies   Allergen Reactions    Latex Rash    Sulfa Antibiotics Rash       Problem List:    Patient Active Problem List   Diagnosis Code    Acquired hypothyroidism E03.9    MS (multiple sclerosis) ([facility] [address] G35    S/P laparoscopic cholecystectomy Z90.49    S/P laparoscopic appendectomy Z90.49    Gastrointestinal hemorrhage associated with gastritis K29.71    Multiple thyroid nodules E04.2    Non-ischemic cardiomyopathy (HCC) I42.8    History of Batsheva-en-Y gastric bypass Z98.84    Renal lesion N28.9    Liver lesion K76.9    Bipolar 1 disorder (HCC) F31.9    Symptomatic bradycardia R00.1    Status post placement of cardiac pacemaker Z95.0    Gastrointestinal hemorrhage with melena K92.1    GI bleed K92.2    Peptic ulcer disease U76.0    Other complications of other bariatric procedure K95.89    Ulcer at site of surgical anastomosis following bypass of stomach T85.898A, K28.9       Past Medical History:        Diagnosis Date    Balance problem     Bipolar I disorder, most recent episode (or current) depressed, in partial or unspecified remission 5/8/2013    Cancer ([facility] [address]     CERVICAL     Depression     Fatigue     Gallstones     Gastric bypass status for obesity 2002    GERD (gastroesophageal reflux disease)     History of blood transfusion     History of hysterectomy     REMOVAL OF ONE OVARY     Hypothyroidism     Liver cyst     GI Dr Joelle Lopez MS (multiple sclerosis) ([facility] [address]     Paresthesias     RT LOWER LIMB    Ulcer at site of surgical anastomosis following bypass of stomach 1/15/2022    UTI (urinary tract infection)        Past Surgical History:        Procedure Laterality Date    APPENDECTOMY      BONE GRAFT Left     left leg to right arm    CARPAL TUNNEL RELEASE  1999    CHOLECYSTECTOMY, LAPAROSCOPIC  5/16/2016    COLONOSCOPY      DILATATION, ESOPHAGUS      ENDOSCOPY, COLON, DIAGNOSTIC      ENTEROSCOPY N/A 2/5/2018    ENTEROSCOPY performed by Polly Johnson MD at [address] [place] [address] N/A 9/28/2020    GASTROSTOMY TUBE PLACEMENT- [facility] TO DO GASTRIC ENDOSCOPY performed by Kaye Delgado MD at [place] [address], [facility]      LAPAROSCOPIC APPENDECTOMY  5/16/2016    OTHER SURGICAL HISTORY      RECTAL [address]    OTHER SURGICAL HISTORY  1986    D&C    OTHER SURGICAL HISTORY  2006    GI BLEED    PACEMAKER INSERTION Right 05/20/2019    Dr. Rich Domínguez FLX DX W/COLLMEHDI Swan 1978 [facility] Left 2/6/2018    COLONOSCOPY performed by Polly Johnson MD at [address] [facility]    ME OFFICE/OUTPT [facility] Left 2/18/2018    EGD ESOPHAGOGASTRODUODENOSCOPY performed by Penny Hallman MD at [address] [facility]    ME OFFICE/OUTPT VISIT,PROCEDURE ONLY N/A 10/4/2018    EGD DIAGNOSTIC ONLY performed by Fred Fernandez MD at [place] [address] ENDOSCOPY Left 2019    EGD DIAGNOSTIC ONLY performed by Eyal Camacho MD [facility]    UPPER GASTROINTESTINAL ENDOSCOPY N/A 2020    ENTEROSCOPY PUSH CONTROL HEMORRHAGE performed by Dinora Dove MD at [facility]       Social History:    Social History     Tobacco Use    Smoking status: Former Smoker     Packs/day: 0.10     Years: 0.50     Pack years: 0.05     Types: Cigarettes     Quit date: 10/24/1977     Years since quittin.2    Smokeless tobacco: Never Used   Substance Use Topics    Alcohol use: No                                Counseling given: Not Answered      Vital Signs (Current):   Vitals:    22 0828 22 0833 22 0840 22 0945   BP: (!) 91/54 (!) 105/59 103/66 (!) 102/58   Pulse: 60 59 60 59   Resp: 14 16 14 14   Temp: 98.1 °F (36.7 °C)  98.1 °F (36.7 °C) 98.2 °F (36.8 °C)   TempSrc: Oral  Oral    SpO2: 100% 100% 100% 100%   Weight:       Height:                                                  BP Readings from Last 3 Encounters:   22 (!) 102/58   01/15/22 114/62   21 118/70       NPO Status: Time of last liquid consumption: 2200                        Time of last solid consumption: 2100                                                      BMI:   Wt Readings from Last 3 Encounters:   22 110 lb 0.2 oz (49.9 kg)   22 110 lb (49.9 kg)   21 122 lb 9.6 oz (55.6 kg)     Body mass index is 23.81 kg/m².     CBC:   Lab Results   Component Value Date    WBC 5.3 01/15/2022    RBC 2.98 01/15/2022    HGB 6.9 2022    HCT 22.3 2022    .7 01/15/2022    RDW 14.1 2021     01/15/2022       CMP:   Lab Results   Component Value Date     2022    K 4.0 2022    K 3.2 2020     2022    CO2 19 2022    BUN 28 2022    CREATININE Zaida Rock MD   1/16/2022

## 2022-01-16 NOTE — PROGRESS NOTES
Discussed case with [facility]. Unfortunately, they do not perform double balloon enteroscopies and are unable to accept the transfer. Will contact the [facility].      Aaron Brito MD   Hospitalist

## 2022-01-16 NOTE — ANESTHESIA PRE PROCEDURE
Department of Anesthesiology  Preprocedure Note       Name:  Hilary Givens   Age:  64 y.o.  :  1960                                          MRN:  [id number]         Date:  2022      Surgeon: Alvaro Nieves):  Jackson Wilder MD    Procedure: Procedure(s):  ENTEROSCOPY PUSH DIAGNOSTIC  COLONOSCOPY CONTROL HEMORRHAGE    Medications prior to admission:   Prior to Admission medications    Medication Sig Start Date End Date Taking? Authorizing Provider   levothyroxine (SYNTHROID) 100 MCG tablet TAKE 1 TABLET BY MOUTH ONE TIME A DAY  Patient taking differently: nightly TAKE 1 TABLET BY MOUTH ONE TIME A DAY 22  Yes Yaneth Orta MD   vilazodone HCl (VILAZODONE HCL) 40 MG TABS TAKE 1 TABLET BY MOUTH ONCE DAILY. 21  Yes AJITH Jackson - CNP   lurasidone (LATUDA) 40 MG TABS tablet Take 1 tablet by mouth daily  Patient taking differently: Take 40 mg by mouth nightly  21  Yes Lucho Lee APRN - CNP   lithium 300 MG tablet Take 2 tablets by mouth 2 times daily 21  Yes AJITH Schwartz - CNP   lamoTRIgine (LAMICTAL) 200 MG tablet Take one tab by mouth once daily. 21  Yes AJITH Schwartz CNP   DHEA 50 MG CAPS Take by mouth nightly   Yes Historical Provider, MD   dimethyl fumarate (TECFIDERA) 240 MG delayed release capsule Take 1 capsule by mouth 2 times daily 6/10/21  Yes David Todd DO   docusate sodium (COLACE) 100 MG capsule TAKE 1 CAPSULE BY MOUTH 2 TIMES A DAY 21  Yes Yaneth Orta MD   ipratropium (ATROVENT) 0.03 % nasal spray INSTILL 2 SPRAYS INTO EACH NOSTRIL 3 TIMES DAILY.  21  Yes Lizet YoanaAJITH - CNP   Lactobacillus-Inulin ([facility], INULIN,) CAPS TAKE 1 CAPSULE BY MOUTH WITH BREAKFAST 21  Yes David Todd DO   fluticasone [facility]) 50 MCG/ACT nasal spray 2 sprays by Each Nostril route daily   Yes Historical Provider, MD   pantoprazole (PROTONIX) 40 MG tablet Take 1 tablet by mouth 2 times daily (before meals)  Patient taking differently: Take 40 mg by mouth daily  12/10/20  Yes Hussein East DO   ondansetron [facility]) 4 MG tablet Take 1 tablet by mouth 3 times daily as needed for Nausea or Vomiting 9/22/20  Yes Yury Mclean MD   Prosthesis MISC by Does not apply route Strap for the prosthesis broken please order new 3/18/20  Yes Hussein East DO   loratadine (CLARITIN) 10 MG tablet Take 1 tablet by mouth daily 2/26/20  Yes Kelvin Bonner MD   Cholecalciferol (VITAMIN D) 2000 units CAPS capsule Take by mouth nightly 50 mcg nightly   Yes Historical Provider, MD   Magnesium Oxide 250 MG TABS Take 250 mg by mouth nightly    Yes Historical Provider, MD   calcium carbonate 600 MG TABS tablet Take 1 tablet by mouth nightly    Yes Historical Provider, MD   vitamin B-12 1000 MCG tablet Take 1 tablet by mouth daily 2/8/18  Yes Tr Wheeler MD   nitroGLYCERIN (NITROSTAT) 0.4 MG SL tablet up to max of 3 total doses.  If no relief after 1 dose, call 911. 2/22/17  Yes Elissa Simpson MD   Prenatal MV-Min-Fe Fum-FA-DHA (PRENATAL 1 PO) Take 1 tablet by mouth daily    Yes Historical Provider, MD       Current medications:    Current Facility-Administered Medications   Medication Dose Route Frequency Provider Last Rate Last Admin    dimethyl fumarate (TECFIDERA) delayed release capsule 240 mg  240 mg Oral BID Tita Bailey MD        lurasidone (LATUDA) tablet 40 mg  40 mg Oral Nightly Tita Bailey MD        vilazodone HCl (VIIBRYD) TABS 40 mg  40 mg Oral Daily Tita Bailey MD        0.9 % sodium chloride infusion   IntraVENous PRN Brenton Willson MD        0.9 % sodium chloride infusion   IntraVENous Continuous Tita Bailey MD 50 mL/hr at 01/15/22 1647 Rate Change at 01/15/22 1647    sodium chloride flush 0.9 % injection 5-40 mL  5-40 mL IntraVENous 2 times per day Tita Bailey MD        sodium chloride flush 0.9 % injection 5-40 mL  5-40 mL IntraVENous PRN Tita Bailey MD        0.9 % at site of surgical anastomosis following bypass of stomach T85.898A, K28.9       Past Medical History:        Diagnosis Date    Balance problem     Bipolar I disorder, most recent episode (or current) depressed, in partial or unspecified remission 5/8/2013    Cancer ([facility] [address]     CERVICAL     Depression     Fatigue     Gallstones     Gastric bypass status for obesity 2002    GERD (gastroesophageal reflux disease)     History of blood transfusion     History of hysterectomy     REMOVAL OF ONE OVARY     Hypothyroidism     Liver cyst     GI Dr Shoaib Hobson MS (multiple sclerosis) ([facility] [address]     Paresthesias     RT LOWER LIMB    Ulcer at site of surgical anastomosis following bypass of stomach 1/15/2022    UTI (urinary tract infection)        Past Surgical History:        Procedure Laterality Date    APPENDECTOMY      BONE GRAFT Left     left leg to right arm    CARPAL TUNNEL RELEASE  1999    CHOLECYSTECTOMY, LAPAROSCOPIC  5/16/2016    COLONOSCOPY      DILATATION, ESOPHAGUS      ENDOSCOPY, COLON, DIAGNOSTIC      ENTEROSCOPY N/A 2/5/2018    ENTEROSCOPY performed by Erica Stokes MD at [address] [place] [address] N/A 9/28/2020    GASTROSTOMY TUBE PLACEMENT- [facility] TO DO GASTRIC ENDOSCOPY performed by Conor Enriquez MD at [facility], VAGINAL      LAPAROSCOPIC APPENDECTOMY  5/16/2016    OTHER SURGICAL HISTORY      RECTAL [address]    OTHER SURGICAL HISTORY  1986    D&C    OTHER SURGICAL HISTORY  2006    GI BLEED    PACEMAKER INSERTION Right 05/20/2019    Dr. Elizabeth Andrade FLX DX W/BONNIE Swan 1978 [facility] Left 2/6/2018    COLONOSCOPY performed by [place] MD at [address] [facility]    CA OFFICE/OUTPT [facility] Left 2/18/2018    EGD ESOPHAGOGASTRODUODENOSCOPY performed by Carmen Banerjee MD at [address] [facility]    CA OFFICE/OUTPT VISIT,PROCEDURE ONLY N/A 10/4/2018    EGD DIAGNOSTIC ONLY performed by Renan Rios MD at [facility] ENDOSCOPY Left 2019    EGD DIAGNOSTIC ONLY performed by Marielle Tamayo MD at  [address] [facility]    UPPER GASTROINTESTINAL ENDOSCOPY N/A 2020    ENTEROSCOPY PUSH CONTROL HEMORRHAGE performed by Kassi Rodriguez MD at  [address] [facility]       Social History:    Social History     Tobacco Use    Smoking status: Former Smoker     Packs/day: 0.10     Years: 0.50     Pack years: 0.05     Types: Cigarettes     Quit date: 10/24/1977     Years since quittin.2    Smokeless tobacco: Never Used   Substance Use Topics    Alcohol use: No                                Counseling given: Not Answered      Vital Signs (Current):   Vitals:    22 1241 22 1341 22 1353 22 1510   BP: 110/75 110/69 123/69 (!) 100/53   Pulse: 70 63 65 67   Resp: 16 14 14 14   Temp: 98.4 °F (36.9 °C) 97.9 °F (36.6 °C) 97.9 °F (36.6 °C) 97.9 °F (36.6 °C)   TempSrc: Oral Oral Oral Oral   SpO2: 100% 99% 100% 100%   Weight:       Height:                                                  BP Readings from Last 3 Encounters:   22 (!) 100/53   22 (!) 104/93   01/15/22 114/62       NPO Status: Time of last liquid consumption: 2200                        Time of last solid consumption: 2100                                                      BMI:   Wt Readings from Last 3 Encounters:   22 110 lb 0.2 oz (49.9 kg)   22 110 lb (49.9 kg)   21 122 lb 9.6 oz (55.6 kg)     Body mass index is 23.81 kg/m².     CBC:   Lab Results   Component Value Date    WBC 5.3 01/15/2022    RBC 2.98 01/15/2022    HGB 4.8 2022    HCT 15.9 2022    .7 01/15/2022    RDW 14.1 2021     01/15/2022       CMP:   Lab Results   Component Value Date     2022    K 4.0 2022    K 3.2 2020     2022    CO2 19 2022    BUN 28 2022    CREATININE 0.40 01/16/2022    GFRAA >60 01/16/2022    LABGLOM >60 01/16/2022    LABGLOM >90 01/14/2022    GLUCOSE 88 01/16/2022    GLUCOSE 81 05/26/2021    PROT 6.1 01/14/2022    CALCIUM 8.3 01/16/2022    BILITOT 0.2 01/14/2022    ALKPHOS 109 01/14/2022    AST 27 01/14/2022    ALT 18 01/14/2022       POC Tests: No results for input(s): POCGLU, POCNA, POCK, POCCL, POCBUN, POCHEMO, POCHCT in the last 72 hours.     Coags:   Lab Results   Component Value Date    INR 0.99 01/14/2022    APTT 32.9 01/14/2022       HCG (If Applicable): No results found for: PREGTESTUR, PREGSERUM, HCG, HCGQUANT     ABGs: No results found for: PHART, PO2ART, JOP9BSK, IMH7TEF, BEART, T5HCWXFN     Type & Screen (If Applicable):  Lab Results   Component Value Date    LABRH NEG 09/25/2020       Drug/Infectious Status (If Applicable):  Lab Results   Component Value Date    HEPCAB Negative 10/25/2018       COVID-19 Screening (If Applicable):   Lab Results   Component Value Date    COVID19 NOT DETECTED 09/27/2020           Anesthesia Evaluation     Anesthesia Plan      Nahomy Cerna MD   1/16/2022

## 2022-01-16 NOTE — PROGRESS NOTES
[facility]  Office: [address], DO, Richard Dean, DO, Michael Hugo, DO, Ernestina Harada Blood, DO, Governor Ximena MD, Supriya Cervantes MD, Ilsa Wing MD, Musa Tipton MD, Phoenix Chung MD, Ozzie Rico MD, Mart Lazar MD, Leigha Barboza, DO, Denise Toth, DO, Medhat Knott MD,  Marcos Mendoza DO, Anna Avalos MD, June Diane MD, Costa Park MD, Suni Carranza MD, Rickie Bravo MD, Annie Davis MD, King Mae MD, Maira Gore, [facility], [facility], CNP, Alexandra Junior, CNP, Bryce Langston, CNS, Michi Landa, CNP, Jeison Messer, CNP, Chayito Rios, CNP, Alicia Arce, CNP, Salvador Flores, CNP, Sydni Ornelas PA-C, Baron Leyva, [facility], Carolina Richmond, [facility], Zamzam Barney, CNP, Gagan Flores, CNP, Khadar Bone, CNP, Jeovanny Montoya, CNP, Magdalena Sue, CNP, Adryan Bustillos, [address] [place] [address]    Progress Note    1/16/2022    9:43 AM    Name:   Jae Doherty  MRN:     [id number]     Acct:      [de-identified]   Room:   [address] [place] [address] Day:  1  Admit Date:  1/15/2022 12:13 PM    PCP:   Erich Horne DO  Code Status:  Full Code    Subjective:     C/C: Melena     Interval History Status: not changed. Patient was seen and examined at bedside this AM. Continues to complain of melanotic stools. GI following; plan for EGD today. The patient had a brief episode of chest pain this morning (states that it lasted about a minute and resolved). EKG is non-ischemic. Brief History:     Patient is a 71-year-old female with a past medical history of multiple sclerosis, bipolar disorder, hypothyroidism who presented to the emergency department on 1/14/2022 complaining of melanotic stools. The patient was admitted to internal medicine for further management of GI bleed.      Review of Systems:     Constitutional:  negative for chills, fevers, sweats  Respiratory:  negative for cough, dyspnea on exertion, shortness of breath, wheezing  Cardiovascular:  Positive for brief episode of chest pain that has resolved. Gastrointestinal:  Positive for melanotic stools. Neurological:  negative for dizziness, headache    Medications: Allergies: Allergies   Allergen Reactions    Latex Rash    Sulfa Antibiotics Rash       Current Meds:   Scheduled Meds:    sodium chloride flush  5-40 mL IntraVENous 2 times per day    pantoprazole  40 mg IntraVENous Q12H    And    sodium chloride (PF)  10 mL IntraVENous Q12H    lamoTRIgine  200 mg Oral Daily    levothyroxine  100 mcg Oral Daily    lithium  600 mg Oral BID    sucralfate  1 g Oral 4 times per day     Continuous Infusions:    sodium chloride      sodium chloride 50 mL/hr at 01/15/22 1647    sodium chloride       PRN Meds: sodium chloride, sodium chloride flush, sodium chloride, ondansetron **OR** ondansetron, acetaminophen **OR** acetaminophen    Data:     Past Medical History:   has a past medical history of Balance problem, Bipolar I disorder, most recent episode (or current) depressed, in partial or unspecified remission, Cancer ([facility] [address] Depression, Fatigue, Gallstones, Gastric bypass status for obesity, GERD (gastroesophageal reflux disease), History of blood transfusion, History of hysterectomy, Hypothyroidism, Liver cyst, MS (multiple sclerosis) ([facility] [address] Paresthesias, Ulcer at site of surgical anastomosis following bypass of stomach, and UTI (urinary tract infection). Social History:   reports that she quit smoking about 44 years ago. Her smoking use included cigarettes. She has a 0.05 pack-year smoking history. She has never used smokeless tobacco. She reports that she does not drink alcohol and does not use drugs.      Family History:   Family History   Problem Relation Age of Onset    Cancer Mother     Colon Polyps Mother     Heart Disease Father     Glaucoma Father     Cancer Sister     Cancer Sister        Vitals:  /66   Pulse 60   Temp 98.1 °F (36.7 °C) (Oral)   Resp 14   Ht 4' 9\" (1.448 m)   Wt 110 lb 0.2 oz (49.9 kg)   SpO2 100%   BMI 23.81 kg/m²   Temp (24hrs), Av.1 °F (36.7 °C), Min:97.9 °F (36.6 °C), Max:98.4 °F (36.9 °C)    No results for input(s): POCGLU in the last 72 hours. I/O (24Hr): Intake/Output Summary (Last 24 hours) at 2022 0943  Last data filed at 1/15/2022 1728  Gross per 24 hour   Intake 1121 ml   Output 250 ml   Net 871 ml       Labs:  Hematology:  Recent Labs     22  1314 22  1314 22  1431 01/15/22  0334 01/15/22  0334 01/15/22  1023 01/15/22  1328 01/15/22  1813 22  0031 22  0508   WBC 7.1  --   --  6.4  --  5.3  --   --   --   --    RBC 3.44*  --   --  2.99*  --  2.98*  --   --   --   --    HGB 11.2*   < >  --  9.7*   < > 9.8*   < > 9.9* 8.0* 6.9*   HCT 34.8*   < >  --  30.6*   < > 30.3*   < > 31.6* 25.0* 22.3*   .2*  --   --  102.3*  --  101.7*  --   --   --   --    MCH 32.6  --   --  32.4  --  32.9  --   --   --   --    MCHC 32.2  --   --  31.7*  --  32.3  --   --   --   --      --   --  193  --  187  --   --   --   --    MPV 9.7  --   --  10.1  --  9.9  --   --   --   --    INR  --   --  0.99  --   --   --   --   --   --   --     < > = values in this interval not displayed.      Chemistry:  Recent Labs     22  1314 22  0508    140   K 4.0 4.0    116*   CO2 22* 19*   GLUCOSE 138* 88   BUN 23* 28*   CREATININE 0.6 0.40*   ANIONGAP 9.0 5*   LABGLOM >90 >60   GFRAA  --  >60   CALCIUM 9.8 8.3*   TROPHS  --  24*     Recent Labs     22  1314 22  1431   PROT 6.1  --    LABALBU 4.3  --    AST 27  --    ALT 18  --    ALKPHOS 109  --    BILITOT 0.2*  --    LIPASE  --  15.8     ABG:No results found for: POCPH, PHART, PH, POCPCO2, MTE1SAJ, PCO2, POCPO2, PO2ART, PO2, POCHCO3, ENJ4NML, HCO3, NBEA, PBEA, BEART, BE, THGBART, THB, DCY4XRM, VHVI1PHG, P5PUVEOP, O2SAT, FIO2  No results found for: SPECIAL  No results found for: CULTURE    Radiology:  CT ABDOMEN PELVIS W IV CONTRAST Additional Contrast? None    Result Date: 1/14/2022  Stable CT scan abdomen and pelvis. There is constipation. There is no convincing evidence for bowel obstruction. No other acute abdominal or pelvic abnormality **This report has been created using voice recognition software. It may contain minor errors which are inherent in voice recognition technology. ** Final report electronically signed by Dr. Sandy Chávez on 1/14/2022 4:17 PM      Physical Examination:        General appearance:  alert, cooperative and no distress  Mental Status:  oriented to person, place and time and normal affect  Lungs:  clear to auscultation bilaterally, normal effort  Heart:  regular rate and rhythm, no murmur  Abdomen:  soft, nontender, nondistended, normal bowel sounds, no masses, hepatomegaly, splenomegaly  Extremities:  no edema, redness, tenderness in the calves. Right arm congenitally absent.    Skin:  no gross lesions, rashes, induration    Assessment:        Hospital Problems           Last Modified POA    * (Principal) GI bleed 1/15/2022 Yes    Acquired hypothyroidism 1/16/2022 Yes    MS (multiple sclerosis) ([facility] [address] 1/15/2022 Yes    Non-ischemic cardiomyopathy ([address] 1/15/2022 Yes    History of Batsheva-en-Y gastric bypass 1/15/2022 Yes    Bipolar 1 disorder ([address] 1/16/2022 Yes    Status post placement of cardiac pacemaker 1/15/2022 Yes    Peptic ulcer disease 1/15/2022 Yes    Other complications of other bariatric procedure 1/15/2022 Yes    Ulcer at site of surgical anastomosis following bypass of stomach 1/15/2022 Yes          Plan:        Acute blood loss anemia 2/2 GI bleed   -Patient continues to have melanotic stools   -HgB 6.9 this AM   -Transfuse 1 unit PRBC  -GI following; plan for EGD today   -Continue maintenance fluids at 50 mL/hr   -Continue IV pantoprazole 40 mg bid   -Continue sucralfate     Chest pain   -Patient had a brief episode of chest pain over her pacemaker this morning (states it lasted about a minute and resolved)   -EKG negative for ischemic changes   -Trend troponin x 3   -Interrogate pacemaker     Hypothyroidism   -Continue home levothyroxine 100 mcg daily     Multiple sclerosis   -Not in a acute flare   -Continue home dimethyl fumarate     Bipolar disorder   -Continue home lamotrigine 200 mg daily   -Continue home lithium 600 mg bid   -Continue home lurasidone 40 mg nightly     Non-ischemic cardiomyopathy   -S/P pacemaker placement   -Continue to monitor     Matias James MD  1/16/2022  9:43 AM

## 2022-01-17 LAB
ABO/RH: NORMAL
ABSOLUTE EOS #: 0.09 K/UL (ref 0–0.44)
ABSOLUTE IMMATURE GRANULOCYTE: 0.04 K/UL (ref 0–0.3)
ABSOLUTE LYMPH #: 0.83 K/UL (ref 1.1–3.7)
ABSOLUTE MONO #: 0.39 K/UL (ref 0.1–1.2)
ANION GAP SERPL CALCULATED.3IONS-SCNC: 10 MMOL/L (ref 9–17)
ANTIBODY SCREEN: NEGATIVE
ARM BAND NUMBER: NORMAL
BASOPHILS # BLD: 1 % (ref 0–2)
BASOPHILS ABSOLUTE: 0.03 K/UL (ref 0–0.2)
BLD PROD TYP BPU: NORMAL
BUN BLDV-MCNC: 17 MG/DL (ref 8–23)
BUN/CREAT BLD: ABNORMAL (ref 9–20)
CALCIUM SERPL-MCNC: 9.4 MG/DL (ref 8.6–10.4)
CHLORIDE BLD-SCNC: 111 MMOL/L (ref 98–107)
CO2: 20 MMOL/L (ref 20–31)
CREAT SERPL-MCNC: 0.43 MG/DL (ref 0.5–0.9)
CROSSMATCH RESULT: NORMAL
DIFFERENTIAL TYPE: ABNORMAL
DISPENSE STATUS BLOOD BANK: NORMAL
EKG ATRIAL RATE: 60 BPM
EKG P AXIS: 53 DEGREES
EKG P-R INTERVAL: 178 MS
EKG Q-T INTERVAL: 502 MS
EKG QRS DURATION: 148 MS
EKG QTC CALCULATION (BAZETT): 502 MS
EKG R AXIS: 16 DEGREES
EKG T AXIS: 40 DEGREES
EKG VENTRICULAR RATE: 60 BPM
EOSINOPHILS RELATIVE PERCENT: 2 % (ref 1–4)
EXPIRATION DATE: NORMAL
GFR AFRICAN AMERICAN: >60 ML/MIN
GFR NON-AFRICAN AMERICAN: >60 ML/MIN
GFR SERPL CREATININE-BSD FRML MDRD: ABNORMAL ML/MIN/{1.73_M2}
GFR SERPL CREATININE-BSD FRML MDRD: ABNORMAL ML/MIN/{1.73_M2}
GLUCOSE BLD-MCNC: 97 MG/DL (ref 70–99)
HCT VFR BLD CALC: 24.9 % (ref 36.3–47.1)
HCT VFR BLD CALC: 26.5 % (ref 36.3–47.1)
HCT VFR BLD CALC: 28.4 % (ref 36.3–47.1)
HEMOGLOBIN: 8.2 G/DL (ref 11.9–15.1)
HEMOGLOBIN: 8.6 G/DL (ref 11.9–15.1)
HEMOGLOBIN: 9.2 G/DL (ref 11.9–15.1)
IMMATURE GRANULOCYTES: 1 %
LYMPHOCYTES # BLD: 16 % (ref 24–43)
MCH RBC QN AUTO: 31.3 PG (ref 25.2–33.5)
MCHC RBC AUTO-ENTMCNC: 32.4 G/DL (ref 28.4–34.8)
MCV RBC AUTO: 96.6 FL (ref 82.6–102.9)
MONOCYTES # BLD: 7 % (ref 3–12)
NRBC AUTOMATED: 0 PER 100 WBC
PDW BLD-RTO: 17.5 % (ref 11.8–14.4)
PLATELET # BLD: 180 K/UL (ref 138–453)
PLATELET ESTIMATE: ABNORMAL
PMV BLD AUTO: 9.9 FL (ref 8.1–13.5)
POTASSIUM SERPL-SCNC: 4 MMOL/L (ref 3.7–5.3)
RBC # BLD: 2.94 M/UL (ref 3.95–5.11)
RBC # BLD: ABNORMAL 10*6/UL
SEG NEUTROPHILS: 73 % (ref 36–65)
SEGMENTED NEUTROPHILS ABSOLUTE COUNT: 3.91 K/UL (ref 1.5–8.1)
SODIUM BLD-SCNC: 141 MMOL/L (ref 135–144)
TRANSFUSION STATUS: NORMAL
UNIT DIVISION: 0
UNIT NUMBER: NORMAL
WBC # BLD: 5.3 K/UL (ref 3.5–11.3)
WBC # BLD: ABNORMAL 10*3/UL

## 2022-01-17 PROCEDURE — C9113 INJ PANTOPRAZOLE SODIUM, VIA: HCPCS | Performed by: INTERNAL MEDICINE

## 2022-01-17 PROCEDURE — 2580000003 HC RX 258: Performed by: INTERNAL MEDICINE

## 2022-01-17 PROCEDURE — 6360000002 HC RX W HCPCS: Performed by: INTERNAL MEDICINE

## 2022-01-17 PROCEDURE — 99232 SBSQ HOSP IP/OBS MODERATE 35: CPT | Performed by: INTERNAL MEDICINE

## 2022-01-17 PROCEDURE — 99232 SBSQ HOSP IP/OBS MODERATE 35: CPT | Performed by: STUDENT IN AN ORGANIZED HEALTH CARE EDUCATION/TRAINING PROGRAM

## 2022-01-17 PROCEDURE — 6370000000 HC RX 637 (ALT 250 FOR IP): Performed by: INTERNAL MEDICINE

## 2022-01-17 PROCEDURE — 2060000000 HC ICU INTERMEDIATE R&B

## 2022-01-17 PROCEDURE — 6370000000 HC RX 637 (ALT 250 FOR IP): Performed by: STUDENT IN AN ORGANIZED HEALTH CARE EDUCATION/TRAINING PROGRAM

## 2022-01-17 PROCEDURE — 80048 BASIC METABOLIC PNL TOTAL CA: CPT

## 2022-01-17 PROCEDURE — 36415 COLL VENOUS BLD VENIPUNCTURE: CPT

## 2022-01-17 PROCEDURE — 85025 COMPLETE CBC W/AUTO DIFF WBC: CPT

## 2022-01-17 PROCEDURE — 85018 HEMOGLOBIN: CPT

## 2022-01-17 PROCEDURE — 93010 ELECTROCARDIOGRAM REPORT: CPT | Performed by: INTERNAL MEDICINE

## 2022-01-17 PROCEDURE — 85014 HEMATOCRIT: CPT

## 2022-01-17 RX ORDER — POLYETHYLENE GLYCOL 3350 17 G/17G
17 POWDER, FOR SOLUTION ORAL DAILY
Status: DISCONTINUED | OUTPATIENT
Start: 2022-01-17 | End: 2022-01-18 | Stop reason: HOSPADM

## 2022-01-17 RX ADMIN — SUCRALFATE 1 G: 1 TABLET ORAL at 16:00

## 2022-01-17 RX ADMIN — SODIUM CHLORIDE, PRESERVATIVE FREE 10 ML: 5 INJECTION INTRAVENOUS at 20:55

## 2022-01-17 RX ADMIN — SODIUM CHLORIDE, PRESERVATIVE FREE 10 ML: 5 INJECTION INTRAVENOUS at 01:07

## 2022-01-17 RX ADMIN — PANTOPRAZOLE SODIUM 40 MG: 40 INJECTION, POWDER, FOR SOLUTION INTRAVENOUS at 12:51

## 2022-01-17 RX ADMIN — SUCRALFATE 1 G: 1 TABLET ORAL at 03:53

## 2022-01-17 RX ADMIN — LAMOTRIGINE 200 MG: 100 TABLET ORAL at 09:34

## 2022-01-17 RX ADMIN — SUCRALFATE 1 G: 1 TABLET ORAL at 09:34

## 2022-01-17 RX ADMIN — SODIUM CHLORIDE, PRESERVATIVE FREE 10 ML: 5 INJECTION INTRAVENOUS at 12:45

## 2022-01-17 RX ADMIN — PANTOPRAZOLE SODIUM 40 MG: 40 INJECTION, POWDER, FOR SOLUTION INTRAVENOUS at 01:05

## 2022-01-17 RX ADMIN — LITHIUM CARBONATE 600 MG: 300 TABLET ORAL at 09:35

## 2022-01-17 RX ADMIN — VILAZODONE HYDROCHLORIDE 40 MG: 40 TABLET ORAL at 09:35

## 2022-01-17 RX ADMIN — LURASIDONE HYDROCHLORIDE 40 MG: 40 TABLET, FILM COATED ORAL at 20:55

## 2022-01-17 RX ADMIN — SODIUM CHLORIDE, PRESERVATIVE FREE 10 ML: 5 INJECTION INTRAVENOUS at 09:00

## 2022-01-17 RX ADMIN — LITHIUM CARBONATE 600 MG: 300 TABLET ORAL at 20:55

## 2022-01-17 RX ADMIN — SUCRALFATE 1 G: 1 TABLET ORAL at 20:55

## 2022-01-17 RX ADMIN — POLYETHYLENE GLYCOL 3350 17 G: 17 POWDER, FOR SOLUTION ORAL at 12:51

## 2022-01-17 RX ADMIN — LEVOTHYROXINE SODIUM 100 MCG: 100 TABLET ORAL at 06:27

## 2022-01-17 NOTE — PROGRESS NOTES
[facility]  Office: [address], DO, Love Favre, DO, Grayland Runner, DO, Gloria Eye Blood, DO, Asa Roche MD, Marlin Huynh MD, Daniel Lobo MD, Marcelle Pete MD, Alexa Bolton MD, Fred Aguilera MD, Alyssia Omalley MD, Mildred Romo, DO, Iván Brandon, DO, Murlean Gowers, MD,  Chelsey Lundberg DO, Rodney German MD, Niecy Patel MD, Duy Burton MD, Chaim Garner MD, Anderson Loving MD, Negro Ayala MD, Daniel Thornton MD, Kylee Dias [facility], [facility], CNP, Mayank Tillman CNP, Chemo Rojas, CNS, Ada Mccurdy, CNP, Shira Najera, CNP, Shruthi Leiva, CNP, Melania Hernandez, CNP, Aaliyah Dumont CNP, Lou Anderson PA-C, Maine Lemus, FIDEL, Luciaon Argueta DNP, Cordelia Maldonado, CNP, Yosef Gonzalez, CNP, Lee Gandara CNP, Dharmesh Carson CNP, Boby Yancey CNP, Israel Vieira, [address] [place] [address]    Progress Note    1/17/2022    9:51 AM    Name:   Natalia Sosa  MRN:     [id number]     Acct:      [de-identified]   Room:   [address] [place] [address] Day:  2  Admit Date:  1/15/2022 12:13 PM    PCP:   Jennifer Romero DO  Code Status:  Full Code    Subjective:     C/C: Melena     Interval History Status: improved. Patient was seen and examined at bedside this AM. She reports feeling better and states that she would like to eat. Hemoglobin has been stable following endoscopy. This physician has attempted to transfer the patient to 3 different facilities [facility], [facility] and [facility] without success. Will discuss necessity of transfer with GI as the patient is hemodynamically stable and does not have any evidence of further bleeding. Brief History:     Patient is a 57-year-old female with a past medical history of multiple sclerosis, bipolar disorder, hypothyroidism who presented to the emergency department on 1/14/2022 complaining of melanotic stools.  The patient was admitted to internal medicine for further management of GI bleed. Review of Systems:     Constitutional:  negative for chills, fevers, sweats  Respiratory:  negative for cough, dyspnea on exertion, shortness of breath, wheezing  Cardiovascular:  Negative for chest pain or palpitations. Gastrointestinal:  Positive for melanotic stools. Neurological:  negative for dizziness, headache    Medications: Allergies: Allergies   Allergen Reactions    Latex Rash    Sulfa Antibiotics Rash       Current Meds:   Scheduled Meds:    dimethyl fumarate  240 mg Oral BID    lurasidone  40 mg Oral Nightly    vilazodone HCl  40 mg Oral Daily    sucralfate  1 g Oral Q6H    sodium chloride flush  5-40 mL IntraVENous 2 times per day    pantoprazole  40 mg IntraVENous Q12H    And    sodium chloride (PF)  10 mL IntraVENous Q12H    lamoTRIgine  200 mg Oral Daily    levothyroxine  100 mcg Oral Daily    lithium  600 mg Oral BID     Continuous Infusions:    sodium chloride      sodium chloride      sodium chloride 50 mL/hr at 01/15/22 1647    sodium chloride       PRN Meds: sodium chloride, sodium chloride, sodium chloride flush, sodium chloride, ondansetron **OR** ondansetron, acetaminophen **OR** acetaminophen    Data:     Past Medical History:   has a past medical history of Balance problem, Bipolar I disorder, most recent episode (or current) depressed, in partial or unspecified remission, Cancer ([facility] [address] Depression, Fatigue, Gallstones, Gastric bypass status for obesity, GERD (gastroesophageal reflux disease), History of blood transfusion, History of hysterectomy, Hypothyroidism, Liver cyst, MS (multiple sclerosis) ([facility] [address] Paresthesias, Ulcer at site of surgical anastomosis following bypass of stomach, and UTI (urinary tract infection). Social History:   reports that she quit smoking about 44 years ago. Her smoking use included cigarettes. She has a 0.05 pack-year smoking history.  She has never used smokeless tobacco. She reports that she does not drink alcohol and does not use drugs. Family History:   Family History   Problem Relation Age of Onset   [facility] Cancer Mother     Colon Polyps Mother     Heart Disease Father     Glaucoma Father     Cancer Sister     Cancer Sister        Vitals:  BP (!) 105/55   Pulse 60   Temp 97.9 °F (36.6 °C) (Oral)   Resp 14   Ht 4' 9\" (1.448 m)   Wt 110 lb 0.2 oz (49.9 kg)   SpO2 100%   BMI 23.81 kg/m²   Temp (24hrs), Av °F (36.7 °C), Min:96.9 °F (36.1 °C), Max:99.1 °F (37.3 °C)    No results for input(s): POCGLU in the last 72 hours. I/O (24Hr): Intake/Output Summary (Last 24 hours) at 2022 0951  Last data filed at 2022 1100  Gross per 24 hour   Intake 300 ml   Output --   Net 300 ml       Labs:  Hematology:  Recent Labs     22  1314 22  1431 01/15/22  0334 01/15/22  0334 01/15/22  1023 01/15/22  1328 22  1638 22  2347 22  0914   WBC   < >  --  6.4  --  5.3  --   --   --  5.3   RBC   < >  --  2.99*  --  2.98*  --   --   --  2.94*   HGB   < >  --  9.7*   < > 9.8*   < > 9.0* 9.8* 9.2*   HCT   < >  --  30.6*   < > 30.3*   < > 27.2* 29.0* 28.4*   MCV   < >  --  102.3*  --  101.7*  --   --   --  96.6   MCH   < >  --  32.4  --  32.9  --   --   --  31.3   MCHC   < >  --  31.7*  --  32.3  --   --   --  32.4   RDW  --   --   --   --   --   --   --   --  17.5*   PLT   < >  --  193  --  187  --   --   --  180   MPV   < >  --  10.1  --  9.9  --   --   --  9.9   INR  --  0.99  --   --   --   --   --   --   --     < > = values in this interval not displayed.      Chemistry:  Recent Labs     22  1314 22  0508    140   K 4.0 4.0    116*   CO2 22* 19*   GLUCOSE 138* 88   BUN 23* 28*   CREATININE 0.6 0.40*   ANIONGAP 9.0 5*   LABGLOM >90 >60   GFRAA  --  >60   CALCIUM 9.8 8.3*   TROPHS  --  24*     Recent Labs     22  1314 22  1431   PROT 6.1  --    LABALBU 4.3  --    AST 27  --    ALT 18  --    ALKPHOS 109  --    BILITOT 0.2*  --    LIPASE  --  15.8     ABG:No results found for: POCPH, PHART, PH, POCPCO2, AEG4VND, PCO2, POCPO2, PO2ART, PO2, POCHCO3, XTU1GAB, HCO3, NBEA, PBEA, BEART, BE, THGBART, THB, BRV0JCS, EEPF6ADH, R0TYMSKW, O2SAT, FIO2  No results found for: SPECIAL  No results found for: CULTURE    Radiology:  CT ABDOMEN PELVIS W IV CONTRAST Additional Contrast? None    Result Date: 1/14/2022  Stable CT scan abdomen and pelvis. There is constipation. There is no convincing evidence for bowel obstruction. No other acute abdominal or pelvic abnormality **This report has been created using voice recognition software. It may contain minor errors which are inherent in voice recognition technology. ** Final report electronically signed by Dr. Sandy Chávez on 1/14/2022 4:17 PM      Physical Examination:        General appearance:  alert, cooperative and no distress  Mental Status:  oriented to person, place and time and normal affect  Lungs:  clear to auscultation bilaterally, normal effort  Heart:  regular rate and rhythm, no murmur  Abdomen:  soft, nontender, nondistended, normal bowel sounds, no masses, hepatomegaly, splenomegaly  Extremities:  no edema, redness, tenderness in the calves. Right arm congenitally absent.    Skin:  no gross lesions, rashes, induration    Assessment:        Hospital Problems           Last Modified POA    * (Principal) GI bleed 1/15/2022 Yes    Acquired hypothyroidism 1/16/2022 Yes    MS (multiple sclerosis) ([address] 1/15/2022 Yes    Non-ischemic cardiomyopathy ([address] 1/15/2022 Yes    History of Batsheva-en-Y gastric bypass 1/15/2022 Yes    Bipolar 1 disorder ([address] 1/16/2022 Yes    Status post placement of cardiac pacemaker 1/15/2022 Yes    Peptic ulcer disease 1/15/2022 Yes    Other complications of other bariatric procedure 1/15/2022 Yes    Ulcer at site of surgical anastomosis following bypass of stomach 1/15/2022 Yes          Plan:        Acute blood loss anemia 2/2 GI bleed   -Improving   -S/P EGD and colonoscopy   -Hemoglobin 9.2 this AM   -S/P 2 units PRBCs   -Colonoscopy showing copious blood in the colon will small bleeding AVM (clipped)   -Continue maintenance fluids at 50 mL/hr   -Continue IV pantoprazole 40 mg bid   -Continue sucralfate     Chest pain, resolved   -Patient had a brief episode of chest pain over her pacemaker this morning (states it lasted about a minute and resolved)   -EKG negative for ischemic changes   -Trend troponin x 3   -Interrogate pacemaker     Hypothyroidism   -Continue home levothyroxine 100 mcg daily     Multiple sclerosis   -Not in a acute flare   -Continue home dimethyl fumarate     Bipolar disorder   -Continue home lamotrigine 200 mg daily   -Continue home lithium 600 mg bid   -Continue home lurasidone 40 mg nightly     Non-ischemic cardiomyopathy   -S/P pacemaker placement   -Continue to monitor     Carl Lynch MD  1/17/2022  9:51 AM

## 2022-01-17 NOTE — PLAN OF CARE
Problem: Bleeding:  Goal: Will show no signs and symptoms of excessive bleeding  Description: Will show no signs and symptoms of excessive bleeding  Outcome: Ongoing     Problem: Falls - Risk of:  Goal: Will remain free from falls  Description: Will remain free from falls  Outcome: Ongoing  Goal: Absence of physical injury  Description: Absence of physical injury  Outcome: Ongoing

## 2022-01-17 NOTE — PROGRESS NOTES
[facility]. [facility] Gastroenterology Progress Note    Meet Rosen is a 64 y.o. female patient. Hospitalization Day:2      Chief consult reason:   GI bleed    Subjective:  Yesterday patient underwent EGD and colonoscopy. Large amount of blood found in colon. Small AVM found in desc colon. Bleeding stopped by hemoclip. CT angio negative. Dark tarry stools ON. Objective:   VITALS:  BP (!) 105/55   Pulse 60   Temp 97.9 °F (36.6 °C) (Oral)   Resp 14   Ht 4' 9\" (1.448 m)   Wt 110 lb 0.2 oz (49.9 kg)   SpO2 100%   BMI 23.81 kg/m²   TEMPERATURE:  Current - Temp: 97.9 °F (36.6 °C);  Max - Temp  Av °F (36.7 °C)  Min: 96.9 °F (36.1 °C)  Max: 99.1 °F (37.3 °C)    Physical Assessment:  General appearance:  alert, cooperative and no distress  Mental Status:  oriented to person, place and time and normal affect  Lungs:  clear to auscultation bilaterally, normal effort  Heart:  regular rate and rhythm, no murmur  Abdomen:  soft, nontender, nondistended, normal bowel sounds, no masses  Extremities:  no edema, no redness, No clubbing  Skin:  warm, dry, no gross lesions or rashes    CURRENT MEDICATIONS:  Scheduled Meds:   dimethyl fumarate  240 mg Oral BID    lurasidone  40 mg Oral Nightly    vilazodone HCl  40 mg Oral Daily    sucralfate  1 g Oral Q6H    sodium chloride flush  5-40 mL IntraVENous 2 times per day    pantoprazole  40 mg IntraVENous Q12H    And    sodium chloride (PF)  10 mL IntraVENous Q12H    lamoTRIgine  200 mg Oral Daily    levothyroxine  100 mcg Oral Daily    lithium  600 mg Oral BID     Continuous Infusions:   sodium chloride      sodium chloride      sodium chloride 50 mL/hr at 01/15/22 1647    sodium chloride       PRN Meds:sodium chloride, sodium chloride, sodium chloride flush, sodium chloride, ondansetron **OR** ondansetron, acetaminophen **OR** acetaminophen      Data Review:  LABS and IMAGING:     CBC  Recent Labs     22  1314 22  1314 01/15/22  0330 01/15/22  0334 01/15/22  1023 01/15/22  1328 01/16/22  0031 01/16/22  0508 01/16/22  1254 01/16/22  1638 01/16/22  2347   WBC 7.1  --  6.4  --  5.3  --   --   --   --   --   --    HGB 11.2*   < > 9.7*   < > 9.8*   < > 8.0* 6.9* 4.8* 9.0* 9.8*   HCT 34.8*   < > 30.6*   < > 30.3*   < > 25.0* 22.3* 15.9* 27.2* 29.0*   .2*  --  102.3*  --  101.7*  --   --   --   --   --   --    MCHC 32.2  --  31.7*  --  32.3  --   --   --   --   --   --      --  193  --  187  --   --   --   --   --   --     < > = values in this interval not displayed. Immature PLTs   No results found for: PLTFLUORE    ANEMIA STUDIES  Recent Labs     01/16/22  0508   LABIRON 30   TIBC 227*   IRON 69       BMP  Recent Labs     01/14/22  1314 01/16/22  0508    140   K 4.0 4.0    116*   CO2 22* 19*   BUN 23* 28*   CREATININE 0.6 0.40*   GLUCOSE 138* 88   CALCIUM 9.8 8.3*       LFTS  Recent Labs     01/14/22  1314   ALKPHOS 109   ALT 18   AST 27   BILITOT 0.2*   LABALBU 4.3       AMYLASE/LIPASE/AMMONIA  Recent Labs     01/14/22  1431   LIPASE 15.8       Acute Hepatitis Panel   Lab Results   Component Value Date    HEPCAB Negative 10/25/2018       HCV Genotype   No results found for: HEPATITISCGENOTYPE    HCV Quantitative   No results found for: HCVQNT    LIVER WORK UP:    AFP  No results found for: AFP    Alpha 1 antitrypsin   No results found for: A1A    Anti - Liver/Kidney Ab  No results found for: LIVER-KIDNEYMICROSOMALAB    NADEEM  No results found for: NADEEM    AMA  No results found for: MITOAB    ASMA  No results found for: SMOOTHMUSCAB    Ceruloplasmin  No results found for: CERULOPLSM    Celiac panel  No results found for: TISSTRNTIIGG, TTGIGA, IGA    IgG  No results found for: IGG    IgM  No results found for: IGM    GGT   No results found for: LABGGT    PT/INR  Recent Labs     01/14/22  1431   INR 0.99       Cancer Markers:  CEA:  No results for input(s): CEA in the last 72 hours.   Ca 125:  No results for input(s):  in the last 72 hours. Ca 19-9:   No results for input(s):  in the last 72 hours. AFP: No results for input(s): AFP in the last 72 hours. Lactic acid:No results for input(s): LACTACIDWB in the last 72 hours. Radiology Review:    CT ABDOMEN PELVIS W IV CONTRAST Additional Contrast? None    Result Date: 1/14/2022  PROCEDURE: CT ABDOMEN PELVIS W IV CONTRAST CLINICAL INFORMATION: abdominal pain rectal bleeeding . TECHNIQUE: 2-D multiplanar postcontrast images of the abdomen and pelvis at 5 mm intervals. Isovue-370 IV contrast. All CT scans at this facility use dose modulation, iterative reconstruction, and/or weight-based dosing when appropriate to reduce radiation dose to as low as reasonably achievable. COMPARISON: 9/22/2020 FINDINGS: Lung bases Lungs appear emphysematous. Undersurface the heart is normal size with pacer leads present. Abdomen pelvis Postsurgical changes of the stomach. Prior embolization procedure gastroduodenal artery. Stable cyst right lobe of the liver. Liver is enlarged. Spleen is normal. Streak artifact from the embolization coils. The pancreas appears normal. Status post cholecystectomy. Adrenals are normal. Nonobstructive calculus right kidney. Pelviectasis left kidney. Aorta is unremarkable. There is constipation. No convincing evidence of bowel obstruction. There is very similar to the prior exam. Urinary bladder is decompressed. No abnormal fluid collections are seen. MSK No suspicious bone lesions With     Stable CT scan abdomen and pelvis. There is constipation. There is no convincing evidence for bowel obstruction. No other acute abdominal or pelvic abnormality **This report has been created using voice recognition software. It may contain minor errors which are inherent in voice recognition technology. ** Final report electronically signed by Dr. Macie Paige on 1/14/2022 4:17 PM    CTA ABDOMEN PELVIS W WO CONTRAST    Result Date: 1/16/2022  EXAMINATION: CTA OF THE ABDOMEN AND PELVIS WITH AND WITHOUT CONTRAST 1/16/2022 3:30 pm TECHNIQUE: CTA of the abdomen and pelvis was performed without and with the administration of intravenous contrast. Multiplanar reformatted images are provided for review. MIP images are provided for review. Dose modulation, iterative reconstruction, and/or weight based adjustment of the mA/kV was utilized to reduce the radiation dose to as low as reasonably achievable. COMPARISON: 01/14/2022 HISTORY: ORDERING SYSTEM PROVIDED HISTORY: GI bleed, please include a delayed venous phase TECHNOLOGIST PROVIDED HISTORY: Please include delayed venous phase to check for small bowel bleeding GI bleed, please include a delayed venous phase Please FINDINGS: Lower Chest: Unremarkable. Organs: Liver is normal in contour and enhancement. Gallbladder is surgically absent. No biliary ductal dilatation. Pancreas is unremarkable. Adrenals are unremarkable. Spleen is normal in size. Benign hyperdense cyst of the left kidney. .  Vasculature is unremarkable. GI/Bowel: No active GI bleed identified. Bowel is non-dilated without wall thickening. Appendix is normal. Pelvis: Unremarkable. Peritoneum/Retroperitoneum:No free fluid, free air, organized fluid collection or lymphadenopathy. Bones: Multilevel degenerative disc disease. No active GI bleed. ENDOSCOPY     Principal Problem:    GI bleed  Active Problems:    Acquired hypothyroidism    MS (multiple sclerosis) (HCC)    Non-ischemic cardiomyopathy ([address]    History of Batsheva-en-Y gastric bypass    Bipolar 1 disorder (HCC)    Status post placement of cardiac pacemaker    Peptic ulcer disease    Other complications of other bariatric procedure    Ulcer at site of surgical anastomosis following bypass of stomach  Resolved Problems:    * No resolved hospital problems. *       GI Assessment:  1. GI bleed:colonoscopy and EGD yesterday due to drop in H&H. desc avm colon clipped. CT angio negative. 2. Hx of RNYGB  3.  GERD currently on protonix        Plan of care:  1. CT angio negative. Will discuss transfer to [facility] for double balloon deep enteroscopy  2. Will discuss possible diet today  3. Transfuse if hgb below 7  4. Continue to monitor clinically        This plan was formulated in collaboration with   Please feel free to contact me with any questions or concerns. Thank you for allowing me to participate in the care of your patient. Malick Houser MD on 1/17/2022 at 8:19 [address] Gastroenterology    Please note that this note was generated using a voice recognition dictation software. Although every effort was made to ensure the accuracy of this automated transcription, some errors in transcription may have occurred.

## 2022-01-18 VITALS
TEMPERATURE: 98.5 F | SYSTOLIC BLOOD PRESSURE: 99 MMHG | HEART RATE: 60 BPM | OXYGEN SATURATION: 98 % | HEIGHT: 57 IN | BODY MASS INDEX: 23.73 KG/M2 | DIASTOLIC BLOOD PRESSURE: 57 MMHG | RESPIRATION RATE: 16 BRPM | WEIGHT: 110.01 LBS

## 2022-01-18 LAB
ABSOLUTE EOS #: 0.13 K/UL (ref 0–0.4)
ABSOLUTE IMMATURE GRANULOCYTE: 0 K/UL (ref 0–0.3)
ABSOLUTE LYMPH #: 0.53 K/UL (ref 1–4.8)
ABSOLUTE MONO #: 0.2 K/UL (ref 0.1–0.8)
ANION GAP SERPL CALCULATED.3IONS-SCNC: 8 MMOL/L (ref 9–17)
BASOPHILS # BLD: 0 % (ref 0–2)
BASOPHILS ABSOLUTE: 0 K/UL (ref 0–0.2)
BUN BLDV-MCNC: 11 MG/DL (ref 8–23)
BUN/CREAT BLD: ABNORMAL (ref 9–20)
CALCIUM SERPL-MCNC: 9.4 MG/DL (ref 8.6–10.4)
CHLORIDE BLD-SCNC: 109 MMOL/L (ref 98–107)
CO2: 20 MMOL/L (ref 20–31)
CREAT SERPL-MCNC: 0.4 MG/DL (ref 0.5–0.9)
DIFFERENTIAL TYPE: ABNORMAL
EOSINOPHILS RELATIVE PERCENT: 2 % (ref 1–4)
GFR AFRICAN AMERICAN: >60 ML/MIN
GFR NON-AFRICAN AMERICAN: >60 ML/MIN
GFR SERPL CREATININE-BSD FRML MDRD: ABNORMAL ML/MIN/{1.73_M2}
GFR SERPL CREATININE-BSD FRML MDRD: ABNORMAL ML/MIN/{1.73_M2}
GLUCOSE BLD-MCNC: 95 MG/DL (ref 70–99)
HCT VFR BLD CALC: 25.9 % (ref 36.3–47.1)
HCT VFR BLD CALC: 27 % (ref 36.3–47.1)
HCT VFR BLD CALC: 30.3 % (ref 36.3–47.1)
HCT VFR BLD CALC: NORMAL % (ref 36.3–47.1)
HEMOGLOBIN: 8.6 G/DL (ref 11.9–15.1)
HEMOGLOBIN: 9 G/DL (ref 11.9–15.1)
HEMOGLOBIN: 9.8 G/DL (ref 11.9–15.1)
HEMOGLOBIN: NORMAL G/DL (ref 11.9–15.1)
IMMATURE GRANULOCYTES: 0 %
LYMPHOCYTES # BLD: 8 % (ref 24–44)
MCH RBC QN AUTO: 32 PG (ref 25.2–33.5)
MCHC RBC AUTO-ENTMCNC: 33.3 G/DL (ref 28.4–34.8)
MCV RBC AUTO: 96.1 FL (ref 82.6–102.9)
MONOCYTES # BLD: 3 % (ref 1–7)
MORPHOLOGY: ABNORMAL
MORPHOLOGY: ABNORMAL
NRBC AUTOMATED: 0 PER 100 WBC
PDW BLD-RTO: 17 % (ref 11.8–14.4)
PLATELET # BLD: ABNORMAL K/UL (ref 138–453)
PLATELET ESTIMATE: ABNORMAL
PLATELET, FLUORESCENCE: 147 K/UL (ref 138–453)
PLATELET, IMMATURE FRACTION: 5.9 % (ref 1.1–10.3)
PMV BLD AUTO: ABNORMAL FL (ref 8.1–13.5)
POTASSIUM SERPL-SCNC: 4.2 MMOL/L (ref 3.7–5.3)
RBC # BLD: 2.81 M/UL (ref 3.95–5.11)
RBC # BLD: ABNORMAL 10*6/UL
SEG NEUTROPHILS: 87 % (ref 36–66)
SEGMENTED NEUTROPHILS ABSOLUTE COUNT: 5.74 K/UL (ref 1.8–7.7)
SODIUM BLD-SCNC: 137 MMOL/L (ref 135–144)
WBC # BLD: 6.6 K/UL (ref 3.5–11.3)
WBC # BLD: ABNORMAL 10*3/UL

## 2022-01-18 PROCEDURE — 6370000000 HC RX 637 (ALT 250 FOR IP): Performed by: INTERNAL MEDICINE

## 2022-01-18 PROCEDURE — 85055 RETICULATED PLATELET ASSAY: CPT

## 2022-01-18 PROCEDURE — C9113 INJ PANTOPRAZOLE SODIUM, VIA: HCPCS | Performed by: INTERNAL MEDICINE

## 2022-01-18 PROCEDURE — 99238 HOSP IP/OBS DSCHRG MGMT 30/<: CPT | Performed by: STUDENT IN AN ORGANIZED HEALTH CARE EDUCATION/TRAINING PROGRAM

## 2022-01-18 PROCEDURE — 85025 COMPLETE CBC W/AUTO DIFF WBC: CPT

## 2022-01-18 PROCEDURE — 85014 HEMATOCRIT: CPT

## 2022-01-18 PROCEDURE — 2580000003 HC RX 258: Performed by: INTERNAL MEDICINE

## 2022-01-18 PROCEDURE — 6370000000 HC RX 637 (ALT 250 FOR IP): Performed by: STUDENT IN AN ORGANIZED HEALTH CARE EDUCATION/TRAINING PROGRAM

## 2022-01-18 PROCEDURE — 6360000002 HC RX W HCPCS: Performed by: INTERNAL MEDICINE

## 2022-01-18 PROCEDURE — 85018 HEMOGLOBIN: CPT

## 2022-01-18 PROCEDURE — 36415 COLL VENOUS BLD VENIPUNCTURE: CPT

## 2022-01-18 PROCEDURE — 99232 SBSQ HOSP IP/OBS MODERATE 35: CPT | Performed by: INTERNAL MEDICINE

## 2022-01-18 PROCEDURE — 80048 BASIC METABOLIC PNL TOTAL CA: CPT

## 2022-01-18 RX ORDER — PANTOPRAZOLE SODIUM 40 MG/1
40 TABLET, DELAYED RELEASE ORAL
Status: DISCONTINUED | OUTPATIENT
Start: 2022-01-18 | End: 2022-01-18 | Stop reason: HOSPADM

## 2022-01-18 RX ORDER — PANTOPRAZOLE SODIUM 40 MG/1
40 TABLET, DELAYED RELEASE ORAL
Qty: 180 TABLET | Refills: 2 | Status: SHIPPED | OUTPATIENT
Start: 2022-01-18 | End: 2022-05-10 | Stop reason: SDUPTHER

## 2022-01-18 RX ORDER — SUCRALFATE 1 G/1
1 TABLET ORAL EVERY 6 HOURS
Qty: 120 TABLET | Refills: 3 | Status: SHIPPED | OUTPATIENT
Start: 2022-01-18 | End: 2022-06-16 | Stop reason: SDUPTHER

## 2022-01-18 RX ADMIN — SODIUM CHLORIDE, PRESERVATIVE FREE 10 ML: 5 INJECTION INTRAVENOUS at 08:52

## 2022-01-18 RX ADMIN — POLYETHYLENE GLYCOL 3350 17 G: 17 POWDER, FOR SOLUTION ORAL at 08:56

## 2022-01-18 RX ADMIN — SUCRALFATE 1 G: 1 TABLET ORAL at 08:51

## 2022-01-18 RX ADMIN — VILAZODONE HYDROCHLORIDE 40 MG: 40 TABLET ORAL at 08:51

## 2022-01-18 RX ADMIN — SUCRALFATE 1 G: 1 TABLET ORAL at 03:39

## 2022-01-18 RX ADMIN — LAMOTRIGINE 200 MG: 100 TABLET ORAL at 08:51

## 2022-01-18 RX ADMIN — SODIUM CHLORIDE, PRESERVATIVE FREE 10 ML: 5 INJECTION INTRAVENOUS at 00:40

## 2022-01-18 RX ADMIN — LITHIUM CARBONATE 600 MG: 300 TABLET ORAL at 08:51

## 2022-01-18 RX ADMIN — LEVOTHYROXINE SODIUM 100 MCG: 100 TABLET ORAL at 06:11

## 2022-01-18 RX ADMIN — ONDANSETRON 4 MG: 2 INJECTION INTRAMUSCULAR; INTRAVENOUS at 10:57

## 2022-01-18 RX ADMIN — PANTOPRAZOLE SODIUM 40 MG: 40 INJECTION, POWDER, FOR SOLUTION INTRAVENOUS at 00:39

## 2022-01-18 NOTE — CARE COORDINATION
Transitional planning    Writer to room to discuss d/c plan, going home, daughter at bedside, copy of IMM given to daughter.

## 2022-01-18 NOTE — DISCHARGE SUMMARY
[facility]  Office: [address], , Margie Gay, DO, Aditi Warner DO, Bobcornelio Ardon, DO, Remington Marks MD, Sloane Reyes MD, Mart Garcia MD, Meño Sullivan MD, Radha Santos MD, Ifeoma English MD, Jannet Palma MD, Cristal Padron, DO, Hilario Sams DO, Ricardo Gifford MD,  Kristen Phelan DO, Laurel Acosta MD, Titus Bennett MD, Ritesh Bonilla MD, Dee Floyd MD, Angelita Rangel MD, Faizan Rojas MD, Sarwat Mcgowan MD, [facility], [facility], [facility], CNP, Angel Ortez CNP, Parish Clifford, CNS, Shilo Cali, CNP, Cathy Miller, CNP, Ramakrishna Curtis, CNP, aGgan Randhawa, CNP, Indira Arizmendi, CNP, Jamil Hughes PA-C, Paulino Llanes, [facility], Juany Flores, [facility], Alexei Funes, CNP, Jeet Blackman, CNP, Nate Sommers, CNP, Марина Jesus, CNP, Ayana Madsen, CNP, Emeka Tolentino, [facility]    Discharge Summary     Patient ID: Ursula Arana  :  1960   MRN: [id number]     ACCOUNT:  [de-identified]   Patient's PCP: Shelbi Purcell DO  Admit Date: 1/15/2022   Discharge Date: 2022     Length of Stay: 3  Code Status:  Full Code  Admitting Physician: Dee Floyd MD  Discharge Physician: Dee Floyd MD     Active Discharge Diagnoses:     Hospital Problem Lists:  Principal Problem:    GI bleed  Active Problems:    Acquired hypothyroidism    MS (multiple sclerosis) ([facility] [address]    Non-ischemic cardiomyopathy ([facility] [address]    History of Batsheva-en-Y gastric bypass    Bipolar 1 disorder ([facility] [address]    Status post placement of cardiac pacemaker    Peptic ulcer disease    Other complications of other bariatric procedure    Ulcer at site of surgical anastomosis following bypass of stomach    Acute blood loss anemia  Resolved Problems:    * No resolved hospital problems. *      Admission Condition:  poor     Discharged Condition: good    Hospital Stay:     Hospital Course:   Ursula Arana is a 64 y.o. female with a past medical history of recurrent GI bleeds, hypothyroidism and bipolar disorder who presented to the emergency department on 1/14/2022 complaining of melenotic stools. The patient states that her symptoms began suddenly and progressively worsened. In the ED, the patient was afebrile and nontoxic appearing. Her initial hemoglobin was 10.6 but dropped to 6.9 shortly after admission. Gastroenterology was consulted and performed an EGD and colonoscopy on 1/16. The endoscopies were remarkable for a bleeding AVM in the descending colon (this was clipped). The patient's hemoglobin remained stable following the procedure. CT angiogram of the abdomen and pelvis was also done and was negative for active bleeding. The patient is discharged today (1/18) in stable condition. She is instructed to follow-up with her gastroenterologist outpatient for a capsule endoscopy. Significant therapeutic interventions: Blood transfusion; GI evaluation; EGD and colonoscopy     Significant Diagnostic Studies:   Labs / Micro:  CBC:   Lab Results   Component Value Date    WBC 6.6 01/18/2022    RBC 2.81 01/18/2022    HGB 9.0 01/18/2022    HCT 27.0 01/18/2022    MCV 96.1 01/18/2022    MCH 32.0 01/18/2022    MCHC 33.3 01/18/2022    RDW 17.0 01/18/2022    PLT See Reflexed IPF Result 01/18/2022     Radiology:  CT ABDOMEN PELVIS W IV CONTRAST Additional Contrast? None    Result Date: 1/14/2022  Stable CT scan abdomen and pelvis. There is constipation. There is no convincing evidence for bowel obstruction. No other acute abdominal or pelvic abnormality **This report has been created using voice recognition software. It may contain minor errors which are inherent in voice recognition technology. ** Final report electronically signed by Dr. Samanta Vincent on 1/14/2022 4:17 PM    CTA ABDOMEN PELVIS W WO CONTRAST    Result Date: 1/16/2022  No active GI bleed.        Consultations:    Consults:     Final Specialist Recommendations/Findings:   IP CONSULT TO GI  IP CONSULT TO IV TEAM  IP CONSULT TO INTERVENTIONAL RADIOLOGY      The patient was seen and examined on day of discharge and this discharge summary is in conjunction with any daily progress note from day of discharge. Discharge plan:     Disposition: Home    Physician Follow Up:     Pia Webb DO  [address]  [phone]    Schedule an appointment as soon as possible for a visit         Requiring Further Evaluation/Follow Up POST HOSPITALIZATION/Incidental Findings: Follow-up with your gastroenterologist for outpatient capsule endoscopy. Diet: regular diet    Activity: As tolerated    Instructions to Patient: Take pantoprazole twice daily. Follow-up with gastroenterology outpatient as scheduled.      Discharge Medications:      Medication List      START taking these medications    sucralfate 1 GM tablet  Commonly known as: CARAFATE  Take 1 tablet by mouth every 6 hours        CHANGE how you take these medications    levothyroxine 100 MCG tablet  Commonly known as: SYNTHROID  TAKE 1 TABLET BY MOUTH ONE TIME A DAY  What changed: when to take this     lurasidone 40 MG Tabs tablet  Commonly known as: Latuda  Take 1 tablet by mouth daily  What changed: when to take this     pantoprazole 40 MG tablet  Commonly known as: PROTONIX  Take 1 tablet by mouth 2 times daily (before meals)  What changed: when to take this        CONTINUE taking these medications    calcium carbonate 600 MG Tabs tablet     Culturelle Health (Inulin) Caps  TAKE 1 CAPSULE BY MOUTH WITH BREAKFAST     cyanocobalamin 1000 MCG tablet  Take 1 tablet by mouth daily     DHEA 50 MG Caps     dimethyl fumarate 240 MG delayed release capsule  Commonly known as: TECFIDERA  Take 1 capsule by mouth 2 times daily     docusate sodium 100 MG capsule  Commonly known as: COLACE  TAKE 1 CAPSULE BY MOUTH 2 TIMES A DAY     fluticasone 50 MCG/ACT nasal spray  Commonly known as: FLONASE     ipratropium 0.03 % nasal spray  Commonly known as: ATROVENT  INSTILL 2 SPRAYS INTO EACH NOSTRIL 3 TIMES DAILY. lamoTRIgine 200 MG tablet  Commonly known as: LAMICTAL  Take one tab by mouth once daily. lithium 300 MG tablet  Take 2 tablets by mouth 2 times daily     loratadine 10 MG tablet  Commonly known as: CLARITIN  Take 1 tablet by mouth daily     Magnesium Oxide 250 MG Tabs tablet  Commonly known as: MAGNESIUM-OXIDE     nitroGLYCERIN 0.4 MG SL tablet  Commonly known as: NITROSTAT  up to max of 3 total doses. If no relief after 1 dose, call 911. ondansetron 4 MG tablet  Commonly known as: ZOFRAN  Take 1 tablet by mouth 3 times daily as needed for Nausea or Vomiting     PRENATAL 1 PO     Prosthesis Misc  by Does not apply route Strap for the prosthesis broken please order new     vilazodone HCl 40 MG Tabs  Commonly known as: vilazodone hcl  TAKE 1 TABLET BY MOUTH ONCE DAILY. vitamin D 50 MCG (2000 UT) Caps capsule           Where to Get Your Medications      These medications were sent to [address] , [address] [facility]  [address] [place] [address]  [address] [place] [address], [address] [place] [address] [address] [address]    Phone: [phone]   · pantoprazole 40 MG tablet  · sucralfate 1 GM tablet         No discharge procedures on file. Time Spent on discharge is  20 mins in patient examination, evaluation, counseling as well as medication reconciliation, prescriptions for required medications, discharge plan and follow up. Electronically signed by   Tia Tobias MD  1/18/2022  12:01 PM      Thank you Dr. Brian Leary DO for the opportunity to be involved in this patient's care.

## 2022-01-18 NOTE — PROGRESS NOTES
[facility]. [facility] Gastroenterology Progress Note    Tamir Porras is a 64 y.o. female patient. Hospitalization Day:3      Chief consult reason:   GI bleed    Subjective:  Beacher Spikes. Denies any blood stools. hgb is stable. Will transfer to [facility] soon. Objective:   VITALS:  BP (!) 99/57   Pulse 60   Temp 98.5 °F (36.9 °C) (Oral)   Resp 16   Ht 4' 9\" (1.448 m)   Wt 110 lb 0.2 oz (49.9 kg)   SpO2 98%   BMI 23.81 kg/m²   TEMPERATURE:  Current - Temp: 98.5 °F (36.9 °C);  Max - Temp  Av.5 °F (36.9 °C)  Min: 97.8 °F (36.6 °C)  Max: 99.9 °F (37.7 °C)    Physical Assessment:  General appearance:  alert, cooperative and no distress  Mental Status:  oriented to person, place and time and normal affect  Lungs:  clear to auscultation bilaterally, normal effort  Heart:  regular rate and rhythm, no murmur  Abdomen:  soft, nontender, nondistended, normal bowel sounds, no masses  Extremities:  no edema, no redness, No clubbing  Skin:  warm, dry, no gross lesions or rashes    CURRENT MEDICATIONS:  Scheduled Meds:   pantoprazole  40 mg Oral BID AC    polyethylene glycol  17 g Oral Daily    dimethyl fumarate  240 mg Oral BID    lurasidone  40 mg Oral Nightly    vilazodone HCl  40 mg Oral Daily    sucralfate  1 g Oral Q6H    sodium chloride flush  5-40 mL IntraVENous 2 times per day    lamoTRIgine  200 mg Oral Daily    levothyroxine  100 mcg Oral Daily    lithium  600 mg Oral BID     Continuous Infusions:   sodium chloride      sodium chloride      sodium chloride       PRN Meds:sodium chloride, sodium chloride, sodium chloride flush, sodium chloride, ondansetron **OR** ondansetron, acetaminophen **OR** acetaminophen      Data Review:  LABS and IMAGING:     CBC  Recent Labs     22  0914 22  1202 22  1847 22  0037 22  0730 22  0731 22  1220   WBC 5.3  --   --   --   --  6.6  --    HGB 9.2*   < > 8.2* 9.8* DISREGARD DUPLICATE ORDER 9.0* 8.6*   HCT 28.4*   < > 24.9* 02.3* DISREGARD DUPLICATE ORDER 88.6* 55.8*   MCV 96.6  --   --   --   --  96.1  --    MCHC 32.4  --   --   --   --  33.3  --    RDW 17.5*  --   --   --   --  17.0*  --      --   --   --   --  See Reflexed IPF Result  --     < > = values in this interval not displayed. Immature PLTs   Lab Results   Component Value Date    PLTFLUORE 147 01/18/2022       ANEMIA STUDIES  Recent Labs     01/16/22  0508   LABIRON 30   TIBC 227*   IRON 69       BMP  Recent Labs     01/16/22  0508 01/17/22  0914 01/18/22  0731    141 137   K 4.0 4.0 4.2   * 111* 109*   CO2 19* 20 20   BUN 28* 17 11   CREATININE 0.40* 0.43* 0.40*   GLUCOSE 88 97 95   CALCIUM 8.3* 9.4 9.4       LFTS  No results for input(s): ALKPHOS, ALT, AST, BILITOT, BILIDIR, LABALBU in the last 72 hours. AMYLASE/LIPASE/AMMONIA  No results for input(s): AMYLASE, LIPASE, AMMONIA in the last 72 hours. Acute Hepatitis Panel   Lab Results   Component Value Date    HEPCAB Negative 10/25/2018       HCV Genotype   No results found for: HEPATITISCGENOTYPE    HCV Quantitative   No results found for: HCVQNT    LIVER WORK UP:    AFP  No results found for: AFP    Alpha 1 antitrypsin   No results found for: A1A    Anti - Liver/Kidney Ab  No results found for: LIVER-KIDNEYMICROSOMALAB    NADEEM  No results found for: NADEEM    AMA  No results found for: MITOAB    ASMA  No results found for: SMOOTHMUSCAB    Ceruloplasmin  No results found for: CERULOPLSM    Celiac panel  No results found for: TISSTRNTIIGG, TTGIGA, IGA    IgG  No results found for: IGG    IgM  No results found for: IGM    GGT   No results found for: LABGGT    PT/INR  No results for input(s): PROTIME, INR in the last 72 hours. Cancer Markers:  CEA:  No results for input(s): CEA in the last 72 hours. Ca 125:  No results for input(s):  in the last 72 hours. Ca 19-9:   No results for input(s):  in the last 72 hours. AFP: No results for input(s): AFP in the last 72 hours.     Lactic acid:No results for input(s): LACTACIDWB in the last 72 hours. Radiology Review:    CT ABDOMEN PELVIS W IV CONTRAST Additional Contrast? None    Result Date: 1/14/2022  PROCEDURE: CT ABDOMEN PELVIS W IV CONTRAST CLINICAL INFORMATION: abdominal pain rectal bleeeding . TECHNIQUE: 2-D multiplanar postcontrast images of the abdomen and pelvis at 5 mm intervals. Isovue-370 IV contrast. All CT scans at this facility use dose modulation, iterative reconstruction, and/or weight-based dosing when appropriate to reduce radiation dose to as low as reasonably achievable. COMPARISON: 9/22/2020 FINDINGS: Lung bases Lungs appear emphysematous. Undersurface the heart is normal size with pacer leads present. Abdomen pelvis Postsurgical changes of the stomach. Prior embolization procedure gastroduodenal artery. Stable cyst right lobe of the liver. Liver is enlarged. Spleen is normal. Streak artifact from the embolization coils. The pancreas appears normal. Status post cholecystectomy. Adrenals are normal. Nonobstructive calculus right kidney. Pelviectasis left kidney. Aorta is unremarkable. There is constipation. No convincing evidence of bowel obstruction. There is very similar to the prior exam. Urinary bladder is decompressed. No abnormal fluid collections are seen. MSK No suspicious bone lesions With     Stable CT scan abdomen and pelvis. There is constipation. There is no convincing evidence for bowel obstruction. No other acute abdominal or pelvic abnormality **This report has been created using voice recognition software. It may contain minor errors which are inherent in voice recognition technology. ** Final report electronically signed by Dr. Simba Crews on 1/14/2022 4:17 PM    CTA ABDOMEN PELVIS W WO CONTRAST    Result Date: 1/16/2022  EXAMINATION: CTA OF THE ABDOMEN AND PELVIS WITH AND WITHOUT CONTRAST 1/16/2022 3:30 pm TECHNIQUE: CTA of the abdomen and pelvis was performed without and with the administration of intravenous contrast. Multiplanar reformatted images are provided for review. MIP images are provided for review. Dose modulation, iterative reconstruction, and/or weight based adjustment of the mA/kV was utilized to reduce the radiation dose to as low as reasonably achievable. COMPARISON: 01/14/2022 HISTORY: ORDERING SYSTEM PROVIDED HISTORY: GI bleed, please include a delayed venous phase TECHNOLOGIST PROVIDED HISTORY: Please include delayed venous phase to check for small bowel bleeding GI bleed, please include a delayed venous phase Please FINDINGS: Lower Chest: Unremarkable. Organs: Liver is normal in contour and enhancement. Gallbladder is surgically absent. No biliary ductal dilatation. Pancreas is unremarkable. Adrenals are unremarkable. Spleen is normal in size. Benign hyperdense cyst of the left kidney. .  Vasculature is unremarkable. GI/Bowel: No active GI bleed identified. Bowel is non-dilated without wall thickening. Appendix is normal. Pelvis: Unremarkable. Peritoneum/Retroperitoneum:No free fluid, free air, organized fluid collection or lymphadenopathy. Bones: Multilevel degenerative disc disease. No active GI bleed. ENDOSCOPY     Principal Problem:    GI bleed  Active Problems:    Acquired hypothyroidism    MS (multiple sclerosis) (HCC)    Non-ischemic cardiomyopathy ([facility] [address]    History of Batsheva-en-Y gastric bypass    Bipolar 1 disorder (HCC)    Status post placement of cardiac pacemaker    Peptic ulcer disease    Other complications of other bariatric procedure    Ulcer at site of surgical anastomosis following bypass of stomach    Acute blood loss anemia  Resolved Problems:    * No resolved hospital problems. *       GI Assessment:  1. GI bleed:colonoscopy and EGD due to drop in H&H. desc avm colon clipped with bleeding and clot noted in colon. Suspect Small intestines bleed. . CT angio negative. 2. Hx of RNYGB  3. GERD currently on protonix        Plan of care:  1. CT angio negative. Transfer to [facility] to be evlaluated by Dr. Ayden Arteaga   2. Continue soft diet   3. Transfuse if hgb below 7  4. Continue to monitor clinically        This plan was formulated in collaboration with   Please feel free to contact me with any questions or concerns. Thank you for allowing me to participate in the care of your patient. Aniyah Proctor MD on 1/18/2022 at [address] [facility] Gastroenterology    Please note that this note was generated using a voice recognition dictation software. Although every effort was made to ensure the accuracy of this automated transcription, some errors in transcription may have occurred.

## 2022-01-18 NOTE — PLAN OF CARE
Problem: Bleeding:  Goal: Will show no signs and symptoms of excessive bleeding  Description: Will show no signs and symptoms of excessive bleeding  1/18/2022 0508 by Jose A Cooper RN  Outcome: Ongoing     Problem: Falls - Risk of:  Goal: Will remain free from falls  Description: Will remain free from falls  1/18/2022 0508 by Jose A Cooper RN  Outcome: Ongoing     Problem: Falls - Risk of:  Goal: Absence of physical injury  Description: Absence of physical injury  1/18/2022 0508 by Jose A Cooper RN  Outcome: Ongoing

## 2022-01-18 NOTE — CARE COORDINATION
Discharge 1 [facility] [place], [place] Case Management Department  Written by: Priyank Mayo RN    Patient Name: Hilary Givens  Attending Provider: Miah Barber MD  Admit Date: 1/15/2022 12:13 PM  MRN: [id number]  Account: [de-identified]                     : 1960  Discharge Date: 22      Disposition: home with daughter    Priyank Mayo RN

## 2022-01-20 RX ORDER — DOCUSATE SODIUM 100 MG/1
CAPSULE, LIQUID FILLED ORAL
Qty: 60 CAPSULE | Refills: 2 | Status: SHIPPED | OUTPATIENT
Start: 2022-01-20

## 2022-01-20 NOTE — TELEPHONE ENCOUNTER
Patient's last appointment was : 11/9/2021  Patient's next appointment is : 2/14/2022  Last refilled: 6/7/21

## 2022-01-24 ENCOUNTER — HOSPITAL ENCOUNTER (OUTPATIENT)
Dept: WOMENS IMAGING | Age: 62
Discharge: HOME | End: 2022-01-24
Payer: MEDICARE

## 2022-01-24 DIAGNOSIS — Z12.31 VISIT FOR SCREENING MAMMOGRAM: ICD-10-CM

## 2022-01-24 PROCEDURE — 77063 BREAST TOMOSYNTHESIS BI: CPT

## 2022-02-18 ENCOUNTER — OFFICE VISIT (OUTPATIENT)
Dept: FAMILY MEDICINE CLINIC | Age: 62
End: 2022-02-18
Payer: MEDICARE

## 2022-02-18 VITALS
HEIGHT: 57 IN | HEART RATE: 80 BPM | RESPIRATION RATE: 16 BRPM | OXYGEN SATURATION: 98 % | WEIGHT: 122.4 LBS | DIASTOLIC BLOOD PRESSURE: 70 MMHG | SYSTOLIC BLOOD PRESSURE: 120 MMHG | BODY MASS INDEX: 26.41 KG/M2 | TEMPERATURE: 97 F

## 2022-02-18 DIAGNOSIS — Z00.00 MEDICARE ANNUAL WELLNESS VISIT, SUBSEQUENT: Primary | ICD-10-CM

## 2022-02-18 PROCEDURE — G0439 PPPS, SUBSEQ VISIT: HCPCS | Performed by: STUDENT IN AN ORGANIZED HEALTH CARE EDUCATION/TRAINING PROGRAM

## 2022-02-18 PROCEDURE — G0444 DEPRESSION SCREEN ANNUAL: HCPCS | Performed by: STUDENT IN AN ORGANIZED HEALTH CARE EDUCATION/TRAINING PROGRAM

## 2022-02-18 ASSESSMENT — PATIENT HEALTH QUESTIONNAIRE - PHQ9
3. TROUBLE FALLING OR STAYING ASLEEP: 3
6. FEELING BAD ABOUT YOURSELF - OR THAT YOU ARE A FAILURE OR HAVE LET YOURSELF OR YOUR FAMILY DOWN: 0
2. FEELING DOWN, DEPRESSED OR HOPELESS: 2
SUM OF ALL RESPONSES TO PHQ QUESTIONS 1-9: 11
7. TROUBLE CONCENTRATING ON THINGS, SUCH AS READING THE NEWSPAPER OR WATCHING TELEVISION: 1
10. IF YOU CHECKED OFF ANY PROBLEMS, HOW DIFFICULT HAVE THESE PROBLEMS MADE IT FOR YOU TO DO YOUR WORK, TAKE CARE OF THINGS AT HOME, OR GET ALONG WITH OTHER PEOPLE: 0
9. THOUGHTS THAT YOU WOULD BE BETTER OFF DEAD, OR OF HURTING YOURSELF: 0
SUM OF ALL RESPONSES TO PHQ QUESTIONS 1-9: 11
8. MOVING OR SPEAKING SO SLOWLY THAT OTHER PEOPLE COULD HAVE NOTICED. OR THE OPPOSITE, BEING SO FIGETY OR RESTLESS THAT YOU HAVE BEEN MOVING AROUND A LOT MORE THAN USUAL: 0
1. LITTLE INTEREST OR PLEASURE IN DOING THINGS: 1
5. POOR APPETITE OR OVEREATING: 3
SUM OF ALL RESPONSES TO PHQ9 QUESTIONS 1 & 2: 3
SUM OF ALL RESPONSES TO PHQ QUESTIONS 1-9: 11
4. FEELING TIRED OR HAVING LITTLE ENERGY: 1
SUM OF ALL RESPONSES TO PHQ QUESTIONS 1-9: 11

## 2022-02-18 ASSESSMENT — LIFESTYLE VARIABLES: HOW OFTEN DO YOU HAVE A DRINK CONTAINING ALCOHOL: NEVER

## 2022-02-18 NOTE — PROGRESS NOTES
Health Maintenance Due   Topic Date Due    Depression Monitoring  Never done   [facility] Wellness Visit (AWV)  12/11/2021

## 2022-02-18 NOTE — PROGRESS NOTES
Medicare Annual Wellness Visit    [facility]gg Jim[facility]nez is here for [facility]. Diagnoses and all orders for this visit:    Medicare annual wellness visit, subsequent         Recommendations for Preventive Services Due: see orders and patient instructions/AVS.  Recommended screening schedule for the next 5-10 years is provided to the patient in written form: see Patient Instructions/AVS.     Return for Medicare Annual Wellness Visit in 1 year. Reviewed and updated this visit by clinical staff:  Tobacco  Allergies  Meds  Problems  Med Hx  Surg Hx  Soc Hx  Fam Hx        Subjective   Doing well. No concerns today. Had recent hospitalization at [facility] for GI bleed. Following up with Vikas Skelton in [facility]. Denies any blood in stool. Denies any fatigue this morning. Has follow up with psychiatry next week for depression and bipolar 1    Patient's complete Health Risk Assessment and screening values have been reviewed and are found in Flowsheets. The following problems were reviewed today and where indicated follow up appointments were made and/or referrals ordered.     Positive Risk Factor Screenings with Interventions:      Depression:  PHQ-2 Score: 3  PHQ-9 Total Score: 11    Severity:1-4 = minimal depression, 5-9 = mild depression, 10-14 = moderate depression, 15-19 = moderately severe depression, 20-27 = severe depression  Depression Interventions:  · follow up with psychiatry on 2/24/22           Health Habits/Nutrition:     Physical Activity: Insufficiently Active    Days of Exercise per Week: 3 days    Minutes of Exercise per Session: 10 min     Have you lost any weight without trying in the past 3 months?: No    Body mass index: (!) 26.48    Have you seen the dentist within the past year?: (!) No      Health Habits/Nutrition Interventions:  · Dental exam overdue:  patient encouraged to make appointment with his/her dentist    Hearing/Vision:  No exam data present  Hearing/Vision  Do you or your family notice any trouble with your hearing that hasn't been managed with hearing aids?: No (wears hearing aids)  Do you have difficulty driving, watching TV, or doing any of your daily activities because of your eyesight?: No  Have you had an eye exam within the past year?: (!) No    Hearing/Vision Interventions:  · Hearing concerns:  patient declines any further evaluation/treatment for hearing issues  · Vision concerns:  patient encouraged to make appointment with his/her eye specialist         Objective               Allergies   Allergen Reactions    Latex Rash    Sulfa Antibiotics Rash     Prior to Visit Medications    Medication Sig Taking? Authorizing Provider   sucralfate (CARAFATE) 1 GM tablet Take 1 tablet by mouth every 6 hours Yes Judy Lopez MD   dimethyl fumarate (TECFIDERA) 240 MG delayed release capsule Take 1 capsule by mouth 2 times daily Yes Elaine Redmond DO   calcium carbonate 600 MG TABS tablet Take 1 tablet by mouth nightly  Yes Historical Provider, MD   vitamin B-12 1000 MCG tablet Take 1 tablet by mouth daily Yes David Jansen MD   docusate sodium (COLACE) 100 MG capsule TAKE 1 CAPSULE BY MOUTH 2 TIMES A DAY  Nelson Malave MD   pantoprazole (PROTONIX) 40 MG tablet Take 1 tablet by mouth 2 times daily (before meals)  Judy Lopez MD   levothyroxine (SYNTHROID) 100 MCG tablet TAKE 1 TABLET BY MOUTH ONE TIME A DAY  Patient taking differently: nightly TAKE 1 TABLET BY MOUTH ONE TIME A DAY  Nelson Malave MD   vilazodone HCl (VILAZODONE HCL) 40 MG TABS TAKE 1 TABLET BY MOUTH ONCE DAILY.   AJITH Chester CNP   lurasidone (LATUDA) 40 MG TABS tablet Take 1 tablet by mouth daily  Patient taking differently: Take 40 mg by mouth nightly   AJITH Chester CNP   lithium 300 MG tablet Take 2 tablets by mouth 2 times daily  AJITH Chester CNP   lamoTRIgine (LAMICTAL) 200 MG tablet Take one tab by mouth once daily. AJITH Mills CNP   DHEA 50 MG CAPS Take by mouth nightly  Historical Provider, MD   ipratropium (ATROVENT) 0.03 % nasal spray INSTILL 2 SPRAYS INTO EACH NOSTRIL 3 TIMES DAILY. AJITH Dozier CNP   Lactobacillus-Inulin ([facility], INULIN,) CAPS TAKE 1 CAPSULE BY MOUTH WITH BREAKFAST  Natalia Meraz DO   fluticasone [facility]) 50 MCG/ACT nasal spray 2 sprays by Each Nostril route daily  Historical Provider, MD   ondansetron (ZOFRAN) 4 MG tablet Take 1 tablet by mouth 3 times daily as needed for Nausea or Vomiting  Rosario Streeter MD   Prosthesis MISC by Does not apply route Strap for the prosthesis broken please order new  Natalia Meraz DO   loratadine (CLARITIN) 10 MG tablet Take 1 tablet by mouth daily  Brianne Pham MD   Cholecalciferol (VITAMIN D) 2000 units CAPS capsule Take by mouth nightly 50 mcg nightly  Historical Provider, MD   Magnesium Oxide 250 MG TABS Take 250 mg by mouth nightly   Historical Provider, MD   nitroGLYCERIN (NITROSTAT) 0.4 MG SL tablet up to max of 3 total doses. If no relief after 1 dose, call 911.   Claire Gillette MD   Prenatal MV-Min-Fe Fum-FA-DHA (PRENATAL 1 PO) Take 1 tablet by mouth daily   Historical Provider, MD Souza (Including outside providers/suppliers regularly involved in providing care):   Patient Care Team:  Natalia Meraz DO as PCP - General (Family Medicine)  Natalia Meraz DO as PCP - [facility]  AJITH Dozier CNP as Nurse Practitioner (Certified Nurse Practitioner)  AJITH Valdez CNP as Nurse Practitioner (Nurse Practitioner)  Briseida Chung MD as Consulting Physician (Gastroenterology)  Benitez Willett MD as Consulting Physician (Neurology)

## 2022-02-18 NOTE — PATIENT INSTRUCTIONS
Thank you   1. Thank you for trusting us with your healthcare needs. You may receive a survey regarding today's visit. It would help us out if you would take a few moments to provide your feedback. We value your input. 2. Please bring in ALL medications BOTTLES, including inhalers, herbal supplements, over the counter, prescribed & non-prescribed medicine. The office would like actual medication bottles and a list.   3. Please note our OFFICE POLICIES:   a. Prior to getting your labs drawn, please check with your insurance company for benefits and eligibility of lab services. Often, insurance companies cover certain tests for preventative visits only. It is patient's responsibility to see what is covered. b. We are unable to change a diagnosis after the test has been performed. c. Lab orders will not be re-printed. Please hold onto your original lab orders and take them to your lab to be completed. d. If you no show your scheduled appointment three times, you will be dismissed from this practice. e. Maralee Henry must be completed 24 hours prior to your schedule appointment. 4. If the list below has been completed, PLEASE FAX RECORDS TO OUR OFFICE @ [phone]. Once the records have been received we will update your records at our office:  Health Maintenance Due   Topic Date Due    Depression Monitoring  Never done    Annual Wellness Visit (AWV)  12/11/2021             Personalized Preventive Plan for Jae Chas - 2/18/2022  Medicare offers a range of preventive health benefits. Some of the tests and screenings are paid in full while other may be subject to a deductible, co-insurance, and/or copay. Some of these benefits include a comprehensive review of your medical history including lifestyle, illnesses that may run in your family, and various assessments and screenings as appropriate.     After reviewing your medical record and screening and assessments performed today your provider may have ordered immunizations, labs, imaging, and/or referrals for you. A list of these orders (if applicable) as well as your Preventive Care list are included within your After Visit Summary for your review. Other Preventive Recommendations:    · A preventive eye exam performed by an eye specialist is recommended every 1-2 years to screen for glaucoma; cataracts, macular degeneration, and other eye disorders. · A preventive dental visit is recommended every 6 months. · Try to get at least 150 minutes of exercise per week or 10,000 steps per day on a pedometer . · Order or download the FREE \"Exercise & Physical Activity: Your Everyday Guide\" from [employer]. Call 9-173.662.7782 or search [employer] online. · You need 9691-3456 mg of calcium and 4407-4544 IU of vitamin D per day. It is possible to meet your calcium requirement with diet alone, but a vitamin D supplement is usually necessary to meet this goal.  · When exposed to the sun, use a sunscreen that protects against both UVA and UVB radiation with an SPF of 30 or greater. Reapply every 2 to 3 hours or after sweating, drying off with a towel, or swimming. · Always wear a seat belt when traveling in a car. Always wear a helmet when riding a bicycle or motorcycle.

## 2022-02-18 NOTE — PROGRESS NOTES
[address] [facility] [address] [place] [id number]  Dept: [phone]  Dept Fax: [phone]  Loc: [phone]      Jae Doherty is a 64 y.o. female who presents today for:  Chief Complaint   Patient presents with    Medicare AWV       Goals      Exercise 3x per week (30 min per time)           HPI:     HPI      Past Medical History:   Diagnosis Date    Balance problem     Bipolar I disorder, most recent episode (or current) depressed, in partial or unspecified remission 5/8/2013    Cancer ([facility]     CERVICAL     Depression     Fatigue     Gallstones     Gastric bypass status for obesity 2002    GERD (gastroesophageal reflux disease)     History of blood transfusion     History of hysterectomy     REMOVAL OF ONE OVARY     Hypothyroidism     Liver cyst     GI Dr Landon Adame MS (multiple sclerosis) ([facility]     Paresthesias     RT LOWER LIMB    Ulcer at site of surgical anastomosis following bypass of stomach 1/15/2022    UTI (urinary tract infection)       Past Surgical History:   Procedure Laterality Date    APPENDECTOMY      BONE GRAFT Left     left leg to right arm    CARPAL TUNNEL RELEASE  1999    CHOLECYSTECTOMY, LAPAROSCOPIC  5/16/2016    COLONOSCOPY      COLONOSCOPY N/A 1/16/2022    COLONOSCOPY CONTROL HEMORRHAGE performed by Nasra Bills MD at [address], [facility]      ENDOSCOPY, COLON, DIAGNOSTIC      ENTEROSCOPY N/A 2/5/2018    ENTEROSCOPY performed by Sweetie Chao MD at [address] [place] [address] N/A 9/28/2020    [address] [facility] TO DO GASTRIC ENDOSCOPY performed by Nathanael De La Cruz MD at [address]  5/16/2016    OTHER SURGICAL HISTORY      RECTAL FISSULRE    OTHER SURGICAL HISTORY  1986    D&C    OTHER SURGICAL HISTORY  2006    GI BLEED    OVARY REMOVAL N/A     pt unaware which one was taken    PACEMAKER INSERTION Right 2019    Dr. Grace Gallegos FLX DX W/COLLJ SPEC WHEN PFRMD Left 2018    COLONOSCOPY performed by Yang Cruz MD at [facility]    WA OFFICE/OUTPT VISIT,PROCEDURE ONLY Left 2018    EGD ESOPHAGOGASTRODUODENOSCOPY performed by Tammie Dang MD at [facility]    [place] OFFICE/OUTPT VISIT,PROCEDURE ONLY N/A 10/4/2018    EGD DIAGNOSTIC ONLY performed by Briseida Chung MD at [place] [address] ENDOSCOPY Left 2019    EGD DIAGNOSTIC ONLY performed by Mesha Koroma MD at [address] [place] [address] ENDOSCOPY N/A 2020    ENTEROSCOPY PUSH CONTROL HEMORRHAGE performed by Yang Cruz MD at [address] [place] [address] ENDOSCOPY N/A 2022    ENTEROSCOPY PUSH DIAGNOSTIC performed by Fadia Gold MD at [address] [place] [address] History   Problem Relation Age of Onset    Cancer Mother 43    Colon Polyps Mother     Breast Cancer Mother     Heart Disease Father     Glaucoma Father    Levester Orris Sister     Colon Cancer Sister     Cancer Sister     Breast Cancer Sister      Social History     Tobacco Use    Smoking status: Former Smoker     Packs/day: 0.10     Years: 0.50     Pack years: 0.05     Types: Cigarettes     Quit date: 10/24/1977     Years since quittin.3    Smokeless tobacco: Never Used   Substance Use Topics    Alcohol use: No      Current Outpatient Medications   Medication Sig Dispense Refill    dimethyl fumarate (TECFIDERA) 240 MG delayed release capsule Take 1 capsule by mouth 2 times daily      calcium carbonate 600 MG TABS tablet Take 1 tablet by mouth nightly       docusate sodium (COLACE) 100 MG capsule TAKE 1 CAPSULE BY MOUTH 2 TIMES A DAY 60 capsule 2    sucralfate (CARAFATE) 1 GM tablet Take 1 tablet by mouth every 6 hours 120 tablet 3    pantoprazole (PROTONIX) 40 MG tablet Take 1 tablet by mouth 2 times daily (before meals) 180 tablet 2    levothyroxine (SYNTHROID) 100 MCG tablet TAKE 1 TABLET BY MOUTH ONE TIME A DAY (Patient taking differently: nightly TAKE 1 TABLET BY MOUTH ONE TIME A DAY) 90 tablet 1    vilazodone HCl (VILAZODONE HCL) 40 MG TABS TAKE 1 TABLET BY MOUTH ONCE DAILY. 30 tablet 2    lurasidone (LATUDA) 40 MG TABS tablet Take 1 tablet by mouth daily (Patient taking differently: Take 40 mg by mouth nightly ) 30 tablet 2    lithium 300 MG tablet Take 2 tablets by mouth 2 times daily 120 tablet 2    lamoTRIgine (LAMICTAL) 200 MG tablet Take one tab by mouth once daily. 30 tablet 2    DHEA 50 MG CAPS Take by mouth nightly      ipratropium (ATROVENT) 0.03 % nasal spray INSTILL 2 SPRAYS INTO EACH NOSTRIL 3 TIMES DAILY. 1 Bottle 3    Lactobacillus-Inulin (CULTURELLE HEALTH, [facility],) CAPS TAKE 1 CAPSULE BY MOUTH WITH BREAKFAST 30 capsule 1    fluticasone (FLONASE) 50 MCG/ACT nasal spray 2 sprays by Each Nostril route daily      ondansetron (ZOFRAN) 4 MG tablet Take 1 tablet by mouth 3 times daily as needed for Nausea or Vomiting 15 tablet 0    Prosthesis MISC by Does not apply route Strap for the prosthesis broken please order new 1 each 0    loratadine (CLARITIN) 10 MG tablet Take 1 tablet by mouth daily 90 tablet 1    Cholecalciferol (VITAMIN D) 2000 units CAPS capsule Take by mouth nightly 50 mcg nightly      Magnesium Oxide 250 MG TABS Take 250 mg by mouth nightly       vitamin B-12 1000 MCG tablet Take 1 tablet by mouth daily 30 tablet 5    nitroGLYCERIN (NITROSTAT) 0.4 MG SL tablet up to max of 3 total doses. If no relief after 1 dose, call 911. 25 tablet 3    Prenatal MV-Min-Fe Fum-FA-DHA (PRENATAL 1 PO) Take 1 tablet by mouth daily        No current facility-administered medications for this visit.      Allergies   Allergen Reactions    Latex Rash    Sulfa Antibiotics Rash       Health Maintenance   Topic Date Due    Depression Monitoring  Never done    Annual Wellness Visit (AWV)  12/11/2021    TSH testing  04/12/2022    Breast cancer screen  01/24/2023    Pneumococcal 0-64 years Vaccine (2 of 2 - PPSV23) 05/12/2025    Lipid screen  04/12/2026    DTaP/Tdap/Td vaccine (4 - Td or Tdap) 10/25/2028    Colorectal Cancer Screen  01/16/2032    Flu vaccine  Completed    Shingles Vaccine  Completed    COVID-19 Vaccine  Completed    Hepatitis C screen  Completed    HIV screen  Completed    Hepatitis A vaccine  Aged Out    Hepatitis B vaccine  Aged Out    Hib vaccine  Aged Out    Meningococcal (ACWY) vaccine  Aged Out       Subjective:      Review of Systems    Objective:     Vitals:    02/18/22 0921   BP: 120/70   Site: Left Upper Arm   Position: Sitting   Cuff Size: Medium Adult   Pulse: 80   Resp: 16   Temp: 97 °F (36.1 °C)   TempSrc: Temporal   SpO2: 98%   Weight: 122 lb 6.4 oz (55.5 kg)   Height: 4' 9\" (1.448 m)       Body mass index is 26.49 kg/m².     Wt Readings from Last 3 Encounters:   02/18/22 122 lb 6.4 oz (55.5 kg)   01/16/22 110 lb 0.2 oz (49.9 kg)   01/14/22 110 lb (49.9 kg)     BP Readings from Last 3 Encounters:   02/18/22 120/70   01/18/22 (!) 99/57   01/16/22 (!) 104/93       Physical Exam    Lab Results   Component Value Date    WBC 6.6 01/18/2022    HGB 8.6 (L) 01/18/2022    HCT 25.9 (L) 01/18/2022    PLT See Reflexed IPF Result 01/18/2022    CHOL 157 04/12/2021    TRIG 71 04/12/2021     04/12/2021    LDLCALC 40 04/12/2021    AST 27 01/14/2022     01/18/2022    K 4.2 01/18/2022     (H) 01/18/2022    CREATININE 0.40 (L) 01/18/2022    BUN 11 01/18/2022    CO2 20 01/18/2022    TSH 2.440 04/12/2021    INR 0.99 01/14/2022    LABA1C 4.7 11/13/2019    LABGLOM >60 01/18/2022    MG 2.0 10/04/2021    CALCIUM 9.4 01/18/2022    VITD25 30 10/04/2021       Imaging Results:    [facility] SCREEN SELF REFERRAL W OR WO CAD BILATERAL    Result Date: 1/24/2022  LOCATION: [place] PROCEDURE: [place] DIGITAL SCREEN SELF REFERRAL W OR WO CAD BILATERAL CLINICAL INFORMATION: Visit for screening mammogram. Tomosynthesis. PATIENT MEDICAL HISTORY: No relevant medical history has been documented for this patient. FAMILY HISTORY: Family medical history includes breast cancer in 2 relatives (mother, sister). RISK VALUES: Tyrer-Cuzick 10yr.: 11.5%, Tyrer-Cuzick life: 26.2% COMPARISON: 1/19/2021, 1/15/2020, 12/12/2018, 12/4/2017, 11/18/2016 TECHNIQUE: Bilateral CC and MLO views of the breasts were obtained. 3D tomosynthesis was utilized. CAD was utilized. BREAST COMPOSITION: The tissue of the breast(s) is heterogeneously dense. This may lower the sensitivity of mammography. FINDINGS: No significant masses, calcifications, or other findings are seen in either breast. There has been no significant interval change. 1. No mammographic evidence of malignancy. A 1 year screening mammogram is recommended. A result letter will be sent to the patient. She will also receive a reminder 1 month prior to her next mammogram. Based on the Tyrer-Cuzick assessment tool, the patient's calculated lifetime risk is at or above 20%. Per the American Cancer Society guidelines, she may be a candidate for screening breast MRI. BI-RADS CATEGORY 1: NEGATIVE. Management Recommendation: Routine annual screening mammography. **This report has been created using voice recognition software. It may contain minor errors which are inherent in voice recognition technology. ** Final report electronically signed by Dr. Christine Swann on 1/24/2022 6:03 PM        Assessment/Plan:     There are no diagnoses linked to this encounter. No follow-ups on file. Medications Prescribed:  No orders of the defined types were placed in this encounter. Future Appointments   Date Time Provider Rudi Brandon   2/24/2022  1:30 PM Nunzio Sacks, APRN - [facility] [address]       Patient given educational materials - see patient instructions. Discussed use, benefit, and sideeffects of prescribed medications. All patient questions answered. Pt voiced understanding. Reviewed health maintenance. Instructed to continue current medications, diet and exercise. Patient agreed with treatment plan. Follow up as directed.      Electronically signed by Emeterio Carnes DO on 2/18/2022 at 9:42 AM

## 2022-02-18 NOTE — PROGRESS NOTES
S: 64 y.o. female with   Chief Complaint   Patient presents with    Medicare AWV       HPI: please see resident note for HPI and ROS. BP Readings from Last 3 Encounters:   02/18/22 120/70   01/18/22 (!) 99/57   01/16/22 (!) 104/93     Wt Readings from Last 3 Encounters:   02/18/22 122 lb 6.4 oz (55.5 kg)   01/16/22 110 lb 0.2 oz (49.9 kg)   01/14/22 110 lb (49.9 kg)       O: VS:  height is 4' 9\" (1.448 m) and weight is 122 lb 6.4 oz (55.5 kg). Her temporal temperature is 97 °F (36.1 °C). Her blood pressure is 120/70 and her pulse is 80. Her respiration is 16 and oxygen saturation is 98%. Diagnosis Orders   1. Medicare annual wellness visit, subsequent         Plan:  Encouraged dental and eye doctor visits. HCM addressed. Stable on current meds. Health Maintenance Due   Topic Date Due    Depression Monitoring  Never done    Annual Wellness Visit (AWV)  12/11/2021       Attending Physician Statement  I have discussed the case, including pertinent history and exam findings with the resident. I agree with the documented assessment and plan as documented by the resident.         Eli Powell, DO 2/18/2022 9:53 AM

## 2022-02-24 ENCOUNTER — OFFICE VISIT (OUTPATIENT)
Dept: PSYCHIATRY | Age: 62
End: 2022-02-24
Payer: MEDICARE

## 2022-02-24 DIAGNOSIS — Z51.81 THERAPEUTIC DRUG MONITORING: ICD-10-CM

## 2022-02-24 DIAGNOSIS — Z79.899 LONG TERM CURRENT USE OF ANTIPSYCHOTIC MEDICATION: ICD-10-CM

## 2022-02-24 DIAGNOSIS — F31.75 BIPOLAR I, MOST RECENT EPISODE DEPRESSED, PARTIAL REMISSION (HCC): Primary | ICD-10-CM

## 2022-02-24 DIAGNOSIS — E55.9 VITAMIN D DEFICIENCY: ICD-10-CM

## 2022-02-24 PROCEDURE — 99214 OFFICE O/P EST MOD 30 MIN: CPT | Performed by: NURSE PRACTITIONER

## 2022-02-24 RX ORDER — LITHIUM CARBONATE 300 MG
600 TABLET ORAL 2 TIMES DAILY
Qty: 120 TABLET | Refills: 2 | Status: SHIPPED | OUTPATIENT
Start: 2022-02-24 | End: 2022-05-24 | Stop reason: SDUPTHER

## 2022-02-24 RX ORDER — VILAZODONE HYDROCHLORIDE 40 MG/1
TABLET ORAL
Qty: 30 TABLET | Refills: 2 | Status: SHIPPED | OUTPATIENT
Start: 2022-02-24 | End: 2022-05-24 | Stop reason: SDUPTHER

## 2022-02-24 RX ORDER — LAMOTRIGINE 200 MG/1
TABLET ORAL
Qty: 30 TABLET | Refills: 2 | Status: SHIPPED | OUTPATIENT
Start: 2022-02-24 | End: 2022-05-24 | Stop reason: SDUPTHER

## 2022-02-24 NOTE — PROGRESS NOTES
\"that saga still isn't over\"  -blockage in her small bowel caused the problem per her report  -saw PCP recently  -sees GI as well      Denies suicidal ideations, intent, plan. No homicidal ideations, intent, plan. No audiovisual hallucinations. HPI    The patient has had 0 lifetime suicide attempts. Reports she had intense thoughts of suicide with a plan and intent back in  but someone came home. Patient reports 3 psych hospital admissions with the last admission taking place in     Past psychiatric medications include: various per her report but does not recall specific names    Adverse reactions from psychotropic medications:  None      Current Psychiatric Review of Systems         Marian or Hypomania:  None current. Previously reported: Patient does not recall specific marian or hypomania; she is unsure how/why she was diagnosed with bipolar disorder     Panic Attacks:  no     Phobias:  no     Obsessions and Compulsions:  no     Body or Vocal Tics:  no     Hallucinations:  no     Delusions:  no    SOCIAL HISTORY:  Patient was born in [place], [place] and raised by her biological parents      Social History     Socioeconomic History    Marital status:      Spouse name: Not on file    Number of children: 0    Years of education: Not on file    Highest education level:  Bachelor's degree (e.g., BA, AB, BS)   Occupational History    Not on file   Tobacco Use    Smoking status: Former Smoker     Packs/day: 0.10     Years: 0.50     Pack years: 0.05     Types: Cigarettes     Quit date: 10/24/1977     Years since quittin.3    Smokeless tobacco: Never Used   Vaping Use    Vaping Use: Never used   Substance and Sexual Activity    Alcohol use: No    Drug use: No    Sexual activity: Not Currently     Partners: Male   Other Topics Concern    Not on file   Social History Narrative    2021    LEVEL OF EDUCATION: graduated high school; earned her bachelor degree in education; did some master's level course work but did not complete the degree    SPECIAL EDUCATION NEEDS: None    RESIDENCE: Currently lives alone    LEGAL HISTORY: None    Faith: Hinduism    TRAUMA: None    : None    HOBBIES: writing, swimming, 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 She was  from 2002 to 2015; they  in 2015    CHILDREN: None    SUBSTANCE USE: None     Social Determinants of Health     Financial Resource Strain: Low Risk     Difficulty of Paying Living Expenses: Not hard at all   Food Insecurity: No Food Insecurity    Worried About Running Out of Food in the Last Year: Never true    Triston of Food in the Last Year: Never true   Transportation Needs:     Lack of Transportation (Medical): Not on file    Lack of Transportation (Non-Medical):  Not on file   Physical Activity: Insufficiently Active    Days of Exercise per Week: 3 days    Minutes of Exercise per Session: 10 min   Stress:     Feeling of Stress : Not on file   Social Connections:     Frequency of Communication with Friends and Family: Not on file    Frequency of Social Gatherings with Friends and Family: Not on file    Attends Restoration Services: Not on file    Active Member of Clubs or Organizations: Not on file    Attends Club or Organization Meetings: Not on file    Marital Status: Not on file   Intimate Partner Violence:     Fear of Current or Ex-Partner: Not on file    Emotionally Abused: Not on file    Physically Abused: Not on file    Sexually Abused: Not on file   Housing Stability:     Unable to Pay for Housing in the Last Year: Not on file    Number of [place] in the Last Year: Not on file    Unstable Housing in the Last Year: Not on file       FAMILY HISTORY:   Family History   Problem Relation Age of Onset    Cancer Mother 43    Colon Polyps Mother     Breast Cancer Mother     Heart Disease Father     Glaucoma Father     Cancer Sister     Colon Cancer Sister     Cancer Sister     Breast Cancer Sister        Psychiatric Family History  None reported    PAST MEDICAL HISTORY:    Past Medical History:   Diagnosis Date    Balance problem     Bipolar I disorder, most recent episode (or current) depressed, in partial or unspecified remission 5/8/2013    Cancer ([facility]     CERVICAL     Depression     Fatigue     Gallstones     Gastric bypass status for obesity 2002    GERD (gastroesophageal reflux disease)     History of blood transfusion     History of hysterectomy     REMOVAL OF ONE OVARY     Hypothyroidism     Liver cyst     GI Dr Joaquín Figueredo MS (multiple sclerosis) ([facility]     Paresthesias     RT LOWER LIMB    Ulcer at site of surgical anastomosis following bypass of stomach 1/15/2022    UTI (urinary tract infection)        PAST SURGICAL HISTORY:    Past Surgical History:   Procedure Laterality Date    APPENDECTOMY      BONE GRAFT Left     left leg to right arm    CARPAL TUNNEL RELEASE  1999    CHOLECYSTECTOMY, LAPAROSCOPIC  5/16/2016    COLONOSCOPY      COLONOSCOPY N/A 1/16/2022    [facility] [address], ESOPHAGUS      ENDOSCOPY, COLON, DIAGNOSTIC      ENTEROSCOPY N/A 2/5/2018    ENTEROSCOPY performed by Latha Foote MD at [address] [place] [address] N/A 9/28/2020    GASTROSTOMY TUBE PLACEMENT- [facility] TO DO GASTRIC ENDOSCOPY performed by Ranjit Alford MD at [place] [address], VAGINAL      LAPAROSCOPIC APPENDECTOMY  5/16/2016    OTHER SURGICAL HISTORY      RECTAL [address]    OTHER SURGICAL HISTORY  1986    D&C    OTHER SURGICAL HISTORY  2006    GI BLEED    OVARY REMOVAL N/A     pt unaware which one was taken    PACEMAKER INSERTION Right 05/20/2019    Dr. Teresa  FLX DX W/BONNIE Swan 1978 PFRMD INTO EACH NOSTRIL 3 TIMES DAILY. 1 Bottle 3    Lactobacillus-Inulin (CULTURELLE HEALTH, INULIN,) CAPS TAKE 1 CAPSULE BY MOUTH WITH BREAKFAST 30 capsule 1    fluticasone (FLONASE) 50 MCG/ACT nasal spray 2 sprays by Each Nostril route daily      ondansetron (ZOFRAN) 4 MG tablet Take 1 tablet by mouth 3 times daily as needed for Nausea or Vomiting 15 tablet 0    Prosthesis MISC by Does not apply route Strap for the prosthesis broken please order new 1 each 0    loratadine (CLARITIN) 10 MG tablet Take 1 tablet by mouth daily 90 tablet 1    Cholecalciferol (VITAMIN D) 2000 units CAPS capsule Take by mouth nightly 50 mcg nightly      Magnesium Oxide 250 MG TABS Take 250 mg by mouth nightly       calcium carbonate 600 MG TABS tablet Take 1 tablet by mouth nightly       vitamin B-12 1000 MCG tablet Take 1 tablet by mouth daily 30 tablet 5    nitroGLYCERIN (NITROSTAT) 0.4 MG SL tablet up to max of 3 total doses. If no relief after 1 dose, call 911. 25 tablet 3    Prenatal MV-Min-Fe Fum-FA-DHA (PRENATAL 1 PO) Take 1 tablet by mouth daily        No facility-administered medications prior to visit. ALLERGIES:    Latex and Sulfa antibiotics    REVIEW OF SYSTEMS:    Review of Systems    The patient sees Prasanth Porras DO as her primary care provider. SPECIALISTS: Dr. Saqib Johnson (GI), Dr. Tony Scott, Dr. Jono Villareal (neurology)    OBJECTIVE DATA     There were no vitals taken for this visit. Physical Exam    Mental Status Evaluation:   Orientation: Alert, oriented, thought content appropriate   Mood:. Dysthymic      Affect:  Normal      Appearance:  right upper extremity prosthetic, Age Appropriate, Casually Dressed, Clean, Well Groomed, Clothing Appropriate for Age and Clothing Appropriate for Weather   Activity:  Cooperative, Good Eye Contact and Seated Calmly   Gait/Posture: Normal   Speech:  Clear, Fluent, Normal Pitch and Volume, Age and Situation Appropriate   Thought Process:   Within Normal Limits   Thought Content: Within Normal Limits   Cognition:  Grossly Intact   Memory: Intact   Insight:  Good   Judgment: Good   Suicidal Ideations: Denies Suicidal Ideation   Homicidal Ideations: Negative for homicidal ideation   Medication Side Effects: Absent       Attention Span Attention span and concentration were age appropriate       Screenings Completed in This Encounter:     Anxiety and Depression:                    DIAGNOSIS AND ASSESSMENT DATA     DIAGNOSIS:   1. Bipolar I, most recent episode depressed, partial remission ([facility]    2. Therapeutic drug monitoring    3. Vitamin D deficiency    4. Long term current use of antipsychotic medication        PLAN   Follow-up:  Return in about 3 months (around 5/24/2022), or if symptoms worsen or fail to improve, for follow-up and medication management. Prescriptions for this encounter:  New Prescriptions    No medications on file       Orders Placed This Encounter   Medications    vilazodone HCl (VILAZODONE HCL) 40 MG TABS     Sig: TAKE 1 TABLET BY MOUTH ONCE DAILY. Dispense:  30 tablet     Refill:  2    lurasidone (LATUDA) 40 MG TABS tablet     Sig: Take 1 tablet by mouth nightly     Dispense:  30 tablet     Refill:  2    lithium 300 MG tablet     Sig: Take 2 tablets by mouth 2 times daily     Dispense:  120 tablet     Refill:  2    lamoTRIgine (LAMICTAL) 200 MG tablet     Sig: Take one tab by mouth once daily.      Dispense:  30 tablet     Refill:  2       Medications Discontinued During This Encounter   Medication Reason    vilazodone HCl (VILAZODONE HCL) 40 MG TABS REORDER    lurasidone (LATUDA) 40 MG TABS tablet REORDER    lithium 300 MG tablet REORDER    lamoTRIgine (LAMICTAL) 200 MG tablet REORDER       Additional orders:  Orders Placed This Encounter   Procedures    Lithium Level    CBC with Auto Differential    Comprehensive Metabolic Panel    TSH    T4, Free    Vitamin B12 & Folate    Vitamin D 25 Hydroxy    Lipid Panel       Patient is reporting slightly worsened mood since last visit but has also had significant stressors. Treatment options reviewed. Patient will continue all medications without changes. Labs as ordered. Patient is encouraged to utilize nonpharmacologic coping skills such as deep breathing, guided imagery, guided meditation, muscle relaxation, calming music, and/or journaling. Risks, potential side effects, possibledrug-drug interactions, benefits and alternate treatments discussed in detail. All questions answered. Patient stated understanding and is agreeable to treatment plan. Patient has been instructed to seek emergency help via the emergency and/or calling 911 should symptoms become severe, worsen, or with other concerning symptoms. Patient instructed to goimmediately to the emergency room and/or call 911 with any suicidal or homicidal ideations or if audio/visual hallucinations develop  Patient stated understanding and agrees. Patient given crisis center information. Provider Signature:  Electronically signed by AJITH Pantoja CNP on 2/24/2022 at 2:17 PM    **This report has been created using voice recognition software. It may contain minor errors which are inherent in voice recognition technology. **

## 2022-03-01 ENCOUNTER — HOSPITAL ENCOUNTER (OUTPATIENT)
Age: 62
Discharge: HOME | End: 2022-03-01
Payer: MEDICARE

## 2022-03-01 DIAGNOSIS — E55.9 VITAMIN D DEFICIENCY: ICD-10-CM

## 2022-03-01 DIAGNOSIS — Z79.899 LONG TERM CURRENT USE OF ANTIPSYCHOTIC MEDICATION: ICD-10-CM

## 2022-03-01 DIAGNOSIS — F31.75 BIPOLAR I, MOST RECENT EPISODE DEPRESSED, PARTIAL REMISSION (HCC): ICD-10-CM

## 2022-03-01 DIAGNOSIS — Z51.81 THERAPEUTIC DRUG MONITORING: ICD-10-CM

## 2022-03-01 LAB
ALBUMIN SERPL-MCNC: 4.3 G/DL (ref 3.5–5.1)
ALP BLD-CCNC: 117 U/L (ref 38–126)
ALT SERPL-CCNC: 25 U/L (ref 11–66)
AST SERPL-CCNC: 21 U/L (ref 5–40)
BASOPHILS # BLD: 0.6 %
BASOPHILS ABSOLUTE: 0 THOU/MM3 (ref 0–0.1)
BILIRUB SERPL-MCNC: 0.2 MG/DL (ref 0.3–1.2)
BILIRUBIN DIRECT: < 0.2 MG/DL (ref 0–0.3)
EOSINOPHIL # BLD: 3.4 %
EOSINOPHILS ABSOLUTE: 0.2 THOU/MM3 (ref 0–0.4)
ERYTHROCYTE [DISTWIDTH] IN BLOOD BY AUTOMATED COUNT: 19.8 % (ref 11.5–14.5)
ERYTHROCYTE [DISTWIDTH] IN BLOOD BY AUTOMATED COUNT: 62.4 FL (ref 35–45)
FOLATE: 18.1 NG/ML (ref 4.8–24.2)
HCT VFR BLD CALC: 26.5 % (ref 37–47)
HEMOGLOBIN: 7.6 GM/DL (ref 12–16)
HYPOCHROMIA: PRESENT
IMMATURE GRANS (ABS): 0.02 THOU/MM3 (ref 0–0.07)
IMMATURE GRANULOCYTES: 0.4 %
LITHIUM LEVEL: 0.49 MMOL/L (ref 0.6–1.2)
LYMPHOCYTES # BLD: 15.6 %
LYMPHOCYTES ABSOLUTE: 0.8 THOU/MM3 (ref 1–4.8)
MCH RBC QN AUTO: 25.1 PG (ref 26–33)
MCHC RBC AUTO-ENTMCNC: 28.7 GM/DL (ref 32.2–35.5)
MCV RBC AUTO: 87.5 FL (ref 81–99)
MONOCYTES # BLD: 11 %
MONOCYTES ABSOLUTE: 0.5 THOU/MM3 (ref 0.4–1.3)
NUCLEATED RED BLOOD CELLS: 0 /100 WBC
PLATELET # BLD: 221 THOU/MM3 (ref 130–400)
PMV BLD AUTO: 12.1 FL (ref 9.4–12.4)
RBC # BLD: 3.03 MILL/MM3 (ref 4.2–5.4)
SEG NEUTROPHILS: 69 %
SEGMENTED NEUTROPHILS ABSOLUTE COUNT: 3.4 THOU/MM3 (ref 1.8–7.7)
T4 FREE: 1.11 NG/DL (ref 0.93–1.76)
TOTAL PROTEIN: 6.7 G/DL (ref 6.1–8)
TSH SERPL DL<=0.05 MIU/L-ACNC: 1.4 UIU/ML (ref 0.4–4.2)
VITAMIN B-12: 417 PG/ML (ref 211–911)
VITAMIN D 25-HYDROXY: 32 NG/ML (ref 30–100)
WBC # BLD: 4.9 THOU/MM3 (ref 4.8–10.8)

## 2022-03-01 PROCEDURE — 80178 ASSAY OF LITHIUM: CPT

## 2022-03-01 PROCEDURE — 85025 COMPLETE CBC W/AUTO DIFF WBC: CPT

## 2022-03-01 PROCEDURE — 84443 ASSAY THYROID STIM HORMONE: CPT

## 2022-03-01 PROCEDURE — 84439 ASSAY OF FREE THYROXINE: CPT

## 2022-03-01 PROCEDURE — 82306 VITAMIN D 25 HYDROXY: CPT

## 2022-03-01 PROCEDURE — 82607 VITAMIN B-12: CPT

## 2022-03-01 PROCEDURE — 80076 HEPATIC FUNCTION PANEL: CPT

## 2022-03-01 PROCEDURE — 36415 COLL VENOUS BLD VENIPUNCTURE: CPT

## 2022-03-01 PROCEDURE — 82746 ASSAY OF FOLIC ACID SERUM: CPT

## 2022-03-02 ENCOUNTER — HOSPITAL ENCOUNTER (OUTPATIENT)
Age: 62
Discharge: HOME | End: 2022-03-02
Payer: MEDICARE

## 2022-03-02 LAB
ALBUMIN SERPL-MCNC: 4.2 G/DL (ref 3.5–5.1)
ALP BLD-CCNC: 109 U/L (ref 38–126)
ALT SERPL-CCNC: 22 U/L (ref 11–66)
ANION GAP SERPL CALCULATED.3IONS-SCNC: 7 MEQ/L (ref 8–16)
AST SERPL-CCNC: 19 U/L (ref 5–40)
BILIRUB SERPL-MCNC: 0.4 MG/DL (ref 0.3–1.2)
BUN BLDV-MCNC: 12 MG/DL (ref 7–22)
CALCIUM SERPL-MCNC: 9.9 MG/DL (ref 8.5–10.5)
CHLORIDE BLD-SCNC: 110 MEQ/L (ref 98–111)
CHOLESTEROL, TOTAL: 141 MG/DL (ref 100–199)
CO2: 24 MEQ/L (ref 23–33)
CREAT SERPL-MCNC: 0.5 MG/DL (ref 0.4–1.2)
GFR SERPL CREATININE-BSD FRML MDRD: > 90 ML/MIN/1.73M2
GLUCOSE BLD-MCNC: 96 MG/DL (ref 70–108)
HDLC SERPL-MCNC: 95 MG/DL
LDL CHOLESTEROL CALCULATED: 40 MG/DL
POTASSIUM SERPL-SCNC: 4.6 MEQ/L (ref 3.5–5.2)
SODIUM BLD-SCNC: 141 MEQ/L (ref 135–145)
TOTAL PROTEIN: 6.5 G/DL (ref 6.1–8)
TRIGL SERPL-MCNC: 32 MG/DL (ref 0–199)

## 2022-03-02 PROCEDURE — 80053 COMPREHEN METABOLIC PANEL: CPT

## 2022-03-02 PROCEDURE — 36415 COLL VENOUS BLD VENIPUNCTURE: CPT

## 2022-03-02 PROCEDURE — 80061 LIPID PANEL: CPT

## 2022-03-03 ENCOUNTER — TELEPHONE (OUTPATIENT)
Dept: GASTROENTEROLOGY | Age: 62
End: 2022-03-03

## 2022-03-03 NOTE — TELEPHONE ENCOUNTER
Pt's sister called and stated [facility] did an EGD and was supposed to give results to pt's GI doctor, and nothing has been done yet. Pt's sister wanted to know what should be done. I provided the number to medical records to get the results. I explained that [facility] is a [facility] and does not have an office.  [employer] is on the comm form

## 2022-03-04 ENCOUNTER — HOSPITAL ENCOUNTER (EMERGENCY)
Age: 62
Discharge: HOME | End: 2022-03-04
Attending: FAMILY MEDICINE
Payer: MEDICARE

## 2022-03-04 ENCOUNTER — HOSPITAL ENCOUNTER (OUTPATIENT)
Age: 62
Discharge: HOME | End: 2022-03-04
Payer: MEDICARE

## 2022-03-04 VITALS
TEMPERATURE: 98.2 F | DIASTOLIC BLOOD PRESSURE: 73 MMHG | OXYGEN SATURATION: 100 % | SYSTOLIC BLOOD PRESSURE: 130 MMHG | RESPIRATION RATE: 18 BRPM | HEART RATE: 62 BPM

## 2022-03-04 DIAGNOSIS — D64.9 ANEMIA, UNSPECIFIED TYPE: Primary | ICD-10-CM

## 2022-03-04 DIAGNOSIS — D64.9 ANEMIA, UNSPECIFIED TYPE: ICD-10-CM

## 2022-03-04 DIAGNOSIS — D50.9 IRON DEFICIENCY ANEMIA, UNSPECIFIED IRON DEFICIENCY ANEMIA TYPE: Primary | ICD-10-CM

## 2022-03-04 LAB
ALBUMIN SERPL-MCNC: 4.2 G/DL (ref 3.5–5.1)
ALP BLD-CCNC: 103 U/L (ref 38–126)
ALT SERPL-CCNC: 19 U/L (ref 11–66)
ANION GAP SERPL CALCULATED.3IONS-SCNC: 11 MEQ/L (ref 8–16)
APTT: 27.1 SECONDS (ref 22–38)
AST SERPL-CCNC: 22 U/L (ref 5–40)
BASOPHILS # BLD: 0.4 %
BASOPHILS # BLD: 0.6 %
BASOPHILS ABSOLUTE: 0 THOU/MM3 (ref 0–0.1)
BASOPHILS ABSOLUTE: 0 THOU/MM3 (ref 0–0.1)
BILIRUB SERPL-MCNC: 0.2 MG/DL (ref 0.3–1.2)
BILIRUBIN DIRECT: < 0.2 MG/DL (ref 0–0.3)
BUN BLDV-MCNC: 13 MG/DL (ref 7–22)
CALCIUM SERPL-MCNC: 9.8 MG/DL (ref 8.5–10.5)
CHLORIDE BLD-SCNC: 107 MEQ/L (ref 98–111)
CO2: 21 MEQ/L (ref 23–33)
CREAT SERPL-MCNC: 0.5 MG/DL (ref 0.4–1.2)
EOSINOPHIL # BLD: 3.1 %
EOSINOPHIL # BLD: 3.3 %
EOSINOPHILS ABSOLUTE: 0.1 THOU/MM3 (ref 0–0.4)
EOSINOPHILS ABSOLUTE: 0.2 THOU/MM3 (ref 0–0.4)
ERYTHROCYTE [DISTWIDTH] IN BLOOD BY AUTOMATED COUNT: 20.4 % (ref 11.5–14.5)
ERYTHROCYTE [DISTWIDTH] IN BLOOD BY AUTOMATED COUNT: 20.5 % (ref 11.5–14.5)
ERYTHROCYTE [DISTWIDTH] IN BLOOD BY AUTOMATED COUNT: 63.5 FL (ref 35–45)
ERYTHROCYTE [DISTWIDTH] IN BLOOD BY AUTOMATED COUNT: 63.7 FL (ref 35–45)
FERRITIN: 13 NG/ML (ref 10–291)
GFR SERPL CREATININE-BSD FRML MDRD: > 90 ML/MIN/1.73M2
GLUCOSE BLD-MCNC: 88 MG/DL (ref 70–108)
HCT VFR BLD CALC: 24.5 % (ref 37–47)
HCT VFR BLD CALC: 25.6 % (ref 37–47)
HEMOGLOBIN: 7.1 GM/DL (ref 12–16)
HEMOGLOBIN: 7.2 GM/DL (ref 12–16)
HYPOCHROMIA: PRESENT
IMMATURE GRANS (ABS): 0.02 THOU/MM3 (ref 0–0.07)
IMMATURE GRANS (ABS): 0.02 THOU/MM3 (ref 0–0.07)
IMMATURE GRANULOCYTES: 0.4 %
IMMATURE GRANULOCYTES: 0.4 %
INR BLD: 0.93 (ref 0.85–1.13)
LYMPHOCYTES # BLD: 14.2 %
LYMPHOCYTES # BLD: 17.7 %
LYMPHOCYTES ABSOLUTE: 0.8 THOU/MM3 (ref 1–4.8)
LYMPHOCYTES ABSOLUTE: 0.8 THOU/MM3 (ref 1–4.8)
MCH RBC QN AUTO: 24.4 PG (ref 26–33)
MCH RBC QN AUTO: 25 PG (ref 26–33)
MCHC RBC AUTO-ENTMCNC: 28.1 GM/DL (ref 32.2–35.5)
MCHC RBC AUTO-ENTMCNC: 29 GM/DL (ref 32.2–35.5)
MCV RBC AUTO: 86.3 FL (ref 81–99)
MCV RBC AUTO: 86.8 FL (ref 81–99)
MONOCYTES # BLD: 8.3 %
MONOCYTES # BLD: 9 %
MONOCYTES ABSOLUTE: 0.4 THOU/MM3 (ref 0.4–1.3)
MONOCYTES ABSOLUTE: 0.4 THOU/MM3 (ref 0.4–1.3)
NUCLEATED RED BLOOD CELLS: 0 /100 WBC
NUCLEATED RED BLOOD CELLS: 0 /100 WBC
OSMOLALITY CALCULATION: 277.1 MOSMOL/KG (ref 275–300)
PLATELET # BLD: 213 THOU/MM3 (ref 130–400)
PLATELET # BLD: 216 THOU/MM3 (ref 130–400)
PMV BLD AUTO: 11 FL (ref 9.4–12.4)
PMV BLD AUTO: 11.9 FL (ref 9.4–12.4)
POTASSIUM REFLEX MAGNESIUM: 4.5 MEQ/L (ref 3.5–5.2)
RBC # BLD: 2.84 MILL/MM3 (ref 4.2–5.4)
RBC # BLD: 2.95 MILL/MM3 (ref 4.2–5.4)
SEG NEUTROPHILS: 69.2 %
SEG NEUTROPHILS: 73.4 %
SEGMENTED NEUTROPHILS ABSOLUTE COUNT: 3.3 THOU/MM3 (ref 1.8–7.7)
SEGMENTED NEUTROPHILS ABSOLUTE COUNT: 4 THOU/MM3 (ref 1.8–7.7)
SODIUM BLD-SCNC: 139 MEQ/L (ref 135–145)
TOTAL IRON BINDING CAPACITY: 437 UG/DL (ref 171–450)
TOTAL PROTEIN: 6.4 G/DL (ref 6.1–8)
WBC # BLD: 4.8 THOU/MM3 (ref 4.8–10.8)
WBC # BLD: 5.4 THOU/MM3 (ref 4.8–10.8)

## 2022-03-04 PROCEDURE — 80048 BASIC METABOLIC PNL TOTAL CA: CPT

## 2022-03-04 PROCEDURE — 82728 ASSAY OF FERRITIN: CPT

## 2022-03-04 PROCEDURE — 85025 COMPLETE CBC W/AUTO DIFF WBC: CPT

## 2022-03-04 PROCEDURE — 99282 EMERGENCY DEPT VISIT SF MDM: CPT

## 2022-03-04 PROCEDURE — 36415 COLL VENOUS BLD VENIPUNCTURE: CPT

## 2022-03-04 PROCEDURE — 85730 THROMBOPLASTIN TIME PARTIAL: CPT

## 2022-03-04 PROCEDURE — 85610 PROTHROMBIN TIME: CPT

## 2022-03-04 PROCEDURE — 80076 HEPATIC FUNCTION PANEL: CPT

## 2022-03-04 PROCEDURE — 83550 IRON BINDING TEST: CPT

## 2022-03-04 RX ORDER — FERROUS SULFATE 325(65) MG
325 TABLET ORAL 3 TIMES DAILY
Qty: 90 TABLET | Refills: 2 | Status: SHIPPED | OUTPATIENT
Start: 2022-03-04 | End: 2022-05-24 | Stop reason: SDUPTHER

## 2022-03-04 NOTE — ED PROVIDER NOTES
[address] COMPLAINT    Chief Complaint   Patient presents with    Abnormal Lab       HPI    Jaelyn Cohen is a 64 y.o. female with a hx of GERD, prior requirement for blood transfusion due to GI bleed, who presents with decreased outpatient Hgb lab draw value, without any generalized weakness or sob since the onset 3 days ago. The duration has been constant since the onset. The generalized weakness may be associated with either a lab error or an actual drop in her hemoglobin. No aggravating or alleviating factors. REVIEW OF SYSTEMS    Cardiac: No chest pain or palpitations  Respiratory: No shortness of breath or new cough  General: No fevers   : No dysuria or hematuria  GI: No vomiting or diarrhea  See HPI for further details. All other systems reviewed and are negative.     PAST MEDICAL OR SURGICAL HISTORY    Past Medical History:   Diagnosis Date    Balance problem     Bipolar I disorder, most recent episode (or current) depressed, in partial or unspecified remission 5/8/2013    [facility]     CERVICAL     Depression     Fatigue     Gallstones     Gastric bypass status for obesity 2002    GERD (gastroesophageal reflux disease)     History of blood transfusion     History of hysterectomy     REMOVAL OF ONE OVARY     Hypothyroidism     Liver cyst     GI Dr Joelle Lopez MS (multiple sclerosis) ([facility]     Paresthesias     RT LOWER LIMB    Ulcer at site of surgical anastomosis following bypass of stomach 1/15/2022    UTI (urinary tract infection)      Past Surgical History:   Procedure Laterality Date    APPENDECTOMY      BONE GRAFT Left     left leg to right arm    CARPAL TUNNEL RELEASE  1999    CHOLECYSTECTOMY, LAPAROSCOPIC  5/16/2016    COLONOSCOPY      COLONOSCOPY N/A 1/16/2022    COLONOSCOPY CONTROL HEMORRHAGE [facility] [address], ESOPHAGUS      ENDOSCOPY, COLON, DIAGNOSTIC      ENTEROSCOPY N/A 2/5/2018    ENTEROSCOPY performed by Myranda Canela MD at [facility] N/A 9/28/2020    GASTROSTOMY TUBE PLACEMENT- [facility] TO DO GASTRIC ENDOSCOPY performed by Dar Chery MD at [facility], VAGINAL      LAPAROSCOPIC APPENDECTOMY  5/16/2016    OTHER SURGICAL HISTORY      RECTAL FISSULRE    OTHER SURGICAL HISTORY  1986    D&C    OTHER SURGICAL HISTORY  2006    GI BLEED    OVARY REMOVAL N/A     pt unaware which one was taken    PACEMAKER INSERTION Right 05/20/2019    Dr. Sweet Artist FLX DX W/COLLMEHDI Swan 1978 PFRMD Left 2/6/2018    [facility] [place] [facility]    MD OFFICE/OUTPT VISIT,PROCEDURE ONLY Left 2/18/2018    EGD ESOPHAGOGASTRODUODENOSCOPY performed by Suzie Hickey MD at [facility] [place] End[facility]    MD OFFICE/OUTPT VISIT,PROCEDURE ONLY N/A 10/4/2018    EGD DIAGNOSTIC ONLY performed by Yumi Mratinez MD at [place] [address] ENDOSCOPY Left 9/24/2019    EGD DIAGNOSTIC ONLY performed by Aneesh Campbell MD at [address] [place] [address] ENDOSCOPY N/A 9/26/2020    ENTEROSCOPY PUSH CONTROL HEMORRHAGE performed by Myranda Canela MD at [address] [facility] N/A 1/16/2022    ENTEROSCOPY PUSH DIAGNOSTIC performed by Matias Zheng MD at [address]    Current Outpatient Rx   Medication Sig Dispense Refill    ferrous sulfate (IRON 325) 325 (65 Fe) MG tablet Take 1 tablet by mouth in the morning, at noon, and at bedtime 90 tablet 2    vilazodone HCl (VILAZODONE HCL) 40 MG TABS TAKE 1 TABLET BY MOUTH ONCE DAILY.  30 tablet 2    lurasidone (LATUDA) 40 MG TABS tablet Take 1 tablet by mouth nightly 30 tablet 2    lithium 300 MG tablet Take 2 tablets by mouth 2 times daily 120 tablet 2    lamoTRIgine (LAMICTAL) 200 MG tablet Take one tab by mouth once daily. 30 tablet 2    docusate sodium (COLACE) 100 MG capsule TAKE 1 CAPSULE BY MOUTH 2 TIMES A DAY 60 capsule 2    sucralfate (CARAFATE) 1 GM tablet Take 1 tablet by mouth every 6 hours 120 tablet 3    pantoprazole (PROTONIX) 40 MG tablet Take 1 tablet by mouth 2 times daily (before meals) 180 tablet 2    levothyroxine (SYNTHROID) 100 MCG tablet TAKE 1 TABLET BY MOUTH ONE TIME A DAY (Patient taking differently: nightly TAKE 1 TABLET BY MOUTH ONE TIME A DAY) 90 tablet 1    DHEA 50 MG CAPS Take by mouth nightly      dimethyl fumarate (TECFIDERA) 240 MG delayed release capsule Take 1 capsule by mouth 2 times daily      ipratropium (ATROVENT) 0.03 % nasal spray INSTILL 2 SPRAYS INTO EACH NOSTRIL 3 TIMES DAILY. 1 Bottle 3    Lactobacillus-Inulin (CULTURELLE HEALTH, INJianshu,) CAPS TAKE 1 CAPSULE BY MOUTH WITH BREAKFAST 30 capsule 1    fluticasone (FLONASE) 50 MCG/ACT nasal spray 2 sprays by Each Nostril route daily      ondansetron (ZOFRAN) 4 MG tablet Take 1 tablet by mouth 3 times daily as needed for Nausea or Vomiting 15 tablet 0    Prosthesis MISC by Does not apply route Strap for the prosthesis broken please order new 1 each 0    loratadine (CLARITIN) 10 MG tablet Take 1 tablet by mouth daily 90 tablet 1    Cholecalciferol (VITAMIN D) 2000 units CAPS capsule Take by mouth nightly 50 mcg nightly      Magnesium Oxide 250 MG TABS Take 250 mg by mouth nightly       calcium carbonate 600 MG TABS tablet Take 1 tablet by mouth nightly       vitamin B-12 1000 MCG tablet Take 1 tablet by mouth daily 30 tablet 5    nitroGLYCERIN (NITROSTAT) 0.4 MG SL tablet up to max of 3 total doses.  If no relief after 1 dose, call 911. 25 tablet 3    Prenatal MV-Min-Fe Fum-FA-DHA (PRENATAL 1 PO) Take 1 tablet by mouth daily          ALLERGIES    Allergies   Allergen Reactions    Latex Rash    Sulfa Antibiotics Rash       FAMILY OR SOCIAL HISTORY    Family History   Problem Relation Age of Onset    Cancer Mother 43    Colon Polyps Mother     Breast Cancer Mother     Heart Disease Father     Glaucoma Father     Cancer Sister     Colon Cancer Sister     Cancer Sister     Breast Cancer Sister      Social History     Socioeconomic History    Marital status:      Spouse name: Not on file    Number of children: 0    Years of education: Not on file    Highest education level: Bachelor's degree (e.g., BA, AB, BS)   Occupational History    Not on file   Tobacco Use    Smoking status: Former Smoker     Packs/day: 0.10     Years: 0.50     Pack years: 0.05     Types: Cigarettes     Quit date: 10/24/1977     Years since quittin.3    Smokeless tobacco: Never Used   Vaping Use    Vaping Use: Never used   Substance and Sexual Activity    Alcohol use: No    Drug use: No    Sexual activity: Not Currently     Partners: Male   Other Topics Concern    Not on file   Social History Narrative    2021    LEVEL OF EDUCATION: graduated high school; earned her bachelor degree in education; did some master's level course work but did not complete the degree    SPECIAL EDUCATION NEEDS: None    RESIDENCE: Currently lives alone    LEGAL HISTORY: None    Mormon: Confucianist    TRAUMA: None    : None    HOBBIES: writing, swimming, 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 She was  from  to ; they  in 2015    CHILDREN: None    SUBSTANCE USE: None     Social Determinants of Health     Financial Resource Strain: Low Risk     Difficulty of Paying Living Expenses: Not hard at all   Food Insecurity: No Food Insecurity    Worried About Running Out of Food in the Last Year: Never true    Triston of Food in the Last Year: Never true   Transportation Needs:     Lack of Transportation (Medical): Not on file    Lack of Transportation (Non-Medical):  Not on file   Physical Activity: Insufficiently Active    Days of Exercise per Week: 3 days    Minutes of Exercise per Session: 10 min   Stress:     Feeling of Stress : Not on file   Social Connections:     Frequency of Communication with Friends and Family: Not on file    Frequency of Social Gatherings with Friends and Family: Not on file    Attends Anabaptism Services: Not on file    Active Member of Clubs or Organizations: Not on file    Attends Club or Organization Meetings: Not on file    Marital Status: Not on file   Intimate Partner Violence:     Fear of Current or Ex-Partner: Not on file    Emotionally Abused: Not on file    Physically Abused: Not on file    Sexually Abused: Not on file   Housing Stability:     Unable to Pay for Housing in the Last Year: Not on file    Number of [place] in the Last Year: Not on file    Unstable Housing in the Last Year: Not on file       PHYSICAL EXAM    VITAL SIGNS: /73   Pulse 62   Resp 18   SpO2 100%   Constitutional:  Well developed, well nourished, no acute distress  Eyes:  Pupils equally round and reactive to light, sclera nonicteric  HENT:  atraumatic, moist mucous membranes  NECK: Normal range of motion, no JVD  Respiratory:  No respiratory distress, normal breath sounds, no wheezing   Cardiovascular:  normal rate, normal rhythm, no murmurs   GI:  Soft, nondistended, normal bowel sounds, nontender  Musculoskeletal:  No edema, no acute deformities   Integument:  Skin is warm and dry, no obvious rash    Neurologic: awake, alert, oriented x3, normal finger to nose test bilaterally  Psychiatric:  normal affect, does not appear anxious    Labs Reviewed   CBC WITH AUTO DIFFERENTIAL - Abnormal; Notable for the following components:       Result Value    RBC 2.84 (*)     Hemoglobin 7.1 (*)     Hematocrit 24.5 (*)     MCH 25.0 (*)     MCHC 29.0 (*)     RDW-CV 20.5 (*)     RDW-SD 63.5 (*)     Lymphocytes Absolute 0.8 (*)     All other components within normal limits   BASIC METABOLIC PANEL W/ REFLEX TO MG FOR LOW K - Abnormal; Notable for the following components:    CO2 21 (*)     All other components within normal limits   HEPATIC FUNCTION PANEL - Abnormal; Notable for the following components: Total Bilirubin 0.2 (*)     All other components within normal limits   PROTIME-INR   APTT   IRON BINDING CAPACITY   FERRITIN   ANION GAP   GLOMERULAR FILTRATION RATE, ESTIMATED   OSMOLALITY   URINALYSIS WITH REFLEX TO CULTURE         RADIOLOGY/PROCEDURES    No orders to display       ED COURSE & MEDICAL DECISION MAKING    Pertinent Labs & Imaging studies reviewed and interpreted. (See chart for details)  See chart for details of medications given during the ED stay. Vitals:    03/04/22 1215 03/04/22 1327   BP: (!) 142/74 130/73   Pulse: 60 62   Resp: 18 18   SpO2: 100% 100%       Differential diagnosis: iron deficiency anemia, persistent anemia, occult GI bleeding, other    FINAL IMPRESSION    1. Iron deficiency anemia, unspecified iron deficiency anemia type        PLAN  MDM - possibly iron deficiency anemia vs wrong lab values, vs chronic condition (anemia of chronic disease). Pt's Fe study showed persistent low values with a normal TIBC. Since pt has not had improvement despite being on Ferrous sulfate qday for over 6 months, her dosage needs increased, but we should also raise the possibility that this could be due to poor absorption possibly due to her prior gastric bypass surgery. In any event, there is no clinical sign that pt is actively hemorrhaging. Pt is also encouraged to improve her diet regarding consuming foods with higher Fe content as well as decreasing consumption of iron-blocking foods. Pt will be referred back to Dr. Clifford Brown for further outpatient management, specifically to set her up with a dietician/nutritionist as well as setting up outpatient periodic IV Fe infusions.          Krish Neff MD  03/04/22 [id number]

## 2022-03-04 NOTE — ED NOTES
Patient in bed. Patient provided education regarding current situation with pt plan of care. Patient VSS and patient does not appear to be in any current distress at this time. Will continue to monitor. Call light within reach.        Deysi Whitaker RN  03/04/22 0199

## 2022-03-09 ENCOUNTER — TELEPHONE (OUTPATIENT)
Dept: PSYCHIATRY | Age: 62
End: 2022-03-09

## 2022-03-09 NOTE — TELEPHONE ENCOUNTER
Patient notified and voiced understanding. States she does have an appointment 03/17 for an iron infusion and was scheduled with a dietician as well. Patient will call if she has any additional questions/concerns prior to her follow up appointment.

## 2022-03-09 NOTE — TELEPHONE ENCOUNTER
Birgit Patterson called, she received her lab order in the mail today from 03/04/2022. She was confused as she had her labs completed at that time and was advised to go to ER. Patient asks if she should repeat her labs? If so, when should she have them repeated?  Thank you

## 2022-03-10 NOTE — TELEPHONE ENCOUNTER
Dr. Tacho Simpson,     Please see the messages below from myself and the patient. We had previously discussed this patient prior to my recommendation that she go to the ER. She was treated and released from the ER (records are in her chart). Just wanted to make you aware in the event there is anything new/different you would like patient to do. It appears she is not scheduled for follow-up with you until 3/15/2022.

## 2022-03-15 ENCOUNTER — OFFICE VISIT (OUTPATIENT)
Dept: FAMILY MEDICINE CLINIC | Age: 62
End: 2022-03-15
Payer: MEDICARE

## 2022-03-15 VITALS
WEIGHT: 125.8 LBS | TEMPERATURE: 97.1 F | HEART RATE: 86 BPM | SYSTOLIC BLOOD PRESSURE: 128 MMHG | BODY MASS INDEX: 27.14 KG/M2 | RESPIRATION RATE: 18 BRPM | DIASTOLIC BLOOD PRESSURE: 76 MMHG | HEIGHT: 57 IN | OXYGEN SATURATION: 96 %

## 2022-03-15 DIAGNOSIS — D50.0 IRON DEFICIENCY ANEMIA DUE TO CHRONIC BLOOD LOSS: Primary | ICD-10-CM

## 2022-03-15 PROCEDURE — 99213 OFFICE O/P EST LOW 20 MIN: CPT | Performed by: STUDENT IN AN ORGANIZED HEALTH CARE EDUCATION/TRAINING PROGRAM

## 2022-03-15 ASSESSMENT — ENCOUNTER SYMPTOMS
NAUSEA: 0
TROUBLE SWALLOWING: 0
DIARRHEA: 0
EYE PAIN: 0
SHORTNESS OF BREATH: 0
BLOOD IN STOOL: 0
COUGH: 0
VOMITING: 0
ABDOMINAL PAIN: 0
CONSTIPATION: 0

## 2022-03-15 NOTE — PROGRESS NOTES
SUBJECTIVE     Claude Dennis is a 64 y. o.female    Chief Complaint   Patient presents with    ED Follow-up     Pt presents for an ED f/u for iron deficiency anemia. Pt states she has been doing good. Chief complaint, [place], and all pertinent details of the case reviewed with the resident. Please see resident's note for specific details discussed at today's visit. Patient Active Problem List   Diagnosis    Acquired hypothyroidism    MS ([facility] sclerosis) ([facility]    S/P laparoscopic cholecystectomy    S/P laparoscopic appendectomy    Gastrointestinal hemorrhage associated with gastritis    Multiple thyroid nodules    Non-ischemic cardiomyopathy ([facility]    History of Batsheva-en-Y gastric bypass    Renal lesion    Liver lesion    Bipolar 1 disorder (HCC)    Symptomatic bradycardia    Status post placement of cardiac pacemaker    Gastrointestinal hemorrhage with melena    GI bleed    Peptic ulcer disease    Other complications of other bariatric procedure    Ulcer at site of surgical anastomosis following bypass of stomach    Acute blood loss anemia       Current Outpatient Medications   Medication Sig Dispense Refill    ferrous sulfate (IRON 325) 325 (65 Fe) MG tablet Take 1 tablet by mouth in the morning, at noon, and at bedtime 90 tablet 2    vilazodone HCl (VILAZODONE HCL) 40 MG TABS TAKE 1 TABLET BY MOUTH ONCE DAILY. 30 tablet 2    lurasidone (LATUDA) 40 MG TABS tablet Take 1 tablet by mouth nightly 30 tablet 2    lithium 300 MG tablet Take 2 tablets by mouth 2 times daily 120 tablet 2    lamoTRIgine (LAMICTAL) 200 MG tablet Take one tab by mouth once daily.  30 tablet 2    docusate sodium (COLACE) 100 MG capsule TAKE 1 CAPSULE BY MOUTH 2 TIMES A DAY 60 capsule 2    sucralfate (CARAFATE) 1 GM tablet Take 1 tablet by mouth every 6 hours 120 tablet 3    pantoprazole (PROTONIX) 40 MG tablet Take 1 tablet by mouth 2 times daily (before meals) 180 tablet 2    levothyroxine PF (6 mo and older Fluzone, Flulaval, Fluarix, and 3 yrs and older Afluria) 09/23/2017, 09/24/2019, 09/20/2021    Pneumococcal Conjugate 13-valent ([id number]) 04/10/2019    Pneumococcal Polysaccharide (Vemwyiaxb30) 05/17/2016    Td (Adult), 5 Lf Tetanus Toxoid, Pf (Tenivac, Decavac) 04/30/1999    Td vaccine (adult) 04/30/1999    Tdap (Boostrix, Adacel) 09/29/2015, 09/12/2016, 10/25/2018    Zoster Recombinant (Shingrix) 03/11/2019, 11/19/2019, 01/03/2020       Health Maintenance   Topic Date Due    Breast cancer screen  01/24/2023    Depression Monitoring  02/18/2023    Annual Wellness Visit (AWV)  02/19/2023    TSH testing  03/01/2023    Pneumococcal 0-64 years Vaccine (2 of 2 - PPSV23) 05/12/2025    Lipid screen  03/02/2027    DTaP/Tdap/Td vaccine (4 - Td or Tdap) 10/25/2028    Colorectal Cancer Screen  01/16/2032    Flu vaccine  Completed    Shingles Vaccine  Completed    COVID-19 Vaccine  Completed    Hepatitis C screen  Completed    HIV screen  Completed    Hepatitis A vaccine  Aged Out    Hepatitis B vaccine  Aged Out    Hib vaccine  Aged Out    Meningococcal (ACWY) vaccine  Aged Out              Future Appointments   Date Time Provider Rudi Brandon   4/19/2022  3:00 PM [facility],  [address]   5/24/2022  1:00 PM AJITH Gonzalez - CNP N SRPX PSYCH P - [facility] [address]       ASSESSMENT       Diagnosis Orders   1.  Iron deficiency anemia due to chronic blood loss  [facility] - Dietitian    CBC with Auto Differential       PLAN      After discussion with Dr. Kelvin Segovia, we agreed on plan as follows:    Check CBC now  We will start IV Venofer-200 mg weekly x5 weeks  Recheck CBC and iron studies every 2 weeks following infusion  Refer to [facility] dietitian at this time for information on iron rich foods  Follow-up with GI as planned  Follow-up in 1 month or sooner if any further problems            Attending Physician Statement  I have discussed the case, including pertinent history and exam findings with the resident. I agree with the documented assessment and plan as documented by the resident.   GE modifier added  to this encounter     Electronically signed by Howie Sepulveda MD on 3/15/2022 at 5:42 PM

## 2022-03-15 NOTE — PROGRESS NOTES
LOWER LIMB    Ulcer at site of surgical anastomosis following bypass of stomach 1/15/2022    UTI (urinary tract infection)       Past Surgical History:   Procedure Laterality Date    APPENDECTOMY      BONE GRAFT Left     left leg to right arm    CARPAL TUNNEL RELEASE  1999    CHOLECYSTECTOMY, LAPAROSCOPIC  5/16/2016    COLONOSCOPY      COLONOSCOPY N/A 1/16/2022    COLONOSCOPY CONTROL HEMORRHAGE performed by Cosme Weston MD at [address], ESOPHAGUS      ENDOSCOPY, COLON, DIAGNOSTIC      ENTEROSCOPY N/A 2/5/2018    ENTEROSCOPY performed by Aria Farah MD at [facility] N/A 9/28/2020    GASTROSTOMY TUBE PLACEMENT- [facility] TO  GASTRIC ENDOSCOPY performed by Tricia Fowler MD at [place] [address], VAGINAL      LAPAROSCOPIC APPENDECTOMY  5/16/2016    OTHER SURGICAL HISTORY      RECTAL FISSULRE    OTHER SURGICAL HISTORY  1986    D&C    OTHER SURGICAL HISTORY  2006    GI BLEED    OVARY REMOVAL N/A     pt unaware which one was taken    PACEMAKER INSERTION Right 05/20/2019    Dr. Antoine Antony FLX DX W/COLLMEHDI Swan 1978 PFRMD Left 2/6/2018    COLONOSCOPY performed by Aria Farah MD at [facility]    NE OFFICE/OUTPT VISIT,PROCEDURE ONLY Left 2/18/2018    EGD ESOPHAGOGASTRODUODENOSCOPY performed by Remigio Villanueva MD at [address] [facility]    NE OFFICE/OUTPT VISIT,PROCEDURE ONLY N/A 10/4/2018    EGD DIAGNOSTIC ONLY performed by Areli Wong MD at [facility] ENDOSCOPY Left 9/24/2019    EGD DIAGNOSTIC ONLY performed by Roberta Almonte MD at [address] ENDOSCOPY N/A 9/26/2020    ENTEROSCOPY PUSH CONTROL HEMORRHAGE performed by Aria Farah MD at [facility] N/A 1/16/2022    ENTEROSCOPY PUSH DIAGNOSTIC performed by Cosme Weston MD at [facility] OR     Family History   Problem Relation Age of Onset   Joanie Nares Cancer Mother 43    Colon Polyps Mother    Joanie Anglin Breast Cancer Mother     Heart Disease Father     Glaucoma Father     Cancer Sister     Colon Cancer Sister     Cancer Sister     Breast Cancer Sister      Social History     Tobacco Use    Smoking status: Former Smoker     Packs/day: 0.10     Years: 0.50     Pack years: 0.05     Types: Cigarettes     Quit date: 10/24/1977     Years since quittin.4    Smokeless tobacco: Never Used   Substance Use Topics    Alcohol use: No      Current Outpatient Medications   Medication Sig Dispense Refill    ferrous sulfate (IRON 325) 325 (65 Fe) MG tablet Take 1 tablet by mouth in the morning, at noon, and at bedtime 90 tablet 2    vilazodone HCl (VILAZODONE HCL) 40 MG TABS TAKE 1 TABLET BY MOUTH ONCE DAILY. 30 tablet 2    lurasidone (LATUDA) 40 MG TABS tablet Take 1 tablet by mouth nightly 30 tablet 2    lithium 300 MG tablet Take 2 tablets by mouth 2 times daily 120 tablet 2    lamoTRIgine (LAMICTAL) 200 MG tablet Take one tab by mouth once daily. 30 tablet 2    docusate sodium (COLACE) 100 MG capsule TAKE 1 CAPSULE BY MOUTH 2 TIMES A DAY 60 capsule 2    sucralfate (CARAFATE) 1 GM tablet Take 1 tablet by mouth every 6 hours 120 tablet 3    pantoprazole (PROTONIX) 40 MG tablet Take 1 tablet by mouth 2 times daily (before meals) 180 tablet 2    levothyroxine (SYNTHROID) 100 MCG tablet TAKE 1 TABLET BY MOUTH ONE TIME A DAY (Patient taking differently: nightly TAKE 1 TABLET BY MOUTH ONE TIME A DAY) 90 tablet 1    DHEA 50 MG CAPS Take by mouth nightly      dimethyl fumarate (TECFIDERA) 240 MG delayed release capsule Take 1 capsule by mouth 2 times daily      ipratropium (ATROVENT) 0.03 % nasal spray INSTILL 2 SPRAYS INTO EACH NOSTRIL 3 TIMES DAILY.  1 Bottle 3    Lactobacillus-Inulin (CULTURELLE HEALTH, INULIN,) CAPS TAKE 1 CAPSULE BY MOUTH WITH BREAKFAST 30 capsule 1    fluticasone (FLONASE) 50 MCG/ACT nasal spray 2 sprays by Each Nostril route daily      ondansetron (ZOFRAN) 4 MG tablet Take 1 tablet by mouth 3 times daily as needed for Nausea or Vomiting 15 tablet 0    Prosthesis MISC by Does not apply route Strap for the prosthesis broken please order new 1 each 0    loratadine (CLARITIN) 10 MG tablet Take 1 tablet by mouth daily 90 tablet 1    Cholecalciferol (VITAMIN D) 2000 units CAPS capsule Take by mouth nightly 50 mcg nightly      Magnesium Oxide 250 MG TABS Take 250 mg by mouth nightly       calcium carbonate 600 MG TABS tablet Take 1 tablet by mouth nightly       vitamin B-12 1000 MCG tablet Take 1 tablet by mouth daily 30 tablet 5    nitroGLYCERIN (NITROSTAT) 0.4 MG SL tablet up to max of 3 total doses. If no relief after 1 dose, call 911. 25 tablet 3    Prenatal MV-Min-Fe Fum-FA-DHA (PRENATAL 1 PO) Take 1 tablet by mouth daily        No current facility-administered medications for this visit. Allergies   Allergen Reactions    Latex Rash    Sulfa Antibiotics Rash       Health Maintenance   Topic Date Due    Breast cancer screen  01/24/2023    Depression Monitoring  02/18/2023    Annual Wellness Visit (AWV)  02/19/2023    TSH testing  03/01/2023    Pneumococcal 0-64 years Vaccine (2 of 2 - PPSV23) 05/12/2025    Lipid screen  03/02/2027    DTaP/Tdap/Td vaccine (4 - Td or Tdap) 10/25/2028    Colorectal Cancer Screen  01/16/2032    Flu vaccine  Completed    Shingles Vaccine  Completed    COVID-19 Vaccine  Completed    Hepatitis C screen  Completed    HIV screen  Completed    Hepatitis A vaccine  Aged Out    Hepatitis B vaccine  Aged Out    Hib vaccine  Aged Out    Meningococcal (ACWY) vaccine  Aged Out       Subjective:      Review of Systems   Constitutional: Negative for chills, fatigue and fever. HENT: Negative for ear pain, postnasal drip and trouble swallowing. Eyes: Negative for pain and visual disturbance.    Respiratory: Negative for cough and shortness of breath. Cardiovascular: Negative for chest pain and palpitations. Gastrointestinal: Negative for abdominal pain, blood in stool, constipation, diarrhea, nausea and vomiting. Genitourinary: Negative for dysuria and urgency. Skin: Negative for rash and wound. Neurological: Negative for dizziness and headaches. Psychiatric/Behavioral: Negative for dysphoric mood. The patient is not nervous/anxious. Objective:     Vitals:    03/15/22 1420   BP: 128/76   Pulse: 86   Resp: 18   Temp: 97.1 °F (36.2 °C)   SpO2: 96%   Weight: 125 lb 12.8 oz (57.1 kg)   Height: 4' 9\" (1.448 m)       Body mass index is 27.22 kg/m². Wt Readings from Last 3 Encounters:   03/15/22 125 lb 12.8 oz (57.1 kg)   02/18/22 122 lb 6.4 oz (55.5 kg)   01/16/22 110 lb 0.2 oz (49.9 kg)     BP Readings from Last 3 Encounters:   03/15/22 128/76   03/04/22 130/73   02/18/22 120/70       Physical Exam  Vitals and nursing note reviewed. Constitutional:       General: She is not in acute distress. Appearance: She is well-developed. She is not diaphoretic. HENT:      Head: Normocephalic and atraumatic. Right Ear: External ear normal.      Left Ear: External ear normal.      Nose: Nose normal.   Eyes:      General: No scleral icterus. Right eye: No discharge. Left eye: No discharge. Conjunctiva/sclera: Conjunctivae normal.   Cardiovascular:      Rate and Rhythm: Normal rate and regular rhythm. Heart sounds: Normal heart sounds. No murmur heard. Pulmonary:      Effort: Pulmonary effort is normal.      Breath sounds: Normal breath sounds. Musculoskeletal:      Cervical back: Normal range of motion. Skin:     General: Skin is warm and dry. Findings: No erythema or rash. Neurological:      Mental Status: She is alert and oriented to person, place, and time. Cranial Nerves: No cranial nerve deficit. Psychiatric:         Behavior: Behavior normal.         Thought Content:  Thought content normal.         Judgment: Judgment normal.         Lab Results   Component Value Date    WBC 4.8 03/04/2022    HGB 7.1 (L) 03/04/2022    HCT 24.5 (L) 03/04/2022     03/04/2022    CHOL 141 03/02/2022    TRIG 32 03/02/2022    HDL 95 03/02/2022    LDLCALC 40 03/02/2022    AST 22 03/04/2022     03/04/2022    K 4.5 03/04/2022     03/04/2022    CREATININE 0.5 03/04/2022    BUN 13 03/04/2022    CO2 21 (L) 03/04/2022    TSH 1.400 03/01/2022    INR 0.93 03/04/2022    LABA1C 4.7 11/13/2019    LABGLOM >90 03/04/2022    MG 2.0 10/04/2021    CALCIUM 9.8 03/04/2022    VITD25 32 03/01/2022       Imaging Results:    No results found. Assessment/Plan:     Selene Escobar was seen today for ed follow-up. Diagnoses and all orders for this visit:    Iron deficiency anemia due to chronic blood loss  -     [facility]. [facility] - Dietitian  -     CBC with Auto Differential; Future      Last hemoglobin on 3/4/2022 was 7.1. Will recheck CBC as soon as possible to recheck hemoglobin. Consider referral to emergency department should she need IV blood infusions for hemoglobin less than 7. We did start IV Venofer 200 mg IV once weekly for 5 treatments. We will recheck iron studies and CBC every 2 weeks with these infusions. Also refer to [facility]'s dietitian for discussion on diet rich foods. Discussed with patient that should her blood counts go below 7 she should return to the emergency department for further evaluation. Follow-up with GI physician as currently scheduled. No follow-ups on file. Medications Prescribed:  No orders of the defined types were placed in this encounter. Future Appointments   Date Time Provider Rudi Brandon   4/19/2022  3:00 PM DO KIMBERLY BooneX [facility] [place]   5/24/2022  1:00 PM [facility] [place] [id number] Heather Posey       Patient given educational materials - see patient instructions.   Discussed use, benefit, and sideeffects of prescribed medications. All patient questions answered. Pt voiced understanding. Reviewed health maintenance. Instructed to continue current medications, diet and exercise. Patient agreed with treatment plan. Follow up as directed.      Electronically signed by Sejal Parra DO on 3/15/2022 at 4:08 PM

## 2022-03-16 ENCOUNTER — HOSPITAL ENCOUNTER (OUTPATIENT)
Age: 62
Discharge: HOME | End: 2022-03-16
Payer: MEDICARE

## 2022-03-16 DIAGNOSIS — D50.0 IRON DEFICIENCY ANEMIA DUE TO CHRONIC BLOOD LOSS: ICD-10-CM

## 2022-03-16 LAB
ANISOCYTOSIS: PRESENT
BASOPHILIA: ABNORMAL
BASOPHILS # BLD: 0.8 %
BASOPHILS ABSOLUTE: 0.1 THOU/MM3 (ref 0–0.1)
EOSINOPHIL # BLD: 2.5 %
EOSINOPHILS ABSOLUTE: 0.2 THOU/MM3 (ref 0–0.4)
ERYTHROCYTE [DISTWIDTH] IN BLOOD BY AUTOMATED COUNT: 22.6 % (ref 11.5–14.5)
ERYTHROCYTE [DISTWIDTH] IN BLOOD BY AUTOMATED COUNT: 71.7 FL (ref 35–45)
HCT VFR BLD CALC: 28.9 % (ref 37–47)
HEMOGLOBIN: 7.8 GM/DL (ref 12–16)
HYPOCHROMIA: PRESENT
IMMATURE GRANS (ABS): 0.05 THOU/MM3 (ref 0–0.07)
IMMATURE GRANULOCYTES: 0.8 %
LYMPHOCYTES # BLD: 10.3 %
LYMPHOCYTES ABSOLUTE: 0.7 THOU/MM3 (ref 1–4.8)
MCH RBC QN AUTO: 23.6 PG (ref 26–33)
MCHC RBC AUTO-ENTMCNC: 27 GM/DL (ref 32.2–35.5)
MCV RBC AUTO: 87.6 FL (ref 81–99)
MONOCYTES # BLD: 10 %
MONOCYTES ABSOLUTE: 0.7 THOU/MM3 (ref 0.4–1.3)
NUCLEATED RED BLOOD CELLS: 0 /100 WBC
PLATELET # BLD: 256 THOU/MM3 (ref 130–400)
PLATELET ESTIMATE: ADEQUATE
PMV BLD AUTO: 11.4 FL (ref 9.4–12.4)
POIKILOCYTES: ABNORMAL
RBC # BLD: 3.3 MILL/MM3 (ref 4.2–5.4)
SCAN OF BLOOD SMEAR: NORMAL
SCHISTOCYTES: ABNORMAL
SEG NEUTROPHILS: 75.6 %
SEGMENTED NEUTROPHILS ABSOLUTE COUNT: 4.9 THOU/MM3 (ref 1.8–7.7)
WBC # BLD: 6.5 THOU/MM3 (ref 4.8–10.8)

## 2022-03-16 PROCEDURE — 36415 COLL VENOUS BLD VENIPUNCTURE: CPT

## 2022-03-16 PROCEDURE — 85025 COMPLETE CBC W/AUTO DIFF WBC: CPT

## 2022-03-17 ENCOUNTER — TELEPHONE (OUTPATIENT)
Dept: FAMILY MEDICINE CLINIC | Age: 62
End: 2022-03-17

## 2022-03-17 NOTE — TELEPHONE ENCOUNTER
----- Message from Emmanuel Flores MD sent at 3/16/2022  2:34 PM EDT -----  Natalee Gallo,     Your hemoglobin has improved to 7.8.      Dr. Lashawn Agarwal

## 2022-03-23 ENCOUNTER — TELEPHONE (OUTPATIENT)
Dept: FAMILY MEDICINE CLINIC | Age: 62
End: 2022-03-23

## 2022-03-23 NOTE — TELEPHONE ENCOUNTER
Dr. Nj Mustafa called and is unable to get into Out Patient nursing due to they do not have an order from you for Iron IV infusion also Subhash Gaines was wanting a standing order for the CBC and Iron labs. Please Advise. From office visit 03/15/2022.      We will start IV Venofer-200 mg weekly x5 weeks  Recheck CBC and iron studies every 2 weeks following infusion

## 2022-03-24 DIAGNOSIS — D50.0 IRON DEFICIENCY ANEMIA DUE TO CHRONIC BLOOD LOSS: ICD-10-CM

## 2022-03-24 PROBLEM — D50.9 IRON DEFICIENCY ANEMIA: Status: ACTIVE | Noted: 2022-03-24

## 2022-03-24 RX ORDER — ONDANSETRON 2 MG/ML
8 INJECTION INTRAMUSCULAR; INTRAVENOUS
Status: CANCELLED | OUTPATIENT
Start: 2022-03-24

## 2022-03-24 RX ORDER — HEPARIN SODIUM (PORCINE) LOCK FLUSH IV SOLN 100 UNIT/ML 100 UNIT/ML
500 SOLUTION INTRAVENOUS PRN
Status: CANCELLED | OUTPATIENT
Start: 2022-03-24

## 2022-03-24 RX ORDER — EPINEPHRINE 1 MG/ML
0.3 INJECTION, SOLUTION, CONCENTRATE INTRAVENOUS PRN
Status: CANCELLED | OUTPATIENT
Start: 2022-03-24

## 2022-03-24 RX ORDER — SODIUM CHLORIDE 9 MG/ML
INJECTION, SOLUTION INTRAVENOUS CONTINUOUS
Status: CANCELLED | OUTPATIENT
Start: 2022-03-24

## 2022-03-24 RX ORDER — SODIUM CHLORIDE 0.9 % (FLUSH) 0.9 %
5-40 SYRINGE (ML) INJECTION PRN
Status: CANCELLED | OUTPATIENT
Start: 2022-03-24

## 2022-03-24 RX ORDER — DIPHENHYDRAMINE HYDROCHLORIDE 50 MG/ML
50 INJECTION INTRAMUSCULAR; INTRAVENOUS
Status: CANCELLED | OUTPATIENT
Start: 2022-03-24

## 2022-03-24 RX ORDER — ACETAMINOPHEN 325 MG/1
650 TABLET ORAL
Status: CANCELLED | OUTPATIENT
Start: 2022-03-24

## 2022-03-24 RX ORDER — MEPERIDINE HYDROCHLORIDE 50 MG/ML
12.5 INJECTION INTRAMUSCULAR; INTRAVENOUS; SUBCUTANEOUS PRN
Status: CANCELLED | OUTPATIENT
Start: 2022-03-24

## 2022-03-24 RX ORDER — ALBUTEROL SULFATE 90 UG/1
4 AEROSOL, METERED RESPIRATORY (INHALATION) PRN
Status: CANCELLED | OUTPATIENT
Start: 2022-03-24

## 2022-03-24 RX ORDER — SODIUM CHLORIDE 9 MG/ML
25 INJECTION, SOLUTION INTRAVENOUS PRN
Status: CANCELLED | OUTPATIENT
Start: 2022-03-24

## 2022-03-25 ENCOUNTER — HOSPITAL ENCOUNTER (OUTPATIENT)
Age: 62
Discharge: HOME | End: 2022-03-25
Payer: MEDICARE

## 2022-03-25 DIAGNOSIS — D50.0 IRON DEFICIENCY ANEMIA DUE TO CHRONIC BLOOD LOSS: ICD-10-CM

## 2022-03-25 LAB
ANISOCYTOSIS: PRESENT
BASOPHILIA: ABNORMAL
BASOPHILS # BLD: 0.7 %
BASOPHILS ABSOLUTE: 0 THOU/MM3 (ref 0–0.1)
EOSINOPHIL # BLD: 2.8 %
EOSINOPHILS ABSOLUTE: 0.2 THOU/MM3 (ref 0–0.4)
ERYTHROCYTE [DISTWIDTH] IN BLOOD BY AUTOMATED COUNT: 22.6 % (ref 11.5–14.5)
ERYTHROCYTE [DISTWIDTH] IN BLOOD BY AUTOMATED COUNT: 70.9 FL (ref 35–45)
FERRITIN: 11 NG/ML (ref 10–291)
HCT VFR BLD CALC: 28.8 % (ref 37–47)
HEMOGLOBIN: 7.8 GM/DL (ref 12–16)
HYPOCHROMIA: PRESENT
IMMATURE GRANS (ABS): 0.01 THOU/MM3 (ref 0–0.07)
IMMATURE GRANULOCYTES: 0.2 %
LYMPHOCYTES # BLD: 12.3 %
LYMPHOCYTES ABSOLUTE: 0.7 THOU/MM3 (ref 1–4.8)
MCH RBC QN AUTO: 23.6 PG (ref 26–33)
MCHC RBC AUTO-ENTMCNC: 27.1 GM/DL (ref 32.2–35.5)
MCV RBC AUTO: 87 FL (ref 81–99)
MONOCYTES # BLD: 8.9 %
MONOCYTES ABSOLUTE: 0.5 THOU/MM3 (ref 0.4–1.3)
NUCLEATED RED BLOOD CELLS: 0 /100 WBC
PLATELET # BLD: 252 THOU/MM3 (ref 130–400)
PLATELET ESTIMATE: ADEQUATE
PMV BLD AUTO: 11.4 FL (ref 9.4–12.4)
POIKILOCYTES: ABNORMAL
RBC # BLD: 3.31 MILL/MM3 (ref 4.2–5.4)
SCAN OF BLOOD SMEAR: NORMAL
SEG NEUTROPHILS: 75.1 %
SEGMENTED NEUTROPHILS ABSOLUTE COUNT: 4.4 THOU/MM3 (ref 1.8–7.7)
WBC # BLD: 5.8 THOU/MM3 (ref 4.8–10.8)

## 2022-03-25 PROCEDURE — 85025 COMPLETE CBC W/AUTO DIFF WBC: CPT

## 2022-03-25 PROCEDURE — 36415 COLL VENOUS BLD VENIPUNCTURE: CPT

## 2022-03-25 PROCEDURE — 82728 ASSAY OF FERRITIN: CPT

## 2022-03-31 ENCOUNTER — OFFICE VISIT (OUTPATIENT)
Dept: INTERNAL MEDICINE CLINIC | Age: 62
End: 2022-03-31
Payer: MEDICARE

## 2022-03-31 VITALS — WEIGHT: 127.8 LBS | TEMPERATURE: 98.3 F | HEIGHT: 57 IN | BODY MASS INDEX: 27.57 KG/M2

## 2022-03-31 DIAGNOSIS — D50.0 IRON DEFICIENCY ANEMIA DUE TO CHRONIC BLOOD LOSS: ICD-10-CM

## 2022-03-31 PROCEDURE — 97802 MEDICAL NUTRITION INDIV IN: CPT | Performed by: DIETITIAN, REGISTERED

## 2022-03-31 NOTE — PATIENT INSTRUCTIONS
Include a high vitamin C source with high Iron food source at meals. Review your food lists given today and form a grocery list.    Call if you have further questions.

## 2022-03-31 NOTE — PROGRESS NOTES
[address] [place] [address]. [address] [place] [address] Marvin., [facility], [address]  [phone] (phone)  [phone] (fax)    Patient Name: Kacy Colunga. Date of Birth: [id number]. MRN: [id number]      Assessment: Patient is a 64 y.o. female seen for Initial MNT visit for Iron Deficiency anemia.     -Nutritionally relevant labs:   Lab Results   Component Value Date/Time    LABA1C 4.7 11/13/2019 02:16 PM    GLUCOSE 88 03/04/2022 12:25 PM    GLUCOSE 96 03/02/2022 08:25 AM    GLUCOSE 81 05/26/2021 09:45 AM    CHOL 141 03/02/2022 08:25 AM    HDL 95 03/02/2022 08:25 AM    LDLCALC 40 03/02/2022 08:25 AM    TRIG 32 03/02/2022 08:25 AM     Results for Beth Pill (MRN [id number]) as of 3/31/2022 12:50   Ref. Range 3/4/2022 11:17 3/4/2022 12:25 3/4/2022 13:31 3/16/2022 09:07 3/25/2022 13:33   Hemoglobin Quant Latest Ref Range: 12.0 - 16.0 gm/dl 7.2 (L) 7.1 (L)  7.8 (L) 7.8 (L)   Hematocrit Latest Ref Range: 37.0 - 47.0 % 25.6 (L) 24.5 (L)  28.9 (L) 28.8 (L)     Pt is a volunteer 5 days/week here at [address] recall:   Breakfast: 5-6 am - cereal - bran flakes with added raisins with 1% milk OR eggs - 2 OR oatmeal - Baptism quick cooking 1 1/2 cups, milk and raisins. She always drinks 1 cup orange Juice and 1 cup 1% milk with brkf. Lunch: Today ate 2 chicken wings. She is bringing home crabmeat salad and 2 breadsticks from the cafeteria, Drinks water. Dinner: last night - 2 white castle sliders and banana, water. No food allergies or intolerances. Compulsive Over Eater - Follows with a 12 step plan to prevent overeating. Pt likes to snack - will eat raisins or welches roll up or grain bar. Per pt has had 4-5 episodes with gastric bleeding every since rou en Y Gastric bypass surgery 2002 which was done in [facility]. Trying to avoid diet mountain dew because of ulcers. Adds Sugar free crush to water. She has no sugar in the house. Lowest hgb. 6.3.    -Main Beverages: 3 tall glasses of water/day or more. She also has MS so tries to keep hydrated. -Impression of Dietary Intake: on average, 3 meals per day, lacking routine intake of fruits and vegetables    Current Outpatient Medications on File Prior to Visit   Medication Sig Dispense Refill    ferrous sulfate (IRON 325) 325 (65 Fe) MG tablet Take 1 tablet by mouth in the morning, at noon, and at bedtime 90 tablet 2    vilazodone HCl (VILAZODONE HCL) 40 MG TABS TAKE 1 TABLET BY MOUTH ONCE DAILY. 30 tablet 2    lurasidone (LATUDA) 40 MG TABS tablet Take 1 tablet by mouth nightly 30 tablet 2    lithium 300 MG tablet Take 2 tablets by mouth 2 times daily 120 tablet 2    lamoTRIgine (LAMICTAL) 200 MG tablet Take one tab by mouth once daily. 30 tablet 2    docusate sodium (COLACE) 100 MG capsule TAKE 1 CAPSULE BY MOUTH 2 TIMES A DAY 60 capsule 2    sucralfate (CARAFATE) 1 GM tablet Take 1 tablet by mouth every 6 hours 120 tablet 3    pantoprazole (PROTONIX) 40 MG tablet Take 1 tablet by mouth 2 times daily (before meals) 180 tablet 2    levothyroxine (SYNTHROID) 100 MCG tablet TAKE 1 TABLET BY MOUTH ONE TIME A DAY (Patient taking differently: nightly TAKE 1 TABLET BY MOUTH ONE TIME A DAY) 90 tablet 1    DHEA 50 MG CAPS Take by mouth nightly      dimethyl fumarate (TECFIDERA) 240 MG delayed release capsule Take 1 capsule by mouth 2 times daily      ipratropium (ATROVENT) 0.03 % nasal spray INSTILL 2 SPRAYS INTO EACH NOSTRIL 3 TIMES DAILY.  1 Bottle 3    Lactobacillus-Inulin (CULTURELLE HEALTH, INULIN,) CAPS TAKE 1 CAPSULE BY MOUTH WITH BREAKFAST 30 capsule 1    fluticasone (FLONASE) 50 MCG/ACT nasal spray 2 sprays by Each Nostril route daily      ondansetron (ZOFRAN) 4 MG tablet Take 1 tablet by mouth 3 times daily as needed for Nausea or Vomiting 15 tablet 0    Prosthesis MISC by Does not apply route Strap for the prosthesis broken please order new 1 each 0    loratadine (CLARITIN) 10 MG tablet Take 1 tablet by mouth daily 90 tablet 1    Cholecalciferol (VITAMIN D) 2000 units CAPS capsule Take by mouth nightly 50 mcg nightly      Magnesium Oxide 250 MG TABS Take 250 mg by mouth nightly       calcium carbonate 600 MG TABS tablet Take 1 tablet by mouth nightly       vitamin B-12 1000 MCG tablet Take 1 tablet by mouth daily 30 tablet 5    nitroGLYCERIN (NITROSTAT) 0.4 MG SL tablet up to max of 3 total doses. If no relief after 1 dose, call 911. 25 tablet 3    Prenatal MV-Min-Fe Fum-FA-DHA (PRENATAL 1 PO) Take 1 tablet by mouth daily        No current facility-administered medications on file prior to visit. Vitals from current and previous visits:  Temp 98.3 °F (36.8 °C)   Ht 4' 9\" (1.448 m)   Wt 127 lb 12.8 oz (58 kg)   BMI 27.66 kg/m²     -Body mass index is 27.66 kg/m². 25-29.9 - Overweight. Nutrition Diagnosis:   Food and nutrition-related knowledge deficit related to Lack of previous MNT/currently undergoing MNT as evidenced by Conditions associated with a diagnosis or treatment: Iron deficiency anemia. Intervention:  -Impression: Pt plans on making a grocery list of foods high in Iron and for foods high in Vitamin C. Batsheva n Y gastric bypass surgery causing malabsorption of iron plus pt with peptic ulcer disease.    -Instructed the patient on: Foods high, moderate and low in iron content. Consuming a high vitamin C source with high iron food sources    -Handouts given for: High Fe++ food list, Foods to fight iron deficiency, High Vit C list, Iron content of foods, oral supplement drink samples (low in added sugars) and coupons. Patient Instructions   Include a high vitamin C source with high Iron food source at meals. Review your food lists given today and form a grocery list.    Call if you have further questions. Comprehension verified using teachback method.     Monitoring/Evaluation:   -Followup visit: as needed with dietitian.   -Receptiveness to education/goals: Agreeable.  -Evaluation of education: Indicates understanding.  -Readiness to change: action - ready to set action plan and implement buying groceries appropriate to foods high in iron and vitamin C.  -Expected compliance: good. Thank you for your referral of this patient. Total time involved in direct patient education: 30 minutes for initial MNT visit.

## 2022-04-01 ENCOUNTER — HOSPITAL ENCOUNTER (OUTPATIENT)
Dept: NURSING | Age: 62
Discharge: HOME | End: 2022-04-01
Payer: MEDICARE

## 2022-04-01 VITALS
RESPIRATION RATE: 16 BRPM | TEMPERATURE: 98 F | SYSTOLIC BLOOD PRESSURE: 117 MMHG | OXYGEN SATURATION: 100 % | DIASTOLIC BLOOD PRESSURE: 56 MMHG | HEART RATE: 88 BPM

## 2022-04-01 DIAGNOSIS — D50.0 IRON DEFICIENCY ANEMIA DUE TO CHRONIC BLOOD LOSS: Primary | ICD-10-CM

## 2022-04-01 PROCEDURE — 6360000002 HC RX W HCPCS: Performed by: FAMILY MEDICINE

## 2022-04-01 PROCEDURE — 96365 THER/PROPH/DIAG IV INF INIT: CPT

## 2022-04-01 PROCEDURE — 2580000003 HC RX 258: Performed by: FAMILY MEDICINE

## 2022-04-01 RX ORDER — ONDANSETRON 2 MG/ML
8 INJECTION INTRAMUSCULAR; INTRAVENOUS
Status: CANCELLED | OUTPATIENT
Start: 2022-04-08

## 2022-04-01 RX ORDER — ACETAMINOPHEN 325 MG/1
650 TABLET ORAL
Status: CANCELLED | OUTPATIENT
Start: 2022-04-08

## 2022-04-01 RX ORDER — MEPERIDINE HYDROCHLORIDE 25 MG/ML
12.5 INJECTION INTRAMUSCULAR; INTRAVENOUS; SUBCUTANEOUS PRN
Status: CANCELLED | OUTPATIENT
Start: 2022-04-08

## 2022-04-01 RX ORDER — SODIUM CHLORIDE 9 MG/ML
25 INJECTION, SOLUTION INTRAVENOUS PRN
Status: CANCELLED | OUTPATIENT
Start: 2022-04-08

## 2022-04-01 RX ORDER — SODIUM CHLORIDE 9 MG/ML
INJECTION, SOLUTION INTRAVENOUS CONTINUOUS
Status: CANCELLED | OUTPATIENT
Start: 2022-04-08

## 2022-04-01 RX ORDER — SODIUM CHLORIDE 0.9 % (FLUSH) 0.9 %
5-40 SYRINGE (ML) INJECTION PRN
Status: CANCELLED | OUTPATIENT
Start: 2022-04-08

## 2022-04-01 RX ORDER — DIPHENHYDRAMINE HYDROCHLORIDE 50 MG/ML
50 INJECTION INTRAMUSCULAR; INTRAVENOUS
Status: CANCELLED | OUTPATIENT
Start: 2022-04-08

## 2022-04-01 RX ORDER — HEPARIN SODIUM (PORCINE) LOCK FLUSH IV SOLN 100 UNIT/ML 100 UNIT/ML
500 SOLUTION INTRAVENOUS PRN
Status: CANCELLED | OUTPATIENT
Start: 2022-04-08

## 2022-04-01 RX ORDER — SODIUM CHLORIDE 9 MG/ML
INJECTION, SOLUTION INTRAVENOUS CONTINUOUS
Status: ACTIVE | OUTPATIENT
Start: 2022-04-01 | End: 2022-04-01

## 2022-04-01 RX ORDER — ALBUTEROL SULFATE 90 UG/1
4 AEROSOL, METERED RESPIRATORY (INHALATION) PRN
Status: CANCELLED | OUTPATIENT
Start: 2022-04-08

## 2022-04-01 RX ADMIN — IRON SUCROSE 200 MG: 20 INJECTION, SOLUTION INTRAVENOUS at 13:37

## 2022-04-01 NOTE — PROGRESS NOTES
1330: Patient arrived ambulatory for venofer infusion. PT RIGHTS AND RESPONSIBILITIES OFFERED TO PT. Patient offered snack and beverage. 1337: Venofer infusion started. 1440: Infusion complete, patient tolerated well. AVS reviewed with patient, voiced understanding. Patient discharged ambulatory.                        _m___ Safety:       (Environmental)   Ramsay to environment   Ensure ID band is correct and in place/ allergy band as needed   Assess for fall risk   Initiate fall precautions as applicable (fall band, side rails, etc.)   Call light within reach   Bed in low position/ wheels locked    _m___ Pain:        Assess pain level and characteristics   Administer analgesics as ordered   Assess effectiveness of pain management and report to MD as needed    _m___ Knowledge Deficit:   Assess baseline knowledge   Provide teaching at level of understanding   Provide teaching via preferred learning method   Evaluate teaching effectiveness    _m___ Hemodynamic/Respiratory Status:       (Pre and Post Procedure Monitoring)   Assess/Monitor vital signs and LOC   Assess Baseline SpO2 prior to any sedation   Obtain weight/height   Assess vital signs/ LOC until patient meets discharge criteria   Monitor procedure site and notify MD of any issues

## 2022-04-07 ENCOUNTER — HOSPITAL ENCOUNTER (OUTPATIENT)
Dept: NURSING | Age: 62
Discharge: HOME | End: 2022-04-07
Payer: MEDICARE

## 2022-04-07 VITALS — HEART RATE: 76 BPM | TEMPERATURE: 98.9 F | RESPIRATION RATE: 18 BRPM | OXYGEN SATURATION: 98 %

## 2022-04-07 DIAGNOSIS — D50.0 IRON DEFICIENCY ANEMIA DUE TO CHRONIC BLOOD LOSS: Primary | ICD-10-CM

## 2022-04-07 PROCEDURE — 96365 THER/PROPH/DIAG IV INF INIT: CPT

## 2022-04-07 PROCEDURE — 2580000003 HC RX 258: Performed by: FAMILY MEDICINE

## 2022-04-07 PROCEDURE — 6360000002 HC RX W HCPCS: Performed by: FAMILY MEDICINE

## 2022-04-07 RX ORDER — ALBUTEROL SULFATE 90 UG/1
4 AEROSOL, METERED RESPIRATORY (INHALATION) PRN
Status: CANCELLED | OUTPATIENT
Start: 2022-04-14

## 2022-04-07 RX ORDER — SODIUM CHLORIDE 9 MG/ML
INJECTION, SOLUTION INTRAVENOUS CONTINUOUS
Status: CANCELLED | OUTPATIENT
Start: 2022-04-14

## 2022-04-07 RX ORDER — SODIUM CHLORIDE 9 MG/ML
INJECTION, SOLUTION INTRAVENOUS CONTINUOUS
Status: ACTIVE | OUTPATIENT
Start: 2022-04-07 | End: 2022-04-07

## 2022-04-07 RX ORDER — MEPERIDINE HYDROCHLORIDE 25 MG/ML
12.5 INJECTION INTRAMUSCULAR; INTRAVENOUS; SUBCUTANEOUS PRN
Status: CANCELLED | OUTPATIENT
Start: 2022-04-14

## 2022-04-07 RX ORDER — HEPARIN SODIUM (PORCINE) LOCK FLUSH IV SOLN 100 UNIT/ML 100 UNIT/ML
500 SOLUTION INTRAVENOUS PRN
Status: CANCELLED | OUTPATIENT
Start: 2022-04-14

## 2022-04-07 RX ORDER — ONDANSETRON 2 MG/ML
8 INJECTION INTRAMUSCULAR; INTRAVENOUS
Status: CANCELLED | OUTPATIENT
Start: 2022-04-14

## 2022-04-07 RX ORDER — SODIUM CHLORIDE 0.9 % (FLUSH) 0.9 %
5-40 SYRINGE (ML) INJECTION PRN
Status: CANCELLED | OUTPATIENT
Start: 2022-04-14

## 2022-04-07 RX ORDER — ACETAMINOPHEN 325 MG/1
650 TABLET ORAL
Status: CANCELLED | OUTPATIENT
Start: 2022-04-14

## 2022-04-07 RX ORDER — SODIUM CHLORIDE 9 MG/ML
25 INJECTION, SOLUTION INTRAVENOUS PRN
Status: CANCELLED | OUTPATIENT
Start: 2022-04-14

## 2022-04-07 RX ORDER — DIPHENHYDRAMINE HYDROCHLORIDE 50 MG/ML
50 INJECTION INTRAMUSCULAR; INTRAVENOUS
Status: CANCELLED | OUTPATIENT
Start: 2022-04-14

## 2022-04-07 RX ORDER — SODIUM CHLORIDE 0.9 % (FLUSH) 0.9 %
5-40 SYRINGE (ML) INJECTION PRN
Status: DISCONTINUED | OUTPATIENT
Start: 2022-04-07 | End: 2022-04-08 | Stop reason: HOSPADM

## 2022-04-07 RX ADMIN — SODIUM CHLORIDE, PRESERVATIVE FREE 10 ML: 5 INJECTION INTRAVENOUS at 13:31

## 2022-04-07 RX ADMIN — IRON SUCROSE 200 MG: 20 INJECTION, SOLUTION INTRAVENOUS at 13:30

## 2022-04-07 ASSESSMENT — PAIN - FUNCTIONAL ASSESSMENT: PAIN_FUNCTIONAL_ASSESSMENT: 0-10

## 2022-04-08 ENCOUNTER — HOSPITAL ENCOUNTER (OUTPATIENT)
Dept: NURSING | Age: 62
End: 2022-04-08

## 2022-04-15 ENCOUNTER — HOSPITAL ENCOUNTER (OUTPATIENT)
Dept: NURSING | Age: 62
Discharge: HOME | End: 2022-04-15
Payer: MEDICARE

## 2022-04-15 VITALS
DIASTOLIC BLOOD PRESSURE: 64 MMHG | SYSTOLIC BLOOD PRESSURE: 134 MMHG | HEART RATE: 84 BPM | TEMPERATURE: 97.4 F | OXYGEN SATURATION: 98 % | RESPIRATION RATE: 18 BRPM

## 2022-04-15 DIAGNOSIS — D50.0 IRON DEFICIENCY ANEMIA DUE TO CHRONIC BLOOD LOSS: Primary | ICD-10-CM

## 2022-04-15 PROCEDURE — 6360000002 HC RX W HCPCS: Performed by: FAMILY MEDICINE

## 2022-04-15 PROCEDURE — 96365 THER/PROPH/DIAG IV INF INIT: CPT

## 2022-04-15 PROCEDURE — 2580000003 HC RX 258: Performed by: FAMILY MEDICINE

## 2022-04-15 RX ORDER — ACETAMINOPHEN 325 MG/1
650 TABLET ORAL
OUTPATIENT
Start: 2022-04-22

## 2022-04-15 RX ORDER — SODIUM CHLORIDE 0.9 % (FLUSH) 0.9 %
5-40 SYRINGE (ML) INJECTION PRN
Status: CANCELLED | OUTPATIENT
Start: 2022-04-22

## 2022-04-15 RX ORDER — HEPARIN SODIUM (PORCINE) LOCK FLUSH IV SOLN 100 UNIT/ML 100 UNIT/ML
500 SOLUTION INTRAVENOUS PRN
OUTPATIENT
Start: 2022-04-22

## 2022-04-15 RX ORDER — SODIUM CHLORIDE 9 MG/ML
INJECTION, SOLUTION INTRAVENOUS CONTINUOUS
Status: ACTIVE | OUTPATIENT
Start: 2022-04-15 | End: 2022-04-15

## 2022-04-15 RX ORDER — SODIUM CHLORIDE 9 MG/ML
INJECTION, SOLUTION INTRAVENOUS CONTINUOUS
Status: CANCELLED | OUTPATIENT
Start: 2022-04-22

## 2022-04-15 RX ORDER — MEPERIDINE HYDROCHLORIDE 25 MG/ML
12.5 INJECTION INTRAMUSCULAR; INTRAVENOUS; SUBCUTANEOUS PRN
OUTPATIENT
Start: 2022-04-22

## 2022-04-15 RX ORDER — SODIUM CHLORIDE 9 MG/ML
25 INJECTION, SOLUTION INTRAVENOUS PRN
OUTPATIENT
Start: 2022-04-22

## 2022-04-15 RX ORDER — DIPHENHYDRAMINE HYDROCHLORIDE 50 MG/ML
50 INJECTION INTRAMUSCULAR; INTRAVENOUS
OUTPATIENT
Start: 2022-04-22

## 2022-04-15 RX ORDER — SODIUM CHLORIDE 9 MG/ML
INJECTION, SOLUTION INTRAVENOUS CONTINUOUS
OUTPATIENT
Start: 2022-04-22

## 2022-04-15 RX ORDER — ALBUTEROL SULFATE 90 UG/1
4 AEROSOL, METERED RESPIRATORY (INHALATION) PRN
OUTPATIENT
Start: 2022-04-22

## 2022-04-15 RX ORDER — ONDANSETRON 2 MG/ML
8 INJECTION INTRAMUSCULAR; INTRAVENOUS
OUTPATIENT
Start: 2022-04-22

## 2022-04-15 RX ADMIN — IRON SUCROSE 200 MG: 20 INJECTION, SOLUTION INTRAVENOUS at 13:31

## 2022-04-15 RX ADMIN — SODIUM CHLORIDE: 9 INJECTION, SOLUTION INTRAVENOUS at 13:31

## 2022-04-15 ASSESSMENT — PAIN - FUNCTIONAL ASSESSMENT: PAIN_FUNCTIONAL_ASSESSMENT: 0-10

## 2022-04-15 NOTE — PROGRESS NOTES
1326 Patient arrived to [facility] ambulatory for venofer infusion. Oriented to room and call light  PT RIGHTS AND RESPONSIBILITIES OFFERED TO PT.    1331 Medication started and she denies complaints    1431 Medication completed and she denies complaints. Iv removed. Discharge instructions given and explained and she denies questions.   Discharged ambulatory    _M___ Safety:       (Environmental)  Meenakshi Specter to environment   Ensure ID band is correct and in place/ allergy band as needed   Assess for fall risk   Initiate fall precautions as applicable (fall band, side rails, etc.)   Call light within reach   Bed in low position/ wheels locked    _M___ Pain:        Assess pain level and characteristics   Administer analgesics as ordered   Assess effectiveness of pain management and report to MD as needed    _M___ Knowledge Deficit:   Assess baseline knowledge   Provide teaching at level of understanding   Provide teaching via preferred learning method   Evaluate teaching effectiveness    _M___ Hemodynamic/Respiratory Status:       (Pre and Post Procedure Monitoring)   Assess/Monitor vital signs and LOC   Assess Baseline SpO2 prior to any sedation   Obtain weight/height   Assess vital signs/ LOC until patient meets discharge criteria   Monitor procedure site and notify MD of any issues

## 2022-04-19 ENCOUNTER — HOSPITAL ENCOUNTER (OUTPATIENT)
Age: 62
Discharge: HOME | End: 2022-04-19
Payer: MEDICARE

## 2022-04-19 ENCOUNTER — OFFICE VISIT (OUTPATIENT)
Dept: FAMILY MEDICINE CLINIC | Age: 62
End: 2022-04-19
Payer: MEDICARE

## 2022-04-19 VITALS
TEMPERATURE: 96.9 F | OXYGEN SATURATION: 99 % | SYSTOLIC BLOOD PRESSURE: 128 MMHG | DIASTOLIC BLOOD PRESSURE: 74 MMHG | BODY MASS INDEX: 26.45 KG/M2 | HEART RATE: 70 BPM | WEIGHT: 122.6 LBS | RESPIRATION RATE: 16 BRPM | HEIGHT: 57 IN

## 2022-04-19 DIAGNOSIS — D50.0 IRON DEFICIENCY ANEMIA DUE TO CHRONIC BLOOD LOSS: Primary | ICD-10-CM

## 2022-04-19 DIAGNOSIS — D50.0 IRON DEFICIENCY ANEMIA DUE TO CHRONIC BLOOD LOSS: ICD-10-CM

## 2022-04-19 DIAGNOSIS — R11.0 NAUSEA: ICD-10-CM

## 2022-04-19 DIAGNOSIS — K30 UPSET STOMACH: ICD-10-CM

## 2022-04-19 LAB
ANISOCYTOSIS: PRESENT
BASOPHILS # BLD: 0.6 %
BASOPHILS ABSOLUTE: 0 THOU/MM3 (ref 0–0.1)
ELLIPTOCYTES: ABNORMAL
EOSINOPHIL # BLD: 3.2 %
EOSINOPHILS ABSOLUTE: 0.2 THOU/MM3 (ref 0–0.4)
ERYTHROCYTE [DISTWIDTH] IN BLOOD BY AUTOMATED COUNT: 26.4 % (ref 11.5–14.5)
ERYTHROCYTE [DISTWIDTH] IN BLOOD BY AUTOMATED COUNT: 85 FL (ref 35–45)
FERRITIN: 260 NG/ML (ref 10–291)
HCT VFR BLD CALC: 38.5 % (ref 37–47)
HEMOGLOBIN: 11.2 GM/DL (ref 12–16)
IMMATURE GRANS (ABS): 0.01 THOU/MM3 (ref 0–0.07)
IMMATURE GRANULOCYTES: 0.2 %
LYMPHOCYTES # BLD: 17.4 %
LYMPHOCYTES ABSOLUTE: 0.9 THOU/MM3 (ref 1–4.8)
MCH RBC QN AUTO: 25.9 PG (ref 26–33)
MCHC RBC AUTO-ENTMCNC: 29.1 GM/DL (ref 32.2–35.5)
MCV RBC AUTO: 89.1 FL (ref 81–99)
MONOCYTES # BLD: 8.8 %
MONOCYTES ABSOLUTE: 0.5 THOU/MM3 (ref 0.4–1.3)
NUCLEATED RED BLOOD CELLS: 0 /100 WBC
PLATELET # BLD: 265 THOU/MM3 (ref 130–400)
PMV BLD AUTO: 9.7 FL (ref 9.4–12.4)
POIKILOCYTES: ABNORMAL
RBC # BLD: 4.32 MILL/MM3 (ref 4.2–5.4)
SCAN OF BLOOD SMEAR: NORMAL
SEG NEUTROPHILS: 69.8 %
SEGMENTED NEUTROPHILS ABSOLUTE COUNT: 3.6 THOU/MM3 (ref 1.8–7.7)
WBC # BLD: 5.2 THOU/MM3 (ref 4.8–10.8)

## 2022-04-19 PROCEDURE — 82728 ASSAY OF FERRITIN: CPT

## 2022-04-19 PROCEDURE — 99213 OFFICE O/P EST LOW 20 MIN: CPT | Performed by: STUDENT IN AN ORGANIZED HEALTH CARE EDUCATION/TRAINING PROGRAM

## 2022-04-19 PROCEDURE — 85025 COMPLETE CBC W/AUTO DIFF WBC: CPT

## 2022-04-19 PROCEDURE — 36415 COLL VENOUS BLD VENIPUNCTURE: CPT

## 2022-04-19 RX ORDER — ASPIRIN 81 MG/1
81 TABLET ORAL 2 TIMES DAILY
Status: ON HOLD | COMMUNITY
End: 2022-04-25 | Stop reason: HOSPADM

## 2022-04-19 NOTE — PATIENT INSTRUCTIONS
Thank you   1. Thank you for trusting us with your healthcare needs. You may receive a survey regarding today's visit. It would help us out if you would take a few moments to provide your feedback. We value your input. 2. Please bring in ALL medications BOTTLES, including inhalers, herbal supplements, over the counter, prescribed & non-prescribed medicine. The office would like actual medication bottles and a list.   3. Please note our OFFICE POLICIES:   a. Prior to getting your labs drawn, please check with your insurance company for benefits and eligibility of lab services. Often, insurance companies cover certain tests for preventative visits only. It is patient's responsibility to see what is covered. b. We are unable to change a diagnosis after the test has been performed. c. Lab orders will not be re-printed. Please hold onto your original lab orders and take them to your lab to be completed. d. If you no show your scheduled appointment three times, you will be dismissed from this practice. e. Almanza Narvaez must be completed 24 hours prior to your schedule appointment. 4. If the list below has been completed, PLEASE FAX RECORDS TO OUR OFFICE @ [phone]. Once the records have been received we will update your records at our office: There are no preventive care reminders to display for this patient.

## 2022-04-19 NOTE — PROGRESS NOTES
S: 64 y.o. female with   Chief Complaint   Patient presents with    1 Month Follow-Up     upset stomach, decreased appetitie, and nausea started yesterday       HPI: please see resident note for HPI and ROS. BP Readings from Last 3 Encounters:   04/19/22 128/74   04/15/22 134/64   04/01/22 (!) 117/56     Wt Readings from Last 3 Encounters:   04/19/22 122 lb 9.6 oz (55.6 kg)   03/31/22 127 lb 12.8 oz (58 kg)   03/15/22 125 lb 12.8 oz (57.1 kg)       O: VS:  height is 4' 9\" (1.448 m) and weight is 122 lb 9.6 oz (55.6 kg). Her temporal temperature is 96.9 °F (36.1 °C). Her blood pressure is 128/74 and her pulse is 70. Her respiration is 16 and oxygen saturation is 99%. Diagnosis Orders   1. Iron deficiency anemia due to chronic blood loss         Plan:  Continue venofer infusions and recheck iron and cbc now and after infusions. Continue protonix. There are no preventive care reminders to display for this patient. Attending Physician Statement  I have discussed the case, including pertinent history and exam findings with the resident. I agree with the documented assessment and plan as documented by the resident.         Nimesh Purvis DO 4/19/2022 3:55 PM

## 2022-04-19 NOTE — PROGRESS NOTES
[address] [facility] [address]  [facility] [address]  Dept: [phone]  Dept Fax: [phone]  Loc: [phone]      Maris Young is a 64 y.o. female who presents today for:  Chief Complaint   Patient presents with    1 Month Follow-Up     upset stomach, decreased appetitie, and nausea started yesterday       Goals      Exercise 3x per week (30 min per time)           HPI:     HPI  Patient presents for follow up for iron defiency anemia. States that she had been feeling well up until yesterday when she started to feel nauseated. She is unsure if this is because her iron is low again. States that she has felt some abdominal cramping as well. Denies vomiting or diarrhea. Denies fevers. Has been getting venofer infusions as scheduled.      Past Medical History:   Diagnosis Date    Balance problem     Bipolar I disorder, most recent episode (or current) depressed, in partial or unspecified remission 5/8/2013    Cancer ([facility]     CERVICAL     Depression     Fatigue     Gallstones     Gastric bypass status for obesity 2002    GERD (gastroesophageal reflux disease)     History of blood transfusion     History of hysterectomy     REMOVAL OF ONE OVARY     Hypothyroidism     Liver cyst     GI Dr Summer Haley MS (multiple sclerosis) ([facility]     Paresthesias     RT LOWER LIMB    Ulcer at site of surgical anastomosis following bypass of stomach 1/15/2022    UTI (urinary tract infection)       Past Surgical History:   Procedure Laterality Date    APPENDECTOMY      BONE GRAFT Left     left leg to right arm    CARPAL TUNNEL RELEASE  1999    CHOLECYSTECTOMY, LAPAROSCOPIC  5/16/2016    COLONOSCOPY      COLONOSCOPY N/A 1/16/2022    COLONOSCOPY CONTROL HEMORRHAGE performed by Nicole Carr MD at [address] [place] [address], ESOPHAGUS      ENDOSCOPY, COLON, DIAGNOSTIC      ENTEROSCOPY N/A 2/5/2018    ENTEROSCOPY performed by Juan Manuel Gilbert MD at [address] [facility] N/A 2020    GASTROSTOMY TUBE PLACEMENT- [facility] TO DO GASTRIC ENDOSCOPY performed by Gordy Jacobs MD at [facility], VAGINAL      LAPAROSCOPIC APPENDECTOMY  2016    OTHER SURGICAL HISTORY      RECTAL [address]    OTHER SURGICAL HISTORY      D&C    OTHER SURGICAL HISTORY      GI BLEED    OVARY REMOVAL N/A     pt unaware which one was taken    PACEMAKER INSERTION Right 2019    Dr. Lopez Do FLX DX W/COLLJ SPEC WHEN PFRMD Left 2018    [facility]    IN OFFICE/OUTPT VISIT,PROCEDURE ONLY Left 2018    EGD ESOPHAGOGASTRODUODENOSCOPY performed by Matias Rosales MD at [facility]    IN OFFICE/OUTPT VISIT,PROCEDURE ONLY N/A 10/4/2018    EGD DIAGNOSTIC ONLY performed by Benny Ahn MD at [place] [address] ENDOSCOPY Left 2019    EGD DIAGNOSTIC ONLY performed by Jorge Gilbert MD at [address] [facility] ENDOSCOPY N/A 2020    ENTEROSCOPY PUSH CONTROL HEMORRHAGE performed by Juan Manuel Gilbert MD at [address] [facility] N/A 2022    ENTEROSCOPY PUSH DIAGNOSTIC performed by Anitha Escobedo MD at [address] [facility]  2022     Family History   Problem Relation Age of Onset    Cancer Mother 43    Colon Polyps Mother     Breast Cancer Mother     Heart Disease Father     Glaucoma Father     Cancer Sister     Colon Cancer Sister     Cancer Sister     Breast Cancer Sister      Social History     Tobacco Use    Smoking status: Former Smoker     Packs/day: 0.10     Years: 0.50     Pack years: 0.05     Types: Cigarettes     Quit date: 10/24/1977     Years since quittin.5    Smokeless tobacco: Never Used   Substance Use Topics    Alcohol use: No      No current facility-administered medications for this visit. No current outpatient medications on file.      Facility-Administered Medications Ordered in Other Visits   Medication Dose Route Frequency Provider Last Rate Last Admin    pantoprazole (PROTONIX) 80 mg in sodium chloride 0.9 % 100 mL infusion  8 mg/hr IntraVENous Continuous Saúl Ro, APRN - CNP 10 mL/hr at 04/24/22 0639 8 mg/hr at 04/24/22 0639    0.9 % sodium chloride infusion   IntraVENous PRN Salma A Mir, APRN - CNP        fluticasone (FLONASE) 50 MCG/ACT nasal spray 2 spray  2 spray Each Nostril Daily Saúl Ro, APRN - CNP   2 spray at 04/24/22 1241    ipratropium (ATROVENT) 0.03 % nasal spray 2 spray  2 spray Each Nostril TID Saúl Ro, APRN - CNP        lamoTRIgine (LAMICTAL) tablet 200 mg  200 mg Oral Daily Saúl Ro, APRN - CNP   200 mg at 04/24/22 1240    levothyroxine (SYNTHROID) tablet 100 mcg  100 mcg Oral Daily Saúl Ro, APRN - CNP   100 mcg at 04/24/22 1240    lithium capsule 600 mg  600 mg Oral BID Saúl Ro, APRN - CNP   600 mg at 04/24/22 1239    cetirizine (ZYRTEC) tablet 10 mg  10 mg Oral Daily Saúl Ro, APRN - CNP   10 mg at 04/24/22 1239    lurasidone (LATUDA) tablet 40 mg  40 mg Oral Nightly Salma A Mir APRN - CNP        magnesium oxide (MAG-OX) tablet 250 mg  250 mg Oral Nightly Salma A Mir APRN - CNP        vilazodone HCl (VIIBRYD) TABS 40 mg  40 mg Oral Daily Salma A Jenniferan, APRN - CNP   40 mg at 04/24/22 1239    sodium chloride flush 0.9 % injection 5-40 mL  5-40 mL IntraVENous 2 times per day Saúl Ro, APRN - CNP        sodium chloride flush 0.9 % injection 5-40 mL  5-40 mL IntraVENous PRN Salma A Mir, APRN - CNP        0.9 % sodium chloride infusion   IntraVENous PRN Saúl Ro, APRN - CNP        ondansetron (ZOFRAN-ODT) disintegrating tablet 4 mg  4 mg Oral Q8H PRN Salma A Mir, AJITH - CNP        Or    ondansetron (ZOFRAN) injection 4 mg  4 mg IntraVENous Q6H PRN AJITH Brizuela - CNP        acetaminophen (TYLENOL) tablet 650 mg  650 mg Oral Q6H PRN AJITH Mccord CNP        Or    acetaminophen (TYLENOL) suppository 650 mg  650 mg Rectal Q6H PRN AJITH Brizuela - CNP        0.9 % sodium chloride infusion   IntraVENous Continuous AJITH Mccord CNP 75 mL/hr at 04/24/22 0757 New Bag at 04/24/22 0757    sucralfate (CARAFATE) tablet 1 g  1 g Oral 4 times per day AJITH Mccord CNP        polyethylene glycol (GLYCOLAX) powder 238 g  238 g Oral Once AJITH Dorado CNP         Allergies   Allergen Reactions    Latex Rash    Sulfa Antibiotics Rash       Health Maintenance   Topic Date Due    Breast cancer screen  01/24/2023    Depression Monitoring  02/18/2023    Annual Wellness Visit (AWV)  02/19/2023    TSH  03/01/2023    Pneumococcal 0-64 years Vaccine (3 - PPSV23 or PCV20) 05/12/2025    Lipids  03/02/2027    DTaP/Tdap/Td vaccine (4 - Td or Tdap) 10/25/2028    Colorectal Cancer Screen  01/16/2032    Flu vaccine  Completed    Shingles Vaccine  Completed    COVID-19 Vaccine  Completed    Hepatitis C screen  Completed    HIV screen  Completed    Hepatitis A vaccine  Aged Out    Hepatitis B vaccine  Aged Out    Hib vaccine  Aged Out    Meningococcal (ACWY) vaccine  Aged Out       Subjective:      Review of Systems   Constitutional: Negative for chills, fatigue and fever. HENT: Negative for ear pain, postnasal drip and trouble swallowing. Eyes: Negative for pain and visual disturbance. Respiratory: Negative for cough and shortness of breath. Cardiovascular: Negative for chest pain and palpitations. Gastrointestinal: Positive for abdominal pain and nausea. Negative for blood in stool, constipation, diarrhea and vomiting. Genitourinary: Negative for dysuria and urgency.    Skin: Negative for rash and wound.   Neurological: Negative for dizziness and headaches. Psychiatric/Behavioral: Negative for dysphoric mood. The patient is not nervous/anxious. Objective:     Vitals:    04/19/22 1458   BP: 128/74   Site: Left Upper Arm   Position: Sitting   Cuff Size: Medium Adult   Pulse: 70   Resp: 16   Temp: 96.9 °F (36.1 °C)   TempSrc: Temporal   SpO2: 99%   Weight: 122 lb 9.6 oz (55.6 kg)   Height: 4' 9\" (1.448 m)       Body mass index is 26.53 kg/m². Wt Readings from Last 3 Encounters:   04/24/22 110 lb (49.9 kg)   04/19/22 122 lb 9.6 oz (55.6 kg)   03/31/22 127 lb 12.8 oz (58 kg)     BP Readings from Last 3 Encounters:   04/24/22 (!) 100/49   04/22/22 (!) 111/57   04/19/22 128/74       Physical Exam  Vitals and nursing note reviewed. Constitutional:       General: She is not in acute distress. Appearance: She is well-developed. She is not diaphoretic. HENT:      Head: Normocephalic and atraumatic. Right Ear: External ear normal.      Left Ear: External ear normal.      Nose: Nose normal.   Eyes:      General: No scleral icterus. Right eye: No discharge. Left eye: No discharge. Conjunctiva/sclera: Conjunctivae normal.   Cardiovascular:      Rate and Rhythm: Normal rate and regular rhythm. Heart sounds: Normal heart sounds. No murmur heard. Pulmonary:      Effort: Pulmonary effort is normal.      Breath sounds: Normal breath sounds. Musculoskeletal:      Cervical back: Normal range of motion. Skin:     General: Skin is warm and dry. Findings: No erythema or rash. Neurological:      Mental Status: She is alert and oriented to person, place, and time. Cranial Nerves: No cranial nerve deficit. Psychiatric:         Behavior: Behavior normal.         Thought Content:  Thought content normal.         Judgment: Judgment normal.         Lab Results   Component Value Date    WBC 6.8 04/24/2022    HGB 8.3 (L) 04/24/2022    HCT 27.6 (L) 04/24/2022     04/24/2022    CHOL 141 03/02/2022    TRIG 32 03/02/2022    HDL 95 03/02/2022    LDLCALC 40 03/02/2022    AST 17 04/24/2022     04/24/2022    K 4.4 04/24/2022     04/24/2022    CREATININE 0.5 04/24/2022    BUN 32 (H) 04/24/2022    CO2 21 (L) 04/24/2022    TSH 1.400 03/01/2022    INR 0.93 03/04/2022    LABA1C 4.7 11/13/2019    LABGLOM >90 04/24/2022    MG 2.0 10/04/2021    CALCIUM 9.4 04/24/2022    VITD25 32 03/01/2022       Imaging Results:    No results found. Assessment/Plan:     Burt Mariano was seen today for 1 month follow-up. Diagnoses and all orders for this visit:    Iron deficiency anemia due to chronic blood loss  -     CBC with Auto Differential; Standing  -     Ferritin; Future    Nausea    Upset stomach    Recheck CBC and ferritin  Likely GI virus at this time. Can take PPI and Carafate that she has previously been prescribed. F/u if no better. No follow-ups on file. Medications Prescribed:  No orders of the defined types were placed in this encounter. Future Appointments   Date Time Provider Rudi Brandon   4/29/2022  1:30 PM [facility] EXAM ROOM [address] [facility] [address] [facility]   5/24/2022  1:00 PM AJITH Jones - CNP N SRPX PSYCH [facility]   5/25/2022  1:00 PM Peace Lizarraga DO SRPX FM RES [facility]   6/2/2022 10:20 AM SCHEDULE, SRPX NW ADV CARDIOLOGY PACER SRPX NW CARD [facility] Ruth Oliveira       Patient given educational materials - see patient instructions. Discussed use, benefit, and sideeffects of prescribed medications. All patient questions answered. Pt voiced understanding. Reviewed health maintenance. Instructed to continue current medications, diet and exercise. Patient agreed with treatment plan. Follow up as directed.      Electronically signed by Daniel Brizuela DO on 4/24/2022 at 4:23 PM

## 2022-04-21 ENCOUNTER — TELEPHONE (OUTPATIENT)
Dept: FAMILY MEDICINE CLINIC | Age: 62
End: 2022-04-21

## 2022-04-21 NOTE — TELEPHONE ENCOUNTER
----- Message from Jeffrey Forman DO sent at 4/20/2022  1:28 PM EDT -----  Your iron studies look very good. Your hemoglobin has gone up to 11.2 and your total stores of iron are drastically increased from prior to starting IV iron infusions. At this point I would recommend continuing the iron infusions until they are completed and then rechecking these labs when you are done with those. Given your nausea that you had yesterday in the office I would recommend holding off on the oral iron until we recheck those labs.   Please let us know if you have any questions

## 2022-04-22 ENCOUNTER — HOSPITAL ENCOUNTER (OUTPATIENT)
Dept: NURSING | Age: 62
Discharge: HOME | DRG: 378 | End: 2022-04-22
Payer: MEDICARE

## 2022-04-22 VITALS
DIASTOLIC BLOOD PRESSURE: 57 MMHG | TEMPERATURE: 98.7 F | RESPIRATION RATE: 16 BRPM | SYSTOLIC BLOOD PRESSURE: 111 MMHG | OXYGEN SATURATION: 99 % | HEART RATE: 80 BPM

## 2022-04-22 DIAGNOSIS — D50.0 IRON DEFICIENCY ANEMIA DUE TO CHRONIC BLOOD LOSS: Primary | ICD-10-CM

## 2022-04-22 PROCEDURE — 2580000003 HC RX 258: Performed by: FAMILY MEDICINE

## 2022-04-22 PROCEDURE — 6360000002 HC RX W HCPCS: Performed by: FAMILY MEDICINE

## 2022-04-22 PROCEDURE — 96365 THER/PROPH/DIAG IV INF INIT: CPT

## 2022-04-22 RX ORDER — HEPARIN SODIUM (PORCINE) LOCK FLUSH IV SOLN 100 UNIT/ML 100 UNIT/ML
500 SOLUTION INTRAVENOUS PRN
OUTPATIENT
Start: 2022-04-29

## 2022-04-22 RX ORDER — ALBUTEROL SULFATE 90 UG/1
4 AEROSOL, METERED RESPIRATORY (INHALATION) PRN
OUTPATIENT
Start: 2022-04-29

## 2022-04-22 RX ORDER — DIPHENHYDRAMINE HYDROCHLORIDE 50 MG/ML
50 INJECTION INTRAMUSCULAR; INTRAVENOUS
OUTPATIENT
Start: 2022-04-29

## 2022-04-22 RX ORDER — MEPERIDINE HYDROCHLORIDE 25 MG/ML
12.5 INJECTION INTRAMUSCULAR; INTRAVENOUS; SUBCUTANEOUS PRN
OUTPATIENT
Start: 2022-04-29

## 2022-04-22 RX ORDER — SODIUM CHLORIDE 9 MG/ML
INJECTION, SOLUTION INTRAVENOUS CONTINUOUS
OUTPATIENT
Start: 2022-04-29

## 2022-04-22 RX ORDER — SODIUM CHLORIDE 0.9 % (FLUSH) 0.9 %
5-40 SYRINGE (ML) INJECTION PRN
OUTPATIENT
Start: 2022-04-29

## 2022-04-22 RX ORDER — ACETAMINOPHEN 325 MG/1
650 TABLET ORAL
OUTPATIENT
Start: 2022-04-29

## 2022-04-22 RX ORDER — SODIUM CHLORIDE 9 MG/ML
25 INJECTION, SOLUTION INTRAVENOUS PRN
OUTPATIENT
Start: 2022-04-29

## 2022-04-22 RX ORDER — ONDANSETRON 2 MG/ML
8 INJECTION INTRAMUSCULAR; INTRAVENOUS
OUTPATIENT
Start: 2022-04-29

## 2022-04-22 RX ORDER — SODIUM CHLORIDE 9 MG/ML
INJECTION, SOLUTION INTRAVENOUS CONTINUOUS
Status: ACTIVE | OUTPATIENT
Start: 2022-04-22 | End: 2022-04-22

## 2022-04-22 RX ORDER — SODIUM CHLORIDE 0.9 % (FLUSH) 0.9 %
5-40 SYRINGE (ML) INJECTION PRN
Status: DISCONTINUED | OUTPATIENT
Start: 2022-04-22 | End: 2022-04-23 | Stop reason: HOSPADM

## 2022-04-22 RX ADMIN — IRON SUCROSE 200 MG: 20 INJECTION, SOLUTION INTRAVENOUS at 13:38

## 2022-04-22 RX ADMIN — SODIUM CHLORIDE, PRESERVATIVE FREE 10 ML: 5 INJECTION INTRAVENOUS at 14:51

## 2022-04-22 NOTE — PROGRESS NOTES
__M__ Safety:       (Environmental)   [place] to environment   Ensure ID band is correct and in place/ allergy band as needed   Assess for fall risk   Initiate fall precautions as applicable (fall band, side rails, etc.)   Call light within reach   Bed in low position/ wheels locked    __M__ Pain:        Assess pain level and characteristics   Administer analgesics as ordered   Assess effectiveness of pain management and report to MD as needed    __M__ Knowledge Deficit:   Assess baseline knowledge   Provide teaching at level of understanding   Provide teaching via preferred learning method   Evaluate teaching effectiveness    __M__ Hemodynamic/Respiratory Status:       (Pre and Post Procedure Monitoring)   Assess/Monitor vital signs and LOC   Assess Baseline SpO2 prior to any sedation   Obtain weight/height   Assess vital signs/ LOC until patient meets discharge criteria   Monitor procedure site and notify MD of any issues    _

## 2022-04-24 ENCOUNTER — HOSPITAL ENCOUNTER (INPATIENT)
Age: 62
LOS: 1 days | Discharge: ANOTHER HOSPITAL | DRG: 378 | End: 2022-04-25
Attending: INTERNAL MEDICINE
Payer: MEDICARE

## 2022-04-24 DIAGNOSIS — D62 ACUTE BLOOD LOSS ANEMIA: ICD-10-CM

## 2022-04-24 DIAGNOSIS — K92.2 GASTROINTESTINAL HEMORRHAGE, UNSPECIFIED GASTROINTESTINAL HEMORRHAGE TYPE: Primary | ICD-10-CM

## 2022-04-24 LAB
ABO: NORMAL
ABSOLUTE RETIC #: 33 THOU/MM3 (ref 20–115)
ALBUMIN SERPL-MCNC: 3.6 G/DL (ref 3.5–5.1)
ALP BLD-CCNC: 88 U/L (ref 38–126)
ALT SERPL-CCNC: 18 U/L (ref 11–66)
ANION GAP SERPL CALCULATED.3IONS-SCNC: 9 MEQ/L (ref 8–16)
ANISOCYTOSIS: PRESENT
ANTIBODY SCREEN: NORMAL
AST SERPL-CCNC: 17 U/L (ref 5–40)
BACTERIA: ABNORMAL /HPF
BASOPHILIA: ABNORMAL
BASOPHILS # BLD: 0.3 %
BASOPHILS ABSOLUTE: 0 THOU/MM3 (ref 0–0.1)
BILIRUB SERPL-MCNC: 0.2 MG/DL (ref 0.3–1.2)
BILIRUBIN DIRECT: < 0.2 MG/DL (ref 0–0.3)
BILIRUBIN URINE: NEGATIVE
BLOOD, URINE: NEGATIVE
BUN BLDV-MCNC: 32 MG/DL (ref 7–22)
CALCIUM SERPL-MCNC: 9.4 MG/DL (ref 8.5–10.5)
CASTS 2: ABNORMAL /LPF
CASTS UA: ABNORMAL /LPF
CHARACTER, URINE: CLEAR
CHLORIDE BLD-SCNC: 109 MEQ/L (ref 98–111)
CO2: 21 MEQ/L (ref 23–33)
COLOR: YELLOW
CREAT SERPL-MCNC: 0.5 MG/DL (ref 0.4–1.2)
CRYSTALS, UA: ABNORMAL
EOSINOPHIL # BLD: 0.3 %
EOSINOPHILS ABSOLUTE: 0 THOU/MM3 (ref 0–0.4)
EPITHELIAL CELLS, UA: ABNORMAL /HPF
ERYTHROCYTE [DISTWIDTH] IN BLOOD BY AUTOMATED COUNT: 26 % (ref 11.5–14.5)
ERYTHROCYTE [DISTWIDTH] IN BLOOD BY AUTOMATED COUNT: 86.8 FL (ref 35–45)
FERRITIN: 375 NG/ML (ref 10–291)
GFR SERPL CREATININE-BSD FRML MDRD: > 90 ML/MIN/1.73M2
GLUCOSE BLD-MCNC: 161 MG/DL (ref 70–108)
GLUCOSE URINE: NEGATIVE MG/DL
HCT VFR BLD CALC: 25 % (ref 37–47)
HCT VFR BLD CALC: 27.6 % (ref 37–47)
HCT VFR BLD CALC: 27.7 % (ref 37–47)
HEMOCCULT STL QL: POSITIVE
HEMOGLOBIN: 8 GM/DL (ref 12–16)
HEMOGLOBIN: 8 GM/DL (ref 12–16)
HEMOGLOBIN: 8.3 GM/DL (ref 12–16)
HYPOCHROMIA: PRESENT
IMMATURE GRANS (ABS): 0.03 THOU/MM3 (ref 0–0.07)
IMMATURE GRANULOCYTES: 0.4 %
IMMATURE RETIC FRACT: 22 % (ref 3–15.9)
IRON: 79 UG/DL (ref 50–170)
KETONES, URINE: NEGATIVE
LEUKOCYTE ESTERASE, URINE: ABNORMAL
LIPASE: 11.2 U/L (ref 5.6–51.3)
LYMPHOCYTES # BLD: 6.6 %
LYMPHOCYTES ABSOLUTE: 0.4 THOU/MM3 (ref 1–4.8)
MCH RBC QN AUTO: 26.6 PG (ref 26–33)
MCHC RBC AUTO-ENTMCNC: 28.9 GM/DL (ref 32.2–35.5)
MCV RBC AUTO: 92 FL (ref 81–99)
MISCELLANEOUS 2: ABNORMAL
MONOCYTES # BLD: 4.6 %
MONOCYTES ABSOLUTE: 0.3 THOU/MM3 (ref 0.4–1.3)
NITRITE, URINE: NEGATIVE
NUCLEATED RED BLOOD CELLS: 0 /100 WBC
OSMOLALITY CALCULATION: 287.9 MOSMOL/KG (ref 275–300)
PH UA: 6.5 (ref 5–9)
PLATELET # BLD: 205 THOU/MM3 (ref 130–400)
PMV BLD AUTO: 10.2 FL (ref 9.4–12.4)
POTASSIUM SERPL-SCNC: 4.4 MEQ/L (ref 3.5–5.2)
PROTEIN UA: NEGATIVE
RBC # BLD: 3.01 MILL/MM3 (ref 4.2–5.4)
RBC URINE: ABNORMAL /HPF
RENAL EPITHELIAL, UA: ABNORMAL
RETIC HEMOGLOBIN: 34.1 PG (ref 28.2–35.7)
RETICULOCYTE ABSOLUTE COUNT: 1.1 % (ref 0.5–2)
RH FACTOR: NORMAL
SCAN OF BLOOD SMEAR: NORMAL
SEG NEUTROPHILS: 87.8 %
SEGMENTED NEUTROPHILS ABSOLUTE COUNT: 6 THOU/MM3 (ref 1.8–7.7)
SODIUM BLD-SCNC: 139 MEQ/L (ref 135–145)
SPECIFIC GRAVITY, URINE: 1.02 (ref 1–1.03)
TOTAL IRON BINDING CAPACITY: 279 UG/DL (ref 171–450)
TOTAL PROTEIN: 5.4 G/DL (ref 6.1–8)
UROBILINOGEN, URINE: 0.2 EU/DL (ref 0–1)
VITAMIN B-12: 342 PG/ML (ref 211–911)
WBC # BLD: 6.8 THOU/MM3 (ref 4.8–10.8)
WBC UA: ABNORMAL /HPF
YEAST: ABNORMAL

## 2022-04-24 PROCEDURE — 83540 ASSAY OF IRON: CPT

## 2022-04-24 PROCEDURE — 85014 HEMATOCRIT: CPT

## 2022-04-24 PROCEDURE — 83550 IRON BINDING TEST: CPT

## 2022-04-24 PROCEDURE — 6370000000 HC RX 637 (ALT 250 FOR IP): Performed by: NURSE PRACTITIONER

## 2022-04-24 PROCEDURE — 2580000003 HC RX 258: Performed by: NURSE PRACTITIONER

## 2022-04-24 PROCEDURE — 86901 BLOOD TYPING SEROLOGIC RH(D): CPT

## 2022-04-24 PROCEDURE — 82728 ASSAY OF FERRITIN: CPT

## 2022-04-24 PROCEDURE — 82272 OCCULT BLD FECES 1-3 TESTS: CPT

## 2022-04-24 PROCEDURE — 99285 EMERGENCY DEPT VISIT HI MDM: CPT

## 2022-04-24 PROCEDURE — 6360000002 HC RX W HCPCS: Performed by: NURSE PRACTITIONER

## 2022-04-24 PROCEDURE — 82607 VITAMIN B-12: CPT

## 2022-04-24 PROCEDURE — 1200000003 HC TELEMETRY R&B

## 2022-04-24 PROCEDURE — 99223 1ST HOSP IP/OBS HIGH 75: CPT | Performed by: NURSE PRACTITIONER

## 2022-04-24 PROCEDURE — 81001 URINALYSIS AUTO W/SCOPE: CPT

## 2022-04-24 PROCEDURE — 82248 BILIRUBIN DIRECT: CPT

## 2022-04-24 PROCEDURE — 80053 COMPREHEN METABOLIC PANEL: CPT

## 2022-04-24 PROCEDURE — 86923 COMPATIBILITY TEST ELECTRIC: CPT

## 2022-04-24 PROCEDURE — P9016 RBC LEUKOCYTES REDUCED: HCPCS

## 2022-04-24 PROCEDURE — C9113 INJ PANTOPRAZOLE SODIUM, VIA: HCPCS | Performed by: NURSE PRACTITIONER

## 2022-04-24 PROCEDURE — 86850 RBC ANTIBODY SCREEN: CPT

## 2022-04-24 PROCEDURE — 83690 ASSAY OF LIPASE: CPT

## 2022-04-24 PROCEDURE — 96365 THER/PROPH/DIAG IV INF INIT: CPT

## 2022-04-24 PROCEDURE — 36415 COLL VENOUS BLD VENIPUNCTURE: CPT

## 2022-04-24 PROCEDURE — 85046 RETICYTE/HGB CONCENTRATE: CPT

## 2022-04-24 PROCEDURE — 85025 COMPLETE CBC W/AUTO DIFF WBC: CPT

## 2022-04-24 PROCEDURE — 86900 BLOOD TYPING SEROLOGIC ABO: CPT

## 2022-04-24 PROCEDURE — 85018 HEMOGLOBIN: CPT

## 2022-04-24 RX ORDER — SUCRALFATE 1 G/1
1 TABLET ORAL EVERY 6 HOURS SCHEDULED
Status: DISCONTINUED | OUTPATIENT
Start: 2022-04-24 | End: 2022-04-25 | Stop reason: HOSPADM

## 2022-04-24 RX ORDER — SODIUM CHLORIDE 0.9 % (FLUSH) 0.9 %
5-40 SYRINGE (ML) INJECTION PRN
Status: DISCONTINUED | OUTPATIENT
Start: 2022-04-24 | End: 2022-04-25 | Stop reason: HOSPADM

## 2022-04-24 RX ORDER — LITHIUM CARBONATE 300 MG/1
600 CAPSULE ORAL 2 TIMES DAILY
Status: DISCONTINUED | OUTPATIENT
Start: 2022-04-24 | End: 2022-04-25 | Stop reason: HOSPADM

## 2022-04-24 RX ORDER — SODIUM CHLORIDE 9 MG/ML
INJECTION, SOLUTION INTRAVENOUS CONTINUOUS
Status: DISCONTINUED | OUTPATIENT
Start: 2022-04-24 | End: 2022-04-25

## 2022-04-24 RX ORDER — ACETAMINOPHEN 325 MG/1
650 TABLET ORAL EVERY 6 HOURS PRN
Status: DISCONTINUED | OUTPATIENT
Start: 2022-04-24 | End: 2022-04-25 | Stop reason: HOSPADM

## 2022-04-24 RX ORDER — ONDANSETRON 2 MG/ML
4 INJECTION INTRAMUSCULAR; INTRAVENOUS EVERY 6 HOURS PRN
Status: DISCONTINUED | OUTPATIENT
Start: 2022-04-24 | End: 2022-04-25 | Stop reason: HOSPADM

## 2022-04-24 RX ORDER — ACETAMINOPHEN 650 MG/1
650 SUPPOSITORY RECTAL EVERY 6 HOURS PRN
Status: DISCONTINUED | OUTPATIENT
Start: 2022-04-24 | End: 2022-04-25 | Stop reason: HOSPADM

## 2022-04-24 RX ORDER — SODIUM CHLORIDE 0.9 % (FLUSH) 0.9 %
5-40 SYRINGE (ML) INJECTION EVERY 12 HOURS SCHEDULED
Status: DISCONTINUED | OUTPATIENT
Start: 2022-04-24 | End: 2022-04-25 | Stop reason: HOSPADM

## 2022-04-24 RX ORDER — POLYETHYLENE GLYCOL 3350 17 G/17G
238 POWDER, FOR SOLUTION ORAL ONCE
Status: COMPLETED | OUTPATIENT
Start: 2022-04-24 | End: 2022-04-24

## 2022-04-24 RX ORDER — PNV NO.95/FERROUS FUM/FOLIC AC 28MG-0.8MG
250 TABLET ORAL NIGHTLY
Status: DISCONTINUED | OUTPATIENT
Start: 2022-04-24 | End: 2022-04-25 | Stop reason: HOSPADM

## 2022-04-24 RX ORDER — LAMOTRIGINE 100 MG/1
200 TABLET ORAL DAILY
Status: DISCONTINUED | OUTPATIENT
Start: 2022-04-24 | End: 2022-04-25 | Stop reason: HOSPADM

## 2022-04-24 RX ORDER — IPRATROPIUM BROMIDE 21 UG/1
2 SPRAY, METERED NASAL 3 TIMES DAILY
Status: DISCONTINUED | OUTPATIENT
Start: 2022-04-24 | End: 2022-04-25 | Stop reason: HOSPADM

## 2022-04-24 RX ORDER — SODIUM CHLORIDE 9 MG/ML
INJECTION, SOLUTION INTRAVENOUS PRN
Status: DISCONTINUED | OUTPATIENT
Start: 2022-04-24 | End: 2022-04-25 | Stop reason: HOSPADM

## 2022-04-24 RX ORDER — VILAZODONE HYDROCHLORIDE 40 MG/1
40 TABLET ORAL DAILY
Status: DISCONTINUED | OUTPATIENT
Start: 2022-04-24 | End: 2022-04-25 | Stop reason: HOSPADM

## 2022-04-24 RX ORDER — ONDANSETRON 4 MG/1
4 TABLET, ORALLY DISINTEGRATING ORAL EVERY 8 HOURS PRN
Status: DISCONTINUED | OUTPATIENT
Start: 2022-04-24 | End: 2022-04-25 | Stop reason: HOSPADM

## 2022-04-24 RX ORDER — FLUTICASONE PROPIONATE 50 MCG
2 SPRAY, SUSPENSION (ML) NASAL DAILY
Status: DISCONTINUED | OUTPATIENT
Start: 2022-04-24 | End: 2022-04-25 | Stop reason: HOSPADM

## 2022-04-24 RX ORDER — LEVOTHYROXINE SODIUM 0.1 MG/1
100 TABLET ORAL DAILY
Status: DISCONTINUED | OUTPATIENT
Start: 2022-04-24 | End: 2022-04-25 | Stop reason: HOSPADM

## 2022-04-24 RX ORDER — CETIRIZINE HYDROCHLORIDE 10 MG/1
10 TABLET ORAL DAILY
Status: DISCONTINUED | OUTPATIENT
Start: 2022-04-24 | End: 2022-04-25 | Stop reason: HOSPADM

## 2022-04-24 RX ADMIN — LITHIUM CARBONATE 600 MG: 300 CAPSULE ORAL at 20:18

## 2022-04-24 RX ADMIN — VILAZODONE HYDROCHLORIDE 40 MG: 40 TABLET ORAL at 12:39

## 2022-04-24 RX ADMIN — LURASIDONE HYDROCHLORIDE 40 MG: 40 TABLET, FILM COATED ORAL at 20:18

## 2022-04-24 RX ADMIN — CETIRIZINE HYDROCHLORIDE 10 MG: 10 TABLET, FILM COATED ORAL at 12:39

## 2022-04-24 RX ADMIN — FLUTICASONE PROPIONATE 2 SPRAY: 50 SPRAY, METERED NASAL at 12:41

## 2022-04-24 RX ADMIN — SODIUM CHLORIDE 8 MG/HR: 9 INJECTION, SOLUTION INTRAVENOUS at 06:39

## 2022-04-24 RX ADMIN — LITHIUM CARBONATE 600 MG: 300 CAPSULE ORAL at 12:39

## 2022-04-24 RX ADMIN — POLYETHYLENE GLYCOL 3350 238 G: 17 POWDER, FOR SOLUTION ORAL at 16:56

## 2022-04-24 RX ADMIN — SODIUM CHLORIDE 80 MG: 9 INJECTION, SOLUTION INTRAVENOUS at 06:08

## 2022-04-24 RX ADMIN — IPRATROPIUM BROMIDE 2 SPRAY: 21 SPRAY NASAL at 16:56

## 2022-04-24 RX ADMIN — LAMOTRIGINE 200 MG: 100 TABLET ORAL at 12:40

## 2022-04-24 RX ADMIN — SUCRALFATE 1 G: 1 TABLET ORAL at 17:05

## 2022-04-24 RX ADMIN — IPRATROPIUM BROMIDE 2 SPRAY: 21 SPRAY NASAL at 20:18

## 2022-04-24 RX ADMIN — SODIUM CHLORIDE: 9 INJECTION, SOLUTION INTRAVENOUS at 07:57

## 2022-04-24 RX ADMIN — LEVOTHYROXINE SODIUM 100 MCG: 0.1 TABLET ORAL at 12:40

## 2022-04-24 RX ADMIN — Medication 250 MG: at 20:18

## 2022-04-24 ASSESSMENT — LIFESTYLE VARIABLES: HOW OFTEN DO YOU HAVE A DRINK CONTAINING ALCOHOL: NEVER

## 2022-04-24 ASSESSMENT — ENCOUNTER SYMPTOMS
CHEST TIGHTNESS: 0
RHINORRHEA: 0
COUGH: 0
TROUBLE SWALLOWING: 0
BLOOD IN STOOL: 0
VOMITING: 0
NAUSEA: 1
ABDOMINAL PAIN: 1
DIARRHEA: 0
ABDOMINAL PAIN: 0
SHORTNESS OF BREATH: 0
ABDOMINAL DISTENTION: 0
BACK PAIN: 0
EYE REDNESS: 0
BLOOD IN STOOL: 1
EYE PAIN: 0
NAUSEA: 0
COUGH: 0
VOMITING: 0
CONSTIPATION: 0

## 2022-04-24 ASSESSMENT — PAIN - FUNCTIONAL ASSESSMENT: PAIN_FUNCTIONAL_ASSESSMENT: NONE - DENIES PAIN

## 2022-04-24 ASSESSMENT — PAIN SCALES - GENERAL: PAINLEVEL_OUTOF10: 0

## 2022-04-24 NOTE — H&P
History & Physical        Patient:  Subhash Gaines  YOB: 1960    MRN: [id number]     Acct: [de-identified]    PCP: Gene Denise DO    Date of Admission: 4/24/2022    Date of Service: Pt seen/examined on 04/24/22  and Admitted to Inpatient with expected LOS greater than two midnights due to medical therapy. ASSESSMENT/PLAN:    1. Acute GI bleed--consult GI~follows with Dr. Anne-Marie Foote, patient is currently on a Protonix drip so we will continue; fecal occult blood positive; hold aspirin, patient is not on any other anticoagulants/antiplatelets  2. Acute blood loss anemia--patient is currently receiving 1 unit packed red blood cells, check hemoglobin approximately 1 hour after transfusion is complete, hold aspirin, please see #1; hemoglobin was noted to be 11.2 on April 19, 2022 and this morning down to 8.0; trend H/H every 6 hours; monitor vital signs closely  3. Elevated BUN--please secondary to #1, monitor  4. History of symptomatic bradycardia status post PPM on May 22nd 2019--follows with Dr. Tone Vasquez  5. Bipolar disorder--treated  6. History of Batsheva-en-Y  7. History of cervical cancer status post hysterectomy    Chief Complaint: Dark red stools    History Of Present Illness:    64 y.o. female who presented to [facility] with dark red stools; patient has a past medical history of symptomatic bradycardia with a pacemaker, bipolar disorder, history of Batsheva-en-Y along with history of cervical cancer status post hysterectomy; she states she has had numerous issues with rectal bleeding; she states last evening approximately 10:50 PM she noticed dark red stools, at that time she also related to lightheadedness and dizziness, she states she has had 2 stools total; she denies any chest pain or shortness of breath or nausea or abdominal pain.     In the emergency department, hemoglobin was noted to be 8.0 and on April 19, 2022 she was noted to be 11.2, she is currently receiving 1 unit [place] OFFICE/OUTPT VISIT,PROCEDURE ONLY Left 2/18/2018    EGD ESOPHAGOGASTRODUODENOSCOPY performed by Brenda Vieira MD at [facility] OFFICE/OUTPT VISIT,PROCEDURE ONLY N/A 10/4/2018    EGD DIAGNOSTIC ONLY performed by Olga Jordan MD at [facility] ENDOSCOPY Left 9/24/2019    EGD DIAGNOSTIC ONLY performed by Analia Leahy MD at [address] [facility] ENDOSCOPY N/A 9/26/2020    ENTEROSCOPY PUSH CONTROL HEMORRHAGE performed by Darin Montoya MD at [address] Endoscopy    UPPER GASTROINTESTINAL ENDOSCOPY N/A 1/16/2022    ENTEROSCOPY PUSH DIAGNOSTIC performed by Nicole Carr MD at [address]  04/13/2022       Medications Prior to Admission:      Prior to Admission medications    Medication Sig Start Date End Date Taking? Authorizing Provider   aspirin 81 MG EC tablet Take 81 mg by mouth in the morning and at bedtime    Historical Provider, MD   ferrous sulfate (IRON 325) 325 (65 Fe) MG tablet Take 1 tablet by mouth in the morning, at noon, and at bedtime 3/4/22 6/2/22  Re Brennan MD   vilazodone HCl (VILAZODONE HCL) 40 MG TABS TAKE 1 TABLET BY MOUTH ONCE DAILY. 2/24/22   Jacque Esparza, APRN - CNP   lurasidone (LATUDA) 40 MG TABS tablet Take 1 tablet by mouth nightly 2/24/22   Jacque Esparza, APRN - CNP   lithium 300 MG tablet Take 2 tablets by mouth 2 times daily 2/24/22   Jacque Esparza, APRN - CNP   lamoTRIgine (LAMICTAL) 200 MG tablet Take one tab by mouth once daily.  2/24/22   Jacque Esparza, APRN - CNP   docusate sodium (COLACE) 100 MG capsule TAKE 1 CAPSULE BY MOUTH 2 TIMES A DAY 1/20/22   Charlcie Libman, MD   sucralfate (CARAFATE) 1 GM tablet Take 1 tablet by mouth every 6 hours 1/18/22   Mohsen Cool MD   pantoprazole (PROTONIX) 40 MG tablet Take 1 tablet by mouth 2 times daily (before meals)  Patient taking differently: Take 40 mg by mouth daily  1/18/22   Navya Almodovar Jesus Piña MD   levothyroxine (SYNTHROID) 100 MCG tablet TAKE 1 TABLET BY MOUTH ONE TIME A DAY  Patient taking differently: nightly TAKE 1 TABLET BY MOUTH ONE TIME A DAY 1/4/22   Jessica Whitmore MD   DHEA 50 MG CAPS Take by mouth nightly    Historical Provider, MD   dimethyl fumarate (TECFIDERA) 240 MG delayed release capsule Take 1 capsule by mouth 2 times daily 6/10/21   Sylvester Babinski, DO   ipratropium (ATROVENT) 0.03 % nasal spray INSTILL 2 SPRAYS INTO EACH NOSTRIL 3 TIMES DAILY. 4/8/21   AJITH Lorenzana - CNP   Lactobacillus-Inulin ([address] [place] [address], [facility],) CAPS TAKE 1 CAPSULE BY MOUTH WITH BREAKFAST 2/4/21   Sylvester Babinski, DO   fluticasone [facility]) 50 MCG/ACT nasal spray 2 sprays by Each Nostril route daily    Historical Provider, MD   ondansetron (ZOFRAN) 4 MG tablet Take 1 tablet by mouth 3 times daily as needed for Nausea or Vomiting 9/22/20   Hortencia Mcdonald MD   Prosthesis MISC by Does not apply route Strap for the prosthesis broken please order new 3/18/20   Sylvester Babinski, DO   loratadine (CLARITIN) 10 MG tablet Take 1 tablet by mouth daily 2/26/20   Anitha Quigley MD   Cholecalciferol (VITAMIN D) 2000 units CAPS capsule Take by mouth nightly 50 mcg nightly    Historical Provider, MD   Magnesium Oxide 250 MG TABS Take 250 mg by mouth nightly     Historical Provider, MD   calcium carbonate 600 MG TABS tablet Take 1 tablet by mouth nightly     Historical Provider, MD   vitamin B-12 1000 MCG tablet Take 1 tablet by mouth daily 2/8/18   Nuala Sacks, MD   nitroGLYCERIN (NITROSTAT) 0.4 MG SL tablet up to max of 3 total doses. If no relief after 1 dose, call 911. 2/22/17   Raegan Johnson MD   Prenatal MV-Min-Fe Fum-FA-DHA (PRENATAL 1 PO) Take 1 tablet by mouth daily     Historical Provider, MD       Allergies:  Latex and Sulfa antibiotics    Social History:   reports that she quit smoking about 44 years ago. Her smoking use included cigarettes. She has a 0.05 pack-year smoking history. She has never used smokeless tobacco. She reports that she does not drink alcohol and does not use drugs. Family History:      Positive as follows:        Problem Relation Age of Onset    Cancer Mother 43    Colon Polyps Mother    Ardyth Moritz Breast Cancer Mother     Heart Disease Father     Glaucoma Father     Cancer Sister     Colon Cancer Sister     Cancer Sister     Breast Cancer Sister        REVIEW OF SYSTEMS:     Constitutional: ROS: positive for  - fatigue  Head: no headache, no head injury, no migraine   Eyes ROS: denies blurred/double vision  Ears ROS: no hearing difficulty, no tinnitus  Mouth and Throat ROS: no ulceration, dysphagia, dental caries  Psychological ROS: no depression, no anxiety, no panic attacks, denies suicide/homicide ideation  Endocrine ROS: denies polyuria, polydypsia, no heat or cold intolerance  Respiratory ROS: no cough, shortness of breath, or wheezing  Cardiovascular ROS: no chest pain or dyspnea on exertion  Gastrointestinal ROS: positive for - blood in stools  Genito-Urinary ROS: denies dysuria, frequency, urgency; denies hematuria  Musculoskeletal ROS: negative  Neurological ROS: no syncope, no seizures, no numbness or tingling of hands, no numbness or tingling of feet, no paresis  Dermatology: no skin rash, no eczema  Endocrine: no polyuria, polydypsia, no heat/cold intolerance  Hematology: denies bruising easily, denies bleeding problems, denies clotting disorders    PHYSICAL EXAM:    BP (!) 97/57   Pulse 60   Temp 98.7 °F (37.1 °C)   Resp 16   Ht 4' 9\" (1.448 m)   Wt 110 lb (49.9 kg)   SpO2 100%   BMI 23.80 kg/m²     General appearance:  No apparent distress, appears stated age and cooperative. HEENT:  Normal cephalic, atraumatic without obvious deformity. Pupils equal, round, and reactive to light. Conjunctivae/corneas clear. Neck: Supple, with full range of motion. No jugular venous distention. Trachea midline. Respiratory:  Normal respiratory effort.  Clear to auscultation, bilaterally without Rales/Wheezes/Rhonchi. Cardiovascular:  Regular rate and rhythm with normal S1/S2 without murmurs, rubs or gallops. Abdomen: Soft, non-tender, non-distended with normal bowel sounds. Musculoskeletal:  No clubbing, cyanosis or edema bilaterally. Full range of motion without deformity~prosthesis noted to right upper extremity. Skin: Skin color is pale. Neurologic:  Neurovascularly intact without any focal sensory/motor deficits. Cranial nerves: II-XII intact, grossly non-focal.  Psychiatric:  Alert and oriented x 4, thought content appropriate  Capillary Refill: Brisk,< 3 seconds   Peripheral Pulses: +2 palpable, equal bilaterally       Labs:     Recent Labs     04/24/22  0515   WBC 6.8   HGB 8.0*   HCT 27.7*        Recent Labs     04/24/22  0515      K 4.4      CO2 21*   BUN 32*   CREATININE 0.5   CALCIUM 9.4     Recent Labs     04/24/22  0515   AST 17   ALT 18   BILIDIR <0.2   BILITOT 0.2*   ALKPHOS 88     Thank you DEBBIE DE LA PAZ DO for the opportunity to be involved in this patient's care.     Electronically signed by AJITH Rosa CNP on 4/24/2022 at 6:35 AM

## 2022-04-24 NOTE — ED PROVIDER NOTES
[facility] Emergency Department    CHIEF COMPLAINT       Chief Complaint   Patient presents with    Rectal Bleeding       Nurses Notes reviewed and I agree except as noted in the HPI. HISTORY OF PRESENT ILLNESS    Sandhyaisak Martinez lauren 64 y.o. female who presents to the ED for evaluation of rectal bleeding. Patient has a hx of recurrent GI bleeding. Hgb was up to 11.2 as of 4/19. Tonight she had two stools that were very dark blood. Endorses some lightheadedness and fatigue. Exertional shortness of breath. States she had generalized cramping before her BM but none after. HPI was provided by the patient    REVIEW OF SYSTEMS     Review of Systems   Constitutional: Negative for chills, fatigue and fever. HENT: Negative for congestion, ear discharge, ear pain, postnasal drip and rhinorrhea. Eyes: Negative for redness. Respiratory: Negative for cough and chest tightness. Cardiovascular: Negative for chest pain and leg swelling. Gastrointestinal: Positive for blood in stool. Negative for abdominal distention, abdominal pain, nausea and vomiting. Genitourinary: Negative for difficulty urinating, dysuria, enuresis, flank pain and hematuria. Musculoskeletal: Negative for back pain and joint swelling. Skin: Negative for rash. Neurological: Negative for dizziness, light-headedness, numbness and headaches. Psychiatric/Behavioral: Negative for agitation, behavioral problems and confusion. All other systems negative except as noted.       PAST MEDICAL HISTORY     Past Medical History:   Diagnosis Date    Balance problem     Bipolar I disorder, most recent episode (or current) depressed, in partial or unspecified remission 5/8/2013    Cancer ([facility] [address]     CERVICAL     Depression     Fatigue     Gallstones     Gastric bypass status for obesity 2002    GERD (gastroesophageal reflux disease)     History of blood transfusion     History of hysterectomy     REMOVAL OF ONE OVARY  Hypothyroidism     Liver cyst     GI Dr Barney Gilliam MS (multiple sclerosis[facility])     Paresthesias     RT LOWER LIMB    Ulcer at site of surgical anastomosis following bypass of stomach 1/15/2022    UTI (urinary tract infection)        SURGICALHISTORY      has a past surgical history that includes Carpal tunnel release (1999); other surgical history; other surgical history (1986); Batsheva-en-Y Gastric Bypass (2000); other surgical history (2006); bone graft (Left); Cholecystectomy, laparoscopic (5/16/2016); laparoscopic appendectomy (5/16/2016); Appendectomy; Enteroscopy (N/A, 2/5/2018); pr colonoscopy flx dx w/collj spec when pfrmd (Left, 2/6/2018); pr office/outpt visit,procedure only (Left, 2/18/2018); Colonoscopy; Endoscopy, colon, diagnostic; Dilatation, esophagus; pr office/outpt visit,procedure only (N/A, 10/4/2018); Hysterectomy, vaginal; Pacemaker insertion (Right, 05/20/2019); pacemaker placement; Gastrostomy tube placement (N/A, 9/28/2020); Colonoscopy (N/A, 1/16/2022); Hysterectomy (1963); Ovary removal (N/A); Upper gastrointestinal endoscopy (Left, 9/24/2019); Upper gastrointestinal endoscopy (N/A, 9/26/2020); Upper gastrointestinal endoscopy (N/A, 1/16/2022); and Upper gastrointestinal endoscopy (04/13/2022).     CURRENT MEDICATIONS       Previous Medications    ASPIRIN 81 MG EC TABLET    Take 81 mg by mouth in the morning and at bedtime    CALCIUM CARBONATE 600 MG TABS TABLET    Take 1 tablet by mouth nightly     CHOLECALCIFEROL (VITAMIN D) 2000 UNITS CAPS CAPSULE    Take by mouth nightly 50 mcg nightly    DHEA 50 MG CAPS    Take by mouth nightly    DIMETHYL FUMARATE (TECFIDERA) 240 MG DELAYED RELEASE CAPSULE    Take 1 capsule by mouth 2 times daily    DOCUSATE SODIUM (COLACE) 100 MG CAPSULE    TAKE 1 CAPSULE BY MOUTH 2 TIMES A DAY    FERROUS SULFATE (IRON 325) 325 (65 FE) MG TABLET    Take 1 tablet by mouth in the morning, at noon, and at bedtime    FLUTICASONE (FLONASE) 50 MCG/ACT NASAL SPRAY    2 sprays by Each Nostril route daily    IPRATROPIUM (ATROVENT) 0.03 % NASAL SPRAY    INSTILL 2 SPRAYS INTO EACH NOSTRIL 3 TIMES DAILY. LACTOBACILLUS-INULIN (CULTURELLE HEALTH, INULIN,) CAPS    TAKE 1 CAPSULE BY MOUTH WITH BREAKFAST    LAMOTRIGINE (LAMICTAL) 200 MG TABLET    Take one tab by mouth once daily. LEVOTHYROXINE (SYNTHROID) 100 MCG TABLET    TAKE 1 TABLET BY MOUTH ONE TIME A DAY    LITHIUM 300 MG TABLET    Take 2 tablets by mouth 2 times daily    LORATADINE (CLARITIN) 10 MG TABLET    Take 1 tablet by mouth daily    LURASIDONE (LATUDA) 40 MG TABS TABLET    Take 1 tablet by mouth nightly    MAGNESIUM OXIDE 250 MG TABS    Take 250 mg by mouth nightly     NITROGLYCERIN (NITROSTAT) 0.4 MG SL TABLET    up to max of 3 total doses. If no relief after 1 dose, call 911. ONDANSETRON (ZOFRAN) 4 MG TABLET    Take 1 tablet by mouth 3 times daily as needed for Nausea or Vomiting    PANTOPRAZOLE (PROTONIX) 40 MG TABLET    Take 1 tablet by mouth 2 times daily (before meals)    PRENATAL MV-MIN-FE FUM-FA-DHA (PRENATAL 1 PO)    Take 1 tablet by mouth daily     PROSTHESIS MISC    by Does not apply route Strap for the prosthesis broken please order new    SUCRALFATE (CARAFATE) 1 GM TABLET    Take 1 tablet by mouth every 6 hours    VILAZODONE HCL (VILAZODONE HCL) 40 MG TABS    TAKE 1 TABLET BY MOUTH ONCE DAILY. VITAMIN B-12 1000 MCG TABLET    Take 1 tablet by mouth daily       ALLERGIES     is allergic to latex and sulfa antibiotics. FAMILY HISTORY     She indicated that her mother is . She indicated that her father is . She indicated that both of her sisters are alive. family history includes Breast Cancer in her mother and sister; Cancer in her sister and sister; Cancer (age of onset: 43) in her mother; Colon Cancer in her sister; Colon Polyps in her mother; Glaucoma in her father; Heart Disease in her father.     SOCIAL HISTORY       Social History     Socioeconomic History    Marital status:      Spouse name: Not on file    Number of children: 0    Years of education: Not on file    Highest education level: Bachelor's degree (e.g., BA, AB, BS)   Occupational History    Not on file   Tobacco Use    Smoking status: Former Smoker     Packs/day: 0.10     Years: 0.50     Pack years: 0.05     Types: Cigarettes     Quit date: 10/24/1977     Years since quittin.5    Smokeless tobacco: Never Used   Vaping Use    Vaping Use: Never used   Substance and Sexual Activity    Alcohol use: No    Drug use: No    Sexual activity: Not Currently     Partners: Male   Other Topics Concern    Not on file   Social History Narrative    2021    LEVEL OF EDUCATION: graduated high school; earned her bachelor degree in education; did some master's level course work but did not complete the degree    SPECIAL EDUCATION NEEDS: None    RESIDENCE: Currently lives alone    LEGAL HISTORY: None    Bahai: Worship    TRAUMA: None    : None    HOBBIES: writing, swimming, 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 She was  from  to ; they  in     CHILDREN: None    SUBSTANCE USE: None     Social Determinants of Health     Financial Resource Strain: Low Risk     Difficulty of Paying Living Expenses: Not hard at all   Food Insecurity: No Food Insecurity    Worried About Running Out of Food in the Last Year: Never true    Triston of Food in the Last Year: Never true   Transportation Needs:     Lack of Transportation (Medical): Not on file    Lack of Transportation (Non-Medical):  Not on file   Physical Activity: Insufficiently Active    Days of Exercise per Week: 3 days    Minutes of Exercise per Session: 10 min   Stress:     Feeling of Stress : Not on file   Social Connections:     Frequency of Communication with Friends and Family: Not on file    Frequency of Social Gatherings with Friends and Family: Not on file    Attends Mor[employer] Services: Not on file    Active Member of Clubs or Organizations: Not on file    Attends Club or Organization Meetings: Not on file    Marital Status: Not on file   Intimate Partner Violence:     Fear of Current or Ex-Partner: Not on file    Emotionally Abused: Not on file    Physically Abused: Not on file    Sexually Abused: Not on file   Housing Stability:     Unable to Pay for Housing in the Last Year: Not on file    Number of [place] in the Last Year: Not on file    Unstable Housing in the Last Year: Not on file       PHYSICAL EXAM     INITIAL VITALS:  height is 4' 9\" (1.448 m) and weight is 110 lb (49.9 kg). Her oral temperature is 97.7 °F (36.5 °C). Her blood pressure is 99/54 (abnormal) and her pulse is 64. Her respiration is 18 and oxygen saturation is 100%. Physical Exam  Vitals and nursing note reviewed. Constitutional:       General: She is not in acute distress. Appearance: She is well-developed. She is not diaphoretic. HENT:      Head: Normocephalic and atraumatic. Nose: Nose normal.      Mouth/Throat:      Mouth: Mucous membranes are moist.      Pharynx: Oropharynx is clear. Eyes:      General:         Right eye: No discharge. Left eye: No discharge. Conjunctiva/sclera: Conjunctivae normal.   Neck:      Trachea: No tracheal deviation. Cardiovascular:      Rate and Rhythm: Normal rate and regular rhythm. Pulses: Normal pulses. Heart sounds: Normal heart sounds. No murmur heard. No gallop. Comments: Normal capillary refill  Pulmonary:      Effort: Pulmonary effort is normal. No respiratory distress. Breath sounds: Normal breath sounds. No stridor. Abdominal:      General: Bowel sounds are normal.      Palpations: Abdomen is soft. Tenderness: There is no abdominal tenderness. Genitourinary:     Rectum: Guaiac result positive.    Musculoskeletal: General: No tenderness or deformity. Normal range of motion. Cervical back: Normal range of motion. Skin:     General: Skin is warm and dry. Capillary Refill: Capillary refill takes less than 2 seconds. Coloration: Skin is pale. Findings: No erythema or rash. Neurological:      General: No focal deficit present. Mental Status: She is alert and oriented to person, place, and time. Cranial Nerves: No cranial nerve deficit. Psychiatric:         Mood and Affect: Mood normal.         Behavior: Behavior normal.         DIFFERENTIAL DIAGNOSIS:   Gi bleeding, anemia    DIAGNOSTIC RESULTS     EKG: All EKG's are interpreted by the Emergency Department Physician who eithersigns or Co-signs this chart in the absence of a cardiologist.        RADIOLOGY: non-plainfilm images(s) such as CT, Ultrasound and MRI are read by the radiologist.  Plain radiographic images are visualized and preliminarily interpreted by the emergency physician unless otherwise stated below.   No orders to display         LABS:   Labs Reviewed   CBC WITH AUTO DIFFERENTIAL - Abnormal; Notable for the following components:       Result Value    RBC 3.01 (*)     Hemoglobin 8.0 (*)     Hematocrit 27.7 (*)     MCHC 28.9 (*)     RDW-CV 26.0 (*)     RDW-SD 86.8 (*)     All other components within normal limits   BLOOD OCCULT STOOL SCREEN #1   BASIC METABOLIC PANEL   LIPASE   HEPATIC FUNCTION PANEL   URINALYSIS WITH REFLEX TO CULTURE   TYPE AND SCREEN   TYPE AND SCREEN   PREPARE RBC (CROSSMATCH)       EMERGENCY DEPARTMENT COURSE:   Vitals:    Vitals:    04/24/22 0503   BP: (!) 99/54   Pulse: 64   Resp: 18   Temp: 97.7 °F (36.5 °C)   TempSrc: Oral   SpO2: 100%   Weight: 110 lb (49.9 kg)   Height: 4' 9\" (1.448 m)                              Internal Administration   First Dose COVID-19, Pfizer Purple top, DILUTE for use, 12+ yrs, 30mcg/0.3mL dose  02/02/2021   Second Dose COVID-19, Pfizer Purple top, DILUTE for use, 12+ yrs, 30mcg/0.3mL dose   02/23/2021       Last COVID Lab SARS-CoV-2, NAAT (no units)   Date Value   09/27/2020 NOT DETECTED            MDM      Patient seen evaluate in the emergency room for rectal bleeding. Appropriate labs and imaging are ordered and reviewed. Hemoccult was positive. Patient is given Protonix bolus and started on Protonix drip. Patient's hemoglobin dropped from 11.2 down to 8.0 in 5 days. The patient is actively bleeding, will transfuse 1 unit. Discussed with hospitalist, Dr. Dipti Agarwal. She will be admitted to their service. Medications   pantoprazole (PROTONIX) 80 mg in sodium chloride 0.9 % 50 mL bolus (has no administration in time range)   pantoprazole (PROTONIX) 80 mg in sodium chloride 0.9 % 100 mL infusion (has no administration in time range)   0.9 % sodium chloride infusion (has no administration in time range)       Please note that the patient was evaluated during a pandemic. All efforts were made for HIPPA compliance as well as provision of appropriate care. Patient was seen independently by myself. The patient's final impression and disposition and plan was determined by myself. Strict return precautions and follow up instructions were discussed with the patient prior to discharge, with which the patient agrees. Physical assessment findings, diagnostic testing(s) if applicable, and vital signs reviewed with patient/patient representative. Questions answered. Medications asdirected, including OTC medications for supportive care. Education provided on medications. Differential diagnosis(s) discussed with patient/patient representative. Home care/self care instructions reviewed withpatient/patient representative. Patient is to follow-up with family care provider in 2-3 days if no improvement. Patient is to go to the emergency department if symptoms worsen.   Patient/patient representative isaware of care plan, questions answered, verbalizes understanding and is in agreement. CRITICAL CARE:   None    CONSULTS:  Hospitalist    PROCEDURES:  None    FINAL IMPRESSION     1. Gastrointestinal hemorrhage, unspecified gastrointestinal hemorrhage type    2. Acute blood loss anemia          DISPOSITION/PLAN   DISPOSITION Decision To Admit 04/24/2022 05:44:22 AM      PATIENT REFERREDTO:  No follow-up provider specified.     DISCHARGE MEDICATIONS:  New Prescriptions    No medications on file       (Please note that portions of this note were completed with a voice recognition program.  Efforts were made to edit the dictations but occasionally words are mis-transcribed.)         AJITH Nieto CNP, APRN - CNP  04/24/22 0836

## 2022-04-24 NOTE — ED NOTES
ED to inpatient nurses report    Chief Complaint   Patient presents with    Rectal Bleeding      Present to ED from home  LOC: alert and orientated to name, place, date  Vital signs   Vitals:    04/24/22 0503 04/24/22 0556 04/24/22 0632   BP: (!) 99/54 (!) 104/56 (!) 97/57   Pulse: 64 60 60   Resp: 18 20 16   Temp: 97.7 °F (36.5 °C) 98.7 °F (37.1 °C) 98.7 °F (37.1 °C)   TempSrc: Oral Oral    SpO2: 100% 100% 100%   Weight: 110 lb (49.9 kg)     Height: 4' 9\" (1.448 m)        Oxygen Baseline RA    Current needs required RA Bipap/Cpap No  LDAs:   Peripheral IV 04/24/22 Left Antecubital (Active)   Site Assessment Clean, dry & intact 04/24/22 0527   Line Status Blood return noted;Specimen collected;Normal saline locked 04/24/22 0527   Dressing Status Clean, dry & intact 04/24/22 0527   Dressing Intervention New 04/24/22 0527       Peripheral IV 04/24/22 Left Other (Comment) (Active)   Site Assessment Clean, dry & intact 04/24/22 0600   Line Status Normal saline locked 04/24/22 0600   Dressing Status Clean, dry & intact 04/24/22 0600   Dressing Intervention New 04/24/22 0600     Mobility: Requires assistance * 1  Pending ED orders: Complete  Present condition: Stable    Electronically signed by Palmer Chew RN on 4/24/2022 at [address] [place] [address]  04/24/22 1244

## 2022-04-24 NOTE — ED NOTES
One unit RBC started at this time.  This RN at [facility] to monitor for transfusion reaction     Rula Harrison, RN  04/24/22 312 [facility], RN  04/24/22 0042

## 2022-04-24 NOTE — ED NOTES
No reaction noted in first 15 minutes of blood transfusion.  [address] [facility], [address], [facility]  04/24/22 0097

## 2022-04-24 NOTE — ED NOTES
Pt resting in bed with eyes closed. Updated on POC. Blood consent obtained.  [address] [facility],  [address], [facility]  04/24/22 5656

## 2022-04-24 NOTE — PROGRESS NOTES
Two nurse skin assessment completed by Emily Zamorano and Galilea Durant RN. Skin issues noted in head-to-toe assessment. Patient educated on call-light use and bed/chair alarms. Patient voices understanding. No other concerns at this time.

## 2022-04-24 NOTE — ED TRIAGE NOTES
Pt presents to the ER from home with c/o rectal bleeding. Pt states she had a BM last night and it was black. Pt states she did have some abdominal pain on Tuesday but currently denies. Pt states she has a hx of rectal bleeding and needing blood transfusions. Pt denies N/V/D. Pt is alert and oriented, respirations even and unlabored.  VSS

## 2022-04-24 NOTE — ED NOTES
ED nurse-to-nurse bedside report    Chief Complaint   Patient presents with    Rectal Bleeding      LOC: alert and orientated to name, place, date  Vital signs   Vitals:    04/24/22 0503 04/24/22 0556 04/24/22 0632 04/24/22 0644   BP: (!) 99/54 (!) 104/56 (!) 97/57 (!) 110/52   Pulse: 64 60 60 60   Resp: 18 20 16 18   Temp: 97.7 °F (36.5 °C) 98.7 °F (37.1 °C) 98.7 °F (37.1 °C) 98.6 °F (37 °C)   TempSrc: Oral Oral  Oral   SpO2: 100% 100% 100% 100%   Weight: 110 lb (49.9 kg)      Height: 4' 9\" (1.448 m)         Pain:    Pain Interventions: none  Pain Goal: 0  Oxygen: No    Current needs required RA   Telemetry: No  LDAs:   Peripheral IV 04/24/22 Left Antecubital (Active)   Site Assessment Clean, dry & intact 04/24/22 0527   Line Status Blood return noted;Specimen collected;Normal saline locked 04/24/22 0527   Dressing Status Clean, dry & intact 04/24/22 0527   Dressing Intervention New 04/24/22 0527       Peripheral IV 04/24/22 Left Other (Comment) (Active)   Site Assessment Clean, dry & intact 04/24/22 0600   Line Status Normal saline locked 04/24/22 0600   Dressing Status Clean, dry & intact 04/24/22 0600   Dressing Intervention New 04/24/22 0600     Continuous Infusions:    pantoprazole 8 mg/hr (04/24/22 0639)    sodium chloride      sodium chloride      sodium chloride       Mobility: Requires assistance * 1  Simpson Fall Risk Score:    Fall Risk 12/10/2020 12/10/2020 10/3/2019   2 or more falls in past year? no no no   Fall with injury in past year? no no no     Fall Interventions: Side rails, call light  Report given to: [place], [place]  04/24/22 7655

## 2022-04-24 NOTE — CONSULTS
Pt Name: Pranay Hurd  MRN: [id number]  [id number]  YOB: 1960  Admit Date: 4/24/2022  5:01 AM  Date of evaluation: 4/24/2022  Primary Care Physician: Vinita Carnes DO   [id number]   Dictating for Dr Ashley Ramirez    Requesting Provider:   AJITH Marquez *    YESSY Consult    Indication:  GI bleed    History:  The patient is a 64 y.o.  female admitted 4-24-22 for GI bleed. She has significant hx as described below of bleeding. She reports right sided abdominal pain yesterday and poor appetite over past few days with some nausea. Last evening she reports 2 episodes large amount of melena and hematochezia PTA. She was dizzi with this but not any longer. She has not had a BM since. Her normal BM is 1 to 2 a day green in [facility]. She denies current abdominal pain, nausea, weight loss, constipation, diarrhea, GERD. She is taking protonix 40 mg BID at home, carafate 1gm 4 times a day at home. She was recently started on ASA BID 81 mg a few months ago for side effects of her MS medication. Otherwise she denies NSAID use. Hgb this am was 8.0     Admitted 1-15-22 with GI bleeding/melena to [place] due to COVID/bed availability per pt. . Colonoscopy and push enteroscopy completed 1- with small AVM in descending colon with clip placed. However dark blood and clots indicated small bowel bleeding. CT angio negative. Pt was to have outpatient SBCE      Single balloon enteroscopy 4-13-22  1. Surgical changes consistent with history of Batsheva-en-Y gastric bypass  with no evidence of ulceration or bleeding at the anastomosis. 2. Technically challenging antegrade enteroscopy due to multiple acute  angulation making advancing the flexible enteroscope difficult. It was  only advanced up to proximal/mid jejunum. Within this distance, there  was no evidence of bleeding or AVMs. RECOMMENDATIONS: Recommend monitoring for now.  If there are concerns of  recurrence of bleeding, then consider retrograde balloon enteroscopy and  colonoscopy. Hx reviewed. H/O PUD & recurrent bleeding from bypassed stomach/duodenum with IR coiling in the past. In the ED 09/20/20, with melena & abd pain. CT A/P demonstrated colitis & she was discharged on Cipro/Flagyl. Returned to ED 09/22/20 for abd pain & back pain, CT negative. Discharged with outpatient follow-up. Enteroscopy 09/27/20, but could not reach lower anastomosis. Colonoscopy planned for following day, but patient had a sudden drop in Hgb. NM GI blood loss scan positive, underwent angio for localization of bleeding from previously coiled area of GDA from a small vessel, but IR unable to get microcatheter into vessel to coil. No bleeding from SMA or YARELIS. Gastrostomy tube placed surgically 09/28/20. Gastric endoscopy done in the OR 09/28/20 & demonstrated stomach postsurgial, proximally obstructed, duodenum with a single small clean-based ulcer with actively oozing spot at proximal side cauterized with APC. Admitted 1-15-22 with GI bleeding/melena to [place]. Colonoscopy and push enteroscopy completed 1- with small AVM in descending colon with clip placed. However dark blood and clots indicated small bowel bleeding. CT angio negative. Pt was to have outpatient SBCE           EGD with Push Enteroscopy: 1-16-22  1. Normal esophagus  2. The GE junction was noted at 38 cm. 3. Evidence of Batsheva-en-Y gastric bypass. 4. The pouch extended from 38 to 40 cm from the incisors. 5. The mucosa in the pouch appeared normal without evidence of bleeding, ulceration or fistula. 6. The gastrojejunal anastomosis was incompetent and dilated and measured more than 25 mm in diameter. 7. The mucosa at the anastomosis appeared normal without evidence of ulceration. 8. The Batsheva limb was intubated up to 150 cm. 9. The mucosa in the Batsheva limb appeared normal.  There was no evidence of bleeding or stigmata of bleeding, bile or excess fluid found in the Batsheva limb. 10.  The jejunojejunal anastomosis was reached. There was no evidence of blood coming in the BP limb     Colonoscopy: 1-16-22  1. Large amount of blood and clots was found in the entire examined colon. This was cleared using copious amount of sterile water irrigation and scope suctioning. 2. A small bleeding AVM was found in the descending colon. This was treated using a Hemoclip and the bleeding stopped. 3. The terminal ileum was intubated for more than 30 cm from the ileocecal valve. 4. Large amount of dark blood and clots were found in the examined terminal ileum suggesting that the bleeding is likely from the small bowel common channel.       Active Hospital Problems    Diagnosis Date Noted    GI bleed [K92.2] 01/14/2022     Past Medical History:        Diagnosis Date    Balance problem     Bipolar I disorder, most recent episode (or current) depressed, in partial or unspecified remission 5/8/2013    Cancer ([facility] [address]     CERVICAL     Depression     Fatigue     Gallstones     Gastric bypass status for obesity 2002    GERD (gastroesophageal reflux disease)     History of blood transfusion     History of hysterectomy     REMOVAL OF ONE OVARY     Hypothyroidism     Liver cyst     GI Dr Benitez Reyes MS (multiple sclerosis) ([facility] [address]     Paresthesias     RT LOWER LIMB    Ulcer at site of surgical anastomosis following bypass of stomach 1/15/2022    UTI (urinary tract infection)      Past Surgical History:        Procedure Laterality Date    APPENDECTOMY      BONE GRAFT Left     left leg to right arm    CARPAL TUNNEL RELEASE  1999    CHOLECYSTECTOMY, LAPAROSCOPIC  5/16/2016    COLONOSCOPY      COLONOSCOPY N/A 1/16/2022    COLONOSCOPY CONTROL HEMORRHAGE performed by Eric Holloway MD at [address]      ENDOSCOPY, COLON, DIAGNOSTIC      ENTEROSCOPY N/A 2/5/2018    ENTEROSCOPY performed by Meghana Castillo MD at [facility] N/A 9/28/2020 GASTROSTOMY TUBE PLACEMENT- [facility] TO DO GASTRIC ENDOSCOPY performed by Lilia Gray MD at [facility], VAGINAL      LAPAROSCOPIC APPENDECTOMY  5/16/2016    OTHER SURGICAL HISTORY      RECTAL FISSULRE    OTHER SURGICAL HISTORY  1986    D&C    OTHER SURGICAL HISTORY  2006    GI BLEED    OVARY REMOVAL N/A     pt unaware which one was taken    PACEMAKER INSERTION Right 05/20/2019    Dr. Diaz Ideal FLX DX W/COLLJ [address] PFRMD Left 2/6/2018    COLONOSCOPY performed by Kaya Elias MD at [facility] [place] End[facility]    MO OFFICE/OUTPT VISIT,PROCEDURE ONLY Left 2/18/2018    EGD ESOPHAGOGASTRODUODENOSCOPY performed by Fredy Silver MD at [facility] Endoscopy    MO OFFICE/OUTPT VISIT,PROCEDURE ONLY N/A 10/4/2018    EGD DIAGNOSTIC ONLY performed by Rom Obrien MD at [place] [address] ENDOSCOPY Left 9/24/2019    EGD DIAGNOSTIC ONLY performed by Gregg Nava MD at [facility] ENDOSCOPY N/A 9/26/2020    ENTEROSCOPY PUSH CONTROL HEMORRHAGE performed by Kaya Elias MD at [facility] N/A 1/16/2022    ENTEROSCOPY PUSH DIAGNOSTIC performed by Praveena Nick MD at [facility]  04/13/2022     Allergies:  Latex and Sulfa antibiotics  Home Meds:  Prior to Admission medications    Medication Sig Start Date End Date Taking? Authorizing Provider   aspirin 81 MG EC tablet Take 81 mg by mouth in the morning and at bedtime    Historical Provider, MD   ferrous sulfate (IRON 325) 325 (65 Fe) MG tablet Take 1 tablet by mouth in the morning, at noon, and at bedtime 3/4/22 6/2/22  Terry Tristan MD   vilazodone HCl (VILAZODONE HCL) 40 MG TABS TAKE 1 TABLET BY MOUTH ONCE DAILY.  2/24/22   AJITH Jones - CNP   lurasidone (LATUDA) 40 MG TABS tablet Take 1 tablet by mouth nightly 2/24/22 AJITH Che CNP   lithium 300 MG tablet Take 2 tablets by mouth 2 times daily 2/24/22   AJITH Che CNP   lamoTRIgine (LAMICTAL) 200 MG tablet Take one tab by mouth once daily. 2/24/22   AJITH Che CNP   docusate sodium (COLACE) 100 MG capsule TAKE 1 CAPSULE BY MOUTH 2 TIMES A DAY 1/20/22   Adalgisa Long MD   sucralfate (CARAFATE) 1 GM tablet Take 1 tablet by mouth every 6 hours 1/18/22   Madelyn Esparza MD   pantoprazole (PROTONIX) 40 MG tablet Take 1 tablet by mouth 2 times daily (before meals)  Patient taking differently: Take 40 mg by mouth daily  1/18/22   Madelyn Esparza MD   levothyroxine (SYNTHROID) 100 MCG tablet TAKE 1 TABLET BY MOUTH ONE TIME A DAY  Patient taking differently: nightly TAKE 1 TABLET BY MOUTH ONE TIME A DAY 1/4/22   Adalgisa Long MD   DHEA 50 MG CAPS Take by mouth nightly    Historical Provider, MD   dimethyl fumarate (TECFIDERA) 240 MG delayed release capsule Take 1 capsule by mouth 2 times daily 6/10/21   Shelbie Dallas,    ipratropium (ATROVENT) 0.03 % nasal spray INSTILL 2 SPRAYS INTO EACH NOSTRIL 3 TIMES DAILY.  4/8/21   AJITH Rodrigues CNP   Lactobacillus-Inulin ([address] [place] [address], INULIN,) CAPS TAKE 1 CAPSULE BY MOUTH WITH BREAKFAST 2/4/21   Shelbie Dallas DO   fluticasone Phyllistine Gallery) 50 MCG/ACT nasal spray 2 sprays by Each Nostril route daily    Historical Provider, MD   ondansetron (ZOFRAN) 4 MG tablet Take 1 tablet by mouth 3 times daily as needed for Nausea or Vomiting 9/22/20   Jan Mccarthy MD   Prosthesis MISC by Does not apply route Strap for the prosthesis broken please order new 3/18/20   Shelbie Dallas DO   loratadine (CLARITIN) 10 MG tablet Take 1 tablet by mouth daily 2/26/20   Rosalba Luong MD   Cholecalciferol (VITAMIN D) 2000 units CAPS capsule Take by mouth nightly 50 mcg nightly    Historical Provider, MD   Magnesium Oxide 250 MG TABS Take 250 mg by mouth nightly     Historical Provider, MD   calcium carbonate 600 MG TABS tablet Take 1 tablet by mouth nightly     Historical Provider, MD   vitamin B-12 1000 MCG tablet Take 1 tablet by mouth daily 2/8/18   Sukhwinder Echols MD   nitroGLYCERIN (NITROSTAT) 0.4 MG SL tablet up to max of 3 total doses.  If no relief after 1 dose, call 911. 2/22/17   Luly Shah MD   Prenatal MV-Min-Fe Fum-FA-DHA (PRENATAL 1 PO) Take 1 tablet by mouth daily     Historical Provider, MD      Current Meds:       Current Facility-Administered Medications:     pantoprazole (PROTONIX) 80 mg in sodium chloride 0.9 % 100 mL infusion, 8 mg/hr, IntraVENous, Continuous, AJITH Brizuela - CNP, Last Rate: 10 mL/hr at 04/24/22 0639, 8 mg/hr at 04/24/22 0639    0.9 % sodium chloride infusion, , IntraVENous, PRN, Salma Hester, APRN - CNP    fluticasone (FLONASE) 50 MCG/ACT nasal spray 2 spray, 2 spray, Each Nostril, Daily, Salma Hester, APRN - CNP    ipratropium (ATROVENT) 0.03 % nasal spray 2 spray, 2 spray, Each Nostril, TID, Salma Hester, APRN - CNP    lamoTRIgine (LAMICTAL) tablet 200 mg, 200 mg, Oral, Daily, Salma A Jenniferan, APRN - CNP    levothyroxine (SYNTHROID) tablet 100 mcg, 100 mcg, Oral, Daily, Salma Hester, APRN - CNP    lithium tablet 600 mg, 600 mg, Oral, BID, Salma A Mir, APRN - CNP    cetirizine (ZYRTEC) tablet 10 mg, 10 mg, Oral, Daily, Salma A Mir, APRN - CNP    lurasidone (LATUDA) tablet 40 mg, 40 mg, Oral, Nightly, Salma A Jenniferan, APRN - CNP    Magnesium Oxide (MAGNESIUM-OXIDE) 250 mg, 250 mg, Oral, Nightly, Salma A Jenniferan, APRN - CNP    vilazodone HCl (VIIBRYD) TABS 40 mg, 40 mg, Oral, Daily, Salma A Jenniferan, APRN - CNP    sodium chloride flush 0.9 % injection 5-40 mL, 5-40 mL, IntraVENous, 2 times per day, Salma Hester, APRN - CNP    sodium chloride flush 0.9 % injection 5-40 mL, 5-40 mL, IntraVENous, PRN, Salma Hester, APRN - CNP    0.9 % sodium chloride infusion, , IntraVENous, PRN, Salma A AJITH Hester - CNP    ondansetron (ZOFRAN-ODT) disintegrating tablet 4 mg, 4 mg, Oral, Q8H PRN **OR** ondansetron (ZOFRAN) injection 4 mg, 4 mg, IntraVENous, Q6H PRN, Salma Hester APRN - CNP    acetaminophen (TYLENOL) tablet 650 mg, 650 mg, Oral, Q6H PRN **OR** acetaminophen (TYLENOL) suppository 650 mg, 650 mg, Rectal, Q6H PRN, Salma Hester APRN - CNP    0.9 % sodium chloride infusion, , IntraVENous, Continuous, AJITH Brizuela - CNP, Last Rate: 75 mL/hr at 22 0757, New Bag at 22 0757   Social History:   Social History     Socioeconomic History    Marital status:      Spouse name: Not on file    Number of children: 0    Years of education: Not on file    Highest education level: Bachelor's degree (e.g., BA, AB, BS)   Occupational History    Not on file   Tobacco Use    Smoking status: Former Smoker     Packs/day: 0.10     Years: 0.50     Pack years: 0.05     Types: Cigarettes     Quit date: 10/24/1977     Years since quittin.5    Smokeless tobacco: Never Used   Vaping Use    Vaping Use: Never used   Substance and Sexual Activity    Alcohol use: No    Drug use: No    Sexual activity: Not Currently     Partners: Male   Other Topics Concern    Not on file   Social History Narrative    2021    LEVEL OF EDUCATION: graduated high school; earned her bachelor degree in education; did some master's level course work but did not complete the degree    SPECIAL EDUCATION NEEDS: None    RESIDENCE: Currently lives alone    LEGAL HISTORY: None    Zoroastrian: Church    TRAUMA: None    : None    HOBBIES: writing, swimming, 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  She was  from  to ; they  in     CHILDREN: None    SUBSTANCE USE: None     Social Determinants of Health     Financial Resource Strain: Low Risk     Difficulty of Paying Living Expenses: Not hard at all   Food Insecurity: No Food Insecurity    Worried About [address] [employer] in the Last Year: Never true    Triston of Food in the Last Year: Never true   Transportation Needs:     Lack of Transportation (Medical): Not on file    Lack of Transportation (Non-Medical): Not on file   Physical Activity: Insufficiently Active    Days of Exercise per Week: 3 days    Minutes of Exercise per Session: 10 min   Stress:     Feeling of Stress : Not on file   Social Connections:     Frequency of Communication with Friends and Family: Not on file    Frequency of Social Gatherings with Friends and Family: Not on file    Attends Roman Catholic Services: Not on file    Active Member of Clubs or Organizations: Not on file    Attends Club or Organization Meetings: Not on file    Marital Status: Not on file   Intimate Partner Violence:     Fear of Current or Ex-Partner: Not on file    Emotionally Abused: Not on file    Physically Abused: Not on file    Sexually Abused: Not on file   Housing Stability:     Unable to Pay for Housing in the Last Year: Not on file    Number of [place] in the Last Year: Not on file    Unstable Housing in the Last Year: Not on file     Family History:   No GI malignancies   Family History   Problem Relation Age of Onset    Cancer Mother 43    Colon Polyps Mother     Breast Cancer Mother     Heart Disease Father     Glaucoma Father     Cancer Sister     Colon Cancer Sister     Cancer Sister     Breast Cancer Sister        ROS:  General : no fever chills or weight loss   Eyes: No  loss of vision  ENT: No  vocal hoarseness   Cardiovascular: No hypertension, heart disease or chest pain. Respiratory: No asthma, emphysema or chronic bronchitis   GI:See HPI  : No kidney or bladder infections. No urinary incontinence   GYN: No abnormal menstrual bleeding  Musculoskeletal: No new painful or swollen joints. No arthritis.    Skin: No rashes, jaundice, or skin cancer  Neurologic: no frequent headaches, migraines, or seizure disorder  Emotional: +bipolar  Endocrine:  No polyuria  Blood: +anemia  Allergic/Immunologic: No lupus or HIV  Cancer: No personal history of cancer     Physical Exam:  BP (!) 115/56   Pulse 61   Temp 98.4 °F (36.9 °C) (Oral)   Resp 18   Ht 4' 9\" (1.448 m)   Wt 110 lb (49.9 kg)   SpO2 100%   BMI 23.80 kg/m²     The patient is a 64 y.o.  female in no acute distress. HEAD: Normal cephalic/atraumatic. Extra-occular motions intact bilaterally. NECK:  Trachea is midline. CHEST: Rises equally on inspiration. Clear to auscultation bilaterally. CARDIOVASCULAR: Regular rate and rhythm without murmurs, rubs or gallops. ABDOMEN: Soft and nondistended with normal bowel sounds. No Hepatosplemomegaly or masses noted   Tender:  Non-tender  EXTREMITIES: no cyanosis, clubbing, or edema. DERM: no rash or jaundice. NEURO: alert and oriented times four with appropriate affect    Recent Labs     04/24/22  0515   WBC 6.8   HGB 8.0*   HCT 27.7*      AST 17   ALT 18   BILITOT 0.2*   ALKPHOS 88       Assessment:  1. GI bleed with melena and hematochezia, 2 episodes PTA. Positive guiac stool. 2. Normocytic Anemia, hgb 8.0, pt was given 1 unit PRBC. It is noted 11.2 previously a few weeks ago but not sure of the accuracy of this as was always around 7.8 prior to this in records. 3. Poor appetite  4. Right sided abdominal pain prior to bleed, resolved now  5. Hx GI bleeding in past. Most recently Descending colon AVM clipped with bleeding and clot noted in colon 1/22. Suspected small intestine bleeding with blood and clots in terminal ileum. . CT angio negative. SBCE completed 10 days ago by in [place] per pt and reports was normal.   6. Hx GDA from small vessel. 9/2020 underwent angio for localization of bleeding from previously coiled area of GDA from a small vessel, but IR unable to get microcatheter into vessel to coil. No bleeding from SMA or YARELIS. Gastrostomy tube placed surgically 09/28/20. Gastric endoscopy done in the OR 09/28/20 & demonstrated stomach postsurgial, proximally obstructed, duodenum with a single small clean-based ulcer with actively oozing spot at proximal side cauterized with APC. 7. MS  8. Bipolar  9. Hx Batsheva-en-Y gastric bypass with hx ulcer at anastomotic site  10. ASA use BID, recently started per pt due to adverse effects of MS medication. 11. Pacemaker  12. Hypothyroidism      Plan:  1. Carafate 4 times a day, home dose. Restarted. 2. Iron TID, home dose, consider restarting when on oral diet if appropriate  3. Protonix 40 mg BID, home dose. Currently on protonix gtt and will continue this  4. Zofran as needed  5. Anemia labs  6. H & H every 6 hours  7. ASA was recently started BID per pt, recommend holding if able at this time as pt has been bleeding int. Staff discussed with Primary  8. Clear liquids   9. One time miralax prep to flush out  10. If patient re-bleeds recommend GI bleeding scan to help identify source. 11. Pt just had recent antegrade balloon enteroscopy on 4-13-22 per care everywhere. Recent colonoscopy 1/22. If there were concerns of recurrence of bleeding, then consider retrograde balloon enteroscopy and colonoscopy. She did not tell me this when seen in room. This was found on care everywhere.   12. Follow          Assessment and Plan discussed with Dr Tati Gracia, APRN - CNP, APRLANCE, CNP, 4/24/2022, 8:32 AM

## 2022-04-25 VITALS
BODY MASS INDEX: 23.73 KG/M2 | OXYGEN SATURATION: 100 % | HEART RATE: 65 BPM | TEMPERATURE: 98.2 F | DIASTOLIC BLOOD PRESSURE: 43 MMHG | WEIGHT: 110 LBS | SYSTOLIC BLOOD PRESSURE: 102 MMHG | HEIGHT: 57 IN | RESPIRATION RATE: 18 BRPM

## 2022-04-25 LAB
ANION GAP SERPL CALCULATED.3IONS-SCNC: 7 MEQ/L (ref 8–16)
APTT: 28.7 SECONDS (ref 22–38)
BUN BLDV-MCNC: 19 MG/DL (ref 7–22)
CALCIUM SERPL-MCNC: 9 MG/DL (ref 8.5–10.5)
CHLORIDE BLD-SCNC: 113 MEQ/L (ref 98–111)
CO2: 20 MEQ/L (ref 23–33)
CREAT SERPL-MCNC: 0.5 MG/DL (ref 0.4–1.2)
GFR SERPL CREATININE-BSD FRML MDRD: > 90 ML/MIN/1.73M2
GLUCOSE BLD-MCNC: 106 MG/DL (ref 70–108)
HCT VFR BLD CALC: 22.4 % (ref 37–47)
HCT VFR BLD CALC: 23.1 % (ref 37–47)
HCT VFR BLD CALC: 23.5 % (ref 37–47)
HEMOGLOBIN: 6.6 GM/DL (ref 12–16)
HEMOGLOBIN: 6.7 GM/DL (ref 12–16)
HEMOGLOBIN: 7.5 GM/DL (ref 12–16)
INR BLD: 1.01 (ref 0.85–1.13)
IRON: 125 UG/DL (ref 50–170)
POTASSIUM REFLEX MAGNESIUM: 4.2 MEQ/L (ref 3.5–5.2)
SODIUM BLD-SCNC: 140 MEQ/L (ref 135–145)
TOTAL IRON BINDING CAPACITY: 222 UG/DL (ref 171–450)

## 2022-04-25 PROCEDURE — 2580000003 HC RX 258: Performed by: NURSE PRACTITIONER

## 2022-04-25 PROCEDURE — 85610 PROTHROMBIN TIME: CPT

## 2022-04-25 PROCEDURE — 83550 IRON BINDING TEST: CPT

## 2022-04-25 PROCEDURE — 6360000002 HC RX W HCPCS: Performed by: NURSE PRACTITIONER

## 2022-04-25 PROCEDURE — 99239 HOSP IP/OBS DSCHRG MGMT >30: CPT | Performed by: NURSE PRACTITIONER

## 2022-04-25 PROCEDURE — 83540 ASSAY OF IRON: CPT

## 2022-04-25 PROCEDURE — C9113 INJ PANTOPRAZOLE SODIUM, VIA: HCPCS | Performed by: NURSE PRACTITIONER

## 2022-04-25 PROCEDURE — 6370000000 HC RX 637 (ALT 250 FOR IP): Performed by: NURSE PRACTITIONER

## 2022-04-25 PROCEDURE — 85014 HEMATOCRIT: CPT

## 2022-04-25 PROCEDURE — 36415 COLL VENOUS BLD VENIPUNCTURE: CPT

## 2022-04-25 PROCEDURE — 85018 HEMOGLOBIN: CPT

## 2022-04-25 PROCEDURE — 80048 BASIC METABOLIC PNL TOTAL CA: CPT

## 2022-04-25 PROCEDURE — 85730 THROMBOPLASTIN TIME PARTIAL: CPT

## 2022-04-25 PROCEDURE — 36430 TRANSFUSION BLD/BLD COMPNT: CPT

## 2022-04-25 RX ORDER — SODIUM CHLORIDE 9 MG/ML
INJECTION, SOLUTION INTRAVENOUS PRN
Status: DISCONTINUED | OUTPATIENT
Start: 2022-04-25 | End: 2022-04-25 | Stop reason: HOSPADM

## 2022-04-25 RX ADMIN — VILAZODONE HYDROCHLORIDE 40 MG: 40 TABLET ORAL at 09:55

## 2022-04-25 RX ADMIN — SODIUM CHLORIDE 8 MG/HR: 9 INJECTION, SOLUTION INTRAVENOUS at 09:52

## 2022-04-25 RX ADMIN — LITHIUM CARBONATE 600 MG: 300 CAPSULE ORAL at 09:55

## 2022-04-25 RX ADMIN — CETIRIZINE HYDROCHLORIDE 10 MG: 10 TABLET, FILM COATED ORAL at 09:55

## 2022-04-25 RX ADMIN — IPRATROPIUM BROMIDE 2 SPRAY: 21 SPRAY NASAL at 09:57

## 2022-04-25 RX ADMIN — FLUTICASONE PROPIONATE 2 SPRAY: 50 SPRAY, METERED NASAL at 09:58

## 2022-04-25 RX ADMIN — SODIUM CHLORIDE, PRESERVATIVE FREE 10 ML: 5 INJECTION INTRAVENOUS at 13:37

## 2022-04-25 RX ADMIN — SUCRALFATE 1 G: 1 TABLET ORAL at 01:12

## 2022-04-25 RX ADMIN — LEVOTHYROXINE SODIUM 100 MCG: 0.1 TABLET ORAL at 06:04

## 2022-04-25 RX ADMIN — LAMOTRIGINE 200 MG: 100 TABLET ORAL at 09:56

## 2022-04-25 RX ADMIN — SODIUM CHLORIDE 8 MG/HR: 9 INJECTION, SOLUTION INTRAVENOUS at 00:03

## 2022-04-25 RX ADMIN — SUCRALFATE 1 G: 1 TABLET ORAL at 06:04

## 2022-04-25 ASSESSMENT — PAIN SCALES - GENERAL: PAINLEVEL_OUTOF10: 0

## 2022-04-25 NOTE — PLAN OF CARE
Blood transfusion consent:    I have discussed with the patient the rationale for blood component transfusion; its benefits in treating or preventing fatigue, organ damage, or death; and its risk which includes mild transfusion reactions, rare risk of blood borne infection, or more serious but rare reactions. I have discussed the alternatives to transfusion, including the risk and consequences of not receiving transfusion. The patient had an opportunity to ask questions and had agreed to proceed with transfusion of blood components.
Reevaluated patient at 115 today: Hemoglobin up to 8.3 posttransfusion; blood pressure 100/49 heart rate 60 and afebrile; GI saw the patient; patient did have 1 bloody stool since being up to the floor, alert and oriented, continue to watch closely in case she needs another transfusion.
Every 4-6 hours:  If on nasal continuous positive airway pressure, respiratory therapy assess nares and determine need for appliance change or resting period. Outcome: Progressing  Note: No new skin break down noted at this time. Encouraged patient to reposition self in bed. Care plan reviewed with patient. Patient verbalizes understanding of plan of care and contributes to goal setting.

## 2022-04-25 NOTE — CARE COORDINATION
4/25/22, 7:59 AM EDT  DISCHARGE PLANNING EVALUATION:    Noel Palacios       Admitted: 4/24/2022/ [facility] day: 1   Location: [facility]10/010-A Reason for admit: Acute blood loss anemia [D62]  GI bleed [K92.2]  Gastrointestinal hemorrhage, unspecified gastrointestinal hemorrhage type [K92.2]   PMH:  has a past medical history of Balance problem, Bipolar I disorder, most recent episode (or current) depressed, in partial or unspecified remission, Cancer ([facility] [address] Depression, Fatigue, Gallstones, Gastric bypass status for obesity, GERD (gastroesophageal reflux disease), History of blood transfusion, History of hysterectomy, Hypothyroidism, Liver cyst, MS (multiple sclerosis) ([facility] [address] Paresthesias, Ulcer at site of surgical anastomosis following bypass of stomach, and UTI (urinary tract infection). Procedure:   None  Barriers to Discharge:  Patient received outpatient iron infusion went home then to ED with rectal bleeding. Admitted to [address] [place] [address]. Blood transfusion completed. GI following. Anion Gap 7.0, H&H 6.7, 23.1. NPO with ice chips. IV fluids and Zofran. PCP: Courtney Stuart DO  Readmission Risk Score: 15.9 ( )%    Patient Goals/Plan/Treatment Preferences:   Lives by self in apartment; plans to return there. Has drivers license but utilizes public transportation. Denies need for DME or HH at this time. Transportation/Food Security/Housekeeping Addressed:  No issues identified. 4/25/22, 1:09 PM EDT    Patient goals/plan/ treatment preferences discussed by  and . Patient goals/plan/ treatment preferences reviewed with patient/ family. Patient/ family verbalize understanding of discharge plan and are in agreement with goal/plan/treatment preferences. Understanding was demonstrated using the teach back method.   AVS provided by RN at time of discharge, which includes all necessary medical information pertaining to the patients current course of illness, treatment, post-discharge goals of care, and treatment preferences. IMM Letter  IMM Letter given to Patient/Family/Significant other/Guardian/POA/by[de-identified] Pt Access  IMM Letter date given[de-identified] 04/24/22  IMM Letter time given[de-identified] 3035     Resident will be transferred to [address] [place] [address] when bed available.

## 2022-04-25 NOTE — PROGRESS NOTES
Transferred via [address] [place] [address]. Blood infusing, approximately 200 ml left to infuse.

## 2022-04-25 NOTE — PROGRESS NOTES
Other-transfer to [facility]    Chief Complaint: Dark red stools    Hospital Course:  64 y.o. female who presented to [address] [place] [address] with dark red stools; patient has a past medical history of symptomatic bradycardia with a pacemaker, bipolar disorder, history of Batsheva-en-Y along with history of cervical cancer status post hysterectomy; she states she has had numerous issues with rectal bleeding; she states last evening approximately 10:50 PM she noticed dark red stools, at that time she also related to lightheadedness and dizziness, she states she has had 2 stools total; she denies any chest pain or shortness of breath or nausea or abdominal pain.     In the emergency department, hemoglobin was noted to be 8.0 and on April 19, 2022 she was noted to be 11.2, she is currently receiving 1 unit packed red blood cells, BUN noted to be 32; she is being admitted to hospital service for further care and evaluation.     4/25--> patient had more dark stools yesterday and states she has had 3 so far today, hemoglobin this morning 6.7 so I ordered 1 unit packed red blood cells, received a call from the nurse that patient has received care up at [facility] and wishes to return there for further care and evaluation, I called her transfer center who initiated the transfer and patient was excepted to the hospital service with [place] under Dr. Stephane Esqueda awaiting bed availability, patient was informed, [facility] at the [facility] did request instead of 1 unit packed red blood cells that the patient received 2 so this was ordered, currently she is hemodynamically stable and alert and oriented    Subjective (past 24 hours): States she has had 3 stools today, no pain at this time; aware of transfer to [facility]      Medications:  Reviewed    Infusion Medications    sodium chloride      pantoprazole 8 mg/hr (04/25/22 0003)    sodium chloride      sodium chloride      sodium chloride 75 mL/hr at 04/24/22 0759     Scheduled Medications    fluticasone  2 spray Each Nostril Daily    ipratropium  2 spray Each Nostril TID    lamoTRIgine  200 mg Oral Daily    levothyroxine  100 mcg Oral Daily    lithium  600 mg Oral BID    cetirizine  10 mg Oral Daily    lurasidone  40 mg Oral Nightly    magnesium oxide  250 mg Oral Nightly    vilazodone HCl  40 mg Oral Daily    sodium chloride flush  5-40 mL IntraVENous 2 times per day    sucralfate  1 g Oral 4 times per day     PRN Meds: sodium chloride, sodium chloride, sodium chloride flush, sodium chloride, ondansetron **OR** ondansetron, acetaminophen **OR** acetaminophen      Intake/Output Summary (Last 24 hours) at 4/25/2022 0947  Last data filed at 4/24/2022 2332  Gross per 24 hour   Intake 552.27 ml   Output 900 ml   Net -347.73 ml       Diet:  Diet NPO Exceptions are: Ice Chips    Exam:  BP (!) 99/49   Pulse 61   Temp 98.4 °F (36.9 °C) (Oral)   Resp 16   Ht 4' 9\" (1.448 m)   Wt 110 lb (49.9 kg)   SpO2 98%   BMI 23.80 kg/m²     General appearance: No apparent distress, appears stated age and cooperative. HEENT: Pupils equal, round, and reactive to light. Conjunctivae/corneas clear. Neck: Supple, with full range of motion. No jugular venous distention. Trachea midline. Respiratory:  Normal respiratory effort. Clear to auscultation, bilaterally without Rales/Wheezes/Rhonchi. Cardiovascular: Regular rate and rhythm with normal S1/S2 without murmurs, rubs or gallops. Abdomen: Soft, non-tender, non-distended with normal bowel sounds. Musculoskeletal: passive and active ROM x 4 extremities. Skin: Skin color, texture, turgor normal.    Neurologic:  Neurovascularly intact without any focal sensory/motor deficits.  Cranial nerves: II-XII intact, grossly non-focal.  Psychiatric: Alert and oriented, thought content appropriate  Capillary Refill: Brisk,< 3 seconds   Peripheral Pulses: +2 palpable, equal bilaterally     Labs:   Recent Labs     04/24/22  3057 04/24/22  1110 04/24/22  1857 04/25/22  0122 04/25/22  0707   WBC 6.8  --   --   --   --    HGB 8.0*   < > 8.0* 7.5* 6.7*   HCT 27.7*   < > 25.0* 23.5* 23.1*     --   --   --   --     < > = values in this interval not displayed. Recent Labs     04/24/22  0515 04/25/22  0707    140   K 4.4 4.2    113*   CO2 21* 20*   BUN 32* 19   CREATININE 0.5 0.5   CALCIUM 9.4 9.0     Recent Labs     04/24/22  0515   AST 17   ALT 18   BILIDIR <0.2   BILITOT 0.2*   ALKPHOS 88     Microbiology:    None    Urinalysis:      Lab Results   Component Value Date    NITRU NEGATIVE 04/24/2022    WBCUA 5-9 04/24/2022    BACTERIA NONE SEEN 04/24/2022    RBCUA 0-2 04/24/2022    BLOODU NEGATIVE 04/24/2022    SPECGRAV >1.030 05/13/2016    GLUCOSEU NEGATIVE 04/24/2022       Radiology:  No results found.     DVT prophylaxis: [] Lovenox                                 [x] SCDs                                 [] SQ Heparin                                 [] Encourage ambulation           [] Already on Anticoagulation     Code Status: Full Code    PT/OT Eval Status: Monitor    Tele:   [x] yes sinus rhythm heart rate 62             [] no    Active Hospital Problems    Diagnosis Date Noted    GI bleed [K92.2] 01/14/2022       Electronically signed by AJITH Alex CNP on 4/25/2022 at 9:47 AM

## 2022-04-25 NOTE — PROGRESS NOTES
Pharmacy Medication History Note      List of current medications patient is taking is complete. Source of information: Patient and Surescript    Changes made to medication list:  Medications removed (include reason, ex. therapy complete or physician discontinued):  · None    Medications added/doses adjusted:  · Viibryd nightly    Other notes (ex. Recent course of antibiotics, Coumadin dosing):  · Denies use of other OTC or herbal medications.       Allergies reviewed      Electronically signed by Ted Sims on 4/25/2022 at 11:45 AM

## 2022-04-25 NOTE — CARE COORDINATION
04/25/22 1014   Service Assessment   Patient Orientation Alert and Oriented;Person;Place;Situation;Self   Cognition Alert   History Provided By Patient   Primary Caregiver Self   Patient's Healthcare Decision Maker is: Named in [address] [place] [address]   PCP Verified by CM Yes   Prior Functional Level Independent in ADLs/IADLs   Current Functional Level Independent in ADLs/IADLs   Can patient return to prior living arrangement Yes   Ability to make needs known: Good   Family able to assist with home care needs: Yes   Would you like for me to discuss the discharge plan with any other family members/significant others, and if so, who? Yes  (Sisters Tonie Miner and Nicole)   [employer] Transportation   Social/Functional History   Type of Home Apartment   Home Access Elevator   Bathroom Shower/Tub Tub/Shower unit   [facility]  (Has cane but does note use)   ADL Assistance Independent   Homemaking Assistance Independent   Homemaking Responsibilities Yes   Meal Prep Responsibility Primary   Laundry Responsibility Primary   Cleaning Responsibility Primary   Bill Paying/Finance Responsibility Primary   Shopping Responsibility Primary   Dependent Care Responsibility Primary   Health Care Management Primary   Ambulation Assistance Independent   Transfer Assistance Independent   Active  Yes   Mode of Transportation Bus  (Can drive but currently takes public bus)   Occupation Retired   Discharge Planning   Type of [address] Needed Outpatient PT/OT; Home Care   Potential Assistance Purchasing Medications No   Type of Home Care Services None   Patient expects to be discharged to: Apartment   See initial case management note for additional details

## 2022-04-25 NOTE — CARE COORDINATION
Paris Byrd NP informs of plan to transfer to [facility].  Patient has followed at [id number] [place] [address] and requests transfer for continuity of care. She has been accepted and is waiting on bed availability.   Electronically signed by Kailyn Blackwell RN on 4/25/2022 at 2:37 PM

## 2022-04-25 NOTE — PROGRESS NOTES
Gastroenterology Progress Note:     Patient Name:  Colletta Gable   MRN: [id number]  [id number]  YOB: 1960  Admit Date: 4/24/2022  5:01 AM  Primary Care Physician: Brett Sheppard DO   [id number]     Patient seen and examined. 24 hours events and chart reviewed. Subjective: Patient resting in bed. Denies abd pain, n/v. Per chart review, she had 3 bloody stools yesterday. She states she had some RUE pain this morning. No BM yet today. Hgb 6.7, unit PRBC ordered by primary    Objective:  BP (!) 105/51   Pulse 80   Temp 98.6 °F (37 °C) (Oral)   Resp 16   Ht 4' 9\" (1.448 m)   Wt 110 lb (49.9 kg)   SpO2 100%   BMI 23.80 kg/m²     Physical Exam:    General:  Chronically ill appearing female  HEENT: Atraumatic, normocephalic. Moist oral mucous membranes. Neck: Supple without adenopathy, JVD, thyromegaly or masses. Trachea midline. CV: Heart RRR, no murmurs, rubs, gallops. Resp: Even, easy without cough or accessory use. Lungs clear to ascultation bilaterally. Abd: Round, soft, nontender. No hepatosplenomegaly or mass present. Active bowel sounds heard. No distention noted. Ext:  Without cyanosis, clubbing, edema. Skin: Pale, warm, dry  Neuro:  Alert, oriented x 3 with no obvious deficits.        Rectal: deferred    Labs:   CBC:   Lab Results   Component Value Date    WBC 6.8 04/24/2022    HGB 6.7 04/25/2022    HCT 23.1 04/25/2022    MCV 92.0 04/24/2022     04/24/2022     BMP:   Lab Results   Component Value Date     04/25/2022    K 4.2 04/25/2022     04/25/2022    CO2 20 04/25/2022    PHOS 3.0 10/07/2018    BUN 19 04/25/2022    CREATININE 0.5 04/25/2022    CALCIUM 9.0 04/25/2022     PT/INR:   Lab Results   Component Value Date    INR 1.01 04/25/2022     Lipids:   Lab Results   Component Value Date    ALKPHOS 88 04/24/2022    ALT 18 04/24/2022    AST 17 04/24/2022    BILITOT 0.2 04/24/2022    BILIDIR <0.2 04/24/2022    LABALBU 3.6 04/24/2022    AMYLASE 83 03/20/2019 LIPASE 11.2 04/24/2022      Ref. Range 4/24/2022 05:15   Ferritin Latest Ref Range: 10 - 291 ng/mL 375 (H)   Iron Latest Ref Range: 50 - 170 ug/dL 79   TIBC Latest Ref Range: 171 - 450 ug/dL 279   Vitamin B-12 Latest Ref Range: 211 - 911 pg/mL 342   Immature Retic Fract Latest Ref Range: 3.0 - 15.9 % 22.0 (H)   Retic Ct Abs Latest Ref Range: 0.5 - 2.0 % 1.1   Absolute Retic # Latest Ref Range: 20.0 - 115.0 thou/mm3 33.0   Retic Hemoglobin Latest Ref Range: 28.2 - 35.7 pg 34.1     Significant Diagnostic Studies: none    Current Meds:  Scheduled Meds:   fluticasone  2 spray Each Nostril Daily    ipratropium  2 spray Each Nostril TID    lamoTRIgine  200 mg Oral Daily    levothyroxine  100 mcg Oral Daily    lithium  600 mg Oral BID    cetirizine  10 mg Oral Daily    lurasidone  40 mg Oral Nightly    magnesium oxide  250 mg Oral Nightly    vilazodone HCl  40 mg Oral Daily    sodium chloride flush  5-40 mL IntraVENous 2 times per day    sucralfate  1 g Oral 4 times per day     Continuous Infusions:   sodium chloride      pantoprazole 8 mg/hr (04/25/22 0952)    sodium chloride      sodium chloride      sodium chloride 75 mL/hr at 04/24/22 0759       Assessment:  65 yo F admitted 04/24/22 for GIB. Endorses right-sided abd pain & poor appetite with associated nausea. Endorses hematochezia. She was recently started on low dose ASA BID. Denies other NSAID use. Initial Hgb 8.0. Recently admitted to [facility] 01/2022 for melena. Underwent colonoscopy & push enteroscopy 04/16/22 with small AVM in descending colon with clip placed. However dark blood and clots indicated small bowel bleeding. CT angio negative. Pt was to have outpatient SBCE. 1. Hematochezia  2. Acute on chronic anemia- s/p 1 unit PRBC  3. Poor appetite  4. Right-sided abdominal pain- resolved  5. ASA use BID- recently started due to adverse effects of MS medication  6. MS  7. H/O Bipolar disorder  8. Pacemaker  9. Thyroid disease  10.  H/O Batsheva-Y gastric bypass with h/o ulcer at anastomotic site  11.  H/O GIB, most recently 01/22 with descending colon AVM clipped, suspected small intestine bleeding    Plan:     Monitor H & H, transfuse prn   Nursing to monitor stool output & document   Continue PPI gtt   Continue Carafate QID   Recommend holding ASA & permanently stopping due to significant history of multiple GI bleeds & chronic anemia, likely risks outweigh the benefits   Clear liquid diet, no red dye   Consider resuming PO iron TID, home dose, when appropriate   If patient has a bloody stool today, send for STAT bleeding scan, RN updated   Consider retrograde balloon enteroscopy & colonoscopy    RN updated   Per RN, primary is wanting to transfer patient to [facility] for further evaluation   Supportive care per primary team  Will follow until transferred    Case reviewed and impression/plan reviewed in collaboration with Dr. Petey Phillip  Electronically signed by Gilford Sovereign, APRN - CNP on 4/25/2022 at 10:18 AM    [facility]

## 2022-04-26 NOTE — DISCHARGE SUMMARY
Hospital Medicine Discharge Summary      Patient Identification:   Celeste Sharpe   : 1960  MRN: [id number]   Account: [de-identified]      Patient's PCP: Evelyne Blanton DO    Admit Date: 2022     Discharge Date: 2022      Admitting Physician: No admitting provider for patient encounter. Discharging Nurse Practitioner: AJITH Miller - CNP     Discharge Diagnoses with Assessment/Plan:  1. Acute GI bleed--appreciate GI input; patient is currently on a Protonix drip; fecal occult blood positive; holding aspirin, patient is not on any other anticoagulants/antiplatelets; GI note reviewed from  and plan is if the patient has a bloody stool today send for stat bleeding scan, also consider retrograde balloon enteroscopy and colonoscopy; patient has received GI care a bit [facility] and so patient is wanting to be transferred to [facility] for further care and evaluation; I did speak to our transfer center along with [facility] and I spoke with Indio Gaitan from the hospitalist service with Dr. Anjali Vega and patient excepted in transfer pending bed assignment  2. Acute blood loss anemia--patient received 1 unit packed red blood cells on ; this morning hemoglobin noted to be 6.7 so I ordered 1 unit of packed red blood cells~after speaking with Indio Gaitan at [facility] he is requesting that 2 units of packed red blood cells be transfused secondary to the continued bloody stools, holding aspirin, please see #1; trend H/H every 6 hours;  hemoglobin at 6.7 this morning, monitor vital signs closely  3. Elevated BUN--resolved  4. History of symptomatic bradycardia status post PPM on May 22nd 2019--follows with Dr. Vinny Valenzuela  5. Bipolar disorder--treated  6. History of Batsheva-en-Y  7. History of cervical cancer status post hysterectomy  8.  History of GI bleed, most recently 2022 with descending colon AVM clipped, suspected small intestine bleeding     The patient was seen and examined on day of discharge and this discharge summary is in conjunction with any daily progress note from day of discharge. Hospital Course:    Gunnar Fuentes is a 64 y.o. female admitted to [address] [place] [address] on 4/24/2022 for dark red stools;  61 y.o. female who presented to [address] [place] [address] with dark red stools; patient has a past medical history of symptomatic bradycardia with a pacemaker, bipolar disorder, history of Batsheva-en-Y along with history of cervical cancer status post hysterectomy; she states she has had numerous issues with rectal bleeding; she states last evening approximately 10:50 PM she noticed dark red stools, at that time she also related to lightheadedness and dizziness, she states she has had 2 stools total; she denies any chest pain or shortness of breath or nausea or abdominal pain.     In the emergency department, hemoglobin was noted to be 8.0 and on April 19, 2022 she was noted to be 11.2, she is currently receiving 1 unit packed red blood cells, BUN noted to be 32; she is being admitted to hospital service for further care and evaluation.     4/25--> patient had more dark stools yesterday and states she has had 3 so far today, hemoglobin this morning 6.7 so I ordered 1 unit packed red blood cells, received a call from the nurse that patient has received care up at [facility] and wishes to return there for further care and evaluation, I called her transfer center who initiated the transfer and patient was excepted to the hospital service with [facility] under Dr. Mell Bo awaiting bed availability, patient was informed, [facility] at the [facility] did request instead of 1 unit packed red blood cells that the patient received 2 so this was ordered, currently she is hemodynamically stable and alert and oriented             Exam:     Vitals:  Vitals:    04/25/22 1101 04/25/22 1407 04/25/22 1432 04/25/22 1645   BP: (!) 107/52 (!) 103/52 (!) 98/49 (!) 102/43   Pulse: 63 60 60 65   Resp: 18 18 18 18   Temp: 98.6 °F (37 °C) 98.4 °F (36.9 °C) 98.2 °F (36.8 °C) 98.2 °F (36.8 °C)   TempSrc: Oral Oral Oral Oral   SpO2: 98% 98% 99% 100%   Weight:       Height:         Weight: Weight: 110 lb (49.9 kg)     24 hour intake/output:No intake or output data in the 24 hours ending 04/26/22 0758      General appearance:  No apparent distress, appears stated age and cooperative. HEENT:  Normal cephalic, atraumatic without obvious deformity. Pupils equal, round, and reactive to light. Conjunctivae/corneas clear. Neck: Supple, with full range of motion. No jugular venous distention. Trachea midline. Respiratory:  Normal respiratory effort. Clear to auscultation, bilaterally without Rales/Wheezes/Rhonchi. Cardiovascular:  Regular rate and rhythm with normal S1/S2 without murmurs, rubs or gallops. Abdomen: Soft, non-tender, non-distended with normal bowel sounds. Musculoskeletal:  No clubbing, cyanosis or edema bilaterally. Full range of motion without deformity. Skin: Skin color is pale. Neurologic:  Neurovascularly intact without any focal sensory/motor deficits. Cranial nerves: II-XII intact, grossly non-focal.  Psychiatric:  Alert and oriented, thought content appropriate  Capillary Refill: Brisk,< 3 seconds   Peripheral Pulses: +2 palpable, equal bilaterally       Labs: For convenience and continuity at follow-up the following most recent labs are provided:      CBC:    Lab Results   Component Value Date    WBC 6.8 04/24/2022    HGB 6.6 04/25/2022    HCT 22.4 04/25/2022     04/24/2022       Renal:    Lab Results   Component Value Date     04/25/2022    K 4.2 04/25/2022     04/25/2022    CO2 20 04/25/2022    BUN 19 04/25/2022    CREATININE 0.5 04/25/2022    CALCIUM 9.0 04/25/2022    PHOS 3.0 10/07/2018       Cardiac: No results for input(s): [place] in the last 72 hours.     Significant Diagnostic Studies    Radiology:   No orders to display Consults:     IP CONSULT TO GI    Disposition:    [x] Home       [] TCU       [] Rehab       [] Psych       [] SNF       [] [place]       [] Other-    Condition at Discharge: Stable    Code Status:  Prior     Pending tests at discharge:      none    Patient Instructions:    Discharge lab work: none  Activity: activity as tolerated  Diet: No diet orders on file      Follow-up visits:   No follow-up provider specified. Discharge Medications:        Medication List      CHANGE how you take these medications    levothyroxine 100 MCG tablet  Commonly known as: SYNTHROID  TAKE 1 TABLET BY MOUTH ONE TIME A DAY  What changed: when to take this     pantoprazole 40 MG tablet  Commonly known as: PROTONIX  Take 1 tablet by mouth 2 times daily (before meals)  What changed: when to take this        CONTINUE taking these medications    calcium carbonate 600 MG Tabs tablet     Culturelle Health (Inulin) Caps  TAKE 1 CAPSULE BY MOUTH WITH BREAKFAST     cyanocobalamin 1000 MCG tablet  Take 1 tablet by mouth daily     DHEA 50 MG Caps     dimethyl fumarate 240 MG delayed release capsule  Commonly known as: TECFIDERA  Take 1 capsule by mouth 2 times daily     docusate sodium 100 MG capsule  Commonly known as: COLACE  TAKE 1 CAPSULE BY MOUTH 2 TIMES A DAY     ferrous sulfate 325 (65 Fe) MG tablet  Commonly known as: IRON 325  Take 1 tablet by mouth in the morning, at noon, and at bedtime     fluticasone 50 MCG/ACT nasal spray  Commonly known as: FLONASE     ipratropium 0.03 % nasal spray  Commonly known as: ATROVENT  INSTILL 2 SPRAYS INTO EACH NOSTRIL 3 TIMES DAILY. lamoTRIgine 200 MG tablet  Commonly known as: LAMICTAL  Take one tab by mouth once daily.      lithium 300 MG tablet  Take 2 tablets by mouth 2 times daily     loratadine 10 MG tablet  Commonly known as: CLARITIN  Take 1 tablet by mouth daily     lurasidone 40 MG Tabs tablet  Commonly known as: Latuda  Take 1 tablet by mouth nightly     Magnesium Oxide 250 MG Tabs tablet  Commonly known as: MAGNESIUM-OXIDE     nitroGLYCERIN 0.4 MG SL tablet  Commonly known as: NITROSTAT  up to max of 3 total doses. If no relief after 1 dose, call 911. ondansetron 4 MG tablet  Commonly known as: ZOFRAN  Take 1 tablet by mouth 3 times daily as needed for Nausea or Vomiting     Prosthesis Misc  by Does not apply route Strap for the prosthesis broken please order new     sucralfate 1 GM tablet  Commonly known as: CARAFATE  Take 1 tablet by mouth every 6 hours     vilazodone HCl 40 MG Tabs  Commonly known as: vilazodone hcl  TAKE 1 TABLET BY MOUTH ONCE DAILY. vitamin D 50 MCG (2000 UT) Caps capsule        STOP taking these medications    aspirin 81 MG EC tablet     PRENATAL 1 PO            Time Spent on discharge is more than 45 minutes in the examination, evaluation, counseling and review of medications and discharge plan. Signed: Thank you Dejah Fernandez DO for the opportunity to be involved in this patient's care.     Electronically signed by AJITH Doty CNP on 4/26/2022 at 7:58 AM

## 2022-04-29 ENCOUNTER — HOSPITAL ENCOUNTER (OUTPATIENT)
Dept: NURSING | Age: 62
Discharge: HOME | End: 2022-04-29

## 2022-05-02 ENCOUNTER — TELEPHONE (OUTPATIENT)
Dept: FAMILY MEDICINE CLINIC | Age: 62
End: 2022-05-02

## 2022-05-02 DIAGNOSIS — D50.0 IRON DEFICIENCY ANEMIA DUE TO CHRONIC BLOOD LOSS: Primary | ICD-10-CM

## 2022-05-02 NOTE — TELEPHONE ENCOUNTER
Pt was D/C from [facility] for GI bleed. Pt is requesting H & H standing order d/t GI bleed and hemoglobin Is very \"touchy. \"     Please advise. Pt has an appointment 5/10/22 with Dr Nicanor Hernandez at 3:20p. [employer] has the records. ... [facility] [phone] for records. Faxed records release to [facility] fax# [phone].

## 2022-05-02 NOTE — TELEPHONE ENCOUNTER
----- Message from [address] [facility] sent at 5/2/2022 10:43 AM EDT -----  Subject: Hospital Follow Up    QUESTIONS  [facility] the [facility]   Date of Discharge? 2022-04-29  Discharge Location? Home  Reason for hospitalization as patient stated? Gastro bleed  What question does the patient have, if applicable? Pt to be seen within a   week  ---------------------------------------------------------------------------  --------------  CALL BACK INFO  What is the best way for the office to contact you? OK to leave message on   voicemail  Preferred Call Back Phone Number? [phone]  ---------------------------------------------------------------------------  --------------  SCRIPT ANSWERS  Relationship to Patient?  Self

## 2022-05-04 ENCOUNTER — TELEPHONE (OUTPATIENT)
Dept: FAMILY MEDICINE CLINIC | Age: 62
End: 2022-05-04

## 2022-05-04 NOTE — TELEPHONE ENCOUNTER
----- Message from [address] [facility] sent at 5/2/2022 10:43 AM EDT -----  Subject: Hospital Follow Up    QUESTIONS  [facility]   Date of Discharge? 2022-04-29  Discharge Location? Home  Reason for hospitalization as patient stated? Gastro bleed  What question does the patient have, if applicable? Pt to be seen within a   week  ---------------------------------------------------------------------------  --------------  CALL BACK INFO  What is the best way for the office to contact you? OK to leave message on   voicemail  Preferred Call Back Phone Number? [phone]  ---------------------------------------------------------------------------  --------------  SCRIPT ANSWERS  Relationship to Patient?  Self

## 2022-05-05 ENCOUNTER — HOSPITAL ENCOUNTER (OUTPATIENT)
Age: 62
Discharge: HOME | End: 2022-05-05
Payer: MEDICARE

## 2022-05-05 DIAGNOSIS — D50.0 IRON DEFICIENCY ANEMIA DUE TO CHRONIC BLOOD LOSS: ICD-10-CM

## 2022-05-05 LAB
HCT VFR BLD CALC: 26.2 % (ref 37–47)
HEMOGLOBIN: 7.5 GM/DL (ref 12–16)

## 2022-05-05 PROCEDURE — 85014 HEMATOCRIT: CPT

## 2022-05-05 PROCEDURE — 85018 HEMOGLOBIN: CPT

## 2022-05-05 PROCEDURE — 36415 COLL VENOUS BLD VENIPUNCTURE: CPT

## 2022-05-09 ENCOUNTER — TELEPHONE (OUTPATIENT)
Dept: FAMILY MEDICINE CLINIC | Age: 62
End: 2022-05-09

## 2022-05-09 ENCOUNTER — HOSPITAL ENCOUNTER (OUTPATIENT)
Age: 62
Discharge: HOME | End: 2022-05-09
Payer: MEDICARE

## 2022-05-09 DIAGNOSIS — D50.0 IRON DEFICIENCY ANEMIA DUE TO CHRONIC BLOOD LOSS: Primary | ICD-10-CM

## 2022-05-09 LAB
ANISOCYTOSIS: PRESENT
BASOPHILS # BLD: 0.6 %
BASOPHILS ABSOLUTE: 0 THOU/MM3 (ref 0–0.1)
EOSINOPHIL # BLD: 2.4 %
EOSINOPHILS ABSOLUTE: 0.1 THOU/MM3 (ref 0–0.4)
ERYTHROCYTE [DISTWIDTH] IN BLOOD BY AUTOMATED COUNT: 20.9 % (ref 11.5–14.5)
ERYTHROCYTE [DISTWIDTH] IN BLOOD BY AUTOMATED COUNT: 83 FL (ref 35–45)
HCT VFR BLD CALC: 30.5 % (ref 37–47)
HEMOGLOBIN: 8.8 GM/DL (ref 12–16)
HYPOCHROMIA: PRESENT
IMMATURE GRANS (ABS): 0.01 THOU/MM3 (ref 0–0.07)
IMMATURE GRANULOCYTES: 0.2 %
LYMPHOCYTES # BLD: 10.9 %
LYMPHOCYTES ABSOLUTE: 0.5 THOU/MM3 (ref 1–4.8)
MCH RBC QN AUTO: 31.1 PG (ref 26–33)
MCHC RBC AUTO-ENTMCNC: 28.9 GM/DL (ref 32.2–35.5)
MCV RBC AUTO: 107.8 FL (ref 81–99)
MONOCYTES # BLD: 6.9 %
MONOCYTES ABSOLUTE: 0.3 THOU/MM3 (ref 0.4–1.3)
NUCLEATED RED BLOOD CELLS: 0 /100 WBC
PLATELET # BLD: 307 THOU/MM3 (ref 130–400)
PMV BLD AUTO: 10.1 FL (ref 9.4–12.4)
RBC # BLD: 2.83 MILL/MM3 (ref 4.2–5.4)
SEG NEUTROPHILS: 79 %
SEGMENTED NEUTROPHILS ABSOLUTE COUNT: 4 THOU/MM3 (ref 1.8–7.7)
WBC # BLD: 5 THOU/MM3 (ref 4.8–10.8)

## 2022-05-09 PROCEDURE — 85025 COMPLETE CBC W/AUTO DIFF WBC: CPT

## 2022-05-09 PROCEDURE — 36415 COLL VENOUS BLD VENIPUNCTURE: CPT

## 2022-05-09 NOTE — TELEPHONE ENCOUNTER
----- Message from Tye sent at 5/9/2022 12:46 PM EDT -----  Subject: Referral Request    QUESTIONS   Reason for referral request? Pt needs standing order for Iron and   hemoglobin. Please send to [address] [place] [address]. [facility]. Please call pt back. Has the physician seen you for this condition before? No   Preferred Specialist (if applicable)? Do you already have an appointment scheduled? No  Additional Information for Provider?   ---------------------------------------------------------------------------  --------------  CALL BACK INFO  What is the best way for the office to contact you? OK to leave message on   voicemail  Preferred Call Back Phone Number? [phone]  ---------------------------------------------------------------------------  --------------  SCRIPT ANSWERS  Relationship to Patient?  Self

## 2022-05-10 ENCOUNTER — OFFICE VISIT (OUTPATIENT)
Dept: FAMILY MEDICINE CLINIC | Age: 62
End: 2022-05-10
Payer: MEDICARE

## 2022-05-10 VITALS
SYSTOLIC BLOOD PRESSURE: 120 MMHG | HEART RATE: 58 BPM | DIASTOLIC BLOOD PRESSURE: 84 MMHG | OXYGEN SATURATION: 98 % | WEIGHT: 123 LBS | HEIGHT: 58 IN | TEMPERATURE: 97 F | BODY MASS INDEX: 25.82 KG/M2 | RESPIRATION RATE: 14 BRPM

## 2022-05-10 DIAGNOSIS — Z09 HOSPITAL DISCHARGE FOLLOW-UP: ICD-10-CM

## 2022-05-10 DIAGNOSIS — D50.9 IRON DEFICIENCY ANEMIA, UNSPECIFIED IRON DEFICIENCY ANEMIA TYPE: Primary | ICD-10-CM

## 2022-05-10 DIAGNOSIS — M79.89 SWELLING OF BOTH LOWER EXTREMITIES: ICD-10-CM

## 2022-05-10 PROCEDURE — 99213 OFFICE O/P EST LOW 20 MIN: CPT | Performed by: STUDENT IN AN ORGANIZED HEALTH CARE EDUCATION/TRAINING PROGRAM

## 2022-05-10 RX ORDER — PANTOPRAZOLE SODIUM 40 MG/1
40 TABLET, DELAYED RELEASE ORAL
Qty: 180 TABLET | Refills: 2 | Status: SHIPPED | OUTPATIENT
Start: 2022-05-10

## 2022-05-10 NOTE — LETTER
[address] [place] [address],[address] [facility] [address] [place] [address]  Phone: [phone]  Fax: [address], DO         May 10, 2022     Patient: Emilie Sidhu   YOB: 1960   Date of Visit: 5/10/2022       To Whom It May Concern: It is my medical opinion that Alexander Werner requires a disability parking placard for the following reasons:    She cannot walk 200 feet without stopping to rest.    Duration of need: lifetime     If you have any questions or concerns, please don't hesitate to call.     Sincerely,        Liliya Bey, DO

## 2022-05-10 NOTE — PROGRESS NOTES
S: 64 y.o. female with   Chief Complaint   Patient presents with    Follow-up     [facility] admin (hemoglobin low)    Follow-up     gi bleed in [place]    Follow-up     low hemocrit and infusions    Follow-up     edems in both legs and feet       [facility] f[facility]u [facility]:  Admitted for GI bleed  Reassuring EGD/[place]  Unable to find the where bleeding is coming from  Required 14 units of PRBC per d/c summer    Since discharge feeling well overall  occ SOB with exertion  No bleeding, melena or hematochezia  On iron and pantoprazole. May need iron infusions. Has GI f/u in the next few wks      BP Readings from Last 3 Encounters:   05/10/22 120/84   04/25/22 (!) 102/43   04/22/22 (!) 111/57     Wt Readings from Last 3 Encounters:   05/10/22 123 lb (55.8 kg)   04/24/22 110 lb (49.9 kg)   04/19/22 122 lb 9.6 oz (55.6 kg)       O: VS:  height is 4' 10\" (1.473 m) and weight is 123 lb (55.8 kg). Her skin temperature is 97 °F (36.1 °C). Her blood pressure is 120/84 and her pulse is 58. Her respiration is 14 and oxygen saturation is 98%. AAO/NAD, appropriate affect for mood  CV:  RRR, no murmur  Resp: CTAB      Lab Results   Component Value Date    WBC 5.0 05/09/2022    HGB 8.8 (L) 05/09/2022    HCT 30.5 (L) 05/09/2022    .8 (H) 05/09/2022     05/09/2022        Diagnosis Orders   1. Iron deficiency anemia, unspecified iron deficiency anemia type  Marielle Bhatti MD, Hematology & Oncology, [address]  -UGI bleed/Acute blood loss anemia: doing better since discharge. con't iron and PPI. Con't serial CBC weekly per GI. Monitor for s/s bleeding. F/u GI as planned in [place]. Reviewed ER precautions, pt understands. Refer to heme to discuss IV iron      There are no preventive care reminders to display for this patient. Attending Physician Statement  I have discussed the case, including pertinent history and exam findings with the resident.  I also have seen the patient and performed key portions of the examination. I agree with the documented assessment and plan as documented by the resident.   GC modifier added to this encounter      Alicia De Leon DO 5/10/2022 5:09 PM

## 2022-05-10 NOTE — PROGRESS NOTES
[address] [facility] [id number]  Dept: [phone]  Loc: [phone]      Please see Resident HPI. ROS per Resident    Lab Results   Component Value Date    WBC 5.0 05/09/2022    HGB 8.8 (L) 05/09/2022    HCT 30.5 (L) 05/09/2022    .8 (H) 05/09/2022     05/09/2022     Lab Results   Component Value Date     04/25/2022    K 4.2 04/25/2022     (H) 04/25/2022    CO2 20 (L) 04/25/2022    BUN 19 04/25/2022    CREATININE 0.5 04/25/2022    GLUCOSE 106 04/25/2022    CALCIUM 9.0 04/25/2022    PROT 5.4 (L) 04/24/2022    LABALBU 3.6 04/24/2022    BILITOT 0.2 (L) 04/24/2022    ALKPHOS 88 04/24/2022    AST 17 04/24/2022    ALT 18 04/24/2022    LABGLOM >90 04/25/2022    GFRAA >60 01/18/2022     Lab Results   Component Value Date    LABA1C 4.7 11/13/2019     No results found for: EAG  No results found for: LABMICR, [id number]  Lab Results   Component Value Date    TSH 1.400 03/01/2022    T4FREE 1.11 03/01/2022     Lab Results   Component Value Date    CHOL 141 03/02/2022    CHOL 157 04/12/2021    CHOL 148 03/04/2021     Lab Results   Component Value Date    TRIG 32 03/02/2022    TRIG 71 04/12/2021    TRIG 62 03/04/2021     Lab Results   Component Value Date    HDL 95 03/02/2022     04/12/2021    HDL 97 03/04/2021     Lab Results   Component Value Date    LDLCALC 40 03/02/2022    LDLCALC 40 04/12/2021    LDLCALC 39 03/04/2021     No results found for: LABVLDL, VLDL  No results found for: CHOLHDLRATIO  No results found for: PSA, PSADIA    There are no preventive care reminders to display for this patient. Physical Exam per Resident       ICD-10-CM    1. Iron deficiency anemia, unspecified iron deficiency anemia type  D50.9 Marielle Alvarez MD, Hematology & Oncology, [facility]  I participated in the discussion and care of this patient   Hospital follow up for GIB, had colonoscopy. No identified source of bleeding.    Standing order for CBC and Ferritin  Refer to Hematology

## 2022-05-10 NOTE — PROGRESS NOTES
[address] [facility] [place] J[address] [facility] [address]  Dept: [phone]  Dept Fax: [phone]  Loc: [phone]  PROGRESS NOTE      Visit Date: 5/10/2022    Subhash Gaines is a 64 y.o. female who presents today for:  Chief Complaint   Patient presents with    Follow-up     [facility] admin (hemoglobin low)    Follow-up     gi bleed in [place]    Follow-up     low hemocrit and infusions    Follow-up     edems in both legs and feet       Impression/Plan:  1. Iron deficiency anemia, unspecified iron deficiency anemia type  Chronic, stable  History of Batsheva-en-Y, likely causing decreased iron absorption  Standing orders placed for CBC and ferritin per GI  Her anemia appears mixed, likely secondary to iron and vitamin supplementation which necessary due to her history of Batsheva-en-Y  May benefit from IV iron transfusions going forward, will refer to hematology  - Eliana Chanel MD, Hematology & Oncology, [facility]    2. Swelling of both lower extremities  Acute, Improving  Nonpitting  Likely secondary to fluids received during admission slowly resolving  Encourage compression socks and elevating legs is much as possible  Encouraged her to continue walking when able    3. Hospital discharge follow-up  Medications reviewed, no changes at this time besides increasing Protonix 40 mg from daily to twice daily  Standing orders placed for CBC and ferritin to monitor hemoglobin and any evidence of acute blood loss      Return in about 4 weeks (around 6/7/2022) for Chronic. Subjective:    Hospital follow-up :  Discharge 4/29. Having some SOB and dizziness with exertion. Had 2 episodes of melena at home prior to admission. Found to have low hemoglobin. Had EGD and colonoscopy in [place], but unable to find source of bleeding. Discharged home once hemoglobin is stable with instructions for close follow-up of lab work.     Sees  [facility] for GI locally, but is working primarily with [place] now. She has follow-up scheduled with them later this month in [place]. She was also given very strict instructions by her GI and [facility] to go there for further care if she develops melena once again. They plan to do an EGD as quickly as possible in an effort to see the source of her frequent GI bleeds. Or if it is not possible, they would like to consult interventional radiology in an effort to find a source of bleeding. I discussed with her that if she is unstable, she should absolutely go to the nearest emergency room first.    Leg swelling: Present bilaterally. She denies any pain in her calves. Her left lower extremity is slightly larger compared to right, but she states that this is chronic due to surgery on the left lower extremity. No further questions of complaints. Constitutional: Negative for chills and fever. HENT: Negative for congestion and sore throat. Eyes: Negative for visual disturbance. Respiratory: Negative for cough and positive for shortness of breath with exertion, which is chronic for her. Cardiovascular: Negative for chest pain. Positive for lower extremity edema bilaterally. Gastrointestinal: Negative for abdominal pain and nausea. She denies any additional episodes of melena. Denies hematochezia. Denies any coffee-ground emesis. Genitourinary: Negative for dysuria. Skin: Negative for rash. Neurological: Negative for dizziness, light-headedness and headaches. Psychiatric/Behavioral: The patient is not nervous/anxious.         Patient Active Problem List   Diagnosis    Acquired hypothyroidism    MS (multiple sclerosis) ([facility]    S/P laparoscopic cholecystectomy    S/P laparoscopic appendectomy    Gastrointestinal hemorrhage associated with gastritis    Multiple thyroid nodules    Non-ischemic cardiomyopathy ([facility]    History of Batsheva-en-Y gastric bypass    Renal lesion    Liver lesion    Bipolar 1 disorder (HCC)    Symptomatic bradycardia    Status post placement of cardiac pacemaker    Gastrointestinal hemorrhage with melena    GI bleed    Peptic ulcer disease    Other complications of other bariatric procedure    Ulcer at site of surgical anastomosis following bypass of stomach    Acute blood loss anemia    Iron deficiency anemia     Past Medical History:   Diagnosis Date    Balance problem     Bipolar I disorder, most recent episode (or current) depressed, in partial or unspecified remission 5/8/2013    Cancer ([facility]     CERVICAL     Depression     Fatigue     Gallstones     Gastric bypass status for obesity 2002    GERD (gastroesophageal reflux disease)     History of blood transfusion     History of hysterectomy     REMOVAL OF ONE OVARY     Hypothyroidism     Liver cyst     GI Dr Sara Miller MS (multiple sclerosis) ([facility]     Paresthesias     RT LOWER LIMB    Ulcer at site of surgical anastomosis following bypass of stomach 1/15/2022    UTI (urinary tract infection)       Past Surgical History:   Procedure Laterality Date    APPENDECTOMY      BONE GRAFT Left     left leg to right arm    CARPAL TUNNEL RELEASE  1999    CHOLECYSTECTOMY, LAPAROSCOPIC  5/16/2016    COLONOSCOPY      COLONOSCOPY N/A 1/16/2022    COLONOSCOPY CONTROL HEMORRHAGE performed by Cisco Bose MD at [address], ESOPHAGUS      ENDOSCOPY, COLON, DIAGNOSTIC      ENTEROSCOPY N/A 2/5/2018    ENTEROSCOPY performed by Ana Jean-Baptiste MD at [address] [place] [address] N/A 9/28/2020    GASTROSTOMY TUBE PLACEMENT- [facility] TO DO GASTRIC ENDOSCOPY performed by Britton Hamlin MD at [facility], VAGINAL      LAPAROSCOPIC APPENDECTOMY  5/16/2016    OTHER SURGICAL HISTORY      RECTAL [address]    OTHER SURGICAL HISTORY  1986    D&C    OTHER SURGICAL HISTORY  2006    GI BLEED    OVARY REMOVAL N/A     pt unaware which one was taken    PACEMAKER INSERTION Right 2019    Dr. Unruly Kaplan FLX DX W/COLLJ SPEC WHEN PFRMD Left 2018    [facility] [place] [facility]    LA OFFICE/OUTPT VISIT,PROCEDURE ONLY Left 2018    EGD ESOPHAGOGASTRODUODENOSCOPY performed by Gregg Green MD at [facility] [place] Endoscopy    LA OFFICE/OUTPT VISIT,PROCEDURE ONLY N/A 10/4/2018    EGD DIAGNOSTIC ONLY performed by Anne-Marie Foote MD at [facility] ENDOSCOPY Left 2019    EGD DIAGNOSTIC ONLY performed by Juan Elkins MD at [address] [facility] ENDOSCOPY N/A 2020    ENTEROSCOPY PUSH CONTROL HEMORRHAGE performed by Bang Sparrow MD at [address] [facility] N/A 2022    ENTEROSCOPY PUSH DIAGNOSTIC performed by Garrick Mancia MD at [address]  2022     Family History   Problem Relation Age of Onset    Cancer Mother 43    Colon Polyps Mother     Breast Cancer Mother     Heart Disease Father     Glaucoma Father    Thirza Stage Sister     Colon Cancer Sister     Cancer Sister     Breast Cancer Sister      Social History     Tobacco Use    Smoking status: Former Smoker     Packs/day: 0.10     Years: 0.50     Pack years: 0.05     Types: Cigarettes     Quit date: 10/24/1977     Years since quittin.5    Smokeless tobacco: Never Used   Substance Use Topics    Alcohol use: No      Current Outpatient Medications   Medication Sig Dispense Refill    pantoprazole (PROTONIX) 40 MG tablet Take 1 tablet by mouth 2 times daily (before meals) 180 tablet 2    ferrous sulfate (IRON 325) 325 (65 Fe) MG tablet Take 1 tablet by mouth in the morning, at noon, and at bedtime 90 tablet 2    vilazodone HCl (VILAZODONE HCL) 40 MG TABS TAKE 1 TABLET BY MOUTH ONCE DAILY.  30 tablet 2    lurasidone (LATUDA) 40 MG TABS tablet Take 1 tablet by mouth nightly 30 tablet 2    lithium 300 MG tablet Take 2 tablets by mouth 2 times daily 120 tablet 2    lamoTRIgine (LAMICTAL) 200 MG tablet Take one tab by mouth once daily. 30 tablet 2    docusate sodium (COLACE) 100 MG capsule TAKE 1 CAPSULE BY MOUTH 2 TIMES A DAY 60 capsule 2    sucralfate (CARAFATE) 1 GM tablet Take 1 tablet by mouth every 6 hours 120 tablet 3    levothyroxine (SYNTHROID) 100 MCG tablet TAKE 1 TABLET BY MOUTH ONE TIME A DAY (Patient taking differently: nightly TAKE 1 TABLET BY MOUTH ONE TIME A DAY) 90 tablet 1    DHEA 50 MG CAPS Take by mouth nightly      dimethyl fumarate (TECFIDERA) 240 MG delayed release capsule Take 1 capsule by mouth 2 times daily      ipratropium (ATROVENT) 0.03 % nasal spray INSTILL 2 SPRAYS INTO EACH NOSTRIL 3 TIMES DAILY. 1 Bottle 3    Lactobacillus-Inulin (C[facility]URELLE HEALTH, [facility],) CAPS TAKE 1 CAPSULE BY MOUTH WITH BREAKFAST 30 capsule 1    fluticasone (FLONASE) 50 MCG/ACT nasal spray 2 sprays by Each Nostril route daily      ondansetron (ZOFRAN) 4 MG tablet Take 1 tablet by mouth 3 times daily as needed for Nausea or Vomiting 15 tablet 0    Prosthesis MISC by Does not apply route Strap for the prosthesis broken please order new 1 each 0    loratadine (CLARITIN) 10 MG tablet Take 1 tablet by mouth daily 90 tablet 1    Cholecalciferol (VITAMIN D) 2000 units CAPS capsule Take by mouth nightly 50 mcg nightly      Magnesium Oxide 250 MG TABS Take 250 mg by mouth nightly       calcium carbonate 600 MG TABS tablet Take 1 tablet by mouth nightly       vitamin B-12 1000 MCG tablet Take 1 tablet by mouth daily 30 tablet 5    nitroGLYCERIN (NITROSTAT) 0.4 MG SL tablet up to max of 3 total doses. If no relief after 1 dose, call 911. 25 tablet 3     No current facility-administered medications for this visit.      Allergies   Allergen Reactions    Latex Rash    Sulfa Antibiotics Rash       Immunization History   Administered Date(s) Administered    COVID-19, Pfizer Purple top, DILUTE for use, 12+ yrs, 30mcg/0.3mL dose 02/02/2021, 02/23/2021, 11/10/2021    Influenza Vaccine, unspecified formulation 09/15/2015, 09/14/2016, 10/21/2016, 08/16/2017    Influenza Virus Vaccine 10/21/2016, 08/16/2017, 09/30/2020    Influenza, MDCK Dulcy Cocks, with preserv IM (Flucelvax 2 yrs and older) 03/11/2019    Influenza, Dulcy Cocks, 6 mo and older, IM, PF (Flulaval, Fluarix) 10/05/2018    Influenza, Quadv, IM, (6 mo and older Fluzone, Flulaval, Fluarix and 3 yrs and older Afluria) 10/05/2018    Influenza, Quadv, IM, PF (6 mo and older Fluzone, Flulaval, Fluarix, and 3 yrs and older Afluria) 09/23/2017, 09/24/2019, 09/20/2021    Pneumococcal Conjugate 13-valent ([id number]) 04/10/2019    Pneumococcal Polysaccharide ([id number]) 05/17/2016    Td (Adult), 5 Lf Tetanus Toxoid, Pf (Tenivac, Decavac) 04/30/1999    Td vaccine (adult) 04/30/1999    Tdap (Boostrix, Adacel) 09/29/2015, 09/12/2016, 10/25/2018    Zoster Recombinant (Shingrix) 03/11/2019, 11/19/2019, 01/03/2020     Health Maintenance   Topic Date Due    Diabetes screen  11/13/2022    Breast cancer screen  01/24/2023    Depression Monitoring  02/18/2023    Annual Wellness Visit (AWV)  02/19/2023    TSH  03/01/2023    Pneumococcal 0-64 years Vaccine (3 - PPSV23 or PCV20) 05/12/2025    Lipids  03/02/2027    DTaP/Tdap/Td vaccine (4 - Td or Tdap) 10/25/2028    Colorectal Cancer Screen  01/16/2032    Flu vaccine  Completed    Shingles vaccine  Completed    COVID-19 Vaccine  Completed    Hepatitis C screen  Completed    HIV screen  Completed    Hepatitis A vaccine  Aged Out    Hepatitis B vaccine  Aged Out    Hib vaccine  Aged Out    Meningococcal (ACWY) vaccine  Aged Out       LABS  Lab Results   Component Value Date    LABA1C 4.7 11/13/2019     No results found for: EAG  No components found for: CHLPL  Lab Results   Component Value Date    TRIG 32 03/02/2022    TRIG 71 04/12/2021    TRIG 62 03/04/2021     Lab Results   Component Value Date    HDL 95 03/02/2022     04/12/2021    HDL 97 03/04/2021     Lab Results   Component Value Date    LDLCALC 40 03/02/2022    LDLCALC 40 04/12/2021    LDLCALC 39 03/04/2021       Chemistry        Component Value Date/Time     04/25/2022 0707    K 4.2 04/25/2022 0707     (H) 04/25/2022 0707    CO2 20 (L) 04/25/2022 0707    BUN 19 04/25/2022 0707    CREATININE 0.5 04/25/2022 0707        Component Value Date/Time    CALCIUM 9.0 04/25/2022 0707    ALKPHOS 88 04/24/2022 0515    AST 17 04/24/2022 0515    ALT 18 04/24/2022 0515    BILITOT 0.2 (L) 04/24/2022 0515          No results found for: Channie Sonny  Lab Results   Component Value Date    TSH 1.400 03/01/2022     No results found for: PSA  Lab Results   Component Value Date    WBC 5.0 05/09/2022    HGB 8.8 (L) 05/09/2022    HCT 30.5 (L) 05/09/2022    .8 (H) 05/09/2022     05/09/2022       Objective:  /84 (Site: Left Upper Arm, Position: Sitting, Cuff Size: Medium Adult)   Pulse 58   Temp 97 °F (36.1 °C) (Skin)   Resp 14   Ht 4' 10\" (1.473 m)   Wt 123 lb (55.8 kg)   SpO2 98%   BMI 25.71 kg/m²     Constitutional:       General: she is not in acute distress. Appearance: Normal appearance. HENT:      Head: Normocephalic. Right Ear: External ear normal.      Left Ear: External ear normal.      Nose: Nose normal.      Mouth/Throat:      Mouth: Mucous membranes are moist.   Eyes:      General: No scleral icterus. Extraocular Movements: Extraocular movements intact. Neck:      Musculoskeletal: Normal range of motion. Cardiovascular:      Rate and Rhythm: Normal rate and regular rhythm. Pulses: Normal pulses. Heart sounds: Normal heart sounds. Pulmonary:      Effort: Pulmonary effort is normal.      Breath sounds: Normal breath sounds. Abdominal:      General: Abdomen is flat. There is no distension. Palpations: Abdomen is soft. Musculoskeletal: Normal range of motion. Nonpitting edema present in bilateral lower extremities. No tenderness to palpation. No tenderness in posterior calves. No erythema appreciated. Skin:     General: Skin is warm and dry. Bruising present along left upper extremity secondary to phlebotomy during hospital admission. Neurological:      Mental Status: she is alert. Motor: No weakness. Psychiatric:         Mood and Affect: Mood normal.         They voiced understanding. All questions answered. They agreed with treatment plan. See patient instructions for any educational materials that may have been given. Discussed use, benefit, and side effects of prescribed medications. Reviewed health maintenance.     (Please note that portions of this note may have been completed with a voice recognition program.  Efforts were made to edit the dictation but occasionally words are mis-transcribed.)      Electronically signed by Eren Aguilar DO on 5/10/2022 at 7:45 PM

## 2022-05-23 ENCOUNTER — HOSPITAL ENCOUNTER (OUTPATIENT)
Age: 62
Discharge: HOME | End: 2022-05-23
Payer: MEDICARE

## 2022-05-23 LAB
INFLUENZA A: NOT DETECTED
INFLUENZA B: NOT DETECTED
SARS-COV-2 RNA, RT PCR: NOT DETECTED

## 2022-05-23 PROCEDURE — 87636 SARSCOV2 & INF A&B AMP PRB: CPT

## 2022-05-23 NOTE — TELEPHONE ENCOUNTER
Patient came into office today to ask about the iron infusions stating she never got the referral for them so was unsure what was going on. She also wanted to let you know she is having a colonoscopy this Thursday.

## 2022-05-24 ENCOUNTER — TELEPHONE (OUTPATIENT)
Dept: FAMILY MEDICINE CLINIC | Age: 62
End: 2022-05-24

## 2022-05-24 ENCOUNTER — HOSPITAL ENCOUNTER (OUTPATIENT)
Age: 62
Discharge: HOME | End: 2022-05-24
Payer: MEDICARE

## 2022-05-24 ENCOUNTER — OFFICE VISIT (OUTPATIENT)
Dept: PSYCHIATRY | Age: 62
End: 2022-05-24
Payer: MEDICARE

## 2022-05-24 DIAGNOSIS — Z51.81 THERAPEUTIC DRUG MONITORING: ICD-10-CM

## 2022-05-24 DIAGNOSIS — D50.0 IRON DEFICIENCY ANEMIA DUE TO CHRONIC BLOOD LOSS: ICD-10-CM

## 2022-05-24 DIAGNOSIS — F31.75 BIPOLAR I, MOST RECENT EPISODE DEPRESSED, PARTIAL REMISSION (HCC): Primary | ICD-10-CM

## 2022-05-24 LAB
BASOPHILS # BLD: 0.5 %
BASOPHILS ABSOLUTE: 0 THOU/MM3 (ref 0–0.1)
EOSINOPHIL # BLD: 3 %
EOSINOPHILS ABSOLUTE: 0.2 THOU/MM3 (ref 0–0.4)
ERYTHROCYTE [DISTWIDTH] IN BLOOD BY AUTOMATED COUNT: 16.2 % (ref 11.5–14.5)
ERYTHROCYTE [DISTWIDTH] IN BLOOD BY AUTOMATED COUNT: 62 FL (ref 35–45)
FERRITIN: 36 NG/ML (ref 10–291)
HCT VFR BLD CALC: 37.6 % (ref 37–47)
HEMOGLOBIN: 11.1 GM/DL (ref 12–16)
IMMATURE GRANS (ABS): 0.02 THOU/MM3 (ref 0–0.07)
IMMATURE GRANULOCYTES: 0.4 %
LYMPHOCYTES # BLD: 11 %
LYMPHOCYTES ABSOLUTE: 0.6 THOU/MM3 (ref 1–4.8)
MCH RBC QN AUTO: 30.9 PG (ref 26–33)
MCHC RBC AUTO-ENTMCNC: 29.5 GM/DL (ref 32.2–35.5)
MCV RBC AUTO: 104.7 FL (ref 81–99)
MONOCYTES # BLD: 9.8 %
MONOCYTES ABSOLUTE: 0.5 THOU/MM3 (ref 0.4–1.3)
NUCLEATED RED BLOOD CELLS: 0 /100 WBC
PLATELET # BLD: 252 THOU/MM3 (ref 130–400)
PMV BLD AUTO: 10.2 FL (ref 9.4–12.4)
RBC # BLD: 3.59 MILL/MM3 (ref 4.2–5.4)
SEG NEUTROPHILS: 75.3 %
SEGMENTED NEUTROPHILS ABSOLUTE COUNT: 4.2 THOU/MM3 (ref 1.8–7.7)
WBC # BLD: 5.6 THOU/MM3 (ref 4.8–10.8)

## 2022-05-24 PROCEDURE — 99214 OFFICE O/P EST MOD 30 MIN: CPT | Performed by: NURSE PRACTITIONER

## 2022-05-24 PROCEDURE — 85025 COMPLETE CBC W/AUTO DIFF WBC: CPT

## 2022-05-24 PROCEDURE — 36415 COLL VENOUS BLD VENIPUNCTURE: CPT

## 2022-05-24 PROCEDURE — 82728 ASSAY OF FERRITIN: CPT

## 2022-05-24 RX ORDER — VILAZODONE HYDROCHLORIDE 40 MG/1
TABLET ORAL
Qty: 30 TABLET | Refills: 2 | Status: SHIPPED | OUTPATIENT
Start: 2022-05-24 | End: 2022-09-13 | Stop reason: SDUPTHER

## 2022-05-24 RX ORDER — LAMOTRIGINE 200 MG/1
TABLET ORAL
Qty: 30 TABLET | Refills: 2 | Status: SHIPPED | OUTPATIENT
Start: 2022-05-24 | End: 2022-09-13 | Stop reason: SDUPTHER

## 2022-05-24 RX ORDER — LITHIUM CARBONATE 300 MG
600 TABLET ORAL 2 TIMES DAILY
Qty: 120 TABLET | Refills: 2 | Status: SHIPPED | OUTPATIENT
Start: 2022-05-24 | End: 2022-09-13 | Stop reason: SDUPTHER

## 2022-05-24 RX ORDER — FERROUS SULFATE 325(65) MG
325 TABLET ORAL 3 TIMES DAILY
Qty: 90 TABLET | Refills: 2 | Status: SHIPPED | OUTPATIENT
Start: 2022-05-24 | End: 2022-10-04

## 2022-05-24 NOTE — TELEPHONE ENCOUNTER
----- Message from Landon Tipton MD sent at 5/24/2022  9:13 AM EDT -----  Hemoglobin and ferritin are trending upward.      Dr. Richmond Santillan

## 2022-05-24 NOTE — TELEPHONE ENCOUNTER
----- Message from Roger Almanza MD sent at 5/24/2022  9:12 AM EDT -----  Hemoglobin continues to trend upward.      Dr. Nicole Xiong

## 2022-05-24 NOTE — PROGRESS NOTES
[address] [place] [id number]  Dept: [phone]  Dept Fax: [phone]: [phone]    Visit Date: 5/24/2022    SUBJECTIVE DATA     CHIEF COMPLAINT:    Chief Complaint   Patient presents with   [address] Problem    Follow-up       History obtained from: patient    HISTORY OF PRESENT ILLNESS:    Maty Walter is a 58 y.o. female who presents to the office for follow-up on her moods. Her last 02/24/2022    MOOD    -Overall mood is 7/10 with 10 being the best     -Some sadness and depression in the evenings when home alone     -denies feeling worthless, hopeless, helpless  -Motivation is 4 or 5/10 with 10 being the best    -Cannot get motivated to do projects or \"something worthwhile\"   -Energy level is 6 or 7/10 with 10 being the best      ANXIETY    -Denies anxiety, racing thoughts    -Focus and concentration are okay,       -Denies irritability or agitation    Appetite is okay,  Compulsive over eater    Snacks all day and overeats with meals. Member of over eaters anonymous and says that it helps     SLEEP   -sleeping okay,    -Falls asleep in front of the television, then goes to bed     -Able to go back to sleep and stay asleep     -Does not feel rested during the day     -Takes 2-2.5 hr naps daily        STRESS  No new stressors   Work is going well     GI bleed diagnosed in [place], 2nd opinion Colonoscopy on Thursday         Several GI bleeds over 10-12 years     Will be seeing hematology - believes this will be early July      Denies suicidal ideations, intent, plan. No homicidal ideations, intent, plan. No audiovisual hallucinations. HPI    The patient has had 0 lifetime suicide attempts. Reports she had intense thoughts of suicide with a plan and intent back in 1987 but someone came home.     Patient reports 3 psych hospital admissions with the last admission taking place in [address] [place] [address] psychiatric medications include: various per her report but does not recall specific names    Adverse reactions from psychotropic medications:  None      Current Psychiatric Review of Systems         Marian or Hypomania:  None current. Previously reported: Patient does not recall specific marian or hypomania; she is unsure how/why she was diagnosed with bipolar disorder     Panic Attacks:  no     Phobias:  no     Obsessions and Compulsions:  no     Body or Vocal Tics:  no     Hallucinations:  no     Delusions:  no    SOCIAL HISTORY:  Patient was born in [place], [place] and raised by her biological parents      Social History     Socioeconomic History    Marital status:      Spouse name: Not on file    Number of children: 0    Years of education: Not on file    Highest education level: Bachelor's degree (e.g., BA, AB, BS)   Occupational History    Not on file   Tobacco Use    Smoking status: Former Smoker     Packs/day: 0.10     Years: 0.50     Pack years: 0.05     Types: Cigarettes     Quit date: 10/24/1977     Years since quittin.6    Smokeless tobacco: Never Used   Vaping Use    Vaping Use: Never used   Substance and Sexual Activity    Alcohol use: No    Drug use: No    Sexual activity: Not Currently     Partners: Male   Other Topics Concern    Not on file   Social History Narrative    2021    LEVEL OF EDUCATION: graduated high school; earned her bachelor degree in education; did some master's level course work but did not complete the degree    SPECIAL EDUCATION NEEDS: None    RESIDENCE: Currently lives alone    LEGAL HISTORY: None    Religious: Voodoo    TRAUMA: None    : None    HOBBIES: writing, swimming, 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  She was  from  to ; they  in     CHILDREN: None    SUBSTANCE USE: None     Social Determinants of Health     Financial Resource Strain: Low Risk     Difficulty of Paying Living Expenses: Not hard at all   Food Insecurity: No Food Insecurity    Worried About Running Out of Food in the Last Year: Never true    Triston of Food in the Last Year: Never true   Transportation Needs:     Lack of Transportation (Medical): Not on file    Lack of Transportation (Non-Medical):  Not on file   Physical Activity: Insufficiently Active    Days of Exercise per Week: 3 days    Minutes of Exercise per Session: 10 min   Stress:     Feeling of Stress : Not on file   Social Connections:     Frequency of Communication with Friends and Family: Not on file    Frequency of Social Gatherings with Friends and Family: Not on file    Attends Faith Services: Not on file    Active Member of Clubs or Organizations: Not on file    Attends Club or Organization Meetings: Not on file    Marital Status: Not on file   Intimate Partner Violence:     Fear of Current or Ex-Partner: Not on file    Emotionally Abused: Not on file    Physically Abused: Not on file    Sexually Abused: Not on file   Housing Stability:     Unable to Pay for Housing in the Last Year: Not on file    Number of [place] in the Last Year: Not on file    Unstable Housing in the Last Year: Not on file       FAMILY HISTORY:   Family History   Problem Relation Age of Onset    Cancer Mother 43    Colon Polyps Mother     Breast Cancer Mother     Heart Disease Father     Glaucoma Father     Cancer Sister     Colon Cancer Sister     Cancer Sister     Breast Cancer Sister        Psychiatric Family History  None reported    PAST MEDICAL HISTORY:    Past Medical History:   Diagnosis Date    Balance problem     Bipolar I disorder, most recent episode (or current) depressed, in partial or unspecified remission 5/8/2013    Cancer ([facility] [address] [facility]     CERVICAL     Depression     Fatigue     Gallstones     Gastric bypass status for obesity 2002    GERD (gastroesophageal reflux disease)     History of blood transfusion     History of hysterectomy     REMOVAL OF ONE OVARY     Hypothyroidism     Liver cyst     GI Dr Dillon Garcia MS (multiple sclerosis) ([facility] [address] [facility]     Paresthesias     RT LOWER LIMB    Ulcer at site of surgical anastomosis following bypass of stomach 1/15/2022    UTI (urinary tract infection)        PAST SURGICAL HISTORY:    Past Surgical History:   Procedure Laterality Date    APPENDECTOMY      BONE GRAFT Left     left leg to right arm    CARPAL TUNNEL RELEASE  1999    CHOLECYSTECTOMY, LAPAROSCOPIC  5/16/2016    COLONOSCOPY      COLONOSCOPY N/A 1/16/2022    COLONOSCOPY CONTROL HEMORRHAGE performed by Robyn Berg MD at [address], ESOPHAGUS      ENDOSCOPY, COLON, DIAGNOSTIC      ENTEROSCOPY N/A 2/5/2018    ENTEROSCOPY performed by Jarvis Greene MD at [address] [place] [address] N/A 9/28/2020    GASTROSTOMY TUBE PLACEMENT- [facility] TO DO GASTRIC ENDOSCOPY performed by Rigoberto Perez MD at [place] [address], VAGINAL      LAPAROSCOPIC APPENDECTOMY  5/16/2016    OTHER SURGICAL HISTORY      RECTAL FISSULRE    OTHER SURGICAL HISTORY  1986    D&C    OTHER SURGICAL HISTORY  2006    GI BLEED    OVARY REMOVAL N/A     pt unaware which one was taken    PACEMAKER INSERTION Right 05/20/2019    Dr. Mohsen Luo FLX DX W/COLLMEHDI [place] 1978 [facility] Left 2/6/2018    [facility]    [facility] Left 2/18/2018    EGD ESOPHAGOGASTRODUODENOSCOPY performed by Skye Cheng MD at [facility] OFFICE/OUTPT VISIT,PROCEDURE ONLY N/A 10/4/2018    EGD DIAGNOSTIC ONLY performed by Brian Dimas MD at [facility] ENDOSCOPY Left 9/24/2019    EGD DIAGNOSTIC ONLY performed by Sindy Tariq MD at [facility] GASTROINTESTINAL ENDOSCOPY N/A 9/26/2020    ENTEROSCOPY PUSH CONTROL HEMORRHAGE performed by Sukhwinder Echols MD at [facility] N/A 1/16/2022    ENTEROSCOPY PUSH DIAGNOSTIC performed by Lady Plascencia MD at [place] [address]  04/13/2022       PREVIOUSMEDICATIONS:  Outpatient Medications Prior to Visit   Medication Sig Dispense Refill    ferrous sulfate (IRON 325) 325 (65 Fe) MG tablet Take 1 tablet by mouth in the morning, at noon, and at bedtime 90 tablet 2    pantoprazole (PROTONIX) 40 MG tablet Take 1 tablet by mouth 2 times daily (before meals) 180 tablet 2    vilazodone HCl (VILAZODONE HCL) 40 MG TABS TAKE 1 TABLET BY MOUTH ONCE DAILY. 30 tablet 2    lurasidone (LATUDA) 40 MG TABS tablet Take 1 tablet by mouth nightly 30 tablet 2    lithium 300 MG tablet Take 2 tablets by mouth 2 times daily 120 tablet 2    lamoTRIgine (LAMICTAL) 200 MG tablet Take one tab by mouth once daily. 30 tablet 2    docusate sodium (COLACE) 100 MG capsule TAKE 1 CAPSULE BY MOUTH 2 TIMES A DAY 60 capsule 2    sucralfate (CARAFATE) 1 GM tablet Take 1 tablet by mouth every 6 hours 120 tablet 3    levothyroxine (SYNTHROID) 100 MCG tablet TAKE 1 TABLET BY MOUTH ONE TIME A DAY (Patient taking differently: nightly TAKE 1 TABLET BY MOUTH ONE TIME A DAY) 90 tablet 1    DHEA 50 MG CAPS Take by mouth nightly      dimethyl fumarate (TECFIDERA) 240 MG delayed release capsule Take 1 capsule by mouth 2 times daily      ipratropium (ATROVENT) 0.03 % nasal spray INSTILL 2 SPRAYS INTO EACH NOSTRIL 3 TIMES DAILY.  1 Bottle 3    Lactobacillus-Inulin (CULTURELLE HEALTH, INULIN,) CAPS TAKE 1 CAPSULE BY MOUTH WITH BREAKFAST 30 capsule 1    fluticasone (FLONASE) 50 MCG/ACT nasal spray 2 sprays by Each Nostril route daily      ondansetron (ZOFRAN) 4 MG tablet Take 1 tablet by mouth 3 times daily as needed for Nausea or Vomiting 15 tablet 0    Prosthesis MISC by Does not apply route Strap for the prosthesis broken please order new 1 each 0    loratadine (CLARITIN) 10 MG tablet Take 1 tablet by mouth daily 90 tablet 1    Cholecalciferol (VITAMIN D) 2000 units CAPS capsule Take by mouth nightly 50 mcg nightly      Magnesium Oxide 250 MG TABS Take 250 mg by mouth nightly       calcium carbonate 600 MG TABS tablet Take 1 tablet by mouth nightly       vitamin B-12 1000 MCG tablet Take 1 tablet by mouth daily 30 tablet 5    nitroGLYCERIN (NITROSTAT) 0.4 MG SL tablet up to max of 3 total doses. If no relief after 1 dose, call 911. 25 tablet 3     No facility-administered medications prior to visit. ALLERGIES:    Latex and Sulfa antibiotics    REVIEW OF SYSTEMS:    Review of Systems    The patient sees Daniel Brizuela DO as her primary care provider. SPECIALISTS: Dr. María Owens (GI), Dr. Kendrick Longoria, Dr. Nadja Reese (neurology)    OBJECTIVE DATA     There were no vitals taken for this visit. Physical Exam    Mental Status Evaluation:   Orientation: Alert, oriented, thought content appropriate   Mood:. Dysthymic      Affect:  Normal      Appearance:  right upper extremity prosthetic, Age Appropriate, Casually Dressed, Clean, Well Groomed, Clothing Appropriate for Age and Clothing Appropriate for Weather   Activity:  Cooperative, Good Eye Contact and Seated Calmly   Gait/Posture: Normal   Speech:  Clear, Fluent, Normal Pitch and Volume, Age and Situation Appropriate   Thought Process: Within Normal Limits   Thought Content: Within Normal Limits   Cognition:  Grossly Intact   Memory: Intact   Insight:  Good   Judgment: Good   Suicidal Ideations: Denies Suicidal Ideation   Homicidal Ideations: Negative for homicidal ideation   Medication Side Effects: Absent       Attention Span Attention span and concentration were age appropriate       Screenings Completed in This Encounter:     Anxiety and Depression:                    DIAGNOSIS AND ASSESSMENT DATA     DIAGNOSIS:   1.  Bipolar I, most recent episode depressed, partial remission ([facility]    2. Therapeutic drug monitoring        PLAN   Follow-up:  Return in about 3 months (around 8/24/2022), or if symptoms worsen or fail to improve, for follow-up and medication management. Prescriptions for this encounter:  New Prescriptions    No medications on file       Orders Placed This Encounter   Medications    vilazodone HCl (VILAZODONE HCL) 40 MG TABS     Sig: TAKE 1 TABLET BY MOUTH ONCE DAILY. Dispense:  30 tablet     Refill:  2    lithium 300 MG tablet     Sig: Take 2 tablets by mouth 2 times daily     Dispense:  120 tablet     Refill:  2    lamoTRIgine (LAMICTAL) 200 MG tablet     Sig: Take one tab by mouth once daily. Dispense:  30 tablet     Refill:  2    lurasidone (LATUDA) 40 MG TABS tablet     Sig: Take 1 tablet by mouth nightly     Dispense:  30 tablet     Refill:  2       Medications Discontinued During This Encounter   Medication Reason    vilazodone HCl (VILAZODONE HCL) 40 MG TABS REORDER    lurasidone (LATUDA) 40 MG TABS tablet REORDER    lithium 300 MG tablet REORDER    lamoTRIgine (LAMICTAL) 200 MG tablet REORDER       Additional orders:  Orders Placed This Encounter   Procedures    Lithium Level    Basic Metabolic Panel     Patient is reporting stable mood since last visit. Patient will continue all medications without changes. Labs as ordered. Patient is encouraged to utilize nonpharmacologic coping skills such as deep breathing, guided imagery, guided meditation, muscle relaxation, calming music, and/or journaling. Risks, potential side effects, possibledrug-drug interactions, benefits and alternate treatments discussed in detail. All questions answered. Patient stated understanding and is agreeable to treatment plan. Patient has been instructed to seek emergency help via the emergency and/or calling 911 should symptoms become severe, worsen, or with other concerning symptoms.  Patient instructed to goimmediately to the emergency room and/or call 911 with any suicidal or homicidal ideations or if audio/visual hallucinations develop  Patient stated understanding and agrees. Patient given crisis center information. Provider Signature:  Electronically signed by AJITH Valente CNP on 5/24/2022 at 1:01 PM    **This report has been created using voice recognition software. It may contain minor errors which are inherent in voice recognition technology. **

## 2022-05-24 NOTE — TELEPHONE ENCOUNTER
Can we please call outpatient nursing about the standing orders? I know personally Dr. Yessenia Serra and Dr. Liborio Franklin have put in orders multiple times about this. Thank you.

## 2022-05-24 NOTE — TELEPHONE ENCOUNTER
Pt has had multiple infusions done. She has recently been referred to Hemat/onc recently and is going to see them soon.  Apparently the pt was asking for a med refill for iron    Patient's last appointment was : 5/10/2022  Patient's next appointment is : 6/14/22  Future Appointments   Date Time Provider Rudi Roma   5/24/2022  1:00 PM Sunita Huang, APRN - CNP [facility] [address]   6/2/2022 10:20 AM SCHEDULE, [facility]OLOG[facility] PACER [address]   6/14/2022  1:20 PM Jaspal Bush, DO [facility]   7/14/2022  1:00 PM AJITH Mills - CNP [facility] [email or web address]     Last refilled: 3/4/22    Lab Results   Component Value Date    LABA1C 4.7 11/13/2019     Lab Results   Component Value Date    CHOL 141 03/02/2022    TRIG 32 03/02/2022    HDL 95 03/02/2022    LDLCALC 40 03/02/2022     Lab Results   Component Value Date     04/25/2022    K 4.2 04/25/2022     (H) 04/25/2022    CO2 20 (L) 04/25/2022    BUN 19 04/25/2022    CREATININE 0.5 04/25/2022    GLUCOSE 106 04/25/2022    CALCIUM 9.0 04/25/2022    PROT 5.4 (L) 04/24/2022    LABALBU 3.6 04/24/2022    BILITOT 0.2 (L) 04/24/2022    ALKPHOS 88 04/24/2022    AST 17 04/24/2022    ALT 18 04/24/2022    LABGLOM >90 04/25/2022    GFRAA >60 01/18/2022     Lab Results   Component Value Date    TSH 1.400 03/01/2022    T4FREE 1.11 03/01/2022     Lab Results   Component Value Date    WBC 5.6 05/24/2022    HGB 11.1 (L) 05/24/2022    HCT 37.6 05/24/2022    .7 (H) 05/24/2022     05/24/2022

## 2022-05-31 ENCOUNTER — HOSPITAL ENCOUNTER (OUTPATIENT)
Age: 62
Discharge: HOME | End: 2022-05-31
Payer: MEDICARE

## 2022-05-31 DIAGNOSIS — Z51.81 THERAPEUTIC DRUG MONITORING: ICD-10-CM

## 2022-05-31 LAB
ANION GAP SERPL CALCULATED.3IONS-SCNC: 9 MEQ/L (ref 8–16)
BUN BLDV-MCNC: 11 MG/DL (ref 7–22)
CALCIUM SERPL-MCNC: 10.7 MG/DL (ref 8.5–10.5)
CHLORIDE BLD-SCNC: 105 MEQ/L (ref 98–111)
CO2: 23 MEQ/L (ref 23–33)
CREAT SERPL-MCNC: 0.5 MG/DL (ref 0.4–1.2)
GFR SERPL CREATININE-BSD FRML MDRD: > 90 ML/MIN/1.73M2
GLUCOSE BLD-MCNC: 84 MG/DL (ref 70–108)
LITHIUM LEVEL: 1.31 MMOL/L (ref 0.6–1.2)
POTASSIUM SERPL-SCNC: 4.5 MEQ/L (ref 3.5–5.2)
SODIUM BLD-SCNC: 137 MEQ/L (ref 135–145)

## 2022-05-31 PROCEDURE — 80178 ASSAY OF LITHIUM: CPT

## 2022-05-31 PROCEDURE — 80048 BASIC METABOLIC PNL TOTAL CA: CPT

## 2022-05-31 PROCEDURE — 36415 COLL VENOUS BLD VENIPUNCTURE: CPT

## 2022-06-03 DIAGNOSIS — Z51.81 THERAPEUTIC DRUG MONITORING: Primary | ICD-10-CM

## 2022-06-08 ENCOUNTER — HOSPITAL ENCOUNTER (OUTPATIENT)
Age: 62
Discharge: HOME | End: 2022-06-08

## 2022-06-08 DIAGNOSIS — Z51.81 THERAPEUTIC DRUG MONITORING: ICD-10-CM

## 2022-06-09 ENCOUNTER — HOSPITAL ENCOUNTER (OUTPATIENT)
Age: 62
Discharge: HOME | End: 2022-06-09
Payer: MEDICARE

## 2022-06-09 ENCOUNTER — TELEPHONE (OUTPATIENT)
Dept: PSYCHIATRY | Age: 62
End: 2022-06-09

## 2022-06-09 DIAGNOSIS — Z51.81 THERAPEUTIC DRUG MONITORING: Primary | ICD-10-CM

## 2022-06-09 LAB — LITHIUM LEVEL: 1.1 MMOL/L (ref 0.6–1.2)

## 2022-06-09 PROCEDURE — 36415 COLL VENOUS BLD VENIPUNCTURE: CPT

## 2022-06-09 PROCEDURE — 80178 ASSAY OF LITHIUM: CPT

## 2022-06-09 NOTE — TELEPHONE ENCOUNTER
Helena Ramsey called to inform provider that Lithium level was drawn today. She was advised that we would contact her with results.

## 2022-06-16 ENCOUNTER — HOSPITAL ENCOUNTER (OUTPATIENT)
Age: 62
Discharge: HOME | End: 2022-06-16
Payer: MEDICARE

## 2022-06-16 ENCOUNTER — OFFICE VISIT (OUTPATIENT)
Dept: FAMILY MEDICINE CLINIC | Age: 62
End: 2022-06-16
Payer: MEDICARE

## 2022-06-16 VITALS
DIASTOLIC BLOOD PRESSURE: 80 MMHG | OXYGEN SATURATION: 98 % | SYSTOLIC BLOOD PRESSURE: 118 MMHG | TEMPERATURE: 97.1 F | HEART RATE: 82 BPM | RESPIRATION RATE: 12 BRPM | BODY MASS INDEX: 25.19 KG/M2 | WEIGHT: 120 LBS | HEIGHT: 58 IN

## 2022-06-16 DIAGNOSIS — D50.9 IRON DEFICIENCY ANEMIA, UNSPECIFIED IRON DEFICIENCY ANEMIA TYPE: Primary | ICD-10-CM

## 2022-06-16 DIAGNOSIS — E83.52 HYPERCALCEMIA: ICD-10-CM

## 2022-06-16 DIAGNOSIS — K27.9 PEPTIC ULCER DISEASE: ICD-10-CM

## 2022-06-16 DIAGNOSIS — D50.9 IRON DEFICIENCY ANEMIA, UNSPECIFIED IRON DEFICIENCY ANEMIA TYPE: ICD-10-CM

## 2022-06-16 DIAGNOSIS — Z51.81 THERAPEUTIC DRUG MONITORING: ICD-10-CM

## 2022-06-16 LAB
ALBUMIN SERPL-MCNC: 4.4 G/DL (ref 3.5–5.1)
ALP BLD-CCNC: 138 U/L (ref 38–126)
ALT SERPL-CCNC: 53 U/L (ref 11–66)
ANION GAP SERPL CALCULATED.3IONS-SCNC: 10 MEQ/L (ref 8–16)
AST SERPL-CCNC: 45 U/L (ref 5–40)
BASOPHILS # BLD: 0.4 %
BASOPHILS ABSOLUTE: 0 THOU/MM3 (ref 0–0.1)
BILIRUB SERPL-MCNC: 0.2 MG/DL (ref 0.3–1.2)
BUN BLDV-MCNC: 20 MG/DL (ref 7–22)
CALCIUM SERPL-MCNC: 10.6 MG/DL (ref 8.5–10.5)
CHLORIDE BLD-SCNC: 108 MEQ/L (ref 98–111)
CO2: 23 MEQ/L (ref 23–33)
CREAT SERPL-MCNC: 0.5 MG/DL (ref 0.4–1.2)
EOSINOPHIL # BLD: 2 %
EOSINOPHILS ABSOLUTE: 0.1 THOU/MM3 (ref 0–0.4)
ERYTHROCYTE [DISTWIDTH] IN BLOOD BY AUTOMATED COUNT: 15.3 % (ref 11.5–14.5)
ERYTHROCYTE [DISTWIDTH] IN BLOOD BY AUTOMATED COUNT: 55.1 FL (ref 35–45)
FERRITIN: 28 NG/ML (ref 10–291)
GFR SERPL CREATININE-BSD FRML MDRD: > 90 ML/MIN/1.73M2
GLUCOSE BLD-MCNC: 80 MG/DL (ref 70–108)
HCT VFR BLD CALC: 37.2 % (ref 37–47)
HEMOGLOBIN: 11.5 GM/DL (ref 12–16)
IMMATURE GRANS (ABS): 0.02 THOU/MM3 (ref 0–0.07)
IMMATURE GRANULOCYTES: 0.4 %
IRON: 120 UG/DL (ref 50–170)
LITHIUM LEVEL: 1.07 MMOL/L (ref 0.6–1.2)
LYMPHOCYTES # BLD: 8.5 %
LYMPHOCYTES ABSOLUTE: 0.5 THOU/MM3 (ref 1–4.8)
MCH RBC QN AUTO: 30.4 PG (ref 26–33)
MCHC RBC AUTO-ENTMCNC: 30.9 GM/DL (ref 32.2–35.5)
MCV RBC AUTO: 98.4 FL (ref 81–99)
MONOCYTES # BLD: 8 %
MONOCYTES ABSOLUTE: 0.4 THOU/MM3 (ref 0.4–1.3)
NUCLEATED RED BLOOD CELLS: 0 /100 WBC
PLATELET # BLD: 234 THOU/MM3 (ref 130–400)
PMV BLD AUTO: 10.1 FL (ref 9.4–12.4)
POTASSIUM SERPL-SCNC: 4.2 MEQ/L (ref 3.5–5.2)
RBC # BLD: 3.78 MILL/MM3 (ref 4.2–5.4)
SEG NEUTROPHILS: 80.7 %
SEGMENTED NEUTROPHILS ABSOLUTE COUNT: 4.4 THOU/MM3 (ref 1.8–7.7)
SODIUM BLD-SCNC: 141 MEQ/L (ref 135–145)
TOTAL PROTEIN: 6.4 G/DL (ref 6.1–8)
WBC # BLD: 5.5 THOU/MM3 (ref 4.8–10.8)

## 2022-06-16 PROCEDURE — 85025 COMPLETE CBC W/AUTO DIFF WBC: CPT

## 2022-06-16 PROCEDURE — 82728 ASSAY OF FERRITIN: CPT

## 2022-06-16 PROCEDURE — 99214 OFFICE O/P EST MOD 30 MIN: CPT | Performed by: STUDENT IN AN ORGANIZED HEALTH CARE EDUCATION/TRAINING PROGRAM

## 2022-06-16 PROCEDURE — 80053 COMPREHEN METABOLIC PANEL: CPT

## 2022-06-16 PROCEDURE — 36415 COLL VENOUS BLD VENIPUNCTURE: CPT

## 2022-06-16 PROCEDURE — 80178 ASSAY OF LITHIUM: CPT

## 2022-06-16 PROCEDURE — 83540 ASSAY OF IRON: CPT

## 2022-06-16 RX ORDER — SUCRALFATE 1 G/1
1 TABLET ORAL EVERY 6 HOURS
Qty: 120 TABLET | Refills: 3 | Status: SHIPPED | OUTPATIENT
Start: 2022-06-16

## 2022-06-16 ASSESSMENT — ENCOUNTER SYMPTOMS
NAUSEA: 0
EYE PAIN: 0
ABDOMINAL PAIN: 0
SHORTNESS OF BREATH: 0
COUGH: 0
VOMITING: 0
CONSTIPATION: 0
TROUBLE SWALLOWING: 0
DIARRHEA: 0
BLOOD IN STOOL: 0

## 2022-06-16 ASSESSMENT — SOCIAL DETERMINANTS OF HEALTH (SDOH): HOW HARD IS IT FOR YOU TO PAY FOR THE VERY BASICS LIKE FOOD, HOUSING, MEDICAL CARE, AND HEATING?: NOT HARD AT ALL

## 2022-06-16 NOTE — PROGRESS NOTES
S: 58 y.o. female with   Chief Complaint   Patient presents with    Follow-up     Pt presents for a f/u. Chief complaint, [place], and all pertinent details of the case reviewed with the resident. Please see resident's note for specific details discussed at today's visit. Feeling ok. BP Readings from Last 3 Encounters:   06/16/22 118/80   05/10/22 120/84   04/25/22 (!) 102/43     Wt Readings from Last 3 Encounters:   06/16/22 120 lb (54.4 kg)   05/10/22 123 lb (55.8 kg)   04/24/22 110 lb (49.9 kg)       O: VS:  height is 4' 10\" (1.473 m) and weight is 120 lb (54.4 kg). Her temperature is 97.1 °F (36.2 °C). Her blood pressure is 118/80 and her pulse is 82. Her respiration is 12 and oxygen saturation is 98%. Diagnosis Orders   1. Iron deficiency anemia, unspecified iron deficiency anemia type     2. Peptic ulcer disease     3. Hypercalcemia         Plan:  Recheck her labs for CBC, fe, and recheck CMP because of prior elevated Calcium  Refill carafate for control of peptic ulcer disease  Attending Physician Statement  I have discussed the case, including pertinent history and exam findings with the resident. I agree with the documented assessment and plan as documented by the resident.         Sumaya Mohan MD 6/16/2022 8:19 AM

## 2022-06-16 NOTE — PROGRESS NOTES
[address] [facility] [address] [place] [id number]  Dept: [phone]  Dept Fax: [phone]  Loc: [phone]      Charmaine Mejia is a 58 y.o. female who presents today for:  Chief Complaint   Patient presents with    Follow-up     Pt presents for a f/u. Goals      Exercise 3x per week (30 min per time)           HPI:     HPI  Patient is a 20-year-old female who presents to the office for 1 month follow-up. History of iron deficiency anemia secondary to malabsorption as well as prior GI bleeds. Following with GI in [facility]. History of Batsheva-en-Y. Previously on IV iron infusions as last hemoglobin checked was stable around 3 weeks ago. Today patient states that she needs a refill on Carafate for peptic ulcer disease. This was previously prescribed in [facility]. Tolerates medication well without any side effects. Patient states that she has been taking oral iron as prescribed. States that overall she is feeling very good today comparatively to what she has felt previously when her hemoglobin has been down.     Past Medical History:   Diagnosis Date    Balance problem     Bipolar I disorder, most recent episode (or current) depressed, in partial or unspecified remission 5/8/2013    Cancer ([facility]     CERVICAL     Depression     Fatigue     Gallstones     Gastric bypass status for obesity 2002    GERD (gastroesophageal reflux disease)     History of blood transfusion     History of hysterectomy     REMOVAL OF ONE OVARY     Hypothyroidism     Liver cyst     GI Dr Mallory Jin MS (multiple sclerosis) ([facility]     Paresthesias     RT LOWER LIMB    Ulcer at site of surgical anastomosis following bypass of stomach 1/15/2022    UTI (urinary tract infection)       Past Surgical History:   Procedure Laterality Date    APPENDECTOMY      BONE GRAFT Left     left leg to right arm   [facility]  CHOLECYSTECTOMY, LAPAROSCOPIC  5/16/2016    COLONOSCOPY      COLONOSCOPY N/A 1/16/2022    COLONOSCOPY CONTROL HEMORRHAGE performed by Becky Fischer MD at [address], ESOPHAGUS      ENDOSCOPY, COLON, DIAGNOSTIC      ENTEROSCOPY N/A 2/5/2018    ENTEROSCOPY performed by Eliana Dupree MD at [facility] N/A 9/28/2020    GASTROSTOMY TUBE PLACEMENT- [facility] TO DO GASTRIC ENDOSCOPY performed by Dru Carmona MD at [address] ([facility])  [facility]  5/16/2016    OTHER SURGICAL HISTORY      RECTAL FISSULRE    OTHER SURGICAL HISTORY  1986    D&C    OTHER SURGICAL HISTORY  2006    GI BLEED    OVARY REMOVAL N/A     pt unaware which one was taken    PACEMAKER INSERTION Right 05/20/2019    Dr. Aleah Nogueira FLX DX W/BONNIE Swan 1978 PFRMD Left 2/6/2018    COLONOSCOPY performed by Eliana Dupree MD at [facility]    [place] OFFICE/OUTPT VISIT,PROCEDURE ONLY Left 2/18/2018    EGD ESOPHAGOGASTRODUODENOSCOPY performed by Aicha Proctor MD at [facility] End[facility]    [place] OFFICE/OUTPT VISIT,PROCEDURE ONLY N/A 10/4/2018    EGD DIAGNOSTIC ONLY performed by Abby Domínguez MD at [place] [address] ENDOSCOPY Left 9/24/2019    EGD DIAGNOSTIC ONLY performed by Sonali Valera MD at [facility] ENDOSCOPY N/A 9/26/2020    ENTEROSCOPY PUSH CONTROL HEMORRHAGE performed by Eliana Dupree MD at [facility] N/A 1/16/2022    ENTEROSCOPY PUSH DIAGNOSTIC performed by Becky Fischer MD at [address]  04/13/2022     Family History   Problem Relation Age of Onset    Cancer Mother 43    Colon Polyps Mother     Breast Cancer Mother     Heart Disease Father     Glaucoma Father     Cancer Sister     Colon Cancer Sister     Cancer Sister     Breast Cancer Sister      Social History     Tobacco Use    Smoking status: Former Smoker     Packs/day: 0.10     Years: 0.50     Pack years: 0.05     Types: Cigarettes     Quit date: 10/24/1977     Years since quittin.6    Smokeless tobacco: Never Used   Substance Use Topics    Alcohol use: No      Current Outpatient Medications   Medication Sig Dispense Refill    sucralfate (CARAFATE) 1 GM tablet Take 1 tablet by mouth every 6 hours 120 tablet 3    ferrous sulfate (IRON 325) 325 (65 Fe) MG tablet Take 1 tablet by mouth in the morning, at noon, and at bedtime 90 tablet 2    vilazodone HCl (VILAZODONE HCL) 40 MG TABS TAKE 1 TABLET BY MOUTH ONCE DAILY. 30 tablet 2    lithium 300 MG tablet Take 2 tablets by mouth 2 times daily 120 tablet 2    lamoTRIgine (LAMICTAL) 200 MG tablet Take one tab by mouth once daily. 30 tablet 2    lurasidone (LATUDA) 40 MG TABS tablet Take 1 tablet by mouth nightly 30 tablet 2    pantoprazole (PROTONIX) 40 MG tablet Take 1 tablet by mouth 2 times daily (before meals) 180 tablet 2    docusate sodium (COLACE) 100 MG capsule TAKE 1 CAPSULE BY MOUTH 2 TIMES A DAY 60 capsule 2    levothyroxine (SYNTHROID) 100 MCG tablet TAKE 1 TABLET BY MOUTH ONE TIME A DAY (Patient taking differently: nightly TAKE 1 TABLET BY MOUTH ONE TIME A DAY) 90 tablet 1    DHEA 50 MG CAPS Take by mouth nightly      dimethyl fumarate (TECFIDERA) 240 MG delayed release capsule Take 1 capsule by mouth 2 times daily      ipratropium (ATROVENT) 0.03 % nasal spray INSTILL 2 SPRAYS INTO EACH NOSTRIL 3 TIMES DAILY.  1 Bottle 3    Lactobacillus-Inulin (CULTURELLE HEALTH, INULIN,) CAPS TAKE 1 CAPSULE BY MOUTH WITH BREAKFAST 30 capsule 1    fluticasone (FLONASE) 50 MCG/ACT nasal spray 2 sprays by Each Nostril route daily      ondansetron (ZOFRAN) 4 MG tablet Take 1 tablet by mouth 3 times daily as needed for Nausea or Vomiting 15 tablet 0    Prosthesis MISC by Does not apply route Strap for the prosthesis broken please order new 1 each 0    loratadine (CLARITIN) 10 MG tablet Take 1 tablet by mouth daily 90 tablet 1    Cholecalciferol (VITAMIN D) 2000 units CAPS capsule Take by mouth nightly 50 mcg nightly      Magnesium Oxide 250 MG TABS Take 250 mg by mouth nightly       calcium carbonate 600 MG TABS tablet Take 1 tablet by mouth nightly       vitamin B-12 1000 MCG tablet Take 1 tablet by mouth daily 30 tablet 5    nitroGLYCERIN (NITROSTAT) 0.4 MG SL tablet up to max of 3 total doses. If no relief after 1 dose, call 911. 25 tablet 3     No current facility-administered medications for this visit. Allergies   Allergen Reactions    Latex Rash    Sulfa Antibiotics Rash       Health Maintenance   Topic Date Due    Breast cancer screen  01/24/2023    Depression Monitoring  02/18/2023    Annual Wellness Visit (AWV)  02/19/2023    Pneumococcal 0-64 years Vaccine (3 - PPSV23 or PCV20) 05/12/2025    Lipids  03/02/2027    DTaP/Tdap/Td vaccine (4 - Td or Tdap) 10/25/2028    Colorectal Cancer Screen  01/16/2032    Flu vaccine  Completed    Shingles vaccine  Completed    COVID-19 Vaccine  Completed    Hepatitis C screen  Completed    HIV screen  Completed    Hepatitis A vaccine  Aged Out    Hepatitis B vaccine  Aged Out    Hib vaccine  Aged Out    Meningococcal (ACWY) vaccine  Aged Out       Subjective:      Review of Systems   Constitutional: Negative for chills, fatigue and fever. HENT: Negative for ear pain, postnasal drip and trouble swallowing. Eyes: Negative for pain and visual disturbance. Respiratory: Negative for cough and shortness of breath. Cardiovascular: Negative for chest pain and palpitations. Gastrointestinal: Negative for abdominal pain, blood in stool, constipation, diarrhea, nausea and vomiting. Genitourinary: Negative for dysuria and urgency. Skin: Negative for rash and wound.    Neurological: Negative for dizziness and headaches. Psychiatric/Behavioral: Negative for dysphoric mood. The patient is not nervous/anxious. Objective:     Vitals:    06/16/22 0803   BP: 118/80   Pulse: 82   Resp: 12   Temp: 97.1 °F (36.2 °C)   SpO2: 98%   Weight: 120 lb (54.4 kg)   Height: 4' 10\" (1.473 m)       Body mass index is 25.08 kg/m². Wt Readings from Last 3 Encounters:   06/16/22 120 lb (54.4 kg)   05/10/22 123 lb (55.8 kg)   04/24/22 110 lb (49.9 kg)     BP Readings from Last 3 Encounters:   06/16/22 118/80   05/10/22 120/84   04/25/22 (!) 102/43       Physical Exam  Vitals and nursing note reviewed. Constitutional:       General: She is not in acute distress. Appearance: She is well-developed. She is not diaphoretic. HENT:      Head: Normocephalic and atraumatic. Right Ear: External ear normal.      Left Ear: External ear normal.      Nose: Nose normal.   Eyes:      General: No scleral icterus. Right eye: No discharge. Left eye: No discharge. Conjunctiva/sclera: Conjunctivae normal.   Cardiovascular:      Rate and Rhythm: Normal rate and regular rhythm. Heart sounds: Normal heart sounds. No murmur heard. Pulmonary:      Effort: Pulmonary effort is normal.      Breath sounds: Normal breath sounds. Musculoskeletal:      Cervical back: Normal range of motion. Skin:     General: Skin is warm and dry. Findings: No erythema or rash. Neurological:      Mental Status: She is alert and oriented to person, place, and time. Cranial Nerves: No cranial nerve deficit. Psychiatric:         Behavior: Behavior normal.         Thought Content:  Thought content normal.         Judgment: Judgment normal.         Lab Results   Component Value Date    WBC 5.6 05/24/2022    HGB 11.1 (L) 05/24/2022    HCT 37.6 05/24/2022     05/24/2022    CHOL 141 03/02/2022    TRIG 32 03/02/2022    HDL 95 03/02/2022    LDLCALC 40 03/02/2022    AST 17 04/24/2022     05/31/2022    K 4.5 05/31/2022  05/31/2022    CREATININE 0.5 05/31/2022    BUN 11 05/31/2022    CO2 23 05/31/2022    TSH 1.400 03/01/2022    INR 1.01 04/25/2022    LABA1C 4.7 11/13/2019    LABGLOM >90 05/31/2022    MG 2.0 10/04/2021    CALCIUM 10.7 (H) 05/31/2022    VITD25 32 03/01/2022       Imaging Results:    No results found. Assessment/Plan:     Sally Ortez was seen today for follow-up. Diagnoses and all orders for this visit:    Iron deficiency anemia, unspecified iron deficiency anemia type  -     CBC with Auto Differential; Future  -     Comprehensive Metabolic Panel; Future  -     Iron; Future  -     Ferritin; Future    Peptic ulcer disease  -     sucralfate (CARAFATE) 1 GM tablet; Take 1 tablet by mouth every 6 hours    Hypercalcemia  -     Comprehensive Metabolic Panel; Future      Recheck CBC, ferritin, iron. Refill of Carafate given today. Hypercalcemia at 10.7 noted on last metabolic panel. We will check CMP today in follow-up. Follow-up with GI as scheduled. Return to clinic in 1 month pending blood work. No follow-ups on file. Medications Prescribed:  Orders Placed This Encounter   Medications    sucralfate (CARAFATE) 1 GM tablet     Sig: Take 1 tablet by mouth every 6 hours     Dispense:  120 tablet     Refill:  3       Future Appointments   Date Time Provider Rudi Brandon   6/30/2022 10:30 AM SCHEDULE, SRPX [facility] CARDIOLOGY PACER SRPX [facility] C[facility]   7/14/2022  1:00 PM AJITH Hoffmann - CNP N Oncology [facility]   8/23/2022  1:00 PM Caroline Nuno APRN - ROSLYN [facility] PSYCH [address]       Patient given educational materials - see patient instructions. Discussed use, benefit, and sideeffects of prescribed medications. All patient questions answered. Pt voiced understanding. Reviewed health maintenance. Instructed to continue current medications, diet and exercise. Patient agreed with treatment plan. Follow up as directed.      Electronically signed by Andi Severe, DO on 6/16/2022 at 8:33 AM

## 2022-06-16 NOTE — PROGRESS NOTES
[address] [facility] [address] [address]  Dept: [phone]  Loc: [phone]      Please see Resident note for complete HPI. ROS per Resident    Lab Results   Component Value Date    WBC 5.6 05/24/2022    HGB 11.1 (L) 05/24/2022    HCT 37.6 05/24/2022    .7 (H) 05/24/2022     05/24/2022     Lab Results   Component Value Date     05/31/2022    K 4.5 05/31/2022     05/31/2022    CO2 23 05/31/2022    BUN 11 05/31/2022    CREATININE 0.5 05/31/2022    GLUCOSE 84 05/31/2022    CALCIUM 10.7 (H) 05/31/2022    PROT 5.4 (L) 04/24/2022    LABALBU 3.6 04/24/2022    BILITOT 0.2 (L) 04/24/2022    ALKPHOS 88 04/24/2022    AST 17 04/24/2022    ALT 18 04/24/2022    LABGLOM >90 05/31/2022    GFRAA >60 01/18/2022     Lab Results   Component Value Date    LABA1C 4.7 11/13/2019     No results found for: EAG  No results found for: LABMICR, [id number]  Lab Results   Component Value Date    TSH 1.400 03/01/2022    T4FREE 1.11 03/01/2022     Lab Results   Component Value Date    CHOL 141 03/02/2022    CHOL 157 04/12/2021    CHOL 148 03/04/2021     Lab Results   Component Value Date    TRIG 32 03/02/2022    TRIG 71 04/12/2021    TRIG 62 03/04/2021     Lab Results   Component Value Date    HDL 95 03/02/2022     04/12/2021    HDL 97 03/04/2021     Lab Results   Component Value Date    LDLCALC 40 03/02/2022    LDLCALC 40 04/12/2021    LDLCALC 39 03/04/2021     No results found for: LABVLDL, VLDL  No results found for: CHOLHDLRATIO  No results found for: PSA, PSADIA    There are no preventive care reminders to display for this patient. Physical Exam per Resident       ICD-10-CM    1. Iron deficiency anemia, unspecified iron deficiency anemia type  D50.9 CBC with Auto Differential     Comprehensive Metabolic Panel     Iron     Ferritin   2. Peptic ulcer disease  K27.9 sucralfate (CARAFATE) 1 GM tablet   3. Hypercalcemia  E83.52 Comprehensive Metabolic Panel           Plan  I participated in the discussion and care of this patient   - Will get labs as ordered  - Continue with Protonix.  Refill for Carafate sent  - Follow-up with Hematology/Oncology as scheduled  - Follow-up with GI as scheduled

## 2022-06-17 ENCOUNTER — TELEPHONE (OUTPATIENT)
Dept: FAMILY MEDICINE CLINIC | Age: 62
End: 2022-06-17

## 2022-06-17 DIAGNOSIS — E83.52 HYPERCALCEMIA: Primary | ICD-10-CM

## 2022-06-17 DIAGNOSIS — Z51.81 THERAPEUTIC DRUG MONITORING: Primary | ICD-10-CM

## 2022-06-17 NOTE — TELEPHONE ENCOUNTER
----- Message from Yessy Reid DO sent at 6/16/2022  1:32 PM EDT -----  Blood work looks great. Hemoglobin is stable. Calcium is still mildly elevated. Could consider stopping oral calcium supplement and rechecking Calcium in 1 week.

## 2022-06-23 ENCOUNTER — TELEPHONE (OUTPATIENT)
Dept: PSYCHIATRY | Age: 62
End: 2022-06-23

## 2022-06-23 ENCOUNTER — HOSPITAL ENCOUNTER (OUTPATIENT)
Age: 62
Discharge: HOME | End: 2022-06-23
Payer: MEDICARE

## 2022-06-23 DIAGNOSIS — R79.89 ELEVATED LITHIUM LEVEL: ICD-10-CM

## 2022-06-23 DIAGNOSIS — Z51.81 THERAPEUTIC DRUG MONITORING: Primary | ICD-10-CM

## 2022-06-23 DIAGNOSIS — Z51.81 THERAPEUTIC DRUG MONITORING: ICD-10-CM

## 2022-06-23 LAB — LITHIUM LEVEL: 1.54 MMOL/L (ref 0.6–1.2)

## 2022-06-23 PROCEDURE — 80178 ASSAY OF LITHIUM: CPT

## 2022-06-23 PROCEDURE — 36415 COLL VENOUS BLD VENIPUNCTURE: CPT

## 2022-06-23 NOTE — TELEPHONE ENCOUNTER
[facility] called into our office with a critical lab value of 1.54 on [address] [place] [facility]. Lab staff said that they would also fax the result over as well. Please advise.      --Per chart; her last Lithium level was completed on 06/16/22 for a value of 1.07.

## 2022-06-23 NOTE — TELEPHONE ENCOUNTER
Noted; I have reached out to the patient to further discuss but had to leave a message. Awaiting call back.

## 2022-06-24 ENCOUNTER — HOSPITAL ENCOUNTER (OUTPATIENT)
Age: 62
Discharge: HOME | End: 2022-06-24
Payer: MEDICARE

## 2022-06-24 DIAGNOSIS — R79.89 ELEVATED LITHIUM LEVEL: ICD-10-CM

## 2022-06-24 DIAGNOSIS — Z51.81 THERAPEUTIC DRUG MONITORING: Primary | ICD-10-CM

## 2022-06-24 DIAGNOSIS — Z51.81 THERAPEUTIC DRUG MONITORING: ICD-10-CM

## 2022-06-24 DIAGNOSIS — E83.52 HYPERCALCEMIA: ICD-10-CM

## 2022-06-24 DIAGNOSIS — R82.90 ABNORMAL FINDING ON URINALYSIS: Primary | ICD-10-CM

## 2022-06-24 LAB
ALBUMIN SERPL-MCNC: 4.4 G/DL (ref 3.5–5.1)
ALP BLD-CCNC: 162 U/L (ref 38–126)
ALT SERPL-CCNC: 88 U/L (ref 11–66)
ANION GAP SERPL CALCULATED.3IONS-SCNC: 9 MEQ/L (ref 8–16)
AST SERPL-CCNC: 62 U/L (ref 5–40)
BACTERIA: ABNORMAL
BILIRUB SERPL-MCNC: 0.2 MG/DL (ref 0.3–1.2)
BILIRUBIN URINE: NEGATIVE
BLOOD, URINE: NEGATIVE
BUN BLDV-MCNC: 14 MG/DL (ref 7–22)
CALCIUM SERPL-MCNC: 10.1 MG/DL (ref 8.5–10.5)
CASTS: ABNORMAL /LPF
CASTS: ABNORMAL /LPF
CHARACTER, URINE: CLEAR
CHLORIDE BLD-SCNC: 105 MEQ/L (ref 98–111)
CO2: 23 MEQ/L (ref 23–33)
COLOR: YELLOW
CREAT SERPL-MCNC: 0.5 MG/DL (ref 0.4–1.2)
CRYSTALS: ABNORMAL
EPITHELIAL CELLS, UA: ABNORMAL /HPF
GFR SERPL CREATININE-BSD FRML MDRD: > 90 ML/MIN/1.73M2
GLUCOSE BLD-MCNC: 71 MG/DL (ref 70–108)
GLUCOSE, URINE: NEGATIVE MG/DL
KETONES, URINE: NEGATIVE
LEUKOCYTE ESTERASE, URINE: ABNORMAL
LITHIUM LEVEL: 1.37 MMOL/L (ref 0.6–1.2)
MISCELLANEOUS LAB TEST RESULT: ABNORMAL
NITRITE, URINE: NEGATIVE
PH UA: 7 (ref 5–9)
POTASSIUM SERPL-SCNC: 4 MEQ/L (ref 3.5–5.2)
PROTEIN UA: NEGATIVE MG/DL
RBC URINE: ABNORMAL /HPF
RENAL EPITHELIAL, UA: ABNORMAL
SODIUM BLD-SCNC: 137 MEQ/L (ref 135–145)
SPECIFIC GRAVITY UA: 1.01 (ref 1–1.03)
TOTAL PROTEIN: 6.4 G/DL (ref 6.1–8)
UROBILINOGEN, URINE: 0.2 EU/DL (ref 0–1)
WBC UA: ABNORMAL /HPF
YEAST: ABNORMAL

## 2022-06-24 PROCEDURE — 36415 COLL VENOUS BLD VENIPUNCTURE: CPT

## 2022-06-24 PROCEDURE — 87086 URINE CULTURE/COLONY COUNT: CPT

## 2022-06-24 PROCEDURE — 81001 URINALYSIS AUTO W/SCOPE: CPT

## 2022-06-24 PROCEDURE — 80053 COMPREHEN METABOLIC PANEL: CPT

## 2022-06-24 PROCEDURE — 80178 ASSAY OF LITHIUM: CPT

## 2022-06-25 ENCOUNTER — HOSPITAL ENCOUNTER (OUTPATIENT)
Age: 62
Discharge: HOME | End: 2022-06-25
Payer: MEDICARE

## 2022-06-25 DIAGNOSIS — Z51.81 THERAPEUTIC DRUG MONITORING: ICD-10-CM

## 2022-06-25 DIAGNOSIS — R79.89 ELEVATED LITHIUM LEVEL: ICD-10-CM

## 2022-06-25 DIAGNOSIS — Z51.81 THERAPEUTIC DRUG MONITORING: Primary | ICD-10-CM

## 2022-06-25 LAB
LITHIUM LEVEL: 1.02 MMOL/L (ref 0.6–1.2)
ORGANISM: ABNORMAL
URINE CULTURE, ROUTINE: ABNORMAL

## 2022-06-25 PROCEDURE — 36415 COLL VENOUS BLD VENIPUNCTURE: CPT

## 2022-06-25 PROCEDURE — 80178 ASSAY OF LITHIUM: CPT

## 2022-06-26 NOTE — PROGRESS NOTES
09/26/2020    BILITOT 0.6 09/26/2020    BILIDIR <0.2 09/22/2020    LABALBU 2.6 09/26/2020    AMYLASE 83 03/20/2019    LIPASE 19.3 09/22/2020     Significant Diagnostic Studies:   Gastrostomy tube placement 09/28/20 by Dr. Gus Leiva    Intraoperative gastric endoscopy 09/28/20 by Dr. Dwyane Fontaine: stomach postsurgial, proximally obstructed, duodenum with a single small clean-based ulcer with actively oozing spot at proximal side cauterized with APC    Current Meds:  Scheduled Meds:   docusate sodium  100 mg Oral BID    sucralfate  1 g Oral BID AC    pantoprazole  40 mg Oral BID AC    sodium chloride flush  10 mL Intravenous 2 times per day    sodium chloride flush  10 mL Intravenous 2 times per day    lithium  300 mg Oral BID WC    lamoTRIgine  200 mg Oral Daily    dimethyl fumarate  240 mg Oral BID    ipratropium  2 spray Each Nostril TID    levothyroxine  100 mcg Oral Daily    cetirizine  10 mg Oral Daily    vilazodone HCl  40 mg Oral Daily    lactobacillus  1 capsule Oral Daily with breakfast    calcium elemental  1 tablet Oral Nightly    Vitamin D  2,000 Units Oral Nightly    [Held by provider] ferrous sulfate  325 mg Oral Daily with breakfast    multivitamin  1 tablet Oral Daily    cyanocobalamin  1,000 mcg Oral Daily    polyethylene glycol  17 g Oral Daily     Continuous Infusions:   lactated ringers 100 mL/hr at 09/28/20 1710       Assessment:  60 yo F admitted 09/25/20 for melena & anemia. H/O PUD & recurrent bleeding from bypassed stomach/duodenum with IR coiling in the past. In the ED 09/20/20, with melena & abd pain. CT A/P demonstrated colitis & she was discharged on Cipro/Flagyl. Returned to ED 09/22/20 for abd pain & back pain, CT negative. Discharged with outpatient follow-up. Enteroscopy 09/27/20, but could not reach lower anastomosis. Colonoscopy planned for following day, but patient had a sudden drop in Hgb.  NM GI blood loss scan positive, underwent angio for localization of bleeding from previously coiled area of GDA from a small vessel, but IR unable to get microcatheter into vessel to coil. No bleeding from SMA or YARELIS. Gastrostomy tube placed surgically 09/28/20. Gastric endoscopy done in the OR 09/28/20 & demonstrated stomach postsurgial, proximally obstructed, duodenum with a single small clean-based ulcer with actively oozing spot at proximal side cauterized with APC. 1. Upper GIB from [address] area on IR, unable to coil  2. Anemia s/p 4 units PRBC  3. Gastrostomy tube placed surgically 09/28/20  4. Gastric endoscopy done in OR 09/28/20 with small clean based ulcer oozing blood in the duodenum s/p APC  5. H/O Batsheva-en-Y bypass  6. Recurrent GIB  7. Pacemaker  8.  Hypothryoidism    Plan:     Monitor H & H, transfuse prn   PPI BID   Sucralfate BID   No NSAIDs   Diet as tolerated   Stop Octreotide   Will need repeat endoscopy via ostomy in 6-8 weeks to confirm healing after dilating ostomy   Stool for h pylori   General surgery on board   Pain control per primary   Colace BID   Miralax daily   Supportive care per primary team  Will follow    Case reviewed and impression/plan reviewed in collaboration with Dr. Daniella Osei  Electronically signed by AJITH Capellan CNP on 9/29/2020 at 12:51 PM    [facility] 22-Dec-2021

## 2022-06-28 ENCOUNTER — HOSPITAL ENCOUNTER (OUTPATIENT)
Age: 62
Discharge: HOME | End: 2022-06-28
Payer: MEDICARE

## 2022-06-28 DIAGNOSIS — Z51.81 THERAPEUTIC DRUG MONITORING: Primary | ICD-10-CM

## 2022-06-28 DIAGNOSIS — Z51.81 THERAPEUTIC DRUG MONITORING: ICD-10-CM

## 2022-06-28 LAB — LITHIUM LEVEL: 0.96 MMOL/L (ref 0.6–1.2)

## 2022-06-28 PROCEDURE — 80178 ASSAY OF LITHIUM: CPT

## 2022-06-28 PROCEDURE — 36415 COLL VENOUS BLD VENIPUNCTURE: CPT

## 2022-06-29 ENCOUNTER — TELEPHONE (OUTPATIENT)
Dept: FAMILY MEDICINE CLINIC | Age: 62
End: 2022-06-29

## 2022-06-29 NOTE — TELEPHONE ENCOUNTER
----- Message from Praful Lemon DO sent at 6/29/2022  2:44 PM EDT -----  Shaina Fisher: Your kidney function on this most recent blood work looks perfect! Any questions, please let us know.     Dr. Olegario Kirby

## 2022-06-30 PROCEDURE — 93280 PM DEVICE PROGR EVAL DUAL: CPT | Performed by: INTERNAL MEDICINE

## 2022-07-01 ENCOUNTER — HOSPITAL ENCOUNTER (OUTPATIENT)
Age: 62
Discharge: HOME | End: 2022-07-01
Payer: MEDICARE

## 2022-07-01 DIAGNOSIS — Z51.81 THERAPEUTIC DRUG MONITORING: ICD-10-CM

## 2022-07-01 LAB — LITHIUM LEVEL: 1.16 MMOL/L (ref 0.6–1.2)

## 2022-07-01 PROCEDURE — 80178 ASSAY OF LITHIUM: CPT

## 2022-07-01 PROCEDURE — 36415 COLL VENOUS BLD VENIPUNCTURE: CPT

## 2022-07-02 ENCOUNTER — PROCEDURE VISIT (OUTPATIENT)
Dept: CARDIOLOGY CLINIC | Age: 62
End: 2022-07-02
Payer: MEDICARE

## 2022-07-02 DIAGNOSIS — Z95.0 ARTIFICIAL PACEMAKER: Primary | ICD-10-CM

## 2022-07-02 NOTE — PROGRESS NOTES
Boris Topete [employer] PAC[employer]AK[employer]    PRESENTING RHYTHM   AP/VS  UNDERLYING RHYTHM  SINUS TOMMY    BATTERY   10.6 YRS/ 3.01V    A IMPEDANCE   418  V IMPEDANCE   437    A THRESHOLD   0.75 @ 0.4  V THRESHOLD   0.75 @ 0.4    A SENSING   0.6  P WAVE   2.0  V SENSING   2.0  R WAVE   4.5    MODE   MVP   BPM    A PACING   85%  V PACING   0.5%    1 MONITORED VT  2 SEC   12 BEATS  APPEARS ATRIAL DRIVEN  SEE STRIP

## 2022-07-05 ENCOUNTER — TELEPHONE (OUTPATIENT)
Dept: PSYCHIATRY | Age: 62
End: 2022-07-05

## 2022-07-05 NOTE — TELEPHONE ENCOUNTER
Per Shae Mediate: Lithium level is WNL. Recheck in 2 weeks. Patient is to drink as much fluid during the week as she is on the weekend. If levels are WNL in 2 weeks, next recheck will be in one month. -LM for patient to call.

## 2022-07-06 ENCOUNTER — TELEPHONE (OUTPATIENT)
Dept: FAMILY MEDICINE CLINIC | Age: 62
End: 2022-07-06

## 2022-07-06 DIAGNOSIS — E03.8 HYPOTHYROIDISM DUE TO HASHIMOTO'S THYROIDITIS: ICD-10-CM

## 2022-07-06 DIAGNOSIS — E06.3 HYPOTHYROIDISM DUE TO HASHIMOTO'S THYROIDITIS: ICD-10-CM

## 2022-07-06 RX ORDER — LEVOTHYROXINE SODIUM 0.1 MG/1
TABLET ORAL
Qty: 90 TABLET | Refills: 1 | Status: SHIPPED | OUTPATIENT
Start: 2022-07-06

## 2022-07-06 NOTE — TELEPHONE ENCOUNTER
Patient called  To see if she needed to start taking her calcium supplement, Dr Pj Higgins advised her to stop taking it, thought calcium levels were too high. She never heard anything and I didn't see any lab results.

## 2022-07-06 NOTE — TELEPHONE ENCOUNTER
Patient's last appointment was : 6/16/2022  Patient's next appointment is : 07/18/22    Future Appointments   Date Time Provider Rudi Brandon   7/14/2022  1:00 PM AJITH Carreon CNP N Oncology [facility]   7/18/2022  8:00 AM Laura Negrete, DO SRPX FM RES [facility]   8/23/2022  1:00 PM AJITH Fountain CNP [facility] [place]   6/29/2023  9:20 AM SCHEDULE, SRPX NW ADV CARDIOLOGY PACER SRPX NW C[facility] [place]     Last refilled:    Lab Results   Component Value Date    LABA1C 4.7 11/13/2019     Lab Results   Component Value Date    CHOL 141 03/02/2022    TRIG 32 03/02/2022    HDL 95 03/02/2022    LDLCALC 40 03/02/2022     Lab Results   Component Value Date     06/24/2022    K 4.0 06/24/2022     06/24/2022    CO2 23 06/24/2022    BUN 14 06/24/2022    CREATININE 0.5 06/24/2022    GLUCOSE 71 06/24/2022    CALCIUM 10.1 06/24/2022    PROT 6.4 06/24/2022    LABALBU 4.4 06/24/2022    BILITOT 0.2 (L) 06/24/2022    ALKPHOS 162 (H) 06/24/2022    AST 62 (H) 06/24/2022    ALT 88 (H) 06/24/2022    LABGLOM >90 06/24/2022    GFRAA >60 01/18/2022     Lab Results   Component Value Date    TSH 1.400 03/01/2022    T4FREE 1.11 03/01/2022     Lab Results   Component Value Date    WBC 5.5 06/16/2022    HGB 11.5 (L) 06/16/2022    HCT 37.2 06/16/2022    MCV 98.4 06/16/2022     06/16/2022

## 2022-07-11 DIAGNOSIS — Z51.81 THERAPEUTIC DRUG MONITORING: Primary | ICD-10-CM

## 2022-07-14 ENCOUNTER — OFFICE VISIT (OUTPATIENT)
Dept: ONCOLOGY | Age: 62
End: 2022-07-14
Payer: MEDICARE

## 2022-07-14 ENCOUNTER — HOSPITAL ENCOUNTER (EMERGENCY)
Age: 62
Discharge: HOME | End: 2022-07-14
Attending: EMERGENCY MEDICINE
Payer: MEDICARE

## 2022-07-14 ENCOUNTER — HOSPITAL ENCOUNTER (OUTPATIENT)
Dept: INFUSION THERAPY | Age: 62
Discharge: HOME | End: 2022-07-14
Payer: MEDICARE

## 2022-07-14 ENCOUNTER — APPOINTMENT (OUTPATIENT)
Dept: CT IMAGING | Age: 62
End: 2022-07-14
Payer: MEDICARE

## 2022-07-14 VITALS
OXYGEN SATURATION: 95 % | TEMPERATURE: 99.8 F | SYSTOLIC BLOOD PRESSURE: 126 MMHG | WEIGHT: 126.8 LBS | BODY MASS INDEX: 26.62 KG/M2 | RESPIRATION RATE: 20 BRPM | DIASTOLIC BLOOD PRESSURE: 60 MMHG | HEIGHT: 58 IN | HEART RATE: 87 BPM

## 2022-07-14 VITALS
TEMPERATURE: 98.9 F | BODY MASS INDEX: 23.09 KG/M2 | RESPIRATION RATE: 17 BRPM | HEART RATE: 59 BPM | HEIGHT: 58 IN | OXYGEN SATURATION: 97 % | WEIGHT: 110 LBS | SYSTOLIC BLOOD PRESSURE: 108 MMHG | DIASTOLIC BLOOD PRESSURE: 79 MMHG

## 2022-07-14 DIAGNOSIS — D50.0 IRON DEFICIENCY ANEMIA DUE TO CHRONIC BLOOD LOSS: Primary | ICD-10-CM

## 2022-07-14 DIAGNOSIS — R10.9 LEFT FLANK PAIN: ICD-10-CM

## 2022-07-14 DIAGNOSIS — N30.01 ACUTE CYSTITIS WITH HEMATURIA: Primary | ICD-10-CM

## 2022-07-14 DIAGNOSIS — R31.9 HEMATURIA, UNSPECIFIED TYPE: ICD-10-CM

## 2022-07-14 LAB
ANION GAP SERPL CALCULATED.3IONS-SCNC: 11 MEQ/L (ref 8–16)
BACTERIA: ABNORMAL /HPF
BASOPHILS # BLD: 0.3 %
BASOPHILS ABSOLUTE: 0 THOU/MM3 (ref 0–0.1)
BILIRUBIN URINE: NEGATIVE
BLOOD, URINE: ABNORMAL
BUN BLDV-MCNC: 17 MG/DL (ref 7–22)
CALCIUM SERPL-MCNC: 10.1 MG/DL (ref 8.5–10.5)
CASTS 2: ABNORMAL /LPF
CASTS UA: ABNORMAL /LPF
CHARACTER, URINE: ABNORMAL
CHLORIDE BLD-SCNC: 106 MEQ/L (ref 98–111)
CO2: 19 MEQ/L (ref 23–33)
COLOR: YELLOW
CREAT SERPL-MCNC: 0.6 MG/DL (ref 0.4–1.2)
CRYSTALS, UA: ABNORMAL
EOSINOPHIL # BLD: 2 %
EOSINOPHILS ABSOLUTE: 0.2 THOU/MM3 (ref 0–0.4)
EPITHELIAL CELLS, UA: ABNORMAL /HPF
ERYTHROCYTE [DISTWIDTH] IN BLOOD BY AUTOMATED COUNT: 15.2 % (ref 11.5–14.5)
ERYTHROCYTE [DISTWIDTH] IN BLOOD BY AUTOMATED COUNT: 55.5 FL (ref 35–45)
GFR SERPL CREATININE-BSD FRML MDRD: > 90 ML/MIN/1.73M2
GLUCOSE BLD-MCNC: 188 MG/DL (ref 70–108)
GLUCOSE URINE: NEGATIVE MG/DL
HCT VFR BLD CALC: 37.6 % (ref 37–47)
HEMOGLOBIN: 11.7 GM/DL (ref 12–16)
IMMATURE GRANS (ABS): 0.04 THOU/MM3 (ref 0–0.07)
IMMATURE GRANULOCYTES: 0.4 %
KETONES, URINE: NEGATIVE
LEUKOCYTE ESTERASE, URINE: ABNORMAL
LYMPHOCYTES # BLD: 5.7 %
LYMPHOCYTES ABSOLUTE: 0.6 THOU/MM3 (ref 1–4.8)
MCH RBC QN AUTO: 30.7 PG (ref 26–33)
MCHC RBC AUTO-ENTMCNC: 31.1 GM/DL (ref 32.2–35.5)
MCV RBC AUTO: 98.7 FL (ref 81–99)
MISCELLANEOUS 2: ABNORMAL
MONOCYTES # BLD: 6.8 %
MONOCYTES ABSOLUTE: 0.7 THOU/MM3 (ref 0.4–1.3)
NITRITE, URINE: POSITIVE
NUCLEATED RED BLOOD CELLS: 0 /100 WBC
OSMOLALITY CALCULATION: 278.5 MOSMOL/KG (ref 275–300)
PH UA: 6 (ref 5–9)
PLATELET # BLD: 211 THOU/MM3 (ref 130–400)
PMV BLD AUTO: 10.1 FL (ref 9.4–12.4)
POTASSIUM SERPL-SCNC: 4.1 MEQ/L (ref 3.5–5.2)
PROTEIN UA: 100
RBC # BLD: 3.81 MILL/MM3 (ref 4.2–5.4)
RBC URINE: ABNORMAL /HPF
RENAL EPITHELIAL, UA: ABNORMAL
SEG NEUTROPHILS: 84.8 %
SEGMENTED NEUTROPHILS ABSOLUTE COUNT: 8.7 THOU/MM3 (ref 1.8–7.7)
SODIUM BLD-SCNC: 136 MEQ/L (ref 135–145)
SPECIFIC GRAVITY, URINE: 1.01 (ref 1–1.03)
UROBILINOGEN, URINE: 0.2 EU/DL (ref 0–1)
WBC # BLD: 10.3 THOU/MM3 (ref 4.8–10.8)
WBC UA: > 200 /HPF
YEAST: ABNORMAL

## 2022-07-14 PROCEDURE — 81001 URINALYSIS AUTO W/SCOPE: CPT

## 2022-07-14 PROCEDURE — 74176 CT ABD & PELVIS W/O CONTRAST: CPT

## 2022-07-14 PROCEDURE — 6360000002 HC RX W HCPCS: Performed by: EMERGENCY MEDICINE

## 2022-07-14 PROCEDURE — 85025 COMPLETE CBC W/AUTO DIFF WBC: CPT

## 2022-07-14 PROCEDURE — 99211 OFF/OP EST MAY X REQ PHY/QHP: CPT

## 2022-07-14 PROCEDURE — 87077 CULTURE AEROBIC IDENTIFY: CPT

## 2022-07-14 PROCEDURE — 96365 THER/PROPH/DIAG IV INF INIT: CPT

## 2022-07-14 PROCEDURE — 80048 BASIC METABOLIC PNL TOTAL CA: CPT

## 2022-07-14 PROCEDURE — 96375 TX/PRO/DX INJ NEW DRUG ADDON: CPT

## 2022-07-14 PROCEDURE — 6360000002 HC RX W HCPCS: Performed by: STUDENT IN AN ORGANIZED HEALTH CARE EDUCATION/TRAINING PROGRAM

## 2022-07-14 PROCEDURE — 87186 SC STD MICRODIL/AGAR DIL: CPT

## 2022-07-14 PROCEDURE — 99214 OFFICE O/P EST MOD 30 MIN: CPT | Performed by: NURSE PRACTITIONER

## 2022-07-14 PROCEDURE — 87086 URINE CULTURE/COLONY COUNT: CPT

## 2022-07-14 PROCEDURE — 99284 EMERGENCY DEPT VISIT MOD MDM: CPT

## 2022-07-14 PROCEDURE — 2580000003 HC RX 258: Performed by: EMERGENCY MEDICINE

## 2022-07-14 RX ORDER — CEPHALEXIN 500 MG/1
500 CAPSULE ORAL 4 TIMES DAILY
Qty: 28 CAPSULE | Refills: 0 | Status: SHIPPED | OUTPATIENT
Start: 2022-07-14 | End: 2022-07-19 | Stop reason: ALTCHOICE

## 2022-07-14 RX ORDER — KETOROLAC TROMETHAMINE 30 MG/ML
15 INJECTION, SOLUTION INTRAMUSCULAR; INTRAVENOUS ONCE
Status: COMPLETED | OUTPATIENT
Start: 2022-07-14 | End: 2022-07-14

## 2022-07-14 RX ADMIN — CEFTRIAXONE SODIUM 1000 MG: 1 INJECTION, POWDER, FOR SOLUTION INTRAMUSCULAR; INTRAVENOUS at 20:23

## 2022-07-14 RX ADMIN — KETOROLAC TROMETHAMINE 15 MG: 30 INJECTION, SOLUTION INTRAMUSCULAR; INTRAVENOUS at 18:20

## 2022-07-14 ASSESSMENT — ENCOUNTER SYMPTOMS
DIARRHEA: 0
NAUSEA: 0
ABDOMINAL PAIN: 0
SHORTNESS OF BREATH: 0
BACK PAIN: 0
SINUS PAIN: 0
SORE THROAT: 0
COUGH: 0
RHINORRHEA: 0
VOMITING: 0
EYE REDNESS: 0

## 2022-07-14 ASSESSMENT — PAIN SCALES - GENERAL
PAINLEVEL_OUTOF10: 3
PAINLEVEL_OUTOF10: 4

## 2022-07-14 ASSESSMENT — PAIN DESCRIPTION - LOCATION: LOCATION: FLANK

## 2022-07-14 ASSESSMENT — PAIN - FUNCTIONAL ASSESSMENT: PAIN_FUNCTIONAL_ASSESSMENT: 0-10

## 2022-07-14 ASSESSMENT — PAIN DESCRIPTION - ORIENTATION: ORIENTATION: LEFT

## 2022-07-14 NOTE — ED NOTES
Patient to ED for hematuria that she noticed this AM. Throughout the day patient noticed her left side of her back started hurting where her kidney is.      Annette Barrow RN  07/14/22 0232

## 2022-07-14 NOTE — ED PROVIDER NOTES
Transfer of Care Note:   Physician Signing out: Barney Orta, [address]  Receiving Physician: Avtar Brady MD  Sign out time: 2000      Brief history:  58-year-old female presenting with flank pain and hematuria    Items pending that need to be checked:  UA and CT      Tentative Impression of patient:  Nephrolithiasis versus infected stone versus pyelonephritis     Expected disposition of patient:  Pending results. Additional Assessment and results:   I have personally performed a face to face diagnostic evaluation on this patient. The patient's initial evaluation and plan have been discussed with the prior physician who initially evaluated the patient. Nursing Notes, Past Medical Hx, Past Surgical Hx, Social Hx, Allergies, vital signs and Family Hx were all reviewed. Vitals:    07/14/22 2004   BP: 108/79   Pulse: 59   Resp: 17   Temp:    SpO2: 97%     Physical Exam  Vitals and nursing note reviewed. Constitutional:       Appearance: Normal appearance. HENT:      Head: Normocephalic and atraumatic. Right Ear: Tympanic membrane normal.      Left Ear: Tympanic membrane normal.      Nose: Nose normal.      Mouth/Throat:      Mouth: Mucous membranes are moist.      Pharynx: Oropharynx is clear. No oropharyngeal exudate. Eyes:      General: No scleral icterus. Extraocular Movements: Extraocular movements intact. Conjunctiva/sclera: Conjunctivae normal.      Pupils: Pupils are equal, round, and reactive to light. Cardiovascular:      Rate and Rhythm: Normal rate and regular rhythm. Pulses: Normal pulses. Heart sounds: Normal heart sounds. No murmur heard. No friction rub. No gallop. Pulmonary:      Effort: Pulmonary effort is normal. No respiratory distress. Breath sounds: Normal breath sounds. Abdominal:      Palpations: Abdomen is soft. Tenderness: There is no abdominal tenderness. There is left CVA tenderness.  There is no right CVA tenderness, guarding or rebound. Musculoskeletal:         General: No swelling or tenderness. Normal range of motion. Cervical back: Normal range of motion and neck supple. Right lower leg: No edema. Left lower leg: No edema. Skin:     General: Skin is warm and dry. Capillary Refill: Capillary refill takes less than 2 seconds. Neurological:      General: No focal deficit present. Mental Status: She is alert and oriented to person, place, and time. Cranial Nerves: No cranial nerve deficit. Motor: No weakness.            Labs Reviewed   BASIC METABOLIC PANEL - Abnormal; Notable for the following components:       Result Value    CO2 19 (*)     Glucose 188 (*)     All other components within normal limits   CBC WITH AUTO DIFFERENTIAL - Abnormal; Notable for the following components:    RBC 3.81 (*)     Hemoglobin 11.7 (*)     MCHC 31.1 (*)     RDW-CV 15.2 (*)     RDW-SD 55.5 (*)     Segs Absolute 8.7 (*)     Lymphocytes Absolute 0.6 (*)     All other components within normal limits   URINE WITH REFLEXED MICRO - Abnormal; Notable for the following components:    Blood, Urine LARGE (*)     Protein,  (*)     Nitrite, Urine POSITIVE (*)     Leukocyte Esterase, Urine LARGE (*)     Character, Urine CLOUDY (*)     All other components within normal limits   CULTURE, REFLEXED, URINE   ANION GAP   GLOMERULAR FILTRATION RATE, ESTIMATED   OSMOLALITY         Medications   cefTRIAXone (ROCEPHIN) 1000 mg IVPB in 50 mL D5W minibag (1,000 mg IntraVENous New Bag 7/14/22 2023)   ketorolac (TORADOL) injection 15 mg (15 mg IntraVENous Given 7/14/22 1820)         CT ABDOMEN PELVIS WO CONTRAST Additional Contrast? None   Final Result            ED Course as of 07/16/22 1841   Thu Jul 14, 2022   1834 WBC: 10.3 [AL]   1834 Hemoglobin Quant(!): 11.7 [AL]   1834 Hematocrit: 37.6 [AL]   1834 Platelet Count: 343 [AL]   1856 CO2(!): 19 [AL]   1856 Potassium: 4.1 [AL]   1856 WBC: 10.3 [AL]   1856 Anion Gap: 11.0 [AL]   2000 CT ABDOMEN PELVIS WO CONTRAST Additional Contrast? None [TM]   2026 Nitrite, Urine(!): POSITIVE [TM]   2026 Leukocyte Esterase, Urine(!): LARGE [TM]   2026 Character, Urine(!): CLOUDY [TM]   2029 CT ABDOMEN PELVIS WO CONTRAST Additional Contrast? None  Impression :      1. No CT evidence of urinary tract calculus or obstruction. Specifically   there is no CT explanation for \"hematuria. \" Consider cystoscopy because of   symptomatic. [TM]   2029 Bacteria, UA: MANY [TM]   Sat Jul 16, 2022 1822 Organism(!): Escherichia coli [JS]   1822 Urine Culture Reflex: [place] count: >100,000 CFU/mL This isolate is a probable ESBL . ID consultation may be helpful in some clinical circumstances. CONTACT isolation required. [JS]      ED Course User Index  [AL] Janelle Vale MD  [JS] Emy Nuno MD  [TM] Andrew Jiménez MD         Further MDM and disposition:     Patient's UA revealed evidence of acute cystitis. Will start on 500 mg Keflex 4 times daily for 7 days. No kidney stone. Patient will follow up with urology    Final diagnoses:   Left flank pain   Hematuria, unspecified type   Acute cystitis with hematuria     New Prescriptions    CEPHALEXIN (KEFLEX) 500 MG CAPSULE    Take 1 capsule by mouth 4 times daily for 7 days         Condition: condition: stable  Dispo: Discharge to home    This transcription was electronically signed. It was dictated by use of voice recognition software and electronically transcribed. The transcription may contain errors not detected in proofreading. Andrew Jiménez MD  Resident  07/14/22 2032    Attending Supervising Physician's Leodan Poll Statement  I performed a history and physical examination on the patient and discussed the management with the resident physician. I reviewed and agree with the findings and plan as documented in his note unless described otherwise below. Pt presents tot he ER c/o L flank pain and hematuria, +UTI.   Will start abx, patient counseled regarding importance of f/u with pcp in 2 days for recheck and to review culture results, patient also counseled regarding ER return precautions. Electronically signed by Gabriele Mcbride MD on 7/16/22 at 6:22 PM EDT      Sully Cole MD  07/16/22 1754      Addendum 7/16/22 at 640p  Urine culture results reviewed, bacteria resistant. Will need new RX. Given the associated flank pain, will be treating empirically for pyelonephritis, so the only appropriate oral abx would be cipro or equivalent. I called patient, she reports her symptoms are improving and she actually feels well today. She denies any fever or vomiting. I discussed with her the need to start the new antibiotic as soon as possible to avoid worsening infection, however she states she will not start until Monday. I did offer her the option to return to the ER now to start the antibiotic now, however she will consider. She requests rx to be sent to the [facility] here, which I have done, cipro. I also informed patient of the potential drug interactions and need to follow up with her pcp in 2-3 days to recheck and monitor for complications related to possible drug interactions.      Sully Cole MD  07/16/22 0542

## 2022-07-14 NOTE — ED PROVIDER NOTES
[place] ENCOUNTER          Pt Name: Miracle Perez  MRN: [id number]  [place] 1960  Date of evaluation: 7/14/2022  Treating Resident Physician: Eamon Laurent MD  Supervising Physician: Dr Lonna Frankel   Patient presents with    Flank Pain    Hematuria     History obtained from the patient. HISTORY OF PRESENT ILLNESS    HPI  Miracle Perez is a 58 y.o. female with pmhx GERD, who presents to the emergency department for evaluation of left flank pain since this a.m. as well as hematuria x2 today. She also complains of dysuria and urinary frequency. She denies any vaginal bleeding vaginal discharge fever, chills, nausea, vomiting, abdominal pain, diarrhea. Any history of nephrolithiasis in herself or any familial person    The patient has no other acute complaints at this time. REVIEW OF SYSTEMS   Review of Systems   Constitutional: Negative for chills and fever. HENT: Negative for rhinorrhea, sinus pain and sore throat. Eyes: Negative for redness. Respiratory: Negative for cough and shortness of breath. Cardiovascular: Negative for chest pain. Gastrointestinal: Negative for abdominal pain, diarrhea, nausea and vomiting. Genitourinary: Positive for dysuria, flank pain, frequency and hematuria. Musculoskeletal: Negative for back pain. Skin: Negative for rash. Neurological: Negative for light-headedness and headaches. Psychiatric/Behavioral: Negative for agitation.          PAST MEDICAL AND SURGICAL HISTORY     Past Medical History:   Diagnosis Date    Balance problem     Bipolar I disorder, most recent episode (or current) depressed, in partial or unspecified remission 5/8/2013    Cancer ([facility] [address]     CERVICAL     Depression     Fatigue     Gallstones     Gastric bypass status for obesity 2002    GERD (gastroesophageal reflux disease)     History of blood transfusion History of hysterectomy     REMOVAL OF ONE OVARY     Hypothyroidism     Liver cyst     GI Dr Nate Cobos    MS (multiple sclerosis) [facility])     Paresthesias     RT LOWER LIMB    Ulcer at site of surgical anastomosis following bypass of stomach 1/15/2022    UTI (urinary tract infection)      Past Surgical History:   Procedure Laterality Date    APPENDECTOMY      BONE GRAFT Left     left leg to right arm    CARPAL TUNNEL RELEASE  1999    CHOLECYSTECTOMY, LAPAROSCOPIC  5/16/2016    COLONOSCOPY      COLONOSCOPY N/A 1/16/2022    COLONOSCOPY CONTROL HEMORRHAGE performed by Oriana Wood MD at [address] [place] [address], ESOPHAGUS      ENDOSCOPY, COLON, DIAGNOSTIC      ENTEROSCOPY N/A 2/5/2018    ENTEROSCOPY performed by Ronni Kirkpatrick MD at [place] [address] N/A 9/28/2020    GASTROSTOMY TUBE PLACEMENT- [facility] TO DO GASTRIC ENDOSCOPY performed by Natalia Peralta MD at [facility] ([address])  [address]  5/16/2016    OTHER SURGICAL HISTORY      RECTAL [address] [place]    D&C    OTHER SURGICAL HISTORY  2006    GI BLEED    OVARY REMOVAL N/A     pt unaware which one was taken    PACEMAKER INSERTION Right 05/20/2019    Dr. French Hernandes FLX DX W/COLLMEHDI [place] 1978 PFRMD Left 2/6/2018    COLONOSCOPY performed by Ronni Kirkpatrick MD at [facility]    [place] OFFICE/OUTPT VISIT,PROCEDURE ONLY Left 2/18/2018    EGD ESOPHAGOGASTRODUODENOSCOPY performed by Edvin Hazel MD at [address] [place] [address] OFFICE/OUTPT VISIT,PROCEDURE ONLY N/A 10/4/2018    EGD DIAGNOSTIC ONLY performed by Renato Payton MD at [facility] [address] ENDOSCOPY Left 9/24/2019    EGD DIAGNOSTIC ONLY performed by Aicha Duncan MD at [facility] N/A 9/26/2020    ENTEROSCOPY PUSH CONTROL HEMORRHAGE performed by Del Hernández MD at [facility]    UPPER GASTROINTESTINAL ENDOSCOPY N/A 1/16/2022    ENTEROSCOPY PUSH DIAGNOSTIC performed by Jessie Vargas MD at [address] [place] [address]  04/13/2022         MEDICATIONS   No current facility-administered medications for this encounter. Current Outpatient Medications:     levothyroxine (SYNTHROID) 100 MCG tablet, TAKE 1 TABLET BY MOUTH ONCE DAILY, Disp: 90 tablet, Rfl: 1    sucralfate (CARAFATE) 1 GM tablet, Take 1 tablet by mouth every 6 hours, Disp: 120 tablet, Rfl: 3    ferrous sulfate (IRON 325) 325 (65 Fe) MG tablet, Take 1 tablet by mouth in the morning, at noon, and at bedtime, Disp: 90 tablet, Rfl: 2    vilazodone HCl (VILAZODONE HCL) 40 MG TABS, TAKE 1 TABLET BY MOUTH ONCE DAILY. , Disp: 30 tablet, Rfl: 2    lithium 300 MG tablet, Take 2 tablets by mouth 2 times daily, Disp: 120 tablet, Rfl: 2    lamoTRIgine (LAMICTAL) 200 MG tablet, Take one tab by mouth once daily. , Disp: 30 tablet, Rfl: 2    lurasidone (LATUDA) 40 MG TABS tablet, Take 1 tablet by mouth nightly, Disp: 30 tablet, Rfl: 2    pantoprazole (PROTONIX) 40 MG tablet, Take 1 tablet by mouth 2 times daily (before meals), Disp: 180 tablet, Rfl: 2    docusate sodium (COLACE) 100 MG capsule, TAKE 1 CAPSULE BY MOUTH 2 TIMES A DAY, Disp: 60 capsule, Rfl: 2    DHEA 50 MG CAPS, Take by mouth nightly, Disp: , Rfl:     dimethyl fumarate (TECFIDERA) 240 MG delayed release capsule, Take 1 capsule by mouth 2 times daily, Disp: , Rfl:     ipratropium (ATROVENT) 0.03 % nasal spray, INSTILL 2 SPRAYS INTO EACH NOSTRIL 3 TIMES DAILY. , Disp: 1 Bottle, Rfl: 3    Lactobacillus-Inulin (CULTURELLE HEALTH, INULIN,) CAPS, TAKE 1 CAPSULE BY MOUTH WITH BREAKFAST, Disp: 30 capsule, Rfl: 1    fluticasone (FLONASE) 50 MCG/ACT nasal spray, 2 sprays by Each Nostril route daily, Disp: , Rfl:     ondansetron (ZOFRAN) 4 MG tablet, Take 1 tablet by mouth 3 times daily as needed for Nausea or Vomiting, Disp: 15 tablet, Rfl: 0    Prosthesis MISC, by Does not apply route Strap for the prosthesis broken please order new, Disp: 1 each, Rfl: 0    loratadine (CLARITIN) 10 MG tablet, Take 1 tablet by mouth daily, Disp: 90 tablet, Rfl: 1    Cholecalciferol (VITAMIN D) 2000 units CAPS capsule, Take by mouth nightly 50 mcg nightly, Disp: , Rfl:     Magnesium Oxide 250 MG TABS, Take 250 mg by mouth nightly , Disp: , Rfl:     calcium carbonate 600 MG TABS tablet, Take 1 tablet by mouth nightly , Disp: , Rfl:     vitamin B-12 1000 MCG tablet, Take 1 tablet by mouth daily, Disp: 30 tablet, Rfl: 5    nitroGLYCERIN (NITROSTAT) 0.4 MG SL tablet, up to max of 3 total doses. 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  She was  from  to ; they  in     CHILDREN: None    SUBSTANCE USE: None     Social History     Tobacco Use    Smoking status: Former Smoker     Packs/day: 0.10     Years: 0.50     Pack years: 0.05     Types: Cigarettes     Quit date: 10/24/1977     Years since quittin.7    Smokeless tobacco: Never Used   Vaping Use    Vaping Use: Never used   Substance Use Topics    Alcohol use: No    Drug use: No         ALLERGIES     Allergies   Allergen Reactions    Latex Rash    Sulfa Antibiotics Rash         FAMILY HISTORY     Family History   Problem Relation Age of Onset    Cancer Mother 43    Colon Polyps Mother     Breast Cancer Mother Heart Disease Father     Glaucoma Father     Cancer Sister     Colon Cancer Sister     Cancer Sister     Breast Cancer Sister          PREVIOUS RECORDS   Previous records reviewed: Patient was seen today by her primary care physician for her iron deficiency anemia      PHYSICAL EXAM     ED Triage Vitals   BP Temp Temp src Pulse Resp SpO2 Height Weight   -- -- -- -- -- -- -- --     Initial vital signs and nursing assessment reviewed and normal. Pulsoximetry is normal per my interpretation. Additional Vital Signs:  Vitals:    07/14/22 1916   BP: 92/71   Pulse: 61   Resp: 17   Temp:    SpO2: 96%       Physical Exam  Vitals and nursing note reviewed. Constitutional:       General: She is in acute distress. Appearance: She is not toxic-appearing. HENT:      Head: Normocephalic and atraumatic. Right Ear: External ear normal.      Left Ear: External ear normal.      Nose: Nose normal.      Mouth/Throat:      Mouth: Mucous membranes are moist.      Pharynx: Oropharynx is clear. Eyes:      General: No scleral icterus. Conjunctiva/sclera: Conjunctivae normal.   Cardiovascular:      Rate and Rhythm: Normal rate and regular rhythm. Pulses: Normal pulses. Heart sounds: Normal heart sounds. Pulmonary:      Effort: Pulmonary effort is normal. No respiratory distress. Breath sounds: Normal breath sounds. Abdominal:      General: Abdomen is flat. There is no distension. Palpations: Abdomen is soft. Tenderness: There is no abdominal tenderness. There is left CVA tenderness. There is no right CVA tenderness, guarding or rebound. Musculoskeletal:         General: Normal range of motion. Cervical back: Normal range of motion and neck supple. No rigidity. No muscular tenderness. Comments: Prosthetic right upper extremity   Lymphadenopathy:      Cervical: No cervical adenopathy. Skin:     General: Skin is warm and dry.       Capillary Refill: Capillary refill takes less 19 [AL]   1856 Potassium: 4.1 [AL]   1856 WBC: 10.3 [AL]   1856 Anion Gap: 11.0 [AL]   2000 CT ABDOMEN PELVIS WO CONTRAST Additional Contrast? None [TM]   2026 Nitrite, Urine(!): POSITIVE [TM]   2026 Leukocyte Esterase, Urine(!): LARGE [TM]   2026 Character, Urine(!): CLOUDY [TM]   2029 CT ABDOMEN PELVIS WO CONTRAST Additional Contrast? None  Impression :      1. No CT evidence of urinary tract calculus or obstruction. Specifically   there is no CT explanation for \"hematuria. \" Consider cystoscopy because of   symptomatic. [TM]   2029 Bacteria, UA: MANY [TM]   Sat Jul 16, 2022 1822 Organism(!): Escherichia coli [JS]   1822 Urine Culture Reflex: [place] count: >100,000 CFU/mL This isolate is a probable ESBL . ID consultation may be helpful in some clinical circumstances. CONTACT isolation required. [JS]      ED Course User Index  [AL] Melvin Barry MD  [JS] Dennis Holcomb MD  [TM] Solo Grace MD         MEDICATION CHANGES     New Prescriptions    No medications on file         FINAL DISPOSITION     Final diagnoses:   Left flank pain     Condition: condition: stable  Dispo: Care transferred to Dr. Mary Ann Chakraborty      This transcription was electronically signed. Parts of this transcriptions may have been dictated by use of voice recognition software and electronically transcribed, and parts may have been transcribed with the assistance of an ED scribe. The transcription may contain errors not detected in proofreading. Please refer to my supervising physician's documentation if my documentation differs.     Electronically Signed: Melvin Barry MD, 07/14/22, 7:49 PM         Melvin Barry MD  Resident  07/14/22 Román Swann MD  07/19/22 8051

## 2022-07-14 NOTE — ED NOTES
Pt ambulated to restroom to obtain urine sample at this time.       Nehal Castellano RN  07/14/22 192

## 2022-07-14 NOTE — PROGRESS NOTES
[facility], [address] [address],[address]  [address] [address]  Dept: [phone]  Dept Fax: [phone]: [phone]      Visit Date:7/14/2022     Brittny Estrada is a 58 y.o. female who presents today for:   Chief Complaint   Patient presents with    New Patient     IRON DEFICIENCY ANEMIA        HPI:   Brittny Estrada is a 58 y.o. female referred to our office by Dr. Clem Carrillo for evaluation of iron deficiency anemia. PMH includes hypothyroidism, MS, GI hemorrhage, cardiomyopathy, bipolar 1 disorder, GI bleed, bypass of stomach, acute blood loss anemia, JOANNE. Hx intermittent anemia since at least 2016. Hx iron deficiency seen in 2018. She is on oral iron TID. She had IV iron with Venofer in April 2022, iron levels not low at that time. Hgb dropped in April 2022 with hospitalization with GI bleed. Hx drops in H/H associated with GI bleeds. She reports intermittent need for IV iron, prescribed by her PCP. She was seeing Dr. Rosalinda Guillaume with GI in [facility], she is now following GI in [place]. She states she has had EGD, colonoscopy & capsule endoscopy with benign findings. GI dr feels that her GI bleeds are originating from within gastric bypass area (completed in 2002) and they are suggesting possible surgery if GI bleeds continue to occur. She reports a few drops of blood in urine today but unsure if coming from vagina, no prior history of this occurring before today. Hx hysterectomy. Recommend monitoring and if occurs again she should be evaluated. She denies fatigue, weakness, headaches, dizziness, SOB, heart palpitations, PICA, other s/s bleeding. She states she has standing order for labs with PCP for CBC, iron panel. She is accompanied by POA and sister. PMH, SH, and FH:  I reviewed the patient's medication and allergy lists as noted on the electronic medical record. The PMH, SH, and FH were also reviewed as noted on the EMR.         Past Medical History: Diagnosis Date    Balance problem     Bipolar I disorder, most recent episode (or current) depressed, in partial or unspecified remission 5/8/2013    Cancer ([facility]     CERVICAL     Depression     Fatigue     Gallstones     Gastric bypass status for obesity 2002    GERD (gastroesophageal reflux disease)     History of blood transfusion     History of hysterectomy     REMOVAL OF ONE OVARY     Hypothyroidism     Liver cyst     GI Dr Ameena Duvall MS (multiple sclerosis) ([facility]     Paresthesias     RT LOWER LIMB    Ulcer at site of surgical anastomosis following bypass of stomach 1/15/2022    UTI (urinary tract infection)       Past Surgical History:   Procedure Laterality Date    APPENDECTOMY      BONE GRAFT Left     left leg to right arm    CARPAL TUNNEL RELEASE  1999    CHOLECYSTECTOMY, LAPAROSCOPIC  5/16/2016    COLONOSCOPY      COLONOSCOPY N/A 1/16/2022    COLONOSCOPY CONTROL HEMORRHAGE performed by Laurita Huynh MD at [address], [facility]      ENDOSCOPY, COLON, DIAGNOSTIC      ENTEROSCOPY N/A 2/5/2018    ENTEROSCOPY performed by Aga Reilly MD at [address] [place] [address] N/A 9/28/2020    GASTROSTOMY TUBE PLACEMENT- [facility] TO DO GASTRIC ENDOSCOPY performed by Rosalio Montoya MD at [facility] [address] ([facility])  [address] [place] [address]  5/16/2016    OTHER SURGICAL HISTORY      RECTAL FISSULRE    OTHER SURGICAL HISTORY  1986    D&C    OTHER SURGICAL HISTORY  2006    GI BLEED    OVARY REMOVAL N/A     pt unaware which one was taken    PACEMAKER INSERTION Right 05/20/2019    Dr. oDn Mtz FLX DX W/BONNIE Swan 1978 [facility] Left 2/6/2018    COLONOSCOPY performed by Aga Reilly MD at [facility]    GA OFFICE/OUTPT [facility] Left 2/18/2018    EGD ESOPHAGOGASTRODUODENOSCOPY performed by Calista Mar MD at [address] [facility]  OK OFFICE/OUTPT VISIT,PROCEDURE ONLY N/A 10/4/2018    EGD DIAGNOSTIC ONLY performed by Viraj Bragg MD at [address]    UPPER GASTROINTESTINAL ENDOSCOPY Left 2019    EGD DIAGNOSTIC ONLY performed by Avril Stewart MD at [facility]    UPPER GASTROINTESTINAL ENDOSCOPY N/A 2020    ENTEROSCOPY PUSH CONTROL HEMORRHAGE performed by Meet Waggoner MD at [facility] Endoscopy    UPPER GASTROINTESTINAL ENDOSCOPY N/A 2022    ENTEROSCOPY PUSH DIAGNOSTIC performed by Hussain Penaloza MD at [facility]  2022      Family History   Problem Relation Age of Onset    Cancer Mother 43    Colon Polyps Mother     Breast Cancer Mother     Heart Disease Father     Glaucoma Father     Cancer Sister     Colon Cancer Sister     Cancer Sister     Breast Cancer Sister       Social History     Tobacco Use    Smoking status: Former Smoker     Packs/day: 0.10     Years: 0.50     Pack years: 0.05     Types: Cigarettes     Quit date: 10/24/1977     Years since quittin.7    Smokeless tobacco: Never Used   Substance Use Topics    Alcohol use: No      Current Outpatient Medications   Medication Sig Dispense Refill    levothyroxine (SYNTHROID) 100 MCG tablet TAKE 1 TABLET BY MOUTH ONCE DAILY 90 tablet 1    sucralfate (CARAFATE) 1 GM tablet Take 1 tablet by mouth every 6 hours 120 tablet 3    ferrous sulfate (IRON 325) 325 (65 Fe) MG tablet Take 1 tablet by mouth in the morning, at noon, and at bedtime 90 tablet 2    vilazodone HCl (VILAZODONE HCL) 40 MG TABS TAKE 1 TABLET BY MOUTH ONCE DAILY. 30 tablet 2    lithium 300 MG tablet Take 2 tablets by mouth 2 times daily 120 tablet 2    lamoTRIgine (LAMICTAL) 200 MG tablet Take one tab by mouth once daily.  30 tablet 2    lurasidone (LATUDA) 40 MG TABS tablet Take 1 tablet by mouth nightly 30 tablet 2    pantoprazole (PROTONIX) 40 MG tablet Take 1 tablet by mouth 2 times daily (before meals) 180 tablet 2    docusate sodium (COLACE) 100 MG capsule TAKE 1 CAPSULE BY MOUTH 2 TIMES A DAY 60 capsule 2    DHEA 50 MG CAPS Take by mouth nightly      dimethyl fumarate (TECFIDERA) 240 MG delayed release capsule Take 1 capsule by mouth 2 times daily      ipratropium (ATROVENT) 0.03 % nasal spray INSTILL 2 SPRAYS INTO EACH NOSTRIL 3 TIMES DAILY. 1 Bottle 3    Lactobacillus-Inulin (CULTURELLE HEALTH, INULIN,) CAPS TAKE 1 CAPSULE BY MOUTH WITH BREAKFAST 30 capsule 1    fluticasone (FLONASE) 50 MCG/ACT nasal spray 2 sprays by Each Nostril route daily      ondansetron (ZOFRAN) 4 MG tablet Take 1 tablet by mouth 3 times daily as needed for Nausea or Vomiting 15 tablet 0    Prosthesis MISC by Does not apply route Strap for the prosthesis broken please order new 1 each 0    loratadine (CLARITIN) 10 MG tablet Take 1 tablet by mouth daily 90 tablet 1    Cholecalciferol (VITAMIN D) 2000 units CAPS capsule Take by mouth nightly 50 mcg nightly      Magnesium Oxide 250 MG TABS Take 250 mg by mouth nightly       calcium carbonate 600 MG TABS tablet Take 1 tablet by mouth nightly       vitamin B-12 1000 MCG tablet Take 1 tablet by mouth daily 30 tablet 5    nitroGLYCERIN (NITROSTAT) 0.4 MG SL tablet up to max of 3 total doses. If no relief after 1 dose, call 911. 25 tablet 3     No current facility-administered medications for this visit. Allergies   Allergen Reactions    Latex Rash    Sulfa Antibiotics Rash          Review of Systems:   Review of Systems   Pertinent review of systems noted in HPI, all other ROS negative. Objective:   Physical Exam   /60 (Site: Left Upper Arm, Position: Sitting, Cuff Size: Large Adult)   Pulse 87   Temp 99.8 °F (37.7 °C) (Oral)   Resp 20   Ht 4' 9.99\" (1.473 m)   Wt 126 lb 12.8 oz (57.5 kg)   SpO2 95%   BMI 26.51 kg/m²    General appearance: No apparent distress, calm and cooperative. HEENT: Pupils equal, round, and reactive to light. questions or concerns. Electronically signed by   AJITH Montoya CNP     I spent a total of 37 minutes on the day of the visit.

## 2022-07-16 LAB
ORGANISM: ABNORMAL
URINE CULTURE REFLEX: ABNORMAL

## 2022-07-16 RX ORDER — CIPROFLOXACIN 500 MG/1
500 TABLET, FILM COATED ORAL 2 TIMES DAILY
Qty: 14 TABLET | Refills: 0 | Status: SHIPPED | OUTPATIENT
Start: 2022-07-16 | End: 2022-07-23

## 2022-07-19 ENCOUNTER — HOSPITAL ENCOUNTER (OUTPATIENT)
Age: 62
Discharge: HOME | End: 2022-07-19
Payer: MEDICARE

## 2022-07-19 ENCOUNTER — OFFICE VISIT (OUTPATIENT)
Dept: FAMILY MEDICINE CLINIC | Age: 62
End: 2022-07-19
Payer: MEDICARE

## 2022-07-19 VITALS
BODY MASS INDEX: 26.16 KG/M2 | DIASTOLIC BLOOD PRESSURE: 60 MMHG | WEIGHT: 124.6 LBS | SYSTOLIC BLOOD PRESSURE: 110 MMHG | HEIGHT: 58 IN | RESPIRATION RATE: 16 BRPM | OXYGEN SATURATION: 99 % | HEART RATE: 64 BPM | TEMPERATURE: 97.5 F

## 2022-07-19 DIAGNOSIS — R74.8 ELEVATED LIVER ENZYMES: ICD-10-CM

## 2022-07-19 DIAGNOSIS — E06.3 HYPOTHYROIDISM DUE TO HASHIMOTO'S THYROIDITIS: ICD-10-CM

## 2022-07-19 DIAGNOSIS — E03.8 HYPOTHYROIDISM DUE TO HASHIMOTO'S THYROIDITIS: ICD-10-CM

## 2022-07-19 DIAGNOSIS — N30.01 ACUTE CYSTITIS WITH HEMATURIA: Primary | ICD-10-CM

## 2022-07-19 DIAGNOSIS — E83.52 HYPERCALCEMIA: ICD-10-CM

## 2022-07-19 DIAGNOSIS — Z51.81 THERAPEUTIC DRUG MONITORING: ICD-10-CM

## 2022-07-19 LAB — LITHIUM LEVEL: 0.96 MMOL/L (ref 0.6–1.2)

## 2022-07-19 PROCEDURE — 80178 ASSAY OF LITHIUM: CPT

## 2022-07-19 PROCEDURE — 99213 OFFICE O/P EST LOW 20 MIN: CPT | Performed by: STUDENT IN AN ORGANIZED HEALTH CARE EDUCATION/TRAINING PROGRAM

## 2022-07-19 PROCEDURE — 36415 COLL VENOUS BLD VENIPUNCTURE: CPT

## 2022-07-19 ASSESSMENT — ENCOUNTER SYMPTOMS
ABDOMINAL PAIN: 0
SHORTNESS OF BREATH: 0
COUGH: 0
NAUSEA: 0
SORE THROAT: 0

## 2022-07-19 NOTE — PROGRESS NOTES
[address] [facility] [place] [address] [facility] [address]  Dept: [phone]  Dept Fax: [phone]  Loc: [phone]  PROGRESS NOTE      Visit Date: 7/19/2022    Devang Connors is a 58 y.o. female who presents today for:  Chief Complaint   Patient presents with    Follow-up    Urinary Tract Infection       Impression/Plan:  1. Acute cystitis with hematuria  Acute, improving  Continue ciprofloxacin  If her fatigue is not improving the next 2 days, may transition to Macrobid per culture and sensitivity    2. Hypercalcemia  Resolved  She like to restart her calcium supplement, which we will do today  Check calcium in 1 month  - Calcium; Future    3. Elevated liver enzymes  Noted in the past, asymptomatic  CT significant for enlarged liver  We will recheck hepatic function panel  - Hepatic Function Panel; Future    Return in about 4 weeks (around 8/16/2022) for hypercalcemia and iron deficiency anemia. Subjective:  HPI    Here for follow-up. UTI:  seen in ED on 7/14. Started on keflex, now on cipro secondary to culture sensitivity. Last dose is Sunday. She does admit to feeling a little more fatigue since switching to the new ciprofloxacin, but is feeling better overall. She denies ongoing hematuria. Denies dysuria, urgency, or increased frequency. Hypercalcemia:  noted in May and June. Now, normal.  Restart calcium supplement today. Check labs in 1 month before seeing me again. Labs:  hemoglobin and hematocrit. Lithium for psychology. Will wait one month to check calcium. No further questions of complaints. Review of Systems   Constitutional:  Negative for chills and fever. HENT:  Negative for congestion and sore throat. Eyes:  Negative for visual disturbance. Respiratory:  Negative for cough and shortness of breath. Cardiovascular:  Negative for chest pain.    Gastrointestinal:  Negative for abdominal pain and nausea. Genitourinary:  Negative for dysuria, flank pain, hematuria and urgency. Skin:  Negative for rash. Neurological:  Negative for dizziness, light-headedness and headaches. Psychiatric/Behavioral:  The patient is not nervous/anxious.       Patient Active Problem List   Diagnosis    Acquired hypothyroidism    MS (multiple sclerosis) ([facility]    S/P laparoscopic cholecystectomy    S/P laparoscopic appendectomy    Gastrointestinal hemorrhage associated with gastritis    Multiple thyroid nodules    Non-ischemic cardiomyopathy (HCC)    History of Batsheva-en-Y gastric bypass    Renal lesion    Liver lesion    Bipolar 1 disorder (HCC)    Symptomatic bradycardia    Status post placement of cardiac pacemaker    Gastrointestinal hemorrhage with melena    GI bleed    Peptic ulcer disease    Other complications of other bariatric procedure    Ulcer at site of surgical anastomosis following bypass of stomach    Acute blood loss anemia    Iron deficiency anemia     Past Medical History:   Diagnosis Date    Balance problem     Bipolar I disorder, most recent episode (or current) depressed, in partial or unspecified remission 5/8/2013    Cancer ([facility]     CERVICAL     Depression     Fatigue     Gallstones     Gastric bypass status for obesity 2002    GERD (gastroesophageal reflux disease)     History of blood transfusion     History of hysterectomy     REMOVAL OF ONE OVARY     Hypothyroidism     Liver cyst     GI Dr Tariq Hewitt    MS (multiple sclerosis) ([facility]     Paresthesias     RT LOWER LIMB    Ulcer at site of surgical anastomosis following bypass of stomach 1/15/2022    UTI (urinary tract infection)       Past Surgical History:   Procedure Laterality Date    APPENDECTOMY      BONE GRAFT Left     left leg to right arm    CARPAL TUNNEL RELEASE  1999    CHOLECYSTECTOMY, LAPAROSCOPIC  5/16/2016    COLONOSCOPY      COLONOSCOPY N/A 1/16/2022    COLONOSCOPY CONTROL HEMORRHAGE performed by Hussain Penaloza MD at [facility] Cigarettes     Quit date: 10/24/1977     Years since quittin.7    Smokeless tobacco: Never   Substance Use Topics    Alcohol use: No      Current Outpatient Medications   Medication Sig Dispense Refill    ciprofloxacin (CIPRO) 500 MG tablet Take 1 tablet by mouth in the morning and 1 tablet before bedtime. Do all this for 7 days. 14 tablet 0    levothyroxine (SYNTHROID) 100 MCG tablet TAKE 1 TABLET BY MOUTH ONCE DAILY 90 tablet 1    sucralfate (CARAFATE) 1 GM tablet Take 1 tablet by mouth every 6 hours 120 tablet 3    ferrous sulfate (IRON 325) 325 (65 Fe) MG tablet Take 1 tablet by mouth in the morning, at noon, and at bedtime 90 tablet 2    vilazodone HCl (VILAZODONE HCL) 40 MG TABS TAKE 1 TABLET BY MOUTH ONCE DAILY. 30 tablet 2    lithium 300 MG tablet Take 2 tablets by mouth 2 times daily 120 tablet 2    lamoTRIgine (LAMICTAL) 200 MG tablet Take one tab by mouth once daily. 30 tablet 2    lurasidone (LATUDA) 40 MG TABS tablet Take 1 tablet by mouth nightly 30 tablet 2    pantoprazole (PROTONIX) 40 MG tablet Take 1 tablet by mouth 2 times daily (before meals) 180 tablet 2    docusate sodium (COLACE) 100 MG capsule TAKE 1 CAPSULE BY MOUTH 2 TIMES A DAY 60 capsule 2    DHEA 50 MG CAPS Take by mouth nightly      dimethyl fumarate (TECFIDERA) 240 MG delayed release capsule Take 1 capsule by mouth 2 times daily      ipratropium (ATROVENT) 0.03 % nasal spray INSTILL 2 SPRAYS INTO EACH NOSTRIL 3 TIMES DAILY.  1 Bottle 3    Lactobacillus-Inulin (CULTURELLE HEALTH, INULIN,) CAPS TAKE 1 CAPSULE BY MOUTH WITH BREAKFAST 30 capsule 1    fluticasone (FLONASE) 50 MCG/ACT nasal spray 2 sprays by Each Nostril route daily      ondansetron (ZOFRAN) 4 MG tablet Take 1 tablet by mouth 3 times daily as needed for Nausea or Vomiting 15 tablet 0    Prosthesis MISC by Does not apply route Strap for the prosthesis broken please order new 1 each 0    loratadine (CLARITIN) 10 MG tablet Take 1 tablet by mouth daily 90 tablet 1 Cholecalciferol (VITAMIN D) 2000 units CAPS capsule Take by mouth nightly 50 mcg nightly      Magnesium Oxide 250 MG TABS Take 250 mg by mouth nightly       calcium carbonate 600 MG TABS tablet Take 1 tablet by mouth nightly       vitamin B-12 1000 MCG tablet Take 1 tablet by mouth daily 30 tablet 5    nitroGLYCERIN (NITROSTAT) 0.4 MG SL tablet up to max of 3 total doses. If no relief after 1 dose, call 911. 25 tablet 3     No current facility-administered medications for this visit.      Allergies   Allergen Reactions    Latex Rash    Sulfa Antibiotics Rash       Immunization History   Administered Date(s) Administered    COVID-19, PFIZER PURPLE top, DILUTE for use, (age 15 y+), 30mcg/0.3mL 02/02/2021, 02/23/2021, 11/10/2021    Influenza Vaccine, unspecified formulation 09/15/2015, 09/14/2016, 10/21/2016, 08/16/2017    Influenza Virus Vaccine 10/21/2016, 08/16/2017, 09/30/2020    Influenza, MDCK Lobo Alexandraa, with preserv IM (Flucelvax 2 yrs and older) 03/11/2019    Influenza, Quadv, 6 mo and older, IM, PF (Flulaval, Fluarix) 10/05/2018    Influenza, Quadv, IM, (6 mo and older Fluzone, Flulaval, Fluarix and 3 yrs and older Afluria) 10/05/2018    Influenza, Quadv, IM, PF (6 mo and older Fluzone, Flulaval, Fluarix, and 3 yrs and older Afluria) 09/23/2017, 09/24/2019, 09/20/2021    Pneumococcal Conjugate 13-valent ([id number]) 04/10/2019    Pneumococcal Polysaccharide ([id number]) 05/17/2016    Td (Adult), 5 Lf Tetanus Toxoid, Pf (Tenivac, Decavac) 04/30/1999    Td vaccine (adult) 04/30/1999    Tdap (Boostrix, Adacel) 09/29/2015, 09/12/2016, 10/25/2018    Zoster Recombinant (Shingrix) 03/11/2019, 11/19/2019, 01/03/2020     Health Maintenance   Topic Date Due    COVID-19 Vaccine (4 - Booster for Pfizer series) 03/10/2022    Flu vaccine (1) 09/01/2022    Diabetes screen  11/13/2022    Breast cancer screen  01/24/2023    Depression Monitoring  02/18/2023    Annual Wellness Visit (AWV)  02/19/2023    Pneumococcal 0-64 years Vaccine (3 - PPSV23 or PCV20) 05/12/2025    Lipids  03/02/2027    DTaP/Tdap/Td vaccine (4 - Td or Tdap) 10/25/2028    Colorectal Cancer Screen  01/16/2032    Shingles vaccine  Completed    Hepatitis C screen  Completed    HIV screen  Completed    Hepatitis A vaccine  Aged Out    Hepatitis B vaccine  Aged Out    Hib vaccine  Aged Out    Meningococcal (ACWY) vaccine  Aged Out       LABS  Lab Results   Component Value Date    LABA1C 4.7 11/13/2019     No results found for: EAG  No components found for: CHLPL  Lab Results   Component Value Date    TRIG 32 03/02/2022    TRIG 71 04/12/2021    TRIG 62 03/04/2021     Lab Results   Component Value Date    HDL 95 03/02/2022     04/12/2021    HDL 97 03/04/2021     Lab Results   Component Value Date    LDLCALC 40 03/02/2022    LDLCALC 40 04/12/2021    LDLCALC 39 03/04/2021       Chemistry        Component Value Date/Time     07/14/2022 1810    K 4.1 07/14/2022 1810    K 4.2 04/25/2022 0707     07/14/2022 1810    CO2 19 (L) 07/14/2022 1810    BUN 17 07/14/2022 1810    CREATININE 0.6 07/14/2022 1810        Component Value Date/Time    CALCIUM 10.1 07/14/2022 1810    ALKPHOS 162 (H) 06/24/2022 1043    AST 62 (H) 06/24/2022 1043    ALT 88 (H) 06/24/2022 1043    BILITOT 0.2 (L) 06/24/2022 1043          No results found for: LABMICR, [id number]  Lab Results   Component Value Date    TSH 1.400 03/01/2022     No results found for: PSA  Lab Results   Component Value Date    WBC 10.3 07/14/2022    HGB 11.7 (L) 07/14/2022    HCT 37.6 07/14/2022    MCV 98.7 07/14/2022     07/14/2022       Objective:  /60 (Site: Right Upper Arm, Position: Sitting, Cuff Size: Medium Adult)   Pulse 64   Temp 97.5 °F (36.4 °C)   Resp 16   Ht 4' 10\" (1.473 m)   Wt 124 lb 9.6 oz (56.5 kg)   SpO2 99%   BMI 26.04 kg/m²     Physical Exam  Constitutional:       General: She is not in acute distress. Appearance: Normal appearance. HENT:      Head: Normocephalic.       Right Ear: External ear normal.      Left Ear: External ear normal.      Nose: Nose normal.      Mouth/Throat:      Mouth: Mucous membranes are moist.   Eyes:      General: No scleral icterus. Extraocular Movements: Extraocular movements intact. Cardiovascular:      Rate and Rhythm: Normal rate and regular rhythm. Pulses: Normal pulses. Heart sounds: Normal heart sounds. Pulmonary:      Effort: Pulmonary effort is normal.      Breath sounds: Normal breath sounds. Abdominal:      General: Abdomen is flat. There is no distension. Palpations: Abdomen is soft. Tenderness: There is no abdominal tenderness (No periumbilical tenderness. ). Musculoskeletal:         General: Normal range of motion. Cervical back: Normal range of motion. Skin:     General: Skin is warm and dry. Neurological:      Mental Status: She is alert. Motor: No weakness. Psychiatric:         Mood and Affect: Mood normal.       They voiced understanding. All questions answered. They agreed with treatment plan. See patient instructions for any educational materials that may have been given. Discussed use, benefit, and side effects of prescribed medications. Reviewed health maintenance.     (Please note that portions of this note may have been completed with a voice recognition program.  Efforts were made to edit the dictation but occasionally words are mis-transcribed.)      Electronically signed by Lisy Becerra DO on 7/19/2022 at 5:30 PM

## 2022-07-20 NOTE — PROGRESS NOTES
Pharmacy Note  ED Culture Follow-up    Albaro Gill is a 58 y.o. female. Allergies: Latex and Sulfa antibiotics     Labs:  Lab Results   Component Value Date    BUN 17 07/14/2022    CREATININE 0.6 07/14/2022    WBC 10.3 07/14/2022     Estimated Creatinine Clearance: 72 mL/min (based on SCr of 0.6 mg/dL). Current antimicrobials:   Cipro 500 mg BID x 7 days    ASSESSMENT:  Micro results:   Urine culture: positive for E. coli     PLAN:  Need for intervention: No  Discussed with: Carla Lassiter MD  Chosen treatment:    Patient already on appropriate treatment as above    Patient response:   No need to contact patient    Called/sent in prescription to: Not applicable    Please call with any questions.  [address] [facility] [place], PharmD 7:47 PM 7/20/2022 Name band;

## 2022-07-22 NOTE — PROGRESS NOTES
Attending Physician Statement  I have discussed the case, including pertinent history and exam findings with the resident. I agree with the documented assessment and plan as documented by the resident.   GE modifier added to this encounter      Chris Xiao MD 7/21/2022 10:16 PM

## 2022-08-17 ENCOUNTER — HOSPITAL ENCOUNTER (OUTPATIENT)
Dept: MRI IMAGING | Age: 62
Discharge: HOME | End: 2022-08-17
Payer: MEDICARE

## 2022-08-17 ENCOUNTER — HOSPITAL ENCOUNTER (OUTPATIENT)
Age: 62
Discharge: HOME | End: 2022-08-17
Payer: MEDICARE

## 2022-08-17 DIAGNOSIS — G35 MULTIPLE SCLEROSIS (HCC): ICD-10-CM

## 2022-08-17 DIAGNOSIS — E83.52 HYPERCALCEMIA: ICD-10-CM

## 2022-08-17 DIAGNOSIS — Z51.81 THERAPEUTIC DRUG MONITORING: ICD-10-CM

## 2022-08-17 DIAGNOSIS — R74.8 ELEVATED LIVER ENZYMES: ICD-10-CM

## 2022-08-17 LAB
ALBUMIN SERPL-MCNC: 4.6 G/DL (ref 3.5–5.1)
ALP BLD-CCNC: 139 U/L (ref 38–126)
ALT SERPL-CCNC: 49 U/L (ref 11–66)
AST SERPL-CCNC: 39 U/L (ref 5–40)
BILIRUB SERPL-MCNC: 0.3 MG/DL (ref 0.3–1.2)
BILIRUBIN DIRECT: < 0.2 MG/DL (ref 0–0.3)
CALCIUM SERPL-MCNC: 10.4 MG/DL (ref 8.5–10.5)
LITHIUM LEVEL: 1.21 MMOL/L (ref 0.6–1.2)
TOTAL PROTEIN: 6.6 G/DL (ref 6.1–8)

## 2022-08-17 PROCEDURE — 82310 ASSAY OF CALCIUM: CPT

## 2022-08-17 PROCEDURE — 70553 MRI BRAIN STEM W/O & W/DYE: CPT

## 2022-08-17 PROCEDURE — 36415 COLL VENOUS BLD VENIPUNCTURE: CPT

## 2022-08-17 PROCEDURE — 6360000004 HC RX CONTRAST MEDICATION: Performed by: PSYCHIATRY & NEUROLOGY

## 2022-08-17 PROCEDURE — A9579 GAD-BASE MR CONTRAST NOS,1ML: HCPCS | Performed by: PSYCHIATRY & NEUROLOGY

## 2022-08-17 PROCEDURE — 80076 HEPATIC FUNCTION PANEL: CPT

## 2022-08-17 PROCEDURE — 80178 ASSAY OF LITHIUM: CPT

## 2022-08-17 RX ADMIN — GADOTERIDOL 10 ML: 279.3 INJECTION, SOLUTION INTRAVENOUS at 15:18

## 2022-08-23 ENCOUNTER — OFFICE VISIT (OUTPATIENT)
Dept: FAMILY MEDICINE CLINIC | Age: 62
End: 2022-08-23
Payer: MEDICARE

## 2022-08-23 VITALS
DIASTOLIC BLOOD PRESSURE: 74 MMHG | RESPIRATION RATE: 16 BRPM | SYSTOLIC BLOOD PRESSURE: 122 MMHG | HEART RATE: 84 BPM | TEMPERATURE: 96.4 F | HEIGHT: 58 IN | WEIGHT: 123.2 LBS | OXYGEN SATURATION: 98 % | BODY MASS INDEX: 25.86 KG/M2

## 2022-08-23 DIAGNOSIS — Z86.2 HISTORY OF ANEMIA: ICD-10-CM

## 2022-08-23 DIAGNOSIS — E83.52 HYPERCALCEMIA: Primary | ICD-10-CM

## 2022-08-23 DIAGNOSIS — N30.01 ACUTE CYSTITIS WITH HEMATURIA: ICD-10-CM

## 2022-08-23 DIAGNOSIS — R74.8 ELEVATED LIVER ENZYMES: ICD-10-CM

## 2022-08-23 PROCEDURE — 99213 OFFICE O/P EST LOW 20 MIN: CPT | Performed by: STUDENT IN AN ORGANIZED HEALTH CARE EDUCATION/TRAINING PROGRAM

## 2022-08-23 ASSESSMENT — ENCOUNTER SYMPTOMS
NAUSEA: 0
COUGH: 0
ABDOMINAL PAIN: 0
SORE THROAT: 0
SHORTNESS OF BREATH: 0

## 2022-08-23 NOTE — PATIENT INSTRUCTIONS
LAB INSTRUCTIONS:    Please complete labs in 3 month(s) - a week prior to your next visit with me. Please fast for 8-10 hours prior to lab collection. The clinic will call you within 1 week of collection. If you have not heard from us within that amount of time, please call us at [phone]. Thank you   Thank you for trusting us with your healthcare needs. You may receive a survey regarding today's visit. It would help us out if you would take a few moments to provide your feedback. We value your input. Please bring in ALL medications BOTTLES, including inhalers, herbal supplements, over the counter, prescribed & non-prescribed medicine. The office would like actual medication bottles and a list.   Please note our OFFICE POLICIES:   Prior to getting your labs drawn, please check with your insurance company for benefits and eligibility of lab services. Often, insurance companies cover certain tests for preventative visits only. It is patient's responsibility to see what is covered. We are unable to change a diagnosis after the test has been performed. Lab orders will not be re-printed. Please hold onto your original lab orders and take them to your lab to be completed. If you no show your scheduled appointment three times, you will be dismissed from this practice. Reschedules must be completed 24 hours prior to your schedule appointment. If the list below has been completed, PLEASE FAX RECORDS TO OUR OFFICE @ [phone].  Once the records have been received we will update your records at our office:  Health Maintenance Due   Topic Date Due    COVID-19 Vaccine (4 - Booster for Maradiaga Peter series) 03/10/2022

## 2022-08-23 NOTE — PROGRESS NOTES
Health Maintenance Due   Topic Date Due    COVID-19 Vaccine (4 - Booster for Pfizer series) 03/10/2022

## 2022-08-23 NOTE — PROGRESS NOTES
S: 58 y.o. female with   Chief Complaint   Patient presents with    1 Month Follow-Up     Hypercalcemia and Iron Deficiency Anemia           Reviewed hx and ROS in detail . See notes for additional details. BP Readings from Last 3 Encounters:   08/23/22 122/74   07/19/22 110/60   07/14/22 108/79     Wt Readings from Last 3 Encounters:   08/23/22 123 lb 3.2 oz (55.9 kg)   07/19/22 124 lb 9.6 oz (56.5 kg)   07/14/22 110 lb (49.9 kg)           O: VS:  height is 4' 10\" (1.473 m) and weight is 123 lb 3.2 oz (55.9 kg). Her temporal temperature is 96.4 °F (35.8 °C) (abnormal). Her blood pressure is 122/74 and her pulse is 84. Her respiration is 16 and oxygen saturation is 98%. Diagnosis Orders   1. Hypercalcemia        2. Elevated liver enzymes        3. Acute cystitis with hematuria        4. History of anemia  Hemoglobin and Hematocrit          Plan  Hypercalcemia-resolved follow    Acute cystitis hematuria resolved symptoms have resolved    Follow-up as previously scheduled      Health Maintenance Due   Topic Date Due    COVID-19 Vaccine (4 - Booster for Pfizer series) 03/10/2022         Attending Physician Statement  I have discussed the case, including pertinent history and exam findings with the resident. I agree with the documented assessment and plan as documented by the resident.      Neil Romberg, MD 8/23/2022 8:34 AM

## 2022-08-23 NOTE — PROGRESS NOTES
[id number] [facility] [place] [address] [facility] [address]  Dept: [phone]  Dept Fax: [phone]  Loc: [phone]  PROGRESS NOTE      Visit Date: 8/23/2022    Devang Connors is a 58 y.o. female who presents today for:  Chief Complaint   Patient presents with    1 Month Follow-Up     Hypercalcemia and Iron Deficiency Anemia       Impression/Plan:  1. Hypercalcemia  Acute, resolved  Continue calcium supplementation and dietary intake    2. Elevated liver enzymes  Chronic, stable  Alkaline phosphatase very mildly elevated  We will continue to monitor    3. Acute cystitis with hematuria  Acute, resolved  Completed ciprofloxacin    4. History of anemia  Chronic, stable  We will recheck labs prior to next visit  H&H standing order placed  - Hemoglobin and Hematocrit; Standing    Chronic labs ordered to be obtained prior to next appointment. Return in about 3 months (around 11/23/2022) for Chronic. Subjective:  HPI    Here for follow-up. UTI:  Hematuria is resolved. No periumbilical pain. Completed antibiotics. Hypercalcemia:  Recent labwork was normal. Still taking calcium supplementation and having dietary intake, so will continue without changes. Alkaline phosphatase stable. No further questions of complaints. Review of Systems   Constitutional:  Negative for chills and fever. HENT:  Negative for congestion and sore throat. Eyes:  Negative for visual disturbance. Respiratory:  Negative for cough and shortness of breath. Cardiovascular:  Negative for chest pain. Gastrointestinal:  Negative for abdominal pain and nausea. Genitourinary:  Negative for dysuria. Skin:  Negative for rash. Neurological:  Negative for dizziness, light-headedness and headaches. Psychiatric/Behavioral:  The patient is not nervous/anxious.       Patient Active Problem List   Diagnosis    Acquired hypothyroidism    MS (multiple sclerosis) ([facility]    S/P laparoscopic cholecystectomy    S/P laparoscopic appendectomy    Gastrointestinal hemorrhage associated with gastritis    Multiple thyroid nodules    Non-ischemic cardiomyopathy (HCC)    History of Batsheva-en-Y gastric bypass    Renal lesion    Liver lesion    Bipolar 1 disorder (HCC)    Symptomatic bradycardia    Status post placement of cardiac pacemaker    Gastrointestinal hemorrhage with melena    GI bleed    Peptic ulcer disease    Other complications of other bariatric procedure    Ulcer at site of surgical anastomosis following bypass of stomach    Acute blood loss anemia    Iron deficiency anemia     Past Medical History:   Diagnosis Date    Balance problem     Bipolar I disorder, most recent episode (or current) depressed, in partial or unspecified remission 5/8/2013    Cancer ([facility] [address]     CERVICAL     Depression     Fatigue     Gallstones     Gastric bypass status for obesity 2002    GERD (gastroesophageal reflux disease)     History of blood transfusion     History of hysterectomy     REMOVAL OF ONE OVARY     Hypothyroidism     Liver cyst     GI Dr Jeremie Garcia    MS (multiple sclerosis) ([facility] [address]     Paresthesias     RT LOWER LIMB    Ulcer at site of surgical anastomosis following bypass of stomach 1/15/2022    UTI (urinary tract infection)       Past Surgical History:   Procedure Laterality Date    APPENDECTOMY      BONE GRAFT Left     left leg to right arm    CARPAL TUNNEL RELEASE  1999    CHOLECYSTECTOMY, LAPAROSCOPIC  5/16/2016    COLONOSCOPY      COLONOSCOPY N/A 1/16/2022    COLONOSCOPY CONTROL HEMORRHAGE performed by Nick Luciano MD at [facility], ESOPHAGUS      ENDOSCOPY, COLON, DIAGNOSTIC      ENTEROSCOPY N/A 2/5/2018    ENTEROSCOPY performed by Riana Munoz MD at [place] [address] N/A 9/28/2020    [facility] performed by Christine Landeros MD at [address] [place] (CERVIX STATUS UNKNOWN)  1963    HYSTERECTOMY, VAGINAL      LAPAROSCOPIC APPENDECTOMY  2016    OTHER SURGICAL HISTORY      RECTAL FISSULRE    OTHER SURGICAL HISTORY      D&C    OTHER SURGICAL HISTORY  2006    GI BLEED    OVARY REMOVAL N/A     pt unaware which one was taken    PACEMAKER INSERTION Right 2019    Dr. Dionne Nageotte FLX DX W/COLLJ SPEC WHEN PFRMD Left 2018    COLONOSCOPY performed by Amelia Barton MD at [facility]    IN OFFICE/OUTPT VISIT,PROCEDURE ONLY Left 2018    EGD ESOPHAGOGASTRODUODENOSCOPY performed by Jarvis Arellano MD at  [facility] OFFICE/OUTPT VISIT,PROCEDURE ONLY N/A 10/4/2018    EGD DIAGNOSTIC ONLY performed by Susan Rodarte MD at [facility] ENDOSCOPY Left 2019    EGD DIAGNOSTIC ONLY [facility]    UPPER GASTROINTESTINAL ENDOSCOPY N/A 2020    ENTEROSCOPY PUSH CONTROL HEMORRHAGE performed by Amelia Barton MD at [place] [address] N/A 2022    ENTEROSCOPY PUSH DIAGNOSTIC performed by Cassy Esqueda MD at [place] [address]  2022     Family History   Problem Relation Age of Onset    Cancer Mother 43    Colon Polyps Mother     Breast Cancer Mother     Heart Disease Father     Glaucoma Father     Cancer Sister     Colon Cancer Sister     Cancer Sister     Breast Cancer Sister      Social History     Tobacco Use    Smoking status: Former     Packs/day: 0.10     Years: 0.50     Pack years: 0.05     Types: Cigarettes     Quit date: 10/24/1977     Years since quittin.8    Smokeless tobacco: Never   Substance Use Topics    Alcohol use: No      Current Outpatient Medications   Medication Sig Dispense Refill    levothyroxine (SYNTHROID) 100 MCG tablet TAKE 1 TABLET BY MOUTH ONCE DAILY 90 tablet 1    sucralfate (CARAFATE) 1 GM tablet Take 1 tablet by mouth every 6 hours 120 tablet 3    ferrous sulfate (IRON 325) 325 (65 Fe) MG tablet Take 1 tablet by mouth in the morning, at noon, and at bedtime 90 tablet 2    vilazodone HCl (VILAZODONE HCL) 40 MG TABS TAKE 1 TABLET BY MOUTH ONCE DAILY. 30 tablet 2    lithium 300 MG tablet Take 2 tablets by mouth 2 times daily 120 tablet 2    lamoTRIgine (LAMICTAL) 200 MG tablet Take one tab by mouth once daily. 30 tablet 2    lurasidone (LATUDA) 40 MG TABS tablet Take 1 tablet by mouth nightly 30 tablet 2    pantoprazole (PROTONIX) 40 MG tablet Take 1 tablet by mouth 2 times daily (before meals) 180 tablet 2    docusate sodium (COLACE) 100 MG capsule TAKE 1 CAPSULE BY MOUTH 2 TIMES A DAY 60 capsule 2    DHEA 50 MG CAPS Take by mouth nightly      dimethyl fumarate (TECFIDERA) 240 MG delayed release capsule Take 1 capsule by mouth 2 times daily      ipratropium (ATROVENT) 0.03 % nasal spray INSTILL 2 SPRAYS INTO EACH NOSTRIL 3 TIMES DAILY. 1 Bottle 3    Lactobacillus-Inulin (CULTURELLE HEALTH, INULIN,) CAPS TAKE 1 CAPSULE BY MOUTH WITH BREAKFAST 30 capsule 1    fluticasone (FLONASE) 50 MCG/ACT nasal spray 2 sprays by Each Nostril route daily      ondansetron (ZOFRAN) 4 MG tablet Take 1 tablet by mouth 3 times daily as needed for Nausea or Vomiting 15 tablet 0    Prosthesis MISC by Does not apply route Strap for the prosthesis broken please order new 1 each 0    loratadine (CLARITIN) 10 MG tablet Take 1 tablet by mouth daily 90 tablet 1    Cholecalciferol (VITAMIN D) 2000 units CAPS capsule Take by mouth nightly 50 mcg nightly      Magnesium Oxide 250 MG TABS Take 250 mg by mouth nightly       calcium carbonate 600 MG TABS tablet Take 1 tablet by mouth nightly       vitamin B-12 1000 MCG tablet Take 1 tablet by mouth daily 30 tablet 5    nitroGLYCERIN (NITROSTAT) 0.4 MG SL tablet up to max of 3 total doses. If no relief after 1 dose, call 911. 25 tablet 3     No current facility-administered medications for this visit. Allergies   Allergen Reactions    Latex Rash    Sulfa Antibiotics Rash       Immunization History   Administered Date(s) Administered    COVID-19, PFIZER PURPLE top, DILUTE for use, (age 15 y+), 30mcg/0.3mL 02/02/2021, 02/23/2021, 11/10/2021    Influenza Vaccine, unspecified formulation 09/15/2015, 09/14/2016, 10/21/2016, 08/16/2017    Influenza Virus Vaccine 10/21/2016, 08/16/2017, 09/30/2020    Influenza, Jenifer Band (age 1 y+), MDV 10/05/2018    Influenza, FLUARIX, Josi Luiz, (age 10 mo+) AND AFLURIA, FLUZONE (age 1 y+), PF 09/23/2017, 09/24/2019, 09/20/2021    Influenza, FLUCELVAX, (age 10 mo+), MDCK, MDV 03/11/2019    Influenza, Quadv, 6 mo and older, IM, PF (Flulaval, Fluarix) 10/05/2018    Pneumococcal Conjugate 13-valent (Hfpvoxy73) 04/10/2019    Pneumococcal Polysaccharide ([id number]) 05/17/2016    Td (Adult), 5 Lf Tetanus Toxoid, Pf (Tenivac, Decavac) 04/30/1999    Td vaccine (adult) 04/30/1999    Tdap (Boostrix, Adacel) 09/29/2015, 09/12/2016, 10/25/2018    Zoster Recombinant (Shingrix) 03/11/2019, 11/19/2019, 01/03/2020     Health Maintenance   Topic Date Due    COVID-19 Vaccine (4 - Booster for Pfizer series) 03/10/2022    Flu vaccine (1) 09/01/2022    Diabetes screen  11/13/2022    Breast cancer screen  01/24/2023    Depression Monitoring  02/18/2023    Annual Wellness Visit (AWV)  02/19/2023    Pneumococcal 0-64 years Vaccine (3 - PPSV23 or PCV20) 05/12/2025    Lipids  03/02/2027    DTaP/Tdap/Td vaccine (4 - Td or Tdap) 10/25/2028    Colorectal Cancer Screen  01/16/2032    Shingles vaccine  Completed    Hepatitis C screen  Completed    HIV screen  Completed    Hepatitis A vaccine  Aged Out    Hepatitis B vaccine  Aged Out    Hib vaccine  Aged Out    Meningococcal (ACWY) vaccine  Aged Out       LABS  Lab Results   Component Value Date    LABA1C 4.7 11/13/2019     No results found for: EAG  No components found for: CHLPL  Lab Results   Component Value Date    TRIG 32 03/02/2022    TRIG 71 04/12/2021    TRIG 62 03/04/2021     Lab Results   Component Value Date    HDL 95 03/02/2022     04/12/2021    HDL 97 03/04/2021     Lab Results   Component Value Date    LDLCALC 40 03/02/2022    LDLCALC 40 04/12/2021    LDLCALC 39 03/04/2021       Chemistry        Component Value Date/Time     07/14/2022 1810    K 4.1 07/14/2022 1810    K 4.2 04/25/2022 0707     07/14/2022 1810    CO2 19 (L) 07/14/2022 1810    BUN 17 07/14/2022 1810    CREATININE 0.6 07/14/2022 1810        Component Value Date/Time    CALCIUM 10.4 08/17/2022 1333    ALKPHOS 139 (H) 08/17/2022 1333    AST 39 08/17/2022 1333    ALT 49 08/17/2022 1333    BILITOT 0.3 08/17/2022 1333          No results found for: LABMICR, [id number]  Lab Results   Component Value Date    TSH 1.400 03/01/2022     No results found for: PSA  Lab Results   Component Value Date    WBC 10.3 07/14/2022    HGB 11.7 (L) 07/14/2022    HCT 37.6 07/14/2022    MCV 98.7 07/14/2022     07/14/2022       Objective:  /74 (Site: Left Upper Arm, Position: Sitting, Cuff Size: Medium Adult)   Pulse 84   Temp (!) 96.4 °F (35.8 °C) (Temporal)   Resp 16   Ht 4' 10\" (1.473 m)   Wt 123 lb 3.2 oz (55.9 kg)   SpO2 98%   BMI 25.75 kg/m²     Physical Exam  Constitutional:       General: She is not in acute distress. Appearance: Normal appearance. HENT:      Head: Normocephalic. Right Ear: External ear normal.      Left Ear: External ear normal.      Nose: Nose normal.      Mouth/Throat:      Mouth: Mucous membranes are moist.   Eyes:      General: No scleral icterus. Extraocular Movements: Extraocular movements intact. Cardiovascular:      Rate and Rhythm: Normal rate and regular rhythm. Pulses: Normal pulses. Heart sounds: Normal heart sounds. Pulmonary:      Effort: Pulmonary effort is normal.      Breath sounds: Normal breath sounds. Abdominal:      General: Abdomen is flat. There is no distension.       Palpations: Abdomen is soft.   Musculoskeletal:         General: Normal range of motion. Cervical back: Normal range of motion. 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 Skin:     General: Skin is warm and dry. Neurological:      Mental Status: She is alert. Motor: No weakness. Psychiatric:         Mood and Affect: Mood normal.       They voiced understanding. All questions answered. They agreed with treatment plan. See patient instructions for any educational materials that may have been given. Discussed use, benefit, and side effects of prescribed medications. Reviewed health maintenance.     (Please note that portions of this note may have been completed with a voice recognition program.  Efforts were made to edit the dictation but occasionally words are mis-transcribed.)      Electronically signed by Erik Brady DO on 8/23/2022 at 9:11 AM

## 2022-08-25 DIAGNOSIS — Z51.81 THERAPEUTIC DRUG MONITORING: Primary | ICD-10-CM

## 2022-08-26 ENCOUNTER — HOSPITAL ENCOUNTER (OUTPATIENT)
Age: 62
Discharge: HOME | End: 2022-08-26
Payer: MEDICARE

## 2022-08-26 DIAGNOSIS — R79.89 ELEVATED LITHIUM LEVEL: ICD-10-CM

## 2022-08-26 DIAGNOSIS — Z51.81 THERAPEUTIC DRUG MONITORING: ICD-10-CM

## 2022-08-26 LAB — LITHIUM LEVEL: 1.14 MMOL/L (ref 0.6–1.2)

## 2022-08-26 PROCEDURE — 36415 COLL VENOUS BLD VENIPUNCTURE: CPT

## 2022-08-26 PROCEDURE — 80178 ASSAY OF LITHIUM: CPT

## 2022-09-01 ENCOUNTER — HOSPITAL ENCOUNTER (OUTPATIENT)
Age: 62
Discharge: HOME | End: 2022-09-01
Payer: MEDICARE

## 2022-09-01 ENCOUNTER — HOSPITAL ENCOUNTER (OUTPATIENT)
Age: 62
Setting detail: OBSERVATION
Discharge: HOME | End: 2022-09-02
Attending: STUDENT IN AN ORGANIZED HEALTH CARE EDUCATION/TRAINING PROGRAM | Admitting: HOSPITALIST
Payer: MEDICARE

## 2022-09-01 ENCOUNTER — TELEPHONE (OUTPATIENT)
Dept: PSYCHOLOGY | Age: 62
End: 2022-09-01

## 2022-09-01 DIAGNOSIS — Z51.81 THERAPEUTIC DRUG MONITORING: ICD-10-CM

## 2022-09-01 DIAGNOSIS — T56.891A LITHIUM TOXICITY, ACCIDENTAL OR UNINTENTIONAL, INITIAL ENCOUNTER: Primary | ICD-10-CM

## 2022-09-01 PROBLEM — R79.89 ELEVATED LITHIUM LEVEL: Status: ACTIVE | Noted: 2022-09-01

## 2022-09-01 LAB
ALBUMIN SERPL-MCNC: 4.2 G/DL (ref 3.5–5.1)
ALP BLD-CCNC: 165 U/L (ref 38–126)
ALT SERPL-CCNC: 74 U/L (ref 11–66)
ANION GAP SERPL CALCULATED.3IONS-SCNC: 13 MEQ/L (ref 8–16)
AST SERPL-CCNC: 38 U/L (ref 5–40)
BACTERIA: ABNORMAL /HPF
BASOPHILS # BLD: 0.7 %
BASOPHILS ABSOLUTE: 0 THOU/MM3 (ref 0–0.1)
BILIRUB SERPL-MCNC: 0.2 MG/DL (ref 0.3–1.2)
BILIRUBIN URINE: NEGATIVE
BLOOD, URINE: NEGATIVE
BUN BLDV-MCNC: 11 MG/DL (ref 7–22)
CALCIUM SERPL-MCNC: 10.3 MG/DL (ref 8.5–10.5)
CASTS 2: ABNORMAL /LPF
CASTS UA: ABNORMAL /LPF
CHARACTER, URINE: CLEAR
CHLORIDE BLD-SCNC: 107 MEQ/L (ref 98–111)
CO2: 18 MEQ/L (ref 23–33)
COLOR: YELLOW
CREAT SERPL-MCNC: 0.5 MG/DL (ref 0.4–1.2)
CRYSTALS, UA: ABNORMAL
EKG ATRIAL RATE: 62 BPM
EKG P AXIS: 86 DEGREES
EKG P-R INTERVAL: 190 MS
EKG Q-T INTERVAL: 460 MS
EKG QRS DURATION: 148 MS
EKG QTC CALCULATION (BAZETT): 466 MS
EKG R AXIS: 37 DEGREES
EKG T AXIS: 50 DEGREES
EKG VENTRICULAR RATE: 62 BPM
EOSINOPHIL # BLD: 3 %
EOSINOPHILS ABSOLUTE: 0.2 THOU/MM3 (ref 0–0.4)
EPITHELIAL CELLS, UA: ABNORMAL /HPF
ERYTHROCYTE [DISTWIDTH] IN BLOOD BY AUTOMATED COUNT: 15.6 % (ref 11.5–14.5)
ERYTHROCYTE [DISTWIDTH] IN BLOOD BY AUTOMATED COUNT: 60.4 FL (ref 35–45)
GFR SERPL CREATININE-BSD FRML MDRD: > 90 ML/MIN/1.73M2
GLUCOSE BLD-MCNC: 185 MG/DL (ref 70–108)
GLUCOSE BLD-MCNC: 49 MG/DL (ref 70–108)
GLUCOSE URINE: NEGATIVE MG/DL
HCT VFR BLD CALC: 39.8 % (ref 37–47)
HEMOGLOBIN: 12.6 GM/DL (ref 12–16)
IMMATURE GRANS (ABS): 0.03 THOU/MM3 (ref 0–0.07)
IMMATURE GRANULOCYTES: 0.4 %
KETONES, URINE: NEGATIVE
LEUKOCYTE ESTERASE, URINE: ABNORMAL
LITHIUM LEVEL: 0.75 MMOL/L (ref 0.6–1.2)
LITHIUM LEVEL: 2.61 MMOL/L (ref 0.6–1.2)
LYMPHOCYTES # BLD: 12.9 %
LYMPHOCYTES ABSOLUTE: 0.9 THOU/MM3 (ref 1–4.8)
MCH RBC QN AUTO: 32.8 PG (ref 26–33)
MCHC RBC AUTO-ENTMCNC: 31.7 GM/DL (ref 32.2–35.5)
MCV RBC AUTO: 103.6 FL (ref 81–99)
MISCELLANEOUS 2: ABNORMAL
MONOCYTES # BLD: 8 %
MONOCYTES ABSOLUTE: 0.5 THOU/MM3 (ref 0.4–1.3)
NITRITE, URINE: NEGATIVE
NUCLEATED RED BLOOD CELLS: 0 /100 WBC
OSMOLALITY CALCULATION: 272.3 MOSMOL/KG (ref 275–300)
PH UA: 6 (ref 5–9)
PLATELET # BLD: 219 THOU/MM3 (ref 130–400)
PMV BLD AUTO: 9.6 FL (ref 9.4–12.4)
POTASSIUM REFLEX MAGNESIUM: 3.8 MEQ/L (ref 3.5–5.2)
PROTEIN UA: NEGATIVE
RBC # BLD: 3.84 MILL/MM3 (ref 4.2–5.4)
RBC URINE: ABNORMAL /HPF
RENAL EPITHELIAL, UA: ABNORMAL
SEG NEUTROPHILS: 75 %
SEGMENTED NEUTROPHILS ABSOLUTE COUNT: 5 THOU/MM3 (ref 1.8–7.7)
SODIUM BLD-SCNC: 138 MEQ/L (ref 135–145)
SPECIFIC GRAVITY, URINE: 1.01 (ref 1–1.03)
TOTAL PROTEIN: 6.1 G/DL (ref 6.1–8)
UROBILINOGEN, URINE: 0.2 EU/DL (ref 0–1)
WBC # BLD: 6.7 THOU/MM3 (ref 4.8–10.8)
WBC UA: ABNORMAL /HPF
YEAST: ABNORMAL

## 2022-09-01 PROCEDURE — 80178 ASSAY OF LITHIUM: CPT

## 2022-09-01 PROCEDURE — 81001 URINALYSIS AUTO W/SCOPE: CPT

## 2022-09-01 PROCEDURE — 2580000003 HC RX 258

## 2022-09-01 PROCEDURE — 80053 COMPREHEN METABOLIC PANEL: CPT

## 2022-09-01 PROCEDURE — 96361 HYDRATE IV INFUSION ADD-ON: CPT

## 2022-09-01 PROCEDURE — 96360 HYDRATION IV INFUSION INIT: CPT

## 2022-09-01 PROCEDURE — G0378 HOSPITAL OBSERVATION PER HR: HCPCS

## 2022-09-01 PROCEDURE — 36415 COLL VENOUS BLD VENIPUNCTURE: CPT

## 2022-09-01 PROCEDURE — 82948 REAGENT STRIP/BLOOD GLUCOSE: CPT

## 2022-09-01 PROCEDURE — 99285 EMERGENCY DEPT VISIT HI MDM: CPT

## 2022-09-01 PROCEDURE — 93005 ELECTROCARDIOGRAM TRACING: CPT | Performed by: STUDENT IN AN ORGANIZED HEALTH CARE EDUCATION/TRAINING PROGRAM

## 2022-09-01 PROCEDURE — 93010 ELECTROCARDIOGRAM REPORT: CPT | Performed by: INTERNAL MEDICINE

## 2022-09-01 PROCEDURE — 85025 COMPLETE CBC W/AUTO DIFF WBC: CPT

## 2022-09-01 PROCEDURE — 2580000003 HC RX 258: Performed by: STUDENT IN AN ORGANIZED HEALTH CARE EDUCATION/TRAINING PROGRAM

## 2022-09-01 RX ORDER — POLYETHYLENE GLYCOL 3350 17 G/17G
17 POWDER, FOR SOLUTION ORAL DAILY PRN
Status: DISCONTINUED | OUTPATIENT
Start: 2022-09-01 | End: 2022-09-02 | Stop reason: HOSPADM

## 2022-09-01 RX ORDER — ONDANSETRON 4 MG/1
4 TABLET, ORALLY DISINTEGRATING ORAL EVERY 8 HOURS PRN
Status: DISCONTINUED | OUTPATIENT
Start: 2022-09-01 | End: 2022-09-02 | Stop reason: HOSPADM

## 2022-09-01 RX ORDER — SODIUM CHLORIDE 9 MG/ML
INJECTION, SOLUTION INTRAVENOUS CONTINUOUS
Status: DISCONTINUED | OUTPATIENT
Start: 2022-09-01 | End: 2022-09-02 | Stop reason: HOSPADM

## 2022-09-01 RX ORDER — FERROUS SULFATE 325(65) MG
325 TABLET ORAL 3 TIMES DAILY
Status: DISCONTINUED | OUTPATIENT
Start: 2022-09-01 | End: 2022-09-01 | Stop reason: ALTCHOICE

## 2022-09-01 RX ORDER — 0.9 % SODIUM CHLORIDE 0.9 %
1000 INTRAVENOUS SOLUTION INTRAVENOUS ONCE
Status: COMPLETED | OUTPATIENT
Start: 2022-09-01 | End: 2022-09-01

## 2022-09-01 RX ORDER — SODIUM CHLORIDE 0.9 % (FLUSH) 0.9 %
5-40 SYRINGE (ML) INJECTION PRN
Status: DISCONTINUED | OUTPATIENT
Start: 2022-09-01 | End: 2022-09-02 | Stop reason: HOSPADM

## 2022-09-01 RX ORDER — DIMETHYL FUMARATE 240 MG/1
240 CAPSULE ORAL 2 TIMES DAILY
Status: DISCONTINUED | OUTPATIENT
Start: 2022-09-01 | End: 2022-09-02 | Stop reason: HOSPADM

## 2022-09-01 RX ORDER — SODIUM CHLORIDE 9 MG/ML
INJECTION, SOLUTION INTRAVENOUS PRN
Status: DISCONTINUED | OUTPATIENT
Start: 2022-09-01 | End: 2022-09-02 | Stop reason: HOSPADM

## 2022-09-01 RX ORDER — CALCIUM CARBONATE 500(1250)
500 TABLET ORAL NIGHTLY
Status: DISCONTINUED | OUTPATIENT
Start: 2022-09-02 | End: 2022-09-02 | Stop reason: HOSPADM

## 2022-09-01 RX ORDER — VILAZODONE HYDROCHLORIDE 40 MG/1
40 TABLET ORAL DAILY
Status: DISCONTINUED | OUTPATIENT
Start: 2022-09-02 | End: 2022-09-02 | Stop reason: HOSPADM

## 2022-09-01 RX ORDER — ONDANSETRON 2 MG/ML
4 INJECTION INTRAMUSCULAR; INTRAVENOUS EVERY 6 HOURS PRN
Status: DISCONTINUED | OUTPATIENT
Start: 2022-09-01 | End: 2022-09-02 | Stop reason: HOSPADM

## 2022-09-01 RX ORDER — SODIUM CHLORIDE 0.9 % (FLUSH) 0.9 %
5-40 SYRINGE (ML) INJECTION EVERY 12 HOURS SCHEDULED
Status: DISCONTINUED | OUTPATIENT
Start: 2022-09-01 | End: 2022-09-02 | Stop reason: HOSPADM

## 2022-09-01 RX ORDER — LAMOTRIGINE 100 MG/1
200 TABLET ORAL DAILY
Status: DISCONTINUED | OUTPATIENT
Start: 2022-09-02 | End: 2022-09-02 | Stop reason: HOSPADM

## 2022-09-01 RX ORDER — LEVOTHYROXINE SODIUM 0.1 MG/1
100 TABLET ORAL DAILY
Status: DISCONTINUED | OUTPATIENT
Start: 2022-09-02 | End: 2022-09-02 | Stop reason: HOSPADM

## 2022-09-01 RX ORDER — ENOXAPARIN SODIUM 100 MG/ML
40 INJECTION SUBCUTANEOUS DAILY
Status: DISCONTINUED | OUTPATIENT
Start: 2022-09-02 | End: 2022-09-02 | Stop reason: HOSPADM

## 2022-09-01 RX ORDER — PANTOPRAZOLE SODIUM 40 MG/1
40 TABLET, DELAYED RELEASE ORAL
Status: DISCONTINUED | OUTPATIENT
Start: 2022-09-02 | End: 2022-09-02 | Stop reason: HOSPADM

## 2022-09-01 RX ORDER — ACETAMINOPHEN 325 MG/1
650 TABLET ORAL EVERY 6 HOURS PRN
Status: DISCONTINUED | OUTPATIENT
Start: 2022-09-01 | End: 2022-09-02 | Stop reason: HOSPADM

## 2022-09-01 RX ORDER — LITHIUM CARBONATE 300 MG/1
600 CAPSULE ORAL 2 TIMES DAILY
Status: DISCONTINUED | OUTPATIENT
Start: 2022-09-01 | End: 2022-09-02 | Stop reason: HOSPADM

## 2022-09-01 RX ADMIN — SODIUM CHLORIDE 1000 ML: 9 INJECTION, SOLUTION INTRAVENOUS at 20:59

## 2022-09-01 RX ADMIN — SODIUM CHLORIDE: 9 INJECTION, SOLUTION INTRAVENOUS at 23:33

## 2022-09-01 ASSESSMENT — ENCOUNTER SYMPTOMS
DIARRHEA: 0
COLOR CHANGE: 0
EYE ITCHING: 0
NAUSEA: 0
VOMITING: 0
SINUS PRESSURE: 0
ABDOMINAL DISTENTION: 0
SHORTNESS OF BREATH: 0
EYE REDNESS: 0
EYE DISCHARGE: 0
RHINORRHEA: 0
ABDOMINAL PAIN: 0
SINUS PAIN: 0
CHEST TIGHTNESS: 0

## 2022-09-01 ASSESSMENT — PAIN - FUNCTIONAL ASSESSMENT
PAIN_FUNCTIONAL_ASSESSMENT: NONE - DENIES PAIN

## 2022-09-01 NOTE — ED TRIAGE NOTES
Presents to ED with c/o abnormal lab. States her lithium level is high. Denies pain. Alert and oriented. Respirations easy and unalbored.

## 2022-09-01 NOTE — ED NOTES
Pt vitals collected. Pt urine sent to lab. Pt denies any needs at this time.      Abigail Lyons RN  09/01/22 0132

## 2022-09-01 NOTE — TELEPHONE ENCOUNTER
Patient notified and voiced understanding. Reports she has missed her Lithium dose the last two nights. Reports she just took Lithium this morning about 5am. Patient denies symptoms of any kind. She will go to ER for evaluation. Reports she will be heading to ER in approximately 30 minutes.

## 2022-09-01 NOTE — ED NOTES
Pt given apple juice and nurse observed pt drinking apple juice d/t blood glucose being 49. Pt provided with box lunch.      Sharan Naylor, VALERIANO  09/01/22 [id number]

## 2022-09-01 NOTE — ED PROVIDER NOTES
[place] ENCOUNTER          Pt Name: Jignesh Gandara  MRN: [id number]  [place] 1960  Date of evaluation: 9/1/2022  Treating Resident Physician: Monroe Sauceda DO  Supervising Physician: Dr. Tai Hendrickson    History obtained from the patient. CHIEF COMPLAINT       Chief Complaint   Patient presents with    Abnormal Lab           HISTORY OF PRESENT ILLNESS    HPI  Jignesh Gandara is a 58 y.o. female who presents to the emergency department for evaluation of lithium toxicity. The patient states that she has been taking lithium since 1989 for bipolar disorder. She reports that she takes 600 mg of lithium twice daily. She states that she has had a lithium level that has been up-and-down lately, so she has been having this routinely checked as an outpatient. At noon today she had an outpatient lithium level that resulted at 2.6, and she was referred to the emergency department. The patient also has a history of MS and reports that she has some chronic difficulty walking a straight line. She states that the last time that she took lithium was this morning and she did not take it for the last 2 evenings. The patient has no other acute complaints at this time. REVIEW OF SYSTEMS   Review of Systems   Constitutional:  Negative for fever. HENT:  Negative for rhinorrhea, sinus pressure and sinus pain. Eyes:  Negative for discharge, redness and itching. Respiratory:  Negative for chest tightness and shortness of breath. Cardiovascular:  Negative for chest pain. Gastrointestinal:  Negative for abdominal distention, abdominal pain, diarrhea, nausea and vomiting. Genitourinary:  Negative for difficulty urinating, dysuria, frequency, hematuria and urgency. Musculoskeletal:  Positive for gait problem. Negative for arthralgias. Skin:  Negative for color change and pallor.    Neurological:  Negative for dizziness, light-headedness and by Lonny Lucio MD at [facility]    PA OFFICE/OUTPT VISIT,PROCEDURE ONLY N/A 10/4/2018    EGD DIAGNOSTIC ONLY performed by Clint Cha MD at [address] [facility] Left 9/24/2019    EGD DIAGNOSTIC ONLY performed by Nancy Zambrano MD at [facility]    UPPER GASTROINTESTINAL ENDOSCOPY N/A 9/26/2020    ENTEROSCOPY PUSH CONTROL HEMORRHAGE performed by Radhika Hernadez MD at [facility] N/A 1/16/2022    ENTEROSCOPY PUSH DIAGNOSTIC performed by Sweta Fan MD at [address]  04/13/2022         MEDICATIONS     Current Facility-Administered Medications:     [START ON 9/2/2022] calcium elemental (OSCAL) tablet 500 mg, 500 mg, Oral, Nightly, Bonny Miller MD    dimethyl fumarate (TECFIDERA) delayed release capsule 240 mg (Patient Supplied), 240 mg, Oral, BID, Bonny Miller MD    [facility] Plater ON 9/2/2022] lamoTRIgine (LAMICTAL) tablet 200 mg, 200 mg, Oral, Daily, Bonny Miller MD    [facility] Plater ON 9/2/2022] levothyroxine (SYNTHROID) tablet 100 mcg, 100 mcg, Oral, Daily, Bonny Miller MD    [facility] [place] by provider] lithium capsule 600 mg, 600 mg, Oral, BID, Bonny Miller MD    Doloris Plater ON 9/2/2022] lurasidone (LATUDA) tablet 40 mg, 40 mg, Oral, Nightly, Bonny Miller MD    Dolor[facility] Plater ON 9/2/2022] vilazodone HCl (VIIBRYD) TABS 40 mg, 40 mg, Oral, Daily, Bonny Miller MD    sodium chloride flush 0.9 % injection 5-40 mL, 5-40 mL, IntraVENous, 2 times per day, Bonny Miller MD    sodium chloride flush 0.9 % injection 5-40 mL, 5-40 mL, IntraVENous, PRN, Bonny Miller MD    0.9 % sodium chloride infusion, , IntraVENous, PRN, Bonny Miller MD    Doloris Plater ON 9/2/2022] enoxaparin (LOVENOX) injection 40 mg, 40 mg, SubCUTAneous, Daily, Bonny Miller MD    ondansetron (ZOFRAN-ODT) disintegrating tablet 4 mg, 4 mg, Oral, Q8H PRN **OR** ondansetron (ZOFRAN) injection 4 mg, 4 mg, IntraVENous, Q6H PRN, Bonny Miller MD polyethylene glycol (GLYCOLAX) packet 17 g, 17 g, Oral, Daily PRN, Toni Conley MD    acetaminophen (TYLENOL) tablet 650 mg, 650 mg, Oral, Q6H PRN **OR** acetaminophen (TYLENOL) suppository 650 mg, 650 mg, Rectal, Q6H PRN, Toni Conley MD      SOCIAL HISTORY     Social History     Social History Narrative    2021    LEVEL OF EDUCATION: graduated high school; earned her bachelor degree in education; did some master's level course work but did not complete the degree    SPECIAL EDUCATION NEEDS: None    RESIDENCE: Currently lives alone    LEGAL HISTORY: None    Anglican: Jain    TRAUMA: None    : None    HOBBIES: writing, swimming, 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  She was  from  to ; they  in     CHILDREN: None    SUBSTANCE USE: None     Social History     Tobacco Use    Smoking status: Former     Packs/day: 0.10     Years: 0.50     Pack years: 0.05     Types: Cigarettes     Quit date: 10/24/1977     Years since quittin.8    Smokeless tobacco: Never   Vaping Use    Vaping Use: Never used   Substance Use Topics    Alcohol use: No    Drug use: No         ALLERGIES     Allergies   Allergen Reactions    Latex Rash    Sulfa Antibiotics Rash         FAMILY HISTORY     Family History   Problem Relation Age of Onset    Cancer Mother 43    Colon Polyps Mother     Breast Cancer Mother     Heart Disease Father     Glaucoma Father     Cancer Sister     Colon Cancer Sister     Cancer Sister     Breast Cancer Sister          PREVIOUS RECORDS   Previous records reviewed        PHYSICAL EXAM     ED Triage Vitals   BP Temp Temp Source Heart Rate Resp SpO2 Height Weight   22 1608 22 1608 22 1608 22 1608 22 1608 22 1608 22 1606 22 1606   (!) 122/51 97.7 °F (36.5 °C) Oral 86 18 98 % 4' 10\" (1.473 m) 115 lb (52.2 kg)     Initial vital signs and nursing assessment reviewed and abnormal from decreased diastolic blood pressure . Body mass index is 24.04 kg/m². Pulsoximetry is normal per my interpretation. Additional Vital Signs:  Vitals:    09/01/22 2142   BP: (!) 112/55   Pulse: 60   Resp: 19   Temp: 98.2 °F (36.8 °C)   SpO2: 97%       Physical Exam  Vitals and nursing note reviewed. Constitutional:       General: She is not in acute distress. Appearance: Normal appearance. She is not ill-appearing. HENT:      Head: Normocephalic and atraumatic. Nose: Nose normal. No congestion or rhinorrhea. Mouth/Throat:      Mouth: Mucous membranes are moist.      Pharynx: Oropharynx is clear. No oropharyngeal exudate or posterior oropharyngeal erythema. Eyes:      Extraocular Movements: Extraocular movements intact. Cardiovascular:      Rate and Rhythm: Normal rate and regular rhythm. Pulses: Normal pulses. Heart sounds: Normal heart sounds. Pulmonary:      Effort: Pulmonary effort is normal.      Breath sounds: Normal breath sounds. Musculoskeletal:         General: No swelling or tenderness. Normal range of motion. Cervical back: Normal range of motion. Right lower leg: No edema. Left lower leg: No edema. Skin:     General: Skin is warm and dry. Neurological:      General: No focal deficit present. Mental Status: She is alert and oriented to person, place, and time. Mental status is at baseline. Comments: No left finger-to-nose ataxia. The patient is able to stand and walk unassisted with a slightly antalgic and not ataxic gait. MEDICAL DECISION MAKING   Initial Assessment:   41-year-old female presenting to the emergency department for evaluation of elevated lithium level. She is not ataxic and has no acute complaints. Plan:   IV, labs to assess for renal toxicity. IV fluid bolus. UA. IV was established in the patient's EJ. Poison control consult.   Poison control contacted by ED staff.  They recommended IV fluids and obtaining two downward trending lithium levels and two repeat BMPs prior to clearing the patient. The patient had a prolonged QRS on EKG that poison control initially recommended sodium bicarbonate for, however the patient has a paced rhythm and her QRS prolongation is likely secondary to this. We will plan to not administer sodium bicarbonate at this time. They also recommended considering magnesium for the patient's prolonged QTc of 466. Given the questionable efficacy of magnesium sulfate in the setting of prolonged QTc we will plan to defer this at this time. The patient was noted to have a blood glucose of 49 on CMP. She was asymptomatic for hypoglycemia. Her blood glucose improved to 185 during the ED course.     ED RESULTS   Laboratory results:  Labs Reviewed   CBC WITH AUTO DIFFERENTIAL - Abnormal; Notable for the following components:       Result Value    RBC 3.84 (*)     .6 (*)     MCHC 31.7 (*)     RDW-CV 15.6 (*)     RDW-SD 60.4 (*)     Lymphocytes Absolute 0.9 (*)     All other components within normal limits   COMPREHENSIVE METABOLIC PANEL W/ REFLEX TO MG FOR LOW K - Abnormal; Notable for the following components:    Glucose 49 (*)     CO2 18 (*)     Alkaline Phosphatase 165 (*)     Total Bilirubin 0.2 (*)     ALT 74 (*)     All other components within normal limits   OSMOLALITY - Abnormal; Notable for the following components:    Osmolality Calc 272.3 (*)     All other components within normal limits   URINE WITH REFLEXED MICRO - Abnormal; Notable for the following components:    Leukocyte Esterase, Urine SMALL (*)     All other components within normal limits   POCT GLUCOSE - Abnormal; Notable for the following components:    POC Glucose 185 (*)     All other components within normal limits   ANION GAP   GLOMERULAR FILTRATION RATE, ESTIMATED   CBC WITH AUTO DIFFERENTIAL   LITHIUM LEVEL   LITHIUM LEVEL       Radiologic studies results:  No orders to display       ED Medications administered this visit:   Medications   calcium elemental (OSCAL) tablet 500 mg (has no administration in time range)   dimethyl fumarate (TECFIDERA) delayed release capsule 240 mg (Patient Supplied) (has no administration in time range)   lamoTRIgine (LAMICTAL) tablet 200 mg (has no administration in time range)   levothyroxine (SYNTHROID) tablet 100 mcg (has no administration in time range)   lithium capsule 600 mg ( Oral Automatically Held 9/4/22 2000)   lurasidone (LATUDA) tablet 40 mg (has no administration in time range)   vilazodone HCl (VIIBRYD) TABS 40 mg (has no administration in time range)   sodium chloride flush 0.9 % injection 5-40 mL (has no administration in time range)   sodium chloride flush 0.9 % injection 5-40 mL (has no administration in time range)   0.9 % sodium chloride infusion (has no administration in time range)   enoxaparin (LOVENOX) injection 40 mg (has no administration in time range)   ondansetron (ZOFRAN-ODT) disintegrating tablet 4 mg (has no administration in time range)     Or   ondansetron (ZOFRAN) injection 4 mg (has no administration in time range)   polyethylene glycol (GLYCOLAX) packet 17 g (has no administration in time range)   acetaminophen (TYLENOL) tablet 650 mg (has no administration in time range)     Or   acetaminophen (TYLENOL) suppository 650 mg (has no administration in time range)   0.9 % sodium chloride bolus (0 mLs IntraVENous Stopped 9/1/22 2140)         ED COURSE     ED Course as of 09/01/22 2201   Thu Sep 01, 2022   1851 Glucose, Random(!): 49 [DO]   2012 POC Glucose(!): 185  Repeat after eating a boxed lunch and drinking juice here. [DO]      ED Course User Index  [DO] Shiraz Chiu DO     MEDICATION CHANGES     Current Discharge Medication List            FINAL DISPOSITION     Final diagnoses:   Lithium toxicity, accidental or unintentional, initial encounter     Condition: condition: serious  Dispo:  Admit to med/surg floor      This transcription was electronically signed. Parts of this transcriptions may have been dictated by use of voice recognition software and electronically transcribed, and parts may have been transcribed with the assistance of an ED scribe. The transcription may contain errors not detected in proofreading. Please refer to my supervising physician's documentation if my documentation differs.     Electronically Signed: Sydney Bergman DO, 09/01/22, 10:01 PM     Sydney Bergman DO  Resident  09/01/22 3927

## 2022-09-01 NOTE — ED NOTES
Pt vitals collected. Pt denies any needs at this time. Pt respirations easy and unlabored.      Lobito Curtis RN  09/01/22 1935

## 2022-09-01 NOTE — TELEPHONE ENCOUNTER
Attempted to contact Sean Andersenaddisno and inform her to go to the ER; but she did not answer. I left a message asking for her to return my call. I will try again later.

## 2022-09-01 NOTE — TELEPHONE ENCOUNTER
[facility] lab calling in with a critical level of Lithium on this pt. Level is 2.61. Please advise.

## 2022-09-01 NOTE — ED NOTES
Poison control contacted at this time. Poison control states to start IV fluids and to repeat the EKG. Dr. Valente Farley notified.      Sharan Naylor, RN  09/01/22 Delores 5657, RN  09/01/22 Delores 5657, [facility]  09/01/22 0047

## 2022-09-02 VITALS
SYSTOLIC BLOOD PRESSURE: 116 MMHG | DIASTOLIC BLOOD PRESSURE: 60 MMHG | HEIGHT: 58 IN | HEART RATE: 60 BPM | RESPIRATION RATE: 18 BRPM | TEMPERATURE: 97.7 F | WEIGHT: 130.2 LBS | OXYGEN SATURATION: 97 % | BODY MASS INDEX: 27.33 KG/M2

## 2022-09-02 LAB
BASOPHILS # BLD: 0.7 %
BASOPHILS ABSOLUTE: 0 THOU/MM3 (ref 0–0.1)
EKG ATRIAL RATE: 61 BPM
EKG P AXIS: 77 DEGREES
EKG P-R INTERVAL: 206 MS
EKG Q-T INTERVAL: 474 MS
EKG QRS DURATION: 146 MS
EKG QTC CALCULATION (BAZETT): 477 MS
EKG R AXIS: 31 DEGREES
EKG T AXIS: 37 DEGREES
EKG VENTRICULAR RATE: 61 BPM
EOSINOPHIL # BLD: 5.1 %
EOSINOPHILS ABSOLUTE: 0.2 THOU/MM3 (ref 0–0.4)
ERYTHROCYTE [DISTWIDTH] IN BLOOD BY AUTOMATED COUNT: 15.7 % (ref 11.5–14.5)
ERYTHROCYTE [DISTWIDTH] IN BLOOD BY AUTOMATED COUNT: 59.8 FL (ref 35–45)
HCT VFR BLD CALC: 37.5 % (ref 37–47)
HEMOGLOBIN: 11.8 GM/DL (ref 12–16)
IMMATURE GRANS (ABS): 0.01 THOU/MM3 (ref 0–0.07)
IMMATURE GRANULOCYTES: 0.2 %
LITHIUM LEVEL: 0.61 MMOL/L (ref 0.6–1.2)
LYMPHOCYTES # BLD: 16.1 %
LYMPHOCYTES ABSOLUTE: 0.7 THOU/MM3 (ref 1–4.8)
MCH RBC QN AUTO: 32.3 PG (ref 26–33)
MCHC RBC AUTO-ENTMCNC: 31.5 GM/DL (ref 32.2–35.5)
MCV RBC AUTO: 102.7 FL (ref 81–99)
MONOCYTES # BLD: 9 %
MONOCYTES ABSOLUTE: 0.4 THOU/MM3 (ref 0.4–1.3)
NUCLEATED RED BLOOD CELLS: 0 /100 WBC
PLATELET # BLD: 182 THOU/MM3 (ref 130–400)
PMV BLD AUTO: 9.9 FL (ref 9.4–12.4)
RBC # BLD: 3.65 MILL/MM3 (ref 4.2–5.4)
SEG NEUTROPHILS: 68.9 %
SEGMENTED NEUTROPHILS ABSOLUTE COUNT: 2.8 THOU/MM3 (ref 1.8–7.7)
WBC # BLD: 4.1 THOU/MM3 (ref 4.8–10.8)

## 2022-09-02 PROCEDURE — 80178 ASSAY OF LITHIUM: CPT

## 2022-09-02 PROCEDURE — 2580000003 HC RX 258

## 2022-09-02 PROCEDURE — G0378 HOSPITAL OBSERVATION PER HR: HCPCS

## 2022-09-02 PROCEDURE — 85025 COMPLETE CBC W/AUTO DIFF WBC: CPT

## 2022-09-02 PROCEDURE — 93010 ELECTROCARDIOGRAM REPORT: CPT | Performed by: INTERNAL MEDICINE

## 2022-09-02 PROCEDURE — 36415 COLL VENOUS BLD VENIPUNCTURE: CPT

## 2022-09-02 PROCEDURE — 6370000000 HC RX 637 (ALT 250 FOR IP)

## 2022-09-02 PROCEDURE — 96361 HYDRATE IV INFUSION ADD-ON: CPT

## 2022-09-02 PROCEDURE — 99217 PR OBSERVATION CARE DISCHARGE MANAGEMENT: CPT | Performed by: PHYSICIAN ASSISTANT

## 2022-09-02 RX ADMIN — SODIUM CHLORIDE, PRESERVATIVE FREE 10 ML: 5 INJECTION INTRAVENOUS at 00:06

## 2022-09-02 RX ADMIN — PANTOPRAZOLE SODIUM 40 MG: 40 TABLET, DELAYED RELEASE ORAL at 06:12

## 2022-09-02 NOTE — TELEPHONE ENCOUNTER
Chart reviewed. It appears patient reported conflicting information to this office, the ED and the admitting physician. Will await discharge instructions. Keep upcoming appointment on 9/13/2022.

## 2022-09-02 NOTE — DISCHARGE SUMMARY
Hospitalist Discharge Summary        Patient: Gale Ram  YOB: 1960  MRN: [id number]   Acct: [de-identified]    Primary Care Physician: Laura Negrete DO    Admit date  9/1/2022    Discharge date: 9/2/2022 11:56 AM    Chief Complaint on presentation :-  Elevated Lithium Level    Discharge Assessment and Plan:-   Elevated lithium level, resolved: Unclear etiology. Patient denies any recent reduced p.o. intake, fluid intake, GI losses. No recent medication adjustments. She follows very closely with her psychiatry provider and gets lithium levels drawn monthly she states. Lithium was held on admission, and repeat level was noted to be 0.61. Patient was instructed to call her psychiatrist after discharge for further follow-up and recommendations regarding dosage. Multiple sclerosis: Patient's dimethyl fumarate ordered. Hx Symptomatic bradycardia s/p pacemaker: noted  PUD: continue home Protonix   Acquired hypothyroidism: Synthroid  Bipolar 1 disorder: Lurasidone  Chronic macrocytic anemia / hx JOANNE: Established with hematology / oncology for this. Continue with ferrous sulfate supplementation outpatient. Hemoglobin is stable, no gross bleeding identified. Initial H and P and Hospital course:-  \"This is a 70-year-old female who presented to [address] [place] [address] on 09/01/2022 after being told that she had an elevated lithium level. She denied having any symptoms associated with this abnormal lab value. She denied chest pain, fever, chills, nausea, vomiting, and diarrhea. She denies decreased appetite, weight changes, shortness of breath, sputum production, change in urination, or change in defecation. She denies any recent changes in her medications, hospitalizations, traumas, or surgeries. She denies any other acute symptoms or concerns at this time. ED vital signs were unremarkable.   ED labs were significant for bicarb 18, glucose 49, alk phos 165, ALT 74, bilirubin 0.2, lithium level 2.61.  EKG showed no acute changes. The patient was admitted and started on normal saline with repeat lithium checks. \"    Patient had an unremarkable admission. She was called and instructed to go to the ED secondary to elevated lithium levels. Patient denies any recent changes, including medication changes, taking extra dosages, or any manner of dehydration or poor p.o. intake. Patient also denied any nausea/vomiting/diarrhea. She denies any symptoms associated with this elevated lithium level including weakness, headaches, dizziness, dry mouth, or appetite changes. No NSAID use. Unclear etiology as to patient's elevated level. She was instructed to call her psychiatrist and to follow-up closely with her regarding any adjustment to her medication. We discussed that adjusting her medication while here in the hospital given that this was a isolated event may be questionable as she normally tolerates her current dose of 600 mg twice daily very well. Patient reports that she follows very closely with her psychiatrist CNP and has her lithium checked monthly. She is agreeable to continuing on her current lithium dosage and calling her for further recommendations. All VSS and suitable for discharge home.     Physical Exam:-  Vitals:   Patient Vitals for the past 24 hrs:   BP Temp Temp src Pulse Resp SpO2 Height Weight   09/02/22 0443 (!) 116/59 98.9 °F (37.2 °C) Oral 60 18 96 % -- 130 lb 3.2 oz (59.1 kg)   09/01/22 2323 97/63 97.9 °F (36.6 °C) Oral 60 18 96 % -- --   09/01/22 2142 (!) 112/55 98.2 °F (36.8 °C) Oral 60 19 97 % -- --   09/01/22 2042 122/86 -- -- 67 18 97 % -- --   09/01/22 1934 128/68 -- -- 68 18 98 % -- --   09/01/22 1849 130/64 -- -- 68 -- -- -- --   09/01/22 1744 (!) 126/58 -- -- 60 18 98 % -- --   09/01/22 1608 (!) 122/51 97.7 °F (36.5 °C) Oral 86 18 98 % -- --   09/01/22 1606 -- -- -- -- -- -- 4' 10\" (1.473 m) 115 lb (52.2 kg)     Weight:   Weight: 130 lb 3.2 oz (59.1 kg)   24 hour intake/output:   No intake or output data in the 24 hours ending 09/02/22 1029    General appearance: elderly, no apparent distress, appears stated age and cooperative. HEENT: Normal cephalic, atraumatic without obvious deformity. Pupils equal, round, and reactive to light. Extra ocular muscles intact. Conjunctivae/corneas clear. Neck: Supple, with full range of motion. No jugular venous distention. Trachea midline. Respiratory:  Normal respiratory effort. Clear to auscultation, bilaterally without Rales/Wheezes/Rhonchi. Cardiovascular: Regular rate and rhythm with normal S1/S2 without murmurs, rubs or gallops. Abdomen: Soft, non-tender, non-distended with normal bowel sounds. Musculoskeletal:  No clubbing, cyanosis or edema bilaterally. 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 Skin: Skin color, texture, turgor normal.  No rashes or lesions. Neurologic:  Neurovascularly intact without any focal sensory/motor deficits.  Cranial nerves: II-XII intact, grossly non-focal.  Psychiatric: Alert and oriented x4, thought content appropriate, normal insight  Capillary Refill: Brisk,< 3 seconds   Peripheral Pulses: +2 palpable, equal bilaterally       Discharge Medications:-      Medication List        CONTINUE taking these medications      calcium carbonate 600 MG Tabs tablet     Culturelle Health (Inulin) Caps  TAKE 1 CAPSULE BY MOUTH WITH BREAKFAST     cyanocobalamin 1000 MCG tablet  Take 1 tablet by mouth daily     DHEA 50 MG Caps     dimethyl fumarate 240 MG delayed release capsule  Commonly known as: TECFIDERA  Take 1 capsule by mouth 2 times daily     docusate sodium 100 MG capsule  Commonly known as: COLACE  TAKE 1 CAPSULE BY MOUTH 2 TIMES A DAY     ferrous sulfate 325 (65 Fe) MG tablet  Commonly known as: IRON 325  Take 1 tablet by mouth in the morning, at noon, and at bedtime     fluticasone 50 MCG/ACT nasal spray  Commonly known as: FLONASE     ipratropium 0.03 % nasal spray  Commonly known as: ATROVENT  INSTILL 2 SPRAYS INTO EACH NOSTRIL 3 TIMES DAILY. lamoTRIgine 200 MG tablet  Commonly known as: LAMICTAL  Take one tab by mouth once daily. levothyroxine 100 MCG tablet  Commonly known as: SYNTHROID  TAKE 1 TABLET BY MOUTH ONCE DAILY     lithium 300 MG tablet  Take 2 tablets by mouth 2 times daily     loratadine 10 MG tablet  Commonly known as: CLARITIN  Take 1 tablet by mouth daily     lurasidone 40 MG Tabs tablet  Commonly known as: Latuda  Take 1 tablet by mouth nightly     Magnesium Oxide 250 MG Tabs tablet  Commonly known as: MAGNESIUM-OXIDE     nitroGLYCERIN 0.4 MG SL tablet  Commonly known as: NITROSTAT  up to max of 3 total doses. If no relief after 1 dose, call 911. ondansetron 4 MG tablet  Commonly known as: ZOFRAN  Take 1 tablet by mouth 3 times daily as needed for Nausea or Vomiting     pantoprazole 40 MG tablet  Commonly known as: PROTONIX  Take 1 tablet by mouth 2 times daily (before meals)     Prosthesis Misc  by Does not apply route Strap for the prosthesis broken please order new     sucralfate 1 GM tablet  Commonly known as: CARAFATE  Take 1 tablet by mouth every 6 hours     vilazodone HCl 40 MG Tabs  Commonly known as: vilazodone hcl  TAKE 1 TABLET BY MOUTH ONCE DAILY.      vitamin D 50 MCG (2000 UT) Caps capsule               Labs :-  Recent Results (from the past 72 hour(s))   Lithium Level    Collection Time: 09/01/22 11:48 AM   Result Value Ref Range    Lithium Lvl 2.61 (HH) 0.60 - 1.20 mmol/L   EKG 12 Lead    Collection Time: 09/01/22  5:01 PM   Result Value Ref Range    Ventricular Rate 62 BPM    Atrial Rate 62 BPM    P-R Interval 190 ms    QRS Duration 148 ms    Q-T Interval 460 ms    QTc Calculation (Bazett) 466 ms    P Axis 86 degrees    R Axis 37 degrees    T Axis 50 degrees   CBC with Auto Differential    Collection Time: 09/01/22  5:11 PM   Result Value Ref Range    WBC 6.7 4.8 - 10.8 thou/mm3    RBC 3.84 (L) 4.20 - 5.40 mill/mm3    Hemoglobin 12.6 12.0 - 16.0 gm/dl    Hematocrit 39.8 37.0 - 47.0 %    .6 (H) 81.0 - 99.0 fL    MCH 32.8 26.0 - 33.0 pg    MCHC 31.7 (L) 32.2 - 35.5 gm/dl    RDW-CV 15.6 (H) 11.5 - 14.5 %    RDW-SD 60.4 (H) 35.0 - 45.0 fL    Platelets 331 039 - 675 thou/mm3    MPV 9.6 9.4 - 12.4 fL    Seg Neutrophils 75.0 %    Lymphocytes 12.9 %    Monocytes 8.0 %    Eosinophils 3.0 %    Basophils 0.7 %    Immature Granulocytes 0.4 %    Segs Absolute 5.0 1.8 - 7.7 thou/mm3    Lymphocytes Absolute 0.9 (L) 1.0 - 4.8 thou/mm3    Monocytes Absolute 0.5 0.4 - 1.3 thou/mm3    Eosinophils Absolute 0.2 0.0 - 0.4 thou/mm3    Basophils Absolute 0.0 0.0 - 0.1 thou/mm3    Immature Grans (Abs) 0.03 0.00 - 0.07 thou/mm3    nRBC 0 /100 wbc   Comprehensive Metabolic Panel w/ Reflex to MG    Collection Time: 09/01/22  5:11 PM   Result Value Ref Range    Glucose 49 (L) 70 - 108 mg/dL    Creatinine 0.5 0.4 - 1.2 mg/dL    BUN 11 7 - 22 mg/dL    Sodium 138 135 - 145 meq/L    Potassium reflex Magnesium 3.8 3.5 - 5.2 meq/L    Chloride 107 98 - 111 meq/L    CO2 18 (L) 23 - 33 meq/L    Calcium 10.3 8.5 - 10.5 mg/dL    AST 38 5 - 40 U/L    Alkaline Phosphatase 165 (H) 38 - 126 U/L    Total Protein 6.1 6.1 - 8.0 g/dL    Albumin 4.2 3.5 - 5.1 g/dL    Total Bilirubin 0.2 (L) 0.3 - 1.2 mg/dL    ALT 74 (H) 11 - 66 U/L   Anion Gap    Collection Time: 09/01/22  5:11 PM   Result Value Ref Range    Anion Gap 13.0 8.0 - 16.0 meq/L   Glomerular Filtration Rate, Estimated    Collection Time: 09/01/22  5:11 PM   Result Value Ref Range    Est, Glom Filt Rate >90 ml/min/1.73m2   Osmolality    Collection Time: 09/01/22  5:11 PM   Result Value Ref Range    Osmolality Calc 272.3 (L) 275.0 - 300.0 mOsmol/kg   Urine with Reflexed Micro    Collection Time: 09/01/22  5:45 PM   Result Value Ref Range    Glucose, Ur NEGATIVE NEGATIVE mg/dl    Bilirubin Urine NEGATIVE NEGATIVE    Ketones, Urine NEGATIVE NEGATIVE    Specific Gravity, Urine 1.011 1.002 - 1.030    Blood, Urine NEGATIVE NEGATIVE    pH, UA 6.0 5.0 - 9.0    Protein, UA NEGATIVE NEGATIVE    Urobilinogen, Urine 0.2 0.0 - 1.0 eu/dl    Nitrite, Urine NEGATIVE NEGATIVE    Leukocyte Esterase, Urine SMALL (A) NEGATIVE    Color, UA YELLOW STRAW-YELLOW    Character, Urine CLEAR CLEAR-SL CLOUD    RBC, UA 0-2 0-2/hpf /hpf    WBC, UA 2-4 0-4/hpf /hpf    Epithelial Cells, UA 0-2 3-5/hpf /hpf    Bacteria, UA NONE SEEN FEW/NONE SEEN /hpf    Casts UA 4-8 HYALINE NONE SEEN /lpf    Crystals, UA NONE SEEN NONE SEEN    Renal Epithelial, UA NONE SEEN NONE SEEN    Yeast, UA NONE SEEN NONE SEEN    CASTS 2 NONE SEEN NONE SEEN /lpf    MISCELLANEOUS 2 NONE SEEN    EKG 12 Lead    Collection Time: 09/01/22  6:41 PM   Result Value Ref Range    Ventricular Rate 61 BPM    Atrial Rate 61 BPM    P-R Interval 206 ms    QRS Duration 146 ms    Q-T Interval 474 ms    QTc Calculation (Bazett) 477 ms    P Axis 77 degrees    R Axis 31 degrees    T Axis 37 degrees   POCT glucose    Collection Time: 09/01/22  7:43 PM   Result Value Ref Range    POC Glucose 185 (H) 70 - 108 mg/dl   Lithium Level    Collection Time: 09/01/22 10:24 PM   Result Value Ref Range    Lithium Lvl 0.75 0.60 - 1.20 mmol/L   CBC with Auto Differential    Collection Time: 09/02/22  6:29 AM   Result Value Ref Range    WBC 4.1 (L) 4.8 - 10.8 thou/mm3    RBC 3.65 (L) 4.20 - 5.40 mill/mm3    Hemoglobin 11.8 (L) 12.0 - 16.0 gm/dl    Hematocrit 37.5 37.0 - 47.0 %    .7 (H) 81.0 - 99.0 fL    MCH 32.3 26.0 - 33.0 pg    MCHC 31.5 (L) 32.2 - 35.5 gm/dl    RDW-CV 15.7 (H) 11.5 - 14.5 %    RDW-SD 59.8 (H) 35.0 - 45.0 fL    Platelets 385 498 - 424 thou/mm3    MPV 9.9 9.4 - 12.4 fL    Seg Neutrophils 68.9 %    Lymphocytes 16.1 %    Monocytes 9.0 %    Eosinophils 5.1 %    Basophils 0.7 %    Immature Granulocytes 0.2 %    Segs Absolute 2.8 1.8 - 7.7 thou/mm3    Lymphocytes Absolute 0.7 (L) 1.0 - 4.8 thou/mm3    Monocytes Absolute 0.4 0.4 - 1.3 thou/mm3    Eosinophils Absolute 0.2 0.0 - 0.4 thou/mm3    Basophils Absolute 0.0 0.0 - 0.1 thou/mm3    Immature Grans (Abs) 0.01 0.00 - 0.07 thou/mm3    nRBC 0 /100 wbc        Microbiology:    Blood culture #1:   Lab Results   Component Value Date/Time    BC No growth-preliminary  No growth   03/20/2019 09:14 PM       Blood culture #2:No results found for: Finn Puff    Organism:  No results found for: LABGRAM    MRSA culture only:No results found for: [facility]    Urine culture:   Lab Results   Component Value Date/Time    LABURIN  06/24/2022 02:14 PM     Growth of Contaminants. The mixture of organisms present are not a common cause of urinary tract infections and probably represent skin jian or distal urethral jian. Lab Results   Component Value Date/Time    ORG Escherichia coli 07/14/2022 07:40 PM        Respiratory culture: No results found for: CULTRESP    Aerobic and Anaerobic :  No results found for: LABAERO  No results found for: LABANAE    Urinalysis:      Lab Results   Component Value Date/Time    NITRU NEGATIVE 09/01/2022 05:45 PM    WBCUA 2-4 09/01/2022 05:45 PM    BACTERIA NONE SEEN 09/01/2022 05:45 PM    RBCUA 0-2 09/01/2022 05:45 PM    BLOODU NEGATIVE 09/01/2022 05:45 PM    SPECGRAV 1.014 06/24/2022 10:40 AM    GLUCOSEU NEGATIVE 09/01/2022 05:45 PM       Radiology:-  No results found.      Follow-up scheduled after discharge :-    in the next few days with Fernando Ni, DO  today with Psychiatry     Consultations during this hospital stay:-  [x] NONE [] Cardiology  [] Nephrology  [] Hemo onco   [] GI   [] ID  [] Endocrine  [] Pulm    [] Neuro    [] Psych   [] Urology  [] ENT   [] G SURGERY   []Ortho    []CV surg    [] Palliative  [] Hospice [] Pain management   []    []TCU   [] PT/OT  OTHERS:-    Disposition: home  Condition at Discharge: Stable    Time Spent:- 35 minutes    Electronically signed by Alivia Deutsch PA-C on 9/2/22 at 10:29 AM EDT   Discharging Hospitalist

## 2022-09-02 NOTE — PROGRESS NOTES
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 She was in good spirits. Looking forward to her discharge. She was peaceful and calm. INTERVENTION    After a brief conversation, I offered prayer for Clarise Leisure. At the end of the prayer, she prayed for me. Raine Delgado was grateful for my prayer. CARE  She will be discharged today.

## 2022-09-02 NOTE — ED NOTES
Poison control contacted again at this time.  Informed that patient has a Paced rhythm and we will not be giving Sodium bicarb for the QRS/QTc and will not be giving Magnesium for the prolonged QT.      Yetta Cranker, RN  09/01/22 2045

## 2022-09-02 NOTE — PLAN OF CARE
Problem: Discharge Planning  Goal: Discharge to home or other facility with appropriate resources  Outcome: Completed     Problem: Safety - Adult  Goal: Free from fall injury  9/2/2022 1125 by John Alva RN  Outcome: Completed  9/2/2022 0348 by Ellis Whyte RN  Outcome: Progressing     Problem: Chronic Conditions and Co-morbidities  Goal: Patient's chronic conditions and co-morbidity symptoms are monitored and maintained or improved  9/2/2022 1125 by John Alva RN  Outcome: Completed  9/2/2022 0348 by Ellis Whyte RN  Outcome: Progressing

## 2022-09-02 NOTE — ED NOTES
Poison control called to follow-up at this time. Poison control states to give sodium bicarb and fluids when possible, and to re-check a metabolic panel q2 hours. Also state that they would like to see two downward trending results of the lithium level on the metabolic panel before clearing patient. State that they will call back again in 4 hours.       Darryl Salud, RN  09/01/22 2036

## 2022-09-02 NOTE — PROGRESS NOTES
Pt has medications from home, but they are not in original bottles. Pharmacy called for verification that they need to be in original bottle. Pt takes dimethyl fumarate which our pharmacy does not carry and pt does not have anyone that can bring in original bottle. Physician notified.

## 2022-09-02 NOTE — H&P
Internal Medicine Resident History and Physical          Patient: Dolores Vega  : 1960  MRN: [id number]     Acct: [de-identified]    PCP: Francisco Maher DO  Date of Admission: 2022  Date of Service: Pt seen/examined on 22  and Admitted to Observation with expected LOS less than two midnights due to medical therapy. Hospital Problems             Last Modified POA    * (Principal) Elevated lithium level 2022 Yes       Assessment and Plan:  Elevated lithium level  - Unclear etiology; patient endorses taking her medications as directed, lithium level on admission 2.61; repeat lithium levels ordered  - Poison control contacted in the ED and recommendation for repeat lithium levels with BMP to monitor  - Patient's home lithium held  - Patient started on normal saline at a rate of 75 MLS per hour    Multiple sclerosis  - Patient's dimethyl fumarate ordered    Symptomatic bradycardia s/p pacemaker  - 2022 EKG and vitals unremarkable, we will refrain from putting this patient on telemetry at this time    Peptic ulcer disease  - Patient's home Protonix resumed    Hypothyroidism  - Patient's home levothyroxine resumed    Bipolar 1 disorder  - Patient's home lurasidone resumed  - Patient's home Lamictal resumed  - Patient's home lithium held    Hypoglycemia  - Patient's glucose on admission 49  - Repeat point-of-care glucose 185  - We will continue to monitor    Nonischemic cardiomyopathy  - Noted    Iron deficiency anemia  - Noted  - 2022 CBC unremarkable    =======================================================================      Chief Complaint:  Elevated Lithium Level    History Of Present Illness: This is a 77-year-old female who presented to [address] [place] [address] on 2022 after being told that she had an elevated lithium level. She denied having any symptoms associated with this abnormal lab value. She denied chest pain, fever, chills, nausea, vomiting, and diarrhea.   She denies OTHER SURGICAL HISTORY      RECTAL FISSULRE    OTHER SURGICAL HISTORY  1986    D&C    OTHER SURGICAL HISTORY  2006    GI BLEED    OVARY REMOVAL N/A     pt unaware which one was taken    PACEMAKER INSERTION Right 05/20/2019    Dr. Jelly Zimmer FLX DX W/BONNIE Swan 1978 PFRMD Left 2/6/2018    COLONOSCOPY performed by Rui Stephenson MD at [address] [facility]    LA OFFICE/OUTPT VISIT,PROCEDURE ONLY Left 2/18/2018    EGD ESOPHAGOGASTRODUODENOSCOPY performed by Osmany Vargas MD at [address] [facility]    LA OFFICE/OUTPT VISIT,PROCEDURE ONLY N/A 10/4/2018    EGD DIAGNOSTIC ONLY performed by Sangeeta Park MD at [facility] ENDOSCOPY Left 9/24/2019    EGD DIAGNOSTIC ONLY performed by Aida Colindres MD at [facility] N/A 9/26/2020    ENTEROSCOPY PUSH CONTROL HEMORRHAGE performed by Rui Stephenson MD at [facility] N/A 1/16/2022    ENTEROSCOPY PUSH DIAGNOSTIC performed by Yoni Mccarty MD at [address] [place] [address]  04/13/2022       Medications Prior to Admission:   Prior to Admission medications    Medication Sig Start Date End Date Taking? Authorizing Provider   levothyroxine (SYNTHROID) 100 MCG tablet TAKE 1 TABLET BY MOUTH ONCE DAILY 7/6/22  Yes Keny Lizarraga, DO   sucralfate (CARAFATE) 1 GM tablet Take 1 tablet by mouth every 6 hours 6/16/22  Yes Alli Mcleod, DO   vilazodone HCl (VILAZODONE HCL) 40 MG TABS TAKE 1 TABLET BY MOUTH ONCE DAILY. 5/24/22  Yes AJITH Olson CNP   lithium 300 MG tablet Take 2 tablets by mouth 2 times daily 5/24/22  Yes Purnima Perez, APRN - CNP   lamoTRIgine (LAMICTAL) 200 MG tablet Take one tab by mouth once daily.  5/24/22  Yes AJITH Olson CNP   lurasidone (LATUDA) 40 MG TABS tablet Take 1 tablet by mouth nightly 5/24/22  Yes AJITH Olson CNP pantoprazole (PROTONIX) 40 MG tablet Take 1 tablet by mouth 2 times daily (before meals) 5/10/22  Yes Lisy Becerra DO   docusate sodium (COLACE) 100 MG capsule TAKE 1 CAPSULE BY MOUTH 2 TIMES A DAY 1/20/22  Yes Asad Mazariegos MD   DHEA 50 MG CAPS Take by mouth nightly   Yes Historical Provider, MD   dimethyl fumarate (TECFIDERA) 240 MG delayed release capsule Take 1 capsule by mouth 2 times daily 6/10/21  Yes Jefferson Jones DO   Lactobacillus-Inulin ([place], [place],) CAPS TAKE 1 CAPSULE BY MOUTH WITH BREAKFAST 2/4/21  Yes Jefferson Jones DO   Cholecalciferol (VITAMIN D) 2000 units CAPS capsule Take by mouth nightly 50 mcg nightly   Yes Historical Provider, MD   Magnesium Oxide 250 MG TABS Take 250 mg by mouth nightly    Yes Historical Provider, MD   calcium carbonate 600 MG TABS tablet Take 1 tablet by mouth nightly    Yes Historical Provider, MD   vitamin B-12 1000 MCG tablet Take 1 tablet by mouth daily 2/8/18  Yes Jostin Arce MD   ferrous sulfate (IRON 325) 325 (65 Fe) MG tablet Take 1 tablet by mouth in the morning, at noon, and at bedtime 5/24/22 8/22/22  Bang Harper MD   ipratropium (ATROVENT) 0.03 % nasal spray INSTILL 2 SPRAYS INTO EACH NOSTRIL 3 TIMES DAILY. 4/8/21   AJITH Abreu CNP   fluticasone (FLONASE) 50 MCG/ACT nasal spray 2 sprays by Each Nostril route daily    Historical Provider, MD   ondansetron (ZOFRAN) 4 MG tablet Take 1 tablet by mouth 3 times daily as needed for Nausea or Vomiting 9/22/20   Aneudy Alfonso MD   Prosthesis MISC by Does not apply route Strap for the prosthesis broken please order new 3/18/20   Jefferson Jones DO   loratadine (CLARITIN) 10 MG tablet Take 1 tablet by mouth daily 2/26/20   Jaylene Johnson MD   nitroGLYCERIN (NITROSTAT) 0.4 MG SL tablet up to max of 3 total doses.  If no relief after 1 dose, call 911. 2/22/17   Mansoor Daniels MD       Allergies:  Latex and Sulfa antibiotics    Social History:    The patient currently lives at home. Tobacco use:   reports that she quit smoking about 44 years ago. Her smoking use included cigarettes. She has a 0.05 pack-year smoking history. She has never used smokeless tobacco.  Alcohol use:   reports no history of alcohol use. Drug use:  reports no history of drug use. Family History:        Problem Relation Age of Onset    Cancer Mother 43    Colon Polyps Mother     Breast Cancer Mother     Heart Disease Father     Glaucoma Father     Cancer Sister     Colon Cancer Sister     Cancer Sister     Breast Cancer Sister        Review of Systems:   Pertinent positives and negatives as noted in the HPI. Otherwise complete ROS negative. Physical Exam:    BP (!) 112/55   Pulse 60   Temp 98.2 °F (36.8 °C) (Oral)   Resp 19   Ht 4' 10\" (1.473 m)   Wt 115 lb (52.2 kg)   SpO2 97%   BMI 24.04 kg/m²       General appearance: No apparent distress, appears stated age. Eyes:  Pupils equal, round, and reactive to light. Conjunctivae/corneas clear. HENT: Head normal in appearance. External nares normal.  Oral mucosa moist without lesions. Hearing grossly intact. Neck: Supple, with full range of motion. Trachea midline. No gross JVD appreciated. Respiratory:  Normal respiratory effort. Clear to auscultation, bilaterally without rales or wheezes or rhonchi. Cardiovascular: Normal rate, regular rhythm with normal S1/S2 without murmurs. No lower extremity edema. Abdomen: Soft, non-tender, non-distended with normal bowel sounds. Musculoskeletal: No joint swelling or tenderness. Normal tone. No abnormal movements. Right upper extremity deformity noted, no acute pathology  Skin: Warm and dry. No rashes or lesions. Neurologic:  No focal sensory/motor deficits in the upper and lower extremities. Cranial nerves:  grossly non-focal 2-12. Psychiatric: Alert and oriented, normal insight and thought content. Capillary Refill: Brisk,< 3 seconds.   Peripheral Pulses: +2 palpable, equal bilaterally. Labs:     Recent Labs     09/01/22  1711   WBC 6.7   HGB 12.6   HCT 39.8        Recent Labs     09/01/22  1711      K 3.8      CO2 18*   BUN 11   CREATININE 0.5   CALCIUM 10.3     Recent Labs     09/01/22  1711   AST 38   ALT 74*   BILITOT 0.2*   ALKPHOS 165*     No results for input(s): INR in the last 72 hours. No results for input(s): Julissa Fuchs in the last 72 hours. Lab Results   Component Value Date/Time    NITRU NEGATIVE 09/01/2022 05:45 PM    WBCUA 2-4 09/01/2022 05:45 PM    BACTERIA NONE SEEN 09/01/2022 05:45 PM    RBCUA 0-2 09/01/2022 05:45 PM    BLOODU NEGATIVE 09/01/2022 05:45 PM    SPECGRAV 1.014 06/24/2022 10:40 AM    GLUCOSEU NEGATIVE 09/01/2022 05:45 PM         Radiology:     No orders to display          EKG:  I have reviewed the EKG with the following interpretation: Atrial paced rhythm with no acute abnormality compared to prior      Diet: ADULT DIET; Regular  DVT prophylaxis: Lovenox  Code Status: Full Code  Disposition: admit to observation    Thank you Edy Garibay DO for the opportunity to be involved in this patient's care. Electronically signed by Makenzie Covington MD on 9/1/2022 at 10:43 PM.     Case discussed with Attending, Dr. Nighat Osorio.

## 2022-09-02 NOTE — ED NOTES
ED to inpatient nurses report    Chief Complaint   Patient presents with    Abnormal Lab      Present to ED from home  LOC: alert and orientated to name, place, date  Vital signs   Vitals:    09/01/22 1744 09/01/22 1849 09/01/22 1934 09/01/22 2042   BP: (!) 126/58 130/64 128/68 122/86   Pulse: 60 68 68 67   Resp: 18  18 18   Temp:       TempSrc:       SpO2: 98%  98% 97%   Weight:       Height:          Oxygen Baseline room air    Current needs required none Bipap/Cpap No  LDAs:   Peripheral IV 09/01/22 Left Forearm (Active)   Site Assessment Clean, dry & intact 09/01/22 2059   Line Status Blood return noted; Flushed 09/01/22 2059   Phlebitis Assessment No symptoms 09/01/22 2059   Infiltration Assessment 0 09/01/22 2059   Dressing Status Clean, dry & intact; New dressing applied 09/01/22 2059   Dressing Type Transparent 09/01/22 2059   Dressing Intervention New 09/01/22 2059     Mobility: Independent  Pending ED orders: none  Present condition: stable  Our promise was given to patient    Electronically signed by Norma Dee RN on 9/1/2022 at 9:08 PM      Norma Dee [facility]  09/01/22 2109

## 2022-09-02 NOTE — PROGRESS NOTES
Poison controlled called to follow-up on condition of pt. Asked about current lithium level, vs, and recommedations of Q2 lithium levels and Q2 BMP draws. As well as recommendation of fluids. Information passed to Dr. Roby Frank.

## 2022-09-02 NOTE — CARE COORDINATION
9/2/22, 11:28 AM EDT  DISCHARGE PLANNING EVALUATION:    Jessica Escobar       Admitted: 9/1/2022/ Luisana Dominique day: 0   Location: [facility]28/028-A Reason for admit: Lithium toxicity, accidental or unintentional, initial encounter [T56.891A]  Elevated lithium level [R79.89]   PMH:  has a past medical history of Balance problem, Bipolar I disorder, most recent episode (or current) depressed, in partial or unspecified remission, Cancer ([facility] [address] Depression, Fatigue, Gallstones, Gastric bypass status for obesity, GERD (gastroesophageal reflux disease), History of blood transfusion, History of hysterectomy, Hypothyroidism, Liver cyst, MS (multiple sclerosis) ([facility] [address] Paresthesias, Ulcer at site of surgical anastomosis following bypass of stomach, and UTI (urinary tract infection). Procedure: No.  Barriers to Discharge: To ER with elevated Lithium level reported to her. Advised to report to ER. 2.67. This am 0.75. IVF at 75/hr. PCP: Fernando Ni DO   %  Patient's Healthcare Decision Maker: Named in [address] [place] [address]    Patient Goals/Plan/Treatment Preferences: Met with Thea Merino. From home alone and self sufficient. No home services. She does have a quad cane if needed. She has a PCP, she uses bus for transport, she has insurance and no issues getting medications. Transportation/Food Security/Housekeeping Addressed:  No issues identified. 9/2/22, 11:33 AM EDT    Patient goals/plan/ treatment preferences discussed by  and . Patient goals/plan/ treatment preferences reviewed with patient/ family. Patient/ family verbalize understanding of discharge plan and are in agreement with goal/plan/treatment preferences. Understanding was demonstrated using the teach back method. AVS provided by RN at time of discharge, which includes all necessary medical information pertaining to the patients current course of illness, treatment, post-discharge goals of care, and treatment preferences.

## 2022-09-02 NOTE — ED NOTES
Pt vitals collected. Pt denies any needs at this time. Pt respirations easy and unlabored.      Jostin Rodriguez RN  09/01/22 2043

## 2022-09-06 ENCOUNTER — TELEPHONE (OUTPATIENT)
Dept: FAMILY MEDICINE CLINIC | Age: 62
End: 2022-09-06

## 2022-09-06 NOTE — TELEPHONE ENCOUNTER
[facility] Transitions Initial Follow Up Call    Outreach made within 2 business days of discharge: Yes    Patient: Paulette Jones Patient : 1960   MRN: [id number]  Reason for Admission: There are no discharge diagnoses documented for the most recent discharge. Discharge Date: 22       Spoke with: Pt     Discharge department/facility: [facility]    TCM Interactive Patient Contact:  Was patient able to fill all prescriptions: Yes  Was patient instructed to bring all medications to the follow-up visit: Yes  Is patient taking all medications as directed in the discharge summary?  Yes  Does patient understand their discharge instructions: Yes  Does patient have questions or concerns that need addressed prior to 7-14 day follow up office visit: no    Scheduled appointment with PCP within 7-14 days    Follow Up  Future Appointments   Date Time Provider Rudi Brandon   2022  9:40 AM Wilma Morfin MD [facility] [place]   2022  3:00 PM Reynaldo Ivan APRN - [facility] [place]   2022  2:45 PM Amos Duran MD SR[facility] NW [facility] [place]   2022  1:00 PM Joanie Ordonez DO [facility] FM [facility] [place]   2023  9:20 AM SCHEDULE, SRPX NW ADV CARDIOLOGY PACER SRPX NW CARD [facility] [address] [facility] ([address] [place] [address])

## 2022-09-06 NOTE — TELEPHONE ENCOUNTER
I have left a message for Sascha Dorman to return my call to verify information; awaiting call back.

## 2022-09-06 NOTE — TELEPHONE ENCOUNTER
[facility] Transitions Initial Follow Up Call    Outreach made within 2 business days of discharge: Yes    Patient: Devang Connors Patient : 1960   MRN: [id number]  Reason for Admission: There are no discharge diagnoses documented for the most recent discharge. Discharge Date: 22       Spoke with: NURY for patient to return call at their earliest convenience.      Discharge department/facility: [facility]        Scheduled appointment with PCP within 7-14 days    Follow Up  Future Appointments   Date Time Provider Rudi Brandon   2022  9:40 AM Basia Bowling MD SRPX FM RES [address]   2022  3:00 PM AJITH Mendez CNP [address]   2022  2:45 PM Augusta Jordan MD SRPX NW CARD [facility] - [address] [place] [address]   2022  1:00 PM Alexi Garcia DO SRPX FM RES [facility] - [address] [place] [address]   2023  9:20 AM SCHEDULE, SRPX NW ADV CARDIOLOGY PACER SRPX NW CARD [facility] - [address] ([address] [place] [address])

## 2022-09-07 NOTE — TELEPHONE ENCOUNTER
Spoke with Natalia Menchaca, she reports that she takes 2-300mg of Lithium in the Am and 2-300mg of Lithium in the evening. She said that she has an appt with Dr. Harris Johnson tomorrow so she will get her Lithium completed tomorrow; she was informed of the holding of her AM dose; she understood. She said that she would take her evening dose tonight about 10:30pm and then complete her lab around the same time tomorrow but in the morning.

## 2022-09-07 NOTE — TELEPHONE ENCOUNTER
I have left another message for Marilia Crump to return my call to verify this information and instruct her of the following information as well; awaiting call back.

## 2022-09-08 ENCOUNTER — OFFICE VISIT (OUTPATIENT)
Dept: FAMILY MEDICINE CLINIC | Age: 62
End: 2022-09-08
Payer: MEDICARE

## 2022-09-08 ENCOUNTER — HOSPITAL ENCOUNTER (OUTPATIENT)
Age: 62
Discharge: HOME | End: 2022-09-08
Payer: MEDICARE

## 2022-09-08 VITALS
DIASTOLIC BLOOD PRESSURE: 86 MMHG | SYSTOLIC BLOOD PRESSURE: 124 MMHG | OXYGEN SATURATION: 97 % | WEIGHT: 120.8 LBS | BODY MASS INDEX: 25.36 KG/M2 | HEIGHT: 58 IN | TEMPERATURE: 97 F | RESPIRATION RATE: 16 BRPM | HEART RATE: 91 BPM

## 2022-09-08 DIAGNOSIS — F31.9 BIPOLAR 1 DISORDER (HCC): ICD-10-CM

## 2022-09-08 DIAGNOSIS — Z51.81 THERAPEUTIC DRUG MONITORING: ICD-10-CM

## 2022-09-08 DIAGNOSIS — Z51.81 THERAPEUTIC DRUG MONITORING: Primary | ICD-10-CM

## 2022-09-08 DIAGNOSIS — Z09 HOSPITAL DISCHARGE FOLLOW-UP: Primary | ICD-10-CM

## 2022-09-08 DIAGNOSIS — R79.89 ELEVATED LITHIUM LEVEL: ICD-10-CM

## 2022-09-08 LAB — LITHIUM LEVEL: 1.05 MMOL/L (ref 0.6–1.2)

## 2022-09-08 PROCEDURE — 80178 ASSAY OF LITHIUM: CPT

## 2022-09-08 PROCEDURE — 1111F DSCHRG MED/CURRENT MED MERGE: CPT | Performed by: STUDENT IN AN ORGANIZED HEALTH CARE EDUCATION/TRAINING PROGRAM

## 2022-09-08 PROCEDURE — 36415 COLL VENOUS BLD VENIPUNCTURE: CPT

## 2022-09-08 ASSESSMENT — ENCOUNTER SYMPTOMS
NAUSEA: 0
BACK PAIN: 0
CONSTIPATION: 0
WHEEZING: 0
COUGH: 0
DIARRHEA: 0
VOMITING: 0
SHORTNESS OF BREATH: 0
ABDOMINAL PAIN: 0

## 2022-09-08 NOTE — TELEPHONE ENCOUNTER
Order is placed. Dr. Silver Rodriguez is no longer with [facility]; it appears patient has an appointment with a different provider within the practice where Dr. Silver Rodriguez previously practiced.

## 2022-09-08 NOTE — PROGRESS NOTES
Albaro Gill is a 58 y.o. female who presents today for:  Chief Complaint   Patient presents with    Follow-Up from [facility] Follow-up d/c 09/02/2022 Elevated Lithium Level         HPI:   Albaro Gill is 58 y.o. who presents today for hospital follow-up. Patient hospitalized at [facility] from 9/1/2022 to 9/2/2022 due to elevated lithium level on outpatient labs. Unclear etiology of elevated lithium as patient had no recent medication changes, decreased oral intake, or GI losses. Home dose lithium was held during admission, and repeat level was noted to be therapeutic. Hospitalization was otherwise unremarkable, patient was discharged on home dose of lithium 600 mg twice daily. She been taking lithium as prescribed, and follows closely with psychiatry. Patient's next follow-up appointment with psychiatry is 9/13/2022. Patient is doing well since hospital discharge. She has been feeling well without acute concerns today.       Objective:     Vitals:    09/08/22 0937   BP: 124/86   Site: Left Upper Arm   Position: Sitting   Cuff Size: Medium Adult   Pulse: 91   Resp: 16   Temp: 97 °F (36.1 °C)   TempSrc: Temporal   SpO2: 97%   Weight: 120 lb 12.8 oz (54.8 kg)   Height: 4' 10\" (1.473 m)       Wt Readings from Last 3 Encounters:   09/08/22 120 lb 12.8 oz (54.8 kg)   09/02/22 130 lb 3.2 oz (59.1 kg)   08/23/22 123 lb 3.2 oz (55.9 kg)       BP Readings from Last 3 Encounters:   09/08/22 124/86   09/02/22 116/60   08/23/22 122/74       Lab Results   Component Value Date    WBC 4.1 (L) 09/02/2022    HGB 11.8 (L) 09/02/2022    HCT 37.5 09/02/2022    .7 (H) 09/02/2022     09/02/2022     Lab Results   Component Value Date     09/01/2022    K 3.8 09/01/2022     09/01/2022    CO2 18 (L) 09/01/2022    BUN 11 09/01/2022    CREATININE 0.5 09/01/2022    GLUCOSE 49 (L) 09/01/2022    CALCIUM 10.3 09/01/2022    PROT 6.1 09/01/2022    LABALBU 4.2 09/01/2022    BILITOT 0.2 (L) 09/01/2022    ALKPHOS 165 (H) 09/01/2022    AST 38 09/01/2022    ALT 74 (H) 09/01/2022    LABGLOM >90 09/01/2022    GFRAA >60 01/18/2022     Lab Results   Component Value Date    TSH 1.400 03/01/2022    T4FREE 1.11 03/01/2022     Lab Results   Component Value Date    LABA1C 4.7 11/13/2019     No results found for: EAG  Lab Results   Component Value Date    CHOL 141 03/02/2022    CHOL 157 04/12/2021    CHOL 148 03/04/2021     Lab Results   Component Value Date    TRIG 32 03/02/2022    TRIG 71 04/12/2021    TRIG 62 03/04/2021     Lab Results   Component Value Date    HDL 95 03/02/2022     04/12/2021    HDL 97 03/04/2021     Lab Results   Component Value Date    LDLCALC 40 03/02/2022    LDLCALC 40 04/12/2021    LDLCALC 39 03/04/2021       No results found for: LABMICR, LVQV12GCA    Review of Systems   Constitutional:  Negative for activity change, chills, fatigue and fever. HENT:  Negative for congestion. Eyes:  Negative for visual disturbance. Respiratory:  Negative for cough, shortness of breath and wheezing. Cardiovascular:  Negative for chest pain and palpitations. Gastrointestinal:  Negative for abdominal pain, constipation, diarrhea, nausea and vomiting. Genitourinary:  Negative for dysuria. Musculoskeletal:  Negative for back pain. Neurological:  Negative for dizziness, syncope, light-headedness and headaches. Psychiatric/Behavioral:  Negative for dysphoric mood. The patient is not nervous/anxious. Physical Exam  Vitals and nursing note reviewed. Constitutional:       Appearance: Normal appearance. She is normal weight. HENT:      Head: Normocephalic and atraumatic. Mouth/Throat:      Mouth: Mucous membranes are moist.      Pharynx: No oropharyngeal exudate or posterior oropharyngeal erythema. Eyes:      Extraocular Movements: Extraocular movements intact. Conjunctiva/sclera: Conjunctivae normal.      Pupils: Pupils are equal, round, and reactive to light. Cardiovascular:      Rate and Rhythm: Normal rate and regular rhythm. Pulses: Normal pulses. Heart sounds: No murmur heard. Pulmonary:      Effort: Pulmonary effort is normal. No respiratory distress. Breath sounds: Normal breath sounds. No wheezing. Abdominal:      General: Abdomen is flat. Bowel sounds are normal. There is no distension. Palpations: Abdomen is soft. There is no mass. Tenderness: There is no abdominal tenderness. Musculoskeletal:      Cervical back: Neck supple. Comments: Right prosthetic arm in place   Skin:     General: Skin is warm and dry. Neurological:      General: No focal deficit present. Mental Status: She is alert and oriented to person, place, and time.    Psychiatric:         Mood and Affect: Mood normal.         Behavior: Behavior normal.       Immunization History   Administered Date(s) Administered    COVID-19, PFIZER PURPLE top, DILUTE for use, (age 15 y+), 30mcg/0.3mL 02/02/2021, 02/23/2021, 11/10/2021    Influenza Vaccine, unspecified formulation 09/15/2015, 09/14/2016, 10/21/2016, 08/16/2017    Influenza Virus Vaccine 10/21/2016, 08/16/2017, 09/30/2020    Influenza, AFLURIA (age 1 yrs+), FLUZONE, (age 10 mo+), MDV, 0.5mL 10/05/2018    Influenza, FLUARIX, FLULAVAL, FLUZONE (age 10 mo+) AND AFLURIA, (age 1 y+), PF, 0.5mL 09/23/2017, 09/24/2019, 09/20/2021    Influenza, FLUCELVAX, (age 10 mo+), MDCK, MDV, 0.5mL 03/11/2019    Influenza, Quadv, 6 mo and older, IM, PF (Flulaval, Fluarix) 10/05/2018    Pneumococcal Conjugate 13-valent ([id number]) 04/10/2019    Pneumococcal Polysaccharide (Iymzdesvb80) 05/17/2016    Td (Adult), 5 Lf Tetanus Toxoid, Pf (Tenivac, Decavac) 04/30/1999    Td vaccine (adult) 04/30/1999    Tdap (Boostrix, Adacel) 09/29/2015, 09/12/2016, 10/25/2018    Zoster Recombinant (Shingrix) 03/11/2019, 11/19/2019, 01/03/2020       Health Maintenance Due   Topic Date Due    COVID-19 Vaccine (4 - Booster for Maradiaga Peter series) 03/10/2022 Flu vaccine (1) 09/01/2022       Food Insecurity: No Food Insecurity    Worried About Running Out of Food in the Last Year: Never true    Ran Out of Food in the Last Year: Never true       Assessment / Plan:   1. Hospital discharge follow-up  - DC DISCHARGE MEDS RECONCILED W/ CURRENT OUTPATIENT MED LIST    2. Bipolar 1 disorder ([address]  Continue current medication regimen  Follow-up with psychiatry as scheduled 9/13/2022 - further medication management per their recommendations    3. Elevated lithium level  Lithium elevated on outpatient labs. Repeat lithium level during hospitalization within normal limits. Patient asymptomatic.  -Patient will have repeat lithium level drawn today as scheduled  -Continue current dose of lithium 600 mg twice daily  -Follow-up with psychiatry as scheduled    Patient will follow-up with Dr. Jona Johnson as scheduled on 11/23/2022. Advised patient to have previously ordered labs completed prior to upcoming appointment. Return in about 2 months (around 11/8/2022) for chronic follow-up. Medications Prescribed:  No orders of the defined types were placed in this encounter. Future Appointments   Date Time Provider Rudi Brandon   9/13/2022  3:00 PM AJITH Wilson - CNP [facility] [place]   11/16/2022  2:45 PM Andrey Jovel MD [facility] [place]   11/23/2022  1:00 PM Cassy Duque DO [facility] [place]   6/29/2023  9:20 AM SCHEDULE, [facility] [place]       Patient given educational materials - see patient instructions. Discussed use, benefit, and sideeffects of prescribed medications. All patient questions answered. Pt voiced understanding. Reviewed health maintenance. Instructed to continue current medications, diet and exercise. Patient agreed with treatment plan. Follow up as directed.      Electronically signed by Panchito Osman MD on 9/8/2022 at 10:20 AM

## 2022-09-13 ENCOUNTER — OFFICE VISIT (OUTPATIENT)
Dept: PSYCHIATRY | Age: 62
End: 2022-09-13
Payer: MEDICARE

## 2022-09-13 DIAGNOSIS — R41.3 MEMORY CHANGES: ICD-10-CM

## 2022-09-13 DIAGNOSIS — F31.75 BIPOLAR I, MOST RECENT EPISODE DEPRESSED, PARTIAL REMISSION (HCC): Primary | ICD-10-CM

## 2022-09-13 PROCEDURE — 99214 OFFICE O/P EST MOD 30 MIN: CPT | Performed by: NURSE PRACTITIONER

## 2022-09-13 RX ORDER — VILAZODONE HYDROCHLORIDE 40 MG/1
TABLET ORAL
Qty: 30 TABLET | Refills: 5 | Status: SHIPPED | OUTPATIENT
Start: 2022-09-13

## 2022-09-13 RX ORDER — LAMOTRIGINE 200 MG/1
TABLET ORAL
Qty: 30 TABLET | Refills: 5 | Status: SHIPPED | OUTPATIENT
Start: 2022-09-13

## 2022-09-13 RX ORDER — LITHIUM CARBONATE 300 MG
600 TABLET ORAL 2 TIMES DAILY
Qty: 120 TABLET | Refills: 5 | Status: SHIPPED | OUTPATIENT
Start: 2022-09-13

## 2022-09-13 NOTE — PROGRESS NOTES
[address] [place] [address]  Dept: [phone]  Dept Fax: [phone]  Loc: [phone]    Visit Date: 9/13/2022    SUBJECTIVE DATA     CHIEF COMPLAINT:    Chief Complaint   Patient presents with    Mental Health Problem    Follow-up       History obtained from: patient    HISTORY OF PRESENT ILLNESS:    Sreekanth Galan is a 58 y.o. female who presents to the office for follow-up on her moods. Her last 05/24/2022    Was hospitalized recently due to elevated lithium level   -states she was feeling fine and denies any symptoms  -had an overnight observation  -states she took her lithium dose just prior to having the lab completed  -reports the [place] was held while she was in the hospital    Restarted the Lithium at the prior dosing when she was discharged home  -has been taking the Lithium daily since discharge    Mood is doing well    Sleeping well overall    Appetite is unchanged    Recently had eye exam and abnormality noted on the left eye  -she will be seeing specialist later this month   -will also be getting new glasses once she sees the ophthalmologist     Will be transitioning to a new neurologist  -admits she has noticed some changes with her memory    Denies suicidal ideations, intent, plan. No homicidal ideations, intent, plan. No audiovisual hallucinations. HPI    The patient has had 0 lifetime suicide attempts. Reports she had intense thoughts of suicide with a plan and intent back in 1987 but someone came home. Patient reports 3 psych hospital admissions with the last admission taking place in 1996    Past psychiatric medications include: various per her report but does not recall specific names    Adverse reactions from psychotropic medications:  None      Current Psychiatric Review of Systems         Marian or Hypomania:  None current.  Previously reported: Patient does not recall specific marian or hypomania; she is unsure how/why she was diagnosed with bipolar disorder     Panic Attacks:  no     Phobias:  no     Obsessions and Compulsions:  no     Body or Vocal Tics:  no     Hallucinations:  no     Delusions:  no    SOCIAL HISTORY:  Patient was born in  [address] [place] [address]  and raised by her biological parents      Social History     Socioeconomic History    Marital status:      Spouse name: Not on file    Number of children: 0    Years of education: Not on file    Highest education level: Bachelor's degree (e.g., BA, AB, BS)   Occupational History    Not on file   Tobacco Use    Smoking status: Former     Packs/day: 0.10     Years: 0.50     Pack years: 0.05     Types: Cigarettes     Quit date: 10/24/1977     Years since quittin.9    Smokeless tobacco: Never   Vaping Use    Vaping Use: Never used   Substance and Sexual Activity    Alcohol use: No    Drug use: No    Sexual activity: Not Currently     Partners: Male   Other Topics Concern    Not on file   Social History Narrative    2021    LEVEL OF EDUCATION: graduated high school; earned her bachelor degree in education; did some master's level course work but did not complete the degree    SPECIAL EDUCATION NEEDS: None    RESIDENCE: Currently lives alone    LEGAL HISTORY: None    Temple: [employer]    TRAUMA: None    : None    HOBBIES: writing, swimming, 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  She was  from  to ; they  [place]     CHILDREN: None    SUBSTANCE USE: None     Social Determinants of Health     Financial Resource Strain: Low Risk     Difficulty of Paying Living Expenses: Not hard at all   Food Insecurity: No Food Insecurity    Worried About Running Out of Food in the Last Year: Never true    Ran Out of Food in the Last Year: Never true   Transportation Needs: Not on file   Physical Activity: Insufficiently Active    Days of Exercise per Week: 3 days    Minutes of Exercise per Session: 10 min   Stress: Not on file   Social Connections: Not on file   Intimate Partner Violence: Not on file   Housing Stability: Not on file       FAMILY HISTORY:   Family History   Problem Relation Age of Onset    Cancer Mother 43    Colon Polyps Mother     Breast Cancer Mother     Heart Disease Father     Glaucoma Father     Cancer Sister     Colon Cancer Sister     Cancer Sister     Breast Cancer Sister        Psychiatric Family History  None reported    PAST MEDICAL HISTORY:    Past Medical History:   Diagnosis Date    Balance problem     Bipolar I disorder, most recent episode (or current) depressed, in partial or unspecified remission 5/8/2013    [facility]cer ([facility]     CERVICAL     Depression     Fatigue     Gallstones     Gastric bypass status for obesity 2002    GERD (gastroesophageal reflux disease)     History of blood transfusion     History of hysterectomy     REMOVAL OF ONE OVARY     Hypothyroidism     Liver cyst     GI Dr Ronnell Jenkins (multiple sclerosis) ([facility]     Paresthesias     RT LOWER LIMB    Ulcer at site of surgical anastomosis following bypass of stomach 1/15/2022    UTI (urinary tract infection)        PAST SURGICAL HISTORY:    Past Surgical History:   Procedure Laterality Date    APPENDECTOMY      BONE GRAFT Left     left leg to right arm    CARPAL TUNNEL RELEASE  1999    CHOLECYSTECTOMY, LAPAROSCOPIC  5/16/2016    COLONOSCOPY      COLONOSCOPY N/A 1/16/2022    COLONOSCOPY CONTROL HEMORRHAGE performed by Janee Slaughter MD at [address] [place] [address], ESOPHAGUS      ENDOSCOPY, COLON, DIAGNOSTIC      ENTEROSCOPY N/A 2/5/2018    ENTEROSCOPY performed by Freddy Cordoba MD at [place] [address] N/A 9/28/2020    GASTROSTOMY TUBE PLACEMENT- [facility] TO DO GASTRIC ENDOSCOPY performed by Filomena Moise MD at [place] [address] ([facility] LAPAROSCOPIC APPENDECTOMY  5/16/2016    OTHER SURGICAL HISTORY      RECTAL FISSULRE    OTHER SURGICAL HISTORY  1986    D&C    OTHER SURGICAL HISTORY  2006    GI BLEED    OVARY REMOVAL N/A     pt unaware which one was taken    PACEMAKER INSERTION Right 05/20/2019    Dr. Arleen Beebe FLX DX W/COLLJ SPEC WHEN PFRMD Left 2/6/2018    COLONOSCOPY performed by Iline Mohs, MD at [facility]    DE OFFICE/OUTPT VISIT,PROCEDURE ONLY Left 2/18/2018    EGD ESOPHAGOGASTRODUODENOSCOPY performed by Jackie Lyles MD at [facility]    DE OFFICE/OUTPT VISIT,PROCEDURE ONLY N/A 10/4/2018    EGD DIAGNOSTIC ONLY performed by [place] Ortega MD at [facility] ENDOSCOPY Left 9/24/2019    EGD DIAGNOSTIC ONLY performed by Osiel Wolff MD at [facility] N/A 9/26/2020    ENTEROSCOPY PUSH CONTROL HEMORRHAGE performed by Iline Mohs, MD at [facility] N/A 1/16/2022    ENTEROSCOPY PUSH DIAGNOSTIC performed by Lamont Smith MD at [place] [address]  04/13/2022       PREVIOUSMEDICATIONS:  Outpatient Medications Prior to Visit   Medication Sig Dispense Refill    levothyroxine (SYNTHROID) 100 MCG tablet TAKE 1 TABLET BY MOUTH ONCE DAILY 90 tablet 1    sucralfate (CARAFATE) 1 GM tablet Take 1 tablet by mouth every 6 hours 120 tablet 3    ferrous sulfate (IRON 325) 325 (65 Fe) MG tablet Take 1 tablet by mouth in the morning, at noon, and at bedtime 90 tablet 2    vilazodone HCl (VILAZODONE HCL) 40 MG TABS TAKE 1 TABLET BY MOUTH ONCE DAILY. 30 tablet 2    lithium 300 MG tablet Take 2 tablets by mouth 2 times daily 120 tablet 2    lamoTRIgine (LAMICTAL) 200 MG tablet Take one tab by mouth once daily.  30 tablet 2    lurasidone (LATUDA) 40 MG TABS tablet Take 1 tablet by mouth nightly 30 tablet 2    pantoprazole (PROTONIX) 40 MG tablet Take 1 tablet by mouth 2 times daily (before meals) 180 tablet 2    docusate sodium (COLACE) 100 MG capsule TAKE 1 CAPSULE BY MOUTH 2 TIMES A DAY 60 capsule 2    DHEA 50 MG CAPS Take by mouth nightly      dimethyl fumarate (TECFIDERA) 240 MG delayed release capsule Take 1 capsule by mouth 2 times daily      ipratropium (ATROVENT) 0.03 % nasal spray INSTILL 2 SPRAYS INTO EACH NOSTRIL 3 TIMES DAILY. 1 Bottle 3    Lactobacillus-Inulin (CULTURELLE HEALTH, INULIN,) CAPS TAKE 1 CAPSULE BY MOUTH WITH BREAKFAST 30 capsule 1    fluticasone (FLONASE) 50 MCG/ACT nasal spray 2 sprays by Each Nostril route daily      ondansetron (ZOFRAN) 4 MG tablet Take 1 tablet by mouth 3 times daily as needed for Nausea or Vomiting 15 tablet 0    Prosthesis MISC by Does not apply route Strap for the prosthesis broken please order new 1 each 0    loratadine (CLARITIN) 10 MG tablet Take 1 tablet by mouth daily 90 tablet 1    Cholecalciferol (VITAMIN D) 2000 units CAPS capsule Take by mouth nightly 50 mcg nightly      Magnesium Oxide 250 MG TABS Take 250 mg by mouth nightly       calcium carbonate 600 MG TABS tablet Take 1 tablet by mouth nightly       vitamin B-12 1000 MCG tablet Take 1 tablet by mouth daily 30 tablet 5    nitroGLYCERIN (NITROSTAT) 0.4 MG SL tablet up to max of 3 total doses. If no relief after 1 dose, call 911. 25 tablet 3     No facility-administered medications prior to visit. ALLERGIES:    Latex and Sulfa antibiotics    REVIEW OF SYSTEMS:    Review of Systems    The patient sees Dilma Blevins DO as her primary care provider. SPECIALISTS: Dr. Alex Rodriguez (GI), Dr. Basil August, Dr. James Schmitz (neurology)    OBJECTIVE DATA     There were no vitals taken for this visit. Physical Exam    Mental Status Evaluation:   Orientation: Alert, oriented, thought content appropriate   Mood:.  Euthymic      Affect:  Normal      Appearance:  right upper extremity prosthetic, Age Appropriate, Casually Dressed, Clean, Well Groomed, Clothing Appropriate for Age and Clothing Appropriate for Weather   Activity:  Cooperative, Good Eye Contact and Seated Calmly   Gait/Posture: Normal   Speech:  Clear, Fluent, Normal Pitch and Volume, Age and Situation Appropriate   Thought Process: Within Normal Limits   Thought Content: Within Normal Limits   Cognition:  Grossly Intact   Memory: Intact   Insight:  Good   Judgment: Good   Suicidal Ideations: Denies Suicidal Ideation   Homicidal Ideations: Negative for homicidal ideation   Medication Side Effects: Absent       Attention Span Attention span and concentration were age appropriate       Screenings Completed in This Encounter:     Anxiety and Depression:                    DIAGNOSIS AND ASSESSMENT DATA     DIAGNOSIS:   1. Bipolar I, most recent episode depressed, partial remission ([facility]    2. Memory changes        PLAN   Follow-up:  Return in about 6 months (around 3/13/2023), or if symptoms worsen or fail to improve, for follow-up and medication management. Prescriptions for this encounter:  New Prescriptions    No medications on file       Orders Placed This Encounter   Medications    lamoTRIgine (LAMICTAL) 200 MG tablet     Sig: Take one tab by mouth once daily. Dispense:  30 tablet     Refill:  5    lithium 300 MG tablet     Sig: Take 2 tablets by mouth in the morning and 2 tablets in the evening. Dispense:  120 tablet     Refill:  5    lurasidone (LATUDA) 40 MG TABS tablet     Sig: Take 1 tablet by mouth nightly     Dispense:  30 tablet     Refill:  5    vilazodone hcl 40 MG TABS     Sig: TAKE 1 TABLET BY MOUTH ONCE DAILY.      Dispense:  30 tablet     Refill:  5       Medications Discontinued During This Encounter   Medication Reason    vilazodone HCl (VILAZODONE HCL) 40 MG TABS REORDER    lithium 300 MG tablet REORDER    lamoTRIgine (LAMICTAL) 200 MG tablet REORDER    lurasidone (LATUDA) 40 MG TABS tablet REORDER       Additional orders:  Orders Placed This Encounter   Procedures Culture, Urine    Urinalysis with Microscopic     Patient is reporting stable mood since last visit. Patient will continue all medications without changes. Labs as ordered. Discussed at length the importance of holding the Lithium for 12 hours prior to having the lab completed. Patient is encouraged to utilize nonpharmacologic coping skills such as deep breathing, guided imagery, guided meditation, muscle relaxation, calming music, and/or journaling. Risks, potential side effects, possibledrug-drug interactions, benefits and alternate treatments discussed in detail. All questions answered. Patient stated understanding and is agreeable to treatment plan. Patient has been instructed to seek emergency help via the emergency and/or calling 911 should symptoms become severe, worsen, or with other concerning symptoms. Patient instructed to goimmediately to the emergency room and/or call 911 with any suicidal or homicidal ideations or if audio/visual hallucinations develop  Patient stated understanding and agrees. Patient given crisis center information. Provider Signature:  Electronically signed by AJITH Hua CNP on 9/13/2022 at 3:32 PM    **This report has been created using voice recognition software. It may contain minor errors which are inherent in voice recognition technology. **

## 2022-09-15 ENCOUNTER — HOSPITAL ENCOUNTER (OUTPATIENT)
Age: 62
Discharge: HOME | End: 2022-09-15
Payer: MEDICARE

## 2022-09-15 ENCOUNTER — TELEPHONE (OUTPATIENT)
Dept: PSYCHIATRY | Age: 62
End: 2022-09-15

## 2022-09-15 DIAGNOSIS — R79.89 ELEVATED LITHIUM LEVEL: ICD-10-CM

## 2022-09-15 DIAGNOSIS — Z51.81 THERAPEUTIC DRUG MONITORING: ICD-10-CM

## 2022-09-15 DIAGNOSIS — R41.3 MEMORY CHANGES: ICD-10-CM

## 2022-09-15 DIAGNOSIS — E16.2 HYPOGLYCEMIA: ICD-10-CM

## 2022-09-15 DIAGNOSIS — Z51.81 THERAPEUTIC DRUG MONITORING: Primary | ICD-10-CM

## 2022-09-15 LAB
ALBUMIN SERPL-MCNC: 4.2 G/DL (ref 3.5–5.1)
ALP BLD-CCNC: 121 U/L (ref 38–126)
ALT SERPL-CCNC: 29 U/L (ref 11–66)
ANION GAP SERPL CALCULATED.3IONS-SCNC: 10 MEQ/L (ref 8–16)
AST SERPL-CCNC: 19 U/L (ref 5–40)
BACTERIA: ABNORMAL
BILIRUB SERPL-MCNC: 0.6 MG/DL (ref 0.3–1.2)
BILIRUBIN URINE: NEGATIVE
BLOOD, URINE: NEGATIVE
BUN BLDV-MCNC: 9 MG/DL (ref 7–22)
CALCIUM SERPL-MCNC: 10.4 MG/DL (ref 8.5–10.5)
CASTS: ABNORMAL /LPF
CASTS: ABNORMAL /LPF
CHARACTER, URINE: CLEAR
CHLORIDE BLD-SCNC: 110 MEQ/L (ref 98–111)
CO2: 20 MEQ/L (ref 23–33)
COLOR: YELLOW
CREAT SERPL-MCNC: 0.6 MG/DL (ref 0.4–1.2)
CRYSTALS: ABNORMAL
EPITHELIAL CELLS, UA: ABNORMAL /HPF
GFR SERPL CREATININE-BSD FRML MDRD: > 90 ML/MIN/1.73M2
GLUCOSE BLD-MCNC: 62 MG/DL (ref 70–108)
GLUCOSE, URINE: NEGATIVE MG/DL
KETONES, URINE: NEGATIVE
LEUKOCYTE ESTERASE, URINE: ABNORMAL
LITHIUM LEVEL: 1.68 MMOL/L (ref 0.6–1.2)
MISCELLANEOUS LAB TEST RESULT: ABNORMAL
NITRITE, URINE: NEGATIVE
PH UA: 6.5 (ref 5–9)
POTASSIUM SERPL-SCNC: 4.1 MEQ/L (ref 3.5–5.2)
PROTEIN UA: ABNORMAL MG/DL
RBC URINE: ABNORMAL /HPF
RENAL EPITHELIAL, UA: ABNORMAL
SODIUM BLD-SCNC: 140 MEQ/L (ref 135–145)
SPECIFIC GRAVITY UA: 1.01 (ref 1–1.03)
TOTAL PROTEIN: 6.2 G/DL (ref 6.1–8)
UROBILINOGEN, URINE: 0.2 EU/DL (ref 0–1)
WBC UA: ABNORMAL /HPF
YEAST: ABNORMAL

## 2022-09-15 PROCEDURE — 36415 COLL VENOUS BLD VENIPUNCTURE: CPT

## 2022-09-15 PROCEDURE — 81001 URINALYSIS AUTO W/SCOPE: CPT

## 2022-09-15 PROCEDURE — 80178 ASSAY OF LITHIUM: CPT

## 2022-09-15 PROCEDURE — 87086 URINE CULTURE/COLONY COUNT: CPT

## 2022-09-15 PROCEDURE — 80053 COMPREHEN METABOLIC PANEL: CPT

## 2022-09-15 NOTE — TELEPHONE ENCOUNTER
Spoke with Rudi Barron in regards to her lab results; she understood and will repeat lab tomorrow without her AM dose. I also reached out to Tracy Lind () and spoke with her about the concerns on whether staff ask patients if they have held their AM dose or not. She said that honestly she is not sure and Epic does not milo who fadia her lab this morning but she would check with staff. She said that when patients are instructed to fast; that is asked first thing at registation but she is not aware of any other questions pertaining to labs about holding doses. She said that she would look into it and instruct staff to ask these questions too. I asked her if staff is looking at the actual order itself, she said that if its within Epic \"probably not\" because they go straight to order review but if the patient has the actual order form it is brought back with staff. She said that they probably assume that the patient has already been instructed by the provider but she would talk with staff regarding these concerns and thanked our office for bringing it to her attention.

## 2022-09-15 NOTE — TELEPHONE ENCOUNTER
Alice Whitmore from outpatient lab called into the office leaving a Vm that she had a critical lab on [facility] [place] and to return their call. Reached out to the lab and spoke with Volodymyr, he said it was valued at 1.68 but he was not aware if she had not taken her AM dose as he did not room the patient. I reached out to Rudi Barron, she said that she forgot to not take her AM dose (took it around 6am this morning and had her blood drawn at 8am today. She reports that she feels fine and no symptoms. I reached out to the provider via text as she is out of the office today; awaiting response.

## 2022-09-16 ENCOUNTER — HOSPITAL ENCOUNTER (OUTPATIENT)
Age: 62
Discharge: HOME | End: 2022-09-16
Payer: MEDICARE

## 2022-09-16 DIAGNOSIS — R79.89 ELEVATED LITHIUM LEVEL: ICD-10-CM

## 2022-09-16 DIAGNOSIS — Z51.81 THERAPEUTIC DRUG MONITORING: ICD-10-CM

## 2022-09-16 DIAGNOSIS — E16.2 HYPOGLYCEMIA: ICD-10-CM

## 2022-09-16 LAB
GLUCOSE BLD-MCNC: 87 MG/DL (ref 70–108)
LITHIUM LEVEL: 1.11 MMOL/L (ref 0.6–1.2)
ORGANISM: ABNORMAL
URINE CULTURE, ROUTINE: ABNORMAL

## 2022-09-16 PROCEDURE — 80178 ASSAY OF LITHIUM: CPT

## 2022-09-16 PROCEDURE — 36415 COLL VENOUS BLD VENIPUNCTURE: CPT

## 2022-09-16 PROCEDURE — 82947 ASSAY GLUCOSE BLOOD QUANT: CPT

## 2022-09-16 NOTE — TELEPHONE ENCOUNTER
I have left a message for Eliane Mcintosh to return my call to verify information; awaiting call back.

## 2022-09-16 NOTE — TELEPHONE ENCOUNTER
Please verify patient had lithium level and glucose levels completed this morning as ordered. Verify patient HELD her morning dose of Lithium.

## 2022-09-16 NOTE — TELEPHONE ENCOUNTER
Patient calls stating she has a prosthesis. The Under arm strap is broken and digging into her skin. She contacted [facility] and they told her she just needs a new prescription. Fax to:  [facility] (they are aware of a new script is coming)    Please advise.     DOLV   2/26/20 Topical Ketoconazole Counseling: Patient counseled that this medication may cause skin irritation or allergic reactions.  In the event of skin irritation, the patient was advised to reduce the amount of the drug applied or use it less frequently.   The patient verbalized understanding of the proper use and possible adverse effects of ketoconazole.  All of the patient's questions and concerns were addressed.

## 2022-09-16 NOTE — TELEPHONE ENCOUNTER
Dallas Romano called back into the office stating that, yes she completed her labs this morning and she held her AM dose of Lithium; awaiting results.

## 2022-09-19 DIAGNOSIS — Z51.81 THERAPEUTIC DRUG MONITORING: Primary | ICD-10-CM

## 2022-09-20 ENCOUNTER — TELEPHONE (OUTPATIENT)
Dept: FAMILY MEDICINE CLINIC | Age: 62
End: 2022-09-20

## 2022-09-20 NOTE — TELEPHONE ENCOUNTER
Pt returned call. Pt states the VV is for low blood sugar not low blood pressure. Michelle notes changed.

## 2022-09-20 NOTE — TELEPHONE ENCOUNTER
----- Message from Rudy Serrato DO sent at 9/20/2022 12:09 PM EDT -----  Natalee! Ms. Sandra Harden has a [facility] Diabetes mychart scheduled for this afternoon, but patient does not have diabetes. She was last seen by Dr. Dejon Worrell on 9/8 and supposed to follow-up with me in November. Can we find out what's going on? Thank you!!     KL

## 2022-09-26 ENCOUNTER — HOSPITAL ENCOUNTER (OUTPATIENT)
Age: 62
Discharge: HOME | End: 2022-09-26
Payer: MEDICARE

## 2022-09-26 DIAGNOSIS — Z51.81 THERAPEUTIC DRUG MONITORING: Primary | ICD-10-CM

## 2022-09-26 DIAGNOSIS — Z51.81 THERAPEUTIC DRUG MONITORING: ICD-10-CM

## 2022-09-26 LAB — LITHIUM LEVEL: 1.03 MMOL/L (ref 0.6–1.2)

## 2022-09-26 PROCEDURE — 36415 COLL VENOUS BLD VENIPUNCTURE: CPT

## 2022-09-26 PROCEDURE — 80178 ASSAY OF LITHIUM: CPT

## 2022-10-04 ENCOUNTER — OFFICE VISIT (OUTPATIENT)
Dept: FAMILY MEDICINE CLINIC | Age: 62
End: 2022-10-04
Payer: MEDICARE

## 2022-10-04 VITALS
DIASTOLIC BLOOD PRESSURE: 64 MMHG | SYSTOLIC BLOOD PRESSURE: 110 MMHG | OXYGEN SATURATION: 100 % | BODY MASS INDEX: 25.82 KG/M2 | RESPIRATION RATE: 12 BRPM | TEMPERATURE: 97.9 F | WEIGHT: 123 LBS | HEART RATE: 84 BPM | HEIGHT: 58 IN

## 2022-10-04 DIAGNOSIS — E16.2 HYPOGLYCEMIA: Primary | ICD-10-CM

## 2022-10-04 PROCEDURE — 99213 OFFICE O/P EST LOW 20 MIN: CPT | Performed by: STUDENT IN AN ORGANIZED HEALTH CARE EDUCATION/TRAINING PROGRAM

## 2022-10-04 ASSESSMENT — ENCOUNTER SYMPTOMS
NAUSEA: 0
COUGH: 0
SHORTNESS OF BREATH: 0
SORE THROAT: 0
ABDOMINAL PAIN: 0

## 2022-10-04 NOTE — PROGRESS NOTES
Health Maintenance Due   Topic Date Due    Cervical cancer screen  Never done    COVID-19 Vaccine (4 - Booster for Maradiaga Peter series) 03/10/2022

## 2022-10-04 NOTE — PROGRESS NOTES
[address] [facility] [address] [place] [address]  Dept: [phone]  Dept Fax: [phone]  Loc: [phone]  PROGRESS NOTE      Visit Date: 10/4/2022    Eleni East is a 58 y.o. female who presents today for:  Chief Complaint   Patient presents with    Follow-up     Hypoglycemia       Impression/Plan:  1. Hypoglycemia  Acute  Noted while admitted  Endorses some shakiness in the morning, which resolves when she eats  Will check blood glucose every a.m. before eating and when symptomatic  Will call our office if she has 3 days in a row of blood glucose below 80  Discussed protein rich foods and complex carbohydrates  If persistently hypoglycemic, order the following: glucose, insulin, c-peptide, beta-hydroxybutyrate, proinsulin, and sulfonylurea & meglitinide screen  - blood glucose monitor kit and supplies; Dispense sufficient amount for indicated testing frequency plus additional to accommodate PRN testing needs. Dispense all needed supplies to include: monitor, strips, lancing device, lancets, control solutions, alcohol swabs. Dispense: 1 kit; Refill: 0    Return in about 4 weeks (around 11/1/2022) for hypoglycemia. Subjective:  HPI    Here for concerns of possible hypoglycemia. She is not taking any antihyperglycemic agents at this time. While she was in the hospital, she did have episodes of documented hypoglycemia. She says she sometimes feels very shaky at home in the mornings when she hasn't eaten. Denies diaphoresis. No previous history of hypoglycemia. No history of diabetes. She does have a history of Batsheva-en-Y but denies diarrhea or other signs of dumping syndrome. Weight is stable. No further questions of complaints. Review of Systems   Constitutional:  Negative for chills and fever. See hpi   HENT:  Negative for congestion and sore throat. Eyes:  Negative for visual disturbance. Respiratory:  Negative for cough and shortness of breath. Cardiovascular:  Negative for chest pain. Gastrointestinal:  Negative for abdominal pain and nausea. Genitourinary:  Negative for dysuria. Skin:  Negative for rash. Neurological:  Negative for dizziness, light-headedness and headaches. Psychiatric/Behavioral:  The patient is not nervous/anxious.       Patient Active Problem List   Diagnosis    Acquired hypothyroidism    MS (multiple sclerosis) ([facility] [address]    S/P laparoscopic cholecystectomy    S/P laparoscopic appendectomy    Gastrointestinal hemorrhage associated with gastritis    Multiple thyroid nodules    Non-ischemic cardiomyopathy (HCC)    History of Batsheva-en-Y gastric bypass    Renal lesion    Liver lesion    Bipolar 1 disorder (HCC)    Symptomatic bradycardia    Status post placement of cardiac pacemaker    Gastrointestinal hemorrhage with melena    GI bleed    Peptic ulcer disease    Other complications of other bariatric procedure    Ulcer at site of surgical anastomosis following bypass of stomach    Acute blood loss anemia    Iron deficiency anemia    Elevated lithium level     Past Medical History:   Diagnosis Date    Balance problem     Bipolar I disorder, most recent episode (or current) depressed, in partial or unspecified remission 5/8/2013    Cancer ([facility] [address]     CERVICAL     Depression     Fatigue     Gallstones     Gastric bypass status for obesity 2002    GERD (gastroesophageal reflux disease)     History of blood transfusion     History of hysterectomy     REMOVAL OF ONE OVARY     Hypothyroidism     Liver cyst     GI Dr Keren Cleary    MS (multiple sclerosis) ([facility] [address]     Paresthesias     RT LOWER LIMB    Ulcer at site of surgical anastomosis following bypass of stomach 1/15/2022    UTI (urinary tract infection)       Past Surgical History:   Procedure Laterality Date    APPENDECTOMY      BONE GRAFT Left     left leg to right arm    CARPAL TUNNEL RELEASE  1999    CHOLECYSTECTOMY, LAPAROSCOPIC  05/16/2016    COLONOSCOPY      COLONOSCOPY N/A 01/16/2022    COLONOSCOPY CONTROL HEMORRHAGE performed by Dayan Alfaro MD at [address] [facility], ESOPHAGUS      ENDOSCOPY, COLON, DIAGNOSTIC      ENTEROSCOPY N/A 02/05/2018    ENTEROSCOPY performed by Simona Alvarado MD at [address] [facility] Left 09/29/2022    Retina Surgery    GASTROSTOMY TUBE PLACEMENT N/A 09/28/2020    GASTROSTOMY TUBE PLACEMENT- [facility] TO DO GASTRIC ENDOSCOPY performed by Sushila Santos MD at [address] ([address])  [address]  05/16/2016    OTHER SURGICAL HISTORY      RECTAL [address] [place]    D&C    OTHER SURGICAL HISTORY  2006    GI BLEED    OVARY REMOVAL N/A     pt unaware which one was taken    PACEMAKER INSERTION Right 05/20/2019    Dr. Bonny Guillory FLX DX W/COLLJ Beny 1978 P[facility] Left 02/06/2018    COLONOSCOPY performed by Simona Alvarado MD at [facility]    OK OFFICE/OUTPT VISIT,PROCEDURE ONLY Left 02/18/2018    EGD ESOPHAGOGASTRODUODENOSCOPY performed by Leah Quiroz MD at [facility] End[facility]    OK OFFICE/OUTPT VISIT,PROCEDURE ONLY N/A 10/04/2018    EGD DIAGNOSTIC ONLY performed by Amy Coreas MD at [facility] ENDOSCOPY Left 09/24/2019    EGD DIAGNOSTIC ONLY performed by Mikey Zepeda MD at [facility] N/A 09/26/2020    ENTEROSCOPY PUSH CONTROL HEMORRHAGE performed by Simona Alvarado MD at [facility] N/A 01/16/2022    ENTEROSCOPY PUSH DIAGNOSTIC performed by Dayan Alfaro MD at [address]  04/13/2022     Family History   Problem Relation Age of Onset    Cancer Mother 43    Colon Polyps Mother     Breast Cancer Mother     Heart Disease Father     Glaucoma Father     Cancer Sister Colon Cancer Sister     Cancer Sister     Breast Cancer Sister      Social History     Tobacco Use    Smoking status: Former     Packs/day: 0.10     Years: 0.50     Pack years: 0.05     Types: Cigarettes     Quit date: 10/24/1977     Years since quittin.9    Smokeless tobacco: Never   Substance Use Topics    Alcohol use: No      Current Outpatient Medications   Medication Sig Dispense Refill    blood glucose monitor kit and supplies Dispense sufficient amount for indicated testing frequency plus additional to accommodate PRN testing needs. Dispense all needed supplies to include: monitor, strips, lancing device, lancets, control solutions, alcohol swabs. 1 kit 0    lamoTRIgine (LAMICTAL) 200 MG tablet Take one tab by mouth once daily. 30 tablet 5    lithium 300 MG tablet Take 2 tablets by mouth in the morning and 2 tablets in the evening. 120 tablet 5    lurasidone (LATUDA) 40 MG TABS tablet Take 1 tablet by mouth nightly 30 tablet 5    vilazodone hcl 40 MG TABS TAKE 1 TABLET BY MOUTH ONCE DAILY. 30 tablet 5    levothyroxine (SYNTHROID) 100 MCG tablet TAKE 1 TABLET BY MOUTH ONCE DAILY 90 tablet 1    sucralfate (CARAFATE) 1 GM tablet Take 1 tablet by mouth every 6 hours 120 tablet 3    ferrous sulfate (IRON 325) 325 (65 Fe) MG tablet Take 1 tablet by mouth in the morning, at noon, and at bedtime 90 tablet 2    pantoprazole (PROTONIX) 40 MG tablet Take 1 tablet by mouth 2 times daily (before meals) 180 tablet 2    docusate sodium (COLACE) 100 MG capsule TAKE 1 CAPSULE BY MOUTH 2 TIMES A DAY 60 capsule 2    DHEA 50 MG CAPS Take by mouth nightly      dimethyl fumarate (TECFIDERA) 240 MG delayed release capsule Take 1 capsule by mouth 2 times daily      ipratropium (ATROVENT) 0.03 % nasal spray INSTILL 2 SPRAYS INTO EACH NOSTRIL 3 TIMES DAILY.  1 Bottle 3    Lactobacillus-Inulin (CULTURELLE HEALTH, INULIN,) CAPS TAKE 1 CAPSULE BY MOUTH WITH BREAKFAST 30 capsule 1    fluticasone (FLONASE) 50 MCG/ACT nasal spray 2 sprays by Each Nostril route daily      ondansetron (ZOFRAN) 4 MG tablet Take 1 tablet by mouth 3 times daily as needed for Nausea or Vomiting 15 tablet 0    Prosthesis MISC by Does not apply route Strap for the prosthesis broken please order new 1 each 0    loratadine (CLARITIN) 10 MG tablet Take 1 tablet by mouth daily 90 tablet 1    Cholecalciferol (VITAMIN D) 2000 units CAPS capsule Take by mouth nightly 50 mcg nightly      Magnesium Oxide 250 MG TABS Take 250 mg by mouth nightly       calcium carbonate 600 MG TABS tablet Take 1 tablet by mouth nightly       vitamin B-12 1000 MCG tablet Take 1 tablet by mouth daily 30 tablet 5    nitroGLYCERIN (NITROSTAT) 0.4 MG SL tablet up to max of 3 total doses. If no relief after 1 dose, call 911. 25 tablet 3     No current facility-administered medications for this visit.      Allergies   Allergen Reactions    Latex Rash    Sulfa Antibiotics Rash       Immunization History   Administered Date(s) Administered    COVID-19, PFIZER PURPLE top, DILUTE for use, (age 15 y+), 30mcg/0.3mL 02/02/2021, 02/23/2021, 11/10/2021    Influenza Vaccine, unspecified formulation 09/15/2015, 09/14/2016, 10/21/2016, 08/16/2017    Influenza Virus Vaccine 10/21/2016, 08/16/2017, 09/30/2020    Influenza, AFLURIA (age 1 yrs+), FLUZONE, (age 10 mo+), MDV, 0.5mL 10/05/2018    Influenza, FLUARIX, FLULAVAL, FLUZONE (age 10 mo+) AND AFLURIA, (age 1 y+), PF, 0.5mL 09/23/2017, 09/24/2019, 09/20/2021    Influenza, FLUBLOK, (age 25 y+), PF, 0.5mL 09/14/2022    Influenza, FLUCELVAX, (age 10 mo+), MDCK, MDV, 0.5mL 03/11/2019    Influenza, Quadv, 6 mo and older, IM, PF (Flulaval, Fluarix) 10/05/2018    Pneumococcal Conjugate 13-valent ([id number]) 04/10/2019    Pneumococcal Polysaccharide ([id number]) 05/17/2016    Td (Adult), 5 Lf Tetanus Toxoid, Pf (Tenivac, Decavac) 04/30/1999    Td vaccine (adult) 04/30/1999    Tdap (Boostrix, Adacel) 09/29/2015, 09/12/2016, 10/25/2018    Zoster Recombinant (Shingrix) 03/11/2019, 11/19/2019, 01/03/2020     Health Maintenance   Topic Date Due    Cervical cancer screen  Never done    COVID-19 Vaccine (4 - Booster for Pfizer series) 03/10/2022    Breast cancer screen  01/24/2023    Depression Monitoring  02/18/2023    Annual Wellness Visit (AWV)  02/19/2023    Pneumococcal 0-64 years Vaccine (3 - PPSV23 or PCV20) 05/12/2025    Lipids  03/02/2027    DTaP/Tdap/Td vaccine (4 - Td or Tdap) 10/25/2028    Colorectal Cancer Screen  01/16/2032    Flu vaccine  Completed    Shingles vaccine  Completed    Hepatitis C screen  Completed    HIV screen  Completed    Hepatitis A vaccine  Aged Out    Hepatitis B vaccine  Aged Out    Hib vaccine  Aged Out    Meningococcal (ACWY) vaccine  Aged Out       LABS  Lab Results   Component Value Date    LABA1C 4.7 11/13/2019     No results found for: EAG  No components found for: CHLPL  Lab Results   Component Value Date    TRIG 32 03/02/2022    TRIG 71 04/12/2021    TRIG 62 03/04/2021     Lab Results   Component Value Date    HDL 95 03/02/2022     04/12/2021    HDL 97 03/04/2021     Lab Results   Component Value Date    LDLCALC 40 03/02/2022    LDLCALC 40 04/12/2021    LDLCALC 39 03/04/2021       Chemistry        Component Value Date/Time     09/15/2022 0758    K 4.1 09/15/2022 0758    K 3.8 09/01/2022 1711     09/15/2022 0758    CO2 20 (L) 09/15/2022 0758    BUN 9 09/15/2022 0758    CREATININE 0.6 09/15/2022 0758        Component Value Date/Time    CALCIUM 10.4 09/15/2022 0758    ALKPHOS 121 09/15/2022 0758    AST 19 09/15/2022 0758    ALT 29 09/15/2022 0758    BILITOT 0.6 09/15/2022 0758          No results found for: LABMICR, [id number]  Lab Results   Component Value Date    TSH 1.400 03/01/2022     No results found for: PSA  Lab Results   Component Value Date    WBC 4.1 (L) 09/02/2022    HGB 11.8 (L) 09/02/2022    HCT 37.5 09/02/2022    .7 (H) 09/02/2022     09/02/2022       Objective:  /64 (Site: Left Upper Arm, Position: Sitting, Cuff Size: Medium Adult)   Pulse 84   Temp 97.9 °F (36.6 °C) (Oral)   Resp 12   Ht 4' 10\" (1.473 m)   Wt 123 lb (55.8 kg)   SpO2 100%   BMI 25.71 kg/m²     Physical Exam  Constitutional:       General: She is not in acute distress. Appearance: Normal appearance. She is not ill-appearing. HENT:      Head: Normocephalic. Right Ear: External ear normal.      Left Ear: External ear normal.      Nose: Nose normal.      Mouth/Throat:      Mouth: Mucous membranes are moist.   Eyes:      General: No scleral icterus. Extraocular Movements: Extraocular movements intact. Cardiovascular:      Rate and Rhythm: Normal rate and regular rhythm. Pulses: Normal pulses. Heart sounds: Normal heart sounds. Pulmonary:      Effort: Pulmonary effort is normal.      Breath sounds: Normal breath sounds. Abdominal:      General: Abdomen is flat. There is no distension. Palpations: Abdomen is soft. Musculoskeletal:         General: Normal range of motion. Cervical back: Normal range of motion. Comments: Chronic right upper extremity amputee with prosthetic. Skin:     General: Skin is warm and dry. Neurological:      Mental Status: She is alert. Motor: No weakness. Psychiatric:         Mood and Affect: Mood normal.       They voiced understanding. All questions answered. They agreed with treatment plan. See patient instructions for any educational materials that may have been given. Discussed use, benefit, and side effects of prescribed medications. Reviewed health maintenance.     (Please note that portions of this note may have been completed with a voice recognition program.  Efforts were made to edit the dictation but occasionally words are mis-transcribed.)      Electronically signed by Tea Cordova DO on 10/4/2022 at 6:31 PM

## 2022-10-04 NOTE — PATIENT INSTRUCTIONS
Please check blood glucose first thing in the morning and then when symptomatic. If you have blood glucose below 80, 3 mornings in a row, please call me. Please eat protein rich foods and complex carbohydrates. Thank you   Thank you for trusting us with your healthcare needs. You may receive a survey regarding today's visit. It would help us out if you would take a few moments to provide your feedback. We value your input. Please bring in ALL medications BOTTLES, including inhalers, herbal supplements, over the counter, prescribed & non-prescribed medicine. The office would like actual medication bottles and a list.   Please note our OFFICE POLICIES:   Prior to getting your labs drawn, please check with your insurance company for benefits and eligibility of lab services. Often, insurance companies cover certain tests for preventative visits only. It is patient's responsibility to see what is covered. We are unable to change a diagnosis after the test has been performed. Lab orders will not be re-printed. Please hold onto your original lab orders and take them to your lab to be completed. If you no show your scheduled appointment three times, you will be dismissed from this practice. Reschedules must be completed 24 hours prior to your schedule appointment. If the list below has been completed, PLEASE FAX RECORDS TO OUR OFFICE @ [phone].  Once the records have been received we will update your records at our office:  Health Maintenance Due   Topic Date Due    Cervical cancer screen  Never done    COVID-19 Vaccine (4 - Booster for Maradiaga Peter series) 03/10/2022

## 2022-10-10 ENCOUNTER — HOSPITAL ENCOUNTER (OUTPATIENT)
Age: 62
Discharge: HOME | End: 2022-10-10
Payer: MEDICARE

## 2022-10-10 DIAGNOSIS — Z51.81 THERAPEUTIC DRUG MONITORING: ICD-10-CM

## 2022-10-10 LAB — LITHIUM LEVEL: 0.93 MMOL/L (ref 0.6–1.2)

## 2022-10-10 PROCEDURE — 80178 ASSAY OF LITHIUM: CPT

## 2022-10-10 PROCEDURE — 36415 COLL VENOUS BLD VENIPUNCTURE: CPT

## 2022-10-12 DIAGNOSIS — Z51.81 THERAPEUTIC DRUG MONITORING: Primary | ICD-10-CM

## 2022-11-01 ENCOUNTER — OFFICE VISIT (OUTPATIENT)
Dept: FAMILY MEDICINE CLINIC | Age: 62
End: 2022-11-01
Payer: MEDICARE

## 2022-11-01 VITALS
BODY MASS INDEX: 26.66 KG/M2 | TEMPERATURE: 98.1 F | HEIGHT: 58 IN | WEIGHT: 127 LBS | DIASTOLIC BLOOD PRESSURE: 60 MMHG | HEART RATE: 86 BPM | SYSTOLIC BLOOD PRESSURE: 108 MMHG | OXYGEN SATURATION: 99 %

## 2022-11-01 DIAGNOSIS — Z86.2 HISTORY OF ANEMIA: ICD-10-CM

## 2022-11-01 DIAGNOSIS — E16.2 HYPOGLYCEMIA: ICD-10-CM

## 2022-11-01 DIAGNOSIS — K90.9 INTESTINAL MALABSORPTION, UNSPECIFIED TYPE: ICD-10-CM

## 2022-11-01 DIAGNOSIS — Z98.84 HISTORY OF GASTRIC BYPASS: Primary | ICD-10-CM

## 2022-11-01 PROCEDURE — 99213 OFFICE O/P EST LOW 20 MIN: CPT | Performed by: STUDENT IN AN ORGANIZED HEALTH CARE EDUCATION/TRAINING PROGRAM

## 2022-11-01 ASSESSMENT — ENCOUNTER SYMPTOMS
NAUSEA: 0
ABDOMINAL PAIN: 0
COUGH: 0
SHORTNESS OF BREATH: 0
SORE THROAT: 0

## 2022-11-01 NOTE — PROGRESS NOTES
Health Maintenance Due   Topic Date Due    COVID-19 Vaccine (4 - Booster for [employer] series) 01/05/2022

## 2022-11-01 NOTE — PROGRESS NOTES
[address] [facility] [place] Javier Henson [facility] [address]  Dept: [phone]  Dept Fax: [phone]  Loc: [phone]  PROGRESS NOTE      Visit Date: 11/1/2022    Eleni East is a 58 y.o. female who presents today for:  Chief Complaint   Patient presents with    1 Month Follow-Up       Hypoglycemia, also wants to talk about the several diets she is on       Impression/Plan:  1. History of gastric bypass  S/p bypass 2002  Has not had labs checked in several years  Will check today  - Vitamin A; Future  - Vitamin B12 & Folate; Future  - Vitamin C; Future  - Vitamin E; Future  - Vitamin D 25 Hydroxy; Future  - Vitamin B1; Future  - Iron; Future  - Zinc; Future  - Copper, Serum; Future  - Selenium Serum; Future  - Calcium, Ionized; Future    2. Hypoglycemia  Resolved, noted incidentally while admitted recently  Logs at home show normal blood glucose overall  Did have 2 episodes of fasting blood glucose in the 70s, but was asymptomatic  Will not continue to monitor unless otherwise indicated    3. Intestinal malabsorption, unspecified type  Related to gastric bypass  Labs ordered today  - Vitamin D 25 Hydroxy; Future    4. History of anemia  Chronic, asymptomatic  Labs ordered today  - Iron; Future    Return in about 7 weeks (around 12/19/2022) for Chronic. Subjective:  HPI    Blood glucose tracking:  did have 2-3 days of blood glucose below 80, it was 70ish. Highest blood glucose was 187. Denies any symptoms of hyper or hypoglycemia. We discussed that she does not need to be checking her blood glucose regularly. Encouraged her to continue eating all whole diet when possible, but should focus on eating an iron rich diet like she was previously. History of gastric bypass: Has not had labs checked in quite some time. We will place orders today and then continue yearly check. She she does endorse occasional diarrhea.   However, it is infrequent. She often struggles more with constipation due to her medications. We discussed decreasing her Colace or stopping it since she is having very soft stools in the morning. She would like to take it every other day and see how this does. No further questions of complaints. Review of Systems   Constitutional:  Negative for chills and fever. HENT:  Negative for congestion and sore throat. Eyes:  Negative for visual disturbance. Respiratory:  Negative for cough and shortness of breath. Cardiovascular:  Negative for chest pain. Gastrointestinal:  Negative for abdominal pain and nausea. Genitourinary:  Negative for dysuria. Skin:  Negative for rash. Neurological:  Negative for dizziness, light-headedness and headaches. Psychiatric/Behavioral:  The patient is not nervous/anxious.       Patient Active Problem List   Diagnosis    Acquired hypothyroidism    MS (multiple sclerosis) ([facility] [address]    S/P laparoscopic cholecystectomy    S/P laparoscopic appendectomy    Gastrointestinal hemorrhage associated with gastritis    Multiple thyroid nodules    Non-ischemic cardiomyopathy ([facility] [address]    History of Batsheva-en-Y gastric bypass    Renal lesion    Liver lesion    Bipolar 1 disorder (HCC)    Symptomatic bradycardia    Status post placement of cardiac pacemaker    Gastrointestinal hemorrhage with melena    GI bleed    Peptic ulcer disease    Other complications of other bariatric procedure    Ulcer at site of surgical anastomosis following bypass of stomach    Acute blood loss anemia    Iron deficiency anemia    Elevated lithium level     Past Medical History:   Diagnosis Date    Balance problem     Bipolar I disorder, most recent episode (or current) depressed, in partial or unspecified remission 5/8/2013    Cancer ([facility] [address]     CERVICAL     Depression     Fatigue     Gallstones     Gastric bypass status for obesity 2002    GERD (gastroesophageal reflux disease)     History of blood transfusion     History of hysterectomy     REMOVAL OF ONE OVARY     Hypothyroidism     Liver cyst     GI Dr Hardik Romo    MS (multiple sclerosis) [facility])     Paresthesias     RT LOWER LIMB    Ulcer at site of surgical anastomosis following bypass of stomach 1/15/2022    UTI (urinary tract infection)       Past Surgical History:   Procedure Laterality Date    APPENDECTOMY      BONE GRAFT Left     left leg to right arm    CARPAL TUNNEL RELEASE  1999    CHOLECYSTECTOMY, LAPAROSCOPIC  05/16/2016    COLONOSCOPY      COLONOSCOPY N/A 01/16/2022    COLONOSCOPY CONTROL HEMORRHAGE performed by Gideon Madsen MD at [facility], ESOPHAGUS      ENDOSCOPY, COLON, DIAGNOSTIC      ENTEROSCOPY N/A 02/05/2018    ENTEROSCOPY performed by Abbie Ta MD at [address] [facility] Left 09/29/2022    Retina Surgery    GASTROSTOMY TUBE PLACEMENT N/A 09/28/2020    GASTROSTOMY TUBE PLACEMENT- [facility] TO DO GASTRIC ENDOSCOPY performed by Jeni Riggs MD at [facility] ([address] [facility])  [address] [facility]  05/16/2016    OTHER SURGICAL HISTORY      RECTAL [address] [place]    D&C    OTHER SURGICAL HISTORY  2006    GI BLEED    OVARY REMOVAL N/A     pt unaware which one was taken    PACEMAKER INSERTION Right 05/20/2019    Dr. Fredis Barreto FLX DX W/COLLJ [place] [address] [facility] Left 02/06/2018    COLONOSCOPY performed by Abbie Ta MD at [facility]    RI OFFICE/OUTPT VISIT,PROCEDURE ONLY Left 02/18/2018    EGD ESOPHAGOGASTRODUODENOSCOPY performed by Dafne Chinchilla MD at [address] [facility] OFFICE/OUTPT VISIT,PROCEDURE ONLY N/A 10/04/2018    EGD DIAGNOSTIC ONLY performed by John Reilly MD at [address] Left 09/24/2019    EGD DIAGNOSTIC ONLY performed by Nan Narvaez MD at [place] [address] MG delayed release capsule Take 1 capsule by mouth 2 times daily      ipratropium (ATROVENT) 0.03 % nasal spray INSTILL 2 SPRAYS INTO EACH NOSTRIL 3 TIMES DAILY. 1 Bottle 3    Lactobacillus-Inulin (CULTURELLE HEALTH, INULIN,) CAPS TAKE 1 CAPSULE BY MOUTH WITH BREAKFAST 30 capsule 1    fluticasone (FLONASE) 50 MCG/ACT nasal spray 2 sprays by Each Nostril route daily      ondansetron (ZOFRAN) 4 MG tablet Take 1 tablet by mouth 3 times daily as needed for Nausea or Vomiting 15 tablet 0    Prosthesis MISC by Does not apply route Strap for the prosthesis broken please order new 1 each 0    loratadine (CLARITIN) 10 MG tablet Take 1 tablet by mouth daily 90 tablet 1    Cholecalciferol (VITAMIN D) 2000 units CAPS capsule Take by mouth nightly 50 mcg nightly      Magnesium Oxide 250 MG TABS Take 250 mg by mouth nightly       calcium carbonate 600 MG TABS tablet Take 1 tablet by mouth nightly       vitamin B-12 1000 MCG tablet Take 1 tablet by mouth daily 30 tablet 5    nitroGLYCERIN (NITROSTAT) 0.4 MG SL tablet up to max of 3 total doses. If no relief after 1 dose, call 911. 25 tablet 3    ferrous sulfate (IRON 325) 325 (65 Fe) MG tablet Take 1 tablet by mouth in the morning, at noon, and at bedtime 90 tablet 2     No current facility-administered medications for this visit.      Allergies   Allergen Reactions    Latex Rash    Sulfa Antibiotics Rash       Immunization History   Administered Date(s) Administered    COVID-19, PFIZER PURPLE top, DILUTE for use, (age 15 y+), 30mcg/0.3mL 02/02/2021, 02/23/2021, 11/10/2021    Influenza Vaccine, unspecified formulation 09/15/2015, 09/14/2016, 10/21/2016, 08/16/2017    Influenza Virus Vaccine 10/21/2016, 08/16/2017, 09/30/2020    Influenza, AFLURIA (age 1 yrs+), FLUZONE, (age 10 mo+), MDV, 0.5mL 10/05/2018    Influenza, FLUARIX, FLULAVAL, FLUZONE (age 10 mo+) AND AFLURIA, (age 1 y+), PF, 0.5mL 09/23/2017, 09/24/2019, 09/20/2021    Influenza, FLUBLOK, (age 25 y+), PF, 0.5mL 09/14/2022 Influenza, FLUCELVAX, (age 10 mo+), MDCK, MDV, 0.5mL 03/11/2019    Influenza, Quadv, 6 mo and older, IM, PF (Flulaval, Fluarix) 10/05/2018    Pneumococcal Conjugate 13-valent ([id number]) 04/10/2019    Pneumococcal Polysaccharide ([id number]) 05/17/2016    Td (Adult), 5 Lf Tetanus Toxoid, Pf (Tenivac, Decavac) 04/30/1999    Td vaccine (adult) 04/30/1999    Tdap (Boostrix, Adacel) 09/29/2015, 09/12/2016, 10/25/2018    Zoster Recombinant (Shingrix) 03/11/2019, 11/19/2019, 01/03/2020     Health Maintenance   Topic Date Due    COVID-19 Vaccine (4 - Booster for Pfizer series) 01/05/2022    Breast cancer screen  01/24/2023    Depression Monitoring  02/18/2023    Annual Wellness Visit (AWV)  02/19/2023    Pneumococcal 0-64 years Vaccine (3 - PPSV23 if available, else PCV20) 05/12/2025    Lipids  03/02/2027    DTaP/Tdap/Td vaccine (4 - Td or Tdap) 10/25/2028    Colorectal Cancer Screen  01/16/2032    Flu vaccine  Completed    Shingles vaccine  Completed    Hepatitis C screen  Completed    HIV screen  Completed    Hepatitis A vaccine  Aged Out    Hib vaccine  Aged Out    Meningococcal (ACWY) vaccine  Aged Out       LABS  Lab Results   Component Value Date    LABA1C 4.7 11/13/2019     No results found for: EAG  No components found for: CHLPL  Lab Results   Component Value Date    TRIG 32 03/02/2022    TRIG 71 04/12/2021    TRIG 62 03/04/2021     Lab Results   Component Value Date    HDL 95 03/02/2022     04/12/2021    HDL 97 03/04/2021     Lab Results   Component Value Date    LDLCALC 40 03/02/2022    LDLCALC 40 04/12/2021    LDLCALC 39 03/04/2021       Chemistry        Component Value Date/Time     09/15/2022 0758    K 4.1 09/15/2022 0758    K 3.8 09/01/2022 1711     09/15/2022 0758    CO2 20 (L) 09/15/2022 0758    BUN 9 09/15/2022 0758    CREATININE 0.6 09/15/2022 0758        Component Value Date/Time    CALCIUM 10.4 09/15/2022 0758    ALKPHOS 121 09/15/2022 0758    AST 19 09/15/2022 0758    ALT 29 09/15/2022 0758    BILITOT 0.6 09/15/2022 0758          No results found for: LABMICR, [id number]  Lab Results   Component Value Date    TSH 1.400 03/01/2022     No results found for: PSA  Lab Results   Component Value Date    WBC 4.1 (L) 09/02/2022    HGB 11.8 (L) 09/02/2022    HCT 37.5 09/02/2022    .7 (H) 09/02/2022     09/02/2022       Objective:  /60 (Site: Left Upper Arm, Position: Sitting, Cuff Size: Medium Adult)   Pulse 86   Temp 98.1 °F (36.7 °C) (Temporal)   Ht 4' 10\" (1.473 m)   Wt 127 lb (57.6 kg)   SpO2 99%   BMI 26.54 kg/m²     Physical Exam  Constitutional:       General: She is not in acute distress. Appearance: Normal appearance. HENT:      Head: Normocephalic. Right Ear: External ear normal.      Left Ear: External ear normal.      Nose: Nose normal.      Mouth/Throat:      Mouth: Mucous membranes are moist.   Eyes:      General: No scleral icterus. Extraocular Movements: Extraocular movements intact. Cardiovascular:      Rate and Rhythm: Normal rate and regular rhythm. Pulses: Normal pulses. Heart sounds: Normal heart sounds. Pulmonary:      Effort: Pulmonary effort is normal.      Breath sounds: Normal breath sounds. Abdominal:      General: Abdomen is flat. There is no distension. Palpations: Abdomen is soft. Musculoskeletal:         General: Normal range of motion. Cervical back: Normal range of motion. Comments: Right arm amputation, chronic. Prosthetic in place. Skin:     General: Skin is warm and dry. Neurological:      Mental Status: She is alert. Motor: No weakness. Psychiatric:         Mood and Affect: Mood normal.       They voiced understanding. All questions answered. They agreed with treatment plan. See patient instructions for any educational materials that may have been given. Discussed use, benefit, and side effects of prescribed medications. Reviewed health maintenance.     (Please note that portions of this note may have been completed with a voice recognition program.  Efforts were made to edit the dictation but occasionally words are mis-transcribed.)      Electronically signed by Day Zambrano DO on 11/1/2022 at 7:07 PM

## 2022-11-02 ENCOUNTER — HOSPITAL ENCOUNTER (OUTPATIENT)
Age: 62
Discharge: HOME | End: 2022-11-02
Payer: MEDICARE

## 2022-11-02 DIAGNOSIS — K90.9 INTESTINAL MALABSORPTION, UNSPECIFIED TYPE: ICD-10-CM

## 2022-11-02 DIAGNOSIS — Z86.2 HISTORY OF ANEMIA: ICD-10-CM

## 2022-11-02 DIAGNOSIS — Z98.84 HISTORY OF GASTRIC BYPASS: ICD-10-CM

## 2022-11-02 LAB
CALCIUM IONIZED: 1.33 MMOL/L (ref 1.12–1.32)
FOLATE: > 20 NG/ML (ref 4.8–24.2)
IRON: 97 UG/DL (ref 50–170)
VITAMIN B-12: 385 PG/ML (ref 211–911)
VITAMIN D 25-HYDROXY: 32 NG/ML (ref 30–100)

## 2022-11-02 PROCEDURE — 84446 ASSAY OF VITAMIN E: CPT

## 2022-11-02 PROCEDURE — 36415 COLL VENOUS BLD VENIPUNCTURE: CPT

## 2022-11-02 PROCEDURE — 82306 VITAMIN D 25 HYDROXY: CPT

## 2022-11-02 PROCEDURE — 84255 ASSAY OF SELENIUM: CPT

## 2022-11-02 PROCEDURE — 84425 ASSAY OF VITAMIN B-1: CPT

## 2022-11-02 PROCEDURE — 84590 ASSAY OF VITAMIN A: CPT

## 2022-11-02 PROCEDURE — 82330 ASSAY OF CALCIUM: CPT

## 2022-11-02 PROCEDURE — 82746 ASSAY OF FOLIC ACID SERUM: CPT

## 2022-11-02 PROCEDURE — 82180 ASSAY OF ASCORBIC ACID: CPT

## 2022-11-02 PROCEDURE — 84630 ASSAY OF ZINC: CPT

## 2022-11-02 PROCEDURE — 82607 VITAMIN B-12: CPT

## 2022-11-02 PROCEDURE — 82525 ASSAY OF COPPER: CPT

## 2022-11-02 PROCEDURE — 83540 ASSAY OF IRON: CPT

## 2022-11-05 LAB
COPPER: 113.7 UG/DL (ref 80–155)
RETINOL (VITAMIN A): NORMAL
SELENIUM: 136.6 UG/L (ref 23–190)
VITAMIN E LEVEL: NORMAL
ZINC: 68.9 UG/DL (ref 60–120)

## 2022-11-06 LAB — VITAMIN C: NORMAL

## 2022-11-08 DIAGNOSIS — K27.9 PEPTIC ULCER DISEASE: ICD-10-CM

## 2022-11-08 LAB — VITAMIN B1 WHOLE BLOOD: 142 NMOL/L (ref 70–180)

## 2022-11-09 ENCOUNTER — HOSPITAL ENCOUNTER (OUTPATIENT)
Age: 62
Discharge: HOME | End: 2022-11-09
Payer: MEDICARE

## 2022-11-09 LAB
ALBUMIN SERPL-MCNC: 4.5 G/DL (ref 3.5–5.1)
ALP BLD-CCNC: 149 U/L (ref 38–126)
ALT SERPL-CCNC: 62 U/L (ref 11–66)
ANION GAP SERPL CALCULATED.3IONS-SCNC: 11 MEQ/L (ref 8–16)
AST SERPL-CCNC: 39 U/L (ref 5–40)
BILIRUB SERPL-MCNC: 0.3 MG/DL (ref 0.3–1.2)
BILIRUBIN DIRECT: < 0.2 MG/DL (ref 0–0.3)
BUN BLDV-MCNC: 25 MG/DL (ref 7–22)
CALCIUM SERPL-MCNC: 10.6 MG/DL (ref 8.5–10.5)
CHLORIDE BLD-SCNC: 106 MEQ/L (ref 98–111)
CHOLESTEROL, TOTAL: 147 MG/DL (ref 100–199)
CO2: 23 MEQ/L (ref 23–33)
CREAT SERPL-MCNC: 0.6 MG/DL (ref 0.4–1.2)
ERYTHROCYTE [DISTWIDTH] IN BLOOD BY AUTOMATED COUNT: 13.5 % (ref 11.5–14.5)
ERYTHROCYTE [DISTWIDTH] IN BLOOD BY AUTOMATED COUNT: 51.1 FL (ref 35–45)
GFR SERPL CREATININE-BSD FRML MDRD: > 60 ML/MIN/1.73M2
GLUCOSE BLD-MCNC: 93 MG/DL (ref 70–108)
HCT VFR BLD CALC: 42.2 % (ref 37–47)
HDLC SERPL-MCNC: 92 MG/DL
HEMOGLOBIN: 13.6 GM/DL (ref 12–16)
LDL CHOLESTEROL CALCULATED: 42 MG/DL
MCH RBC QN AUTO: 33 PG (ref 26–33)
MCHC RBC AUTO-ENTMCNC: 32.2 GM/DL (ref 32.2–35.5)
MCV RBC AUTO: 102.4 FL (ref 81–99)
PLATELET # BLD: 232 THOU/MM3 (ref 130–400)
PMV BLD AUTO: 9.8 FL (ref 9.4–12.4)
POTASSIUM SERPL-SCNC: 4.9 MEQ/L (ref 3.5–5.2)
RBC # BLD: 4.12 MILL/MM3 (ref 4.2–5.4)
SODIUM BLD-SCNC: 140 MEQ/L (ref 135–145)
TOTAL PROTEIN: 6.7 G/DL (ref 6.1–8)
TRIGL SERPL-MCNC: 67 MG/DL (ref 0–199)
WBC # BLD: 5.4 THOU/MM3 (ref 4.8–10.8)

## 2022-11-09 PROCEDURE — 82248 BILIRUBIN DIRECT: CPT

## 2022-11-09 PROCEDURE — 80053 COMPREHEN METABOLIC PANEL: CPT

## 2022-11-09 PROCEDURE — 36415 COLL VENOUS BLD VENIPUNCTURE: CPT

## 2022-11-09 PROCEDURE — 80061 LIPID PANEL: CPT

## 2022-11-09 PROCEDURE — 85027 COMPLETE CBC AUTOMATED: CPT

## 2022-11-10 RX ORDER — SUCRALFATE 1 G/1
1 TABLET ORAL EVERY 6 HOURS
Qty: 120 TABLET | Refills: 3 | Status: SHIPPED | OUTPATIENT
Start: 2022-11-10

## 2022-11-14 ENCOUNTER — HOSPITAL ENCOUNTER (OUTPATIENT)
Age: 62
Discharge: HOME | End: 2022-11-14
Payer: MEDICARE

## 2022-11-14 DIAGNOSIS — Z51.81 THERAPEUTIC DRUG MONITORING: ICD-10-CM

## 2022-11-14 LAB — LITHIUM LEVEL: 1.04 MMOL/L (ref 0.6–1.2)

## 2022-11-14 PROCEDURE — 80178 ASSAY OF LITHIUM: CPT

## 2022-11-14 PROCEDURE — 36415 COLL VENOUS BLD VENIPUNCTURE: CPT

## 2022-11-16 ENCOUNTER — HOSPITAL ENCOUNTER (OUTPATIENT)
Age: 62
Discharge: HOME | End: 2022-11-16
Payer: MEDICARE

## 2022-11-16 ENCOUNTER — OFFICE VISIT (OUTPATIENT)
Dept: CARDIOLOGY CLINIC | Age: 62
End: 2022-11-16
Payer: MEDICARE

## 2022-11-16 VITALS
HEIGHT: 58 IN | HEART RATE: 64 BPM | WEIGHT: 127.8 LBS | DIASTOLIC BLOOD PRESSURE: 62 MMHG | RESPIRATION RATE: 20 BRPM | BODY MASS INDEX: 26.83 KG/M2 | SYSTOLIC BLOOD PRESSURE: 116 MMHG

## 2022-11-16 DIAGNOSIS — E83.52 HYPERCALCEMIA: ICD-10-CM

## 2022-11-16 DIAGNOSIS — R74.8 ELEVATED LIVER ENZYMES: ICD-10-CM

## 2022-11-16 DIAGNOSIS — Z95.0 ARTIFICIAL PACEMAKER: Primary | ICD-10-CM

## 2022-11-16 DIAGNOSIS — E78.5 DYSLIPIDEMIA (HIGH LDL; LOW HDL): ICD-10-CM

## 2022-11-16 DIAGNOSIS — Z86.2 HISTORY OF ANEMIA: ICD-10-CM

## 2022-11-16 LAB
ALBUMIN SERPL-MCNC: 4.3 G/DL (ref 3.5–5.1)
ALP BLD-CCNC: 144 U/L (ref 38–126)
ALT SERPL-CCNC: 94 U/L (ref 11–66)
ANION GAP SERPL CALCULATED.3IONS-SCNC: 17 MEQ/L (ref 8–16)
AST SERPL-CCNC: 51 U/L (ref 5–40)
BASOPHILS # BLD: 0.7 %
BASOPHILS ABSOLUTE: 0 THOU/MM3 (ref 0–0.1)
BILIRUB SERPL-MCNC: 0.2 MG/DL (ref 0.3–1.2)
BUN BLDV-MCNC: 21 MG/DL (ref 7–22)
CALCIUM SERPL-MCNC: 10.4 MG/DL (ref 8.5–10.5)
CHLORIDE BLD-SCNC: 105 MEQ/L (ref 98–111)
CHOLESTEROL, FASTING: 143 MG/DL (ref 100–199)
CO2: 15 MEQ/L (ref 23–33)
CREAT SERPL-MCNC: 0.8 MG/DL (ref 0.4–1.2)
EOSINOPHIL # BLD: 1.6 %
EOSINOPHILS ABSOLUTE: 0.1 THOU/MM3 (ref 0–0.4)
ERYTHROCYTE [DISTWIDTH] IN BLOOD BY AUTOMATED COUNT: 13.6 % (ref 11.5–14.5)
ERYTHROCYTE [DISTWIDTH] IN BLOOD BY AUTOMATED COUNT: 52.6 FL (ref 35–45)
FERRITIN: 49 NG/ML (ref 10–291)
GFR SERPL CREATININE-BSD FRML MDRD: > 60 ML/MIN/1.73M2
GLUCOSE BLD-MCNC: 327 MG/DL (ref 70–108)
HCT VFR BLD CALC: 40 % (ref 37–47)
HDLC SERPL-MCNC: 86 MG/DL
HEMOGLOBIN: 12.9 GM/DL (ref 12–16)
IMMATURE GRANS (ABS): 0.03 THOU/MM3 (ref 0–0.07)
IMMATURE GRANULOCYTES: 0.4 %
IRON SATURATION: 31 % (ref 20–50)
IRON: 99 UG/DL (ref 50–170)
LDL CHOLESTEROL CALCULATED: 44 MG/DL
LYMPHOCYTES # BLD: 14.7 %
LYMPHOCYTES ABSOLUTE: 1 THOU/MM3 (ref 1–4.8)
MCH RBC QN AUTO: 33.5 PG (ref 26–33)
MCHC RBC AUTO-ENTMCNC: 32.3 GM/DL (ref 32.2–35.5)
MCV RBC AUTO: 103.9 FL (ref 81–99)
MONOCYTES # BLD: 6 %
MONOCYTES ABSOLUTE: 0.4 THOU/MM3 (ref 0.4–1.3)
NUCLEATED RED BLOOD CELLS: 0 /100 WBC
PLATELET # BLD: 208 THOU/MM3 (ref 130–400)
PMV BLD AUTO: 9.9 FL (ref 9.4–12.4)
POTASSIUM SERPL-SCNC: 4.4 MEQ/L (ref 3.5–5.2)
RBC # BLD: 3.85 MILL/MM3 (ref 4.2–5.4)
SEG NEUTROPHILS: 76.6 %
SEGMENTED NEUTROPHILS ABSOLUTE COUNT: 5.3 THOU/MM3 (ref 1.8–7.7)
SODIUM BLD-SCNC: 137 MEQ/L (ref 135–145)
TOTAL IRON BINDING CAPACITY: 320 UG/DL (ref 171–450)
TOTAL PROTEIN: 6.4 G/DL (ref 6.1–8)
TRIGLYCERIDE, FASTING: 65 MG/DL (ref 0–199)
TSH SERPL DL<=0.05 MIU/L-ACNC: 2.47 UIU/ML (ref 0.4–4.2)
WBC # BLD: 6.9 THOU/MM3 (ref 4.8–10.8)

## 2022-11-16 PROCEDURE — 80053 COMPREHEN METABOLIC PANEL: CPT

## 2022-11-16 PROCEDURE — 82728 ASSAY OF FERRITIN: CPT

## 2022-11-16 PROCEDURE — 84443 ASSAY THYROID STIM HORMONE: CPT

## 2022-11-16 PROCEDURE — 85025 COMPLETE CBC W/AUTO DIFF WBC: CPT

## 2022-11-16 PROCEDURE — 83540 ASSAY OF IRON: CPT

## 2022-11-16 PROCEDURE — 36415 COLL VENOUS BLD VENIPUNCTURE: CPT

## 2022-11-16 PROCEDURE — 99213 OFFICE O/P EST LOW 20 MIN: CPT | Performed by: INTERNAL MEDICINE

## 2022-11-16 PROCEDURE — 80061 LIPID PANEL: CPT

## 2022-11-16 PROCEDURE — 83550 IRON BINDING TEST: CPT

## 2022-11-16 PROCEDURE — 93000 ELECTROCARDIOGRAM COMPLETE: CPT | Performed by: INTERNAL MEDICINE

## 2022-11-16 RX ORDER — NITROGLYCERIN 0.4 MG/1
TABLET SUBLINGUAL
Qty: 25 TABLET | Refills: 3 | Status: SHIPPED | OUTPATIENT
Start: 2022-11-16

## 2022-11-16 RX ORDER — ASPIRIN 81 MG/1
81 TABLET ORAL DAILY
COMMUNITY

## 2022-11-16 ASSESSMENT — ENCOUNTER SYMPTOMS
NAUSEA: 0
APNEA: 0
COUGH: 0
WHEEZING: 0
VOICE CHANGE: 0
ABDOMINAL PAIN: 0
STRIDOR: 0
COLOR CHANGE: 0
ANAL BLEEDING: 0
ABDOMINAL DISTENTION: 0
SHORTNESS OF BREATH: 0
CHOKING: 0
BLOOD IN STOOL: 0
TROUBLE SWALLOWING: 0
CHEST TIGHTNESS: 0
VOMITING: 0

## 2022-11-16 NOTE — PROGRESS NOTES
[address] [facility]  [place] [address]  Dept: [phone]  Loc: [phone]     11/16/2022       Elijah Butcher is here today for   Chief Complaint   Patient presents with    Follow-up           Referring Physician:  No ref. provider found     Patient Active Problem List   Diagnosis    Acquired hypothyroidism    MS (multiple sclerosis) ([facility] [address]    S/P laparoscopic cholecystectomy    S/P laparoscopic appendectomy    Gastrointestinal hemorrhage associated with gastritis    Multiple thyroid nodules    Non-ischemic cardiomyopathy ([facility] [address]    History of Batsheva-en-Y gastric bypass    Renal lesion    Liver lesion    Bipolar 1 disorder (HCC)    Symptomatic bradycardia    Status post placement of cardiac pacemaker    Gastrointestinal hemorrhage with melena    GI bleed    Peptic ulcer disease    Other complications of other bariatric procedure    Ulcer at site of surgical anastomosis following bypass of stomach    Acute blood loss anemia    Iron deficiency anemia    Elevated lithium level       Review of Systems   Constitutional:  Positive for fatigue. Negative for activity change, appetite change, fever and unexpected weight change. HENT:  Negative for congestion, trouble swallowing and voice change. Eyes:  Negative for visual disturbance. Respiratory:  Negative for apnea, cough, choking, chest tightness, shortness of breath, wheezing and stridor. Cardiovascular:  Negative for chest pain, palpitations and leg swelling. Gastrointestinal:  Negative for abdominal distention, abdominal pain, anal bleeding, blood in stool, nausea and vomiting. Endocrine: Negative for cold intolerance and heat intolerance. Genitourinary:  Negative for hematuria. Musculoskeletal:  Negative for arthralgias, gait problem, joint swelling and myalgias. Skin:  Negative for color change and rash.    Allergic/Immunologic: Negative for environmental allergies and food allergies. Neurological:  Negative for dizziness, tremors, syncope, facial asymmetry, weakness, light-headedness, numbness and headaches. Hematological:  Does not bruise/bleed easily. Psychiatric/Behavioral:  Negative for agitation, behavioral problems and sleep disturbance. Past Medical History:   Diagnosis Date    Balance problem     Bipolar I disorder, most recent episode (or current) depressed, in partial or unspecified remission 5/8/2013    Cancer ([facility] [address]     CERVICAL     Depression     Fatigue     Gallstones     Gastric bypass status for obesity 2002    GERD (gastroesophageal reflux disease)     History of blood transfusion     History of hysterectomy     REMOVAL OF ONE OVARY     Hypothyroidism     Liver cyst     GI Dr Hardik Romo    MS (multiple sclerosis) [facility])     Paresthesias     RT LOWER LIMB    Ulcer at site of surgical anastomosis following bypass of stomach 1/15/2022    UTI (urinary tract infection)        Allergies   Allergen Reactions    Latex Rash    Sulfa Antibiotics Rash       Current Outpatient Medications   Medication Sig Dispense Refill    aspirin 81 MG EC tablet Take 81 mg by mouth daily      nitroGLYCERIN (NITROSTAT) 0.4 MG SL tablet up to max of 3 total doses. If no relief after 1 dose, call 911. 25 tablet 3    sucralfate (CARAFATE) 1 GM tablet TAKE 1 TABLET BY MOUTH EVERY 6 HOURS 120 tablet 3    blood glucose monitor kit and supplies Dispense sufficient amount for indicated testing frequency plus additional to accommodate PRN testing needs. Dispense all needed supplies to include: monitor, strips, lancing device, lancets, control solutions, alcohol swabs. 1 kit 0    lamoTRIgine (LAMICTAL) 200 MG tablet Take one tab by mouth once daily. 30 tablet 5    lithium 300 MG tablet Take 2 tablets by mouth in the morning and 2 tablets in the evening.  120 tablet 5    lurasidone (LATUDA) 40 MG TABS tablet Take 1 tablet by mouth nightly 30 tablet 5    vilazodone hcl 40 MG TABS TAKE 1 TABLET BY MOUTH ONCE DAILY. 30 tablet 5    levothyroxine (SYNTHROID) 100 MCG tablet TAKE 1 TABLET BY MOUTH ONCE DAILY 90 tablet 1    ferrous sulfate (IRON 325) 325 (65 Fe) MG tablet Take 1 tablet by mouth in the morning, at noon, and at bedtime 90 tablet 2    pantoprazole (PROTONIX) 40 MG tablet Take 1 tablet by mouth 2 times daily (before meals) 180 tablet 2    docusate sodium (COLACE) 100 MG capsule TAKE 1 CAPSULE BY MOUTH 2 TIMES A DAY 60 capsule 2    DHEA 50 MG CAPS Take by mouth nightly      dimethyl fumarate (TECFIDERA) 240 MG delayed release capsule Take 1 capsule by mouth 2 times daily      ipratropium (ATROVENT) 0.03 % nasal spray INSTILL 2 SPRAYS INTO EACH NOSTRIL 3 TIMES DAILY. 1 Bottle 3    Lactobacillus-Inulin (CULTURELLE HEALTH, INULIN,) CAPS TAKE 1 CAPSULE BY MOUTH WITH BREAKFAST 30 capsule 1    fluticasone (FLONASE) 50 MCG/ACT nasal spray 2 sprays by Each Nostril route daily      ondansetron (ZOFRAN) 4 MG tablet Take 1 tablet by mouth 3 times daily as needed for Nausea or Vomiting 15 tablet 0    Prosthesis MISC by Does not apply route Strap for the prosthesis broken please order new 1 each 0    loratadine (CLARITIN) 10 MG tablet Take 1 tablet by mouth daily 90 tablet 1    Cholecalciferol (VITAMIN D) 2000 units CAPS capsule Take by mouth nightly 50 mcg nightly      Magnesium Oxide 250 MG TABS Take 250 mg by mouth nightly       calcium carbonate 600 MG TABS tablet Take 1 tablet by mouth nightly       vitamin B-12 1000 MCG tablet Take 1 tablet by mouth daily 30 tablet 5     No current facility-administered medications for this visit. Social History     Socioeconomic History    Marital status:      Spouse name: None    Number of children: 0    Years of education: None    Highest education level:  Bachelor's degree (e.g., BA, AB, BS)   Tobacco Use    Smoking status: Former     Packs/day: 0.10     Years: 0.50     Pack years: 0.05     Types: Cigarettes     Quit date: 10/24/1977 Years since quittin.0    Smokeless tobacco: Never   Vaping Use    Vaping Use: Never used   Substance and Sexual Activity    Alcohol use: No    Drug use: No    Sexual activity: Not Currently     Partners: Male   Social History Narrative    2021    LEVEL OF EDUCATION: graduated high school; earned her bachelor degree in education; did some master's level course work but did not complete the degree    SPECIAL EDUCATION NEEDS: None    RESIDENCE: Currently lives alone    LEGAL HISTORY: None    Gnosticist: [employer]    TRAUMA: None    : None    HOBBIES: writing, swimming, 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 She was  from  to ; they  in     CHILDREN: None    SUBSTANCE USE: None     Social Determinants of Health     Financial Resource Strain: Low Risk     Difficulty of Paying Living Expenses: Not hard at all   Physical Activity: Insufficiently Active    Days of Exercise per Week: 3 days    Minutes of Exercise per Session: 10 min       Family History   Problem Relation Age of Onset    Cancer Mother 43    Colon Polyps Mother     Breast Cancer Mother     Heart Disease Father     Glaucoma Father     Cancer Sister     Colon Cancer Sister     Cancer Sister     Breast Cancer Sister        Blood pressure 116/62, pulse 64, resp. rate 20, height 4' 10\" (1.473 m), weight 127 lb 12.8 oz (58 kg), not currently breastfeeding.     Physical Exam:    General Appearance: alert and oriented to person, place and time, in no acute distress  Cardiovascular: normal rate, regular rhythm, normal S1 and S2, no murmurs, rubs, clicks, or gallops, distal pulses intact, no carotid bruits, no JVD  Pulmonary/Chest: clear to auscultation bilaterally- no wheezes, rales or rhonchi, normal air movement, no respiratory distress  Abdomen: soft, non-tender, non-distended, normal bowel sounds, no masses   Extremities: no cyanosis, clubbing or edema, pulse   Skin: warm and dry, no rash or erythema  Head: normocephalic and atraumatic  Eyes: pupils equal, round, and reactive to light  Neck: supple and non-tender without mass, no thyromegaly   Musculoskeletal: normal range of motion, no joint swelling, deformity or tenderness  Neurological: alert, oriented, normal speech, no focal findings or movement disorder noted    Lab Data:    Cardiac Enzymes:  No results for input(s): CKTOTAL, CKMB, CKMBINDEX, TROPONINI in the last 72 hours.     CBC:   Lab Results   Component Value Date/Time    WBC 6.9 11/16/2022 12:58 PM    RBC 3.85 11/16/2022 12:58 PM    HGB 12.9 11/16/2022 12:58 PM    HCT 40.0 11/16/2022 12:58 PM     11/16/2022 12:58 PM       CMP:    Lab Results   Component Value Date/Time     11/16/2022 12:58 PM    K 4.4 11/16/2022 12:58 PM    K 3.8 09/01/2022 05:11 PM     11/16/2022 12:58 PM    CO2 15 11/16/2022 12:58 PM    BUN 21 11/16/2022 12:58 PM    CREATININE 0.8 11/16/2022 12:58 PM    GFRAA >60 01/18/2022 07:31 AM    LABGLOM >60 11/16/2022 12:57 PM    GLUCOSE 327 11/16/2022 12:58 PM    GLUCOSE 81 05/26/2021 09:45 AM    CALCIUM 10.4 11/16/2022 12:58 PM       Hepatic Function Panel:    Lab Results   Component Value Date/Time    ALKPHOS 144 11/16/2022 12:58 PM    ALT 94 11/16/2022 12:58 PM    AST 51 11/16/2022 12:58 PM    PROT 6.4 11/16/2022 12:58 PM    BILITOT 0.2 11/16/2022 12:58 PM    BILIDIR <0.2 11/09/2022 07:27 AM    LABALBU 4.3 11/16/2022 12:58 PM       Magnesium:    Lab Results   Component Value Date/Time    MG 2.0 10/04/2021 10:09 AM       PT/INR:    Lab Results   Component Value Date/Time    INR 1.01 04/25/2022 07:07 AM       HgBA1c:    Lab Results   Component Value Date/Time    LABA1C 4.7 11/13/2019 02:16 PM       FLP:    Lab Results   Component Value Date/Time    TRIG 67 11/09/2022 07:27 AM    HDL 86 11/16/2022 12:58 PM    LDLCALC 44 11/16/2022 12:58 PM       TSH:    Lab Results   Component Value Date/Time TSH 2.470 11/16/2022 12:58 PM        Diagnosis Orders   1. Artificial pacemaker  54962 - NV ELECTROCARDIOGRAM, COMPLETE    CBC    Basic Metabolic Panel    Lipid Panel    Hepatic Function Panel      2. Dyslipidemia (high LDL; low HDL)  CBC    Basic Metabolic Panel    Lipid Panel    Hepatic Function Panel           Assessment/Plan    Kobi Mayorga 58years old lady who had a history of pacemaker implantation the patient had a history of right upper arm amputation showed an orthopedic arm she has history of depression she has been on the lithium history of hypothyroidism on hormonal replacement. The patient had MS seen in neurology service. The patient denied chest pain per se she had no PND or orthopnea she has been active. The patient did had a lab work showed her cholesterol 147 and her blood sugar within normal limits. The patient the EKG showed a pacer rhythm no acute changes cardiac wise patient stable she will continue with the current medication she will be seen annually plan of treatment discussed with her in great details and the all her questions were answered to her satisfaction seek medical attention should any change in clinical condition end of dictation thank    Orders Placed This Encounter   Procedures    CBC     Standing Status:   Future     Standing Expiration Date:   70/72/2414    Basic Metabolic Panel     Standing Status:   Future     Standing Expiration Date:   11/16/2023    Lipid Panel     Standing Status:   Future     Standing Expiration Date:   11/16/2023     Order Specific Question:   Is Patient Fasting?/# of Hours     Answer:   12 hours    Hepatic Function Panel     Standing Status:   Future     Standing Expiration Date:   11/16/2023    70274 - NV ELECTROCARDIOGRAM, COMPLETE       Return in about 1 year (around 11/16/2023) for pace maker.      Yo Valerio MD

## 2022-11-22 ENCOUNTER — TELEPHONE (OUTPATIENT)
Dept: PSYCHIATRY | Age: 62
End: 2022-11-22

## 2022-11-22 DIAGNOSIS — Z51.81 THERAPEUTIC DRUG MONITORING: Primary | ICD-10-CM

## 2022-11-23 NOTE — TELEPHONE ENCOUNTER
Lithium level to be drawn 12/14/22  Please inform patient to be sure to hold morning dose of Lithium, she can take following the lab draw.

## 2022-12-05 ENCOUNTER — TELEPHONE (OUTPATIENT)
Dept: FAMILY MEDICINE CLINIC | Age: 62
End: 2022-12-05

## 2022-12-05 DIAGNOSIS — G35 MS (MULTIPLE SCLEROSIS) (HCC): Primary | ICD-10-CM

## 2022-12-05 NOTE — TELEPHONE ENCOUNTER
[facility] called and states that she would like a referral to Dr. Grace Tang office due to Dr. Jeanie Downs is retiring. Please Advise.

## 2022-12-06 ENCOUNTER — TELEPHONE (OUTPATIENT)
Dept: FAMILY MEDICINE CLINIC | Age: 62
End: 2022-12-06

## 2022-12-06 NOTE — TELEPHONE ENCOUNTER
----- Message from [address] sent at 12/6/2022  3:27 PM EST -----  Subject: Referral Request    Reason for referral request? Patient would like a referral for Dr. Bella Grant. He will not treat patient without a referral from [facility]. Dr. Sharon Willsi retired and she would like the referral sent to Dr. Bella Grant  Provider patient wants to be referred to(if known):     Provider Phone Number(if known):     Additional Information for Provider?   ---------------------------------------------------------------------------  --------------  Vishnu Rodriguez INFO    [phone]; OK to leave message on voicemail  ---------------------------------------------------------------------------  --------------

## 2022-12-13 ENCOUNTER — HOSPITAL ENCOUNTER (OUTPATIENT)
Age: 62
Discharge: HOME | End: 2022-12-13
Payer: MEDICARE

## 2022-12-13 ENCOUNTER — TELEPHONE (OUTPATIENT)
Dept: PSYCHIATRY | Age: 62
End: 2022-12-13

## 2022-12-13 DIAGNOSIS — Z51.81 THERAPEUTIC DRUG MONITORING: ICD-10-CM

## 2022-12-13 DIAGNOSIS — Z51.81 THERAPEUTIC DRUG MONITORING: Primary | ICD-10-CM

## 2022-12-13 LAB — LITHIUM LEVEL: 1.43 MMOL/L (ref 0.6–1.2)

## 2022-12-13 PROCEDURE — 80178 ASSAY OF LITHIUM: CPT

## 2022-12-13 PROCEDURE — 36415 COLL VENOUS BLD VENIPUNCTURE: CPT

## 2022-12-13 RX ORDER — LITHIUM CARBONATE 300 MG
450 TABLET ORAL 2 TIMES DAILY
Qty: 120 TABLET | Refills: 5 | Status: SHIPPED | OUTPATIENT
Start: 2022-12-13

## 2022-12-13 NOTE — TELEPHONE ENCOUNTER
Spoke with Bro Krueger and informed her of this information; she understood and wrote everything down as well. She notes that she is not experiencing any of the following listed symptoms but understands to present to her ER if she ever does. She was reminded to hold her AM dose prior to getting her Lithium drawn as well in 5 days. She has also been scheduled for the next in person appt with this provdier on 01/23/23 as she is unable to do mychart. She is aware that a new Rx has been sent as well.

## 2022-12-13 NOTE — TELEPHONE ENCOUNTER
Encounter regarding patient Lithium level of 1.43, it was verified that patient's last dose of Lithium was at 5 pm the evening prior to having Lithium level drawn. Please advise patient if she is currently experiencing, or develops, any nausea, vomiting, tremor, confusion, slurred speech, blurred vision, muscle weakness or balance issues to STOP taking the Brillion and go to the ER immediatly. If she is not experiencing any of these symptoms we will decrease her Lithium dose to 450 mg PO BID and have her increase her fluid intake. We will then redraw her Lithium level and a CMP in 5 days. Also please have her scheduled for follow up with a covering Provider as soon as possible.

## 2022-12-14 ENCOUNTER — OFFICE VISIT (OUTPATIENT)
Dept: FAMILY MEDICINE CLINIC | Age: 62
End: 2022-12-14
Payer: MEDICARE

## 2022-12-14 VITALS
WEIGHT: 127.8 LBS | SYSTOLIC BLOOD PRESSURE: 114 MMHG | HEART RATE: 77 BPM | OXYGEN SATURATION: 98 % | DIASTOLIC BLOOD PRESSURE: 68 MMHG | BODY MASS INDEX: 26.83 KG/M2 | HEIGHT: 58 IN | TEMPERATURE: 98.6 F

## 2022-12-14 DIAGNOSIS — R53.83 FATIGUE DUE TO DEPRESSION: ICD-10-CM

## 2022-12-14 DIAGNOSIS — F33.0 MILD EPISODE OF RECURRENT MAJOR DEPRESSIVE DISORDER (HCC): Primary | ICD-10-CM

## 2022-12-14 DIAGNOSIS — R73.09 ELEVATED RANDOM BLOOD GLUCOSE LEVEL: ICD-10-CM

## 2022-12-14 DIAGNOSIS — F32.A FATIGUE DUE TO DEPRESSION: ICD-10-CM

## 2022-12-14 PROCEDURE — 99213 OFFICE O/P EST LOW 20 MIN: CPT | Performed by: STUDENT IN AN ORGANIZED HEALTH CARE EDUCATION/TRAINING PROGRAM

## 2022-12-14 SDOH — ECONOMIC STABILITY: FOOD INSECURITY: WITHIN THE PAST 12 MONTHS, YOU WORRIED THAT YOUR FOOD WOULD RUN OUT BEFORE YOU GOT MONEY TO BUY MORE.: NEVER TRUE

## 2022-12-14 SDOH — ECONOMIC STABILITY: FOOD INSECURITY: WITHIN THE PAST 12 MONTHS, THE FOOD YOU BOUGHT JUST DIDN'T LAST AND YOU DIDN'T HAVE MONEY TO GET MORE.: NEVER TRUE

## 2022-12-14 ASSESSMENT — ENCOUNTER SYMPTOMS
NAUSEA: 0
SORE THROAT: 0
ABDOMINAL PAIN: 0
COUGH: 0
SHORTNESS OF BREATH: 0

## 2022-12-14 NOTE — PROGRESS NOTES
[address] [facility] [address]  Dept: [phone]  Dept Fax: [phone]  Loc: [phone]  PROGRESS NOTE      Visit Date: 12/14/2022    Aurther Phalen is a 58 y.o. female who presents today for:  Chief Complaint   Patient presents with    3 Month Follow-Up       Impression/Plan:  1. Mild episode of recurrent major depressive disorder (HCC)  Acute on chronic  Noted worsening of her chronic depression few months ago  Endorses anhedonia and fatigue  Taking Viibryd, lurasidone, and lithium  She will contact psychiatry to discuss adjusting medications    2. Fatigue due to depression  Acute  See plan as above    3. Elevated random blood glucose level  Noted on recent labs  Previous random blood glucoses have all been within normal limits  Hemoglobin A1c ordered today  She denies any symptoms of hypoglycemia  - Hemoglobin A1C; Future    Return in about 8 weeks (around 2/8/2023) for Depression. Subjective:  HPI    Here for follow-up of chronic conditions. She recently had lab work for follow-up of her previous gastric bypass. Labs are all within normal limits with the exception of elevated random blood glucose. She was diagnosed with type 2 diabetes previously, but insulin sensitivity improved after gastric bypass many years ago. Fatigue: Having trouble with sleepiness during the day. She thinks it has progressively worsened over the last few months. 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 Does struggle with depression chronically. Next appointment 1/23/23 with psychiatry. She has thought about this a lot and prayed about it. She feels as though the devil does not want her to pray with her patients.   During our conversation, she endorses that she believes a lot of her fatigue is related to her mood/depression. Follows with neurology for MS. has not established with [facility] neurology yet, but intends to very soon as her previous neurologist at [facility] has retired. No further questions of complaints. Review of Systems   Constitutional:  Positive for fatigue. Negative for chills and fever. HENT:  Negative for congestion and sore throat. Eyes:  Negative for visual disturbance. Respiratory:  Negative for cough and shortness of breath. Cardiovascular:  Negative for chest pain. Gastrointestinal:  Negative for abdominal pain and nausea. Genitourinary:  Negative for dysuria. Skin:  Negative for rash. Neurological:  Negative for dizziness, light-headedness and headaches. Psychiatric/Behavioral:  The patient is not nervous/anxious.          Worsening depression     Patient Active Problem List   Diagnosis    Acquired hypothyroidism    MS (multiple sclerosis) ([facility] [address]    S/P laparoscopic cholecystectomy    S/P laparoscopic appendectomy    Gastrointestinal hemorrhage associated with gastritis    Multiple thyroid nodules    Non-ischemic cardiomyopathy (HCC)    History of Batsheva-en-Y gastric bypass    Renal lesion    Liver lesion    Bipolar 1 disorder (HCC)    Symptomatic bradycardia    Status post placement of cardiac pacemaker    Gastrointestinal hemorrhage with melena    GI bleed    Peptic ulcer disease    Other complications of other bariatric procedure    Ulcer at site of surgical anastomosis following bypass of stomach    Acute blood loss anemia    Iron deficiency anemia    Elevated lithium level     Past Medical History:   Diagnosis Date    Balance problem     Bipolar I disorder, most recent episode (or current) depressed, in partial or unspecified remission 5/8/2013    Cancer ([facility] [address]     CERVICAL     Depression     Fatigue     Gallstones     Gastric bypass status for obesity 2002    GERD (gastroesophageal reflux disease)     History of blood transfusion     History of hysterectomy REMOVAL OF ONE OVARY     Hypothyroidism     Liver cyst     GI Dr Jordana Gotti    MS (multiple sclerosis) [facility])     Paresthesias     RT LOWER LIMB    Ulcer at site of surgical anastomosis following bypass of stomach 1/15/2022    UTI (urinary tract infection)       Past Surgical History:   Procedure Laterality Date    APPENDECTOMY      BONE GRAFT Left     left leg to right arm    CARPAL TUNNEL RELEASE  1999    CHOLECYSTECTOMY, LAPAROSCOPIC  05/16/2016    COLONOSCOPY      COLONOSCOPY N/A 01/16/2022    COLONOSCOPY CONTROL HEMORRHAGE performed by Preeti Negrete MD at [facility], ESOPHAGUS      ENDOSCOPY, COLON, DIAGNOSTIC      ENTEROSCOPY N/A 02/05/2018    ENTEROSCOPY performed by Natacha Wallace MD at [facility] Left 09/29/2022    Retina Surgery    GASTROSTOMY TUBE PLACEMENT N/A 09/28/2020    GASTROSTOMY TUBE PLACEMENT- [facility] TO DO GASTRIC ENDOSCOPY performed by Katherin Mitchell MD at [facility] ([facility])  [address] [place] [address]  05/16/2016    OTHER SURGICAL HISTORY      RECTAL [address] [place]    D&C    OTHER SURGICAL HISTORY  2006    GI BLEED    OVARY REMOVAL N/A     pt unaware which one was taken    PACEMAKER INSERTION Right 05/20/2019    Dr. Luana Lind FLX DX W/COLLMEHDI [place] [address] P[facility]MD Left 02/06/2018    COLONOSCOPY performed by Natacha Wallace MD at [facility]    CO OFFICE/OUTPT VISIT,PROCEDURE ONLY Left 02/18/2018    EGD ESOPHAGOGASTRODUODENOSCOPY performed by Tabitha Sanders MD at [address] [facility] OFFICE/OUTPT VISIT,PROCEDURE ONLY N/A 10/04/2018    EGD DIAGNOSTIC ONLY performed by Marbella Vu MD at [facility] 23 ENDOSCOPY Left 09/24/2019    EGD DIAGNOSTIC ONLY performed by Jian Christian MD at [facility] N/A 09/26/2020 ENTEROSCOPY PUSH CONTROL HEMORRHAGE performed by Ramirez Medrano MD at [facility] N/A 2022    ENTEROSCOPY PUSH DIAGNOSTIC performed by Alesha Godinez MD at [address] [facility]  2022     Family History   Problem Relation Age of Onset    Cancer Mother 43    Colon Polyps Mother     Breast Cancer Mother     Heart Disease Father     Glaucoma Father     Cancer Sister     Colon Cancer Sister     Cancer Sister     Breast Cancer Sister      Social History     Tobacco Use    Smoking status: Former     Packs/day: 0.10     Years: 0.50     Pack years: 0.05     Types: Cigarettes     Quit date: 10/24/1977     Years since quittin.1    Smokeless tobacco: Never   Substance Use Topics    Alcohol use: No      Current Outpatient Medications   Medication Sig Dispense Refill    lithium 300 MG tablet Take 1.5 tablets by mouth in the morning and 1.5 tablets in the evening. 120 tablet 5    aspirin 81 MG EC tablet Take 81 mg by mouth daily      nitroGLYCERIN (NITROSTAT) 0.4 MG SL tablet up to max of 3 total doses. If no relief after 1 dose, call 911. 25 tablet 3    sucralfate (CARAFATE) 1 GM tablet TAKE 1 TABLET BY MOUTH EVERY 6 HOURS 120 tablet 3    blood glucose monitor kit and supplies Dispense sufficient amount for indicated testing frequency plus additional to accommodate PRN testing needs. Dispense all needed supplies to include: monitor, strips, lancing device, lancets, control solutions, alcohol swabs. 1 kit 0    lamoTRIgine (LAMICTAL) 200 MG tablet Take one tab by mouth once daily. 30 tablet 5    lurasidone (LATUDA) 40 MG TABS tablet Take 1 tablet by mouth nightly 30 tablet 5    vilazodone hcl 40 MG TABS TAKE 1 TABLET BY MOUTH ONCE DAILY.  30 tablet 5    levothyroxine (SYNTHROID) 100 MCG tablet TAKE 1 TABLET BY MOUTH ONCE DAILY 90 tablet 1    pantoprazole (PROTONIX) 40 MG tablet Take 1 tablet by mouth 2 times daily (before meals) 180 tablet 2 docusate sodium (COLACE) 100 MG capsule TAKE 1 CAPSULE BY MOUTH 2 TIMES A DAY 60 capsule 2    DHEA 50 MG CAPS Take by mouth nightly      dimethyl fumarate (TECFIDERA) 240 MG delayed release capsule Take 1 capsule by mouth 2 times daily      ipratropium (ATROVENT) 0.03 % nasal spray INSTILL 2 SPRAYS INTO EACH NOSTRIL 3 TIMES DAILY. 1 Bottle 3    Lactobacillus-Inulin (CULTURELLE HEALTH, INULIN,) CAPS TAKE 1 CAPSULE BY MOUTH WITH BREAKFAST 30 capsule 1    fluticasone (FLONASE) 50 MCG/ACT nasal spray 2 sprays by Each Nostril route daily      ondansetron (ZOFRAN) 4 MG tablet Take 1 tablet by mouth 3 times daily as needed for Nausea or Vomiting 15 tablet 0    Prosthesis MISC by Does not apply route Strap for the prosthesis broken please order new 1 each 0    loratadine (CLARITIN) 10 MG tablet Take 1 tablet by mouth daily 90 tablet 1    Cholecalciferol (VITAMIN D) 2000 units CAPS capsule Take by mouth nightly 50 mcg nightly      Magnesium Oxide 250 MG TABS Take 250 mg by mouth nightly       calcium carbonate 600 MG TABS tablet Take 1 tablet by mouth nightly       vitamin B-12 1000 MCG tablet Take 1 tablet by mouth daily 30 tablet 5    ferrous sulfate (IRON 325) 325 (65 Fe) MG tablet Take 1 tablet by mouth in the morning, at noon, and at bedtime 90 tablet 2     No current facility-administered medications for this visit.      Allergies   Allergen Reactions    Latex Rash    Sulfa Antibiotics Rash       Immunization History   Administered Date(s) Administered    COVID-19, PFIZER PURPLE top, DILUTE for use, (age 15 y+), 30mcg/0.3mL 02/02/2021, 02/23/2021, 11/10/2021    Influenza Vaccine, unspecified formulation 09/15/2015, 09/14/2016, 10/21/2016, 08/16/2017    Influenza Virus Vaccine 10/21/2016, 08/16/2017, 09/30/2020    Influenza, AFLURIA (age 1 yrs+), FLUZONE, (age 10 mo+), MDV, 0.5mL 10/05/2018    Influenza, FLUARIX, FLULAVAL, FLUZONE (age 10 mo+) AND AFLURIA, (age 1 y+), PF, 0.5mL 09/23/2017, 09/24/2019, 09/20/2021 Influenza, FLUBLOK, (age 25 y+), PF, 0.5mL 09/14/2022    Influenza, FLUCELVAX, (age 10 mo+), MDCK, MDV, 0.5mL 03/11/2019    Influenza, Quadv, 6 mo and older, IM, PF (Flulaval, Fluarix) 10/05/2018    Pneumococcal Conjugate 13-valent (Ufihbbh12) 04/10/2019    Pneumococcal Polysaccharide ([id number]) 05/17/2016    Td (Adult), 5 Lf Tetanus Toxoid, Pf (Tenivac, Decavac) 04/30/1999    Td vaccine (adult) 04/30/1999    Tdap (Boostrix, Adacel) 09/29/2015, 09/12/2016, 10/25/2018    Zoster Recombinant (Shingrix) 03/11/2019, 11/19/2019, 01/03/2020     Health Maintenance   Topic Date Due    COVID-19 Vaccine (4 - Booster for Pfizer series) 01/05/2022    Diabetes screen  11/13/2022    Breast cancer screen  01/24/2023    Depression Monitoring  02/18/2023    Annual Wellness Visit (AWV)  02/19/2023    Pneumococcal 0-64 years Vaccine (3 - PPSV23 if available, else PCV20) 05/12/2025    Lipids  11/16/2027    DTaP/Tdap/Td vaccine (4 - Td or Tdap) 10/25/2028    Colorectal Cancer Screen  01/16/2032    Flu vaccine  Completed    Shingles vaccine  Completed    Hepatitis C screen  Completed    HIV screen  Completed    Hepatitis A vaccine  Aged Out    Hib vaccine  Aged Out    Meningococcal (ACWY) vaccine  Aged Out       LABS  Lab Results   Component Value Date    LABA1C 4.7 11/13/2019     No results found for: EAG  No components found for: CHLPL  Lab Results   Component Value Date    TRIG 67 11/09/2022    TRIG 32 03/02/2022    TRIG 71 04/12/2021     Lab Results   Component Value Date    HDL 86 11/16/2022    HDL 92 11/09/2022    HDL 95 03/02/2022     Lab Results   Component Value Date    LDLCALC 44 11/16/2022    LDLCALC 42 11/09/2022    LDLCALC 40 03/02/2022       Chemistry        Component Value Date/Time     11/16/2022 1258    K 4.4 11/16/2022 1258    K 3.8 09/01/2022 1711     11/16/2022 1258    CO2 15 (L) 11/16/2022 1258    BUN 21 11/16/2022 1258    CREATININE 0.8 11/16/2022 1258        Component Value Date/Time    CALCIUM 10.4 11/16/2022 1258    ALKPHOS 144 (H) 11/16/2022 1258    AST 51 (H) 11/16/2022 1258    ALT 94 (H) 11/16/2022 1258    BILITOT 0.2 (L) 11/16/2022 1258          No results found for: LABMICR, [id number]  Lab Results   Component Value Date    TSH 2.470 11/16/2022     No results found for: PSA  Lab Results   Component Value Date    WBC 6.9 11/16/2022    HGB 12.9 11/16/2022    HCT 40.0 11/16/2022    .9 (H) 11/16/2022     11/16/2022       Objective:  /68 (Site: Left Upper Arm, Position: Sitting, Cuff Size: Medium Adult)   Pulse 77   Temp 98.6 °F (37 °C) (Temporal)   Ht 4' 10\" (1.473 m)   Wt 127 lb 12.8 oz (58 kg)   SpO2 98%   BMI 26.71 kg/m²     Physical Exam  Constitutional:       General: She is not in acute distress. Appearance: Normal appearance. HENT:      Head: Normocephalic. Right Ear: External ear normal.      Left Ear: External ear normal.      Nose: Nose normal.      Mouth/Throat:      Mouth: Mucous membranes are moist.   Eyes:      General: No scleral icterus. Extraocular Movements: Extraocular movements intact. Cardiovascular:      Rate and Rhythm: Normal rate and regular rhythm. Pulses: Normal pulses. Heart sounds: Normal heart sounds. No murmur heard. Pulmonary:      Effort: Pulmonary effort is normal.      Breath sounds: Normal breath sounds. Abdominal:      General: Abdomen is flat. There is no distension. Palpations: Abdomen is soft. Musculoskeletal:         General: Normal range of motion. Cervical back: Normal range of motion. 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 Neurological:      Mental Status: She is alert. Motor: No weakness. Psychiatric:         Mood and Affect: Mood normal.       They voiced understanding. All questions answered. They agreed with treatment plan.    See patient instructions for any educational materials that may have been given.  Discussed use, benefit, and side effects of prescribed medications. Reviewed health maintenance.     (Please note that portions of this note may have been completed with a voice recognition program.  Efforts were made to edit the dictation but occasionally words are mis-transcribed.)      Electronically signed by Jeronimo Kim DO on 12/14/2022 at 8:05 PM

## 2022-12-14 NOTE — PROGRESS NOTES
Health Maintenance Due   Topic Date Due    COVID-19 Vaccine (4 - Booster for Pfizer series) 01/05/2022    Diabetes screen  11/13/2022

## 2022-12-19 ENCOUNTER — HOSPITAL ENCOUNTER (OUTPATIENT)
Age: 62
Discharge: HOME | End: 2022-12-19
Payer: MEDICARE

## 2022-12-19 DIAGNOSIS — Z51.81 THERAPEUTIC DRUG MONITORING: Primary | ICD-10-CM

## 2022-12-19 DIAGNOSIS — Z51.81 THERAPEUTIC DRUG MONITORING: ICD-10-CM

## 2022-12-19 DIAGNOSIS — R73.09 ELEVATED RANDOM BLOOD GLUCOSE LEVEL: ICD-10-CM

## 2022-12-19 LAB
ALBUMIN SERPL-MCNC: 4.6 G/DL (ref 3.5–5.1)
ALP BLD-CCNC: 149 U/L (ref 38–126)
ALT SERPL-CCNC: 74 U/L (ref 11–66)
ANION GAP SERPL CALCULATED.3IONS-SCNC: 10 MEQ/L (ref 8–16)
AST SERPL-CCNC: 51 U/L (ref 5–40)
AVERAGE GLUCOSE: 93 MG/DL (ref 70–126)
BILIRUB SERPL-MCNC: 0.3 MG/DL (ref 0.3–1.2)
BUN BLDV-MCNC: 19 MG/DL (ref 7–22)
CALCIUM SERPL-MCNC: 10.4 MG/DL (ref 8.5–10.5)
CHLORIDE BLD-SCNC: 106 MEQ/L (ref 98–111)
CO2: 24 MEQ/L (ref 23–33)
CREAT SERPL-MCNC: 0.7 MG/DL (ref 0.4–1.2)
GFR SERPL CREATININE-BSD FRML MDRD: > 60 ML/MIN/1.73M2
GLUCOSE BLD-MCNC: 99 MG/DL (ref 70–108)
HBA1C MFR BLD: 5.1 % (ref 4.4–6.4)
LITHIUM LEVEL: 0.81 MMOL/L (ref 0.6–1.2)
POTASSIUM SERPL-SCNC: 4.5 MEQ/L (ref 3.5–5.2)
SODIUM BLD-SCNC: 140 MEQ/L (ref 135–145)
TOTAL PROTEIN: 6.4 G/DL (ref 6.1–8)

## 2022-12-19 PROCEDURE — 80053 COMPREHEN METABOLIC PANEL: CPT

## 2022-12-19 PROCEDURE — 80178 ASSAY OF LITHIUM: CPT

## 2022-12-19 PROCEDURE — 83036 HEMOGLOBIN GLYCOSYLATED A1C: CPT

## 2022-12-19 PROCEDURE — 36415 COLL VENOUS BLD VENIPUNCTURE: CPT

## 2022-12-29 ENCOUNTER — PROCEDURE VISIT (OUTPATIENT)
Dept: CARDIOLOGY CLINIC | Age: 62
End: 2022-12-29

## 2022-12-29 DIAGNOSIS — Z95.0 ARTIFICIAL PACEMAKER: Primary | ICD-10-CM

## 2022-12-29 NOTE — PROGRESS NOTES
[facility]   Patient of Brian    Battery 10.1 years    Presenting rhythm AP VS    A Impedance 418  RV Impedance 475    P wave sensing 3.4  R wave sensing 4.6    A Threshold 0.625 @ 0.40  A Amplitude 1.50 @ 0.40  RV Thresholds 0.75 @ 0.40  RV Amplitude 2.0 @ 0.40      A Paced 96.5%  V Paced 0.2%    Programmed Mode AAIR <=> DDDR       Afib [place] <0.1%    Episodes   none

## 2023-01-03 ENCOUNTER — HOSPITAL ENCOUNTER (OUTPATIENT)
Age: 63
Discharge: HOME | End: 2023-01-03
Payer: MEDICARE

## 2023-01-03 DIAGNOSIS — Z51.81 THERAPEUTIC DRUG MONITORING: ICD-10-CM

## 2023-01-03 DIAGNOSIS — Z51.81 THERAPEUTIC DRUG MONITORING: Primary | ICD-10-CM

## 2023-01-03 LAB — LITHIUM LEVEL: 0.97 MMOL/L (ref 0.6–1.2)

## 2023-01-03 PROCEDURE — 80178 ASSAY OF LITHIUM: CPT

## 2023-01-03 PROCEDURE — 36415 COLL VENOUS BLD VENIPUNCTURE: CPT

## 2023-01-20 NOTE — TELEPHONE ENCOUNTER
Last visit- 4/10/2019  Next visit- no follow-up      Requested Prescriptions     Pending Prescriptions Disp Refills    loratadine (CLARITIN) 10 MG tablet [Pharmacy Med Name: LORATADINE 10MG TABS] 30 tablet 1     Sig: TAKE 1 TABLET BY MOUTH ONE TIME A DAY PCP

## 2023-01-25 ENCOUNTER — HOSPITAL ENCOUNTER (OUTPATIENT)
Dept: WOMENS IMAGING | Age: 63
Discharge: HOME | End: 2023-01-25
Payer: MEDICARE

## 2023-01-25 DIAGNOSIS — Z12.31 VISIT FOR SCREENING MAMMOGRAM: ICD-10-CM

## 2023-01-25 PROCEDURE — 77067 SCR MAMMO BI INCL CAD: CPT

## 2023-01-30 DIAGNOSIS — E03.8 HYPOTHYROIDISM DUE TO HASHIMOTO'S THYROIDITIS: ICD-10-CM

## 2023-01-30 DIAGNOSIS — E06.3 HYPOTHYROIDISM DUE TO HASHIMOTO'S THYROIDITIS: ICD-10-CM

## 2023-01-30 RX ORDER — LEVOTHYROXINE SODIUM 0.1 MG/1
TABLET ORAL
Qty: 90 TABLET | Refills: 1 | Status: SHIPPED | OUTPATIENT
Start: 2023-01-30

## 2023-01-30 NOTE — TELEPHONE ENCOUNTER
Patient's last appointment was : 12/14/2022  Patient's next appointment is :  02/07/2023  Last refilled: 07/06/2022  Melinda Perez Verified    Lab Results   Component Value Date    LABA1C 5.1 12/19/2022     Lab Results   Component Value Date    CHOL 147 11/09/2022    TRIG 67 11/09/2022    HDL 86 11/16/2022    LDLCALC 44 11/16/2022     Lab Results   Component Value Date     12/19/2022    K 4.5 12/19/2022     12/19/2022    CO2 24 12/19/2022    BUN 19 12/19/2022    CREATININE 0.7 12/19/2022    GLUCOSE 99 12/19/2022    CALCIUM 10.4 12/19/2022    PROT 6.4 12/19/2022    LABALBU 4.6 12/19/2022    BILITOT 0.3 12/19/2022    ALKPHOS 149 (H) 12/19/2022    AST 51 (H) 12/19/2022    ALT 74 (H) 12/19/2022    LABGLOM >60 12/19/2022    GFRAA >60 01/18/2022     Lab Results   Component Value Date    TSH 2.470 11/16/2022    T4FREE 1.11 03/01/2022     Lab Results   Component Value Date    WBC 6.9 11/16/2022    HGB 12.9 11/16/2022    HCT 40.0 11/16/2022    .9 (H) 11/16/2022     11/16/2022

## 2023-02-07 ENCOUNTER — OFFICE VISIT (OUTPATIENT)
Dept: FAMILY MEDICINE CLINIC | Age: 63
End: 2023-02-07

## 2023-02-07 VITALS
HEART RATE: 67 BPM | BODY MASS INDEX: 25.86 KG/M2 | SYSTOLIC BLOOD PRESSURE: 112 MMHG | DIASTOLIC BLOOD PRESSURE: 72 MMHG | RESPIRATION RATE: 18 BRPM | HEIGHT: 58 IN | OXYGEN SATURATION: 97 % | WEIGHT: 123.2 LBS | TEMPERATURE: 97.8 F

## 2023-02-07 DIAGNOSIS — G35 MS (MULTIPLE SCLEROSIS) (HCC): ICD-10-CM

## 2023-02-07 DIAGNOSIS — D50.9 IRON DEFICIENCY ANEMIA, UNSPECIFIED IRON DEFICIENCY ANEMIA TYPE: ICD-10-CM

## 2023-02-07 DIAGNOSIS — F33.0 MILD EPISODE OF RECURRENT MAJOR DEPRESSIVE DISORDER (HCC): Primary | ICD-10-CM

## 2023-02-07 RX ORDER — CAFFEINE 200 MG
200 TABLET ORAL 2 TIMES DAILY
COMMUNITY

## 2023-02-07 SDOH — ECONOMIC STABILITY: FOOD INSECURITY: WITHIN THE PAST 12 MONTHS, THE FOOD YOU BOUGHT JUST DIDN'T LAST AND YOU DIDN'T HAVE MONEY TO GET MORE.: NEVER TRUE

## 2023-02-07 SDOH — ECONOMIC STABILITY: INCOME INSECURITY: HOW HARD IS IT FOR YOU TO PAY FOR THE VERY BASICS LIKE FOOD, HOUSING, MEDICAL CARE, AND HEATING?: NOT HARD AT ALL

## 2023-02-07 SDOH — ECONOMIC STABILITY: HOUSING INSECURITY
IN THE LAST 12 MONTHS, WAS THERE A TIME WHEN YOU DID NOT HAVE A STEADY PLACE TO SLEEP OR SLEPT IN A SHELTER (INCLUDING NOW)?: NO

## 2023-02-07 SDOH — ECONOMIC STABILITY: FOOD INSECURITY: WITHIN THE PAST 12 MONTHS, YOU WORRIED THAT YOUR FOOD WOULD RUN OUT BEFORE YOU GOT MONEY TO BUY MORE.: NEVER TRUE

## 2023-02-07 ASSESSMENT — ENCOUNTER SYMPTOMS
COUGH: 0
SHORTNESS OF BREATH: 0
NAUSEA: 0
ABDOMINAL PAIN: 0
SORE THROAT: 0

## 2023-02-07 ASSESSMENT — PATIENT HEALTH QUESTIONNAIRE - PHQ9
3. TROUBLE FALLING OR STAYING ASLEEP: 0
8. MOVING OR SPEAKING SO SLOWLY THAT OTHER PEOPLE COULD HAVE NOTICED. OR THE OPPOSITE, BEING SO FIGETY OR RESTLESS THAT YOU HAVE BEEN MOVING AROUND A LOT MORE THAN USUAL: 0
6. FEELING BAD ABOUT YOURSELF - OR THAT YOU ARE A FAILURE OR HAVE LET YOURSELF OR YOUR FAMILY DOWN: 0
SUM OF ALL RESPONSES TO PHQ9 QUESTIONS 1 & 2: 0
SUM OF ALL RESPONSES TO PHQ QUESTIONS 1-9: 5
9. THOUGHTS THAT YOU WOULD BE BETTER OFF DEAD, OR OF HURTING YOURSELF: 0
SUM OF ALL RESPONSES TO PHQ QUESTIONS 1-9: 5
2. FEELING DOWN, DEPRESSED OR HOPELESS: 0
1. LITTLE INTEREST OR PLEASURE IN DOING THINGS: 0
10. IF YOU CHECKED OFF ANY PROBLEMS, HOW DIFFICULT HAVE THESE PROBLEMS MADE IT FOR YOU TO DO YOUR WORK, TAKE CARE OF THINGS AT HOME, OR GET ALONG WITH OTHER PEOPLE: 0
4. FEELING TIRED OR HAVING LITTLE ENERGY: 1
SUM OF ALL RESPONSES TO PHQ QUESTIONS 1-9: 5
5. POOR APPETITE OR OVEREATING: 3
SUM OF ALL RESPONSES TO PHQ QUESTIONS 1-9: 5
7. TROUBLE CONCENTRATING ON THINGS, SUCH AS READING THE NEWSPAPER OR WATCHING TELEVISION: 1

## 2023-02-07 NOTE — PROGRESS NOTES
[address] [facility] Javier [address] [facility][note 3] [address]  Dept: [phone]  Dept Fax: [phone]  Loc: [phone]  PROGRESS NOTE      Visit Date: 2/7/2023    Mihir Gutierrez is a 58 y.o. female who presents today for:  Chief Complaint   Patient presents with    Depression     Feels like she is doing well. Impression/Plan:  1. Mild episode of recurrent major depressive disorder (HCC)  Chronic, controlled  Mood is much better today  Continue management per psychiatry    2. Iron deficiency anemia, unspecified iron deficiency anemia type  Chronic, controlled  Due for lab recheck  - CBC with Auto Differential; Future    3. MS (multiple sclerosis) ([facility] [address]  Chronic, new patient appointment with neurology tomorrow  Think some of her fatigue may be related to her MS  We will follow their visit notes    Return in about 3 months (around 5/7/2023) for Chronic conditions. Please schedule with Dr. Aris Krishnan, Dr. Timo Coppola, Dr. Marta Vieira, or Dr. Campbell Pamela. .    Subjective:  HPI    Here today for follow-up. Past medical history significant for nonischemic cardiomyopathy (follows with cardiology), peptic ulcer disease (controlled with Protonix), GERD, hypothyroidism (TSH checked 11/16/2022 WNL), MS (managed by neurology), iron deficiency anemia (we will recheck CBC today), depression and bipolar 1 disorder (managed by psychiatry), and GI bleed in the past.    Depression: She follows with psychiatry for depression and bipolar 1 disorder. When we last spoke, she was taking Viibryd, lurasidone, and lithium. She was planning to contact psychiatry to discuss medication changes at that time. Her mood is much better today. No changes made to her psychiatric medications. She denies SI/HI. At last visit, she had elevated random blood glucose on lab draw. Hemoglobin A1c on 12/19/2022 was 5.1. No other changes that time.     MS: Previously following with neurology at [facility]. She will see neurology at [facility] on 2/8/2023. She thinks her current fatigue is related to her MS, so she is looking forward to this appointment. No further questions of complaints. Review of Systems   Constitutional:  Negative for chills and fever. HENT:  Negative for congestion and sore throat. Eyes:  Negative for visual disturbance. Respiratory:  Negative for cough and shortness of breath. Cardiovascular:  Negative for chest pain. Gastrointestinal:  Negative for abdominal pain and nausea. Genitourinary:  Negative for dysuria. Skin:  Negative for rash. Neurological:  Negative for dizziness, light-headedness and headaches. Psychiatric/Behavioral:  The patient is not nervous/anxious.       Patient Active Problem List   Diagnosis    Acquired hypothyroidism    MS (multiple sclerosis) ([facility] [address]    S/P laparoscopic cholecystectomy    S/P laparoscopic appendectomy    Gastrointestinal hemorrhage associated with gastritis    Multiple thyroid nodules    Non-ischemic cardiomyopathy ([address]    History of Batsheva-en-Y gastric bypass    Renal lesion    Liver lesion    Bipolar 1 disorder (HCC)    Symptomatic bradycardia    Status post placement of cardiac pacemaker    Gastrointestinal hemorrhage with melena    GI bleed    Peptic ulcer disease    Other complications of other bariatric procedure    Ulcer at site of surgical anastomosis following bypass of stomach    Acute blood loss anemia    Iron deficiency anemia    Elevated lithium level     Past Medical History:   Diagnosis Date    Balance problem     Bipolar I disorder, most recent episode (or current) depressed, in partial or unspecified remission 5/8/2013    Cancer ([facility] [address]     CERVICAL     Depression     Fatigue     Gallstones     Gastric bypass status for obesity 2002    GERD (gastroesophageal reflux disease)     History of blood transfusion     History of hysterectomy     REMOVAL OF ONE OVARY     Hypothyroidism     Liver cyst     GI Dr Yazmin Dobson - Glendy Wong    MS (multiple sclerosis) (HCC)     Paresthesias     RT LOWER LIMB    Ulcer at site of surgical anastomosis following bypass of stomach 1/15/2022    UTI (urinary tract infection)       Past Surgical History:   Procedure Laterality Date    APPENDECTOMY      BONE GRAFT Left     left leg to right arm    CARPAL TUNNEL RELEASE  1999    CHOLECYSTECTOMY, LAPAROSCOPIC  05/16/2016    COLONOSCOPY      COLONOSCOPY N/A 01/16/2022    COLONOSCOPY CONTROL HEMORRHAGE performed by Susana Workman MD at [address] [facility], ESOPHAGUS      ENDOSCOPY, COLON, DIAGNOSTIC      ENTEROSCOPY N/A 02/05/2018    ENTEROSCOPY performed by Leatha Coles MD at [address] [facility] Left 09/29/2022    Retina Surgery    GASTROSTOMY TUBE PLACEMENT N/A 09/28/2020    [facility] performed by Radha Santos MD at [address] ([address] [facility])  [address] [facility]  05/16/2016    OTHER SURGICAL HISTORY      RECTAL [address] [place]    D&C    OTHER SURGICAL HISTORY  2006    GI BLEED    OVARY REMOVAL N/A     pt unaware which one was taken    PACEMAKER INSERTION Right 05/20/2019    Dr. Sanz Peaks FLX DX W/COLLJ [place] [address] P[facility]MD Left 02/06/2018    COLONOSCOPY performed by Leatha Coles MD at [facility]    TX OFFICE/OUTPT VISIT,PROCEDURE ONLY Left 02/18/2018    EGD ESOPHAGOGASTRODUODENOSCOPY performed by Inna Santamaria MD at [address] [facility] OFFICE/OUTPT VISIT,PROCEDURE ONLY N/A 10/04/2018    EGD DIAGNOSTIC ONLY performed by Serge Gutierres MD at [facility] ENDOSCOPY Left 09/24/2019    EGD DIAGNOSTIC ONLY performed by Remedios Ashby MD at [address] N/A 09/26/2020    ENTEROSCOPY PUSH CONTROL HEMORRHAGE performed by Leatha Coles MD at [facility]    UPPER GASTROINTESTINAL ENDOSCOPY N/A 2022    ENTEROSCOPY PUSH DIAGNOSTIC performed by Gerard Mckeon MD at [address]  2022     Family History   Problem Relation Age of Onset    Colon Polyps Mother     Breast Cancer Mother 48    Heart Disease Father     Glaucoma Father     Breast Cancer Sister 62    Colon Cancer Sister     Cancer Sister     Breast Cancer Sister 54     Social History     Tobacco Use    Smoking status: Former     Packs/day: 0.10     Years: 0.50     Pack years: 0.05     Types: Cigarettes     Quit date: 10/24/1977     Years since quittin.3    Smokeless tobacco: Never   Substance Use Topics    Alcohol use: No      Current Outpatient Medications   Medication Sig Dispense Refill    Prenatal Vit-Fe Fumarate-FA (PRENATAL 1+1 PO) Take by mouth      Caffeine (VIVARIN) 200 MG TABS Take 200 mg by mouth 2 times daily      NONFORMULARY Focus and Cognition Support OTC Once Daily      levothyroxine (SYNTHROID) 100 MCG tablet TAKE 1 TABLET BY MOUTH DAILY 90 tablet 1    lithium 300 MG tablet Take 1.5 tablets by mouth in the morning and 1.5 tablets in the evening. 120 tablet 5    nitroGLYCERIN (NITROSTAT) 0.4 MG SL tablet up to max of 3 total doses. If no relief after 1 dose, call 911. 25 tablet 3    blood glucose monitor kit and supplies Dispense sufficient amount for indicated testing frequency plus additional to accommodate PRN testing needs. Dispense all needed supplies to include: monitor, strips, lancing device, lancets, control solutions, alcohol swabs. 1 kit 0    lamoTRIgine (LAMICTAL) 200 MG tablet Take one tab by mouth once daily. 30 tablet 5    lurasidone (LATUDA) 40 MG TABS tablet Take 1 tablet by mouth nightly 30 tablet 5    vilazodone hcl 40 MG TABS TAKE 1 TABLET BY MOUTH ONCE DAILY.  30 tablet 5    ferrous sulfate (IRON 325) 325 (65 Fe) MG tablet Take 1 tablet by mouth in the morning, at noon, and at bedtime 90 tablet 2    pantoprazole (PROTONIX) 40 MG tablet Take 1 tablet by mouth 2 times daily (before meals) 180 tablet 2    DHEA 50 MG CAPS Take by mouth nightly      dimethyl fumarate (TECFIDERA) 240 MG delayed release capsule Take 1 capsule by mouth 2 times daily      ipratropium (ATROVENT) 0.03 % nasal spray INSTILL 2 SPRAYS INTO EACH NOSTRIL 3 TIMES DAILY. 1 Bottle 3    fluticasone (FLONASE) 50 MCG/ACT nasal spray 2 sprays by Each Nostril route daily      Prosthesis MISC by Does not apply route Strap for the prosthesis broken please order new 1 each 0    loratadine (CLARITIN) 10 MG tablet Take 1 tablet by mouth daily 90 tablet 1    Cholecalciferol (VITAMIN D) 2000 units CAPS capsule Take by mouth nightly 50 mcg nightly      Magnesium Oxide 250 MG TABS Take 250 mg by mouth nightly       calcium carbonate 600 MG TABS tablet Take 1 tablet by mouth nightly       vitamin B-12 1000 MCG tablet Take 1 tablet by mouth daily 30 tablet 5    ondansetron (ZOFRAN) 4 MG tablet Take 1 tablet by mouth 3 times daily as needed for Nausea or Vomiting (Patient not taking: Reported on 2/7/2023) 15 tablet 0     No current facility-administered medications for this visit.      Allergies   Allergen Reactions    Latex Rash    Sulfa Antibiotics Rash       Immunization History   Administered Date(s) Administered    COVID-19, PFIZER PURPLE top, DILUTE for use, (age 15 y+), 30mcg/0.3mL 02/02/2021, 02/23/2021, 11/10/2021    Influenza Vaccine, unspecified formulation 09/15/2015, 09/14/2016, 10/21/2016, 08/16/2017    Influenza Virus Vaccine 10/21/2016, 08/16/2017, 09/30/2020    Influenza, AFLURIA (age 1 yrs+), FLUZONE, (age 10 mo+), MDV, 0.5mL 10/05/2018    Influenza, FLUARIX, FLULAVAL, FLUZONE (age 10 mo+) AND AFLURIA, (age 1 y+), PF, 0.5mL 09/23/2017, 09/24/2019, 09/20/2021    Influenza, FLUBLOK, (age 25 y+), PF, 0.5mL 09/14/2022    Influenza, FLUCELVAX, (age 10 mo+), MDCK, MDV, 0.5mL 03/11/2019    Influenza, Quadv, 6 mo and older, IM, PF (Flulaval, Fluarix) 10/05/2018 Pneumococcal Conjugate 13-valent ([id number]) 04/10/2019    Pneumococcal Polysaccharide ([id number]) 05/17/2016    Td (Adult), 5 Lf Tetanus Toxoid, Pf (Tenivac, Decavac) 04/30/1999    Td vaccine (adult) 04/30/1999    Tdap (Boostrix, Adacel) 09/29/2015, 09/12/2016, 10/25/2018    Zoster Recombinant (Shingrix) 03/11/2019, 11/19/2019, 01/03/2020     Health Maintenance   Topic Date Due    COVID-19 Vaccine (4 - Booster for Pfizer series) 01/05/2022    Depression Monitoring  02/18/2023    Annual Wellness Visit (AWV)  02/19/2023    Breast cancer screen  01/25/2024    Pneumococcal 0-64 years Vaccine (3 - PPSV23 if available, else PCV20) 05/12/2025    Lipids  11/16/2027    DTaP/Tdap/Td vaccine (4 - Td or Tdap) 10/25/2028    Colorectal Cancer Screen  01/16/2032    Flu vaccine  Completed    Shingles vaccine  Completed    Hepatitis C screen  Completed    HIV screen  Completed    Hepatitis A vaccine  Aged Out    Hib vaccine  Aged Out    Meningococcal (ACWY) vaccine  Aged Out       LABS  Lab Results   Component Value Date    LABA1C 5.1 12/19/2022     No results found for: EAG  No components found for: CHLPL  Lab Results   Component Value Date    TRIG 67 11/09/2022    TRIG 32 03/02/2022    TRIG 71 04/12/2021     Lab Results   Component Value Date    HDL 86 11/16/2022    HDL 92 11/09/2022    HDL 95 03/02/2022     Lab Results   Component Value Date    LDLCALC 44 11/16/2022    LDLCALC 42 11/09/2022    LDLCALC 40 03/02/2022       Chemistry        Component Value Date/Time     12/19/2022 0844    K 4.5 12/19/2022 0844    K 3.8 09/01/2022 1711     12/19/2022 0844    CO2 24 12/19/2022 0844    BUN 19 12/19/2022 0844    CREATININE 0.7 12/19/2022 0844        Component Value Date/Time    CALCIUM 10.4 12/19/2022 0844    ALKPHOS 149 (H) 12/19/2022 0844    AST 51 (H) 12/19/2022 0844    ALT 74 (H) 12/19/2022 0844    BILITOT 0.3 12/19/2022 0844          No results found for: LABMICR, [id number]  Lab Results   Component Value Date    TSH 2.470 11/16/2022     No results found for: PSA  Lab Results   Component Value Date    WBC 6.9 11/16/2022    HGB 12.9 11/16/2022    HCT 40.0 11/16/2022    .9 (H) 11/16/2022     11/16/2022       Objective:  /72 (Site: Left Upper Arm, Position: Sitting, Cuff Size: Medium Adult)   Pulse 67   Temp 97.8 °F (36.6 °C) (Oral)   Resp 18   Ht 4' 10\" (1.473 m)   Wt 123 lb 3.2 oz (55.9 kg)   SpO2 97%   BMI 25.75 kg/m²     Physical Exam  Constitutional:       General: She is not in acute distress. Appearance: Normal appearance. HENT:      Head: Normocephalic. Right Ear: External ear normal.      Left Ear: External ear normal.      Nose: Nose normal.      Mouth/Throat:      Mouth: Mucous membranes are moist.   Eyes:      General: No scleral icterus. Extraocular Movements: Extraocular movements intact. Cardiovascular:      Rate and Rhythm: Normal rate and regular rhythm. Pulses: Normal pulses. Heart sounds: Normal heart sounds. Pulmonary:      Effort: Pulmonary effort is normal.      Breath sounds: Normal breath sounds. Abdominal:      General: Abdomen is flat. There is no distension. Palpations: Abdomen is soft. Musculoskeletal:         General: Normal range of motion. Cervical back: Normal range of motion. Comments: Chronic limb deformity present. Skin:     General: Skin is warm and dry. Neurological:      Mental Status: She is alert. Motor: No weakness. Psychiatric:         Mood and Affect: Mood normal.       They voiced understanding. All questions answered. They agreed with treatment plan. See patient instructions for any educational materials that may have been given. Discussed use, benefit, and side effects of prescribed medications. Reviewed health maintenance.     (Please note that portions of this note may have been completed with a voice recognition program.  Efforts were made to edit the dictation but occasionally words are mis-transcribed.)      Electronically signed by Bernice Weathers DO on 2/7/2023 at 2:41 PM

## 2023-02-07 NOTE — PROGRESS NOTES
[address] [place] [address]  Dept: [phone]  Loc: [phone]      Please see Resident note for complete HPI. ROS per Resident    Lab Results   Component Value Date    WBC 6.9 11/16/2022    HGB 12.9 11/16/2022    HCT 40.0 11/16/2022    .9 (H) 11/16/2022     11/16/2022     Lab Results   Component Value Date     12/19/2022    K 4.5 12/19/2022     12/19/2022    CO2 24 12/19/2022    BUN 19 12/19/2022    CREATININE 0.7 12/19/2022    GLUCOSE 99 12/19/2022    CALCIUM 10.4 12/19/2022    PROT 6.4 12/19/2022    LABALBU 4.6 12/19/2022    BILITOT 0.3 12/19/2022    ALKPHOS 149 (H) 12/19/2022    AST 51 (H) 12/19/2022    ALT 74 (H) 12/19/2022    LABGLOM >60 12/19/2022    GFRAA >60 01/18/2022     Lab Results   Component Value Date    LABA1C 5.1 12/19/2022     No results found for: EAG  No results found for: LABMICR, [id number]  Lab Results   Component Value Date    TSH 2.470 11/16/2022    T4FREE 1.11 03/01/2022     Lab Results   Component Value Date    CHOL 147 11/09/2022    CHOL 141 03/02/2022    CHOL 157 04/12/2021     Lab Results   Component Value Date    TRIG 67 11/09/2022    TRIG 32 03/02/2022    TRIG 71 04/12/2021     Lab Results   Component Value Date    HDL 86 11/16/2022    HDL 92 11/09/2022    HDL 95 03/02/2022     Lab Results   Component Value Date    LDLCALC 44 11/16/2022    LDLCALC 42 11/09/2022    LDLCALC 40 03/02/2022     No results found for: LABVLDL, VLDL  No results found for: CHOLHDLRATIO  No results found for: PSA, PSADIA    Health Maintenance Due   Topic Date Due    COVID-19 Vaccine (4 - Booster for Maradiaga Peter series) 01/05/2022    Depression Monitoring  02/18/2023    Annual Wellness Visit (AWV)  02/19/2023         Physical Exam per Resident       ICD-10-CM    1. Mild episode of recurrent major depressive disorder ([facility] [address]  F33.0       2.  Iron deficiency anemia, unspecified iron deficiency anemia type  D50.9 CBC with Auto Differential      3.  MS (multiple sclerosis) ([facility] [address]  G35               Plan  I participated in the discussion and care of this patient   Doing well, continue current plan

## 2023-02-07 NOTE — PROGRESS NOTES
S: 58 y.o. female with   Chief Complaint   Patient presents with    Depression     Feels like she is doing well. HPI: please see resident note for HPI and ROS. 58-year-old female with history of depression, iron deficiency anemia, and multiple sclerosis here for follow-up. Moods are stable on current medication. Follows with psychiatry. History of iron deficiency anemia that is stable. Due for repeat labs. Followed with neurology at [facility] for multiple sclerosis but transitioning care to [facility]; appointment scheduled for tomorrow. BP Readings from Last 3 Encounters:   02/07/23 112/72   12/14/22 114/68   11/16/22 116/62     Wt Readings from Last 3 Encounters:   02/07/23 123 lb 3.2 oz (55.9 kg)   12/14/22 127 lb 12.8 oz (58 kg)   11/16/22 127 lb 12.8 oz (58 kg)       O: VS:  height is 4' 10\" (1.473 m) and weight is 123 lb 3.2 oz (55.9 kg). Her oral temperature is 97.8 °F (36.6 °C). Her blood pressure is 112/72 and her pulse is 67. Her respiration is 18 and oxygen saturation is 97%. Diagnosis Orders   1. Mild episode of recurrent major depressive disorder ([facility] [address]        2. Iron deficiency anemia, unspecified iron deficiency anemia type  CBC with Auto Differential      3. MS (multiple sclerosis) ([facility] [address]            Plan:  Please refer to resident note for full plan. Mood disorder: Chronic, stable. Continue medication as prescribed by psychiatry and follow-up as scheduled. Iron deficiency anemia: Chronic, stable. Agree with resident physician rechecking CBC at this time. Advised to follow-up with neurology as scheduled for multiple sclerosis. Follow-up in 3 months for recheck or sooner if needed.     Health Maintenance Due   Topic Date Due    COVID-19 Vaccine (4 - Booster for Pfizer series) 01/05/2022    Depression Monitoring  02/18/2023    Annual Wellness Visit (AWV)  02/19/2023       Attending Physician Statement  I have discussed the case, including pertinent history and exam findings with the resident. I agree with the documented assessment and plan as documented by the resident.         Cheyenne Dancer, DO 2/7/2023 4:13 PM

## 2023-02-07 NOTE — PROGRESS NOTES
Health Maintenance Due   Topic Date Due    COVID-19 Vaccine (4 - Booster for Pfizer series) 01/05/2022    Depression Monitoring  02/18/2023    Annual Wellness Visit (AWV)  02/19/2023

## 2023-02-08 ENCOUNTER — HOSPITAL ENCOUNTER (OUTPATIENT)
Dept: MRI IMAGING | Age: 63
Discharge: HOME | End: 2023-02-08

## 2023-02-08 ENCOUNTER — HOSPITAL ENCOUNTER (OUTPATIENT)
Age: 63
Discharge: HOME | End: 2023-02-08
Payer: MEDICARE

## 2023-02-08 ENCOUNTER — OFFICE VISIT (OUTPATIENT)
Dept: NEUROLOGY | Age: 63
End: 2023-02-08

## 2023-02-08 VITALS
HEIGHT: 58 IN | HEART RATE: 62 BPM | OXYGEN SATURATION: 98 % | DIASTOLIC BLOOD PRESSURE: 75 MMHG | SYSTOLIC BLOOD PRESSURE: 105 MMHG | WEIGHT: 125 LBS | BODY MASS INDEX: 26.24 KG/M2

## 2023-02-08 DIAGNOSIS — G35 MULTIPLE SCLEROSIS (HCC): Primary | ICD-10-CM

## 2023-02-08 DIAGNOSIS — M89.9 DISORDER OF BONE, UNSPECIFIED: ICD-10-CM

## 2023-02-08 DIAGNOSIS — Z00.6 ENCOUNTER FOR EXAMINATION FOR NORMAL COMPARISON AND CONTROL IN CLINICAL RESEARCH PROGRAM: ICD-10-CM

## 2023-02-08 DIAGNOSIS — Z51.81 THERAPEUTIC DRUG MONITORING: ICD-10-CM

## 2023-02-08 DIAGNOSIS — D50.9 IRON DEFICIENCY ANEMIA, UNSPECIFIED IRON DEFICIENCY ANEMIA TYPE: ICD-10-CM

## 2023-02-08 LAB
BASOPHILS ABSOLUTE: 0 THOU/MM3 (ref 0–0.1)
BASOPHILS NFR BLD AUTO: 0.3 %
DEPRECATED RDW RBC AUTO: 49.1 FL (ref 35–45)
EOSINOPHIL NFR BLD AUTO: 2 %
EOSINOPHILS ABSOLUTE: 0.1 THOU/MM3 (ref 0–0.4)
ERYTHROCYTE [DISTWIDTH] IN BLOOD BY AUTOMATED COUNT: 13 % (ref 11.5–14.5)
HCT VFR BLD AUTO: 39 % (ref 37–47)
HGB BLD-MCNC: 12.9 GM/DL (ref 12–16)
IMM GRANULOCYTES # BLD AUTO: 0.02 THOU/MM3 (ref 0–0.07)
IMM GRANULOCYTES NFR BLD AUTO: 0.3 %
LITHIUM SERPL-SCNC: 1.05 MMOL/L (ref 0.6–1.2)
LYMPHOCYTES ABSOLUTE: 0.4 THOU/MM3 (ref 1–4.8)
LYMPHOCYTES NFR BLD AUTO: 6.5 %
MCH RBC QN AUTO: 33.9 PG (ref 26–33)
MCHC RBC AUTO-ENTMCNC: 33.1 GM/DL (ref 32.2–35.5)
MCV RBC AUTO: 102.4 FL (ref 81–99)
MONOCYTES ABSOLUTE: 0.6 THOU/MM3 (ref 0.4–1.3)
MONOCYTES NFR BLD AUTO: 8.8 %
NEUTROPHILS NFR BLD AUTO: 82.1 %
NRBC BLD AUTO-RTO: 0 /100 WBC
PLATELET # BLD AUTO: 189 THOU/MM3 (ref 130–400)
PMV BLD AUTO: 9.8 FL (ref 9.4–12.4)
RBC # BLD AUTO: 3.81 MILL/MM3 (ref 4.2–5.4)
SEGMENTED NEUTROPHILS ABSOLUTE COUNT: 5.4 THOU/MM3 (ref 1.8–7.7)
WBC # BLD AUTO: 6.6 THOU/MM3 (ref 4.8–10.8)

## 2023-02-08 PROCEDURE — 36415 COLL VENOUS BLD VENIPUNCTURE: CPT

## 2023-02-08 PROCEDURE — 80178 ASSAY OF LITHIUM: CPT

## 2023-02-08 PROCEDURE — 85025 COMPLETE CBC W/AUTO DIFF WBC: CPT

## 2023-02-08 NOTE — LETTER
[address] [facility]  [facility] [address]  [facility] [address]  Phone: [phone]  Fax: [phone]    Anai Hampton MD    February 19, 2023     Francisco Maher DO  [address]    Patient: Dolores Vega   MR Number: [id number]   YOB: 1960   Date of Visit: 2/8/2023       Dear Francisco Maher: Thank you for referring Theresa Dudley to me for evaluation/treatment. Below are the relevant portions of my assessment and plan of care. If you have questions, please do not hesitate to call me. I look forward to following Sean James along with you.     Sincerely,      Anai Hampton MD

## 2023-02-08 NOTE — PATIENT INSTRUCTIONS
Obtain records from previous neurologist, Dr. Nicholas for review  Stop Tecifdera per patient request  Vitamin B12, folate  Vitamin D level  Vitamin B6 level  Stay physically active  Take calcium and vitamin D supplements daily  Call with any new symptoms or concerns.   Follow up in 12 months.

## 2023-02-09 ENCOUNTER — HOSPITAL ENCOUNTER (OUTPATIENT)
Age: 63
Discharge: HOME | End: 2023-02-09
Payer: MEDICARE

## 2023-02-09 DIAGNOSIS — M89.9 DISORDER OF BONE, UNSPECIFIED: ICD-10-CM

## 2023-02-09 DIAGNOSIS — G35 MULTIPLE SCLEROSIS (HCC): ICD-10-CM

## 2023-02-09 LAB
25(OH)D3 SERPL-MCNC: 31 NG/ML (ref 30–100)
FOLATE SERPL-MCNC: > 20 NG/ML (ref 4.8–24.2)
VIT B12 SERPL-MCNC: 374 PG/ML (ref 211–911)

## 2023-02-09 PROCEDURE — 36415 COLL VENOUS BLD VENIPUNCTURE: CPT

## 2023-02-09 PROCEDURE — 82607 VITAMIN B-12: CPT

## 2023-02-09 PROCEDURE — 82746 ASSAY OF FOLIC ACID SERUM: CPT

## 2023-02-09 PROCEDURE — 82306 VITAMIN D 25 HYDROXY: CPT

## 2023-02-09 PROCEDURE — 84207 ASSAY OF VITAMIN B-6: CPT

## 2023-02-10 ENCOUNTER — TELEPHONE (OUTPATIENT)
Dept: NEUROLOGY | Age: 63
End: 2023-02-10

## 2023-02-10 NOTE — TELEPHONE ENCOUNTER
----- Message from AJITH Ng CNP sent at 2/9/2023  4:10 PM EST -----  Let patient know her vitamin B12 level is marginally low=[id number]. Please ask her to start vitamin B12 sublingual supplements, at least 3000 mcg daily, over the counter.   Please have her call the office in 2-3 months to obtain repeat vitamin b12 level  Joellen Babinski, CNP

## 2023-02-12 LAB — PYRIDOXAL PHOS SERPL-SCNC: 36.4 NMOL/L (ref 20–125)

## 2023-02-13 NOTE — TELEPHONE ENCOUNTER
Second attempt. Left voice message for patient to return call to office. [employer] message sent to notify patient of above.

## 2023-02-19 NOTE — PROGRESS NOTES
Chief Complaint   Patient presents with    Consultation     MS Samanta Goldstein is a 58 y.o. female who presents today for evaluation of history of multiple sclerosis diagnosed in 200. Initial presenting symptoms was balance trouble, weakness. Diagnosis was made with MRI brain and spinal tap. She has been seen by neurology in the past, Dr. Vanessa Garza, and is establishing local care. She is currently on tecfidera. She has been on avonex in the past, however this was stopped due to injection site reaction. She has not had an exacerbation since 1998, however she feels she has slowly progressively lost ground over time. MRI brain done W/WO contrast 8/17/22 showed stable compared to March 2021. Her sleep is poor and interrupted, she does wake up feeling tired. She has not been told she snores. She does take daytime naps. She does have previous history of gastric bypass surgery. She is taking calcium and vitamin D supplements. No family history of multiple sclerosis. She   denies chest pain. No shortness of breath, no neck pain. No vision changes. No dysphagia. No fever. No rash. No weight loss. History provided by patient.         Past Medical History:   Diagnosis Date    Balance problem     Bipolar I disorder, most recent episode (or current) depressed, in partial or unspecified remission 5/8/2013    Cancer ([address]     CERVICAL     Depression     Fatigue     Gallstones     Gastric bypass status for obesity 2002    GERD (gastroesophageal reflux disease)     History of blood transfusion     History of hysterectomy     REMOVAL OF ONE OVARY     Hypothyroidism     Liver cyst     GI Dr Julius Perez    MS (multiple sclerosis) [facility])     Paresthesias     RT LOWER LIMB    Ulcer at site of surgical anastomosis following bypass of stomach 1/15/2022    UTI (urinary tract infection)        Patient Active Problem List   Diagnosis    Acquired hypothyroidism    MS (multiple sclerosis) ([address]    S/P laparoscopic cholecystectomy S/P laparoscopic appendectomy    Gastrointestinal hemorrhage associated with gastritis    Multiple thyroid nodules    Non-ischemic cardiomyopathy (HCC)    History of Batsheva-en-Y gastric bypass    Renal lesion    Liver lesion    Bipolar 1 disorder (HCC)    Symptomatic bradycardia    Status post placement of cardiac pacemaker    Gastrointestinal hemorrhage with melena    GI bleed    Peptic ulcer disease    Other complications of other bariatric procedure    Ulcer at site of surgical anastomosis following bypass of stomach    Acute blood loss anemia    Iron deficiency anemia    Elevated lithium level       Allergies   Allergen Reactions    Latex Rash    Sulfa Antibiotics Rash       Current Outpatient Medications   Medication Sig Dispense Refill    Prenatal Vit-Fe Fumarate-FA (PRENATAL 1+1 PO) Take by mouth      Caffeine (VIVARIN) 200 MG TABS Take 200 mg by mouth 2 times daily      NONFORMULARY Focus and Cognition Support OTC Once Daily      levothyroxine (SYNTHROID) 100 MCG tablet TAKE 1 TABLET BY MOUTH DAILY 90 tablet 1    lithium 300 MG tablet Take 1.5 tablets by mouth in the morning and 1.5 tablets in the evening. 120 tablet 5    nitroGLYCERIN (NITROSTAT) 0.4 MG SL tablet up to max of 3 total doses. If no relief after 1 dose, call 911. 25 tablet 3    blood glucose monitor kit and supplies Dispense sufficient amount for indicated testing frequency plus additional to accommodate PRN testing needs. Dispense all needed supplies to include: monitor, strips, lancing device, lancets, control solutions, alcohol swabs. 1 kit 0    lurasidone (LATUDA) 40 MG TABS tablet Take 1 tablet by mouth nightly 30 tablet 5    vilazodone hcl 40 MG TABS TAKE 1 TABLET BY MOUTH ONCE DAILY.  30 tablet 5    ferrous sulfate (IRON 325) 325 (65 Fe) MG tablet Take 1 tablet by mouth in the morning, at noon, and at bedtime 90 tablet 2    pantoprazole (PROTONIX) 40 MG tablet Take 1 tablet by mouth 2 times daily (before meals) 180 tablet 2    DHEA 50 MG CAPS Take by mouth nightly      dimethyl fumarate (TECFIDERA) 240 MG delayed release capsule Take 1 capsule by mouth 2 times daily      ipratropium (ATROVENT) 0.03 % nasal spray INSTILL 2 SPRAYS INTO EACH NOSTRIL 3 TIMES DAILY. 1 Bottle 3    fluticasone (FLONASE) 50 MCG/ACT nasal spray 2 sprays by Each Nostril route daily      Prosthesis MISC by Does not apply route Strap for the prosthesis broken please order new 1 each 0    loratadine (CLARITIN) 10 MG tablet Take 1 tablet by mouth daily 90 tablet 1    Cholecalciferol (VITAMIN D) 2000 units CAPS capsule Take by mouth nightly 50 mcg nightly      Magnesium Oxide 250 MG TABS Take 250 mg by mouth nightly       calcium carbonate 600 MG TABS tablet Take 1 tablet by mouth nightly       vitamin B-12 1000 MCG tablet Take 1 tablet by mouth daily 30 tablet 5    lamoTRIgine (LAMICTAL) 200 MG tablet Take one tab by mouth once daily. 30 tablet 5     No current facility-administered medications for this visit. Social History     Socioeconomic History    Marital status:      Spouse name: None    Number of children: 0    Years of education: None    Highest education level:  Bachelor's degree (e.g., BA, AB, BS)   Tobacco Use    Smoking status: Former     Packs/day: 0.10     Years: 0.50     Pack years: 0.05     Types: Cigarettes     Quit date: 10/24/1977     Years since quittin.3    Smokeless tobacco: Never   Vaping Use    Vaping Use: Never used   Substance and Sexual Activity    Alcohol use: No    Drug use: No    Sexual activity: Not Currently     Partners: Male   Social History Narrative    2021    LEVEL OF EDUCATION: graduated high school; earned her bachelor degree in education; did some master's level course work but did not complete the degree    SPECIAL EDUCATION NEEDS: None    RESIDENCE: Currently lives alone    LEGAL HISTORY: None    Yarsanism: Hoahaoism    TRAUMA: None    : None    HOBBIES: writing, swimming, 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 She was  from 2002 to 2015; they  in 2015    CHILDREN: None    SUBSTANCE USE: None     Social Determinants of Health     Financial Resource Strain: Low Risk     Difficulty of Paying Living Expenses: Not hard at all   Food Insecurity: No Food Insecurity    Worried About Running Out of Food in the Last Year: Never true    Ran Out of Food in the Last Year: Never true   Transportation Needs: Unknown    Lack of Transportation (Non-Medical): No   Physical Activity: Insufficiently Active    Days of Exercise per Week: 3 days    Minutes of Exercise per Session: 10 min   Housing Stability: Unknown    Unstable Housing in the Last Year: No       Family History   Problem Relation Age of Onset    Colon Polyps Mother     Breast Cancer Mother 48    Heart Disease Father     Glaucoma Father     Breast Cancer Sister 62    Colon Cancer Sister     Cancer Sister     Breast Cancer Sister 54         I reviewed the past medical history, allergies, medications, social history and family history. Review of Systems   All systems reviewed, and are all negative, except what is mentioned in HPI      Vitals:    02/08/23 1429   BP: 105/75   Site: Left Upper Arm   Position: Sitting   Cuff Size: Large Adult   Pulse: 62   SpO2: 98%   Weight: 125 lb (56.7 kg)   Height: 4' 10\" (1.473 m)       Physical Examination:  General appearance - alert, well appearing, and in no distress, oriented to person, place, and time and normal weight  Mental status- Level of Alertness: awake  Orientation: person, place, time  Memory: normal  Fund of Knowledge: normal  Attention/Concentration: normal  Language: normal. Mood is normal.   Neck - supple, no significant adenopathy, carotids upstroke normal bilaterally. There is no axillary lymphadenopathy. There is no carotid bruit. No neck lymphadenopathy .  No thyroid enlargement  Neurological - Cranial [id number]:.   Cranial nerve II: Normal   Cranial nerve III: Pupils: equal, round, reactive to light  Cranial nerves III, IV, VI: Extraocular Movements: intact   Cranial nerve V: Facial sensation: intact   Cranial nerve VII:Facial strength: intact   Cranial nerve VIII: Hearing: intact    Cranial nerve IX: Palate Elevation intact bilaterally  Cranial nerve XI: Shoulder shrug intact bilaterally  Cranial nerve XII: Tongue midline   neck supple without rigidity, there is no limitation of range of motion of the neck. 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 Normal muscle tone. There is no muscle atrophy. Sensory is intact for light touch   Coordination: finger to nose,   intact on the left, not tested on right  Gait and station intact  Abnormal movement none, vibration reduced distally  Skin - warm, dry to touch, normal coloration, no rashes, no suspicious skin lesions  Superficial temporal artery pulses are normal.   There is no resting tremor, no pin rolling, no bradykinesia, no Hypohonia, normal blink rate. Musculoskeletal: Has no hand arthritis, no limitation of ROM in any of the four extremities, right arm amputation. .  There is no leg edema. The Heart was regular in rate and rhythm. No heart murmur  Chest Clear, with  good effort. Abdomen soft, intact bowel sounds. Results for orders placed in visit on 09/16/22    MRI BRAIN WO CONTRAST    Results for orders placed during the hospital encounter of 08/17/22    MRI BRAIN W WO CONTRAST    Narrative  PROCEDURE: MRI BRAIN W [employer] INFORMATIONMultiple sclerosis ([facility] COMPARISON: MRI scan of the brain dated 30 March 2021. .    TECHNIQUE: Multiplanar and multiple spin echo T1 and T2-weighted images were obtained through the brain before and after the administration of intravenous contrast.    FINDINGS:      The diffusion-weighted images are normal. The brain volume is slightly reduced. There are no intra-or extra-axial collections. There is no hydrocephalus, midline shift or mass effect. On the FLAIR and T2-weighted sequences, there multiple areas of increased signal intensity in the corpus callosum and white matter consistent with involvement by demyelinating disease, unchanged since previous study dated 3 March 2021. There are no new  white matter lesions. There is no restricted diffusion. There is no abnormal enhancement. On the gradient echo T2-weighted images, there are  no areas of susceptibility artifact noted    There is no abnormal enhancement in the brain. The major intracranial vascular flow voids are present. The midline craniocervical junction structures are normal.  The brainstem and pituitary gland are normal.    Impression  1. Stable MRI scan of the brain, no interval change since previous study dated 30 March 2021.  2. Multiple areas of increased signal intensity in the corpus callosum and white matter consistent with involvement by demyelinating disease, unchanged. 3. There are no new white matter lesions, areas of restricted diffusion or abnormal enhancement noted. **This report has been created using voice recognition software. It may contain minor errors which are inherent in voice recognition technology. **      Final report electronically signed by DR Juan Steele on 8/17/2022 3:54 PM        We reviewed the patient records from referring provider and available information in the EHR       ASSESSMENT:      Diagnosis Orders   1. Multiple sclerosis [facility])           This is a 58year old female with history of multiple sclerosis, diagnosed in 1990, establishing care. She reports her symptoms have been stable.  I reviewed the MRI Brain 8/17/2022, and agree with interpretation that it is stable compared to MRI brain 4/2021, I also reviewed the patient pertinent labs and records in the EHR and from other providers. She is currently on Tecfidera, and is tolerated well, she denies side effects. She has been on Avonex in the past, however this was stopped due to injection site reaction. The patient has a mild disease burden and has been event free for years and MRI brain is stable. Her last relapse was in 1998. She was counseled about her symptoms, and given the option to go off the Malo, with continued monitoring. If there are any new symptoms, to report. She will also need periodic evaluations with us. After detailed discussion with patient we agreed on the following plan. Plan    Obtain records from previous neurologist, Dr. Warner [place] for review  Stop Tecifdera per patient request  Vitamin B12, folate  Vitamin D level  Vitamin B6 level  Stay physically active  Take calcium and vitamin D supplements daily  Call with any new symptoms or concerns. Follow up in 12 months.      Total time 65 min    Octaviano Hampton MD

## 2023-02-21 DIAGNOSIS — Z51.81 THERAPEUTIC DRUG MONITORING: Primary | ICD-10-CM

## 2023-03-20 DIAGNOSIS — K27.9 PEPTIC ULCER DISEASE: ICD-10-CM

## 2023-03-21 RX ORDER — SUCRALFATE 1 G/1
1 TABLET ORAL EVERY 6 HOURS
Qty: 120 TABLET | Refills: 3 | OUTPATIENT
Start: 2023-03-21

## 2023-03-21 RX ORDER — SUCRALFATE 1 G/1
TABLET ORAL
Qty: 120 TABLET | Refills: 3 | Status: SHIPPED | OUTPATIENT
Start: 2023-03-21

## 2023-03-31 ENCOUNTER — TELEPHONE (OUTPATIENT)
Dept: CARDIOLOGY CLINIC | Age: 63
End: 2023-03-31

## 2023-04-03 RX ORDER — LAMOTRIGINE 200 MG/1
TABLET ORAL
Qty: 30 TABLET | Refills: 0 | Status: SHIPPED | OUTPATIENT
Start: 2023-04-03

## 2023-04-03 NOTE — TELEPHONE ENCOUNTER
[facility] is requesting a refill on the following medications:  Requested Prescriptions     Pending Prescriptions Disp Refills    lamoTRIgine (LAMICTAL) 200 MG tablet [Pharmacy Med Name: LAMOTRIGINE 200MG TABS] 30 tablet 0     Sig: TAKE 1 TABLET BY MOUTH DAILY       Date of last visit: 9/13/2022  Date of next visit (if applicable):5/9/2023  Pharmacy Name: [facility])      Thanks,  Isabela Hernandez, [facility] Rita Mcintyre

## 2023-04-24 ENCOUNTER — TELEPHONE (OUTPATIENT)
Dept: FAMILY MEDICINE CLINIC | Age: 63
End: 2023-04-24

## 2023-04-24 DIAGNOSIS — R04.0 EPISTAXIS: Primary | ICD-10-CM

## 2023-04-24 NOTE — TELEPHONE ENCOUNTER
----- Message from Gisela Dotson sent at 4/24/2023 11:47 AM EDT -----  Subject: Referral Request    Reason for referral request? Patient has been having nose bleeds and would   like a referral to Dr. Katya Smith. Provider patient wants to be referred to(if known):     Provider Phone Number(if known):     Additional Information for Provider?   ---------------------------------------------------------------------------  --------------  [facility] INFO    [phone]; OK to leave message on voicemail  ---------------------------------------------------------------------------  --------------

## 2023-04-25 ENCOUNTER — PROCEDURE VISIT (OUTPATIENT)
Dept: CARDIOLOGY CLINIC | Age: 63
End: 2023-04-25
Payer: MEDICARE

## 2023-04-25 DIAGNOSIS — Z95.0 ARTIFICIAL PACEMAKER: Primary | ICD-10-CM

## 2023-04-25 PROCEDURE — 93296 REM INTERROG EVL PM/IDS: CPT | Performed by: INTERNAL MEDICINE

## 2023-04-25 PROCEDURE — 93294 REM INTERROG EVL PM/LDLS PM: CPT | Performed by: INTERNAL MEDICINE

## 2023-04-25 NOTE — PROGRESS NOTES
"[employer]link Medtronic Dual Pacemaker   Patient of Brian    Battery 9.8 years    Presenting rhythm APVS    A Impedance 418  RV Impedance 475    P wave sensing 3.8  R wave sensing 4    A Threshold 0.625 @ 0.40  A Amplitude 1.50 @ 0.40  RV Thresholds 1 @ 0.40  RV Amplitude 2.0 @ 0.40      A Paced 94.9%  V Paced 1.3%    Programmed Mode AAIR <=> DDDR       Afib [place] <0.1%    Episodes   none

## 2023-05-08 RX ORDER — LAMOTRIGINE 200 MG/1
TABLET ORAL
Qty: 30 TABLET | Refills: 2 | OUTPATIENT
Start: 2023-05-08

## 2023-05-09 ENCOUNTER — OFFICE VISIT (OUTPATIENT)
Dept: PSYCHIATRY | Age: 63
End: 2023-05-09
Payer: MEDICARE

## 2023-05-09 VITALS
HEIGHT: 58 IN | DIASTOLIC BLOOD PRESSURE: 69 MMHG | SYSTOLIC BLOOD PRESSURE: 103 MMHG | HEART RATE: 83 BPM | BODY MASS INDEX: 26.24 KG/M2 | WEIGHT: 125 LBS | OXYGEN SATURATION: 97 %

## 2023-05-09 DIAGNOSIS — Z51.81 THERAPEUTIC DRUG MONITORING: ICD-10-CM

## 2023-05-09 DIAGNOSIS — F31.75 BIPOLAR I, MOST RECENT EPISODE DEPRESSED, PARTIAL REMISSION (HCC): Primary | ICD-10-CM

## 2023-05-09 PROCEDURE — 99214 OFFICE O/P EST MOD 30 MIN: CPT | Performed by: NURSE PRACTITIONER

## 2023-05-09 RX ORDER — LAMOTRIGINE 200 MG/1
TABLET ORAL
Qty: 30 TABLET | Refills: 5 | Status: SHIPPED | OUTPATIENT
Start: 2023-05-09

## 2023-05-09 RX ORDER — VILAZODONE HYDROCHLORIDE 40 MG/1
TABLET ORAL
Qty: 30 TABLET | Refills: 5 | Status: SHIPPED | OUTPATIENT
Start: 2023-05-09

## 2023-05-09 RX ORDER — LITHIUM CARBONATE 300 MG
450 TABLET ORAL 2 TIMES DAILY
Qty: 120 TABLET | Refills: 5 | Status: SHIPPED | OUTPATIENT
Start: 2023-05-09

## 2023-05-09 RX ORDER — LURASIDONE HYDROCHLORIDE 40 MG/1
40 TABLET, FILM COATED ORAL NIGHTLY
Qty: 30 TABLET | Refills: 5 | Status: SHIPPED | OUTPATIENT
Start: 2023-05-09

## 2023-05-10 ENCOUNTER — HOSPITAL ENCOUNTER (OUTPATIENT)
Age: 63
Discharge: HOME | End: 2023-05-10
Payer: MEDICARE

## 2023-05-10 DIAGNOSIS — F31.75 BIPOLAR I, MOST RECENT EPISODE DEPRESSED, PARTIAL REMISSION (HCC): ICD-10-CM

## 2023-05-10 DIAGNOSIS — Z51.81 THERAPEUTIC DRUG MONITORING: ICD-10-CM

## 2023-05-10 LAB
ANION GAP SERPL CALC-SCNC: 11 MEQ/L (ref 8–16)
BUN SERPL-MCNC: 21 MG/DL (ref 7–22)
CALCIUM SERPL-MCNC: 10.3 MG/DL (ref 8.5–10.5)
CHLORIDE SERPL-SCNC: 106 MEQ/L (ref 98–111)
CO2 SERPL-SCNC: 21 MEQ/L (ref 23–33)
GLUCOSE SERPL-MCNC: 91 MG/DL (ref 70–108)
LITHIUM SERPL-SCNC: 0.45 MMOL/L (ref 0.6–1.2)
POTASSIUM SERPL-SCNC: 4.4 MEQ/L (ref 3.5–5.2)
SODIUM SERPL-SCNC: 138 MEQ/L (ref 135–145)

## 2023-05-10 PROCEDURE — 82947 ASSAY GLUCOSE BLOOD QUANT: CPT

## 2023-05-10 PROCEDURE — 80178 ASSAY OF LITHIUM: CPT

## 2023-05-10 PROCEDURE — 36415 COLL VENOUS BLD VENIPUNCTURE: CPT

## 2023-05-10 PROCEDURE — 84520 ASSAY OF UREA NITROGEN: CPT

## 2023-05-10 PROCEDURE — 82310 ASSAY OF CALCIUM: CPT

## 2023-05-10 PROCEDURE — 80051 ELECTROLYTE PANEL: CPT

## 2023-06-02 ENCOUNTER — TELEPHONE (OUTPATIENT)
Dept: FAMILY MEDICINE CLINIC | Age: 63
End: 2023-06-02

## 2023-06-02 DIAGNOSIS — H91.93 DECREASED HEARING OF BOTH EARS: Primary | ICD-10-CM

## 2023-06-06 RX ORDER — SUCRALFATE 1 G/1
TABLET ORAL
Qty: 120 TABLET | Refills: 3 | Status: SHIPPED | OUTPATIENT
Start: 2023-06-06

## 2023-06-08 NOTE — TELEPHONE ENCOUNTER
I have not tried since, yes pleasenthat would be great. Is there anything I would need to do to fax it over?  Thank you

## 2023-06-23 ENCOUNTER — OFFICE VISIT (OUTPATIENT)
Dept: FAMILY MEDICINE CLINIC | Age: 63
End: 2023-06-23
Payer: MEDICARE

## 2023-06-23 VITALS
DIASTOLIC BLOOD PRESSURE: 84 MMHG | OXYGEN SATURATION: 97 % | BODY MASS INDEX: 26.36 KG/M2 | TEMPERATURE: 98.1 F | WEIGHT: 125.6 LBS | RESPIRATION RATE: 12 BRPM | SYSTOLIC BLOOD PRESSURE: 132 MMHG | HEART RATE: 84 BPM | HEIGHT: 58 IN

## 2023-06-23 DIAGNOSIS — E03.8 HYPOTHYROIDISM DUE TO HASHIMOTO'S THYROIDITIS: ICD-10-CM

## 2023-06-23 DIAGNOSIS — R09.89 RUNNY NOSE: ICD-10-CM

## 2023-06-23 DIAGNOSIS — Z98.84 HISTORY OF GASTRIC BYPASS: Primary | ICD-10-CM

## 2023-06-23 DIAGNOSIS — Z86.2 HISTORY OF ANEMIA: ICD-10-CM

## 2023-06-23 DIAGNOSIS — G35 MS (MULTIPLE SCLEROSIS) (HCC): ICD-10-CM

## 2023-06-23 DIAGNOSIS — H91.93 DECREASED HEARING OF BOTH EARS: ICD-10-CM

## 2023-06-23 DIAGNOSIS — Z87.19 HISTORY OF GASTROESOPHAGEAL REFLUX (GERD): ICD-10-CM

## 2023-06-23 DIAGNOSIS — E06.3 HYPOTHYROIDISM DUE TO HASHIMOTO'S THYROIDITIS: ICD-10-CM

## 2023-06-23 DIAGNOSIS — F31.9 BIPOLAR 1 DISORDER (HCC): ICD-10-CM

## 2023-06-23 PROBLEM — Z95.0 PRESENCE OF CARDIAC PACEMAKER: Status: ACTIVE | Noted: 2022-04-25

## 2023-06-23 PROCEDURE — 99214 OFFICE O/P EST MOD 30 MIN: CPT

## 2023-06-23 RX ORDER — PANTOPRAZOLE SODIUM 40 MG/1
40 TABLET, DELAYED RELEASE ORAL
Qty: 180 TABLET | Refills: 2 | Status: SHIPPED | OUTPATIENT
Start: 2023-06-23

## 2023-06-23 RX ORDER — LEVOTHYROXINE SODIUM 0.1 MG/1
TABLET ORAL
Qty: 90 TABLET | Refills: 1 | Status: SHIPPED | OUTPATIENT
Start: 2023-06-23

## 2023-06-23 ASSESSMENT — ENCOUNTER SYMPTOMS
CHEST TIGHTNESS: 0
DIARRHEA: 0
SHORTNESS OF BREATH: 0
NAUSEA: 0
VOMITING: 0
SORE THROAT: 0
WHEEZING: 0
CONSTIPATION: 0
RHINORRHEA: 0
ABDOMINAL PAIN: 0
EYE PAIN: 0

## 2023-06-24 ENCOUNTER — HOSPITAL ENCOUNTER (OUTPATIENT)
Age: 63
Discharge: HOME | End: 2023-06-24
Payer: MEDICARE

## 2023-06-24 DIAGNOSIS — E03.8 HYPOTHYROIDISM DUE TO HASHIMOTO'S THYROIDITIS: ICD-10-CM

## 2023-06-24 DIAGNOSIS — E06.3 HYPOTHYROIDISM DUE TO HASHIMOTO'S THYROIDITIS: ICD-10-CM

## 2023-06-24 DIAGNOSIS — Z98.84 HISTORY OF GASTRIC BYPASS: ICD-10-CM

## 2023-06-24 DIAGNOSIS — Z86.2 HISTORY OF ANEMIA: ICD-10-CM

## 2023-06-24 LAB
ALBUMIN SERPL BCG-MCNC: 4.6 G/DL (ref 3.5–5.1)
ALP SERPL-CCNC: 117 U/L (ref 38–126)
ALT SERPL W/O P-5'-P-CCNC: 46 U/L (ref 11–66)
ANION GAP SERPL CALC-SCNC: 11 MEQ/L (ref 8–16)
AST SERPL-CCNC: 28 U/L (ref 5–40)
BASOPHILS ABSOLUTE: 0 THOU/MM3 (ref 0–0.1)
BASOPHILS NFR BLD AUTO: 0.3 %
BILIRUB SERPL-MCNC: 0.4 MG/DL (ref 0.3–1.2)
BUN SERPL-MCNC: 14 MG/DL (ref 7–22)
CALCIUM SERPL-MCNC: 10.4 MG/DL (ref 8.5–10.5)
CHLORIDE SERPL-SCNC: 110 MEQ/L (ref 98–111)
CHOLESTEROL, FASTING: 155 MG/DL (ref 100–199)
CO2 SERPL-SCNC: 22 MEQ/L (ref 23–33)
CREAT SERPL-MCNC: 0.6 MG/DL (ref 0.4–1.2)
DEPRECATED RDW RBC AUTO: 49.4 FL (ref 35–45)
EOSINOPHIL NFR BLD AUTO: 2 %
EOSINOPHILS ABSOLUTE: 0.1 THOU/MM3 (ref 0–0.4)
ERYTHROCYTE [DISTWIDTH] IN BLOOD BY AUTOMATED COUNT: 12.8 % (ref 11.5–14.5)
FOLATE SERPL-MCNC: > 20 NG/ML (ref 4.8–24.2)
GFR SERPL CREATININE-BSD FRML MDRD: > 60 ML/MIN/1.73M2
GLUCOSE SERPL-MCNC: 95 MG/DL (ref 70–108)
HCT VFR BLD AUTO: 45 % (ref 37–47)
HDLC SERPL-MCNC: 96 MG/DL
HGB BLD-MCNC: 14.4 GM/DL (ref 12–16)
IMM GRANULOCYTES # BLD AUTO: 0.01 THOU/MM3 (ref 0–0.07)
IMM GRANULOCYTES NFR BLD AUTO: 0.2 %
IRON SERPL-MCNC: 108 UG/DL (ref 50–170)
LDLC SERPL CALC-MCNC: 43 MG/DL
LYMPHOCYTES ABSOLUTE: 0.8 THOU/MM3 (ref 1–4.8)
LYMPHOCYTES NFR BLD AUTO: 13 %
MAGNESIUM SERPL-MCNC: 2 MG/DL (ref 1.6–2.4)
MCH RBC QN AUTO: 33.6 PG (ref 26–33)
MCHC RBC AUTO-ENTMCNC: 32 GM/DL (ref 32.2–35.5)
MCV RBC AUTO: 105.1 FL (ref 81–99)
MONOCYTES ABSOLUTE: 0.4 THOU/MM3 (ref 0.4–1.3)
MONOCYTES NFR BLD AUTO: 6.4 %
NEUTROPHILS NFR BLD AUTO: 78.1 %
NRBC BLD AUTO-RTO: 0 /100 WBC
PLATELET # BLD AUTO: 215 THOU/MM3 (ref 130–400)
PMV BLD AUTO: 10.1 FL (ref 9.4–12.4)
POTASSIUM SERPL-SCNC: 4.7 MEQ/L (ref 3.5–5.2)
PROT SERPL-MCNC: 6.7 G/DL (ref 6.1–8)
RBC # BLD AUTO: 4.28 MILL/MM3 (ref 4.2–5.4)
SEGMENTED NEUTROPHILS ABSOLUTE COUNT: 4.6 THOU/MM3 (ref 1.8–7.7)
SODIUM SERPL-SCNC: 143 MEQ/L (ref 135–145)
T4 FREE SERPL-MCNC: 1.13 NG/DL (ref 0.93–1.76)
TRIGLYCERIDE, FASTING: 82 MG/DL (ref 0–199)
TSH SERPL DL<=0.005 MIU/L-ACNC: 1.4 UIU/ML (ref 0.4–4.2)
VIT B12 SERPL-MCNC: 835 PG/ML (ref 211–911)
WBC # BLD AUTO: 5.9 THOU/MM3 (ref 4.8–10.8)

## 2023-06-24 PROCEDURE — 36415 COLL VENOUS BLD VENIPUNCTURE: CPT

## 2023-06-24 PROCEDURE — 84443 ASSAY THYROID STIM HORMONE: CPT

## 2023-06-24 PROCEDURE — 84255 ASSAY OF SELENIUM: CPT

## 2023-06-24 PROCEDURE — 84446 ASSAY OF VITAMIN E: CPT

## 2023-06-24 PROCEDURE — 84630 ASSAY OF ZINC: CPT

## 2023-06-24 PROCEDURE — 82525 ASSAY OF COPPER: CPT

## 2023-06-24 PROCEDURE — 83735 ASSAY OF MAGNESIUM: CPT

## 2023-06-24 PROCEDURE — 84597 ASSAY OF VITAMIN K: CPT

## 2023-06-24 PROCEDURE — 80053 COMPREHEN METABOLIC PANEL: CPT

## 2023-06-24 PROCEDURE — 82746 ASSAY OF FOLIC ACID SERUM: CPT

## 2023-06-24 PROCEDURE — 84425 ASSAY OF VITAMIN B-1: CPT

## 2023-06-24 PROCEDURE — 84439 ASSAY OF FREE THYROXINE: CPT

## 2023-06-24 PROCEDURE — 82607 VITAMIN B-12: CPT

## 2023-06-24 PROCEDURE — 85025 COMPLETE CBC W/AUTO DIFF WBC: CPT

## 2023-06-24 PROCEDURE — 83540 ASSAY OF IRON: CPT

## 2023-06-24 PROCEDURE — 80061 LIPID PANEL: CPT

## 2023-06-24 PROCEDURE — 84590 ASSAY OF VITAMIN A: CPT

## 2023-06-28 LAB
COPPER SERPL-MCNC: 128.7 UG/DL (ref 80–155)
PHYTONADIONE SERPL-MCNC: NORMAL NG/L
SELENIUM SERPL-MCNC: 138 UG/L (ref 23–190)
VIT A SERPL-MCNC: NORMAL UG/ML
VITAMIN E LEVEL: NORMAL
ZINC SERPL-MCNC: 71.1 UG/DL (ref 60–120)

## 2023-06-29 ENCOUNTER — NURSE ONLY (OUTPATIENT)
Dept: CARDIOLOGY CLINIC | Age: 63
End: 2023-06-29
Payer: MEDICARE

## 2023-06-29 DIAGNOSIS — Z95.0 ARTIFICIAL PACEMAKER: Primary | ICD-10-CM

## 2023-06-29 LAB — VIT B1 PYROPHOSHATE BLD-SCNC: 148 NMOL/L (ref 70–180)

## 2023-06-29 PROCEDURE — 93280 PM DEVICE PROGR EVAL DUAL: CPT | Performed by: INTERNAL MEDICINE

## 2023-07-03 ENCOUNTER — TELEPHONE (OUTPATIENT)
Dept: FAMILY MEDICINE CLINIC | Age: 63
End: 2023-07-03

## 2023-07-10 NOTE — PROGRESS NOTES
In Office Medtronic Dual Pacemaker   Patient of [address] [facility]    Completed by [employer] Rep    Battery 9.6 years    Presenting rhythm AP VS    A Impedance 418  RV Impedance 456    P wave sensing 3  R wave sensing 3.8    A Threshold 0.5 @ 0.40  A Amplitude 1.50 @ 0.40  RV Thresholds 0.75 @ 0.40  RV Amplitude 2.0 @ 0.40      A Paced 96.3%  V Paced 0.5%    Programmed Mode AAIR <=> DDDR       Afib [place] <0.1%    Episodes   6/1/23- 13 beats NS VT rate 160

## 2023-09-14 DIAGNOSIS — K27.9 PEPTIC ULCER DISEASE: Primary | ICD-10-CM

## 2023-09-14 RX ORDER — SUCRALFATE 1 G/1
TABLET ORAL
Qty: 120 TABLET | Refills: 3 | Status: SHIPPED | OUTPATIENT
Start: 2023-09-14

## 2023-09-21 NOTE — PROGRESS NOTES
Medicare Annual Wellness Visit    Peggy Gottron is here for Medicare AWV (Pt presents for [address]    Assessment & Plan   Hypothyroidism due to Hashimoto's thyroiditis  - 2023 TSH 1.400 and T4 1.13  - Continue Levothyroxine   - RTC: 6 months for repeat TSH/T4  Orders  -     TSH; Future  -     T4, Free; Future  MS (multiple sclerosis) ([address])  - MRI 2022 with multiple areas of increased signal intensity in the corpus callosum and white matter consistent with involvement by demyelinating disease (unchanged compared to MRI 3/2021)  - Discontinued Tecfidera due to headaches and has been stable/unchanged since  - Maintain f/u with Dr. Олег Greenwood 2024  Bipolar 1 disorder ([address])  - Maintain f/u with Chana Parish  - Encouraged continued participation with [employer]   History of gastric bypass  - 2023 BMP, Vit B1, Vit K, Vit E, Vit A, Selenium, Zinc, Copper, B12, Folate, Iron, and Mg wnl  - Encouraged continued participation with [employer]  - RTC: 6 months, at which point will order repeat labs for next AWV  Sensorineural hearing loss (SNHL) of both ears  - 2023 Audiology eval with bilateral sensorineural hearing loss  - [facility]  Medicare annual wellness visit, subsequent  - Recommending dental appointment  - RTC: 6 months for repeat TSH/T4    Health Maintenance/Vaccinations  - Vaccinations: COVID x3 (anticipating), PCV13 and PPSV23, Shingrix x3, TDaP (2015), Flu shot this year   - Colon Cancer Screenin2019 with one 3-4mm polyp removed and mild diverticulosis and internal hemorrhoids, to repeat in 5 years ()  - Diabetes Screenin2022 HgbAqC  5.1  - Statin Use:  The 10-year ASCVD risk score (Evan ESTRADA, et al., 2019) is: 2.4%   - Cervical Cancer Screening: H/o cervical cancer s/p hysterectomy  - Breast Cancer Screenin2023 Mammo negative, to repeat in 1 year  - Osteoporosis Screening: DEXA at age

## 2023-09-28 ENCOUNTER — OFFICE VISIT (OUTPATIENT)
Dept: FAMILY MEDICINE CLINIC | Age: 63
End: 2023-09-28
Payer: MEDICARE

## 2023-09-28 VITALS
HEART RATE: 61 BPM | OXYGEN SATURATION: 98 % | RESPIRATION RATE: 16 BRPM | TEMPERATURE: 98.1 F | BODY MASS INDEX: 26.74 KG/M2 | HEIGHT: 58 IN | SYSTOLIC BLOOD PRESSURE: 116 MMHG | WEIGHT: 127.4 LBS | DIASTOLIC BLOOD PRESSURE: 74 MMHG

## 2023-09-28 DIAGNOSIS — Z98.84 HISTORY OF GASTRIC BYPASS: ICD-10-CM

## 2023-09-28 DIAGNOSIS — H90.3 SENSORINEURAL HEARING LOSS (SNHL) OF BOTH EARS: ICD-10-CM

## 2023-09-28 DIAGNOSIS — E06.3 HYPOTHYROIDISM DUE TO HASHIMOTO'S THYROIDITIS: Primary | ICD-10-CM

## 2023-09-28 DIAGNOSIS — E03.8 HYPOTHYROIDISM DUE TO HASHIMOTO'S THYROIDITIS: Primary | ICD-10-CM

## 2023-09-28 DIAGNOSIS — Z00.00 MEDICARE ANNUAL WELLNESS VISIT, SUBSEQUENT: ICD-10-CM

## 2023-09-28 DIAGNOSIS — F31.9 BIPOLAR 1 DISORDER (HCC): ICD-10-CM

## 2023-09-28 DIAGNOSIS — G35 MS (MULTIPLE SCLEROSIS) (HCC): ICD-10-CM

## 2023-09-28 PROCEDURE — G0439 PPPS, SUBSEQ VISIT: HCPCS

## 2023-09-28 PROCEDURE — 3017F COLORECTAL CA SCREEN DOC REV: CPT

## 2023-09-28 ASSESSMENT — PATIENT HEALTH QUESTIONNAIRE - PHQ9
6. FEELING BAD ABOUT YOURSELF - OR THAT YOU ARE A FAILURE OR HAVE LET YOURSELF OR YOUR FAMILY DOWN: 0
2. FEELING DOWN, DEPRESSED OR HOPELESS: 0
9. THOUGHTS THAT YOU WOULD BE BETTER OFF DEAD, OR OF HURTING YOURSELF: 0
SUM OF ALL RESPONSES TO PHQ QUESTIONS 1-9: 1
4. FEELING TIRED OR HAVING LITTLE ENERGY: 0
8. MOVING OR SPEAKING SO SLOWLY THAT OTHER PEOPLE COULD HAVE NOTICED. OR THE OPPOSITE, BEING SO FIGETY OR RESTLESS THAT YOU HAVE BEEN MOVING AROUND A LOT MORE THAN USUAL: 0
SUM OF ALL RESPONSES TO PHQ QUESTIONS 1-9: 1
7. TROUBLE CONCENTRATING ON THINGS, SUCH AS READING THE NEWSPAPER OR WATCHING TELEVISION: 1
1. LITTLE INTEREST OR PLEASURE IN DOING THINGS: 0
SUM OF ALL RESPONSES TO PHQ QUESTIONS 1-9: 1
SUM OF ALL RESPONSES TO PHQ9 QUESTIONS 1 & 2: 0
5. POOR APPETITE OR OVEREATING: 0
3. TROUBLE FALLING OR STAYING ASLEEP: 0
10. IF YOU CHECKED OFF ANY PROBLEMS, HOW DIFFICULT HAVE THESE PROBLEMS MADE IT FOR YOU TO DO YOUR WORK, TAKE CARE OF THINGS AT HOME, OR GET ALONG WITH OTHER PEOPLE: 0
SUM OF ALL RESPONSES TO PHQ QUESTIONS 1-9: 1

## 2023-09-28 ASSESSMENT — LIFESTYLE VARIABLES
HOW MANY STANDARD DRINKS CONTAINING ALCOHOL DO YOU HAVE ON A TYPICAL DAY: PATIENT DOES NOT DRINK
HOW OFTEN DO YOU HAVE A DRINK CONTAINING ALCOHOL: NEVER

## 2023-10-05 ENCOUNTER — PROCEDURE VISIT (OUTPATIENT)
Dept: CARDIOLOGY CLINIC | Age: 63
End: 2023-10-05
Payer: MEDICARE

## 2023-10-05 DIAGNOSIS — Z95.0 ARTIFICIAL PACEMAKER: Primary | ICD-10-CM

## 2023-10-05 PROCEDURE — 93296 REM INTERROG EVL PM/IDS: CPT | Performed by: INTERNAL MEDICINE

## 2023-10-05 PROCEDURE — 93294 REM INTERROG EVL PM/LDLS PM: CPT | Performed by: INTERNAL MEDICINE

## 2023-10-05 NOTE — PROGRESS NOTES
[facility] Medtronic Dual Pacemaker   Patient of Brian    Battery 9.4 years    Presenting rhythm AP VS    A Impedance 437  RV Impedance 513    P wave sensing 3.1  R wave sensing 4.1    A Threshold 0.625 @ 0.40  A Amplitude 1.5 @ 0.4  RV Thresholds 1.125 @ 0.40  RV Amplitude 2.25 @ 0.40      A Paced 96.3%  V Paced 0.5%    Programmed Mode AAIR <=> DDDR       Afib Deputy 0%    Episodes none

## 2023-10-30 ENCOUNTER — TELEPHONE (OUTPATIENT)
Dept: FAMILY MEDICINE CLINIC | Age: 63
End: 2023-10-30

## 2023-10-30 DIAGNOSIS — F31.75 BIPOLAR I, MOST RECENT EPISODE DEPRESSED, PARTIAL REMISSION (HCC): ICD-10-CM

## 2023-10-30 DIAGNOSIS — R04.0 EPISTAXIS, RECURRENT: Primary | ICD-10-CM

## 2023-10-30 RX ORDER — VILAZODONE HYDROCHLORIDE 40 MG/1
TABLET ORAL
Qty: 30 TABLET | Refills: 5 | OUTPATIENT
Start: 2023-10-30

## 2023-10-30 NOTE — TELEPHONE ENCOUNTER
----- Message from Gilda Lanier sent at 10/30/2023  3:44 PM EDT -----  Subject: Referral Request    Reason for referral request? Otolaryngology  Provider patient wants to be referred to(if known):     Provider Phone Number(if known): Additional Information for Provider?  Patient request for Dr. Fredo Stephens  ---------------------------------------------------------------------------  --------------  Butch Esposito INFO    [phone]; OK to leave message on voicemail  ---------------------------------------------------------------------------  --------------

## 2023-10-31 DIAGNOSIS — F31.75 BIPOLAR I, MOST RECENT EPISODE DEPRESSED, PARTIAL REMISSION (HCC): ICD-10-CM

## 2023-10-31 NOTE — TELEPHONE ENCOUNTER
Called patient, no answer. Left voicemail, asking her to call back with more information. I will also call back later. Patient was seen 9/28 in office when we discussed her audiology report. Patient was following with the [facility] [address] for hearing aids at that time. Need further clarification on what this referral request to otolaryngology is for.     Electronically signed by Dominguez Foster DO on 10/31/2023 at 1:11 PM

## 2023-10-31 NOTE — TELEPHONE ENCOUNTER
Patient called the office and states that she wanted the Referral to Otolaryngology for nose bleeds. Patient states that last night she spent about a couple of hours trying to get her nose to stop bleeding. Patient states that she gets nose bleeds quite frequently. Patient states that she gets them at least once a week. Please advise.

## 2023-10-31 NOTE — TELEPHONE ENCOUNTER
Patient called in requesting a refill of the following medication:    Vilazodone HCL 40mg #30 with 5 refills to the [address] [place] [address]. [facility] Rx sent 05/09/23. Spoke to Jarrell at the pharmacy and she stated that they fill it for 90 days at a time. Patient stated she has 8 pills left.     Last visit 05/09/23  Next visit 11/07/23    Pending your approval.

## 2023-11-02 RX ORDER — VILAZODONE HYDROCHLORIDE 40 MG/1
TABLET ORAL
Qty: 30 TABLET | Refills: 5 | Status: SHIPPED | OUTPATIENT
Start: 2023-11-02

## 2023-11-02 NOTE — TELEPHONE ENCOUNTER
Nosebleeds are a possible side effect of Flonase; patient can try Sensimist formulation of Flonase to see if that helps. But since nosebleeds have been recurrent and regular, also sending referral to Otolaryngology (patient preference of Dr. Vincenzo Segura).

## 2023-11-02 NOTE — TELEPHONE ENCOUNTER
Patient called in stated that she requested a referral over 2 days ago and is still waiting on a response. Advised it looks like we spoke to her on 10/31 requesting more clarification on what the referral is for and we put that response in to Dr. Roby Hartley and we are just waiting on a response from her. Patient understands.

## 2023-11-06 ENCOUNTER — HOSPITAL ENCOUNTER (OUTPATIENT)
Age: 63
Discharge: HOME | End: 2023-11-06
Payer: MEDICARE

## 2023-11-06 DIAGNOSIS — Z95.0 ARTIFICIAL PACEMAKER: ICD-10-CM

## 2023-11-06 DIAGNOSIS — E78.5 DYSLIPIDEMIA (HIGH LDL; LOW HDL): ICD-10-CM

## 2023-11-06 LAB
ALBUMIN SERPL BCG-MCNC: 4.2 G/DL (ref 3.5–5.1)
ALP SERPL-CCNC: 107 U/L (ref 38–126)
ALT SERPL W/O P-5'-P-CCNC: 40 U/L (ref 11–66)
ANION GAP SERPL CALC-SCNC: 9 MEQ/L (ref 8–16)
AST SERPL-CCNC: 27 U/L (ref 5–40)
BILIRUB CONJ SERPL-MCNC: < 0.2 MG/DL (ref 0–0.3)
BILIRUB SERPL-MCNC: 0.3 MG/DL (ref 0.3–1.2)
BUN SERPL-MCNC: 16 MG/DL (ref 7–22)
CALCIUM SERPL-MCNC: 10.4 MG/DL (ref 8.5–10.5)
CHLORIDE SERPL-SCNC: 109 MEQ/L (ref 98–111)
CHOLEST SERPL-MCNC: 151 MG/DL (ref 100–199)
CO2 SERPL-SCNC: 24 MEQ/L (ref 23–33)
CREAT SERPL-MCNC: 0.7 MG/DL (ref 0.4–1.2)
DEPRECATED RDW RBC AUTO: 52.4 FL (ref 35–45)
ERYTHROCYTE [DISTWIDTH] IN BLOOD BY AUTOMATED COUNT: 13.5 % (ref 11.5–14.5)
GFR SERPL CREATININE-BSD FRML MDRD: > 60 ML/MIN/1.73M2
GLUCOSE SERPL-MCNC: 86 MG/DL (ref 70–108)
HCT VFR BLD AUTO: 42.5 % (ref 37–47)
HDLC SERPL-MCNC: 87 MG/DL
HGB BLD-MCNC: 13.6 GM/DL (ref 12–16)
LDLC SERPL CALC-MCNC: 49 MG/DL
MCH RBC QN AUTO: 33.3 PG (ref 26–33)
MCHC RBC AUTO-ENTMCNC: 32 GM/DL (ref 32.2–35.5)
MCV RBC AUTO: 104.2 FL (ref 81–99)
PLATELET # BLD AUTO: 203 THOU/MM3 (ref 130–400)
PMV BLD AUTO: 9.5 FL (ref 9.4–12.4)
POTASSIUM SERPL-SCNC: 4.3 MEQ/L (ref 3.5–5.2)
PROT SERPL-MCNC: 6.7 G/DL (ref 6.1–8)
RBC # BLD AUTO: 4.08 MILL/MM3 (ref 4.2–5.4)
SODIUM SERPL-SCNC: 142 MEQ/L (ref 135–145)
TRIGL SERPL-MCNC: 77 MG/DL (ref 0–199)
WBC # BLD AUTO: 5.3 THOU/MM3 (ref 4.8–10.8)

## 2023-11-06 PROCEDURE — 82248 BILIRUBIN DIRECT: CPT

## 2023-11-06 PROCEDURE — 36415 COLL VENOUS BLD VENIPUNCTURE: CPT

## 2023-11-06 PROCEDURE — 80061 LIPID PANEL: CPT

## 2023-11-06 PROCEDURE — 85027 COMPLETE CBC AUTOMATED: CPT

## 2023-11-06 PROCEDURE — 80053 COMPREHEN METABOLIC PANEL: CPT

## 2023-11-07 ENCOUNTER — HOSPITAL ENCOUNTER (OUTPATIENT)
Age: 63
Discharge: HOME | End: 2023-11-07
Payer: MEDICARE

## 2023-11-07 ENCOUNTER — OFFICE VISIT (OUTPATIENT)
Dept: PSYCHIATRY | Age: 63
End: 2023-11-07
Payer: MEDICAID

## 2023-11-07 VITALS
DIASTOLIC BLOOD PRESSURE: 71 MMHG | HEART RATE: 90 BPM | SYSTOLIC BLOOD PRESSURE: 121 MMHG | WEIGHT: 131 LBS | HEIGHT: 58 IN | BODY MASS INDEX: 27.5 KG/M2 | OXYGEN SATURATION: 96 %

## 2023-11-07 DIAGNOSIS — Z51.81 THERAPEUTIC DRUG MONITORING: ICD-10-CM

## 2023-11-07 DIAGNOSIS — R41.3 MEMORY CHANGES: ICD-10-CM

## 2023-11-07 DIAGNOSIS — F31.75 BIPOLAR I, MOST RECENT EPISODE DEPRESSED, PARTIAL REMISSION (HCC): Primary | ICD-10-CM

## 2023-11-07 DIAGNOSIS — F31.75 BIPOLAR I, MOST RECENT EPISODE DEPRESSED, PARTIAL REMISSION (HCC): ICD-10-CM

## 2023-11-07 LAB
BACTERIA: ABNORMAL
BILIRUB UR QL STRIP: NEGATIVE
CASTS #/AREA URNS LPF: ABNORMAL /LPF
CHARACTER UR: CLEAR
COLOR UR: YELLOW
CRYSTALS URNS QL MICRO: ABNORMAL
EPITHELIAL CELLS, UA: ABNORMAL /HPF
GLUCOSE UR QL STRIP.AUTO: NEGATIVE MG/DL
HGB UR QL STRIP.AUTO: NEGATIVE
KETONES UR QL STRIP.AUTO: NEGATIVE
LEUKOCYTE ESTERASE UR QL STRIP.AUTO: ABNORMAL
LITHIUM SERPL-SCNC: 0.89 MMOL/L (ref 0.6–1.2)
NITRITE UR QL STRIP.AUTO: NEGATIVE
PH UR STRIP.AUTO: 6.5 [PH] (ref 5–9)
PROT UR STRIP.AUTO-MCNC: NEGATIVE MG/DL
RBC #/AREA URNS HPF: ABNORMAL /HPF
SPECIFIC GRAVITY UA: 1.01 (ref 1–1.03)
UROBILINOGEN, URINE: 0.2 EU/DL (ref 0–1)
WBC #/AREA URNS HPF: ABNORMAL /HPF

## 2023-11-07 PROCEDURE — 81001 URINALYSIS AUTO W/SCOPE: CPT

## 2023-11-07 PROCEDURE — 36415 COLL VENOUS BLD VENIPUNCTURE: CPT

## 2023-11-07 PROCEDURE — 87086 URINE CULTURE/COLONY COUNT: CPT

## 2023-11-07 PROCEDURE — 99214 OFFICE O/P EST MOD 30 MIN: CPT | Performed by: NURSE PRACTITIONER

## 2023-11-07 PROCEDURE — 80178 ASSAY OF LITHIUM: CPT

## 2023-11-07 RX ORDER — VILAZODONE HYDROCHLORIDE 40 MG/1
TABLET ORAL
Qty: 30 TABLET | Refills: 2 | Status: SHIPPED | OUTPATIENT
Start: 2023-11-07

## 2023-11-07 RX ORDER — LURASIDONE HYDROCHLORIDE 40 MG/1
40 TABLET, FILM COATED ORAL NIGHTLY
Qty: 30 TABLET | Refills: 2 | Status: SHIPPED | OUTPATIENT
Start: 2023-11-07

## 2023-11-07 RX ORDER — LITHIUM CARBONATE 300 MG
450 TABLET ORAL
Qty: 120 TABLET | Refills: 2 | Status: SHIPPED | OUTPATIENT
Start: 2023-11-07

## 2023-11-07 RX ORDER — LAMOTRIGINE 200 MG/1
TABLET ORAL
Qty: 30 TABLET | Refills: 2 | Status: SHIPPED | OUTPATIENT
Start: 2023-11-07

## 2023-11-07 NOTE — PROGRESS NOTES
[address]  [facility] [place] [place] [address]  Dept: [phone]  Dept Fax: [phone]  Loc: [phone]    Visit Date: 11/7/2023    SUBJECTIVE DATA     CHIEF COMPLAINT:    Chief Complaint   Patient presents with    Depression    Medication Refill    Follow-up       History obtained from: patient    HISTORY OF PRESENT ILLNESS:    Dinora Hendrix is a 61 y.o. female who presents to the office for follow-up on her moods. Her last 05/09/2023. MOOD  - Denies feeling down, sad, depressed  - States focus and concentration are a problem; she is worried about her memory   - Hard to remember names    - Hard to remember where she places things; seems to be happening more and more (3-4 times per day)   - Started a couple of weeks ago   - Good motivation  - Denies feeling worthless, hopeless, helpless  - Denies mood swings  - Denies irritability or agitation  - Denies any symptoms of marina or hypomania        ANXIETY  - Denies any anxiety         SLEEP  - Sleeping around 7 hours total; 2 hours at once    - Interrupted sleep; waking up and watching television           STRESS  - Denies any new stressors besides memory loss that started a couple of weeks ago     Sees neurology    Not in any counseling     Denies suicidal ideations, intent, plan. No homicidal ideations, intent, plan. No audiovisual hallucinations. HPI    The patient has had 0 lifetime suicide attempts. Reports she had intense thoughts of suicide with a plan and intent back in 1987 but someone came home. Patient reports 3 psych hospital admissions with the last admission taking place in 1996    Past psychiatric medications include: various per her report but does not recall specific names    Adverse reactions from psychotropic medications:  None      Current Psychiatric Review of Systems         Marian or Hypomania:  None current.  Previously reported: Patient does not recall

## 2023-11-08 ENCOUNTER — TELEPHONE (OUTPATIENT)
Dept: NEUROLOGY | Age: 63
End: 2023-11-08

## 2023-11-08 LAB
BACTERIA UR CULT: ABNORMAL
ORGANISM: ABNORMAL

## 2023-11-08 NOTE — TELEPHONE ENCOUNTER
Psych office calling requesting sooner appointment. Patient was seen by Cheng Shannon who noticed concerns about patient memory and MOCA score of 23. Antonia Upton requesting sooner appointment with neurology for memory. Next follow up on 2/9/2024 for MS. Please advise. Thank you.

## 2023-11-08 NOTE — TELEPHONE ENCOUNTER
First attempt. When trying to call patient, phone does not even ring and call becomes disconnected. Multiple attempts tried. Will try again later.

## 2023-11-10 NOTE — TELEPHONE ENCOUNTER
Patient stopped by the office regarding message. Advised patient of provide response and adding to wait list. Verbalized understanding with no additional questions at this time.

## 2023-11-13 ENCOUNTER — OFFICE VISIT (OUTPATIENT)
Dept: ENT CLINIC | Age: 63
End: 2023-11-13

## 2023-11-13 VITALS
TEMPERATURE: 97.8 F | SYSTOLIC BLOOD PRESSURE: 118 MMHG | HEART RATE: 73 BPM | DIASTOLIC BLOOD PRESSURE: 72 MMHG | RESPIRATION RATE: 16 BRPM | BODY MASS INDEX: 27.12 KG/M2 | HEIGHT: 58 IN | OXYGEN SATURATION: 97 % | WEIGHT: 129.2 LBS

## 2023-11-13 DIAGNOSIS — E04.2 MULTIPLE THYROID NODULES: ICD-10-CM

## 2023-11-13 DIAGNOSIS — R04.0 EPISTAXIS: Primary | ICD-10-CM

## 2023-11-13 DIAGNOSIS — J34.89 RHINORRHEA: ICD-10-CM

## 2023-11-13 NOTE — PATIENT INSTRUCTIONS
- Afrin (oxymetazoline) from the pharmacy. Use 3 sprays to the left nostril, three times daily for 3 days starting today 11/13/23. -After three days of Afrin, transition to nasal saline 1-2 sprays to each nostril at least 1-2 times daily to keep the nose moist and hopefully prevent recurrence of epistaxis  -Avoid/limit blowing and wiping your nose for the next 2 weeks    If you have a nosebleed at home, you should try the following:    Lightly blow your nose in attempts to dislodge old blood and blood clots  Spray 3 sprays of Afrin (oxymetazoline) to the bleeding nostril. If unsure which nostril is bleeding, spray both nostrils  Apply direct pressure to the nose, compressing both nostrils completely shut. You can use your fingers or nasal clamp  Hold pressure with your fingers/nasal clamp for at least 15 minutes. Do not release pressure to check if bleeding has resolved before that. If bleeding has not resolved after this, you can repeat steps 1-4. You can do this up to 3 times consecutively, but if bleeding still persists you should consider being evaluated in the ER or ENT office.

## 2023-11-13 NOTE — PROGRESS NOTES
[facility] EAR, NOSE AND THROAT  [address]  Dept: [phone]  Dept Fax: [phone]  Loc: [phone]    Carissa Vaca is a 61 y.o. female who was referred by Tabitha Sims DO for:  Chief Complaint   Patient presents with    New Patient     New patient here for recurrent epistaxis getting more frequent and lasting longer. Patient states the right side of her nose has constant nasal dripping and when she has her nosebleeds it's only on the left side. patient states that her last nosebleed was yesterday but only lasted a few minutes. Epistaxis   . HPI:     Carissa Vaca presents today for evaluation of recurrent epistaxis. She reports that she has been dealing with mostly left-sided anterior epistaxis for most of her adult life, has seemingly worsened in the last 2 to 3 months. 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 She reports several week for the last few months. She is typically able to control epistaxis after 5 to 10 minutes, but had a bad one a few nights ago that lasted over an hour. She is typically able to control epistaxis by sticking a tissue in her nostril pinching her nose and tilting her head back. She also uses nasal saline acutely when bleeding to try and stop the bleeding, but does not use it routinely to try and prevent epistaxis. She reports that she cleans her nose out at night and sometimes bleeds in association with this, but is not uncommon for her to get a small scab or 2 out when doing this. She reports frequent clear rhinorrhea which is almost exclusively from the right nare. She reports the rhinorrhea can be spontaneous, but can be associated with meals as well. She has a history of thyroid nodules in the past upon chart review. Its been since 2018 since her last ultrasound.   She denies any sensation of

## 2023-11-27 ENCOUNTER — OFFICE VISIT (OUTPATIENT)
Dept: CARDIOLOGY CLINIC | Age: 63
End: 2023-11-27
Payer: MEDICARE

## 2023-11-27 VITALS
SYSTOLIC BLOOD PRESSURE: 118 MMHG | WEIGHT: 131 LBS | RESPIRATION RATE: 18 BRPM | HEIGHT: 58 IN | DIASTOLIC BLOOD PRESSURE: 68 MMHG | BODY MASS INDEX: 27.5 KG/M2 | HEART RATE: 65 BPM

## 2023-11-27 DIAGNOSIS — Z95.0 PACEMAKER: Primary | ICD-10-CM

## 2023-11-27 DIAGNOSIS — E78.5 DYSLIPIDEMIA (HIGH LDL; LOW HDL): ICD-10-CM

## 2023-11-27 PROCEDURE — G8482 FLU IMMUNIZE ORDER/ADMIN: HCPCS | Performed by: NURSE PRACTITIONER

## 2023-11-27 PROCEDURE — 99214 OFFICE O/P EST MOD 30 MIN: CPT | Performed by: NURSE PRACTITIONER

## 2023-11-27 PROCEDURE — G8419 CALC BMI OUT NRM PARAM NOF/U: HCPCS | Performed by: NURSE PRACTITIONER

## 2023-11-27 PROCEDURE — 1036F TOBACCO NON-USER: CPT | Performed by: NURSE PRACTITIONER

## 2023-11-27 PROCEDURE — 93000 ELECTROCARDIOGRAM COMPLETE: CPT | Performed by: INTERNAL MEDICINE

## 2023-11-27 PROCEDURE — 3017F COLORECTAL CA SCREEN DOC REV: CPT | Performed by: NURSE PRACTITIONER

## 2023-11-27 PROCEDURE — G8427 DOCREV CUR MEDS BY ELIG CLIN: HCPCS | Performed by: NURSE PRACTITIONER

## 2023-11-27 RX ORDER — NITROGLYCERIN 0.4 MG/1
TABLET SUBLINGUAL
Qty: 25 TABLET | Refills: 3 | Status: SHIPPED | OUTPATIENT
Start: 2023-11-27

## 2023-11-27 NOTE — PROGRESS NOTES
in 1 years time or sooner if she needs us. Orders Placed This Encounter   Procedures    Lipid Panel     Standing Status:   Future     Standing Expiration Date:   11/27/2024     Order Specific Question:   Is Patient Fasting?/# of Hours     Answer:   15     Order Specific Question:   Has the patient fasted? Answer:   Yes    Hepatic Function Panel     Standing Status:   Future     Standing Expiration Date:   88/23/7709    Basic Metabolic Panel     Standing Status:   Future     Standing Expiration Date:   11/27/2024    CBC     Standing Status:   Future     Standing Expiration Date:   11/27/2024    21534 - DC ELECTROCARDIOGRAM, COMPLETE     Continue Dr Geeta Lennon current treatment plan    There are no Patient Instructions on file for this visit. Return in about 1 year (around 11/27/2024), or if symptoms worsen or fail to improve.     Follow up with Dr Sofia Berman as scheduled or sooner if needed    Shelly Gustafson, APRN - CNP

## 2023-12-01 ASSESSMENT — ENCOUNTER SYMPTOMS
GASTROINTESTINAL NEGATIVE: 1
RESPIRATORY NEGATIVE: 1

## 2023-12-26 ENCOUNTER — HOSPITAL ENCOUNTER (OUTPATIENT)
Dept: ULTRASOUND IMAGING | Age: 63
Discharge: HOME | End: 2023-12-26
Payer: MEDICARE

## 2023-12-26 DIAGNOSIS — E04.2 MULTIPLE THYROID NODULES: ICD-10-CM

## 2023-12-26 PROCEDURE — 76536 US EXAM OF HEAD AND NECK: CPT

## 2024-01-02 ENCOUNTER — TELEPHONE (OUTPATIENT)
Dept: ENT CLINIC | Age: 64
End: 2024-01-02

## 2024-01-02 DIAGNOSIS — E04.2 MULTIPLE THYROID NODULES: Primary | ICD-10-CM

## 2024-01-02 DIAGNOSIS — E03.9 HYPOTHYROIDISM, UNSPECIFIED TYPE: ICD-10-CM

## 2024-01-02 NOTE — TELEPHONE ENCOUNTER
FNA order printed and faxed. Called and LM for pt to call back to inform her that they will be calling to schedule FNA and when to have labs drawn.

## 2024-01-02 NOTE — TELEPHONE ENCOUNTER
Please contact the patient and let her know that the thyroid ultrasound revealed multiple nodules, two of which are at recommendation for biopsy. As we discussed this possibility in the office visit, I am placing an order for FNA. Ideally, this would be completed 1-2 weeks prior to her follow up with me 2/7/24. Please help arrange FNA.     I am also ordering some thyroid labs that I would like for her to get drawn in the next 1-2 weeks. These labs can take 2 weeks to result so I would like for her to get those drawn 1-2 weeks to allow time for results to be received prior to her office appointment.

## 2024-01-08 ENCOUNTER — HOSPITAL ENCOUNTER (OUTPATIENT)
Age: 64
Discharge: HOME | End: 2024-01-08
Payer: MEDICARE

## 2024-01-08 DIAGNOSIS — E03.9 HYPOTHYROIDISM, UNSPECIFIED TYPE: ICD-10-CM

## 2024-01-08 DIAGNOSIS — E04.2 MULTIPLE THYROID NODULES: ICD-10-CM

## 2024-01-08 LAB
T4 FREE SERPL-MCNC: 1.36 NG/DL (ref 0.93–1.76)
TSH SERPL DL<=0.005 MIU/L-ACNC: 0.5 UIU/ML (ref 0.4–4.2)

## 2024-01-08 PROCEDURE — 84443 ASSAY THYROID STIM HORMONE: CPT

## 2024-01-08 PROCEDURE — 36415 COLL VENOUS BLD VENIPUNCTURE: CPT

## 2024-01-08 PROCEDURE — 84439 ASSAY OF FREE THYROXINE: CPT

## 2024-01-08 PROCEDURE — 84432 ASSAY OF THYROGLOBULIN: CPT

## 2024-01-11 ENCOUNTER — PROCEDURE VISIT (OUTPATIENT)
Dept: CARDIOLOGY CLINIC | Age: 64
End: 2024-01-11
Payer: MEDICARE

## 2024-01-11 DIAGNOSIS — Z95.0 PACEMAKER: Primary | ICD-10-CM

## 2024-01-11 PROCEDURE — 93296 REM INTERROG EVL PM/IDS: CPT | Performed by: INTERNAL MEDICINE

## 2024-01-11 PROCEDURE — 93294 REM INTERROG EVL PM/LDLS PM: CPT | Performed by: INTERNAL MEDICINE

## 2024-01-11 NOTE — PROGRESS NOTES
[facility] Medtronic Dual Pacemaker   Patient of Talha Torres    Battery 9.1 years    Presenting rhythm AP VS    A Impedance 418  RV Impedance 494    P wave sensing 3.3  R wave sensing 4.1    A Threshold 0.5 @ 0.4  A Amplitude 1.5 @ 0.4  RV Thresholds 1 @ 0.4  RV Amplitude 2 @ 0.4    A Paced 95.6%  V Paced 1.1%    Programmed Mode AAIR <=> DDDR     Afib [place] 0%    Episodes none

## 2024-01-12 ENCOUNTER — HOSPITAL ENCOUNTER (OUTPATIENT)
Dept: ULTRASOUND IMAGING | Age: 64
Discharge: HOME | End: 2024-01-12
Payer: MEDICARE

## 2024-01-12 DIAGNOSIS — E04.2 MULTIPLE THYROID NODULES: ICD-10-CM

## 2024-01-12 LAB — THYROGLOB SERPL-MCNC: 21.5 NG/ML (ref 1.3–31.8)

## 2024-01-12 PROCEDURE — 10006 FNA BX W/US GDN EA ADDL: CPT

## 2024-01-12 PROCEDURE — 10005 FNA BX W/US GDN 1ST LES: CPT

## 2024-01-17 ENCOUNTER — OFFICE VISIT (OUTPATIENT)
Dept: NEUROLOGY | Age: 64
End: 2024-01-17
Payer: MEDICARE

## 2024-01-17 VITALS
DIASTOLIC BLOOD PRESSURE: 76 MMHG | HEART RATE: 81 BPM | BODY MASS INDEX: 26.87 KG/M2 | HEIGHT: 58 IN | SYSTOLIC BLOOD PRESSURE: 121 MMHG | OXYGEN SATURATION: 98 % | WEIGHT: 128 LBS

## 2024-01-17 DIAGNOSIS — G35 MULTIPLE SCLEROSIS (HCC): Primary | ICD-10-CM

## 2024-01-17 DIAGNOSIS — R47.89 WORD FINDING DIFFICULTY: ICD-10-CM

## 2024-01-17 PROCEDURE — 99214 OFFICE O/P EST MOD 30 MIN: CPT | Performed by: NURSE PRACTITIONER

## 2024-01-17 PROCEDURE — 1036F TOBACCO NON-USER: CPT | Performed by: NURSE PRACTITIONER

## 2024-01-17 PROCEDURE — G8482 FLU IMMUNIZE ORDER/ADMIN: HCPCS | Performed by: NURSE PRACTITIONER

## 2024-01-17 PROCEDURE — 3017F COLORECTAL CA SCREEN DOC REV: CPT | Performed by: NURSE PRACTITIONER

## 2024-01-17 PROCEDURE — G8427 DOCREV CUR MEDS BY ELIG CLIN: HCPCS | Performed by: NURSE PRACTITIONER

## 2024-01-17 PROCEDURE — G8419 CALC BMI OUT NRM PARAM NOF/U: HCPCS | Performed by: NURSE PRACTITIONER

## 2024-01-17 NOTE — PATIENT INSTRUCTIONS
MRI brain W/WO contrast   Continue with Vitamin B12, sublingual supplements at least 3000 mcg. Daily  Vitamin B12 level  Referral to sleep medicine to screen for underlying sleep disorder  Stay physically active  Take calcium and vitamin D supplements daily  Call with any new symptoms or concerns.   Follow up in 4 months.

## 2024-01-18 ENCOUNTER — HOSPITAL ENCOUNTER (OUTPATIENT)
Age: 64
Discharge: HOME | End: 2024-01-18
Payer: MEDICARE

## 2024-01-18 DIAGNOSIS — R47.89 WORD FINDING DIFFICULTY: ICD-10-CM

## 2024-01-18 DIAGNOSIS — G35 MULTIPLE SCLEROSIS (HCC): ICD-10-CM

## 2024-01-18 LAB
FOLATE SERPL-MCNC: > 20 NG/ML (ref 4.8–24.2)
VIT B12 SERPL-MCNC: 1472 PG/ML (ref 211–911)

## 2024-01-18 PROCEDURE — 36415 COLL VENOUS BLD VENIPUNCTURE: CPT

## 2024-01-18 PROCEDURE — 82607 VITAMIN B-12: CPT

## 2024-01-18 PROCEDURE — 82746 ASSAY OF FOLIC ACID SERUM: CPT

## 2024-01-22 ENCOUNTER — OFFICE VISIT (OUTPATIENT)
Dept: PULMONOLOGY | Age: 64
End: 2024-01-22
Payer: MEDICARE

## 2024-01-22 VITALS
HEART RATE: 86 BPM | OXYGEN SATURATION: 97 % | BODY MASS INDEX: 27.41 KG/M2 | TEMPERATURE: 97.2 F | WEIGHT: 130.6 LBS | SYSTOLIC BLOOD PRESSURE: 128 MMHG | DIASTOLIC BLOOD PRESSURE: 68 MMHG | HEIGHT: 58 IN

## 2024-01-22 DIAGNOSIS — G47.10 HYPERSOMNIA: Primary | ICD-10-CM

## 2024-01-22 DIAGNOSIS — G35 MS (MULTIPLE SCLEROSIS) (HCC): ICD-10-CM

## 2024-01-22 DIAGNOSIS — Z95.0 S/P PLACEMENT OF CARDIAC PACEMAKER: ICD-10-CM

## 2024-01-22 DIAGNOSIS — F31.9 BIPOLAR 1 DISORDER (HCC): ICD-10-CM

## 2024-01-22 PROCEDURE — G8419 CALC BMI OUT NRM PARAM NOF/U: HCPCS | Performed by: INTERNAL MEDICINE

## 2024-01-22 PROCEDURE — G8482 FLU IMMUNIZE ORDER/ADMIN: HCPCS | Performed by: INTERNAL MEDICINE

## 2024-01-22 PROCEDURE — G8427 DOCREV CUR MEDS BY ELIG CLIN: HCPCS | Performed by: INTERNAL MEDICINE

## 2024-01-22 PROCEDURE — 1036F TOBACCO NON-USER: CPT | Performed by: INTERNAL MEDICINE

## 2024-01-22 PROCEDURE — 3017F COLORECTAL CA SCREEN DOC REV: CPT | Performed by: INTERNAL MEDICINE

## 2024-01-22 PROCEDURE — 99204 OFFICE O/P NEW MOD 45 MIN: CPT | Performed by: INTERNAL MEDICINE

## 2024-01-22 NOTE — PROGRESS NOTES
Chief Complaint: New sleep consult     Mallampati airway Class:4  Neck Circumference:. 13 Inches    Jackhorn sleepiness score 1/22/24:   Sleep apnea quality of life questionnaire:.97    
once cleared by her family physician.   - Haily Parker was educated about my impression and plan. She verbalizes understanding.      -I personally reviewed updated the Past medical hx, Past surgical hx,Social hx, Family hx, Medications, Allergies in the discrete data section of the patient chart along with labs, Pulmonary medicine,Sleep medicine related, Pathological, Microbiological and Radiological investigations.

## 2024-01-25 ENCOUNTER — HOSPITAL ENCOUNTER (OUTPATIENT)
Dept: WOMENS IMAGING | Age: 64
Discharge: HOME | End: 2024-01-25
Payer: MEDICARE

## 2024-01-25 DIAGNOSIS — Z12.31 VISIT FOR SCREENING MAMMOGRAM: ICD-10-CM

## 2024-01-25 PROCEDURE — 77063 BREAST TOMOSYNTHESIS BI: CPT

## 2024-01-29 DIAGNOSIS — K27.9 PEPTIC ULCER DISEASE: ICD-10-CM

## 2024-01-29 NOTE — TELEPHONE ENCOUNTER
This medication refill is regarding a electronic request. [facility] .    Requested Prescriptions     Pending Prescriptions Disp Refills    sucralfate (CARAFATE) 1 GM tablet [Pharmacy Med Name: SUCRALFATE 1GM TABS] 120 tablet 3     Sig: TAKE 1 TABLET BY MOUTH EVERY 6 HOURS.       Date of last visit: 9/28/2023   Date of next visit: None  Date of last refill: 9/14/2023  120/3    Last Lipid Panel:    Lab Results   Component Value Date/Time    CHOL 151 11/06/2023 06:39 AM    TRIG 77 11/06/2023 06:39 AM    HDL 87 11/06/2023 06:39 AM    LDLCALC 49 11/06/2023 06:39 AM     Last CMP:   Lab Results   Component Value Date     11/06/2023    K 4.3 11/06/2023     11/06/2023    CO2 24 11/06/2023    BUN 16 11/06/2023    CREATININE 0.7 11/06/2023    GLUCOSE 86 11/06/2023    CALCIUM 10.4 11/06/2023    PROT 6.7 11/06/2023    LABALBU 4.2 11/06/2023    BILITOT 0.3 11/06/2023    ALKPHOS 107 11/06/2023    AST 27 11/06/2023    ALT 40 11/06/2023    LABGLOM >60 11/06/2023    GFRAA >60 01/18/2022       Last Thyroid:    Lab Results   Component Value Date    TSH 0.502 01/08/2024    T4FREE 1.36 01/08/2024     Last Hemoglobin A1C:    Lab Results   Component Value Date/Time    LABA1C 5.1 12/19/2022 08:44 AM       Rx verified, ordered and set to EP.

## 2024-01-30 RX ORDER — SUCRALFATE 1 G/1
TABLET ORAL
Qty: 120 TABLET | Refills: 3 | Status: SHIPPED | OUTPATIENT
Start: 2024-01-30

## 2024-02-05 DIAGNOSIS — E06.3 HYPOTHYROIDISM DUE TO HASHIMOTO'S THYROIDITIS: ICD-10-CM

## 2024-02-05 DIAGNOSIS — E03.8 HYPOTHYROIDISM DUE TO HASHIMOTO'S THYROIDITIS: ICD-10-CM

## 2024-02-05 RX ORDER — LEVOTHYROXINE SODIUM 0.1 MG/1
TABLET ORAL
Qty: 90 TABLET | Refills: 1 | Status: SHIPPED | OUTPATIENT
Start: 2024-02-05

## 2024-02-05 NOTE — TELEPHONE ENCOUNTER
Patient's last appointment was : 9/28/2023 with our   Patient's next appointment is : Visit date not found   Last refilled on: 6/23/2023  Which pharmacy does the script need sent to: [facility]      Lab Results   Component Value Date    LABA1C 5.1 12/19/2022     Lab Results   Component Value Date    CHOL 151 11/06/2023    TRIG 77 11/06/2023    HDL 87 11/06/2023    LDLCALC 49 11/06/2023     Lab Results   Component Value Date     11/06/2023    K 4.3 11/06/2023     11/06/2023    CO2 24 11/06/2023    BUN 16 11/06/2023    CREATININE 0.7 11/06/2023    GLUCOSE 86 11/06/2023    CALCIUM 10.4 11/06/2023    PROT 6.7 11/06/2023    LABALBU 4.2 11/06/2023    BILITOT 0.3 11/06/2023    ALKPHOS 107 11/06/2023    AST 27 11/06/2023    ALT 40 11/06/2023    LABGLOM >60 11/06/2023    GFRAA >60 01/18/2022     Lab Results   Component Value Date    TSH 0.502 01/08/2024    T4FREE 1.36 01/08/2024     Lab Results   Component Value Date    WBC 5.3 11/06/2023    HGB 13.6 11/06/2023    HCT 42.5 11/06/2023    .2 (H) 11/06/2023     11/06/2023

## 2024-02-06 ENCOUNTER — OFFICE VISIT (OUTPATIENT)
Dept: PSYCHIATRY | Age: 64
End: 2024-02-06
Payer: MEDICAID

## 2024-02-06 VITALS
WEIGHT: 131 LBS | DIASTOLIC BLOOD PRESSURE: 57 MMHG | OXYGEN SATURATION: 96 % | BODY MASS INDEX: 27.5 KG/M2 | HEIGHT: 58 IN | HEART RATE: 85 BPM | SYSTOLIC BLOOD PRESSURE: 106 MMHG

## 2024-02-06 DIAGNOSIS — F31.75 BIPOLAR I, MOST RECENT EPISODE DEPRESSED, PARTIAL REMISSION (HCC): Primary | ICD-10-CM

## 2024-02-06 DIAGNOSIS — Z51.81 THERAPEUTIC DRUG MONITORING: ICD-10-CM

## 2024-02-06 PROCEDURE — 99213 OFFICE O/P EST LOW 20 MIN: CPT | Performed by: NURSE PRACTITIONER

## 2024-02-06 RX ORDER — LITHIUM CARBONATE 300 MG
450 TABLET ORAL
Qty: 120 TABLET | Refills: 2 | Status: SHIPPED | OUTPATIENT
Start: 2024-02-06

## 2024-02-06 RX ORDER — VILAZODONE HYDROCHLORIDE 40 MG/1
TABLET ORAL
Qty: 30 TABLET | Refills: 2 | Status: SHIPPED | OUTPATIENT
Start: 2024-02-06

## 2024-02-06 RX ORDER — LAMOTRIGINE 200 MG/1
TABLET ORAL
Qty: 30 TABLET | Refills: 2 | Status: SHIPPED | OUTPATIENT
Start: 2024-02-06

## 2024-02-06 RX ORDER — LURASIDONE HYDROCHLORIDE 40 MG/1
40 TABLET, FILM COATED ORAL NIGHTLY
Qty: 30 TABLET | Refills: 2 | Status: SHIPPED | OUTPATIENT
Start: 2024-02-06

## 2024-02-06 NOTE — PROGRESS NOTES
Delusions:  no    SOCIAL HISTORY:  Patient was born in  [place], [place]  and raised by her biological parents      Social History     Socioeconomic History    Marital status:      Spouse name: Not on file    Number of children: 0    Years of education: Not on file    Highest education level: Bachelor's degree (e.g., BA, AB, BS)   Occupational History    Not on file   Tobacco Use    Smoking status: Former     Current packs/day: 0.00     Average packs/day: 0.1 packs/day for 0.5 years     Types: Cigarettes     Start date: 1977     Quit date: 10/24/1977     Years since quittin.3     Passive exposure: Past    Smokeless tobacco: Never   Vaping Use    Vaping Use: Never used   Substance and Sexual Activity    Alcohol use: No    Drug use: No    Sexual activity: Not Currently     Partners: Male   Other Topics Concern    Not on file   Social History Narrative    2023    LEVEL OF EDUCATION: graduated high school; earned her bachelor degree in education; did some master's level course work but did not complete the degree    SPECIAL EDUCATION NEEDS: None    RESIDENCE: Currently lives alone    LEGAL HISTORY: None    Restorationist: Anglican    TRAUMA: None    : None    HOBBIES: writing, swimming, volunteer at [employer] [facility]    EMPLOYMENT: on disability - she has been on disability since  for MS, bipolar disorder    MARRIAGES: one. She was  from  to ; they  in     CHILDREN: None    SUBSTANCE USE: None     Social Determinants of Health     Financial Resource Strain: Low Risk  (2023)    Overall Financial Resource Strain (CARDIA)     Difficulty of Paying Living Expenses: Not hard at all   Food Insecurity: Not on file (2023)   Transportation Needs: Unknown (2023)    PRAPARE - Transportation     Lack of Transportation (Medical): Not on file     Lack of Transportation (Non-Medical): No   Physical Activity: Sufficiently Active (2023)    Exercise Vital Sign     Days of

## 2024-02-07 ENCOUNTER — OFFICE VISIT (OUTPATIENT)
Dept: ENT CLINIC | Age: 64
End: 2024-02-07
Payer: MEDICARE

## 2024-02-07 VITALS
RESPIRATION RATE: 18 BRPM | HEART RATE: 72 BPM | BODY MASS INDEX: 27.16 KG/M2 | DIASTOLIC BLOOD PRESSURE: 68 MMHG | HEIGHT: 58 IN | TEMPERATURE: 97.3 F | SYSTOLIC BLOOD PRESSURE: 112 MMHG | OXYGEN SATURATION: 98 % | WEIGHT: 129.4 LBS

## 2024-02-07 DIAGNOSIS — R04.0 EPISTAXIS: ICD-10-CM

## 2024-02-07 DIAGNOSIS — E03.9 HYPOTHYROIDISM, UNSPECIFIED TYPE: ICD-10-CM

## 2024-02-07 DIAGNOSIS — E04.2 MULTIPLE THYROID NODULES: Primary | ICD-10-CM

## 2024-02-07 PROCEDURE — 99214 OFFICE O/P EST MOD 30 MIN: CPT | Performed by: PHYSICIAN ASSISTANT

## 2024-02-07 PROCEDURE — 3017F COLORECTAL CA SCREEN DOC REV: CPT | Performed by: PHYSICIAN ASSISTANT

## 2024-02-07 PROCEDURE — G8427 DOCREV CUR MEDS BY ELIG CLIN: HCPCS | Performed by: PHYSICIAN ASSISTANT

## 2024-02-07 PROCEDURE — G8482 FLU IMMUNIZE ORDER/ADMIN: HCPCS | Performed by: PHYSICIAN ASSISTANT

## 2024-02-07 PROCEDURE — 1036F TOBACCO NON-USER: CPT | Performed by: PHYSICIAN ASSISTANT

## 2024-02-07 PROCEDURE — G8419 CALC BMI OUT NRM PARAM NOF/U: HCPCS | Performed by: PHYSICIAN ASSISTANT

## 2024-02-07 NOTE — PROGRESS NOTES
VAGINAL      LAPAROSCOPIC APPENDECTOMY  05/16/2016    OTHER SURGICAL HISTORY      RECTAL FISSULRE    OTHER SURGICAL HISTORY  1986    D&C    OTHER SURGICAL HISTORY  2006    GI BLEED    OVARY REMOVAL N/A     pt unaware which one was taken    PACEMAKER INSERTION Right 05/20/2019    Dr. Torres    PACEMAKER PLACEMENT      AL COLONOSCOPY FLX DX W/COLLJ SPEC WHEN PFRMD Left 02/06/2018    COLONOSCOPY performed by David Jansen MD at [facility]    AL OFFICE/OUTPT VISIT,PROCEDURE ONLY Left 02/18/2018    EGD ESOPHAGOGASTRODUODENOSCOPY performed by Biju Kim MD at [facility]    AL OFFICE/OUTPT VISIT,PROCEDURE ONLY N/A 10/04/2018    EGD DIAGNOSTIC ONLY performed by Sydney Frances MD at [facility]    SOPHIE-EN-Y GASTRIC BYPASS  2000    UPPER GASTROINTESTINAL ENDOSCOPY Left 09/24/2019    EGD DIAGNOSTIC ONLY performed by Lo Sandra MD at [facility]    UPPER GASTROINTESTINAL ENDOSCOPY N/A 09/26/2020    ENTEROSCOPY PUSH CONTROL HEMORRHAGE performed by David Jansen MD at [facility]    UPPER GASTROINTESTINAL ENDOSCOPY N/A 01/16/2022    ENTEROSCOPY PUSH DIAGNOSTIC performed by Shea Pedro MD at [facility] OR    UPPER GASTROINTESTINAL ENDOSCOPY  04/13/2022    US THYROID BIOPSY  01/2024        MEDICATIONS:  Current Outpatient Medications   Medication Sig Dispense Refill    lamoTRIgine (LAMICTAL) 200 MG tablet TAKE 1 TABLET BY MOUTH DAILY 30 tablet 2    lithium 300 MG tablet Take 1.5 tablets by mouth in the morning and 1.5 tablets in the evening. 120 tablet 2    lurasidone (LATUDA) 40 MG TABS tablet Take 1 tablet by mouth nightly 30 tablet 2    vilazodone hcl 40 MG TABS TAKE 1 TABLET BY MOUTH ONCE DAILY. 30 tablet 2    levothyroxine (SYNTHROID) 100 MCG tablet TAKE ONE TABLET BY MOUTH ONCE A DAY 90 tablet 1    sucralfate (CARAFATE) 1 GM tablet TAKE 1 TABLET BY MOUTH EVERY 6 HOURS. 120 tablet 3    nitroGLYCERIN (NITROSTAT) 0.4 MG SL tablet up to max of 3 total doses. If no relief after 1 dose, call

## 2024-02-08 ENCOUNTER — HOSPITAL ENCOUNTER (OUTPATIENT)
Age: 64
Discharge: HOME | End: 2024-02-08
Payer: MEDICARE

## 2024-02-08 DIAGNOSIS — F31.75 BIPOLAR I, MOST RECENT EPISODE DEPRESSED, PARTIAL REMISSION (HCC): ICD-10-CM

## 2024-02-08 DIAGNOSIS — Z51.81 THERAPEUTIC DRUG MONITORING: ICD-10-CM

## 2024-02-08 LAB — LITHIUM SERPL-SCNC: 0.98 MMOL/L (ref 0.6–1.2)

## 2024-02-08 PROCEDURE — 36415 COLL VENOUS BLD VENIPUNCTURE: CPT

## 2024-02-08 PROCEDURE — 80178 ASSAY OF LITHIUM: CPT

## 2024-02-18 ENCOUNTER — HOSPITAL ENCOUNTER (OUTPATIENT)
Dept: SLEEP CENTER | Age: 64
Discharge: HOME | End: 2024-02-20
Payer: MEDICARE

## 2024-02-18 DIAGNOSIS — Z95.0 S/P PLACEMENT OF CARDIAC PACEMAKER: ICD-10-CM

## 2024-02-18 DIAGNOSIS — G47.10 HYPERSOMNIA: ICD-10-CM

## 2024-02-18 DIAGNOSIS — G35 MS (MULTIPLE SCLEROSIS) (HCC): ICD-10-CM

## 2024-02-18 DIAGNOSIS — F31.9 BIPOLAR 1 DISORDER (HCC): ICD-10-CM

## 2024-02-18 PROCEDURE — 95810 POLYSOM 6/> YRS 4/> PARAM: CPT

## 2024-02-19 ENCOUNTER — HOSPITAL ENCOUNTER (OUTPATIENT)
Dept: MRI IMAGING | Age: 64
Discharge: HOME | End: 2024-02-19
Payer: MEDICARE

## 2024-02-19 DIAGNOSIS — G35 MULTIPLE SCLEROSIS (HCC): ICD-10-CM

## 2024-02-19 DIAGNOSIS — R47.89 WORD FINDING DIFFICULTY: ICD-10-CM

## 2024-02-19 LAB — POC CREATININE WHOLE BLOOD: 0.8 MG/DL (ref 0.5–1.2)

## 2024-02-19 PROCEDURE — 70553 MRI BRAIN STEM W/O & W/DYE: CPT

## 2024-02-19 PROCEDURE — 6360000004 HC RX CONTRAST MEDICATION: Performed by: NURSE PRACTITIONER

## 2024-02-19 PROCEDURE — A9579 GAD-BASE MR CONTRAST NOS,1ML: HCPCS | Performed by: NURSE PRACTITIONER

## 2024-02-19 PROCEDURE — 82565 ASSAY OF CREATININE: CPT

## 2024-02-19 RX ADMIN — GADOTERIDOL 10 ML: 279.3 INJECTION, SOLUTION INTRAVENOUS at 15:43

## 2024-02-20 ENCOUNTER — TELEPHONE (OUTPATIENT)
Dept: NEUROLOGY | Age: 64
End: 2024-02-20

## 2024-02-20 NOTE — TELEPHONE ENCOUNTER
----- Message from Paige L Leopold, APRN - CNP sent at 2/20/2024  7:57 AM EST -----  Please let patient know her MRI brain showed There is one new foci of abnormal signal in the subcortical white matter the left frontal lobe. This does not enhance.  Will discuss further on follow up   Paige Leopold, CNP

## 2024-02-22 NOTE — TELEPHONE ENCOUNTER
Spoke with the patient regarding results. Verbalized understanding with no questions at this time.

## 2024-02-28 NOTE — PROGRESS NOTES
Position: Sitting, Cuff Size: Medium Adult)   Pulse 80   Temp 99.1 °F (37.3 °C) (Infrared)   Ht 1.473 m (4' 10\")   Wt 58.4 kg (128 lb 12.8 oz)   SpO2 97% Comment: SHANNA  BMI 26.92 kg/m²    Body mass index is 26.92 kg/m².  Oxygen level - Room Air  Physical Exam  Vitals and nursing note reviewed.   Constitutional:       Appearance: She is well-developed.   HENT:      Head: Normocephalic and atraumatic.   Eyes:      Conjunctiva/sclera: Conjunctivae normal.      Pupils: Pupils are equal, round, and reactive to light.   Neck:      Vascular: No JVD.   Cardiovascular:      Rate and Rhythm: Normal rate and regular rhythm.      Heart sounds: Normal heart sounds. No murmur heard.     No friction rub. No gallop.   Pulmonary:      Effort: Pulmonary effort is normal. No respiratory distress.      Breath sounds: Normal breath sounds. No wheezing or rales.   Abdominal:      General: Bowel sounds are normal.      Palpations: Abdomen is soft.   Musculoskeletal:         General: Normal range of motion.      Cervical back: Normal range of motion and neck supple.   Skin:     General: Skin is warm and dry.      Capillary Refill: Capillary refill takes less than 2 seconds.   Neurological:      Mental Status: She is alert and oriented to person, place, and time.   Psychiatric:         Behavior: Behavior normal.         Thought Content: Thought content normal.         Judgment: Judgment normal.        Assessment   Diagnosis Orders   1. Mild obstructive sleep apnea        2. Hypersomnia      mild per patient      3. MS (multiple sclerosis) ([facility])           Recommendations    -PSG consistent with mild MIKEY  -Discussed treatment options including PAP, OAT, weight loss, positional therapy and surgical interventions  -Haily has decided to pursue oral appliance for treatment she would like to try SNORE MD which is OTC  - Sleep testing reviewed and discussed with Haily and myself  - Recommend 7-9 hours of sleep    - Good sleep hygiene

## 2024-02-29 ENCOUNTER — OFFICE VISIT (OUTPATIENT)
Dept: PULMONOLOGY | Age: 64
End: 2024-02-29
Payer: MEDICARE

## 2024-02-29 VITALS
HEIGHT: 58 IN | HEART RATE: 80 BPM | OXYGEN SATURATION: 97 % | BODY MASS INDEX: 27.04 KG/M2 | DIASTOLIC BLOOD PRESSURE: 70 MMHG | WEIGHT: 128.8 LBS | TEMPERATURE: 99.1 F | SYSTOLIC BLOOD PRESSURE: 120 MMHG

## 2024-02-29 DIAGNOSIS — G47.10 HYPERSOMNIA: ICD-10-CM

## 2024-02-29 DIAGNOSIS — G47.33 MILD OBSTRUCTIVE SLEEP APNEA: Primary | ICD-10-CM

## 2024-02-29 DIAGNOSIS — G35 MS (MULTIPLE SCLEROSIS) (HCC): ICD-10-CM

## 2024-02-29 PROCEDURE — 99213 OFFICE O/P EST LOW 20 MIN: CPT | Performed by: NURSE PRACTITIONER

## 2024-02-29 PROCEDURE — G8427 DOCREV CUR MEDS BY ELIG CLIN: HCPCS | Performed by: NURSE PRACTITIONER

## 2024-02-29 PROCEDURE — G8482 FLU IMMUNIZE ORDER/ADMIN: HCPCS | Performed by: NURSE PRACTITIONER

## 2024-02-29 PROCEDURE — 1036F TOBACCO NON-USER: CPT | Performed by: NURSE PRACTITIONER

## 2024-02-29 PROCEDURE — 3017F COLORECTAL CA SCREEN DOC REV: CPT | Performed by: NURSE PRACTITIONER

## 2024-02-29 PROCEDURE — G8419 CALC BMI OUT NRM PARAM NOF/U: HCPCS | Performed by: NURSE PRACTITIONER

## 2024-02-29 ASSESSMENT — ENCOUNTER SYMPTOMS
ALLERGIC/IMMUNOLOGIC NEGATIVE: 1
SHORTNESS OF BREATH: 0
WHEEZING: 0
GASTROINTESTINAL NEGATIVE: 1
COUGH: 0
EYES NEGATIVE: 1
RESPIRATORY NEGATIVE: 1

## 2024-04-18 ENCOUNTER — PROCEDURE VISIT (OUTPATIENT)
Dept: CARDIOLOGY CLINIC | Age: 64
End: 2024-04-18

## 2024-04-18 DIAGNOSIS — Z95.0 PACEMAKER: Primary | ICD-10-CM

## 2024-04-18 NOTE — PROGRESS NOTES
[facility] Med[facility] Dual [facility]   Patient of Baki    Battery 8.9 years    Presenting rhythm AP VS    A Impedance 418  RV Impedance 494    P wave sensing 2.9  R wave sensing 3.8    A Threshold 0.625 @ 0.40  A Amplitude 1.50 @ 0.40  RV Thresholds 0.875 @ 0.40  RV Amplitude 2.0 @ 0.40      A Paced 94.3%  V Paced 0.8%    Programmed Mode AAIR <=> DDDR       Afib [place] 0%    Episodes   none     no difficulties

## 2024-04-24 ENCOUNTER — OFFICE VISIT (OUTPATIENT)
Dept: PULMONOLOGY | Age: 64
End: 2024-04-24
Payer: MEDICARE

## 2024-04-24 VITALS
HEIGHT: 58 IN | WEIGHT: 129.8 LBS | BODY MASS INDEX: 27.25 KG/M2 | TEMPERATURE: 98.2 F | DIASTOLIC BLOOD PRESSURE: 80 MMHG | OXYGEN SATURATION: 96 % | HEART RATE: 83 BPM | SYSTOLIC BLOOD PRESSURE: 114 MMHG

## 2024-04-24 DIAGNOSIS — G47.10 HYPERSOMNIA: ICD-10-CM

## 2024-04-24 DIAGNOSIS — R06.83 SNORING: ICD-10-CM

## 2024-04-24 DIAGNOSIS — G35 MS (MULTIPLE SCLEROSIS) (HCC): ICD-10-CM

## 2024-04-24 DIAGNOSIS — G47.33 OSA (OBSTRUCTIVE SLEEP APNEA): Primary | ICD-10-CM

## 2024-04-24 PROCEDURE — 99214 OFFICE O/P EST MOD 30 MIN: CPT | Performed by: NURSE PRACTITIONER

## 2024-04-24 PROCEDURE — 1036F TOBACCO NON-USER: CPT | Performed by: NURSE PRACTITIONER

## 2024-04-24 PROCEDURE — G8419 CALC BMI OUT NRM PARAM NOF/U: HCPCS | Performed by: NURSE PRACTITIONER

## 2024-04-24 PROCEDURE — 3017F COLORECTAL CA SCREEN DOC REV: CPT | Performed by: NURSE PRACTITIONER

## 2024-04-24 PROCEDURE — G8427 DOCREV CUR MEDS BY ELIG CLIN: HCPCS | Performed by: NURSE PRACTITIONER

## 2024-04-24 ASSESSMENT — ENCOUNTER SYMPTOMS
ALLERGIC/IMMUNOLOGIC NEGATIVE: 1
EYES NEGATIVE: 1
COUGH: 0
WHEEZING: 0
RESPIRATORY NEGATIVE: 1
SHORTNESS OF BREATH: 0
GASTROINTESTINAL NEGATIVE: 1

## 2024-04-24 NOTE — PROGRESS NOTES
Hypersomnia        3. Snoring        4. MS (multiple sclerosis) ([facility])  DME Order for CPAP as OP           Plan   Do you need any equipment today? No  - DME order placed for APAP 5-20 cm H2O   - Discussed limited oral intake in the evening to help with frequent urination at night   - Office message sent to Dr. Gaines today after visit   - She  was advised to continue current positive airway pressure therapy with above described pressure.   - She  advised to keep good compliance with current recommended pressure to get optimal results and clinical improvement  - Recommend 7-9 hours of sleep with PAP  - She was advised to call DME company regarding supplies if needed.   -She call my office for earlier appointment if needed for worsening of sleep symptoms.   - She was instructed on weight loss  - Haily was educated about my impression and plan. Patient verbalizesunderstanding.  We will see Haliy Parker back in: 3 months with download    Information added by my medical assistant/LPN was reviewed today   Electronically signed by AJITH Chavis CNP on 4/24/2024 at 1:31 PM

## 2024-05-02 ENCOUNTER — OFFICE VISIT (OUTPATIENT)
Dept: PSYCHIATRY | Age: 64
End: 2024-05-02
Payer: MEDICARE

## 2024-05-02 VITALS
OXYGEN SATURATION: 96 % | HEART RATE: 79 BPM | BODY MASS INDEX: 27.08 KG/M2 | SYSTOLIC BLOOD PRESSURE: 110 MMHG | DIASTOLIC BLOOD PRESSURE: 70 MMHG | HEIGHT: 58 IN | WEIGHT: 129 LBS

## 2024-05-02 DIAGNOSIS — F31.75 BIPOLAR I, MOST RECENT EPISODE DEPRESSED, PARTIAL REMISSION (HCC): Primary | ICD-10-CM

## 2024-05-02 DIAGNOSIS — Z51.81 THERAPEUTIC DRUG MONITORING: ICD-10-CM

## 2024-05-02 PROCEDURE — 3017F COLORECTAL CA SCREEN DOC REV: CPT | Performed by: NURSE PRACTITIONER

## 2024-05-02 PROCEDURE — G8427 DOCREV CUR MEDS BY ELIG CLIN: HCPCS | Performed by: NURSE PRACTITIONER

## 2024-05-02 PROCEDURE — 1036F TOBACCO NON-USER: CPT | Performed by: NURSE PRACTITIONER

## 2024-05-02 PROCEDURE — G8419 CALC BMI OUT NRM PARAM NOF/U: HCPCS | Performed by: NURSE PRACTITIONER

## 2024-05-02 PROCEDURE — 99214 OFFICE O/P EST MOD 30 MIN: CPT | Performed by: NURSE PRACTITIONER

## 2024-05-02 RX ORDER — LAMOTRIGINE 200 MG/1
TABLET ORAL
Qty: 30 TABLET | Refills: 2 | Status: SHIPPED | OUTPATIENT
Start: 2024-05-02

## 2024-05-02 RX ORDER — VILAZODONE HYDROCHLORIDE 40 MG/1
TABLET ORAL
Qty: 30 TABLET | Refills: 2 | Status: SHIPPED | OUTPATIENT
Start: 2024-05-02

## 2024-05-02 RX ORDER — LURASIDONE HYDROCHLORIDE 40 MG/1
40 TABLET, FILM COATED ORAL NIGHTLY
Qty: 30 TABLET | Refills: 2 | Status: SHIPPED | OUTPATIENT
Start: 2024-05-02

## 2024-05-02 RX ORDER — LITHIUM CARBONATE 300 MG
450 TABLET ORAL
Qty: 120 TABLET | Refills: 2 | Status: SHIPPED | OUTPATIENT
Start: 2024-05-02

## 2024-05-02 ASSESSMENT — ANXIETY QUESTIONNAIRES
6. BECOMING EASILY ANNOYED OR IRRITABLE: NOT AT ALL
3. WORRYING TOO MUCH ABOUT DIFFERENT THINGS: NOT AT ALL
IF YOU CHECKED OFF ANY PROBLEMS ON THIS QUESTIONNAIRE, HOW DIFFICULT HAVE THESE PROBLEMS MADE IT FOR YOU TO DO YOUR WORK, TAKE CARE OF THINGS AT HOME, OR GET ALONG WITH OTHER PEOPLE: NOT DIFFICULT AT ALL
2. NOT BEING ABLE TO STOP OR CONTROL WORRYING: NOT AT ALL
5. BEING SO RESTLESS THAT IT IS HARD TO SIT STILL: NOT AT ALL
4. TROUBLE RELAXING: NOT AT ALL
7. FEELING AFRAID AS IF SOMETHING AWFUL MIGHT HAPPEN: NOT AT ALL
1. FEELING NERVOUS, ANXIOUS, OR ON EDGE: NOT AT ALL
GAD7 TOTAL SCORE: 0

## 2024-05-02 ASSESSMENT — PATIENT HEALTH QUESTIONNAIRE - PHQ9
SUM OF ALL RESPONSES TO PHQ9 QUESTIONS 1 & 2: 0
SUM OF ALL RESPONSES TO PHQ QUESTIONS 1-9: 2
6. FEELING BAD ABOUT YOURSELF - OR THAT YOU ARE A FAILURE OR HAVE LET YOURSELF OR YOUR FAMILY DOWN: NOT AT ALL
SUM OF ALL RESPONSES TO PHQ QUESTIONS 1-9: 2
SUM OF ALL RESPONSES TO PHQ QUESTIONS 1-9: 2
1. LITTLE INTEREST OR PLEASURE IN DOING THINGS: NOT AT ALL
10. IF YOU CHECKED OFF ANY PROBLEMS, HOW DIFFICULT HAVE THESE PROBLEMS MADE IT FOR YOU TO DO YOUR WORK, TAKE CARE OF THINGS AT HOME, OR GET ALONG WITH OTHER PEOPLE: NOT DIFFICULT AT ALL
3. TROUBLE FALLING OR STAYING ASLEEP: NOT AT ALL
9. THOUGHTS THAT YOU WOULD BE BETTER OFF DEAD, OR OF HURTING YOURSELF: NOT AT ALL
7. TROUBLE CONCENTRATING ON THINGS, SUCH AS READING THE NEWSPAPER OR WATCHING TELEVISION: SEVERAL DAYS
8. MOVING OR SPEAKING SO SLOWLY THAT OTHER PEOPLE COULD HAVE NOTICED. OR THE OPPOSITE, BEING SO FIGETY OR RESTLESS THAT YOU HAVE BEEN MOVING AROUND A LOT MORE THAN USUAL: NOT AT ALL
4. FEELING TIRED OR HAVING LITTLE ENERGY: SEVERAL DAYS
2. FEELING DOWN, DEPRESSED OR HOPELESS: NOT AT ALL
SUM OF ALL RESPONSES TO PHQ QUESTIONS 1-9: 2
5. POOR APPETITE OR OVEREATING: NOT AT ALL

## 2024-05-02 NOTE — PROGRESS NOTES
[facility]  [address]  [facility] [address]  Dept: [phone]  Dept Fax: [phone]  Loc: [phone]    Visit Date: 5/2/2024    SUBJECTIVE DATA     CHIEF COMPLAINT:    Chief Complaint   Patient presents with    Bipolar I     Depression    Medication Refill    Follow-up       History obtained from: patient    HISTORY OF PRESENT ILLNESS:    Haily Parker is a 63 y.o. female who presents to the office for follow-up on her moods. Her last 02/06/2024.    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  -she realizes she had a lot of anger and she has been working on that; states she feels more peace     Denies suicidal ideations, intent, plan. No homicidal ideations, intent, plan. No audiovisual hallucinations.    HPI    The patient has had 0 lifetime suicide attempts. Reports she had intense thoughts of suicide with a plan and intent back in 1987 but someone came home.    Patient reports 3 psych hospital admissions with the last admission taking place in 1996    Past psychiatric medications include: various per her report but does not recall specific names    Adverse reactions from psychotropic medications:  None      Current Psychiatric Review of Systems         Marian or Hypomania:  None current. Previously reported: Patient does

## 2024-05-03 ENCOUNTER — HOSPITAL ENCOUNTER (OUTPATIENT)
Age: 64
Discharge: HOME | End: 2024-05-03
Payer: MEDICARE

## 2024-05-03 DIAGNOSIS — F31.75 BIPOLAR I, MOST RECENT EPISODE DEPRESSED, PARTIAL REMISSION (HCC): ICD-10-CM

## 2024-05-03 DIAGNOSIS — Z51.81 THERAPEUTIC DRUG MONITORING: ICD-10-CM

## 2024-05-03 LAB
ANION GAP SERPL CALC-SCNC: 13 MEQ/L (ref 8–16)
BUN SERPL-MCNC: 17 MG/DL (ref 7–22)
CALCIUM SERPL-MCNC: 10.9 MG/DL (ref 8.5–10.5)
CHLORIDE SERPL-SCNC: 105 MEQ/L (ref 98–111)
CO2 SERPL-SCNC: 21 MEQ/L (ref 23–33)
CREAT SERPL-MCNC: 0.7 MG/DL (ref 0.4–1.2)
GFR SERPL CREATININE-BSD FRML MDRD: > 90 ML/MIN/1.73M2
GLUCOSE SERPL-MCNC: 80 MG/DL (ref 70–108)
LITHIUM SERPL-SCNC: 0.97 MMOL/L (ref 0.6–1.2)
POTASSIUM SERPL-SCNC: 4.5 MEQ/L (ref 3.5–5.2)
SODIUM SERPL-SCNC: 139 MEQ/L (ref 135–145)

## 2024-05-03 PROCEDURE — 80048 BASIC METABOLIC PNL TOTAL CA: CPT

## 2024-05-03 PROCEDURE — 36415 COLL VENOUS BLD VENIPUNCTURE: CPT

## 2024-05-03 PROCEDURE — 80178 ASSAY OF LITHIUM: CPT

## 2024-05-13 NOTE — ED NOTES
Dr. Patricia Blanchard attempted to place ultrasound guided IV, but was unsuccessful. Dr. Imtiaz Williamson states that he will attempt IV placement.       Robert Aguilar RN  09/01/22 1930 Patient requesting refill on fluconazole 150 mg.   0

## 2024-05-17 ENCOUNTER — OFFICE VISIT (OUTPATIENT)
Dept: NEUROLOGY | Age: 64
End: 2024-05-17
Payer: MEDICARE

## 2024-05-17 ENCOUNTER — HOSPITAL ENCOUNTER (OUTPATIENT)
Age: 64
Discharge: HOME | End: 2024-05-17
Payer: MEDICARE

## 2024-05-17 VITALS
DIASTOLIC BLOOD PRESSURE: 70 MMHG | OXYGEN SATURATION: 99 % | HEIGHT: 58 IN | WEIGHT: 128 LBS | BODY MASS INDEX: 26.87 KG/M2 | SYSTOLIC BLOOD PRESSURE: 110 MMHG | HEART RATE: 77 BPM

## 2024-05-17 DIAGNOSIS — M89.9 DISORDER OF BONE, UNSPECIFIED: ICD-10-CM

## 2024-05-17 DIAGNOSIS — R53.83 OTHER FATIGUE: ICD-10-CM

## 2024-05-17 DIAGNOSIS — G35 MULTIPLE SCLEROSIS (HCC): Primary | ICD-10-CM

## 2024-05-17 DIAGNOSIS — G35 MULTIPLE SCLEROSIS (HCC): ICD-10-CM

## 2024-05-17 LAB — 25(OH)D3 SERPL-MCNC: 35 NG/ML (ref 30–100)

## 2024-05-17 PROCEDURE — 36415 COLL VENOUS BLD VENIPUNCTURE: CPT

## 2024-05-17 PROCEDURE — G8419 CALC BMI OUT NRM PARAM NOF/U: HCPCS | Performed by: PSYCHIATRY & NEUROLOGY

## 2024-05-17 PROCEDURE — 1036F TOBACCO NON-USER: CPT | Performed by: PSYCHIATRY & NEUROLOGY

## 2024-05-17 PROCEDURE — G8427 DOCREV CUR MEDS BY ELIG CLIN: HCPCS | Performed by: PSYCHIATRY & NEUROLOGY

## 2024-05-17 PROCEDURE — 99214 OFFICE O/P EST MOD 30 MIN: CPT | Performed by: PSYCHIATRY & NEUROLOGY

## 2024-05-17 PROCEDURE — 82306 VITAMIN D 25 HYDROXY: CPT

## 2024-05-17 PROCEDURE — 3017F COLORECTAL CA SCREEN DOC REV: CPT | Performed by: PSYCHIATRY & NEUROLOGY

## 2024-05-17 RX ORDER — AMANTADINE HYDROCHLORIDE 100 MG/1
100 CAPSULE, GELATIN COATED ORAL DAILY
Qty: 30 CAPSULE | Refills: 5 | Status: SHIPPED | OUTPATIENT
Start: 2024-05-17

## 2024-05-17 NOTE — PROGRESS NOTES
NEUROLOGY OUT PATIENT FOLLOW UP NOTE:  5/17/202412:17 PM    Haily Parker is here for follow up for multiple sclerosis, she is here to go over plan.   Patient Active Problem List   Diagnosis    Acquired hypothyroidism    Multiple sclerosis (HCC)    S/P laparoscopic cholecystectomy    S/P laparoscopic appendectomy    Gastrointestinal hemorrhage associated with gastritis    Multiple thyroid nodules    Non-ischemic cardiomyopathy (HCC)    History of Batsheva-en-Y gastric bypass    Renal lesion    Liver lesion    Bipolar 1 disorder (HCC)    Symptomatic bradycardia    Status post placement of cardiac pacemaker    Gastrointestinal hemorrhage with melena    GI bleed    Peptic ulcer disease    Other complications of other bariatric procedure    Ulcer at site of surgical anastomosis following bypass of stomach    Acute blood loss anemia    Iron deficiency anemia, unspecified    Elevated lithium level    Presence of cardiac pacemaker        Allergies   Allergen Reactions    Latex Rash    Sulfa Antibiotics Rash and Other (See Comments)       Current Outpatient Medications:     Prenatal MV-Min-Fe Fum-FA-DHA (PRENATAL 1 PO), Take by mouth, Disp: , Rfl:     lamoTRIgine (LAMICTAL) 200 MG tablet, TAKE 1 TABLET BY MOUTH DAILY, Disp: 30 tablet, Rfl: 2    lithium 300 MG tablet, Take 1.5 tablets by mouth in the morning and 1.5 tablets in the evening., Disp: 120 tablet, Rfl: 2    lurasidone (LATUDA) 40 MG TABS tablet, Take 1 tablet by mouth nightly, Disp: 30 tablet, Rfl: 2    vilazodone hcl 40 MG TABS, TAKE 1 TABLET BY MOUTH ONCE DAILY., Disp: 30 tablet, Rfl: 2    levothyroxine (SYNTHROID) 100 MCG tablet, TAKE ONE TABLET BY MOUTH ONCE A DAY, Disp: 90 tablet, Rfl: 1    sucralfate (CARAFATE) 1 GM tablet, TAKE 1 TABLET BY MOUTH EVERY 6 HOURS., Disp: 120 tablet, Rfl: 3    nitroGLYCERIN (NITROSTAT) 0.4 MG SL tablet, up to max of 3 total doses. If no relief after 1 dose, call 911., Disp: 25 tablet, Rfl: 3    pantoprazole (PROTONIX) 40 MG

## 2024-05-17 NOTE — PATIENT INSTRUCTIONS
Plan:  Start Amantadine 100 mg daily.   Vitamin D level  Stay physically active  Take calcium and vitamin D supplements daily  Call with any new symptoms or concerns.   Follow up in 7 months

## 2024-06-19 ENCOUNTER — OFFICE VISIT (OUTPATIENT)
Dept: CARDIOLOGY CLINIC | Age: 64
End: 2024-06-19
Payer: MEDICARE

## 2024-06-19 ENCOUNTER — TELEPHONE (OUTPATIENT)
Dept: FAMILY MEDICINE CLINIC | Age: 64
End: 2024-06-19

## 2024-06-19 ENCOUNTER — NURSE ONLY (OUTPATIENT)
Dept: CARDIOLOGY CLINIC | Age: 64
End: 2024-06-19
Payer: MEDICARE

## 2024-06-19 VITALS
BODY MASS INDEX: 26.87 KG/M2 | HEIGHT: 58 IN | HEART RATE: 80 BPM | WEIGHT: 128 LBS | DIASTOLIC BLOOD PRESSURE: 78 MMHG | SYSTOLIC BLOOD PRESSURE: 120 MMHG

## 2024-06-19 DIAGNOSIS — Z95.0 PACEMAKER: Primary | ICD-10-CM

## 2024-06-19 DIAGNOSIS — Z95.0 PRESENCE OF CARDIAC PACEMAKER: Primary | ICD-10-CM

## 2024-06-19 DIAGNOSIS — Q71.31: Primary | ICD-10-CM

## 2024-06-19 PROCEDURE — G8427 DOCREV CUR MEDS BY ELIG CLIN: HCPCS | Performed by: NUCLEAR MEDICINE

## 2024-06-19 PROCEDURE — G8419 CALC BMI OUT NRM PARAM NOF/U: HCPCS | Performed by: NUCLEAR MEDICINE

## 2024-06-19 PROCEDURE — 3017F COLORECTAL CA SCREEN DOC REV: CPT | Performed by: NUCLEAR MEDICINE

## 2024-06-19 PROCEDURE — 1036F TOBACCO NON-USER: CPT | Performed by: NUCLEAR MEDICINE

## 2024-06-19 PROCEDURE — 93280 PM DEVICE PROGR EVAL DUAL: CPT | Performed by: NUCLEAR MEDICINE

## 2024-06-19 PROCEDURE — 99213 OFFICE O/P EST LOW 20 MIN: CPT | Performed by: NUCLEAR MEDICINE

## 2024-06-19 ASSESSMENT — ENCOUNTER SYMPTOMS
ANAL BLEEDING: 0
ABDOMINAL DISTENTION: 0
RECTAL PAIN: 0
PHOTOPHOBIA: 0
ABDOMINAL PAIN: 0
BACK PAIN: 0
CONSTIPATION: 0
NAUSEA: 0
SHORTNESS OF BREATH: 0
BLOOD IN STOOL: 0
CHEST TIGHTNESS: 0
DIARRHEA: 0
COLOR CHANGE: 0
VOMITING: 0

## 2024-06-19 NOTE — PROGRESS NOTES
Medtronic dual pacer in ofice     ..Battery longevity:  8.8 years   Presenting rhythm  AP VS     Atrial impedance 456  RV impedance 513    P wave sensing 2.8  R wave sensing 4.6    92.7 % atrial paced  0.5 % RV paced     Atrial threshold 0.75 V  at 0.4ms  RV threshold 0.75 V at 0.4ms  Mode switches   <0.1  5076 A and V leads

## 2024-06-19 NOTE — TELEPHONE ENCOUNTER
Received handwritten request for DME order regarding broken prosthesis for Haily Smith.     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 Will need faxed to [phone] c/o Nadir Lofton ([facility] Manager). Order printed and slid into [facility]'s mailbox.

## 2024-06-19 NOTE — PROGRESS NOTES
[facility] PHYSICIANS [place] SPECIAL[facility]  [facility] CARDIOLOGY  [address]  [address]  [facility] [address]  Dept: [phone]  Dept Fax: [phone]  Loc: [phone]    Visit Date: 6/19/2024    Haily Parker is a 64 y.o. female who presents todayfor:  Chief Complaint   Patient presents with    Other     EKG completed on 11/2023    New Patient     Former Dr. Torres pt   Here for the first time  Used to see Brian   Pacer 2019 for bradycardia  No known CAD  Some dizziness  Seen neurology   Does have MS  No know HTN   No syncope  No chest pain reported   No smoking  Family history of CAD       HPI:  HPI  Past Medical History:   Diagnosis Date    Balance problem     2/2 MS    Bipolar I disorder, most recent episode (or current) depressed, in partial or unspecified remission 05/08/2013    Depression     Gallstones     Gastric bypass status for obesity 2002    GERD (gastroesophageal reflux disease)     History of hysterectomy     REMOVAL OF ONE OVARY     Hx of cervical cancer     s/p hysterectomy    Hypothyroidism     Liver cyst     GI Dr [address]    Liver lesion     MS (multiple sclerosis) (HCC)     Paresthesias     RT LOWER LIMB    Ulcer at site of surgical anastomosis following bypass of stomach 01/15/2022    UTI (urinary tract infection)       Past Surgical History:   Procedure Laterality Date    APPENDECTOMY      BONE GRAFT Left     left leg to right arm    CARPAL TUNNEL RELEASE  1999    CATARACT EXTRACTION Bilateral     2024    CHOLECYSTECTOMY, LAPAROSCOPIC  05/16/2016    COLONOSCOPY      COLONOSCOPY N/A 01/16/2022    COLONOSCOPY CONTROL HEMORRHAGE performed by Shea Pedro MD at [facility] OR    DILATATION, ESOPHAGUS      ENDOSCOPY, COLON, DIAGNOSTIC      ENTEROSCOPY N/A 02/05/2018    ENTEROSCOPY performed by David Jansen MD at [facility]    EYE SURGERY Left 09/29/2022    Retina Surgery    GASTROSTOMY TUBE PLACEMENT N/A 09/28/2020    GASTROSTOMY TUBE PLACEMENT- EUSEBIO TO

## 2024-06-24 ENCOUNTER — OFFICE VISIT (OUTPATIENT)
Dept: FAMILY MEDICINE CLINIC | Age: 64
End: 2024-06-24
Payer: MEDICARE

## 2024-06-24 VITALS
DIASTOLIC BLOOD PRESSURE: 84 MMHG | WEIGHT: 128.8 LBS | SYSTOLIC BLOOD PRESSURE: 126 MMHG | TEMPERATURE: 98.1 F | RESPIRATION RATE: 12 BRPM | BODY MASS INDEX: 27.04 KG/M2 | HEIGHT: 58 IN | HEART RATE: 84 BPM | OXYGEN SATURATION: 98 %

## 2024-06-24 DIAGNOSIS — E03.8 HYPOTHYROIDISM DUE TO HASHIMOTO'S THYROIDITIS: Primary | ICD-10-CM

## 2024-06-24 DIAGNOSIS — E06.3 HYPOTHYROIDISM DUE TO HASHIMOTO'S THYROIDITIS: Primary | ICD-10-CM

## 2024-06-24 DIAGNOSIS — Q71.31: ICD-10-CM

## 2024-06-24 DIAGNOSIS — Z86.39 HISTORY OF IRON DEFICIENCY: ICD-10-CM

## 2024-06-24 DIAGNOSIS — Z98.84 HISTORY OF GASTRIC BYPASS: ICD-10-CM

## 2024-06-24 PROCEDURE — 3017F COLORECTAL CA SCREEN DOC REV: CPT

## 2024-06-24 PROCEDURE — G8419 CALC BMI OUT NRM PARAM NOF/U: HCPCS

## 2024-06-24 PROCEDURE — G8427 DOCREV CUR MEDS BY ELIG CLIN: HCPCS

## 2024-06-24 PROCEDURE — 99214 OFFICE O/P EST MOD 30 MIN: CPT

## 2024-06-24 PROCEDURE — 1036F TOBACCO NON-USER: CPT

## 2024-06-24 SDOH — ECONOMIC STABILITY: FOOD INSECURITY: WITHIN THE PAST 12 MONTHS, YOU WORRIED THAT YOUR FOOD WOULD RUN OUT BEFORE YOU GOT MONEY TO BUY MORE.: NEVER TRUE

## 2024-06-24 SDOH — ECONOMIC STABILITY: FOOD INSECURITY: WITHIN THE PAST 12 MONTHS, THE FOOD YOU BOUGHT JUST DIDN'T LAST AND YOU DIDN'T HAVE MONEY TO GET MORE.: NEVER TRUE

## 2024-06-24 SDOH — ECONOMIC STABILITY: INCOME INSECURITY: HOW HARD IS IT FOR YOU TO PAY FOR THE VERY BASICS LIKE FOOD, HOUSING, MEDICAL CARE, AND HEATING?: NOT HARD AT ALL

## 2024-06-24 ASSESSMENT — ENCOUNTER SYMPTOMS
NAUSEA: 0
CONSTIPATION: 0
DIARRHEA: 0
VOMITING: 0

## 2024-06-24 NOTE — PATIENT INSTRUCTIONS
Thank you   Thank you for trusting us with your healthcare needs. You may receive a survey regarding today's visit. It would help us out if you would take a few moments to provide your feedback. We value your input.  Please bring in ALL medications BOTTLES, including inhalers, herbal supplements, over the counter, prescribed & non-prescribed medicine. The office would like actual medication bottles and a list.         4.  Prior to getting your labs drawn, check with your insurance company for benefits and eligibility of lab services.  Often, insurance companies cover certain tests for preventative visits only.  It is patient's responsibility to    see what is covered.    5.  If the list below has been completed, PLEASE FAX RECORDS TO OUR OFFICE @ [phone]. Once the records have been received we will update your records at our office:  Health Maintenance Due   Topic Date Due    Respiratory Syncytial Virus (RSV) Pregnant or age 60 yrs+ (1 - 1-dose 60+ series) Never done    COVID-19 Vaccine (4 - 2023-24 season) 09/01/2023    Annual Wellness Visit (Medicare Advantage)  01/01/2024

## 2024-06-24 NOTE — PROGRESS NOTES
S: 64 y.o. female with   Chief Complaint   Patient presents with    Follow-up     Needs order for Prosthetic arm       HPI: please see resident note for HPI and ROS.    BP Readings from Last 3 Encounters:   06/24/24 126/84   06/19/24 120/78   05/17/24 110/70     Wt Readings from Last 3 Encounters:   06/24/24 58.4 kg (128 lb 12.8 oz)   06/19/24 58.1 kg (128 lb)   05/17/24 58.1 kg (128 lb)       O: VS:  height is 1.473 m (4' 9.99\") and weight is 58.4 kg (128 lb 12.8 oz). Her oral temperature is 98.1 °F (36.7 °C). Her blood pressure is 126/84 and her pulse is 84. Her respiration is 12 and oxygen saturation is 98%.   General: No acute distress.  CV: Regular rate and rhythm  Respiratory: No respiratory distress, clear to auscultation     Diagnosis Orders   1. Hypothyroidism due to Hashimoto's thyroiditis  TSH    T4, Free      2. History of gastric bypass  Vitamin B1    Vitamin K    Vitamin E    Vitamin A    Selenium Serum    Zinc    Copper, Serum    Vitamin B12 & Folate    Iron    Magnesium    Vitamin C    CBC with Auto Differential    Comprehensive Metabolic Panel      3. Congenital absence of hand, right        4. Body mass index (BMI) 26.0-26.9, adult  CBC with Auto Differential      5. History of iron deficiency  Iron          Plan:  Please refer to resident note for full plan.    64 year old female presents to the office for chronic condition review.    Congenital absence of right hand. Plan for new prosthesis due to having issues with current one. Will place/fax order.     Hypothyroidism: chronic stable. Currently on levothyroxine 100mcg and doing well. Will plan to recheck and follow up.    History of gastric bypass: plan for micronutrient re evaluation. Continue multivitamin use.     Follow up in 6 months    Health Maintenance Due   Topic Date Due    Respiratory Syncytial Virus (RSV) Pregnant or age 60 yrs+ (1 - 1-dose 60+ series) Never done    COVID-19 Vaccine (4 - 2023-24 season) 09/01/2023    Annual Wellness

## 2024-06-24 NOTE — PROGRESS NOTES
Health Maintenance Due   Topic Date Due    Respiratory Syncytial Virus (RSV) Pregnant or age 60 yrs+ (1 - 1-dose 60+ series) Never done    COVID-19 Vaccine (4 - 2023-24 season) 09/01/2023    Annual Wellness Visit (Medicare Advantage)  01/01/2024

## 2024-06-24 NOTE — PROGRESS NOTES
[facility]  [facility]  [address]  [facility] [address]  Dept: [phone]  Dept Fax: [phone]  Loc: [phone]  Resident Note    Patient:Haily Parker Sex: female  YOB: 1960 Age:64 y.o.  MRN: [id number]  Assessment & Plan   1. Hypothyroidism due to Hashimoto's thyroiditis  -  23 TSH 1.4, T4 1.13  - Continue Levothyroxine 100mcg   - Rechecking TSH and T4  Orders  - TSH; Future  - T4, Free; Future    2. History of gastric bypass  - 2023 vitamins wnl and Vit D 35 on 24  - Rechecking annual labs with Vit B1, A, E, K, Selenium, Zinc, Copper, Vit B12, Folate, Iron, Mg, Vit C, CBC, and CMP  - Continue daily PNV and iron supplementation   - RTC: 6 months for well check  Orders  - Vitamin B1; Future  - Vitamin K; Future  - Vitamin E; Future  - Vitamin A; Future  - Selenium Serum; Future  - Zinc; Future  - Copper, Serum; Future  - Vitamin B12 & Folate; Future  - Iron; Future  - Magnesium; Future  - Vitamin C; Future  - CBC with Auto Differential; Future  - Comprehensive Metabolic Panel; Future    3. Congenital absence of hand, right  - Called [facility] while in office. This provider had placed order on  and slid order in mailbox to be faxed. Clinic states that fax had not been received. Reprinted order and personally faxed it to number give by Clinic. Will call office to confirm receipt tomorrow    4. Body mass index (BMI) 26.0-26.9, adult  - s/p Gastric Bypass.   - Checking CBC  Orders  - CBC with Auto Differential; Future    5. History of iron deficiency  - s/p Gastric Bypass. Was iron deficient in 2018 with Iron 21  - Continue iron supplementation  - Rechecking iron   Orders  - Iron; Future     Health Maintenance/Vaccinations  - Immunizations: COVID x3, PNA x2, TDaP 10/25/2018, Zoster x3  Age appropriate for RSV  - Colon Cancer Screenin2019 with one 3-4mm polyp removed and mild diverticulosis and

## 2024-06-26 ENCOUNTER — TELEPHONE (OUTPATIENT)
Dept: FAMILY MEDICINE CLINIC | Age: 64
End: 2024-06-26

## 2024-06-26 ENCOUNTER — HOSPITAL ENCOUNTER (OUTPATIENT)
Age: 64
Discharge: HOME | End: 2024-06-26
Payer: MEDICARE

## 2024-06-26 DIAGNOSIS — E06.3 HYPOTHYROIDISM DUE TO HASHIMOTO'S THYROIDITIS: ICD-10-CM

## 2024-06-26 DIAGNOSIS — E03.8 HYPOTHYROIDISM DUE TO HASHIMOTO'S THYROIDITIS: ICD-10-CM

## 2024-06-26 DIAGNOSIS — Z98.84 HISTORY OF GASTRIC BYPASS: ICD-10-CM

## 2024-06-26 DIAGNOSIS — Z86.39 HISTORY OF IRON DEFICIENCY: ICD-10-CM

## 2024-06-26 LAB
ALBUMIN SERPL BCG-MCNC: 4.4 G/DL (ref 3.5–5.1)
ALP SERPL-CCNC: 116 U/L (ref 38–126)
ALT SERPL W/O P-5'-P-CCNC: 68 U/L (ref 11–66)
ANION GAP SERPL CALC-SCNC: 15 MEQ/L (ref 8–16)
AST SERPL-CCNC: 32 U/L (ref 5–40)
BASOPHILS ABSOLUTE: 0 THOU/MM3 (ref 0–0.1)
BASOPHILS NFR BLD AUTO: 0.5 %
BILIRUB SERPL-MCNC: 0.5 MG/DL (ref 0.3–1.2)
BUN SERPL-MCNC: 11 MG/DL (ref 7–22)
CALCIUM SERPL-MCNC: 10.5 MG/DL (ref 8.5–10.5)
CHLORIDE SERPL-SCNC: 105 MEQ/L (ref 98–111)
CO2 SERPL-SCNC: 18 MEQ/L (ref 23–33)
CREAT SERPL-MCNC: 0.7 MG/DL (ref 0.4–1.2)
DEPRECATED RDW RBC AUTO: 48.1 FL (ref 35–45)
EOSINOPHIL NFR BLD AUTO: 2 %
EOSINOPHILS ABSOLUTE: 0.1 THOU/MM3 (ref 0–0.4)
ERYTHROCYTE [DISTWIDTH] IN BLOOD BY AUTOMATED COUNT: 12.8 % (ref 11.5–14.5)
FOLATE SERPL-MCNC: > 20 NG/ML (ref 4.8–24.2)
GFR SERPL CREATININE-BSD FRML MDRD: > 90 ML/MIN/1.73M2
GLUCOSE SERPL-MCNC: 67 MG/DL (ref 70–108)
HCT VFR BLD AUTO: 42.3 % (ref 37–47)
HGB BLD-MCNC: 13.7 GM/DL (ref 12–16)
IMM GRANULOCYTES # BLD AUTO: 0.01 THOU/MM3 (ref 0–0.07)
IMM GRANULOCYTES NFR BLD AUTO: 0.2 %
IRON SERPL-MCNC: 118 UG/DL (ref 50–170)
LYMPHOCYTES ABSOLUTE: 0.7 THOU/MM3 (ref 1–4.8)
LYMPHOCYTES NFR BLD AUTO: 11.4 %
MAGNESIUM SERPL-MCNC: 2 MG/DL (ref 1.6–2.4)
MCH RBC QN AUTO: 33.3 PG (ref 26–33)
MCHC RBC AUTO-ENTMCNC: 32.4 GM/DL (ref 32.2–35.5)
MCV RBC AUTO: 102.7 FL (ref 81–99)
MONOCYTES ABSOLUTE: 0.4 THOU/MM3 (ref 0.4–1.3)
MONOCYTES NFR BLD AUTO: 6.5 %
NEUTROPHILS ABSOLUTE: 5.2 THOU/MM3 (ref 1.8–7.7)
NEUTROPHILS NFR BLD AUTO: 79.4 %
NRBC BLD AUTO-RTO: 0 /100 WBC
PLATELET # BLD AUTO: 195 THOU/MM3 (ref 130–400)
PMV BLD AUTO: 10 FL (ref 9.4–12.4)
POTASSIUM SERPL-SCNC: 4.1 MEQ/L (ref 3.5–5.2)
PROT SERPL-MCNC: 6.4 G/DL (ref 6.1–8)
RBC # BLD AUTO: 4.12 MILL/MM3 (ref 4.2–5.4)
SODIUM SERPL-SCNC: 138 MEQ/L (ref 135–145)
T4 FREE SERPL-MCNC: 1.27 NG/DL (ref 0.93–1.68)
TSH SERPL DL<=0.005 MIU/L-ACNC: 0.87 UIU/ML (ref 0.4–4.2)
VIT B12 SERPL-MCNC: 773 PG/ML (ref 211–911)
WBC # BLD AUTO: 6.5 THOU/MM3 (ref 4.8–10.8)

## 2024-06-26 PROCEDURE — 85025 COMPLETE CBC W/AUTO DIFF WBC: CPT

## 2024-06-26 PROCEDURE — 84439 ASSAY OF FREE THYROXINE: CPT

## 2024-06-26 PROCEDURE — 82180 ASSAY OF ASCORBIC ACID: CPT

## 2024-06-26 PROCEDURE — 84443 ASSAY THYROID STIM HORMONE: CPT

## 2024-06-26 PROCEDURE — 83540 ASSAY OF IRON: CPT

## 2024-06-26 PROCEDURE — 84590 ASSAY OF VITAMIN A: CPT

## 2024-06-26 PROCEDURE — 82746 ASSAY OF FOLIC ACID SERUM: CPT

## 2024-06-26 PROCEDURE — 82525 ASSAY OF COPPER: CPT

## 2024-06-26 PROCEDURE — 84630 ASSAY OF ZINC: CPT

## 2024-06-26 PROCEDURE — 80053 COMPREHEN METABOLIC PANEL: CPT

## 2024-06-26 PROCEDURE — 84255 ASSAY OF SELENIUM: CPT

## 2024-06-26 PROCEDURE — 82607 VITAMIN B-12: CPT

## 2024-06-26 PROCEDURE — 84446 ASSAY OF VITAMIN E: CPT

## 2024-06-26 PROCEDURE — 36415 COLL VENOUS BLD VENIPUNCTURE: CPT

## 2024-06-26 PROCEDURE — 84425 ASSAY OF VITAMIN B-1: CPT

## 2024-06-26 PROCEDURE — 84597 ASSAY OF VITAMIN K: CPT

## 2024-06-26 PROCEDURE — 83735 ASSAY OF MAGNESIUM: CPT

## 2024-06-26 NOTE — TELEPHONE ENCOUNTER
Spoke with [facility] who confirms they have received the order for patient's replacement of prosthesis and that they have ordered the new hand and glove. He is unsure if the decreased functionality of the prosthesis is because of the glove or hand so they decided to order both.     Electronically signed by Luciana Parr DO on 6/26/2024 at 3:12 PM

## 2024-06-28 LAB
COPPER SERPL-MCNC: 111.3 UG/DL (ref 80–155)
SELENIUM SERPL-MCNC: 109.4 UG/L (ref 23–190)
VIT A SERPL-MCNC: NORMAL UG/ML
VITAMIN E LEVEL: NORMAL
ZINC SERPL-MCNC: 68.8 UG/DL (ref 60–120)

## 2024-06-29 LAB — VIT C SERPL-MCNC: NORMAL MG/DL

## 2024-07-01 LAB
PHYTONADIONE SERPL-MCNC: NORMAL NG/L
VIT B1 PYROPHOSHATE BLD-SCNC: 182 NMOL/L (ref 70–180)

## 2024-07-02 ENCOUNTER — TELEPHONE (OUTPATIENT)
Dept: FAMILY MEDICINE CLINIC | Age: 64
End: 2024-07-02

## 2024-07-02 NOTE — TELEPHONE ENCOUNTER
Received form from [facility] requesting more information for prosthesis order. Form completed and attached copies of notes from recent office visit / telephone calls regarding this order. Slid into Pat's mailbox.    Electronically signed by Luciana Parr DO on 7/2/2024 at 2:40 PM

## 2024-07-08 ENCOUNTER — TELEPHONE (OUTPATIENT)
Dept: PSYCHIATRY | Age: 64
End: 2024-07-08

## 2024-07-08 NOTE — TELEPHONE ENCOUNTER
Haily called into the office leaving a VM stating that there is an issue with one of her medications (did not mention which one) and is asking that our office getting it fixed at [facility] pharmacy.    I reached out to [facility] pharmacy and spoke with the pharmacist; notes that her Lithium 300mg (1.5 tablet in the AM and 1.5 tablet in the evening) was filled on 06/29/24 but at a different pharmacy via what insurance is telling them. She asked if our office can see if she sent a Rx elsewhere; per chart, she has only been using [facility]. I asked the pharmacist if she can see if she transferred the Rx elsewhere, she does not see that it was transferred. Mentioned that they last filled the Rx on 06/04/24 for a 29 day supply. I asked her why they only filled the Rx for 29 days when it was written for the 30 days; she said that she is unsure if at that time there was only enough or what happened but they have previously filled it for more than the 29 days. I asked her if the Rx was ran for a different amount of days than the 29 days and that is why its rejecting; she said no.    She said that she left a message for Haily to give them a call back for additional information. I have also left a message to get more information; awaiting call back.

## 2024-07-09 NOTE — TELEPHONE ENCOUNTER
Spoke with Flor (pharmacist); she said that one of the techs reached out to her insurance and she is not sure what the issue was but Haily was able to  her medication this morning.

## 2024-07-18 ENCOUNTER — APPOINTMENT (OUTPATIENT)
Dept: INTERVENTIONAL RADIOLOGY/VASCULAR | Age: 64
End: 2024-07-18
Payer: MEDICARE

## 2024-07-18 ENCOUNTER — APPOINTMENT (OUTPATIENT)
Dept: CT IMAGING | Age: 64
End: 2024-07-18
Payer: MEDICARE

## 2024-07-18 ENCOUNTER — HOSPITAL ENCOUNTER (INPATIENT)
Age: 64
LOS: 7 days | Discharge: HOME | End: 2024-07-25
Attending: EMERGENCY MEDICINE | Admitting: PHYSICIAN ASSISTANT
Payer: MEDICARE

## 2024-07-18 ENCOUNTER — TELEPHONE (OUTPATIENT)
Dept: NEUROLOGY | Age: 64
End: 2024-07-18

## 2024-07-18 DIAGNOSIS — R26.81 UNSTEADY GAIT: ICD-10-CM

## 2024-07-18 DIAGNOSIS — Z91.81 AT HIGH RISK FOR FALLS: Primary | ICD-10-CM

## 2024-07-18 PROBLEM — R47.9 SPEECH DISTURBANCE: Status: ACTIVE | Noted: 2024-07-18

## 2024-07-18 LAB
ALBUMIN SERPL BCG-MCNC: 4.1 G/DL (ref 3.5–5.1)
ALP SERPL-CCNC: 156 U/L (ref 38–126)
ALT SERPL W/O P-5'-P-CCNC: 57 U/L (ref 11–66)
ANION GAP SERPL CALC-SCNC: 6 MEQ/L (ref 8–16)
AST SERPL-CCNC: 24 U/L (ref 5–40)
BASOPHILS ABSOLUTE: 0 THOU/MM3 (ref 0–0.1)
BASOPHILS NFR BLD AUTO: 0.6 %
BILIRUB SERPL-MCNC: 0.3 MG/DL (ref 0.3–1.2)
BILIRUB UR QL STRIP.AUTO: NEGATIVE
BUN SERPL-MCNC: 14 MG/DL (ref 7–22)
CALCIUM SERPL-MCNC: 10.1 MG/DL (ref 8.5–10.5)
CHARACTER UR: CLEAR
CHLORIDE SERPL-SCNC: 109 MEQ/L (ref 98–111)
CO2 SERPL-SCNC: 21 MEQ/L (ref 23–33)
COLOR, UA: YELLOW
CREAT SERPL-MCNC: 0.6 MG/DL (ref 0.4–1.2)
D DIMER PPP IA.FEU-MCNC: 840 NG/ML FEU (ref 0–500)
DEPRECATED RDW RBC AUTO: 49.1 FL (ref 35–45)
EKG ATRIAL RATE: 60 BPM
EKG P AXIS: 80 DEGREES
EKG P-R INTERVAL: 204 MS
EKG Q-T INTERVAL: 452 MS
EKG QRS DURATION: 148 MS
EKG QTC CALCULATION (BAZETT): 452 MS
EKG R AXIS: -19 DEGREES
EKG T AXIS: 43 DEGREES
EKG VENTRICULAR RATE: 60 BPM
EOSINOPHIL NFR BLD AUTO: 2.4 %
EOSINOPHILS ABSOLUTE: 0.2 THOU/MM3 (ref 0–0.4)
ERYTHROCYTE [DISTWIDTH] IN BLOOD BY AUTOMATED COUNT: 12.7 % (ref 11.5–14.5)
FLUAV RNA RESP QL NAA+PROBE: NOT DETECTED
FLUBV RNA RESP QL NAA+PROBE: NOT DETECTED
GFR SERPL CREATININE-BSD FRML MDRD: > 90 ML/MIN/1.73M2
GLUCOSE BLD STRIP.AUTO-MCNC: 154 MG/DL (ref 70–108)
GLUCOSE BLD STRIP.AUTO-MCNC: 86 MG/DL (ref 70–108)
GLUCOSE SERPL-MCNC: 80 MG/DL (ref 70–108)
GLUCOSE UR QL STRIP.AUTO: 100 MG/DL
HCT VFR BLD AUTO: 42.7 % (ref 37–47)
HGB BLD-MCNC: 13.6 GM/DL (ref 12–16)
HGB UR QL STRIP.AUTO: NEGATIVE
IMM GRANULOCYTES # BLD AUTO: 0.03 THOU/MM3 (ref 0–0.07)
IMM GRANULOCYTES NFR BLD AUTO: 0.4 %
KETONES UR QL STRIP.AUTO: NEGATIVE
LIPASE SERPL-CCNC: 18.3 U/L (ref 5.6–51.3)
LYMPHOCYTES ABSOLUTE: 0.7 THOU/MM3 (ref 1–4.8)
LYMPHOCYTES NFR BLD AUTO: 10.4 %
MAGNESIUM SERPL-MCNC: 2.2 MG/DL (ref 1.6–2.4)
MCH RBC QN AUTO: 33.4 PG (ref 26–33)
MCHC RBC AUTO-ENTMCNC: 31.9 GM/DL (ref 32.2–35.5)
MCV RBC AUTO: 104.9 FL (ref 81–99)
MONOCYTES ABSOLUTE: 0.5 THOU/MM3 (ref 0.4–1.3)
MONOCYTES NFR BLD AUTO: 7.8 %
NEUTROPHILS ABSOLUTE: 5.5 THOU/MM3 (ref 1.8–7.7)
NEUTROPHILS NFR BLD AUTO: 78.4 %
NITRITE UR QL STRIP: NEGATIVE
NRBC BLD AUTO-RTO: 0 /100 WBC
NT-PROBNP SERPL IA-MCNC: 171.4 PG/ML (ref 0–124)
OSMOLALITY SERPL CALC.SUM OF ELEC: 271.4 MOSMOL/KG (ref 275–300)
PH UR STRIP.AUTO: 7 [PH] (ref 5–9)
PLATELET # BLD AUTO: 195 THOU/MM3 (ref 130–400)
PMV BLD AUTO: 10.2 FL (ref 9.4–12.4)
POTASSIUM SERPL-SCNC: 4 MEQ/L (ref 3.5–5.2)
PROT SERPL-MCNC: 6.1 G/DL (ref 6.1–8)
PROT UR STRIP.AUTO-MCNC: NEGATIVE MG/DL
RBC # BLD AUTO: 4.07 MILL/MM3 (ref 4.2–5.4)
REASON FOR REJECTION: NORMAL
REJECTED TEST: NORMAL
SARS-COV-2 RNA RESP QL NAA+PROBE: NOT DETECTED
SODIUM SERPL-SCNC: 136 MEQ/L (ref 135–145)
SP GR UR REFRACT.AUTO: > 1.03 (ref 1–1.03)
TROPONIN, HIGH SENSITIVITY: 13 NG/L (ref 0–12)
TSH SERPL DL<=0.005 MIU/L-ACNC: 1.08 UIU/ML (ref 0.4–4.2)
UROBILINOGEN, URINE: 0.2 EU/DL (ref 0–1)
WBC # BLD AUTO: 7 THOU/MM3 (ref 4.8–10.8)
WBC #/AREA URNS HPF: NEGATIVE /[HPF]

## 2024-07-18 PROCEDURE — 86052 AQUAPORIN-4 ANTB CBA EACH: CPT

## 2024-07-18 PROCEDURE — 84443 ASSAY THYROID STIM HORMONE: CPT

## 2024-07-18 PROCEDURE — 82948 REAGENT STRIP/BLOOD GLUCOSE: CPT

## 2024-07-18 PROCEDURE — 76937 US GUIDE VASCULAR ACCESS: CPT

## 2024-07-18 PROCEDURE — 2580000003 HC RX 258: Performed by: PHYSICIAN ASSISTANT

## 2024-07-18 PROCEDURE — 84484 ASSAY OF TROPONIN QUANT: CPT

## 2024-07-18 PROCEDURE — 1200000003 HC TELEMETRY R&B

## 2024-07-18 PROCEDURE — 2580000003 HC RX 258

## 2024-07-18 PROCEDURE — 83690 ASSAY OF LIPASE: CPT

## 2024-07-18 PROCEDURE — 85379 FIBRIN DEGRADATION QUANT: CPT

## 2024-07-18 PROCEDURE — 70498 CT ANGIOGRAPHY NECK: CPT

## 2024-07-18 PROCEDURE — 83735 ASSAY OF MAGNESIUM: CPT

## 2024-07-18 PROCEDURE — 85025 COMPLETE CBC W/AUTO DIFF WBC: CPT

## 2024-07-18 PROCEDURE — 83880 ASSAY OF NATRIURETIC PEPTIDE: CPT

## 2024-07-18 PROCEDURE — 99223 1ST HOSP IP/OBS HIGH 75: CPT | Performed by: PHYSICIAN ASSISTANT

## 2024-07-18 PROCEDURE — 2709999900 IR FLUORO GUIDED CVA DEVICE PLMT/REPLACE/REMOVAL

## 2024-07-18 PROCEDURE — 6360000004 HC RX CONTRAST MEDICATION

## 2024-07-18 PROCEDURE — 93971 EXTREMITY STUDY: CPT

## 2024-07-18 PROCEDURE — 93010 ELECTROCARDIOGRAM REPORT: CPT | Performed by: INTERNAL MEDICINE

## 2024-07-18 PROCEDURE — 80053 COMPREHEN METABOLIC PANEL: CPT

## 2024-07-18 PROCEDURE — 93005 ELECTROCARDIOGRAM TRACING: CPT

## 2024-07-18 PROCEDURE — 36556 INSERT NON-TUNNEL CV CATH: CPT

## 2024-07-18 PROCEDURE — 6360000002 HC RX W HCPCS

## 2024-07-18 PROCEDURE — 99285 EMERGENCY DEPT VISIT HI MDM: CPT

## 2024-07-18 PROCEDURE — 81003 URINALYSIS AUTO W/O SCOPE: CPT

## 2024-07-18 PROCEDURE — 77001 FLUOROGUIDE FOR VEIN DEVICE: CPT

## 2024-07-18 PROCEDURE — 70450 CT HEAD/BRAIN W/O DYE: CPT

## 2024-07-18 PROCEDURE — 36415 COLL VENOUS BLD VENIPUNCTURE: CPT

## 2024-07-18 PROCEDURE — 87636 SARSCOV2 & INF A&B AMP PRB: CPT

## 2024-07-18 PROCEDURE — 70496 CT ANGIOGRAPHY HEAD: CPT

## 2024-07-18 PROCEDURE — 99223 1ST HOSP IP/OBS HIGH 75: CPT

## 2024-07-18 RX ORDER — ONDANSETRON 4 MG/1
4 TABLET, ORALLY DISINTEGRATING ORAL EVERY 8 HOURS PRN
Status: DISCONTINUED | OUTPATIENT
Start: 2024-07-18 | End: 2024-07-25 | Stop reason: HOSPADM

## 2024-07-18 RX ORDER — LITHIUM CARBONATE 150 MG/1
450 CAPSULE ORAL 2 TIMES DAILY
Status: DISCONTINUED | OUTPATIENT
Start: 2024-07-19 | End: 2024-07-25 | Stop reason: HOSPADM

## 2024-07-18 RX ORDER — CALCIUM CARBONATE 500(1250)
500 TABLET ORAL NIGHTLY
Status: DISCONTINUED | OUTPATIENT
Start: 2024-07-19 | End: 2024-07-25 | Stop reason: HOSPADM

## 2024-07-18 RX ORDER — POTASSIUM CHLORIDE 20 MEQ/1
40 TABLET, EXTENDED RELEASE ORAL PRN
Status: DISCONTINUED | OUTPATIENT
Start: 2024-07-18 | End: 2024-07-25 | Stop reason: HOSPADM

## 2024-07-18 RX ORDER — MAGNESIUM SULFATE IN WATER 40 MG/ML
2000 INJECTION, SOLUTION INTRAVENOUS PRN
Status: DISCONTINUED | OUTPATIENT
Start: 2024-07-18 | End: 2024-07-25 | Stop reason: HOSPADM

## 2024-07-18 RX ORDER — ACETAMINOPHEN 650 MG/1
650 SUPPOSITORY RECTAL EVERY 6 HOURS PRN
Status: DISCONTINUED | OUTPATIENT
Start: 2024-07-18 | End: 2024-07-25 | Stop reason: HOSPADM

## 2024-07-18 RX ORDER — SUCRALFATE 1 G/1
1 TABLET ORAL
Status: DISCONTINUED | OUTPATIENT
Start: 2024-07-19 | End: 2024-07-25 | Stop reason: HOSPADM

## 2024-07-18 RX ORDER — SODIUM CHLORIDE 0.9 % (FLUSH) 0.9 %
5-40 SYRINGE (ML) INJECTION PRN
Status: DISCONTINUED | OUTPATIENT
Start: 2024-07-18 | End: 2024-07-25 | Stop reason: HOSPADM

## 2024-07-18 RX ORDER — SODIUM CHLORIDE 9 MG/ML
INJECTION, SOLUTION INTRAVENOUS PRN
Status: DISCONTINUED | OUTPATIENT
Start: 2024-07-18 | End: 2024-07-25 | Stop reason: HOSPADM

## 2024-07-18 RX ORDER — ONDANSETRON 2 MG/ML
4 INJECTION INTRAMUSCULAR; INTRAVENOUS EVERY 6 HOURS PRN
Status: DISCONTINUED | OUTPATIENT
Start: 2024-07-18 | End: 2024-07-25 | Stop reason: HOSPADM

## 2024-07-18 RX ORDER — SODIUM CHLORIDE 9 MG/ML
INJECTION, SOLUTION INTRAVENOUS CONTINUOUS
Status: DISCONTINUED | OUTPATIENT
Start: 2024-07-18 | End: 2024-07-19

## 2024-07-18 RX ORDER — LEVOTHYROXINE SODIUM 0.1 MG/1
100 TABLET ORAL DAILY
Status: DISCONTINUED | OUTPATIENT
Start: 2024-07-19 | End: 2024-07-25 | Stop reason: HOSPADM

## 2024-07-18 RX ORDER — LURASIDONE HYDROCHLORIDE 40 MG/1
40 TABLET, FILM COATED ORAL NIGHTLY
Status: DISCONTINUED | OUTPATIENT
Start: 2024-07-19 | End: 2024-07-25 | Stop reason: HOSPADM

## 2024-07-18 RX ORDER — PHENOL 1.4 %
1 AEROSOL, SPRAY (ML) MUCOUS MEMBRANE NIGHTLY
Status: DISCONTINUED | OUTPATIENT
Start: 2024-07-18 | End: 2024-07-18 | Stop reason: CLARIF

## 2024-07-18 RX ORDER — VILAZODONE HYDROCHLORIDE 40 MG/1
40 TABLET ORAL DAILY
Status: DISCONTINUED | OUTPATIENT
Start: 2024-07-19 | End: 2024-07-25 | Stop reason: HOSPADM

## 2024-07-18 RX ORDER — ENOXAPARIN SODIUM 100 MG/ML
40 INJECTION SUBCUTANEOUS DAILY
Status: DISCONTINUED | OUTPATIENT
Start: 2024-07-18 | End: 2024-07-18

## 2024-07-18 RX ORDER — PANTOPRAZOLE SODIUM 40 MG/1
40 TABLET, DELAYED RELEASE ORAL
Status: DISCONTINUED | OUTPATIENT
Start: 2024-07-19 | End: 2024-07-25 | Stop reason: HOSPADM

## 2024-07-18 RX ORDER — AMANTADINE HYDROCHLORIDE 100 MG/1
100 CAPSULE, GELATIN COATED ORAL DAILY
Status: DISCONTINUED | OUTPATIENT
Start: 2024-07-19 | End: 2024-07-25 | Stop reason: HOSPADM

## 2024-07-18 RX ORDER — LAMOTRIGINE 100 MG/1
200 TABLET ORAL DAILY
Status: DISCONTINUED | OUTPATIENT
Start: 2024-07-19 | End: 2024-07-25 | Stop reason: HOSPADM

## 2024-07-18 RX ORDER — SODIUM CHLORIDE 0.9 % (FLUSH) 0.9 %
5-40 SYRINGE (ML) INJECTION EVERY 12 HOURS SCHEDULED
Status: DISCONTINUED | OUTPATIENT
Start: 2024-07-18 | End: 2024-07-25 | Stop reason: HOSPADM

## 2024-07-18 RX ORDER — POTASSIUM CHLORIDE 7.45 MG/ML
10 INJECTION INTRAVENOUS PRN
Status: DISCONTINUED | OUTPATIENT
Start: 2024-07-18 | End: 2024-07-25 | Stop reason: HOSPADM

## 2024-07-18 RX ORDER — POLYETHYLENE GLYCOL 3350 17 G/17G
17 POWDER, FOR SOLUTION ORAL DAILY PRN
Status: DISCONTINUED | OUTPATIENT
Start: 2024-07-18 | End: 2024-07-25 | Stop reason: HOSPADM

## 2024-07-18 RX ORDER — ACETAMINOPHEN 325 MG/1
650 TABLET ORAL EVERY 6 HOURS PRN
Status: DISCONTINUED | OUTPATIENT
Start: 2024-07-18 | End: 2024-07-25 | Stop reason: HOSPADM

## 2024-07-18 RX ADMIN — SODIUM CHLORIDE: 9 INJECTION, SOLUTION INTRAVENOUS at 21:27

## 2024-07-18 RX ADMIN — IOPAMIDOL 80 ML: 755 INJECTION, SOLUTION INTRAVENOUS at 13:10

## 2024-07-18 RX ADMIN — SODIUM CHLORIDE, PRESERVATIVE FREE 10 ML: 5 INJECTION INTRAVENOUS at 21:26

## 2024-07-18 RX ADMIN — METHYLPREDNISOLONE SODIUM SUCCINATE 1000 MG: 1 INJECTION INTRAMUSCULAR; INTRAVENOUS at 20:07

## 2024-07-18 ASSESSMENT — PAIN SCALES - GENERAL: PAINLEVEL_OUTOF10: 0

## 2024-07-18 ASSESSMENT — PAIN - FUNCTIONAL ASSESSMENT: PAIN_FUNCTIONAL_ASSESSMENT: NONE - DENIES PAIN

## 2024-07-18 NOTE — ED NOTES
Pt in bed, eyes open, respirations even and unlabored. Vital signs reassessed. Report received from IR. Bleeding controlled at picc line site, hematoma noted. Ice pack applied by IR. Pt voices no needs at this time, sister at bedside.

## 2024-07-18 NOTE — ED NOTES
Pt in bed, eyes open, respirations even and unlabored. Vital signs reassessed, IV insertion attempted, unsuccessful. Awaiting ultrasound guided IV. No needs voiced by pt.

## 2024-07-18 NOTE — H&P
Formulation and discussion of sedation / procedure plans, risks, benefits, side effects and alternatives with patient and/or responsible adult completed.    History and Physical reviewed and unchanged.    Electronically signed by Uli Dumont MD on 7/18/24 at 4:45 PM EDT

## 2024-07-18 NOTE — ED NOTES
Pt in bed, eyes open, respirations even and unlabored. Vital signs reassessed, no needs voiced. Picc line site appears swollen. Ice applied. No bleeding noted.

## 2024-07-18 NOTE — ED NOTES
Unsuccessful IV attempt by Dr. Renteria. Pt in bed, eyes open, respirations even and unlabored. Vital signs reassessed, no needs voiced.

## 2024-07-18 NOTE — PROGRESS NOTES
1705 Manual pressure removed. Hematoma noted. PICC sutured to right neck.  1707 Tegaderm with CHG applied to site on right neck. No active bleeding noted and hematoma remains unchanged. Ice pack to site.  1711 Pt positioned on cart for comfort. Transferred to ED per cart. Report to RN and physician. Pt c/o dull ache in right neck \"8\" on scale 1-10.

## 2024-07-18 NOTE — ED PROVIDER NOTES
diagnoses:   At high risk for falls   Unsteady gait       Condition: condition: stable  Dispo: Admit to med/surg floor  DISPOSITION Decision To Admit 07/18/2024 04:15:44 PM      This transcription was electronically signed. It was dictated by use of voice recognition software and electronically transcribed. The transcription may contain errors not detected in proofreading.

## 2024-07-18 NOTE — ED PROVIDER NOTES
ATTENDING NOTE:    I supervised and discussed the history, physical exam and the management of this patient with the resident. I reviewed the resident's note and agree with the documented findings and plan of care.  Please see my additional note.    Patient presents for nausea, double vision, jumbled speech, difficulty walking since Tuesday.  She has been having a hard time seeing her phone, has been seeing double.  She reports feeling as though her difficulty walking is due to problems with both of her legs.  She denies numbness or tingling to me.  She reports a history of multiple sclerosis, states her last flareup was a 98 and she had difficulty walking at that time.  She has not had changes with her speech or her vision due to her MS in the past.  She contacted her neurologist, Dr. Gaines, and was sent to the emergency department for further evaluation.  She denies chest pain, abdominal pain, fever, chills, urinary symptoms.  She does report some ongoing chronic diarrhea.  Denies palpitations, syncope, lower extremity pain or swelling.    I personally saw and examined the patient.  I have reviewed and agree with the resident's findings, including all diagnostic interpretations and treatment plans as written.  I was present for the key portion of any procedures performed and the inclusive time noted in any critical care statement.    Electronically verified by Ave Lau MD  07/19/24 7508

## 2024-07-18 NOTE — OP NOTE
PICC Line Insertion:      Pre-Procedure Diagnosis:  dizziness, poor IV access .needs PICC line for CT scan     Procedure Performed: PICC line insertion.  IV team to follow     Anesthesia: local     Findings: successful    Immediate Complications:  None    Estimated Blood Loss: minimal    SEE DICTATED PROCEDURE NOTE FOR COMPLETE DETAILS.    Uli Dumont MD   7/18/2024 5:13 PM

## 2024-07-18 NOTE — ED NOTES
Pt in bed, eyes open, respirations even and unlabored. Vital signs reassessed, pt to IR at this time. No needs voiced.

## 2024-07-18 NOTE — TELEPHONE ENCOUNTER
Deanna reports patient is jumbling speech, double vision, and feeling nauseated since Tuesday.   LOV: 5/17/24- Dr. Gaines  Started Amantadine 100 mg daily  Vitamin D level- 35 on 5/17/24  Last MRI brain completed 2/19/24

## 2024-07-18 NOTE — CONSULTS
Neurology Consult Note    Date:7/18/2024       Room:[address] [place] [address]  Patient Name:Haily Parker     YOB: 1960     Age:64 y.o.    Requesting Physician: Vikas Hernandez PA-C     Reason for Consult:  Evaluate for history of multiple sclerosis, new neurologic deficits      Chief Complaint:   Chief Complaint   Patient presents with    Fatigue    Dizziness    Diplopia       Subjective     Haily Parker is a 64 y.o. female with a history of multiple sclerosis, bipolar disorder, depression, GERD, cervical cancer s/p hysterectomy, hypothyroidism, bradycardia s/p PPM, congenital absence of right upper extremity who presented to [facility] ED for the evaluation of fatigue, dysarthria, blurry vision, and gait instability. Patient states that last weekend she began to experience the symptoms and states it has progressively gotten worse. Patient does have a history of multiple sclerosis and follows with outpatient neurology Dr. Gaines. She was previously on Tecfidera but has since discontinued as she experienced undesirable side effects. She last saw Dr. Gaines in May of this year and was started on Amantadine 100mg daily. She states that she is having diplopia when both eyes are open and when one eye is covered it appears blurry. She states that she is having gait instability when she ambulates and that she tends to lean to the left side. She states that her last multiple sclerosis flare was in 1998 and describes similar symptoms during that time. She denies any recent illness, fever, chills, cough, vomiting, headaches, dizziness.     Review of Systems   Review of Systems   Constitutional:  Negative for chills and fever.   HENT:  Negative for rhinorrhea and sore throat.    Eyes:  Positive for visual disturbance.   Respiratory:  Negative for cough and shortness of breath.    Cardiovascular:  Negative for chest pain.   Gastrointestinal:  Positive for nausea. Negative for abdominal pain and vomiting.      CHEMISTRIES:  Recent Labs     07/18/24  1207      K 4.0      CO2 21*   BUN 14   CREATININE 0.6   GLUCOSE 80   MG 2.2     COAGULATION STUDIES:No results for input(s): \"PROTIME\", \"INR\", \"APTT\" in the last 72 hours.  LIVER PROFILE:  Recent Labs     07/18/24  1207   AST 24   ALT 57   BILITOT 0.3   ALKPHOS 156*     CHOLESTEROL AND A1C:No results for input(s): \"HDL\", \"CHOL\", \"TRIG\", \"LABA1C\" in the last 720 hours.    Invalid input(s): \"LDLCALC\"   Imaging Last 24 Hours:  CT HEAD WO CONTRAST    Result Date: 7/18/2024  PROCEDURE: CT HEAD WO CONTRAST CLINICAL INFORMATION: LUE and LLE decreased sensation; Peripheral vision loss B/L; h/o MS. COMPARISON: MRI scan of the brain dated 2/19/2024.. TECHNIQUE: Noncontrast 5 mm axial images were obtained through the brain. Sagittal and coronal reconstructions were obtained. All CT scans at this facility use dose modulation, iterative reconstruction, and/or weight-based dosing when appropriate to reduce radiation dose to as low as reasonably achievable. FINDINGS: There is mild atrophy. There is diminished attenuation in the white matter. This may represent involvement by demyelinating disease, unchanged since previous MRI scan dated 2/19/2024. There is no hemorrhage. There are no intra-or extra-axial collections.  There is no hydrocephalus, midline shift or mass effect.  .  The paranasal sinuses and mastoid air cells are normally aerated.  There is no suspicious calvarial abnormality.      1. Stable CT scan of the brain, no interval change since previous MRI scan of the brain dated 2/19/2024.. **This report has been created using voice recognition software. It may contain minor errors which are inherent in voice recognition technology.** Electronically signed by Dr. oJhn Hdez    Vascular duplex lower extremity venous left    Result Date: 7/18/2024  PROCEDURE: VAS DUP LOWER EXTREMITY VENOUS LEFT CLINICAL INFORMATION: Edema left calf. COMPARISON: No prior study.

## 2024-07-18 NOTE — PROGRESS NOTES
1630 Patient received in IR for Peripherally inserted central catheter.  1631 This procedure has been fully reviewed with the patient and written informed consent has been obtained.  1644 Procedure started with . Sonosite utilized to gain access into jugular vein.  1653 Double PICC inserted in right neck per Dr Dumont. Pt tolerated fair.  1655 Hematoma noted at PICC site. Manual pressure applied per Dr Dumont. Report to Isamar BAL.

## 2024-07-18 NOTE — ED TRIAGE NOTES
Pt to ED with fatigue, double vision, and trouble walking. Sister at bedside states that the patient is \"walking to the left.\" Pt states that she has a hard time looking at her phone, is seeing double. Pt has diminished sensory on left upper arm and leg. bS 86.

## 2024-07-18 NOTE — H&P
Hospitalist History & Physical    Patient:  Haily Parker    Unit/Bed:12/012A  YOB: 1960  MRN: [id number]   PCP: Luciana Parr DO  Code Status: Full Code    Date of Service: The patient was seen and examined on 07/18/24 and admitted to Inpatient with an expected length of stay of less than two midnights due to medical therapy.     Chief Complaint: Gait disturbance, blurred vision, fatigue    Assessment/Plan:    Speech disturbance, gait disturbance, blurred vision, fatigue, subjective left upper and lower extremity weakness, sensation deficit in left upper and lower extremity  Constellation of symptoms suspicious for flare of multiple sclerosis.  Neurology consulted for further evaluation.  Will defer selection of MRI to neurology service.  PT/OT/SLP consult.  Multiple sclerosis  Follows with neurology locally.  Previously on Tecfidera, no longer on disease modifying therapy.  Continue amantadine for fatigue.  Right neck hematoma  Secondary to right IJ PICC placement.  Undergoing CTA of the head and neck for further evaluation.  SCDs for DVT prophylaxis.  Diarrhea  Reported x 2 weeks.  Check stool panel.  Start IV fluids.  Bipolar 1 disorder  Continue home psychiatric medication regimen  Hypothyroidism  Continue synthroid  GERD  Continue PPI, Carafate  History of bradycardia  S/p PPM  Obstructive sleep apnea  On CPAP  History of Batsheva-en-Y gastric bypass  Congenital absence of right upper extremity    History of Present Illness:  Haily Parker is a 64 y.o. female with a history of multiple sclerosis, bipolar 1 disorder, hypothyroidism, GERD, bradycardia status post PPM, Batsheva-en-Y gastric bypass, and congenital absence of right upper extremity who presented to [facility] with chief complaint of fatigue, gait disturbance, blurred vision, and speech disturbance.  The patient states over the last week or 2 she has been having increasing fatigue, difficulty with ambulation, difficulty with speech,  extremities.  Hyperesthesia to light touch in left upper and left lower extremity.    Data: (All radiographs, tracings, PFTs, and imaging are personally viewed and interpreted unless otherwise noted)  Labs:   Recent Labs     07/18/24  1040   WBC 7.0   HGB 13.6   HCT 42.7        Recent Labs     07/18/24  1207      K 4.0      CO2 21*   BUN 14   CREATININE 0.6   CALCIUM 10.1     Urinalysis:   Lab Results   Component Value Date/Time    NITRU NEGATIVE 11/07/2023 03:54 PM    WBCUA 0-2 11/07/2023 03:54 PM    BACTERIA NONE 11/07/2023 03:54 PM    RBCUA NONE 11/07/2023 03:54 PM    BLOODU NEGATIVE 11/07/2023 03:54 PM    GLUCOSEU NEGATIVE 11/07/2023 03:54 PM    GLUCOSEU NEGATIVE 09/01/2022 05:45 PM       EKG: Atrial paced rhythm with right bundle branch block    Radiology:  CT HEAD WO CONTRAST    Result Date: 7/18/2024  PROCEDURE: CT HEAD WO CONTRAST CLINICAL INFORMATION: LUE and LLE decreased sensation; Peripheral vision loss B/L; h/o MS. COMPARISON: MRI scan of the brain dated 2/19/2024.. TECHNIQUE: Noncontrast 5 mm axial images were obtained through the brain. Sagittal and coronal reconstructions were obtained. All CT scans at this facility use dose modulation, iterative reconstruction, and/or weight-based dosing when appropriate to reduce radiation dose to as low as reasonably achievable. FINDINGS: There is mild atrophy. There is diminished attenuation in the white matter. This may represent involvement by demyelinating disease, unchanged since previous MRI scan dated 2/19/2024. There is no hemorrhage. There are no intra-or extra-axial collections.  There is no hydrocephalus, midline shift or mass effect.  .  The paranasal sinuses and mastoid air cells are normally aerated.  There is no suspicious calvarial abnormality.      1. Stable CT scan of the brain, no interval change since previous MRI scan of the brain dated 2/19/2024.. **This report has been created using voice recognition software. It may

## 2024-07-19 ENCOUNTER — APPOINTMENT (OUTPATIENT)
Dept: MRI IMAGING | Age: 64
End: 2024-07-19
Payer: MEDICARE

## 2024-07-19 PROBLEM — K21.9 GASTROESOPHAGEAL REFLUX DISEASE: Status: ACTIVE | Noted: 2024-07-19

## 2024-07-19 PROBLEM — H53.9 VISION DISTURBANCE: Status: ACTIVE | Noted: 2024-07-19

## 2024-07-19 PROBLEM — S10.93XA HEMATOMA OF NECK: Status: ACTIVE | Noted: 2024-07-19

## 2024-07-19 PROBLEM — Z87.898 HISTORY OF BRADYCARDIA: Status: ACTIVE | Noted: 2024-07-19

## 2024-07-19 PROBLEM — G47.33 OSA (OBSTRUCTIVE SLEEP APNEA): Status: ACTIVE | Noted: 2024-07-19

## 2024-07-19 PROBLEM — R19.7 DIARRHEA: Status: ACTIVE | Noted: 2024-07-19

## 2024-07-19 LAB
ANION GAP SERPL CALC-SCNC: 7 MEQ/L (ref 8–16)
BASOPHILS ABSOLUTE: 0 THOU/MM3 (ref 0–0.1)
BASOPHILS NFR BLD AUTO: 0.1 %
BUN SERPL-MCNC: 13 MG/DL (ref 7–22)
CALCIUM SERPL-MCNC: 9.8 MG/DL (ref 8.5–10.5)
CHLORIDE SERPL-SCNC: 109 MEQ/L (ref 98–111)
CO2 SERPL-SCNC: 22 MEQ/L (ref 23–33)
CREAT SERPL-MCNC: 0.6 MG/DL (ref 0.4–1.2)
DEPRECATED RDW RBC AUTO: 46.9 FL (ref 35–45)
EOSINOPHIL NFR BLD AUTO: 0 %
EOSINOPHILS ABSOLUTE: 0 THOU/MM3 (ref 0–0.4)
ERYTHROCYTE [DISTWIDTH] IN BLOOD BY AUTOMATED COUNT: 12.4 % (ref 11.5–14.5)
GFR SERPL CREATININE-BSD FRML MDRD: > 90 ML/MIN/1.73M2
GLUCOSE SERPL-MCNC: 152 MG/DL (ref 70–108)
HCT VFR BLD AUTO: 40.3 % (ref 37–47)
HGB BLD-MCNC: 12.9 GM/DL (ref 12–16)
IMM GRANULOCYTES # BLD AUTO: 0.04 THOU/MM3 (ref 0–0.07)
IMM GRANULOCYTES NFR BLD AUTO: 0.5 %
LYMPHOCYTES ABSOLUTE: 0.4 THOU/MM3 (ref 1–4.8)
LYMPHOCYTES NFR BLD AUTO: 4.6 %
MCH RBC QN AUTO: 32.8 PG (ref 26–33)
MCHC RBC AUTO-ENTMCNC: 32 GM/DL (ref 32.2–35.5)
MCV RBC AUTO: 102.5 FL (ref 81–99)
MONOCYTES ABSOLUTE: 0 THOU/MM3 (ref 0.4–1.3)
MONOCYTES NFR BLD AUTO: 0.5 %
NEUTROPHILS ABSOLUTE: 7.4 THOU/MM3 (ref 1.8–7.7)
NEUTROPHILS NFR BLD AUTO: 94.3 %
NRBC BLD AUTO-RTO: 0 /100 WBC
OSMOLALITY SERPL CALC.SUM OF ELEC: 278.8 MOSMOL/KG (ref 275–300)
PLATELET # BLD AUTO: 198 THOU/MM3 (ref 130–400)
PMV BLD AUTO: 10.2 FL (ref 9.4–12.4)
POTASSIUM SERPL-SCNC: 4 MEQ/L (ref 3.5–5.2)
RBC # BLD AUTO: 3.93 MILL/MM3 (ref 4.2–5.4)
SODIUM SERPL-SCNC: 138 MEQ/L (ref 135–145)
WBC # BLD AUTO: 7.9 THOU/MM3 (ref 4.8–10.8)

## 2024-07-19 PROCEDURE — 97166 OT EVAL MOD COMPLEX 45 MIN: CPT

## 2024-07-19 PROCEDURE — 6370000000 HC RX 637 (ALT 250 FOR IP): Performed by: PHYSICIAN ASSISTANT

## 2024-07-19 PROCEDURE — 2580000003 HC RX 258

## 2024-07-19 PROCEDURE — 80048 BASIC METABOLIC PNL TOTAL CA: CPT

## 2024-07-19 PROCEDURE — 97162 PT EVAL MOD COMPLEX 30 MIN: CPT

## 2024-07-19 PROCEDURE — 6360000002 HC RX W HCPCS

## 2024-07-19 PROCEDURE — 97535 SELF CARE MNGMENT TRAINING: CPT

## 2024-07-19 PROCEDURE — 36556 INSERT NON-TUNNEL CV CATH: CPT

## 2024-07-19 PROCEDURE — 97116 GAIT TRAINING THERAPY: CPT

## 2024-07-19 PROCEDURE — 99232 SBSQ HOSP IP/OBS MODERATE 35: CPT

## 2024-07-19 PROCEDURE — 72156 MRI NECK SPINE W/O & W/DYE: CPT

## 2024-07-19 PROCEDURE — 1200000000 HC SEMI PRIVATE

## 2024-07-19 PROCEDURE — 1200000003 HC TELEMETRY R&B

## 2024-07-19 PROCEDURE — 6360000004 HC RX CONTRAST MEDICATION

## 2024-07-19 PROCEDURE — 99232 SBSQ HOSP IP/OBS MODERATE 35: CPT | Performed by: FAMILY MEDICINE

## 2024-07-19 PROCEDURE — 2580000003 HC RX 258: Performed by: PHYSICIAN ASSISTANT

## 2024-07-19 PROCEDURE — 76937 US GUIDE VASCULAR ACCESS: CPT

## 2024-07-19 PROCEDURE — 85025 COMPLETE CBC W/AUTO DIFF WBC: CPT

## 2024-07-19 PROCEDURE — A9579 GAD-BASE MR CONTRAST NOS,1ML: HCPCS

## 2024-07-19 PROCEDURE — 70553 MRI BRAIN STEM W/O & W/DYE: CPT

## 2024-07-19 PROCEDURE — 02HV33Z INSERTION OF INFUSION DEVICE INTO SUPERIOR VENA CAVA, PERCUTANEOUS APPROACH: ICD-10-PCS | Performed by: STUDENT IN AN ORGANIZED HEALTH CARE EDUCATION/TRAINING PROGRAM

## 2024-07-19 PROCEDURE — 77001 FLUOROGUIDE FOR VEIN DEVICE: CPT

## 2024-07-19 PROCEDURE — 92523 SPEECH SOUND LANG COMPREHEN: CPT

## 2024-07-19 PROCEDURE — 36415 COLL VENOUS BLD VENIPUNCTURE: CPT

## 2024-07-19 RX ADMIN — SODIUM CHLORIDE, PRESERVATIVE FREE 10 ML: 5 INJECTION INTRAVENOUS at 21:00

## 2024-07-19 RX ADMIN — CALCIUM 500 MG: 500 TABLET ORAL at 21:01

## 2024-07-19 RX ADMIN — VILAZODONE HYDROCHLORIDE 40 MG: 40 TABLET, FILM COATED ORAL at 09:00

## 2024-07-19 RX ADMIN — LAMOTRIGINE 200 MG: 100 TABLET ORAL at 09:00

## 2024-07-19 RX ADMIN — CALCIUM 500 MG: 500 TABLET ORAL at 00:03

## 2024-07-19 RX ADMIN — LITHIUM CARBONATE 450 MG: 150 CAPSULE, GELATIN COATED ORAL at 09:00

## 2024-07-19 RX ADMIN — LITHIUM CARBONATE 450 MG: 150 CAPSULE, GELATIN COATED ORAL at 21:02

## 2024-07-19 RX ADMIN — SUCRALFATE 1 G: 1 TABLET ORAL at 16:54

## 2024-07-19 RX ADMIN — AMANTADINE HYDROCHLORIDE 100 MG: 100 CAPSULE, GELATIN COATED ORAL at 09:00

## 2024-07-19 RX ADMIN — SODIUM CHLORIDE, PRESERVATIVE FREE 10 ML: 5 INJECTION INTRAVENOUS at 09:00

## 2024-07-19 RX ADMIN — LURASIDONE HYDROCHLORIDE 40 MG: 40 TABLET, FILM COATED ORAL at 21:02

## 2024-07-19 RX ADMIN — PANTOPRAZOLE SODIUM 40 MG: 40 TABLET, DELAYED RELEASE ORAL at 06:00

## 2024-07-19 RX ADMIN — SUCRALFATE 1 G: 1 TABLET ORAL at 11:00

## 2024-07-19 RX ADMIN — LURASIDONE HYDROCHLORIDE 40 MG: 40 TABLET, FILM COATED ORAL at 00:03

## 2024-07-19 RX ADMIN — SUCRALFATE 1 G: 1 TABLET ORAL at 06:00

## 2024-07-19 RX ADMIN — PANTOPRAZOLE SODIUM 40 MG: 40 TABLET, DELAYED RELEASE ORAL at 16:55

## 2024-07-19 RX ADMIN — SUCRALFATE 1 G: 1 TABLET ORAL at 21:03

## 2024-07-19 RX ADMIN — LITHIUM CARBONATE 450 MG: 150 CAPSULE, GELATIN COATED ORAL at 00:07

## 2024-07-19 RX ADMIN — LEVOTHYROXINE SODIUM 100 MCG: 0.1 TABLET ORAL at 06:00

## 2024-07-19 RX ADMIN — SUCRALFATE 1 G: 1 TABLET ORAL at 00:03

## 2024-07-19 RX ADMIN — METHYLPREDNISOLONE SODIUM SUCCINATE 1000 MG: 1 INJECTION INTRAMUSCULAR; INTRAVENOUS at 11:02

## 2024-07-19 RX ADMIN — GADOTERIDOL 10 ML: 279.3 INJECTION, SOLUTION INTRAVENOUS at 16:05

## 2024-07-19 ASSESSMENT — PAIN SCALES - GENERAL: PAINLEVEL_OUTOF10: 0

## 2024-07-19 NOTE — ED NOTES
ED to inpatient nurses report      Chief Complaint:  Chief Complaint   Patient presents with    Fatigue    Dizziness    Diplopia     Present to ED from: home    MOA:     LOC: alert and orientated to name, place, date  Mobility: Independent - has a cane and doesn't use it per sister  Oxygen Baseline: room air    Current needs required: room air     Code Status:   Full Code    What abnormal results were found and what did you give/do to treat them?   Any procedures or intervention occur?     Mental Status:  Level of Consciousness: Alert (0)    Psych Assessment:        Vitals:  Patient Vitals for the past 24 hrs:   BP Temp Pulse Resp SpO2 Weight   07/18/24 2004 (!) 180/61 -- 60 15 97 % --   07/18/24 1903 131/62 -- 60 21 99 % --   07/18/24 1757 128/79 -- 65 16 99 % --   07/18/24 1626 127/80 -- 60 21 98 % --   07/18/24 1532 131/81 -- 60 17 98 % --   07/18/24 1513 131/81 -- 60 17 100 % --   07/18/24 1353 120/73 -- 60 15 99 % --   07/18/24 1215 118/73 -- 60 16 97 % --   07/18/24 1114 108/67 -- 60 13 95 % --   07/18/24 1035 119/72 98 °F (36.7 °C) 73 14 -- 55.8 kg (123 lb)        LDAs:      Ambulatory Status:  Presents to emergency department  because of falls (Syncope, seizure, or loss of consciousness): No, Age > 70: No, Altered Mental Status, Intoxication with alcohol or substance confusion (Disorientation, impaired judgment, poor safety awaremess, or inability to follow instructions): No, Impaired Mobility: Ambulates or transfers with assistive devices or assistance; Unable to ambulate or transer.: Yes    Diagnosis:  DISPOSITION Admitted 07/18/2024 04:22:07 PM   Final diagnoses:   At high risk for falls   Unsteady gait        Consults:  IP CONSULT TO NEUROLOGY     Pain Score:  Pain Assessment  Pain Assessment: None - Denies Pain    C-SSRS:   Risk of Suicide: No Risk    Sepsis Screening:       Nestor Fall Risk:  Logan 1 Fall Risk  Presents to emergency department  because of falls (Syncope, seizure, or loss of  OFFICE/OUTPT VISIT,PROCEDURE ONLY N/A 10/04/2018    EGD DIAGNOSTIC ONLY performed by Sydney Frances MD at [facility]    SOPHIE-EN-Y GASTRIC BYPASS  2000    UPPER GASTROINTESTINAL ENDOSCOPY Left 09/24/2019    EGD DIAGNOSTIC ONLY performed by Lo Sandra MD at [facility]    UPPER GASTROINTESTINAL ENDOSCOPY N/A 09/26/2020    ENTEROSCOPY PUSH CONTROL HEMORRHAGE performed by David Jansen MD at [facility]    UPPER GASTROINTESTINAL ENDOSCOPY N/A 01/16/2022    ENTEROSCOPY PUSH DIAGNOSTIC performed by Shea Pedro MD at [facility] OR    UPPER GASTROINTESTINAL ENDOSCOPY  04/13/2022    US THYROID BIOPSY  01/2024       PAST MEDICAL HISTORY       Past Medical History:   Diagnosis Date    Balance problem     2/2 MS    Bipolar I disorder, most recent episode (or current) depressed, in partial or unspecified remission 05/08/2013    Depression     Gallstones     Gastric bypass status for obesity 2002    GERD (gastroesophageal reflux disease)     History of hysterectomy     REMOVAL OF ONE OVARY     Hx of cervical cancer     s/p hysterectomy    Hypothyroidism     Liver cyst     GI Dr [address]    Liver lesion     MS (multiple sclerosis) (HCC)     Paresthesias     RT LOWER LIMB    Ulcer at site of surgical anastomosis following bypass of stomach 01/15/2022    UTI (urinary tract infection)            Electronically signed by Marcia Gonzales RN on 7/18/2024 at 8:09 PM

## 2024-07-19 NOTE — PROGRESS NOTES
[facility]  INPATIENT OCCUPATIONAL THERAPY  STRZ CCU 3A  EVALUATION    Time:    Time In: 755  Time Out: 0841  Timed Code Treatment Minutes: 36 Minutes  Minutes: 46          Date: 2024  Patient Name: Haily Parker,   Gender: female      MRN: [id number]  : 1960  (64 y.o.)  Referring Practitioner: Vikas Hernandez PA-C  Diagnosis: speech disturbance  Additional Pertinent Hx: Per ER note on 2024:64 y.o. female with PMH significant for MS (last flareup in ) with known balance problem, hypothyroidism who presents to the emergency department from home, by private vehicle for evaluation of nausea, difficulty with peripheral vision, speech issues, and difficulty ambulating since  (LNW).  States that she has had a hard time seeing anything in front of her and at times has had double vision.  Associates this visual disturbance with her difficulty to ambulate - veers to the left when attempting to walk straight.  No overt numbness or tingling, however does report LUE and LLE decreased sensation.    Restrictions/Precautions:  Restrictions/Precautions: Fall Risk  Position Activity Restriction  Other position/activity restrictions: RUE prothesis    Subjective  Chart Reviewed: Yes, Orders, Progress Notes, History and Physical  Patient assessed for rehabilitation services?: Yes  Family / Caregiver Present: Yes (sister)    Subjective: cooperative, \"I'm ready to get out of here\"    Pain: 0/10: no c/o pain during session    Vitals: Blood Pressure: 126/69  Oxygen: 99% on RA  Heart Rate: 60    Social/Functional History:  Lives With: Alone  Type of Home: Apartment  Home Layout: One level  Home Access: Elevator (to 2nd level)  Home Equipment: Cane   Bathroom Shower/Tub: Tub/Shower unit  Bathroom Toilet: Standard  Bathroom Equipment: Shower chair, Grab bars in shower       ADL Assistance: Independent  Homemaking Assistance: Independent  Ambulation Assistance: Independent  Transfer Assistance:

## 2024-07-19 NOTE — PROGRESS NOTES
[facility]  INPATIENT PHYSICAL THERAPY  EVALUATION  [facility] CCU 3A - 3A-05/005-A    Time In: 1115  Time Out: 1144  Timed Code Treatment Minutes: 20 Minutes  Minutes: 29          Date: 2024  Patient Name: Haily Parker,  Gender:  female        MRN: [id number]  : 1960  (64 y.o.)      Referring Practitioner: Vikas Hernandez PA-C  Diagnosis: Speech disturbance  Additional Pertinent Hx: Per EMR \"64 y.o. female with PMH significant for MS (last flareup in ) with known balance problem, hypothyroidism who presents to the emergency department from home, by private vehicle for evaluation of nausea, difficulty with peripheral vision, speech issues, and difficulty ambulating since  (LNW).  States that she has had a hard time seeing anything in front of her and at times has had double vision.  Associates this visual disturbance with her difficulty to ambulate - veers to the left when attempting to walk straight.  No overt numbness or tingling, however does report LUE and LLE decreased sensation. \" Per imaging \"CT Head WO Contrast 1. Stable CT scan of the brain, no interval change since previous MRI scan of   the brain dated 2024.\"     Restrictions/Precautions:  Restrictions/Precautions: Fall Risk, Contact Precautions  Position Activity Restriction  Other position/activity restrictions: RUE prothesis, hx of MS    Subjective:  Chart Reviewed: Yes  Patient assessed for rehabilitation services?: Yes  Family / Caregiver Present: Yes  Subjective: Pt OK to see per nursing. Pt in chair upon arrival with 2 sisters present. Pt agreeable and cooperative with therapy. Sister's questioning discharge plans and educated on discharge options. Family does not feel that pt is safe to go home alone right now and therapy in agreeance.    General:  Overall Orientation Status: Within Functional Limits  Orientation Level: Oriented to place, Oriented to time, Oriented to situation, Disoriented to  funcional independence and progress to PLOF  Therapy Prognosis: Good    Requires PT Follow-Up: Yes    Discharge Recommendations:  Discharge Recommendations: Continue to assess pending progress, IP Rehab    Patient Education:      .    Patient Education  Education Given To: Patient, Family  Education Provided: Role of Therapy, Transfer Training, Equipment, Plan of Care, Family Education  Education Method: Verbal  Barriers to Learning: None  Education Outcome: Verbalized understanding, Demonstrated understanding, Continued education needed     *Family educated on discharge plans and more therapy recommended     Equipment Recommendations:  Equipment Needed: No    Plan:  Current Treatment Recommendations: Strengthening, Balance training, Functional mobility training, Transfer training, Gait training, Neuromuscular re-education, Endurance training, Home exercise program, Equipment evaluation, education, & procurement, Patient/Caregiver education & training, Safety education & training, Therapeutic activities  General Plan:  (5-6X N)    Goals:  Patient Goals : \"To get stronger and go home\"  Short Term Goals  Time Frame for Short Term Goals: by discharge  Short Term Goal 1: Pt will demo bed mobility modified independently to inc functional independence  Short Term Goal 2: Pt will demo sit to/from stand transfer with LRAD to inc funcitonal independence  Short Term Goal 3: Pt will amb 100 ft with LRAD modified independently to inc functional independence  Short Term Goal 4: Pt will demo functional outcomes assessment  Long Term Goals  Time Frame for Long Term Goals : NA due to short ELOS    Following session, patient left in safe position with all fall risk precautions in place.    Claudia Aranda , licensed Therapist was present and directly responsible for all treatments provided by, William Sánchez, CARRIE.

## 2024-07-19 NOTE — PLAN OF CARE
Problem: Discharge Planning  Goal: Discharge to home or other facility with appropriate resources  7/19/2024 0857 by Aniyah Guadarrama, RN  Outcome: Progressing  7/19/2024 0249 by Sandie Lorenzo RN  Outcome: Progressing     Problem: Safety - Adult  Goal: Free from fall injury  7/19/2024 0857 by Aniyah Guadarrama RN  Outcome: Progressing  7/19/2024 0249 by Sandie Lorenzo RN  Outcome: Progressing  Flowsheets (Taken 7/19/2024 0249)  Free From Fall Injury: Instruct family/caregiver on patient safety

## 2024-07-19 NOTE — ED PROVIDER NOTES
Transfer of Care Note:   Physician Signing out: Paul Renteria MD  Receiving Physician: Mali Pham MD  Sign out time: 1630      Brief history:  Patient with history of multiple sclerosis presents with diplopia weakness multiple extremities.  Patient to be admitted pending PICC line placement due to inability to place IV access    Items pending that need to be checked:  PICC line placement and admission      Tentative Impression of patient:  Multiple sclerosis exacerbation    Expected disposition of patient:  Pending results, admitted.        Additional Assessment and results:   I have personally performed a face to face diagnostic evaluation on this patient. The patient's initial evaluation and plan have been discussed with the prior physician who initially evaluated the patient. Nursing Notes, Past Medical Hx, Past Surgical Hx, Social Hx, Allergies, vital signs and Family Hx were all reviewed.      Vitals:    07/18/24 2200   BP: 126/61   Pulse: 62   Resp: 18   Temp:    SpO2:      Physical Exam      Labs Reviewed   CBC WITH AUTO DIFFERENTIAL - Abnormal; Notable for the following components:       Result Value    RBC 4.07 (*)     .9 (*)     MCH 33.4 (*)     MCHC 31.9 (*)     RDW-SD 49.1 (*)     Lymphocytes Absolute 0.7 (*)     All other components within normal limits   D-DIMER, QUANTITATIVE - Abnormal; Notable for the following components:    D-Dimer, Quant 840.00 (*)     All other components within normal limits   COMPREHENSIVE METABOLIC PANEL - Abnormal; Notable for the following components:    CO2 21 (*)     Alkaline Phosphatase 156 (*)     All other components within normal limits   TROPONIN - Abnormal; Notable for the following components:    Troponin, High Sensitivity 13 (*)     All other components within normal limits   BRAIN NATRIURETIC PEPTIDE - Abnormal; Notable for the following components:    Pro-.4 (*)     All other components within normal limits   ANION GAP - Abnormal; Notable for  time range)   levothyroxine (SYNTHROID) tablet 100 mcg (has no administration in time range)   lithium tablet 450 mg (has no administration in time range)   lurasidone (LATUDA) tablet 40 mg (has no administration in time range)   pantoprazole (PROTONIX) tablet 40 mg (has no administration in time range)   vilazodone HCl (VIIBRYD) TABS 40 mg (has no administration in time range)   sucralfate (CARAFATE) tablet 1 g (has no administration in time range)   0.9 % sodium chloride infusion ( IntraVENous New Bag 7/18/24 2127)   methylPREDNISolone sodium (SOLU-MEDROL) 1,000 mg in sodium chloride 0.9 % 250 mL IVPB (0 mg IntraVENous Stopped 7/18/24 2114)   iopamidol (ISOVUE-370) 76 % injection 80 mL (80 mLs IntraVENous Given 7/18/24 1310)         CTA HEAD W WO CONTRAST   Final Result      1. Large hematoma in the right neck soft tissues. This is tracking in the   subcutaneous tissues. There is no evidence of arterial extravasation of   contrast.   2. The right-sided PICC line enters in the expected location of the right IJ and   right subclavian vein junction. Those veins are not opacified with contrast. The   catheter has a normal course in the SVC.   3. No evidence of arterial injury.   4. Irregularity of the internal carotid arteries and vertebral arteries in their   mid segments. This can be related to contrast bolus timing. This can also be   associated with fibromuscular dysplasia. There is no associated stenosis.   5. No stenosis or irregularity of either carotid bulb.   6. Normal CTA of the head.               **This report has been created using voice recognition software. It may contain   minor errors which are inherent in voice recognition technology.**      Electronically signed by Dr. Jannet Jaramillo      CTA NECK W WO CONTRAST   Final Result      1. Large hematoma in the right neck soft tissues. This is tracking in the   subcutaneous tissues. There is no evidence of arterial extravasation of   contrast.   2. The

## 2024-07-19 NOTE — CARE COORDINATION
Case Management Assessment Initial Evaluation    Date/Time of Evaluation: 2024 12:04 PM  Assessment Completed by: Ana Maria Augustin RN    If patient is discharged prior to next notation, then this note serves as note for discharge by case management.    Patient Name: Haily Parker                   YOB: 1960  Diagnosis: Speech disturbance [R47.9]  Unsteady gait [R26.81]  At high risk for falls [Z91.81]                   Date / Time: 2024 10:25 AM  Location: [address] [place] [address]     Patient Admission Status: Inpatient   Readmission Risk Low 0-14, Mod 15-19), High > 20: Readmission Risk Score: 14.3    Current PCP: Luciana Parr,     Additional Case Management Notes: From ED, telemetry, PT/OT/SLP, Neurology consult, IV fluids, IV Solumedrol, electrolyte replacement protocols.     Procedures:    PICC line placement     Imagin/18 CT Head WO Contrast 1. Stable CT scan of the brain, no interval change since previous MRI scan of   the brain dated 2024..      CTA Head Neck W WO Contrast 1. Large hematoma in the right neck soft tissues. This is tracking in the   subcutaneous tissues. There is no evidence of arterial extravasation of   contrast.   2. The right-sided PICC line enters in the expected location of the right IJ and   right subclavian vein junction. Those veins are not opacified with contrast. The   catheter has a normal course in the SVC.   3. No evidence of arterial injury.   4. Irregularity of the internal carotid arteries and vertebral arteries in their   mid segments. This can be related to contrast bolus timing. This can also be   associated with fibromuscular dysplasia. There is no associated stenosis.   5. No stenosis or irregularity of either carotid bulb.   6. Normal CTA of the head.       Patient Goals/Plan/Treatment Preferences: Met with Haily. She currently lives at home alone. She has support from sisters. She has DME as listed. Plan is to return home  at discharge. She denies need for DME and declines HH. Will follow. Has a CPAP from [facility].        07/19/24 1132   Service Assessment   Patient Orientation Alert and Oriented   Cognition Alert   History Provided By Patient   Primary Caregiver Self   Support Systems Family Members   Patient's Healthcare Decision Maker is: Named in Scanned ACP Document   PCP Verified by CM Yes   Last Visit to PCP Within last 6 months   Prior Functional Level Independent in ADLs/IADLs   Current Functional Level Independent in ADLs/IADLs   Can patient return to prior living arrangement Yes   Ability to make needs known: Good   Family able to assist with home care needs: Yes   Would you like for me to discuss the discharge plan with any other family members/significant others, and if so, who? No   Financial Resources Medicare   Community Resources None   Social/Functional History   Lives With Alone   Type of Home Apartment   Home Layout One level   Home Access Elevator  (2nd floor apartment)   Bathroom Shower/Tub Tub/Shower unit   Bathroom Toilet Standard   Bathroom Equipment Shower chair;Grab bars in shower   Home Equipment Cane - Quad   ADL Assistance Independent   Homemaking Assistance Independent   Ambulation Assistance Independent   Transfer Assistance Independent   Active  No   Mode of Transportation Bus   Discharge Planning   Type of Residence Apartment   Living Arrangements Alone   Current Services Prior To Admission None   Potential Assistance Needed N/A   DME Ordered? No   Potential Assistance Purchasing Medications No   Type of Home Care Services None   Patient expects to be discharged to: Apartment   Services At/After Discharge   Confirm Follow Up Transport Self   Condition of Participation: Discharge Planning   The Plan for Transition of Care is related to the following treatment goals: to get better

## 2024-07-19 NOTE — PROGRESS NOTES
Neurology Progress Note    Date:7/19/2024       Room:[facility]005-A  Patient Name:Haily Parker     YOB: 1960     Age:64 y.o.    Requesting Physician: Yasmine Eng MD     Reason for Consult:  Evaluate for history of multiple sclerosis, new neurologic deficits      Chief Complaint:   Chief Complaint   Patient presents with    Fatigue    Dizziness    Diplopia       Subjective     Haily Parker is a 64 y.o. female with a history of multiple sclerosis, bipolar disorder, depression, GERD, cervical cancer s/p hysterectomy, hypothyroidism, bradycardia s/p PPM, congenital absence of right upper extremity who presented to [facility] ED for the evaluation of fatigue, dysarthria, blurry vision, and gait instability. Patient states that last weekend she began to experience the symptoms and states it has progressively gotten worse. Patient does have a history of multiple sclerosis and follows with outpatient neurology Dr. Gaines. She was previously on Tecfidera but has since discontinued as she experienced undesirable side effects. She last saw Dr. Gaines in May of this year and was started on Amantadine 100mg daily. She states that she is having diplopia when both eyes are open and when one eye is covered it appears blurry. She states that she is having gait instability when she ambulates and that she tends to lean to the left side. She states that her last multiple sclerosis flare was in 1998 and describes similar symptoms during that time. She denies any recent illness, fever, chills, cough, vomiting, headaches, dizziness.     7/19/24 Interval History:   No acute events overnight. She explained her right eye is still a little blurry with her vision being at 90% back to normal. She denies issues with her left eye. She denies diplopia. Her speech is back to her baseline per her sisters at bedside. She explained she is generally weak. Upon chart review, it appears her Tecfidera was stopped in Feb 2023 per  hemorrhage. There are no intra-or extra-axial collections.  There is no hydrocephalus, midline shift or mass effect.  .  The paranasal sinuses and mastoid air cells are normally aerated.  There is no suspicious calvarial abnormality.      1. Stable CT scan of the brain, no interval change since previous MRI scan of the brain dated 2/19/2024.. **This report has been created using voice recognition software. It may contain minor errors which are inherent in voice recognition technology.** Electronically signed by Dr. John Hdez    Vascular duplex lower extremity venous left    Result Date: 7/18/2024  PROCEDURE: VAS DUP LOWER EXTREMITY VENOUS LEFT CLINICAL INFORMATION: Edema left calf. COMPARISON: No prior study. TECHNIQUE: Venous doppler ultrasound was performed of the left lower extremity using gray scale, color flow and spectral doppler imaging. FINDINGS: There is normal color flow, spectral analysis and compressibility of the left common femoral vein, femoral vein and popliteal vein. There is normal color flow and compressibility in the left posterior tibial veins, anterior tibial veins and peroneal veins. There is normal color flow, spectral analysis and compressibility in the right common femoral vein.     1 no evidence of a DVT in the left lower extremity. **This report has been created using voice recognition software. It may contain minor errors which are inherent in voice recognition technology.** Electronically signed by Dr. John Hdez        Assessment and Plan:        Multiple Sclerosis - no active lesions noted on MRI   Generalized weakness     MRI brain W WO contrast revealed stable white matter lesions in the brain. No evidence of active demyelination.   MRI C spine W WO contrast revealed no evidence of white matter lesions in the cervical spinal cord. Moderate severity spinal canal stenosis at the C4-5 level.  Moderate spinal canal stenosis per primary team   Begin solu-medrol 1g daily x 3 days.

## 2024-07-19 NOTE — ED NOTES
Pt medicated per MAR. Pt in bed, eyes open, respirations even and unlabored. Vital signs reassessed, no needs voiced. Pt updated on POC.

## 2024-07-19 NOTE — PROGRESS NOTES
[facility]  SPEECH THERAPY  STRZ CCU 3A  Speech - Language - Cognitive Evaluation    SLP Individual Minutes  Time In: 1350  Time Out: 1413  Minutes: 23  Timed Code Treatment Minutes: 0 Minutes       Date: 2024  Patient Name: Haily Parker      CSN: [id number]   : 1960  (64 y.o.)  Gender: female   Referring Physician:  Vikas Hernandez PA-C   Diagnosis: Speech disturbance   Precautions: Fall Risk   History of Present Illness/Injury: Patient admitted with above diagnosis. Per chart review, \"Haily Parker is a 64 y.o. female with a history of multiple sclerosis, bipolar 1 disorder, hypothyroidism, GERD, bradycardia status post PPM, Batsheva-en-Y gastric bypass, and congenital absence of right upper extremity who presented to [facility] with chief complaint of fatigue, gait disturbance, blurred vision, and speech disturbance.  The patient states over the last week or 2 she has been having increasing fatigue, difficulty with ambulation, difficulty with speech, and some minor weakness in the left upper and left lower extremities.  The patient does have a history of multiple sclerosis and is not currently on disease modifying therapy.  She reports over the last week or so she has noticed some mild weakness in the left upper and lower extremities, some difficulty with ambulation during which she tends to drift to the left, difficulty getting her words out, slurring of her speech, and increasing fatigue.  The patient denies any sensation deficits, although this was noted in prior ER documentation.  She also reports some lightheadedness.  She has been having diarrhea for the last couple of weeks and reports she continues to have 1-2 loose bowel movements per day.  She denies any fevers, chills, cough, or vomiting.  She does report some nausea.  Her symptoms did not come on suddenly.  She has no history of ischemic stroke.  She denies any headaches.  She does not smoke or drink alcohol.\"     Past

## 2024-07-19 NOTE — ED NOTES
Pt to be transferred to [facility]. Pt in stable condition at this time. Spoke with 3A staff prior to transport.

## 2024-07-19 NOTE — PROGRESS NOTES
PROGRESS NOTE      Patient:  Haily Parker  Unit/Bed:3A-05/005-A  YOB: 1960  MRN: [id number]   Acct: [id number]    PCP: Luciana Parr DO    Date of Admission: 7/18/2024 LOS: 1    Date of Evaluation:  7/19/2024    Anticipated Discharge: pending clinical course    Assessment/Plan:    Speech disturbance, gait disturbance, blurred vision, fatigue likely 2/2 ?MS flair up  Covid and influenza negative   UA unremarkable  TSH 1.080  Trop 13  EKG 7/18/24: Atrial paced with RBBB  EKG 9/1/22: Atrial paced with RBBB   NIH 2 on admission   CT Head 7/18: stable, no changes when compared to scan from 2/19/24  CTA Head and Neck 7/18: large hematoma in the right neck soft tissue, Irregularity of the internal carotid arteries and vertebral arteries . Related to contrast vs fibromuscular dysplasia   Neurology consulted and following.  PT/OT  SLP recommends Outpatient Speech Therapy vs. Inpatient Rehabilitation vs. Home Health . Regular diet with thin liquids    Multiple Sclerosis   Home medications include amantadine , will continue for fatigue   Neuro following and would like MRI Brain and MRI C spine . IV steroids (7/18-7/20). 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   MRI brain and MRI C Spine ordered, will follow up   Given pt has a pacer, will get cardiac to clear pacemaker prior to proceeding with MRI. Nurse is to fax form to cardiac office to have them fill it out.   Follow OP with Dr. Gaines . Not on any disease modifying therapy     Diarrhea , improving   Started on IVF in the ED (7/18-7/19)  Pt tolerating normal diet, will stop IVF at this time. (7/19)  GI panel pending   Cont Regular diet per speech   Has had 1 BM since here   No white count, no use of Abx recently, likely not infectious but will follow up GI panel    Right neck hematoma 2/2 right IJ PICC placement   CTA Head and Neck 7/18: large hematoma in the right neck soft tissue  Hgb stable today 7/19 at 12.9 compared to  Rhythm: Normal rate and regular rhythm.   Pulmonary:      Effort: Pulmonary effort is normal. No respiratory distress.      Breath sounds: Normal breath sounds.   Abdominal:      Palpations: Abdomen is soft.      Tenderness: There is no abdominal tenderness.   Musculoskeletal:         General: No tenderness.      Comments: Absence of RUE , b/l swelling of b/l le no pitting edema   Skin:     General: Skin is warm.      Findings: No lesion.   Neurological:      Mental Status: She is alert and oriented to person, place, and time.   Psychiatric:         Mood and Affect: Mood normal.        All labs reviewed and interpreted by me:  Labs:   Recent Labs     07/18/24  1040 07/19/24  0435   WBC 7.0 7.9   HGB 13.6 12.9   HCT 42.7 40.3    198     Recent Labs     07/18/24  1207 07/19/24  0435    138   K 4.0 4.0    109   CO2 21* 22*   BUN 14 13   CREATININE 0.6 0.6   CALCIUM 10.1 9.8     Recent Labs     07/18/24  1207   AST 24   ALT 57   BILITOT 0.3   ALKPHOS 156*     No results for input(s): \"INR\" in the last 72 hours.  No results for input(s): \"CKTOTAL\", \"TROPONINT\" in the last 72 hours.    Urinalysis:      Lab Results   Component Value Date/Time    NITRU NEGATIVE 07/18/2024 10:35 PM    WBCUA 0-2 11/07/2023 03:54 PM    BACTERIA NONE 11/07/2023 03:54 PM    RBCUA NONE 11/07/2023 03:54 PM    BLOODU NEGATIVE 07/18/2024 10:35 PM    GLUCOSEU 100 07/18/2024 10:35 PM       All radiology images and reports reviewed and interpreted by me:  Radiology:  CTA HEAD W WO CONTRAST   Final Result      1. Large hematoma in the right neck soft tissues. This is tracking in the   subcutaneous tissues. There is no evidence of arterial extravasation of   contrast.   2. The right-sided PICC line enters in the expected location of the right IJ and   right subclavian vein junction. Those veins are not opacified with contrast. The   catheter has a normal course in the SVC.   3. No evidence of arterial injury.   4. Irregularity of the

## 2024-07-20 PROBLEM — D72.829 LEUKOCYTOSIS: Status: ACTIVE | Noted: 2024-07-20

## 2024-07-20 LAB
ANION GAP SERPL CALC-SCNC: 9 MEQ/L (ref 8–16)
BASOPHILS ABSOLUTE: 0 THOU/MM3 (ref 0–0.1)
BASOPHILS NFR BLD AUTO: 0.1 %
BUN SERPL-MCNC: 18 MG/DL (ref 7–22)
CALCIUM SERPL-MCNC: 10.3 MG/DL (ref 8.5–10.5)
CHLORIDE SERPL-SCNC: 111 MEQ/L (ref 98–111)
CO2 SERPL-SCNC: 22 MEQ/L (ref 23–33)
CREAT SERPL-MCNC: 0.6 MG/DL (ref 0.4–1.2)
DEPRECATED RDW RBC AUTO: 47.4 FL (ref 35–45)
EOSINOPHIL NFR BLD AUTO: 0 %
EOSINOPHILS ABSOLUTE: 0 THOU/MM3 (ref 0–0.4)
ERYTHROCYTE [DISTWIDTH] IN BLOOD BY AUTOMATED COUNT: 12.7 % (ref 11.5–14.5)
GFR SERPL CREATININE-BSD FRML MDRD: > 90 ML/MIN/1.73M2
GLUCOSE SERPL-MCNC: 131 MG/DL (ref 70–108)
HCT VFR BLD AUTO: 39.3 % (ref 37–47)
HGB BLD-MCNC: 13 GM/DL (ref 12–16)
IMM GRANULOCYTES # BLD AUTO: 0.12 THOU/MM3 (ref 0–0.07)
IMM GRANULOCYTES NFR BLD AUTO: 0.8 %
LYMPHOCYTES ABSOLUTE: 0.7 THOU/MM3 (ref 1–4.8)
LYMPHOCYTES NFR BLD AUTO: 4.7 %
MCH RBC QN AUTO: 33.6 PG (ref 26–33)
MCHC RBC AUTO-ENTMCNC: 33.1 GM/DL (ref 32.2–35.5)
MCV RBC AUTO: 101.6 FL (ref 81–99)
MONOCYTES ABSOLUTE: 0.8 THOU/MM3 (ref 0.4–1.3)
MONOCYTES NFR BLD AUTO: 5 %
NEUTROPHILS ABSOLUTE: 13.7 THOU/MM3 (ref 1.8–7.7)
NEUTROPHILS NFR BLD AUTO: 89.4 %
NRBC BLD AUTO-RTO: 0 /100 WBC
PLATELET # BLD AUTO: 206 THOU/MM3 (ref 130–400)
PMV BLD AUTO: 10.2 FL (ref 9.4–12.4)
POTASSIUM SERPL-SCNC: 4.6 MEQ/L (ref 3.5–5.2)
RBC # BLD AUTO: 3.87 MILL/MM3 (ref 4.2–5.4)
SODIUM SERPL-SCNC: 142 MEQ/L (ref 135–145)
WBC # BLD AUTO: 15.3 THOU/MM3 (ref 4.8–10.8)

## 2024-07-20 PROCEDURE — 6370000000 HC RX 637 (ALT 250 FOR IP): Performed by: PHYSICIAN ASSISTANT

## 2024-07-20 PROCEDURE — 80048 BASIC METABOLIC PNL TOTAL CA: CPT

## 2024-07-20 PROCEDURE — 99232 SBSQ HOSP IP/OBS MODERATE 35: CPT | Performed by: FAMILY MEDICINE

## 2024-07-20 PROCEDURE — 85025 COMPLETE CBC W/AUTO DIFF WBC: CPT

## 2024-07-20 PROCEDURE — 1200000000 HC SEMI PRIVATE

## 2024-07-20 PROCEDURE — 2580000003 HC RX 258: Performed by: PHYSICIAN ASSISTANT

## 2024-07-20 PROCEDURE — 36415 COLL VENOUS BLD VENIPUNCTURE: CPT

## 2024-07-20 PROCEDURE — 2580000003 HC RX 258

## 2024-07-20 PROCEDURE — 1200000003 HC TELEMETRY R&B

## 2024-07-20 PROCEDURE — 6360000002 HC RX W HCPCS

## 2024-07-20 RX ADMIN — CALCIUM 500 MG: 500 TABLET ORAL at 21:34

## 2024-07-20 RX ADMIN — LITHIUM CARBONATE 450 MG: 150 CAPSULE, GELATIN COATED ORAL at 09:00

## 2024-07-20 RX ADMIN — METHYLPREDNISOLONE SODIUM SUCCINATE 1000 MG: 1 INJECTION INTRAMUSCULAR; INTRAVENOUS at 09:04

## 2024-07-20 RX ADMIN — LURASIDONE HYDROCHLORIDE 40 MG: 40 TABLET, FILM COATED ORAL at 21:34

## 2024-07-20 RX ADMIN — AMANTADINE HYDROCHLORIDE 100 MG: 100 CAPSULE, GELATIN COATED ORAL at 09:00

## 2024-07-20 RX ADMIN — SUCRALFATE 1 G: 1 TABLET ORAL at 17:07

## 2024-07-20 RX ADMIN — PANTOPRAZOLE SODIUM 40 MG: 40 TABLET, DELAYED RELEASE ORAL at 17:07

## 2024-07-20 RX ADMIN — SUCRALFATE 1 G: 1 TABLET ORAL at 21:34

## 2024-07-20 RX ADMIN — VILAZODONE HYDROCHLORIDE 40 MG: 40 TABLET, FILM COATED ORAL at 09:00

## 2024-07-20 RX ADMIN — LITHIUM CARBONATE 450 MG: 150 CAPSULE, GELATIN COATED ORAL at 21:34

## 2024-07-20 RX ADMIN — LEVOTHYROXINE SODIUM 100 MCG: 0.1 TABLET ORAL at 07:35

## 2024-07-20 RX ADMIN — SODIUM CHLORIDE, PRESERVATIVE FREE 10 ML: 5 INJECTION INTRAVENOUS at 09:01

## 2024-07-20 RX ADMIN — LAMOTRIGINE 200 MG: 100 TABLET ORAL at 09:01

## 2024-07-20 RX ADMIN — SODIUM CHLORIDE, PRESERVATIVE FREE 10 ML: 5 INJECTION INTRAVENOUS at 21:35

## 2024-07-20 RX ADMIN — PANTOPRAZOLE SODIUM 40 MG: 40 TABLET, DELAYED RELEASE ORAL at 07:35

## 2024-07-20 RX ADMIN — SUCRALFATE 1 G: 1 TABLET ORAL at 07:36

## 2024-07-20 RX ADMIN — SUCRALFATE 1 G: 1 TABLET ORAL at 13:12

## 2024-07-20 ASSESSMENT — PAIN SCALES - GENERAL
PAINLEVEL_OUTOF10: 0
PAINLEVEL_OUTOF10: 0

## 2024-07-20 NOTE — PROGRESS NOTES
Hospitalist Progress Note      Patient:  Haily Parker      Unit/Bed:3A-05/005-A    YOB: 1960    MRN: [id number]       Acct: [id number]     PCP: Luciana Parr DO    Date of Admission: 7/18/2024    Date/Time of Evaluation:  7/20/2024 at 8:51 AM    Assessment/Plan:    vision changes, speech disturbance, gait disturbance, fatigue, generalized weakness with multiple sclerosis with concern for flare/exacerbation --> apprec neuro assist -- MRI brain and C spine 7/19/24 no new lesions or signs of active lesions --> neuro started on high dose solu-medrol 1g IV daily x 3 days on 7/18 and to complete 7/20   -- aquaporin 4 (p) for neuromyelitis optica   -- PT/OT/ST following and ST, PT rec IPR -> c/s PMR 7/21 to assess for rehab needs as lives alone with family support  -- Follows outpt with neuro Dr. Gaines - cont amantadine  -- no signs of infection to cause changes and WBC up 7/20 likely due to steroids - afeb  -- TSH 7/18 WNL.   -- Trop (-) thus unlikely cardiac etiology but monitor.   -- check lithium level 7/21  Leukocytosis --WBC up to 15.3 on 7/20 from 7's on 7/18 and 7/19 --most likely due to steroids as afebrile no other signs of infection.  Diarrhea had subsided.  UA on admission 7/18 negative.  Diarrhea -- improved - no need for GI panel  Right neck hematoma due to right IJ PICC placement -- noted on CT neck 7/18 s/p IR PICC placement -H&H stable, no airway compromise or swallowing difficulties.  Stable on RA  Metabolic acidosis -- CO2 slightly low on BMP - ?due to recent diarrhea - encourage po, no signs of infection, no LA done and not needed - monitor prn  Hyperglycemia --mild and up to the 150s during admission however likely due to steroids --no history of DM.  Monitor and address as needed  Elevated d dimer --slightly elevated at 840  --LLE Doppler 7/18/2024 negative for DVT.  No signs of PE.  ?due to #1  Elevated alk phos -- ?etiology -- rest of LFT WNL - ?due to meds -- monitor

## 2024-07-20 NOTE — PLAN OF CARE
Problem: Discharge Planning  Goal: Discharge to home or other facility with appropriate resources  7/20/2024 1118 by Luana Aranda, RN  Outcome: Progressing  Flowsheets (Taken 7/20/2024 1118)  Discharge to home or other facility with appropriate resources: Identify barriers to discharge with patient and caregiver     Problem: Safety - Adult  Goal: Free from fall injury  7/20/2024 1118 by Luana Aranda, RN  Outcome: Progressing  Flowsheets (Taken 7/20/2024 1118)  Free From Fall Injury: Instruct family/caregiver on patient safety     Problem: Skin/Tissue Integrity  Goal: Absence of new skin breakdown  Description: 1.  Monitor for areas of redness and/or skin breakdown  2.  Assess vascular access sites hourly  3.  Every 4-6 hours minimum:  Change oxygen saturation probe site  4.  Every 4-6 hours:  If on nasal continuous positive airway pressure, respiratory therapy assess nares and determine need for appliance change or resting period.  7/20/2024 1118 by Luana Aranda, RN  Outcome: Progressing     Problem: Pain  Goal: Verbalizes/displays adequate comfort level or baseline comfort level  Outcome: Progressing  Flowsheets (Taken 7/20/2024 1118)  Verbalizes/displays adequate comfort level or baseline comfort level:   Encourage patient to monitor pain and request assistance   Assess pain using appropriate pain scale   Administer analgesics based on type and severity of pain and evaluate response    Care plan reviewed with patient and family.  Patient and family verbalize understanding of the plan of care and contribute to goal setting.

## 2024-07-21 ENCOUNTER — APPOINTMENT (OUTPATIENT)
Dept: GENERAL RADIOLOGY | Age: 64
End: 2024-07-21
Payer: MEDICARE

## 2024-07-21 LAB
ANION GAP SERPL CALC-SCNC: 6 MEQ/L (ref 8–16)
BACTERIA: NORMAL
BASOPHILS ABSOLUTE: 0 THOU/MM3 (ref 0–0.1)
BASOPHILS NFR BLD AUTO: 0.1 %
BILIRUB UR QL STRIP: NEGATIVE
BUN SERPL-MCNC: 23 MG/DL (ref 7–22)
CALCIUM SERPL-MCNC: 10.1 MG/DL (ref 8.5–10.5)
CASTS #/AREA URNS LPF: NORMAL /LPF
CASTS #/AREA URNS LPF: NORMAL /LPF
CHARACTER UR: NORMAL
CHARCOAL URNS QL MICRO: NORMAL
CHLORIDE SERPL-SCNC: 111 MEQ/L (ref 98–111)
CO2 SERPL-SCNC: 23 MEQ/L (ref 23–33)
COLOR UR: YELLOW
CREAT SERPL-MCNC: 0.6 MG/DL (ref 0.4–1.2)
CRYSTALS URNS QL MICRO: NORMAL
DEPRECATED RDW RBC AUTO: 49.1 FL (ref 35–45)
EOSINOPHIL NFR BLD AUTO: 0 %
EOSINOPHILS ABSOLUTE: 0 THOU/MM3 (ref 0–0.4)
EPITHELIAL CELLS, UA: NORMAL /HPF
ERYTHROCYTE [DISTWIDTH] IN BLOOD BY AUTOMATED COUNT: 13.1 % (ref 11.5–14.5)
GFR SERPL CREATININE-BSD FRML MDRD: > 90 ML/MIN/1.73M2
GLUCOSE SERPL-MCNC: 98 MG/DL (ref 70–108)
GLUCOSE UR QL STRIP.AUTO: NEGATIVE MG/DL
HCT VFR BLD AUTO: 37.6 % (ref 37–47)
HGB BLD-MCNC: 12.4 GM/DL (ref 12–16)
HGB UR QL STRIP.AUTO: NEGATIVE
IMM GRANULOCYTES # BLD AUTO: 0.1 THOU/MM3 (ref 0–0.07)
IMM GRANULOCYTES NFR BLD AUTO: 0.8 %
KETONES UR QL STRIP.AUTO: NEGATIVE
LEUKOCYTE ESTERASE UR QL STRIP.AUTO: NEGATIVE
LITHIUM SERPL-SCNC: 1 MMOL/L (ref 0.6–1.2)
LYMPHOCYTES ABSOLUTE: 0.9 THOU/MM3 (ref 1–4.8)
LYMPHOCYTES NFR BLD AUTO: 7.4 %
MCH RBC QN AUTO: 33.8 PG (ref 26–33)
MCHC RBC AUTO-ENTMCNC: 33 GM/DL (ref 32.2–35.5)
MCV RBC AUTO: 102.5 FL (ref 81–99)
MONOCYTES ABSOLUTE: 0.8 THOU/MM3 (ref 0.4–1.3)
MONOCYTES NFR BLD AUTO: 6.9 %
NEUTROPHILS ABSOLUTE: 10.1 THOU/MM3 (ref 1.8–7.7)
NEUTROPHILS NFR BLD AUTO: 84.8 %
NITRITE UR QL STRIP.AUTO: NEGATIVE
NRBC BLD AUTO-RTO: 0 /100 WBC
PH UR STRIP.AUTO: 6.5 [PH] (ref 5–9)
PLATELET # BLD AUTO: 176 THOU/MM3 (ref 130–400)
PMV BLD AUTO: 10 FL (ref 9.4–12.4)
POTASSIUM SERPL-SCNC: 3.8 MEQ/L (ref 3.5–5.2)
PROT UR STRIP.AUTO-MCNC: NEGATIVE MG/DL
RBC # BLD AUTO: 3.67 MILL/MM3 (ref 4.2–5.4)
RBC #/AREA URNS HPF: NORMAL /HPF
RENAL EPI CELLS #/AREA URNS HPF: NORMAL /[HPF]
SODIUM SERPL-SCNC: 140 MEQ/L (ref 135–145)
SPECIFIC GRAVITY UA: 1.01 (ref 1–1.03)
UROBILINOGEN, URINE: 0.2 EU/DL (ref 0–1)
WBC # BLD AUTO: 11.9 THOU/MM3 (ref 4.8–10.8)
WBC #/AREA URNS HPF: NORMAL /HPF
YEAST LIKE FUNGI URNS QL MICRO: NORMAL

## 2024-07-21 PROCEDURE — 99233 SBSQ HOSP IP/OBS HIGH 50: CPT | Performed by: FAMILY MEDICINE

## 2024-07-21 PROCEDURE — 6360000002 HC RX W HCPCS: Performed by: NURSE PRACTITIONER

## 2024-07-21 PROCEDURE — 80048 BASIC METABOLIC PNL TOTAL CA: CPT

## 2024-07-21 PROCEDURE — 87086 URINE CULTURE/COLONY COUNT: CPT

## 2024-07-21 PROCEDURE — 99232 SBSQ HOSP IP/OBS MODERATE 35: CPT | Performed by: NURSE PRACTITIONER

## 2024-07-21 PROCEDURE — 81001 URINALYSIS AUTO W/SCOPE: CPT

## 2024-07-21 PROCEDURE — 6370000000 HC RX 637 (ALT 250 FOR IP): Performed by: PHYSICIAN ASSISTANT

## 2024-07-21 PROCEDURE — 2580000003 HC RX 258: Performed by: NURSE PRACTITIONER

## 2024-07-21 PROCEDURE — 1200000003 HC TELEMETRY R&B

## 2024-07-21 PROCEDURE — 97110 THERAPEUTIC EXERCISES: CPT

## 2024-07-21 PROCEDURE — 71046 X-RAY EXAM CHEST 2 VIEWS: CPT

## 2024-07-21 PROCEDURE — 1200000000 HC SEMI PRIVATE

## 2024-07-21 PROCEDURE — 2580000003 HC RX 258: Performed by: PHYSICIAN ASSISTANT

## 2024-07-21 PROCEDURE — 85025 COMPLETE CBC W/AUTO DIFF WBC: CPT

## 2024-07-21 PROCEDURE — 36415 COLL VENOUS BLD VENIPUNCTURE: CPT

## 2024-07-21 PROCEDURE — 80178 ASSAY OF LITHIUM: CPT

## 2024-07-21 PROCEDURE — 97116 GAIT TRAINING THERAPY: CPT

## 2024-07-21 RX ADMIN — LITHIUM CARBONATE 450 MG: 150 CAPSULE, GELATIN COATED ORAL at 07:41

## 2024-07-21 RX ADMIN — SUCRALFATE 1 G: 1 TABLET ORAL at 20:47

## 2024-07-21 RX ADMIN — PANTOPRAZOLE SODIUM 40 MG: 40 TABLET, DELAYED RELEASE ORAL at 05:20

## 2024-07-21 RX ADMIN — SUCRALFATE 1 G: 1 TABLET ORAL at 07:41

## 2024-07-21 RX ADMIN — LEVOTHYROXINE SODIUM 100 MCG: 0.1 TABLET ORAL at 05:20

## 2024-07-21 RX ADMIN — AMANTADINE HYDROCHLORIDE 100 MG: 100 CAPSULE, GELATIN COATED ORAL at 07:41

## 2024-07-21 RX ADMIN — SODIUM CHLORIDE, PRESERVATIVE FREE 10 ML: 5 INJECTION INTRAVENOUS at 20:56

## 2024-07-21 RX ADMIN — LURASIDONE HYDROCHLORIDE 40 MG: 40 TABLET, FILM COATED ORAL at 20:47

## 2024-07-21 RX ADMIN — SUCRALFATE 1 G: 1 TABLET ORAL at 11:47

## 2024-07-21 RX ADMIN — LAMOTRIGINE 200 MG: 100 TABLET ORAL at 07:42

## 2024-07-21 RX ADMIN — PANTOPRAZOLE SODIUM 40 MG: 40 TABLET, DELAYED RELEASE ORAL at 17:05

## 2024-07-21 RX ADMIN — CALCIUM 500 MG: 500 TABLET ORAL at 20:46

## 2024-07-21 RX ADMIN — SODIUM CHLORIDE, PRESERVATIVE FREE 10 ML: 5 INJECTION INTRAVENOUS at 07:40

## 2024-07-21 RX ADMIN — VILAZODONE HYDROCHLORIDE 40 MG: 40 TABLET, FILM COATED ORAL at 07:41

## 2024-07-21 RX ADMIN — METHYLPREDNISOLONE SODIUM SUCCINATE 1000 MG: 1 INJECTION INTRAMUSCULAR; INTRAVENOUS at 20:59

## 2024-07-21 RX ADMIN — SUCRALFATE 1 G: 1 TABLET ORAL at 17:05

## 2024-07-21 RX ADMIN — LITHIUM CARBONATE 450 MG: 150 CAPSULE, GELATIN COATED ORAL at 20:47

## 2024-07-21 ASSESSMENT — PAIN SCALES - GENERAL
PAINLEVEL_OUTOF10: 0

## 2024-07-21 NOTE — PLAN OF CARE
Problem: Discharge Planning  Goal: Discharge to home or other facility with appropriate resources  Outcome: Progressing  Flowsheets (Taken 7/20/2024 1945)  Discharge to home or other facility with appropriate resources:   Identify barriers to discharge with patient and caregiver   Identify discharge learning needs (meds, wound care, etc)     Problem: Skin/Tissue Integrity  Goal: Absence of new skin breakdown  Description: 1.  Monitor for areas of redness and/or skin breakdown  2.  Assess vascular access sites hourly  3.  Every 4-6 hours minimum:  Change oxygen saturation probe site  4.  Every 4-6 hours:  If on nasal continuous positive airway pressure, respiratory therapy assess nares and determine need for appliance change or resting period.  Outcome: Progressing     Problem: Pain  Goal: Verbalizes/displays adequate comfort level or baseline comfort level  Outcome: Progressing  Flowsheets (Taken 7/20/2024 1945)  Verbalizes/displays adequate comfort level or baseline comfort level:   Encourage patient to monitor pain and request assistance   Administer analgesics based on type and severity of pain and evaluate response

## 2024-07-21 NOTE — PROGRESS NOTES
[facility]  INPATIENT PHYSICAL THERAPY  DAILY NOTE  STRZ CCU 3A - 3A-05/005-A    Time In: 1154  Time Out: 1217  Timed Code Treatment Minutes: 23 Minutes  Minutes: 23          Date: 2024  Patient Name: Haily Parker,  Gender:  female        MRN: [id number]  : 1960  (64 y.o.)     Referring Practitioner: Vikas Hernandez PA-C  Diagnosis: Speech disturbance  Additional Pertinent Hx: Per EMR \"64 y.o. female with PMH significant for MS (last flareup in ) with known balance problem, hypothyroidism who presents to the emergency department from home, by private vehicle for evaluation of nausea, difficulty with peripheral vision, speech issues, and difficulty ambulating since  (LNW).  States that she has had a hard time seeing anything in front of her and at times has had double vision.  Associates this visual disturbance with her difficulty to ambulate - veers to the left when attempting to walk straight.  No overt numbness or tingling, however does report LUE and LLE decreased sensation. \" Per imaging \"CT Head WO Contrast 1. Stable CT scan of the brain, no interval change since previous MRI scan of   the brain dated 2024.\"     Prior Level of Function:  Lives With: Alone  Type of Home: Apartment  Home Layout: One level  Home Access: Elevator (2nd floor apartment)  Home Equipment: Cane - Quad   Bathroom Shower/Tub: Tub/Shower unit  Bathroom Toilet: Standard  Bathroom Equipment: Shower chair, Grab bars in shower    ADL Assistance: Independent  Homemaking Assistance: Independent  Ambulation Assistance: Independent  Transfer Assistance: Independent  Active : No  Additional Comments: Pt reports using no AD PLOF. Friend takes Pt to grocery store. 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    Restrictions/Precautions:  Restrictions/Precautions: Fall Risk, Contact Precautions  Position Activity Restriction  Other position/activity restrictions: RUGRACE ayalahesis, hx of MS  without physical assistance (SBA/CGA).   Patient could not live alone but could walk from one room to another without physical help from another person.  Education provided regarding stroke rehabilitation management.    ASSESSMENT:  Assessment: Patient progressing toward established goals.  Activity Tolerance:  Patient tolerance of  treatment: good.   Equipment Recommendations:Equipment Needed: No  Discharge Recommendations: Inpatient Rehabilitation  Plan: Current Treatment Recommendations: Strengthening, Balance training, Functional mobility training, Transfer training, Gait training, Neuromuscular re-education, Endurance training, Home exercise program, Equipment evaluation, education, & procurement, Patient/Caregiver education & training, Safety education & training, Therapeutic activities  General Plan:  (5-6X N)    Education:  Learners: Patient  Patient Education: Plan of Care, Bed Mobility, Transfers, Gait, Up in Chair for All Meals, Verbal Exercise Instruction    Goals:  Patient Goals : \"To get stronger and go home\"  Short Term Goals  Time Frame for Short Term Goals: by discharge  Short Term Goal 1: Pt will demo bed mobility modified independently to inc functional independence  Short Term Goal 2: Pt will demo sit to/from stand transfer with LRAD to inc funcitonal independence  Short Term Goal 3: Pt will amb 100 ft with LRAD modified independently to inc functional independence  Short Term Goal 4: Pt will demo functional outcomes assessment  Long Term Goals  Time Frame for Long Term Goals : NA due to short ELOS    Following session, patient left in safe position with all fall risk precautions in place.

## 2024-07-21 NOTE — PROGRESS NOTES
Jannet CANSECO-CNP with Neurology told RN that if PT UA is negative that was sent down today PT will need to be started on IV steroids for 2 more days 1st dose is to be given tonight and 2nd dose tomorrow. RN should notify Attending and ask them to order steroid based or antibiotics on UA results. Will continue to monitor

## 2024-07-21 NOTE — PLAN OF CARE
Care plan reviewed with patient and patient verbalize understanding of the plan of care and contribute to goal setting.     Problem: Discharge Planning  Goal: Discharge to home or other facility with appropriate resources  7/21/2024 1307 by Osman Betancourt, RN  Outcome: Not Progressing  7/21/2024 0132 by Juli Vela, RN  Outcome: Progressing  Flowsheets (Taken 7/20/2024 1945)  Discharge to home or other facility with appropriate resources:   Identify barriers to discharge with patient and caregiver   Identify discharge learning needs (meds, wound care, etc)

## 2024-07-21 NOTE — PROGRESS NOTES
PROGRESS NOTE      Patient:  Haily Parker  Unit/Bed:3A-05/005-A  YOB: 1960  MRN: [id number]   Acct: [id number]    PCP: Luciana Parr DO    Date of Admission: 7/18/2024 LOS: 3    Date of Evaluation:  7/21/2024    Anticipated Discharge: 2-3 days pending IPR evaluation    Assessment/Plan:    Vision changes, speech disturbance, gait disturbance, fatigue, generalized weakness with multiple sclerosis with concern for flare/exacerbation --> apprec neuro assist   -- MRI brain and C spine 7/19/24 no new lesions or signs of active lesions --> neuro started on high dose solu-medrol 1g IV daily x 3 days on 7/18 and to complete 7/20   -- aquaporin 4 (p) for neuromyelitis optica   -- PT/OT/ST following and ST, PT rec IPR  - Will consult inpatient rehab on 7/21, pending recommendations  -- Follows outpt with neuro Dr. Gaines - cont amantadine  -- TSH 7/18 WNL.   -- Trop (-) thus unlikely cardiac etiology but monitor.   -- Lithium level is 1.0, therapeutic  -- 7/21 patient was noted to be slower cognitively.  At this time reached out to neurology and recommended repeating MRI scan to rule out causes of infection.  MRI brain, UA, chest x-ray ordered, pending results    Leukocytosis   --WBC up to 15.3 on 7/20 from 7's on 7/18 and 7/19 --> improved to 11.9 on 7/21  --most likely due to steroids as afebrile no other signs of infection.  Diarrhea had subsided.  UA on admission 7/18 negative.    Diarrhea   -- improved - no need for GI panel    Right neck hematoma due to right IJ PICC placement   -- noted on CT neck 7/18 s/p IR PICC placement -H&H stable, no airway compromise or swallowing difficulties.  Stable on RA    Metabolic acidosis, improving  -- CO2 slightly low on BMP - ?due to recent diarrhea - encourage po, no signs of infection, no LA done and not needed - monitor prn  - CO2 improved to 23 on 7/21, will continue to monitor    Hyperglycemia   --mild and up to the 150s during admission however likely

## 2024-07-21 NOTE — PROGRESS NOTES
Neurology Progress Note    Date:7/21/2024       Room:[facility]005-A  Patient Name:Haily Parker     YOB: 1960     Age:64 y.o.    Requesting Physician: Yasmine Eng MD     Reason for Consult:  Evaluate for history of multiple sclerosis, new neurologic deficits      Chief Complaint:   Chief Complaint   Patient presents with    Fatigue    Dizziness    Diplopia       Subjective     Haily Parker is a 64 y.o. female with a history of multiple sclerosis, bipolar disorder, depression, GERD, cervical cancer s/p hysterectomy, hypothyroidism, bradycardia s/p PPM, congenital absence of right upper extremity who presented to [facility] ED for the evaluation of fatigue, dysarthria, blurry vision, and gait instability. Patient states that last weekend she began to experience the symptoms and states it has progressively gotten worse. Patient does have a history of multiple sclerosis and follows with outpatient neurology Dr. Gaines. She was previously on Tecfidera but has since discontinued as she experienced undesirable side effects. She last saw Dr. Gaines in May of this year and was started on Amantadine 100mg daily. She states that she is having diplopia when both eyes are open and when one eye is covered it appears blurry. She states that she is having gait instability when she ambulates and that she tends to lean to the left side. She states that her last multiple sclerosis flare was in 1998 and describes similar symptoms during that time. She denies any recent illness, fever, chills, cough, vomiting, headaches, dizziness.     7/19/24 Interval History:   No acute events overnight. She explained her right eye is still a little blurry with her vision being at 90% back to normal. She denies issues with her left eye. She denies diplopia. Her speech is back to her baseline per her sisters at bedside. She explained she is generally weak. Upon chart review, it appears her Tecfidera was stopped in Feb 2023 per

## 2024-07-22 LAB
ANION GAP SERPL CALC-SCNC: 14 MEQ/L (ref 8–16)
BASOPHILS ABSOLUTE: 0 THOU/MM3 (ref 0–0.1)
BASOPHILS NFR BLD AUTO: 0.1 %
BUN SERPL-MCNC: 27 MG/DL (ref 7–22)
CALCIUM SERPL-MCNC: 10 MG/DL (ref 8.5–10.5)
CHLORIDE SERPL-SCNC: 108 MEQ/L (ref 98–111)
CO2 SERPL-SCNC: 19 MEQ/L (ref 23–33)
CREAT SERPL-MCNC: 0.8 MG/DL (ref 0.4–1.2)
DEPRECATED RDW RBC AUTO: 49.3 FL (ref 35–45)
EOSINOPHIL NFR BLD AUTO: 0 %
EOSINOPHILS ABSOLUTE: 0 THOU/MM3 (ref 0–0.4)
ERYTHROCYTE [DISTWIDTH] IN BLOOD BY AUTOMATED COUNT: 12.8 % (ref 11.5–14.5)
GFR SERPL CREATININE-BSD FRML MDRD: 82 ML/MIN/1.73M2
GLUCOSE BLD STRIP.AUTO-MCNC: 115 MG/DL (ref 70–108)
GLUCOSE BLD STRIP.AUTO-MCNC: 151 MG/DL (ref 70–108)
GLUCOSE BLD STRIP.AUTO-MCNC: 225 MG/DL (ref 70–108)
GLUCOSE SERPL-MCNC: 204 MG/DL (ref 70–108)
HCT VFR BLD AUTO: 40.5 % (ref 37–47)
HGB BLD-MCNC: 13.2 GM/DL (ref 12–16)
IMM GRANULOCYTES # BLD AUTO: 0.09 THOU/MM3 (ref 0–0.07)
IMM GRANULOCYTES NFR BLD AUTO: 1.2 %
LYMPHOCYTES ABSOLUTE: 0.4 THOU/MM3 (ref 1–4.8)
LYMPHOCYTES NFR BLD AUTO: 5.1 %
MCH RBC QN AUTO: 33.8 PG (ref 26–33)
MCHC RBC AUTO-ENTMCNC: 32.6 GM/DL (ref 32.2–35.5)
MCV RBC AUTO: 103.6 FL (ref 81–99)
MONOCYTES ABSOLUTE: 0.1 THOU/MM3 (ref 0.4–1.3)
MONOCYTES NFR BLD AUTO: 1.5 %
NEUTROPHILS ABSOLUTE: 6.7 THOU/MM3 (ref 1.8–7.7)
NEUTROPHILS NFR BLD AUTO: 92.1 %
NRBC BLD AUTO-RTO: 0 /100 WBC
PLATELET # BLD AUTO: 170 THOU/MM3 (ref 130–400)
PMV BLD AUTO: 10.2 FL (ref 9.4–12.4)
POTASSIUM SERPL-SCNC: 3.8 MEQ/L (ref 3.5–5.2)
RBC # BLD AUTO: 3.91 MILL/MM3 (ref 4.2–5.4)
SODIUM SERPL-SCNC: 141 MEQ/L (ref 135–145)
WBC # BLD AUTO: 7.3 THOU/MM3 (ref 4.8–10.8)

## 2024-07-22 PROCEDURE — 99232 SBSQ HOSP IP/OBS MODERATE 35: CPT | Performed by: NURSE PRACTITIONER

## 2024-07-22 PROCEDURE — 97535 SELF CARE MNGMENT TRAINING: CPT

## 2024-07-22 PROCEDURE — 1200000000 HC SEMI PRIVATE

## 2024-07-22 PROCEDURE — 85025 COMPLETE CBC W/AUTO DIFF WBC: CPT

## 2024-07-22 PROCEDURE — 97116 GAIT TRAINING THERAPY: CPT

## 2024-07-22 PROCEDURE — 97130 THER IVNTJ EA ADDL 15 MIN: CPT

## 2024-07-22 PROCEDURE — 80048 BASIC METABOLIC PNL TOTAL CA: CPT

## 2024-07-22 PROCEDURE — 99232 SBSQ HOSP IP/OBS MODERATE 35: CPT | Performed by: FAMILY MEDICINE

## 2024-07-22 PROCEDURE — 99221 1ST HOSP IP/OBS SF/LOW 40: CPT | Performed by: NURSE PRACTITIONER

## 2024-07-22 PROCEDURE — 2580000003 HC RX 258: Performed by: NURSE PRACTITIONER

## 2024-07-22 PROCEDURE — 36415 COLL VENOUS BLD VENIPUNCTURE: CPT

## 2024-07-22 PROCEDURE — 82948 REAGENT STRIP/BLOOD GLUCOSE: CPT

## 2024-07-22 PROCEDURE — 6370000000 HC RX 637 (ALT 250 FOR IP): Performed by: PHYSICIAN ASSISTANT

## 2024-07-22 PROCEDURE — 97129 THER IVNTJ 1ST 15 MIN: CPT

## 2024-07-22 PROCEDURE — 97530 THERAPEUTIC ACTIVITIES: CPT

## 2024-07-22 PROCEDURE — 1200000003 HC TELEMETRY R&B

## 2024-07-22 PROCEDURE — 6360000002 HC RX W HCPCS: Performed by: NURSE PRACTITIONER

## 2024-07-22 RX ORDER — DEXTROSE MONOHYDRATE 100 MG/ML
INJECTION, SOLUTION INTRAVENOUS CONTINUOUS PRN
Status: DISCONTINUED | OUTPATIENT
Start: 2024-07-22 | End: 2024-07-25 | Stop reason: HOSPADM

## 2024-07-22 RX ORDER — INSULIN LISPRO 100 [IU]/ML
0-4 INJECTION, SOLUTION INTRAVENOUS; SUBCUTANEOUS NIGHTLY
Status: DISCONTINUED | OUTPATIENT
Start: 2024-07-22 | End: 2024-07-25 | Stop reason: HOSPADM

## 2024-07-22 RX ORDER — INSULIN LISPRO 100 [IU]/ML
0-4 INJECTION, SOLUTION INTRAVENOUS; SUBCUTANEOUS
Status: DISCONTINUED | OUTPATIENT
Start: 2024-07-22 | End: 2024-07-25 | Stop reason: HOSPADM

## 2024-07-22 RX ORDER — GLUCAGON 1 MG/ML
1 KIT INJECTION PRN
Status: DISCONTINUED | OUTPATIENT
Start: 2024-07-22 | End: 2024-07-25 | Stop reason: HOSPADM

## 2024-07-22 RX ADMIN — INSULIN LISPRO 1 UNITS: 100 INJECTION, SOLUTION INTRAVENOUS; SUBCUTANEOUS at 16:56

## 2024-07-22 RX ADMIN — LITHIUM CARBONATE 450 MG: 150 CAPSULE, GELATIN COATED ORAL at 20:02

## 2024-07-22 RX ADMIN — AMANTADINE HYDROCHLORIDE 100 MG: 100 CAPSULE, GELATIN COATED ORAL at 07:52

## 2024-07-22 RX ADMIN — LEVOTHYROXINE SODIUM 100 MCG: 0.1 TABLET ORAL at 07:51

## 2024-07-22 RX ADMIN — LAMOTRIGINE 200 MG: 100 TABLET ORAL at 07:51

## 2024-07-22 RX ADMIN — SUCRALFATE 1 G: 1 TABLET ORAL at 07:51

## 2024-07-22 RX ADMIN — LITHIUM CARBONATE 450 MG: 150 CAPSULE, GELATIN COATED ORAL at 07:51

## 2024-07-22 RX ADMIN — SUCRALFATE 1 G: 1 TABLET ORAL at 20:02

## 2024-07-22 RX ADMIN — PANTOPRAZOLE SODIUM 40 MG: 40 TABLET, DELAYED RELEASE ORAL at 07:51

## 2024-07-22 RX ADMIN — SUCRALFATE 1 G: 1 TABLET ORAL at 16:56

## 2024-07-22 RX ADMIN — METHYLPREDNISOLONE SODIUM SUCCINATE 1000 MG: 1 INJECTION INTRAMUSCULAR; INTRAVENOUS at 19:54

## 2024-07-22 RX ADMIN — LURASIDONE HYDROCHLORIDE 40 MG: 40 TABLET, FILM COATED ORAL at 20:02

## 2024-07-22 RX ADMIN — VILAZODONE HYDROCHLORIDE 40 MG: 40 TABLET, FILM COATED ORAL at 07:51

## 2024-07-22 RX ADMIN — SUCRALFATE 1 G: 1 TABLET ORAL at 14:06

## 2024-07-22 RX ADMIN — PANTOPRAZOLE SODIUM 40 MG: 40 TABLET, DELAYED RELEASE ORAL at 16:56

## 2024-07-22 RX ADMIN — CALCIUM 500 MG: 500 TABLET ORAL at 20:02

## 2024-07-22 ASSESSMENT — ENCOUNTER SYMPTOMS: TROUBLE SWALLOWING: 0

## 2024-07-22 ASSESSMENT — PAIN SCALES - GENERAL: PAINLEVEL_OUTOF10: 0

## 2024-07-22 NOTE — PROGRESS NOTES
[facility]  INPATIENT REHABILITATION  ADMISSIONS COORDINATOR CONSULT    Referral Type: internal    Patient Name: Haily Parker      MRN: [id number]    : 1960  (64 y.o.)  Gender: female   Race:White (non-)     Payor Source: Payor: [employer] MEDICARE / Plan: [employer] GOLD PLUS HMO / Product Type: *No Product type* /   Secondary Payor Source:  MEDICAID [place]    Isolation Status: Contact    Lives With: Alone  Type of Home: Apartment  Home Layout: One level  Home Access: Elevator (2nd floor apartment)        Additional Comments: Pt reports using no AD PLOF. Friend takes Pt to grocery store. 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    What is treatment plan?  Disciplines Required upon Admission to Inpatient Rehabilitation: Physical Therapy, Occupational Therapy, and Speech Therapy  Post operative: No  Fall: No  Dialysis: No  Diet: ADULT DIET; Regular  Discussed patient with  and PM&R provider: Await medical work up completion

## 2024-07-22 NOTE — CARE COORDINATION
7/22/24, 1:58 PM EDT    DISCHARGE PLANNING EVALUATION       SW consulted due to Inpatient rehab seeing and recommending ECF at discharge.  SW met with Patient and sisters and they are preferring a rehab stay and prefer [facility].  SW provided list for second options in case needed.      ANT later received message from sister that second option would be [facility] and third would be [facility].     ANT faxed referral to [facility] and spoke with Stephan who stated that she would review Patient and let SW know.      Post-acute (PAC) provider list was provided to patient. Patient was informed of their freedom to choose PAC provider. Discussed and offered to show the patient the relevant PAC Providers quality and resource use measures on Medicare Compare web site via computer based on patient's goals of care and treatment preferences. Questions regarding selection process were answered.     ANT spoke with Stephan at [facility] and they will not have a bed this week for patient.     ANT spoke with Maday at [facility] and provided referral. ANT updated sister Deanna.

## 2024-07-22 NOTE — PLAN OF CARE
Problem: Discharge Planning  Goal: Discharge to home or other facility with appropriate resources  7/22/2024 1408 by Priti Wilson, MSW, LSW  Outcome: Progressing  Flowsheets (Taken 7/22/2024 1408)  Discharge to home or other facility with appropriate resources: Identify barriers to discharge with patient and caregiver  Note: Return home vs rehab, see SW notes 7/22/24.

## 2024-07-22 NOTE — PROGRESS NOTES
[facility]  INPATIENT PHYSICAL THERAPY  DAILY NOTE  STRZ CCU 3A - 3A-05/005-A    Time In: 1230  Time Out: 1301  Timed Code Treatment Minutes: 31 Minutes  Minutes: 31          Date: 2024  Patient Name: Haily Parker,  Gender:  female        MRN: [id number]  : 1960  (64 y.o.)     Referring Practitioner: Vikas Hernandez PA-C  Diagnosis: Speech disturbance  Additional Pertinent Hx: Per EMR \"64 y.o. female with PMH significant for MS (last flareup in ) with known balance problem, hypothyroidism who presents to the emergency department from home, by private vehicle for evaluation of nausea, difficulty with peripheral vision, speech issues, and difficulty ambulating since  (LNW).  States that she has had a hard time seeing anything in front of her and at times has had double vision.  Associates this visual disturbance with her difficulty to ambulate - veers to the left when attempting to walk straight.  No overt numbness or tingling, however does report LUE and LLE decreased sensation. \" Per imaging \"CT Head WO Contrast 1. Stable CT scan of the brain, no interval change since previous MRI scan of   the brain dated 2024.\"     Prior Level of Function:  Lives With: Alone  Type of Home: Apartment  Home Layout: One level  Home Access: Elevator (2nd floor apartment)  Home Equipment: Cane - Quad   Bathroom Shower/Tub: Tub/Shower unit  Bathroom Toilet: Standard  Bathroom Equipment: Shower chair, Grab bars in shower    ADL Assistance: Independent  Homemaking Assistance: Independent  Ambulation Assistance: Independent  Transfer Assistance: Independent  Active : No  Additional Comments: Pt reports using no AD PLOF. Friend takes Pt to grocery store. 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    Restrictions/Precautions:  Restrictions/Precautions: Fall Risk, Contact Precautions  Position Activity Restriction  Other position/activity restrictions: RUGRACE ayalahesis, hx of MS

## 2024-07-22 NOTE — PROGRESS NOTES
[facility]  STRZ CCU 3A  Occupational Therapy  Daily Note  Time:   Time In: 1140  Time Out: 1203  Timed Code Treatment Minutes: 23 Minutes  Minutes: 23          Date: 2024  Patient Name: Haily Parker,   Gender: female      Room: [facility]05/005-A  MRN: [id number]  : 1960  (64 y.o.)  Referring Practitioner: Vikas Hernandez PA-C  Diagnosis: speech disturbance  Additional Pertinent Hx: Per ER note on 2024:64 y.o. female with PMH significant for MS (last flareup in ) with known balance problem, hypothyroidism who presents to the emergency department from home, by private vehicle for evaluation of nausea, difficulty with peripheral vision, speech issues, and difficulty ambulating since  (LNW).  States that she has had a hard time seeing anything in front of her and at times has had double vision.  Associates this visual disturbance with her difficulty to ambulate - veers to the left when attempting to walk straight.  No overt numbness or tingling, however does report LUE and LLE decreased sensation.    Restrictions/Precautions:  Restrictions/Precautions: Fall Risk, Contact Precautions  Position Activity Restriction  Other position/activity restrictions: RUE prothesis, hx of MS     Social/Functional History:  Lives With: Alone  Type of Home: Apartment  Home Layout: One level  Home Access: Elevator (2nd floor apartment)  Home Equipment: Cane - Quad   Bathroom Shower/Tub: Tub/Shower unit  Bathroom Toilet: Standard  Bathroom Equipment: Shower chair, Grab bars in shower       ADL Assistance: Independent  Homemaking Assistance: Independent  Ambulation Assistance: Independent  Transfer Assistance: Independent    Active : No  Patient's  Info: Pt uses public transportation  Mode of Transportation: Bus     Additional Comments: Pt reports using no AD PLOF. Friend takes Pt to grocery store. 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    SUBJECTIVE: Pt sitting in chair

## 2024-07-22 NOTE — PLAN OF CARE
Problem: Discharge Planning  Goal: Discharge to home or other facility with appropriate resources  7/22/2024 0842 by Hanna Boyd RN  Outcome: Progressing     Problem: Safety - Adult  Goal: Free from fall injury  7/22/2024 0842 by Hanna Boyd RN  Outcome: Progressing     Problem: Skin/Tissue Integrity  Goal: Absence of new skin breakdown  Description: 1.  Monitor for areas of redness and/or skin breakdown  2.  Assess vascular access sites hourly  3.  Every 4-6 hours minimum:  Change oxygen saturation probe site  4.  Every 4-6 hours:  If on nasal continuous positive airway pressure, respiratory therapy assess nares and determine need for appliance change or resting period.  7/22/2024 0842 by Hanna Boyd RN  Outcome: Progressing     Problem: Pain  Goal: Verbalizes/displays adequate comfort level or baseline comfort level  7/22/2024 0842 by Hanna Boyd RN  Outcome: Progressing   Care Plan reviewed with patient.  Patient verbalizes understanding to educator and is willing to contribute and participate in goal planning.

## 2024-07-22 NOTE — CONSULTS
Physical Medicine & Rehabilitation   Consult Note      Admitting Physician: Yasmine Eng MD    Primary Care Provider: Luciana Parr DO     Reason for Consult: MS.  Rehab needs.    History of Present Illness:  Haily Parker is a 64 y.o. female admitted to [facility] on 7/18/2024. Patient  has a past medical history of Balance problem, Bipolar I disorder, most recent episode (or current) depressed, in partial or unspecified remission, Depression, Gallstones, Gastric bypass status for obesity, GERD (gastroesophageal reflux disease), History of hysterectomy, Hx of cervical cancer, Hypothyroidism, Liver cyst, Liver lesion, MS (multiple sclerosis) (HCC), MIKEY (obstructive sleep apnea), Paresthesias, Ulcer at site of surgical anastomosis following bypass of stomach, and UTI (urinary tract infection). Patient presented to [facility] ER due to fatigue, dysarthria, blurred vision, and gait instability.  Patient reported that symptoms began the weekend prior.  Patient does have a history of MS and follows with Dr. Gaines outpatient.  Patient previously on Tecfidera however this was discontinued due to side effects.  Patient was started on amantadine 2 months ago, with Dr. Gaines.  Patient states she is having diplopia and blurred vision.  Patient reported her last MS flare was in 1998 with similar symptoms.  MRI of the head and neck did not show any acute flare of MS lesions.  Patient was started on steroids for 3 days. Patient showed improvement with the steroid treatment.  However upon awakening on 7/21/2024 patient was noted to be more lethargic and slurring her words.  Neurology was reconsulted.  Patient did improve somewhat throughout the day but still not to where she was with her improvement after the steroid treatment.  An additional 2 days was added of steroids, first dose 7/21 PM.    7/22/24: Patient seen today in consult, sitting up in chair.  Sisters present.  Discussed further therapy  communicating artery. Vertebral arteries: The vertebral arteries are normal. Basilar artery: Normal. Superior cerebellar arteries: Normal. Posterior cerebral arteries: The P1 segments are normal and symmetric.  The P2 branches are normal. No aneurysms, stenoses or occlusions are noted. The superior sagittal sinus, vein of El, internal cerebral veins, straight sinus, transverse sinuses and sigmoid sinuses are patent. Axial source data: There is a large hematoma in the right lower neck extending throughout the subcutaneous tissues. This is superficial to the musculature. This is extending into the anterior upper chest wall and into the lower right neck. This surrounds the superior vena cava and internal jugular vein. Those veins are not opacified. This examination was timed for the arteries. There is a vascular catheter entering from a mid right neck approach. There is no contrast pooling surrounding the catheter. There is no suggestion of an arterial injury. The PICC line enters the expected location of the junction of the right internal jugular vein and subclavian vein. Those veins cannot be seen. The PICC line is along the pacemaker wires which are in the SVC. There is no hemorrhage or hematoma surrounding the SVC. Within the hematoma, there are a few scattered muscular branches. In the axial plane, the hematoma measures 11.7 x 5.8 cm. This has a superior-inferior dimension of 10 cm. The thyroid gland is heterogeneous and enlarged. The trachea is not narrowed. There are no suspicious findings in the brain.   1. Large hematoma in the right neck soft tissues. This is tracking in the subcutaneous tissues. There is no evidence of arterial extravasation of contrast. 2. The right-sided PICC line enters in the expected location of the right IJ and right subclavian vein junction. Those veins are not opacified with contrast. The catheter has a normal course in the SVC. 3. No evidence of arterial injury. 4. Irregularity

## 2024-07-22 NOTE — PROGRESS NOTES
Neurology Progress Note    Date:7/22/2024       Room:[facility]005-A  Patient Name:Haily Parker     YOB: 1960     Age:64 y.o.    Requesting Physician: Yasmine Eng MD     Reason for Consult:  Evaluate for history of multiple sclerosis, new neurologic deficits      Chief Complaint:   Chief Complaint   Patient presents with    Fatigue    Dizziness    Diplopia       Subjective     Haily Parker is a 64 y.o. female with a history of multiple sclerosis, bipolar disorder, depression, GERD, cervical cancer s/p hysterectomy, hypothyroidism, bradycardia s/p PPM, congenital absence of right upper extremity who presented to [facility] ED for the evaluation of fatigue, dysarthria, blurry vision, and gait instability. Patient states that last weekend she began to experience the symptoms and states it has progressively gotten worse. Patient does have a history of multiple sclerosis and follows with outpatient neurology Dr. Gaines. She was previously on Tecfidera but has since discontinued as she experienced undesirable side effects. She last saw Dr. Gaines in May of this year and was started on Amantadine 100mg daily. She states that she is having diplopia when both eyes are open and when one eye is covered it appears blurry. She states that she is having gait instability when she ambulates and that she tends to lean to the left side. She states that her last multiple sclerosis flare was in 1998 and describes similar symptoms during that time. She denies any recent illness, fever, chills, cough, vomiting, headaches, dizziness.     7/19/24 Interval History:   No acute events overnight. She explained her right eye is still a little blurry with her vision being at 90% back to normal. She denies issues with her left eye. She denies diplopia. Her speech is back to her baseline per her sisters at bedside. She explained she is generally weak. Upon chart review, it appears her Tecfidera was stopped in Feb 2023 per

## 2024-07-22 NOTE — PROGRESS NOTES
PROGRESS NOTE      Patient:  Haily Parker  Unit/Bed:3A-05/005-A  YOB: 1960  MRN: [id number]   Acct: [id number]    PCP: Luciana Parr DO    Date of Admission: 7/18/2024 LOS: 4    Date of Evaluation:  7/22/2024    Anticipated Discharge: Pending clinical course    Assessment/Plan:    Vision changes, speech disturbance, gait disturbance, fatigue, generalized weakness with multiple sclerosis with concern for flare/exacerbation --> apprec neuro assist   -- MRI brain and C spine 7/19/24 no new lesions or signs of active lesions --> neuro started on high dose solu-medrol 1g IV daily x 3 days on 7/18 and to complete 7/20   -- aquaporin 4 (p) for neuromyelitis optica. will need to follow-up with PCP/outpatient neurology for results  -- Continue PT/OT/ST   -- diet per speech (currently tolerating regular diet)  -- [facility]PR was consulted [facility]  -- Follows outpt with neuro Dr. Gaines - cont amantadine  -- TSH 7/18 WNL.   -- Trop (-) thus unlikely cardiac etiology but monitor.   -- Lithium level is 1.0, therapeutic  -- 7/21 patient was noted to be slower cognitively.  CXR 7/21 and UA unremarkable.  Infectious cause ruled out   -- Per neuro recs we will continue IV Solu-Medrol (7/18-7/22)  --Neurology following and recommend that once patient is back to baseline follow-up with Dr. Gaines OP     Leukocytosis , resolved  --WBC up to 15.3 on 7/20 from 7's on 7/18 and 7/19 --> improved to 7.3 on 7/22  --most likely due to steroids as afebrile no other signs of infection.  Diarrhea had subsided.    -- Repeat infectious workup over the weekend unremarkable     Diarrhea   -- improved - no need for GI panel     Right neck hematoma due to right IJ PICC placement   -- noted on CT neck 7/18 s/p IR PICC placement -H&H stable, no airway compromise or swallowing difficulties.    --Stable on RA     Metabolic acidosis, improving  -- CO2 slightly low on BMP - ?due to recent diarrhea - encourage po, no

## 2024-07-22 NOTE — CARE COORDINATION
7/22/24, 1:14 PM EDT    DISCHARGE ON GOING EVALUATION    Haily R [facility] day: 4  Location: 3A-05/005-A Reason for admit: Speech disturbance [R47.9]  Unsteady gait [R26.81]  At high risk for falls [Z91.81]     Procedures:   7/19 PICC line placement: done in IR, RIJ.    Imaging since last note:   7/19 MRI brain: Stable white matter lesions in the brain. No evidence of active demyelination.   7/19 MRI C-spine:   1. No evidence of white matter lesions in the cervical spinal cord.  2. Moderate severity spinal canal stenosis at the C4-5 level.  7/21 CXR:   1. Normal heart size. Permanent dual chamber pacemaker. Right jugular PICC line, catheter tip at cavoatrial junction.  2. Kyphotic positioning of the patient. No acute infiltrates or effusions are  seen.    Barriers to Discharge: Hospitalist and Neurology following. Physiatry consulted. PT/OT/SLP. Solumedrol iv daily.     PCP: Luciana Parr DO  Readmission Risk Score: 10.9    Patient Goals/Plan/Treatment Preferences: From home alone. Has good family support. [facility], also has prosthetic arm. Physiatry saw pt, recommending SNF. SW consulted.

## 2024-07-22 NOTE — PROGRESS NOTES
[facility]  INPATIENT SPEECH THERAPY  STRZ CCU 3A  DAILY NOTE    TIME   SLP Individual Minutes  Time In: 1207  Time Out: 1236  Minutes: 29  Timed Code Treatment Minutes: 29 Minutes       Date: 2024  Patient Name: Haily Parker      CSN: [id number]   : 1960  (64 y.o.)  Gender: female   Referring Physician:  Vikas Hernandez PA-C   Diagnosis: Speech disturbance   Precautions: Fall Risk   Current Diet: Regular Diet with Thin Liquids  Respiratory Status: Room Air  Swallowing Strategies: Standard Universal Swallow Precautions  Date of Last MBS/FEES: Not Applicable    Pain:  No pain reported.    Subjective:  Patient seen with RN Hanna permission. Patient seen sitting upright in recliner upon ST arrival; alert and cooperative throughout intervention session. Livia MCGOWAN, from [facility] to enter during ST session with report for patient with decision to make re: discharge home with family support vs. Sub-acute facility for rehabilitation as IPR not an option at this time. Patient's sisters arrived midway through ST session.    Short-Term Goals:  SHORT TERM GOAL #1:  Goal 1: Patient will complete recall tasks (immediate, delayed, working) with focus on utilization of compensatory memory strategies with 65% given mod cues in order to improve recall of novel information.  INTERVENTIONS:ST completed review of compensatory memory strategies using the acronym W.R.A.P. (i.e.,Write it down, Repeat it, Associate it, and Picture it). ST provided functional scenarios to utilize each memory strategy within ADLs/IADLs.    WRAP Recall: 3/4 independent withOUT written visual aid, 1/4 independent with written visual aid    Generation and Recall of Grocery List (applesauce, polina litter, propel, juice, milk, laundry soap)  Immediate Recall: 3/6 independent, 3/6 independent with written visual aid  *Decreased divided and alternating attention endorsed during entrance of additional medical staff (I.e., nursing,  to Assess Pending Progress     Eber Moyer M.S., CCC-SLP [id number]

## 2024-07-23 ENCOUNTER — APPOINTMENT (OUTPATIENT)
Dept: ULTRASOUND IMAGING | Age: 64
End: 2024-07-23
Payer: MEDICARE

## 2024-07-23 LAB
ANION GAP SERPL CALC-SCNC: 10 MEQ/L (ref 8–16)
BACTERIA UR CULT: ABNORMAL
BUN SERPL-MCNC: 28 MG/DL (ref 7–22)
CALCIUM SERPL-MCNC: 9.8 MG/DL (ref 8.5–10.5)
CHLORIDE SERPL-SCNC: 112 MEQ/L (ref 98–111)
CO2 SERPL-SCNC: 22 MEQ/L (ref 23–33)
CREAT SERPL-MCNC: 0.7 MG/DL (ref 0.4–1.2)
DEPRECATED RDW RBC AUTO: 47.8 FL (ref 35–45)
ERYTHROCYTE [DISTWIDTH] IN BLOOD BY AUTOMATED COUNT: 12.8 % (ref 11.5–14.5)
GFR SERPL CREATININE-BSD FRML MDRD: > 90 ML/MIN/1.73M2
GLUCOSE BLD STRIP.AUTO-MCNC: 109 MG/DL (ref 70–108)
GLUCOSE BLD STRIP.AUTO-MCNC: 132 MG/DL (ref 70–108)
GLUCOSE BLD STRIP.AUTO-MCNC: 142 MG/DL (ref 70–108)
GLUCOSE BLD STRIP.AUTO-MCNC: 144 MG/DL (ref 70–108)
GLUCOSE SERPL-MCNC: 147 MG/DL (ref 70–108)
HCT VFR BLD AUTO: 38.8 % (ref 37–47)
HGB BLD-MCNC: 13.1 GM/DL (ref 12–16)
MCH RBC QN AUTO: 34 PG (ref 26–33)
MCHC RBC AUTO-ENTMCNC: 33.8 GM/DL (ref 32.2–35.5)
MCV RBC AUTO: 100.8 FL (ref 81–99)
MISC. #1 REFERENCE GROUP TEST: NORMAL
ORGANISM: ABNORMAL
PLATELET # BLD AUTO: 175 THOU/MM3 (ref 130–400)
PMV BLD AUTO: 10.2 FL (ref 9.4–12.4)
POTASSIUM SERPL-SCNC: 3.4 MEQ/L (ref 3.5–5.2)
RBC # BLD AUTO: 3.85 MILL/MM3 (ref 4.2–5.4)
SODIUM SERPL-SCNC: 144 MEQ/L (ref 135–145)
WBC # BLD AUTO: 9.9 THOU/MM3 (ref 4.8–10.8)

## 2024-07-23 PROCEDURE — 6370000000 HC RX 637 (ALT 250 FOR IP): Performed by: PHYSICIAN ASSISTANT

## 2024-07-23 PROCEDURE — 2580000003 HC RX 258: Performed by: PHYSICIAN ASSISTANT

## 2024-07-23 PROCEDURE — 82948 REAGENT STRIP/BLOOD GLUCOSE: CPT

## 2024-07-23 PROCEDURE — 97116 GAIT TRAINING THERAPY: CPT

## 2024-07-23 PROCEDURE — 97530 THERAPEUTIC ACTIVITIES: CPT

## 2024-07-23 PROCEDURE — 80048 BASIC METABOLIC PNL TOTAL CA: CPT

## 2024-07-23 PROCEDURE — 97110 THERAPEUTIC EXERCISES: CPT

## 2024-07-23 PROCEDURE — 76882 US LMTD JT/FCL EVL NVASC XTR: CPT

## 2024-07-23 PROCEDURE — 1200000003 HC TELEMETRY R&B

## 2024-07-23 PROCEDURE — 36415 COLL VENOUS BLD VENIPUNCTURE: CPT

## 2024-07-23 PROCEDURE — 97535 SELF CARE MNGMENT TRAINING: CPT

## 2024-07-23 PROCEDURE — 1200000000 HC SEMI PRIVATE

## 2024-07-23 PROCEDURE — 99232 SBSQ HOSP IP/OBS MODERATE 35: CPT | Performed by: FAMILY MEDICINE

## 2024-07-23 PROCEDURE — 85027 COMPLETE CBC AUTOMATED: CPT

## 2024-07-23 PROCEDURE — 6370000000 HC RX 637 (ALT 250 FOR IP)

## 2024-07-23 PROCEDURE — 99232 SBSQ HOSP IP/OBS MODERATE 35: CPT | Performed by: NURSE PRACTITIONER

## 2024-07-23 RX ORDER — POTASSIUM CHLORIDE 20 MEQ/1
40 TABLET, EXTENDED RELEASE ORAL ONCE
Status: COMPLETED | OUTPATIENT
Start: 2024-07-23 | End: 2024-07-23

## 2024-07-23 RX ADMIN — SUCRALFATE 1 G: 1 TABLET ORAL at 21:34

## 2024-07-23 RX ADMIN — VILAZODONE HYDROCHLORIDE 40 MG: 40 TABLET, FILM COATED ORAL at 09:14

## 2024-07-23 RX ADMIN — LEVOTHYROXINE SODIUM 100 MCG: 0.1 TABLET ORAL at 09:13

## 2024-07-23 RX ADMIN — AMANTADINE HYDROCHLORIDE 100 MG: 100 CAPSULE, GELATIN COATED ORAL at 09:13

## 2024-07-23 RX ADMIN — LITHIUM CARBONATE 450 MG: 150 CAPSULE, GELATIN COATED ORAL at 09:13

## 2024-07-23 RX ADMIN — PANTOPRAZOLE SODIUM 40 MG: 40 TABLET, DELAYED RELEASE ORAL at 09:13

## 2024-07-23 RX ADMIN — LITHIUM CARBONATE 450 MG: 150 CAPSULE, GELATIN COATED ORAL at 21:34

## 2024-07-23 RX ADMIN — LURASIDONE HYDROCHLORIDE 40 MG: 40 TABLET, FILM COATED ORAL at 21:35

## 2024-07-23 RX ADMIN — CALCIUM 500 MG: 500 TABLET ORAL at 21:34

## 2024-07-23 RX ADMIN — LAMOTRIGINE 200 MG: 100 TABLET ORAL at 09:13

## 2024-07-23 RX ADMIN — SODIUM CHLORIDE, PRESERVATIVE FREE 10 ML: 5 INJECTION INTRAVENOUS at 21:35

## 2024-07-23 RX ADMIN — SUCRALFATE 1 G: 1 TABLET ORAL at 09:13

## 2024-07-23 RX ADMIN — POTASSIUM CHLORIDE 40 MEQ: 1500 TABLET, EXTENDED RELEASE ORAL at 09:14

## 2024-07-23 RX ADMIN — SUCRALFATE 1 G: 1 TABLET ORAL at 13:09

## 2024-07-23 RX ADMIN — PANTOPRAZOLE SODIUM 40 MG: 40 TABLET, DELAYED RELEASE ORAL at 17:41

## 2024-07-23 RX ADMIN — SUCRALFATE 1 G: 1 TABLET ORAL at 17:40

## 2024-07-23 ASSESSMENT — PAIN SCALES - GENERAL: PAINLEVEL_OUTOF10: 0

## 2024-07-23 NOTE — PROGRESS NOTES
Neurology Progress Note    Date:7/23/2024       Room:[facility]005-  Patient Name:Haily Parker     YOB: 1960     Age:64 y.o.    CC:   Chief Complaint   Patient presents with    Fatigue    Dizziness    Diplopia        Subjective      History - Taken from Neurology Progress Note - 7/22/24    Haily Parker is a 64 y.o. female with a history of multiple sclerosis, bipolar disorder, depression, GERD, cervical cancer s/p hysterectomy, hypothyroidism, bradycardia s/p PPM, congenital absence of right upper extremity who presented to [facility] ED for the evaluation of fatigue, dysarthria, blurry vision, and gait instability. Patient states that last weekend she began to experience the symptoms and states it has progressively gotten worse. Patient does have a history of multiple sclerosis and follows with outpatient neurology Dr. Gaines. She was previously on Tecfidera but has since discontinued as she experienced undesirable side effects. She last saw Dr. Gaines in May of this year and was started on Amantadine 100mg daily. She states that she is having diplopia when both eyes are open and when one eye is covered it appears blurry. She states that she is having gait instability when she ambulates and that she tends to lean to the left side. She states that her last multiple sclerosis flare was in 1998 and describes similar symptoms during that time. She denies any recent illness, fever, chills, cough, vomiting, headaches, dizziness.      7/19/24 Interval History:   No acute events overnight. She explained her right eye is still a little blurry with her vision being at 90% back to normal. She denies issues with her left eye. She denies diplopia. Her speech is back to her baseline per her sisters at bedside. She explained she is generally weak. Upon chart review, it appears her Tecfidera was stopped in Feb 2023 per patient's requested when she saw Dr. Gaines due to side effects. Her sisters stated  sounds.  Musculoskeletal: No joint swelling or tenderness. Normal tone. No abnormal movements.   Skin: Warm and dry. No rashes or lesions.  Neurologic: See below.    Psychiatric: Alert and oriented, normal insight and thought content.   Peripheral pulses/Cap Refill: DP pulses palpable           Neurological Exam    Mental Status: Awake, alert and oriented.  She stated that she thinks that she is much improved, but does still have some general weakness. Nothing focal on exam. Congenital absence - Right arm (below elbow); wears prosthesis. Other extremities - 4.5/5 strength. Patient has some hypophonia; no aphasia. Denies any headache or blurred vision today.   CN: II-XII  II - No visual field cut noted; PERRL   III,IV,VI - EOMs intact  V,VII - face symmetric, facial sensation intact  VIII - hearing intact to finger rub bilaterally   IX, XX, XII - palate elevation symmetric,tongue midline; shoulder shrug/SCM  Motor: tone - normal              bulk -  normal             strength - RUE - congenital absence of right arm (at the elbow); wears prothesis. Other extremities 4.5/5     Coordination - FTN and HTS equal bilaterally; no dysmetria - unable to do FTN on the right   Sensory: Intact to light touch throughout  Gait: Not tested           Labs/Imaging/Diagnostics   Labs:  CBC:  Recent Labs     07/21/24  0530 07/22/24  0420 07/23/24  0508   WBC 11.9* 7.3 9.9   RBC 3.67* 3.91* 3.85*   HGB 12.4 13.2 13.1   HCT 37.6 40.5 38.8   .5* 103.6* 100.8*    170 175     CHEMISTRIES:  Recent Labs     07/21/24  0530 07/22/24  0420 07/23/24  0508    141 144   K 3.8 3.8 3.4*    108 112*   CO2 23 19* 22*   BUN 23* 27* 28*   CREATININE 0.6 0.8 0.7   GLUCOSE 98 204* 147*     PT/INR:No results for input(s): \"PROTIME\", \"INR\" in the last 72 hours.  APTT:No results for input(s): \"APTT\" in the last 72 hours.  LIVER PROFILE:No results for input(s): \"AST\", \"ALT\", \"BILIDIR\", \"BILITOT\", \"ALKPHOS\" in the last 72

## 2024-07-23 NOTE — CARE COORDINATION
7/23/24, 8:42 AM EDT    DISCHARGE ON GOING EVALUATION    Haily R [facility] day: 5  Location: 3A-05/005-A Reason for admit: Speech disturbance [R47.9]  Unsteady gait [R26.81]  At high risk for falls [Z91.81]     Procedures:   7/19 PICC line placement: done in IR, RIJ.     Imaging since last note: None    Barriers to Discharge: Hospitalist and Neurology following. PM&R recommending SNF. PT/OT/SLP following. Awaiting placement. MARLO.     PCP: Luciana Parr DO  Readmission Risk Score: 10.7    Patient Goals/Plan/Treatment Preferences: From home alone. Has good family support. [facility] also has prosthetic arm. SW following for placement.

## 2024-07-23 NOTE — PROGRESS NOTES
[facility]  INPATIENT PHYSICAL THERAPY  DAILY NOTE  STRZ CCU 3A - 3A-05/005-A    Time In: 1154  Time Out: 1220  Timed Code Treatment Minutes: 26 Minutes  Minutes: 26          Date: 2024  Patient Name: Haily Parker,  Gender:  female        MRN: [id number]  : 1960  (64 y.o.)     Referring Practitioner: Vikas Hernandez PA-C  Diagnosis: Speech disturbance  Additional Pertinent Hx: Per EMR \"64 y.o. female with PMH significant for MS (last flareup in ) with known balance problem, hypothyroidism who presents to the emergency department from home, by private vehicle for evaluation of nausea, difficulty with peripheral vision, speech issues, and difficulty ambulating since  (LNW).  States that she has had a hard time seeing anything in front of her and at times has had double vision.  Associates this visual disturbance with her difficulty to ambulate - veers to the left when attempting to walk straight.  No overt numbness or tingling, however does report LUE and LLE decreased sensation. \" Per imaging \"CT Head WO Contrast 1. Stable CT scan of the brain, no interval change since previous MRI scan of   the brain dated 2024.\"     Prior Level of Function:  Lives With: Alone  Type of Home: Apartment  Home Layout: One level  Home Access: Elevator (2nd floor apartment)  Home Equipment: Cane - Quad   Bathroom Shower/Tub: Tub/Shower unit  Bathroom Toilet: Standard  Bathroom Equipment: Shower chair, Grab bars in shower    ADL Assistance: Independent  Homemaking Assistance: Independent  Ambulation Assistance: Independent  Transfer Assistance: Independent  Active : No  Additional Comments: Pt reports using no AD PLOF. Friend takes Pt to grocery store. 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    Restrictions/Precautions:  Restrictions/Precautions: Fall Risk, Contact Precautions  Position Activity Restriction  Other position/activity restrictions: SERAFIN ayalahesis, hx of MS

## 2024-07-23 NOTE — CARE COORDINATION
7/23/24, 3:13 PM EDT    DISCHARGE PLANNING EVALUATION     ANT spoke with Maday at [facility] and they have initiated pre-cert today.  ANT updated sister Deanna also.

## 2024-07-23 NOTE — PROGRESS NOTES
sleeping upon arrival, easily woke and agreeable to OT session with ADL completion. Pt motivated to participate stating \"I need to get better for my patients.\"     PAIN: 0/10:     Vitals: Blood Pressure: 138/78  Oxygen: 95% on RA  Heart Rate: 63 bpm  All vitals remained stable throughout    COGNITION: WFL    ADL:   Feeding: Setup.  Eating breakfast upon departure. Setup to open milk carton only  Grooming: Stand By Assistance.  Washing/drying face, combing hair, brushing teeth while standing at sink. Pt requires minimal assist for thoroughness with use of shampoo cap  Bathing: Stand By Assistance.  Sponge bath while standing at sink. Minimal cues to sit to wash LB to increase safety. Pt utilizes countertop for support via RUE. CHG was utilized during bathing ADLs this session with skilled education on role of CHG re: infection prevention and how to utilize within ADLs safely.  Patient demonstrated good understanding.   Upper Extremity Dressing: Stand By Assistance.  Donning gown  Lower Extremity Dressing: Stand By Assistance.  Donning depends, completed while standing with stabilization on countertop with RUE.   Footwear Management: Supervision and with increased time for completion.  Donning socks while seated at EOB  Toileting: Stand By Assistance.  Hygiene, clothing mgmt  Toilet Transfer: Supervision.  .    IADL:   Not Tested    BALANCE:  Sitting Balance:  Independent.    Standing Balance: Stand By Assistance. Pt tolerates standing ~15 minutes during ADL completion    BED MOBILITY:  Supine to Sit: Stand By Assistance, with head of bed raised, with rail, with increased time for completion    Scooting: Modified Independent      TRANSFERS:  Sit to Stand:  Stand By Assistance, with increased time for completion.    Stand to Sit: Stand By Assistance, with increased time for completion.    **Completed X2 trials during session    FUNCTIONAL MOBILITY:  Assistive Device: Quad Cane  Assist Level:  Stand By Assistance and  with increased time for completion.   Distance: To and from bathroom, Within room, and Household distances within unit   Functional mobility completed to increase overall activity tolerance needed for ADL tasks.       AM-PAC Inpatient Daily Activity Raw Score: 19  AM-PAC Inpatient ADL T-Scale Score : 40.22  ADL Inpatient CMS 0-100% Score: 42.8    Modified Yanique Scale:  Not Applicable    ASSESSMENT:     Activity Tolerance:  Patient tolerance of  treatment: Good treatment tolerance and Limited by fatigue       Discharge Recommendations: ECF with OT  Equipment Recommendations: Other: monitor pending progress  Plan: Times Per Week: 6x; frequency decreased to 4-5x this date due to good progress towards goals.   Current Treatment Recommendations: Functional mobility training, Balance training, Endurance training, Safety education & training, Self-Care / ADL    Education:  Learners: Patient  Plan of Care, ADL's, and Importance of Increasing Activity    Goals  Short Term Goals  Time Frame for Short Term Goals: until discharge  Short Term Goal 1: Pt will complete 6-8 min dynamic standing task with S & 0 vcs for safety for increased balance & safety with returning to IADLs at home. Goal partially met, revised.   Short Term Goal 2: Pt will complete mobility to/from bathroom with cane prn, S, & 0-1 vcs for safety. Goal revised.   Short Term Goal 3: Pt will complete LE dressing with S & adaptative strategies prn. Goal revised.   Long Term Goals  Time Frame for Long Term Goals : No LTG set d/t short ELOS    Revised Short-Term Goals  Short Term Goals  Time Frame for Short Term Goals: until discharge  Short Term Goal 1: Pt will complete IADL tasks with MI to increase independence with homemaking tasks.  Short Term Goal 2: Pt will increase activity tolerance for functional mobility household distances with MI while demoing safe item retrieval/transport in prep for ADL/IADL completion.  Short Term Goal 3: Pt will complete showering

## 2024-07-23 NOTE — PROGRESS NOTES
PROGRESS NOTE      Patient:  Haily Parker  Unit/Bed:3A-05/005-A  YOB: 1960  MRN: [id number]   Acct: [id number]    PCP: Luciana Parr DO    Date of Admission: 7/18/2024 LOS: 5    Date of Evaluation:  7/23/2024    Anticipated Discharge: pending placement, pre cert started    Assessment/Plan:    Vision changes, speech disturbance, gait disturbance, fatigue, generalized weakness with multiple sclerosis with concern for flare/exacerbation --> apprec neuro assist   -- MRI brain and C spine 7/19/24 no new lesions or signs of active lesions --> neuro started on high dose solu-medrol 1g IV daily x 3 days on 7/18 and to complete 7/20   -- aquaporin 4 (p) for neuromyelitis optica. will need to follow-up with PCP/outpatient neurology for results  -- Continue PT/OT/ST   -- diet per speech (currently tolerating regular diet)  -- IPR was consulted and recommend SNF at [place]  -- Follows outpt with neuro Dr. Gaines - cont amantadine  -- TSH 7/18 WNL.   -- Trop (-) thus unlikely cardiac etiology but monitor.   -- Lithium level is 1.0, therapeutic  -- 7/21 patient was noted to be slower cognitively.  CXR 7/21 and UA unremarkable.  Infectious cause ruled out   -- Per neuro recs we will continue IV Solu-Medrol (7/18-7/22)  --Neurology following and recommend that once patient is back to baseline follow-up with Dr. Gaines OP  - discussed case with neurology on 7/23 for recs regarding steroids on DC (whether taper or burst) since Pt tolerated Iv steroids well and family is concerned that pt will have another episode prior to their neuro appt OP. Will follow up recs.      Leukocytosis , resolved  --WBC up to 15.3 on 7/20 from 7's on 7/18 and 7/19 --> improved to 9.9 on 7/23  --most likely due to steroids as pt is afebrile with no other signs of infection.  Diarrhea had subsided.    -- Repeat infectious workup over the weekend unremarkable     Diarrhea   -- improved - no need for GI panel     Right neck    seen.            **This report has been created using voice recognition software.  It may contain   minor errors which are inherent in voice recognition technology.**            Electronically signed by Dr. Uli Dumont      MRI CERVICAL SPINE W WO CONTRAST   Final Result   1. No evidence of white matter lesions in the cervical spinal cord.   2. Moderate severity spinal canal stenosis at the C4-5 level.               **This report has been created using voice recognition software. It may contain   minor errors which are inherent in voice recognition technology.**      Electronically signed by Dr. Jannet Jaramillo      MRI BRAIN W WO CONTRAST   Final Result      Stable white matter lesions in the brain.  No evidence of active demyelination.            **This report has been created using voice recognition software. It may contain   minor errors which are inherent in voice recognition technology.**                     Electronically signed by Dr. Jannet Jaramillo      IR FLUORO GUIDED CVA DEVICE PLMT/REPLACE/REMOVAL   Final Result   1. Status post successful PICC line insertion.   2.  Patient developed a large right side cervical hematoma during this procedure   which was successfully manage with manual pressure and an ice bag. A followup   CTA neck revealed no active extravasation.             **This report has been created using voice recognition software.  It may contain   minor errors which are inherent in voice recognition technology.**         Electronically signed by Dr. Uli Dumont      CTA HEAD W WO CONTRAST   Final Result      1. Large hematoma in the right neck soft tissues. This is tracking in the   subcutaneous tissues. There is no evidence of arterial extravasation of   contrast.   2. The right-sided PICC line enters in the expected location of the right IJ and   right subclavian vein junction. Those veins are not opacified with contrast. The   catheter has a normal course in the SVC.   3. No evidence of

## 2024-07-23 NOTE — PROGRESS NOTES
[facility]  INPATIENT SPEECH THERAPY  STRZ CCU 3A  DAILY NOTE    TIME   SLP Individual Minutes  Time In: 1023  Time Out: 1033  Minutes: 10  Timed Code Treatment Minutes: 10 Minutes       Date: 2024  Patient Name: Haily Parker      CSN: [id number]   : 1960  (64 y.o.)  Gender: female   Referring Physician:  Vikas Hernandez PA-C   Diagnosis: Speech disturbance   Precautions: Fall Risk   Current Diet: Regular Diet with Thin Liquids  Respiratory Status: Room Air  Swallowing Strategies: Standard Universal Swallow Precautions  Date of Last MBS/FEES: Not Applicable    Pain:  No pain reported.    Subjective:  Patient seen with RN Hanna's approval. Physician present rounding in room prior to ST session. Patient upright in armchair pleasant and cooperative with session. Family present in room report cognition as minimal concern stating, \"she has always gotten by\". Endorsing mobility as highest rehab priority d/t patient previously walking ~4 blocks from house to volunteer.     Short-Term Goals:  SHORT TERM GOAL #1:  Goal 1: Patient will complete recall tasks (immediate, delayed, working) with focus on utilization of compensatory memory strategies with 65% given mod cues in order to improve recall of novel information.  INTERVENTIONS:  Recall of WRAP: indep recall of write it down, total assist required to recall repeat, associate and picture it.    24 hr Delayed Recall of Grocery List  Applesauce: Fo2  Opal litter: Fo2  Propel: total assist  Juice: mod verbal cues  Milk: min verbal associative cue  Laundry soap:Fo2 cueing    SHORT TERM GOAL #2:  Goal 2: Patient will complete executive functioning and reasoning tasks with 70% given mod cues in order to resume ADL/IADLs tasks with least amount of supervision/assistance.  INTERVENTIONS:   Adding coin values: 3/4 indep, 1/4 min verbal cues  *poor visual orientation of written problems requiring bookmarker to aide in maintained row    SHORT  TERM GOAL #3:  Goal 3: Patient will complete thought organziation tasks with 70% given mod cues to improve processing speed and organization during ADL/IADL completion.  INTERVENTIONS: DNT on this date d/t focus on other goals    SHORT TERM GOAL #4:  Goal 4: Patient will complete attention tasks (sustained, selective, divided, alternating) with no more than 8 errors/redirections in order to resume multi-tasking during ADL/IADL completion and make safe return to active driving.  INTERVENTIONS: DNT on this date d/t focus on other goals    Long-Term Goals:  No LTGs established due to short ELOS.    Functional Oral Intake Scale: Total Oral Intake: 7.  Total oral intake with no restrictions    EDUCATION:  Learner: Patient and Family  Education:  Reviewed ST goals and Plan of Care, Reviewed recommendations for follow-up, Education Related to Health Promotion and Wellness, and Home Safety Education  Evaluation of Education: Verbalizes understanding and Needs further instruction    ASSESSMENT/PLAN:  Activity Tolerance:  Patient tolerance of  treatment: good.      Assessment/Plan: Patient progressing toward established goals.  Continues to require skilled care of licensed speech pathologist to progress toward achievement of established goals and plan of care..     Plan for Next Session: attention, problem solving  Discharge Recommendations: Home Health, SNF, and Continue to Assess Pending Progress     Ave Biggs MA, CCC-SLP [id number]

## 2024-07-24 LAB
ANION GAP SERPL CALC-SCNC: 7 MEQ/L (ref 8–16)
BUN SERPL-MCNC: 28 MG/DL (ref 7–22)
CALCIUM SERPL-MCNC: 10 MG/DL (ref 8.5–10.5)
CHLORIDE SERPL-SCNC: 113 MEQ/L (ref 98–111)
CO2 SERPL-SCNC: 23 MEQ/L (ref 23–33)
CREAT SERPL-MCNC: 0.9 MG/DL (ref 0.4–1.2)
DEPRECATED RDW RBC AUTO: 48.4 FL (ref 35–45)
ERYTHROCYTE [DISTWIDTH] IN BLOOD BY AUTOMATED COUNT: 13.2 % (ref 11.5–14.5)
GFR SERPL CREATININE-BSD FRML MDRD: 71 ML/MIN/1.73M2
GLUCOSE BLD STRIP.AUTO-MCNC: 178 MG/DL (ref 70–108)
GLUCOSE BLD STRIP.AUTO-MCNC: 70 MG/DL (ref 70–108)
GLUCOSE BLD STRIP.AUTO-MCNC: 90 MG/DL (ref 70–108)
GLUCOSE BLD STRIP.AUTO-MCNC: 91 MG/DL (ref 70–108)
GLUCOSE BLD STRIP.AUTO-MCNC: 95 MG/DL (ref 70–108)
GLUCOSE SERPL-MCNC: 96 MG/DL (ref 70–108)
HCT VFR BLD AUTO: 38.1 % (ref 37–47)
HGB BLD-MCNC: 12.8 GM/DL (ref 12–16)
MAGNESIUM SERPL-MCNC: 2.3 MG/DL (ref 1.6–2.4)
MCH RBC QN AUTO: 33.8 PG (ref 26–33)
MCHC RBC AUTO-ENTMCNC: 33.6 GM/DL (ref 32.2–35.5)
MCV RBC AUTO: 100.5 FL (ref 81–99)
PLATELET # BLD AUTO: 174 THOU/MM3 (ref 130–400)
PMV BLD AUTO: 10.1 FL (ref 9.4–12.4)
POTASSIUM SERPL-SCNC: 3.9 MEQ/L (ref 3.5–5.2)
RBC # BLD AUTO: 3.79 MILL/MM3 (ref 4.2–5.4)
SODIUM SERPL-SCNC: 143 MEQ/L (ref 135–145)
WBC # BLD AUTO: 8.6 THOU/MM3 (ref 4.8–10.8)

## 2024-07-24 PROCEDURE — 80048 BASIC METABOLIC PNL TOTAL CA: CPT

## 2024-07-24 PROCEDURE — 2580000003 HC RX 258: Performed by: PHYSICIAN ASSISTANT

## 2024-07-24 PROCEDURE — 85027 COMPLETE CBC AUTOMATED: CPT

## 2024-07-24 PROCEDURE — 1200000000 HC SEMI PRIVATE

## 2024-07-24 PROCEDURE — 82948 REAGENT STRIP/BLOOD GLUCOSE: CPT

## 2024-07-24 PROCEDURE — 36415 COLL VENOUS BLD VENIPUNCTURE: CPT

## 2024-07-24 PROCEDURE — 97535 SELF CARE MNGMENT TRAINING: CPT

## 2024-07-24 PROCEDURE — 6370000000 HC RX 637 (ALT 250 FOR IP): Performed by: PHYSICIAN ASSISTANT

## 2024-07-24 PROCEDURE — 97530 THERAPEUTIC ACTIVITIES: CPT

## 2024-07-24 PROCEDURE — 99233 SBSQ HOSP IP/OBS HIGH 50: CPT | Performed by: STUDENT IN AN ORGANIZED HEALTH CARE EDUCATION/TRAINING PROGRAM

## 2024-07-24 PROCEDURE — 83735 ASSAY OF MAGNESIUM: CPT

## 2024-07-24 PROCEDURE — 97116 GAIT TRAINING THERAPY: CPT

## 2024-07-24 RX ADMIN — LITHIUM CARBONATE 450 MG: 150 CAPSULE, GELATIN COATED ORAL at 19:51

## 2024-07-24 RX ADMIN — SUCRALFATE 1 G: 1 TABLET ORAL at 06:35

## 2024-07-24 RX ADMIN — LAMOTRIGINE 200 MG: 100 TABLET ORAL at 09:26

## 2024-07-24 RX ADMIN — PANTOPRAZOLE SODIUM 40 MG: 40 TABLET, DELAYED RELEASE ORAL at 17:33

## 2024-07-24 RX ADMIN — SUCRALFATE 1 G: 1 TABLET ORAL at 09:26

## 2024-07-24 RX ADMIN — VILAZODONE HYDROCHLORIDE 40 MG: 40 TABLET, FILM COATED ORAL at 09:27

## 2024-07-24 RX ADMIN — LURASIDONE HYDROCHLORIDE 40 MG: 40 TABLET, FILM COATED ORAL at 19:51

## 2024-07-24 RX ADMIN — SUCRALFATE 1 G: 1 TABLET ORAL at 17:33

## 2024-07-24 RX ADMIN — LITHIUM CARBONATE 450 MG: 150 CAPSULE, GELATIN COATED ORAL at 09:26

## 2024-07-24 RX ADMIN — LEVOTHYROXINE SODIUM 100 MCG: 0.1 TABLET ORAL at 06:35

## 2024-07-24 RX ADMIN — PANTOPRAZOLE SODIUM 40 MG: 40 TABLET, DELAYED RELEASE ORAL at 06:35

## 2024-07-24 RX ADMIN — SUCRALFATE 1 G: 1 TABLET ORAL at 19:51

## 2024-07-24 RX ADMIN — AMANTADINE HYDROCHLORIDE 100 MG: 100 CAPSULE, GELATIN COATED ORAL at 09:26

## 2024-07-24 RX ADMIN — CALCIUM 500 MG: 500 TABLET ORAL at 19:52

## 2024-07-24 RX ADMIN — SODIUM CHLORIDE, PRESERVATIVE FREE 10 ML: 5 INJECTION INTRAVENOUS at 09:26

## 2024-07-24 RX ADMIN — SODIUM CHLORIDE, PRESERVATIVE FREE 10 ML: 5 INJECTION INTRAVENOUS at 19:52

## 2024-07-24 ASSESSMENT — PAIN SCALES - GENERAL
PAINLEVEL_OUTOF10: 0

## 2024-07-24 NOTE — CARE COORDINATION
7/24/24, 2:38 PM EDT    DISCHARGE ON GOING EVALUATION    Haily R [facility] day: 6  Location: 3A-05/005-A Reason for admit: Speech disturbance [R47.9]  Unsteady gait [R26.81]  At high risk for falls [Z91.81]     Procedures:   7/19 PICC line placement: done in IR, RIJ.     Imaging since last note: None    Barriers to Discharge: Hospitalist and Neurology following. PM&R recommending SNF. PT/OT/SLP following. Awaiting placement. MARLO.     PCP: Luciana Parr DO  Readmission Risk Score: 10.6    Patient Goals/Plan/Treatment Preferences: From home alone. Has good family support. [facility], also has prosthetic arm. SW following for placement.

## 2024-07-24 NOTE — PROGRESS NOTES
PROGRESS NOTE      Patient:  Haily Parker  Unit/Bed:3A-05/005-A  YOB: 1960  MRN: [id number]   Acct: [id number]    PCP: Luciana Parr DO    Date of Admission: 7/18/2024 LOS: 6    Date of Evaluation:  7/24/2024    Anticipated Discharge: pending placement    Assessment/Plan:    Vision changes, speech disturbance, gait disturbance, fatigue, generalized weakness with multiple sclerosis with concern for flare/exacerbation --> apprec neuro assist   -- MRI brain and C spine 7/19/24 no new lesions or signs of active lesions --> neuro started on high dose solu-medrol 1g IV daily x 3 days on 7/18 and to complete 7/20   -- aquaporin 4 (p) for neuromyelitis optica. will need to follow-up with PCP/outpatient neurology for results  -- Continue PT/OT/ST   -- diet per speech (currently tolerating regular diet)  -- IPR was consulted and recommend SNF at [place]  -- Follows outpt with neuro Dr. Gaines - cont amantadine  -- TSH 7/18 WNL.   -- Trop (-) thus unlikely cardiac etiology but monitor.   -- Lithium level is 1.0, therapeutic  -- 7/21 patient was noted to be slower cognitively.  CXR 7/21 and UA unremarkable.  Infectious cause ruled out   -- Per neuro recs we will continue IV Solu-Medrol (7/18-7/22). Neuro signed off 7/24 with plans to continue on the Amantadine 100 mg daily and follow up with Dr. Gaines in 1 one month      Leukocytosis , resolved  --WBC up to 15.3 on 7/20 from 7's on 7/18 and 7/19 --> improved to 8.6 on 7/24  --most likely due to steroids as pt is afebrile with no other signs of infection.  Diarrhea had subsided.    -- Repeat infectious workup over the weekend unremarkable     Diarrhea   -- improved - no need for GI panel     Right neck hematoma due to right IJ PICC placement , resolved  -- noted on CT neck 7/18 s/p IR PICC placement   --H&H stable, no airway compromise or swallowing difficulties.    --Stable on RA  -- on 7/23, PICC noted to be draining serosanguineous fluid.  --  General: No focal deficit present.      Mental Status: She is alert.   Psychiatric:         Mood and Affect: Mood normal.        All labs reviewed and interpreted by me:  Labs:   Recent Labs     07/22/24  0420 07/23/24  0508 07/24/24 0422   WBC 7.3 9.9 8.6   HGB 13.2 13.1 12.8   HCT 40.5 38.8 38.1    175 174     Recent Labs     07/22/24  0420 07/23/24  0508 07/24/24 0422    144 143   K 3.8 3.4* 3.9    112* 113*   CO2 19* 22* 23   BUN 27* 28* 28*   CREATININE 0.8 0.7 0.9   CALCIUM 10.0 9.8 10.0     No results for input(s): \"AST\", \"ALT\", \"BILIDIR\", \"BILITOT\", \"ALKPHOS\" in the last 72 hours.  No results for input(s): \"INR\" in the last 72 hours.  No results for input(s): \"CKTOTAL\", \"TROPONINT\" in the last 72 hours.    Urinalysis:      Lab Results   Component Value Date/Time    NITRU NEGATIVE 07/21/2024 06:10 PM    WBCUA 5-9 07/21/2024 06:10 PM    BACTERIA NONE SEEN 07/21/2024 06:10 PM    RBCUA 0-2 07/21/2024 06:10 PM    BLOODU NEGATIVE 07/21/2024 06:10 PM    GLUCOSEU NEGATIVE 07/21/2024 06:10 PM       All radiology images and reports reviewed and interpreted by me:  Radiology:  US EXTREMITY RIGHT NON VASC LIMITED   Final Result      No mass or fluid collection.            **This report has been created using voice recognition software. It may contain   minor errors which are inherent in voice recognition technology.**         Electronically signed by Dr. Varun Ybarra      XR CHEST (2 VW)   Final Result   1. Normal heart size. Permanent dual chamber pacemaker. Right jugular PICC line,   catheter tip at cavoatrial junction.   2. Kyphotic positioning of the patient. No acute infiltrates or effusions are   seen.            **This report has been created using voice recognition software.  It may contain   minor errors which are inherent in voice recognition technology.**            Electronically signed by Dr. Uli Dumont      MRI CERVICAL SPINE W WO CONTRAST   Final Result   1. No evidence of

## 2024-07-24 NOTE — PLAN OF CARE
Problem: Discharge Planning  Goal: Discharge to home or other facility with appropriate resources  Outcome: Progressing  Flowsheets (Taken 7/23/2024 2100 by Juli Vela, RN)  Discharge to home or other facility with appropriate resources:   Identify barriers to discharge with patient and caregiver   Identify discharge learning needs (meds, wound care, etc)     Problem: Safety - Adult  Goal: Free from fall injury  Outcome: Progressing     Problem: Skin/Tissue Integrity  Goal: Absence of new skin breakdown  Description: 1.  Monitor for areas of redness and/or skin breakdown  2.  Assess vascular access sites hourly  3.  Every 4-6 hours minimum:  Change oxygen saturation probe site  4.  Every 4-6 hours:  If on nasal continuous positive airway pressure, respiratory therapy assess nares and determine need for appliance change or resting period.  Outcome: Progressing     Problem: Pain  Goal: Verbalizes/displays adequate comfort level or baseline comfort level  Outcome: Progressing  Flowsheets (Taken 7/23/2024 2100 by Juli Vela, RN)  Verbalizes/displays adequate comfort level or baseline comfort level:   Encourage patient to monitor pain and request assistance   Administer analgesics based on type and severity of pain and evaluate response

## 2024-07-24 NOTE — PROGRESS NOTES
[facility]  INPATIENT PHYSICAL THERAPY  DAILY NOTE  STRZ CCU 3A - 3A-05/005-A    Time In: 1156  Time Out: 1227  Timed Code Treatment Minutes: 31 Minutes  Minutes: 31          Date: 2024  Patient Name: Haily Parker,  Gender:  female        MRN: [id number]  : 1960  (64 y.o.)     Referring Practitioner: Vikas Hernandez PA-C  Diagnosis: Speech disturbance  Additional Pertinent Hx: Per EMR \"64 y.o. female with PMH significant for MS (last flareup in ) with known balance problem, hypothyroidism who presents to the emergency department from home, by private vehicle for evaluation of nausea, difficulty with peripheral vision, speech issues, and difficulty ambulating since  (LNW).  States that she has had a hard time seeing anything in front of her and at times has had double vision.  Associates this visual disturbance with her difficulty to ambulate - veers to the left when attempting to walk straight.  No overt numbness or tingling, however does report LUE and LLE decreased sensation. \" Per imaging \"CT Head WO Contrast 1. Stable CT scan of the brain, no interval change since previous MRI scan of   the brain dated 2024.\"     Prior Level of Function:  Lives With: Alone  Type of Home: Apartment  Home Layout: One level  Home Access: Elevator (2nd floor apartment)  Home Equipment: Cane - Quad   Bathroom Shower/Tub: Tub/Shower unit  Bathroom Toilet: Standard  Bathroom Equipment: Shower chair, Grab bars in shower    ADL Assistance: Independent  Homemaking Assistance: Independent  Ambulation Assistance: Independent  Transfer Assistance: Independent  Active : No  Additional Comments: Pt reports using no AD PLOF. Friend takes Pt to grocery store. 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    Restrictions/Precautions:  Restrictions/Precautions: Fall Risk, Contact Precautions  Position Activity Restriction  Other position/activity restrictions: RUE prothesis, hx of MS  activities  General Plan:  (5-6X N)    Education:  Learners: Patient  Patient Education: Plan of Care, Education Related to Diagnosis, Transfers, Gait, Use of Gait Belt, Health Promotion and Wellness Education    Goals:  Patient Goals : \"To get stronger and go home\"  Short Term Goals  Time Frame for Short Term Goals: by discharge  Short Term Goal 1: Pt will demo bed mobility modified independently to inc functional independence  Short Term Goal 2: Pt will demo sit to/from stand transfer with LRAD to inc funcitonal independence  Short Term Goal 3: Pt will amb 100 ft with LRAD modified independently to inc functional independence  Short Term Goal 4: Pt will demo functional outcomes assessment  Long Term Goals  Time Frame for Long Term Goals : NA due to short ELOS    Following session, patient left in safe position with all fall risk precautions in place.  123

## 2024-07-24 NOTE — PROGRESS NOTES
[facility]  STRZ CCU 3A  Occupational Therapy  Daily Note  Time:   Time In: 1039  Time Out: 1114  Timed Code Treatment Minutes: 35 Minutes  Minutes: 35          Date: 2024  Patient Name: Haily Parker,   Gender: female      Room: [facility]05/005-A  MRN: [id number]  : 1960  (64 y.o.)  Referring Practitioner: Vikas Hernandez PA-C  Diagnosis: speech disturbance  Additional Pertinent Hx: Per ER note on 2024:64 y.o. female with PMH significant for MS (last flareup in ) with known balance problem, hypothyroidism who presents to the emergency department from home, by private vehicle for evaluation of nausea, difficulty with peripheral vision, speech issues, and difficulty ambulating since  (LNW).  States that she has had a hard time seeing anything in front of her and at times has had double vision.  Associates this visual disturbance with her difficulty to ambulate - veers to the left when attempting to walk straight.  No overt numbness or tingling, however does report LUE and LLE decreased sensation.    Restrictions/Precautions:  Restrictions/Precautions: Fall Risk, Contact Precautions  Position Activity Restriction  Other position/activity restrictions: RUE prothesis, hx of MS     Social/Functional History:  Lives With: Alone  Type of Home: Apartment  Home Layout: One level  Home Access: Elevator (2nd floor apartment)  Home Equipment: Cane - Quad   Bathroom Shower/Tub: Tub/Shower unit  Bathroom Toilet: Standard  Bathroom Equipment: Shower chair, Grab bars in shower       ADL Assistance: Independent  Homemaking Assistance: Independent  Ambulation Assistance: Independent  Transfer Assistance: Independent    Active : No  Patient's  Info: Pt uses public transportation  Mode of Transportation: Bus     Additional Comments: Pt reports using no AD PLOF. Friend takes Pt to grocery store. 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    SUBJECTIVE: Isaiah BAL approved  therapy prior. Upon OT arrival, pt was lying in bed and visiting with her 2 sisters present. Pt was agreeable to therapy, expressing desire to complete ADLs and take a walk this date. Pt's sisters left room for ADLs and returned at end of session. Pt appears fatigues throughout session but denies.     PAIN: Pt does not report    Vitals: Vitals not assessed per clinical judgement, see nursing flowsheet    COGNITION: WFL    ADL:   Grooming: Supervision.  Close, to brush teeth in standing at sink.  Bathing: Stand By Assistance, Contact Guard Assistance, with verbal cues , and with increased time for completion.  Pt completes sponge bath mainly in standing at sink. Pt begins to wash BLE with extreme forward flexed posture, with OT cueing to sit in chair provided for increased safety. Pt receptive to cues for safety and completes bath in sitting for feet and LE, standing X1 for kaylah-area washing.  Upper Extremity Dressing: Minimal Assistance.  Pt requires assist to orient and thread BUE through gown when donning and to tie/untie.  Lower Extremity Dressing: Supervision and with increased time for completion.  Pt doffs brief in standing with RUE supporting trunk on sink, dons brief in sitting.   Footwear Management: Supervision and Dependent.  Pt at S for doffing slipper socks, Dep for donning while sitting d/t pt attempts to  bare feet with intent to walk to sink for hygiene-- concerns for safety with OT requesting to don socks for safety prior to stand..  Toileting: Supervision.     Toilet Transfer: Stand By Assistance.   .    IADL:   Not Tested    BALANCE:  Sitting Balance:  Modified Independent. Sitting EOB and in recliner  Standing Balance: Supervision, Contact Guard Assistance. Pt initially with CGA d/t appearing fatigued, pt progresses to Sup with increased movement for static and dynamic standing.    BED MOBILITY:  Supine to Sit: Supervision      TRANSFERS:  Sit to Stand:  Contact Guard Assistance. To stand

## 2024-07-25 VITALS
HEART RATE: 66 BPM | TEMPERATURE: 98.4 F | SYSTOLIC BLOOD PRESSURE: 139 MMHG | OXYGEN SATURATION: 97 % | BODY MASS INDEX: 26.63 KG/M2 | WEIGHT: 123.46 LBS | DIASTOLIC BLOOD PRESSURE: 77 MMHG | HEIGHT: 57 IN | RESPIRATION RATE: 11 BRPM

## 2024-07-25 LAB
GLUCOSE BLD STRIP.AUTO-MCNC: 90 MG/DL (ref 70–108)
GLUCOSE BLD STRIP.AUTO-MCNC: 92 MG/DL (ref 70–108)

## 2024-07-25 PROCEDURE — 2580000003 HC RX 258: Performed by: PHYSICIAN ASSISTANT

## 2024-07-25 PROCEDURE — 82948 REAGENT STRIP/BLOOD GLUCOSE: CPT

## 2024-07-25 PROCEDURE — 6370000000 HC RX 637 (ALT 250 FOR IP): Performed by: PHYSICIAN ASSISTANT

## 2024-07-25 PROCEDURE — 97535 SELF CARE MNGMENT TRAINING: CPT

## 2024-07-25 PROCEDURE — 99239 HOSP IP/OBS DSCHRG MGMT >30: CPT | Performed by: STUDENT IN AN ORGANIZED HEALTH CARE EDUCATION/TRAINING PROGRAM

## 2024-07-25 RX ADMIN — SODIUM CHLORIDE, PRESERVATIVE FREE 10 ML: 5 INJECTION INTRAVENOUS at 08:23

## 2024-07-25 RX ADMIN — LEVOTHYROXINE SODIUM 100 MCG: 0.1 TABLET ORAL at 04:34

## 2024-07-25 RX ADMIN — VILAZODONE HYDROCHLORIDE 40 MG: 40 TABLET, FILM COATED ORAL at 08:25

## 2024-07-25 RX ADMIN — SUCRALFATE 1 G: 1 TABLET ORAL at 04:34

## 2024-07-25 RX ADMIN — LITHIUM CARBONATE 450 MG: 150 CAPSULE, GELATIN COATED ORAL at 08:24

## 2024-07-25 RX ADMIN — AMANTADINE HYDROCHLORIDE 100 MG: 100 CAPSULE, GELATIN COATED ORAL at 08:25

## 2024-07-25 RX ADMIN — PANTOPRAZOLE SODIUM 40 MG: 40 TABLET, DELAYED RELEASE ORAL at 04:34

## 2024-07-25 RX ADMIN — SUCRALFATE 1 G: 1 TABLET ORAL at 11:39

## 2024-07-25 RX ADMIN — LAMOTRIGINE 200 MG: 100 TABLET ORAL at 08:25

## 2024-07-25 ASSESSMENT — PAIN SCALES - GENERAL: PAINLEVEL_OUTOF10: 0

## 2024-07-25 NOTE — CARE COORDINATION
24, 7:21 AM EDT    DISCHARGE PLANNING EVALUATION    SW received call from pts insurance requesting a rihk-pi-vrni.  # [phone] option 5, need pts name,  and ID # [id number].  This needs completed before noon today.  SW provided information to the provider.     8:34 AM  SW was updated by attending that [id number] was denied.    SW updated Maday at [facility].    SW updated pts sister Deanna on insurance denial for ECF.  SW answered questions regarding insurance. Discussed home health as an option.  Deanna requested SW speak with pt once she arrives this morning.

## 2024-07-25 NOTE — PROGRESS NOTES
[facility]  STRZ CCU 3A  Occupational Therapy  Daily Note  Time:   Time In: 933  Time Out: 956  Timed Code Treatment Minutes: 23 Minutes  Minutes: 23          Date: 2024  Patient Name: Haily Parker,   Gender: female      Room: [facility]05/005-A  MRN: [id number]  : 1960  (64 y.o.)  Referring Practitioner: Vikas Hernandez PA-C  Diagnosis: speech disturbance  Additional Pertinent Hx: Per ER note on 2024:64 y.o. female with PMH significant for MS (last flareup in ) with known balance problem, hypothyroidism who presents to the emergency department from home, by private vehicle for evaluation of nausea, difficulty with peripheral vision, speech issues, and difficulty ambulating since  (LNW).  States that she has had a hard time seeing anything in front of her and at times has had double vision.  Associates this visual disturbance with her difficulty to ambulate - veers to the left when attempting to walk straight.  No overt numbness or tingling, however does report LUE and LLE decreased sensation.    Restrictions/Precautions:  Restrictions/Precautions: Fall Risk, Contact Precautions  Position Activity Restriction  Other position/activity restrictions: RUE prothesis, hx of MS     Social/Functional History:  Lives With: Alone  Type of Home: Apartment  Home Layout: One level  Home Access: Elevator (2nd floor apartment)  Home Equipment: Cane - Quad   Bathroom Shower/Tub: Tub/Shower unit  Bathroom Toilet: Standard  Bathroom Equipment: Shower chair, Grab bars in shower       ADL Assistance: Independent  Homemaking Assistance: Independent  Ambulation Assistance: Independent  Transfer Assistance: Independent    Active : No  Patient's  Info: Pt uses public transportation  Mode of Transportation: Bus     Additional Comments: Pt reports using no AD PLOF. Friend takes Pt to grocery store. 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    SUBJECTIVE: Pt supine in bed

## 2024-07-25 NOTE — PROGRESS NOTES
[facility]  PHYSICAL THERAPY MISSED TREATMENT NOTE  STRZ CCU 3A    Date: 2024  Patient Name: Haily Parker        MRN: [id number]   : 1960  (64 y.o.)  Gender: female   Referring Practitioner: Vikas Hernandez PA-C  Diagnosis: Speech disturbance         REASON FOR MISSED TREATMENT:  Missed Treat.      Patient in transport chair upon PTA arrival. Patient d/cing home with HH at this time. Patient's sister present. PTA allowed time for questions. Both patient and sister reporting no questions at this time.

## 2024-07-25 NOTE — CARE COORDINATION
7/25/24, 10:41 AM EDT    Patient goals/plan/ treatment preferences discussed by  and .  Patient goals/plan/ treatment preferences reviewed with patient/ family.  Patient/ family verbalize understanding of discharge plan and are in agreement with goal/plan/treatment preferences.  Understanding was demonstrated using the teach back method.  AVS provided by RN at time of discharge, which includes all necessary medical information pertaining to the patients current course of illness, treatment, post-discharge goals of care, and treatment preferences.     Services At/After Discharge: Home Health, Aide services, Nursing service, OT, and PT    Post-acute (PAC) provider list was provided to patient. Patient was informed of their freedom to choose PAC provider. Discussed and offered to show the patient the relevant PAC Providers quality and resource use measures on Medicare Compare web site via computer based on patient's goals of care and treatment preferences. Questions regarding selection process were answered.    Pt is being discharged home today with new [facility] (RN/PT/OT/Aide).  ANT left message with Ayanna at [facility].    ANT also met with pt and sister Deanna, provided pt with Community Resources (Medical Alert Systems, Home Delivered Meals, Area Agency on Aging, and Transportation).    SW received call from Ayanna with [facility], they have a waiting list for PT.  SW updated pt and Deanna.      Pt reviewed  list again, pt requested [facility].  Referral completed with Luciana. Clinicals faxed       9:26 AM update: 7/26/24  SW received a voicemail from [facility] stating that they would not be able to see pt until next Thursday.    ANT updated pts sister Deanna.  She requested referral to [facility].  Referral called, they are not in network with [employer].    Deanna updated, requested referral to [facility]     Referral completed with Mildred, they are in network with [employer].  ANT faxed  clinicals.     updated Deanna

## 2024-07-25 NOTE — CARE COORDINATION
7/25/24, 8:41 AM EDT    DISCHARGE ON GOING EVALUATION    Haily R [facility] day: 7  Location: 3A-05/005-A Reason for admit: Speech disturbance [R47.9]  Unsteady gait [R26.81]  At high risk for falls [Z91.81]     Procedures:   7/18 PICC line placement: done in IR, RIJ.     Imaging since last note: None    Barriers to Discharge:  Hospitalist and Neurology following. PM&R recommending SNF. PT/OT/SLP following.     PCP: Luciana Parr DO  Readmission Risk Score: 9.5    Patient Goals/Plan/Treatment Preferences: From home alone. Has good family support. Has CPAP from [facility], also has prosthetic arm. SW following, precert denied and P2P upheld denial.      yes

## 2024-07-25 NOTE — PLAN OF CARE
Problem: Discharge Planning  Goal: Discharge to home or other facility with appropriate resources  Outcome: Progressing  Flowsheets (Taken 7/24/2024 2020)  Discharge to home or other facility with appropriate resources:   Identify barriers to discharge with patient and caregiver   Arrange for needed discharge resources and transportation as appropriate   Identify discharge learning needs (meds, wound care, etc)     Problem: Safety - Adult  Goal: Free from fall injury  Outcome: Progressing     Problem: Skin/Tissue Integrity  Goal: Absence of new skin breakdown  Description: 1.  Monitor for areas of redness and/or skin breakdown  2.  Assess vascular access sites hourly  3.  Every 4-6 hours minimum:  Change oxygen saturation probe site  4.  Every 4-6 hours:  If on nasal continuous positive airway pressure, respiratory therapy assess nares and determine need for appliance change or resting period.  Outcome: Progressing     Problem: Pain  Goal: Verbalizes/displays adequate comfort level or baseline comfort level  Outcome: Progressing

## 2024-07-25 NOTE — PLAN OF CARE
Haily Parker requires a quad cane due to impaired ambulation and for increased stability in order to participate in or complete daily living tasks of: eating, bathing, toileting, personal cares, ambulating, grooming, hygiene, dressing upper body, dressing lower body, meal preparation, and taking own medications.  The patient is able to safely use the quad cane and the functional mobility deficit can be sufficiently resolved.

## 2024-07-25 NOTE — DISCHARGE SUMMARY
DISCHARGE SUMMARY      Patient Identification:   Haily Parker   : 1960  MRN: [id number]   Account: [id number]      Patient's PCP: Luciana Parr DO    Admit Date: 2024, 1025     Discharge Date: 24     Admitting Physician: Vikas Hernandez PA-C      Discharge Physician: Heather Mariee MD    Discharge Diagnoses:    Vision changes, speech disturbance, gait disturbance, fatigue, generalized weakness with multiple sclerosis with concern for flare/exacerbation  Leukocytosis , resolved  Diarrhea, resolved   Right neck hematoma due to right IJ PICC placement , resolved  Metabolic acidosis, improving  Hypokalemia   Hyperglycemia   Elevated d dimer   Elevated alk phos   Macrocytosis without anemia   Hx bradycardia with PPM   Bipolar disorder   Hypothyroidism   GERD   MIKEY   Hx Batsheva-en-Y gastric bypass  Congenital absence of RUE      The patient was seen and examined on day of discharge and this discharge summary is in conjunction with any daily progress note from day of discharge.    Hospital Course:   Haily Parker is a 64 y.o. female with  has a past medical history of Balance problem, Bipolar I disorder, most recent episode (or current) depressed, in partial or unspecified remission, Depression, Gallstones, Gastric bypass status for obesity, GERD (gastroesophageal reflux disease), History of hysterectomy, Hx of cervical cancer, Hypothyroidism, Liver cyst, Liver lesion, MS (multiple sclerosis) (HCC), MIKEY (obstructive sleep apnea), Paresthesias, Ulcer at site of surgical anastomosis following bypass of stomach, and UTI (urinary tract infection) who was admitted to [facility] on 2024 for Vision changes, speech disturbance, gait disturbance, fatigue       Hospital Course By Problem:     Vision changes, speech disturbance, gait disturbance, fatigue, generalized weakness with multiple sclerosis with concern for flare/exacerbation --> apprec neuro assist   -- MRI brain and C

## 2024-07-26 ENCOUNTER — TELEPHONE (OUTPATIENT)
Dept: FAMILY MEDICINE CLINIC | Age: 64
End: 2024-07-26

## 2024-07-26 NOTE — TELEPHONE ENCOUNTER
Care Transitions Initial Follow Up Call    Outreach made within 2 business days of discharge: Yes    Patient: Haily Parker Patient : 1960   MRN: [id number]  Reason for Admission: Multiple Sclerosis exacerbation  Discharge Date: 24       Spoke with: NURY on  for return call.     Discharge department/facility: [facility]      Additional needs identified to be addressed with provider  No needs identified             Scheduled appointment with PCP within 7-14 days    Follow Up  Future Appointments   Date Time Provider Department Center   2024  1:00 PM Purnima Perez APRN - CNP N SRPX PSYCH [facility]   12/3/2024  1:00 PM Luciana Parr DO SRPX FM RES [facility]   2024  1:00 PM Leopold, Paige L, APRN - CNP N SRPXNEURO Neurology -   2025  9:45 AM SCHEDULE, SRPX PACER NURSE N SRPX PACER [facility]   2025 10:00 AM Rudy Kerr MD N SRPX Heart [facility]       Arlette Bui MA

## 2024-07-29 ENCOUNTER — APPOINTMENT (OUTPATIENT)
Dept: CT IMAGING | Age: 64
End: 2024-07-29
Payer: MEDICARE

## 2024-07-29 ENCOUNTER — TELEPHONE (OUTPATIENT)
Dept: FAMILY MEDICINE CLINIC | Age: 64
End: 2024-07-29

## 2024-07-29 ENCOUNTER — HOSPITAL ENCOUNTER (INPATIENT)
Age: 64
LOS: 2 days | Discharge: HOME | End: 2024-07-31
Attending: FAMILY MEDICINE
Payer: MEDICARE

## 2024-07-29 DIAGNOSIS — T56.891A: Primary | ICD-10-CM

## 2024-07-29 DIAGNOSIS — K27.9 PEPTIC ULCER DISEASE: ICD-10-CM

## 2024-07-29 DIAGNOSIS — R60.0 LOCALIZED EDEMA: ICD-10-CM

## 2024-07-29 PROBLEM — Z91.81 AT HIGH RISK FOR FALLS: Status: ACTIVE | Noted: 2024-07-29

## 2024-07-29 LAB
ANION GAP SERPL CALC-SCNC: 10 MEQ/L (ref 8–16)
ANION GAP SERPL CALC-SCNC: 14 MEQ/L (ref 8–16)
ANION GAP SERPL CALC-SCNC: 9 MEQ/L (ref 8–16)
BASOPHILS ABSOLUTE: 0 THOU/MM3 (ref 0–0.1)
BASOPHILS NFR BLD AUTO: 0.2 %
BUN SERPL-MCNC: 11 MG/DL (ref 7–22)
BUN SERPL-MCNC: 13 MG/DL (ref 7–22)
BUN SERPL-MCNC: 16 MG/DL (ref 7–22)
CALCIUM SERPL-MCNC: 9.2 MG/DL (ref 8.5–10.5)
CALCIUM SERPL-MCNC: 9.7 MG/DL (ref 8.5–10.5)
CALCIUM SERPL-MCNC: 9.8 MG/DL (ref 8.5–10.5)
CHLORIDE SERPL-SCNC: 107 MEQ/L (ref 98–111)
CHLORIDE SERPL-SCNC: 108 MEQ/L (ref 98–111)
CHLORIDE SERPL-SCNC: 109 MEQ/L (ref 98–111)
CO2 SERPL-SCNC: 18 MEQ/L (ref 23–33)
CO2 SERPL-SCNC: 19 MEQ/L (ref 23–33)
CO2 SERPL-SCNC: 22 MEQ/L (ref 23–33)
CREAT SERPL-MCNC: 0.8 MG/DL (ref 0.4–1.2)
CREAT SERPL-MCNC: 0.9 MG/DL (ref 0.4–1.2)
CREAT SERPL-MCNC: 0.9 MG/DL (ref 0.4–1.2)
DEPRECATED RDW RBC AUTO: 49.4 FL (ref 35–45)
EKG ATRIAL RATE: 69 BPM
EKG P AXIS: 65 DEGREES
EKG P-R INTERVAL: 204 MS
EKG Q-T INTERVAL: 458 MS
EKG QRS DURATION: 150 MS
EKG QTC CALCULATION (BAZETT): 490 MS
EKG R AXIS: 0 DEGREES
EKG T AXIS: 37 DEGREES
EKG VENTRICULAR RATE: 69 BPM
EOSINOPHIL NFR BLD AUTO: 1.3 %
EOSINOPHILS ABSOLUTE: 0.1 THOU/MM3 (ref 0–0.4)
ERYTHROCYTE [DISTWIDTH] IN BLOOD BY AUTOMATED COUNT: 13.2 % (ref 11.5–14.5)
GFR SERPL CREATININE-BSD FRML MDRD: 71 ML/MIN/1.73M2
GFR SERPL CREATININE-BSD FRML MDRD: 71 ML/MIN/1.73M2
GFR SERPL CREATININE-BSD FRML MDRD: 82 ML/MIN/1.73M2
GLUCOSE BLD STRIP.AUTO-MCNC: 221 MG/DL (ref 70–108)
GLUCOSE BLD STRIP.AUTO-MCNC: 70 MG/DL (ref 70–108)
GLUCOSE SERPL-MCNC: 127 MG/DL (ref 70–108)
GLUCOSE SERPL-MCNC: 267 MG/DL (ref 70–108)
GLUCOSE SERPL-MCNC: 65 MG/DL (ref 70–108)
HCT VFR BLD AUTO: 47.7 % (ref 37–47)
HGB BLD-MCNC: 15.4 GM/DL (ref 12–16)
IMM GRANULOCYTES # BLD AUTO: 0.06 THOU/MM3 (ref 0–0.07)
IMM GRANULOCYTES NFR BLD AUTO: 0.7 %
LITHIUM SERPL-SCNC: 1.4 MMOL/L (ref 0.6–1.2)
LITHIUM SERPL-SCNC: 2.19 MMOL/L (ref 0.6–1.2)
LYMPHOCYTES ABSOLUTE: 0.5 THOU/MM3 (ref 1–4.8)
LYMPHOCYTES NFR BLD AUTO: 5.3 %
MCH RBC QN AUTO: 32.8 PG (ref 26–33)
MCHC RBC AUTO-ENTMCNC: 32.3 GM/DL (ref 32.2–35.5)
MCV RBC AUTO: 101.7 FL (ref 81–99)
MONOCYTES ABSOLUTE: 0.5 THOU/MM3 (ref 0.4–1.3)
MONOCYTES NFR BLD AUTO: 6 %
NEUTROPHILS ABSOLUTE: 7.4 THOU/MM3 (ref 1.8–7.7)
NEUTROPHILS NFR BLD AUTO: 86.5 %
NRBC BLD AUTO-RTO: 0 /100 WBC
OSMOLALITY SERPL CALC.SUM OF ELEC: 276.7 MOSMOL/KG (ref 275–300)
OSMOLALITY SERPL CALC.SUM OF ELEC: 276.9 MOSMOL/KG (ref 275–300)
OSMOLALITY SERPL CALC.SUM OF ELEC: 287 MOSMOL/KG (ref 275–300)
PLATELET # BLD AUTO: 121 THOU/MM3 (ref 130–400)
PMV BLD AUTO: 10.5 FL (ref 9.4–12.4)
POTASSIUM SERPL-SCNC: 3.8 MEQ/L (ref 3.5–5.2)
POTASSIUM SERPL-SCNC: 4.2 MEQ/L (ref 3.5–5.2)
POTASSIUM SERPL-SCNC: 4.5 MEQ/L (ref 3.5–5.2)
RBC # BLD AUTO: 4.69 MILL/MM3 (ref 4.2–5.4)
SODIUM SERPL-SCNC: 138 MEQ/L (ref 135–145)
SODIUM SERPL-SCNC: 139 MEQ/L (ref 135–145)
SODIUM SERPL-SCNC: 139 MEQ/L (ref 135–145)
T4 FREE SERPL-MCNC: 1.3 NG/DL (ref 0.93–1.68)
TSH SERPL DL<=0.005 MIU/L-ACNC: 3.19 UIU/ML (ref 0.4–4.2)
WBC # BLD AUTO: 8.5 THOU/MM3 (ref 4.8–10.8)

## 2024-07-29 PROCEDURE — 84443 ASSAY THYROID STIM HORMONE: CPT

## 2024-07-29 PROCEDURE — 84439 ASSAY OF FREE THYROXINE: CPT

## 2024-07-29 PROCEDURE — 2580000003 HC RX 258: Performed by: EMERGENCY MEDICINE

## 2024-07-29 PROCEDURE — 93005 ELECTROCARDIOGRAM TRACING: CPT | Performed by: PHYSICIAN ASSISTANT

## 2024-07-29 PROCEDURE — 99223 1ST HOSP IP/OBS HIGH 75: CPT | Performed by: STUDENT IN AN ORGANIZED HEALTH CARE EDUCATION/TRAINING PROGRAM

## 2024-07-29 PROCEDURE — 80178 ASSAY OF LITHIUM: CPT

## 2024-07-29 PROCEDURE — 1200000003 HC TELEMETRY R&B

## 2024-07-29 PROCEDURE — 70450 CT HEAD/BRAIN W/O DYE: CPT

## 2024-07-29 PROCEDURE — 82948 REAGENT STRIP/BLOOD GLUCOSE: CPT

## 2024-07-29 PROCEDURE — 85025 COMPLETE CBC W/AUTO DIFF WBC: CPT

## 2024-07-29 PROCEDURE — 6370000000 HC RX 637 (ALT 250 FOR IP)

## 2024-07-29 PROCEDURE — 36415 COLL VENOUS BLD VENIPUNCTURE: CPT

## 2024-07-29 PROCEDURE — 6360000002 HC RX W HCPCS

## 2024-07-29 PROCEDURE — 2580000003 HC RX 258

## 2024-07-29 PROCEDURE — 99285 EMERGENCY DEPT VISIT HI MDM: CPT

## 2024-07-29 PROCEDURE — 80048 BASIC METABOLIC PNL TOTAL CA: CPT

## 2024-07-29 PROCEDURE — 93010 ELECTROCARDIOGRAM REPORT: CPT | Performed by: INTERNAL MEDICINE

## 2024-07-29 RX ORDER — ACETAMINOPHEN 650 MG/1
650 SUPPOSITORY RECTAL EVERY 6 HOURS PRN
Status: DISCONTINUED | OUTPATIENT
Start: 2024-07-29 | End: 2024-07-31 | Stop reason: HOSPADM

## 2024-07-29 RX ORDER — PROCHLORPERAZINE MALEATE 10 MG
10 TABLET ORAL EVERY 6 HOURS PRN
Status: DISCONTINUED | OUTPATIENT
Start: 2024-07-29 | End: 2024-07-31 | Stop reason: HOSPADM

## 2024-07-29 RX ORDER — LEVOTHYROXINE SODIUM 0.1 MG/1
100 TABLET ORAL DAILY
Status: DISCONTINUED | OUTPATIENT
Start: 2024-07-30 | End: 2024-07-31 | Stop reason: HOSPADM

## 2024-07-29 RX ORDER — POTASSIUM CHLORIDE 20 MEQ/1
40 TABLET, EXTENDED RELEASE ORAL PRN
Status: DISCONTINUED | OUTPATIENT
Start: 2024-07-29 | End: 2024-07-31 | Stop reason: HOSPADM

## 2024-07-29 RX ORDER — LURASIDONE HYDROCHLORIDE 40 MG/1
40 TABLET, FILM COATED ORAL NIGHTLY
Status: DISCONTINUED | OUTPATIENT
Start: 2024-07-29 | End: 2024-07-31 | Stop reason: HOSPADM

## 2024-07-29 RX ORDER — ENOXAPARIN SODIUM 100 MG/ML
40 INJECTION SUBCUTANEOUS DAILY
Status: DISCONTINUED | OUTPATIENT
Start: 2024-07-29 | End: 2024-07-31 | Stop reason: HOSPADM

## 2024-07-29 RX ORDER — POTASSIUM CHLORIDE 7.45 MG/ML
10 INJECTION INTRAVENOUS PRN
Status: DISCONTINUED | OUTPATIENT
Start: 2024-07-29 | End: 2024-07-31 | Stop reason: HOSPADM

## 2024-07-29 RX ORDER — ONDANSETRON 2 MG/ML
4 INJECTION INTRAMUSCULAR; INTRAVENOUS EVERY 6 HOURS PRN
Status: DISCONTINUED | OUTPATIENT
Start: 2024-07-29 | End: 2024-07-31 | Stop reason: HOSPADM

## 2024-07-29 RX ORDER — SODIUM CHLORIDE 0.9 % (FLUSH) 0.9 %
5-40 SYRINGE (ML) INJECTION EVERY 12 HOURS SCHEDULED
Status: DISCONTINUED | OUTPATIENT
Start: 2024-07-29 | End: 2024-07-31 | Stop reason: HOSPADM

## 2024-07-29 RX ORDER — FERROUS SULFATE 325(65) MG
325 TABLET ORAL EVERY OTHER DAY
Status: DISCONTINUED | OUTPATIENT
Start: 2024-07-30 | End: 2024-07-31 | Stop reason: HOSPADM

## 2024-07-29 RX ORDER — SUCRALFATE 1 G/1
1 TABLET ORAL EVERY 6 HOURS
Status: DISCONTINUED | OUTPATIENT
Start: 2024-07-29 | End: 2024-07-29

## 2024-07-29 RX ORDER — ACETAMINOPHEN 325 MG/1
650 TABLET ORAL EVERY 6 HOURS PRN
Status: DISCONTINUED | OUTPATIENT
Start: 2024-07-29 | End: 2024-07-31 | Stop reason: HOSPADM

## 2024-07-29 RX ORDER — SODIUM CHLORIDE 0.9 % (FLUSH) 0.9 %
5-40 SYRINGE (ML) INJECTION PRN
Status: DISCONTINUED | OUTPATIENT
Start: 2024-07-29 | End: 2024-07-31 | Stop reason: HOSPADM

## 2024-07-29 RX ORDER — VILAZODONE HYDROCHLORIDE 40 MG/1
40 TABLET ORAL DAILY
Status: DISCONTINUED | OUTPATIENT
Start: 2024-07-29 | End: 2024-07-31 | Stop reason: HOSPADM

## 2024-07-29 RX ORDER — SUCRALFATE 1 G/1
TABLET ORAL
Qty: 120 TABLET | Refills: 3 | Status: SHIPPED | OUTPATIENT
Start: 2024-07-29

## 2024-07-29 RX ORDER — DEXTROSE MONOHYDRATE 100 MG/ML
INJECTION, SOLUTION INTRAVENOUS CONTINUOUS PRN
Status: DISCONTINUED | OUTPATIENT
Start: 2024-07-29 | End: 2024-07-31 | Stop reason: HOSPADM

## 2024-07-29 RX ORDER — 0.9 % SODIUM CHLORIDE 0.9 %
1000 INTRAVENOUS SOLUTION INTRAVENOUS ONCE
Status: COMPLETED | OUTPATIENT
Start: 2024-07-29 | End: 2024-07-29

## 2024-07-29 RX ORDER — POLYETHYLENE GLYCOL 3350 17 G/17G
17 POWDER, FOR SOLUTION ORAL DAILY PRN
Status: DISCONTINUED | OUTPATIENT
Start: 2024-07-29 | End: 2024-07-31 | Stop reason: HOSPADM

## 2024-07-29 RX ORDER — SODIUM CHLORIDE 9 MG/ML
INJECTION, SOLUTION INTRAVENOUS PRN
Status: DISCONTINUED | OUTPATIENT
Start: 2024-07-29 | End: 2024-07-31 | Stop reason: HOSPADM

## 2024-07-29 RX ORDER — SODIUM CHLORIDE 9 MG/ML
INJECTION, SOLUTION INTRAVENOUS CONTINUOUS
Status: DISCONTINUED | OUTPATIENT
Start: 2024-07-29 | End: 2024-07-30

## 2024-07-29 RX ORDER — PROMETHAZINE HYDROCHLORIDE 25 MG/1
12.5 TABLET ORAL EVERY 6 HOURS PRN
Status: DISCONTINUED | OUTPATIENT
Start: 2024-07-29 | End: 2024-07-29

## 2024-07-29 RX ORDER — GLUCAGON 1 MG/ML
1 KIT INJECTION PRN
Status: DISCONTINUED | OUTPATIENT
Start: 2024-07-29 | End: 2024-07-31 | Stop reason: HOSPADM

## 2024-07-29 RX ORDER — LANOLIN ALCOHOL/MO/W.PET/CERES
500 CREAM (GRAM) TOPICAL DAILY
Status: DISCONTINUED | OUTPATIENT
Start: 2024-07-30 | End: 2024-07-31 | Stop reason: HOSPADM

## 2024-07-29 RX ORDER — UREA 10 %
500 LOTION (ML) TOPICAL DAILY
COMMUNITY

## 2024-07-29 RX ORDER — FERROUS SULFATE 325(65) MG
325 TABLET ORAL 3 TIMES DAILY
Status: DISCONTINUED | OUTPATIENT
Start: 2024-07-29 | End: 2024-07-29

## 2024-07-29 RX ORDER — SODIUM CHLORIDE 9 MG/ML
INJECTION, SOLUTION INTRAVENOUS CONTINUOUS
Status: DISCONTINUED | OUTPATIENT
Start: 2024-07-29 | End: 2024-07-29

## 2024-07-29 RX ORDER — AMANTADINE HYDROCHLORIDE 100 MG/1
100 CAPSULE, GELATIN COATED ORAL DAILY
Status: DISCONTINUED | OUTPATIENT
Start: 2024-07-30 | End: 2024-07-31 | Stop reason: HOSPADM

## 2024-07-29 RX ORDER — PANTOPRAZOLE SODIUM 40 MG/1
40 TABLET, DELAYED RELEASE ORAL
Status: DISCONTINUED | OUTPATIENT
Start: 2024-07-29 | End: 2024-07-29

## 2024-07-29 RX ORDER — SUCRALFATE 1 G/1
1 TABLET ORAL EVERY 6 HOURS PRN
Status: DISCONTINUED | OUTPATIENT
Start: 2024-07-29 | End: 2024-07-31 | Stop reason: HOSPADM

## 2024-07-29 RX ORDER — MAGNESIUM SULFATE IN WATER 40 MG/ML
2000 INJECTION, SOLUTION INTRAVENOUS PRN
Status: DISCONTINUED | OUTPATIENT
Start: 2024-07-29 | End: 2024-07-31 | Stop reason: HOSPADM

## 2024-07-29 RX ADMIN — ENOXAPARIN SODIUM 40 MG: 100 INJECTION SUBCUTANEOUS at 19:03

## 2024-07-29 RX ADMIN — SODIUM CHLORIDE: 9 INJECTION, SOLUTION INTRAVENOUS at 22:25

## 2024-07-29 RX ADMIN — LURASIDONE HYDROCHLORIDE 40 MG: 40 TABLET, FILM COATED ORAL at 20:58

## 2024-07-29 RX ADMIN — SUCRALFATE 1 G: 1 TABLET ORAL at 20:58

## 2024-07-29 RX ADMIN — SODIUM CHLORIDE: 9 INJECTION, SOLUTION INTRAVENOUS at 14:17

## 2024-07-29 RX ADMIN — SODIUM CHLORIDE 1000 ML: 9 INJECTION, SOLUTION INTRAVENOUS at 16:35

## 2024-07-29 ASSESSMENT — PAIN SCALES - GENERAL: PAINLEVEL_OUTOF10: 0

## 2024-07-29 ASSESSMENT — PAIN - FUNCTIONAL ASSESSMENT
PAIN_FUNCTIONAL_ASSESSMENT: NONE - DENIES PAIN

## 2024-07-29 ASSESSMENT — ENCOUNTER SYMPTOMS
VOMITING: 0
NAUSEA: 0
RHINORRHEA: 0
ABDOMINAL PAIN: 0
DIARRHEA: 0
BLOOD IN STOOL: 0
COUGH: 0
SHORTNESS OF BREATH: 0

## 2024-07-29 NOTE — ED NOTES
Pt eating boxed lunch with family at bedside. RN started fluids. Pt respirations easy and unlabored. Pt denies further needs.

## 2024-07-29 NOTE — PROGRESS NOTES
Pt admitted to  8B31 via via cart/stretcher from ED.  Complaints: Lithium Overdose.    IV normal saline infusing into the antecubital right, condition patent and no redness at a rate of 75 mls/ hour with about 800 mls in the bag still. IV site free of s/s of infection or infiltration. Vital signs obtained. Assessment and data collection initiated. Two nurse skin assessment performed by Mariella RN and Nova RN. Oriented to room. Policies and procedures for 5K explained. All questions answered with no further questions at this time. Fall prevention and safety brochure discussed with patient.  Bed alarm on. Call light in reach.Oriented to room.   Mariella Rodríguez RN, RN 7/29/2024 4:40 PM     Explained patients right to have family, representative or physician notified of their admission.  Patient has N/A for physician to be notified.  Patient has N/A for family/representative to be notified. Sister and daughter at bedside.

## 2024-07-29 NOTE — ED NOTES
Pt transported to [facility] in stable condition. Floor notified prior to arrival.

## 2024-07-29 NOTE — ED NOTES
Pt resting with call light in reach and family at bedside. Pt ready to be transported to inpatient room.

## 2024-07-29 NOTE — TELEPHONE ENCOUNTER
Care Transitions Initial Follow Up Call    Outreach made within 2 business days of discharge: Yes    Patient: Haily Parker Patient : 1960   MRN: [id number]  Reason for Admission: Multiple Sclerosis exacerbation  Discharge Date: 24       Spoke with: Patient    Discharge department/facility: [facility] Patient Contact:  Was patient able to fill all prescriptions: Yes  Was patient instructed to bring all medications to the follow-up visit: Yes  Is patient taking all medications as directed in the discharge summary? Yes  Does patient understand their discharge instructions: Yes  Does patient have questions or concerns that need addressed prior to 7-14 day follow up office visit: no    Additional needs identified to be addressed with provider  No needs identified             Scheduled appointment with PCP within 7-14 days    Follow Up  Future Appointments   Date Time Provider Department Center   2024  1:00 PM Purnima Perez APRN - CNP N SRPX PSYCH [facility]   12/3/2024  1:00 PM Luciana Parr DO SRPX FM RES [facility]   2024  1:00 PM Leopold, Paige L, APRN - CNP N SRPXNEURO Neurology -   2025  9:45 AM SCHEDULE, SRPX PACER NURSE N SRPX PACER [facility]   2025 10:00 AM Rudy Kerr MD N SRPX Heart [facility]       Ave Ybarra MA

## 2024-07-29 NOTE — H&P
DEPARTMENT OF HOSPITAL MEDICINE    HISTORY AND PHYSICAL EXAM    PATIENT NAME:  Haily Parker    MRN:  [id number]  SERVICE DATE:  7/29/2024   SERVICE TIME:  5:44 PM    Primary Care Physician: Luciana Parr DO     SUBJECTIVE  CHIEF COMPLAINT:  confusion    HPI:  Haily Parker is a 64 y.o., , female who  has a past medical history of Balance problem, Bipolar I disorder, most recent episode (or current) depressed, in partial or unspecified remission, Depression, Gallstones, Gastric bypass status for obesity, GERD (gastroesophageal reflux disease), History of hysterectomy, Hx of cervical cancer, Hypothyroidism, Liver cyst, Liver lesion, MS (multiple sclerosis) (HCC), MIKEY (obstructive sleep apnea), Paresthesias, Ulcer at site of surgical anastomosis following bypass of stomach, and UTI (urinary tract infection). that is hospitalized for lithium toxicity. The patient was brought in by her two sisters for concern for unintentional medication overdose. Her sisters state the patient was hospitalized last week for confusion/ AMS/ unsteadiness and discharged 7/25/24 with home health. Patient's home health nurse looked at her medication box and noticed there were multiple tablets of lithium and lamotrigine that patient was taking. Her sisters state she has been confused and has been \"mixing up\" her medications. She is alert to person and place but not time. Pt's sisters request for patient to be placed in rehab as she currently is not ambulating much. Patient was given a quad cane at the time of last discharge 7/25/24 due to impaired ambulation.      PAST MEDICAL HISTORY:    Past Medical History:   Diagnosis Date    Balance problem     2/2 MS    Bipolar I disorder, most recent episode (or current) depressed, in partial or unspecified remission 05/08/2013    Depression     Gallstones     Gastric bypass status for obesity 2002    GERD (gastroesophageal reflux disease)     History of hysterectomy     REMOVAL OF  been on disability since 2014 for MS, bipolar disorder    MARRIAGES: one. She was  from 2002 to 2015; they  in 2015    CHILDREN: None    SUBSTANCE USE: None     Social Determinants of Health     Financial Resource Strain: Low Risk  (6/24/2024)    Overall Financial Resource Strain (CARDIA)     Difficulty of Paying Living Expenses: Not hard at all   Food Insecurity: No Food Insecurity (7/29/2024)    Hunger Vital Sign     Worried About Running Out of Food in the Last Year: Never true     Ran Out of Food in the Last Year: Never true   Transportation Needs: Unmet Transportation Needs (7/29/2024)    PRAPARE - Transportation     Lack of Transportation (Medical): Yes     Lack of Transportation (Non-Medical): Yes   Physical Activity: Sufficiently Active (9/28/2023)    Exercise Vital Sign     Days of Exercise per Week: 3 days     Minutes of Exercise per Session: 110 min   Stress: Not on file   Social Connections: Not on file   Intimate Partner Violence: Not on file   Housing Stability: Low Risk  (7/29/2024)    Housing Stability Vital Sign     Unable to Pay for Housing in the Last Year: No     Number of Places Lived in the Last Year: 1     Unstable Housing in the Last Year: No     MEDICATIONS:   Prior to Admission medications    Medication Sig Start Date End Date Taking? Authorizing Provider   vitamin B-12 (CYANOCOBALAMIN) 500 MCG tablet Take 1 tablet by mouth daily   Yes ProviderTiffanie MD   sucralfate (CARAFATE) 1 GM tablet TAKE 1 TABLET BY MOUTH EVERY 6 HOURS. 7/29/24   Luciana Parr DO   Prenatal MV-Min-Fe Fum-FA-DHA (PRENATAL 1 PO) Take by mouth    ProviderTiffanie MD   amantadine (SYMMETREL) 100 MG capsule Take 1 capsule by mouth daily 5/17/24   Bladimir Gaines MD   lamoTRIgine (LAMICTAL) 200 MG tablet TAKE 1 TABLET BY MOUTH DAILY 5/2/24   Purnima Perez APRN - CNP   lithium 300 MG tablet Take 1.5 tablets by mouth in the morning and 1.5 tablets in the evening. 5/2/24   Purnima Perez,

## 2024-07-29 NOTE — ED NOTES
ED to inpatient nurses report      Chief Complaint:  Chief Complaint   Patient presents with    Altered Mental Status    Ingestion     Possible overdose on lithium      Present to ED from: Home    MOA:     LOC: alert and orientated to name, place, date  Mobility: Requires assistance * 1  Oxygen Baseline: Room air    Current needs required: 0     Code Status:   Full Code    What abnormal results were found and what did you give/do to treat them? Lithium overdose; labs  Any procedures or intervention occur? N/a    Mental Status:  Level of Consciousness: Alert (0)    Psych Assessment:        Vitals:  Patient Vitals for the past 24 hrs:   BP Temp Temp src Pulse Resp SpO2 Weight   07/29/24 1507 122/74 -- -- 60 18 100 % --   07/29/24 1418 (!) 144/87 -- -- 60 20 99 % --   07/29/24 1315 139/73 -- -- 60 18 97 % --   07/29/24 1205 135/76 97.6 °F (36.4 °C) Oral -- -- -- --   07/29/24 1204 -- -- -- 65 19 97 % 55.8 kg (123 lb)        LDAs:   Peripheral IV 07/29/24 Distal;Left;Anterior Cephalic (Active)   Site Assessment Clean, dry & intact 07/29/24 1507   Line Status Infusing 07/29/24 1507   Phlebitis Assessment No symptoms 07/29/24 1507   Infiltration Assessment 0 07/29/24 1507   Dressing Status Clean, dry & intact 07/29/24 1507       Ambulatory Status:  Presents to emergency department  because of falls (Syncope, seizure, or loss of consciousness): No, Age > 70: No, Altered Mental Status, Intoxication with alcohol or substance confusion (Disorientation, impaired judgment, poor safety awaremess, or inability to follow instructions): Yes, Impaired Mobility: Ambulates or transfers with assistive devices or assistance; Unable to ambulate or transer.: Yes, Nursing Judgement: Yes    Diagnosis:  DISPOSITION Admitted 07/29/2024 03:45:01 PM   Final diagnoses:   Lithium overdose, accidental or unintentional, initial encounter        Consults:  IP CONSULT TO CASE MANAGEMENT     Pain Score:  Pain Assessment  Pain Assessment: None - Denies

## 2024-07-29 NOTE — TELEPHONE ENCOUNTER
Joan called and stated that she would like to get the patient set up for physical therapy, outpatient therapy, and a home health nurse aid. These services will need to be approved by Dr. Parr.       Joan can be contacted at [phone]

## 2024-07-29 NOTE — ED NOTES
Patient presents to the ED with sister(POA) for concerns of altered mental status and a possible overdose on lithium and lamatical. Sister reports patient was admitted here last week and was sent home this past Friday. Home health nurse made a visit today and noticed that patient had been taking about x3 more of the prescribed lithium and lamatical than she should be. There is also some confusion as to where the bottles of medications that were prescribed at her discharge from the hospital. Patient is alert and oriented. She lives on her own. Sister reports insurance denied going into a rehab nursing facility, so she was discharged. Sister is concerned for her to go home in this condition as she is unable to care for herself. Sister also brought a baggy of medications that the home health nurse was unable to identify. Patient denies any pain. Patient is unsure of what meds she has or hasn't been taking. EKG completed. VSS>

## 2024-07-29 NOTE — TELEPHONE ENCOUNTER
Patient's last appointment was : 6/24/2024 with our   Patient's next appointment is : 12/3/2024 with our Dr. Parr  Last refilled on: 01/30/2024  Which pharmacy does the script need sent to: [facility] pharmacy      Lab Results   Component Value Date    LABA1C 5.1 12/19/2022     Lab Results   Component Value Date    CHOL 151 11/06/2023    TRIG 77 11/06/2023    HDL 87 11/06/2023     Lab Results   Component Value Date     07/29/2024    K 3.8 07/29/2024     07/29/2024    CO2 22 (L) 07/29/2024    BUN 16 07/29/2024    CREATININE 0.8 07/29/2024    GLUCOSE 65 (L) 07/29/2024    CALCIUM 9.8 07/29/2024    BILITOT 0.3 07/18/2024    ALKPHOS 156 (H) 07/18/2024    AST 24 07/18/2024    ALT 57 07/18/2024    LABGLOM 82 07/29/2024    GFRAA >60 01/18/2022     Lab Results   Component Value Date    TSH 1.080 07/18/2024    T4FREE 1.27 06/26/2024     Lab Results   Component Value Date    WBC 8.5 07/29/2024    HGB 15.4 07/29/2024    HCT 47.7 (H) 07/29/2024    .7 (H) 07/29/2024     (L) 07/29/2024

## 2024-07-29 NOTE — ED PROVIDER NOTES
[facility] EMERGENCY DEPT      EMERGENCY MEDICINE     Pt Name: Haily Parker  MRN: [id number]  Birthdate 1960  Date of evaluation: 7/29/2024  Provider: Clark Rodriguez DO  Supervising Physician: Yoshi Quevedo MD    CHIEF COMPLAINT       Chief Complaint   Patient presents with    Altered Mental Status    Ingestion     Possible overdose on lithium      HISTORY OF PRESENT ILLNESS   Haily Parker is a 64 y.o. female with a h/o MS, bipolar, hypothyroidism, who presents to the emergency department from home with her sister for evaluation of ?AMS. Sister is concerned that Pt might be taking more than 3x her prescribed Lithium and Lamictal. Pt denies this. Pt denies any confusion but just states she has been feeling unwell for a few weeks. Sister is wanting her placed in assisted living. Pt recently D/C and has had home health evaluating her. She denies N/V/D, any pain, recent fall, cough, cough, congestion, dysuria. She also endorses being recently admitted and treated for MS exacerbation, she went through a course of steroids.     PASTMEDICAL HISTORY     Past Medical History:   Diagnosis Date    Balance problem     2/2 MS    Bipolar I disorder, most recent episode (or current) depressed, in partial or unspecified remission 05/08/2013    Depression     Gallstones     Gastric bypass status for obesity 2002    GERD (gastroesophageal reflux disease)     History of hysterectomy     REMOVAL OF ONE OVARY     Hx of cervical cancer     s/p hysterectomy    Hypothyroidism     Liver cyst     GI Dr [address]    Liver lesion     MS (multiple sclerosis) (HCC)     MIKEY (obstructive sleep apnea) 7/19/2024    Paresthesias     RT LOWER LIMB    Ulcer at site of surgical anastomosis following bypass of stomach 01/15/2022    UTI (urinary tract infection)        Patient Active Problem List   Diagnosis Code    Hypothyroidism E03.9    Multiple sclerosis exacerbation (HCC) G35    S/P laparoscopic cholecystectomy Z90.49    S/P  decisions.  With her lithium level of 2.19, these are at toxic levels and patient will require admission for monitoring of her lithium levels.  Patient is not showing any kidney failure labs, is not confused.  She does not meet any criteria for dialysis at this time.  She was given p.o. for her low glucose and was started on maintenance fluids.  Patient also not likely safe to go home as she has been unaware that she is taking the wrong dose of multiple of her medications.     Amount and/or Complexity of Data Reviewed  Labs: ordered.  Radiology: ordered.  ECG/medicine tests: ordered.    Risk  Decision regarding hospitalization.    /  ED Course as of 07/29/24 1513   Mon Jul 29, 2024   1241 EKG interpreted by myself: Atrial paced rhythm, RRR, RBBB, no STEMI, TWI anterior leads. EKG unchanged from prior ED visit [AC]   1355 Creatinine: 0.8 [AC]   1357 Lithium is 2.19 [AC]   1402 Glucose(!): 65  Will give PO [AC]   1404 Pt has chronic lithium toxicity but does not meet criteria for needing dialysis [AC]      ED Course User Index  [AC] Clark Rodriguez DO     Vitals Reviewed:    Vitals:    07/29/24 1205 07/29/24 1315 07/29/24 1418 07/29/24 1507   BP: 135/76 139/73 (!) 144/87 122/74   Pulse:  60 60 60   Resp:  18 20 18   Temp: 97.6 °F (36.4 °C)      TempSrc: Oral      SpO2:  97% 99% 100%   Weight:           The patient was seen and examined. Appropriate diagnostic testing was performed and results reviewed with the patient.      The results of pertinent diagnostic studies and exam findings were discussed. The patient’s provisional diagnosis and plan of care were discussed with the patient and present family who expressed understanding. Any medications were reviewed and indications and risks of medications were discussed with the patient /family present. Strict verbal and written return precautions, instructions and appropriate follow-up provided to  the patient.     ED Medications administered this visit:  (None if

## 2024-07-29 NOTE — TELEPHONE ENCOUNTER
Received call from Home Health stating that pt was not appropriate for home health, and pt is currently admitted. Pt is agreeable to assisted living or rehab.

## 2024-07-29 NOTE — ED NOTES
Pt resting with eyes closed and family at bedside. Pt call light in reach. Pt respirations unlabored. Denies further needs

## 2024-07-29 NOTE — CARE COORDINATION
Patient was recently discharged, insurance denied SNF related to self ambulation distance. Patient was to be picked up by [facility] home health but they did not sign as they felt she was not appropriate at that moment for home health.  Spoke with patient and 2 sisters. Patient advised she had spoke with Joan at [facility] about moving to a Assisted Living related to medications being mixed up in pill container and having harder time with ADLs related to natural progression of MS. Patient knows it is appropriate to be in a AL and willing to move but preference is home.   Sisters have set up pills properly and started calling AL facilities by them. Patient is on waiting list for a few different AL facilities related to being Medicaid. Family will call [facility] if patient is discharged to re evaluate for services. CM offered to assist as needed, denied additional questions at this time.  This information was presented and process of getting into AL with Medicaid was explained to Dr. Quevedo and Dr. Rodriguez.     Family called and requested listing for Assisted Living. Took on to them on 8B.  Electronically signed by CHITO QUIÑONES RN on 7/29/2024 at 4:53 PM

## 2024-07-29 NOTE — ED NOTES
Pt ambulated to restroom for urine collection. Pt threw toilet paper in sample. Unable to collect.

## 2024-07-29 NOTE — PROGRESS NOTES
[facility]   PROGRESS NOTE      Patient: Haily Parker  Room #: 8B-31/031-A            YOB: 1960  Age: 64 y.o.  Gender: female            Admit Date & Time: 7/29/2024 11:57 AM    Assessment:    The patient welcomed prayer.    Interventions:  The patient was provided personalized prayer.    Outcomes:  The patient was thankful for the provided prayer.     Plan:  1.Spiritual care will continue to follow the patient according to [facility] spiritual care SOP.       Electronically signed by Chaplain Yrn, on 7/29/2024 at 6:52 PM.  Spiritual Care Department  [facility]  [phone]     07/29/24 5233   Encounter Summary   Encounter Overview/Reason Spiritual/Emotional Needs   Service Provided For Patient   Referral/Consult From Nurse   Support System Buddhism/lawrence community;Family members   Last Encounter  07/29/24   Complexity of Encounter High   Begin Time 1735   End Time  1800   Total Time Calculated 25 min   Spiritual/Emotional needs   Type Spiritual Support   Assessment/Intervention/Outcome   Assessment Coping;Hopeful   Intervention Active listening;Empowerment;Prayer (assurance of)/[place]   Outcome Encouraged;Expressed feelings of [place], Peace and/or Love

## 2024-07-30 ENCOUNTER — APPOINTMENT (OUTPATIENT)
Dept: INTERVENTIONAL RADIOLOGY/VASCULAR | Age: 64
End: 2024-07-30
Payer: MEDICARE

## 2024-07-30 LAB
AMPHETAMINES UR QL SCN: NEGATIVE
ANION GAP SERPL CALC-SCNC: 10 MEQ/L (ref 8–16)
ANION GAP SERPL CALC-SCNC: 6 MEQ/L (ref 8–16)
ANION GAP SERPL CALC-SCNC: 7 MEQ/L (ref 8–16)
ANION GAP SERPL CALC-SCNC: 9 MEQ/L (ref 8–16)
BARBITURATES UR QL SCN: NEGATIVE
BASOPHILS ABSOLUTE: 0 THOU/MM3 (ref 0–0.1)
BASOPHILS NFR BLD AUTO: 0.4 %
BENZODIAZ UR QL SCN: NEGATIVE
BUN SERPL-MCNC: 10 MG/DL (ref 7–22)
BUN SERPL-MCNC: 11 MG/DL (ref 7–22)
BUN SERPL-MCNC: 12 MG/DL (ref 7–22)
BUN SERPL-MCNC: 13 MG/DL (ref 7–22)
BZE UR QL SCN: NEGATIVE
CALCIUM SERPL-MCNC: 9 MG/DL (ref 8.5–10.5)
CALCIUM SERPL-MCNC: 9.1 MG/DL (ref 8.5–10.5)
CALCIUM SERPL-MCNC: 9.2 MG/DL (ref 8.5–10.5)
CALCIUM SERPL-MCNC: 9.5 MG/DL (ref 8.5–10.5)
CANNABINOIDS UR QL SCN: NEGATIVE
CHLORIDE SERPL-SCNC: 111 MEQ/L (ref 98–111)
CHLORIDE SERPL-SCNC: 111 MEQ/L (ref 98–111)
CHLORIDE SERPL-SCNC: 112 MEQ/L (ref 98–111)
CHLORIDE SERPL-SCNC: 113 MEQ/L (ref 98–111)
CO2 SERPL-SCNC: 17 MEQ/L (ref 23–33)
CO2 SERPL-SCNC: 18 MEQ/L (ref 23–33)
CO2 SERPL-SCNC: 20 MEQ/L (ref 23–33)
CO2 SERPL-SCNC: 20 MEQ/L (ref 23–33)
CREAT SERPL-MCNC: 0.8 MG/DL (ref 0.4–1.2)
CREAT SERPL-MCNC: 0.8 MG/DL (ref 0.4–1.2)
CREAT SERPL-MCNC: 0.9 MG/DL (ref 0.4–1.2)
CREAT SERPL-MCNC: 0.9 MG/DL (ref 0.4–1.2)
DEPRECATED RDW RBC AUTO: 50.3 FL (ref 35–45)
ECHO BSA: 1.48 M2
EKG ATRIAL RATE: 61 BPM
EKG P AXIS: 72 DEGREES
EKG P-R INTERVAL: 216 MS
EKG Q-T INTERVAL: 486 MS
EKG QRS DURATION: 154 MS
EKG QTC CALCULATION (BAZETT): 489 MS
EKG R AXIS: -9 DEGREES
EKG T AXIS: 16 DEGREES
EKG VENTRICULAR RATE: 61 BPM
EOSINOPHIL NFR BLD AUTO: 2.7 %
EOSINOPHILS ABSOLUTE: 0.2 THOU/MM3 (ref 0–0.4)
ERYTHROCYTE [DISTWIDTH] IN BLOOD BY AUTOMATED COUNT: 13.3 % (ref 11.5–14.5)
FENTANYL: NEGATIVE
GFR SERPL CREATININE-BSD FRML MDRD: 71 ML/MIN/1.73M2
GFR SERPL CREATININE-BSD FRML MDRD: 71 ML/MIN/1.73M2
GFR SERPL CREATININE-BSD FRML MDRD: 82 ML/MIN/1.73M2
GFR SERPL CREATININE-BSD FRML MDRD: 82 ML/MIN/1.73M2
GLUCOSE BLD STRIP.AUTO-MCNC: 245 MG/DL (ref 70–108)
GLUCOSE BLD STRIP.AUTO-MCNC: 80 MG/DL (ref 70–108)
GLUCOSE BLD STRIP.AUTO-MCNC: 86 MG/DL (ref 70–108)
GLUCOSE BLD STRIP.AUTO-MCNC: 91 MG/DL (ref 70–108)
GLUCOSE SERPL-MCNC: 162 MG/DL (ref 70–108)
GLUCOSE SERPL-MCNC: 93 MG/DL (ref 70–108)
GLUCOSE SERPL-MCNC: 93 MG/DL (ref 70–108)
GLUCOSE SERPL-MCNC: 94 MG/DL (ref 70–108)
HCT VFR BLD AUTO: 36.1 % (ref 37–47)
HGB BLD-MCNC: 11.5 GM/DL (ref 12–16)
IMM GRANULOCYTES # BLD AUTO: 0.07 THOU/MM3 (ref 0–0.07)
IMM GRANULOCYTES NFR BLD AUTO: 0.8 %
LITHIUM SERPL-SCNC: 0.68 MMOL/L (ref 0.6–1.2)
LITHIUM SERPL-SCNC: 0.82 MMOL/L (ref 0.6–1.2)
LITHIUM SERPL-SCNC: 0.89 MMOL/L (ref 0.6–1.2)
LITHIUM SERPL-SCNC: 1 MMOL/L (ref 0.6–1.2)
LITHIUM SERPL-SCNC: 1.04 MMOL/L (ref 0.6–1.2)
LITHIUM SERPL-SCNC: 1.23 MMOL/L (ref 0.6–1.2)
LYMPHOCYTES ABSOLUTE: 1 THOU/MM3 (ref 1–4.8)
LYMPHOCYTES NFR BLD AUTO: 11.5 %
MCH RBC QN AUTO: 33.3 PG (ref 26–33)
MCHC RBC AUTO-ENTMCNC: 31.9 GM/DL (ref 32.2–35.5)
MCV RBC AUTO: 104.6 FL (ref 81–99)
MONOCYTES ABSOLUTE: 0.7 THOU/MM3 (ref 0.4–1.3)
MONOCYTES NFR BLD AUTO: 8.4 %
NEUTROPHILS ABSOLUTE: 6.4 THOU/MM3 (ref 1.8–7.7)
NEUTROPHILS NFR BLD AUTO: 76.2 %
NRBC BLD AUTO-RTO: 0 /100 WBC
OPIATES UR QL SCN: NEGATIVE
OSMOLALITY SERPL CALC.SUM OF ELEC: 273.3 MOSMOL/KG (ref 275–300)
OSMOLALITY SERPL CALC.SUM OF ELEC: 275.5 MOSMOL/KG (ref 275–300)
OSMOLALITY SERPL CALC.SUM OF ELEC: 278.5 MOSMOL/KG (ref 275–300)
OXYCODONE: NEGATIVE
PCP UR QL SCN: NEGATIVE
PLATELET # BLD AUTO: 144 THOU/MM3 (ref 130–400)
PMV BLD AUTO: 9.8 FL (ref 9.4–12.4)
POTASSIUM SERPL-SCNC: 3.7 MEQ/L (ref 3.5–5.2)
POTASSIUM SERPL-SCNC: 3.7 MEQ/L (ref 3.5–5.2)
POTASSIUM SERPL-SCNC: 3.8 MEQ/L (ref 3.5–5.2)
POTASSIUM SERPL-SCNC: 4.1 MEQ/L (ref 3.5–5.2)
RBC # BLD AUTO: 3.45 MILL/MM3 (ref 4.2–5.4)
SODIUM SERPL-SCNC: 137 MEQ/L (ref 135–145)
SODIUM SERPL-SCNC: 138 MEQ/L (ref 135–145)
SODIUM SERPL-SCNC: 139 MEQ/L (ref 135–145)
SODIUM SERPL-SCNC: 140 MEQ/L (ref 135–145)
WBC # BLD AUTO: 8.4 THOU/MM3 (ref 4.8–10.8)

## 2024-07-30 PROCEDURE — 80178 ASSAY OF LITHIUM: CPT

## 2024-07-30 PROCEDURE — 99233 SBSQ HOSP IP/OBS HIGH 50: CPT | Performed by: NURSE PRACTITIONER

## 2024-07-30 PROCEDURE — 1200000003 HC TELEMETRY R&B

## 2024-07-30 PROCEDURE — 93005 ELECTROCARDIOGRAM TRACING: CPT

## 2024-07-30 PROCEDURE — 82948 REAGENT STRIP/BLOOD GLUCOSE: CPT

## 2024-07-30 PROCEDURE — 6360000002 HC RX W HCPCS

## 2024-07-30 PROCEDURE — 97530 THERAPEUTIC ACTIVITIES: CPT

## 2024-07-30 PROCEDURE — 2580000003 HC RX 258

## 2024-07-30 PROCEDURE — 85025 COMPLETE CBC W/AUTO DIFF WBC: CPT

## 2024-07-30 PROCEDURE — 93010 ELECTROCARDIOGRAM REPORT: CPT | Performed by: INTERNAL MEDICINE

## 2024-07-30 PROCEDURE — 6370000000 HC RX 637 (ALT 250 FOR IP)

## 2024-07-30 PROCEDURE — 93971 EXTREMITY STUDY: CPT

## 2024-07-30 PROCEDURE — 36415 COLL VENOUS BLD VENIPUNCTURE: CPT

## 2024-07-30 PROCEDURE — 80048 BASIC METABOLIC PNL TOTAL CA: CPT

## 2024-07-30 PROCEDURE — 97166 OT EVAL MOD COMPLEX 45 MIN: CPT

## 2024-07-30 PROCEDURE — 97110 THERAPEUTIC EXERCISES: CPT

## 2024-07-30 PROCEDURE — 97162 PT EVAL MOD COMPLEX 30 MIN: CPT

## 2024-07-30 PROCEDURE — 80307 DRUG TEST PRSMV CHEM ANLYZR: CPT

## 2024-07-30 RX ADMIN — AMANTADINE HYDROCHLORIDE 100 MG: 100 CAPSULE, GELATIN COATED ORAL at 08:57

## 2024-07-30 RX ADMIN — SODIUM CHLORIDE: 9 INJECTION, SOLUTION INTRAVENOUS at 05:13

## 2024-07-30 RX ADMIN — VILAZODONE HYDROCHLORIDE 40 MG: 40 TABLET, FILM COATED ORAL at 08:56

## 2024-07-30 RX ADMIN — LEVOTHYROXINE SODIUM 100 MCG: 0.1 TABLET ORAL at 05:14

## 2024-07-30 RX ADMIN — Medication 500 MCG: at 08:56

## 2024-07-30 RX ADMIN — FERROUS SULFATE TAB 325 MG (65 MG ELEMENTAL FE) 325 MG: 325 (65 FE) TAB at 08:57

## 2024-07-30 RX ADMIN — SODIUM CHLORIDE, PRESERVATIVE FREE 10 ML: 5 INJECTION INTRAVENOUS at 20:35

## 2024-07-30 RX ADMIN — LURASIDONE HYDROCHLORIDE 40 MG: 40 TABLET, FILM COATED ORAL at 20:35

## 2024-07-30 RX ADMIN — ENOXAPARIN SODIUM 40 MG: 100 INJECTION SUBCUTANEOUS at 08:57

## 2024-07-30 ASSESSMENT — PAIN SCALES - GENERAL: PAINLEVEL_OUTOF10: 0

## 2024-07-30 NOTE — PROGRESS NOTES
[facility] [place]  SPEECH THERAPY MISSED TREATMENT NOTE  STRZ MED SURG 8AB      Date: 2024  Patient Name: Haily Parker        MRN: [id number]    : 1960  (64 y.o.)    REASON FOR MISSED TREATMENT:  ST received novel orders from Herminio Meyers DO to complete clinical swallow evaluation; however, upon arrival to floor, VALERIANO Zhao indicating patient currently NICOLE at ultrasound. ST to re-attempt at a later date/time as patient is medically appropriate and available.     Kendra Parker M.A., CCC-SLP [id number]

## 2024-07-30 NOTE — PROGRESS NOTES
[facility]  INPATIENT OCCUPATIONAL THERAPY  STRZ MED SURG 8AB  EVALUATION    Time:   Time In: 1106  Time Out: 1126  Timed Code Treatment Minutes: 15 Minutes  Minutes: 20          Date: 2024  Patient Name: Haily Parker,   Gender: female      MRN: [id number]  : 1960  (64 y.o.)  Referring Practitioner: Herminio Meyers DO  Diagnosis: Lithium overdose, accidental  Additional Pertinent Hx: Per EMR, \"Haily Parker is a 64 y.o., , female who  has a past medical history of Balance problem, Bipolar I disorder, most recent episode (or current) depressed, in partial or unspecified remission, Depression, Gallstones, Gastric bypass status for obesity, GERD (gastroesophageal reflux disease), History of hysterectomy, Hx of cervical cancer, Hypothyroidism, Liver cyst, Liver lesion, MS (multiple sclerosis) (HCC), MIKEY (obstructive sleep apnea), Paresthesias, Ulcer at site of surgical anastomosis following bypass of stomach, and UTI (urinary tract infection). that is hospitalized for lithium toxicity. The patient was brought in by her two sisters for concern for unintentional medication overdose. Her sisters state the patient was hospitalized last week for confusion/ AMS/ unsteadiness and discharged 24 with home health. Patient's home health nurse looked at her medication box and noticed there were multiple tablets of lithium and lamotrigine that patient was taking. Her sisters state she has been confused and has been \"mixing up\" her medications. She is alert to person and place but not time. Pt's sisters request for patient to be placed in rehab as she currently is not ambulating much. Patient was given a quad cane at the time of last discharge 24 due to impaired ambulation.\"    Restrictions/Precautions:  Restrictions/Precautions: Fall Risk, General Precautions  Position Activity Restriction  Other position/activity restrictions: RUE amputee with prosthetic    Subjective  Chart

## 2024-07-30 NOTE — PROGRESS NOTES
Hospitalist Progress Note    Patient:  Haily Parker      Unit/Bed:8B-31/031-A    YOB: 1960    MRN: [id number]       Acct: [id number]     PCP: Luciana Parr DO    Date of Admission: 7/29/2024    Assessment/Plan:    Acute encephalopathy, metabolic versus toxic with #2--CT head did not reveal anything acute, electrolytes are normal; will add urinalysis  Lithium overdose--Poison Control Center was called per admitting group and recommended a 1 L 0.9 normal saline bolus followed by maintenance fluids of 150 mL an hour with lithium checks and BMPs every 2 hours; initial lithium level was noted to be 2.19 and last lithium level documented was 1.00~lithium level stopped as resulted to normal  Left upper extremity edema--possibly infiltration from IV site however will obtain venous Doppler to rule out a DVT  Mild metabolic acidosis--monitor  Physical deconditioning/debility--PT/OT/ST and  on the case, family requesting placement  History of multiple sclerosis  Neuromyelitis optica/vision disturbance  Hypoglycemia--resolved  Hypothyroidism--treated with Synthroid  Macrocytosis without anemia  Bradycardia with PPM  Bipolar disorder--Lamictal lithium on hold, on Latuda and Viibryd  GERD--on Protonix  MIKEY--on home CPAP  History of Batsheva-en-Y  Congenital absence of right upper extremity        Expected discharge date: Pending clinical course    Disposition:    [] Home       [] TCU       [] Rehab       [] Psych       [] SNF       [] Long Term Care Facility       [] Other-    Chief Complaint: Confusion    Hospital Course: Per H&P done 7/29/2024: \"Haily Parker is a 64 y.o., , female who  has a past medical history of Balance problem, Bipolar I disorder, most recent episode (or current) depressed, in partial or unspecified remission, Depression, Gallstones, Gastric bypass status for obesity, GERD (gastroesophageal reflux disease), History of hysterectomy, Hx of cervical  unintentional, initial encounter [T56.891A] 07/29/2024       Electronically signed by AJITH Brizuela CNP on 7/30/2024 at 12:41 PM

## 2024-07-30 NOTE — PROGRESS NOTES
[facility]  INPATIENT PHYSICAL THERAPY  EVALUATION  STRZ MED SURG 8AB - 8B-31/031-A    Time In: 1700  Time Out: 1725  Timed Code Treatment Minutes: 10 Minutes  Minutes: 25          Date: 2024  Patient Name: Haily Parker,  Gender:  female        MRN: [id number]  : 1960  (64 y.o.)      Referring Practitioner: Herminio Meyers DO  Diagnosis: Lithium overdose, accidental or unintentional  Additional Pertinent Hx: Per EMR, \"Haily Parker is a 64 y.o., , female who  has a past medical history of Balance problem, Bipolar I disorder, most recent episode (or current) depressed, in partial or unspecified remission, Depression, Gallstones, Gastric bypass status for obesity, GERD (gastroesophageal reflux disease), History of hysterectomy, Hx of cervical cancer, Hypothyroidism, Liver cyst, Liver lesion, MS (multiple sclerosis) (HCC), MIKEY (obstructive sleep apnea), Paresthesias, Ulcer at site of surgical anastomosis following bypass of stomach, and UTI (urinary tract infection). that is hospitalized for lithium toxicity. The patient was brought in by her two sisters for concern for unintentional medication overdose. Her sisters state the patient was hospitalized last week for confusion/ AMS/ unsteadiness and discharged 24 with home health. Patient's home health nurse looked at her medication box and noticed there were multiple tablets of lithium and lamotrigine that patient was taking. Her sisters state she has been confused and has been \"mixing up\" her medications. She is alert to person and place but not time. Pt's sisters request for patient to be placed in rehab as she currently is not ambulating much. Patient was given a quad cane at the time of last discharge 24 due to impaired ambulation.\"     Restrictions/Precautions:  Restrictions/Precautions: Fall Risk, General Precautions  Position Activity Restriction  Other position/activity restrictions: RUE amputee with

## 2024-07-30 NOTE — CARE COORDINATION
Case Management Assessment Initial Evaluation    Date/Time of Evaluation: 2024 10:09 AM  Assessment Completed by: Merlyn Carlos RN    If patient is discharged prior to next notation, then this note serves as note for discharge by case management.    Patient Name: Haily Parker                   YOB: 1960  Diagnosis: Lithium overdose, accidental or unintentional, initial encounter [T56.891A]                   Date / Time: 2024 11:57 AM  Location: [address] [place] [address]     Patient Admission Status: Inpatient   Readmission Risk Low 0-14, Mod 15-19), High > 20: Readmission Risk Score: 19.9    Current PCP: Luciana Parr DO  Health Care Decision Makers:     Additional Case Management Notes: Readmitted from ER wit AMS and possible medication overdose. Hx of MS, bipolar. Concern with pt taking incorrect doseage of her Lithium. Recently discharged home with  services. Pt reportedly agreeable to AL or rehab. SW is consulted. PT/OT/ST.     Procedures: No    Imagin24 CT Head  IMPRESSION:  No evidence of an acute process. No change from prior.    Patient Goals/Plan/Treatment Preferences: Met with Haily today. She resides alone and has up to this time been independent. She has 2 sisters who are supportive and helpful. She is insured and has a PCP.  SW following for possible assist with discharge needs.          [id number] [phone]   Service Assessment   Patient Orientation Alert and Oriented   Cognition Alert   History Provided By Patient   Primary Caregiver Self   Support Systems Family Members   Patient's Healthcare Decision Maker is: Named in Scanned ACP Document   PCP Verified by CM Yes   Last Visit to PCP Within last 3 months   Prior Functional Level Independent in ADLs/IADLs   Current Functional Level Independent in ADLs/IADLs   Can patient return to prior living arrangement Unknown at present   Ability to make needs known: Good   Family able to assist with home care needs: Yes   Would you  like for me to discuss the discharge plan with any other family members/significant others, and if so, who? No   Financial Resources Medicare;Medicaid   Community Resources Transportation;ECF/Home Care  (Public Transportation.)   Social/Functional History   Lives With Alone   Type of Home Apartment   Home Layout One level   Home Access Elevator   Active  No   Patient's  Info Public Transportation.   Discharge Planning   Type of Residence Apartment   Living Arrangements Alone   Current Services Prior To Admission C-pap;Durable Medical Equipment;Home Care  ([facility])   Current DME Prior to Arrival Cane;Cpap   Potential Assistance Needed Home Care;Other (Comment)  (Assisted Living)   DME Ordered? No   Potential Assistance Purchasing Medications No   Type of Home Care Services Nursing Services   Patient expects to be discharged to: Other (comment)  (Possible AL.)   One/Two Story Residence One story   Services At/After Discharge   Transition of Care Consult (CM Consult) Discharge Planning   Mode of Transport at Discharge Other (see comment)  (Undetermined at present time.)   Confirm Follow Up Transport Other (see comment)  (Pending discharge disposition.)   Condition of Participation: Discharge Planning   The Plan for Transition of Care is related to the following treatment goals: To get a discharge plan in place.

## 2024-07-30 NOTE — CARE COORDINATION
07/30/24 1537   Readmission Assessment   Number of Days since last admission? 1-7 days   Previous Disposition Home with Home Health   Who is being Interviewed Patient   What was the patient's/caregiver's perception as to why they think they needed to return back to the hospital? Other (Comment)  (HH noticed that she had not taken her Lithium correctly.)   Did you visit your Primary Care Physician after you left the hospital, before you returned this time? No   Why weren't you able to visit your PCP? Other (Comment)  (Pt reports she did not have an appt.)   Did you see a specialist, such as Cardiac, Pulmonary, Orthopedic Physician, etc. after you left the hospital? No   Who advised the patient to return to the hospital? Home Health Staff   Does the patient report anything that got in the way of taking their medications? No   In our efforts to provide the best possible care to you and others like you, can you think of anything that we could have done to help you after you left the hospital the first time, so that you might not have needed to return so soon? Other (Comment)

## 2024-07-30 NOTE — PLAN OF CARE
Problem: Discharge Planning  Goal: Discharge to home or other facility with appropriate resources  Outcome: Progressing  Flowsheets (Taken 7/29/2024 0154 by Mariella Rodríguez RN)  Discharge to home or other facility with appropriate resources:   Identify barriers to discharge with patient and caregiver   Arrange for needed discharge resources and transportation as appropriate   Arrange for interpreters to assist at discharge as needed     Problem: Safety - Adult  Goal: Free from fall injury  Outcome: Progressing

## 2024-07-30 NOTE — CARE COORDINATION
DISCHARGE PLANNING EVALUATION  7/30/24, 4:05 PM EDT    Reason for Referral: discharge planning  Decision Maker: patient makes decisions with support of sisters  Current Services: [facility], nursing, PT & OT  New Services Requested: SNF vs AL  Family/ Social/ Home environment: Spoke with patient and two sisters.  Patient ws discharge last Friday, not taking medications correctly.  Discussed SNF vs AL.  Did not qualify for SNF last week.  PT & OT will evaluate.  Sisters have appointments at AL and [facility] to check options.  Will follow up tomorrow after therapy and appointments.  Payment Source:[facility] Medicare  and Medicaid  Transportation at Discharge: sisters  Post-acute (PAC) provider list was provided to patient. Patient was informed of their freedom to choose PAC provider. Discussed and offered to show the patient the relevant PAC Providers quality and resource use measures on Medicare Compare web site via computer based on patient's goals of care and treatment preferences. Questions regarding selection process were answered.      Teach Back Method used with patient regarding care plan and discharge planning.  Patient and two sister verbalized understanding of the plan of care and contribute to goal setting.       Patient preferences and discharge plan: Plan SNF vs AL vs Home with HH.    Electronically signed by XOCHILT Rasheed on 7/30/2024 at 4:05 PM

## 2024-07-31 VITALS
OXYGEN SATURATION: 100 % | SYSTOLIC BLOOD PRESSURE: 116 MMHG | TEMPERATURE: 98 F | WEIGHT: 121.9 LBS | RESPIRATION RATE: 16 BRPM | DIASTOLIC BLOOD PRESSURE: 72 MMHG | BODY MASS INDEX: 26.3 KG/M2 | HEART RATE: 75 BPM | HEIGHT: 57 IN

## 2024-07-31 LAB
ANION GAP SERPL CALC-SCNC: 11 MEQ/L (ref 8–16)
BASOPHILS ABSOLUTE: 0 THOU/MM3 (ref 0–0.1)
BASOPHILS NFR BLD AUTO: 0.3 %
BUN SERPL-MCNC: 11 MG/DL (ref 7–22)
CALCIUM SERPL-MCNC: 9.9 MG/DL (ref 8.5–10.5)
CHLORIDE SERPL-SCNC: 113 MEQ/L (ref 98–111)
CO2 SERPL-SCNC: 20 MEQ/L (ref 23–33)
CREAT SERPL-MCNC: 0.7 MG/DL (ref 0.4–1.2)
DEPRECATED RDW RBC AUTO: 51.6 FL (ref 35–45)
EKG ATRIAL RATE: 60 BPM
EKG P AXIS: 65 DEGREES
EKG P-R INTERVAL: 208 MS
EKG Q-T INTERVAL: 476 MS
EKG QRS DURATION: 152 MS
EKG QTC CALCULATION (BAZETT): 476 MS
EKG R AXIS: 3 DEGREES
EKG T AXIS: 25 DEGREES
EKG VENTRICULAR RATE: 60 BPM
EOSINOPHIL NFR BLD AUTO: 2 %
EOSINOPHILS ABSOLUTE: 0.2 THOU/MM3 (ref 0–0.4)
ERYTHROCYTE [DISTWIDTH] IN BLOOD BY AUTOMATED COUNT: 13.2 % (ref 11.5–14.5)
GFR SERPL CREATININE-BSD FRML MDRD: > 90 ML/MIN/1.73M2
GLUCOSE BLD STRIP.AUTO-MCNC: 89 MG/DL (ref 70–108)
GLUCOSE SERPL-MCNC: 104 MG/DL (ref 70–108)
HCT VFR BLD AUTO: 38.4 % (ref 37–47)
HGB BLD-MCNC: 12.3 GM/DL (ref 12–16)
IMM GRANULOCYTES # BLD AUTO: 0.07 THOU/MM3 (ref 0–0.07)
IMM GRANULOCYTES NFR BLD AUTO: 0.8 %
LYMPHOCYTES ABSOLUTE: 0.8 THOU/MM3 (ref 1–4.8)
LYMPHOCYTES NFR BLD AUTO: 9.7 %
MCH RBC QN AUTO: 34 PG (ref 26–33)
MCHC RBC AUTO-ENTMCNC: 32 GM/DL (ref 32.2–35.5)
MCV RBC AUTO: 106.1 FL (ref 81–99)
MONOCYTES ABSOLUTE: 0.6 THOU/MM3 (ref 0.4–1.3)
MONOCYTES NFR BLD AUTO: 7.4 %
NEUTROPHILS ABSOLUTE: 6.9 THOU/MM3 (ref 1.8–7.7)
NEUTROPHILS NFR BLD AUTO: 79.8 %
NRBC BLD AUTO-RTO: 0 /100 WBC
PLATELET # BLD AUTO: 155 THOU/MM3 (ref 130–400)
PMV BLD AUTO: 10 FL (ref 9.4–12.4)
POTASSIUM SERPL-SCNC: 3.9 MEQ/L (ref 3.5–5.2)
RBC # BLD AUTO: 3.62 MILL/MM3 (ref 4.2–5.4)
SODIUM SERPL-SCNC: 144 MEQ/L (ref 135–145)
WBC # BLD AUTO: 8.7 THOU/MM3 (ref 4.8–10.8)

## 2024-07-31 PROCEDURE — 80048 BASIC METABOLIC PNL TOTAL CA: CPT

## 2024-07-31 PROCEDURE — 82948 REAGENT STRIP/BLOOD GLUCOSE: CPT

## 2024-07-31 PROCEDURE — 6370000000 HC RX 637 (ALT 250 FOR IP)

## 2024-07-31 PROCEDURE — 92610 EVALUATE SWALLOWING FUNCTION: CPT

## 2024-07-31 PROCEDURE — 36415 COLL VENOUS BLD VENIPUNCTURE: CPT

## 2024-07-31 PROCEDURE — 93010 ELECTROCARDIOGRAM REPORT: CPT | Performed by: INTERNAL MEDICINE

## 2024-07-31 PROCEDURE — 93005 ELECTROCARDIOGRAM TRACING: CPT

## 2024-07-31 PROCEDURE — 85025 COMPLETE CBC W/AUTO DIFF WBC: CPT

## 2024-07-31 PROCEDURE — 99238 HOSP IP/OBS DSCHRG MGMT 30/<: CPT | Performed by: PHYSICIAN ASSISTANT

## 2024-07-31 RX ADMIN — Medication 500 MCG: at 08:22

## 2024-07-31 RX ADMIN — VILAZODONE HYDROCHLORIDE 40 MG: 40 TABLET, FILM COATED ORAL at 08:22

## 2024-07-31 RX ADMIN — AMANTADINE HYDROCHLORIDE 100 MG: 100 CAPSULE, GELATIN COATED ORAL at 08:22

## 2024-07-31 RX ADMIN — LEVOTHYROXINE SODIUM 100 MCG: 0.1 TABLET ORAL at 05:39

## 2024-07-31 ASSESSMENT — PAIN SCALES - GENERAL: PAINLEVEL_OUTOF10: 0

## 2024-07-31 NOTE — PLAN OF CARE
Problem: Discharge Planning  Goal: Discharge to home or other facility with appropriate resources  7/31/2024 1045 by Ana Maria De La Rosa, VALERIANO  Outcome: Adequate for Discharge  7/30/2024 2152 by Tita Anderson, RN  Outcome: Progressing     Problem: Safety - Adult  Goal: Free from fall injury  7/31/2024 1045 by Ana Maria De La Rosa, RN  Outcome: Adequate for Discharge  7/30/2024 2152 by Tita Anderson, RN  Outcome: Progressing

## 2024-07-31 NOTE — PLAN OF CARE
Problem: Discharge Planning  Goal: Discharge to home or other facility with appropriate resources  7/30/2024 2152 by Tita Anderson, RN  Outcome: Progressing  7/30/2024 1619 by Lisa Simpson LSW  Outcome: Progressing     Problem: Safety - Adult  Goal: Free from fall injury  Outcome: Progressing

## 2024-07-31 NOTE — DISCHARGE SUMMARY
Hospital Medicine Discharge Summary      Patient Identification:   aHily Parker   : 1960  MRN: [id number]   Account: [id number]      Patient's PCP: Luciana Parr DO    Admit Date: 2024     Discharge Date: 24    Admitting Physician: No admitting provider for patient encounter.     Discharge Physician: Osman Colvin PA-C     Haily Parker is a 64 y.o. female admitted to [facility] on 2024.      HPI On Admission From H&P:    \"Haily Parker is a 64 y.o., , female who  has a past medical history of Balance problem, Bipolar I disorder, most recent episode (or current) depressed, in partial or unspecified remission, Depression, Gallstones, Gastric bypass status for obesity, GERD (gastroesophageal reflux disease), History of hysterectomy, Hx of cervical cancer, Hypothyroidism, Liver cyst, Liver lesion, MS (multiple sclerosis) (HCC), MIKEY (obstructive sleep apnea), Paresthesias, Ulcer at site of surgical anastomosis following bypass of stomach, and UTI (urinary tract infection). that is hospitalized for lithium toxicity. The patient was brought in by her two sisters for concern for unintentional medication overdose. Her sisters state the patient was hospitalized last week for confusion/ AMS/ unsteadiness and discharged 24 with home health. Patient's home health nurse looked at her medication box and noticed there were multiple tablets of lithium and lamotrigine that patient was taking. Her sisters state she has been confused and has been \"mixing up\" her medications. She is alert to person and place but not time. Pt's sisters request for patient to be placed in rehab as she currently is not ambulating much. Patient was given a quad cane at the time of last discharge 24 due to impaired ambulation.\"    Assessment/Plan With Discharge Diagnoses:    \"Acute encephalopathy, metabolic versus toxic with #2--CT head did not reveal anything acute,  tablet  Commonly known as: CARAFATE  TAKE 1 TABLET BY MOUTH EVERY 6 HOURS.  What changed:   how much to take  how to take this  when to take this  additional instructions            CONTINUE taking these medications      amantadine 100 MG capsule  Commonly known as: SYMMETREL  Take 1 capsule by mouth daily     blood glucose monitor kit and supplies  Dispense sufficient amount for indicated testing frequency plus additional to accommodate PRN testing needs. Dispense all needed supplies to include: monitor, strips, lancing device, lancets, control solutions, alcohol swabs.     Caffeine 200 MG Tabs  Commonly known as: VIVARIN     calcium carbonate 600 MG Tabs tablet     DHEA 50 MG Caps     ferrous sulfate 325 (65 Fe) MG tablet  Commonly known as: IRON 325  Take 1 tablet by mouth in the morning, at noon, and at bedtime     fluticasone 50 MCG/ACT nasal spray  Commonly known as: FLONASE     lamoTRIgine 200 MG tablet  Commonly known as: LAMICTAL  TAKE 1 TABLET BY MOUTH DAILY     levothyroxine 100 MCG tablet  Commonly known as: SYNTHROID  TAKE ONE TABLET BY MOUTH ONCE A DAY     lithium 300 MG tablet  Take 1.5 tablets by mouth in the morning and 1.5 tablets in the evening.     loratadine 10 MG tablet  Commonly known as: CLARITIN  Take 1 tablet by mouth daily     lurasidone 40 MG Tabs tablet  Commonly known as: Latuda  Take 1 tablet by mouth nightly     Magnesium Oxide 250 MG Tabs tablet  Commonly known as: MAGNESIUM-OXIDE     nitroGLYCERIN 0.4 MG SL tablet  Commonly known as: NITROSTAT  up to max of 3 total doses. If no relief after 1 dose, call 911.     pantoprazole 40 MG tablet  Commonly known as: PROTONIX  Take 1 tablet by mouth 2 times daily (before meals)     PRENATAL 1 PO     Prosthesis Misc  by Does not apply route Strap for the prosthesis broken please order new     vilazodone HCl 40 MG Tabs  Commonly known as: vilazodone hcl  TAKE 1 TABLET BY MOUTH ONCE DAILY.     vitamin B-12 500 MCG tablet  Commonly known as:

## 2024-07-31 NOTE — PROGRESS NOTES
[facility]  SPEECH THERAPY  STRZ MED SURG 8AB  Clinical Swallow Evaluation      SLP Individual Minutes  Time In: 0755  Time Out: 0809  Minutes: 14  Timed Code Treatment Minutes: 0 Minutes       DIET ORDER RECOMMENDATIONS AFTER EVALUATION: Regular Diet and Thin Liquids    Date: 2024  Patient Name: Haily Parker      CSN: [id number]   : 1960  (64 y.o.)  Gender: female   Referring Physician:  Herminio Meyers DO     Diagnosis: Lithium overdose, accidental or unintentional, initial encounter    History of Present Illness/Injury: Patient admitted to [facility] with aforementioned diagnosis. Per physician H&P, \"Haily Parker is a 64 y.o., , female who has a past medical history of Balance problem, Bipolar I disorder, most recent episode (or current) depressed, in partial or unspecified remission, Depression, Gallstones, Gastric bypass status for obesity, GERD (gastroesophageal reflux disease), History of hysterectomy, Hx of cervical cancer, Hypothyroidism, Liver cyst, Liver lesion, MS (multiple sclerosis) (HCC), MIKEY (obstructive sleep apnea), Paresthesias, Ulcer at site of surgical anastomosis following bypass of stomach, and UTI (urinary tract infection). that is hospitalized for lithium toxicity. The patient was brought in by her two sisters for concern for unintentional medication overdose. Her sisters state the patient was hospitalized last week for confusion/ AMS/ unsteadiness and discharged 24 with home health. Patient's home health nurse looked at her medication box and noticed there were multiple tablets of lithium and lamotrigine that patient was taking. Her sisters state she has been confused and has been \"mixing up\" her medications. She is alert to person and place but not time. Pt's sisters request for patient to be placed in rehab as she currently is not ambulating much. Patient was given a quad cane at the time of last discharge 24 due to impaired  ambulation.\"    CT head on 7/29 indicated no evidence of an acute process.     Chest XR on 7/21 yielded the following impressions:    1. Normal heart size. Permanent dual chamber pacemaker. Right jugular PICC line, catheter tip at cavoatrial junction.  2. Kyphotic positioning of the patient. No acute infiltrates or effusions are seen.    Past Medical History:   Diagnosis Date    Balance problem     2/2 MS    Bipolar I disorder, most recent episode (or current) depressed, in partial or unspecified remission 05/08/2013    Depression     Gallstones     Gastric bypass status for obesity 2002    GERD (gastroesophageal reflux disease)     History of hysterectomy     REMOVAL OF ONE OVARY     Hx of cervical cancer     s/p hysterectomy    Hypothyroidism     Liver cyst     GI Dr [address]    Liver lesion     MS (multiple sclerosis) (HCC)     MIKEY (obstructive sleep apnea) 7/19/2024    Paresthesias     RT LOWER LIMB    Ulcer at site of surgical anastomosis following bypass of stomach 01/15/2022    UTI (urinary tract infection)        SUBJECTIVE:  RN Cece approved completion of ST evaluation. Patient seen resting in bed upon ST arrival. Patient alert and agreeable to ST interventions, but demonstrated moderate emotional lability, stating that \"no one is giving her answers\" and that she \"wants to speak with her .\" ST demonstrated receptiveness and provided positive encouragement, stating she would notify RN of patient's concerns; ST spoke to RN following session, and RN verbalized receptiveness.     OBJECTIVE:    Pain:  No pain reported.    Current Diet: Regular Diet and Thin Liquids    Respiratory Status:  Room Air    Behavioral Observation:  Alert    CRANIAL NERVE ASSESSMENT   CN V (Trigeminal) Closes and Opens Mandible WFL    Rotary Jaw Movement WFL      CN VII (Facial) Cheeks Hold Food out of Sulci WFL    Opens, Closes/Seals, Protrudes, Retracts Lips WFL    General Appearance WFL    Sensation Not Tested

## 2024-07-31 NOTE — CARE COORDINATION
7/31/24, 10:18 AM EDT    DISCHARGE PLANNING EVALUATION    This SW did meet with patient and sisters in room this morning. Made them aware that patient is doing very well with therapy while admitted and that she is doing too well to qualify for SNF placement at this time per her insurance. Sisters expressed frustration but understanding of thi and want to know if patient will discharge today. SW made them aware that we will reach out to provider to discuss discharge and SW will call [facility] to have them continue services once discharged.       10:26 AM- SW did reach out to provider to discuss plans for discharge, he is okay with discharge today. SW did update patient and sisters of this, provider will place orders ASAP.    10:42 AM- ANT spoke with Mildred at [facility], she reports that they saw patient on Monday but did not admit her as HH patient due to her wanting SNF placement. They recommended that she go to hospital to be placed. ANT made Mildred aware that she does not qualify for SNF and needs HH at discharge today. She is unsure if they can take Haily back but will check with supervisor and call SW back.     10:48 AM- ANT did speak again with Mildred, reports that they will take her back and assess to see if she is appropriate for HH. Made them aware that they will need to call her sister Deanna for schedule time and that she will be leaving today.     7/31/24, 10:51 AM EDT    Patient goals/plan/ treatment preferences discussed by  and .  Patient goals/plan/ treatment preferences reviewed with patient/ family.  Patient/ family verbalize understanding of discharge plan and are in agreement with goal/plan/treatment preferences.  Understanding was demonstrated using the teach back method.  AVS provided by RN at time of discharge, which includes all necessary medical information pertaining to the patients current course of illness, treatment, post-discharge goals of care, and treatment

## 2024-08-01 ENCOUNTER — OFFICE VISIT (OUTPATIENT)
Dept: PSYCHIATRY | Age: 64
End: 2024-08-01
Payer: MEDICARE

## 2024-08-01 ENCOUNTER — TELEPHONE (OUTPATIENT)
Dept: PSYCHIATRY | Age: 64
End: 2024-08-01

## 2024-08-01 ENCOUNTER — TELEPHONE (OUTPATIENT)
Dept: FAMILY MEDICINE CLINIC | Age: 64
End: 2024-08-01

## 2024-08-01 VITALS
HEIGHT: 57 IN | BODY MASS INDEX: 26.54 KG/M2 | HEART RATE: 83 BPM | SYSTOLIC BLOOD PRESSURE: 124 MMHG | DIASTOLIC BLOOD PRESSURE: 73 MMHG | WEIGHT: 123 LBS | OXYGEN SATURATION: 97 %

## 2024-08-01 DIAGNOSIS — F31.75 BIPOLAR I, MOST RECENT EPISODE DEPRESSED, PARTIAL REMISSION (HCC): Primary | ICD-10-CM

## 2024-08-01 DIAGNOSIS — Z51.81 THERAPEUTIC DRUG MONITORING: ICD-10-CM

## 2024-08-01 DIAGNOSIS — Z09 HOSPITAL DISCHARGE FOLLOW-UP: ICD-10-CM

## 2024-08-01 PROCEDURE — 1111F DSCHRG MED/CURRENT MED MERGE: CPT | Performed by: NURSE PRACTITIONER

## 2024-08-01 PROCEDURE — 3017F COLORECTAL CA SCREEN DOC REV: CPT | Performed by: NURSE PRACTITIONER

## 2024-08-01 PROCEDURE — 90836 PSYTX W PT W E/M 45 MIN: CPT | Performed by: NURSE PRACTITIONER

## 2024-08-01 PROCEDURE — 1036F TOBACCO NON-USER: CPT | Performed by: NURSE PRACTITIONER

## 2024-08-01 PROCEDURE — 99214 OFFICE O/P EST MOD 30 MIN: CPT | Performed by: NURSE PRACTITIONER

## 2024-08-01 PROCEDURE — G8427 DOCREV CUR MEDS BY ELIG CLIN: HCPCS | Performed by: NURSE PRACTITIONER

## 2024-08-01 PROCEDURE — G8419 CALC BMI OUT NRM PARAM NOF/U: HCPCS | Performed by: NURSE PRACTITIONER

## 2024-08-01 RX ORDER — VILAZODONE HYDROCHLORIDE 40 MG/1
TABLET ORAL
Qty: 30 TABLET | Refills: 2 | Status: CANCELLED | OUTPATIENT
Start: 2024-08-01

## 2024-08-01 RX ORDER — LURASIDONE HYDROCHLORIDE 40 MG/1
40 TABLET, FILM COATED ORAL NIGHTLY
Qty: 30 TABLET | Refills: 2 | Status: CANCELLED | OUTPATIENT
Start: 2024-08-01

## 2024-08-01 RX ORDER — LITHIUM CARBONATE 300 MG
300 TABLET ORAL 3 TIMES DAILY
Qty: 90 TABLET | Refills: 0
Start: 2024-08-01 | End: 2024-08-06 | Stop reason: DRUGHIGH

## 2024-08-01 RX ORDER — LAMOTRIGINE 200 MG/1
TABLET ORAL
Qty: 30 TABLET | Refills: 2 | Status: CANCELLED | OUTPATIENT
Start: 2024-08-01

## 2024-08-01 ASSESSMENT — PATIENT HEALTH QUESTIONNAIRE - PHQ9
9. THOUGHTS THAT YOU WOULD BE BETTER OFF DEAD, OR OF HURTING YOURSELF: NOT AT ALL
2. FEELING DOWN, DEPRESSED OR HOPELESS: MORE THAN HALF THE DAYS
SUM OF ALL RESPONSES TO PHQ QUESTIONS 1-9: 10
SUM OF ALL RESPONSES TO PHQ QUESTIONS 1-9: 10
6. FEELING BAD ABOUT YOURSELF - OR THAT YOU ARE A FAILURE OR HAVE LET YOURSELF OR YOUR FAMILY DOWN: MORE THAN HALF THE DAYS
10. IF YOU CHECKED OFF ANY PROBLEMS, HOW DIFFICULT HAVE THESE PROBLEMS MADE IT FOR YOU TO DO YOUR WORK, TAKE CARE OF THINGS AT HOME, OR GET ALONG WITH OTHER PEOPLE: SOMEWHAT DIFFICULT
SUM OF ALL RESPONSES TO PHQ QUESTIONS 1-9: 10
SUM OF ALL RESPONSES TO PHQ9 QUESTIONS 1 & 2: 5
5. POOR APPETITE OR OVEREATING: NOT AT ALL
8. MOVING OR SPEAKING SO SLOWLY THAT OTHER PEOPLE COULD HAVE NOTICED. OR THE OPPOSITE, BEING SO FIGETY OR RESTLESS THAT YOU HAVE BEEN MOVING AROUND A LOT MORE THAN USUAL: NOT AT ALL
SUM OF ALL RESPONSES TO PHQ QUESTIONS 1-9: 10
4. FEELING TIRED OR HAVING LITTLE ENERGY: SEVERAL DAYS
7. TROUBLE CONCENTRATING ON THINGS, SUCH AS READING THE NEWSPAPER OR WATCHING TELEVISION: MORE THAN HALF THE DAYS
3. TROUBLE FALLING OR STAYING ASLEEP: NOT AT ALL
1. LITTLE INTEREST OR PLEASURE IN DOING THINGS: NEARLY EVERY DAY

## 2024-08-01 ASSESSMENT — ANXIETY QUESTIONNAIRES
4. TROUBLE RELAXING: NOT AT ALL
3. WORRYING TOO MUCH ABOUT DIFFERENT THINGS: SEVERAL DAYS
1. FEELING NERVOUS, ANXIOUS, OR ON EDGE: NOT AT ALL
IF YOU CHECKED OFF ANY PROBLEMS ON THIS QUESTIONNAIRE, HOW DIFFICULT HAVE THESE PROBLEMS MADE IT FOR YOU TO DO YOUR WORK, TAKE CARE OF THINGS AT HOME, OR GET ALONG WITH OTHER PEOPLE: SOMEWHAT DIFFICULT
7. FEELING AFRAID AS IF SOMETHING AWFUL MIGHT HAPPEN: NOT AT ALL
2. NOT BEING ABLE TO STOP OR CONTROL WORRYING: NOT AT ALL
6. BECOMING EASILY ANNOYED OR IRRITABLE: SEVERAL DAYS
5. BEING SO RESTLESS THAT IT IS HARD TO SIT STILL: SEVERAL DAYS
GAD7 TOTAL SCORE: 3

## 2024-08-01 NOTE — TELEPHONE ENCOUNTER
Deanna called in because provider wanted to know how many refills patient has on her medications.    Deanna looked at the bottles and below is what she read off:    Lamictal 200mg- 1 Refill left  Lurasidone 40mg- 2 Refills left  Vilazodone 40mg- 0.33 Refills left  Lithium 300mg- Patient has 2 bottles that are about 4\" tall. One bottle is full and the 2nd bottle is half full. Refill on bottle states .01 refills left.    Pending your advice.

## 2024-08-01 NOTE — TELEPHONE ENCOUNTER
Care Transitions Initial Follow Up Call    Outreach made within 2 business days of discharge: Yes    Patient: Haily Parker Patient : 1960   MRN: [id number]  Reason for Admission: Accidental [place] overdose  Discharge Date: 24       Spoke with: Patient    Discharge department/facility: [facility]    TCM Interactive Patient Contact:  Was patient able to fill all prescriptions: Yes  Was patient instructed to bring all medications to the follow-up visit: Yes  Is patient taking all medications as directed in the discharge summary? Yes  Does patient understand their discharge instructions: Yes  Does patient have questions or concerns that need addressed prior to 7-14 day follow up office visit: no    Additional needs identified to be addressed with provider  No needs identified             Scheduled appointment with PCP within 7-14 days    Follow Up  Future Appointments   Date Time Provider Department Center   2024  1:00 PM Purnima Perez APRN - CNP N [facility] PSYCH P - [place]   2024 10:00 AM Luciana Parr DO SR[facility] FM RES [facility] DEP   12/3/2024  1:00 PM Luciana Parr DO SR[facility] FM RES [facility] ECC DEP   2024  1:00 PM Leopold, Paige L, APRN - CNP N SRPXNEURO Neurology -   2025  9:45 AM SCHEDULE, SRPX PACER NURSE N [facility] PACER [facility] - [place]   2025 10:00 AM Rudy Kerr MD N [facility] Heart [facility] - [address]       [address] Y[address]ra MA

## 2024-08-01 NOTE — PROGRESS NOTES
[facility]  [address]  [facility] [address]  Dept: [phone]  Dept Fax: [phone]  Loc: [phone]    Visit Date: 8/1/2024    SUBJECTIVE DATA     CHIEF COMPLAINT:    Chief Complaint   Patient presents with    Bipolar I     Depression    Medication Refill    Follow-up    Other     Patient recently in hospital for [place] overdose on July 29, 2024. Her POA is here today to discuss her medications today with provider.       History obtained from: patient    HISTORY OF PRESENT ILLNESS:    Haily Parker is a 64 y.o. female who presents to the office for follow-up on her moods. Her last 02/06/2024. She is accompanied by Deanna, patient sister and POA    Patient states she had a recent hospitalizations    Sister voices concerns about patient living independently as it has been highly encouraged patient move to assisted living  -patient desires to remain living at home  -patient states she is very worried she won't be able to bring her cats    Sister voices concerns about patient dietary habits as patient has not been eating supper  -she doesn't think patient is eating well or regularly  -she is concerned patient isn't drinking enough water  -sister is concerned about patient caffeine intake    Sister has put all medications into a pill planner  -sister reports patient had significant medication errors when they reviewed her meds at home  -sister reports it is unclear if patient was taking medications properly prior to most recent hospitalization, but since discharge family has been helping with the medications    Wonders if there is an option to adjust lithium so that she can take it TID rather than splitting the doses    Was hospitalized x2 in July  -once for unsteady gait (unclear diagnosis)  -most recently for lithium toxicity    Sister reports patient had her medications all mixed up upon further inspection after her initial

## 2024-08-02 ENCOUNTER — HOSPITAL ENCOUNTER (OUTPATIENT)
Age: 64
Discharge: HOME | End: 2024-08-02
Payer: MEDICARE

## 2024-08-02 DIAGNOSIS — Z51.81 THERAPEUTIC DRUG MONITORING: ICD-10-CM

## 2024-08-02 DIAGNOSIS — F31.75 BIPOLAR I, MOST RECENT EPISODE DEPRESSED, PARTIAL REMISSION (HCC): Primary | ICD-10-CM

## 2024-08-02 DIAGNOSIS — F31.75 BIPOLAR I, MOST RECENT EPISODE DEPRESSED, PARTIAL REMISSION (HCC): ICD-10-CM

## 2024-08-02 LAB
ANION GAP SERPL CALC-SCNC: 13 MEQ/L (ref 8–16)
BASOPHILS ABSOLUTE: 0 THOU/MM3 (ref 0–0.1)
BASOPHILS NFR BLD AUTO: 0.4 %
BUN SERPL-MCNC: 18 MG/DL (ref 7–22)
CALCIUM SERPL-MCNC: 10.7 MG/DL (ref 8.5–10.5)
CHLORIDE SERPL-SCNC: 112 MEQ/L (ref 98–111)
CO2 SERPL-SCNC: 19 MEQ/L (ref 23–33)
DEPRECATED RDW RBC AUTO: 53.5 FL (ref 35–45)
EOSINOPHIL NFR BLD AUTO: 2.5 %
EOSINOPHILS ABSOLUTE: 0.2 THOU/MM3 (ref 0–0.4)
ERYTHROCYTE [DISTWIDTH] IN BLOOD BY AUTOMATED COUNT: 14 % (ref 11.5–14.5)
GLUCOSE SERPL-MCNC: 43 MG/DL (ref 70–108)
HCT VFR BLD AUTO: 40.2 % (ref 37–47)
HGB BLD-MCNC: 13 GM/DL (ref 12–16)
IMM GRANULOCYTES # BLD AUTO: 0.05 THOU/MM3 (ref 0–0.07)
IMM GRANULOCYTES NFR BLD AUTO: 0.6 %
LITHIUM SERPL-SCNC: 0.59 MMOL/L (ref 0.6–1.2)
LYMPHOCYTES ABSOLUTE: 0.8 THOU/MM3 (ref 1–4.8)
LYMPHOCYTES NFR BLD AUTO: 10.6 %
MCH RBC QN AUTO: 33.4 PG (ref 26–33)
MCHC RBC AUTO-ENTMCNC: 32.3 GM/DL (ref 32.2–35.5)
MCV RBC AUTO: 103.3 FL (ref 81–99)
MONOCYTES ABSOLUTE: 0.6 THOU/MM3 (ref 0.4–1.3)
MONOCYTES NFR BLD AUTO: 8.2 %
NEUTROPHILS ABSOLUTE: 6.1 THOU/MM3 (ref 1.8–7.7)
NEUTROPHILS NFR BLD AUTO: 77.7 %
NRBC BLD AUTO-RTO: 0 /100 WBC
PLATELET # BLD AUTO: 195 THOU/MM3 (ref 130–400)
PMV BLD AUTO: 10 FL (ref 9.4–12.4)
POTASSIUM SERPL-SCNC: 4.2 MEQ/L (ref 3.5–5.2)
RBC # BLD AUTO: 3.89 MILL/MM3 (ref 4.2–5.4)
SODIUM SERPL-SCNC: 144 MEQ/L (ref 135–145)
WBC # BLD AUTO: 7.9 THOU/MM3 (ref 4.8–10.8)

## 2024-08-02 PROCEDURE — 82947 ASSAY GLUCOSE BLOOD QUANT: CPT

## 2024-08-02 PROCEDURE — 84520 ASSAY OF UREA NITROGEN: CPT

## 2024-08-02 PROCEDURE — 82310 ASSAY OF CALCIUM: CPT

## 2024-08-02 PROCEDURE — 36415 COLL VENOUS BLD VENIPUNCTURE: CPT

## 2024-08-02 PROCEDURE — 80178 ASSAY OF LITHIUM: CPT

## 2024-08-02 PROCEDURE — 80051 ELECTROLYTE PANEL: CPT

## 2024-08-02 PROCEDURE — 85025 COMPLETE CBC W/AUTO DIFF WBC: CPT

## 2024-08-06 ENCOUNTER — HOSPITAL ENCOUNTER (OUTPATIENT)
Age: 64
Discharge: HOME | End: 2024-08-06
Payer: MEDICARE

## 2024-08-06 DIAGNOSIS — F31.75 BIPOLAR I, MOST RECENT EPISODE DEPRESSED, PARTIAL REMISSION (HCC): ICD-10-CM

## 2024-08-06 DIAGNOSIS — Z51.81 THERAPEUTIC DRUG MONITORING: ICD-10-CM

## 2024-08-06 DIAGNOSIS — Z51.81 THERAPEUTIC DRUG MONITORING: Primary | ICD-10-CM

## 2024-08-06 DIAGNOSIS — E83.52 HYPERCALCEMIA: ICD-10-CM

## 2024-08-06 LAB
ANION GAP SERPL CALC-SCNC: 12 MEQ/L (ref 8–16)
BUN SERPL-MCNC: 20 MG/DL (ref 7–22)
CALCIUM SERPL-MCNC: 10.6 MG/DL (ref 8.5–10.5)
CHLORIDE SERPL-SCNC: 111 MEQ/L (ref 98–111)
CO2 SERPL-SCNC: 19 MEQ/L (ref 23–33)
CREAT SERPL-MCNC: 0.7 MG/DL (ref 0.4–1.2)
GFR SERPL CREATININE-BSD FRML MDRD: > 90 ML/MIN/1.73M2
GLUCOSE SERPL-MCNC: 69 MG/DL (ref 70–108)
LITHIUM SERPL-SCNC: 0.58 MMOL/L (ref 0.6–1.2)
POTASSIUM SERPL-SCNC: 4.1 MEQ/L (ref 3.5–5.2)
SODIUM SERPL-SCNC: 142 MEQ/L (ref 135–145)

## 2024-08-06 PROCEDURE — 36415 COLL VENOUS BLD VENIPUNCTURE: CPT

## 2024-08-06 PROCEDURE — 80178 ASSAY OF LITHIUM: CPT

## 2024-08-06 PROCEDURE — 80048 BASIC METABOLIC PNL TOTAL CA: CPT

## 2024-08-06 RX ORDER — LITHIUM CARBONATE 300 MG
600 TABLET ORAL NIGHTLY
Qty: 60 TABLET | Refills: 0
Start: 2024-08-06

## 2024-08-08 ENCOUNTER — OFFICE VISIT (OUTPATIENT)
Dept: FAMILY MEDICINE CLINIC | Age: 64
End: 2024-08-08

## 2024-08-08 ENCOUNTER — OFFICE VISIT (OUTPATIENT)
Dept: PSYCHIATRY | Age: 64
End: 2024-08-08
Payer: MEDICARE

## 2024-08-08 VITALS
SYSTOLIC BLOOD PRESSURE: 138 MMHG | BODY MASS INDEX: 26.15 KG/M2 | OXYGEN SATURATION: 98 % | HEIGHT: 57 IN | WEIGHT: 121.2 LBS | HEART RATE: 76 BPM | TEMPERATURE: 98.6 F | RESPIRATION RATE: 18 BRPM | DIASTOLIC BLOOD PRESSURE: 68 MMHG

## 2024-08-08 VITALS
SYSTOLIC BLOOD PRESSURE: 133 MMHG | HEIGHT: 57 IN | OXYGEN SATURATION: 96 % | BODY MASS INDEX: 26.86 KG/M2 | WEIGHT: 124.5 LBS | HEART RATE: 71 BPM | DIASTOLIC BLOOD PRESSURE: 74 MMHG

## 2024-08-08 DIAGNOSIS — Q71.31: Primary | ICD-10-CM

## 2024-08-08 DIAGNOSIS — Z99.89 USE OF CANE AS AMBULATORY AID: ICD-10-CM

## 2024-08-08 DIAGNOSIS — R26.89 IMBALANCE: ICD-10-CM

## 2024-08-08 DIAGNOSIS — Z09 HOSPITAL DISCHARGE FOLLOW-UP: ICD-10-CM

## 2024-08-08 DIAGNOSIS — F31.75 BIPOLAR I, MOST RECENT EPISODE DEPRESSED, PARTIAL REMISSION (HCC): ICD-10-CM

## 2024-08-08 DIAGNOSIS — F31.9 BIPOLAR 1 DISORDER (HCC): ICD-10-CM

## 2024-08-08 DIAGNOSIS — E16.2 HYPOGLYCEMIA: ICD-10-CM

## 2024-08-08 PROCEDURE — 1111F DSCHRG MED/CURRENT MED MERGE: CPT | Performed by: NURSE PRACTITIONER

## 2024-08-08 PROCEDURE — 3017F COLORECTAL CA SCREEN DOC REV: CPT | Performed by: NURSE PRACTITIONER

## 2024-08-08 PROCEDURE — 99213 OFFICE O/P EST LOW 20 MIN: CPT | Performed by: NURSE PRACTITIONER

## 2024-08-08 PROCEDURE — 90833 PSYTX W PT W E/M 30 MIN: CPT | Performed by: NURSE PRACTITIONER

## 2024-08-08 PROCEDURE — 1036F TOBACCO NON-USER: CPT | Performed by: NURSE PRACTITIONER

## 2024-08-08 PROCEDURE — G8419 CALC BMI OUT NRM PARAM NOF/U: HCPCS | Performed by: NURSE PRACTITIONER

## 2024-08-08 PROCEDURE — G8427 DOCREV CUR MEDS BY ELIG CLIN: HCPCS | Performed by: NURSE PRACTITIONER

## 2024-08-08 RX ORDER — LAMOTRIGINE 200 MG/1
TABLET ORAL
Qty: 30 TABLET | Refills: 2 | Status: CANCELLED | OUTPATIENT
Start: 2024-08-08

## 2024-08-08 RX ORDER — VILAZODONE HYDROCHLORIDE 40 MG/1
TABLET ORAL
Qty: 30 TABLET | Refills: 2 | Status: CANCELLED | OUTPATIENT
Start: 2024-08-08

## 2024-08-08 RX ORDER — LURASIDONE HYDROCHLORIDE 40 MG/1
40 TABLET, FILM COATED ORAL NIGHTLY
Qty: 30 TABLET | Refills: 2 | Status: CANCELLED | OUTPATIENT
Start: 2024-08-08

## 2024-08-08 ASSESSMENT — PATIENT HEALTH QUESTIONNAIRE - PHQ9
1. LITTLE INTEREST OR PLEASURE IN DOING THINGS: SEVERAL DAYS
9. THOUGHTS THAT YOU WOULD BE BETTER OFF DEAD, OR OF HURTING YOURSELF: SEVERAL DAYS
SUM OF ALL RESPONSES TO PHQ QUESTIONS 1-9: 6
SUM OF ALL RESPONSES TO PHQ QUESTIONS 1-9: 6
2. FEELING DOWN, DEPRESSED OR HOPELESS: SEVERAL DAYS
SUM OF ALL RESPONSES TO PHQ QUESTIONS 1-9: 5
8. MOVING OR SPEAKING SO SLOWLY THAT OTHER PEOPLE COULD HAVE NOTICED. OR THE OPPOSITE, BEING SO FIGETY OR RESTLESS THAT YOU HAVE BEEN MOVING AROUND A LOT MORE THAN USUAL: SEVERAL DAYS
SUM OF ALL RESPONSES TO PHQ QUESTIONS 1-9: 6
5. POOR APPETITE OR OVEREATING: NOT AT ALL
4. FEELING TIRED OR HAVING LITTLE ENERGY: SEVERAL DAYS
SUM OF ALL RESPONSES TO PHQ9 QUESTIONS 1 & 2: 2
3. TROUBLE FALLING OR STAYING ASLEEP: NOT AT ALL
6. FEELING BAD ABOUT YOURSELF - OR THAT YOU ARE A FAILURE OR HAVE LET YOURSELF OR YOUR FAMILY DOWN: NOT AT ALL

## 2024-08-08 ASSESSMENT — ANXIETY QUESTIONNAIRES
IF YOU CHECKED OFF ANY PROBLEMS ON THIS QUESTIONNAIRE, HOW DIFFICULT HAVE THESE PROBLEMS MADE IT FOR YOU TO DO YOUR WORK, TAKE CARE OF THINGS AT HOME, OR GET ALONG WITH OTHER PEOPLE: SOMEWHAT DIFFICULT
7. FEELING AFRAID AS IF SOMETHING AWFUL MIGHT HAPPEN: NOT AT ALL
4. TROUBLE RELAXING: NOT AT ALL
2. NOT BEING ABLE TO STOP OR CONTROL WORRYING: NOT AT ALL
6. BECOMING EASILY ANNOYED OR IRRITABLE: SEVERAL DAYS
1. FEELING NERVOUS, ANXIOUS, OR ON EDGE: SEVERAL DAYS
5. BEING SO RESTLESS THAT IT IS HARD TO SIT STILL: NOT AT ALL
GAD7 TOTAL SCORE: 3
3. WORRYING TOO MUCH ABOUT DIFFERENT THINGS: SEVERAL DAYS

## 2024-08-08 ASSESSMENT — COLUMBIA-SUICIDE SEVERITY RATING SCALE - C-SSRS
6. HAVE YOU EVER DONE ANYTHING, STARTED TO DO ANYTHING, OR PREPARED TO DO ANYTHING TO END YOUR LIFE?: NO
1. WITHIN THE PAST MONTH, HAVE YOU WISHED YOU WERE DEAD OR WISHED YOU COULD GO TO SLEEP AND NOT WAKE UP?: YES
2. HAVE YOU ACTUALLY HAD ANY THOUGHTS OF KILLING YOURSELF?: NO

## 2024-08-08 NOTE — PROGRESS NOTES
[facility]  [address]  [facility] [address]  Dept: [phone]  Dept Fax: [phone]  Loc: [phone]    Visit Date: 8/8/2024    SUBJECTIVE DATA     CHIEF COMPLAINT:    Chief Complaint   Patient presents with    Bipolar I    Therapeutic drug monitoring     Medication Check    Medication Refill       History obtained from: patient    HISTORY OF PRESENT ILLNESS:    Haily Parker is a 64 y.o. female who presents to the office for follow-up on her moods. Her last 08/01/2024.    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  Yanely concerned about patient not getting outside  -Yanely expresses concerns about patient getting \"Very involved\" I'm her OA and EA groups  Yanely also expresses concerns about patient's eating patterns and fluid intake.  Yanely reports patient has been more receptive to assistance from others    Patient has plenty of medications at home; no refills needed at this time    Denies suicidal ideations, intent, plan. No homicidal ideations, intent, plan. No audiovisual hallucinations.    HPI    The patient has had 0 lifetime suicide attempts. Reports she had intense thoughts of suicide with a plan and intent back in 1987 but someone came home.    Patient reports 3 psych hospital admissions with the last admission taking place in 1996    Past psychiatric medications include: various per her report but does not recall specific

## 2024-08-09 ENCOUNTER — TELEPHONE (OUTPATIENT)
Dept: PSYCHIATRY | Age: 64
End: 2024-08-09

## 2024-08-09 DIAGNOSIS — F31.75 BIPOLAR I, MOST RECENT EPISODE DEPRESSED, PARTIAL REMISSION (HCC): ICD-10-CM

## 2024-08-09 NOTE — TELEPHONE ENCOUNTER
Deanna called stating she was asked to call in with the amount of refills patient has available on medications at home. She states hat patient just \"dumps the bottles\" when she gets her prescriptions filled. She states she has bottles from March and she knows patient is taking her medication and has had them filled since then.     Deanna asks about dates prescriptions were last sent to the pharmacy. She does note the lurasidone bottle she is looking at is from May and indicates there are two refills still remaining.  She states she is going to go to [facility] pharmacy and verify exactly what prescriptions patient still has on file with them. She reports she will switch everything over to [email or web address] so they can complete pill packs for patient. Deanna will call the office on Monday to provide an update and request prescriptions if needed.

## 2024-08-09 NOTE — PROGRESS NOTES
S: 64 y.o. female with   Chief Complaint   Patient presents with    Follow-Up from Hospital     Bipolar, depressed, accidental lithium overdose     Would like to discuss Latest lab results        Chief complaint, [place], and all pertinent details of the case reviewed with the resident.    Please see resident's note for specific details discussed at today's visit.  Here with sister  BP Readings from Last 3 Encounters:   08/08/24 133/74   08/08/24 138/68   08/01/24 124/73     Wt Readings from Last 3 Encounters:   08/08/24 56.5 kg (124 lb 8 oz)   08/08/24 55 kg (121 lb 3.2 oz)   08/01/24 55.8 kg (123 lb)       O: VS:  height is 1.448 m (4' 9\") and weight is 55 kg (121 lb 3.2 oz). Her temporal temperature is 98.6 °F (37 °C). Her blood pressure is 138/68 and her pulse is 76. Her respiration is 18 and oxygen saturation is 98%.   AAO/NAD, appropriate affect for mood  Prosthesis on rt hand in pinching position  A:     Diagnosis Orders   1. Congenital absence of hand, right  [facility]      2. Imbalance  [facility]      3. Use of cane as ambulatory aid  [facility]      4. Hypoglycemia        5. Bipolar 1 disorder (HCC)        6. Hospital discharge follow-up  UT DISCHARGE MEDS RECONCILED W/ CURRENT OUTPATIENT MED LIST          Plan:  Reach out to  clinic if prothesis can be fixed or other alternative  OT for better function with prosthesis    Health Maintenance Due   Topic Date Due    Respiratory Syncytial Virus (RSV) Pregnant or age 60 yrs+ (1 - 1-dose 60+ series) Never done    COVID-19 Vaccine (4 - 2023-24 season) 09/01/2023    Annual Wellness Visit (Medicare Advantage)  01/01/2024    Flu vaccine (1) 08/01/2024       Attending Physician Statement  I have discussed the case, including pertinent history and exam findings with the resident. I also have seen the patient and performed key portions of the examination.  I agree with the 
  lithium 300 MG tablet  Take 2 tablets by mouth at bedtime     loratadine 10 MG tablet  Commonly known as: CLARITIN  Take 1 tablet by mouth daily     lurasidone 40 MG Tabs tablet  Commonly known as: Latuda  Take 1 tablet by mouth nightly     Magnesium Oxide 250 MG Tabs tablet  Commonly known as: MAGNESIUM-OXIDE     nitroGLYCERIN 0.4 MG SL tablet  Commonly known as: NITROSTAT  up to max of 3 total doses. If no relief after 1 dose, call 911.     pantoprazole 40 MG tablet  Commonly known as: PROTONIX  Take 1 tablet by mouth 2 times daily (before meals)     PRENATAL 1 PO     Prosthesis Misc  by Does not apply route Strap for the prosthesis broken please order new     sucralfate 1 GM tablet  Commonly known as: CARAFATE  TAKE 1 TABLET BY MOUTH EVERY 6 HOURS.     vilazodone HCl 40 MG Tabs  Commonly known as: vilazodone hcl  TAKE 1 TABLET BY MOUTH ONCE DAILY.     vitamin B-12 500 MCG tablet  Commonly known as: CYANOCOBALAMIN     vitamin D 50 MCG (2000 UT) Caps capsule                Medications marked \"taking\" at this time  Outpatient Medications Marked as Taking for the 8/8/24 encounter (Office Visit) with Luciana Parr DO   Medication Sig Dispense Refill    lithium 300 MG tablet Take 2 tablets by mouth at bedtime 60 tablet 0    sucralfate (CARAFATE) 1 GM tablet TAKE 1 TABLET BY MOUTH EVERY 6 HOURS. 120 tablet 3    vitamin B-12 (CYANOCOBALAMIN) 500 MCG tablet Take 1 tablet by mouth daily      Prenatal MV-Min-Fe Fum-FA-DHA (PRENATAL 1 PO) Take by mouth      amantadine (SYMMETREL) 100 MG capsule Take 1 capsule by mouth daily 30 capsule 5    lamoTRIgine (LAMICTAL) 200 MG tablet TAKE 1 TABLET BY MOUTH DAILY 30 tablet 2    lurasidone (LATUDA) 40 MG TABS tablet Take 1 tablet by mouth nightly 30 tablet 2    vilazodone hcl 40 MG TABS TAKE 1 TABLET BY MOUTH ONCE DAILY. 30 tablet 2    levothyroxine (SYNTHROID) 100 MCG tablet TAKE ONE TABLET BY MOUTH ONCE A DAY 90 tablet 1    nitroGLYCERIN (NITROSTAT) 0.4 MG SL tablet up to max of

## 2024-08-12 NOTE — TELEPHONE ENCOUNTER
Deanna (ARLIN) called into the office stating that she is asking for 1 refill on all of Haily's medications (Lamictal, Lithium, Latuda and Viibryd-went over dosing via phone/correct on active medication list), she said that when she contacted [facility] pharmacy, she discovered that Haily had not been taking her medications as she should and feels she keeps finding mistakes with her medications; so she is starting over and is asking for new prescriptions that she will  tomorrow.    She said that she is managing Haily's medications for right now; doing a 1 week pill container with when she should take them but she said she is finding it difficult to drive from [place] to [place] once weekly as well. She said that Haily spoke with Home Health on Friday and she is refusing services. She was requesting it for medication management and to provide welfare checks with her not doing well. She said that she contacted [facility] and they suggested that they just try to approach her with medication management and nothing else. She is asking for 1 last referral in attempts to get her set up. *She would like to be the contact people for the Home Health referral. She said that Haily is very stubborn and she is still \"sound mind that is able to make decisions, so its not something that can be force upon her\". She said that they had requested a home health place out of [place] (she was unsure of the exact name).     Please advise on medications? She would like all sent to [facility] pharmacy.

## 2024-08-13 ENCOUNTER — HOSPITAL ENCOUNTER (OUTPATIENT)
Age: 64
Discharge: HOME | End: 2024-08-13
Payer: MEDICARE

## 2024-08-13 DIAGNOSIS — Z51.81 THERAPEUTIC DRUG MONITORING: ICD-10-CM

## 2024-08-13 DIAGNOSIS — E83.52 HYPERCALCEMIA: ICD-10-CM

## 2024-08-13 DIAGNOSIS — F31.75 BIPOLAR I, MOST RECENT EPISODE DEPRESSED, PARTIAL REMISSION (HCC): ICD-10-CM

## 2024-08-13 DIAGNOSIS — R79.89 ELEVATED PARATHYROID HORMONE: Primary | ICD-10-CM

## 2024-08-13 LAB
ANION GAP SERPL CALC-SCNC: 15 MEQ/L (ref 8–16)
BUN SERPL-MCNC: 16 MG/DL (ref 7–22)
CALCIUM SERPL-MCNC: 10.7 MG/DL (ref 8.5–10.5)
CHLORIDE SERPL-SCNC: 111 MEQ/L (ref 98–111)
CO2 SERPL-SCNC: 18 MEQ/L (ref 23–33)
CREAT SERPL-MCNC: 0.7 MG/DL (ref 0.4–1.2)
GFR SERPL CREATININE-BSD FRML MDRD: > 90 ML/MIN/1.73M2
GLUCOSE SERPL-MCNC: 63 MG/DL (ref 70–108)
LITHIUM SERPL-SCNC: 0.52 MMOL/L (ref 0.6–1.2)
POTASSIUM SERPL-SCNC: 4.1 MEQ/L (ref 3.5–5.2)
PTH-INTACT SERPL-MCNC: 78.4 PG/ML (ref 15–65)
SODIUM SERPL-SCNC: 144 MEQ/L (ref 135–145)

## 2024-08-13 PROCEDURE — 80048 BASIC METABOLIC PNL TOTAL CA: CPT

## 2024-08-13 PROCEDURE — 83970 ASSAY OF PARATHORMONE: CPT

## 2024-08-13 PROCEDURE — 36415 COLL VENOUS BLD VENIPUNCTURE: CPT

## 2024-08-13 PROCEDURE — 80178 ASSAY OF LITHIUM: CPT

## 2024-08-13 RX ORDER — LAMOTRIGINE 200 MG/1
TABLET ORAL
Qty: 30 TABLET | Refills: 0 | Status: CANCELLED | OUTPATIENT
Start: 2024-08-13

## 2024-08-13 RX ORDER — LURASIDONE HYDROCHLORIDE 40 MG/1
40 TABLET, FILM COATED ORAL NIGHTLY
Qty: 30 TABLET | Refills: 0 | Status: CANCELLED | OUTPATIENT
Start: 2024-08-13

## 2024-08-13 RX ORDER — VILAZODONE HYDROCHLORIDE 40 MG/1
TABLET ORAL
Qty: 30 TABLET | Refills: 0 | Status: CANCELLED | OUTPATIENT
Start: 2024-08-13

## 2024-08-13 NOTE — TELEPHONE ENCOUNTER
How much of those medications does patient have remaining at home? I can certainly send in new prescriptions but if there is already a surplus at home, it is not ideal to send in new prescriptions.    Please clarify the home health request. The prior notes indicate patient refused services, which implies home health already did an evaluation and offered services but patient declined.

## 2024-08-13 NOTE — TELEPHONE ENCOUNTER
Deanna called back checking on refill request. She states patient does have approximately a 90 day supply at home of [place]. She does request prescriptions for Lamictal, Latuda and Viibryd.    Deanna also mentioned labs were completed and they would like results after provider has had a chance to review them. She also mentions previous conversation with staff regarding home health (please see Karen's message) and asks if provider would be willing to write the order for home health?    Rx's pending. #30, 0 refills. Please advise otherwise.

## 2024-08-14 NOTE — TELEPHONE ENCOUNTER
Deanna called back in with a update on patients medications. Deanna called the pharmacy and this is what they told her:    Lurasidone -patient has a refill left  Vilazodone  -patient has a refill left  Lithium -patient has plenty and doesn't need a refill.   Lamictal -has no refills left but has enough to get her thru to appointment on 08/20/24    Deanna is asking if provider can write her medications for just enough until her next appointment so patient doesn't have extra pills.The mediations that she has a refill for if those medications can be wrote for just 2 months.

## 2024-08-14 NOTE — TELEPHONE ENCOUNTER
I will not prescribe any additional fills on medications at this time as patient has plenty at home and has refills left at the pharmacy.     Regarding [facility] and assistance with medications, family or patient will need to call [facility] and inquire if those services are available through [facility]. Or, will patient need referral to home health?

## 2024-08-14 NOTE — TELEPHONE ENCOUNTER
Late entry    Called patient and sister Deanna to give them patient blood results on 08/14/24 and they verbally understood.    Deanna had asked that a letter be wrote for  of Aging to help patient with her medications. Per chart review patient had refused this service previously. Deanna stated she had a talk with patient and patient agreed only for them to help her with her medications. Deanna is also trying to get meals on wheels for the patient because Deanna doesn't think the patient is eating very well because patient \"is afraid of gaining weight\".    Deanna also stated she looked at patients medications and patient doesn't need the Lithium for sure. Deanna stated for the patiens other medications she has a 30 day supply and Deanna stated patient has enough and will talk with provider at patients next appointment on 08/20/24.    Pending your advice.

## 2024-08-14 NOTE — TELEPHONE ENCOUNTER
Spoke to Deanna and gave her providers response. Deanna stated she had called P ( [facility]) in [place] and they told her that a referral will need placed for patient.

## 2024-08-16 NOTE — TELEPHONE ENCOUNTER
Spoke to Deanna and she verbally understood. Deanna stated she has patients pills \"boxed up until then\".

## 2024-08-20 ENCOUNTER — OFFICE VISIT (OUTPATIENT)
Dept: PSYCHIATRY | Age: 64
End: 2024-08-20
Payer: MEDICARE

## 2024-08-20 ENCOUNTER — TELEPHONE (OUTPATIENT)
Dept: PSYCHIATRY | Age: 64
End: 2024-08-20

## 2024-08-20 VITALS
HEIGHT: 57 IN | WEIGHT: 134 LBS | DIASTOLIC BLOOD PRESSURE: 67 MMHG | BODY MASS INDEX: 28.91 KG/M2 | OXYGEN SATURATION: 97 % | SYSTOLIC BLOOD PRESSURE: 115 MMHG | HEART RATE: 89 BPM

## 2024-08-20 DIAGNOSIS — F31.75 BIPOLAR I, MOST RECENT EPISODE DEPRESSED, PARTIAL REMISSION (HCC): Primary | ICD-10-CM

## 2024-08-20 DIAGNOSIS — R41.3 MEMORY CHANGES: ICD-10-CM

## 2024-08-20 PROCEDURE — 1111F DSCHRG MED/CURRENT MED MERGE: CPT | Performed by: NURSE PRACTITIONER

## 2024-08-20 PROCEDURE — 3017F COLORECTAL CA SCREEN DOC REV: CPT | Performed by: NURSE PRACTITIONER

## 2024-08-20 PROCEDURE — G8427 DOCREV CUR MEDS BY ELIG CLIN: HCPCS | Performed by: NURSE PRACTITIONER

## 2024-08-20 PROCEDURE — 99214 OFFICE O/P EST MOD 30 MIN: CPT | Performed by: NURSE PRACTITIONER

## 2024-08-20 PROCEDURE — G8419 CALC BMI OUT NRM PARAM NOF/U: HCPCS | Performed by: NURSE PRACTITIONER

## 2024-08-20 PROCEDURE — 1036F TOBACCO NON-USER: CPT | Performed by: NURSE PRACTITIONER

## 2024-08-20 PROCEDURE — 90833 PSYTX W PT W E/M 30 MIN: CPT | Performed by: NURSE PRACTITIONER

## 2024-08-20 RX ORDER — LAMOTRIGINE 200 MG/1
TABLET ORAL
Qty: 30 TABLET | Refills: 0 | Status: SHIPPED | OUTPATIENT
Start: 2024-08-20

## 2024-08-20 RX ORDER — LURASIDONE HYDROCHLORIDE 40 MG/1
40 TABLET, FILM COATED ORAL NIGHTLY
Qty: 30 TABLET | Refills: 2 | Status: CANCELLED | OUTPATIENT
Start: 2024-08-20

## 2024-08-20 RX ORDER — VILAZODONE HYDROCHLORIDE 40 MG/1
TABLET ORAL
Qty: 30 TABLET | Refills: 2 | Status: CANCELLED | OUTPATIENT
Start: 2024-08-20

## 2024-08-20 ASSESSMENT — PATIENT HEALTH QUESTIONNAIRE - PHQ9
5. POOR APPETITE OR OVEREATING: NOT AT ALL
SUM OF ALL RESPONSES TO PHQ9 QUESTIONS 1 & 2: 2
SUM OF ALL RESPONSES TO PHQ QUESTIONS 1-9: 7
3. TROUBLE FALLING OR STAYING ASLEEP: NOT AT ALL
7. TROUBLE CONCENTRATING ON THINGS, SUCH AS READING THE NEWSPAPER OR WATCHING TELEVISION: SEVERAL DAYS
1. LITTLE INTEREST OR PLEASURE IN DOING THINGS: NOT AT ALL
10. IF YOU CHECKED OFF ANY PROBLEMS, HOW DIFFICULT HAVE THESE PROBLEMS MADE IT FOR YOU TO DO YOUR WORK, TAKE CARE OF THINGS AT HOME, OR GET ALONG WITH OTHER PEOPLE: SOMEWHAT DIFFICULT
SUM OF ALL RESPONSES TO PHQ QUESTIONS 1-9: 6
8. MOVING OR SPEAKING SO SLOWLY THAT OTHER PEOPLE COULD HAVE NOTICED. OR THE OPPOSITE, BEING SO FIGETY OR RESTLESS THAT YOU HAVE BEEN MOVING AROUND A LOT MORE THAN USUAL: NOT AT ALL
4. FEELING TIRED OR HAVING LITTLE ENERGY: MORE THAN HALF THE DAYS
2. FEELING DOWN, DEPRESSED OR HOPELESS: MORE THAN HALF THE DAYS
9. THOUGHTS THAT YOU WOULD BE BETTER OFF DEAD, OR OF HURTING YOURSELF: SEVERAL DAYS
6. FEELING BAD ABOUT YOURSELF - OR THAT YOU ARE A FAILURE OR HAVE LET YOURSELF OR YOUR FAMILY DOWN: SEVERAL DAYS

## 2024-08-20 ASSESSMENT — ANXIETY QUESTIONNAIRES
GAD7 TOTAL SCORE: 2
IF YOU CHECKED OFF ANY PROBLEMS ON THIS QUESTIONNAIRE, HOW DIFFICULT HAVE THESE PROBLEMS MADE IT FOR YOU TO DO YOUR WORK, TAKE CARE OF THINGS AT HOME, OR GET ALONG WITH OTHER PEOPLE: SOMEWHAT DIFFICULT
4. TROUBLE RELAXING: NOT AT ALL
3. WORRYING TOO MUCH ABOUT DIFFERENT THINGS: NOT AT ALL
6. BECOMING EASILY ANNOYED OR IRRITABLE: SEVERAL DAYS
1. FEELING NERVOUS, ANXIOUS, OR ON EDGE: SEVERAL DAYS
5. BEING SO RESTLESS THAT IT IS HARD TO SIT STILL: NOT AT ALL
2. NOT BEING ABLE TO STOP OR CONTROL WORRYING: NOT AT ALL
7. FEELING AFRAID AS IF SOMETHING AWFUL MIGHT HAPPEN: NOT AT ALL

## 2024-08-20 ASSESSMENT — COLUMBIA-SUICIDE SEVERITY RATING SCALE - C-SSRS
1. WITHIN THE PAST MONTH, HAVE YOU WISHED YOU WERE DEAD OR WISHED YOU COULD GO TO SLEEP AND NOT WAKE UP?: YES
7. DID THIS OCCUR IN THE LAST THREE MONTHS: NO
2. HAVE YOU ACTUALLY HAD ANY THOUGHTS OF KILLING YOURSELF?: NO
6. HAVE YOU EVER DONE ANYTHING, STARTED TO DO ANYTHING, OR PREPARED TO DO ANYTHING TO END YOUR LIFE?: YES

## 2024-08-20 NOTE — PROGRESS NOTES
PRN testing needs. Dispense all needed supplies to include: monitor, strips, lancing device, lancets, control solutions, alcohol swabs. 1 kit 0    ferrous sulfate (IRON 325) 325 (65 Fe) MG tablet Take 1 tablet by mouth in the morning, at noon, and at bedtime 90 tablet 2    DHEA 50 MG CAPS Take by mouth nightly      Prosthesis MISC by Does not apply route Strap for the prosthesis broken please order new 1 each 0    loratadine (CLARITIN) 10 MG tablet Take 1 tablet by mouth daily 90 tablet 1    Cholecalciferol (VITAMIN D) 2000 units CAPS capsule Take by mouth nightly 50 mcg nightly      Magnesium Oxide 250 MG TABS Take 1 tablet by mouth nightly      calcium carbonate 600 MG TABS tablet Take 1 tablet by mouth nightly       No facility-administered medications prior to visit.       ALLERGIES:    Latex and Sulfa antibiotics    REVIEW OF SYSTEMS:    Review of Systems    The patient sees Luciana Parr DO as her primary care provider.    SPECIALISTS: Dr. Frances (GI), Dr. Palencia (cardiology), Dr. Nix (neurology)    OBJECTIVE DATA     /67   Pulse 89   Ht 1.448 m (4' 9\")   Wt 60.8 kg (134 lb)   SpO2 97%   BMI 29.00 kg/m²       Physical Exam    Mental Status Evaluation:   Orientation: Alert, oriented, thought content appropriate   Mood:. Euthymic      Affect:  Normal      Appearance:  right upper extremity prosthetic, Age Appropriate, Casually Dressed, Clean, Well Groomed, Clothing Appropriate for Age and Clothing Appropriate for Weather   Activity:  Cooperative, Good Eye Contact and Seated Calmly   Gait/Posture: Normal   Speech:  Clear, Fluent, Normal Pitch and Volume, Age and Situation Appropriate   Thought Process:  Within Normal Limits   Thought Content:  Within Normal Limits   Cognition:  Grossly Intact   Memory: Intact   Insight:  Good   Judgment: Good   Suicidal Ideations: Denies Suicidal Ideation   Homicidal Ideations: Negative for homicidal ideation   Medication Side Effects: Absent       Attention Span  with other concerning symptoms. Patient instructed to goimmediately to the emergency room and/or call 911 with any suicidal or homicidal ideations or if audio/visual hallucinations develop  Patient stated understanding and agrees.    Patient given crisis center information.    I spent a total of 30 minutes with the patient in counseling and coordination of care regarding topics discussed above. The patient was engaged and responsive throughout session. Utilized CBT, MI and reflective listening to address topics above. The remainder of session spent on symptom evaluation and medication management.      Provider Signature:  Electronically signed by AJITH Schwartz CNP on 8/20/2024 at 2:16 PM    **This report has been created using voice recognition software. It may contain minor errors which are inherent in voice recognition technology.**

## 2024-08-20 NOTE — TELEPHONE ENCOUNTER
Referral was placed for Haily to receive Home Health services.     This writer called [facility]. They state this referral will only provide short term services. They will help with medication compliance and education for safety. Pt will need to be able to take over her medications once service has ended (approximately 6 weeks or less depending on insurance).They accept Medicare but NOT medicaid   Pt was called and notified of this information. Pt asked to hold off on this referral until she speaks with family. Pt will c/b to let us know how she wants to proceed.  [If pt wants to proceed, fax referral and supporting documents  to [phone]]

## 2024-08-26 ENCOUNTER — TELEPHONE (OUTPATIENT)
Dept: INTERNAL MEDICINE CLINIC | Age: 64
End: 2024-08-26

## 2024-08-26 ENCOUNTER — TELEPHONE (OUTPATIENT)
Dept: FAMILY MEDICINE CLINIC | Age: 64
End: 2024-08-26

## 2024-08-26 DIAGNOSIS — D50.8 OTHER IRON DEFICIENCY ANEMIA: ICD-10-CM

## 2024-08-26 DIAGNOSIS — E06.3 HYPOTHYROIDISM DUE TO HASHIMOTO'S THYROIDITIS: Primary | ICD-10-CM

## 2024-08-26 DIAGNOSIS — E03.8 HYPOTHYROIDISM DUE TO HASHIMOTO'S THYROIDITIS: Primary | ICD-10-CM

## 2024-08-26 DIAGNOSIS — Z98.84 HISTORY OF GASTRIC BYPASS: ICD-10-CM

## 2024-08-26 NOTE — TELEPHONE ENCOUNTER
Received call from patients sister, she states she is working with [facility] to get pt pill packs for medications, but for the supplements pt takes they have to have an rx from PCP and can't be over the counter.     Supplements needing rx sent to [facility]:    Calcium carbonate 600mcg nightly (pt's sister states patients calcium has been running high and asked if she needs to take this)    BHEA 50 mg capsule nightly     Iron 225mg tablets 3x daily    Prenatal 1x daily     Vitamin B12 500mcg 1x daily    Vitamin D 500mcg capsule nightly    Pts sister also states they are trying to get patient a new prosthetic arm and asked for an rx to be sent to [facility] in [place].       Pts sister asked if these medications are necessary and would like PCP to review. Please advise. Thank you!    Call sister (Deanna Mott) back at [phone]

## 2024-08-26 NOTE — TELEPHONE ENCOUNTER
Called [facility] and spoke with Ameena (administration). States Haily was in Tuesday (8/20) for a consultation. She has had previous issues with skin irritation with other prosthesis. They had decided to try more cosmetic options for her prosthesis rather than the current prosthesis she has with the harness. Ameena states they will send Haily with a full note and details regarding any prescription needed in the future. Ameena states she will call Haily today to update her.     Electronically signed by Luciana Parr DO on 8/26/2024 at 1:51 PM

## 2024-08-27 ENCOUNTER — HOSPITAL ENCOUNTER (OUTPATIENT)
Age: 64
Discharge: HOME | End: 2024-08-27
Payer: MEDICARE

## 2024-08-27 DIAGNOSIS — Z51.81 THERAPEUTIC DRUG MONITORING: ICD-10-CM

## 2024-08-27 DIAGNOSIS — F31.75 BIPOLAR I, MOST RECENT EPISODE DEPRESSED, PARTIAL REMISSION (HCC): ICD-10-CM

## 2024-08-27 LAB
ANION GAP SERPL CALC-SCNC: 11 MEQ/L (ref 8–16)
BUN SERPL-MCNC: 23 MG/DL (ref 7–22)
CALCIUM SERPL-MCNC: 10.6 MG/DL (ref 8.5–10.5)
CHLORIDE SERPL-SCNC: 110 MEQ/L (ref 98–111)
CO2 SERPL-SCNC: 18 MEQ/L (ref 23–33)
CREAT SERPL-MCNC: 0.6 MG/DL (ref 0.4–1.2)
GFR SERPL CREATININE-BSD FRML MDRD: > 90 ML/MIN/1.73M2
GLUCOSE SERPL-MCNC: 77 MG/DL (ref 70–108)
LITHIUM SERPL-SCNC: 0.37 MMOL/L (ref 0.6–1.2)
POTASSIUM SERPL-SCNC: 4.3 MEQ/L (ref 3.5–5.2)
SODIUM SERPL-SCNC: 139 MEQ/L (ref 135–145)

## 2024-08-27 PROCEDURE — 80178 ASSAY OF LITHIUM: CPT

## 2024-08-27 PROCEDURE — 80048 BASIC METABOLIC PNL TOTAL CA: CPT

## 2024-08-27 PROCEDURE — 36415 COLL VENOUS BLD VENIPUNCTURE: CPT

## 2024-08-27 RX ORDER — MULTIVIT-MIN/IRON/FOLIC ACID/K 18-600-40
1 CAPSULE ORAL NIGHTLY
Qty: 30 CAPSULE | Refills: 2 | Status: SHIPPED | OUTPATIENT
Start: 2024-08-27

## 2024-08-27 RX ORDER — FERROUS SULFATE 325(65) MG
325 TABLET ORAL 3 TIMES DAILY
Qty: 90 TABLET | Refills: 2 | Status: SHIPPED | OUTPATIENT
Start: 2024-08-27 | End: 2024-11-25

## 2024-08-27 RX ORDER — UREA 10 %
500 LOTION (ML) TOPICAL DAILY
Qty: 30 TABLET | Refills: 2 | Status: SHIPPED | OUTPATIENT
Start: 2024-08-27

## 2024-08-27 RX ORDER — PRASTERONE (DHEA) 50 MG
50 CAPSULE ORAL NIGHTLY
Qty: 30 CAPSULE | Refills: 2 | Status: SHIPPED | OUTPATIENT
Start: 2024-08-27

## 2024-08-27 RX ORDER — PNV NO.95/FERROUS FUM/FOLIC AC 28MG-0.8MG
1 TABLET ORAL DAILY
Qty: 30 TABLET | Refills: 2 | Status: SHIPPED | OUTPATIENT
Start: 2024-08-27

## 2024-08-28 NOTE — TELEPHONE ENCOUNTER
Orders sent to [facility] in [place]. I sent 30-day supply with 2 refills for the B12, Vit D, Prenatal vitamin, and DHEA. Rosanna takes these all for her history of gastric bypass since she doesn't absorb vitamins as well. I did not refill the calcium carbonate - she doesn't need to take that one. The last medication was the iron supplementation which she takes for anemia.     I spoke with [facility] yesterday and they told me that they are working with Haily through a couple options for her prosthesis in house and that if they need a prescription, they will give her the forms to bring to our office in the future but at the moment they did not need any orders from me.     Electronically signed by Luciana Parr DO on 8/27/2024 at 9:08 PM

## 2024-08-29 NOTE — TELEPHONE ENCOUNTER
Received call from patient's sister(HIPAA), following up on conversation from Monday, reviewed note from Dr. Parr. Patient's sister verbalized understanding.

## 2024-08-30 DIAGNOSIS — Z87.19 HISTORY OF GASTROESOPHAGEAL REFLUX (GERD): ICD-10-CM

## 2024-08-30 DIAGNOSIS — E06.3 HYPOTHYROIDISM DUE TO HASHIMOTO'S THYROIDITIS: ICD-10-CM

## 2024-08-30 DIAGNOSIS — G35 MULTIPLE SCLEROSIS (HCC): ICD-10-CM

## 2024-08-30 DIAGNOSIS — F31.75 BIPOLAR I, MOST RECENT EPISODE DEPRESSED, PARTIAL REMISSION (HCC): ICD-10-CM

## 2024-08-30 DIAGNOSIS — E03.8 HYPOTHYROIDISM DUE TO HASHIMOTO'S THYROIDITIS: ICD-10-CM

## 2024-08-30 DIAGNOSIS — J34.89 RHINORRHEA: ICD-10-CM

## 2024-08-30 DIAGNOSIS — R53.83 OTHER FATIGUE: ICD-10-CM

## 2024-08-30 RX ORDER — LURASIDONE HYDROCHLORIDE 40 MG/1
40 TABLET, FILM COATED ORAL NIGHTLY
Qty: 30 TABLET | Refills: 0 | Status: SHIPPED | OUTPATIENT
Start: 2024-08-30

## 2024-08-30 RX ORDER — AMANTADINE HYDROCHLORIDE 100 MG/1
100 CAPSULE, GELATIN COATED ORAL DAILY
Qty: 30 CAPSULE | Refills: 5 | Status: SHIPPED | OUTPATIENT
Start: 2024-08-30

## 2024-08-30 RX ORDER — LITHIUM CARBONATE 300 MG
600 TABLET ORAL NIGHTLY
Qty: 60 TABLET | Refills: 0 | Status: SHIPPED | OUTPATIENT
Start: 2024-08-30

## 2024-08-30 NOTE — TELEPHONE ENCOUNTER
[facility] called into the office stating that they had spoke with Deanna (Haily's POA); she had stated they are missing 2 medications:    Lithium 300mg and Lamictal 200mg to put in her pill packs. They are requesting new prescriptions for both.    Medications are pending your approval for a 30 day supply with 0 refills; she was last seen on 08/20/24. Scheduled to return on 09/19/24. Please advise otherwise.

## 2024-08-30 NOTE — TELEPHONE ENCOUNTER
Spoke with Deanna (sister); she said yes, that things have been going well. Haily is now agreeable to the pill packs through [employer] and she is also now signed up for [employer] Whe[employer]. She said that she spent some time with Haily this morning and she is now realizing that everything, that everyone is doing for her, is making her feel better and she is doing much better.     She said that the excessive medication at home is being turned back into [facility]; they are agreeable to using it to supplement her pill packs. She said that the only 2 medications for prescriptions were the ones being requested.

## 2024-08-30 NOTE — TELEPHONE ENCOUNTER
Is patient going to be utilizing the pill packs from [facility]? At last visit patient and patient family had declined those services and insisted on home health. Also, prior encounters have indicated patient had excessive amounts of both medications available. If they are switching to the pill packs then I will send the Rx but patient/family will need to dispose of the old Rx the patient already has at home.

## 2024-08-30 NOTE — TELEPHONE ENCOUNTER
Patient's last appointment was: 8/8/2024  with our   Patient's next appointment is: 12/3/2024  with our Dr. Parr  Last refilled on:   Which pharmacy does the script need sent to: [facility]      Lab Results   Component Value Date    LABA1C 5.1 12/19/2022     Lab Results   Component Value Date    CHOL 151 11/06/2023    TRIG 77 11/06/2023    HDL 87 11/06/2023     Lab Results   Component Value Date     08/27/2024    K 4.3 08/27/2024     08/27/2024    CO2 18 (L) 08/27/2024    BUN 23 (H) 08/27/2024    CREATININE 0.6 08/27/2024    GLUCOSE 77 08/27/2024    CALCIUM 10.6 (H) 08/27/2024    BILITOT 0.3 07/18/2024    ALKPHOS 156 (H) 07/18/2024    AST 24 07/18/2024    ALT 57 07/18/2024    LABGLOM > 90 08/27/2024    GFRAA >60 01/18/2022     Lab Results   Component Value Date    TSH 3.190 07/29/2024    T4FREE 1.30 07/29/2024     Lab Results   Component Value Date    WBC 7.9 08/02/2024    HGB 13.0 08/02/2024    HCT 40.2 08/02/2024    .3 (H) 08/02/2024     08/02/2024

## 2024-08-31 RX ORDER — LORATADINE 10 MG/1
10 TABLET ORAL DAILY
Qty: 90 TABLET | Refills: 1 | Status: SHIPPED | OUTPATIENT
Start: 2024-08-31

## 2024-08-31 RX ORDER — PANTOPRAZOLE SODIUM 40 MG/1
40 TABLET, DELAYED RELEASE ORAL
Qty: 180 TABLET | Refills: 1 | Status: SHIPPED | OUTPATIENT
Start: 2024-08-31

## 2024-08-31 RX ORDER — LEVOTHYROXINE SODIUM 100 UG/1
TABLET ORAL
Qty: 90 TABLET | Refills: 1 | Status: SHIPPED | OUTPATIENT
Start: 2024-08-31

## 2024-09-05 DIAGNOSIS — F31.75 BIPOLAR I, MOST RECENT EPISODE DEPRESSED, PARTIAL REMISSION (HCC): ICD-10-CM

## 2024-09-05 RX ORDER — LAMOTRIGINE 200 MG/1
TABLET ORAL
Qty: 4 TABLET | Refills: 0 | Status: SHIPPED | OUTPATIENT
Start: 2024-09-05

## 2024-09-05 NOTE — TELEPHONE ENCOUNTER
Deanna (sister) called into the office stating that Haily's pill packs from [facility] will start on 09/28/24 but up until then she has been utilizing the supply at home to make her pill boxes until Haily starts with [facility]. She said with that, she will be short #4 pills of her Lamictal 200mg medication. She is asking that just the #4 is sent to [facility] so she can finish her made up pill boxes until her pill packs start. All other extra medication will be disposed of at [facility]; mentioned a lot of extra Lithium.    She said that she is aware that this provider was not going to send in refills until her next appointment on the 17th; but [facility] already has the starter Rx for the Latuda and Lithium, she would only need the Lamictal and Viibryd sent at that appointment.     Medication for Lamictal 200mg;#4 with 0 refills is pending. Please advise otherwise. She mentioned that the other sister will be attending with Haily since she will be out of town.

## 2024-09-09 DIAGNOSIS — F31.75 BIPOLAR I, MOST RECENT EPISODE DEPRESSED, PARTIAL REMISSION (HCC): ICD-10-CM

## 2024-09-11 RX ORDER — LURASIDONE HYDROCHLORIDE 40 MG/1
40 TABLET, FILM COATED ORAL NIGHTLY
Qty: 3 TABLET | OUTPATIENT
Start: 2024-09-11

## 2024-09-11 RX ORDER — LAMOTRIGINE 200 MG/1
TABLET ORAL
Qty: 4 TABLET | Refills: 0 | OUTPATIENT
Start: 2024-09-11

## 2024-09-17 ENCOUNTER — OFFICE VISIT (OUTPATIENT)
Dept: PSYCHIATRY | Age: 64
End: 2024-09-17
Payer: MEDICARE

## 2024-09-17 VITALS
DIASTOLIC BLOOD PRESSURE: 80 MMHG | OXYGEN SATURATION: 96 % | WEIGHT: 141 LBS | SYSTOLIC BLOOD PRESSURE: 121 MMHG | HEART RATE: 94 BPM | HEIGHT: 57 IN | BODY MASS INDEX: 30.42 KG/M2

## 2024-09-17 DIAGNOSIS — F31.75 BIPOLAR I, MOST RECENT EPISODE DEPRESSED, PARTIAL REMISSION (HCC): Primary | ICD-10-CM

## 2024-09-17 DIAGNOSIS — Z51.81 THERAPEUTIC DRUG MONITORING: ICD-10-CM

## 2024-09-17 PROCEDURE — 3017F COLORECTAL CA SCREEN DOC REV: CPT | Performed by: NURSE PRACTITIONER

## 2024-09-17 PROCEDURE — 1036F TOBACCO NON-USER: CPT | Performed by: NURSE PRACTITIONER

## 2024-09-17 PROCEDURE — 99213 OFFICE O/P EST LOW 20 MIN: CPT | Performed by: NURSE PRACTITIONER

## 2024-09-17 PROCEDURE — G8427 DOCREV CUR MEDS BY ELIG CLIN: HCPCS | Performed by: NURSE PRACTITIONER

## 2024-09-17 PROCEDURE — G8417 CALC BMI ABV UP PARAM F/U: HCPCS | Performed by: NURSE PRACTITIONER

## 2024-09-17 PROCEDURE — 90833 PSYTX W PT W E/M 30 MIN: CPT | Performed by: NURSE PRACTITIONER

## 2024-09-17 RX ORDER — LURASIDONE HYDROCHLORIDE 40 MG/1
40 TABLET, FILM COATED ORAL NIGHTLY
Qty: 30 TABLET | Refills: 2 | Status: SHIPPED | OUTPATIENT
Start: 2024-09-17

## 2024-09-17 RX ORDER — LITHIUM CARBONATE 300 MG
600 TABLET ORAL NIGHTLY
Qty: 60 TABLET | Refills: 2 | Status: SHIPPED | OUTPATIENT
Start: 2024-09-17

## 2024-09-17 RX ORDER — VILAZODONE HYDROCHLORIDE 40 MG/1
TABLET ORAL
Qty: 30 TABLET | Refills: 2 | Status: SHIPPED | OUTPATIENT
Start: 2024-09-17

## 2024-09-17 RX ORDER — FLUTICASONE PROPIONATE 50 MCG
1 SPRAY, SUSPENSION (ML) NASAL AS NEEDED
COMMUNITY

## 2024-09-17 RX ORDER — LAMOTRIGINE 200 MG/1
TABLET ORAL
Qty: 30 TABLET | Refills: 2 | Status: SHIPPED | OUTPATIENT
Start: 2024-09-17

## 2024-09-17 ASSESSMENT — PATIENT HEALTH QUESTIONNAIRE - PHQ9
1. LITTLE INTEREST OR PLEASURE IN DOING THINGS: SEVERAL DAYS
SUM OF ALL RESPONSES TO PHQ9 QUESTIONS 1 & 2: 1
6. FEELING BAD ABOUT YOURSELF - OR THAT YOU ARE A FAILURE OR HAVE LET YOURSELF OR YOUR FAMILY DOWN: NOT AT ALL
7. TROUBLE CONCENTRATING ON THINGS, SUCH AS READING THE NEWSPAPER OR WATCHING TELEVISION: SEVERAL DAYS
8. MOVING OR SPEAKING SO SLOWLY THAT OTHER PEOPLE COULD HAVE NOTICED. OR THE OPPOSITE, BEING SO FIGETY OR RESTLESS THAT YOU HAVE BEEN MOVING AROUND A LOT MORE THAN USUAL: NOT AT ALL
SUM OF ALL RESPONSES TO PHQ QUESTIONS 1-9: 4
3. TROUBLE FALLING OR STAYING ASLEEP: MORE THAN HALF THE DAYS
10. IF YOU CHECKED OFF ANY PROBLEMS, HOW DIFFICULT HAVE THESE PROBLEMS MADE IT FOR YOU TO DO YOUR WORK, TAKE CARE OF THINGS AT HOME, OR GET ALONG WITH OTHER PEOPLE: SOMEWHAT DIFFICULT
2. FEELING DOWN, DEPRESSED OR HOPELESS: NOT AT ALL
5. POOR APPETITE OR OVEREATING: NOT AT ALL
SUM OF ALL RESPONSES TO PHQ QUESTIONS 1-9: 4
4. FEELING TIRED OR HAVING LITTLE ENERGY: NOT AT ALL
SUM OF ALL RESPONSES TO PHQ QUESTIONS 1-9: 4
SUM OF ALL RESPONSES TO PHQ QUESTIONS 1-9: 4
9. THOUGHTS THAT YOU WOULD BE BETTER OFF DEAD, OR OF HURTING YOURSELF: NOT AT ALL

## 2024-09-17 ASSESSMENT — ANXIETY QUESTIONNAIRES
6. BECOMING EASILY ANNOYED OR IRRITABLE: NOT AT ALL
7. FEELING AFRAID AS IF SOMETHING AWFUL MIGHT HAPPEN: NOT AT ALL
2. NOT BEING ABLE TO STOP OR CONTROL WORRYING: NOT AT ALL
GAD7 TOTAL SCORE: 2
1. FEELING NERVOUS, ANXIOUS, OR ON EDGE: NOT AT ALL
5. BEING SO RESTLESS THAT IT IS HARD TO SIT STILL: SEVERAL DAYS
4. TROUBLE RELAXING: NOT AT ALL
IF YOU CHECKED OFF ANY PROBLEMS ON THIS QUESTIONNAIRE, HOW DIFFICULT HAVE THESE PROBLEMS MADE IT FOR YOU TO DO YOUR WORK, TAKE CARE OF THINGS AT HOME, OR GET ALONG WITH OTHER PEOPLE: NOT DIFFICULT AT ALL
3. WORRYING TOO MUCH ABOUT DIFFERENT THINGS: SEVERAL DAYS

## 2024-09-18 ENCOUNTER — HOSPITAL ENCOUNTER (OUTPATIENT)
Age: 64
Discharge: HOME | End: 2024-09-18
Payer: MEDICARE

## 2024-09-18 DIAGNOSIS — F31.75 BIPOLAR I, MOST RECENT EPISODE DEPRESSED, PARTIAL REMISSION (HCC): ICD-10-CM

## 2024-09-18 DIAGNOSIS — Z51.81 THERAPEUTIC DRUG MONITORING: ICD-10-CM

## 2024-09-18 LAB
ANION GAP SERPL CALC-SCNC: 13 MEQ/L (ref 8–16)
BUN SERPL-MCNC: 19 MG/DL (ref 7–22)
CALCIUM SERPL-MCNC: 10.2 MG/DL (ref 8.5–10.5)
CHLORIDE SERPL-SCNC: 104 MEQ/L (ref 98–111)
CO2 SERPL-SCNC: 22 MEQ/L (ref 23–33)
CREAT SERPL-MCNC: 0.5 MG/DL (ref 0.4–1.2)
GFR SERPL CREATININE-BSD FRML MDRD: > 90 ML/MIN/1.73M2
GLUCOSE SERPL-MCNC: 66 MG/DL (ref 70–108)
LITHIUM SERPL-SCNC: 0.49 MMOL/L (ref 0.6–1.2)
POTASSIUM SERPL-SCNC: 4.6 MEQ/L (ref 3.5–5.2)
SODIUM SERPL-SCNC: 139 MEQ/L (ref 135–145)

## 2024-09-18 PROCEDURE — 80048 BASIC METABOLIC PNL TOTAL CA: CPT

## 2024-09-18 PROCEDURE — 80178 ASSAY OF LITHIUM: CPT

## 2024-09-18 PROCEDURE — 36415 COLL VENOUS BLD VENIPUNCTURE: CPT

## 2024-09-20 ENCOUNTER — OFFICE VISIT (OUTPATIENT)
Dept: PULMONOLOGY | Age: 64
End: 2024-09-20
Payer: MEDICARE

## 2024-09-20 ENCOUNTER — TELEPHONE (OUTPATIENT)
Dept: FAMILY MEDICINE CLINIC | Age: 64
End: 2024-09-20

## 2024-09-20 VITALS
WEIGHT: 143 LBS | BODY MASS INDEX: 30.85 KG/M2 | OXYGEN SATURATION: 98 % | HEIGHT: 57 IN | DIASTOLIC BLOOD PRESSURE: 62 MMHG | HEART RATE: 78 BPM | TEMPERATURE: 99.1 F | SYSTOLIC BLOOD PRESSURE: 110 MMHG

## 2024-09-20 DIAGNOSIS — Z78.9 INTOLERANCE OF CONTINUOUS POSITIVE AIRWAY PRESSURE (CPAP) VENTILATION: ICD-10-CM

## 2024-09-20 DIAGNOSIS — G47.33 OSA (OBSTRUCTIVE SLEEP APNEA): Primary | ICD-10-CM

## 2024-09-20 PROCEDURE — 1036F TOBACCO NON-USER: CPT | Performed by: NURSE PRACTITIONER

## 2024-09-20 PROCEDURE — G8417 CALC BMI ABV UP PARAM F/U: HCPCS | Performed by: NURSE PRACTITIONER

## 2024-09-20 PROCEDURE — 3017F COLORECTAL CA SCREEN DOC REV: CPT | Performed by: NURSE PRACTITIONER

## 2024-09-20 PROCEDURE — G8427 DOCREV CUR MEDS BY ELIG CLIN: HCPCS | Performed by: NURSE PRACTITIONER

## 2024-09-20 PROCEDURE — 99213 OFFICE O/P EST LOW 20 MIN: CPT | Performed by: NURSE PRACTITIONER

## 2024-09-26 ENCOUNTER — APPOINTMENT (OUTPATIENT)
Dept: GENERAL RADIOLOGY | Age: 64
End: 2024-09-26
Payer: MEDICARE

## 2024-09-26 ENCOUNTER — HOSPITAL ENCOUNTER (EMERGENCY)
Age: 64
Discharge: HOME | End: 2024-09-26
Attending: EMERGENCY MEDICINE
Payer: MEDICARE

## 2024-09-26 ENCOUNTER — APPOINTMENT (OUTPATIENT)
Dept: INTERVENTIONAL RADIOLOGY/VASCULAR | Age: 64
End: 2024-09-26
Attending: EMERGENCY MEDICINE
Payer: MEDICARE

## 2024-09-26 ENCOUNTER — TELEPHONE (OUTPATIENT)
Dept: FAMILY MEDICINE CLINIC | Age: 64
End: 2024-09-26

## 2024-09-26 VITALS
HEIGHT: 57 IN | SYSTOLIC BLOOD PRESSURE: 131 MMHG | HEART RATE: 83 BPM | TEMPERATURE: 97.6 F | BODY MASS INDEX: 31.28 KG/M2 | OXYGEN SATURATION: 97 % | WEIGHT: 145 LBS | DIASTOLIC BLOOD PRESSURE: 76 MMHG | RESPIRATION RATE: 17 BRPM

## 2024-09-26 DIAGNOSIS — R60.9 DEPENDENT EDEMA: Primary | ICD-10-CM

## 2024-09-26 LAB
ALBUMIN SERPL BCG-MCNC: 3.8 G/DL (ref 3.5–5.1)
ALP SERPL-CCNC: 87 U/L (ref 38–126)
ALT SERPL W/O P-5'-P-CCNC: 32 U/L (ref 11–66)
ANION GAP SERPL CALC-SCNC: 7 MEQ/L (ref 8–16)
AST SERPL-CCNC: 21 U/L (ref 5–40)
BASOPHILS ABSOLUTE: 0 THOU/MM3 (ref 0–0.1)
BASOPHILS NFR BLD AUTO: 0.4 %
BILIRUB SERPL-MCNC: 0.2 MG/DL (ref 0.3–1.2)
BILIRUB UR QL STRIP.AUTO: NEGATIVE
BUN SERPL-MCNC: 21 MG/DL (ref 7–22)
CALCIUM SERPL-MCNC: 9.9 MG/DL (ref 8.5–10.5)
CHARACTER UR: CLEAR
CHLORIDE SERPL-SCNC: 112 MEQ/L (ref 98–111)
CO2 SERPL-SCNC: 24 MEQ/L (ref 23–33)
COLOR, UA: YELLOW
CREAT SERPL-MCNC: 0.5 MG/DL (ref 0.4–1.2)
DEPRECATED RDW RBC AUTO: 53.5 FL (ref 35–45)
ECHO BSA: 1.63 M2
EKG ATRIAL RATE: 60 BPM
EKG P AXIS: 59 DEGREES
EKG P-R INTERVAL: 194 MS
EKG Q-T INTERVAL: 470 MS
EKG QRS DURATION: 150 MS
EKG QTC CALCULATION (BAZETT): 470 MS
EKG R AXIS: 11 DEGREES
EKG T AXIS: 38 DEGREES
EKG VENTRICULAR RATE: 60 BPM
EOSINOPHIL NFR BLD AUTO: 1.1 %
EOSINOPHILS ABSOLUTE: 0.1 THOU/MM3 (ref 0–0.4)
ERYTHROCYTE [DISTWIDTH] IN BLOOD BY AUTOMATED COUNT: 13.5 % (ref 11.5–14.5)
GFR SERPL CREATININE-BSD FRML MDRD: > 90 ML/MIN/1.73M2
GLUCOSE SERPL-MCNC: 83 MG/DL (ref 70–108)
GLUCOSE UR QL STRIP.AUTO: NEGATIVE MG/DL
HCT VFR BLD AUTO: 38.2 % (ref 37–47)
HGB BLD-MCNC: 12.1 GM/DL (ref 12–16)
HGB UR QL STRIP.AUTO: NEGATIVE
IMM GRANULOCYTES # BLD AUTO: 0.04 THOU/MM3 (ref 0–0.07)
IMM GRANULOCYTES NFR BLD AUTO: 0.5 %
KETONES UR QL STRIP.AUTO: NEGATIVE
LITHIUM SERPL-SCNC: 0.32 MMOL/L (ref 0.6–1.2)
LYMPHOCYTES ABSOLUTE: 0.8 THOU/MM3 (ref 1–4.8)
LYMPHOCYTES NFR BLD AUTO: 10.3 %
MCH RBC QN AUTO: 33.9 PG (ref 26–33)
MCHC RBC AUTO-ENTMCNC: 31.7 GM/DL (ref 32.2–35.5)
MCV RBC AUTO: 107 FL (ref 81–99)
MONOCYTES ABSOLUTE: 0.6 THOU/MM3 (ref 0.4–1.3)
MONOCYTES NFR BLD AUTO: 7.3 %
NEUTROPHILS ABSOLUTE: 6.5 THOU/MM3 (ref 1.8–7.7)
NEUTROPHILS NFR BLD AUTO: 80.4 %
NITRITE UR QL STRIP: NEGATIVE
NRBC BLD AUTO-RTO: 0 /100 WBC
NT-PROBNP SERPL IA-MCNC: 447.7 PG/ML (ref 0–124)
OSMOLALITY SERPL CALC.SUM OF ELEC: 287.1 MOSMOL/KG (ref 275–300)
PH UR STRIP.AUTO: 6 [PH] (ref 5–9)
PLATELET # BLD AUTO: 188 THOU/MM3 (ref 130–400)
PMV BLD AUTO: 9.6 FL (ref 9.4–12.4)
POTASSIUM SERPL-SCNC: 4.2 MEQ/L (ref 3.5–5.2)
PROT SERPL-MCNC: 5.9 G/DL (ref 6.1–8)
PROT UR STRIP.AUTO-MCNC: NEGATIVE MG/DL
RBC # BLD AUTO: 3.57 MILL/MM3 (ref 4.2–5.4)
SODIUM SERPL-SCNC: 143 MEQ/L (ref 135–145)
SP GR UR REFRACT.AUTO: 1.01 (ref 1–1.03)
UROBILINOGEN, URINE: 0.2 EU/DL (ref 0–1)
WBC # BLD AUTO: 8.1 THOU/MM3 (ref 4.8–10.8)
WBC #/AREA URNS HPF: NEGATIVE /[HPF]

## 2024-09-26 PROCEDURE — 83880 ASSAY OF NATRIURETIC PEPTIDE: CPT

## 2024-09-26 PROCEDURE — 80053 COMPREHEN METABOLIC PANEL: CPT

## 2024-09-26 PROCEDURE — 81003 URINALYSIS AUTO W/O SCOPE: CPT

## 2024-09-26 PROCEDURE — 99203 OFFICE O/P NEW LOW 30 MIN: CPT | Performed by: NURSE PRACTITIONER

## 2024-09-26 PROCEDURE — 93005 ELECTROCARDIOGRAM TRACING: CPT | Performed by: EMERGENCY MEDICINE

## 2024-09-26 PROCEDURE — 36415 COLL VENOUS BLD VENIPUNCTURE: CPT

## 2024-09-26 PROCEDURE — 99213 OFFICE O/P EST LOW 20 MIN: CPT

## 2024-09-26 PROCEDURE — 80178 ASSAY OF LITHIUM: CPT

## 2024-09-26 PROCEDURE — 93970 EXTREMITY STUDY: CPT

## 2024-09-26 PROCEDURE — 99285 EMERGENCY DEPT VISIT HI MDM: CPT

## 2024-09-26 PROCEDURE — 71045 X-RAY EXAM CHEST 1 VIEW: CPT

## 2024-09-26 PROCEDURE — 73590 X-RAY EXAM OF LOWER LEG: CPT

## 2024-09-26 PROCEDURE — 93294 REM INTERROG EVL PM/LDLS PM: CPT | Performed by: NUCLEAR MEDICINE

## 2024-09-26 PROCEDURE — 85025 COMPLETE CBC W/AUTO DIFF WBC: CPT

## 2024-09-26 PROCEDURE — 93010 ELECTROCARDIOGRAM REPORT: CPT | Performed by: NUCLEAR MEDICINE

## 2024-09-26 PROCEDURE — 99215 OFFICE O/P EST HI 40 MIN: CPT

## 2024-09-26 PROCEDURE — 93296 REM INTERROG EVL PM/IDS: CPT | Performed by: NUCLEAR MEDICINE

## 2024-09-26 RX ORDER — FUROSEMIDE 20 MG
20 TABLET ORAL 2 TIMES DAILY
Qty: 14 TABLET | Refills: 0 | Status: SHIPPED | OUTPATIENT
Start: 2024-09-26 | End: 2024-10-03

## 2024-09-26 ASSESSMENT — ENCOUNTER SYMPTOMS
VOMITING: 0
DIARRHEA: 0
SINUS PRESSURE: 0
SHORTNESS OF BREATH: 1
ABDOMINAL PAIN: 0
COUGH: 0
WHEEZING: 0
SORE THROAT: 0
NAUSEA: 0

## 2024-09-26 ASSESSMENT — PAIN - FUNCTIONAL ASSESSMENT: PAIN_FUNCTIONAL_ASSESSMENT: NONE - DENIES PAIN

## 2024-09-26 NOTE — ED TRIAGE NOTES
Pt presents to ED as transfer from urgent care for bilateral leg swelling. Pt states she was has been drinking 160oz of water a day after being hospitalized for dehydration in August. Pt states over the past couple of days, her bilateral lower legs have been swelling. Denies pain or shortness of breath.

## 2024-09-26 NOTE — ED NOTES
Pt returned from testing. Ambulatory to bathroom for urine collection. Denies further needs. Family at bedside. Call light within reach.

## 2024-09-26 NOTE — ED PROVIDER NOTES
[facility] EMERGENCY DEPT  [address] [address]  Phone: [phone]      CHIEF COMPLAINT       Chief Complaint   Patient presents with    Leg Swelling    Dehydration    Leg Pain       Nurses Notes reviewed and I agree except as noted in the HPI.      HISTORY OF PRESENT ILLNESS    Haily Parker is a 64 y.o. female.    Patient was seen by Albina Mccoy NP in the urgent care today, sent into the ER for further evaluation.    Patient was reportedly hospitalized a month ago for what she describes as dehydration but actually sounds like was probably more like lithium toxicity.  Patient takes lithium for history of bipolar disorder.     Patient was instructed to increase her water intake and states that now over the last month she has had some pretty massive swelling in both lower extremities with a 20 pound weight gain.  Patient denies any hip prior history of CHF renal failure or liver disease.    There is a secondary issue.  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  She had some type of surgery where they tried to graft bone and took it from the left lower leg.  She says that scar area never normally bothers her but it seems to be hurting now.    No fever reported, no chest pain.    REVIEW OF SYSTEMS               PAST MEDICAL HISTORY    has a past medical history of Balance problem, Bipolar I disorder, most recent episode (or current) depressed, in partial or unspecified remission, Depression, Gallstones, Gastric bypass status for obesity, GERD (gastroesophageal reflux disease), History of hysterectomy, Hx of cervical cancer, Hypothyroidism, Liver cyst, Liver lesion, MS (multiple sclerosis) (HCC), MIKEY (obstructive sleep apnea), Paresthesias, Ulcer at site of surgical anastomosis following bypass of stomach, and UTI (urinary tract infection).    SURGICAL HISTORY      has a past surgical history that includes Carpal tunnel release (1999); other  components:    Anion Gap 7.0 (*)     All other components within normal limits   URINALYSIS WITH REFLEX TO CULTURE   OSMOLALITY   GLOMERULAR FILTRATION RATE, ESTIMATED       EMERGENCY DEPARTMENT COURSE:   Vitals:    Vitals:    09/26/24 1323 09/26/24 1359 09/26/24 1515 09/26/24 1650   BP: 132/76 133/77 135/66 131/76   Pulse: 67 85 60 83   Resp: 20 18 19 17   Temp: 98.2 °F (36.8 °C) 97.6 °F (36.4 °C)     TempSrc: Oral Oral     SpO2: 95% 98% 99% 97%   Weight: 66.1 kg (145 lb 12.8 oz) 65.8 kg (145 lb)     Height:  1.448 m (4' 9\")       Patient is clinically well-appearing, able to ambulate not complaining of any current pain.  She has no evidence of congestive heart failure, liver failure or renal failure.  No evidence of proteinuria.  No evidence of DVT in lower extremities.  Emergent causes have been ruled out.  Likely has some component of volume overload due to excessive intake.  We are to put her on a small dose of Lasix this week and have her follow-up with her primary physician for recheck for follow-up and recheck potassium.        CRITICAL CARE:   none         FINAL IMPRESSION      1. Dependent edema          DISPOSITION/PLAN   discharged    DISCHARGE MEDICATIONS:  New Prescriptions    FUROSEMIDE (LASIX) 20 MG TABLET    Take 1 tablet by mouth 2 times daily for 7 days       (Please note that portions of this note were completed with a voice recognition program.  Efforts were made to edit the dictations but occasionally words are mis-transcribed.)    DO Scot Adorno Lawrence, DO  09/26/24 [id number]

## 2024-09-26 NOTE — ED PROVIDER NOTES
[facility] EMERGENCY DEPT  UrgentCare Encounter      CHIEFCOMPLAINT       Chief Complaint   Patient presents with    Leg Swelling    Dehydration    Leg Pain       Nurses Notes reviewed and I agree except as noted in the HPI.  HISTORY OF PRESENT ILLNESS     Haily Parker is a 64 y.o. female who presents to the urgent care for evaluation.  She reports bilateral leg pain and swelling that has worsened since August 2024.  Reports a 20 pound weight gain since that time.  Has some shortness of breath.  Has a pace maker.   Extensive medical history as below.    HPI is provided by the patient and her sister.     The patient/patient representative has no other acute complaints at this time.    REVIEW OF SYSTEMS     Review of Systems   Constitutional:  Negative for chills and fever.   HENT:  Negative for congestion, ear pain, sinus pressure and sore throat.    Respiratory:  Positive for shortness of breath. Negative for cough and wheezing.    Cardiovascular:  Positive for leg swelling. Negative for chest pain.   Gastrointestinal:  Negative for abdominal pain, diarrhea, nausea and vomiting.   Skin:  Negative for rash.       PAST MEDICAL HISTORY         Diagnosis Date    Balance problem     2/2 MS    Bipolar I disorder, most recent episode (or current) depressed, in partial or unspecified remission 05/08/2013    Depression     Gallstones     Gastric bypass status for obesity 2002    GERD (gastroesophageal reflux disease)     History of hysterectomy     REMOVAL OF ONE OVARY     Hx of cervical cancer     s/p hysterectomy    Hypothyroidism     Liver cyst     GI Dr [address]    Liver lesion     MS (multiple sclerosis) (HCC)     MIKEY (obstructive sleep apnea) 7/19/2024    Paresthesias     RT LOWER LIMB    Ulcer at site of surgical anastomosis following bypass of stomach 01/15/2022    UTI (urinary tract infection)        SURGICAL HISTORY     Patient  has a past surgical history that includes Carpal tunnel release (1999);  DAY    LITHIUM 300 MG TABLET    Take 2 tablets by mouth at bedtime    LORATADINE (CLARITIN) 10 MG TABLET    Take 1 tablet by mouth daily    LURASIDONE (LATUDA) 40 MG TABS TABLET    Take 1 tablet by mouth nightly    MAGNESIUM OXIDE (MAGNESIUM-OXIDE) 250 MG TABS TABLET    Take 1 tablet by mouth nightly    NITROGLYCERIN (NITROSTAT) 0.4 MG SL TABLET    up to max of 3 total doses. If no relief after 1 dose, call 911.    PANTOPRAZOLE (PROTONIX) 40 MG TABLET    Take 1 tablet by mouth 2 times daily (before meals)    PRENATAL VIT-FE FUMARATE-FA (PRENATAL VITAMIN) 27-0.8 MG TABS    Take 1 each by mouth daily    PROSTHESIS MISC    by Does not apply route Strap for the prosthesis broken please order new    SUCRALFATE (CARAFATE) 1 GM TABLET    TAKE 1 TABLET BY MOUTH EVERY 6 HOURS.    VILAZODONE HCL 40 MG TABS    TAKE 1 TABLET BY MOUTH ONCE DAILY.    VITAMIN B-12 (CYANOCOBALAMIN) 500 MCG TABLET    Take 1 tablet by mouth daily       ALLERGIES     Patient is is allergic to latex and sulfa antibiotics.    FAMILY HISTORY     Patient'sfamily history includes Breast Cancer (age of onset: 50) in her mother; Breast Cancer (age of onset: 55) in her sister; Breast Cancer (age of onset: 58) in her sister; Cancer in her sister; Colon Cancer in her sister; Colon Polyps in her mother; Glaucoma in her father; Heart Disease in her father.    SOCIAL HISTORY     Patient  reports that she quit smoking about 46 years ago. Her smoking use included cigarettes. She started smoking about 47 years ago. She smoked an average 0.1 packs per day for 0.5 years. She has been exposed to tobacco smoke. She has never used smokeless tobacco. She reports that she does not drink alcohol and does not use drugs.    PHYSICAL EXAM     ED TRIAGE VITALS  BP: 132/76, Temp: 98.2 °F (36.8 °C), Pulse: 67, Respirations: 20, SpO2: 95 %  Physical Exam  Vitals and nursing note reviewed.   Constitutional:       General: She is not in acute distress.     Appearance: Normal

## 2024-09-26 NOTE — TELEPHONE ENCOUNTER
Received form from Medicaid requesting PA for patient's Vitamin D3. Form attached to this encounter as well as placed in mailbox.

## 2024-09-26 NOTE — ED TRIAGE NOTES
To room with c/o off balance for the last 1-2 months. She was hospitalized at the end of August with dehydration and lithium levels ended up really high. When discharge she was instructed to drink 160 oz of water a day. She now has left knee pain x 2 days- she had surgery at this site as a child. Denies new injury. Bilateral leg swelling for the past 3 days.

## 2024-09-26 NOTE — DISCHARGE INSTRUCTIONS
Follow up with your doctor in 2-3 days. You will need to have your potassium rechecked after Lasix. Call for appointment time.

## 2024-10-02 ENCOUNTER — OFFICE VISIT (OUTPATIENT)
Dept: FAMILY MEDICINE CLINIC | Age: 64
End: 2024-10-02

## 2024-10-02 VITALS
SYSTOLIC BLOOD PRESSURE: 128 MMHG | DIASTOLIC BLOOD PRESSURE: 80 MMHG | HEIGHT: 57 IN | BODY MASS INDEX: 31.67 KG/M2 | TEMPERATURE: 98.2 F | WEIGHT: 146.8 LBS | OXYGEN SATURATION: 95 % | HEART RATE: 93 BPM | RESPIRATION RATE: 12 BRPM

## 2024-10-02 DIAGNOSIS — D75.89 MACROCYTOSIS WITHOUT ANEMIA: ICD-10-CM

## 2024-10-02 DIAGNOSIS — E03.1 CONGENITAL HYPOTHYROIDISM WITHOUT GOITER: ICD-10-CM

## 2024-10-02 DIAGNOSIS — R60.0 BILATERAL LOWER EXTREMITY EDEMA: Primary | ICD-10-CM

## 2024-10-02 RX ORDER — POTASSIUM CHLORIDE 1500 MG/1
20 TABLET, EXTENDED RELEASE ORAL 2 TIMES DAILY
Qty: 180 TABLET | Refills: 1 | Status: SHIPPED | OUTPATIENT
Start: 2024-10-02 | End: 2024-10-02

## 2024-10-02 RX ORDER — POTASSIUM CHLORIDE 1500 MG/1
20 TABLET, EXTENDED RELEASE ORAL DAILY
Qty: 7 TABLET | Refills: 0 | Status: SHIPPED | OUTPATIENT
Start: 2024-10-02

## 2024-10-02 RX ORDER — FUROSEMIDE 80 MG
80 TABLET ORAL DAILY
Qty: 7 TABLET | Refills: 0 | Status: SHIPPED | OUTPATIENT
Start: 2024-10-02

## 2024-10-02 ASSESSMENT — ENCOUNTER SYMPTOMS
COUGH: 0
WHEEZING: 0
ABDOMINAL DISTENTION: 0
NAUSEA: 0
CONSTIPATION: 0
VOMITING: 0
ABDOMINAL PAIN: 0
BLOOD IN STOOL: 0
SHORTNESS OF BREATH: 0
DIARRHEA: 0

## 2024-10-02 NOTE — PATIENT INSTRUCTIONS
Central Scheduling phone number  [phone]    Thank you   Thank you for trusting us with your healthcare needs. You may receive a survey regarding today's visit. It would help us out if you would take a few moments to provide your feedback. We value your input.  Please bring in ALL medications BOTTLES, including inhalers, herbal supplements, over the counter, prescribed & non-prescribed medicine. The office would like actual medication bottles and a list.         4.  Prior to getting your labs drawn, check with your insurance company for benefits and eligibility of lab services.  Often, insurance companies cover certain tests for preventative visits only.  It is patient's responsibility to    see what is covered.    5.  If the list below has been completed, PLEASE FAX RECORDS TO OUR OFFICE @ [phone]. Once the records have been received we will update your records at our office:  Health Maintenance Due   Topic Date Due    Respiratory Syncytial Virus (RSV) Pregnant or age 60 yrs+ (1 - 1-dose 60+ series) Never done    Annual Wellness Visit (Medicare Advantage)  01/01/2024    COVID-19 Vaccine (4 - 2023-24 season) 09/01/2024

## 2024-10-02 NOTE — PROGRESS NOTES
Attending Physician Note    I have seen and evaluated the patient. I discussed the findings, assessment and plan with the resident and agree with the resident's findings and plan as documented in the resident's note.  GC modifier added.    Brief summary:  New onset LE edema.  20+lb weight gain since 8/1/24.  No mass on abd exam, no inguinal mass.  No other s/s hf, but pro BNP was above normal range at ED visit 9/26/24.  Check echo.  LE dopplers done 9/26/24 were negative for thrombosis.  No proteinuria.  Check TSH, BMP.  Increase diuretic dose and add potassium.  Compression, elevation.  Short term follow up.

## 2024-10-02 NOTE — PROGRESS NOTES
Health Maintenance Due   Topic Date Due    Respiratory Syncytial Virus (RSV) Pregnant or age 60 yrs+ (1 - 1-dose 60+ series) Never done    Annual Wellness Visit (Medicare Advantage)  01/01/2024    COVID-19 Vaccine (4 - 2023-24 season) 09/01/2024

## 2024-10-02 NOTE — PROGRESS NOTES
[facility] PROFESSIONAL [facility] PRACTICE  [address]  [facility] [address]  Dept: [phone]  Loc: [phone]      Haily Parker (:  1960) is a 64 y.o. female,Established patient, here for evaluation of the following chief complaint(s):  ED Follow-up ([facility] ED 2024 Follow-up Leg Swelling · Dehydration · Leg Pain and has gotten worse)    ASSESSMENT/PLAN:  1. Bilateral lower extremity edema  -     furosemide (LASIX) 80 MG tablet; Take 1 tablet by mouth daily Take 1 tablet by mouth in the morning, Disp-7 tablet, R-0Normal  -     Basic Metabolic Panel; Future  -     Echo (TTE) complete (PRN contrast/bubble/strain/3D); Future  -     potassium chloride (KLOR-CON M) 20 MEQ extended release tablet; Take 1 tablet by mouth daily, Disp-7 tablet, R-0Normal  2. Macrocytosis without anemia  -     Vitamin B12 & Folate; Future  3. 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  Had negative bilateral lower extremity venous ultrasound duplex in ED .  S/p 7-day course of Lasix 20 mg twice daily.  Etiology unclear.  Heart failure is 1 possibility, however no shortness of breath and lungs clear to auscultation.  Patient appears to have gained 8 to 10 kg in the past 2 months from chart weight records.  Hypothyroidism possibly contributing, although has been taking and has been stable on same dose of levothyroxine since early  and was within normal limits end of July, but was higher than it has been during that time period.  Will trial Lasix 80 mg 1x daily x 7 days with potassium, recheck metabolic panel, TFTs, cardiac echo (TTE) at this time.  If unable to use compression socks, could use a wrap like ace bandage to provide some compression.    Return in 1 week (on 10/9/2024) for follow-up BLE edema

## 2024-10-03 ENCOUNTER — TELEPHONE (OUTPATIENT)
Dept: PSYCHIATRY | Age: 64
End: 2024-10-03

## 2024-10-03 ENCOUNTER — HOSPITAL ENCOUNTER (OUTPATIENT)
Age: 64
Discharge: HOME | End: 2024-10-03
Payer: MEDICARE

## 2024-10-03 DIAGNOSIS — D75.89 MACROCYTOSIS WITHOUT ANEMIA: ICD-10-CM

## 2024-10-03 DIAGNOSIS — R60.0 BILATERAL LOWER EXTREMITY EDEMA: ICD-10-CM

## 2024-10-03 DIAGNOSIS — E03.1 CONGENITAL HYPOTHYROIDISM WITHOUT GOITER: ICD-10-CM

## 2024-10-03 LAB
ANION GAP SERPL CALC-SCNC: 9 MEQ/L (ref 8–16)
BUN SERPL-MCNC: 22 MG/DL (ref 7–22)
CALCIUM SERPL-MCNC: 9.8 MG/DL (ref 8.5–10.5)
CHLORIDE SERPL-SCNC: 108 MEQ/L (ref 98–111)
CO2 SERPL-SCNC: 22 MEQ/L (ref 23–33)
CREAT SERPL-MCNC: 0.5 MG/DL (ref 0.4–1.2)
FOLATE SERPL-MCNC: > 20 NG/ML (ref 4.8–24.2)
GFR SERPL CREATININE-BSD FRML MDRD: > 90 ML/MIN/1.73M2
GLUCOSE SERPL-MCNC: 152 MG/DL (ref 70–108)
POTASSIUM SERPL-SCNC: 4.2 MEQ/L (ref 3.5–5.2)
SODIUM SERPL-SCNC: 139 MEQ/L (ref 135–145)
TSH SERPL DL<=0.005 MIU/L-ACNC: 1.42 UIU/ML (ref 0.4–4.2)
VIT B12 SERPL-MCNC: 1515 PG/ML (ref 211–911)

## 2024-10-03 PROCEDURE — 84443 ASSAY THYROID STIM HORMONE: CPT

## 2024-10-03 PROCEDURE — 82746 ASSAY OF FOLIC ACID SERUM: CPT

## 2024-10-03 PROCEDURE — 36415 COLL VENOUS BLD VENIPUNCTURE: CPT

## 2024-10-03 PROCEDURE — 82607 VITAMIN B-12: CPT

## 2024-10-03 PROCEDURE — 80048 BASIC METABOLIC PNL TOTAL CA: CPT

## 2024-10-03 NOTE — TELEPHONE ENCOUNTER
Deanna called stating patient was seen at the Emergency Room on 09/26/2024 due to \"extreme swelling\".  She reports patient's Lithium level was low (available to review in EPIC) and the ER provider recommended she contact our office to determine if medication adjustments are needed. She reports patient is currently taking Lithium 600 mg nightly. Deanna also states patient saw PCP due to the swelling and was started on Lasix 80 mg and Potassium 20 mEq. [facility] is using [facility]. Deanna states patient was advised to have testing completed on her heart (per chart review, Echo has been ordered). Deanna states patient will also need to schedule an appointment with a Cardiologist as well. Deanna will be contacting Cardiology.

## 2024-10-03 NOTE — TELEPHONE ENCOUNTER
Thank you for the update. Patient may need to reduce fluid intake as BNP was elevated; discuss with PCP/cardiology. Patient will need to find balance between managing swelling and preventing dehydration and maintaining lithium level. Lithium level reviewed; no changes at this time. Keep upcoming appointment.

## 2024-10-04 ENCOUNTER — HOSPITAL ENCOUNTER (OUTPATIENT)
Age: 64
Discharge: HOME | End: 2024-10-06
Payer: MEDICARE

## 2024-10-04 DIAGNOSIS — R60.0 BILATERAL LOWER EXTREMITY EDEMA: ICD-10-CM

## 2024-10-04 LAB
ECHO AO ASC DIAM: 3.1 CM
ECHO AV CUSP MM: 1.5 CM
ECHO AV PEAK GRADIENT: 7 MMHG
ECHO AV PEAK VELOCITY: 1.3 M/S
ECHO AV VELOCITY RATIO: 0.69
ECHO LA AREA 2C: 10.5 CM2
ECHO LA AREA 4C: 9.3 CM2
ECHO LA DIAMETER: 2.9 CM
ECHO LA MAJOR AXIS: 4 CM
ECHO LA MINOR AXIS: 4.1 CM
ECHO LA VOL BP: 19 ML (ref 22–52)
ECHO LA VOL MOD A2C: 23 ML (ref 22–52)
ECHO LA VOL MOD A4C: 16 ML (ref 22–52)
ECHO LV E' LATERAL VELOCITY: 10.8 CM/S
ECHO LV E' SEPTAL VELOCITY: 9.7 CM/S
ECHO LV EJECTION FRACTION BIPLANE: 55 % (ref 55–100)
ECHO LV FRACTIONAL SHORTENING: 29 % (ref 28–44)
ECHO LV INTERNAL DIMENSION DIASTOLIC: 4.5 CM (ref 3.9–5.3)
ECHO LV INTERNAL DIMENSION SYSTOLIC: 3.2 CM
ECHO LV ISOVOLUMETRIC RELAXATION TIME (IVRT): 67 MS
ECHO LV IVSD: 0.7 CM (ref 0.6–0.9)
ECHO LV MASS 2D: 95.7 G (ref 67–162)
ECHO LV POSTERIOR WALL DIASTOLIC: 0.7 CM (ref 0.6–0.9)
ECHO LV RELATIVE WALL THICKNESS RATIO: 0.31
ECHO LVOT PEAK GRADIENT: 3 MMHG
ECHO LVOT PEAK VELOCITY: 0.9 M/S
ECHO MV A VELOCITY: 0.7 M/S
ECHO MV E DECELERATION TIME (DT): 294 MS
ECHO MV E VELOCITY: 0.65 M/S
ECHO MV E/A RATIO: 0.93
ECHO MV E/E' LATERAL: 6.02
ECHO MV E/E' RATIO (AVERAGED): 6.36
ECHO MV E/E' SEPTAL: 6.7
ECHO MV REGURGITANT PEAK GRADIENT: 64 MMHG
ECHO MV REGURGITANT PEAK VELOCITY: 4 M/S
ECHO PV MAX VELOCITY: 0.6 M/S
ECHO PV PEAK GRADIENT: 1 MMHG
ECHO RV INTERNAL DIMENSION: 1.9 CM
ECHO RV TAPSE: 2 CM (ref 1.7–?)
ECHO TV E WAVE: 0.7 M/S
ECHO TV REGURGITANT MAX VELOCITY: 2.33 M/S
ECHO TV REGURGITANT PEAK GRADIENT: 22 MMHG

## 2024-10-04 PROCEDURE — 93306 TTE W/DOPPLER COMPLETE: CPT

## 2024-10-04 PROCEDURE — 93306 TTE W/DOPPLER COMPLETE: CPT | Performed by: NUCLEAR MEDICINE

## 2024-10-04 NOTE — TELEPHONE ENCOUNTER
Deanna inman. Reports fluid intake was discussed with PCP as well. PCP recommended waiting to see Cardiology before making fluid intake changes. Patient has Echo today and will follow up with Cardiology on Monday.

## 2024-10-07 ENCOUNTER — OFFICE VISIT (OUTPATIENT)
Dept: CARDIOLOGY CLINIC | Age: 64
End: 2024-10-07
Payer: MEDICARE

## 2024-10-07 VITALS
SYSTOLIC BLOOD PRESSURE: 126 MMHG | WEIGHT: 141 LBS | HEART RATE: 93 BPM | BODY MASS INDEX: 30.42 KG/M2 | DIASTOLIC BLOOD PRESSURE: 70 MMHG | HEIGHT: 57 IN

## 2024-10-07 DIAGNOSIS — Z95.0 PACEMAKER: ICD-10-CM

## 2024-10-07 DIAGNOSIS — R60.0 PEDAL EDEMA: Primary | ICD-10-CM

## 2024-10-07 PROCEDURE — 99214 OFFICE O/P EST MOD 30 MIN: CPT | Performed by: PHYSICIAN ASSISTANT

## 2024-10-07 NOTE — PROGRESS NOTES
[facility] PHYSICIANS [place] SPECIALTY  [facility] CARDIOLOGY  [address]  [address]  [facility] [place] [facility] [address]  Dept: [phone]  Dept Fax: [phone]  Loc: [phone]    Chief Complaint   Patient presents with    Follow-up     Increase in swelling      Patient with a history of pacemaker for bradycardia presents with complaint of increased swelling.   Patient states over the last few weeks she has noted increased pedal edema.  Her PCP placed her on 80 mg of Lasix daily for 5 days.  She is currently 3 days into this regimen.  She states that has helped to decrease her edema.  She states she was recently diagnosed with dehydration.  She was told to increase her fluid intake.  She also during this timeframe was taking caffeine pills to help curb her appetite.  After she started to increase her fluid intake to 5 bottles of water a day she started to develop peripheral edema.  She denies any shortness of breath or chest pain.  Cardiologist:  Dr. Palencia        General:   No fever, no chills, No fatigue or weight loss  Pulmonary:    No dyspnea, no wheezing  Cardiac:    Denies recent chest pain   GI:     No nausea or vomiting, no abdominal pain  Neuro:    No dizziness or light headedness  Musculoskeletal:  No recent active issues  Extremities:  Moderate peripheral edema    Past Medical History:   Diagnosis Date    Balance problem     2/2 MS    Bipolar I disorder, most recent episode (or current) depressed, in partial or unspecified remission 05/08/2013    Depression     Gallstones     Gastric bypass status for obesity 2002    GERD (gastroesophageal reflux disease)     History of hysterectomy     REMOVAL OF ONE OVARY     Hx of cervical cancer     s/p hysterectomy    Hypothyroidism     Liver cyst     GI Dr [address]    Liver lesion     MS (multiple sclerosis) (HCC)     MIKEY (obstructive sleep apnea) 7/19/2024    Paresthesias     RT LOWER LIMB    Ulcer at site of surgical anastomosis

## 2024-10-08 NOTE — PROGRESS NOTES
[facility] PROFESSIONAL [facility] MEDICINE PRACTICE  [address]  [facility] [address]  Dept: [phone]  Loc: [phone]      Haily Parker (:  1960) is a 64 y.o. female,Established patient, here for evaluation of the following chief complaint(s):  Follow-up (1 week BLE edema follow up. Much improvement w leg swelling, able to wear regular shoes. )        ASSESSMENT/PLAN:  1. Bilateral lower extremity edema  -     furosemide (LASIX) 40 MG tablet; Take 1 tablet by mouth daily for 14 days Take 1 tablet by mouth in the morning, Disp-14 tablet, R-0Normal  -     potassium chloride (KLOR-CON M) 10 MEQ extended release tablet; Take 1 tablet by mouth daily for 14 days, Disp-14 tablet, R-0Normal    Patient's weight is down 6 pounds since last week.  Right lower extremity significantly improved.  Left lower extremity improved although less so than the right.  Patient has history of the left side swelling, attributed to a surgery removing some bone approximately 60 years ago.  Had bilateral venous duplex ultrasound when seen in ED for BLE edema negative for DVT.  Could consider CT abdomen pelvis in the future if left lower extremity does not improve further or worsens without right increasing.  Will lower furosemide to 40 mg daily and potassium to 10 mEq daily for 2 weeks.  Consider further decreases pending clinical course.      Return in about 2 weeks (around 10/23/2024) for f/u lower extremity edema.    Patient given educational materials - see patient instructions.  Discussed use, benefit, and side effects of recommended medications. Discussed reasons to return or go to the ED before next visit.  All patient questions answered.  Pt voiced understanding.  Reviewed health maintenance.    SUBJECTIVE/OBJECTIVE:    Patient has been elevating her legs more, last dose of bumex today. Swelling has gone down, feeling better, would like to go back to work/volunteering. Saw

## 2024-10-09 ENCOUNTER — OFFICE VISIT (OUTPATIENT)
Dept: FAMILY MEDICINE CLINIC | Age: 64
End: 2024-10-09

## 2024-10-09 VITALS
HEART RATE: 85 BPM | WEIGHT: 140.8 LBS | DIASTOLIC BLOOD PRESSURE: 70 MMHG | OXYGEN SATURATION: 97 % | TEMPERATURE: 98.1 F | SYSTOLIC BLOOD PRESSURE: 114 MMHG | BODY MASS INDEX: 30.38 KG/M2 | RESPIRATION RATE: 21 BRPM | HEIGHT: 57 IN

## 2024-10-09 DIAGNOSIS — R60.0 BILATERAL LOWER EXTREMITY EDEMA: ICD-10-CM

## 2024-10-09 RX ORDER — FUROSEMIDE 40 MG
40 TABLET ORAL DAILY
Qty: 14 TABLET | Refills: 0 | Status: SHIPPED | OUTPATIENT
Start: 2024-10-09 | End: 2024-10-23

## 2024-10-09 RX ORDER — POTASSIUM CHLORIDE 750 MG/1
10 TABLET, EXTENDED RELEASE ORAL DAILY
Qty: 14 TABLET | Refills: 0 | Status: SHIPPED | OUTPATIENT
Start: 2024-10-09 | End: 2024-10-23

## 2024-10-09 ASSESSMENT — ENCOUNTER SYMPTOMS
CONSTIPATION: 0
ABDOMINAL PAIN: 0
SHORTNESS OF BREATH: 0
DIARRHEA: 0
VOMITING: 0

## 2024-10-10 ENCOUNTER — HOSPITAL ENCOUNTER (OUTPATIENT)
Age: 64
Discharge: HOME | End: 2024-10-10
Payer: MEDICARE

## 2024-10-10 DIAGNOSIS — R60.0 PEDAL EDEMA: ICD-10-CM

## 2024-10-10 LAB
ANION GAP SERPL CALC-SCNC: 11 MEQ/L (ref 8–16)
BUN SERPL-MCNC: 25 MG/DL (ref 7–22)
CALCIUM SERPL-MCNC: 10.2 MG/DL (ref 8.5–10.5)
CHLORIDE SERPL-SCNC: 106 MEQ/L (ref 98–111)
CO2 SERPL-SCNC: 23 MEQ/L (ref 23–33)
CREAT SERPL-MCNC: 0.6 MG/DL (ref 0.4–1.2)
GFR SERPL CREATININE-BSD FRML MDRD: > 90 ML/MIN/1.73M2
GLUCOSE SERPL-MCNC: 101 MG/DL (ref 70–108)
POTASSIUM SERPL-SCNC: 3.8 MEQ/L (ref 3.5–5.2)
SODIUM SERPL-SCNC: 140 MEQ/L (ref 135–145)

## 2024-10-10 PROCEDURE — 36415 COLL VENOUS BLD VENIPUNCTURE: CPT

## 2024-10-10 PROCEDURE — 80048 BASIC METABOLIC PNL TOTAL CA: CPT

## 2024-10-15 ENCOUNTER — OFFICE VISIT (OUTPATIENT)
Dept: PSYCHIATRY | Age: 64
End: 2024-10-15
Payer: MEDICARE

## 2024-10-15 VITALS — BODY MASS INDEX: 30.85 KG/M2 | WEIGHT: 143 LBS | HEIGHT: 57 IN

## 2024-10-15 DIAGNOSIS — Z51.81 THERAPEUTIC DRUG MONITORING: ICD-10-CM

## 2024-10-15 DIAGNOSIS — F31.75 BIPOLAR I, MOST RECENT EPISODE DEPRESSED, PARTIAL REMISSION (HCC): Primary | ICD-10-CM

## 2024-10-15 PROCEDURE — 3017F COLORECTAL CA SCREEN DOC REV: CPT | Performed by: NURSE PRACTITIONER

## 2024-10-15 PROCEDURE — 99213 OFFICE O/P EST LOW 20 MIN: CPT | Performed by: NURSE PRACTITIONER

## 2024-10-15 PROCEDURE — 90833 PSYTX W PT W E/M 30 MIN: CPT | Performed by: NURSE PRACTITIONER

## 2024-10-15 PROCEDURE — 1036F TOBACCO NON-USER: CPT | Performed by: NURSE PRACTITIONER

## 2024-10-15 PROCEDURE — G8427 DOCREV CUR MEDS BY ELIG CLIN: HCPCS | Performed by: NURSE PRACTITIONER

## 2024-10-15 PROCEDURE — G8484 FLU IMMUNIZE NO ADMIN: HCPCS | Performed by: NURSE PRACTITIONER

## 2024-10-15 PROCEDURE — G8417 CALC BMI ABV UP PARAM F/U: HCPCS | Performed by: NURSE PRACTITIONER

## 2024-10-15 NOTE — PROGRESS NOTES
[facility] PROFESSIONAL SERVS  [facility] PSYCHIATRY  [address]  [facility] [address]  Dept: [phone]  Dept Fax: [phone]  Loc: [phone]    Visit Date: 10/15/2024    SUBJECTIVE DATA     CHIEF COMPLAINT:    Chief Complaint   Patient presents with    Mental Health Problem    Follow-up       History obtained from: patient    HISTORY OF PRESENT ILLNESS:    Haily Parker is a 64 y.o. female who presents to the office for follow-up on her moods. Her last visit was 09/17/2024. Accompanied by sister, Deanna.    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  - Finds this enjoyable to be back, getting out of house has been good  - states mood is real good   - denies feeling down, sad, or depressed  - states motivation is \"so, so\" is cleaning at home   - denies feeling worthless, hopeless, helpless  - has lost some weight however, is taking lasix for 14 days     ANXIETY  - denies excessive worrying   - denies irritability  - denies feeling overwhelmed       SLEEP  - states she is sleeping well  - sleeps 5 hours a night, feels rested   - does take a nap on occasions       STRESS  - managing doctors appointment ok, family is helping her  - She is very grateful for family and states there are no issues with them   - wants to reduce weight to 120lbs she states       Not using meals on wheels    Using a protein supplement of 20g/serving 4 times per day  Eating 3 meals per day     Sister reports patient seems to be doing very well and has done well returning to volunteer activities.      Denies suicidal ideations, intent, plan. No homicidal ideations, intent, plan. No audiovisual hallucinations.    HPI    The patient has had 0 lifetime suicide attempts. Reports she had intense thoughts of suicide with a plan and intent back in 1987 but someone came home.    Patient reports 3 [facility]

## 2024-10-21 ENCOUNTER — OFFICE VISIT (OUTPATIENT)
Dept: FAMILY MEDICINE CLINIC | Age: 64
End: 2024-10-21
Payer: MEDICARE

## 2024-10-21 VITALS
RESPIRATION RATE: 12 BRPM | BODY MASS INDEX: 29.64 KG/M2 | HEIGHT: 57 IN | HEART RATE: 91 BPM | DIASTOLIC BLOOD PRESSURE: 84 MMHG | OXYGEN SATURATION: 97 % | TEMPERATURE: 97.5 F | SYSTOLIC BLOOD PRESSURE: 136 MMHG | WEIGHT: 137.4 LBS

## 2024-10-21 DIAGNOSIS — R60.0 BILATERAL LOWER EXTREMITY EDEMA: Primary | ICD-10-CM

## 2024-10-21 DIAGNOSIS — G35 MS (MULTIPLE SCLEROSIS) (HCC): ICD-10-CM

## 2024-10-21 PROCEDURE — 99213 OFFICE O/P EST LOW 20 MIN: CPT

## 2024-10-21 PROCEDURE — G8427 DOCREV CUR MEDS BY ELIG CLIN: HCPCS

## 2024-10-21 PROCEDURE — G8417 CALC BMI ABV UP PARAM F/U: HCPCS

## 2024-10-21 PROCEDURE — 3017F COLORECTAL CA SCREEN DOC REV: CPT

## 2024-10-21 PROCEDURE — 1036F TOBACCO NON-USER: CPT

## 2024-10-21 PROCEDURE — G8484 FLU IMMUNIZE NO ADMIN: HCPCS

## 2024-10-21 NOTE — PROGRESS NOTES
S: 64 y.o. female with   Chief Complaint   Patient presents with    2 Week Follow-Up     Lower Extremity Edema       HPI: please see resident note for HPI and ROS.    BP Readings from Last 3 Encounters:   10/21/24 136/84   10/09/24 114/70   10/07/24 126/70     Wt Readings from Last 3 Encounters:   10/21/24 62.3 kg (137 lb 6.4 oz)   10/15/24 64.9 kg (143 lb)   10/09/24 63.9 kg (140 lb 12.8 oz)       O: VS:  height is 1.448 m (4' 9.01\") and weight is 62.3 kg (137 lb 6.4 oz). Her oral temperature is 97.5 °F (36.4 °C). Her blood pressure is 136/84 and her pulse is 91. Her respiration is 12 and oxygen saturation is 97%.   Physical exam performed by resident physician     Diagnosis Orders   1. Bilateral lower extremity edema        2. MS (multiple sclerosis) ([facility])  Handicap Placard MISC          Plan:  Please refer to resident note for full plan.    64-year-old female presents the office to follow-up on bilateral lower extremity edema.  Patient was previously seen after hospitalization from fluid overload.  Since that time has been titrating down on Lasix to get her fluid levels back to baseline.  At this point patient initially had significant lower extremity edema that was pitting and now it is +1 to trace lower extremity edema and doing well.  Currently approximately to baseline.  No concerns this time no evidence of fluid overload.  Patient was educated to not exceed more than 100 ounces of fluid in the day and follow-up as scheduled.  Continue with compression stockings, elevation, titration of Lasix.  Patient verbalized understanding of plan and is agreeable.      Health Maintenance Due   Topic Date Due    Respiratory Syncytial Virus (RSV) Pregnant or age 60 yrs+ (1 - 1-dose 60+ series) Never done    Annual Wellness Visit (Medicare Advantage)  01/01/2024    COVID-19 Vaccine (4 - 2023-24 season) 09/01/2024       Attending Physician Statement  I have discussed the case, including pertinent history and exam findings 
used to authenticate this note. Parts of this note may have been dictated by use of voice recognition software and electronically transcribed. The note may contain errors not detected in proofreading.    --Osman Valle MD

## 2024-10-28 DIAGNOSIS — K27.9 PEPTIC ULCER DISEASE: ICD-10-CM

## 2024-10-28 RX ORDER — SUCRALFATE 1 G/1
TABLET ORAL
Qty: 112 TABLET | Refills: 2 | Status: SHIPPED | OUTPATIENT
Start: 2024-10-28

## 2024-10-28 NOTE — TELEPHONE ENCOUNTER
Refill sent to preferred pharmacy.    Electronically signed by Luciana Parr DO on 10/28/2024 at 5:53 PM

## 2024-10-28 NOTE — TELEPHONE ENCOUNTER
Patient's last appointment was: 10/21/2024  with our   Patient's next appointment is: 12/3/2024  with our Dr. Parr  Last refilled on: 10/14/2024   Which pharmacy does the script need sent to:   [facility] - [place], [place]     Lab Results   Component Value Date    LABA1C 5.1 12/19/2022     Lab Results   Component Value Date    CHOL 151 11/06/2023    TRIG 77 11/06/2023    HDL 87 11/06/2023     Lab Results   Component Value Date     10/10/2024    K 3.8 10/10/2024     10/10/2024    CO2 23 10/10/2024    BUN 25 (H) 10/10/2024    CREATININE 0.6 10/10/2024    GLUCOSE 101 10/10/2024    CALCIUM 10.2 10/10/2024    BILITOT 0.2 (L) 09/26/2024    ALKPHOS 87 09/26/2024    AST 21 09/26/2024    ALT 32 09/26/2024    LABGLOM > 90 10/10/2024    GFRAA >60 01/18/2022     Lab Results   Component Value Date    TSH 1.420 10/03/2024    T4FREE 1.30 07/29/2024     Lab Results   Component Value Date    WBC 8.1 09/26/2024    HGB 12.1 09/26/2024    HCT 38.2 09/26/2024    .0 (H) 09/26/2024     09/26/2024

## 2024-11-01 ENCOUNTER — HOSPITAL ENCOUNTER (OUTPATIENT)
Age: 64
Discharge: HOME | End: 2024-11-01
Payer: MEDICARE

## 2024-11-01 ENCOUNTER — OFFICE VISIT (OUTPATIENT)
Age: 64
End: 2024-11-01

## 2024-11-01 VITALS
WEIGHT: 139.4 LBS | SYSTOLIC BLOOD PRESSURE: 126 MMHG | BODY MASS INDEX: 30.07 KG/M2 | HEIGHT: 57 IN | HEART RATE: 85 BPM | DIASTOLIC BLOOD PRESSURE: 84 MMHG

## 2024-11-01 DIAGNOSIS — E21.3 HYPERPARATHYROIDISM (HCC): Primary | ICD-10-CM

## 2024-11-01 DIAGNOSIS — E04.2 MULTINODULAR GOITER: ICD-10-CM

## 2024-11-01 DIAGNOSIS — E83.52 HYPERCALCEMIA: ICD-10-CM

## 2024-11-01 DIAGNOSIS — Z98.84 GASTRIC BYPASS STATUS FOR OBESITY: ICD-10-CM

## 2024-11-01 DIAGNOSIS — E21.3 HYPERPARATHYROIDISM (HCC): ICD-10-CM

## 2024-11-01 LAB
25(OH)D3 SERPL-MCNC: 35 NG/ML (ref 30–100)
ANION GAP SERPL CALC-SCNC: 12 MEQ/L (ref 8–16)
BUN SERPL-MCNC: 22 MG/DL (ref 7–22)
CA-I BLD ISE-SCNC: 1.42 MMOL/L (ref 1.12–1.32)
CALCIUM SERPL-MCNC: 10.5 MG/DL (ref 8.5–10.5)
CHLORIDE SERPL-SCNC: 108 MEQ/L (ref 98–111)
CO2 SERPL-SCNC: 24 MEQ/L (ref 23–33)
CREAT SERPL-MCNC: 0.7 MG/DL (ref 0.4–1.2)
GFR SERPL CREATININE-BSD FRML MDRD: > 90 ML/MIN/1.73M2
GLUCOSE SERPL-MCNC: 89 MG/DL (ref 70–108)
MAGNESIUM SERPL-MCNC: 2.2 MG/DL (ref 1.6–2.4)
POTASSIUM SERPL-SCNC: 4.1 MEQ/L (ref 3.5–5.2)
PTH-INTACT SERPL-MCNC: 74.3 PG/ML (ref 15–65)
SODIUM SERPL-SCNC: 144 MEQ/L (ref 135–145)

## 2024-11-01 PROCEDURE — 83970 ASSAY OF PARATHORMONE: CPT

## 2024-11-01 PROCEDURE — 82330 ASSAY OF CALCIUM: CPT

## 2024-11-01 PROCEDURE — 82306 VITAMIN D 25 HYDROXY: CPT

## 2024-11-01 PROCEDURE — 36415 COLL VENOUS BLD VENIPUNCTURE: CPT

## 2024-11-01 PROCEDURE — 80048 BASIC METABOLIC PNL TOTAL CA: CPT

## 2024-11-01 PROCEDURE — 83735 ASSAY OF MAGNESIUM: CPT

## 2024-11-01 NOTE — PROGRESS NOTES
Shingles vaccine  Completed    Hepatitis C screen  Completed    HIV screen  Completed    Hepatitis A vaccine  Aged Out    Hepatitis B vaccine  Aged Out    Hib vaccine  Aged Out    Polio vaccine  Aged Out    Meningococcal (ACWY) vaccine  Aged Out       Labs  Lab Results   Component Value Date    LABA1C 5.1 12/19/2022     Lab Results   Component Value Date    EAG 93 12/19/2022     Lab Results   Component Value Date    TSH 1.420 10/03/2024     Lab Results   Component Value Date    CHOL 151 11/06/2023    CHOL 147 11/09/2022    CHOL 141 03/02/2022     Lab Results   Component Value Date    TRIG 77 11/06/2023    TRIG 67 11/09/2022    TRIG 32 03/02/2022     Lab Results   Component Value Date    HDL 87 11/06/2023    HDL 96 06/24/2023    HDL 86 11/16/2022     No components found for: \"LDLCALC\", \"LDLCHOLESTEROL\"  No results found for: \"VLDL\"  No results found for: \"CHOLHDLRATIO\"      Review of Systems   Constitutional: negative for chills or and fevers  Eyes: negative for irritation, redness and visual disturbance  Respiratory: negative for cough, shortness of breath and wheezing  Cardiovascular: negative for chest pain, irregular heart beat and palpitations     The remainder of systems were reviewed and negative.     Objective:    /84 (Site: Left Upper Arm, Position: Sitting, Cuff Size: Medium Adult)   Pulse 85   Ht 1.448 m (4' 9\")   Wt 63.2 kg (139 lb 6.4 oz)   BMI 30.17 kg/m²   Body surface area is 1.59 meters squared.    Physical Exam     General: alert, appears stated age, cooperative and no distress  Eyes: negative findings: lids and lashes normal, conjunctivae and sclerae normal, corneas clear and pupils equal, round, reactive to light and accomodation  Neck:no adenopathy, no carotid bruit, no JVD, and supple, symmetrical, trachea midline  Thyroid: bilateral nodules but there is no bruit.  Lung:clear to auscultation bilaterally  Heart: regular rate and rhythm, S1, S2 normal, no murmur, click, rub or gallop and

## 2024-11-07 ENCOUNTER — HOSPITAL ENCOUNTER (OUTPATIENT)
Age: 64
Discharge: HOME | End: 2024-11-07
Payer: MEDICARE

## 2024-11-07 DIAGNOSIS — F31.75 BIPOLAR I, MOST RECENT EPISODE DEPRESSED, PARTIAL REMISSION (HCC): ICD-10-CM

## 2024-11-07 DIAGNOSIS — Z51.81 THERAPEUTIC DRUG MONITORING: ICD-10-CM

## 2024-11-07 LAB — LITHIUM SERPL-SCNC: 0.52 MMOL/L (ref 0.6–1.2)

## 2024-11-07 PROCEDURE — 80178 ASSAY OF LITHIUM: CPT

## 2024-11-07 PROCEDURE — 36415 COLL VENOUS BLD VENIPUNCTURE: CPT

## 2024-11-12 ENCOUNTER — TELEPHONE (OUTPATIENT)
Dept: ENT CLINIC | Age: 64
End: 2024-11-12

## 2024-11-12 NOTE — TELEPHONE ENCOUNTER
Patient has an US Thyroid order that needs done at the end of January/ beginning of February. Patient needs a follow up with SUJATA in office after US, previously seen by Emigdio. I attempted to call patient today to schedule, left a message informing her of this and requesting she call our office.

## 2024-11-19 ENCOUNTER — HOSPITAL ENCOUNTER (OUTPATIENT)
Dept: ULTRASOUND IMAGING | Age: 64
Discharge: HOME | End: 2024-11-19
Attending: INTERNAL MEDICINE
Payer: MEDICARE

## 2024-11-19 DIAGNOSIS — E04.2 MULTINODULAR GOITER: ICD-10-CM

## 2024-11-19 PROCEDURE — 76536 US EXAM OF HEAD AND NECK: CPT

## 2024-11-22 NOTE — RESULT ENCOUNTER NOTE
Ultrasound shows further growth of thyroid nodules.  I called and she did not .  Please get this patient for me.  I also drafted a letter to be mailed certified.

## 2024-12-03 ENCOUNTER — OFFICE VISIT (OUTPATIENT)
Dept: FAMILY MEDICINE CLINIC | Age: 64
End: 2024-12-03

## 2024-12-03 VITALS
WEIGHT: 136 LBS | HEIGHT: 57 IN | HEART RATE: 93 BPM | RESPIRATION RATE: 20 BRPM | BODY MASS INDEX: 29.34 KG/M2 | OXYGEN SATURATION: 97 % | DIASTOLIC BLOOD PRESSURE: 82 MMHG | SYSTOLIC BLOOD PRESSURE: 130 MMHG | TEMPERATURE: 98.4 F

## 2024-12-03 DIAGNOSIS — D50.8 OTHER IRON DEFICIENCY ANEMIA: ICD-10-CM

## 2024-12-03 DIAGNOSIS — Q71.31: ICD-10-CM

## 2024-12-03 DIAGNOSIS — Z98.84 HISTORY OF GASTRIC BYPASS: ICD-10-CM

## 2024-12-03 DIAGNOSIS — F31.75 BIPOLAR I, MOST RECENT EPISODE DEPRESSED, PARTIAL REMISSION (HCC): ICD-10-CM

## 2024-12-03 DIAGNOSIS — Z23 IMMUNIZATION DUE: ICD-10-CM

## 2024-12-03 DIAGNOSIS — G35 MS (MULTIPLE SCLEROSIS) (HCC): Primary | ICD-10-CM

## 2024-12-03 RX ORDER — LAMOTRIGINE 200 MG/1
TABLET ORAL
Qty: 28 TABLET | Refills: 2 | Status: SHIPPED | OUTPATIENT
Start: 2024-12-03

## 2024-12-03 RX ORDER — LURASIDONE HYDROCHLORIDE 40 MG/1
40 TABLET, FILM COATED ORAL NIGHTLY
Qty: 28 TABLET | Refills: 2 | Status: SHIPPED | OUTPATIENT
Start: 2024-12-03

## 2024-12-03 ASSESSMENT — ENCOUNTER SYMPTOMS
BACK PAIN: 0
COLOR CHANGE: 0
SHORTNESS OF BREATH: 0

## 2024-12-03 NOTE — TELEPHONE ENCOUNTER
[facility] is requesting a medication refill on Haily's behalf for Lamictal 200mg and Latuda 40mg;#30 with 2 refills; both last sent on 09/17/24 and both last filled on 11/11/24 for a #28 day supply.     Medications are pending your approval for a 28 day supply with 2 refills; please advise otherwise. She is scheduled to return on 01/14/25. Last seen on 10/15/24.

## 2024-12-03 NOTE — TELEPHONE ENCOUNTER
Patient's last appointment was: 10/21/2024  with our   Patient's next appointment is: 12/3/2024  with our Dr. Parr  Last refilled on: 8/27/2024  Which pharmacy does the script need sent to: [place]       Lab Results   Component Value Date    LABA1C 5.1 12/19/2022     Lab Results   Component Value Date    CHOL 151 11/06/2023    TRIG 77 11/06/2023    HDL 87 11/06/2023     Lab Results   Component Value Date     11/01/2024    K 4.1 11/01/2024     11/01/2024    CO2 24 11/01/2024    BUN 22 11/01/2024    CREATININE 0.7 11/01/2024    GLUCOSE 89 11/01/2024    CALCIUM 10.5 11/01/2024    BILITOT 0.2 (L) 09/26/2024    ALKPHOS 87 09/26/2024    AST 21 09/26/2024    ALT 32 09/26/2024    LABGLOM > 90 11/01/2024    GFRAA >60 01/18/2022     Lab Results   Component Value Date    TSH 1.420 10/03/2024    T4FREE 1.30 07/29/2024     Lab Results   Component Value Date    WBC 8.1 09/26/2024    HGB 12.1 09/26/2024    HCT 38.2 09/26/2024    .0 (H) 09/26/2024     09/26/2024

## 2024-12-03 NOTE — PROGRESS NOTES
S: 64 y.o. female with   Chief Complaint   Patient presents with    6 Month Follow-Up     Patient has no concerns or complaints.       HPI: please see resident note for HPI and ROS.    BP Readings from Last 3 Encounters:   12/03/24 130/82   11/01/24 126/84   10/21/24 136/84     Wt Readings from Last 3 Encounters:   12/03/24 61.7 kg (136 lb)   11/01/24 63.2 kg (139 lb 6.4 oz)   10/21/24 62.3 kg (137 lb 6.4 oz)       O: VS:  height is 1.448 m (4' 9\") and weight is 61.7 kg (136 lb). Her oral temperature is 98.4 °F (36.9 °C). Her blood pressure is 130/82 and her pulse is 93. Her respiration is 20 and oxygen saturation is 97%.        Diagnosis Orders   1. MS (multiple sclerosis) (HCC)  Handicap Placard MISC    DISCONTINUED: Handicap Placard MISC      2. Congenital absence of hand, right  Handicap Placard MISC    DISCONTINUED: Handicap Placard MISC      3. BMI 29.0-29.9,adult        4. Immunization due            Plan:  Please refer to resident note for full plan.    64-year-old female here for follow up. Congential absence of right arm; has prosthesis but working on getting more functional option. Follows with psychiatry for bipolar disorder, cardiology for edema and pacemaker management, endo for hypoparathyroidism and thyroid nodules, neurology for MS. Due for RSV vaccine at pharmacy. Follow up in 6 months or sooner if needed.     Health Maintenance Due   Topic Date Due    Respiratory Syncytial Virus (RSV) Pregnant or age 60 yrs+ (1 - Risk 60-74 years 1-dose series) Never done    Annual Wellness Visit (Medicare Advantage)  01/01/2024    COVID-19 Vaccine (4 - 2023-24 season) 09/01/2024       Attending Physician Statement  I have discussed the case, including pertinent history and exam findings with the resident.  I agree with the documented assessment and plan as documented by the resident.        Mayra Santamaria,  12/3/2024 1:58 PM    
Electronically signed by Dr. Savanna Munoz      An electronic signature was used to authenticate this note  - Luciana Parr DO PGY-2 on 12/3/2024 at 1:48 PM

## 2024-12-04 RX ORDER — FERROUS SULFATE 325(65) MG
TABLET ORAL
Qty: 84 TABLET | Refills: 2 | Status: SHIPPED | OUTPATIENT
Start: 2024-12-04

## 2024-12-04 RX ORDER — PRENATAL VIT/IRON FUM/FOLIC AC 27MG-0.8MG
1 TABLET ORAL DAILY
Qty: 28 TABLET | Refills: 2 | Status: SHIPPED | OUTPATIENT
Start: 2024-12-04

## 2024-12-04 RX ORDER — MULTIVITAMIN WITH IRON
500 TABLET ORAL DAILY
Qty: 28 TABLET | Refills: 2 | Status: SHIPPED | OUTPATIENT
Start: 2024-12-04

## 2024-12-04 RX ORDER — ACETAMINOPHEN 160 MG
2000 TABLET,DISINTEGRATING ORAL NIGHTLY
Qty: 28 CAPSULE | Refills: 2 | Status: SHIPPED | OUTPATIENT
Start: 2024-12-04

## 2024-12-20 ENCOUNTER — OFFICE VISIT (OUTPATIENT)
Dept: NEUROLOGY | Age: 64
End: 2024-12-20

## 2024-12-20 VITALS
DIASTOLIC BLOOD PRESSURE: 68 MMHG | SYSTOLIC BLOOD PRESSURE: 104 MMHG | OXYGEN SATURATION: 98 % | HEIGHT: 57 IN | WEIGHT: 136 LBS | HEART RATE: 84 BPM | BODY MASS INDEX: 29.34 KG/M2

## 2024-12-20 DIAGNOSIS — R53.83 OTHER FATIGUE: ICD-10-CM

## 2024-12-20 DIAGNOSIS — G35 MULTIPLE SCLEROSIS (HCC): Primary | ICD-10-CM

## 2024-12-20 DIAGNOSIS — R25.8 BRADYKINESIA: ICD-10-CM

## 2024-12-20 DIAGNOSIS — G25.2 RESTING TREMOR: ICD-10-CM

## 2024-12-20 DIAGNOSIS — G20.A2 PARKINSON'S DISEASE WITHOUT DYSKINESIA, WITH FLUCTUATING MANIFESTATIONS (HCC): ICD-10-CM

## 2024-12-20 RX ORDER — CARBIDOPA/LEVODOPA 10MG-100MG
1 TABLET ORAL 2 TIMES DAILY
Qty: 60 TABLET | Refills: 3 | Status: SHIPPED | OUTPATIENT
Start: 2024-12-20

## 2024-12-20 NOTE — PROGRESS NOTES
Progress History:   Since last visit any new medical issues? No  Any trouble with Machine No  Any new sleep medicines? no  Trouble Falling Asleep No  Trouble Staying Asleep No  Snoring no     Equipment issues:  NA     Review of Systems -   Review of Systems   Constitutional:  Positive for fatigue. Negative for chills and fever.   HENT: Negative.  Negative for congestion.    Eyes: Negative.    Respiratory: Negative.  Negative for cough, shortness of breath and wheezing.    Cardiovascular: Negative.  Negative for chest pain and leg swelling.   Gastrointestinal: Negative.    Endocrine: Negative.    Genitourinary: Negative.    Musculoskeletal:  Positive for gait problem.   Allergic/Immunologic: Negative.    Neurological:  Positive for weakness.   Hematological: Negative.    Psychiatric/Behavioral: Negative.  Negative for sleep disturbance.          Physical Exam:     BMI:  Body mass index is 30.94 kg/m².        Wt Readings from Last 3 Encounters:   09/20/24 64.9 kg (143 lb)   09/17/24 64 kg (141 lb)   08/20/24 60.8 kg (134 lb)      Weight stable / unchanged  Vitals: /62   Pulse 78   Temp 99.1 °F (37.3 °C)   Ht 1.448 m (4' 9\")   Wt 64.9 kg (143 lb)   SpO2 98%   BMI 30.94 kg/m²       Physical Exam  Vitals and nursing note reviewed.   Constitutional:       Appearance: She is well-developed. She is obese.   HENT:      Head: Normocephalic and atraumatic.   Eyes:      Conjunctiva/sclera: Conjunctivae normal.      Pupils: Pupils are equal, round, and reactive to light.   Neck:      Vascular: No JVD.   Cardiovascular:      Rate and Rhythm: Normal rate and regular rhythm.      Heart sounds: Normal heart sounds. No murmur heard.     No friction rub. No gallop.   Pulmonary:      Effort: Pulmonary effort is normal. No respiratory distress.      Breath sounds: Normal breath sounds. No wheezing or rales.   Abdominal:      General: Bowel sounds are normal.      Palpations: Abdomen is soft.   Musculoskeletal:

## 2024-12-20 NOTE — PATIENT INSTRUCTIONS
Start Sinemet 10/100 mg two times a day, take at 5am and 1 pm daily  Referral to physical therapy re: gait safety, parkinson's related exercises.  Handicap placard given today  Stay physically active  Take calcium and vitamin D supplements daily  Call with any new symptoms or concerns.   Follow up in 2 months or sooner if needed with Dr. Gaines.

## 2024-12-26 ENCOUNTER — TELEPHONE (OUTPATIENT)
Age: 64
End: 2024-12-26

## 2025-01-03 DIAGNOSIS — F31.75 BIPOLAR I, MOST RECENT EPISODE DEPRESSED, PARTIAL REMISSION (HCC): ICD-10-CM

## 2025-01-06 ENCOUNTER — CLINICAL DOCUMENTATION (OUTPATIENT)
Age: 65
End: 2025-01-06

## 2025-01-06 DIAGNOSIS — E04.2 MULTINODULAR GOITER: Primary | ICD-10-CM

## 2025-01-06 RX ORDER — VILAZODONE HYDROCHLORIDE 40 MG/1
TABLET ORAL
Qty: 28 TABLET | Refills: 3 | Status: SHIPPED | OUTPATIENT
Start: 2025-01-06

## 2025-01-06 RX ORDER — LITHIUM CARBONATE 300 MG
600 TABLET ORAL NIGHTLY
Qty: 56 TABLET | Refills: 3 | Status: SHIPPED | OUTPATIENT
Start: 2025-01-06

## 2025-01-06 NOTE — TELEPHONE ENCOUNTER
[facility] is requesting a refill on the following medications:  Requested Prescriptions     Pending Prescriptions Disp Refills    vilazodone HCl (VIIBRYD) 40 MG TABS [Pharmacy Med Name: Vilazodone HCl 40MG TABS] 28 tablet      Sig: TAKE 1 TABLET BY MOUTH DAILY    lithium 300 MG tablet [Pharmacy Med Name: Lithium Carbonate 300MG TABS] 56 tablet      Sig: TAKE 2 TABLETS BY MOUTH AT BEDTIME       Date of last visit: 10/15/2024  Date of next visit (if applicable):1/14/2025  Pharmacy Name: Lise [facility]co MA

## 2025-01-07 ENCOUNTER — HOSPITAL ENCOUNTER (OUTPATIENT)
Dept: PHYSICAL THERAPY | Age: 65
Setting detail: THERAPIES SERIES
Discharge: HOME | End: 2025-01-07
Payer: MEDICARE

## 2025-01-07 PROCEDURE — 97162 PT EVAL MOD COMPLEX 30 MIN: CPT

## 2025-01-07 NOTE — PROGRESS NOTES
2    ferrous sulfate (FEROSUL) 325 (65 Fe) MG tablet, TAKE 1 TABLET BY MOUTH THREE TIMES A DAY IN THE MORNING NOON AND AT BEDTIME, Disp: 84 tablet, Rfl: 2    lurasidone (LATUDA) 40 MG TABS tablet, TAKE 1 TABLET BY MOUTH NIGHTLY, Disp: 28 tablet, Rfl: 2    lamoTRIgine (LAMICTAL) 200 MG tablet, TAKE 1 TABLET BY MOUTH DAILY, Disp: 28 tablet, Rfl: 2    Handicap Placard MISC, by Does not apply route, Disp: 1 each, Rfl: 0    sucralfate (CARAFATE) 1 GM tablet, TAKE 1 TABLET BY MOUTH EVERY SIX HOURS, Disp: 112 tablet, Rfl: 2    levothyroxine (SYNTHROID) 100 MCG tablet, TAKE ONE TABLET BY MOUTH ONCE A DAY, Disp: 90 tablet, Rfl: 1    loratadine (CLARITIN) 10 MG tablet, Take 1 tablet by mouth daily, Disp: 90 tablet, Rfl: 1    Magnesium Oxide (MAGNESIUM-OXIDE) 250 MG TABS tablet, Take 1 tablet by mouth nightly, Disp: 90 tablet, Rfl: 1    pantoprazole (PROTONIX) 40 MG tablet, Take 1 tablet by mouth 2 times daily (before meals), Disp: 180 tablet, Rfl: 1    fluticasone (FLONASE) 50 MCG/ACT nasal spray, 1 spray by Each Nostril route as needed for Rhinitis, Disp: , Rfl:     amantadine (SYMMETREL) 100 MG capsule, Take 1 capsule by mouth daily, Disp: 30 capsule, Rfl: 5    DHEA 50 MG CAPS, Take 50 mg by mouth nightly, Disp: 30 capsule, Rfl: 2    nitroGLYCERIN (NITROSTAT) 0.4 MG SL tablet, up to max of 3 total doses. If no relief after 1 dose, call 911., Disp: 25 tablet, Rfl: 3    blood glucose monitor kit and supplies, Dispense sufficient amount for indicated testing frequency plus additional to accommodate PRN testing needs. Dispense all needed supplies to include: monitor, strips, lancing device, lancets, control solutions, alcohol swabs., Disp: 1 kit, Rfl: 0    Prosthesis MISC, by Does not apply route Strap for the prosthesis broken please order new, Disp: 1 each, Rfl: 0      Functional Outcome Measure Used Dynamic Gait Index, 9 hole peg test   Functional Outcome Score DGI deferred at eval. 9 hole peg test 28 seconds (1/7/25)

## 2025-01-14 ENCOUNTER — OFFICE VISIT (OUTPATIENT)
Dept: PSYCHIATRY | Age: 65
End: 2025-01-14
Payer: MEDICARE

## 2025-01-14 VITALS
OXYGEN SATURATION: 96 % | DIASTOLIC BLOOD PRESSURE: 64 MMHG | HEART RATE: 84 BPM | WEIGHT: 136 LBS | HEIGHT: 57 IN | TEMPERATURE: 98.2 F | BODY MASS INDEX: 29.34 KG/M2 | SYSTOLIC BLOOD PRESSURE: 109 MMHG

## 2025-01-14 DIAGNOSIS — F31.75 BIPOLAR I, MOST RECENT EPISODE DEPRESSED, PARTIAL REMISSION (HCC): Primary | ICD-10-CM

## 2025-01-14 DIAGNOSIS — Z51.81 THERAPEUTIC DRUG MONITORING: ICD-10-CM

## 2025-01-14 PROCEDURE — G8427 DOCREV CUR MEDS BY ELIG CLIN: HCPCS | Performed by: NURSE PRACTITIONER

## 2025-01-14 PROCEDURE — 3017F COLORECTAL CA SCREEN DOC REV: CPT | Performed by: NURSE PRACTITIONER

## 2025-01-14 PROCEDURE — G8417 CALC BMI ABV UP PARAM F/U: HCPCS | Performed by: NURSE PRACTITIONER

## 2025-01-14 PROCEDURE — 90833 PSYTX W PT W E/M 30 MIN: CPT | Performed by: NURSE PRACTITIONER

## 2025-01-14 PROCEDURE — 99213 OFFICE O/P EST LOW 20 MIN: CPT | Performed by: NURSE PRACTITIONER

## 2025-01-14 PROCEDURE — 1036F TOBACCO NON-USER: CPT | Performed by: NURSE PRACTITIONER

## 2025-01-14 ASSESSMENT — PATIENT HEALTH QUESTIONNAIRE - PHQ9
4. FEELING TIRED OR HAVING LITTLE ENERGY: NOT AT ALL
6. FEELING BAD ABOUT YOURSELF - OR THAT YOU ARE A FAILURE OR HAVE LET YOURSELF OR YOUR FAMILY DOWN: NOT AT ALL
SUM OF ALL RESPONSES TO PHQ QUESTIONS 1-9: 4
SUM OF ALL RESPONSES TO PHQ QUESTIONS 1-9: 4
5. POOR APPETITE OR OVEREATING: NEARLY EVERY DAY
SUM OF ALL RESPONSES TO PHQ QUESTIONS 1-9: 4
9. THOUGHTS THAT YOU WOULD BE BETTER OFF DEAD, OR OF HURTING YOURSELF: NOT AT ALL
7. TROUBLE CONCENTRATING ON THINGS, SUCH AS READING THE NEWSPAPER OR WATCHING TELEVISION: SEVERAL DAYS
SUM OF ALL RESPONSES TO PHQ9 QUESTIONS 1 & 2: 0
10. IF YOU CHECKED OFF ANY PROBLEMS, HOW DIFFICULT HAVE THESE PROBLEMS MADE IT FOR YOU TO DO YOUR WORK, TAKE CARE OF THINGS AT HOME, OR GET ALONG WITH OTHER PEOPLE: VERY DIFFICULT
1. LITTLE INTEREST OR PLEASURE IN DOING THINGS: NOT AT ALL
8. MOVING OR SPEAKING SO SLOWLY THAT OTHER PEOPLE COULD HAVE NOTICED. OR THE OPPOSITE, BEING SO FIGETY OR RESTLESS THAT YOU HAVE BEEN MOVING AROUND A LOT MORE THAN USUAL: NOT AT ALL
3. TROUBLE FALLING OR STAYING ASLEEP: NOT AT ALL
SUM OF ALL RESPONSES TO PHQ QUESTIONS 1-9: 4
2. FEELING DOWN, DEPRESSED OR HOPELESS: NOT AT ALL

## 2025-01-14 ASSESSMENT — ANXIETY QUESTIONNAIRES
4. TROUBLE RELAXING: NOT AT ALL
1. FEELING NERVOUS, ANXIOUS, OR ON EDGE: NOT AT ALL
3. WORRYING TOO MUCH ABOUT DIFFERENT THINGS: NOT AT ALL
5. BEING SO RESTLESS THAT IT IS HARD TO SIT STILL: NOT AT ALL
IF YOU CHECKED OFF ANY PROBLEMS ON THIS QUESTIONNAIRE, HOW DIFFICULT HAVE THESE PROBLEMS MADE IT FOR YOU TO DO YOUR WORK, TAKE CARE OF THINGS AT HOME, OR GET ALONG WITH OTHER PEOPLE: NOT DIFFICULT AT ALL
7. FEELING AFRAID AS IF SOMETHING AWFUL MIGHT HAPPEN: NOT AT ALL
2. NOT BEING ABLE TO STOP OR CONTROL WORRYING: NOT AT ALL
GAD7 TOTAL SCORE: 0
6. BECOMING EASILY ANNOYED OR IRRITABLE: NOT AT ALL

## 2025-01-14 NOTE — PROGRESS NOTES
appetite or overeating: Nearly every day  Feeling bad about yourself - or that you are a failure or have let yourself or your family down: Not at all  Trouble concentrating on things, such as reading the newspaper or watching television: Several days  Moving or speaking so slowly that other people could have noticed. Or the opposite - being so fidgety or restless that you have been moving around a lot more than usual: Not at all  Thoughts that you would be better off dead, or of hurting yourself in some way: Not at all  If you checked off any problems, how difficult have these problems made it for you to do your work, take care of things at home, or get along with other people?: Very difficult  PHQ-9 Total Score: 4  PHQ-9 Total Score: 4  Assessment & Plan   DIAGNOSIS AND ASSESSMENT DATA     DIAGNOSIS:   1. Bipolar I, most recent episode depressed, partial remission (HCC)    2. Therapeutic drug monitoring        PLAN   Follow-up:  Return in about 3 months (around 4/14/2025), or if symptoms worsen or fail to improve, for follow-up and medication management.    Prescriptions for this encounter:  New Prescriptions    No medications on file       No orders of the defined types were placed in this encounter.      There are no discontinued medications.    Additional orders:  Orders Placed This Encounter   Procedures    Lithium Level    Basic Metabolic Panel     Patient is reporting stable mood since last visit. Patient will continue all medications without changes; she is using pill packs from [employer] and is not in need of refills at this time. Labs as ordered. Keep upcoming appointment with endocrinology. Again, discussed healthy eating patterns, fluid intake and balance of volunteering and overall health. Patient to continue increasing activity as tolerated. Supportive therapy provided. Patient is encouraged to utilize nonpharmacologic coping skills such as deep breathing, guided imagery, guided meditation, muscle

## 2025-01-15 ENCOUNTER — HOSPITAL ENCOUNTER (OUTPATIENT)
Age: 65
Discharge: HOME | End: 2025-01-15
Payer: MEDICARE

## 2025-01-15 DIAGNOSIS — Z51.81 THERAPEUTIC DRUG MONITORING: ICD-10-CM

## 2025-01-15 DIAGNOSIS — F31.75 BIPOLAR I, MOST RECENT EPISODE DEPRESSED, PARTIAL REMISSION (HCC): ICD-10-CM

## 2025-01-15 LAB
ANION GAP SERPL CALC-SCNC: 10 MEQ/L (ref 8–16)
BUN SERPL-MCNC: 16 MG/DL (ref 7–22)
CALCIUM SERPL-MCNC: 10.3 MG/DL (ref 8.5–10.5)
CHLORIDE SERPL-SCNC: 109 MEQ/L (ref 98–111)
CO2 SERPL-SCNC: 22 MEQ/L (ref 23–33)
CREAT SERPL-MCNC: 0.7 MG/DL (ref 0.4–1.2)
GFR SERPL CREATININE-BSD FRML MDRD: > 90 ML/MIN/1.73M2
GLUCOSE SERPL-MCNC: 59 MG/DL (ref 70–108)
LITHIUM SERPL-SCNC: 0.54 MMOL/L (ref 0.6–1.2)
POTASSIUM SERPL-SCNC: 4.6 MEQ/L (ref 3.5–5.2)
SODIUM SERPL-SCNC: 141 MEQ/L (ref 135–145)

## 2025-01-15 PROCEDURE — 80048 BASIC METABOLIC PNL TOTAL CA: CPT

## 2025-01-15 PROCEDURE — 80178 ASSAY OF LITHIUM: CPT

## 2025-01-15 PROCEDURE — 36415 COLL VENOUS BLD VENIPUNCTURE: CPT

## 2025-01-16 DIAGNOSIS — F31.75 BIPOLAR I, MOST RECENT EPISODE DEPRESSED, PARTIAL REMISSION (HCC): Primary | ICD-10-CM

## 2025-01-16 DIAGNOSIS — E16.2 HYPOGLYCEMIA: ICD-10-CM

## 2025-01-20 ENCOUNTER — HOSPITAL ENCOUNTER (OUTPATIENT)
Dept: PHYSICAL THERAPY | Age: 65
Setting detail: THERAPIES SERIES
End: 2025-01-20
Payer: MEDICARE

## 2025-01-22 ENCOUNTER — APPOINTMENT (OUTPATIENT)
Dept: PHYSICAL THERAPY | Age: 65
End: 2025-01-22
Payer: MEDICARE

## 2025-01-25 ENCOUNTER — HOSPITAL ENCOUNTER (OUTPATIENT)
Age: 65
Discharge: HOME | End: 2025-01-25
Payer: MEDICARE

## 2025-01-25 DIAGNOSIS — E16.2 HYPOGLYCEMIA: ICD-10-CM

## 2025-01-25 DIAGNOSIS — F31.75 BIPOLAR I, MOST RECENT EPISODE DEPRESSED, PARTIAL REMISSION (HCC): ICD-10-CM

## 2025-01-25 LAB
ANION GAP SERPL CALC-SCNC: 13 MEQ/L (ref 8–16)
BUN SERPL-MCNC: 24 MG/DL (ref 7–22)
CALCIUM SERPL-MCNC: 10.7 MG/DL (ref 8.5–10.5)
CHLORIDE SERPL-SCNC: 108 MEQ/L (ref 98–111)
CO2 SERPL-SCNC: 20 MEQ/L (ref 23–33)
CREAT SERPL-MCNC: 0.7 MG/DL (ref 0.4–1.2)
GFR SERPL CREATININE-BSD FRML MDRD: > 90 ML/MIN/1.73M2
GLUCOSE SERPL-MCNC: 83 MG/DL (ref 70–108)
POTASSIUM SERPL-SCNC: 4.6 MEQ/L (ref 3.5–5.2)
SODIUM SERPL-SCNC: 141 MEQ/L (ref 135–145)

## 2025-01-25 PROCEDURE — 36415 COLL VENOUS BLD VENIPUNCTURE: CPT

## 2025-01-25 PROCEDURE — 80048 BASIC METABOLIC PNL TOTAL CA: CPT

## 2025-01-27 ENCOUNTER — HOSPITAL ENCOUNTER (OUTPATIENT)
Dept: WOMENS IMAGING | Age: 65
Discharge: HOME | End: 2025-01-27
Payer: MEDICARE

## 2025-01-27 ENCOUNTER — HOSPITAL ENCOUNTER (OUTPATIENT)
Dept: PHYSICAL THERAPY | Age: 65
Setting detail: THERAPIES SERIES
Discharge: HOME | End: 2025-01-27
Payer: MEDICARE

## 2025-01-27 VITALS — WEIGHT: 136.02 LBS | HEIGHT: 57 IN | BODY MASS INDEX: 29.35 KG/M2

## 2025-01-27 DIAGNOSIS — Z12.31 VISIT FOR SCREENING MAMMOGRAM: ICD-10-CM

## 2025-01-27 PROCEDURE — 97112 NEUROMUSCULAR REEDUCATION: CPT

## 2025-01-27 PROCEDURE — 77063 BREAST TOMOSYNTHESIS BI: CPT

## 2025-01-27 PROCEDURE — 97110 THERAPEUTIC EXERCISES: CPT

## 2025-01-27 NOTE — PROGRESS NOTES
[facility]  Therapy Contact Note      Date: 2025  Patient Name: Haily Pakrer        MRN: [id number]    Account Number: [id number]  : 1960  (64 y.o.)  Gender: female         Patient is only needing a will call ride home through [employer] for the following dates:    1/27/25   1/30/25  2/3/25  2/6/25  2/10/25  2/13/25    Salma Bunch, Rehab Tech   
due to short expected duration of care - patient wants to try just a few visits     Long Term Goals:  Time Frame: 12 weeks    Patient will perform Timed Up and Go within 12 seconds with no LOB in order to reduce risk of falling and improve gait initiation from shuffling pattern.    Patient will improve 30 sec sit <> stand test from baseline 13 with partial sitting, to fully leaning to back of chair and rising to stand x10 reps.     Patient will improve 9 hole peg test from baseline 28 seconds, to less than 20 seconds to meet age-related norms for females using left hand, so she can improve coordination with fine motor tasks and reduce tremor.    Patient will write 5 lines of same sentence and maintain amplitude and legibility, and improve time from 2 mins 35 seconds at baseline, to under 2 minutes in order to improve legible handwriting.    Patient will be IND with HEP in order to meet long term goals.       Patient Education:   [x]  HEP/Education Completed: Initiated exercises, posture, safety with exercises at home, gait training  [employer] Access Code: [email or web address] , big exercises   []  No new Education completed  []  Reviewed Prior HEP      [x]  Patient verbalized and/or demonstrated understanding of education provided.  []  Patient unable to verbalize and/or demonstrate understanding of education provided.  Will continue education.  []  Barriers to learning: none    PLAN:  Treatment Recommendations: Strengthening, Range of Motion, Balance Training, Functional Mobility Training, Gait Training, Stair Training, Neuromuscular Re-education, Manual Therapy - Soft Tissue Mobilization, Manual Therapy - Joint Manipulation, Home Exercise Program, Patient Education, and Modalities    []  Plan of care initiated.  Plan to see patient 2 times per week for 12 weeks to address the treatment planned outlined above.  [x]  Continue with current plan of care  []  Modify plan of care as follows:    []  Hold pending physician visit  []

## 2025-01-28 DIAGNOSIS — K27.9 PEPTIC ULCER DISEASE: ICD-10-CM

## 2025-01-28 RX ORDER — SUCRALFATE 1 G/1
TABLET ORAL
Qty: 112 TABLET | Refills: 2 | Status: SHIPPED | OUTPATIENT
Start: 2025-01-28

## 2025-01-28 NOTE — TELEPHONE ENCOUNTER
Patient's last appointment was: 12/3/2024  with our   Patient's next appointment is: 6/3/2025  with our Dr. Parr  Last refilled on: 1/13/2025   Which pharmacy does the script need sent to:   [facility] - [id number] - [place], [place]         Lab Results   Component Value Date    LABA1C 5.1 12/19/2022     Lab Results   Component Value Date    CHOL 151 11/06/2023    TRIG 77 11/06/2023    HDL 87 11/06/2023     Lab Results   Component Value Date     01/25/2025    K 4.6 01/25/2025     01/25/2025    CO2 20 (L) 01/25/2025    BUN 24 (H) 01/25/2025    CREATININE 0.7 01/25/2025    GLUCOSE 83 01/25/2025    CALCIUM 10.7 (H) 01/25/2025    BILITOT 0.2 (L) 09/26/2024    ALKPHOS 87 09/26/2024    AST 21 09/26/2024    ALT 32 09/26/2024    LABGLOM > 90 01/25/2025    GFRAA >60 01/18/2022     Lab Results   Component Value Date    TSH 1.420 10/03/2024    T4FREE 1.30 07/29/2024     Lab Results   Component Value Date    WBC 8.1 09/26/2024    HGB 12.1 09/26/2024    HCT 38.2 09/26/2024    .0 (H) 09/26/2024     09/26/2024

## 2025-01-29 DIAGNOSIS — G35 MULTIPLE SCLEROSIS (HCC): ICD-10-CM

## 2025-01-29 DIAGNOSIS — R53.83 OTHER FATIGUE: ICD-10-CM

## 2025-01-29 RX ORDER — AMANTADINE HYDROCHLORIDE 100 MG/1
100 CAPSULE, GELATIN COATED ORAL DAILY
Qty: 30 CAPSULE | Refills: 5 | Status: SHIPPED | OUTPATIENT
Start: 2025-01-29

## 2025-01-29 NOTE — TELEPHONE ENCOUNTER
[facility] called requesting a refill on the following medications:  Requested Prescriptions     Pending Prescriptions Disp Refills    amantadine (SYMMETREL) 100 MG capsule 30 capsule 5     Sig: Take 1 capsule by mouth daily       Date of last visit: 12/20/2024  Date of next visit (if applicable):2/24/2025  Date of last refill: 8/30/24  Pharmacy Name: Maribel Nolasco LPN

## 2025-01-30 ENCOUNTER — HOSPITAL ENCOUNTER (OUTPATIENT)
Dept: PHYSICAL THERAPY | Age: 65
Setting detail: THERAPIES SERIES
Discharge: HOME | End: 2025-01-30
Payer: MEDICARE

## 2025-01-30 PROCEDURE — 97112 NEUROMUSCULAR REEDUCATION: CPT

## 2025-01-30 PROCEDURE — 97116 GAIT TRAINING THERAPY: CPT

## 2025-01-30 NOTE — PROGRESS NOTES
[facility]  PHYSICAL THERAPY  [] EVALUATION  [x] DAILY NOTE (LAND) [] DAILY NOTE (AQUATIC ) [] PROGRESS NOTE [] DISCHARGE NOTE    [x] OUTPATIENT REHABILITATION [facility]   [] [facility]    [] [facility]   [] [facility]    Date: 2025  Patient Name:  Haily Parker  : 1960  MRN: [id number]  CSN: [id number]    Referring Practitioner Leopold, Paige L, APRN - CNP [id number]      Diagnosis Other abnormalities of gait and mobility  Unsteadiness on feet  Other abnormal involuntary movements  Unspecified lack of coordination  Other fatigue  Parkinson's disease without dyskinesia, with fluctuations  Other specified forms of tremor  Multiple Sclerosis   Treatment Diagnosis R26.89  Abnormalities of gait and mobility  R26.81  Unsteadiness on feet  R25.8 Other Abnormal Involuntary Movements  R27.9 Unspecified Lack of Coordination   Date of Evaluation 25   Additional Pertinent History Haily Parker has a past medical history of Balance problem, Bipolar I disorder, most recent episode (or current) depressed, in partial or unspecified remission, Depression, Gallstones, Gastric bypass status for obesity, GERD (gastroesophageal reflux disease), History of hysterectomy, Hx of cervical cancer, Hypothyroidism, Liver cyst, Liver lesion, MS (multiple sclerosis) (HCC), MIKEY (obstructive sleep apnea), Paresthesias, Ulcer at site of surgical anastomosis following bypass of stomach, and UTI (urinary tract infection).  she has a past surgical history that includes Carpal tunnel release (); other surgical history; other surgical history (); Batsheva-en-Y Gastric Bypass (); other surgical history (); bone graft (Left); Cholecystectomy, laparoscopic (2016); laparoscopic appendectomy (2016); Appendectomy; Enteroscopy (N/A, 2018); pr colonoscopy flx dx w/collj spec when pfrmd (Left, 2018); pr office/outpt visit,procedure only

## 2025-02-03 ENCOUNTER — HOSPITAL ENCOUNTER (OUTPATIENT)
Dept: PHYSICAL THERAPY | Age: 65
Setting detail: THERAPIES SERIES
Discharge: HOME | End: 2025-02-03
Payer: MEDICARE

## 2025-02-03 PROCEDURE — 97116 GAIT TRAINING THERAPY: CPT

## 2025-02-03 PROCEDURE — 97110 THERAPEUTIC EXERCISES: CPT

## 2025-02-03 NOTE — PROGRESS NOTES
(LAMICTAL) 200 MG tablet, TAKE 1 TABLET BY MOUTH DAILY, Disp: 28 tablet, Rfl: 2    Handicap Placard MISC, by Does not apply route, Disp: 1 each, Rfl: 0    levothyroxine (SYNTHROID) 100 MCG tablet, TAKE ONE TABLET BY MOUTH ONCE A DAY, Disp: 90 tablet, Rfl: 1    loratadine (CLARITIN) 10 MG tablet, Take 1 tablet by mouth daily, Disp: 90 tablet, Rfl: 1    Magnesium Oxide (MAGNESIUM-OXIDE) 250 MG TABS tablet, Take 1 tablet by mouth nightly, Disp: 90 tablet, Rfl: 1    pantoprazole (PROTONIX) 40 MG tablet, Take 1 tablet by mouth 2 times daily (before meals) (Patient not taking: Reported on 1/14/2025), Disp: 180 tablet, Rfl: 1    fluticasone (FLONASE) 50 MCG/ACT nasal spray, 1 spray by Each Nostril route as needed for Rhinitis (Patient not taking: Reported on 1/14/2025), Disp: , Rfl:     DHEA 50 MG CAPS, Take 50 mg by mouth nightly, Disp: 30 capsule, Rfl: 2    nitroGLYCERIN (NITROSTAT) 0.4 MG SL tablet, up to max of 3 total doses. If no relief after 1 dose, call 911., Disp: 25 tablet, Rfl: 3    blood glucose monitor kit and supplies, Dispense sufficient amount for indicated testing frequency plus additional to accommodate PRN testing needs. Dispense all needed supplies to include: monitor, strips, lancing device, lancets, control solutions, alcohol swabs., Disp: 1 kit, Rfl: 0    Prosthesis MISC, by Does not apply route Strap for the prosthesis broken please order new, Disp: 1 each, Rfl: 0      Functional Outcome Measure Used Dynamic Gait Index, 9 hole peg test   Functional Outcome Score DGI deferred at eval. 9 hole peg test 28 seconds (1/7/25)       Insurance: Primary: Payor: [employer] MEDICARE /  /  / ,   Secondary: MEDICAID [place]   Authorization Information PRE CERTIFICATION REQUIRED: AUTHORIZATION REQUIRED AFTER EVALUATION.  INSURANCE THERAPY BENEFIT: Visits approved based on medical necessity.. .   AQUATIC THERAPY COVERED: Yes  MODALITIES COVERED:  Yes     Received auth for 10 PT visits for CPT codes 01277, 78381, [id number] and

## 2025-02-04 ENCOUNTER — HOSPITAL ENCOUNTER (OUTPATIENT)
Dept: ULTRASOUND IMAGING | Age: 65
Discharge: HOME | End: 2025-02-04
Payer: MEDICARE

## 2025-02-04 DIAGNOSIS — E04.2 MULTINODULAR GOITER: ICD-10-CM

## 2025-02-04 PROCEDURE — 60300 ASPIR/INJ THYROID CYST: CPT

## 2025-02-04 PROCEDURE — 88172 CYTP DX EVAL FNA 1ST EA SITE: CPT

## 2025-02-04 PROCEDURE — 88173 CYTOPATH EVAL FNA REPORT: CPT

## 2025-02-05 ENCOUNTER — OFFICE VISIT (OUTPATIENT)
Age: 65
End: 2025-02-05
Payer: MEDICARE

## 2025-02-05 VITALS
BODY MASS INDEX: 29.73 KG/M2 | HEIGHT: 57 IN | WEIGHT: 137.8 LBS | SYSTOLIC BLOOD PRESSURE: 122 MMHG | DIASTOLIC BLOOD PRESSURE: 84 MMHG | HEART RATE: 84 BPM

## 2025-02-05 DIAGNOSIS — E04.2 MULTINODULAR GOITER: ICD-10-CM

## 2025-02-05 DIAGNOSIS — E83.52 HYPERCALCEMIA: ICD-10-CM

## 2025-02-05 DIAGNOSIS — E21.3 HYPERPARATHYROIDISM (HCC): Primary | ICD-10-CM

## 2025-02-05 PROCEDURE — G8427 DOCREV CUR MEDS BY ELIG CLIN: HCPCS | Performed by: INTERNAL MEDICINE

## 2025-02-05 PROCEDURE — G8417 CALC BMI ABV UP PARAM F/U: HCPCS | Performed by: INTERNAL MEDICINE

## 2025-02-05 PROCEDURE — 3017F COLORECTAL CA SCREEN DOC REV: CPT | Performed by: INTERNAL MEDICINE

## 2025-02-05 PROCEDURE — 1036F TOBACCO NON-USER: CPT | Performed by: INTERNAL MEDICINE

## 2025-02-05 PROCEDURE — 99214 OFFICE O/P EST MOD 30 MIN: CPT | Performed by: INTERNAL MEDICINE

## 2025-02-05 NOTE — PROGRESS NOTES
[facility] PHYSICIANS [place] [facility]  [facility] ENDOCRINOLOGY  [address]  [place] [place] [address]  Dept: [phone]  Loc: [phone]     Visit Date: 2/5/2025  The patient (or guardian, if applicable) and other individuals in attendance with the patient were advised that Artificial Intelligence will be utilized during this visit to record, process the conversation to generate a clinical note, and support improvement of the AI technology. The patient (or guardian, if applicable) and other individuals in attendance at the appointment consented to the use of AI, including the recording.      Haily Parker is a 64 y.o. female who presents today for:  Chief Complaint   Patient presents with    Hyperparathyroidism        HPI  History of Present Illness  The patient presents for evaluation of hyperparathyroidism and multinodular goiter.  She was diagnosed with hyperparathyroidism 3 months ago.   She reports no history of nephrolithiasis. She has not undergone any diagnostic testing for osteoporosis. She does not experience constipation but acknowledges frequent urination, which she attributes to increased fluid intake due to a previous episode of dehydration. She reports mild thirst but does not experience any body aches, depression, or cognitive impairment. She was previously on a calcium supplement but has discontinued its use for an extended period.  This patient had a thyroid ultrasound which showed multinodular goiter with growth of some of the nodules.  She does not experience any discomfort in the neck region, dysphagia, or changes in her vocal quality. Her sister had a large tumor of the thyroid, which was not cancerous, and she undergoes periodic ultrasounds to monitor it.    Past Medical History:   Diagnosis Date    Balance problem     2/2 MS    Bipolar I disorder, most recent episode (or current) depressed, in partial or unspecified remission 05/08/2013    Depression

## 2025-02-07 ENCOUNTER — HOSPITAL ENCOUNTER (OUTPATIENT)
Age: 65
Discharge: HOME | End: 2025-02-07
Payer: MEDICARE

## 2025-02-07 DIAGNOSIS — E21.3 HYPERPARATHYROIDISM (HCC): ICD-10-CM

## 2025-02-07 LAB
25(OH)D3 SERPL-MCNC: 28 NG/ML (ref 30–100)
PTH-INTACT SERPL-MCNC: 71.5 PG/ML (ref 15–65)

## 2025-02-07 PROCEDURE — 82306 VITAMIN D 25 HYDROXY: CPT

## 2025-02-07 PROCEDURE — 36415 COLL VENOUS BLD VENIPUNCTURE: CPT

## 2025-02-07 PROCEDURE — 83970 ASSAY OF PARATHORMONE: CPT

## 2025-02-09 ENCOUNTER — HOSPITAL ENCOUNTER (OUTPATIENT)
Age: 65
Setting detail: SPECIMEN
Discharge: HOME | End: 2025-02-09
Payer: MEDICARE

## 2025-02-09 PROCEDURE — 82340 ASSAY OF CALCIUM IN URINE: CPT

## 2025-02-09 PROCEDURE — 82570 ASSAY OF URINE CREATININE: CPT

## 2025-02-09 PROCEDURE — 81050 URINALYSIS VOLUME MEASURE: CPT

## 2025-02-09 PROCEDURE — 84300 ASSAY OF URINE SODIUM: CPT

## 2025-02-10 ENCOUNTER — TELEPHONE (OUTPATIENT)
Age: 65
End: 2025-02-10

## 2025-02-10 ENCOUNTER — HOSPITAL ENCOUNTER (OUTPATIENT)
Dept: PHYSICAL THERAPY | Age: 65
Setting detail: THERAPIES SERIES
Discharge: HOME | End: 2025-02-10
Payer: MEDICARE

## 2025-02-10 DIAGNOSIS — E21.3 HYPERPARATHYROIDISM (HCC): ICD-10-CM

## 2025-02-10 LAB
CALCIUM 24H UR-MRATE: 133 MG/24HR (ref 100–240)
CALCIUM UR-MCNC: 7.6 MG/DL
CREAT 24H UR-MRATE: 0.7 GM/24HR (ref 0.7–1.6)
CREAT UR-MCNC: 42.8 MG/DL
HOURS COLLECTED: 24 HRS
SODIUM 24H UR-SRATE: 110 MEQ/24HR (ref 75–200)
SODIUM UR-SCNC: 63 MEQ/L
URINE VOLUME MEASURE: 1750 ML
URINE VOLUME, 24 HOUR: 1750 ML
URINE VOLUME, 24 HOUR: 1750 ML

## 2025-02-10 PROCEDURE — 97110 THERAPEUTIC EXERCISES: CPT

## 2025-02-10 NOTE — PROGRESS NOTES
[facility]  PHYSICAL THERAPY  [] EVALUATION  [x] DAILY NOTE (LAND) [] DAILY NOTE (AQUATIC ) [] PROGRESS NOTE [] DISCHARGE NOTE    [x] OUTPATIENT REHABILITATION [facility]   [] [facility]    [] [facility]   [] [facility]    Date: 2/10/2025  Patient Name:  Haily Parker  : 1960  MRN: [id number]  CSN: [id number]    Referring Practitioner Leopold, Paige L, APRN - CNP [id number]      Diagnosis Other abnormalities of gait and mobility  Unsteadiness on feet  Other abnormal involuntary movements  Unspecified lack of coordination  Other fatigue  Parkinson's disease without dyskinesia, with fluctuations  Other specified forms of tremor  Multiple Sclerosis   Treatment Diagnosis R26.89  Abnormalities of gait and mobility  R26.81  Unsteadiness on feet  R25.8 Other Abnormal Involuntary Movements  R27.9 Unspecified Lack of Coordination   Date of Evaluation 25   Additional Pertinent History Haily Parker has a past medical history of Balance problem, Bipolar I disorder, most recent episode (or current) depressed, in partial or unspecified remission, Depression, Gallstones, Gastric bypass status for obesity, GERD (gastroesophageal reflux disease), History of hysterectomy, Hx of cervical cancer, Hypothyroidism, Liver cyst, Liver lesion, MS (multiple sclerosis) (HCC), MIKEY (obstructive sleep apnea), Paresthesias, Ulcer at site of surgical anastomosis following bypass of stomach, and UTI (urinary tract infection).  she has a past surgical history that includes Carpal tunnel release (); other surgical history; other surgical history (); Batsheva-en-Y Gastric Bypass (); other surgical history (); bone graft (Left); Cholecystectomy, laparoscopic (2016); laparoscopic appendectomy (2016); Appendectomy; Enteroscopy (N/A, 2018); pr colonoscopy flx dx w/collj spec when pfrmd (Left, 2018); pr office/outpt visit,procedure only

## 2025-02-10 NOTE — TELEPHONE ENCOUNTER
----- Message from Dr. Armando Alvarenga MD sent at 2/9/2025  2:45 PM EST -----  Benign thyroid biopsy.  Please call patient.

## 2025-02-12 ENCOUNTER — HOSPITAL ENCOUNTER (OUTPATIENT)
Dept: PHYSICAL THERAPY | Age: 65
Setting detail: THERAPIES SERIES
Discharge: HOME | End: 2025-02-12
Payer: MEDICARE

## 2025-02-12 PROCEDURE — 97110 THERAPEUTIC EXERCISES: CPT

## 2025-02-12 NOTE — THERAPY DISCHARGE
Denies    “X” in shaded column indicates activity completed today   Modalities Parameters/  Location  Notes               Manual Therapy Time/Technique  Notes               Exercise/Intervention   Notes   Nu step warm up (seat 5, B LE/L UE) 5 min  Level 2            Sitting Floor to Ceiling 10 seconds 10 reps      Sitting Side to Side 10 seconds  8 reps each     Big forward step  10 reps each  In between balance bars    Big sideways step  5 reps each     Big backward Step  8 reps each  Cues for weight shift off the front foot    Forward Rock and Reach   10 -20 reps each  Cues for increased weight shift    Sideways Rock and Reach   10-20 reps each            Sit to Stand BIG   5 reps     Walking BIG  ~300 ft    Added dual cognitive task and cues to maintain amplitude    Dynamic gait: walking marches  3 laps //bars            Balance:        Feet together EO/EC 1 min EO  1 min EC   Steady with EO, slight intermittent trunk sway with EC    Step stance- B lead on airex  1 min ea   Mild trunk sway   Low valentin sling shot 10 R/L ea   1 UE   Lateral step over valentin  10x   1 UE   Alt taps at valentin  2x10   SBA          TUG test exercise  3x   x See goals   Dynamic Gait Index   x See goals   30 second sit <> stand test   x See goals          Signature- BIG, 10 finger flicks 1st 4x      Finger flinging - full arm for large amplitude 10x      Handwriting - full sentences 4 sentences   Cues for vertical amplitude and legibility   9 hole Peg test - 28 seconds 1 rep  x Same as baseline   9 hole peg test - handfuls passing 4-5 pegs at a time  6 reps    Challenging for patient     Specific Interventions Next Treatment: Emphasis on large amplitude movement, initiating movement, balance training, and left hand coordination, finger flicks.     Activity/Treatment Tolerance:  [x]  Patient tolerated treatment well  []  Patient limited by fatigue  []  Patient limited by pain   []  Patient limited by medical complications  []  Other:

## 2025-02-13 ENCOUNTER — APPOINTMENT (OUTPATIENT)
Dept: PHYSICAL THERAPY | Age: 65
End: 2025-02-13
Payer: MEDICARE

## 2025-02-14 ENCOUNTER — HOSPITAL ENCOUNTER (OUTPATIENT)
Dept: WOMENS IMAGING | Age: 65
Discharge: HOME | End: 2025-02-14
Attending: INTERNAL MEDICINE
Payer: MEDICARE

## 2025-02-14 DIAGNOSIS — E21.3 HYPERPARATHYROIDISM (HCC): ICD-10-CM

## 2025-02-14 PROCEDURE — 77080 DXA BONE DENSITY AXIAL: CPT

## 2025-02-15 ENCOUNTER — CLINICAL DOCUMENTATION (OUTPATIENT)
Age: 65
End: 2025-02-15

## 2025-02-15 DIAGNOSIS — E21.3 HYPERPARATHYROIDISM (HCC): Primary | ICD-10-CM

## 2025-02-15 NOTE — RESULT ENCOUNTER NOTE
Haily Parker  lab test(s) were abnormal.  DEXA scan is diagnostic of osteoporosis.  Urinary calcium within the normal range.  Please complete renal ultrasound.  I have also ordered sestamibi scan.  Fall precautions.  Bring this patient back in 4 to 6 weeks to discuss further management.  Please contact patient and document response.

## 2025-02-15 NOTE — PROGRESS NOTES
DEXA scan is diagnostic of osteoporosis.  Urinary calcium within the normal range.  Please complete renal ultrasound.  I have also ordered sestamibi scan.  Fall precautions.  Bring this patient back in 4 to 6 weeks to discuss further management.

## 2025-02-17 ENCOUNTER — TELEPHONE (OUTPATIENT)
Age: 65
End: 2025-02-17

## 2025-02-17 NOTE — TELEPHONE ENCOUNTER
----- Message from Dr. Armando Alvarenga MD sent at 2/15/2025 12:45 PM EST -----  Haily Parker  lab test(s) were abnormal.  DEXA scan is diagnostic of osteoporosis.  Urinary calcium within the normal range.  Please complete renal ultrasound.  I have also ordered sestamibi scan.  Fall precautions.  Bring this patient back in 4 to 6 weeks to discuss further management.  Please contact patient and document response.

## 2025-02-17 NOTE — TELEPHONE ENCOUNTER
Pt informed and verbalized understanding with no further questions at this time. Patient scheduled.

## 2025-02-19 ENCOUNTER — HOSPITAL ENCOUNTER (OUTPATIENT)
Dept: ULTRASOUND IMAGING | Age: 65
Discharge: HOME | End: 2025-02-19
Attending: INTERNAL MEDICINE
Payer: MEDICARE

## 2025-02-19 DIAGNOSIS — E21.3 HYPERPARATHYROIDISM: ICD-10-CM

## 2025-02-19 PROCEDURE — 76770 US EXAM ABDO BACK WALL COMP: CPT

## 2025-02-24 ENCOUNTER — OFFICE VISIT (OUTPATIENT)
Dept: NEUROLOGY | Age: 65
End: 2025-02-24
Payer: MEDICARE

## 2025-02-24 VITALS
SYSTOLIC BLOOD PRESSURE: 110 MMHG | OXYGEN SATURATION: 99 % | BODY MASS INDEX: 29.77 KG/M2 | HEART RATE: 87 BPM | WEIGHT: 138 LBS | DIASTOLIC BLOOD PRESSURE: 85 MMHG | HEIGHT: 57 IN

## 2025-02-24 DIAGNOSIS — R53.83 OTHER FATIGUE: ICD-10-CM

## 2025-02-24 DIAGNOSIS — G35 MS (MULTIPLE SCLEROSIS) (HCC): ICD-10-CM

## 2025-02-24 DIAGNOSIS — G20.A2 PARKINSON'S DISEASE WITHOUT DYSKINESIA, WITH FLUCTUATING MANIFESTATIONS (HCC): Primary | ICD-10-CM

## 2025-02-24 DIAGNOSIS — Q71.31: ICD-10-CM

## 2025-02-24 DIAGNOSIS — R25.8 BRADYKINESIA: ICD-10-CM

## 2025-02-24 DIAGNOSIS — G25.2 RESTING TREMOR: ICD-10-CM

## 2025-02-24 DIAGNOSIS — G35 MULTIPLE SCLEROSIS (HCC): ICD-10-CM

## 2025-02-24 PROCEDURE — 1036F TOBACCO NON-USER: CPT | Performed by: PSYCHIATRY & NEUROLOGY

## 2025-02-24 PROCEDURE — G8427 DOCREV CUR MEDS BY ELIG CLIN: HCPCS | Performed by: PSYCHIATRY & NEUROLOGY

## 2025-02-24 PROCEDURE — G8417 CALC BMI ABV UP PARAM F/U: HCPCS | Performed by: PSYCHIATRY & NEUROLOGY

## 2025-02-24 PROCEDURE — 99214 OFFICE O/P EST MOD 30 MIN: CPT | Performed by: PSYCHIATRY & NEUROLOGY

## 2025-02-24 PROCEDURE — 3017F COLORECTAL CA SCREEN DOC REV: CPT | Performed by: PSYCHIATRY & NEUROLOGY

## 2025-02-24 RX ORDER — CARBIDOPA AND LEVODOPA 25; 100 MG/1; MG/1
1 TABLET ORAL 2 TIMES DAILY
Qty: 60 TABLET | Refills: 5 | Status: SHIPPED | OUTPATIENT
Start: 2025-02-24

## 2025-02-24 NOTE — PATIENT INSTRUCTIONS
Change Sinemet to 25/100 mg two times a day, take at 5 am and 1 pm daily  Follow physical therapy recommendations.  Replacement Handicap placard given today  Stay physically active  Take calcium and vitamin D supplements daily  Call with any new symptoms or concerns.   Follow up in 6 months or sooner if needed.

## 2025-02-24 NOTE — PROGRESS NOTES
Skin:     General: Skin is warm and dry.      Capillary Refill: Capillary refill takes less than 2 seconds.   Neurological:      Mental Status: She is alert and oriented to person, place, and time.      Motor: Weakness present.      Gait: Gait abnormal.   Psychiatric:         Behavior: Behavior normal.         Thought Content: Thought content normal.         Judgment: Judgment normal.               ASSESSMENT/DIAGNOSIS       Diagnosis Orders   1. MIKEY (obstructive sleep apnea)  DME Order for (Specify) as OP       2. Intolerance of continuous positive airway pressure (CPAP) ventilation  DME Order for (Specify) as OP                 Plan  - DME order placed today to STOP any further PAP therapy   -Patient refused to go for any further treatment for her obstructive sleep apnea. She verbalizes understanding of consequences of her decision including but not limited to increased cardiovascular and cerebrovascular events with increased morbidity and mortality. She was also instructed to not to drive any motor vehicles or operate heavy equipment if she feels sleepy.  Patient to follow with my clinic at [facility] sleep clinic if she decided to go for treatment. Patient verbalizes understanding.     Electronically signed by AJITH Chavis CNP on 9/20/2024 at 8:14 AM                Assessment:     Diagnosis Orders   1. Parkinson's disease without dyskinesia, with fluctuating manifestations (HCC)        2. Multiple sclerosis (HCC)        3. Bradykinesia        4. Resting tremor        5. Other fatigue             Patient seen as follow-up for multiple sclerosis, white matter abnormality, fatigue, she is off immune modifying medications by choice.  She was found to have bradykinesia and resting tremor, she was started on carbidopa levodopa, reports that she had initial benefit from the medication that wore off.  She is currently doing the same to better. She is on Sinemet 10/100 mg twice a day. She did receive PT. No falls,

## 2025-02-25 ENCOUNTER — TELEPHONE (OUTPATIENT)
Age: 65
End: 2025-02-25

## 2025-02-25 NOTE — TELEPHONE ENCOUNTER
----- Message from Dr. Armando Alvarenga MD sent at 2/23/2025 10:34 PM EST -----  Haily Parker  lab test(s) were abnormal.  Ultrasound shows no evidence of kidney stones but she has cysts in the kidneys.  Keep appointment to discuss.  Please contact patient and document response.

## 2025-02-26 ENCOUNTER — TELEPHONE (OUTPATIENT)
Dept: NEUROLOGY | Age: 65
End: 2025-02-26

## 2025-02-26 DIAGNOSIS — D50.8 OTHER IRON DEFICIENCY ANEMIA: ICD-10-CM

## 2025-02-26 DIAGNOSIS — Q71.31: ICD-10-CM

## 2025-02-26 DIAGNOSIS — G35 MS (MULTIPLE SCLEROSIS) (HCC): ICD-10-CM

## 2025-02-26 DIAGNOSIS — F31.75 BIPOLAR I, MOST RECENT EPISODE DEPRESSED, PARTIAL REMISSION (HCC): ICD-10-CM

## 2025-02-26 DIAGNOSIS — Z98.84 HISTORY OF GASTRIC BYPASS: ICD-10-CM

## 2025-02-26 NOTE — TELEPHONE ENCOUNTER
Patient's last appointment was: 12/3/2024  with our   Patient's next appointment is: 6/3/2025  with our Dr. Parr  Last refilled on: 12/04/2024  Which pharmacy does the script need sent to: [facility]      Lab Results   Component Value Date    LABA1C 5.1 12/19/2022     Lab Results   Component Value Date    CHOL 151 11/06/2023    TRIG 77 11/06/2023    HDL 87 11/06/2023     Lab Results   Component Value Date     01/25/2025    K 4.6 01/25/2025     01/25/2025    CO2 20 (L) 01/25/2025    BUN 24 (H) 01/25/2025    CREATININE 0.7 01/25/2025    GLUCOSE 83 01/25/2025    CALCIUM 10.7 (H) 01/25/2025    BILITOT 0.2 (L) 09/26/2024    ALKPHOS 87 09/26/2024    AST 21 09/26/2024    ALT 32 09/26/2024    LABGLOM > 90 01/25/2025    GFRAA >60 01/18/2022     Lab Results   Component Value Date    TSH 1.420 10/03/2024    T4FREE 1.30 07/29/2024     Lab Results   Component Value Date    WBC 8.1 09/26/2024    HGB 12.1 09/26/2024    HCT 38.2 09/26/2024    .0 (H) 09/26/2024     09/26/2024

## 2025-02-26 NOTE — TELEPHONE ENCOUNTER
Patient went to [facility] with handicap placard given to her at appointment on Monday 2/24/25. However they will not accept this without an expiration date.   Please reprint handicap placard with expiration date. Will need mailed to patient's address on file.   Please advise.

## 2025-02-26 NOTE — TELEPHONE ENCOUNTER
[facility] pharmacy is requesting a medication refill on Marlenes behalf for:    Requested Prescriptions     Pending Prescriptions Disp Refills    lamoTRIgine (LAMICTAL) 200 MG tablet [Pharmacy Med Name: lamoTRIgine 200MG TABS*] 28 tablet 2     Sig: TAKE 1 TABLET BY MOUTH DAILY    lurasidone (LATUDA) 40 MG TABS tablet [Pharmacy Med Name: Lurasidone HCl 40MG TABS] 28 tablet 2     Sig: TAKE 1 TABLET BY MOUTH NIGHTLY       Date of last visit: 1/14/2025  Date of next visit (if applicable):4/15/2025    Pharmacy Name: [facility]       ThanksAna Maria MA

## 2025-02-27 RX ORDER — PRENATAL VIT/IRON FUM/FOLIC AC 27MG-0.8MG
1 TABLET ORAL DAILY
Qty: 28 TABLET | Refills: 2 | Status: SHIPPED | OUTPATIENT
Start: 2025-02-27

## 2025-02-27 RX ORDER — FERROUS SULFATE 325(65) MG
TABLET ORAL
Qty: 84 TABLET | Refills: 2 | Status: SHIPPED | OUTPATIENT
Start: 2025-02-27

## 2025-02-27 RX ORDER — MULTIVITAMIN WITH IRON
500 TABLET ORAL DAILY
Qty: 28 TABLET | Refills: 2 | Status: SHIPPED | OUTPATIENT
Start: 2025-02-27

## 2025-02-27 RX ORDER — ACETAMINOPHEN 160 MG
2000 TABLET,DISINTEGRATING ORAL NIGHTLY
Qty: 28 CAPSULE | Refills: 2 | Status: SHIPPED | OUTPATIENT
Start: 2025-02-27

## 2025-03-03 RX ORDER — LURASIDONE HYDROCHLORIDE 40 MG/1
40 TABLET, FILM COATED ORAL NIGHTLY
Qty: 28 TABLET | Refills: 2 | Status: SHIPPED | OUTPATIENT
Start: 2025-03-03

## 2025-03-03 RX ORDER — LAMOTRIGINE 200 MG/1
TABLET ORAL
Qty: 28 TABLET | Refills: 2 | Status: SHIPPED | OUTPATIENT
Start: 2025-03-03

## 2025-03-06 ENCOUNTER — HOSPITAL ENCOUNTER (OUTPATIENT)
Dept: NUCLEAR MEDICINE | Age: 65
Discharge: HOME | End: 2025-03-06
Attending: INTERNAL MEDICINE
Payer: MEDICARE

## 2025-03-06 ENCOUNTER — APPOINTMENT (OUTPATIENT)
Dept: NUCLEAR MEDICINE | Age: 65
End: 2025-03-06
Attending: INTERNAL MEDICINE
Payer: MEDICARE

## 2025-03-06 DIAGNOSIS — E21.3 HYPERPARATHYROIDISM: ICD-10-CM

## 2025-03-06 PROCEDURE — 78072 PARATHYRD PLANAR W/SPECT&CT: CPT

## 2025-03-06 PROCEDURE — A9500 TC99M SESTAMIBI: HCPCS | Performed by: INTERNAL MEDICINE

## 2025-03-06 PROCEDURE — 3430000000 HC RX DIAGNOSTIC RADIOPHARMACEUTICAL: Performed by: INTERNAL MEDICINE

## 2025-03-06 RX ORDER — TETRAKIS(2-METHOXYISOBUTYLISOCYANIDE)COPPER(I) TETRAFLUOROBORATE 1 MG/ML
25.9 INJECTION, POWDER, LYOPHILIZED, FOR SOLUTION INTRAVENOUS
Status: COMPLETED | OUTPATIENT
Start: 2025-03-06 | End: 2025-03-06

## 2025-03-06 RX ADMIN — Medication 25.9 MILLICURIE: at 10:05

## 2025-03-08 ENCOUNTER — RESULTS FOLLOW-UP (OUTPATIENT)
Dept: NUCLEAR MEDICINE | Age: 65
End: 2025-03-08

## 2025-03-10 ENCOUNTER — TELEPHONE (OUTPATIENT)
Dept: CARDIOLOGY CLINIC | Age: 65
End: 2025-03-10

## 2025-03-10 NOTE — TELEPHONE ENCOUNTER
----- Message from Dr. Armando Alvarenga MD sent at 3/8/2025  4:55 PM EST -----  Sestamibi scan did not localize parathyroid adenoma.  Keep existing appointment.

## 2025-03-18 ENCOUNTER — OFFICE VISIT (OUTPATIENT)
Age: 65
End: 2025-03-18

## 2025-03-18 VITALS
WEIGHT: 135.2 LBS | BODY MASS INDEX: 29.17 KG/M2 | DIASTOLIC BLOOD PRESSURE: 82 MMHG | HEIGHT: 57 IN | SYSTOLIC BLOOD PRESSURE: 122 MMHG | HEART RATE: 70 BPM

## 2025-03-18 DIAGNOSIS — E21.3 HYPERPARATHYROIDISM: Primary | ICD-10-CM

## 2025-03-18 DIAGNOSIS — M81.0 POSTMENOPAUSAL OSTEOPOROSIS: ICD-10-CM

## 2025-03-18 DIAGNOSIS — E83.52 HYPERCALCEMIA: ICD-10-CM

## 2025-03-18 DIAGNOSIS — E04.2 MULTINODULAR GOITER: ICD-10-CM

## 2025-03-18 NOTE — PROGRESS NOTES
Liver lesion     MS (multiple sclerosis) (HCC)     MIKEY (obstructive sleep apnea) 7/19/2024    Paresthesias     RT LOWER LIMB    Ulcer at site of surgical anastomosis following bypass of stomach 01/15/2022    UTI (urinary tract infection)       Past Surgical History:   Procedure Laterality Date    APPENDECTOMY      BONE GRAFT Left     left leg to right arm    CARPAL TUNNEL RELEASE  1999    CATARACT EXTRACTION Bilateral     2024    CHOLECYSTECTOMY, LAPAROSCOPIC  05/16/2016    COLONOSCOPY      COLONOSCOPY N/A 01/16/2022    COLONOSCOPY CONTROL HEMORRHAGE performed by Shea Pedro MD at [facility] OR    DILATATION, ESOPHAGUS      ENDOSCOPY, COLON, DIAGNOSTIC      ENTEROSCOPY N/A 02/05/2018    ENTEROSCOPY performed by David Jansen MD at [facility] Endoscopy    EYE SURGERY Left 09/29/2022    Retina Surgery    GASTROSTOMY TUBE PLACEMENT N/A 09/28/2020    [facility] by Yanely Reid MD at [facility] OR    HYSTERECTOMY (CERVIX STATUS UNKNOWN)  1963    HYSTERECTOMY, VAGINAL      LAPAROSCOPIC APPENDECTOMY  05/16/2016    OTHER SURGICAL HISTORY      RECTAL FISSULRE    OTHER SURGICAL HISTORY  1986    D&C    OTHER SURGICAL HISTORY  2006    GI BLEED    OVARY REMOVAL N/A     pt unaware which one was taken    PACEMAKER INSERTION Right 05/20/2019    Dr. Torres    PACEMAKER PLACEMENT      AZ COLONOSCOPY FLX DX W/COLLJ SPEC WHEN PFRMD Left 02/06/2018    COLONOSCOPY performed by David Jansen MD at [facility] Endoscopy    AZ OFFICE/OUTPT VISIT,PROCEDURE ONLY Left 02/18/2018    EGD ESOPHAGOGASTRODUODENOSCOPY performed by Biju Kim MD at [facility] Endoscopy    AZ OFFICE/OUTPT VISIT,PROCEDURE ONLY N/A 10/04/2018    EGD DIAGNOSTIC ONLY performed by Sydney Frances MD at [facility] Endoscopy    SOPHIE-EN-Y GASTRIC BYPASS  2000    UPPER GASTROINTESTINAL ENDOSCOPY Left 09/24/2019    EGD DIAGNOSTIC ONLY performed by Lo Sandra MD at [facility] Endoscopy    UPPER GASTROINTESTINAL ENDOSCOPY N/A

## 2025-03-24 ENCOUNTER — TELEPHONE (OUTPATIENT)
Dept: CARDIOLOGY CLINIC | Age: 65
End: 2025-03-24

## 2025-03-24 DIAGNOSIS — Z87.19 HISTORY OF GASTROESOPHAGEAL REFLUX (GERD): ICD-10-CM

## 2025-03-24 DIAGNOSIS — E06.3 HYPOTHYROIDISM DUE TO HASHIMOTO'S THYROIDITIS: ICD-10-CM

## 2025-03-24 NOTE — TELEPHONE ENCOUNTER
Patient's last appointment was: 12/3/2024  with our   Patient's next appointment is: 6/3/2025  with our Dr. Parr  Last refilled on: 9/18/2024  Which pharmacy does the script need sent to: [place]      Lab Results   Component Value Date    LABA1C 5.1 12/19/2022     Lab Results   Component Value Date    CHOL 151 11/06/2023    TRIG 77 11/06/2023    HDL 87 11/06/2023     Lab Results   Component Value Date     01/25/2025    K 4.6 01/25/2025     01/25/2025    CO2 20 (L) 01/25/2025    BUN 24 (H) 01/25/2025    CREATININE 0.7 01/25/2025    GLUCOSE 83 01/25/2025    CALCIUM 10.7 (H) 01/25/2025    BILITOT 0.2 (L) 09/26/2024    ALKPHOS 87 09/26/2024    AST 21 09/26/2024    ALT 32 09/26/2024    LABGLOM > 90 01/25/2025    GFRAA >60 01/18/2022     Lab Results   Component Value Date    TSH 1.420 10/03/2024    T4FREE 1.30 07/29/2024     Lab Results   Component Value Date    WBC 8.1 09/26/2024    HGB 12.1 09/26/2024    HCT 38.2 09/26/2024    .0 (H) 09/26/2024     09/26/2024

## 2025-03-24 NOTE — TELEPHONE ENCOUNTER
Pre op Risk Assessment    Procedure Fillings, extractions, root canal with local anesthetic.  Physician [facility]  Date of surgery/procedure Per dentist office - this is for when needed, its treatment planned but not scheduled.    Last OV 10-7-24 w/ Guicho  Last Stress 5-6-19  Last Echo 10-4-24  Last Cath 4-25-17  Last Stent ?  Is patient on blood thinners   Hold Meds/how many days     F: [phone]

## 2025-03-26 RX ORDER — LEVOTHYROXINE SODIUM 100 UG/1
100 TABLET ORAL DAILY
Qty: 90 TABLET | Refills: 1 | Status: ON HOLD | OUTPATIENT
Start: 2025-03-26

## 2025-03-26 RX ORDER — PANTOPRAZOLE SODIUM 40 MG/1
80 TABLET, DELAYED RELEASE ORAL
Qty: 180 TABLET | Refills: 1 | Status: SHIPPED | OUTPATIENT
Start: 2025-03-26 | End: 2025-05-24

## 2025-03-26 RX ORDER — PNV NO.95/FERROUS FUM/FOLIC AC 28MG-0.8MG
1 TABLET ORAL NIGHTLY
Qty: 90 TABLET | Refills: 1 | Status: ON HOLD | OUTPATIENT
Start: 2025-03-26

## 2025-04-10 ENCOUNTER — TELEPHONE (OUTPATIENT)
Age: 65
End: 2025-04-10

## 2025-04-10 NOTE — TELEPHONE ENCOUNTER
Deanna called in stating keila were going to reach out to the patients NP & psychiatrist regarding patient stopping the lithium. Wu was calling to check on this.

## 2025-04-15 ENCOUNTER — OFFICE VISIT (OUTPATIENT)
Dept: PSYCHIATRY | Age: 65
End: 2025-04-15
Payer: MEDICARE

## 2025-04-15 ENCOUNTER — TELEPHONE (OUTPATIENT)
Dept: FAMILY MEDICINE CLINIC | Age: 65
End: 2025-04-15

## 2025-04-15 VITALS
BODY MASS INDEX: 29.77 KG/M2 | HEIGHT: 57 IN | OXYGEN SATURATION: 96 % | WEIGHT: 138 LBS | SYSTOLIC BLOOD PRESSURE: 126 MMHG | TEMPERATURE: 98.2 F | DIASTOLIC BLOOD PRESSURE: 76 MMHG | HEART RATE: 89 BPM

## 2025-04-15 DIAGNOSIS — F31.75 BIPOLAR I, MOST RECENT EPISODE DEPRESSED, PARTIAL REMISSION (HCC): Primary | ICD-10-CM

## 2025-04-15 DIAGNOSIS — Z51.81 THERAPEUTIC DRUG MONITORING: ICD-10-CM

## 2025-04-15 PROCEDURE — G8427 DOCREV CUR MEDS BY ELIG CLIN: HCPCS | Performed by: NURSE PRACTITIONER

## 2025-04-15 PROCEDURE — G8417 CALC BMI ABV UP PARAM F/U: HCPCS | Performed by: NURSE PRACTITIONER

## 2025-04-15 PROCEDURE — 3017F COLORECTAL CA SCREEN DOC REV: CPT | Performed by: NURSE PRACTITIONER

## 2025-04-15 PROCEDURE — 90833 PSYTX W PT W E/M 30 MIN: CPT | Performed by: NURSE PRACTITIONER

## 2025-04-15 PROCEDURE — 99214 OFFICE O/P EST MOD 30 MIN: CPT | Performed by: NURSE PRACTITIONER

## 2025-04-15 PROCEDURE — 1036F TOBACCO NON-USER: CPT | Performed by: NURSE PRACTITIONER

## 2025-04-15 RX ORDER — VILAZODONE HYDROCHLORIDE 40 MG/1
TABLET ORAL
Qty: 28 TABLET | Refills: 3 | Status: SHIPPED | OUTPATIENT
Start: 2025-04-15

## 2025-04-15 RX ORDER — LITHIUM CARBONATE 300 MG
600 TABLET ORAL NIGHTLY
Qty: 56 TABLET | Refills: 3 | Status: SHIPPED | OUTPATIENT
Start: 2025-04-15 | End: 2025-05-08 | Stop reason: DRUGHIGH

## 2025-04-15 ASSESSMENT — PATIENT HEALTH QUESTIONNAIRE - PHQ9
7. TROUBLE CONCENTRATING ON THINGS, SUCH AS READING THE NEWSPAPER OR WATCHING TELEVISION: SEVERAL DAYS
SUM OF ALL RESPONSES TO PHQ QUESTIONS 1-9: 4
10. IF YOU CHECKED OFF ANY PROBLEMS, HOW DIFFICULT HAVE THESE PROBLEMS MADE IT FOR YOU TO DO YOUR WORK, TAKE CARE OF THINGS AT HOME, OR GET ALONG WITH OTHER PEOPLE: NOT DIFFICULT AT ALL
9. THOUGHTS THAT YOU WOULD BE BETTER OFF DEAD, OR OF HURTING YOURSELF: NOT AT ALL
4. FEELING TIRED OR HAVING LITTLE ENERGY: SEVERAL DAYS
5. POOR APPETITE OR OVEREATING: SEVERAL DAYS
1. LITTLE INTEREST OR PLEASURE IN DOING THINGS: NOT AT ALL
SUM OF ALL RESPONSES TO PHQ QUESTIONS 1-9: 4
2. FEELING DOWN, DEPRESSED OR HOPELESS: NOT AT ALL
3. TROUBLE FALLING OR STAYING ASLEEP: SEVERAL DAYS
SUM OF ALL RESPONSES TO PHQ QUESTIONS 1-9: 4
6. FEELING BAD ABOUT YOURSELF - OR THAT YOU ARE A FAILURE OR HAVE LET YOURSELF OR YOUR FAMILY DOWN: NOT AT ALL
8. MOVING OR SPEAKING SO SLOWLY THAT OTHER PEOPLE COULD HAVE NOTICED. OR THE OPPOSITE, BEING SO FIGETY OR RESTLESS THAT YOU HAVE BEEN MOVING AROUND A LOT MORE THAN USUAL: NOT AT ALL
SUM OF ALL RESPONSES TO PHQ QUESTIONS 1-9: 4

## 2025-04-15 ASSESSMENT — ANXIETY QUESTIONNAIRES
6. BECOMING EASILY ANNOYED OR IRRITABLE: NOT AT ALL
5. BEING SO RESTLESS THAT IT IS HARD TO SIT STILL: NOT AT ALL
1. FEELING NERVOUS, ANXIOUS, OR ON EDGE: NOT AT ALL
2. NOT BEING ABLE TO STOP OR CONTROL WORRYING: NOT AT ALL
4. TROUBLE RELAXING: NOT AT ALL
GAD7 TOTAL SCORE: 0
IF YOU CHECKED OFF ANY PROBLEMS ON THIS QUESTIONNAIRE, HOW DIFFICULT HAVE THESE PROBLEMS MADE IT FOR YOU TO DO YOUR WORK, TAKE CARE OF THINGS AT HOME, OR GET ALONG WITH OTHER PEOPLE: NOT DIFFICULT AT ALL
7. FEELING AFRAID AS IF SOMETHING AWFUL MIGHT HAPPEN: NOT AT ALL
3. WORRYING TOO MUCH ABOUT DIFFERENT THINGS: NOT AT ALL

## 2025-04-15 NOTE — PROGRESS NOTES
[facility]  [address]  [facility] [address]  Dept: [phone]  Dept Fax: [phone]  Loc: [phone]    Visit Date: 4/15/2025    SUBJECTIVE DATA     CHIEF COMPLAINT:    Chief Complaint   Patient presents with    Bipolar I     Depression    Medication Refill    Follow-up       History obtained from: patient    HISTORY OF PRESENT ILLNESS:    Haily Parker is a 64 y.o. female who presents to the office for follow-up on her moods. Her last visit was 10/15/2024.       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   -patient wonders what, if anything, needs done  -she states she has been on Lithium for several decades to help regulate her mood and prevent pedrol uis  -reports she just got her pill pack from pharmacy yesterday so any changes wouldn't take place until next month    Mood is doing well  -enjoys time with her cat  -denies feeling down, sad, or depressed  -good motivation  -denies feeling worthless, hopeless, helpless    Sleeping well  -does take a nap in the afternoon  -getting around 9 hours sleep per night      Reports anxiety is well controlled    Denies suicidal ideations, intent, plan. No homicidal ideations, intent, plan. No audiovisual hallucinations.    HPI    The patient has had 0 lifetime suicide attempts. Reports she had intense thoughts of suicide with a plan and intent back in 1987 but someone came home.    Patient reports 3 psych hospital admissions with the last admission taking place in 1996    Past psychiatric medications include: various per

## 2025-04-15 NOTE — TELEPHONE ENCOUNTER
Patient called in asking if she can stop taking Protonix so she can take care of her Osteoporosis. Patient states that the Osteoporosis is a side effect of Protonix. Patient states if you have any questions to contact Purnima Nobles with Psych at [phone]. Patient would also like a call back with updates.   Principal Discharge DX:	Seizure Principal Discharge DX:	Breakthrough seizure Principal Discharge DX:	Breakthrough seizure  Assessment and plan of treatment:	rest and hydration. continue medication as prescribed. Follow up with your neurologist in 1-2 days. Return to the ER immediately for worsening symptoms(weakness, fever, pain, vomiting,  and blurred vision)

## 2025-04-16 ENCOUNTER — TELEPHONE (OUTPATIENT)
Dept: PSYCHIATRY | Age: 65
End: 2025-04-16

## 2025-04-16 ENCOUNTER — HOSPITAL ENCOUNTER (OUTPATIENT)
Age: 65
Discharge: HOME | End: 2025-04-16
Payer: MEDICARE

## 2025-04-16 DIAGNOSIS — Z51.81 THERAPEUTIC DRUG MONITORING: ICD-10-CM

## 2025-04-16 LAB — LITHIUM SERPL-SCNC: 0.5 MMOL/L (ref 0.6–1.2)

## 2025-04-16 PROCEDURE — 80178 ASSAY OF LITHIUM: CPT

## 2025-04-16 PROCEDURE — 36415 COLL VENOUS BLD VENIPUNCTURE: CPT

## 2025-04-16 NOTE — TELEPHONE ENCOUNTER
Dr. Alvarenga called regarding this mutual patient. He reports patient was referred to him due to elevated PTH levels. He mentions Lithium can also cause the elevated levels and wonders if the Lithium can be stopped for one month so he may have labs redrawn? Dr. Alvarenga mentions an alternative medication would likely need to be prescribed if provider is agreeable stopping the Lithium. Requests a call from provider to discuss at [phone].

## 2025-04-17 ENCOUNTER — RESULTS FOLLOW-UP (OUTPATIENT)
Dept: PSYCHIATRY | Age: 65
End: 2025-04-17

## 2025-04-17 ENCOUNTER — HOSPITAL ENCOUNTER (OUTPATIENT)
Age: 65
Discharge: HOME | End: 2025-04-17
Payer: MEDICARE

## 2025-04-17 DIAGNOSIS — E21.3 HYPERPARATHYROIDISM: ICD-10-CM

## 2025-04-17 DIAGNOSIS — F31.75 BIPOLAR I, MOST RECENT EPISODE DEPRESSED, PARTIAL REMISSION (HCC): ICD-10-CM

## 2025-04-17 DIAGNOSIS — Z51.81 THERAPEUTIC DRUG MONITORING: Primary | ICD-10-CM

## 2025-04-17 LAB
ANION GAP SERPL CALC-SCNC: 11 MEQ/L (ref 8–16)
BUN SERPL-MCNC: 20 MG/DL (ref 8–23)
CA-I BLD ISE-SCNC: 1.35 MMOL/L (ref 1.12–1.32)
CALCIUM SERPL-MCNC: 10.9 MG/DL (ref 8.8–10.2)
CHLORIDE SERPL-SCNC: 107 MEQ/L (ref 98–111)
CO2 SERPL-SCNC: 22 MEQ/L (ref 22–29)
CREAT SERPL-MCNC: 0.6 MG/DL (ref 0.5–0.9)
GFR SERPL CREATININE-BSD FRML MDRD: > 90 ML/MIN/1.73M2
GLUCOSE SERPL-MCNC: 91 MG/DL (ref 74–109)
POTASSIUM SERPL-SCNC: 4 MEQ/L (ref 3.5–5.2)
PTH-INTACT SERPL-MCNC: 83 PG/ML (ref 15–65)
SODIUM SERPL-SCNC: 140 MEQ/L (ref 135–145)

## 2025-04-17 PROCEDURE — 80048 BASIC METABOLIC PNL TOTAL CA: CPT

## 2025-04-17 PROCEDURE — 82330 ASSAY OF CALCIUM: CPT

## 2025-04-17 PROCEDURE — 36415 COLL VENOUS BLD VENIPUNCTURE: CPT

## 2025-04-17 PROCEDURE — 83970 ASSAY OF PARATHORMONE: CPT

## 2025-04-17 NOTE — TELEPHONE ENCOUNTER
Noted, patient informed to have Dr. Alvarenga's labs completed and will present to outpatient lab today.

## 2025-04-17 NOTE — TELEPHONE ENCOUNTER
I agree that it is a good time to trial off of the Protonix. For patients with osteoporosis, this class of medication can increase risk of fractures. However she has had GI bleeds in the past and gastric ulcers, so the medication was started for good reason in the past. Okay to stop the Protonix.     Electronically signed by Luciana Parr DO on 4/16/2025 at 10:48 PM

## 2025-04-17 NOTE — TELEPHONE ENCOUNTER
This was discussed extensively with patient during her last visit. It was recommended patient have previously ordered labs completed to determine how to proceed with the Lithium. She is higher risk from mood shifts with a discontinuation of the Lithium and it should be discontinued slowly. Will also need to discuss this with patient's sister to ensure understanding and proper dosing and timing of labs, etc. Will discuss with Dr. Alvarenga upon this provider's return to office. In the interim, please instruct patient to have labs from Dr. Alvarenga completed (see other encounter on Lithium level). Will contact patient's sister upon return to office as well.

## 2025-04-20 ENCOUNTER — RESULTS FOLLOW-UP (OUTPATIENT)
Age: 65
End: 2025-04-20

## 2025-04-20 NOTE — RESULT ENCOUNTER NOTE
Calcium remains elevated.  I have left a message for her psychiatrist regarding medication adjustment.

## 2025-04-22 DIAGNOSIS — K27.9 PEPTIC ULCER DISEASE: ICD-10-CM

## 2025-04-22 RX ORDER — SUCRALFATE 1 G/1
TABLET ORAL
Qty: 112 TABLET | Refills: 2 | Status: SHIPPED | OUTPATIENT
Start: 2025-04-22

## 2025-04-22 NOTE — TELEPHONE ENCOUNTER
Patient's last appointment was: 12/3/2024  with our   Patient's next appointment is: 6/3/2025  with our Dr. Parr  Last refilled on:  4/7/2025   Which pharmacy does the script need sent to:   [facility] - [id number] - [place], [place]        Lab Results   Component Value Date    LABA1C 5.1 12/19/2022     Lab Results   Component Value Date    CHOL 151 11/06/2023    TRIG 77 11/06/2023    HDL 87 11/06/2023     Lab Results   Component Value Date     04/17/2025    K 4.0 04/17/2025     04/17/2025    CO2 22 04/17/2025    BUN 20 04/17/2025    CREATININE 0.6 04/17/2025    GLUCOSE 91 04/17/2025    CALCIUM 10.9 (H) 04/17/2025    BILITOT 0.2 (L) 09/26/2024    ALKPHOS 87 09/26/2024    AST 21 09/26/2024    ALT 32 09/26/2024    LABGLOM > 90 04/17/2025    GFRAA >60 01/18/2022     Lab Results   Component Value Date    TSH 1.420 10/03/2024    T4FREE 1.30 07/29/2024     Lab Results   Component Value Date    WBC 8.1 09/26/2024    HGB 12.1 09/26/2024    HCT 38.2 09/26/2024    .0 (H) 09/26/2024     09/26/2024

## 2025-04-24 NOTE — TELEPHONE ENCOUNTER
----- Message from Dr. Armando Alvarenga MD sent at 4/20/2025  3:29 PM EDT -----  Calcium remains elevated.  I have left a message for her psychiatrist regarding medication adjustment.

## 2025-04-25 ENCOUNTER — CLINICAL DOCUMENTATION (OUTPATIENT)
Age: 65
End: 2025-04-25

## 2025-04-25 NOTE — PROGRESS NOTES
I discussed patient's care with Purnima Perez who prescribes her lithium.  She will look into the prospect of weaning Haily off the lithium.  If this can be safely done, then she will proceed with the wean to allow me to retest Haily.  If this cannot be safely done, then we will not put her through the danger that could result from discontinuing this medication.  I also spoke with patient's sister Deanna Mott and notified her of this discussion.

## 2025-05-08 RX ORDER — LITHIUM CARBONATE 450 MG
450 TABLET, EXTENDED RELEASE ORAL NIGHTLY
Qty: 30 TABLET | Refills: 2 | Status: SHIPPED | OUTPATIENT
Start: 2025-05-08

## 2025-05-20 DIAGNOSIS — Z98.84 HISTORY OF GASTRIC BYPASS: ICD-10-CM

## 2025-05-20 DIAGNOSIS — D50.8 OTHER IRON DEFICIENCY ANEMIA: ICD-10-CM

## 2025-05-20 DIAGNOSIS — F31.75 BIPOLAR I, MOST RECENT EPISODE DEPRESSED, PARTIAL REMISSION (HCC): ICD-10-CM

## 2025-05-20 RX ORDER — LURASIDONE HYDROCHLORIDE 40 MG/1
40 TABLET, FILM COATED ORAL NIGHTLY
Qty: 28 TABLET | Refills: 2 | OUTPATIENT
Start: 2025-05-20

## 2025-05-20 RX ORDER — LAMOTRIGINE 200 MG/1
200 TABLET ORAL DAILY
Qty: 28 TABLET | Refills: 2 | OUTPATIENT
Start: 2025-05-20

## 2025-05-21 ENCOUNTER — HOSPITAL ENCOUNTER (OUTPATIENT)
Age: 65
Discharge: HOME | End: 2025-05-21
Payer: MEDICARE

## 2025-05-21 DIAGNOSIS — F31.75 BIPOLAR I, MOST RECENT EPISODE DEPRESSED, PARTIAL REMISSION (HCC): ICD-10-CM

## 2025-05-21 DIAGNOSIS — Z51.81 THERAPEUTIC DRUG MONITORING: ICD-10-CM

## 2025-05-21 LAB
ANION GAP SERPL CALC-SCNC: 11 MEQ/L (ref 8–16)
BUN SERPL-MCNC: 25 MG/DL (ref 8–23)
CALCIUM SERPL-MCNC: 11.1 MG/DL (ref 8.8–10.2)
CHLORIDE SERPL-SCNC: 107 MEQ/L (ref 98–111)
CO2 SERPL-SCNC: 23 MEQ/L (ref 22–29)
CREAT SERPL-MCNC: 0.8 MG/DL (ref 0.5–0.9)
GFR SERPL CREATININE-BSD FRML MDRD: 82 ML/MIN/1.73M2
GLUCOSE SERPL-MCNC: 79 MG/DL (ref 74–109)
LITHIUM SERPL-SCNC: 0.5 MMOL/L (ref 0.6–1.2)
POTASSIUM SERPL-SCNC: 4.7 MEQ/L (ref 3.5–5.2)
SODIUM SERPL-SCNC: 141 MEQ/L (ref 135–145)

## 2025-05-21 PROCEDURE — 80178 ASSAY OF LITHIUM: CPT

## 2025-05-21 PROCEDURE — 80048 BASIC METABOLIC PNL TOTAL CA: CPT

## 2025-05-21 PROCEDURE — 36415 COLL VENOUS BLD VENIPUNCTURE: CPT

## 2025-05-21 NOTE — TELEPHONE ENCOUNTER
Patient's last appointment was: 12/3/2024  with our   Patient's next appointment is: 6/3/2025  with our Dr. Parr  Last refilled on: 2/27/2025  Which pharmacy does the script need sent to: [facility]      Lab Results   Component Value Date    LABA1C 5.1 12/19/2022     Lab Results   Component Value Date    CHOL 151 11/06/2023    TRIG 77 11/06/2023    HDL 87 11/06/2023     Lab Results   Component Value Date     05/21/2025    K 4.7 05/21/2025     05/21/2025    CO2 23 05/21/2025    BUN 25 (H) 05/21/2025    CREATININE 0.8 05/21/2025    GLUCOSE 79 05/21/2025    CALCIUM 11.1 (H) 05/21/2025    BILITOT 0.2 (L) 09/26/2024    ALKPHOS 87 09/26/2024    AST 21 09/26/2024    ALT 32 09/26/2024    LABGLOM 82 05/21/2025    GFRAA >60 01/18/2022     Lab Results   Component Value Date    TSH 1.420 10/03/2024    T4FREE 1.30 07/29/2024     Lab Results   Component Value Date    WBC 8.1 09/26/2024    HGB 12.1 09/26/2024    HCT 38.2 09/26/2024    .0 (H) 09/26/2024     09/26/2024

## 2025-05-22 RX ORDER — ACETAMINOPHEN 160 MG
2000 TABLET,DISINTEGRATING ORAL NIGHTLY
Qty: 28 CAPSULE | Refills: 2 | Status: SHIPPED | OUTPATIENT
Start: 2025-05-22

## 2025-05-22 RX ORDER — MULTIVITAMIN WITH IRON
500 TABLET ORAL DAILY
Qty: 28 TABLET | Refills: 2 | Status: SHIPPED | OUTPATIENT
Start: 2025-05-22

## 2025-05-22 RX ORDER — FERROUS SULFATE 325(65) MG
TABLET ORAL
Qty: 84 TABLET | Refills: 2 | Status: SHIPPED | OUTPATIENT
Start: 2025-05-22

## 2025-05-22 RX ORDER — PRENATAL VIT/IRON FUM/FOLIC AC 27MG-0.8MG
1 TABLET ORAL DAILY
Qty: 28 TABLET | Refills: 2 | Status: SHIPPED | OUTPATIENT
Start: 2025-05-22

## 2025-05-24 ENCOUNTER — HOSPITAL ENCOUNTER (EMERGENCY)
Age: 65
Discharge: HOME | DRG: 379 | End: 2025-05-24
Attending: EMERGENCY MEDICINE
Payer: MEDICARE

## 2025-05-24 ENCOUNTER — APPOINTMENT (OUTPATIENT)
Dept: CT IMAGING | Age: 65
DRG: 379 | End: 2025-05-24
Payer: MEDICARE

## 2025-05-24 VITALS
BODY MASS INDEX: 26.99 KG/M2 | DIASTOLIC BLOOD PRESSURE: 73 MMHG | OXYGEN SATURATION: 97 % | TEMPERATURE: 97.7 F | RESPIRATION RATE: 15 BRPM | SYSTOLIC BLOOD PRESSURE: 130 MMHG | HEART RATE: 63 BPM | WEIGHT: 120 LBS | HEIGHT: 56 IN

## 2025-05-24 DIAGNOSIS — K62.5 RECTAL BLEEDING: Primary | ICD-10-CM

## 2025-05-24 LAB
ABO GROUP BLD: NORMAL
ALBUMIN SERPL BCG-MCNC: 4.5 G/DL (ref 3.4–4.9)
ALP SERPL-CCNC: 133 U/L (ref 38–126)
ALT SERPL W/O P-5'-P-CCNC: 29 U/L (ref 10–35)
ANION GAP SERPL CALC-SCNC: 15 MEQ/L (ref 8–16)
APTT PPP: 35.5 SECONDS (ref 22–38)
AST SERPL-CCNC: 34 U/L (ref 10–35)
BASOPHILS ABSOLUTE: 0 THOU/MM3 (ref 0–0.1)
BASOPHILS NFR BLD AUTO: 0.4 %
BILIRUB SERPL-MCNC: 0.3 MG/DL (ref 0.3–1.2)
BUN SERPL-MCNC: 37 MG/DL (ref 8–23)
CALCIUM SERPL-MCNC: 10.9 MG/DL (ref 8.8–10.2)
CHLORIDE SERPL-SCNC: 110 MEQ/L (ref 98–111)
CO2 SERPL-SCNC: 18 MEQ/L (ref 22–29)
CREAT SERPL-MCNC: 0.8 MG/DL (ref 0.5–0.9)
DEPRECATED RDW RBC AUTO: 48.8 FL (ref 35–45)
EKG ATRIAL RATE: 83 BPM
EKG P AXIS: 78 DEGREES
EKG P-R INTERVAL: 134 MS
EKG Q-T INTERVAL: 424 MS
EKG QRS DURATION: 144 MS
EKG QTC CALCULATION (BAZETT): 498 MS
EKG R AXIS: -120 DEGREES
EKG T AXIS: 50 DEGREES
EKG VENTRICULAR RATE: 83 BPM
EOSINOPHIL NFR BLD AUTO: 0.6 %
EOSINOPHILS ABSOLUTE: 0 THOU/MM3 (ref 0–0.4)
ERYTHROCYTE [DISTWIDTH] IN BLOOD BY AUTOMATED COUNT: 13.1 % (ref 11.5–14.5)
GFR SERPL CREATININE-BSD FRML MDRD: 82 ML/MIN/1.73M2
GLUCOSE SERPL-MCNC: 161 MG/DL (ref 74–109)
HCT VFR BLD AUTO: 44.7 % (ref 37–47)
HEMOCCULT STL QL: POSITIVE
HGB BLD-MCNC: 14.8 GM/DL (ref 12–16)
IAT IGG-SP REAG SERPL QL: NORMAL
IMM GRANULOCYTES # BLD AUTO: 0.02 THOU/MM3 (ref 0–0.07)
IMM GRANULOCYTES NFR BLD AUTO: 0.3 %
INR PPP: 0.97 (ref 0.85–1.13)
LYMPHOCYTES ABSOLUTE: 1.2 THOU/MM3 (ref 1–4.8)
LYMPHOCYTES NFR BLD AUTO: 15.7 %
MCH RBC QN AUTO: 33.7 PG (ref 26–33)
MCHC RBC AUTO-ENTMCNC: 33.1 GM/DL (ref 32.2–35.5)
MCV RBC AUTO: 101.8 FL (ref 81–99)
MONOCYTES ABSOLUTE: 0.6 THOU/MM3 (ref 0.4–1.3)
MONOCYTES NFR BLD AUTO: 7.4 %
NEUTROPHILS ABSOLUTE: 5.8 THOU/MM3 (ref 1.8–7.7)
NEUTROPHILS NFR BLD AUTO: 75.6 %
NRBC BLD AUTO-RTO: 0 /100 WBC
OSMOLALITY SERPL CALC.SUM OF ELEC: 297.1 MOSMOL/KG (ref 275–300)
PLATELET # BLD AUTO: 189 THOU/MM3 (ref 130–400)
PMV BLD AUTO: 10.2 FL (ref 9.4–12.4)
POTASSIUM SERPL-SCNC: 4.7 MEQ/L (ref 3.5–5.2)
PROT SERPL-MCNC: 7.1 G/DL (ref 6.4–8.3)
RBC # BLD AUTO: 4.39 MILL/MM3 (ref 4.2–5.4)
RH BLD: NORMAL
SODIUM SERPL-SCNC: 143 MEQ/L (ref 135–145)
WBC # BLD AUTO: 7.7 THOU/MM3 (ref 4.8–10.8)

## 2025-05-24 PROCEDURE — 80053 COMPREHEN METABOLIC PANEL: CPT

## 2025-05-24 PROCEDURE — 74177 CT ABD & PELVIS W/CONTRAST: CPT

## 2025-05-24 PROCEDURE — 99285 EMERGENCY DEPT VISIT HI MDM: CPT

## 2025-05-24 PROCEDURE — 93010 ELECTROCARDIOGRAM REPORT: CPT | Performed by: INTERNAL MEDICINE

## 2025-05-24 PROCEDURE — 86900 BLOOD TYPING SEROLOGIC ABO: CPT

## 2025-05-24 PROCEDURE — 82272 OCCULT BLD FECES 1-3 TESTS: CPT

## 2025-05-24 PROCEDURE — 86885 COOMBS TEST INDIRECT QUAL: CPT

## 2025-05-24 PROCEDURE — 6360000004 HC RX CONTRAST MEDICATION: Performed by: EMERGENCY MEDICINE

## 2025-05-24 PROCEDURE — 96366 THER/PROPH/DIAG IV INF ADDON: CPT

## 2025-05-24 PROCEDURE — 96365 THER/PROPH/DIAG IV INF INIT: CPT

## 2025-05-24 PROCEDURE — 85610 PROTHROMBIN TIME: CPT

## 2025-05-24 PROCEDURE — 6360000002 HC RX W HCPCS

## 2025-05-24 PROCEDURE — 85730 THROMBOPLASTIN TIME PARTIAL: CPT

## 2025-05-24 PROCEDURE — 36415 COLL VENOUS BLD VENIPUNCTURE: CPT

## 2025-05-24 PROCEDURE — 86901 BLOOD TYPING SEROLOGIC RH(D): CPT

## 2025-05-24 PROCEDURE — 2580000003 HC RX 258

## 2025-05-24 PROCEDURE — 93005 ELECTROCARDIOGRAM TRACING: CPT | Performed by: EMERGENCY MEDICINE

## 2025-05-24 PROCEDURE — 85025 COMPLETE CBC W/AUTO DIFF WBC: CPT

## 2025-05-24 RX ORDER — FAMOTIDINE 20 MG/1
20 TABLET, FILM COATED ORAL 2 TIMES DAILY
Qty: 60 TABLET | Refills: 0 | Status: SHIPPED | OUTPATIENT
Start: 2025-05-24 | End: 2025-06-23

## 2025-05-24 RX ORDER — IOPAMIDOL 755 MG/ML
80 INJECTION, SOLUTION INTRAVASCULAR
Status: COMPLETED | OUTPATIENT
Start: 2025-05-24 | End: 2025-05-24

## 2025-05-24 RX ADMIN — IOPAMIDOL 80 ML: 755 INJECTION, SOLUTION INTRAVENOUS at 14:54

## 2025-05-24 RX ADMIN — Medication 8 MG/HR: at 12:24

## 2025-05-24 RX ADMIN — SODIUM CHLORIDE 80 MG: 9 INJECTION INTRAMUSCULAR; INTRAVENOUS; SUBCUTANEOUS at 12:23

## 2025-05-24 ASSESSMENT — PAIN - FUNCTIONAL ASSESSMENT
PAIN_FUNCTIONAL_ASSESSMENT: 0-10
PAIN_FUNCTIONAL_ASSESSMENT: NONE - DENIES PAIN

## 2025-05-24 ASSESSMENT — PAIN SCALES - GENERAL
PAINLEVEL_OUTOF10: 6

## 2025-05-24 ASSESSMENT — PAIN DESCRIPTION - ORIENTATION: ORIENTATION: RIGHT;LEFT

## 2025-05-24 ASSESSMENT — PAIN DESCRIPTION - LOCATION: LOCATION: ABDOMEN

## 2025-05-24 ASSESSMENT — PAIN DESCRIPTION - DESCRIPTORS: DESCRIPTORS: ACHING

## 2025-05-24 ASSESSMENT — PAIN DESCRIPTION - PAIN TYPE: TYPE: ACUTE PAIN

## 2025-05-24 NOTE — DISCHARGE INSTRUCTIONS
You were seen and evaluated emergency department today for concern of abdominal pain and bleeding from your rectum.  We did not see a high amount of blood on the physical exam.  The stool came back positive for blood in the lab.  Your blood counts overall are stable today.  CT scan did not show any emergent changes to your abdomen.    Please fill your prescription for Pepcid and take twice daily to decrease acid production in your stomach.    Call your gastroenterologist first thing schedule an appointment this upcoming week for follow-up of your blood per rectum.    Return to emergency department anytime for severe pain uncontrolled at home, significant blood loss, lightheadedness, loss of consciousness or other worrisome changes.

## 2025-05-24 NOTE — ED NOTES
Pt. Resting in bed with even and unlabored respirations. Pt. Denies any pain. Pt. Updated about plan for ct scan. Pt. Has no further concerns, questions or needs at this time. Call light within reach.

## 2025-05-24 NOTE — ED NOTES
Pt. Resting in bed with even and unlabored respirations. Pt. States pain is a 6/10 at this time. Pt. Updated about plan of care and treatment. . Pt. Has no further concerns, questions or needs at this time. Call light within reach.

## 2025-05-24 NOTE — ED NOTES
Pt arrives to the ED for rectal bleeding and abdominal pain. Pt states she began having abdominal pain along with weakness about 5 days ago and was hoping the pain with subside on its own. Pt states she woke up today and began having very dark stools. Pt states she has not had any n/v or diarrhea. Pt states she has had 6/10 generalized abdominal pain. Pt respirations are unlabored. VSS

## 2025-05-24 NOTE — ED PROVIDER NOTES

## 2025-05-24 NOTE — ED PROVIDER NOTES
[facility] EMERGENCY DEPARTMENT  EMERGENCY DEPARTMENT ENCOUNTER          Pt Name: Haily Parker  MRN: [id number]  Birthdate 1960  Date of evaluation: 5/24/2025  Resident Physician: Kenney Leyva MD EM Resident PGY-3  Attending Physician: Ave Castro MD        CHIEF COMPLAINT       Chief Complaint   Patient presents with    Abdominal Pain    Rectal Bleeding         HISTORY OF PRESENT ILLNESS    HPI  Haily Parker is a 65 y.o. female who presents to the emergency department from home, as a walk in to the ED lobby for evaluation of abdominal pain, rectal bleed.    Patient with extensive previous history s/p lap manju, appendectomy, gastric sleeve, multiple GI bleeds with melena s/p cardiac pacemaker.  Patient states that she began to have abdominal pain and had black stool starting this morning.  She denies any hematemesis, hematochezia.  Denies any fall or injury.      The patient has no other acute complaints at this time.    ROS negative except as stated above.    PAST MEDICAL AND SURGICAL HISTORY     Past Medical History:   Diagnosis Date    Balance problem     2/2 MS    Bipolar I disorder, most recent episode (or current) depressed, in partial or unspecified remission 05/08/2013    Depression     Gallstones     Gastric bypass status for obesity 2002    GERD (gastroesophageal reflux disease)     History of hysterectomy     REMOVAL OF ONE OVARY     Hx of cervical cancer     s/p hysterectomy    Hypothyroidism     Liver cyst     GI Dr [address]    Liver lesion     MS (multiple sclerosis) (HCC)     MIKEY (obstructive sleep apnea) 7/19/2024    Paresthesias     RT LOWER LIMB    Ulcer at site of surgical anastomosis following bypass of stomach 01/15/2022    UTI (urinary tract infection)      Past Surgical History:   Procedure Laterality Date    APPENDECTOMY      BONE GRAFT Left     left leg to right arm    CARPAL TUNNEL RELEASE  1999    CATARACT EXTRACTION Bilateral     2024     CHOLECYSTECTOMY, LAPAROSCOPIC  05/16/2016    COLONOSCOPY      COLONOSCOPY N/A 01/16/2022    COLONOSCOPY CONTROL HEMORRHAGE performed by Shea Pedro MD at [facility] OR    DILATATION, ESOPHAGUS      ENDOSCOPY, COLON, DIAGNOSTIC      ENTEROSCOPY N/A 02/05/2018    ENTEROSCOPY performed by David Jansen MD at [facility] Endoscopy    EYE SURGERY Left 09/29/2022    Retina Surgery    GASTROSTOMY TUBE PLACEMENT N/A 09/28/2020    GASTROSTOMY TUBE PLACEMENT- [facility] TO DO GASTRIC ENDOSCOPY performed by Yanely Reid MD at [facility] OR    HYSTERECTOMY (CERVIX STATUS UNKNOWN)  1963    HYSTERECTOMY, VAGINAL      LAPAROSCOPIC APPENDECTOMY  05/16/2016    OTHER SURGICAL HISTORY      RECTAL FISSULRE    OTHER SURGICAL HISTORY  1986    D&C    OTHER SURGICAL HISTORY  2006    GI BLEED    OVARY REMOVAL N/A     pt unaware which one was taken    PACEMAKER INSERTION Right 05/20/2019    Dr. Torres    PACEMAKER PLACEMENT      NH COLONOSCOPY FLX DX W/COLLJ SPEC WHEN PFRMD Left 02/06/2018    COLONOSCOPY performed by David Jansen MD at [facility]    NH OFFICE/OUTPT VISIT,PROCEDURE ONLY Left 02/18/2018    EGD ESOPHAGOGASTRODUODENOSCOPY performed by Biju Kim MD at [facility]    NH OFFICE/OUTPT VISIT,PROCEDURE ONLY N/A 10/04/2018    EGD DIAGNOSTIC ONLY performed by Sydney Frances MD at [facility]    SOPHIE-EN-Y GASTRIC BYPASS  2000    UPPER GASTROINTESTINAL ENDOSCOPY Left 09/24/2019    EGD DIAGNOSTIC ONLY performed by Lo Sandra MD at [facility]    UPPER GASTROINTESTINAL ENDOSCOPY N/A 09/26/2020    ENTEROSCOPY PUSH CONTROL HEMORRHAGE performed by David Jansen MD at [facility]    UPPER GASTROINTESTINAL ENDOSCOPY N/A 01/16/2022    ENTEROSCOPY PUSH DIAGNOSTIC performed by Shea Pedro MD at [facility] OR    UPPER GASTROINTESTINAL ENDOSCOPY  04/13/2022    US ASP/INJ THYROID CYST  2/4/2025    US ASP/INJ THYROID CYST 2/4/2025 [facility] ULTRASOUND    US BIOPSY THYROID  01/2024         MEDICATIONS     Current  Disp: 25 tablet, Rfl: 3    blood glucose monitor kit and supplies, Dispense sufficient amount for indicated testing frequency plus additional to accommodate PRN testing needs. Dispense all needed supplies to include: monitor, strips, lancing device, lancets, control solutions, alcohol swabs., Disp: 1 kit, Rfl: 0    Prosthesis MISC, by Does not apply route Strap for the prosthesis broken please order new, Disp: 1 each, Rfl: 0    Previous Medications    AMANTADINE (SYMMETREL) 100 MG CAPSULE    Take 1 capsule by mouth daily    BLOOD GLUCOSE MONITOR KIT AND SUPPLIES    Dispense sufficient amount for indicated testing frequency plus additional to accommodate PRN testing needs. Dispense all needed supplies to include: monitor, strips, lancing device, lancets, control solutions, alcohol swabs.    CARBIDOPA-LEVODOPA (SINEMET)  MG PER TABLET    Take 1 tablet by mouth 2 times daily    FEROSUL 325 (65 FE) MG TABLET    TAKE 1 TABLET BY MOUTH THREE TIMES A DAY IN THE MORNING NOON AND AT BEDTIME    HANDICAP PLACARD MISC    by Does not apply route Exp: 2/2029    LAMOTRIGINE (LAMICTAL) 200 MG TABLET    TAKE 1 TABLET BY MOUTH DAILY    LEVOTHYROXINE (SYNTHROID) 100 MCG TABLET    TAKE 1 TABLET BY MOUTH DAILY    LITHIUM (ESKALITH) 450 MG EXTENDED RELEASE TABLET    Take 1 tablet by mouth at bedtime    LURASIDONE (LATUDA) 40 MG TABS TABLET    TAKE 1 TABLET BY MOUTH NIGHTLY    MAGNESIUM OXIDE (MAG-OX) 250 MG TABS TABLET    TAKE 1 TABLET BY MOUTH AT BEDTIME    NITROGLYCERIN (NITROSTAT) 0.4 MG SL TABLET    up to max of 3 total doses. If no relief after 1 dose, call 911.    PRENATAL VIT-FE FUMARATE-FA (PRENATAL VITAMIN) 27-0.8 MG TABS    TAKE 1 TABLET BY MOUTH DAILY    PROSTHESIS MISC    by Does not apply route Strap for the prosthesis broken please order new    SUCRALFATE (CARAFATE) 1 GM TABLET    TAKE 1 TABLET BY MOUTH EVERY SIX HOURS    VILAZODONE HCL (VIIBRYD) 40 MG TABS    TAKE 1 TABLET BY MOUTH DAILY    VITAMIN B-12  cervical adenopathy.   Skin:     General: Skin is warm and dry.      Capillary Refill: Capillary refill takes less than 2 seconds.      Coloration: Skin is not jaundiced.   Neurological:      General: No focal deficit present.      Mental Status: She is alert and oriented to person, place, and time.   Psychiatric:         Mood and Affect: Mood normal.           ED RESULTS   Laboratory results (none if blank):  Labs Reviewed   CBC WITH AUTO DIFFERENTIAL - Abnormal; Notable for the following components:       Result Value    .8 (*)     MCH 33.7 (*)     RDW-SD 48.8 (*)     All other components within normal limits   COMPREHENSIVE METABOLIC PANEL - Abnormal; Notable for the following components:    Glucose 161 (*)     BUN 37 (*)     CO2 18 (*)     Calcium 10.9 (*)     Alkaline Phosphatase 133 (*)     All other components within normal limits   APTT   BLOOD OCCULT STOOL SCREEN #1   PROTIME-INR   ANION GAP   OSMOLALITY   GLOMERULAR FILTRATION RATE, ESTIMATED   TYPE AND SCREEN     All laboratory results are individually reviewed and interpreted by me in the clinical context of this patient.  See ED course below for results interpretation if applicable.  (Any cultures that may have been sent were not resulted at the time of this patient ED visit)      Radiologic studies results available at the moment of this note (None if blank):  CT ABDOMEN PELVIS W IV CONTRAST Additional Contrast? None   Final Result      No acute intra-abdominal or intrapelvic findings.            **This report has been created using voice recognition software. It may contain   minor errors which are inherent in voice recognition technology.**      Electronically signed by Dr. Varun Ybarra        See ED course below for my interpretation if applicable.  All radiology images independently reviewed by me in the clinical context of this patient, in addition to interpretation provided by the radiologist.      EKG interpretation (none if

## 2025-05-25 ENCOUNTER — APPOINTMENT (OUTPATIENT)
Dept: CT IMAGING | Age: 65
DRG: 379 | End: 2025-05-25
Payer: MEDICARE

## 2025-05-25 ENCOUNTER — APPOINTMENT (OUTPATIENT)
Dept: GENERAL RADIOLOGY | Age: 65
DRG: 379 | End: 2025-05-25
Payer: MEDICARE

## 2025-05-25 ENCOUNTER — ANESTHESIA (OUTPATIENT)
Dept: ENDOSCOPY | Age: 65
End: 2025-05-25
Payer: MEDICARE

## 2025-05-25 ENCOUNTER — APPOINTMENT (OUTPATIENT)
Dept: ENDOSCOPY | Age: 65
DRG: 379 | End: 2025-05-25
Attending: INTERNAL MEDICINE
Payer: MEDICARE

## 2025-05-25 ENCOUNTER — ANESTHESIA EVENT (OUTPATIENT)
Dept: ENDOSCOPY | Age: 65
End: 2025-05-25
Payer: MEDICARE

## 2025-05-25 ENCOUNTER — HOSPITAL ENCOUNTER (INPATIENT)
Age: 65
LOS: 1 days | Discharge: ANOTHER HOSPITAL | DRG: 379 | End: 2025-05-26
Attending: EMERGENCY MEDICINE
Payer: MEDICARE

## 2025-05-25 DIAGNOSIS — I95.9 HYPOTENSION, UNSPECIFIED HYPOTENSION TYPE: ICD-10-CM

## 2025-05-25 DIAGNOSIS — K92.2 ACUTE GI BLEEDING: Primary | ICD-10-CM

## 2025-05-25 PROBLEM — G20.A1 PARKINSON'S DISEASE (HCC): Status: ACTIVE | Noted: 2025-05-25

## 2025-05-25 LAB
ABO GROUP BLD: NORMAL
ALBUMIN SERPL BCG-MCNC: 3.9 G/DL (ref 3.4–4.9)
ALP SERPL-CCNC: 107 U/L (ref 38–126)
ALT SERPL W/O P-5'-P-CCNC: 20 U/L (ref 10–35)
ANION GAP SERPL CALC-SCNC: 12 MEQ/L (ref 8–16)
AST SERPL-CCNC: 32 U/L (ref 10–35)
BASOPHILS ABSOLUTE: 0 THOU/MM3 (ref 0–0.1)
BASOPHILS NFR BLD AUTO: 0.5 %
BILIRUB SERPL-MCNC: 0.3 MG/DL (ref 0.3–1.2)
BILIRUB UR QL STRIP.AUTO: NEGATIVE
BUN SERPL-MCNC: 43 MG/DL (ref 8–23)
CALCIUM SERPL-MCNC: 9.7 MG/DL (ref 8.8–10.2)
CHARACTER UR: CLEAR
CHLORIDE SERPL-SCNC: 106 MEQ/L (ref 98–111)
CO2 SERPL-SCNC: 18 MEQ/L (ref 22–29)
COLOR, UA: YELLOW
CREAT SERPL-MCNC: 0.7 MG/DL (ref 0.5–0.9)
D DIMER PPP IA.FEU-MCNC: 733 NG/ML FEU (ref 0–500)
DEPRECATED RDW RBC AUTO: 50.4 FL (ref 35–45)
EKG ATRIAL RATE: 79 BPM
EKG P AXIS: 53 DEGREES
EKG P-R INTERVAL: 176 MS
EKG Q-T INTERVAL: 420 MS
EKG QRS DURATION: 142 MS
EKG QTC CALCULATION (BAZETT): 481 MS
EKG R AXIS: 64 DEGREES
EKG T AXIS: 29 DEGREES
EKG VENTRICULAR RATE: 79 BPM
EOSINOPHIL NFR BLD AUTO: 0.5 %
EOSINOPHILS ABSOLUTE: 0 THOU/MM3 (ref 0–0.4)
ERYTHROCYTE [DISTWIDTH] IN BLOOD BY AUTOMATED COUNT: 13.2 % (ref 11.5–14.5)
GFR SERPL CREATININE-BSD FRML MDRD: > 90 ML/MIN/1.73M2
GLUCOSE SERPL-MCNC: 225 MG/DL (ref 74–109)
GLUCOSE UR QL STRIP.AUTO: 500 MG/DL
HCT VFR BLD AUTO: 23.8 % (ref 37–47)
HCT VFR BLD AUTO: 29.9 % (ref 37–47)
HCT VFR BLD AUTO: 34.1 % (ref 37–47)
HEMOCCULT STL QL: POSITIVE
HGB BLD-MCNC: 11.1 GM/DL (ref 12–16)
HGB BLD-MCNC: 7.8 GM/DL (ref 12–16)
HGB BLD-MCNC: 9.6 GM/DL (ref 12–16)
HGB UR QL STRIP.AUTO: NEGATIVE
IAT IGG-SP REAG SERPL QL: NORMAL
IMM GRANULOCYTES # BLD AUTO: 0.04 THOU/MM3 (ref 0–0.07)
IMM GRANULOCYTES NFR BLD AUTO: 0.5 %
INR PPP: 0.99 (ref 0.85–1.13)
KETONES UR QL STRIP.AUTO: NEGATIVE
LACTIC ACID, SEPSIS: 1.2 MMOL/L (ref 0.5–1.9)
LACTIC ACID, SEPSIS: 2.5 MMOL/L (ref 0.5–1.9)
LIPASE SERPL-CCNC: 28 U/L (ref 13–60)
LITHIUM SERPL-SCNC: 0.4 MMOL/L (ref 0.6–1.2)
LYMPHOCYTES ABSOLUTE: 0.9 THOU/MM3 (ref 1–4.8)
LYMPHOCYTES NFR BLD AUTO: 11.4 %
MCH RBC QN AUTO: 33.6 PG (ref 26–33)
MCHC RBC AUTO-ENTMCNC: 32.6 GM/DL (ref 32.2–35.5)
MCV RBC AUTO: 103.3 FL (ref 81–99)
MONOCYTES ABSOLUTE: 0.6 THOU/MM3 (ref 0.4–1.3)
MONOCYTES NFR BLD AUTO: 7 %
NEUTROPHILS ABSOLUTE: 6.6 THOU/MM3 (ref 1.8–7.7)
NEUTROPHILS NFR BLD AUTO: 80.1 %
NITRITE UR QL STRIP: NEGATIVE
NRBC BLD AUTO-RTO: 0 /100 WBC
NT-PROBNP SERPL IA-MCNC: 156 PG/ML (ref 0–124)
OSMOLALITY SERPL CALC.SUM OF ELEC: 289.8 MOSMOL/KG (ref 275–300)
PH UR STRIP.AUTO: 5.5 [PH] (ref 5–9)
PLATELET # BLD AUTO: 171 THOU/MM3 (ref 130–400)
PMV BLD AUTO: 10.5 FL (ref 9.4–12.4)
POTASSIUM SERPL-SCNC: 4 MEQ/L (ref 3.5–5.2)
PROT SERPL-MCNC: 5.7 G/DL (ref 6.4–8.3)
PROT UR STRIP.AUTO-MCNC: NEGATIVE MG/DL
RBC # BLD AUTO: 3.3 MILL/MM3 (ref 4.2–5.4)
RH BLD: NORMAL
SODIUM SERPL-SCNC: 136 MEQ/L (ref 135–145)
SP GR UR REFRACT.AUTO: 1.01 (ref 1–1.03)
TROPONIN, HIGH SENSITIVITY: 24 NG/L (ref 0–12)
TROPONIN, HIGH SENSITIVITY: 24 NG/L (ref 0–12)
UROBILINOGEN, URINE: 0.2 EU/DL (ref 0–1)
WBC # BLD AUTO: 8.2 THOU/MM3 (ref 4.8–10.8)
WBC #/AREA URNS HPF: NEGATIVE /[HPF]

## 2025-05-25 PROCEDURE — 3609013900 HC ENTEROSCOPY: Performed by: INTERNAL MEDICINE

## 2025-05-25 PROCEDURE — 36415 COLL VENOUS BLD VENIPUNCTURE: CPT

## 2025-05-25 PROCEDURE — 85379 FIBRIN DEGRADATION QUANT: CPT

## 2025-05-25 PROCEDURE — 6360000002 HC RX W HCPCS

## 2025-05-25 PROCEDURE — 2580000003 HC RX 258

## 2025-05-25 PROCEDURE — 80178 ASSAY OF LITHIUM: CPT

## 2025-05-25 PROCEDURE — 86901 BLOOD TYPING SEROLOGIC RH(D): CPT

## 2025-05-25 PROCEDURE — 6360000002 HC RX W HCPCS: Performed by: NURSE ANESTHETIST, CERTIFIED REGISTERED

## 2025-05-25 PROCEDURE — 2720000010 HC SURG SUPPLY STERILE: Performed by: INTERNAL MEDICINE

## 2025-05-25 PROCEDURE — 0DJ08ZZ INSPECTION OF UPPER INTESTINAL TRACT, VIA NATURAL OR ARTIFICIAL OPENING ENDOSCOPIC: ICD-10-PCS | Performed by: INTERNAL MEDICINE

## 2025-05-25 PROCEDURE — 96374 THER/PROPH/DIAG INJ IV PUSH: CPT

## 2025-05-25 PROCEDURE — 2580000003 HC RX 258: Performed by: STUDENT IN AN ORGANIZED HEALTH CARE EDUCATION/TRAINING PROGRAM

## 2025-05-25 PROCEDURE — 83880 ASSAY OF NATRIURETIC PEPTIDE: CPT

## 2025-05-25 PROCEDURE — 6370000000 HC RX 637 (ALT 250 FOR IP)

## 2025-05-25 PROCEDURE — 2060000000 HC ICU INTERMEDIATE R&B

## 2025-05-25 PROCEDURE — 86923 COMPATIBILITY TEST ELECTRIC: CPT

## 2025-05-25 PROCEDURE — 83690 ASSAY OF LIPASE: CPT

## 2025-05-25 PROCEDURE — 83605 ASSAY OF LACTIC ACID: CPT

## 2025-05-25 PROCEDURE — 99223 1ST HOSP IP/OBS HIGH 75: CPT | Performed by: STUDENT IN AN ORGANIZED HEALTH CARE EDUCATION/TRAINING PROGRAM

## 2025-05-25 PROCEDURE — 3700000000 HC ANESTHESIA ATTENDED CARE: Performed by: INTERNAL MEDICINE

## 2025-05-25 PROCEDURE — 99285 EMERGENCY DEPT VISIT HI MDM: CPT

## 2025-05-25 PROCEDURE — 71045 X-RAY EXAM CHEST 1 VIEW: CPT

## 2025-05-25 PROCEDURE — 93005 ELECTROCARDIOGRAM TRACING: CPT | Performed by: EMERGENCY MEDICINE

## 2025-05-25 PROCEDURE — 71275 CT ANGIOGRAPHY CHEST: CPT

## 2025-05-25 PROCEDURE — 3700000001 HC ADD 15 MINUTES (ANESTHESIA): Performed by: INTERNAL MEDICINE

## 2025-05-25 PROCEDURE — 6360000004 HC RX CONTRAST MEDICATION: Performed by: STUDENT IN AN ORGANIZED HEALTH CARE EDUCATION/TRAINING PROGRAM

## 2025-05-25 PROCEDURE — 86885 COOMBS TEST INDIRECT QUAL: CPT

## 2025-05-25 PROCEDURE — 80053 COMPREHEN METABOLIC PANEL: CPT

## 2025-05-25 PROCEDURE — 7100000010 HC PHASE II RECOVERY - FIRST 15 MIN: Performed by: INTERNAL MEDICINE

## 2025-05-25 PROCEDURE — 2580000003 HC RX 258: Performed by: EMERGENCY MEDICINE

## 2025-05-25 PROCEDURE — 81003 URINALYSIS AUTO W/O SCOPE: CPT

## 2025-05-25 PROCEDURE — 85018 HEMOGLOBIN: CPT

## 2025-05-25 PROCEDURE — 85014 HEMATOCRIT: CPT

## 2025-05-25 PROCEDURE — P9016 RBC LEUKOCYTES REDUCED: HCPCS

## 2025-05-25 PROCEDURE — 7100000011 HC PHASE II RECOVERY - ADDTL 15 MIN: Performed by: INTERNAL MEDICINE

## 2025-05-25 PROCEDURE — 93010 ELECTROCARDIOGRAM REPORT: CPT | Performed by: INTERNAL MEDICINE

## 2025-05-25 PROCEDURE — 84484 ASSAY OF TROPONIN QUANT: CPT

## 2025-05-25 PROCEDURE — 85610 PROTHROMBIN TIME: CPT

## 2025-05-25 PROCEDURE — 86900 BLOOD TYPING SEROLOGIC ABO: CPT

## 2025-05-25 PROCEDURE — 82272 OCCULT BLD FECES 1-3 TESTS: CPT

## 2025-05-25 PROCEDURE — 85025 COMPLETE CBC W/AUTO DIFF WBC: CPT

## 2025-05-25 RX ORDER — MAGNESIUM SULFATE IN WATER 40 MG/ML
2000 INJECTION, SOLUTION INTRAVENOUS PRN
Status: DISCONTINUED | OUTPATIENT
Start: 2025-05-25 | End: 2025-05-26 | Stop reason: HOSPADM

## 2025-05-25 RX ORDER — SODIUM CHLORIDE 9 MG/ML
INJECTION, SOLUTION INTRAVENOUS CONTINUOUS
Status: ACTIVE | OUTPATIENT
Start: 2025-05-25 | End: 2025-05-26

## 2025-05-25 RX ORDER — POTASSIUM CHLORIDE 1500 MG/1
40 TABLET, EXTENDED RELEASE ORAL PRN
Status: DISCONTINUED | OUTPATIENT
Start: 2025-05-25 | End: 2025-05-26 | Stop reason: HOSPADM

## 2025-05-25 RX ORDER — POLYETHYLENE GLYCOL 3350 17 G/17G
17 POWDER, FOR SOLUTION ORAL DAILY PRN
Status: DISCONTINUED | OUTPATIENT
Start: 2025-05-25 | End: 2025-05-26 | Stop reason: HOSPADM

## 2025-05-25 RX ORDER — ACETAMINOPHEN 650 MG/1
650 SUPPOSITORY RECTAL EVERY 6 HOURS PRN
Status: DISCONTINUED | OUTPATIENT
Start: 2025-05-25 | End: 2025-05-26 | Stop reason: HOSPADM

## 2025-05-25 RX ORDER — LEVOTHYROXINE SODIUM 100 UG/1
100 TABLET ORAL
Status: DISCONTINUED | OUTPATIENT
Start: 2025-05-26 | End: 2025-05-26 | Stop reason: HOSPADM

## 2025-05-25 RX ORDER — AMANTADINE HYDROCHLORIDE 100 MG/1
100 CAPSULE, GELATIN COATED ORAL DAILY
Status: DISCONTINUED | OUTPATIENT
Start: 2025-05-26 | End: 2025-05-26 | Stop reason: HOSPADM

## 2025-05-25 RX ORDER — LITHIUM CARBONATE 450 MG
450 TABLET, EXTENDED RELEASE ORAL NIGHTLY
Status: DISCONTINUED | OUTPATIENT
Start: 2025-05-25 | End: 2025-05-26 | Stop reason: HOSPADM

## 2025-05-25 RX ORDER — SODIUM CHLORIDE 0.9 % (FLUSH) 0.9 %
5-40 SYRINGE (ML) INJECTION PRN
Status: DISCONTINUED | OUTPATIENT
Start: 2025-05-25 | End: 2025-05-26 | Stop reason: HOSPADM

## 2025-05-25 RX ORDER — SUCRALFATE 1 G/1
1 TABLET ORAL
Status: DISCONTINUED | OUTPATIENT
Start: 2025-05-25 | End: 2025-05-26 | Stop reason: HOSPADM

## 2025-05-25 RX ORDER — SODIUM CHLORIDE 0.9 % (FLUSH) 0.9 %
5-40 SYRINGE (ML) INJECTION EVERY 12 HOURS SCHEDULED
Status: DISCONTINUED | OUTPATIENT
Start: 2025-05-25 | End: 2025-05-26 | Stop reason: HOSPADM

## 2025-05-25 RX ORDER — LURASIDONE HYDROCHLORIDE 40 MG/1
40 TABLET, FILM COATED ORAL NIGHTLY
Status: DISCONTINUED | OUTPATIENT
Start: 2025-05-25 | End: 2025-05-26 | Stop reason: HOSPADM

## 2025-05-25 RX ORDER — ONDANSETRON 4 MG/1
4 TABLET, ORALLY DISINTEGRATING ORAL EVERY 8 HOURS PRN
Status: DISCONTINUED | OUTPATIENT
Start: 2025-05-25 | End: 2025-05-26 | Stop reason: HOSPADM

## 2025-05-25 RX ORDER — SODIUM CHLORIDE 9 MG/ML
INJECTION, SOLUTION INTRAVENOUS PRN
Status: DISCONTINUED | OUTPATIENT
Start: 2025-05-25 | End: 2025-05-26 | Stop reason: HOSPADM

## 2025-05-25 RX ORDER — VILAZODONE HYDROCHLORIDE 40 MG/1
40 TABLET ORAL DAILY
Status: DISCONTINUED | OUTPATIENT
Start: 2025-05-26 | End: 2025-05-26 | Stop reason: HOSPADM

## 2025-05-25 RX ORDER — IOPAMIDOL 755 MG/ML
80 INJECTION, SOLUTION INTRAVASCULAR
Status: COMPLETED | OUTPATIENT
Start: 2025-05-25 | End: 2025-05-25

## 2025-05-25 RX ORDER — ACETAMINOPHEN 325 MG/1
650 TABLET ORAL EVERY 6 HOURS PRN
Status: DISCONTINUED | OUTPATIENT
Start: 2025-05-25 | End: 2025-05-26 | Stop reason: HOSPADM

## 2025-05-25 RX ORDER — 0.9 % SODIUM CHLORIDE 0.9 %
1000 INTRAVENOUS SOLUTION INTRAVENOUS ONCE
Status: COMPLETED | OUTPATIENT
Start: 2025-05-25 | End: 2025-05-25

## 2025-05-25 RX ORDER — POTASSIUM CHLORIDE 7.45 MG/ML
10 INJECTION INTRAVENOUS PRN
Status: DISCONTINUED | OUTPATIENT
Start: 2025-05-25 | End: 2025-05-26 | Stop reason: HOSPADM

## 2025-05-25 RX ORDER — LIDOCAINE HYDROCHLORIDE 20 MG/ML
INJECTION, SOLUTION INFILTRATION; PERINEURAL
Status: DISCONTINUED | OUTPATIENT
Start: 2025-05-25 | End: 2025-05-25 | Stop reason: SDUPTHER

## 2025-05-25 RX ORDER — PROPOFOL 10 MG/ML
INJECTION, EMULSION INTRAVENOUS
Status: DISCONTINUED | OUTPATIENT
Start: 2025-05-25 | End: 2025-05-25 | Stop reason: SDUPTHER

## 2025-05-25 RX ORDER — ONDANSETRON 2 MG/ML
4 INJECTION INTRAMUSCULAR; INTRAVENOUS EVERY 6 HOURS PRN
Status: DISCONTINUED | OUTPATIENT
Start: 2025-05-25 | End: 2025-05-26 | Stop reason: HOSPADM

## 2025-05-25 RX ORDER — CARBIDOPA AND LEVODOPA 25; 100 MG/1; MG/1
1 TABLET ORAL 2 TIMES DAILY
Status: DISCONTINUED | OUTPATIENT
Start: 2025-05-25 | End: 2025-05-26 | Stop reason: HOSPADM

## 2025-05-25 RX ORDER — LAMOTRIGINE 100 MG/1
200 TABLET ORAL DAILY
Status: DISCONTINUED | OUTPATIENT
Start: 2025-05-26 | End: 2025-05-26 | Stop reason: HOSPADM

## 2025-05-25 RX ADMIN — Medication 8 MG/HR: at 22:43

## 2025-05-25 RX ADMIN — Medication 8 MG/HR: at 10:16

## 2025-05-25 RX ADMIN — PROPOFOL 150 MG: 10 INJECTION, EMULSION INTRAVENOUS at 15:19

## 2025-05-25 RX ADMIN — SODIUM CHLORIDE: 0.9 INJECTION, SOLUTION INTRAVENOUS at 16:54

## 2025-05-25 RX ADMIN — SODIUM CHLORIDE: 9 INJECTION, SOLUTION INTRAVENOUS at 15:16

## 2025-05-25 RX ADMIN — CARBIDOPA AND LEVODOPA 1 TABLET: 25; 100 TABLET ORAL at 20:57

## 2025-05-25 RX ADMIN — SODIUM CHLORIDE 1000 ML: 9 INJECTION, SOLUTION INTRAVENOUS at 09:00

## 2025-05-25 RX ADMIN — LURASIDONE HYDROCHLORIDE 40 MG: 40 TABLET, FILM COATED ORAL at 20:57

## 2025-05-25 RX ADMIN — LITHIUM CARBONATE 450 MG: 450 TABLET, EXTENDED RELEASE ORAL at 20:57

## 2025-05-25 RX ADMIN — SODIUM CHLORIDE 80 MG: 9 INJECTION, SOLUTION INTRAMUSCULAR; INTRAVENOUS; SUBCUTANEOUS at 10:14

## 2025-05-25 RX ADMIN — LIDOCAINE HYDROCHLORIDE 100 MG: 20 INJECTION, SOLUTION INFILTRATION; PERINEURAL at 15:19

## 2025-05-25 RX ADMIN — IOPAMIDOL 80 ML: 755 INJECTION, SOLUTION INTRAVENOUS at 12:01

## 2025-05-25 ASSESSMENT — PAIN - FUNCTIONAL ASSESSMENT
PAIN_FUNCTIONAL_ASSESSMENT: NONE - DENIES PAIN
PAIN_FUNCTIONAL_ASSESSMENT: FACE, LEGS, ACTIVITY, CRY, AND CONSOLABILITY (FLACC)
PAIN_FUNCTIONAL_ASSESSMENT: 0-10
PAIN_FUNCTIONAL_ASSESSMENT: FACE, LEGS, ACTIVITY, CRY, AND CONSOLABILITY (FLACC)

## 2025-05-25 ASSESSMENT — PAIN SCALES - GENERAL
PAINLEVEL_OUTOF10: 0
PAINLEVEL_OUTOF10: 0

## 2025-05-25 NOTE — CONSULTS
Pt Name: Haily Parker  MRN: [id number]  [id number]  YOB: 1960  Admit Date: 5/25/2025  8:44 AM  Date of evaluation: 5/25/2025  Primary Care Physician: Luciana Parr DO   03/[id number]     Requesting Provider:  Delilah Bullard DO     GI Consult    Indication:  melena    History:  The patient is a 65 y.o. female with h/o RYGB 2002 admitted 5/24/25 for melena.  She has h/o PUD with unsuccessful coiling of GDA in bypassed stomach.   I had surgery place surgical G-tube and performed an interoperative EGD where I found a bleeding duodenal ulcer which I cauterized.       She returns with melena.  She has Bipolar.  PPI was stopped weeks ago due to osteoporosis.  She does not smoke or use NSAIDs.  Hgb has dropped 14.8 to 9.6 g/dL overnight.      Location:  melena  Duration:  24h  Radiation:  none  Associated:  weakness  Mitigating factors:  none  Exacerbating factors:  none    Last EGD:  9/28/2020 EGD via G-tube  Last Colonoscopy:  unknown    Past Medical History:        Diagnosis Date    Balance problem     2/2 MS    Bipolar I disorder, most recent episode (or current) depressed, in partial or unspecified remission 05/08/2013    Depression     Gallstones     Gastric bypass status for obesity 2002    GERD (gastroesophageal reflux disease)     History of hysterectomy     REMOVAL OF ONE OVARY     Hx of cervical cancer     s/p hysterectomy    Hypothyroidism     Liver cyst     GI Dr [address]    Liver lesion     MS (multiple sclerosis) (HCC)     MIKEY (obstructive sleep apnea) 7/19/2024    Paresthesias     RT LOWER LIMB    Ulcer at site of surgical anastomosis following bypass of stomach 01/15/2022    UTI (urinary tract infection)      Past Surgical History:        Procedure Laterality Date    APPENDECTOMY      BONE GRAFT Left     left leg to right arm    CARPAL TUNNEL RELEASE  1999    CATARACT EXTRACTION Bilateral     2024    CHOLECYSTECTOMY, LAPAROSCOPIC  05/16/2016    COLONOSCOPY      COLONOSCOPY N/A  per tablet 1 tablet  1 tablet Oral BID Nancy Wilsonman,         lamoTRIgine (LAMICTAL) tablet 200 mg  200 mg Oral Daily Steve, Luis,         levothyroxine (SYNTHROID) tablet 100 mcg  100 mcg Oral Daily Steve, Luis,         lithium (ESKALITH) extended release tablet 450 mg  450 mg Oral Nightly SteveLuis,         lurasidone (LATUDA) tablet 40 mg  40 mg Oral Nightly Nancy Wilsonman,         vilazodone HCl (VIIBRYD) TABS 40 mg  40 mg Oral Daily SteveLuis,         iopamidol (ISOVUE-370) 76 % injection 80 mL  80 mL IntraVENous ONCE PRN Behl, Ashita, MD         Current Outpatient Medications   Medication Sig Dispense Refill    famotidine (PEPCID) 20 MG tablet Take 1 tablet by mouth 2 times daily 60 tablet 0    vitamin B-12 (CYANOCOBALAMIN) 500 MCG tablet TAKE 1 TABLET BY MOUTH DAILY 28 tablet 2    VITAMIN D3 50 MCG (2000 UT) CAPS capsule TAKE 1 CAPSULE BY MOUTH AT BEDTIME 28 capsule 2    Prenatal Vit-Fe Fumarate-FA (PRENATAL VITAMIN) 27-0.8 MG TABS TAKE 1 TABLET BY MOUTH DAILY 28 tablet 2    FEROSUL 325 (65 Fe) MG tablet TAKE 1 TABLET BY MOUTH THREE TIMES A DAY IN THE MORNING NOON AND AT BEDTIME 84 tablet 2    lithium (ESKALITH) 450 MG extended release tablet Take 1 tablet by mouth at bedtime 30 tablet 2    sucralfate (CARAFATE) 1 GM tablet TAKE 1 TABLET BY MOUTH EVERY SIX HOURS 112 tablet 2    vilazodone HCl (VIIBRYD) 40 MG TABS TAKE 1 TABLET BY MOUTH DAILY 28 tablet 3    magnesium oxide (MAG-OX) 250 MG TABS tablet TAKE 1 TABLET BY MOUTH AT BEDTIME 90 tablet 1    levothyroxine (SYNTHROID) 100 MCG tablet TAKE 1 TABLET BY MOUTH DAILY 90 tablet 1    lamoTRIgine (LAMICTAL) 200 MG tablet TAKE 1 TABLET BY MOUTH DAILY 28 tablet 2    lurasidone (LATUDA) 40 MG TABS tablet TAKE 1 TABLET BY MOUTH NIGHTLY 28 tablet 2    Handicap Placard MISC by Does not apply route Exp: 2/2029 1 each 0    carbidopa-levodopa (SINEMET)  MG per tablet Take 1 tablet by mouth 2 times daily 60 tablet 5     2015    CHILDREN: None    SUBSTANCE USE: None     Social Drivers of Health     Financial Resource Strain: Low Risk  (6/24/2024)    Overall Financial Resource Strain (CARDIA)     Difficulty of Paying Living Expenses: Not hard at all   Food Insecurity: No Food Insecurity (7/29/2024)    Hunger Vital Sign     Worried About Running Out of Food in the Last Year: Never true     Ran Out of Food in the Last Year: Never true   Transportation Needs: Unmet Transportation Needs (7/29/2024)    PRAPARE - Transportation     Lack of Transportation (Medical): Yes     Lack of Transportation (Non-Medical): Yes   Physical Activity: Sufficiently Active (9/28/2023)    Exercise Vital Sign     Days of Exercise per Week: 3 days     Minutes of Exercise per Session: 110 min   Stress: Not on file   Social Connections: Not on file   Intimate Partner Violence: Unknown (2/22/2024)    [facility]    [place] [employer] [place]     Fear of Current or Ex-Partner: Not on file     Emotionally Abused: Not on file     Physically Abused: Not on file     Sexually Abused: Not on file     Physically or Sexually Abused: Not on file   Housing Stability: Low Risk  (7/29/2024)    Housing Stability Vital Sign     Unable to Pay for Housing in the Last Year: No     Number of Places Lived in the Last Year: 1     Unstable Housing in the Last Year: No     Family History:   No GI malignancies     ROS:  GENERAL: No fever or chills.   NEURO: No headache or seizure.   EYES: No diplopia or vision loss.   CARDIOVASCULAR: No chest pain or palpitations.   RESPIRATORY: No dyspnea or cough.   GI: YES melena.  NO hematochezia   : No dysuria or hematuria.   GYN: No discharge or abnormal bleeding.    MUSCULOSKELETAL: No new arthralgias or myalgias  DERM: No rash or jaundice.   ENDOCRINE: No polyuria or polydipsia.   PSYCH: No anxiety or depression.    Physical Exam:  VITALS: BP (!) 100/57   Pulse 65   Temp 97.8 °F (36.6 °C)

## 2025-05-25 NOTE — CONSENT
Informed Consent for Blood Component Transfusion Note    I have discussed with the patient the rationale for blood component transfusion; its benefits in treating or preventing fatigue, organ damage, or death; and its risk which includes mild transfusion reactions, rare risk of blood borne infection, or more serious but rare reactions. I have discussed the alternatives to transfusion, including the risk and consequences of not receiving transfusion. The patient had an opportunity to ask questions and had agreed to proceed with transfusion of blood components.    Electronically signed by Ashita Behl, MD on 5/25/25 at 12:03 PM EDT

## 2025-05-25 NOTE — ED PROVIDER NOTES
ATTENDING NOTE:    I supervised and discussed the history, physical exam and the management of this patient with the resident. I reviewed the resident's note and agree with the documented findings and plan of care.  Please see my additional note.    Patient was seen yesterday for abdominal pain, had 1 episode of a dark loose stool yesterday.  She underwent evaluation with labs and CT imaging, was found to have  normal hemoglobin, nothing acute on CT scan, no hematochezia or GI bleeding in ED, vital signs were stable.  She is not on blood thinners.  She was released to home with return precautions given.  She reports after going home yesterday she began feeling generally weak.  She has not had any further loose dark stools.  She reports that she had a hard stool this morning.  She has not eaten since she left other than having a banana, coffee, protein mix, and coffee cake for breakfast.  No hematochezia or hematemesis.  No fever.  She reports that she still is having some abdominal discomfort but she thinks this is from not eating.  She denies chest pain.  She does report some shortness of breath today.  She denies any fall.  Upon arrival patient was found to be hypotensive.  IV fluid resuscitation started.  Labs ordered.  Patient's hemoglobin yesterday was 14.8, today result is 11.1. Will plan to admit.    I personally saw and examined the patient.  I have reviewed and agree with the resident's findings, including all diagnostic interpretations and treatment plans as written.  I was present for the key portion of any procedures performed and the inclusive time noted in any critical care statement.    Electronically verified by Ave Lau MD  05/25/25 [id number]

## 2025-05-25 NOTE — PROGRESS NOTES
Enteroscopy completed. Pictures taken. Pt tolerated well. No interventions used.     Scope  Used.

## 2025-05-25 NOTE — ED NOTES
Pt to bathroom via WC at this time for urine sample. Pt tolerated well and denied any dizziness upon movement. VSS, pt medicated per MAR upon return to room

## 2025-05-25 NOTE — ED NOTES
ED to inpatient nurses report      Chief Complaint:  Chief Complaint   Patient presents with    Weakness    Hypotension     Present to ED from: home; lives alone    MOA:     LOC: alert and orientated to name, place, date  Mobility: Requires assistance * 1  Oxygen Baseline: 98 RA    Current needs required: NPO- scope to be conducted around 3927-4007    Code Status:   Prior    What abnormal results were found and what did you give/do to treat them? Hypotension, potential bleeding  Any procedures or intervention occur? protonix    Mental Status:       Psych Assessment:        Vitals:  Patient Vitals for the past 24 hrs:   BP Temp Pulse Resp SpO2 Height Weight   05/25/25 1115 (!) 100/57 -- 65 23 -- -- --   05/25/25 1030 94/60 -- 60 10 -- -- --   05/25/25 0915 (!) 105/59 -- 65 17 -- -- --   05/25/25 0851 (!) 82/53 97.8 °F (36.6 °C) -- -- -- -- --   05/25/25 0849 (!) 99/57 -- 74 22 98 % 1.422 m (4' 8\") 54.4 kg (120 lb)        LDAs:   Peripheral IV 05/25/25 Left Antecubital (Active)   Site Assessment Clean, dry & intact 05/25/25 0855       Ambulatory Status:  Presents to emergency department  because of falls (Syncope, seizure, or loss of consciousness): Yes, Age > 70: No, Altered Mental Status, Intoxication with alcohol or substance confusion (Disorientation, impaired judgment, poor safety awaremess, or inability to follow instructions): No, Impaired Mobility: Ambulates or transfers with assistive devices or assistance; Unable to ambulate or transer.: No, Nursing Judgement: Yes    Diagnosis:  DISPOSITION Admitted 05/25/2025 11:23:11 AM   Final diagnoses:   Acute GI bleeding   Hypotension, unspecified hypotension type        Consults:  IP CONSULT TO GI     Pain Score:  Pain Assessment  Pain Assessment: 0-10  Patient's Stated Pain Goal: 0 - No pain    C-SSRS:   Risk of Suicide: No Risk    Sepsis Screening:       Nestor Fall Risk:  [place] 1 Fall Risk  Presents to emergency department  because of falls (Syncope, seizure, or

## 2025-05-25 NOTE — ED NOTES
Pt last reports eating a banana, small coffee cake and small cup of black coffee around 0700. Pt updated on NPO status at this time and denies any needs

## 2025-05-25 NOTE — ED TRIAGE NOTES
Pt to ED via [facility] with c/o weakness. Pt states she was seen yesterday and there was a full work up completed with no cause found. Pt states weakness started yesterday and has gotten progressively worse. No new complaints from yesterday other than the increased weakness. Pt has a recent diagnosis of Parkinsons in the last 2 months and that is what the weakness was originally being attributed to. EKG complete. Pt hypotensive at this time, other VSS

## 2025-05-25 NOTE — H&P
Internal Medicine Resident H&P Note      Patient:  Haily Parker    Unit/Bed:03/003A  YOB: 1960  MRN: [id number]   Acct: [id number]   PCP: Luciana Parr DO  Date of Admission: 5/25/2025  Date of Service: Pt seen/examined on 05/25/25      Assessment/Plan:  Nonvariceal upper GI bleed: Patient presented to the hospital with black stools yesterday.  Her hemoglobin was 14.1.  Reportedly short of breath and weak.  Returned back to the ED today found to have a hemoglobin of 11 with black stools.  She does not take any blood thinners or anticoagulation at home.  Fecal occult positive  GI consulted and following: Appreciate any recommendation  Continue Protonix drip  H&H every 6 hours  Type and screen  Transfuse if hemoglobin less than 7 or signs and symptoms of acute anemia  Elevated troponin: Presented with a troponin of 24.  She denies any chest pain at this time.  EKG shows atrial paced rhythm.  Suspect troponin increased secondary to demand ischemia.  In the setting of GI bleed  Will trend troponins to make sure they are not increasing  Elevated D-dimer: Patient did have subjective shortness of breath on admission, I suspect this is in the setting of anemia however cannot rule out PE.  D-dimer was 733 which was checked by the ED.  Ordered CT abdomen pelvis with contrast  Bipolar 1 disorder: Continue lithium, Latuda, Viibryd, Lamictal.  Hypothyroidism: Continue Synthroid  MIKEY: Continue with home CPAP  History of Batsheva-en-Y bypass: Noted  Parkinson's disease: Continue Sinemet  Multiple sclerosis: Continue amantadine            Expected discharge date:  TBD    Disposition: TBD  [] Home  [] Inpatient Rehab  [] Psychiatric Unit  [] SNF  [] Long Term Care Facility  [] Other-    ===================================================================      Chief Complaint: Black stools    HPI: Patient is a 65-year-old female past medical history of GERD, seizures, multiple sclerosis, bipolar 1,  Status: [x]Inpatient / []Observation  Telemetry: [x]Yes / []No   PT/OT: []Yes / [x]No  DVT Prophylaxis: [] Lovenox / [] Heparin / [x] SCDs / [] Already on Systemic Anticoagulation / [] None   Diet: Diet NPO  Code Status: Full Code      Electronically signed by Luis Wilson DO on 5/25/2025 at 11:32 AM    Case was discussed with Attending, Dr. Behl

## 2025-05-25 NOTE — OP NOTE
[facility] Endoscopy    Patient: Haily Parker  : 1960  Acct#: [id number]  Date of evaluation: 2025  Primary Care Physician: Luciana Parr DO     Procedure:      [x]Enteroscopy      Indication:  melena     History:  The patient is a 65 y.o. female with h/o RYGB 2002 admitted 25 for melena.  She has h/o PUD with unsuccessful coiling of GDA in bypassed stomach.   I had surgery place surgical G-tube and performed an interoperative EGD where I found a bleeding duodenal ulcer which I cauterized.        She returns with melena.  She has Bipolar.  PPI was stopped weeks ago due to osteoporosis.  She does not smoke or use NSAIDs.  Hgb has dropped 14.8 to 9.6 g/dL overnight.      Physical Exam:  VITALS: BP (!) 91/49   Pulse 71   Temp 97.8 °F (36.6 °C)   Resp 15   Ht 1.422 m (4' 8\")   Wt 54.4 kg (120 lb)   SpO2 98%   BMI 26.90 kg/m²   The patient is a 65 y.o. female in no acute distress.  HEAD: Normal cephalic/atraumatic. Extra-occular motions intact bilaterally.  NECK: No lymphadenopathy or bruits.  CHEST: Rises equally on inspiration. Clear to auscultation bilaterally.   CARDIOVASCULAR: Regular rate and rhythm without murmurs, rubs or gallops.   ABDOMEN: Soft, nontender, and nondistended with normal bowel sounds. No Hepatosplemomegaly.  UPPER EXTREMITIES: no cyanosis, clubbing, or edema.  DERM: no rash or jaundice.  LOWER EXTREMITIES: no cyanosis, clubbing, or edema.  NEURO: Alert and oriented times four. Patient moves all extremities and has gross sensation in all extremities.    ASA: ASA 3 - Patient with moderate systemic disease with functional limitations    MEDICATION:    MAC/Propofol/Anesthesia:  Yes     Photo:  Yes  Biopsy:  No      Description of Procedure:  The risks and benefits of the procedure were described to the patient or their representative if unable to give consent including but not limited to bleeding, infection, poking a hole  someplace requiring surgery to fix it, having a reaction to medication, and death. The patient or their representative if unable to give consent understood these risks and provided informed consent. Airway was assessed and is adequate for the planned sedation.     Anesthesia: MAC  Estimated Blood Loss: less than 50   Complications: None  Specimens: Was Not Obtained     Sedation was administered by anesthesia who monitored the patient during the procedure.   The patient was placed in the left lateral decubitus position.     The patient was placed in the left lateral decubitus position.  A forward-viewing Olympus pediatric colonoscope was lubricated and inserted through the mouth into the oropharynx. Under direct visualization, the upper esophagus was intubated. The scope was advanced through the esophagus into the gastric pouch, past the GJ anastomosis into the jejunum.  The lower J-J anastomosis was NOT reached.  The endoscope was slowly withdrawn with good views of mucosal surfaces. The scope was retroflexed in the fundus. Findings and maneuvers are listed in impression below. The patient tolerated the procedure well. The scope was removed. There were no immediate complications.    IMPRESSION:  1.  Esophagus - normal   2.  Stomach pouch - normal   3.  GJ anastomosis - normal  4.  Duodenum - unable to examine due to surgical anatomy  5.  Gastric remnant - unable to examine due to surgical anatomy  6.  Jejunum - normal   7.  J-J anastomosis - unable to reach due to anatomy    RECOMMENDATIONS:    1.  Continue ppi infusion  2.  Continue NPO  3.  If Hgb continues to drop, transfer patient to a tertiary center where remnant stomach/duodenum can be reached.    David Jansen MD  3:35 PM 5/25/2025

## 2025-05-25 NOTE — H&P
[facility] Endoscopy    Pre-Endoscopy H&PE      Patient: Haily Parker    : 1960    Acct#: [id number]  Primary Care Physician: Luciana Parr DO   Date of evaluation: 2025    Planned Procedure:    []EGD    [x]Enteroscopy    []PEG    []PEG change    []PEG removal  []Variceal banding    []Biopsy   []Dilation      [x]Control of bleeding  []Destruction of lesion by APC    []Stent Placement  []Foreign Body Removal    []Snare Polypectomy  []Other:         Planned Sedation  []Conscious Sedation [x]MAC/Propofol []Anesthesia    []None    Airway:  Adequate for planned sedation    Indication:  melena     History:  The patient is a 65 y.o. female with h/o RYGB  admitted 25 for melena.  She has h/o PUD with unsuccessful coiling of GDA in bypassed stomach.   I had surgery place surgical G-tube and performed an interoperative EGD where I found a bleeding duodenal ulcer which I cauterized.        She returns with melena.  She has Bipolar.  PPI was stopped weeks ago due to osteoporosis.  She does not smoke or use NSAIDs.  Hgb has dropped 14.8 to 9.6 g/dL overnight.      Physical Exam:  VITALS: BP (!) 91/49   Pulse 71   Temp 97.8 °F (36.6 °C)   Resp 15   Ht 1.422 m (4' 8\")   Wt 54.4 kg (120 lb)   SpO2 98%   BMI 26.90 kg/m²   The patient is a 65 y.o. female in no acute distress.  HEAD: Normal cephalic/atraumatic. Extra-occular motions intact bilaterally.  NECK: No lymphadenopathy or bruits.  CHEST: Rises equally on inspiration. Clear to auscultation bilaterally.   CARDIOVASCULAR: Regular rate and rhythm without murmurs, rubs or gallops.   ABDOMEN: Soft, nontender, and nondistended with normal bowel sounds. No Hepatosplemomegaly.  UPPER EXTREMITIES: no cyanosis, clubbing, or edema.  DERM: no rash or jaundice.  LOWER EXTREMITIES: no cyanosis, clubbing, or edema.  NEURO: Alert and oriented times four. Patient moves all extremities and has gross sensation in all extremities.    ASA: ASA 3

## 2025-05-25 NOTE — PLAN OF CARE
Problem: Discharge Planning  Goal: Discharge to home or other facility with appropriate resources  Outcome: Progressing     Problem: Pain  Goal: Verbalizes/displays adequate comfort level or baseline comfort level  Outcome: Progressing     Problem: Cardiovascular - Adult  Goal: Maintains optimal cardiac output and hemodynamic stability  Outcome: Progressing     Problem: Skin/Tissue Integrity - Adult  Goal: Skin integrity remains intact  Outcome: Progressing     Problem: Gastrointestinal - Adult  Goal: Maintains adequate nutritional intake  Outcome: Progressing     Problem: Infection - Adult  Goal: Absence of infection at discharge  Outcome: Progressing     Problem: Metabolic/Fluid and Electrolytes - Adult  Goal: Electrolytes maintained within normal limits  Outcome: Progressing  Goal: Hemodynamic stability and optimal renal function maintained  Outcome: Progressing     Problem: Hematologic - Adult  Goal: Maintains hematologic stability  Outcome: Progressing

## 2025-05-26 VITALS
HEART RATE: 62 BPM | BODY MASS INDEX: 26.99 KG/M2 | DIASTOLIC BLOOD PRESSURE: 64 MMHG | OXYGEN SATURATION: 96 % | WEIGHT: 120 LBS | SYSTOLIC BLOOD PRESSURE: 113 MMHG | RESPIRATION RATE: 18 BRPM | TEMPERATURE: 98 F | HEIGHT: 56 IN

## 2025-05-26 LAB
ANION GAP SERPL CALC-SCNC: 7 MEQ/L (ref 8–16)
BASOPHILS ABSOLUTE: 0 THOU/MM3 (ref 0–0.1)
BASOPHILS NFR BLD AUTO: 0.3 %
BUN SERPL-MCNC: 25 MG/DL (ref 8–23)
CALCIUM SERPL-MCNC: 9 MG/DL (ref 8.8–10.2)
CHLORIDE SERPL-SCNC: 118 MEQ/L (ref 98–111)
CO2 SERPL-SCNC: 19 MEQ/L (ref 22–29)
CREAT SERPL-MCNC: 0.6 MG/DL (ref 0.5–0.9)
DEPRECATED RDW RBC AUTO: 51.6 FL (ref 35–45)
EOSINOPHIL NFR BLD AUTO: 0.7 %
EOSINOPHILS ABSOLUTE: 0 THOU/MM3 (ref 0–0.4)
ERYTHROCYTE [DISTWIDTH] IN BLOOD BY AUTOMATED COUNT: 13.5 % (ref 11.5–14.5)
FERRITIN SERPL IA-MCNC: 144 NG/ML (ref 13–150)
GFR SERPL CREATININE-BSD FRML MDRD: > 90 ML/MIN/1.73M2
GLUCOSE SERPL-MCNC: 99 MG/DL (ref 74–109)
HCT VFR BLD AUTO: 22.2 % (ref 37–47)
HCT VFR BLD AUTO: 24.4 % (ref 37–47)
HCT VFR BLD AUTO: 25 % (ref 37–47)
HGB BLD-MCNC: 7 GM/DL (ref 12–16)
HGB BLD-MCNC: 7.8 GM/DL (ref 12–16)
HGB BLD-MCNC: 7.9 GM/DL (ref 12–16)
HGB RETIC QN AUTO: 35.6 PG (ref 28.2–35.7)
IMM GRANULOCYTES # BLD AUTO: 0.03 THOU/MM3 (ref 0–0.07)
IMM GRANULOCYTES NFR BLD AUTO: 0.4 %
IMM RETICS NFR: 19.9 % (ref 3–15.9)
IRON SATN MFR SERPL: 63 % (ref 20–50)
IRON SERPL-MCNC: 122 UG/DL (ref 37–145)
LYMPHOCYTES ABSOLUTE: 1.2 THOU/MM3 (ref 1–4.8)
LYMPHOCYTES NFR BLD AUTO: 18.2 %
MCH RBC QN AUTO: 33.2 PG (ref 26–33)
MCHC RBC AUTO-ENTMCNC: 32 GM/DL (ref 32.2–35.5)
MCV RBC AUTO: 103.8 FL (ref 81–99)
MONOCYTES ABSOLUTE: 0.3 THOU/MM3 (ref 0.4–1.3)
MONOCYTES NFR BLD AUTO: 5 %
NEUTROPHILS ABSOLUTE: 5.1 THOU/MM3 (ref 1.8–7.7)
NEUTROPHILS NFR BLD AUTO: 75.4 %
NRBC BLD AUTO-RTO: 0 /100 WBC
PLATELET # BLD AUTO: 132 THOU/MM3 (ref 130–400)
PMV BLD AUTO: 10.5 FL (ref 9.4–12.4)
POTASSIUM SERPL-SCNC: 4.1 MEQ/L (ref 3.5–5.2)
RBC # BLD AUTO: 2.35 MILL/MM3 (ref 4.2–5.4)
RETICS # AUTO: 49 THOU/MM3 (ref 20–115)
RETICS/RBC NFR AUTO: 2.4 % (ref 0.5–2)
SODIUM SERPL-SCNC: 144 MEQ/L (ref 135–145)
TIBC SERPL-MCNC: 193 UG/DL (ref 171–450)
WBC # BLD AUTO: 6.8 THOU/MM3 (ref 4.8–10.8)

## 2025-05-26 PROCEDURE — 2580000003 HC RX 258

## 2025-05-26 PROCEDURE — 6370000000 HC RX 637 (ALT 250 FOR IP)

## 2025-05-26 PROCEDURE — 2580000003 HC RX 258: Performed by: INTERNAL MEDICINE

## 2025-05-26 PROCEDURE — 30233N1 TRANSFUSION OF NONAUTOLOGOUS RED BLOOD CELLS INTO PERIPHERAL VEIN, PERCUTANEOUS APPROACH: ICD-10-PCS | Performed by: INTERNAL MEDICINE

## 2025-05-26 PROCEDURE — 85046 RETICYTE/HGB CONCENTRATE: CPT

## 2025-05-26 PROCEDURE — 85014 HEMATOCRIT: CPT

## 2025-05-26 PROCEDURE — 6360000002 HC RX W HCPCS

## 2025-05-26 PROCEDURE — 80048 BASIC METABOLIC PNL TOTAL CA: CPT

## 2025-05-26 PROCEDURE — 83540 ASSAY OF IRON: CPT

## 2025-05-26 PROCEDURE — 2580000003 HC RX 258: Performed by: STUDENT IN AN ORGANIZED HEALTH CARE EDUCATION/TRAINING PROGRAM

## 2025-05-26 PROCEDURE — 36430 TRANSFUSION BLD/BLD COMPNT: CPT

## 2025-05-26 PROCEDURE — 99239 HOSP IP/OBS DSCHRG MGMT >30: CPT | Performed by: INTERNAL MEDICINE

## 2025-05-26 PROCEDURE — 36415 COLL VENOUS BLD VENIPUNCTURE: CPT

## 2025-05-26 PROCEDURE — 82728 ASSAY OF FERRITIN: CPT

## 2025-05-26 PROCEDURE — 85018 HEMOGLOBIN: CPT

## 2025-05-26 PROCEDURE — 83550 IRON BINDING TEST: CPT

## 2025-05-26 PROCEDURE — 85025 COMPLETE CBC W/AUTO DIFF WBC: CPT

## 2025-05-26 RX ORDER — SODIUM CHLORIDE 9 MG/ML
INJECTION, SOLUTION INTRAVENOUS PRN
Status: DISCONTINUED | OUTPATIENT
Start: 2025-05-26 | End: 2025-05-26 | Stop reason: SDUPTHER

## 2025-05-26 RX ORDER — SODIUM CHLORIDE 9 MG/ML
INJECTION, SOLUTION INTRAVENOUS CONTINUOUS
Status: DISCONTINUED | OUTPATIENT
Start: 2025-05-26 | End: 2025-05-26 | Stop reason: HOSPADM

## 2025-05-26 RX ADMIN — SODIUM CHLORIDE: 0.9 INJECTION, SOLUTION INTRAVENOUS at 03:54

## 2025-05-26 RX ADMIN — Medication 8 MG/HR: at 20:04

## 2025-05-26 RX ADMIN — LURASIDONE HYDROCHLORIDE 40 MG: 40 TABLET, FILM COATED ORAL at 20:06

## 2025-05-26 RX ADMIN — CARBIDOPA AND LEVODOPA 1 TABLET: 25; 100 TABLET ORAL at 20:06

## 2025-05-26 RX ADMIN — Medication 8 MG/HR: at 09:19

## 2025-05-26 RX ADMIN — SODIUM CHLORIDE 125 MG: 9 INJECTION, SOLUTION INTRAVENOUS at 11:01

## 2025-05-26 RX ADMIN — LITHIUM CARBONATE 450 MG: 450 TABLET, EXTENDED RELEASE ORAL at 20:06

## 2025-05-26 RX ADMIN — SODIUM CHLORIDE: 0.9 INJECTION, SOLUTION INTRAVENOUS at 17:57

## 2025-05-26 ASSESSMENT — ENCOUNTER SYMPTOMS
DIARRHEA: 1
BACK PAIN: 1
BLOOD IN STOOL: 1
SHORTNESS OF BREATH: 1

## 2025-05-26 ASSESSMENT — PAIN SCALES - GENERAL
PAINLEVEL_OUTOF10: 0

## 2025-05-26 NOTE — DISCHARGE INSTR - DIET

## 2025-05-26 NOTE — PLAN OF CARE
Problem: Discharge Planning  Goal: Discharge to home or other facility with appropriate resources  Outcome: Progressing  Flowsheets (Taken 5/26/2025 1302)  Discharge to home or other facility with appropriate resources:   Identify barriers to discharge with patient and caregiver   Arrange for needed discharge resources and transportation as appropriate   Arrange for interpreters to assist at discharge as needed   Identify discharge learning needs (meds, wound care, etc)     Problem: Pain  Goal: Verbalizes/displays adequate comfort level or baseline comfort level  Outcome: Progressing  Flowsheets (Taken 5/26/2025 1302)  Verbalizes/displays adequate comfort level or baseline comfort level:   Encourage patient to monitor pain and request assistance   Administer analgesics based on type and severity of pain and evaluate response   Assess pain using appropriate pain scale   Implement non-pharmacological measures as appropriate and evaluate response     Problem: Cardiovascular - Adult  Goal: Maintains optimal cardiac output and hemodynamic stability  Outcome: Progressing  Flowsheets (Taken 5/26/2025 1302)  Maintains optimal cardiac output and hemodynamic stability:   Monitor blood pressure and heart rate   Monitor urine output and notify Licensed Independent Practitioner for values outside of normal range   Assess for signs of decreased cardiac output   Administer fluid and/or volume expanders as ordered     Problem: Cardiovascular - Adult  Goal: Absence of cardiac dysrhythmias or at baseline  Outcome: Progressing  Flowsheets (Taken 5/26/2025 1302)  Absence of cardiac dysrhythmias or at baseline:   Monitor cardiac rate and rhythm   Administer antiarrhythmia medication and electrolyte replacement as ordered   Assess for signs of decreased cardiac output     Problem: Skin/Tissue Integrity - Adult  Goal: Skin integrity remains intact  Outcome: Progressing  Flowsheets (Taken 5/26/2025 1302)  Skin Integrity Remains Intact:

## 2025-05-26 NOTE — PLAN OF CARE
Problem: Discharge Planning  Goal: Discharge to home or other facility with appropriate resources  5/26/2025 1831 by Bella Butt RN  Outcome: Completed  5/26/2025 1302 by Bella Butt RN  Outcome: Progressing  Flowsheets (Taken 5/26/2025 1302)  Discharge to home or other facility with appropriate resources:   Identify barriers to discharge with patient and caregiver   Arrange for needed discharge resources and transportation as appropriate   Arrange for interpreters to assist at discharge as needed   Identify discharge learning needs (meds, wound care, etc)     Problem: Pain  Goal: Verbalizes/displays adequate comfort level or baseline comfort level  5/26/2025 1831 by Bella Butt RN  Outcome: Completed  5/26/2025 1302 by Bella Butt RN  Outcome: Progressing  Flowsheets (Taken 5/26/2025 1302)  Verbalizes/displays adequate comfort level or baseline comfort level:   Encourage patient to monitor pain and request assistance   Administer analgesics based on type and severity of pain and evaluate response   Assess pain using appropriate pain scale   Implement non-pharmacological measures as appropriate and evaluate response     Problem: Cardiovascular - Adult  Goal: Maintains optimal cardiac output and hemodynamic stability  5/26/2025 1831 by Bella Butt RN  Outcome: Completed  5/26/2025 1302 by Bella Butt RN  Outcome: Progressing  Flowsheets (Taken 5/26/2025 1302)  Maintains optimal cardiac output and hemodynamic stability:   Monitor blood pressure and heart rate   Monitor urine output and notify Licensed Independent Practitioner for values outside of normal range   Assess for signs of decreased cardiac output   Administer fluid and/or volume expanders as ordered  Goal: Absence of cardiac dysrhythmias or at baseline  5/26/2025 1831 by Bella Butt RN  Outcome: Completed  5/26/2025 1302 by Bella Butt RN  Outcome: Progressing  Flowsheets (Taken 5/26/2025  1302)  Absence of cardiac dysrhythmias or at baseline:   Monitor cardiac rate and rhythm   Administer antiarrhythmia medication and electrolyte replacement as ordered   Assess for signs of decreased cardiac output     Problem: Skin/Tissue Integrity - Adult  Goal: Skin integrity remains intact  5/26/2025 1831 by Bella Butt RN  Outcome: Completed  5/26/2025 1302 by Bella Butt RN  Outcome: Progressing  Flowsheets (Taken 5/26/2025 1302)  Skin Integrity Remains Intact:   Monitor for areas of redness and/or skin breakdown   Assess vascular access sites hourly   Every 4-6 hours minimum:  Change oxygen saturation probe site     Problem: Gastrointestinal - Adult  Goal: Maintains adequate nutritional intake  5/26/2025 1831 by Bella Butt RN  Outcome: Completed  5/26/2025 1302 by Bella Butt RN  Outcome: Progressing  Flowsheets (Taken 5/26/2025 1302)  Maintains adequate nutritional intake:   Monitor percentage of each meal consumed   Obtain nutritional consult as needed   Identify factors contributing to decreased intake, treat as appropriate   Assist with meals as needed  Goal: Minimal or absence of nausea and vomiting  5/26/2025 1831 by Bella Butt RN  Outcome: Completed  5/26/2025 1302 by Bella Butt RN  Outcome: Progressing  Flowsheets (Taken 5/26/2025 1302)  Minimal or absence of nausea and vomiting: Administer IV fluids as ordered to ensure adequate hydration     Problem: Infection - Adult  Goal: Absence of infection at discharge  5/26/2025 1831 by Bella Butt RN  Outcome: Completed  5/26/2025 1302 by Bella Butt RN  Outcome: Progressing  Flowsheets (Taken 5/26/2025 1302)  Absence of infection at discharge:   Assess and monitor for signs and symptoms of infection   Monitor lab/diagnostic results     Problem: Metabolic/Fluid and Electrolytes - Adult  Goal: Electrolytes maintained within normal limits  5/26/2025 1831 by Bella Butt RN  Outcome:  Progressing  Flowsheets (Taken 5/26/2025 1302)  Care Plan - Patient's Chronic Conditions and Co-Morbidity Symptoms are Monitored and Maintained or Improved:   Monitor and assess patient's chronic conditions and comorbid symptoms for stability, deterioration, or improvement   Collaborate with multidisciplinary team to address chronic and comorbid conditions and prevent exacerbation or deterioration

## 2025-05-26 NOTE — DISCHARGE SUMMARY
[facility]--HOSPITALIST GROUP    Hospitalist DISCHARGE SUMMARY dictated by Marlon Plata MD on 2025      DEMOGRAPHICS     Date of Service: 2025  6:35 PM   Patient ID:Haily Parker is65 y.o.   : 1960 MRN:[id number]  Code Status:  Full Code  Admit date: 2025  Discharge date: 2025     Primary Care Physician - Luciana Parr DO   Patient Care Team:  Luciana Parr DO as PCP - General (Family Medicine)  Patricia Erickson MD as PCP - Empaneled Provider  Hillary Short APRN - CNP as Nurse Practitioner (Certified Nurse Practitioner)  Purnima Perez APRN - CNP as Nurse Practitioner (Nurse Practitioner)  Sydney Frances MD as Consulting Physician (Gastroenterology)  Amanda Nicholas MD as Consulting Physician (Neurology)  Heather Mariee MD as Hospitalist (Internal Medicine)  Herminio Meyers DO as Resident (Family Medicine)  Tel: [phone]  IP CONSULT TO GI  IP CONSULT TO PHARMACY  Admitting Physician: No admitting provider for patient encounter.   Discharge Physician: Marlon Plata MD      MEDICAL SYNOPSIS     FOLLOW UP APPOINTMENTS:   No follow-up provider specified.    Code Status:  Full Code    Diet: Diet NPO    Final diagnoses:       Principal Problem:    GI bleed  Active Problems:    Elevated d-dimer    Multiple sclerosis (HCC)    Acute GI bleeding    Parkinson's disease (HCC)  Resolved Problems:    * No resolved hospital problems. *  No pulmonary emboli or pulmonary infiltrates per CTA chest 2025.  LVEF 55 - 60% per echo report 10/4/2024. Normal diastolic function.   Batsheva-en-Y bypass in     AVM: descending colon ACM clipped in    No acute intra-abdominal or intrapelvic findings per CT abdomen 2025.   Positive stool occult blood 2025.  Esophagus, Stomach pouch, GJ anastomosis and Jejunum - normal per enteroscopy 2025. Duodenum, Gastric remnant,  J-J anastomosis - unable to reach due to anatomy.  Iron saturation 63%  known as: VIIBRYD  TAKE 1 TABLET BY MOUTH DAILY            ASK your doctor about these medications      famotidine 20 MG tablet  Commonly known as: PEPCID  Take 1 tablet by mouth 2 times daily     FeroSul 325 (65 Fe) MG tablet  Generic drug: ferrous sulfate  TAKE 1 TABLET BY MOUTH THREE TIMES A DAY IN THE MORNING NOON AND AT BEDTIME     prenatal vitamin 27-0.8 MG Tabs  TAKE 1 TABLET BY MOUTH DAILY     sucralfate 1 GM tablet  Commonly known as: CARAFATE  TAKE 1 TABLET BY MOUTH EVERY SIX HOURS     vitamin B-12 500 MCG tablet  Commonly known as: CYANOCOBALAMIN  TAKE 1 TABLET BY MOUTH DAILY     vitamin D 50 MCG (2000 UT) Caps capsule  Commonly known as: CHOLECALCIFEROL  TAKE 1 CAPSULE BY MOUTH AT BEDTIME            Allergies   Allergen Reactions    Latex Rash    Sulfa Antibiotics Rash and Other (See Comments)       Diet NPO        PAST MEDICAL HISTORY    has a past medical history of Balance problem, Bipolar I disorder, most recent episode (or current) depressed, in partial or unspecified remission, Depression, Gallstones, Gastric bypass status for obesity, GERD (gastroesophageal reflux disease), History of hysterectomy, Hx of cervical cancer, Hypothyroidism, Liver cyst, Liver lesion, MS (multiple sclerosis) (HCC), MIKEY (obstructive sleep apnea), Paresthesias, Ulcer at site of surgical anastomosis following bypass of stomach, and UTI (urinary tract infection).    SURGICAL HISTORY      has a past surgical history that includes Carpal tunnel release (1999); other surgical history; other surgical history (1986); Batsheva-en-Y Gastric Bypass (2000); other surgical history (2006); bone graft (Left); Cholecystectomy, laparoscopic (05/16/2016); laparoscopic appendectomy (05/16/2016); Appendectomy; Enteroscopy (N/A, 02/05/2018); pr colonoscopy flx dx w/collj spec when pfrmd (Left, 02/06/2018); pr office/outpt visit,procedure only (Left, 02/18/2018); Colonoscopy; Endoscopy, colon, diagnostic; Dilatation, esophagus; pr office/outpt

## 2025-05-26 NOTE — FLOWSHEET NOTE
05/26/25 1200   Treatment Team Notification   Reason for Communication Evaluate;Review case   Name of Team Member Notified Dr Devries   Treatment Team Role Resident   Method of Communication Face to face   Response No new orders   Notification Time 1200     Do not give pills, keep straight NPO

## 2025-05-26 NOTE — PROGRESS NOTES
RN called report to Lauryn RN at [facility]. No further questions. RN printed transfer packet and last dose MAR. Patient aware of plan and aware of leaving at 8 pm. Patient notified family

## 2025-05-26 NOTE — PROGRESS NOTES
Gastroenterology Progress Note:     Patient Name:  Haily Parker   MRN: [id number]  [id number]  YOB: 1960  Admit Date: 5/25/2025  8:44 AM  Primary Care Physician: Luciana Parr DO   [id number]     Patient seen and examined.  24 hours events and chart reviewed.    Subjective: Patient doing well.  Discussed case and potential findings. Primary team would like to transfer patient. Patient agreeable.     Review of Systems   Respiratory:  Positive for shortness of breath.    Gastrointestinal:  Positive for blood in stool and diarrhea.   Musculoskeletal:  Positive for back pain.   Skin:  Positive for pallor.         Objective:  BP (!) 105/59   Pulse 60   Temp 98.2 °F (36.8 °C) (Oral)   Resp 16   Ht 1.422 m (4' 8\")   Wt 54.4 kg (120 lb)   SpO2 97%   BMI 26.90 kg/m²     Physical Exam:    General:  Nourished in no distress  HEENT: Atraumatic, normocephalic. Moist oral mucous membranes.  Neck: Supple without adenopathy, JVD, thyromegaly or masses. Trachea midline.  CV: Heart RRR, no murmurs, rubs, gallops.  Resp: Even, easy without cough or accessory use. Lungs clear to ascultation bilaterally.   Abd: Round, soft, non-tender. No hepatosplenomegaly or mass present. Active bowel sounds heard. No distention noted.   Ext:  Without cyanosis, clubbing, edema. Right arm amputation noted.    Skin: Pink, warm, dry  Neuro:  Alert, oriented x 3 with no obvious deficits.       Rectal: deferred    Labs:   CBC:   Lab Results   Component Value Date/Time    WBC 6.8 05/26/2025 03:40 AM    HGB 7.8 05/26/2025 03:40 AM    HCT 24.4 05/26/2025 03:40 AM    .8 05/26/2025 03:40 AM     05/26/2025 03:40 AM     BMP:   Lab Results   Component Value Date/Time     05/26/2025 03:40 AM    K 4.1 05/26/2025 03:40 AM     05/26/2025 03:40 AM    CO2 19 05/26/2025 03:40 AM    PHOS 3.0 10/07/2018 06:00 AM    BUN 25 05/26/2025 03:40 AM    CREATININE 0.6 05/26/2025 03:40 AM    CALCIUM 9.0 05/26/2025 03:40 AM      PT/INR:   Lab Results   Component Value Date/Time    INR 0.99 05/25/2025 08:55 AM     Lipids:   Lab Results   Component Value Date/Time    ALKPHOS 107 05/25/2025 08:55 AM    ALT 20 05/25/2025 08:55 AM    AST 32 05/25/2025 08:55 AM    BILITOT 0.3 05/25/2025 08:55 AM    BILIDIR <0.2 11/06/2023 06:39 AM    AMYLASE 83 03/20/2019 08:45 PM    LIPASE 28.0 05/25/2025 08:55 AM     Significant Diagnostic Studies:   CTA chest negative for pulmonary emboli or pulmonary infiltrates.      Current Meds:  Scheduled Meds:   sodium chloride flush  5-40 mL IntraVENous 2 times per day    amantadine  100 mg Oral Daily    carbidopa-levodopa  1 tablet Oral BID    lamoTRIgine  200 mg Oral Daily    levothyroxine  100 mcg Oral QAM AC    lithium  450 mg Oral Nightly    lurasidone  40 mg Oral Nightly    vilazodone HCl  40 mg Oral Daily    [Held by provider] sucralfate  1 g Oral 4x Daily AC & HS     Continuous Infusions:   pantoprazole 8 mg/hr (05/25/25 7322)    sodium chloride      sodium chloride      sodium chloride      sodium chloride 100 mL/hr at 05/26/25 8784       Assessment:  Acute symptomatic macrocytic anemia: Hemoglobin 14.1 on 5/24/2025 dropped to 11 with black tarry stools and shortness of breath.  Hemoglobin now stable at 7.8.  2 unit transfused  Hematochezia with melanotic stools  History of iron deficiency requiring IV iron  Batsheva-en-Y bypass: done in 2002  History of EGD, colonoscopy & capsule endoscopy with benign findings in 2022  History of AVM: descending colon AVM clipped in 2022  Recurrent peptic ulcer disease with unsuccessful clipping of the GDA    Plan:    Status post upper endoscopy -nl  Continue IV PPI infusions   Continue NPO  Retic and Iron panel ordered (shows elevated iron saturations and elevated retic).  Primary team plans to transfer to tertiary care center - [facility]   One unit of PRBC ordered.       Case and plan discussed with Dr. Sandra  Electronically signed by Pieter Devries DO on 5/26/2025 at 6:28

## 2025-05-26 NOTE — PROGRESS NOTES
[facility]--HOSPITALIST GROUP    Hospitalist PROGRESS NOTE dictated by Marlon Plata MD on 2025    Patient ID: Haily Parker is 65 y.o. and presently in room -A  : 1960 MRN: [id number]    Admit date: 2025  Primary Care Physician: Luciana Parr DO   Patient Care Team:  Luciana Parr DO as PCP - General (Family Medicine)  Patricia Erickson MD as PCP - Empaneled Provider  Hillary Short APRN - CNP as Nurse Practitioner (Certified Nurse Practitioner)  Purnima Perez APRN - CNP as Nurse Practitioner (Nurse Practitioner)  Sydney Frances MD as Consulting Physician (Gastroenterology)  Amanda Nicholas MD as Consulting Physician (Neurology)  Heather Mariee MD as Hospitalist (Internal Medicine)  Herminio Meyers DO as Resident (Family Medicine)  Tel: [phone]  Admitting Physician: No admitting provider for patient encounter.   Code Status:  Full Code    Haily Parker is a 65 y.o.  female who presented with Weakness and Hypotension    Room: -A  Admit date: 2025    Chief Complaint: Black stools     HPI: Patient is a 65-year-old female past medical history of GERD, seizures, multiple sclerosis, bipolar 1, gastric bypass, MIKEY who presented to [facility] with dark stools and abdominal pain.  Patient stated yesterday she started to have abdominal pain and noticed that her stools were black.  She reports increased weakness and shortness of breath.  She came to the ED yesterday when her hemoglobin was 14.1.  Her weakness and shortness of breath progressed.  Repeat labs show a hemoglobin of 11 today.  Not on any blood thinners or anticoagulation.  Denies any fevers, chills, nausea, vomiting.  -----------------    2025: Patient denies dizziness, chest pain, shortness of breath, cough or abdominal pain but has been having melanotic stool with some tinges of blood.  Patient agreeable to transfer to [facility] if needed.    Hemoglobin 7.0.  Earlier  tract infection).    SURGICAL HISTORY      has a past surgical history that includes Carpal tunnel release (1999); other surgical history; other surgical history (1986); Batsheva-en-Y Gastric Bypass (2000); other surgical history (2006); bone graft (Left); Cholecystectomy, laparoscopic (05/16/2016); laparoscopic appendectomy (05/16/2016); Appendectomy; Enteroscopy (N/A, 02/05/2018); pr colonoscopy flx dx w/collj spec when pfrmd (Left, 02/06/2018); pr office/outpt visit,procedure only (Left, 02/18/2018); Colonoscopy; Endoscopy, colon, diagnostic; Dilatation, esophagus; pr office/outpt visit,procedure only (N/A, 10/04/2018); Hysterectomy, vaginal; Pacemaker insertion (Right, 05/20/2019); pacemaker placement; Gastrostomy tube placement (N/A, 09/28/2020); Colonoscopy (N/A, 01/16/2022); Hysterectomy (1963); Ovary removal (N/A); Upper gastrointestinal endoscopy (Left, 09/24/2019); Upper gastrointestinal endoscopy (N/A, 09/26/2020); Upper gastrointestinal endoscopy (N/A, 01/16/2022); Upper gastrointestinal endoscopy (04/13/2022); eye surgery (Left, 09/29/2022); US BIOPSY THYROID (01/2024); Cataract extraction (Bilateral); and US ASP/INJ THYROID CYST (2/4/2025).    CURRENT MEDICATIONS     sodium chloride flush, 5-40 mL, 2 times per day  amantadine, 100 mg, Daily  carbidopa-levodopa, 1 tablet, BID  lamoTRIgine, 200 mg, Daily  levothyroxine, 100 mcg, QAM AC  lithium, 450 mg, Nightly  lurasidone, 40 mg, Nightly  vilazodone HCl, 40 mg, Daily  [Held by provider] sucralfate, 1 g, 4x Daily AC & HS        Continuous Infusions:    pantoprazole 8 mg/hr (05/26/25 0919)    sodium chloride      sodium chloride      sodium chloride      sodium chloride 100 mL/hr at 05/26/25 0725       PRN:  sodium chloride flush, 5-40 mL, PRN  sodium chloride, , PRN  potassium chloride, 40 mEq, PRN   Or  potassium alternative oral replacement, 40 mEq, PRN   Or  potassium chloride, 10 mEq, PRN  magnesium sulfate, 2,000 mg, PRN  ondansetron, 4 mg, Q8H PRN

## 2025-05-29 ENCOUNTER — TELEPHONE (OUTPATIENT)
Dept: FAMILY MEDICINE CLINIC | Age: 65
End: 2025-05-29

## 2025-05-29 NOTE — TELEPHONE ENCOUNTER
Received call from patients sister stating that patient is currently admitted into [facility] for a gastrointestinal bleed. Canceled patients 06/03/2025 appointment, and informed them to call to reschedule when she is feeling better.

## 2025-05-30 ENCOUNTER — TELEPHONE (OUTPATIENT)
Dept: FAMILY MEDICINE CLINIC | Age: 65
End: 2025-05-30

## 2025-05-30 NOTE — TELEPHONE ENCOUNTER
Provider received a call from Tere with [facility] home health regarding verbal home health orders for Nursing, PT/OT stating PCP will be following patients care.  Provider reached out to PCP to confirm the plan.  Provider reached out to Tere to confirm verbal orders, and that PCP would be following.

## 2025-05-30 NOTE — TELEPHONE ENCOUNTER
[facility] Tere called asking for a verbal order for patient to receive Nursing PT/OT .    Patient is currently at [facility] discharge plan is to discharge tomorrow 05/31/2025  .    Please call tere back for verbal orders for Nursing PT/ OT  at [phone] . Tere does need verbal before patient is able to schedule for any follow up therapy .     Thank you .

## 2025-06-07 ENCOUNTER — HOSPITAL ENCOUNTER (OUTPATIENT)
Age: 65
Discharge: HOME | End: 2025-06-07
Payer: MEDICARE

## 2025-06-07 LAB
ANISOCYTOSIS BLD QL SMEAR: PRESENT
BASOPHILS ABSOLUTE: 0 THOU/MM3 (ref 0–0.1)
BASOPHILS NFR BLD AUTO: 0.7 %
DEPRECATED RDW RBC AUTO: 76.7 FL (ref 35–45)
EOSINOPHIL NFR BLD AUTO: 1.3 %
EOSINOPHILS ABSOLUTE: 0.1 THOU/MM3 (ref 0–0.4)
ERYTHROCYTE [DISTWIDTH] IN BLOOD BY AUTOMATED COUNT: 20.5 % (ref 11.5–14.5)
HCT VFR BLD AUTO: 33.8 % (ref 37–47)
HGB BLD-MCNC: 10.4 GM/DL (ref 12–16)
IMM GRANULOCYTES # BLD AUTO: 0.05 THOU/MM3 (ref 0–0.07)
IMM GRANULOCYTES NFR BLD AUTO: 0.8 %
LYMPHOCYTES ABSOLUTE: 0.7 THOU/MM3 (ref 1–4.8)
LYMPHOCYTES NFR BLD AUTO: 10.7 %
MCH RBC QN AUTO: 31.5 PG (ref 26–33)
MCHC RBC AUTO-ENTMCNC: 30.8 GM/DL (ref 32.2–35.5)
MCV RBC AUTO: 102.4 FL (ref 81–99)
MONOCYTES ABSOLUTE: 0.5 THOU/MM3 (ref 0.4–1.3)
MONOCYTES NFR BLD AUTO: 8.4 %
NEUTROPHILS ABSOLUTE: 4.8 THOU/MM3 (ref 1.8–7.7)
NEUTROPHILS NFR BLD AUTO: 78.1 %
NRBC BLD AUTO-RTO: 0 /100 WBC
PLATELET # BLD AUTO: 279 THOU/MM3 (ref 130–400)
PLATELET BLD QL SMEAR: ADEQUATE
PMV BLD AUTO: 9.6 FL (ref 9.4–12.4)
RBC # BLD AUTO: 3.3 MILL/MM3 (ref 4.2–5.4)
SCAN OF BLOOD SMEAR: NORMAL
WBC # BLD AUTO: 6.1 THOU/MM3 (ref 4.8–10.8)

## 2025-06-07 PROCEDURE — 36415 COLL VENOUS BLD VENIPUNCTURE: CPT

## 2025-06-07 PROCEDURE — 85025 COMPLETE CBC W/AUTO DIFF WBC: CPT

## 2025-06-09 ENCOUNTER — TELEPHONE (OUTPATIENT)
Dept: FAMILY MEDICINE CLINIC | Age: 65
End: 2025-06-09

## 2025-06-09 ENCOUNTER — RESULTS FOLLOW-UP (OUTPATIENT)
Dept: PSYCHIATRY | Age: 65
End: 2025-06-09

## 2025-06-09 DIAGNOSIS — F31.75 BIPOLAR I, MOST RECENT EPISODE DEPRESSED, PARTIAL REMISSION (HCC): ICD-10-CM

## 2025-06-09 NOTE — TELEPHONE ENCOUNTER
Haily called into the office leaving a VM stating that she needs refills on both her Lamictal 200mg and Latuda 40mg; both last sent on 03/03/25 for #28 with 2 refills.     Medications are pending your approval for the 28 day supply (pill packs) with 2 refills; she is scheduled to return on 07/15/25. Last seen on 04/15/25.

## 2025-06-10 RX ORDER — LURASIDONE HYDROCHLORIDE 40 MG/1
40 TABLET, FILM COATED ORAL NIGHTLY
Qty: 28 TABLET | Refills: 2 | Status: SHIPPED | OUTPATIENT
Start: 2025-06-10

## 2025-06-10 RX ORDER — LAMOTRIGINE 200 MG/1
TABLET ORAL
Qty: 28 TABLET | Refills: 2 | Status: SHIPPED | OUTPATIENT
Start: 2025-06-10

## 2025-06-10 NOTE — PROGRESS NOTES
gastric bypass, AVMs, recurrent peptic ulcer disease, iron   deficiency    -H&P (5/25):  \"I had surgery place surgical G-tube and performed an   interoperative EGD where I found a bleeding duodenal ulcer which I   cauterized.\"    -H&P (5/25): \"Returned back to the ED today found to have a hemoglobin of 11   with black stools.  She does not take any blood thinners or anticoagulation at   home.  Fecal occult positive\"    -GI (5/26): \"Acute symptomatic macrocytic anemia: Hemoglobin 14.1 on 5/24/2025   dropped to 11 with black tarry stools and shortness of breath.  Hemoglobin   now stable at 7.8.  2 unit transfused  Hematochezia with melanotic stools  History of iron deficiency requiring IV iron\"    -Discharge Summary (5/26): \"Esophagus, Stomach pouch, GJ anastomosis and   Jejunum - normal per enteroscopy 5/25/2025. Duodenum, Gastric remnant,  J-J   anastomosis - unable to reach due to anatomy.\"    -Labs (5/25-5/26): Ferritin: 144, Iron: 122, TIBC: 193, Iron%: 63; HGB:   11.1-7.0    -Treatment: IV Protonix, IV Ferric gluconate, and NSS @100mL/hr per MAR,   PRBCs, Enteroscopy performed by GI, GI c/s, lab monitoring, transfer to   tertiary center  Options provided:  -- Related to acute blood loss superimposed upon iron deficiency  -- Related to acute blood loss  -- Related to iron deficiency  -- Related to acute on chronic blood loss  -- Other - I will add my own diagnosis  -- Disagree - Not applicable / Not valid  -- Disagree - Clinically unable to determine / Unknown  -- Refer to Clinical Documentation Reviewer    PROVIDER RESPONSE TEXT:    The patients anemia is related to acute blood loss.    Query created by: Pk Salinas on 6/10/2025 11:01 AM      Electronically signed by:  Marlon Plata MD 6/10/2025 2:33 PM

## 2025-06-16 ENCOUNTER — OFFICE VISIT (OUTPATIENT)
Dept: CARDIOLOGY CLINIC | Age: 65
End: 2025-06-16
Payer: MEDICARE

## 2025-06-16 ENCOUNTER — CLINICAL SUPPORT (OUTPATIENT)
Dept: CARDIOLOGY CLINIC | Age: 65
End: 2025-06-16

## 2025-06-16 VITALS
HEIGHT: 56 IN | WEIGHT: 126.6 LBS | DIASTOLIC BLOOD PRESSURE: 72 MMHG | BODY MASS INDEX: 28.48 KG/M2 | SYSTOLIC BLOOD PRESSURE: 138 MMHG | HEART RATE: 82 BPM

## 2025-06-16 DIAGNOSIS — Z95.0 PACEMAKER: Primary | ICD-10-CM

## 2025-06-16 DIAGNOSIS — R00.1 BRADYCARDIA: Primary | ICD-10-CM

## 2025-06-16 DIAGNOSIS — Z95.0 PACEMAKER: ICD-10-CM

## 2025-06-16 PROCEDURE — 1123F ACP DISCUSS/DSCN MKR DOCD: CPT | Performed by: NUCLEAR MEDICINE

## 2025-06-16 PROCEDURE — G8417 CALC BMI ABV UP PARAM F/U: HCPCS | Performed by: NUCLEAR MEDICINE

## 2025-06-16 PROCEDURE — G8399 PT W/DXA RESULTS DOCUMENT: HCPCS | Performed by: NUCLEAR MEDICINE

## 2025-06-16 PROCEDURE — G8428 CUR MEDS NOT DOCUMENT: HCPCS | Performed by: NUCLEAR MEDICINE

## 2025-06-16 PROCEDURE — 3017F COLORECTAL CA SCREEN DOC REV: CPT | Performed by: NUCLEAR MEDICINE

## 2025-06-16 PROCEDURE — 1111F DSCHRG MED/CURRENT MED MERGE: CPT | Performed by: NUCLEAR MEDICINE

## 2025-06-16 PROCEDURE — 1090F PRES/ABSN URINE INCON ASSESS: CPT | Performed by: NUCLEAR MEDICINE

## 2025-06-16 PROCEDURE — 1036F TOBACCO NON-USER: CPT | Performed by: NUCLEAR MEDICINE

## 2025-06-16 PROCEDURE — 99213 OFFICE O/P EST LOW 20 MIN: CPT | Performed by: NUCLEAR MEDICINE

## 2025-06-16 NOTE — PROGRESS NOTES
[facility] [place] [facility]  [facility] CARDIOLOGY  [address]  [address]  [facility] [address]  Dept: [phone]  Dept Fax: [phone]  Loc: [phone]    Visit Date: 6/16/2025    Haily Parker is a 65 y.o. female who presents todayfor:  Chief Complaint   Patient presents with    Follow-up    Palpitations    Shortness of Breath   Used to see Brian   Had bradycardia at some point   Pacer is followed and doing well   Known MS and limitation   No chest pain   No changes in breathing  BP is stable  No dizziness  No syncope  Lately GI issues       HPI:  HPI  Past Medical History:   Diagnosis Date    Balance problem     2/2 MS    Bipolar I disorder, most recent episode (or current) depressed, in partial or unspecified remission 05/08/2013    Depression     Gallstones     Gastric bypass status for obesity 2002    GERD (gastroesophageal reflux disease)     History of hysterectomy     REMOVAL OF ONE OVARY     Hx of cervical cancer     s/p hysterectomy    Hypothyroidism     Liver cyst     GI Dr [address]    Liver lesion     MS (multiple sclerosis) (HCC)     MIKEY (obstructive sleep apnea) 7/19/2024    Paresthesias     RT LOWER LIMB    Ulcer at site of surgical anastomosis following bypass of stomach 01/15/2022    UTI (urinary tract infection)       Past Surgical History:   Procedure Laterality Date    APPENDECTOMY      BONE GRAFT Left     left leg to right arm    CARPAL TUNNEL RELEASE  1999    CATARACT EXTRACTION Bilateral     2024    CHOLECYSTECTOMY, LAPAROSCOPIC  05/16/2016    COLONOSCOPY      COLONOSCOPY N/A 01/16/2022    COLONOSCOPY CONTROL HEMORRHAGE performed by Shea Pedro MD at [facility] OR    DILATATION, ESOPHAGUS      ENDOSCOPY, COLON, DIAGNOSTIC      ENTEROSCOPY N/A 02/05/2018    ENTEROSCOPY performed by David Jansen MD at [facility] End[facility]    EYE SURGERY Left 09/29/2022    Retina Surgery    GASTROSTOMY TUBE PLACEMENT N/A 09/28/2020    GASTROSTOMY TUBE PLACEMENT-

## 2025-06-17 ENCOUNTER — TELEPHONE (OUTPATIENT)
Dept: FAMILY MEDICINE CLINIC | Age: 65
End: 2025-06-17

## 2025-06-18 ENCOUNTER — OFFICE VISIT (OUTPATIENT)
Dept: FAMILY MEDICINE CLINIC | Age: 65
End: 2025-06-18

## 2025-06-18 VITALS
HEART RATE: 89 BPM | BODY MASS INDEX: 28.34 KG/M2 | RESPIRATION RATE: 16 BRPM | TEMPERATURE: 98.1 F | OXYGEN SATURATION: 99 % | WEIGHT: 126 LBS | DIASTOLIC BLOOD PRESSURE: 84 MMHG | HEIGHT: 56 IN | SYSTOLIC BLOOD PRESSURE: 124 MMHG

## 2025-06-18 DIAGNOSIS — Z87.19 HISTORY OF GASTROESOPHAGEAL REFLUX (GERD): ICD-10-CM

## 2025-06-18 DIAGNOSIS — Z09 HOSPITAL DISCHARGE FOLLOW-UP: ICD-10-CM

## 2025-06-18 DIAGNOSIS — K26.4 GASTROINTESTINAL HEMORRHAGE ASSOCIATED WITH DUODENAL ULCER: Primary | ICD-10-CM

## 2025-06-18 DIAGNOSIS — Z98.84 HISTORY OF GASTRIC BYPASS: ICD-10-CM

## 2025-06-18 DIAGNOSIS — K27.9 PEPTIC ULCER DISEASE: ICD-10-CM

## 2025-06-18 ASSESSMENT — ENCOUNTER SYMPTOMS
CONSTIPATION: 0
DIARRHEA: 0
ABDOMINAL DISTENTION: 0
NAUSEA: 0
BLOOD IN STOOL: 0

## 2025-06-18 NOTE — PROGRESS NOTES
Haily Parker is a 65 y.o. female who presents today for:  Chief Complaint   Patient presents with    Follow-Up from Hospital     GIB  Discharged from [facility]   05/26 - 06/03     HPI:   Haily Parker is 65 y.o. who presents today for hospital follow up.    Pt in hospital at [facility] from 5/26 - 6/3, admitted for GIB.  Pt reports that she stopped taking her PPI due to concerns for causing osteoporosis.  In hospital for GI bleed with hemoglobin as low as ~ 7.0, requiring blood transfusion during admission.  Per chart review of hospital admission, EGD done during admission showed non-bleeding duodenal ulcer -- > AXIOS stent inserted in duodenum (to aid with dilatation and drainage of GI fluids).  Patient discharged on PPI twice daily x 3 months followed by daily after that.  Patient advised to avoid NSAID use, given above findings and concern for GIB.  Patient has follow-up scheduled in July as well as August for further evaluation of stent removal.    As well, Colonoscopy done during admission (5/29) showed inadequate prep, normal ileum, blood in entire colon.  No source of acute bleeding noted in report.    Today, patient presents for follow-up since hospital discharge.  Patient reports doing well overall.  Denies any further episodes of blood noted in stool, urine, or any other symptoms of lightheadedness, dizziness, weakness, fatigue, syncopal/presyncopal episodes, falls associated with it.  Denies any abdominal pain, N/V/D, constipation, urinary concerns, fever, chills during this time since discharge.    6/7 CBC showed Hb 10.4, significantly improved since prior check during hospital admission.  6/3 Hb  9.1 (improved during hospital stay) << 8.7 on 5/30  << 7.3 on 5/27.    No other acute concerns or complaints noted today.    Objective:     Vitals:    06/18/25 1104   BP: 124/84   BP Site: Left Upper Arm   Patient Position: Sitting   BP Cuff Size: Medium Adult   Pulse: 89   Resp: 16   Temp: 98.1 °F (36.7 °C)

## 2025-06-18 NOTE — PROGRESS NOTES
Health Maintenance Due   Topic Date Due    Pneumococcal 50+ years Vaccine (3 of 3 - PCV20 or PCV21) 04/10/2024    COVID-19 Vaccine (4 - 2024-25 season) 09/01/2024    Annual Wellness Visit (Medicare Advantage)  01/01/2025

## 2025-06-26 ENCOUNTER — TELEPHONE (OUTPATIENT)
Dept: FAMILY MEDICINE CLINIC | Age: 65
End: 2025-06-26

## 2025-06-26 NOTE — TELEPHONE ENCOUNTER
Forms completed and placed in Ave's mailbox.     Electronically signed by Luciana Parr DO on 6/26/2025 at 4:26 PM

## 2025-07-15 ENCOUNTER — OFFICE VISIT (OUTPATIENT)
Dept: PSYCHIATRY | Age: 65
End: 2025-07-15
Payer: MEDICARE

## 2025-07-15 ENCOUNTER — HOSPITAL ENCOUNTER (OUTPATIENT)
Age: 65
Discharge: HOME | End: 2025-07-15
Payer: MEDICARE

## 2025-07-15 VITALS
DIASTOLIC BLOOD PRESSURE: 68 MMHG | SYSTOLIC BLOOD PRESSURE: 109 MMHG | HEART RATE: 89 BPM | WEIGHT: 125 LBS | TEMPERATURE: 97.2 F | HEIGHT: 56 IN | OXYGEN SATURATION: 97 % | BODY MASS INDEX: 28.12 KG/M2

## 2025-07-15 DIAGNOSIS — Z51.81 THERAPEUTIC DRUG MONITORING: ICD-10-CM

## 2025-07-15 DIAGNOSIS — R53.83 OTHER FATIGUE: ICD-10-CM

## 2025-07-15 DIAGNOSIS — G35 MULTIPLE SCLEROSIS (HCC): ICD-10-CM

## 2025-07-15 DIAGNOSIS — F31.75 BIPOLAR I, MOST RECENT EPISODE DEPRESSED, PARTIAL REMISSION (HCC): Primary | ICD-10-CM

## 2025-07-15 LAB
BASOPHILS ABSOLUTE: 0 THOU/MM3 (ref 0–0.1)
BASOPHILS NFR BLD AUTO: 0.7 %
DEPRECATED RDW RBC AUTO: 58.2 FL (ref 35–45)
EOSINOPHIL NFR BLD AUTO: 2.1 %
EOSINOPHILS ABSOLUTE: 0.1 THOU/MM3 (ref 0–0.4)
ERYTHROCYTE [DISTWIDTH] IN BLOOD BY AUTOMATED COUNT: 15.1 % (ref 11.5–14.5)
FERRITIN SERPL IA-MCNC: 72 NG/ML (ref 13–150)
FOLATE SERPL-MCNC: > 20 NG/ML (ref 4.6–34.8)
HCT VFR BLD AUTO: 43 % (ref 37–47)
HGB BLD-MCNC: 13.2 GM/DL (ref 12–16)
IMM GRANULOCYTES # BLD AUTO: 0.04 THOU/MM3 (ref 0–0.07)
IMM GRANULOCYTES NFR BLD AUTO: 0.7 %
IRON SATN MFR SERPL: 29 % (ref 20–50)
IRON SERPL-MCNC: 77 UG/DL (ref 37–145)
LYMPHOCYTES ABSOLUTE: 0.9 THOU/MM3 (ref 1–4.8)
LYMPHOCYTES NFR BLD AUTO: 16.6 %
MCH RBC QN AUTO: 31.8 PG (ref 26–33)
MCHC RBC AUTO-ENTMCNC: 30.7 GM/DL (ref 32.2–35.5)
MCV RBC AUTO: 103.6 FL (ref 81–99)
MONOCYTES ABSOLUTE: 0.4 THOU/MM3 (ref 0.4–1.3)
MONOCYTES NFR BLD AUTO: 6.3 %
NEUTROPHILS ABSOLUTE: 4.1 THOU/MM3 (ref 1.8–7.7)
NEUTROPHILS NFR BLD AUTO: 73.6 %
NRBC BLD AUTO-RTO: 0 /100 WBC
PLATELET # BLD AUTO: 174 THOU/MM3 (ref 130–400)
PMV BLD AUTO: 10.3 FL (ref 9.4–12.4)
RBC # BLD AUTO: 4.15 MILL/MM3 (ref 4.2–5.4)
TIBC SERPL-MCNC: 263 UG/DL (ref 171–450)
VIT B12 SERPL-MCNC: 1721 PG/ML (ref 232–1245)
WBC # BLD AUTO: 5.6 THOU/MM3 (ref 4.8–10.8)

## 2025-07-15 PROCEDURE — 1036F TOBACCO NON-USER: CPT | Performed by: NURSE PRACTITIONER

## 2025-07-15 PROCEDURE — 82746 ASSAY OF FOLIC ACID SERUM: CPT

## 2025-07-15 PROCEDURE — 36415 COLL VENOUS BLD VENIPUNCTURE: CPT

## 2025-07-15 PROCEDURE — G8427 DOCREV CUR MEDS BY ELIG CLIN: HCPCS | Performed by: NURSE PRACTITIONER

## 2025-07-15 PROCEDURE — 1123F ACP DISCUSS/DSCN MKR DOCD: CPT | Performed by: NURSE PRACTITIONER

## 2025-07-15 PROCEDURE — 3017F COLORECTAL CA SCREEN DOC REV: CPT | Performed by: NURSE PRACTITIONER

## 2025-07-15 PROCEDURE — 82728 ASSAY OF FERRITIN: CPT

## 2025-07-15 PROCEDURE — 83540 ASSAY OF IRON: CPT

## 2025-07-15 PROCEDURE — 1090F PRES/ABSN URINE INCON ASSESS: CPT | Performed by: NURSE PRACTITIONER

## 2025-07-15 PROCEDURE — G8399 PT W/DXA RESULTS DOCUMENT: HCPCS | Performed by: NURSE PRACTITIONER

## 2025-07-15 PROCEDURE — 83550 IRON BINDING TEST: CPT

## 2025-07-15 PROCEDURE — G8417 CALC BMI ABV UP PARAM F/U: HCPCS | Performed by: NURSE PRACTITIONER

## 2025-07-15 PROCEDURE — 85025 COMPLETE CBC W/AUTO DIFF WBC: CPT

## 2025-07-15 PROCEDURE — 82607 VITAMIN B-12: CPT

## 2025-07-15 PROCEDURE — 99214 OFFICE O/P EST MOD 30 MIN: CPT | Performed by: NURSE PRACTITIONER

## 2025-07-15 RX ORDER — LURASIDONE HYDROCHLORIDE 40 MG/1
40 TABLET, FILM COATED ORAL NIGHTLY
Qty: 28 TABLET | Refills: 2 | Status: SHIPPED | OUTPATIENT
Start: 2025-07-15

## 2025-07-15 RX ORDER — LITHIUM CARBONATE 300 MG/1
300 TABLET, FILM COATED, EXTENDED RELEASE ORAL NIGHTLY
Qty: 28 TABLET | Refills: 2 | Status: SHIPPED | OUTPATIENT
Start: 2025-07-15

## 2025-07-15 RX ORDER — LAMOTRIGINE 200 MG/1
TABLET ORAL
Qty: 28 TABLET | Refills: 2 | Status: SHIPPED | OUTPATIENT
Start: 2025-07-15

## 2025-07-15 RX ORDER — VILAZODONE HYDROCHLORIDE 40 MG/1
TABLET ORAL
Qty: 28 TABLET | Refills: 2 | Status: SHIPPED | OUTPATIENT
Start: 2025-07-15

## 2025-07-15 RX ORDER — AMANTADINE HYDROCHLORIDE 100 MG/1
100 CAPSULE, GELATIN COATED ORAL DAILY
Qty: 30 CAPSULE | Refills: 5 | Status: SHIPPED | OUTPATIENT
Start: 2025-07-15

## 2025-07-15 ASSESSMENT — PATIENT HEALTH QUESTIONNAIRE - PHQ9
4. FEELING TIRED OR HAVING LITTLE ENERGY: NOT AT ALL
SUM OF ALL RESPONSES TO PHQ QUESTIONS 1-9: 1
SUM OF ALL RESPONSES TO PHQ QUESTIONS 1-9: 1
6. FEELING BAD ABOUT YOURSELF - OR THAT YOU ARE A FAILURE OR HAVE LET YOURSELF OR YOUR FAMILY DOWN: NOT AT ALL
3. TROUBLE FALLING OR STAYING ASLEEP: NOT AT ALL
1. LITTLE INTEREST OR PLEASURE IN DOING THINGS: NOT AT ALL
7. TROUBLE CONCENTRATING ON THINGS, SUCH AS READING THE NEWSPAPER OR WATCHING TELEVISION: SEVERAL DAYS
SUM OF ALL RESPONSES TO PHQ QUESTIONS 1-9: 1
8. MOVING OR SPEAKING SO SLOWLY THAT OTHER PEOPLE COULD HAVE NOTICED. OR THE OPPOSITE, BEING SO FIGETY OR RESTLESS THAT YOU HAVE BEEN MOVING AROUND A LOT MORE THAN USUAL: NOT AT ALL
9. THOUGHTS THAT YOU WOULD BE BETTER OFF DEAD, OR OF HURTING YOURSELF: NOT AT ALL
5. POOR APPETITE OR OVEREATING: NOT AT ALL
10. IF YOU CHECKED OFF ANY PROBLEMS, HOW DIFFICULT HAVE THESE PROBLEMS MADE IT FOR YOU TO DO YOUR WORK, TAKE CARE OF THINGS AT HOME, OR GET ALONG WITH OTHER PEOPLE: NOT DIFFICULT AT ALL
SUM OF ALL RESPONSES TO PHQ QUESTIONS 1-9: 1
2. FEELING DOWN, DEPRESSED OR HOPELESS: NOT AT ALL

## 2025-07-15 NOTE — TELEPHONE ENCOUNTER
We have received a refill request on the following medications:  Requested Prescriptions     Pending Prescriptions Disp Refills    amantadine (SYMMETREL) 100 MG capsule 30 capsule 5     Sig: Take 1 capsule by mouth daily       Date last time medication prescribed: 1/29/25    Last Visit Date:  2/24/2025 with Bladimir Gaines MD    Next Visit Date:    8/25/2025 with Paige Leopold, CNP    Please approve or deny.    
no

## 2025-07-15 NOTE — PROGRESS NOTES
[facility]  [address]  [facility] [address]  Dept: [phone]  Dept Fax: [phone]  Loc: [phone]    Visit Date: 7/15/2025    SUBJECTIVE DATA     CHIEF COMPLAINT:    Chief Complaint   Patient presents with    Depression    Bipolar I     Medication Refill    Follow-up       History obtained from: patient    HISTORY OF PRESENT ILLNESS:    Haily Parker is a 65 y.o. female who presents to the office for follow-up on her moods. Her last visit was 4/15/2025.     States she is \"doing fine\"  -feeling well  -mood is doing well  --denies feeling down, sad, or depressed  -good motivation  -denies feeling worthless, hopeless, helpless  -denies marian/hypomania    States she had an admission at [facility] for gastric bleed since last visit  -she took herself off her protonix out of concerns for osteoporosis  -began drinking soda again  -ended up admitted at [facility] for around a week  -did some PT/OT after she was discharged from the hospital; that has ended since she has regained all her strength and gets out of the home again    She is volunteering at [facility] 5 days per week  -states \"I back up to full speed\"  -this is her favorite activity     Sleeping well  -does take a nap in the afternoon  -getting around 9 hours sleep per night      Reports anxiety is well controlled    Denies suicidal ideations, intent, plan. No homicidal ideations, intent, plan. No audiovisual hallucinations.    HPI    The patient has had 0 lifetime suicide attempts. Reports she had intense thoughts of suicide with a plan and intent back in 1987 but someone came home.    Patient reports 3 psych hospital admissions with the last admission taking place in 1996    Past psychiatric medications include: various per her report but does not recall specific names    Adverse reactions from psychotropic medications:  None      Current Psychiatric Review of Systems         Marian or Hypomania:

## 2025-07-16 ENCOUNTER — TELEPHONE (OUTPATIENT)
Dept: PSYCHIATRY | Age: 65
End: 2025-07-16

## 2025-07-16 NOTE — TELEPHONE ENCOUNTER
Patient called in with questions about a blood test.    Patient called in stating she was seen yesterday and received a order to have a lithium level done. Patient stated provider stated she wanted to see her in 3 months and told her to get the Lithium level done before her next visit. Patient stated on the order it stated expected date to be done is 07/22/25. Patient is asking when should she get it done.    Next visit 10/14/25    Pending your aadvice

## 2025-07-23 ENCOUNTER — HOSPITAL ENCOUNTER (OUTPATIENT)
Age: 65
Discharge: HOME | End: 2025-07-23
Payer: MEDICARE

## 2025-07-23 LAB
BASOPHILS ABSOLUTE: 0 THOU/MM3 (ref 0–0.1)
BASOPHILS NFR BLD AUTO: 0.4 %
DEPRECATED RDW RBC AUTO: 54.3 FL (ref 35–45)
EOSINOPHIL NFR BLD AUTO: 2.2 %
EOSINOPHILS ABSOLUTE: 0.2 THOU/MM3 (ref 0–0.4)
ERYTHROCYTE [DISTWIDTH] IN BLOOD BY AUTOMATED COUNT: 14.6 % (ref 11.5–14.5)
HCT VFR BLD AUTO: 42.6 % (ref 37–47)
HGB BLD-MCNC: 13.4 GM/DL (ref 12–16)
IMM GRANULOCYTES # BLD AUTO: 0.03 THOU/MM3 (ref 0–0.07)
IMM GRANULOCYTES NFR BLD AUTO: 0.4 %
LYMPHOCYTES ABSOLUTE: 0.8 THOU/MM3 (ref 1–4.8)
LYMPHOCYTES NFR BLD AUTO: 9.8 %
MCH RBC QN AUTO: 32 PG (ref 26–33)
MCHC RBC AUTO-ENTMCNC: 31.5 GM/DL (ref 32.2–35.5)
MCV RBC AUTO: 101.7 FL (ref 81–99)
MONOCYTES ABSOLUTE: 0.6 THOU/MM3 (ref 0.4–1.3)
MONOCYTES NFR BLD AUTO: 7.2 %
NEUTROPHILS ABSOLUTE: 6.2 THOU/MM3 (ref 1.8–7.7)
NEUTROPHILS NFR BLD AUTO: 80 %
NRBC BLD AUTO-RTO: 0 /100 WBC
PLATELET # BLD AUTO: 170 THOU/MM3 (ref 130–400)
PMV BLD AUTO: 10 FL (ref 9.4–12.4)
RBC # BLD AUTO: 4.19 MILL/MM3 (ref 4.2–5.4)
WBC # BLD AUTO: 7.7 THOU/MM3 (ref 4.8–10.8)

## 2025-07-23 PROCEDURE — 85025 COMPLETE CBC W/AUTO DIFF WBC: CPT

## 2025-07-23 PROCEDURE — 86316 IMMUNOASSAY TUMOR OTHER: CPT

## 2025-07-23 PROCEDURE — 36415 COLL VENOUS BLD VENIPUNCTURE: CPT

## 2025-07-23 PROCEDURE — 82941 ASSAY OF GASTRIN: CPT

## 2025-07-25 LAB
CGA SERPL-MCNC: 232 NG/ML (ref 0–187)
GASTRIN SERPL-MCNC: 97 PG/ML (ref 0–100)

## 2025-08-26 ENCOUNTER — OFFICE VISIT (OUTPATIENT)
Age: 65
End: 2025-08-26

## 2025-08-26 VITALS
HEART RATE: 86 BPM | WEIGHT: 128.25 LBS | BODY MASS INDEX: 28.85 KG/M2 | SYSTOLIC BLOOD PRESSURE: 124 MMHG | HEIGHT: 56 IN | DIASTOLIC BLOOD PRESSURE: 80 MMHG

## 2025-08-26 DIAGNOSIS — E04.2 MULTINODULAR GOITER: ICD-10-CM

## 2025-08-26 DIAGNOSIS — M81.0 POSTMENOPAUSAL OSTEOPOROSIS: ICD-10-CM

## 2025-08-26 DIAGNOSIS — E83.52 HYPERCALCEMIA: Primary | ICD-10-CM

## 2025-08-26 DIAGNOSIS — Z98.84 GASTRIC BYPASS STATUS FOR OBESITY: ICD-10-CM

## 2025-08-26 DIAGNOSIS — E21.3 HYPERPARATHYROIDISM: ICD-10-CM

## 2025-08-26 RX ORDER — PANTOPRAZOLE SODIUM 40 MG/1
TABLET, DELAYED RELEASE ORAL
COMMUNITY
Start: 2025-07-28

## (undated) DEVICE — SPONGE GZ W4XL4IN COT 12 PLY TYP VII WVN C FLD DSGN

## (undated) DEVICE — PACK PROCEDURE SURG SET UP SRMC

## (undated) DEVICE — BEDPAN PT AD 1600ML W11XH2.75XL14IN STD GRAPHITE PLAS STK

## (undated) DEVICE — APPLICATOR MEDICATED 26 CC SOLUTION HI LT ORNG CHLORAPREP

## (undated) DEVICE — CONMED SCOPE SAVER BITE BLOCK, 20X27 MM: Brand: SCOPE SAVER

## (undated) DEVICE — SYRINGE, 10ML SALINE IN 10ML: Brand: MEDLINE

## (undated) DEVICE — GLOVE ORANGE PI 7 1/2   MSG9075

## (undated) DEVICE — GLOVE ORANGE PI 7   MSG9070

## (undated) DEVICE — TUBING, SUCTION, 1/4" X 20', STRAIGHT: Brand: MEDLINE INDUSTRIES, INC.

## (undated) DEVICE — ENDO KIT: Brand: MEDLINE INDUSTRIES, INC.

## (undated) DEVICE — GLOVE ORTHO 8   MSG9480

## (undated) DEVICE — GOWN,SIRUS,NONRNF,SETINSLV,XL,20/CS: Brand: MEDLINE

## (undated) DEVICE — DEFENDO AIR WATER SUCTION AND BIOPSY VALVE KIT FOR  OLYMPUS: Brand: DEFENDO AIR/WATER/SUCTION AND BIOPSY VALVE

## (undated) DEVICE — SOLUTION IV 1000ML 0.45% SOD CHL PH 5 INJ USP VIAFLX PLAS

## (undated) DEVICE — SET ADMIN 25ML L117IN PMP MOD CK VLV RLER CLMP 2 SMRTSITE

## (undated) DEVICE — GLOVE ORANGE PI 8   MSG9080

## (undated) DEVICE — YANKAUER,BULB TIP,W/O VENT,RIGID,STERILE: Brand: MEDLINE

## (undated) DEVICE — DRESSING TRNSPAR W5XL4.5IN FLM SHT SEMIPERMEABLE WIND

## (undated) DEVICE — SET LNR RED GRN W/ BASE CLEANASCOPE

## (undated) DEVICE — CONNECTOR TBNG AUX H2O JET DISP FOR OLY 160/180 SER

## (undated) DEVICE — CANISTER, RIGID, 2000CC: Brand: MEDLINE INDUSTRIES, INC.

## (undated) DEVICE — ADHESIVE SKIN CLSR 0.7ML TOP DERMBND ADV

## (undated) DEVICE — COVER ARMBRD W13XL28.5IN IMPERV BLU FOR OP RM

## (undated) DEVICE — TUBING IV STOPCOCK 48 CM 3 W

## (undated) DEVICE — GOWN,SIRUS,NON REINFRCD,LARGE,SET IN SL: Brand: MEDLINE

## (undated) DEVICE — SPONGE LAP W18XL18IN WHT COT 4 PLY FLD STRUNG RADPQ DISP ST

## (undated) DEVICE — PENCIL SMK EVAC ALL IN 1 DSGN ENH VISIBILITY IMPROVED AIR

## (undated) DEVICE — TUBE GASTROSTMY 24FR MED GRD SIL FEED GAM RECESS DST TIP

## (undated) DEVICE — SYRINGE IRRIG 60ML SFT PLIABLE BLB EZ TO GRP 1 HND USE W/

## (undated) DEVICE — DISPOSABLE DISTAL ATTACHMENT: Brand: DISPOSABLE DISTAL ATTACHMENT

## (undated) DEVICE — ELECTRODE PT RET AD L9FT HI MOIST COND ADH HYDRGEL CORDED

## (undated) DEVICE — GLOVE ORANGE PI 8 1/2   MSG9085

## (undated) DEVICE — KIT INF CTRL 2OZ LUB TBNG L12FT DBL END BRSH SYR OP4

## (undated) DEVICE — DRAIN CHN 19FR L0.25IN DIA6.3MM SIL RND HUBLESS FULL FLUT

## (undated) DEVICE — GLOVE SURG SZ 65 THK91MIL LTX FREE SYN POLYISOPRENE

## (undated) DEVICE — 4-PORT MANIFOLD: Brand: NEPTUNE 2

## (undated) DEVICE — TOTAL TRAY, 16FR 10ML SIL FOLEY, URN: Brand: MEDLINE

## (undated) DEVICE — SET INFUS PRIMING 33ML L121IN 15DROP/ML FLTR 180UM 2.3LB

## (undated) DEVICE — GOWN,AURORA,NON-REINFORCED,2XL: Brand: MEDLINE

## (undated) DEVICE — EVACUATOR SURG 100CC SIL BLB SUCT RESVR FOR CLS WND DRNGE

## (undated) DEVICE — BIOGUARD A/W CLEANING ADAPTER

## (undated) DEVICE — CLIP LIG L235CM RESOL 360 BX/20

## (undated) DEVICE — SPONGE DRN W4XL4IN RAYON/POLYESTER 6 PLY NONWOVEN PRECUT